# Patient Record
Sex: FEMALE | Race: OTHER | HISPANIC OR LATINO | Employment: OTHER | URBAN - METROPOLITAN AREA
[De-identification: names, ages, dates, MRNs, and addresses within clinical notes are randomized per-mention and may not be internally consistent; named-entity substitution may affect disease eponyms.]

---

## 2017-04-04 ENCOUNTER — HOSPITAL ENCOUNTER (EMERGENCY)
Facility: HOSPITAL | Age: 72
Discharge: HOME | End: 2017-04-04
Admitting: EMERGENCY MEDICINE
Payer: COMMERCIAL

## 2017-04-04 ENCOUNTER — APPOINTMENT (EMERGENCY)
Dept: RADIOLOGY | Facility: HOSPITAL | Age: 72
End: 2017-04-04
Payer: COMMERCIAL

## 2017-04-04 VITALS
RESPIRATION RATE: 20 BRPM | DIASTOLIC BLOOD PRESSURE: 86 MMHG | TEMPERATURE: 98.2 F | HEART RATE: 91 BPM | SYSTOLIC BLOOD PRESSURE: 160 MMHG | OXYGEN SATURATION: 94 % | WEIGHT: 197.4 LBS

## 2017-04-04 DIAGNOSIS — T14.8XXA CONTUSION: ICD-10-CM

## 2017-04-04 DIAGNOSIS — S62.91XA HAND FRACTURE, RIGHT: Primary | ICD-10-CM

## 2017-04-04 PROCEDURE — 73130 X-RAY EXAM OF HAND: CPT

## 2017-04-04 PROCEDURE — 99283 EMERGENCY DEPT VISIT LOW MDM: CPT

## 2017-04-04 RX ORDER — IBUPROFEN 600 MG/1
600 TABLET ORAL EVERY 6 HOURS PRN
Qty: 15 TABLET | Refills: 0 | Status: SHIPPED | OUTPATIENT
Start: 2017-04-04 | End: 2019-02-14

## 2017-04-04 RX ORDER — HYDROCODONE BITARTRATE AND ACETAMINOPHEN 5; 325 MG/1; MG/1
1 TABLET ORAL EVERY 6 HOURS PRN
Qty: 6 TABLET | Refills: 0 | Status: SHIPPED | OUTPATIENT
Start: 2017-04-04 | End: 2017-09-04

## 2017-04-12 ENCOUNTER — ALLSCRIPTS OFFICE VISIT (OUTPATIENT)
Dept: OTHER | Facility: OTHER | Age: 72
End: 2017-04-12

## 2017-05-05 ENCOUNTER — ALLSCRIPTS OFFICE VISIT (OUTPATIENT)
Dept: OTHER | Facility: OTHER | Age: 72
End: 2017-05-05

## 2017-05-05 ENCOUNTER — HOSPITAL ENCOUNTER (OUTPATIENT)
Dept: RADIOLOGY | Facility: OTHER | Age: 72
Discharge: HOME | End: 2017-05-05
Payer: COMMERCIAL

## 2017-05-05 DIAGNOSIS — M19.031 PRIMARY OSTEOARTHRITIS OF RIGHT WRIST: ICD-10-CM

## 2017-05-05 DIAGNOSIS — S62.316A CLOSED DISPLACED FRACTURE OF BASE OF FIFTH METACARPAL BONE OF RIGHT HAND: ICD-10-CM

## 2017-05-05 DIAGNOSIS — M25.631 STIFFNESS OF RIGHT WRIST, NOT ELSEWHERE CLASSIFIED: ICD-10-CM

## 2017-05-05 PROCEDURE — 73130 X-RAY EXAM OF HAND: CPT

## 2017-05-10 ENCOUNTER — APPOINTMENT (OUTPATIENT)
Dept: OCCUPATIONAL THERAPY | Facility: MEDICAL CENTER | Age: 72
End: 2017-05-10
Payer: COMMERCIAL

## 2017-05-10 PROCEDURE — 97165 OT EVAL LOW COMPLEX 30 MIN: CPT

## 2017-05-10 PROCEDURE — 97140 MANUAL THERAPY 1/> REGIONS: CPT

## 2017-05-10 PROCEDURE — 97010 HOT OR COLD PACKS THERAPY: CPT

## 2017-05-17 ENCOUNTER — APPOINTMENT (OUTPATIENT)
Dept: OCCUPATIONAL THERAPY | Facility: MEDICAL CENTER | Age: 72
End: 2017-05-17
Payer: COMMERCIAL

## 2017-05-17 PROCEDURE — 97140 MANUAL THERAPY 1/> REGIONS: CPT

## 2017-05-17 PROCEDURE — 97110 THERAPEUTIC EXERCISES: CPT

## 2017-05-17 PROCEDURE — 97010 HOT OR COLD PACKS THERAPY: CPT

## 2017-08-18 ENCOUNTER — TRANSCRIBE ORDERS (OUTPATIENT)
Dept: ADMINISTRATIVE | Facility: HOSPITAL | Age: 72
End: 2017-08-18

## 2017-08-18 DIAGNOSIS — Z12.31 ENCOUNTER FOR MAMMOGRAM TO ESTABLISH BASELINE MAMMOGRAM: Primary | ICD-10-CM

## 2017-08-23 ENCOUNTER — HOSPITAL ENCOUNTER (OUTPATIENT)
Dept: MAMMOGRAPHY | Facility: HOSPITAL | Age: 72
Discharge: HOME | End: 2017-08-23
Payer: COMMERCIAL

## 2017-08-23 ENCOUNTER — TRANSCRIBE ORDERS (OUTPATIENT)
Dept: ADMINISTRATIVE | Facility: HOSPITAL | Age: 72
End: 2017-08-23

## 2017-08-23 ENCOUNTER — HOSPITAL ENCOUNTER (OUTPATIENT)
Dept: NON INVASIVE DIAGNOSTICS | Facility: HOSPITAL | Age: 72
Discharge: HOME | End: 2017-08-23
Payer: COMMERCIAL

## 2017-08-23 ENCOUNTER — GENERIC CONVERSION - ENCOUNTER (OUTPATIENT)
Dept: OTHER | Facility: OTHER | Age: 72
End: 2017-08-23

## 2017-08-23 DIAGNOSIS — Z12.31 ENCOUNTER FOR MAMMOGRAM TO ESTABLISH BASELINE MAMMOGRAM: ICD-10-CM

## 2017-08-23 DIAGNOSIS — I49.9 VENTRICULAR ARRHYTHMIA: ICD-10-CM

## 2017-08-23 DIAGNOSIS — I49.9 VENTRICULAR ARRHYTHMIA: Primary | ICD-10-CM

## 2017-08-23 PROCEDURE — G0202 SCR MAMMO BI INCL CAD: HCPCS

## 2017-08-23 PROCEDURE — 93005 ELECTROCARDIOGRAM TRACING: CPT

## 2017-08-24 LAB
ATRIAL RATE: 83 BPM
P AXIS: 45 DEGREES
PR INTERVAL: 170 MS
QRS AXIS: 19 DEGREES
QRSD INTERVAL: 90 MS
QT INTERVAL: 390 MS
QTC INTERVAL: 458 MS
T WAVE AXIS: 38 DEGREES
VENTRICULAR RATE: 83 BPM

## 2017-08-30 ENCOUNTER — HOSPITAL ENCOUNTER (OUTPATIENT)
Dept: MAMMOGRAPHY | Facility: CLINIC | Age: 72
Discharge: HOME | End: 2017-08-30
Payer: COMMERCIAL

## 2017-08-30 ENCOUNTER — TRANSCRIBE ORDERS (OUTPATIENT)
Dept: ADMINISTRATIVE | Facility: HOSPITAL | Age: 72
End: 2017-08-30

## 2017-08-30 ENCOUNTER — HOSPITAL ENCOUNTER (OUTPATIENT)
Dept: ULTRASOUND IMAGING | Facility: CLINIC | Age: 72
Discharge: HOME | End: 2017-08-30
Payer: COMMERCIAL

## 2017-08-30 DIAGNOSIS — Z12.31 SCREENING MAMMOGRAM, ENCOUNTER FOR: Primary | ICD-10-CM

## 2017-08-30 DIAGNOSIS — R92.8 ABNORMAL SCREENING MAMMOGRAM: ICD-10-CM

## 2017-08-30 PROCEDURE — G0206 DX MAMMO INCL CAD UNI: HCPCS

## 2017-08-30 PROCEDURE — G0279 TOMOSYNTHESIS, MAMMO: HCPCS

## 2017-08-30 PROCEDURE — 76642 ULTRASOUND BREAST LIMITED: CPT

## 2017-09-04 ENCOUNTER — APPOINTMENT (EMERGENCY)
Dept: RADIOLOGY | Facility: HOSPITAL | Age: 72
End: 2017-09-04
Payer: COMMERCIAL

## 2017-09-04 ENCOUNTER — HOSPITAL ENCOUNTER (EMERGENCY)
Facility: HOSPITAL | Age: 72
Discharge: HOME | End: 2017-09-04
Attending: EMERGENCY MEDICINE | Admitting: EMERGENCY MEDICINE
Payer: COMMERCIAL

## 2017-09-04 VITALS
DIASTOLIC BLOOD PRESSURE: 78 MMHG | SYSTOLIC BLOOD PRESSURE: 147 MMHG | RESPIRATION RATE: 15 BRPM | OXYGEN SATURATION: 97 % | TEMPERATURE: 98.1 F | HEART RATE: 61 BPM | WEIGHT: 185 LBS

## 2017-09-04 DIAGNOSIS — J45.901 ACUTE BRONCHITIS WITH ASTHMA WITH ACUTE EXACERBATION: Primary | ICD-10-CM

## 2017-09-04 DIAGNOSIS — J20.9 ACUTE BRONCHITIS WITH ASTHMA WITH ACUTE EXACERBATION: Primary | ICD-10-CM

## 2017-09-04 PROCEDURE — 71020 HB CHEST X-RAY 2VW FRONTAL&LATL: CPT

## 2017-09-04 PROCEDURE — 99283 EMERGENCY DEPT VISIT LOW MDM: CPT

## 2017-09-04 PROCEDURE — 94640 AIRWAY INHALATION TREATMENT: CPT

## 2017-09-04 RX ORDER — LOSARTAN POTASSIUM AND HYDROCHLOROTHIAZIDE 25; 100 MG/1; MG/1
1 TABLET ORAL DAILY
COMMUNITY
Start: 2015-02-17 | End: 2019-02-25 | Stop reason: SDUPTHER

## 2017-09-04 RX ORDER — ALBUTEROL SULFATE 90 UG/1
2 AEROSOL, METERED RESPIRATORY (INHALATION) EVERY 4 HOURS PRN
COMMUNITY
Start: 2015-04-20 | End: 2019-03-28 | Stop reason: SDUPTHER

## 2017-09-04 RX ORDER — GUAIFENESIN 600 MG
600 TABLET, EXTENDED RELEASE 12 HR ORAL 2 TIMES DAILY
Qty: 10 TABLET | Refills: 0 | Status: SHIPPED | OUTPATIENT
Start: 2017-09-04 | End: 2017-09-09

## 2017-09-04 RX ORDER — ALBUTEROL SULFATE 2.5 MG/3ML
2.5 SOLUTION RESPIRATORY (INHALATION) EVERY 6 HOURS PRN
COMMUNITY
End: 2020-08-25 | Stop reason: SDUPTHER

## 2017-09-04 RX ORDER — PREDNISONE 20 MG/1
40 TABLET ORAL DAILY
Qty: 8 TABLET | Refills: 0 | Status: SHIPPED | OUTPATIENT
Start: 2017-09-04 | End: 2017-09-08

## 2017-09-04 RX ORDER — FLUTICASONE PROPIONATE 50 MCG
1 SPRAY, SUSPENSION (ML) NASAL
COMMUNITY
Start: 2015-02-27 | End: 2019-04-19

## 2017-09-04 RX ORDER — IPRATROPIUM BROMIDE AND ALBUTEROL SULFATE 2.5; .5 MG/3ML; MG/3ML
3 SOLUTION RESPIRATORY (INHALATION) ONCE
Status: COMPLETED | OUTPATIENT
Start: 2017-09-04 | End: 2017-09-04

## 2017-09-04 RX ORDER — LEVOFLOXACIN 750 MG/1
750 TABLET ORAL DAILY
Qty: 5 TABLET | Refills: 0 | Status: SHIPPED | OUTPATIENT
Start: 2017-09-04 | End: 2017-09-09

## 2017-09-04 RX ADMIN — IPRATROPIUM BROMIDE AND ALBUTEROL SULFATE 3 ML: .5; 3 SOLUTION RESPIRATORY (INHALATION) at 09:41

## 2017-09-04 RX ADMIN — PREDNISONE 50 MG: 10 TABLET ORAL at 09:39

## 2017-10-31 ENCOUNTER — ALLSCRIPTS OFFICE VISIT (OUTPATIENT)
Dept: OTHER | Facility: OTHER | Age: 72
End: 2017-10-31

## 2017-11-01 NOTE — CONSULTS
Assessment  1  Palpitations (785 1) (R00 2)   2  Premature atrial contractions (427 61) (I49 1)    Plan  Palpitations    · EKG/ECG- POC; Status:Complete;   Done: [id number] 03:04PM   Perform: In Office; Last Updated By:Nico Brown; 10/31/2017 3:12:11 PM;Ordered; For:Palpitations; Ordered By:Andre Santos;  Premature atrial contractions    · Follow-up visit in 4 Months Evaluation and Treatment  Follow-up  Status: Hold For -  Scheduling  Requested for: [id number]   Ordered; For: Premature atrial contractions; Ordered By: Gianfranco Yates Performed:  Due: [id number]    Discussion/Summary    This patient has premature atrial contractions she is relatively asymptomatic with the exception of palpitations  I did explain to her that these are benign atrial arrhythmias however they can be a harbinger of atrial fibrillation  My recommendation is that I follow up with her in four months and if she continues to have frequent atrial ectopy we consider 48 hour Holter monitor and repeat echocardiogram  Her echocardiogram in 2014 showed normal left ventricular ejection fraction and no significant atrial enlargement or valvular heart disease  hypertension is adequately controlled when I checked her systolic blood pressure today was 120   does have risk factors for atrial fibrillation including obesity  She majano make is an effort to exercise on a regular basis  will follow up this kind patient four months I thank you for asking me to see her in consultation  History of Present Illness  Cardiology HPI Free Text Note Form [facility]: consult for extra beats PACs and PVCs  history of htn and asthma  htn for years  history no cva or MI  brothers and sisters - cancer  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 [address]  no tobacco  etoh - rare etoh    [place]  moved here age 3    has occasional palpitations no chest pain or shortness of breath with exertion she was getting lightheaded and dizzy until her metoprolol was back down and then she felt better  Review of Systems      Cardiac: rhythm problems, but-- no chest pain,-- no signs of swelling-- and-- no syncope/fainting  Skin: No complaints of nonhealing sores or skin rash  Genitourinary: No complaints of recurrent urinary tract infections, frequent urination at night, difficult urination, blood in urine, kidney stones, loss of bladder control, kidney problems, denies any birth control or hormone replacement, is not post menopausal, not currently pregnant  Psychological: anxiety-- and-- panic attacks, but-- no depression  General: no trouble sleeping  Respiratory: shortness of breath,-- cough/sputum-- and-- wheezing--   asthma  Gastrointestinal: heartburn, but-- no nausea,-- no vomiting,-- no diarrhea-- and-- no constipation   Neurological: no numbness,-- no tingling,-- no weakness,-- no seizures,-- no headaches-- and-- no dizziness   Musculoskeletal: arthritis,-- back pain-- and-- swelling/pain-- knees, neck, back  ROS reviewed  Active Problems  1  Closed nondisplaced fracture of base of fifth metacarpal bone of right hand with routine   healing (V54 19) (S62 346D)   2  Distal radius fracture (813 42) (S52 509A)   3  Encounter for routine gynecological examination with Papanicolaou smear of cervix   (V72 31,V76 2) (Z01 419)   4  Orthopedic aftercare (V54 9) (Z47 89)   5  Palpitations (785 1) (R00 2)   6  Primary osteoarthritis of right wrist (715 13) (M19 031)   7  Wrist stiffness, right (719 53) (M25 631)    Past Medical History   · History of No significant past medical history    The active problems and past medical history were reviewed and updated today  Surgical History   · History of Appendectomy   · Denied: History Of Prior Surgery   · History of Knee Surgery   · History of Shoulder Surgery    The surgical history was reviewed and updated today         Family History  Mother    · Denied: Family history of arthritis   · Denied: Family history of malignant neoplasm   · Denied: Family history of Osteopetrosis  Father    · Denied: Family history of arthritis   · Denied: Family history of malignant neoplasm   · Denied: Family history of Osteopetrosis  Family History Reviewed: The family history was reviewed and updated today  Social History   · Drinks wine (V46 89) (Z78 9)   · Never a smoker   · Non-smoker  The social history was reviewed and updated today  The social history was reviewed and is unchanged  Current Meds   1  Advair Diskus 250-50 MCG/DOSE Inhalation Aerosol Powder Breath Activated; Therapy: 31Nuq7698 to Recorded   2  Fluticasone Propionate 50 MCG/ACT Nasal Suspension; Therapy: 87YFV9430 to Recorded   3  Losartan Potassium-HCTZ 100-25 MG Oral Tablet; Therapy: 76PRQ4941 to Recorded   4  Metoprolol Tartrate 25 MG Oral Tablet; Therapy: 78Vjd7897 to Recorded   5  Ventolin  (90 Base) MCG/ACT Inhalation Aerosol Solution; Therapy: 82Ubo9841 to Recorded    The medication list was reviewed and updated today  Allergies  1  No Known Drug Allergies    Vitals  Signs   Heart Rate: 65  Respiration: 16  Systolic: 367  Diastolic: 86  Height: 4 ft 11 in  Weight: 194 lb   BMI Calculated: 39 18  BSA Calculated: 1 82    Physical Exam    Constitutional - General appearance: No acute distress, well appearing and well nourished  Eyes - Conjunctiva and Sclera examination: Conjunctiva pink, sclera anicteric  Neck - Normal, no JVD   Pulmonary - Respiratory effort: No signs of respiratory distress  -- Auscultation of lungs: Clear to auscultation  Cardiovascular - Auscultation of heart: Normal rate and rhythm, normal S1 and S2, no murmurs  -- Pedal pulses: Normal, 2+ bilaterally  -- Examination of extremities for edema and/or varicosities: Normal     Abdomen - Soft  Musculoskeletal - Gait and station: Normal gait  Skin - Skin: Normal without rashes  Skin is warm and well perfused  Neurologic - Speech normal  No focal deficits  Psychiatric - Orientation to person, place, and time: Normal       Results/Data  Normal sinus rhythm with frequent premature atrial contractions some of these are a barely conducted  End of Encounter Meds  1  Advair Diskus 250-50 MCG/DOSE Inhalation Aerosol Powder Breath Activated; Therapy: 42Oan0893 to Recorded   2  Fluticasone Propionate 50 MCG/ACT Nasal Suspension; Therapy: 77QCL7201 to Recorded   3  Losartan Potassium-HCTZ 100-25 MG Oral Tablet; Therapy: 86TPR8294 to Recorded   4  Metoprolol Tartrate 25 MG Oral Tablet; Therapy: 15Elb8687 to Recorded   5  Ventolin  (90 Base) MCG/ACT Inhalation Aerosol Solution;    Therapy: 39Izw5392 to Recorded    Signatures   Electronically signed by : Talya Dillon DO; Oct 31 2017  3:46PM EST                       (Author)

## 2017-12-15 ENCOUNTER — GENERIC CONVERSION - ENCOUNTER (OUTPATIENT)
Dept: OTHER | Facility: OTHER | Age: 72
End: 2017-12-15

## 2017-12-15 ENCOUNTER — HOSPITAL ENCOUNTER (OUTPATIENT)
Dept: MAMMOGRAPHY | Facility: CLINIC | Age: 72
Discharge: HOME | End: 2017-12-15
Payer: COMMERCIAL

## 2017-12-15 ENCOUNTER — HOSPITAL ENCOUNTER (OUTPATIENT)
Dept: ULTRASOUND IMAGING | Facility: CLINIC | Age: 72
Discharge: HOME | End: 2017-12-15
Payer: COMMERCIAL

## 2017-12-15 DIAGNOSIS — N63.0 BREAST LUMP: ICD-10-CM

## 2017-12-15 DIAGNOSIS — Z12.31 SCREENING MAMMOGRAM, ENCOUNTER FOR: ICD-10-CM

## 2017-12-15 PROCEDURE — G0279 TOMOSYNTHESIS, MAMMO: HCPCS

## 2017-12-15 PROCEDURE — G0206 DX MAMMO INCL CAD UNI: HCPCS

## 2018-01-13 VITALS
SYSTOLIC BLOOD PRESSURE: 146 MMHG | WEIGHT: 194 LBS | DIASTOLIC BLOOD PRESSURE: 86 MMHG | HEIGHT: 59 IN | RESPIRATION RATE: 16 BRPM | BODY MASS INDEX: 39.11 KG/M2 | HEART RATE: 65 BPM

## 2018-01-14 VITALS
HEIGHT: 59 IN | SYSTOLIC BLOOD PRESSURE: 145 MMHG | HEART RATE: 73 BPM | DIASTOLIC BLOOD PRESSURE: 80 MMHG | WEIGHT: 197 LBS | BODY MASS INDEX: 39.72 KG/M2

## 2018-01-15 VITALS
WEIGHT: 197.09 LBS | DIASTOLIC BLOOD PRESSURE: 88 MMHG | SYSTOLIC BLOOD PRESSURE: 161 MMHG | HEART RATE: 89 BPM | BODY MASS INDEX: 39.73 KG/M2 | HEIGHT: 59 IN

## 2018-01-23 ENCOUNTER — APPOINTMENT (EMERGENCY)
Dept: RADIOLOGY | Facility: HOSPITAL | Age: 73
End: 2018-01-23
Payer: COMMERCIAL

## 2018-01-23 ENCOUNTER — HOSPITAL ENCOUNTER (EMERGENCY)
Facility: HOSPITAL | Age: 73
Discharge: HOME | End: 2018-01-23
Attending: EMERGENCY MEDICINE
Payer: COMMERCIAL

## 2018-01-23 VITALS
DIASTOLIC BLOOD PRESSURE: 99 MMHG | WEIGHT: 203.8 LBS | BODY MASS INDEX: 41.16 KG/M2 | OXYGEN SATURATION: 98 % | TEMPERATURE: 98.6 F | RESPIRATION RATE: 22 BRPM | SYSTOLIC BLOOD PRESSURE: 188 MMHG | HEART RATE: 90 BPM

## 2018-01-23 DIAGNOSIS — J45.901 ASTHMA EXACERBATION: Primary | ICD-10-CM

## 2018-01-23 LAB
ALBUMIN SERPL BCP-MCNC: 4 G/DL (ref 3.5–5)
ALP SERPL-CCNC: 77 U/L (ref 46–116)
ALT SERPL W P-5'-P-CCNC: 24 U/L (ref 12–78)
ANION GAP SERPL CALCULATED.3IONS-SCNC: 8 MMOL/L (ref 4–13)
AST SERPL W P-5'-P-CCNC: 22 U/L (ref 5–45)
BASOPHILS # BLD AUTO: 0.02 THOUSANDS/ΜL (ref 0–0.1)
BASOPHILS NFR BLD AUTO: 0 % (ref 0–1)
BILIRUB SERPL-MCNC: 0.29 MG/DL (ref 0.2–1)
BUN SERPL-MCNC: 27 MG/DL (ref 5–25)
CALCIUM SERPL-MCNC: 9.2 MG/DL (ref 8.3–10.1)
CHLORIDE SERPL-SCNC: 103 MMOL/L (ref 100–108)
CO2 SERPL-SCNC: 31 MMOL/L (ref 21–32)
CREAT SERPL-MCNC: 0.91 MG/DL (ref 0.6–1.3)
EOSINOPHIL # BLD AUTO: 0.27 THOUSAND/ΜL (ref 0–0.61)
EOSINOPHIL NFR BLD AUTO: 4 % (ref 0–6)
ERYTHROCYTE [DISTWIDTH] IN BLOOD BY AUTOMATED COUNT: 13.4 % (ref 11.6–15.1)
GFR SERPL CREATININE-BSD FRML MDRD: 64 ML/MIN/1.73SQ M
GLUCOSE SERPL-MCNC: 103 MG/DL (ref 65–140)
HCT VFR BLD AUTO: 38 % (ref 34.8–46.1)
HGB BLD-MCNC: 12.6 G/DL (ref 11.5–15.4)
LYMPHOCYTES # BLD AUTO: 2.75 THOUSANDS/ΜL (ref 0.6–4.47)
LYMPHOCYTES NFR BLD AUTO: 40 % (ref 14–44)
MCH RBC QN AUTO: 29.5 PG (ref 26.8–34.3)
MCHC RBC AUTO-ENTMCNC: 33.2 G/DL (ref 31.4–37.4)
MCV RBC AUTO: 89 FL (ref 82–98)
MONOCYTES # BLD AUTO: 0.69 THOUSAND/ΜL (ref 0.17–1.22)
MONOCYTES NFR BLD AUTO: 10 % (ref 4–12)
NEUTROPHILS # BLD AUTO: 3.14 THOUSANDS/ΜL (ref 1.85–7.62)
NEUTS SEG NFR BLD AUTO: 46 % (ref 43–75)
NRBC BLD AUTO-RTO: 0 /100 WBCS
PLATELET # BLD AUTO: 106 THOUSANDS/UL (ref 149–390)
PMV BLD AUTO: 13.2 FL (ref 8.9–12.7)
POTASSIUM SERPL-SCNC: 3.6 MMOL/L (ref 3.5–5.3)
PROT SERPL-MCNC: 7.6 G/DL (ref 6.4–8.2)
RBC # BLD AUTO: 4.27 MILLION/UL (ref 3.81–5.12)
SODIUM SERPL-SCNC: 142 MMOL/L (ref 136–145)
WBC # BLD AUTO: 6.87 THOUSAND/UL (ref 4.31–10.16)

## 2018-01-23 PROCEDURE — 80053 COMPREHEN METABOLIC PANEL: CPT | Performed by: EMERGENCY MEDICINE

## 2018-01-23 PROCEDURE — 99285 EMERGENCY DEPT VISIT HI MDM: CPT

## 2018-01-23 PROCEDURE — 85025 COMPLETE CBC W/AUTO DIFF WBC: CPT | Performed by: EMERGENCY MEDICINE

## 2018-01-23 PROCEDURE — 93005 ELECTROCARDIOGRAM TRACING: CPT

## 2018-01-23 PROCEDURE — 36415 COLL VENOUS BLD VENIPUNCTURE: CPT

## 2018-01-23 PROCEDURE — 94640 AIRWAY INHALATION TREATMENT: CPT

## 2018-01-23 PROCEDURE — 71046 X-RAY EXAM CHEST 2 VIEWS: CPT

## 2018-01-23 RX ORDER — PREDNISONE 20 MG/1
60 TABLET ORAL DAILY
Qty: 15 TABLET | Refills: 0 | Status: SHIPPED | OUTPATIENT
Start: 2018-01-23 | End: 2018-01-28

## 2018-01-23 RX ORDER — PREDNISONE 20 MG/1
60 TABLET ORAL ONCE
Status: COMPLETED | OUTPATIENT
Start: 2018-01-23 | End: 2018-01-23

## 2018-01-23 RX ORDER — ALBUTEROL SULFATE 2.5 MG/3ML
5 SOLUTION RESPIRATORY (INHALATION) ONCE
Status: COMPLETED | OUTPATIENT
Start: 2018-01-23 | End: 2018-01-23

## 2018-01-23 RX ADMIN — PREDNISONE 60 MG: 20 TABLET ORAL at 22:08

## 2018-01-23 RX ADMIN — ALBUTEROL SULFATE 5 MG: 2.5 SOLUTION RESPIRATORY (INHALATION) at 18:25

## 2018-01-23 RX ADMIN — IPRATROPIUM BROMIDE 0.5 MG: 0.5 SOLUTION RESPIRATORY (INHALATION) at 18:25

## 2018-01-24 NOTE — DISCHARGE INSTRUCTIONS
Asthma   WHAT YOU NEED TO KNOW:   What is asthma? Asthma is a lung disease that makes breathing difficult  Chronic inflammation and reactions to triggers narrow the airways in the lungs  Asthma can become life-threatening if it is not managed  What is cough-variant asthma? Cough-variant asthma is a type of asthma that causes a dry cough that keeps coming back  A dry cough may be your only symptom, or you may also have chest tightness  These symptoms may be caused by exercise or exposure to odors, allergens, or respiratory tract infections  Cough-variant asthma is treated the same way as typical asthma  What are the signs and symptoms of asthma? · Coughing     · Wheezing     · Shortness of breath     · Chest tightness  What may trigger an asthma attack? · A cold, the flu, or a sinus infection     · Exercise     · Weather changes, especially cold, dry air    · Smoking or secondhand smoke    · Fumes from chemicals, dust, air pollution, or other small particles in the air    · Pets, pollen, dust mites, or cockroaches       How is asthma diagnosed? Your healthcare provider will examine you and listen to your lungs  He or she will ask how often you have symptoms and what makes them worse  Tell him or her if you have trouble sleeping, exercising, or doing other activities because of shortness of breath  Your provider will ask about your allergies and past colds, and if anyone in your family has allergies or asthma  Tell your healthcare provider about medicines you take, including over-the-counter drugs and herbal supplements  You may need a chest x-ray to check for lung problems, or a lung function test  Lung function tests show how well you can breathe  How is asthma treated? · Medicines  decrease inflammation, open airways, and make it easier to breathe  Medicines may be inhaled, taken as a pill, or injected  Short-term medicines relieve your symptoms quickly   Long-term medicines are used to prevent future attacks  You may also need medicine to help control your allergies  · Allergy testing  may find allergies that trigger an asthma attack  You may need allergy shots or medicine to control allergies that make your asthma worse  How can I manage my symptoms and prevent future attacks? · Follow your Asthma Action Plan (AAP)  This is a written plan that you and your healthcare provider create  It explains which medicine you need and when to change doses if necessary  It also explains how you can monitor symptoms and use a peak flow meter  The meter measures how well your lungs are working  · Manage other health conditions , such as allergies, acid reflux, and sleep apnea  · Identify and avoid triggers  These may include pets, dust mites, mold, and cockroaches  · Do not smoke or be around others who smoke  Nicotine and other chemicals in cigarettes and cigars can cause lung damage  Ask your healthcare provider for information if you currently smoke and need help to quit  E-cigarettes or smokeless tobacco still contain nicotine  Talk to your healthcare provider before you use these products  · Ask about the flu vaccine  The flu can make your asthma worse  You may need a yearly flu shot  When should I seek immediate care? · You have severe shortness of breath  · Your lips or nails turn blue or gray  · The skin around your neck and ribs pulls in with each breath  · You have shortness of breath, even after you take your short-term medicine as directed  · Your peak flow numbers are in the red zone of your AAP  When should I contact my healthcare provider? · You run out of medicine before your next refill is due  · Your symptoms get worse  · You need to take more medicine than usual to control your symptoms  · You have questions or concerns about your condition or care  CARE AGREEMENT:   You have the right to help plan your care   Learn about your health condition and how it may be treated  Discuss treatment options with your caregivers to decide what care you want to receive  You always have the right to refuse treatment  The above information is an  only  It is not intended as medical advice for individual conditions or treatments  Talk to your doctor, nurse or pharmacist before following any medical regimen to see if it is safe and effective for you  © 2017 [employer] [employer] Information is for End User's use only and may not be sold, redistributed or otherwise used for commercial purposes  All illustrations and images included in CareNotes® are the copyrighted property of A D A M , Inc  or [employer]

## 2018-01-24 NOTE — ED PROVIDER NOTES
History  Chief Complaint   Patient presents with    Shortness of Breath     sob w/wheezing x 1 5 weeks  cough  denies fever/chills       History provided by:  Patient   used: No    Shortness of Breath   Severity:  Mild  Onset quality:  Gradual  Duration:  2 weeks  Timing:  Intermittent  Progression:  Waxing and waning  Chronicity:  Recurrent  Context: URI    Context comment:  Hx of Asthma  Relieved by:  Nothing  Worsened by:  Nothing  Ineffective treatments:  Inhaler  Associated symptoms: cough and wheezing    Associated symptoms: no abdominal pain, no chest pain, no fever, no headaches, no neck pain, no rash, no sore throat, no sputum production and no vomiting    Cough:     Cough characteristics:  Non-productive    Sputum characteristics:  Nondescript    Severity:  Moderate    Onset quality:  Gradual    Duration:  2 weeks    Timing:  Intermittent    Progression:  Waxing and waning    Chronicity:  Recurrent  Wheezing:     Severity:  Moderate    Onset quality:  Gradual    Duration:  2 weeks    Timing:  Intermittent    Progression:  Waxing and waning    Chronicity:  Recurrent  Risk factors comment:  Asthma  Hypertension      Prior to Admission Medications   Prescriptions Last Dose Informant Patient Reported? Taking?    albuterol (2 5 mg/3 mL) 0 083 % nebulizer solution   Yes No   Sig: Take 2 5 mg by nebulization every 6 (six) hours as needed for wheezing   albuterol (VENTOLIN HFA) 90 mcg/act inhaler   Yes No   Sig: Inhale 2 puffs every 4 (four) hours as needed     fluticasone (FLONASE) 50 mcg/act nasal spray   Yes No   Si spray into each nostril     fluticasone-salmeterol (ADVAIR DISKUS) 250-50 mcg/dose inhaler   Yes No   Sig: Inhale 1 puff 2 (two) times a day     ibuprofen (MOTRIN) 600 mg tablet   No No   Sig: Take 1 tablet by mouth every 6 (six) hours as needed for mild pain for up to 15 doses   losartan-hydrochlorothiazide (HYZAAR) 100-25 MG per tablet   Yes No   Sig: Take 1 tablet by mouth daily     metoprolol tartrate (LOPRESSOR) 25 mg tablet   Yes No   Sig: Take 25 mg by mouth daily        Facility-Administered Medications: None       Past Medical History:   Diagnosis Date    Asthma     Hypertension     Ventricular arrhythmia        Past Surgical History:   Procedure Laterality Date    KNEE SURGERY      ROTATOR CUFF REPAIR         History reviewed  No pertinent family history  I have reviewed and agree with the history as documented  Social History   Substance Use Topics    Smoking status: Never Smoker    Smokeless tobacco: Never Used    Alcohol use No        Review of Systems   Constitutional: Negative for activity change, chills and fever  HENT: Negative for facial swelling, sore throat and trouble swallowing  Eyes: Negative for pain and visual disturbance  Respiratory: Positive for cough, shortness of breath and wheezing  Negative for sputum production and chest tightness  Cardiovascular: Negative for chest pain and leg swelling  Gastrointestinal: Negative for abdominal pain, blood in stool, diarrhea, nausea and vomiting  Genitourinary: Negative for dysuria and flank pain  Musculoskeletal: Negative for back pain, neck pain and neck stiffness  Skin: Negative for pallor and rash  Allergic/Immunologic: Negative for environmental allergies and immunocompromised state  Neurological: Negative for dizziness and headaches  Hematological: Negative for adenopathy  Does not bruise/bleed easily  Psychiatric/Behavioral: Negative for agitation and behavioral problems  All other systems reviewed and are negative        Physical Exam  ED Triage Vitals   Temperature Pulse Respirations Blood Pressure SpO2   01/23/18 1821 01/23/18 1821 01/23/18 1821 01/23/18 1821 01/23/18 1821   98 2 °F (36 8 °C) 84 (!) 27 (!) 195/98 99 %      Temp src Heart Rate Source Patient Position - Orthostatic VS BP Location FiO2 (%)   -- 01/23/18 2119 01/23/18 2119 01/23/18 2119 --    Monitor Sitting Right arm       Pain Score       01/23/18 1821       4           Orthostatic Vital Signs  Vitals:    01/23/18 1821 01/23/18 2119 01/23/18 2145   BP: (!) 195/98 (!) 204/103 (!) 188/99   Pulse: 84 92 90   Patient Position - Orthostatic VS:  Sitting Sitting       Physical Exam   Constitutional: She is oriented to person, place, and time  She appears well-developed and well-nourished  No distress  HENT:   Head: Normocephalic and atraumatic  Airway intact, speaking full sentences     Eyes: EOM are normal    Neck: Normal range of motion  Neck supple  Cardiovascular: Normal rate, regular rhythm, normal heart sounds and intact distal pulses  Pulmonary/Chest: Effort normal  She has wheezes (bilateral)  No increased work of breathing   Abdominal: Soft  Bowel sounds are normal  There is no tenderness  There is no rebound and no guarding  Musculoskeletal: Normal range of motion  Neurological: She is alert and oriented to person, place, and time  Skin: Skin is warm and dry  Psychiatric: She has a normal mood and affect  Nursing note and vitals reviewed        ED Medications  Medications   ipratropium (ATROVENT) 0 02 % inhalation solution 0 5 mg (0 5 mg Nebulization Given 1/23/18 1825)   albuterol inhalation solution 5 mg (5 mg Nebulization Given 1/23/18 1825)   predniSONE tablet 60 mg (60 mg Oral Given 1/23/18 2208)       Diagnostic Studies  Results Reviewed     Procedure Component Value Units Date/Time    CBC and differential [[id number]]  (Abnormal) Collected:  01/23/18 1928    Lab Status:  Final result Specimen:  Blood from Arm, Right Updated:  01/23/18 2011     WBC 6 87 Thousand/uL      RBC 4 27 Million/uL      Hemoglobin 12 6 g/dL      Hematocrit 38 0 %      MCV 89 fL      MCH 29 5 pg      MCHC 33 2 g/dL      RDW 13 4 %      MPV 13 2 (H) fL      Platelets 733 (L) Thousands/uL      nRBC 0 /100 WBCs      Neutrophils Relative 46 %      Lymphocytes Relative 40 %      Monocytes Relative 10 % Eosinophils Relative 4 %      Basophils Relative 0 %      Neutrophils Absolute 3 14 Thousands/µL      Lymphocytes Absolute 2 75 Thousands/µL      Monocytes Absolute 0 69 Thousand/µL      Eosinophils Absolute 0 27 Thousand/µL      Basophils Absolute 0 02 Thousands/µL     Comprehensive metabolic panel [[id number]]  (Abnormal) Collected:  01/23/18 1928    Lab Status:  Final result Specimen:  Blood from Arm, Right Updated:  01/23/18 2001     Sodium 142 mmol/L      Potassium 3 6 mmol/L      Chloride 103 mmol/L      CO2 31 mmol/L      Anion Gap 8 mmol/L      BUN 27 (H) mg/dL      Creatinine 0 91 mg/dL      Glucose 103 mg/dL      Calcium 9 2 mg/dL      AST 22 U/L      ALT 24 U/L      Alkaline Phosphatase 77 U/L      Total Protein 7 6 g/dL      Albumin 4 0 g/dL      Total Bilirubin 0 29 mg/dL      eGFR 64 ml/min/1 73sq m     Narrative:         National Kidney Disease Education Program recommendations are as follows:  GFR calculation is accurate only with a steady state creatinine  Chronic Kidney disease less than 60 ml/min/1 73 sq  meters  Kidney failure less than 15 ml/min/1 73 sq  meters  XR chest 2 views    (Results Pending)              Procedures  ECG 12 Lead Documentation  Date/Time: 1/23/2018 9:57 PM  Performed by: Vikas Has  Authorized by: Vikas Has     Indications / Diagnosis:  Cough, Dyspnea  ECG reviewed by me, the ED Provider: yes    Patient location:  ED  Interpretation:     Interpretation: normal    Rate:     ECG rate:  91    ECG rate assessment: normal    Rhythm:     Rhythm: sinus rhythm    Ectopy:     Ectopy: none    QRS:     QRS axis:  Normal  Conduction:     Conduction: normal    ST segments:     ST segments:  Normal  T waves:     T waves: normal             Phone Contacts  ED Phone Contact    ED Course  ED Course      NOTE: Labs, CXR, Duoneb first nursed, workup wnl, patient felt better   Presentation c/w mild Asthma exacerbation; given Prednisone, d/c on Prednisone script; stable on discharge  MDM  Number of Diagnoses or Management Options  Asthma exacerbation: new and requires workup  Diagnosis management comments: Patient is a 70-year-old lady, history of asthma, comes in with recurrent symptoms of cough, wheezing, going on for 2 weeks, has tried inhalers and cough medications, was also put on amoxicillin for sinusitis about 2 weeks back, no recent use of steroids  Patient is alert, oriented, well-appearing, hemodynamically stable vital signs noted; airway intact speaking full sentences without difficulty, Lung exam shows bilateral wheezing, no increased work of breathing; Cardiovascular normal heart sounds:  Normal, equal pulses bilaterally; Abdomen: soft, nontender, nondistended, bowel sounds present; Neuro: normal cranial nerve, motor and sensory exam, no focal deficits; no neck stiffness; Extremities: no calf swelling or tenderness, neurovascular intact distally  Impression:  Mild-to-moderate asthma exacerbation  Labs and x-rays were done according to 1st nurse protocol, which were reviewed and within normal limits  Patient received a DuoNeb, and feels better  Will give prednisone, discharged on same for total 5 days, follow up with family doctor  Amount and/or Complexity of Data Reviewed  Clinical lab tests: ordered and reviewed  Tests in the radiology section of CPT®: ordered and reviewed  Tests in the medicine section of CPT®: ordered and reviewed  Independent visualization of images, tracings, or specimens: yes      CritCare Time    Disposition  Final diagnoses:   Asthma exacerbation     Time reflects when diagnosis was documented in both MDM as applicable and the Disposition within this note     Time User Action Codes Description Comment    1/23/2018  9:53 PM Chris Goldman Asthma exacerbation       ED Disposition     ED Disposition Condition Comment    Discharge  Starla Abebe discharge to home/self care      Condition at discharge: Stable        Follow-up Information     Follow up With Specialties Details Why Contact Info    Julia Manuel MD Family Medicine   [phone] [address]  [phone]          Discharge Medication List as of 1/23/2018  9:54 PM      START taking these medications    Details   predniSONE 20 mg tablet Take 3 tablets by mouth daily for 5 days, Starting Tue 1/23/2018, Until Sun 1/28/2018, Print         CONTINUE these medications which have NOT CHANGED    Details   albuterol (2 5 mg/3 mL) 0 083 % nebulizer solution Take 2 5 mg by nebulization every 6 (six) hours as needed for wheezing, Historical Med      albuterol (VENTOLIN HFA) 90 mcg/act inhaler Inhale 2 puffs every 4 (four) hours as needed  , Starting Mon 4/20/2015, Historical Med      fluticasone (FLONASE) 50 mcg/act nasal spray 1 spray into each nostril  , Starting Fri 2/27/2015, Historical Med      fluticasone-salmeterol (ADVAIR DISKUS) 250-50 mcg/dose inhaler Inhale 1 puff 2 (two) times a day  , Starting Mon 4/20/2015, Historical Med      ibuprofen (MOTRIN) 600 mg tablet Take 1 tablet by mouth every 6 (six) hours as needed for mild pain for up to 15 doses, Starting 4/4/2017, Until Discontinued, Print      losartan-hydrochlorothiazide (HYZAAR) 100-25 MG per tablet Take 1 tablet by mouth daily  , Starting Tue 2/17/2015, Historical Med      metoprolol tartrate (LOPRESSOR) 25 mg tablet Take 25 mg by mouth daily  , Starting Wed 4/22/2015, Historical Med           No discharge procedures on file      ED Provider  Electronically Signed by           Tri Dobson MD  01/23/18 [id number]

## 2018-01-25 LAB
ATRIAL RATE: 91 BPM
P AXIS: 59 DEGREES
PR INTERVAL: 174 MS
QRS AXIS: 42 DEGREES
QRSD INTERVAL: 96 MS
QT INTERVAL: 384 MS
QTC INTERVAL: 472 MS
T WAVE AXIS: 42 DEGREES
VENTRICULAR RATE: 91 BPM

## 2018-06-01 ENCOUNTER — HOSPITAL ENCOUNTER (EMERGENCY)
Facility: HOSPITAL | Age: 73
Discharge: HOME | End: 2018-06-01
Attending: EMERGENCY MEDICINE
Payer: COMMERCIAL

## 2018-06-01 ENCOUNTER — APPOINTMENT (EMERGENCY)
Dept: CT IMAGING | Facility: HOSPITAL | Age: 73
End: 2018-06-01
Payer: COMMERCIAL

## 2018-06-01 VITALS
SYSTOLIC BLOOD PRESSURE: 160 MMHG | TEMPERATURE: 97.5 F | DIASTOLIC BLOOD PRESSURE: 77 MMHG | RESPIRATION RATE: 16 BRPM | OXYGEN SATURATION: 97 % | HEART RATE: 74 BPM

## 2018-06-01 DIAGNOSIS — I10 HYPERTENSION: ICD-10-CM

## 2018-06-01 DIAGNOSIS — R51.9 HEADACHE: Primary | ICD-10-CM

## 2018-06-01 LAB
ANION GAP SERPL CALCULATED.3IONS-SCNC: 8 MMOL/L (ref 4–13)
BASOPHILS # BLD AUTO: 0.02 THOUSANDS/ΜL (ref 0–0.1)
BASOPHILS NFR BLD AUTO: 0 % (ref 0–1)
BUN SERPL-MCNC: 24 MG/DL (ref 5–25)
CALCIUM SERPL-MCNC: 9.7 MG/DL (ref 8.3–10.1)
CHLORIDE SERPL-SCNC: 101 MMOL/L (ref 100–108)
CO2 SERPL-SCNC: 33 MMOL/L (ref 21–32)
CREAT SERPL-MCNC: 0.76 MG/DL (ref 0.6–1.3)
EOSINOPHIL # BLD AUTO: 0.18 THOUSAND/ΜL (ref 0–0.61)
EOSINOPHIL NFR BLD AUTO: 3 % (ref 0–6)
ERYTHROCYTE [DISTWIDTH] IN BLOOD BY AUTOMATED COUNT: 13.3 % (ref 11.6–15.1)
GFR SERPL CREATININE-BSD FRML MDRD: 79 ML/MIN/1.73SQ M
GLUCOSE SERPL-MCNC: 94 MG/DL (ref 65–140)
HCT VFR BLD AUTO: 39.2 % (ref 34.8–46.1)
HGB BLD-MCNC: 13.1 G/DL (ref 11.5–15.4)
LYMPHOCYTES # BLD AUTO: 1.54 THOUSANDS/ΜL (ref 0.6–4.47)
LYMPHOCYTES NFR BLD AUTO: 27 % (ref 14–44)
MCH RBC QN AUTO: 29.5 PG (ref 26.8–34.3)
MCHC RBC AUTO-ENTMCNC: 33.4 G/DL (ref 31.4–37.4)
MCV RBC AUTO: 88 FL (ref 82–98)
MONOCYTES # BLD AUTO: 0.63 THOUSAND/ΜL (ref 0.17–1.22)
MONOCYTES NFR BLD AUTO: 11 % (ref 4–12)
NEUTROPHILS # BLD AUTO: 3.32 THOUSANDS/ΜL (ref 1.85–7.62)
NEUTS SEG NFR BLD AUTO: 58 % (ref 43–75)
NRBC BLD AUTO-RTO: 0 /100 WBCS
PLATELET # BLD AUTO: 135 THOUSANDS/UL (ref 149–390)
PMV BLD AUTO: 13.2 FL (ref 8.9–12.7)
POTASSIUM SERPL-SCNC: 4.1 MMOL/L (ref 3.5–5.3)
RBC # BLD AUTO: 4.44 MILLION/UL (ref 3.81–5.12)
SODIUM SERPL-SCNC: 142 MMOL/L (ref 136–145)
WBC # BLD AUTO: 5.69 THOUSAND/UL (ref 4.31–10.16)

## 2018-06-01 PROCEDURE — 36415 COLL VENOUS BLD VENIPUNCTURE: CPT | Performed by: PHYSICIAN ASSISTANT

## 2018-06-01 PROCEDURE — 80048 BASIC METABOLIC PNL TOTAL CA: CPT | Performed by: PHYSICIAN ASSISTANT

## 2018-06-01 PROCEDURE — 99284 EMERGENCY DEPT VISIT MOD MDM: CPT

## 2018-06-01 PROCEDURE — 70450 CT HEAD/BRAIN W/O DYE: CPT

## 2018-06-01 PROCEDURE — 85025 COMPLETE CBC W/AUTO DIFF WBC: CPT | Performed by: PHYSICIAN ASSISTANT

## 2018-06-01 RX ORDER — MONTELUKAST SODIUM 10 MG/1
10 TABLET ORAL DAILY
COMMUNITY
End: 2019-03-09

## 2018-06-01 RX ORDER — CETIRIZINE HYDROCHLORIDE 10 MG/1
10 TABLET ORAL DAILY
COMMUNITY
End: 2021-11-05

## 2018-06-01 NOTE — DISCHARGE INSTRUCTIONS
Acute Headache, Ambulatory Care   GENERAL INFORMATION:   An acute headache  is pain or discomfort that starts suddenly and gets worse quickly  The cause of an acute headache may not be known  It may be triggered by stress, fatigue, hormones, food, or trauma  Common related symptoms include the following:   · Fever    · Sinus pressure    · Loss of memory    · Nausea or vomiting    · Problems with your vision, such as watery or red eyes, loss of vision, or pain in bright light    · Stiff neck    · Tenderness of the head and neck area    · Trouble staying awake, or being less alert than usual     · Weakness or less energy  Seek immediate care for the following symptoms:   · Severe pain    · A headache that occurs after a blow to the head, a fall, or other trauma     · Confusion or forgetfulness    · Numbness on one side of your face or body  Treatment for an acute headache  may include medicine to decrease pain  You may also need biofeedback or cognitive behavioral therapy  Ask your healthcare provider about these and other treatments for an acute headache  Manage my symptoms:   · Apply heat  on your head for 20 to 30 minutes every 2 hours for as many days as directed  Heat helps decrease pain and muscle spasms  You may alternate heat and ice  · Apply ice  on your head for 15 to 20 minutes every hour or as directed  Use an ice pack, or put crushed ice in a plastic bag  Cover it with a towel  Ice helps decrease pain  · Relax your muscles  Lie down in a comfortable position and close your eyes  Relax your muscles slowly  Start at your toes and work your way up your body  · Keep a record of your headaches  Write down when your headaches start and stop  Include your symptoms and what you were doing when the headache began  Record what you ate or drank for 24 hours before the headache started  Describe the pain and where it hurts  Keep track of what you did to treat your headache and whether it worked    Follow up with your healthcare provider as directed:  Bring your headache record with you when you see your healthcare provider  Write down your questions so you remember to ask them during your visits  CARE AGREEMENT:   You have the right to help plan your care  Learn about your health condition and how it may be treated  Discuss treatment options with your caregivers to decide what care you want to receive  You always have the right to refuse treatment  The above information is an  only  It is not intended as medical advice for individual conditions or treatments  Talk to your doctor, nurse or pharmacist before following any medical regimen to see if it is safe and effective for you  © 2014 [address] is for End User's use only and may not be sold, redistributed or otherwise used for commercial purposes  All illustrations and images included in CareNotes® are the copyrighted property of [employer] D [employer]  or [employer]

## 2018-06-01 NOTE — ED PROVIDER NOTES
History  Chief Complaint   Patient presents with    Headache     Reports that for the past few days she has been a waxing and waning headache  States took her BP and was 358 mmHg/systolic  States took BP medication today  Pt denies blurred or double vision  Denies nausea/vomiting  Denies chest pain  States has been compliant with BP medication  Reports headache subsided but was an 8/10 at the time  Recently started no allergy medication  73 y/o F with PMHx of asthma, hypertension, who presents to the ED for bilateral frontal headaches intermittently x months, then more frequently x1 week  Patient reports that the headaches are a 10/10 and wake her up from sleep  Described as frontal, bilateral, throbbing, waxing and waning  Improves as the day progresses, but returns again at night  Did not take anything for pain prior to arrival, although patient states that the headache does subside without any pharmacological intervention  Patient denies fevers, chills, dizziness, numbness, tingling, weakness, slurred speech, confusion, hearing changes, vision changes  Denies weight loss or weight gain  Denies recent head injury  Has not started or stopped any new medications today  Patient's blood pressure also noted to be elevated on intake  Denies chest pain, sob, palpitations  She states that she just took her blood pressure medications prior to arrival         History provided by:  Patient   used: No        Prior to Admission Medications   Prescriptions Last Dose Informant Patient Reported? Taking?    albuterol (2 5 mg/3 mL) 0 083 % nebulizer solution   Yes Yes   Sig: Take 2 5 mg by nebulization every 6 (six) hours as needed for wheezing   albuterol (VENTOLIN HFA) 90 mcg/act inhaler   Yes Yes   Sig: Inhale 2 puffs every 4 (four) hours as needed     cetirizine (ZyrTEC) 10 mg tablet   Yes Yes   Sig: Take 10 mg by mouth daily   fluticasone (FLONASE) 50 mcg/act nasal spray   Yes Yes   Si spray into each nostril     fluticasone-salmeterol (ADVAIR DISKUS) 250-50 mcg/dose inhaler   Yes Yes   Sig: Inhale 1 puff 2 (two) times a day     ibuprofen (MOTRIN) 600 mg tablet   No Yes   Sig: Take 1 tablet by mouth every 6 (six) hours as needed for mild pain for up to 15 doses   losartan-hydrochlorothiazide (HYZAAR) 100-25 MG per tablet   Yes Yes   Sig: Take 1 tablet by mouth daily     metoprolol tartrate (LOPRESSOR) 25 mg tablet   Yes Yes   Sig: Take 25 mg by mouth daily     montelukast (SINGULAIR) 10 mg tablet   Yes Yes   Sig: Take 10 mg by mouth daily      Facility-Administered Medications: None       Past Medical History:   Diagnosis Date    Asthma     Hypertension     Ventricular arrhythmia        Past Surgical History:   Procedure Laterality Date    KNEE SURGERY      ROTATOR CUFF REPAIR         History reviewed  No pertinent family history  I have reviewed and agree with the history as documented  Social History   Substance Use Topics    Smoking status: Never Smoker    Smokeless tobacco: Never Used    Alcohol use No        Review of Systems   Constitutional: Negative for chills and fever  HENT: Negative  Eyes: Negative  Respiratory: Negative for cough, chest tightness, shortness of breath and wheezing  Cardiovascular: Negative for chest pain, palpitations and leg swelling  Gastrointestinal: Negative for abdominal pain, constipation, diarrhea, nausea and vomiting  Genitourinary: Negative for dysuria, flank pain, frequency, hematuria and urgency  Musculoskeletal: Negative for arthralgias, back pain, gait problem, joint swelling, myalgias, neck pain and neck stiffness  Skin: Negative for color change, pallor, rash and wound  Neurological: Positive for headaches  Negative for dizziness, tremors, seizures, syncope, facial asymmetry, speech difficulty, weakness, light-headedness and numbness         Physical Exam  Physical Exam   Constitutional: She is oriented to person, place, and time  She appears well-developed and well-nourished  No distress  HENT:   Head: Normocephalic and atraumatic  Mouth/Throat: Oropharynx is clear and moist    Eyes: Conjunctivae and EOM are normal  Pupils are equal, round, and reactive to light  Neck: Normal range of motion  Neck supple  Cardiovascular: Normal rate, regular rhythm and intact distal pulses  Pulmonary/Chest: Effort normal and breath sounds normal  She has no wheezes  She has no rales  Abdominal: Soft  There is no tenderness  Musculoskeletal: Normal range of motion  She exhibits no edema or tenderness  Neurological: She is alert and oriented to person, place, and time  No cranial nerve deficit or sensory deficit  She exhibits normal muscle tone  Coordination normal    Normal finger to nose  No pronator drift  Skin: Skin is warm and dry  Capillary refill takes less than 2 seconds  She is not diaphoretic  Psychiatric: She has a normal mood and affect  Her behavior is normal    Nursing note and vitals reviewed        Vital Signs  ED Triage Vitals [06/01/18 1013]   Temperature Pulse Respirations Blood Pressure SpO2   97 5 °F (36 4 °C) 70 16 (!) 203/100 96 %      Temp Source Heart Rate Source Patient Position - Orthostatic VS BP Location FiO2 (%)   Temporal Monitor Sitting Right arm --      Pain Score       No Pain           Vitals:    06/01/18 1013 06/01/18 1117 06/01/18 1221 06/01/18 1339   BP: (!) 203/100 (!) 189/79 (!) 174/84 160/77   Pulse: 70 72 68 74   Patient Position - Orthostatic VS: Sitting Lying Lying Lying       Visual Acuity      ED Medications  Medications - No data to display    Diagnostic Studies  Results Reviewed     Procedure Component Value Units Date/Time    Basic metabolic panel [[id number]]  (Abnormal) Collected:  06/01/18 1149    Lab Status:  Final result Specimen:  Blood from Arm, Right Updated:  06/01/18 1205     Sodium 142 mmol/L      Potassium 4 1 mmol/L      Chloride 101 mmol/L      CO2 33 (H) mmol/L      Anion Gap 8 mmol/L      BUN 24 mg/dL      Creatinine 0 76 mg/dL      Glucose 94 mg/dL      Calcium 9 7 mg/dL      eGFR 79 ml/min/1 73sq m     Narrative:         National Kidney Disease Education Program recommendations are as follows:  GFR calculation is accurate only with a steady state creatinine  Chronic Kidney disease less than 60 ml/min/1 73 sq  meters  Kidney failure less than 15 ml/min/1 73 sq  meters  CBC and differential [[id number]]  (Abnormal) Collected:  06/01/18 1149    Lab Status:  Final result Specimen:  Blood from Arm, Right Updated:  06/01/18 1205     WBC 5 69 Thousand/uL      RBC 4 44 Million/uL      Hemoglobin 13 1 g/dL      Hematocrit 39 2 %      MCV 88 fL      MCH 29 5 pg      MCHC 33 4 g/dL      RDW 13 3 %      MPV 13 2 (H) fL      Platelets 258 (L) Thousands/uL      nRBC 0 /100 WBCs      Neutrophils Relative 58 %      Lymphocytes Relative 27 %      Monocytes Relative 11 %      Eosinophils Relative 3 %      Basophils Relative 0 %      Neutrophils Absolute 3 32 Thousands/µL      Lymphocytes Absolute 1 54 Thousands/µL      Monocytes Absolute 0 63 Thousand/µL      Eosinophils Absolute 0 18 Thousand/µL      Basophils Absolute 0 02 Thousands/µL                  CT head without contrast   Final Result by Fran Rowell MD (06/01 1326)      No acute intracranial abnormality  Small left sphenoid fluid level  Workstation performed: [id number]                    Procedures  Procedures       Phone Contacts  ED Phone Contact    ED Course  ED Course as of Jun 01 1350 Fri Jun 01, 2018   1328 Patient's headache is currently improved without intervention  1350 Blood pressure improving without intervention  MDM  Number of Diagnoses or Management Options  Headache:   Diagnosis management comments: 71 y/o F with PMHx of asthma, hypertension, who presents to the ED for bilateral frontal headaches intermittently x months, then more frequently x1 week  Differential Diagnosis includes but is not limited to: tension headache, migraine headache, brain mass, low suspicion for intracranial hemorrhage  Patient declining analgesia in the ED as her headache has improved without intervention  CT head negative for brain mass  Labs are generally unremarkable  Will have patient follow up with neurology and PMD for further evaluation  Currently in the ED she has a nonfocal neurologic exam  Dc home  Pt re-examined and evaluated after testing and treatment  Spoke with the patient and feeling improved and sxs have resolved  Will discharge home with close f/u with pcp and instructed to return to the ED if sxs worsen or continue  Pt agrees with the plan for discharge and feels comfortable to go home with proper f/u  Advised to return for worsening or additional problems  Diagnostic tests were reviewed and questions answered  Diagnosis, care plan and treatment options were discussed  The patient understand instructions and will follow up as directed  Amount and/or Complexity of Data Reviewed  Clinical lab tests: ordered and reviewed  Tests in the radiology section of CPT®: ordered and reviewed      CritCare Time    Disposition  Final diagnoses:   Headache     Time reflects when diagnosis was documented in both MDM as applicable and the Disposition within this note     Time User Action Codes Description Comment    6/1/2018  1:43 PM Chanda, [address] Headache       ED Disposition     ED Disposition Condition Comment    Discharge  Chaplin Alexandru discharge to home/self care      Condition at discharge: Good        Follow-up Information     Follow up With Specialties Details Why Contact Info    Krissy Patterson MD Family Medicine In 3 days  [address] Neurology [facility] Neurology In 3 days  [address] [facility] [address] [phone]          Patient's Medications Discharge Prescriptions    No medications on file     No discharge procedures on file      ED Provider  Electronically Signed by           Tennille Lares PA-C  06/01/18 207 Josep Aquino PA-C  06/01/18 6960

## 2018-10-31 ENCOUNTER — OFFICE VISIT (OUTPATIENT)
Dept: FAMILY MEDICINE CLINIC | Facility: CLINIC | Age: 73
End: 2018-10-31
Payer: COMMERCIAL

## 2018-10-31 ENCOUNTER — TRANSCRIBE ORDERS (OUTPATIENT)
Dept: ADMINISTRATIVE | Facility: HOSPITAL | Age: 73
End: 2018-10-31

## 2018-10-31 VITALS
HEIGHT: 60 IN | WEIGHT: 218 LBS | BODY MASS INDEX: 42.8 KG/M2 | OXYGEN SATURATION: 96 % | SYSTOLIC BLOOD PRESSURE: 140 MMHG | RESPIRATION RATE: 20 BRPM | TEMPERATURE: 98.3 F | DIASTOLIC BLOOD PRESSURE: 90 MMHG | HEART RATE: 100 BPM

## 2018-10-31 DIAGNOSIS — I10 ESSENTIAL HYPERTENSION: ICD-10-CM

## 2018-10-31 DIAGNOSIS — D69.6 THROMBOCYTOPENIA (HCC): ICD-10-CM

## 2018-10-31 DIAGNOSIS — J45.40 MODERATE PERSISTENT ASTHMA, UNSPECIFIED WHETHER COMPLICATED: ICD-10-CM

## 2018-10-31 DIAGNOSIS — K44.9 HIATAL HERNIA: ICD-10-CM

## 2018-10-31 DIAGNOSIS — Z13.820 SCREENING FOR OSTEOPOROSIS: Primary | ICD-10-CM

## 2018-10-31 DIAGNOSIS — E55.9 VITAMIN D DEFICIENCY: ICD-10-CM

## 2018-10-31 DIAGNOSIS — Z12.11 COLON CANCER SCREENING: ICD-10-CM

## 2018-10-31 DIAGNOSIS — Z23 NEED FOR VACCINATION AGAINST STREPTOCOCCUS PNEUMONIAE USING PNEUMOCOCCAL CONJUGATE VACCINE 13: ICD-10-CM

## 2018-10-31 PROBLEM — I49.1 PREMATURE ATRIAL CONTRACTIONS: Status: ACTIVE | Noted: 2017-10-31

## 2018-10-31 PROBLEM — M25.631 WRIST STIFFNESS, RIGHT: Status: ACTIVE | Noted: 2017-05-05

## 2018-10-31 PROBLEM — M19.031 PRIMARY OSTEOARTHRITIS OF RIGHT WRIST: Status: ACTIVE | Noted: 2017-05-05

## 2018-10-31 PROBLEM — R00.2 PALPITATIONS: Status: ACTIVE | Noted: 2017-10-31

## 2018-10-31 PROCEDURE — G0009 ADMIN PNEUMOCOCCAL VACCINE: HCPCS

## 2018-10-31 PROCEDURE — 1160F RVW MEDS BY RX/DR IN RCRD: CPT

## 2018-10-31 PROCEDURE — 99204 OFFICE O/P NEW MOD 45 MIN: CPT | Performed by: PHYSICIAN ASSISTANT

## 2018-10-31 PROCEDURE — 3725F SCREEN DEPRESSION PERFORMED: CPT | Performed by: PHYSICIAN ASSISTANT

## 2018-10-31 PROCEDURE — 90670 PCV13 VACCINE IM: CPT

## 2018-10-31 PROCEDURE — 1101F PT FALLS ASSESS-DOCD LE1/YR: CPT | Performed by: PHYSICIAN ASSISTANT

## 2018-10-31 PROCEDURE — 1160F RVW MEDS BY RX/DR IN RCRD: CPT | Performed by: PHYSICIAN ASSISTANT

## 2018-10-31 RX ORDER — DOXYCYCLINE HYCLATE 100 MG/1
100 CAPSULE ORAL EVERY 12 HOURS
Refills: 0 | COMMUNITY
Start: 2018-07-25 | End: 2018-10-31

## 2018-10-31 RX ORDER — ERGOCALCIFEROL 1.25 MG/1
50000 CAPSULE ORAL WEEKLY
Qty: 4 CAPSULE | Refills: 1 | Status: SHIPPED | OUTPATIENT
Start: 2018-10-31 | End: 2019-03-09

## 2018-10-31 RX ORDER — ERGOCALCIFEROL 1.25 MG/1
50000 CAPSULE ORAL WEEKLY
Refills: 5 | COMMUNITY
Start: 2018-07-30 | End: 2018-10-31 | Stop reason: SDUPTHER

## 2018-10-31 RX ORDER — ATENOLOL 25 MG/1
25 TABLET ORAL DAILY
Qty: 30 TABLET | Refills: 2 | Status: SHIPPED | OUTPATIENT
Start: 2018-10-31 | End: 2019-01-18 | Stop reason: SDUPTHER

## 2018-10-31 RX ORDER — OMEPRAZOLE 20 MG/1
20 CAPSULE, DELAYED RELEASE ORAL DAILY
Qty: 30 CAPSULE | Refills: 2 | Status: SHIPPED | OUTPATIENT
Start: 2018-10-31 | End: 2019-01-08 | Stop reason: SDUPTHER

## 2018-10-31 NOTE — PROGRESS NOTES
Assessment/Plan:    Hypertension  Will switch metoprolol to atenolol and recheck in another 3 weeks  BMI 40 0-44 9, adult [facility])  Will refer to dietician and weight loss center  Patient was interested in surgery however she is unsure if it could be done due to age  Thrombocytopenia (HCC)  Last CBC done in June and stable  This has been present her whole life    Vitamin D deficiency  Will recheck labs and continue on medication until can be completed         Problem List Items Addressed This Visit        Digestive    Hiatal hernia    Relevant Medications    omeprazole (PriLOSEC) 20 mg delayed release capsule    Other Relevant Orders    Ambulatory referral to Gastroenterology       Cardiovascular and Mediastinum    Hypertension     Will switch metoprolol to atenolol and recheck in another 3 weeks  Relevant Medications    atenolol (TENORMIN) 25 mg tablet       Other    Thrombocytopenia (HCC)     Last CBC done in June and stable  This has been present her whole life         BMI 40 0-44 9, adult [facility])     Will refer to dietician and weight loss center  Patient was interested in surgery however she is unsure if it could be done due to age            Relevant Orders    Lipid panel    TSH baseline    Ambulatory referral to Weight Management    Vitamin D deficiency     Will recheck labs and continue on medication until can be completed         Relevant Medications    ergocalciferol (VITAMIN D2) 50,000 units    Other Relevant Orders    Vitamin D 25 hydroxy      Other Visit Diagnoses     Screening for osteoporosis    -  Primary    Relevant Orders    DXA bone density spine hip and pelvis    Colon cancer screening        Relevant Orders    Ambulatory referral to Gastroenterology    Moderate persistent asthma, unspecified whether complicated        Relevant Medications    fluticasone-salmeterol (ADVAIR) 500-50 mcg/dose inhaler    Need for vaccination against Streptococcus pneumoniae using pneumococcal conjugate vaccine 13        Relevant Orders    PNEUMOCOCCAL CONJUGATE VACCINE 13-VALENT GREATER THAN 6 MONTHS (Completed)            Subjective:      Patient ID: Jovan Mcmahon is a 72 y o  female  HPI  73 y/o F here for NP visit  She has a history of asthma  She does get SOB while walking  She is talking advair 250/50  She did try another inhaler that helped more but it was not on insurance formulary  She has a history of arthritis in her wrist but also her back and knees  She is getting [facility]  Weather changes does make it worse  She does not get medication for this  Last back injections 10 months ago  Her wrists  Are the worse pain  Pain makes her left wrist wewak  She is left/right handed  No tingling with left wrist   She takes 800mg ibuprfeon in the AM only  Sometimes she gets tramadol from ortho and it helps but makes her feel    She has been having headaches  Thye are often early in the AM and wake her up  She had CT scan for this which showed no mass this summer     No nausea with headache  BP is usally high when she wakes up with headache  She was on metoprolol 50 but it was making her dizzy  She still does get some dizziness on 25 mg but not as bad  She was on it to help with PACs and palpitations  She has a hx of hiatal hernia  She takes prilosec 20mg a day  She does get acid reflux if she does not take it  Fried foods and saucy foods make it happen  The following portions of the patient's history were reviewed and updated as appropriate:   She  has a past medical history of Asthma; Hypertension; and Ventricular arrhythmia    She   Patient Active Problem List    Diagnosis Date Noted    Thrombocytopenia ([facility] ) 10/31/2018    Hypertension 10/31/2018    BMI 40 0-44 9, adult ([facility] ) 10/31/2018    Vitamin D deficiency 10/31/2018    Hiatal hernia 10/31/2018    Palpitations 10/31/2017    Premature atrial contractions 10/31/2017    Primary osteoarthritis of right wrist 05/05/2017    Wrist stiffness, right 05/05/2017     She  has a past surgical history that includes Rotator cuff repair; Knee surgery; Appendectomy; and Shoulder surgery  Her family history includes Diabetes in her father and mother; Hyperlipidemia in her father and mother; Hypertension in her father and mother; Stroke in her mother  She  reports that she has never smoked  She has never used smokeless tobacco  She reports that she does not drink alcohol or use drugs  Current Outpatient Prescriptions   Medication Sig Dispense Refill    albuterol (2 5 mg/3 mL) 0 083 % nebulizer solution Take 2 5 mg by nebulization every 6 (six) hours as needed for wheezing      albuterol (VENTOLIN HFA) 90 mcg/act inhaler Inhale 2 puffs every 4 (four) hours as needed        cetirizine (ZyrTEC) 10 mg tablet Take 10 mg by mouth daily      ergocalciferol (VITAMIN D2) 50,000 units Take 1 capsule (50,000 Units total) by mouth once a week 4 capsule 1    fluticasone (FLONASE) 50 mcg/act nasal spray 1 spray into each nostril        ibuprofen (MOTRIN) 600 mg tablet Take 1 tablet by mouth every 6 (six) hours as needed for mild pain for up to 15 doses 15 tablet 0    losartan-hydrochlorothiazide (HYZAAR) 100-25 MG per tablet Take 1 tablet by mouth daily        montelukast (SINGULAIR) 10 mg tablet Take 10 mg by mouth daily      atenolol (TENORMIN) 25 mg tablet Take 1 tablet (25 mg total) by mouth daily 30 tablet 2    fluticasone-salmeterol (ADVAIR) 500-50 mcg/dose inhaler Inhale 1 puff 2 (two) times a day Rinse mouth after use  1 Inhaler 3    omeprazole (PriLOSEC) 20 mg delayed release capsule Take 1 capsule (20 mg total) by mouth daily 30 capsule 2     No current facility-administered medications for this visit        Current Outpatient Prescriptions on File Prior to Visit   Medication Sig    albuterol (2 5 mg/3 mL) 0 083 % nebulizer solution Take 2 5 mg by nebulization every 6 (six) hours as needed for wheezing    albuterol (VENTOLIN HFA) 90 mcg/act inhaler Inhale 2 puffs every 4 (four) hours as needed      cetirizine (ZyrTEC) 10 mg tablet Take 10 mg by mouth daily    fluticasone (FLONASE) 50 mcg/act nasal spray 1 spray into each nostril      ibuprofen (MOTRIN) 600 mg tablet Take 1 tablet by mouth every 6 (six) hours as needed for mild pain for up to 15 doses    losartan-hydrochlorothiazide (HYZAAR) 100-25 MG per tablet Take 1 tablet by mouth daily      montelukast (SINGULAIR) 10 mg tablet Take 10 mg by mouth daily    [DISCONTINUED] fluticasone-salmeterol (ADVAIR DISKUS) 250-50 mcg/dose inhaler Inhale 1 puff 2 (two) times a day      [DISCONTINUED] metoprolol tartrate (LOPRESSOR) 25 mg tablet Take 25 mg by mouth daily       No current facility-administered medications on file prior to visit  She has No Known Allergies       Review of Systems   Constitutional: Positive for unexpected weight change  Negative for activity change, appetite change, chills and fatigue  HENT: Negative for dental problem, ear pain, hearing loss and sore throat  Eyes: Negative for visual disturbance  Respiratory: Negative for cough and wheezing  Cardiovascular: Positive for leg swelling (mild)  Negative for chest pain  Gastrointestinal: Positive for abdominal pain  Negative for constipation, diarrhea and vomiting  Genitourinary: Negative for difficulty urinating and dysuria  Musculoskeletal: Positive for arthralgias and back pain  Negative for myalgias  Skin: Negative for rash  Neurological: Positive for dizziness  Negative for headaches  Psychiatric/Behavioral: Negative for behavioral problems  Objective:      /90 (BP Location: Left arm, Patient Position: Sitting, Cuff Size: Large)   Pulse 100   Temp 98 3 °F (36 8 °C) (Tympanic)   Resp 20   Ht 5' (1 524 m)   Wt 98 9 kg (218 lb)   SpO2 96%   Breastfeeding?  No   BMI 42 58 kg/m²          Physical Exam   Constitutional: She is oriented to person, place, and time  She appears well-developed and well-nourished  HENT:   Head: Normocephalic and atraumatic  Right Ear: External ear normal    Left Ear: External ear normal    Nose: Nose normal    Mouth/Throat: Oropharynx is clear and moist    Eyes: Conjunctivae are normal    Neck: Normal range of motion  Neck supple  No thyromegaly present  Cardiovascular: Normal rate, regular rhythm and normal heart sounds  No murmur heard  Pulmonary/Chest: Effort normal and breath sounds normal  No respiratory distress  Abdominal: Soft  Bowel sounds are normal  She exhibits no mass  There is no tenderness  There is no guarding  Musculoskeletal: Normal range of motion  She exhibits tenderness (left mid wrist with swelling  full rom)  Lymphadenopathy:     She has no cervical adenopathy  Neurological: She is alert and oriented to person, place, and time  Skin: Skin is warm  Psychiatric: She has a normal mood and affect  Her behavior is normal    Nursing note and vitals reviewed

## 2018-10-31 NOTE — ASSESSMENT & PLAN NOTE
Will refer to dietician and weight loss center  Patient was interested in surgery however she is unsure if it could be done due to age

## 2018-10-31 NOTE — PATIENT INSTRUCTIONS
Weight Management   WHAT YOU NEED TO KNOW:   Why is important to manage my weight? Being overweight increases your risk of health conditions such as heart disease, high blood pressure, type 2 diabetes, and certain types of cancer  It can also increase your risk for osteoarthritis, sleep apnea, and other respiratory problems  Aim for a slow, steady weight loss  Even a small amount of weight loss can lower your risk of health problems  How do I lose weight safely? A safe and healthy way to lose weight is to eat fewer calories and get regular exercise  You can lose up about 1 pound a week by decreasing the number of calories you eat by 500 calories each day  You can decrease calories by eating smaller portion sizes or by cutting out high-calorie foods  Read labels to find out how many calories are in the foods you eat  You can also burn calories with exercise such as walking, swimming, or biking  You will be more likely to keep weight off if you make these changes part of your lifestyle  What is a healthy meal plan that can help me manage my weight? A healthy meal plan includes a variety of foods, contains fewer calories, and helps you stay healthy  A healthy meal plan includes the following:  · Eat whole-grain foods more often  A healthy meal plan should contain fiber  Fiber is the part of grains, fruits, and vegetables that is not broken down by your body  Whole-grain foods are healthy and provide extra fiber in your diet  Some examples of whole-grain foods are whole-wheat breads and pastas, oatmeal, brown rice, and bulgur  · Eat a variety of vegetables every day  Include dark, leafy greens such as spinach, kale, clay greens, and mustard greens  Eat yellow and orange vegetables such as carrots, sweet potatoes, and winter squash  · Eat a variety of fruits every day  Choose fresh or canned fruit (canned in its own juice or light syrup) instead of juice   Fruit juice has very little or no fiber     · Eat low-fat dairy foods  Drink fat-free (skim) milk or 1% milk  Eat fat-free yogurt and low-fat cottage cheese  Try low-fat cheeses such as mozzarella and other reduced-fat cheeses  · Choose meat and other protein foods that are low in fat  Choose beans or other legumes such as split peas or lentils  Choose fish, skinless poultry (chicken or turkey), or lean cuts of red meat (beef or pork)  Before you cook meat or poultry, cut off any visible fat  · Use less fat and oil  Try baking foods instead of frying them  Add less fat, such as margarine, sour cream, regular salad dressing and mayonnaise to foods  Eat fewer high-fat foods  Some examples of high-fat foods include french fries, doughnuts, ice cream, and cakes  · Eat fewer sweets  Limit foods and drinks that are high in sugar  This includes candy, cookies, regular soda, and sweetened drinks  What are some ways I can decrease calories? · Eat smaller portions  ¨ Use a small plate with smaller servings  ¨ Do not eat second helpings  ¨ When you eat at a restaurant, ask for a box and place half of your meal in the box before you eat  ¨ Share an entrée with someone else  · Replace high-calorie snacks with healthy, low-calorie snacks  ¨ Choose fresh fruit, vegetables, fat-free rice cakes, or air-popped popcorn instead of potato chips, nuts, or chocolate  ¨ Choose water or calorie-free drinks instead of soda or sweetened drinks  · Eat regular meals  Skipping meals can lead to overeating later in the day  Eat a healthy snack in place of a meal if you do not have time to eat a regular meal      · Do not shop for groceries when you are hungry  You may be more likely to make unhealthy food choices  Take a grocery list of healthy foods and shop after you have eaten  How much exercise do I need? Exercise at least 30 minutes per day on most days of the week   Some examples of exercise include walking, biking, dancing, and swimming  You can also fit in more physical activity by taking the stairs instead of the elevator or parking farther away from stores  Ask your healthcare provider about the best exercise plan for you  What other things should I consider as I try to lose weight? · Be aware of situations that may give you the urge to overeat, such as eating while watching television  Find ways to avoid these situations  For example, read a book, go for a walk, or do crafts  · Meet with a weight loss support group or friends who are also trying to lose weight  This may help you stay motivated to continue working on your weight loss goals  CARE AGREEMENT:   You have the right to help plan your care  Learn about your health condition and how it may be treated  Discuss treatment options with your caregivers to decide what care you want to receive  You always have the right to refuse treatment  The above information is an  only  It is not intended as medical advice for individual conditions or treatments  Talk to your doctor, nurse or pharmacist before following any medical regimen to see if it is safe and effective for you  © 2017 [employer] Information is for End User's use only and may not be sold, redistributed or otherwise used for commercial purposes  All illustrations and images included in CareNotes® are the copyrighted property of [employer]  or [employer]

## 2018-11-06 ENCOUNTER — TRANSCRIBE ORDERS (OUTPATIENT)
Dept: LAB | Facility: CLINIC | Age: 73
End: 2018-11-06

## 2018-11-06 ENCOUNTER — LAB (OUTPATIENT)
Dept: LAB | Facility: CLINIC | Age: 73
End: 2018-11-06
Payer: COMMERCIAL

## 2018-11-06 DIAGNOSIS — E55.9 VITAMIN D DEFICIENCY: ICD-10-CM

## 2018-11-06 LAB
25(OH)D3 SERPL-MCNC: 50.7 NG/ML (ref 30–100)
CHOLEST SERPL-MCNC: 194 MG/DL (ref 50–200)
HDLC SERPL-MCNC: 47 MG/DL (ref 40–60)
LDLC SERPL CALC-MCNC: 112 MG/DL (ref 0–100)
NONHDLC SERPL-MCNC: 147 MG/DL
TRIGL SERPL-MCNC: 173 MG/DL
TSH SERPL DL<=0.05 MIU/L-ACNC: 2.4 UIU/ML (ref 0.36–3.74)

## 2018-11-06 PROCEDURE — 84443 ASSAY THYROID STIM HORMONE: CPT

## 2018-11-06 PROCEDURE — 82306 VITAMIN D 25 HYDROXY: CPT

## 2018-11-06 PROCEDURE — 36415 COLL VENOUS BLD VENIPUNCTURE: CPT

## 2018-11-06 PROCEDURE — 80061 LIPID PANEL: CPT

## 2018-11-07 NOTE — PROGRESS NOTES
Thyroid and vitamin d were good    Her cholesterol was a litle high and I would just recommend regular exercise and trying to eat a heart healthy diet and avoid a lot of fried foods, canola oil and processed foods such as cookies, chips

## 2018-11-21 ENCOUNTER — OFFICE VISIT (OUTPATIENT)
Dept: FAMILY MEDICINE CLINIC | Facility: CLINIC | Age: 73
End: 2018-11-21
Payer: COMMERCIAL

## 2018-11-21 VITALS
BODY MASS INDEX: 42.8 KG/M2 | TEMPERATURE: 97.6 F | WEIGHT: 218 LBS | SYSTOLIC BLOOD PRESSURE: 138 MMHG | HEART RATE: 90 BPM | DIASTOLIC BLOOD PRESSURE: 70 MMHG | RESPIRATION RATE: 20 BRPM | HEIGHT: 60 IN | OXYGEN SATURATION: 95 %

## 2018-11-21 DIAGNOSIS — I10 ESSENTIAL HYPERTENSION: ICD-10-CM

## 2018-11-21 DIAGNOSIS — Z12.39 BREAST CANCER SCREENING: Primary | ICD-10-CM

## 2018-11-21 DIAGNOSIS — J45.20 MILD INTERMITTENT ASTHMA WITHOUT COMPLICATION: ICD-10-CM

## 2018-11-21 PROBLEM — J45.909 ASTHMA: Status: ACTIVE | Noted: 2018-11-21

## 2018-11-21 PROCEDURE — 1036F TOBACCO NON-USER: CPT | Performed by: PHYSICIAN ASSISTANT

## 2018-11-21 PROCEDURE — 3075F SYST BP GE 130 - 139MM HG: CPT | Performed by: PHYSICIAN ASSISTANT

## 2018-11-21 PROCEDURE — 4040F PNEUMOC VAC/ADMIN/RCVD: CPT | Performed by: PHYSICIAN ASSISTANT

## 2018-11-21 PROCEDURE — 3008F BODY MASS INDEX DOCD: CPT | Performed by: PHYSICIAN ASSISTANT

## 2018-11-21 PROCEDURE — 3078F DIAST BP <80 MM HG: CPT | Performed by: PHYSICIAN ASSISTANT

## 2018-11-21 PROCEDURE — 99213 OFFICE O/P EST LOW 20 MIN: CPT | Performed by: PHYSICIAN ASSISTANT

## 2018-11-21 NOTE — PROGRESS NOTES
Assessment/Plan:    Asthma  Currently well controlled  Continue current medication      Hypertension  Continue on atenolol  Dizziness has resolved with this and BP is still controlled  Depressed mood has also improved         Problem List Items Addressed This Visit        Respiratory    Asthma     Currently well controlled  Continue current medication              Cardiovascular and Mediastinum    Hypertension     Continue on atenolol  Dizziness has resolved with this and BP is still controlled  Depressed mood has also improved           Other Visit Diagnoses     Breast cancer screening    -  Primary    Relevant Orders    Mammo screening bilateral w cad            Subjective:      Patient ID: Yolis Tafoya is a 72 y o  female  HPI   71 y/o F here for follow up for HTN  When last here 3 weeks ago metoprolol was switched to atenolol due to dizziness  Dizziness has resolved  Her mood is improved also  She is also here to review labs  Lipids levels were slighlty elevated and she was advised to change her diet and exercise more regularly  She would also like to get a handicap spot by her house  She has handicap placard already  Sometimes she has to walk from 1-2 blocks away and she gets pain in her knees and SOB with asthma  The following portions of the patient's history were reviewed and updated as appropriate:   She  has a past medical history of Asthma; Hypertension; and Ventricular arrhythmia  She   Patient Active Problem List    Diagnosis Date Noted    Asthma 11/21/2018    Thrombocytopenia ([facility] [address] ) 10/31/2018    Hypertension 10/31/2018    BMI 40 0-44 9, adult ([facility] [address] ) 10/31/2018    Vitamin D deficiency 10/31/2018    Hiatal hernia 10/31/2018    Palpitations 10/31/2017    Premature atrial contractions 10/31/2017    Primary osteoarthritis of right wrist 05/05/2017    Wrist stiffness, right 05/05/2017     She  has a past surgical history that includes Rotator cuff repair; Knee surgery;  Appendectomy; and Shoulder surgery  Her family history includes Diabetes in her father and mother; Hyperlipidemia in her father and mother; Hypertension in her father and mother; Stroke in her mother  She  reports that she has never smoked  She has never used smokeless tobacco  She reports that she drinks alcohol  She reports that she does not use drugs  Current Outpatient Prescriptions   Medication Sig Dispense Refill    albuterol (2 5 mg/3 mL) 0 083 % nebulizer solution Take 2 5 mg by nebulization every 6 (six) hours as needed for wheezing      albuterol (VENTOLIN HFA) 90 mcg/act inhaler Inhale 2 puffs every 4 (four) hours as needed        atenolol (TENORMIN) 25 mg tablet Take 1 tablet (25 mg total) by mouth daily 30 tablet 2    cetirizine (ZyrTEC) 10 mg tablet Take 10 mg by mouth daily      ergocalciferol (VITAMIN D2) 50,000 units Take 1 capsule (50,000 Units total) by mouth once a week 4 capsule 1    fluticasone (FLONASE) 50 mcg/act nasal spray 1 spray into each nostril        fluticasone-salmeterol (ADVAIR) 500-50 mcg/dose inhaler Inhale 1 puff 2 (two) times a day Rinse mouth after use  1 Inhaler 3    ibuprofen (MOTRIN) 600 mg tablet Take 1 tablet by mouth every 6 (six) hours as needed for mild pain for up to 15 doses 15 tablet 0    losartan-hydrochlorothiazide (HYZAAR) 100-25 MG per tablet Take 1 tablet by mouth daily        montelukast (SINGULAIR) 10 mg tablet Take 10 mg by mouth daily      omeprazole (PriLOSEC) 20 mg delayed release capsule Take 1 capsule (20 mg total) by mouth daily 30 capsule 2     No current facility-administered medications for this visit        Current Outpatient Prescriptions on File Prior to Visit   Medication Sig    albuterol (2 5 mg/3 mL) 0 083 % nebulizer solution Take 2 5 mg by nebulization every 6 (six) hours as needed for wheezing    albuterol (VENTOLIN HFA) 90 mcg/act inhaler Inhale 2 puffs every 4 (four) hours as needed      atenolol (TENORMIN) 25 mg tablet Take 1 tablet (25 mg total) by mouth daily    cetirizine (ZyrTEC) 10 mg tablet Take 10 mg by mouth daily    ergocalciferol (VITAMIN D2) 50,000 units Take 1 capsule (50,000 Units total) by mouth once a week    fluticasone (FLONASE) 50 mcg/act nasal spray 1 spray into each nostril      fluticasone-salmeterol (ADVAIR) 500-50 mcg/dose inhaler Inhale 1 puff 2 (two) times a day Rinse mouth after use   ibuprofen (MOTRIN) 600 mg tablet Take 1 tablet by mouth every 6 (six) hours as needed for mild pain for up to 15 doses    losartan-hydrochlorothiazide (HYZAAR) 100-25 MG per tablet Take 1 tablet by mouth daily      montelukast (SINGULAIR) 10 mg tablet Take 10 mg by mouth daily    omeprazole (PriLOSEC) 20 mg delayed release capsule Take 1 capsule (20 mg total) by mouth daily     No current facility-administered medications on file prior to visit  She has No Known Allergies       Review of Systems   Constitutional: Negative for chills, fatigue and fever  Respiratory: Negative for shortness of breath  Cardiovascular: Negative for chest pain  Gastrointestinal: Negative for abdominal pain  Musculoskeletal: Positive for arthralgias  Skin: Negative for rash  Neurological: Negative for dizziness  Psychiatric/Behavioral: Negative for dysphoric mood  Objective:      /70 (BP Location: Left arm, Patient Position: Sitting, Cuff Size: Large)   Pulse 90   Temp 97 6 °F (36 4 °C) (Tympanic)   Resp 20   Ht 5' (1 524 m)   Wt 98 9 kg (218 lb)   SpO2 95%   Breastfeeding? No   BMI 42 58 kg/m²          Physical Exam   Constitutional: She is oriented to person, place, and time  She appears well-developed and well-nourished  No distress  HENT:   Head: Normocephalic and atraumatic  Right Ear: External ear normal    Left Ear: External ear normal    Eyes: Conjunctivae are normal    Neck: Normal range of motion  Neck supple  No thyromegaly present     Cardiovascular: Normal rate, regular rhythm and normal heart sounds  No murmur heard  Pulmonary/Chest: Effort normal and breath sounds normal  No respiratory distress  She has no wheezes  Musculoskeletal:   Antalgic gait     Lymphadenopathy:     She has no cervical adenopathy  Neurological: She is alert and oriented to person, place, and time  Psychiatric: She has a normal mood and affect  Her behavior is normal    Nursing note and vitals reviewed

## 2018-12-05 ENCOUNTER — HOSPITAL ENCOUNTER (OUTPATIENT)
Dept: BONE DENSITY | Facility: CLINIC | Age: 73
Discharge: HOME | End: 2018-12-05
Payer: COMMERCIAL

## 2018-12-05 DIAGNOSIS — Z13.820 SCREENING FOR OSTEOPOROSIS: ICD-10-CM

## 2018-12-05 PROCEDURE — 77080 DXA BONE DENSITY AXIAL: CPT

## 2018-12-05 NOTE — PROGRESS NOTES
dexa scan shows osteopenia which is low bone density  IT is not as severe as osteoporosis    I recommend making sure she is getting 3 servings of calcium daily or if she cannot consume this regularly then taking calcium OTC such as oscal

## 2018-12-10 ENCOUNTER — TELEPHONE (OUTPATIENT)
Dept: NEUROLOGY | Facility: CLINIC | Age: 73
End: 2018-12-10

## 2018-12-18 ENCOUNTER — HOSPITAL ENCOUNTER (OUTPATIENT)
Dept: MAMMOGRAPHY | Facility: HOSPITAL | Age: 73
Discharge: HOME | End: 2018-12-18
Payer: COMMERCIAL

## 2018-12-18 VITALS — BODY MASS INDEX: 37.76 KG/M2 | WEIGHT: 200 LBS | HEIGHT: 61 IN

## 2018-12-18 DIAGNOSIS — Z12.39 BREAST CANCER SCREENING: ICD-10-CM

## 2018-12-18 PROCEDURE — 77067 SCR MAMMO BI INCL CAD: CPT

## 2018-12-27 PROBLEM — Z78.0 MENOPAUSE: Status: ACTIVE | Noted: 2018-12-27

## 2019-01-08 DIAGNOSIS — K44.9 HIATAL HERNIA: ICD-10-CM

## 2019-01-08 RX ORDER — OMEPRAZOLE 20 MG/1
CAPSULE, DELAYED RELEASE ORAL
Qty: 30 CAPSULE | Refills: 2 | Status: SHIPPED | OUTPATIENT
Start: 2019-01-08 | End: 2019-04-19 | Stop reason: SDUPTHER

## 2019-01-17 ENCOUNTER — OFFICE VISIT (OUTPATIENT)
Dept: FAMILY MEDICINE CLINIC | Facility: CLINIC | Age: 74
End: 2019-01-17

## 2019-01-17 VITALS
HEART RATE: 94 BPM | SYSTOLIC BLOOD PRESSURE: 124 MMHG | HEIGHT: 60 IN | OXYGEN SATURATION: 96 % | RESPIRATION RATE: 18 BRPM | BODY MASS INDEX: 44.2 KG/M2 | WEIGHT: 225.13 LBS | DIASTOLIC BLOOD PRESSURE: 78 MMHG | TEMPERATURE: 97.7 F

## 2019-01-17 DIAGNOSIS — M48.061 SPINAL STENOSIS, LUMBAR REGION WITHOUT NEUROGENIC CLAUDICATION: Primary | ICD-10-CM

## 2019-01-17 DIAGNOSIS — S83.282A DISLOCATION OF LEFT KNEE WITH LATERAL MENISCUS TEAR, INITIAL ENCOUNTER: ICD-10-CM

## 2019-01-17 DIAGNOSIS — S83.105A DISLOCATION OF LEFT KNEE WITH LATERAL MENISCUS TEAR, INITIAL ENCOUNTER: ICD-10-CM

## 2019-01-17 DIAGNOSIS — Z12.39 BREAST CANCER SCREENING: ICD-10-CM

## 2019-01-17 DIAGNOSIS — I10 ESSENTIAL HYPERTENSION: ICD-10-CM

## 2019-01-17 DIAGNOSIS — S83.282A ACUTE LATERAL MENISCUS TEAR OF LEFT KNEE, INITIAL ENCOUNTER: ICD-10-CM

## 2019-01-17 PROBLEM — Z78.0 OSTEOPENIA AFTER MENOPAUSE: Status: ACTIVE | Noted: 2019-01-17

## 2019-01-17 PROBLEM — M47.816 LUMBAR FACET ARTHROPATHY: Status: ACTIVE | Noted: 2019-01-17

## 2019-01-17 PROBLEM — M85.80 OSTEOPENIA AFTER MENOPAUSE: Status: ACTIVE | Noted: 2019-01-17

## 2019-01-17 PROCEDURE — 99214 OFFICE O/P EST MOD 30 MIN: CPT | Performed by: PHYSICIAN ASSISTANT

## 2019-01-17 RX ORDER — ACETAMINOPHEN AND CODEINE PHOSPHATE 300; 30 MG/1; MG/1
1 TABLET ORAL EVERY 6 HOURS PRN
Refills: 0 | COMMUNITY
Start: 2019-01-08 | End: 2019-02-14

## 2019-01-17 RX ORDER — MELOXICAM 15 MG/1
15 TABLET ORAL DAILY
Qty: 30 TABLET | Refills: 1 | Status: SHIPPED | OUTPATIENT
Start: 2019-01-17 | End: 2019-03-16 | Stop reason: SDUPTHER

## 2019-01-17 NOTE — PROGRESS NOTES
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  Recommend regular weight bearing exercise, calcium and vitamin d   2 year follow up with DEXA scan    Hypertension  Controlled with medications    BMI 40 0-44 9, adult (HCC)  BMI Counseling: Body mass index is 43 97 kg/m²  Discussed the patient's BMI with her  The BMI is above average  BMI counseling and education was provided to the patient  Referral to a nutritionist was provided to the patient  Referral to weight management was provided to the patient  Problem List Items Addressed This Visit        Cardiovascular and Mediastinum    Hypertension     Controlled with medications            Other    BMI 40 0-44 9, adult (HCC)     BMI Counseling: Body mass index is 43 97 kg/m²  Discussed the patient's BMI with her  The BMI is above average  BMI counseling and education was provided to the patient  Referral to a nutritionist was provided to the patient  Referral to weight management was provided to the patient  Other Visit Diagnoses     Spinal stenosis, 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    -  Primary    Relevant Medications    meloxicam (MOBIC) 15 mg tablet    Breast cancer screening        Dislocation of left knee with lateral meniscus tear, initial encounter        Acute lateral meniscus tear of left knee, initial encounter        Relevant Medications    meloxicam (MOBIC) 15 mg tablet            Subjective:      Patient ID: Vladimir Cortez is a 72 y o  female  HPI 73 y/o F here for medical update and to review DEXA scan  She saw orthpedics 3 weeks ago for torn meniscus in left knee  She had cortisone injection and drainage of effusion  She was debating surgery but she needed to go to therapy  She is doing PT 2 times a week and she does the exercise at home  She thinks it is helping some but she is still in pain  She has been doing ibuprofen but it is not helping as much as it used to       She will be seeing weight loss center this month  Due to her knee she has not been able to walk as much as prior  She also needs a form filled out for a parking spot  With her knee and lumbar issues she has dificulty walking more than 2 blocks  She also cannot stand for long periods of time  When she goes grocery shopping she only gets a few things to go through the fast line and then checks out  The following portions of the patient's history were reviewed and updated as appropriate:   She  has a past medical history of Asthma; Hypertension; and Ventricular arrhythmia  She   Patient Active Problem List    Diagnosis Date Noted    Spinal stenosis of 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 01/17/2019    Lumbar facet arthropathy 01/17/2019    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 01/17/2019    Menopause 12/27/2018    Asthma 11/21/2018    Thrombocytopenia ([facility] [address] ) 10/31/2018    Hypertension 10/31/2018    BMI 40 0-44 9, adult ([facility] [address] ) 10/31/2018    Vitamin D deficiency 10/31/2018    Hiatal hernia 10/31/2018    Palpitations 10/31/2017    Premature atrial contractions 10/31/2017    Primary osteoarthritis of right wrist 05/05/2017    Wrist stiffness, right 05/05/2017     She  has a past surgical history that includes Rotator cuff repair; Knee surgery; Appendectomy; and Shoulder surgery  Her family history includes Diabetes in her father and mother; Hyperlipidemia in her father and mother; Hypertension in her father and mother; Lung cancer (age of onset: 71) in her sister; Lung cancer (age of onset: 79) in her father; Stroke in her mother  She  reports that she has never smoked  She has never used smokeless tobacco  She reports that she drinks alcohol  She reports that she does not use drugs    Current Outpatient Prescriptions   Medication Sig Dispense Refill    acetaminophen-codeine (TYLENOL #3) 300-30 mg per tablet Take 1 tablet by mouth every 6 (six) hours as needed  0    albuterol (2 5 mg/3 mL) 0 083 % nebulizer solution Take 2 5 mg by nebulization every 6 (six) hours as needed for wheezing      albuterol (VENTOLIN HFA) 90 mcg/act inhaler Inhale 2 puffs every 4 (four) hours as needed        atenolol (TENORMIN) 25 mg tablet Take 1 tablet (25 mg total) by mouth daily 30 tablet 2    cetirizine (ZyrTEC) 10 mg tablet Take 10 mg by mouth daily      ergocalciferol (VITAMIN D2) 50,000 units Take 1 capsule (50,000 Units total) by mouth once a week 4 capsule 1    fluticasone (FLONASE) 50 mcg/act nasal spray 1 spray into each nostril        fluticasone-salmeterol (ADVAIR) 500-50 mcg/dose inhaler Inhale 1 puff 2 (two) times a day Rinse mouth after use  1 Inhaler 3    ibuprofen (MOTRIN) 600 mg tablet Take 1 tablet by mouth every 6 (six) hours as needed for mild pain for up to 15 doses 15 tablet 0    losartan-hydrochlorothiazide (HYZAAR) 100-25 MG per tablet Take 1 tablet by mouth daily        meloxicam (MOBIC) 15 mg tablet Take 1 tablet (15 mg total) by mouth daily 30 tablet 1    montelukast (SINGULAIR) 10 mg tablet Take 10 mg by mouth daily      omeprazole (PriLOSEC) 20 mg delayed release capsule TAKE 1 CAPSULE BY MOUTH EVERY DAY 30 capsule 2     No current facility-administered medications for this visit  Current Outpatient Prescriptions on File Prior to Visit   Medication Sig    albuterol (2 5 mg/3 mL) 0 083 % nebulizer solution Take 2 5 mg by nebulization every 6 (six) hours as needed for wheezing    albuterol (VENTOLIN HFA) 90 mcg/act inhaler Inhale 2 puffs every 4 (four) hours as needed      atenolol (TENORMIN) 25 mg tablet Take 1 tablet (25 mg total) by mouth daily    cetirizine (ZyrTEC) 10 mg tablet Take 10 mg by mouth daily    ergocalciferol (VITAMIN D2) 50,000 units Take 1 capsule (50,000 Units total) by mouth once a week    fluticasone (FLONASE) 50 mcg/act nasal spray 1 spray into each nostril      fluticasone-salmeterol (ADVAIR) 500-50 mcg/dose inhaler Inhale 1 puff 2 (two) times a day Rinse mouth after use      ibuprofen (MOTRIN) 600 mg tablet Take 1 tablet by mouth every 6 (six) hours as needed for mild pain for up to 15 doses    losartan-hydrochlorothiazide (HYZAAR) 100-25 MG per tablet Take 1 tablet by mouth daily      montelukast (SINGULAIR) 10 mg tablet Take 10 mg by mouth daily    omeprazole (PriLOSEC) 20 mg delayed release capsule TAKE 1 CAPSULE BY MOUTH EVERY DAY     No current facility-administered medications on file prior to visit  She has No Known Allergies       Review of Systems      Objective:      /78 (BP Location: Left arm, Patient Position: Sitting, Cuff Size: Standard)   Pulse 94   Temp 97 7 °F (36 5 °C)   Resp 18   Ht 5' (1 524 m)   Wt 102 kg (225 lb 2 oz)   SpO2 96%   BMI 43 97 kg/m²          Physical Exam   Constitutional: She is oriented to person, place, and time  She appears well-developed and well-nourished  No distress  HENT:   Head: Normocephalic and atraumatic  Right Ear: External ear normal    Left Ear: External ear normal    Eyes: Conjunctivae are normal    Neck: Normal range of motion  Neck supple  No thyromegaly present  Cardiovascular: Normal rate, regular rhythm and normal heart sounds  No murmur heard  Pulmonary/Chest: Effort normal and breath sounds normal  No respiratory distress  She has no wheezes  Lymphadenopathy:     She has no cervical adenopathy  Neurological: She is alert and oriented to person, place, and time  Psychiatric: She has a normal mood and affect  Her behavior is normal    Nursing note and vitals reviewed

## 2019-01-17 NOTE — PATIENT INSTRUCTIONS
Osteopenia   WHAT YOU NEED TO KNOW:   What is osteopenia? Osteopenia is a condition that causes bone loss and low bone mineral density (BMD)  Low BMD can weaken your bones and increase your risk for fractures  Osteopenia does not cause signs or symptoms  You may not know you have it until you break a bone  Osteopenia must be managed or treated so it does not worsen and become osteoporosis  Osteoporosis is a serious condition that causes bones to become weak, brittle, and easily fractured  What increases my risk for osteopenia? Your risk increases as you age and lose bone  The following may also increase your risk:  · Lack of weight-bearing exercise, or being bedridden    · Loss of testosterone (in men), or loss of estrogen, such as from menopause (in women)    · Family history of osteopenia or osteoporosis    · Alcohol abuse or smoking cigarettes    · Long-term use of a steroid medicine or an anticonvulsant medicine    · An eating disorder, such as anorexia    · Not enough calcium and vitamin D  How is osteopenia diagnosed and treated? · A bone scan  may be used to measure your bone density (thickness) and bone mass (amount)  You may need a bone scan if you are older than 65 years or you are in menopause  A bone scan may be used if you are younger than 72 years and at increased risk for fractures  A dual-energy x-ray absorptiometry (DXA) is a type of bone scan that measures the mineral content in your spine, hip, or forearm  DXA will give a number, called a T-score, based on how much bone mineral you have  The T-score shows your risk for fracture  · Treatment  depends on your risk for fracture  If your risk is low, you may only need to make exercise, nutrition, and other lifestyle changes to manage osteopenia  The changes can help prevent more bone mineral loss and reduce your risk for fractures   If your risk is high, you may be given medicines to help strengthen your bones, prevent bone breakdown, or increase bone formation  What can I do to manage or prevent osteopenia? · Exercise as directed  Do weight-bearing exercises, such as brisk walking, dancing, or yoga  Weight-bearing exercises help build or maintain bone  Exercises such as swimming and bike riding are non-weight-bearing and will not build or maintain bone  Weightlifting also helps strengthen bones and build muscle  Extra muscle can help protect your bones  Your healthcare provider may recommend weightlifting 3 times per week as part of your exercise routine  · Increase calcium and vitamin D as directed  Calcium and vitamin D work together to help build bone  The body deposits calcium into the bones until about age 27  You will then need to get enough calcium and vitamin D to maintain what your bones are storing  Your healthcare provider may tell you to eat more dairy products, such as milk and cheese, for calcium  Spinach, salmon, and dried beans are also good sources of calcium  Cereal, bread, and orange juice may be fortified with vitamin D  You also get vitamin D from exposure to sunlight  Your healthcare provider may also suggest a calcium or vitamin D supplement  Do not take supplements unless directed  · Limit or do not drink alcohol as directed  Alcohol can take calcium from your bones and increase your risk for fractures  Ask your healthcare provider if it is safe for you to drink alcohol  Women should limit alcohol to 1 drink per day  Men should limit alcohol to 2 drinks per day  A drink of alcohol is 12 ounces of beer, 5 ounces of wine, or 1½ ounces of liquor  · Do not smoke  Nicotine can damage blood vessels and make it more difficult to manage your osteopenia  Smoking also affects bone density and increases your risk for bone fractures  Do not use e-cigarettes or smokeless tobacco in place of cigarettes or to help you quit  They still contain nicotine   Ask your healthcare provider for information if you currently smoke and need help quitting  Ask your healthcare provider for information if you need help quitting  · Prevent falls  Decrease your risk for falling by removing items from the floor and removing loose carpets  Turn the lights on where you will be walking  CARE AGREEMENT:   You have the right to help plan your care  Learn about your health condition and how it may be treated  Discuss treatment options with your caregivers to decide what care you want to receive  You always have the right to refuse treatment  The above information is an  only  It is not intended as medical advice for individual conditions or treatments  Talk to your doctor, nurse or pharmacist before following any medical regimen to see if it is safe and effective for you  © 2017 [facility] Information is for End User's use only and may not be sold, redistributed or otherwise used for commercial purposes  All illustrations and images included in CareNotes® are the copyrighted property of A D A M , Inc  or Kolby Eub[employer]

## 2019-01-17 NOTE — ASSESSMENT & PLAN NOTE
BMI Counseling: Body mass index is 43 97 kg/m²  Discussed the patient's BMI with her  The BMI is above average  BMI counseling and education was provided to the patient  Referral to a nutritionist was provided to the patient  Referral to weight management was provided to the patient

## 2019-01-18 DIAGNOSIS — I10 ESSENTIAL HYPERTENSION: ICD-10-CM

## 2019-01-18 RX ORDER — ATENOLOL 25 MG/1
TABLET ORAL
Qty: 30 TABLET | Refills: 2 | Status: SHIPPED | OUTPATIENT
Start: 2019-01-18 | End: 2019-04-19 | Stop reason: SDUPTHER

## 2019-01-22 ENCOUNTER — OFFICE VISIT (OUTPATIENT)
Dept: GASTROENTEROLOGY | Facility: MEDICAL CENTER | Age: 74
End: 2019-01-22
Payer: COMMERCIAL

## 2019-01-22 VITALS
BODY MASS INDEX: 42.1 KG/M2 | WEIGHT: 223 LBS | TEMPERATURE: 98.8 F | HEART RATE: 74 BPM | DIASTOLIC BLOOD PRESSURE: 80 MMHG | SYSTOLIC BLOOD PRESSURE: 122 MMHG | HEIGHT: 61 IN

## 2019-01-22 DIAGNOSIS — K44.9 HIATAL HERNIA: ICD-10-CM

## 2019-01-22 DIAGNOSIS — K21.9 GASTROESOPHAGEAL REFLUX DISEASE WITHOUT ESOPHAGITIS: ICD-10-CM

## 2019-01-22 DIAGNOSIS — Z11.59 NEED FOR HEPATITIS C SCREENING TEST: ICD-10-CM

## 2019-01-22 DIAGNOSIS — Z12.11 COLON CANCER SCREENING: Primary | ICD-10-CM

## 2019-01-22 PROCEDURE — 99204 OFFICE O/P NEW MOD 45 MIN: CPT | Performed by: INTERNAL MEDICINE

## 2019-01-22 NOTE — LETTER
January 22, 2019     [address] [place] [address]  [address] [place] [address]    Patient: Rosalie Schneider   YOB: 1945   Date of Visit: 1/22/2019       Dear Dr Chantelle Lambert: Thank you for referring Rosalie Schneider to me for evaluation  Below are my notes for this consultation  If you have questions, please do not hesitate to call me  I look forward to following your patient along with you  Sincerely,        Tu Mayorga DO        CC: No Recipients  Tu Mayorga DO  1/22/2019  1:10 PM  Sign at close encounter  [facility] - Outpatient Consultation  Rosalie Schneider 72 y o  female MRN: [id number]  Encounter: [id number]          ASSESSMENT AND PLAN:    Nida Cheng was seen today for screening colonoscopy and Gerd  1  Colon cancer screening  Last colonoscopy was over 10 years ago, Benign polyps were removed  I will schedule her for a colonoscopy  Risks and benefits of the procedure were discussed  Risks include but aren't limited to bleeding, perforation, missed lesion, and infection  Patient is agreeable to the procedure  Bowel prep instructions given  2  Hiatal hernia  Hiatal hernia revealed via esophagram several years ago  3  BMI 40 0-44 9, adult ([address] )    4  Gastroesophageal reflux disease without esophagitis  Reflux symptoms well controlled with Omeprazole 20mg daily  I advised her to take the Omeprazole 30min before her first meal of the day  I also instructed her to continue avoiding her food triggers  5  Need for hepatitis C screening test  Because of her age range, hepatitis C screening was recommended and she will have her lab work drawn in near future  Follow-up in 4 months    ______________________________________________________________________    HPI:  Rosalie Schneider is a 72 y o  female with a history of hiatal hernia referred to us by [id number] for a colon cancer screening  Her last colonoscopy was over 10 years ago   Benign polyps were removed  Pt has a history of GERD  She used to take Prilosec OTC, but she has been on Omeprazole 20mg once daily in the morning for the past month  She does get acid reflux if she does not take it  Fried foods and saucy foods trigger her reflux symptoms  She also reports nocturnal symptomatology  She denies dysphagia, weight loss  She has no family history of colon cancer  Patient does not smoke and she consumes alcohol moderately  REVIEW OF SYSTEMS:    CONSTITUTIONAL: Denies any fever, chills, rigors, and weight loss  HEENT: No earache or tinnitus  Denies hearing loss or visual disturbances  CARDIOVASCULAR: No chest pain or palpitations  RESPIRATORY: Denies any cough, hemoptysis, shortness of breath or dyspnea on exertion  GASTROINTESTINAL: As noted in the History of Present Illness  GENITOURINARY: No problems with urination  Denies any hematuria or dysuria  NEUROLOGIC: No dizziness or vertigo, denies headaches  MUSCULOSKELETAL: Denies any muscle or joint pain  SKIN: Denies skin rashes or itching  ENDOCRINE: Denies excessive thirst  Denies intolerance to heat or cold  PSYCHOSOCIAL: Denies depression or anxiety  Denies any recent memory loss         Historical Information   Past Medical History:   Diagnosis Date    Asthma     Hypertension     Ventricular arrhythmia      Past Surgical History:   Procedure Laterality Date    APPENDECTOMY      KNEE SURGERY      ROTATOR CUFF REPAIR      SHOULDER SURGERY       Social History   History   Alcohol Use    Yes     Comment: rarely drinks wine     History   Drug Use No     History   Smoking Status    Never Smoker   Smokeless Tobacco    Never Used     Family History   Problem Relation Age of Onset    Stroke Mother     Diabetes Mother     Hyperlipidemia Mother    [place] Hypertension Mother     Diabetes Father     Hyperlipidemia Father     Hypertension Father     Lung cancer Father 79    Lung cancer Sister 71       Meds/Allergies Current Outpatient Prescriptions:     acetaminophen-codeine (TYLENOL #3) 300-30 mg per tablet    albuterol (2 5 mg/3 mL) 0 083 % nebulizer solution    albuterol (VENTOLIN HFA) 90 mcg/act inhaler    atenolol (TENORMIN) 25 mg tablet    cetirizine (ZyrTEC) 10 mg tablet    fluticasone (FLONASE) 50 mcg/act nasal spray    fluticasone-salmeterol (ADVAIR) 500-50 mcg/dose inhaler    losartan-hydrochlorothiazide (HYZAAR) 100-25 MG per tablet    meloxicam (MOBIC) 15 mg tablet    omeprazole (PriLOSEC) 20 mg delayed release capsule    ergocalciferol (VITAMIN D2) 50,000 units    ibuprofen (MOTRIN) 600 mg tablet    montelukast (SINGULAIR) 10 mg tablet    No Known Allergies        Objective     not currently breastfeeding  There is no height or weight on file to calculate BMI  PHYSICAL EXAM:      General Appearance:   Alert, cooperative, no distress   HEENT:   Normocephalic, atraumatic, anicteric      Neck:  Supple, symmetrical, trachea midline   Lungs:   Clear to auscultation bilaterally; no rales, rhonchi or wheezing; respirations unlabored    Heart[de-identified]   Regular rate and rhythm; no murmur, rub, or gallop  Abdomen:   Soft, non-tender, non-distended; normal bowel sounds; no masses, no organomegaly    Genitalia:   Deferred    Rectal:   Deferred    Extremities:  No cyanosis, clubbing or edema    Pulses:  2+ and symmetric    Skin:  No jaundice, rashes, or lesions    Lymph nodes:  No palpable cervical lymphadenopathy        Lab Results:   No visits with results within 1 Day(s) from this visit  Latest known visit with results is:   Lab on 11/06/2018   Component Date Value    Cholesterol 11/06/2018 194     Triglycerides 11/06/2018 173*    HDL, Direct 11/06/2018 47     LDL Calculated 11/06/2018 112*    Non-HDL-Chol (CHOL-HDL) 11/06/2018 147     Vit D, 25-Hydroxy 11/06/2018 50 7     TSH 3RD GENERATON 11/06/2018 2 400          Radiology Results:   No results found      Attestation:   By signing my name below, I, Nicho White, attest that this documentation has been prepared under the direction and in the presence of Maye Palma DO  Electronically Signed: Itzel Lambert  1/22/19     I, Maye Palma, personally performed the services described in this documentation  All medical record entries made by the scribe were at my direction and in my presence  I have reviewed the chart and discharge instructions and agree that the record reflects my personal performance and is accurate and complete   Maye Palma DO  1/22/19

## 2019-01-22 NOTE — PROGRESS NOTES
[facility] [facility] - Outpatient Consultation  Rosalie Schneider 72 y o  female MRN: [id number]  Encounter: [id number]          ASSESSMENT AND PLAN:    Niad Cheng was seen today for screening colonoscopy and Gerd  1  Colon cancer screening  Last colonoscopy was over 10 years ago, Benign polyps were removed  I will schedule her for a colonoscopy  Risks and benefits of the procedure were discussed  Risks include but aren't limited to bleeding, perforation, missed lesion, and infection  Patient is agreeable to the procedure  Bowel prep instructions given  2  Hiatal hernia  Hiatal hernia revealed via esophagram several years ago  3  BMI 40 0-44 9, adult ([facility] )    4  Gastroesophageal reflux disease without esophagitis  Reflux symptoms well controlled with Omeprazole 20mg daily  I advised her to take the Omeprazole 30min before her first meal of the day  I also instructed her to continue avoiding her food triggers  5  Need for hepatitis C screening test  Because of her age range, hepatitis C screening was recommended and she will have her lab work drawn in near future  Follow-up in 4 months    ______________________________________________________________________    HPI:  Rosalie Schneider is a 72 y o  female with a history of hiatal hernia referred to us by Ángel Gaytan for a colon cancer screening  Her last colonoscopy was over 10 years ago  Benign polyps were removed  Pt has a history of GERD  She used to take Prilosec OTC, but she has been on Omeprazole 20mg once daily in the morning for the past month  She does get acid reflux if she does not take it  Fried foods and saucy foods trigger her reflux symptoms  She also reports nocturnal symptomatology  She denies dysphagia, weight loss  She has no family history of colon cancer  Patient does not smoke and she consumes alcohol moderately  REVIEW OF SYSTEMS:    CONSTITUTIONAL: Denies any fever, chills, rigors, and weight loss    HEENT: No earache or tinnitus  Denies hearing loss or visual disturbances  CARDIOVASCULAR: No chest pain or palpitations  RESPIRATORY: Denies any cough, hemoptysis, shortness of breath or dyspnea on exertion  GASTROINTESTINAL: As noted in the History of Present Illness  GENITOURINARY: No problems with urination  Denies any hematuria or dysuria  NEUROLOGIC: No dizziness or vertigo, denies headaches  MUSCULOSKELETAL: Denies any muscle or joint pain  SKIN: Denies skin rashes or itching  ENDOCRINE: Denies excessive thirst  Denies intolerance to heat or cold  PSYCHOSOCIAL: Denies depression or anxiety  Denies any recent memory loss         Historical Information   Past Medical History:   Diagnosis Date    Asthma     Hypertension     Ventricular arrhythmia      Past Surgical History:   Procedure Laterality Date    APPENDECTOMY      KNEE SURGERY      ROTATOR CUFF REPAIR      SHOULDER SURGERY       Social History   History   Alcohol Use    Yes     Comment: rarely drinks wine     History   Drug Use No     History   Smoking Status    Never Smoker   Smokeless Tobacco    Never Used     Family History   Problem Relation Age of Onset   [place] Stroke Mother     Diabetes Mother     Hyperlipidemia Mother    [place] Hypertension Mother     Diabetes Father     Hyperlipidemia Father     Hypertension Father     Lung cancer Father 79    Lung cancer Sister 71       Meds/Allergies       Current Outpatient Prescriptions:     acetaminophen-codeine (TYLENOL #3) 300-30 mg per tablet    albuterol (2 5 mg/3 mL) 0 083 % nebulizer solution    albuterol (VENTOLIN HFA) 90 mcg/act inhaler    atenolol (TENORMIN) 25 mg tablet    cetirizine (ZyrTEC) 10 mg tablet    fluticasone (FLONASE) 50 mcg/act nasal spray    fluticasone-salmeterol (ADVAIR) 500-50 mcg/dose inhaler    losartan-hydrochlorothiazide (HYZAAR) 100-25 MG per tablet    meloxicam (MOBIC) 15 mg tablet    omeprazole (PriLOSEC) 20 mg delayed release capsule    ergocalciferol (VITAMIN D2) 50,000 units    ibuprofen (MOTRIN) 600 mg tablet    montelukast (SINGULAIR) 10 mg tablet    No Known Allergies        Objective     not currently breastfeeding  There is no height or weight on file to calculate BMI  PHYSICAL EXAM:      General Appearance:   Alert, cooperative, no distress   HEENT:   Normocephalic, atraumatic, anicteric      Neck:  Supple, symmetrical, trachea midline   Lungs:   Clear to auscultation bilaterally; no rales, rhonchi or wheezing; respirations unlabored    Heart[de-identified]   Regular rate and rhythm; no murmur, rub, or gallop  Abdomen:   Soft, non-tender, non-distended; normal bowel sounds; no masses, no organomegaly    Genitalia:   Deferred    Rectal:   Deferred    Extremities:  No cyanosis, clubbing or edema    Pulses:  2+ and symmetric    Skin:  No jaundice, rashes, or lesions    Lymph nodes:  No palpable cervical lymphadenopathy        Lab Results:   No visits with results within 1 Day(s) from this visit  Latest known visit with results is:   Lab on 11/06/2018   Component Date Value    Cholesterol 11/06/2018 194     Triglycerides 11/06/2018 173*    HDL, Direct 11/06/2018 47     LDL Calculated 11/06/2018 112*    Non-HDL-Chol (CHOL-HDL) 11/06/2018 147     Vit D, 25-Hydroxy 11/06/2018 50 7     TSH 3RD GENERATON 11/06/2018 2 400          Radiology Results:   No results found  Attestation:   By signing my name below, Teddy Altamirano, attest that this documentation has been prepared under the direction and in the presence of Rocky Ortiz DO  Electronically Signed: Itzel Sidhu  1/22/19     I, Rocky Ortiz, personally performed the services described in this documentation  All medical record entries made by the keonibgeneva were at my direction and in my presence  I have reviewed the chart and discharge instructions and agree that the record reflects my personal performance and is accurate and complete   Rocky Ortiz DO  1/22/19

## 2019-01-31 ENCOUNTER — TELEPHONE (OUTPATIENT)
Dept: GASTROENTEROLOGY | Facility: MEDICAL CENTER | Age: 74
End: 2019-01-31

## 2019-01-31 DIAGNOSIS — Z12.11 COLON CANCER SCREENING: Primary | ICD-10-CM

## 2019-01-31 NOTE — TELEPHONE ENCOUNTER
Dr Mitesh Mason pt called stating her prep cost too much  Pt is sched for colon procedure 03/18/19   Pt can be reached at [phone]

## 2019-02-12 NOTE — PROGRESS NOTES
Assessment/Plan:    No problem-specific Assessment & Plan notes found for this encounter  Diagnoses and all orders for this visit:    BMI 40 0-44 9, adult ([facility] )  -     Ambulatory referral to Weight Management    Gastroesophageal reflux disease without esophagitis    Suspected sleep apnea    Essential hypertension    Mild intermittent asthma without complication    Premature atrial contractions    Other orders  -     Cancel: Ambulatory referral to Sleep Medicine; Future      -Discussed options of Healthy[facility]-Intensive Lifestyle Intervention Program, Very Low Calorie Diet-VLCD, Conservative Program, [place]-En-Y Gastric Bypass and Vertical Sleeve Gastrectomy and the role of weight loss medications   -Initial weight loss goal of 5-10% weight loss for improved health  -Screening labs- reviewed  -Patient is interested in pursuing Conservative Program and learning more about surgical options  Goals:  Food log (ie ) www myfitnesspal com,sparkpeople  com,loseit com,calorieking  com,etc  baritastic  No sugary beverages  At least 64oz of water daily  Increase physical activity by 10 minutes daily  Gradually increase physical activity to a goal of 5 days per week for 30 minutes of MODERATE intensity PLUS 2 days per week of FULL BODY resistance training  Goal protein intake of 60-80 grams per day,   5-10 servings of fruits and vegetables per day,   25-35 grams of dietary fiber per day,   1875-1408 calories per day and Limit caffeine to 16oz per day  She also plans to contact her insurance to inquire about free gym plan via medicare  60 minute visit, >50% face-to-face time spent counseling patient on surgical and nonsurgical interventions for the treatment of excess weight  Discussed the advantages and long-term outcomes with regards to bariatric surgery    Discussed in detail nonsurgical options including intensive lifestyle intervention program, very low-calorie diet program and conservative program   Discussed the role of weight loss medications  Counseled patient on diet behavior and exercise modification for weight loss  Follow up in approximately 6-8weeks with Non-Surgical Physician/Advanced Practitioner  Subjective:   Chief Complaint   Patient presents with    Consult     [facility] consult        Patient ID: Debbie Rocha  is a 72 y o  female with excess weight/obesity here to pursue weight management  Past Medical History:   Diagnosis Date    Asthma     Hypertension     Ventricular arrhythmia        HPI:  Obesity:  Severity: class 3  Onset: 30 years ago    Modifiers: Diet and Exercise  Contributing factors: Pregnancy and Medications  Associated symptoms: comorbid conditions, fatigue, increased joint pain and decreased exercise capacity    Goals:healthy wt  Hydration: water- 3 glasses of water a day, no soda no juices  Alcohol: rarely  Exercise: no  She was going to the gym but she fell on the gym and broke her wrist a year ago so stopped since  Sleep: 8-10hr  STOP ban/8, has already an appointment    Bf: toast or pancake, coffee w milk w splenda  S:no  L: sandwich ham and cheese or leftovers (rice/beans w protein)  S: no  D: vegetables soup  S: tea, cookies x 4    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  The following portions of the patient's history were reviewed and updated as appropriate: allergies, current medications, past family history, past medical history, past social history, past surgical history and problem list     Review of Systems   Constitutional: Negative for activity change and appetite change  HENT: Negative  Eyes: Negative  Respiratory: Negative  Cardiovascular: Positive for palpitations  Negative for chest pain  Gastrointestinal: Negative  Genitourinary: Negative  Musculoskeletal: Negative for arthralgias  Skin: Negative for rash  Neurological: Negative  Psychiatric/Behavioral: Negative          Objective:    /90 (BP Location: Left arm, Patient Position: Sitting, Cuff Size: Adult)   Pulse 83   Temp 97 8 °F (36 6 °C) (Tympanic)   Resp 22   Ht 5' (1 524 m)   Wt 102 kg (224 lb 4 8 oz)   BMI 43 81 kg/m²     Physical Exam     Constitutional   General appearance: Abnormal   well developed and obese  Eyes No conjunctival pallor  Ears, Nose, Mouth, and Throat Oral mucosa moist    Pulmonary   Respiratory effort: No increased work of breathing or signs of respiratory distress  Auscultation of lungs: Clear to auscultation, equal breath sounds bilaterally, no wheezes, no rales, no rhonci  Cardiovascular   Auscultation of heart: Normal rate and rhythm, normal S1 and S2, without murmurs  Examination of extremities for edema and/or varicosities: Normal   no edema  Abdomen   Abdomen: Abnormal   The abdomen was obese  Bowel sounds were normal  The abdomen was soft and nontender     Musculoskeletal   Gait and station: Normal     Psychiatric   Orientation to person, place and time: Normal     Affect: appropriate

## 2019-02-13 ENCOUNTER — TELEPHONE (OUTPATIENT)
Dept: FAMILY MEDICINE CLINIC | Facility: CLINIC | Age: 74
End: 2019-02-13

## 2019-02-13 DIAGNOSIS — G44.89 OTHER HEADACHE SYNDROME: Primary | ICD-10-CM

## 2019-02-13 NOTE — TELEPHONE ENCOUNTER
Pt asking for an order to Neuro for headaches  She has an appointment tomorrow  You last saw her on 1/17/19  Last time headaches were addressed was on 10/31/18 visit

## 2019-02-13 NOTE — PROGRESS NOTES
[facility] Consult  PATIENT:  Israel Wheatley  MRN:  [id number]  :  1945  DATE OF SERVICE:  2019  REFERRED BY: Nel Jimenez PA-C  PMD: Nel Jimenez PA-C    Assessment/Plan:     Israel Wheatley is a very pleasant 72 y o  female with a past medical history of hypertension, lifelong thrombocytopenia, vitamin-D deficiency, hiatal hernia, GERD, asthma, arthritis in wrist/back/knees, palpitations, PACs, depression, osteopenia, BMI over 40 referred here for evaluation of headache  Neurologic assessment reveals normal neurological exam, except for chronic left lens abnormality, diffuse hypoactive reflexes, mildly unstable tandem gait  Noncontrast head CT 2018: Unremarkable    Chronic tension-type headache, not intractable  Positional lightheadedness  Meleico Davis reports that she never really had headaches until summer 2018 where they worker in almost every day and headache was bad enough that she went to the emergency department 2018 where she reported that home systolic blood pressure was nearly 200  She describes a bifrontal and bitemporal throbbing that occurs in the a m  Hours waking her up and last about 30 min, typically resolving spontaneously  She denies aura and denies other typical associated migrainous features  Some positional lightheadedness with waking in the morning, but knows to sit up slowly before standing  *She reports headache started improving in  and now have improved in frequency and severity and has only had headaches only twice in the past month       Abortive:  - headache typically resolve spontaneously within 30 min  - she takes daily meloxicam 15 mg for her arthritis, otherwise I recommended no more than 3 days per week over-the-counter or prescription pain medications to avoid medication overuse/rebound headache    Preventative:  - metoprolol was switched to atenolol in 2018, discussed that this medication is sometimes used for headache/migraine prevention, although it does not sound like she has migraines, it does sound like her headaches may be related to hypertension and therefore recommend continue close blood pressure monitoring and management per primary care provider  - if headaches increase in frequency or severity could consider taking over-the-counter supplements including magnesium, riboflavin daily as a preventative  - discussed safety precautions regarding positional lightheadedness in a m , continue to sit up and stand slowly to avoid falls      Insomnia, unspecified type  Snoring  - She reports history of insomnia, snoring, needing to sleep 9-10 hours yet still not feeling refreshed, waking up with headaches and high blood pressure and a sleep study in the past that she thinks was diagnostic of sleep apnea, recommended referral to sleep medicine for further evaluation  Discussed in detail what uncontrolled sleep apnea can due to the body and the brain, and recommended if she is diagnosed with sleep apnea that she follow through with the recommendations especially if they recommend CPAP  Patient instructions     Headaches have improved which is great, continue controlling blood pressure, headaches could also be related to possible sleep apnea and will refer to Sleep Medicine  Positional lightheadedness in the morning likely related to lower blood pressure when you sit up or stand 1st thing in the morning, continue to get up slowly, sitting 1st and adjusting before standing to prevent falls     Additional Testing:   - referral to sleep medicine     Headache/migraine treatment:   Abortive medications (for immediate treatment of a headache): It is ok to take ibuprofen, acetaminophen or naproxen (Advil, Tylenol,  Aleve, Excedrin) if they help your headaches you should limit these to No more than 3 times a week to avoid medication overuse/rebound headaches       Over the counter preventive supplements for headaches/migraines   - if headaches increase you could consider these supplements:  (to take every day to help prevent headaches - not to take at the time of headache):  [x] Magnesium 400mg daily (If any diarrhea or upset stomach, decrease dose  as tolerated)  [x] Riboflavin (Vitamin B2) 400mg daily   (FYI B2 may make your urine bright/neon yellow)    Prescription preventive medications for headaches/migraines   (to take every day to help prevent headaches - not to take at the time of headache):  [x] continue Atenolol prescribed by PMD  Continue trying to keep Blood pressure in line     Sleep:   [x] referral to sleep medicine for possible sleep apnea which could affect headaches    Lifestyle Recommendations:  [x] SLEEP - Maintain a regular sleep schedule: Adults need at least 7-8 hours of uninterrupted a night  Maintain good sleep hygiene:  Going to bed and waking up at consistent times, avoiding excessive daytime naps, avoiding caffeinated beverages in the evening, avoid excessive stimulation in the evening and generally using bed primarily for sleeping  One hour before bedtime would recommend turning lights down lower, decreasing your activity (may read quietly, listen to music at a low volume)  When you get into bed, should eliminate all technology (no texting, emailing, playing with your phone, iPad or tablet in bed)  [x] HYDRATION - Maintain good hydration  Drink  2L of fluid a day (4 typical small water bottles)  [x] DIET - Maintain good nutrition  In particular don't skip meals and try and eat healthy balanced meals regularly  [x] TRIGGERS - Look for other triggers and avoid them: Limit caffeine to 1-2 cups a day or less  Avoid dietary triggers that you have noticed bring on your headaches (this could include aged cheese, peanuts, MSG, aspartame and nitrates)    [x] EXERCISE - physical exercise as we all know is good for you in many ways, and not only is good for your heart, but also is beneficial for your mental health, cognitive health and  chronic pain/headaches  I would encourage at the least 5 days of physical exercise weekly for at least 30 minutes  Education and Follow-up  [x] Please call with any questions or concerns  [x] follow up 3 months       CC: We had the pleasure of evaluating Gwyn Morris in neurological consultation today  Gwyn Morris is a 72 y o    right handed female who presents today for evaluation of headaches  History of Present Illness:   Past medical history of hypertension, lifelong thrombocytopenia, vitamin-D deficiency, hiatal hernia, GERD, asthma, arthritis in wrist back and knees, palpitations, PACs, ventricular arrhythmia, depression, osteopenia, BMI over 40    * BP has often been high with headaches  ED 06/01/2018:  Reporting bifrontal headache intermittently for months  - noncontrast head CT performed  - no analgesics given as patient denied since headache improve without intervention      What is your current pain level - 0    Headaches started at what age?never used to get headaches until age 72   How often do the headaches occur? Worse over summer 2018 and then bad enough that she went to ED on 6/1/18  Started improving 10 or 11/2018 - before then they were almost every day  Now only twice in the past month - has improved in frequency and severity   What time of the day do the headaches start? often wake her up in am hours   How long do the headaches last? Lasts about 30 minutes   Are you ever headache free? Yes    Aura? without aura     Describe your usual headache pain quality? Throbbing   Where is your headache located? Bifrontal and bitemporal  What is the intensity of pain?  When bad about 6-7, now 4   Associated symptoms:   [] Nausea       [] Vomiting        [] Diarrhea  [x] Insomnia    [] Problems with concentration  [] Photophobia     []Phonophobia      [] Osmophobia  [] Blurred vision   [] Prefer quiet, dark room  [] Tinnitus   [] Hands or feet tingle or feel numb/paresthesias      [] Red ear      [] Ptosis      [] Facial droop  [] Lacrimation  [] Nasal congestion/rhinorrhea   [] Flushing of face  [] Change in pupil size    Unassociated with headache  [x] Stiff or sore neck - chronically, has had PT for this  [x] Light-headed - with standing first thing in am and gets up and everything goes away            Headache triggers:  None known     Have you seen someone else for headaches or pain? No  Have you had trigger point injection performed and how often? No  Have you had Botox injection performed and how often? No   Have you had epidural injections or transforaminal injections performed? Yes she thinks at [facility]   - some kind of cortisone injections into her back, does not sounds like in her spine   Have you used CBD or THC for your headaches and how often? No  Are you current pregnant or planning on getting pregnant? No  Have you ever had any Brain imaging? Yes, see below      What medications do you take or have you taken for your headaches? ABORTIVE:    Gets up, sits on edge of bed, walks around and it goes away on its own within 30 minues     Meloxicam 15 mg daily in am     Ibuprofen 800 mg in a m  - was taking daily for arthritis, stopped taking a few months ago once switched to meloxicam  Acetaminophen-codeine 01/08/2018, 20 tabs - that was for knee surgery, so never took   Tramadol 04/03/2018, 90 tabs - not taking anymore    PREVENTIVE:   Not specifically for headache    For hypertension:  10/31/2018 metoprolol 50 mg  - switched to atenolol 25 mg  - metoprolol was making her sleep and dizzy   - also on losartan/HCTZ  Alternative therapies used in the past for headaches? no    LIFESTYLE  Sleep - 9-10 hours  - sleep study about 5 years ago told her she sleep apnea, can not recall if they recommended a CPAP        Is your sleep restful? Maybe   What time do you go to bed at night?  11:30  What time do you wake up in am? 7  How often do you get up at night? Yes sometimes  Do you snore while asleep? yes  Have you been told that you stop breathing during sleeping? no  Do you wake up tired in the morning? yes  Do you take frequent naps during the day? Yes, sometimes     Physical activity: not lately  Gym last year     Water:  3 glasses   Diet:  Do you ever skip meals?   no    Mood: no history of anxiety  Some depressed mood around days before her menses used to be       The following portions of the patient's history were reviewed and updated as appropriate: allergies, current medications, past family history, past medical history, past social history, past surgical history and problem list     Past Medical History:     Past Medical History:   Diagnosis Date    Asthma     Hypertension     Ventricular arrhythmia        Patient Active Problem List   Diagnosis    Palpitations    Premature atrial contractions    Primary osteoarthritis of right wrist    Wrist stiffness, right    Thrombocytopenia ([facility] [address] [facility]    Hypertension    BMI 40 0-44 9, adult ([facility] )    Vitamin D deficiency    Hiatal hernia    Asthma    Menopause    Spinal stenosis of 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    Lumbar facet arthropathy    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    Colon cancer screening    Gastroesophageal reflux disease without esophagitis    Other headache syndrome       Medications:      Current Outpatient Medications   Medication Sig Dispense Refill    albuterol (2 5 mg/3 mL) 0 083 % nebulizer solution Take 2 5 mg by nebulization every 6 (six) hours as needed for wheezing      albuterol (VENTOLIN HFA) 90 mcg/act inhaler Inhale 2 puffs every 4 (four) hours as needed        atenolol (TENORMIN) 25 mg tablet TAKE 1 TABLET BY MOUTH EVERY DAY 30 tablet 2    cetirizine (ZyrTEC) 10 mg tablet Take 10 mg by mouth daily      fluticasone (FLONASE) 50 mcg/act nasal spray 1 spray into each nostril        fluticasone-salmeterol (ADVAIR) 500-50 mcg/dose inhaler Inhale 1 puff 2 (two) times a day Rinse mouth after use  1 Inhaler 3    losartan-hydrochlorothiazide (HYZAAR) 100-25 MG per tablet Take 1 tablet by mouth daily        meloxicam (MOBIC) 15 mg tablet Take 1 tablet (15 mg total) by mouth daily 30 tablet 1    omeprazole (PriLOSEC) 20 mg delayed release capsule TAKE 1 CAPSULE BY MOUTH EVERY DAY 30 capsule 2    polyethylene glycol (GOLYTELY) 4000 mL solution Use as directed by office 4000 mL 0    ergocalciferol (VITAMIN D2) 50,000 units Take 1 capsule (50,000 Units total) by mouth once a week (Patient not taking: Reported on 1/22/2019 ) 4 capsule 1    montelukast (SINGULAIR) 10 mg tablet Take 10 mg by mouth daily      Na Sulfate-K Sulfate-Mg Sulf (SUPREP BOWEL PREP KIT) 17 5-3 13-1 6 GM/177ML SOLN Take 1 Bottle by mouth once for 1 dose 1 Bottle 0     No current facility-administered medications for this visit           Allergies:    No Known Allergies    Family History:     Family History   Problem Relation Age of Onset   Tori Alcala Stroke Mother     Diabetes Mother     Hyperlipidemia Mother     Hypertension Mother     Diabetes Father     Hyperlipidemia Father     Hypertension Father     Lung cancer Father 79    Lung cancer Sister 71       Social History:   Work: works per [employer] as a [employer]   Education: high school, 1 semester college  Lives with 2 cats     Illicit Drugs: denies  Alcohol/tobacco: Denies tobacco use, alcohol intake: seldomly    Social History     Socioeconomic History    Marital status: Single     Spouse name: Not on file    Number of children: Not on file    Years of education: Not on file    Highest education level: Not on file   Occupational History    Not on file   Social Needs    Financial resource strain: Not on file    Food insecurity:     Worry: Not on file     Inability: Not on file    Transportation needs:     Medical: Not on file     Non-medical: Not on file   Tobacco Use    Smoking status: Never Smoker    Smokeless tobacco: Never Used   Substance and Sexual Activity    Alcohol use: Yes     Comment: rarely drinks wine    Drug use: No    Sexual activity: Not on file   Lifestyle    Physical activity:     Days per week: Not on file     Minutes per session: Not on file    Stress: Not on file   Relationships    Social connections:     Talks on phone: Not on file     Gets together: Not on file     Attends [employer] service: Not on file     Active member of club or organization: Not on file     Attends meetings of clubs or organizations: Not on file     Relationship status: Not on file    Intimate partner violence:     Fear of current or ex partner: Not on file     Emotionally abused: Not on file     Physically abused: Not on file     Forced sexual activity: Not on file   Other Topics Concern    Not on file   Social History Narrative    Not on file         Objective:     Physical Exam:                                                                 Vitals:            Constitutional:    /66 (BP Location: Left arm, Patient Position: Sitting, Cuff Size: Standard)   Pulse 82   Resp 16   Ht 5' 1" (1 549 m)   Wt 102 kg (224 lb 1 6 oz)   BMI 42 34 kg/m²   BP Readings from Last 3 Encounters:   02/14/19 120/66   01/22/19 122/80   01/17/19 124/78     Pulse Readings from Last 3 Encounters:   02/14/19 82   01/22/19 74   01/17/19 94         Well developed, well nourished, well groomed  No dysmorphic features  HEENT:  Normocephalic atraumatic  No meningismus  Oropharynx is clear and moist  No oral mucosal lesions  Chest:  Respirations regular and unlabored  Cardiovascular:  Regular rate, intact distal pulses  Distal extremities warm without palpable edema or tenderness, no observed significant swelling  Musculoskeletal:  Full range of motion  (see below under neurologic exam for evaluation of motor function and gait)   Skin:  warm and dry, not diaphoretic  No apparent birthmarks or stigmata of neurocutaneous disease  Psychiatric:  Normal behavior and appropriate affect        Neurological Examination:     Mental status/cognitive function:   Orientated to time, place and person  Recent and remote memory intact  Attention span and concentration as well as fund of knowledge are appropriate for age  Normal language and spontaneous speech  Cranial Nerves:  II-visual fields full  Fundi poorly visualized due to pupillary constriction (last saw eye doctor within 2 years and dilated fundus exam normal) - L eye lens (pupil per patient) was cut by broken glass at age 17 and abnormality still evident   III, IV, VI-Pupils were equal, round, and reactive to light and accomodation  Extraocular movements were full and conjugate without nystagmus  conjugate gaze, normal smooth pursuits, normal saccades   V-facial sensation symmetric  VII-facial expression symmetric, intact forehead wrinkle, strong eye closure, symmetric smile    VIII-hearing grossly intact bilaterally   IX, X-palate elevation symmetric, no dysarthria  XI-shoulder shrug strength intact    XII-tongue protrusion midline  Motor Exam: symmetric bulk and tone throughout, no pronator drift  Power/strength 5/5 bilateral upper and lower extremities, no atrophy, fasciculations or abnormal movements noted  Sensory: grossly intact light touch in all extremities  Reflexes: brachioradialis 1+, biceps 1+, knee 1+, ankle 1+ bilaterally  No ankle clonus  Reflexes diffusely hypoactive  Coordination: Finger nose finger intact bilaterally, no apparent dysmetria, ataxia or tremor noted  Gait: steady casual and mildly unstable tandem gait  Romberg Negative  Pertinent lab results:   11/6/18: Vit D 50 7, TSH 2 4  6/1/18: BMP remarkable for mildly elevated bicarb 33  CBC remarkable for plts 135  1/23/18: LFTs normal     EKG 1/23/18: NSR   Imaging:   Personally reviewed   Noncontrast head CT 06/01/2018:   Unremarkable except for small left sphenoid fluid level     Review of Systems:   ROS obtained by medical assistant Personally reviewed and updated if indicated          Review of Systems   Constitutional: Positive for fatigue  HENT: Negative  Eyes: Negative  Respiratory: Negative  Cardiovascular: Negative  Gastrointestinal: Negative  Endocrine: Negative  Genitourinary: Negative  Musculoskeletal: Positive for back pain, neck pain and neck stiffness  Skin: Negative  Allergic/Immunologic: Negative  Neurological: Positive for light-headedness and headaches  Hematological: Negative  Psychiatric/Behavioral: Negative  All other systems reviewed and are negative  I have spent over 60 minutes with Patient  today in which greater than 50% of this time was spent in counseling/coordination of care regarding Prognosis, Risks and benefits of tx options, Intructions for management, Importance of tx compliance, Risk factor reductions and Impressions        Author:  Hao Montanez MD 2/14/2019 9:20 AM

## 2019-02-14 ENCOUNTER — OFFICE VISIT (OUTPATIENT)
Dept: NEUROLOGY | Facility: CLINIC | Age: 74
End: 2019-02-14
Payer: COMMERCIAL

## 2019-02-14 ENCOUNTER — OFFICE VISIT (OUTPATIENT)
Dept: BARIATRICS | Facility: CLINIC | Age: 74
End: 2019-02-14
Payer: COMMERCIAL

## 2019-02-14 VITALS
WEIGHT: 224.1 LBS | DIASTOLIC BLOOD PRESSURE: 66 MMHG | RESPIRATION RATE: 16 BRPM | HEART RATE: 82 BPM | SYSTOLIC BLOOD PRESSURE: 120 MMHG | HEIGHT: 61 IN | BODY MASS INDEX: 42.31 KG/M2

## 2019-02-14 VITALS
RESPIRATION RATE: 22 BRPM | TEMPERATURE: 97.8 F | WEIGHT: 224.3 LBS | SYSTOLIC BLOOD PRESSURE: 130 MMHG | HEART RATE: 83 BPM | BODY MASS INDEX: 44.04 KG/M2 | HEIGHT: 60 IN | DIASTOLIC BLOOD PRESSURE: 90 MMHG

## 2019-02-14 DIAGNOSIS — I49.1 PREMATURE ATRIAL CONTRACTIONS: ICD-10-CM

## 2019-02-14 DIAGNOSIS — J45.20 MILD INTERMITTENT ASTHMA WITHOUT COMPLICATION: ICD-10-CM

## 2019-02-14 DIAGNOSIS — G44.229 CHRONIC TENSION-TYPE HEADACHE, NOT INTRACTABLE: Primary | ICD-10-CM

## 2019-02-14 DIAGNOSIS — G47.00 INSOMNIA, UNSPECIFIED TYPE: ICD-10-CM

## 2019-02-14 DIAGNOSIS — R42 POSITIONAL LIGHTHEADEDNESS: ICD-10-CM

## 2019-02-14 DIAGNOSIS — R29.818 SUSPECTED SLEEP APNEA: ICD-10-CM

## 2019-02-14 DIAGNOSIS — I10 ESSENTIAL HYPERTENSION: ICD-10-CM

## 2019-02-14 DIAGNOSIS — R06.83 SNORING: ICD-10-CM

## 2019-02-14 DIAGNOSIS — K21.9 GASTROESOPHAGEAL REFLUX DISEASE WITHOUT ESOPHAGITIS: ICD-10-CM

## 2019-02-14 PROCEDURE — 99204 OFFICE O/P NEW MOD 45 MIN: CPT | Performed by: PSYCHIATRY & NEUROLOGY

## 2019-02-14 PROCEDURE — 99214 OFFICE O/P EST MOD 30 MIN: CPT | Performed by: FAMILY MEDICINE

## 2019-02-14 NOTE — PROGRESS NOTES
Patient ID: Rajiv Man is a 72 y o  female  Assessment/Plan:    No problem-specific Assessment & Plan notes found for this encounter  {Assess/PlanSmartLinks:89245}       Subjective:    HPI    {[facility] HPI texts:92693}    {Common ambulatory SmartLinks:64297}         Objective:    not currently breastfeeding  Physical Exam    Neurological Exam      ROS:    Review of Systems   Constitutional: Positive for fatigue  HENT: Negative  Eyes: Negative  Respiratory: Negative  Cardiovascular: Negative  Gastrointestinal: Negative  Endocrine: Negative  Genitourinary: Negative  Musculoskeletal: Positive for back pain, neck pain and neck stiffness  Skin: Negative  Allergic/Immunologic: Negative  Neurological: Positive for light-headedness and headaches  Hematological: Negative  Psychiatric/Behavioral: Negative  All other systems reviewed and are negative

## 2019-02-14 NOTE — PATIENT INSTRUCTIONS
Headaches have improved which is great, continue controlling blood pressure, headaches could also be related to possible sleep apnea and will refer to Sleep Medicine  Positional lightheadedness in the morning likely related to lower blood pressure when you sit up or stand 1st thing in the morning, continue to get up slowly, sitting 1st and adjusting before standing to prevent falls    Additional Testing:   - referral to sleep medicine     Headache/migraine treatment:   Abortive medications (for immediate treatment of a headache): It is ok to take ibuprofen, acetaminophen or naproxen (Advil, Tylenol,  Aleve, Excedrin) if they help your headaches you should limit these to No more than 3 times a week to avoid medication overuse/rebound headaches  Over the counter preventive supplements for headaches/migraines   - if headaches increase you could consider these supplements:  (to take every day to help prevent headaches - not to take at the time of headache):  [x] Magnesium 400mg daily (If any diarrhea or upset stomach, decrease dose  as tolerated)  [x] Riboflavin (Vitamin B2) 400mg daily   (FYI B2 may make your urine bright/neon yellow)    Prescription preventive medications for headaches/migraines   (to take every day to help prevent headaches - not to take at the time of headache):  [x] continue Atenolol prescribed by PMD  Continue trying to keep Blood pressure in line     Sleep:   [x] referral to sleep medicine for possible sleep apnea which could affect headaches    Lifestyle Recommendations:  [x] SLEEP - Maintain a regular sleep schedule: Adults need at least 7-8 hours of uninterrupted a night  Maintain good sleep hygiene:  Going to bed and waking up at consistent times, avoiding excessive daytime naps, avoiding caffeinated beverages in the evening, avoid excessive stimulation in the evening and generally using bed primarily for sleeping    One hour before bedtime would recommend turning lights down lower, decreasing your activity (may read quietly, listen to music at a low volume)  When you get into bed, should eliminate all technology (no texting, emailing, playing with your phone, iPad or tablet in bed)  [x] HYDRATION - Maintain good hydration  Drink  2L of fluid a day (4 typical small water bottles)  [x] DIET - Maintain good nutrition  In particular don't skip meals and try and eat healthy balanced meals regularly  [x] TRIGGERS - Look for other triggers and avoid them: Limit caffeine to 1-2 cups a day or less  Avoid dietary triggers that you have noticed bring on your headaches (this could include aged cheese, peanuts, MSG, aspartame and nitrates)  [x] EXERCISE - physical exercise as we all know is good for you in many ways, and not only is good for your heart, but also is beneficial for your mental health, cognitive health and  chronic pain/headaches  I would encourage at the least 5 days of physical exercise weekly for at least 30 minutes  Education and Follow-up  [x] Please call with any questions or concerns     [x] follow up 3 months

## 2019-02-25 DIAGNOSIS — I10 ESSENTIAL HYPERTENSION: Primary | ICD-10-CM

## 2019-02-25 RX ORDER — LOSARTAN POTASSIUM AND HYDROCHLOROTHIAZIDE 25; 100 MG/1; MG/1
1 TABLET ORAL DAILY
Qty: 90 TABLET | Refills: 1 | Status: SHIPPED | OUTPATIENT
Start: 2019-02-25 | End: 2019-04-19 | Stop reason: SDUPTHER

## 2019-02-25 NOTE — TELEPHONE ENCOUNTER
Pt needs her Losartan-Hydro 100-25mg to be sent to [facility] on [address]  Pt is totally out now because the pharm was sending the refill request to wrong office

## 2019-03-05 ENCOUNTER — ANESTHESIA EVENT (OUTPATIENT)
Dept: PERIOP | Facility: AMBULARY SURGERY CENTER | Age: 74
End: 2019-03-05
Payer: COMMERCIAL

## 2019-03-06 ENCOUNTER — APPOINTMENT (OUTPATIENT)
Dept: LAB | Facility: HOSPITAL | Age: 74
End: 2019-03-06
Attending: INTERNAL MEDICINE
Payer: COMMERCIAL

## 2019-03-06 DIAGNOSIS — Z11.59 NEED FOR HEPATITIS C SCREENING TEST: ICD-10-CM

## 2019-03-06 PROCEDURE — 86803 HEPATITIS C AB TEST: CPT

## 2019-03-06 PROCEDURE — 36415 COLL VENOUS BLD VENIPUNCTURE: CPT

## 2019-03-07 LAB — HCV AB SER QL: ABNORMAL

## 2019-03-09 ENCOUNTER — HOSPITAL ENCOUNTER (EMERGENCY)
Facility: HOSPITAL | Age: 74
Discharge: HOME | End: 2019-03-09
Attending: EMERGENCY MEDICINE | Admitting: EMERGENCY MEDICINE
Payer: COMMERCIAL

## 2019-03-09 ENCOUNTER — APPOINTMENT (EMERGENCY)
Dept: RADIOLOGY | Facility: HOSPITAL | Age: 74
End: 2019-03-09
Payer: COMMERCIAL

## 2019-03-09 VITALS
SYSTOLIC BLOOD PRESSURE: 191 MMHG | DIASTOLIC BLOOD PRESSURE: 94 MMHG | TEMPERATURE: 98.3 F | HEART RATE: 94 BPM | RESPIRATION RATE: 18 BRPM | OXYGEN SATURATION: 96 %

## 2019-03-09 DIAGNOSIS — R76.8 POSITIVE HEPATITIS C ANTIBODY TEST: Primary | ICD-10-CM

## 2019-03-09 DIAGNOSIS — J20.9 ACUTE BRONCHITIS: ICD-10-CM

## 2019-03-09 DIAGNOSIS — R05.9 COUGH: Primary | ICD-10-CM

## 2019-03-09 PROCEDURE — 71046 X-RAY EXAM CHEST 2 VIEWS: CPT

## 2019-03-09 PROCEDURE — 94640 AIRWAY INHALATION TREATMENT: CPT

## 2019-03-09 PROCEDURE — 99283 EMERGENCY DEPT VISIT LOW MDM: CPT

## 2019-03-09 RX ORDER — AZITHROMYCIN 250 MG/1
500 TABLET, FILM COATED ORAL ONCE
Status: COMPLETED | OUTPATIENT
Start: 2019-03-09 | End: 2019-03-09

## 2019-03-09 RX ORDER — BENZONATATE 100 MG/1
100 CAPSULE ORAL EVERY 8 HOURS
Qty: 21 CAPSULE | Refills: 0 | Status: SHIPPED | OUTPATIENT
Start: 2019-03-09 | End: 2019-04-19

## 2019-03-09 RX ORDER — AZITHROMYCIN 250 MG/1
250 TABLET, FILM COATED ORAL EVERY 24 HOURS
Qty: 4 TABLET | Refills: 0 | Status: SHIPPED | OUTPATIENT
Start: 2019-03-09 | End: 2019-03-13

## 2019-03-09 RX ADMIN — IPRATROPIUM BROMIDE 0.5 MG: 0.5 SOLUTION RESPIRATORY (INHALATION) at 08:36

## 2019-03-09 RX ADMIN — AZITHROMYCIN MONOHYDRATE 500 MG: 250 TABLET ORAL at 08:51

## 2019-03-09 RX ADMIN — ALBUTEROL SULFATE 5 MG: 2.5 SOLUTION RESPIRATORY (INHALATION) at 08:36

## 2019-03-09 NOTE — DISCHARGE INSTRUCTIONS
Take the Azithromycin daily for the next 4 days  Start this medication tomorrow  You can try the Tessalon for coughing, a humidifier in your bedroom may also help  Call your family doctor, you should be seen in the office for further evaluation and management to make sure your symptoms are improving

## 2019-03-09 NOTE — ED PROVIDER NOTES
History  Chief Complaint   Patient presents with    Cough     Patient reports sore throat since yesterday, reporting cough and body aches today  Denies fevers  Reports pain in her chest when she coughs  71 YO female presents for evaluation of URI symptoms  States started yesterday with sore throat  She woke this morning with a non-productive cough which has been mostly constant  Having a burning chest discomfort with cough but denies pain without cough  She denies fevers, states occasional chills over the last day, no ear pain  Pt works in a nursing home, states she did receive her flu vaccination this year  Pt states symptoms are similar to previous upper respiratory infections, no new or different symptoms; she states she does get frequent infections 2/2 her asthma  Pt denies CP/SOB/F/C/N/V/D/C, no dysuria, burning on urination or blood in urine   used: No    Cough   Cough characteristics:  Non-productive  Onset quality:  Gradual  Duration:  1 day  Timing:  Constant  Progression:  Worsening  Chronicity:  Recurrent  Smoker: no    Context: upper respiratory infection    Relieved by:  Nothing  Worsened by:  Nothing  Ineffective treatments:  None tried  Associated symptoms: chest pain, chills and sore throat    Associated symptoms: no fever, no rash, no rhinorrhea, no shortness of breath, no sinus congestion and no wheezing    Chest pain:     Quality: burning      Severity:  Mild    Onset quality:  Gradual    Duration:  1 day    Timing:  Intermittent    Progression:  Waxing and waning    Chronicity:  Recurrent  Sore throat:     Severity:  Moderate    Onset quality:  Gradual    Duration:  1 day    Timing:  Constant    Progression:  Improving      Prior to Admission Medications   Prescriptions Last Dose Informant Patient Reported? Taking?    Na Sulfate-K Sulfate-Mg Sulf (SUPREP BOWEL PREP KIT) 17 5-3 13-1 6 GM/177ML SOLN   No Yes   Sig: Take 1 Bottle by mouth once for 1 dose   albuterol (2 5 mg/3 mL) 0 083 % nebulizer solution  Self Yes Yes   Sig: Take 2 5 mg by nebulization every 6 (six) hours as needed for wheezing   albuterol (VENTOLIN HFA) 90 mcg/act inhaler  Self Yes Yes   Sig: Inhale 2 puffs every 4 (four) hours as needed     atenolol (TENORMIN) 25 mg tablet  Self No Yes   Sig: TAKE 1 TABLET BY MOUTH EVERY DAY   cetirizine (ZyrTEC) 10 mg tablet  Self Yes Yes   Sig: Take 10 mg by mouth daily   fluticasone (FLONASE) 50 mcg/act nasal spray  Self Yes Yes   Si spray into each nostril     fluticasone-salmeterol (ADVAIR) 500-50 mcg/dose inhaler  Self No Yes   Sig: Inhale 1 puff 2 (two) times a day Rinse mouth after use  losartan-hydrochlorothiazide (HYZAAR) 100-25 MG per tablet   No Yes   Sig: Take 1 tablet by mouth daily   meloxicam (MOBIC) 15 mg tablet  Self No Yes   Sig: Take 1 tablet (15 mg total) by mouth daily   omeprazole (PriLOSEC) 20 mg delayed release capsule  Self No Yes   Sig: TAKE 1 CAPSULE BY MOUTH EVERY DAY      Facility-Administered Medications: None       Past Medical History:   Diagnosis Date    Asthma     GERD (gastroesophageal reflux disease)     Hypertension     Lumbar disc disease     Ventricular arrhythmia        Past Surgical History:   Procedure Laterality Date    APPENDECTOMY      KNEE SURGERY      ROTATOR CUFF REPAIR      SHOULDER SURGERY         Family History   Problem Relation Age of Onset    Stroke Mother     Diabetes Mother     Hyperlipidemia Mother     Hypertension Mother     Diabetes Father     Hyperlipidemia Father    Vani Armas Hypertension Father     Lung cancer Father 79    Lung cancer Sister 71     I have reviewed and agree with the history as documented  Social History     Tobacco Use    Smoking status: Never Smoker    Smokeless tobacco: Never Used   Substance Use Topics    Alcohol use: Yes     Comment: rarely drinks wine    Drug use: No        Review of Systems   Constitutional: Positive for chills  Negative for fatigue and fever     HENT: Positive for sore throat  Negative for dental problem and rhinorrhea  Eyes: Negative for visual disturbance  Respiratory: Positive for cough  Negative for shortness of breath and wheezing  Cardiovascular: Positive for chest pain  Gastrointestinal: Negative for abdominal pain, diarrhea and vomiting  Genitourinary: Negative for dysuria and frequency  Musculoskeletal: Negative for arthralgias  Skin: Negative for rash  Neurological: Negative for dizziness, weakness and light-headedness  Psychiatric/Behavioral: Negative for agitation, behavioral problems and confusion  All other systems reviewed and are negative  Physical Exam  Physical Exam   Constitutional: She is oriented to person, place, and time  She appears well-developed and well-nourished  HENT:   Head: Normocephalic and atraumatic  Mouth/Throat: Oropharynx is clear and moist    Eyes: Pupils are equal, round, and reactive to light  EOM are normal    Neck: Normal range of motion  Cardiovascular: Normal rate, regular rhythm and normal heart sounds  Pulmonary/Chest: Effort normal and breath sounds normal  She has no wheezes  She exhibits no tenderness  Abdominal: Soft  There is no tenderness  Musculoskeletal: Normal range of motion  Neurological: She is alert and oriented to person, place, and time  Skin: Skin is warm and dry  Psychiatric: She has a normal mood and affect  Her behavior is normal  Thought content normal    Nursing note and vitals reviewed        Vital Signs  ED Triage Vitals   Temperature Pulse Respirations Blood Pressure SpO2   03/09/19 0751 03/09/19 0749 03/09/19 0749 03/09/19 0749 03/09/19 0749   98 3 °F (36 8 °C) 94 18 (!) 191/94 96 %      Temp Source Heart Rate Source Patient Position - Orthostatic VS BP Location FiO2 (%)   03/09/19 0751 03/09/19 0749 03/09/19 0749 03/09/19 0749 --   Oral Monitor Sitting Right arm       Pain Score       03/09/19 0749       2           Vitals:    03/09/19 0749   BP: (!) 191/94   Pulse: 94   Patient Position - Orthostatic VS: Sitting       Visual Acuity      ED Medications  Medications   ipratropium (ATROVENT) 0 02 % inhalation solution 0 5 mg (0 5 mg Nebulization Given 3/9/19 0836)   albuterol inhalation solution 5 mg (5 mg Nebulization Given 3/9/19 0836)   azithromycin (ZITHROMAX) tablet 500 mg (500 mg Oral Given 3/9/19 0851)       Diagnostic Studies  Results Reviewed     None                 XR chest 2 views   ED Interpretation by Pilar Samaniego MD (03/09 6869)   No PNA      Final Result by Candice Hamm MD (03/09 1239)      No acute cardiopulmonary disease  Workstation performed: [id number]                    Procedures  Procedures       Phone Contacts  ED Phone Contact    ED Course                               MDM  Number of Diagnoses or Management Options  Acute bronchitis: new and does not require workup  Cough: new and does not require workup  Diagnosis management comments: 1  Cough - Pt with URI, states similar to previous  Currently denies chest pain, only has this discomfort with coughing  Will check CXR to rule out PNA, try breathing Tx  Amount and/or Complexity of Data Reviewed  Tests in the radiology section of CPT®: ordered and reviewed  Independent visualization of images, tracings, or specimens: yes    Patient Progress  Patient progress: stable      Disposition  Final diagnoses:   Cough   Acute bronchitis     Time reflects when diagnosis was documented in both MDM as applicable and the Disposition within this note     Time User Action Codes Description Comment    3/9/2019  8:46 AM Seymour Nissen E Add [R05] Cough     3/9/2019  8:46 AM Seymour Nissen E Add [J20 9] Acute bronchitis       ED Disposition     ED Disposition Condition Date/Time Comment    Discharge Stable Sat Mar 9, 2019  8:46 AM Anthony Anderson discharge to home/self care              Follow-up Information    None         Discharge Medication List as of 3/9/2019  8:54 AM      START taking these medications    Details   albuterol (5 mg/mL) 0 5 % nebulizer solution Take 0 5 mL (2 5 mg total) by nebulization every 6 (six) hours as needed for wheezing, Starting Sat 3/9/2019, Print      azithromycin (ZITHROMAX) 250 mg tablet Take 1 tablet (250 mg total) by mouth every 24 hours for 4 days Take 2 tablets today then 1 tablet daily x 4 days, Starting Sat 3/9/2019, Until Wed 3/13/2019, Print      benzonatate (TESSALON PERLES) 100 mg capsule Take 1 capsule (100 mg total) by mouth every 8 (eight) hours, Starting Sat 3/9/2019, Print         CONTINUE these medications which have NOT CHANGED    Details   albuterol (2 5 mg/3 mL) 0 083 % nebulizer solution Take 2 5 mg by nebulization every 6 (six) hours as needed for wheezing, Historical Med      albuterol (VENTOLIN HFA) 90 mcg/act inhaler Inhale 2 puffs every 4 (four) hours as needed  , Starting Mon 4/20/2015, Historical Med      atenolol (TENORMIN) 25 mg tablet TAKE 1 TABLET BY MOUTH EVERY DAY, Normal      cetirizine (ZyrTEC) 10 mg tablet Take 10 mg by mouth daily, Historical Med      fluticasone (FLONASE) 50 mcg/act nasal spray 1 spray into each nostril  , Starting Fri 2/27/2015, Historical Med      fluticasone-salmeterol (ADVAIR) 500-50 mcg/dose inhaler Inhale 1 puff 2 (two) times a day Rinse mouth after use , Starting Wed 10/31/2018, Normal      losartan-hydrochlorothiazide (HYZAAR) 100-25 MG per tablet Take 1 tablet by mouth daily, Starting Mon 2/25/2019, Normal      meloxicam (MOBIC) 15 mg tablet Take 1 tablet (15 mg total) by mouth daily, Starting Thu 1/17/2019, Normal      Na Sulfate-K Sulfate-Mg Sulf (SUPREP BOWEL PREP KIT) 17 5-3 13-1 6 GM/177ML SOLN Take 1 Bottle by mouth once for 1 dose, Starting Tue 1/22/2019, Normal      omeprazole (PriLOSEC) 20 mg delayed release capsule TAKE 1 CAPSULE BY MOUTH EVERY DAY, Normal           No discharge procedures on file      ED Provider  Electronically Signed by           Nayeli Gilbert MD  03/09/19 0966

## 2019-03-16 DIAGNOSIS — M48.061 SPINAL STENOSIS, LUMBAR REGION WITHOUT NEUROGENIC CLAUDICATION: ICD-10-CM

## 2019-03-16 DIAGNOSIS — S83.282A ACUTE LATERAL MENISCUS TEAR OF LEFT KNEE, INITIAL ENCOUNTER: ICD-10-CM

## 2019-03-17 RX ORDER — MELOXICAM 15 MG/1
TABLET ORAL
Qty: 30 TABLET | Refills: 1 | Status: SHIPPED | OUTPATIENT
Start: 2019-03-17 | End: 2019-04-19 | Stop reason: SDUPTHER

## 2019-03-17 NOTE — ANESTHESIA PREPROCEDURE EVALUATION
Review of Systems/Medical History  Patient summary reviewed  Chart reviewed  No history of anesthetic complications     Cardiovascular  Hypertension , Dysrhythmias (palpitations (PACs)) ,    Pulmonary  Asthma ,        GI/Hepatic    GERD ,  Hiatal hernia,             Endo/Other    Obesity    GYN       Hematology    Thrombocytopenia,    Musculoskeletal  Back pain , lumbar pain,   Arthritis     Neurology   Psychology           Physical Exam    Airway    Mallampati score: III  TM Distance: >3 FB  Neck ROM: full     Dental   upper dentures,     Cardiovascular  Rhythm: regular, Rate: normal,     Pulmonary  Breath sounds clear to auscultation,     Other Findings  Full upper denture      Anesthesia Plan  ASA Score- 2     Anesthesia Type- IV sedation with anesthesia with ASA Monitors  Additional Monitors:   Airway Plan:         Plan Factors-    Induction- intravenous  Postoperative Plan-     Informed Consent- Anesthetic plan and risks discussed with patient  I personally reviewed this patient with the CRNA  Discussed and agreed on the Anesthesia Plan with the LI Alvarenga

## 2019-03-18 ENCOUNTER — ANESTHESIA (OUTPATIENT)
Dept: PERIOP | Facility: AMBULARY SURGERY CENTER | Age: 74
End: 2019-03-18
Payer: COMMERCIAL

## 2019-03-18 ENCOUNTER — HOSPITAL ENCOUNTER (OUTPATIENT)
Facility: AMBULARY SURGERY CENTER | Age: 74
Setting detail: OUTPATIENT SURGERY
Discharge: HOME | End: 2019-03-18
Attending: INTERNAL MEDICINE | Admitting: INTERNAL MEDICINE
Payer: COMMERCIAL

## 2019-03-18 VITALS
WEIGHT: 206 LBS | DIASTOLIC BLOOD PRESSURE: 74 MMHG | SYSTOLIC BLOOD PRESSURE: 139 MMHG | OXYGEN SATURATION: 99 % | BODY MASS INDEX: 38.89 KG/M2 | RESPIRATION RATE: 18 BRPM | TEMPERATURE: 97.3 F | HEIGHT: 61 IN | HEART RATE: 64 BPM

## 2019-03-18 DIAGNOSIS — K21.9 GASTROESOPHAGEAL REFLUX DISEASE WITHOUT ESOPHAGITIS: ICD-10-CM

## 2019-03-18 DIAGNOSIS — Z12.11 COLON CANCER SCREENING: ICD-10-CM

## 2019-03-18 PROBLEM — K29.50 CHRONIC GASTRITIS WITHOUT BLEEDING: Status: ACTIVE | Noted: 2019-03-18

## 2019-03-18 PROCEDURE — 43239 EGD BIOPSY SINGLE/MULTIPLE: CPT | Performed by: INTERNAL MEDICINE

## 2019-03-18 PROCEDURE — 88342 IMHCHEM/IMCYTCHM 1ST ANTB: CPT | Performed by: PATHOLOGY

## 2019-03-18 PROCEDURE — 88313 SPECIAL STAINS GROUP 2: CPT | Performed by: PATHOLOGY

## 2019-03-18 PROCEDURE — 88305 TISSUE EXAM BY PATHOLOGIST: CPT | Performed by: PATHOLOGY

## 2019-03-18 PROCEDURE — G0121 COLON CA SCRN NOT HI RSK IND: HCPCS | Performed by: INTERNAL MEDICINE

## 2019-03-18 RX ORDER — LIDOCAINE HYDROCHLORIDE 10 MG/ML
INJECTION, SOLUTION INFILTRATION; PERINEURAL AS NEEDED
Status: DISCONTINUED | OUTPATIENT
Start: 2019-03-18 | End: 2019-03-18 | Stop reason: SURG

## 2019-03-18 RX ORDER — SODIUM CHLORIDE 9 MG/ML
75 INJECTION, SOLUTION INTRAVENOUS CONTINUOUS
Status: DISCONTINUED | OUTPATIENT
Start: 2019-03-18 | End: 2019-03-18 | Stop reason: HOSPADM

## 2019-03-18 RX ORDER — PROPOFOL 10 MG/ML
INJECTION, EMULSION INTRAVENOUS AS NEEDED
Status: DISCONTINUED | OUTPATIENT
Start: 2019-03-18 | End: 2019-03-18 | Stop reason: SURG

## 2019-03-18 RX ADMIN — LIDOCAINE HYDROCHLORIDE ANHYDROUS 50 MG: 10 INJECTION, SOLUTION INFILTRATION at 09:50

## 2019-03-18 RX ADMIN — PROPOFOL 20 MG: 10 INJECTION, EMULSION INTRAVENOUS at 10:06

## 2019-03-18 RX ADMIN — PROPOFOL 50 MG: 10 INJECTION, EMULSION INTRAVENOUS at 09:52

## 2019-03-18 RX ADMIN — PROPOFOL 50 MG: 10 INJECTION, EMULSION INTRAVENOUS at 10:01

## 2019-03-18 RX ADMIN — PROPOFOL 100 MG: 10 INJECTION, EMULSION INTRAVENOUS at 09:50

## 2019-03-18 RX ADMIN — PROPOFOL 50 MG: 10 INJECTION, EMULSION INTRAVENOUS at 09:57

## 2019-03-18 RX ADMIN — PROPOFOL 30 MG: 10 INJECTION, EMULSION INTRAVENOUS at 10:10

## 2019-03-18 RX ADMIN — SODIUM CHLORIDE: 0.9 INJECTION, SOLUTION INTRAVENOUS at 09:42

## 2019-03-18 NOTE — DISCHARGE INSTR - AVS FIRST PAGE
OPERATIVE REPORT  PATIENT NAME: Dre Matthews    :  1945  MRN: [id number]  Pt Location: [facility]  GI ROOM 01    SURGERY DATE: 3/18/2019    Surgeon(s) and Role:     Latonya Bear DO - Primary    Preop Diagnosis:  Colon cancer screening [Z12 11]  Gastroesophageal reflux disease without esophagitis [K21 9]    Post-Op Diagnosis Codes:     * Colon cancer screening [Z12 11]     * Gastroesophageal reflux disease without esophagitis [K21 9]    Procedure(s) (LRB):  COLONOSCOPY (N/A)  ESOPHAGOGASTRODUODENOSCOPY (EGD) (N/A)    Specimen(s):  ID Type Source Tests Collected by Time Destination   1 : gastritis Tissue Stomach TISSUE EXAM Severiano Castillo DO 3/18/2019 9444    2 : erosion Tissue Stomach TISSUE EXAM Severiano Castillo DO 3/18/2019 0955        Estimated Blood Loss:   Minimal    Drains:  * No LDAs found *    Anesthesia Type:   Choice    Operative Indications:  Colon cancer screening [Z12 11]  Gastroesophageal reflux disease without esophagitis [K21 9]      Operative Findings:    ESOPHAGOGASTRODUODENOSCOPY    PROCEDURE: EGD    SEDATION: Monitored anesthesia care, check anesthesia records    ASA Class: 2    INDICATIONS: GERD    CONSENT:  Informed consent was obtained for the procedure, including sedation after explaining the risks and benefits of the procedure  Risks including but not limited to bleeding, perforation, infection, and missed lesion  PREPARATION:   Telemetry, pulse oximetry, blood pressure were monitored throughout the procedure  Patient was identified by myself both verbally and by visual inspection of ID band  DESCRIPTION:   Patient was placed in the left lateral decubitus position and was sedated with the above medication  The gastroscope was introduced in to the oropharynx and the esophagus was intubated under direct visualization  Scope was passed down the esophagus up to 2nd part of the duodenum   A careful inspection was made as the gastroscope was withdrawn, including a retroflexed view of the stomach; findings and interventions are described below  FINDINGS:    #1  Esophagus- normal esophagus    #2  Stomach- mild gastritis seen in the body of the stomach, biopsied with cold biopsy forceps, a small erosion in the cardia of the stomach was found, biopsied with cold biopsy forceps    #3  Duodenum- normal 1st and 2nd part of the duodenum, biopsied with cold biopsy forceps         IMPRESSIONS:      Gastritis  Biopsied  Small erosion in the stomach cardia  Biopsied  Normal duodenum  Biopsied  RECOMMENDATIONS:     Await biopsy results  Proceed with colonoscopy now  COMPLICATIONS:  None; patient tolerated the procedure well  SPECIMENS:    ID Type Source Tests Collected by Time Destination   1 : gastritis Tissue Stomach TISSUE EXAM [employer], DO 3/18/2019 3249    2 : erosion Tissue Stomach TISSUE EXAM [employer], DO 3/18/2019 0955        ESTIMATED BLOOD LOSS:  Minimal      Colonoscopy Procedure Note    Procedure: Colonoscopy    Sedation: Monitored anesthesia care, check anesthesia records      ASA Class: 2    INDICATIONS: Colon cancer screening    POST-OP DIAGNOSIS: See the impression below    Procedure Details     Prior colonoscopy: No prior colonoscopy  Informed consent was obtained for the procedure, including sedation  Risks of perforation, hemorrhage, adverse drug reaction and aspiration were discussed  The patient was placed in the left lateral decubitus position  Based on the pre-procedure assessment, including review of the patient's medical history, medications, allergies, and review of systems, she had been deemed to be an appropriate candidate for conscious sedation; she was therefore sedated with the medications listed below  The patient was monitored continuously with telemetry, pulse oximetry, blood pressure monitoring, and direct observations  A rectal examination was performed    The colonoscope was inserted into the rectum and advanced under direct vision to the cecum, which was identified by the ileocecal valve and appendiceal orifice  The quality of the colonic preparation was good  A careful inspection was made as the colonoscope was withdrawn, including a retroflexed view of the rectum; findings and interventions are described below  Findings:  Pan diverticulosis as well as a tortuous colon  Otherwise normal colonoscopy  Complications: None; patient tolerated the procedure well  Impression:    Pan diverticulosis as well as a tortuous colon  Otherwise normal colonoscopy  Recommendations:  Repeat colonoscopy in 10 years or sooner if clinically indicated  COMPLICATIONS:  None; patient tolerated the procedure well      SPECIMENS:    ID Type Source Tests Collected by Time Destination   1 : gastritis Tissue Stomach TISSUE EXAM Paddy Pinzon DO 3/18/2019 9599    2 : erosion Tissue Stomach TISSUE EXAM Paddy Pinzon DO 3/18/2019 0955        ESTIMATED BLOOD LOSS:  Minimal        SIGNATURE: Paddy Pinzon DO  DATE: March 18, 2019  TIME: 10:43 AM

## 2019-03-18 NOTE — OP NOTE
OPERATIVE REPORT  PATIENT NAME: Israel Wheatley    :  1945  MRN: [id number]  Pt Location: [facility] 01    SURGERY DATE: 3/18/2019    Surgeon(s) and Role:     Siri Jones,  - Primary    Preop Diagnosis:  Colon cancer screening [Z12 11]  Gastroesophageal reflux disease without esophagitis [K21 9]    Post-Op Diagnosis Codes:     * Colon cancer screening [Z12 11]     * Gastroesophageal reflux disease without esophagitis [K21 9]    Procedure(s) (LRB):  COLONOSCOPY (N/A)  ESOPHAGOGASTRODUODENOSCOPY (EGD) (N/A)    Specimen(s):  ID Type Source Tests Collected by Time Destination   1 : gastritis Tissue Stomach TISSUE EXAM Lora Pacheco DO 3/18/2019 3099    2 : erosion Tissue Stomach TISSUE EXAM Lora Pacheco,  3/18/2019 0955        Estimated Blood Loss:   Minimal    Drains:  * No LDAs found *    Anesthesia Type:   Choice    Operative Indications:  Colon cancer screening [Z12 11]  Gastroesophageal reflux disease without esophagitis [K21 9]      Operative Findings:    ESOPHAGOGASTRODUODENOSCOPY    PROCEDURE: EGD    SEDATION: Monitored anesthesia care, check anesthesia records    ASA Class: 2    INDICATIONS: GERD    CONSENT:  Informed consent was obtained for the procedure, including sedation after explaining the risks and benefits of the procedure  Risks including but not limited to bleeding, perforation, infection, and missed lesion  PREPARATION:   Telemetry, pulse oximetry, blood pressure were monitored throughout the procedure  Patient was identified by myself both verbally and by visual inspection of ID band  DESCRIPTION:   Patient was placed in the left lateral decubitus position and was sedated with the above medication  The gastroscope was introduced in to the oropharynx and the esophagus was intubated under direct visualization  Scope was passed down the esophagus up to 2nd part of the duodenum   A careful inspection was made as the gastroscope was withdrawn, including a retroflexed view of the stomach; findings and interventions are described below  FINDINGS:    #1  Esophagus- normal esophagus    #2  Stomach- mild gastritis seen in the body of the stomach, biopsied with cold biopsy forceps, a small erosion in the cardia of the stomach was found, biopsied with cold biopsy forceps    #3  Duodenum- normal 1st and 2nd part of the duodenum, biopsied with cold biopsy forceps         IMPRESSIONS:      Gastritis  Biopsied  Small erosion in the stomach cardia  Biopsied  Normal duodenum  Biopsied  RECOMMENDATIONS:     Await biopsy results  Proceed with colonoscopy now  COMPLICATIONS:  None; patient tolerated the procedure well  SPECIMENS:    ID Type Source Tests Collected by Time Destination   1 : gastritis Tissue Stomach TISSUE EXAM Tiny Gant, DO 3/18/2019 8456    2 : erosion Tissue Stomach TISSUE EXAM Tiny Gant, DO 3/18/2019 0955        ESTIMATED BLOOD LOSS:  Minimal      Colonoscopy Procedure Note    Procedure: Colonoscopy    Sedation: Monitored anesthesia care, check anesthesia records      ASA Class: 2    INDICATIONS: Colon cancer screening    POST-OP DIAGNOSIS: See the impression below    Procedure Details     Prior colonoscopy: No prior colonoscopy  Informed consent was obtained for the procedure, including sedation  Risks of perforation, hemorrhage, adverse drug reaction and aspiration were discussed  The patient was placed in the left lateral decubitus position  Based on the pre-procedure assessment, including review of the patient's medical history, medications, allergies, and review of systems, she had been deemed to be an appropriate candidate for conscious sedation; she was therefore sedated with the medications listed below  The patient was monitored continuously with telemetry, pulse oximetry, blood pressure monitoring, and direct observations  A rectal examination was performed    The colonoscope was inserted into the rectum and advanced under direct vision to the cecum, which was identified by the ileocecal valve and appendiceal orifice  The quality of the colonic preparation was good  A careful inspection was made as the colonoscope was withdrawn, including a retroflexed view of the rectum; findings and interventions are described below  Findings:  Pan diverticulosis as well as a tortuous colon  Otherwise normal colonoscopy  Complications: None; patient tolerated the procedure well  Impression:    Pan diverticulosis as well as a tortuous colon  Otherwise normal colonoscopy  Recommendations:  Repeat colonoscopy in 10 years or sooner if clinically indicated  COMPLICATIONS:  None; patient tolerated the procedure well      SPECIMENS:    ID Type Source Tests Collected by Time Destination   1 : gastritis Tissue Stomach TISSUE EXAM Lieutenant Yuniel DO 3/18/2019 8762    2 : erosion Tissue Stomach TISSUE EXAM Lieutenant Yuniel DO 3/18/2019 0955        ESTIMATED BLOOD LOSS:  Minimal        SIGNATURE: Lieutenant Yuniel DO  DATE: March 18, 2019  TIME: 10:43 AM

## 2019-03-18 NOTE — H&P
History and Physical - [facility]  Rosalie Schneider 72 y o  female MRN: [id number]                  HPI: Rosalie Schneider is a 72y o  year old female who presents for EGD due to GERD and colonoscopy for screening  Her last colonoscopy was over 10 years ago  REVIEW OF SYSTEMS: Per the HPI, and otherwise unremarkable      Historical Information   Past Medical History:   Diagnosis Date    Asthma     GERD (gastroesophageal reflux disease)     Hypertension     Lumbar disc disease     Ventricular arrhythmia      Past Surgical History:   Procedure Laterality Date    APPENDECTOMY      KNEE SURGERY      ROTATOR CUFF REPAIR      SHOULDER SURGERY       Social History   Social History     Substance and Sexual Activity   Alcohol Use Yes    Frequency: Monthly or less    Comment: rarely drinks wine     Social History     Substance and Sexual Activity   Drug Use No     Social History     Tobacco Use   Smoking Status Former Smoker   Smokeless Tobacco Never Used     Family History   Problem Relation Age of Onset    Stroke Mother     Diabetes Mother     Hyperlipidemia Mother    Eamon Wolfe Hypertension Mother     Diabetes Father     Hyperlipidemia Father     Hypertension Father     Lung cancer Father 79    Lung cancer Sister 71       Meds/Allergies     Medications Prior to Admission   Medication    albuterol (VENTOLIN HFA) 90 mcg/act inhaler    atenolol (TENORMIN) 25 mg tablet    fluticasone (FLONASE) 50 mcg/act nasal spray    fluticasone-salmeterol (ADVAIR) 500-50 mcg/dose inhaler    losartan-hydrochlorothiazide (HYZAAR) 100-25 MG per tablet    albuterol (2 5 mg/3 mL) 0 083 % nebulizer solution    benzonatate (TESSALON PERLES) 100 mg capsule    cetirizine (ZyrTEC) 10 mg tablet    meloxicam (MOBIC) 15 mg tablet    omeprazole (PriLOSEC) 20 mg delayed release capsule       No Known Allergies    Objective     Blood pressure 147/77, pulse 70, temperature (!) 97 3 °F (36 3 °C), temperature source Temporal, resp  rate 18, height 5' 1" (1 549 m), weight 93 4 kg (206 lb), SpO2 96 %, not currently breastfeeding  PHYSICAL EXAM    Gen: NAD  CV: RRR  CHEST: Clear  ABD: soft, NT/ND  EXT: no edema      ASSESSMENT/PLAN:  This is a 72y o  year old female here for EGD and colonoscopy, and she is stable and optimized for her procedure

## 2019-03-18 NOTE — DISCHARGE INSTRUCTIONS
Colonoscopy   WHAT YOU NEED TO KNOW:   A colonoscopy is a procedure to examine the inside of your colon (intestine) with a scope  Polyps or tissue growths may have been removed during your colonoscopy  It is normal to feel bloated and to have some abdominal discomfort  You should be passing gas  If you have hemorrhoids or you had polyps removed, you may have a small amount of bleeding  DISCHARGE INSTRUCTIONS:   Seek care immediately if:   · You have a large amount of bright red blood in your bowel movements  · Your abdomen is hard and firm and you have severe pain  · You have sudden trouble breathing  Contact your healthcare provider if:   · You develop a rash or hives  · You have a fever within 24 hours of your procedure  · You have not had a bowel movement for 3 days after your procedure  · You have questions or concerns about your condition or care  Activity:   · Do not lift, strain, or run  for 3 days after your procedure  · Rest after your procedure  You have been given medicine to relax you  Do not  drive or make important decisions until the day after your procedure  Return to your normal activity as directed  · Relieve gas and discomfort from bloating  by lying on your right side with a heating pad on your abdomen  You may need to take short walks to help the gas move out  Eat small meals until bloating is relieved  If you had polyps removed: For 7 days after your procedure:  · Do not  take aspirin  · Do not  go on long car rides  Help prevent constipation:   · Eat a variety of healthy foods  Healthy foods include fruit, vegetables, whole-grain breads, low-fat dairy products, beans, lean meat, and fish  Ask if you need to be on a special diet  Your healthcare provider may recommend that you eat high-fiber foods such as cooked beans  Fiber helps you have regular bowel movements  · Drink liquids as directed    Adults should drink between 9 and 13 eight-ounce cups of liquid every day  Ask what amount is best for you  For most people, good liquids to drink are water, juice, and milk  · Exercise as directed  Talk to your healthcare provider about the best exercise plan for you  Exercise can help prevent constipation, decrease your blood pressure and improve your health  Follow up with your healthcare provider as directed:  Write down your questions so you remember to ask them during your visits  © 2017 [employer] Information is for End User's use only and may not be sold, redistributed or otherwise used for commercial purposes  All illustrations and images included in CareNotes® are the copyrighted property of A [employer] M [employer]  or [employer]  The above information is an  only  It is not intended as medical advice for individual conditions or treatments  Talk to your doctor, nurse or pharmacist before following any medical regimen to see if it is safe and effective for you  Upper Endoscopy   WHAT YOU NEED TO KNOW:   An upper endoscopy is also called an upper gastrointestinal (GI) endoscopy, or an esophagogastroduodenoscopy (EGD)  You may feel bloated, gassy, or have some abdominal discomfort after your procedure  Your throat may be sore for 24 to 36 hours  You may burp or pass gas from air that is still inside your body  DISCHARGE INSTRUCTIONS:   Call 911 for any of the following:   · You have sudden chest pain or trouble breathing  Seek care immediately if:   · You feel dizzy or faint  · You have trouble swallowing  · Your bowel movements are very dark or black  · Your abdomen is hard and firm and you have severe pain  · You vomit blood  Contact your healthcare provider if:   · You feel full or bloated and cannot burp or pass gas  · You have not had a bowel movement for 3 days after your procedure  · You have neck pain  · You have a fever or chills  · You have nausea or are vomiting      · You have a rash or hives  · You have questions or concerns about your endoscopy  Relieve a sore throat:  Suck on throat lozenges or crushed ice  Gargle with a small amount of warm salt water  Mix 1 teaspoon of salt and 1 cup of warm water to make salt water  Relieve gas and discomfort from bloating:  Lie on your right side with a heating pad on your abdomen  Take short walks to help pass gas  Eat small meals until bloating is relieved  Rest after your procedure: You have been given medicine to relax you  Do not  drive or make important decisions until the day after your procedure  Return to your normal activity as directed  You can usually return to work the day after your procedure  Follow up with your healthcare provider as directed:  Write down your questions so you remember to ask them during your visits  © 2017 [address] [employer] is for End User's use only and may not be sold, redistributed or otherwise used for commercial purposes  All illustrations and images included in CareNotes® are the copyrighted property of [employer] M [employer]  or [employer]  The above information is an  only  It is not intended as medical advice for individual conditions or treatments  Talk to your doctor, nurse or pharmacist before following any medical regimen to see if it is safe and effective for you

## 2019-03-18 NOTE — ANESTHESIA POSTPROCEDURE EVALUATION
Post-Op Assessment Note    CV Status:  Stable    Pain management: adequate     Mental Status:  Lethargic   Hydration Status:  Euvolemic   PONV Controlled:  Controlled   Airway Patency:  Patent   Post Op Vitals Reviewed: Yes      Staff: CRNA           /73 (03/18/19 1015)    Temp (!) 97 3 °F (36 3 °C) (03/18/19 1015)    Pulse 71 (03/18/19 1015)   Resp 18 (03/18/19 1015)    SpO2 97 % (03/18/19 1015)

## 2019-03-28 DIAGNOSIS — J45.20 MILD INTERMITTENT ASTHMA WITHOUT COMPLICATION: Primary | ICD-10-CM

## 2019-03-28 RX ORDER — ALBUTEROL SULFATE 90 UG/1
2 AEROSOL, METERED RESPIRATORY (INHALATION) EVERY 4 HOURS PRN
Qty: 1 EACH | Refills: 1 | Status: SHIPPED | OUTPATIENT
Start: 2019-03-28 | End: 2019-04-19 | Stop reason: SDUPTHER

## 2019-04-11 ENCOUNTER — OFFICE VISIT (OUTPATIENT)
Dept: BARIATRICS | Facility: CLINIC | Age: 74
End: 2019-04-11
Payer: COMMERCIAL

## 2019-04-11 VITALS
HEART RATE: 76 BPM | DIASTOLIC BLOOD PRESSURE: 60 MMHG | TEMPERATURE: 98.3 F | BODY MASS INDEX: 40.48 KG/M2 | SYSTOLIC BLOOD PRESSURE: 120 MMHG | RESPIRATION RATE: 17 BRPM | WEIGHT: 206.2 LBS | HEIGHT: 60 IN

## 2019-04-11 DIAGNOSIS — E66.01 OBESITY, CLASS III, BMI 40-49.9 (MORBID OBESITY) (HCC): Primary | ICD-10-CM

## 2019-04-11 DIAGNOSIS — R29.818 SUSPECTED SLEEP APNEA: ICD-10-CM

## 2019-04-11 DIAGNOSIS — J45.20 MILD INTERMITTENT ASTHMA WITHOUT COMPLICATION: ICD-10-CM

## 2019-04-11 DIAGNOSIS — I10 ESSENTIAL HYPERTENSION: ICD-10-CM

## 2019-04-11 PROBLEM — E66.813 OBESITY, CLASS III, BMI 40-49.9 (MORBID OBESITY): Status: ACTIVE | Noted: 2019-04-11

## 2019-04-11 PROCEDURE — 99214 OFFICE O/P EST MOD 30 MIN: CPT | Performed by: FAMILY MEDICINE

## 2019-04-12 ENCOUNTER — OFFICE VISIT (OUTPATIENT)
Dept: SLEEP CENTER | Facility: CLINIC | Age: 74
End: 2019-04-12
Payer: COMMERCIAL

## 2019-04-12 VITALS — HEIGHT: 61 IN | WEIGHT: 205 LBS | BODY MASS INDEX: 38.71 KG/M2 | HEART RATE: 74 BPM

## 2019-04-12 DIAGNOSIS — E66.01 OBESITY, CLASS III, BMI 40-49.9 (MORBID OBESITY) (HCC): ICD-10-CM

## 2019-04-12 DIAGNOSIS — R06.83 SNORING: Primary | ICD-10-CM

## 2019-04-12 DIAGNOSIS — J45.20 MILD INTERMITTENT ASTHMA WITHOUT COMPLICATION: ICD-10-CM

## 2019-04-12 DIAGNOSIS — G47.09 OTHER INSOMNIA: ICD-10-CM

## 2019-04-12 DIAGNOSIS — J31.0 CHRONIC RHINITIS: ICD-10-CM

## 2019-04-12 DIAGNOSIS — I10 ESSENTIAL HYPERTENSION: ICD-10-CM

## 2019-04-12 DIAGNOSIS — G44.229 CHRONIC TENSION-TYPE HEADACHE, NOT INTRACTABLE: ICD-10-CM

## 2019-04-12 DIAGNOSIS — G47.10 EXCESSIVE SLEEPINESS: ICD-10-CM

## 2019-04-12 PROCEDURE — 99204 OFFICE O/P NEW MOD 45 MIN: CPT | Performed by: INTERNAL MEDICINE

## 2019-04-12 RX ORDER — ZOLPIDEM TARTRATE 5 MG/1
TABLET ORAL
Qty: 1 TABLET | Refills: 0 | Status: SHIPPED | OUTPATIENT
Start: 2019-04-12 | End: 2019-10-18

## 2019-04-19 ENCOUNTER — TELEPHONE (OUTPATIENT)
Dept: GASTROENTEROLOGY | Facility: CLINIC | Age: 74
End: 2019-04-19

## 2019-04-19 ENCOUNTER — OFFICE VISIT (OUTPATIENT)
Dept: FAMILY MEDICINE CLINIC | Facility: CLINIC | Age: 74
End: 2019-04-19

## 2019-04-19 ENCOUNTER — APPOINTMENT (OUTPATIENT)
Dept: LAB | Facility: HOSPITAL | Age: 74
End: 2019-04-19
Attending: INTERNAL MEDICINE
Payer: COMMERCIAL

## 2019-04-19 VITALS
HEIGHT: 61 IN | HEART RATE: 95 BPM | OXYGEN SATURATION: 96 % | WEIGHT: 203 LBS | SYSTOLIC BLOOD PRESSURE: 136 MMHG | RESPIRATION RATE: 18 BRPM | DIASTOLIC BLOOD PRESSURE: 60 MMHG | BODY MASS INDEX: 38.33 KG/M2 | TEMPERATURE: 97.6 F

## 2019-04-19 DIAGNOSIS — S83.282A ACUTE LATERAL MENISCUS TEAR OF LEFT KNEE, INITIAL ENCOUNTER: ICD-10-CM

## 2019-04-19 DIAGNOSIS — J45.20 MILD INTERMITTENT ASTHMA WITHOUT COMPLICATION: ICD-10-CM

## 2019-04-19 DIAGNOSIS — J31.0 CHRONIC RHINITIS: ICD-10-CM

## 2019-04-19 DIAGNOSIS — R76.8 POSITIVE HEPATITIS C ANTIBODY TEST: ICD-10-CM

## 2019-04-19 DIAGNOSIS — M48.061 SPINAL STENOSIS, LUMBAR REGION WITHOUT NEUROGENIC CLAUDICATION: ICD-10-CM

## 2019-04-19 DIAGNOSIS — D69.6 THROMBOCYTOPENIA (HCC): Primary | ICD-10-CM

## 2019-04-19 DIAGNOSIS — J45.40 MODERATE PERSISTENT ASTHMA, UNSPECIFIED WHETHER COMPLICATED: ICD-10-CM

## 2019-04-19 DIAGNOSIS — I10 ESSENTIAL HYPERTENSION: ICD-10-CM

## 2019-04-19 DIAGNOSIS — K44.9 HIATAL HERNIA: ICD-10-CM

## 2019-04-19 PROCEDURE — 36415 COLL VENOUS BLD VENIPUNCTURE: CPT

## 2019-04-19 PROCEDURE — 87522 HEPATITIS C REVRS TRNSCRPJ: CPT

## 2019-04-19 PROCEDURE — 99214 OFFICE O/P EST MOD 30 MIN: CPT | Performed by: PHYSICIAN ASSISTANT

## 2019-04-19 RX ORDER — MELOXICAM 15 MG/1
15 TABLET ORAL DAILY
Qty: 90 TABLET | Refills: 1 | Status: SHIPPED | OUTPATIENT
Start: 2019-04-19 | End: 2019-10-15 | Stop reason: SDUPTHER

## 2019-04-19 RX ORDER — ATENOLOL 25 MG/1
25 TABLET ORAL DAILY
Qty: 90 TABLET | Refills: 1 | Status: SHIPPED | OUTPATIENT
Start: 2019-04-19 | End: 2019-09-24 | Stop reason: SDUPTHER

## 2019-04-19 RX ORDER — ALBUTEROL SULFATE 90 UG/1
2 AEROSOL, METERED RESPIRATORY (INHALATION) EVERY 4 HOURS PRN
Qty: 1 EACH | Refills: 1 | Status: SHIPPED | OUTPATIENT
Start: 2019-04-19 | End: 2019-07-23 | Stop reason: SDUPTHER

## 2019-04-19 RX ORDER — LOSARTAN POTASSIUM AND HYDROCHLOROTHIAZIDE 25; 100 MG/1; MG/1
1 TABLET ORAL DAILY
Qty: 90 TABLET | Refills: 1 | Status: SHIPPED | OUTPATIENT
Start: 2019-04-19 | End: 2019-10-18

## 2019-04-19 RX ORDER — OMEPRAZOLE 20 MG/1
20 CAPSULE, DELAYED RELEASE ORAL DAILY
Qty: 90 CAPSULE | Refills: 1 | Status: SHIPPED | OUTPATIENT
Start: 2019-04-19 | End: 2020-07-20

## 2019-04-22 ENCOUNTER — TELEPHONE (OUTPATIENT)
Dept: FAMILY MEDICINE CLINIC | Facility: CLINIC | Age: 74
End: 2019-04-22

## 2019-04-22 LAB
HCV RNA SERPL NAA+PROBE-ACNC: NORMAL IU/ML
TEST INFORMATION: NORMAL

## 2019-04-24 DIAGNOSIS — J30.9 ALLERGIC RHINITIS, UNSPECIFIED SEASONALITY, UNSPECIFIED TRIGGER: Primary | ICD-10-CM

## 2019-04-24 RX ORDER — FLUTICASONE PROPIONATE 50 MCG
2 SPRAY, SUSPENSION (ML) NASAL DAILY
Qty: 1 BOTTLE | Refills: 2 | Status: SHIPPED | OUTPATIENT
Start: 2019-04-24 | End: 2019-08-14 | Stop reason: SDUPTHER

## 2019-04-29 ENCOUNTER — TELEPHONE (OUTPATIENT)
Dept: GASTROENTEROLOGY | Facility: CLINIC | Age: 74
End: 2019-04-29

## 2019-05-13 ENCOUNTER — APPOINTMENT (OUTPATIENT)
Dept: URGENT CARE | Facility: MEDICAL CENTER | Age: 74
End: 2019-05-13
Payer: OTHER MISCELLANEOUS

## 2019-05-13 PROCEDURE — G0382 LEV 3 HOSP TYPE B ED VISIT: HCPCS | Performed by: NURSE PRACTITIONER

## 2019-05-13 PROCEDURE — 99283 EMERGENCY DEPT VISIT LOW MDM: CPT | Performed by: NURSE PRACTITIONER

## 2019-06-04 ENCOUNTER — HOSPITAL ENCOUNTER (OUTPATIENT)
Dept: SLEEP CENTER | Facility: CLINIC | Age: 74
Discharge: HOME | End: 2019-06-04
Payer: COMMERCIAL

## 2019-06-04 DIAGNOSIS — G44.229 CHRONIC TENSION-TYPE HEADACHE, NOT INTRACTABLE: ICD-10-CM

## 2019-06-04 DIAGNOSIS — R06.83 SNORING: ICD-10-CM

## 2019-06-04 PROCEDURE — 95810 POLYSOM 6/> YRS 4/> PARAM: CPT

## 2019-06-06 ENCOUNTER — TELEPHONE (OUTPATIENT)
Dept: SLEEP CENTER | Facility: CLINIC | Age: 74
End: 2019-06-06

## 2019-07-23 DIAGNOSIS — J45.20 MILD INTERMITTENT ASTHMA WITHOUT COMPLICATION: ICD-10-CM

## 2019-07-23 RX ORDER — ALBUTEROL SULFATE 90 UG/1
AEROSOL, METERED RESPIRATORY (INHALATION)
Qty: 8.5 INHALER | Refills: 1 | Status: SHIPPED | OUTPATIENT
Start: 2019-07-23 | End: 2019-10-18 | Stop reason: SDUPTHER

## 2019-08-02 ENCOUNTER — OFFICE VISIT (OUTPATIENT)
Dept: SLEEP CENTER | Facility: CLINIC | Age: 74
End: 2019-08-02
Payer: COMMERCIAL

## 2019-08-02 VITALS
DIASTOLIC BLOOD PRESSURE: 80 MMHG | SYSTOLIC BLOOD PRESSURE: 144 MMHG | HEIGHT: 61 IN | WEIGHT: 191 LBS | BODY MASS INDEX: 36.06 KG/M2 | HEART RATE: 84 BPM

## 2019-08-02 DIAGNOSIS — I10 ESSENTIAL HYPERTENSION: ICD-10-CM

## 2019-08-02 DIAGNOSIS — E66.01 OBESITY, CLASS III, BMI 40-49.9 (MORBID OBESITY) (HCC): ICD-10-CM

## 2019-08-02 DIAGNOSIS — J45.20 MILD INTERMITTENT ASTHMA WITHOUT COMPLICATION: ICD-10-CM

## 2019-08-02 DIAGNOSIS — J31.0 CHRONIC RHINITIS: ICD-10-CM

## 2019-08-02 DIAGNOSIS — G47.10 EXCESSIVE SLEEPINESS: ICD-10-CM

## 2019-08-02 DIAGNOSIS — G47.09 OTHER INSOMNIA: ICD-10-CM

## 2019-08-02 DIAGNOSIS — G47.33 OSA (OBSTRUCTIVE SLEEP APNEA): Primary | ICD-10-CM

## 2019-08-02 PROCEDURE — 99214 OFFICE O/P EST MOD 30 MIN: CPT | Performed by: INTERNAL MEDICINE

## 2019-08-02 NOTE — PATIENT INSTRUCTIONS
CPAP Desensitization - Principle & Procedure    Principle    1  You can adjust to practically anything you want given the time, motivation and willingness to persevere  2  In fact your nervous system is designed to adapt to repetitive stimuli  Physiologically, sensory receptors show a decline in action potential frequency with time in response to constant/repetitive  stimuli  3  To illustrate how your body adapts, here are 2 examples that you will be able to relate to:   a  When you step into a hot shower, initially the water feels pretty hot  After a few seconds, it no longer feels so hot  Now you can turn up the heat some more   b  Say you live next to a busy road (or railway line) initially you will hear all the traffic  After a few days you do not hear it as much any more  This happens because your nervous system has adapted to the repetitive stimuli and tuned out the noise  The procedure that we use utilizes this physiological process and we use this principle to your advantage in getting you to acclimate to use of CPAP  Procedure     First phase is for you to put the mask on initially during the day, while awake and engaged in other activities such as reading, watching TV etc    1  Wear the mask for a few minutes at a time e g  while watching TV: put it on initially when the commercials are on and remove it during the program    2  Gradually build up your wearing time to the point where you are able to wear it during the program and remove it only while the commercials are on    3  Your goal is to reach the point that you are able to wear it continuously with no discomfort for around 20 minutes without a break  4  Once you are able to do this while awake, now wear the mask and try to sleep with it on  Second phase is to wear the mask and introduce the air pressure  You will need to repeat the same process above, this time with the mask and the pressure   Once again, your goal is to reach the point that you are able to wear it continuously with no discomfort for around 20 minutes without a break (at the lowest pressure setting)  5   Once you are able to tolerate the mask with the pressure like this for 20 minutes while awake, you are ready for the next step: to go to sleep with the mask and the pressure on  Third phase is to wear the mask with the pressure every night while gradually increasing the pressure in small increments according to the prescription  6  These increments are very small and designed especially for your situation  It will be increased over days or weeks (if necessary) so that you will not even be able to tell the difference! In this way you will get you adapted to CPAP by tricking your body using what we know about your physiology to your advantage  7  If you have any difficulty tolerating the pressure, use the Ramp feature  This will be especially useful if you wake up and have trouble falling back to sleep  8  At this point we still do not know what your optimal pressure requirement is  We are guessing based on various known factors  If you notice improvement in your sleep and / or daytime function, this suggests that we are close to your optimal pressure requirement  If you notice no change, then the pressure is probably still too low  In either case, once you have adapted to CPAP, you will need a repeat study to figure out what is the pressure that is need to fix your breathing during sleep  9  This adaptation period is also the time for you to be working out which mask and humidity settings work best for you  If you have any difficulties with making these adjustments, please contact your D[employer] Medical [employer] provider  They are required to work with you in this regard  I strongly recommend that you clarify this aspect with them up front  Ideally they should be able to exchange the mask for you at no additional cost during the acclimation period

## 2019-08-02 NOTE — PROGRESS NOTES
Review of Systems      Genitourinary none   Cardiology none   Gastrointestinal none   Neurology none   Constitutional none   Integumentary none   Psychiatry none   Musculoskeletal joint pain and back pain   Pulmonary snoring   ENT none   Endocrine none   Hematological none

## 2019-08-02 NOTE — PROGRESS NOTES
Follow-up Note - Sleep Center   Tanya Dow  69 y o  female  [id number]  [id number]    I saw Sharon Ramirez in sleep clinic today for her sleep complaints & medical conditions  Patient recently had a diagnostic sleep study and is here to review results / treatment options  The study demonstrated severe obstructive sleep apnea: AHI 36 4 /hour higher while supine and considerably higher during stage REM  Zeinab Pereirather Intermittent snoring of very loud intensity was noted    Minimum oxygen saturation 68 %  and 201 minutes of total sleep time was spent with saturations less than 90%  There were moderate periodic limb movements of sleep for an index of 24 per hour  ECG demonstrated occasional PVCs    PFSH, Problem List, Medications & Allergies were reviewed in EMR  Interval changes: none reported  She  has a past medical history of Asthma, GERD (gastroesophageal reflux disease), Hypertension, Lumbar disc disease, and Ventricular arrhythmia  She has a current medication list which includes the following prescription(s): albuterol, albuterol, atenolol, cetirizine, fluticasone, fluticasone-salmeterol, losartan-hydrochlorothiazide, meloxicam, omeprazole, and zolpidem  ROS: reviewed see attached  Significant for allergies and asthma are controlled on current medications  Recently she has not experienced palpitations  She has had around 30 lb intentional weight loss over the past 4-5 months  She is very claustrophobic  HPI:  She sleeps alone  She continues to snore but is not disturbing her  She is not aware of breathing difficulties during sleep  She states she is no longer awakening with headaches  Sleep Routine:  She is getting 7-1/2 hours sleep  She has no difficulty initiating or maintaining sleep  She awakens spontaneously feeling refreshed  [She denied excessive drowsiness and rated herself at Total score: 3 /24 on the [place] sleepiness scale ]        Habits:  reports that she has quit smoking   She has quit using smokeless tobacco  She reports that she drinks alcohol  She reports that she does not use drugs  EXAM: /80   Pulse 84   Ht 5' 1" (1 549 m)   Wt 86 6 kg (191 lb)   BMI 36 09 kg/m²     Patient is well groomed; well appearing  Skin/Extrem: warm & dry; col & hydration normal; no edema  Psych: cooperativeand in no distress  Mental state appears normal   CNS: Alert, orientated, clear & coherent speech  H&N: EOMI; NC/AT:no facial pressure marks, no rashes  Neck Circumference: 17 7 cm  ENMT Mucus membranes appear normal Nasal airway:patent  Oral airway:  crowded; base of tongue is at Mallampati IV  She has dentures in the upper jaw  Teeth in the lower jaw all worn down  Resp:effort is normal CVS: RRR ABD:truncal obesity MSK:Gait normal     IMPRESSION: Primary Sleep/Secondary(to Medical or Psych conditions) & comorbidities   1  MONO (obstructive sleep apnea)  Cpap DME    CPAP Study   2  Other insomnia      Improved   3  Excessive sleepiness      Improved   4  Chronic rhinitis     5  Mild intermittent asthma without complication     6  Essential hypertension     7  Obesity, Class III, 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) ([facility] [address] )         PLAN:    1  I reviewed results of the Sleep studies with the patient  2  With respect to above conditions, I counseled on pathophysiology, diagnosis, treatment options, risks and benefits; inter-relationship and effects on symptoms and comorbidities; risks of no treatment; costs and insurance aspects  3  Because of severity of her sleep disordered breathing and no teeth in the upper jaw, her options are limited to surgery and she is not interested in surgery  CPAP is the treatment of choice, but she is extremely resistant, agreed to try, but very reluctantly  4  I recommended formal desensitization and explained the principal and procedure to her  Pressure settin to 15 cm H2O in 1 cm increments    5  Need for compliance with therapy and weight reduction were emphasized  6  Follow-up to be scheduled in 1 months to monitor compliance, progress and to adjust therapy  7  Once she has adjusted, she will need a titration study to determine the optimal pressure setting  Thank you for allowing me to participate in the care of this patient  I will keep you apprised of developments      Sincerely,    Authenticated electronically by Og Trevizo MD on 74/32/03   Board Certified Specialist

## 2019-08-14 DIAGNOSIS — J30.9 ALLERGIC RHINITIS, UNSPECIFIED SEASONALITY, UNSPECIFIED TRIGGER: ICD-10-CM

## 2019-08-15 RX ORDER — FLUTICASONE PROPIONATE 50 MCG
SPRAY, SUSPENSION (ML) NASAL
Qty: 16 ML | Refills: 2 | Status: SHIPPED | OUTPATIENT
Start: 2019-08-15 | End: 2019-11-22 | Stop reason: SDUPTHER

## 2019-09-24 DIAGNOSIS — I10 ESSENTIAL HYPERTENSION: ICD-10-CM

## 2019-09-25 RX ORDER — ATENOLOL 25 MG/1
TABLET ORAL
Qty: 90 TABLET | Refills: 1 | Status: SHIPPED | OUTPATIENT
Start: 2019-09-25 | End: 2019-10-18 | Stop reason: SDUPTHER

## 2019-09-30 DIAGNOSIS — R76.8 HEPATITIS C ANTIBODY TEST POSITIVE: Primary | ICD-10-CM

## 2019-10-14 ENCOUNTER — APPOINTMENT (OUTPATIENT)
Dept: LAB | Facility: HOSPITAL | Age: 74
End: 2019-10-14
Attending: INTERNAL MEDICINE
Payer: COMMERCIAL

## 2019-10-14 DIAGNOSIS — R76.8 HEPATITIS C ANTIBODY TEST POSITIVE: ICD-10-CM

## 2019-10-14 PROCEDURE — 36415 COLL VENOUS BLD VENIPUNCTURE: CPT

## 2019-10-14 PROCEDURE — 87522 HEPATITIS C REVRS TRNSCRPJ: CPT

## 2019-10-15 DIAGNOSIS — S83.282A ACUTE LATERAL MENISCUS TEAR OF LEFT KNEE, INITIAL ENCOUNTER: ICD-10-CM

## 2019-10-15 DIAGNOSIS — M48.061 SPINAL STENOSIS, LUMBAR REGION WITHOUT NEUROGENIC CLAUDICATION: ICD-10-CM

## 2019-10-15 RX ORDER — MELOXICAM 15 MG/1
TABLET ORAL
Qty: 90 TABLET | Refills: 0 | Status: SHIPPED | OUTPATIENT
Start: 2019-10-15 | End: 2020-02-11 | Stop reason: SDUPTHER

## 2019-10-16 LAB
HCV RNA SERPL NAA+PROBE-ACNC: NORMAL IU/ML
TEST INFORMATION: NORMAL

## 2019-10-17 ENCOUNTER — TELEPHONE (OUTPATIENT)
Dept: GASTROENTEROLOGY | Facility: AMBULARY SURGERY CENTER | Age: 74
End: 2019-10-17

## 2019-10-17 NOTE — TELEPHONE ENCOUNTER
Called patient, Alex Sepulveda RN not in office),and she is aware that her viral load was not detected and that she does not have Hep C  She understands she will always test positive for Hep C  As long as viral load is not detected or zero, she does not have the Hep C virus in her body

## 2019-10-18 ENCOUNTER — OFFICE VISIT (OUTPATIENT)
Dept: FAMILY MEDICINE CLINIC | Facility: CLINIC | Age: 74
End: 2019-10-18

## 2019-10-18 VITALS
OXYGEN SATURATION: 96 % | SYSTOLIC BLOOD PRESSURE: 138 MMHG | RESPIRATION RATE: 18 BRPM | HEART RATE: 82 BPM | TEMPERATURE: 97.3 F | DIASTOLIC BLOOD PRESSURE: 70 MMHG | WEIGHT: 192 LBS | HEIGHT: 61 IN | BODY MASS INDEX: 36.25 KG/M2

## 2019-10-18 DIAGNOSIS — J45.40 MODERATE PERSISTENT ASTHMA, UNSPECIFIED WHETHER COMPLICATED: ICD-10-CM

## 2019-10-18 DIAGNOSIS — J45.41 MODERATE PERSISTENT ASTHMA WITH EXACERBATION: ICD-10-CM

## 2019-10-18 DIAGNOSIS — I10 ESSENTIAL HYPERTENSION: ICD-10-CM

## 2019-10-18 DIAGNOSIS — Z23 ENCOUNTER FOR IMMUNIZATION: Primary | ICD-10-CM

## 2019-10-18 DIAGNOSIS — J45.20 MILD INTERMITTENT ASTHMA WITHOUT COMPLICATION: ICD-10-CM

## 2019-10-18 PROCEDURE — 3075F SYST BP GE 130 - 139MM HG: CPT | Performed by: PHYSICIAN ASSISTANT

## 2019-10-18 PROCEDURE — 3078F DIAST BP <80 MM HG: CPT | Performed by: PHYSICIAN ASSISTANT

## 2019-10-18 PROCEDURE — 1036F TOBACCO NON-USER: CPT | Performed by: PHYSICIAN ASSISTANT

## 2019-10-18 PROCEDURE — 90662 IIV NO PRSV INCREASED AG IM: CPT | Performed by: PHYSICIAN ASSISTANT

## 2019-10-18 PROCEDURE — G0008 ADMIN INFLUENZA VIRUS VAC: HCPCS | Performed by: PHYSICIAN ASSISTANT

## 2019-10-18 PROCEDURE — 99214 OFFICE O/P EST MOD 30 MIN: CPT | Performed by: PHYSICIAN ASSISTANT

## 2019-10-18 RX ORDER — ALBUTEROL SULFATE 90 UG/1
2 AEROSOL, METERED RESPIRATORY (INHALATION) EVERY 6 HOURS PRN
Qty: 8.5 INHALER | Refills: 1 | Status: SHIPPED | OUTPATIENT
Start: 2019-10-18 | End: 2020-03-20 | Stop reason: SDUPTHER

## 2019-10-18 RX ORDER — PREDNISONE 20 MG/1
20 TABLET ORAL 2 TIMES DAILY WITH MEALS
Qty: 10 TABLET | Refills: 0 | Status: SHIPPED | OUTPATIENT
Start: 2019-10-18 | End: 2019-10-23

## 2019-10-18 RX ORDER — IBUPROFEN 800 MG/1
TABLET ORAL EVERY 6 HOURS PRN
COMMUNITY
End: 2020-05-11

## 2019-10-18 RX ORDER — ATENOLOL 25 MG/1
25 TABLET ORAL DAILY
Qty: 90 TABLET | Refills: 1 | Status: SHIPPED | OUTPATIENT
Start: 2019-10-18 | End: 2020-07-28 | Stop reason: SDUPTHER

## 2019-10-18 RX ORDER — HYDROCHLOROTHIAZIDE 25 MG/1
25 TABLET ORAL DAILY
Qty: 90 TABLET | Refills: 1 | Status: SHIPPED | OUTPATIENT
Start: 2019-10-18 | End: 2020-05-13 | Stop reason: SDUPTHER

## 2019-10-18 RX ORDER — LOSARTAN POTASSIUM 100 MG/1
100 TABLET ORAL DAILY
Qty: 90 TABLET | Refills: 1 | Status: SHIPPED | OUTPATIENT
Start: 2019-10-18 | End: 2020-05-18 | Stop reason: SDUPTHER

## 2019-10-18 NOTE — PROGRESS NOTES
Assessment/Plan:    Lumbar facet arthropathy  She had injections from [facility]    Hypertension    Continue with losartan, hydrochlorothiazide and atenolol  Blood pressure is well controlled  Asthma    Continue Advair daily and as needed albuterol         Problem List Items Addressed This Visit        Respiratory    Asthma       Continue Advair daily and as needed albuterol         Relevant Medications    albuterol (PROVENTIL HFA,VENTOLIN HFA) 90 mcg/act inhaler    fluticasone-salmeterol (ADVAIR, WIXELA) 500-50 mcg/dose inhaler       Cardiovascular and Mediastinum    Hypertension       Continue with losartan, hydrochlorothiazide and atenolol  Blood pressure is well controlled  Relevant Medications    atenolol (TENORMIN) 25 mg tablet    losartan (COZAAR) 100 MG tablet    hydrochlorothiazide (HYDRODIURIL) 25 mg tablet    Other Relevant Orders    Comprehensive metabolic panel    Lipid panel      Other Visit Diagnoses     Encounter for immunization    -  Primary    Relevant Orders    influenza vaccine, [id number], high-dose, PF 0 5 mL (FLUZONE HIGH-DOSE) (Completed)    Moderate persistent asthma with exacerbation        Relevant Medications    predniSONE 20 mg tablet    albuterol (PROVENTIL HFA,VENTOLIN HFA) 90 mcg/act inhaler    fluticasone-salmeterol (ADVAIR, WIXELA) 500-50 mcg/dose inhaler            Subjective:      Patient ID: Truong Irwin is a 69 y o  female  HPI [de-identified] year female here for follow-up of hypertension  No complaints    She has been feeling sick for about the last 4 days  She has had congtestion and sorethroat  NO fevers  She has   Been using her inhaler more frequently every 4-6 hours  She is getting cough and wheeze and feeling shortness of breath      The following portions of the patient's history were reviewed and updated as appropriate:   She  has a past medical history of Asthma, GERD (gastroesophageal reflux disease), Hypertension, Lumbar disc disease, and Ventricular arrhythmia  She   Patient Active Problem List    Diagnosis Date Noted    Obstructive sleep apnea (adult) (pediatric)     Chronic rhinitis 04/12/2019    Obesity, Class III, 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) ([facility] ) 04/11/2019    Chronic gastritis without bleeding 03/18/2019    Suspected sleep apnea 02/14/2019    Other headache syndrome 02/13/2019    Colon cancer screening 01/22/2019    Gastroesophageal reflux disease without esophagitis 01/22/2019    Spinal stenosis of 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 01/17/2019    Lumbar facet arthropathy 01/17/2019    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 01/17/2019    Menopause 12/27/2018    Asthma 11/21/2018    Thrombocytopenia ([facility] ) 10/31/2018    Hypertension 10/31/2018    BMI 40 0-44 9, adult ([place] [address] ) 10/31/2018    Vitamin D deficiency 10/31/2018    Hiatal hernia 10/31/2018    Palpitations 10/31/2017    Premature atrial contractions 10/31/2017    Primary osteoarthritis of right wrist 05/05/2017    Wrist stiffness, right 05/05/2017     She  has a past surgical history that includes Rotator cuff repair; Knee surgery; Appendectomy; Shoulder surgery; Colonoscopy (N/A, 3/18/2019); and Esophagogastroduodenoscopy (N/A, 3/18/2019)  Her family history includes Diabetes in her father and mother; Hyperlipidemia in her father and mother; Hypertension in her father and mother; Lung cancer (age of onset: 71) in her sister; Lung cancer (age of onset: 79) in her father; Stroke in her mother  She  reports that she has quit smoking  She has quit using smokeless tobacco  She reports that she drinks alcohol  She reports that she does not use drugs    Current Outpatient Medications   Medication Sig Dispense Refill    albuterol (2 5 mg/3 mL) 0 083 % nebulizer solution Take 2 5 mg by nebulization every 6 (six) hours as needed for wheezing      albuterol (PROVENTIL HFA,VENTOLIN HFA) 90 mcg/act inhaler Inhale 2 puffs every 6 (six) hours as needed for wheezing 8 5 Inhaler 1  atenolol (TENORMIN) 25 mg tablet Take 1 tablet (25 mg total) by mouth daily 90 tablet 1    cetirizine (ZyrTEC) 10 mg tablet Take 10 mg by mouth daily      fluticasone (FLONASE) 50 mcg/act nasal spray SPRAY 2 SPRAYS INTO EACH NOSTRIL EVERY DAY 16 mL 2    fluticasone-salmeterol (ADVAIR, WIXELA) 500-50 mcg/dose inhaler Inhale 1 puff 2 (two) times a day Rinse mouth after use  3 Inhaler 1    ibuprofen (MOTRIN) 800 mg tablet Take by mouth every 6 (six) hours as needed      omeprazole (PriLOSEC) 20 mg delayed release capsule Take 1 capsule (20 mg total) by mouth daily 90 capsule 1    hydrochlorothiazide (HYDRODIURIL) 25 mg tablet Take 1 tablet (25 mg total) by mouth daily 90 tablet 1    losartan (COZAAR) 100 MG tablet Take 1 tablet (100 mg total) by mouth daily 90 tablet 1    predniSONE 20 mg tablet Take 1 tablet (20 mg total) by mouth 2 (two) times a day with meals for 5 days 10 tablet 0     No current facility-administered medications for this visit  Current Outpatient Medications on File Prior to Visit   Medication Sig    albuterol (2 5 mg/3 mL) 0 083 % nebulizer solution Take 2 5 mg by nebulization every 6 (six) hours as needed for wheezing    cetirizine (ZyrTEC) 10 mg tablet Take 10 mg by mouth daily    fluticasone (FLONASE) 50 mcg/act nasal spray SPRAY 2 SPRAYS INTO EACH NOSTRIL EVERY DAY    ibuprofen (MOTRIN) 800 mg tablet Take by mouth every 6 (six) hours as needed    omeprazole (PriLOSEC) 20 mg delayed release capsule Take 1 capsule (20 mg total) by mouth daily    [DISCONTINUED] albuterol (PROVENTIL HFA,VENTOLIN HFA) 90 mcg/act inhaler TAKE 2 PUFFS BY MOUTH EVERY 4 HOURS AS NEEDED FOR WHEEZE    [DISCONTINUED] atenolol (TENORMIN) 25 mg tablet TAKE 1 TABLET BY MOUTH EVERY DAY    [DISCONTINUED] fluticasone-salmeterol (ADVAIR DISKUS, WIXELA INHUB) 500-50 mcg/dose inhaler Inhale 1 puff 2 (two) times a day Rinse mouth after use      [DISCONTINUED] losartan-hydrochlorothiazide (HYZAAR) 100-25 MG per tablet Take 1 tablet by mouth daily    [DISCONTINUED] meloxicam (MOBIC) 15 mg tablet TAKE 1 TABLET BY MOUTH EVERY DAY (Patient not taking: Reported on 10/18/2019)    [DISCONTINUED] zolpidem (AMBIEN) 5 mg tablet Take one 1/2 hour before lights out on the night of your sleep study  (Patient not taking: Reported on 10/18/2019)     No current facility-administered medications on file prior to visit  She has No Known Allergies       Review of Systems   Constitutional: Negative for activity change, appetite change, chills, fatigue and unexpected weight change  HENT: Positive for congestion, rhinorrhea and sore throat  Negative for dental problem, ear pain, hearing loss, sinus pressure and sinus pain  Eyes: Negative for visual disturbance  Respiratory: Positive for cough and shortness of breath  Negative for wheezing  Cardiovascular: Negative for chest pain  Gastrointestinal: Negative for abdominal pain, constipation, diarrhea and vomiting  Genitourinary: Negative for difficulty urinating and dysuria  Musculoskeletal: Positive for arthralgias and back pain  Negative for myalgias  Skin: Negative for rash  Neurological: Negative for dizziness and headaches  Psychiatric/Behavioral: Negative for behavioral problems  Objective:      /70 (BP Location: Left arm, Patient Position: Sitting, Cuff Size: Large)   Pulse 82   Temp (!) 97 3 °F (36 3 °C) (Temporal)   Resp 18   Ht 5' 1" (1 549 m)   Wt 87 1 kg (192 lb)   SpO2 96%   Breastfeeding? No   BMI 36 28 kg/m²          Physical Exam   Constitutional: She is oriented to person, place, and time  She appears well-developed and well-nourished  No distress  HENT:   Head: Normocephalic and atraumatic  Right Ear: External ear normal    Left Ear: External ear normal    Mouth/Throat: Oropharynx is clear and moist  No oropharyngeal exudate  Eyes: Conjunctivae are normal    Neck: Normal range of motion  Neck supple   No thyromegaly present  Cardiovascular: Normal rate, regular rhythm and normal heart sounds  No murmur heard  Pulmonary/Chest: Effort normal and breath sounds normal  No respiratory distress  She has no wheezes  Lymphadenopathy:     She has no cervical adenopathy  Neurological: She is alert and oriented to person, place, and time  Psychiatric: She has a normal mood and affect  Her behavior is normal    Nursing note and vitals reviewed

## 2019-11-03 DIAGNOSIS — J45.20 MILD INTERMITTENT ASTHMA WITHOUT COMPLICATION: ICD-10-CM

## 2019-11-04 RX ORDER — ALBUTEROL SULFATE 90 UG/1
AEROSOL, METERED RESPIRATORY (INHALATION)
Qty: 8.5 INHALER | Refills: 1 | Status: SHIPPED | OUTPATIENT
Start: 2019-11-04 | End: 2020-08-13

## 2019-11-05 ENCOUNTER — HOSPITAL ENCOUNTER (OUTPATIENT)
Dept: SLEEP CENTER | Facility: CLINIC | Age: 74
Discharge: HOME | End: 2019-11-05
Payer: COMMERCIAL

## 2019-11-05 DIAGNOSIS — G47.33 OSA (OBSTRUCTIVE SLEEP APNEA): ICD-10-CM

## 2019-11-05 PROCEDURE — 95811 POLYSOM 6/>YRS CPAP 4/> PARM: CPT

## 2019-11-06 NOTE — PROGRESS NOTES
Sleep Study Documentation    Pre-Sleep Study       Sleep testing procedure explained to patient:YES    Patient napped prior to study:NO    Caffeine:Dayshift worker after 12PM   Caffeine use:NO    Alcohol:Dayshift workers after 5PM: Alcohol use:NO    Typical day for patient:YES       Study Documentation    Sleep Study Indications: MONO     Sleep Study: Treatment   Optimal PAP pressure: 9  Leak:Small  Snore:Eliminated  REM Obtained:yes  Supplemental O2: no    Minimum SaO2 89%  Baseline SaO2 94%  PAP mask tried (list all) Respironics Dreamwear Small  PAP mask choice (final) Respironics Dreamwear Small  PAP mask type:full face  PAP pressure at which snoring was eliminated 9  Minimum SaO2 at final PAP pressure 91%  Mode of Therapy:CPAP  ETCO2:No  CPAP changed to BiPAP:No    Mode of Therapy:CPAP    EKG abnormalities: no     EEG abnormalities: no    Sleep Study Recorded < 2 hours: N/A    Sleep Study Recorded > 2 hours but incomplete study: N/A    Sleep Study Recorded 6 hours but no sleep obtained: NO    Patient classification: employed       Post-Sleep Study    Medication used at bedtime or during sleep study:NO    Patient reports time it took to fall asleep:less than 20 minutes    Patient reports waking up during study:1 to 2 times  Patient reports returning to sleep in 10 to 30 minutes  Patient reports sleeping 4 to 6 hours without dreaming  Patient reports sleep during study:typical    Patient rated sleepiness: Very sleepy or tired    PAP treatment:yes: Post PAP treatment patient reports feeling better and  would wear PAP mask at home

## 2019-11-07 DIAGNOSIS — G47.33 OSA (OBSTRUCTIVE SLEEP APNEA): Primary | ICD-10-CM

## 2019-11-08 ENCOUNTER — TELEPHONE (OUTPATIENT)
Dept: SLEEP CENTER | Facility: CLINIC | Age: 74
End: 2019-11-08

## 2019-11-08 NOTE — TELEPHONE ENCOUNTER
Advised patient there is a DME order- she states she received a new CPAP machine in August but the pressure is too high   Pressure on new DME order is 9 cm

## 2019-11-11 ENCOUNTER — TELEPHONE (OUTPATIENT)
Dept: SLEEP CENTER | Facility: CLINIC | Age: 74
End: 2019-11-11

## 2019-11-11 NOTE — TELEPHONE ENCOUNTER
Patient called & left a message that her pressure is too high  Emailed new DME order earlier to [employer] with lower pressure, in-boxed liasons to check for order & change pressure

## 2019-11-13 ENCOUNTER — TELEPHONE (OUTPATIENT)
Dept: SLEEP CENTER | Facility: CLINIC | Age: 74
End: 2019-11-13

## 2019-11-14 NOTE — TELEPHONE ENCOUNTER
Recalled the patient left message for the patient to discuss sleep study results and Dr Sendy Torres recommendations      Patient does have compliance follow up scheduled with Dr Nadya Wilson on 1/10/2019

## 2019-11-22 DIAGNOSIS — J30.9 ALLERGIC RHINITIS, UNSPECIFIED SEASONALITY, UNSPECIFIED TRIGGER: ICD-10-CM

## 2019-11-22 RX ORDER — FLUTICASONE PROPIONATE 50 MCG
SPRAY, SUSPENSION (ML) NASAL
Qty: 16 ML | Refills: 2 | Status: SHIPPED | OUTPATIENT
Start: 2019-11-22 | End: 2020-05-13 | Stop reason: SDUPTHER

## 2019-12-16 ENCOUNTER — OFFICE VISIT (OUTPATIENT)
Dept: SLEEP CENTER | Facility: CLINIC | Age: 74
End: 2019-12-16
Payer: COMMERCIAL

## 2019-12-16 VITALS
DIASTOLIC BLOOD PRESSURE: 76 MMHG | WEIGHT: 192 LBS | HEIGHT: 61 IN | SYSTOLIC BLOOD PRESSURE: 118 MMHG | BODY MASS INDEX: 36.25 KG/M2

## 2019-12-16 DIAGNOSIS — G44.229 CHRONIC TENSION-TYPE HEADACHE, NOT INTRACTABLE: ICD-10-CM

## 2019-12-16 DIAGNOSIS — G47.33 OSA (OBSTRUCTIVE SLEEP APNEA): Primary | ICD-10-CM

## 2019-12-16 DIAGNOSIS — G47.09 OTHER INSOMNIA: ICD-10-CM

## 2019-12-16 DIAGNOSIS — G47.10 EXCESSIVE SLEEPINESS: ICD-10-CM

## 2019-12-16 DIAGNOSIS — J31.0 CHRONIC RHINITIS: ICD-10-CM

## 2019-12-16 DIAGNOSIS — I10 ESSENTIAL HYPERTENSION: ICD-10-CM

## 2019-12-16 DIAGNOSIS — E66.9 OBESITY (BMI 30-39.9): ICD-10-CM

## 2019-12-16 DIAGNOSIS — J45.20 MILD INTERMITTENT ASTHMA WITHOUT COMPLICATION: ICD-10-CM

## 2019-12-16 PROCEDURE — 3074F SYST BP LT 130 MM HG: CPT | Performed by: INTERNAL MEDICINE

## 2019-12-16 PROCEDURE — 3078F DIAST BP <80 MM HG: CPT | Performed by: INTERNAL MEDICINE

## 2019-12-16 PROCEDURE — 99214 OFFICE O/P EST MOD 30 MIN: CPT | Performed by: INTERNAL MEDICINE

## 2019-12-16 NOTE — PATIENT INSTRUCTIONS

## 2019-12-16 NOTE — PROGRESS NOTES
Follow-Up Note - [address] [facility] 79  69 y o  female  QEE:[birth year]  MBR:[id number]    CC: I saw this patient for follow-up in clinic today for her Sleep Disordered Breathing, Coexisting Sleep and Medical Problems  A sleep study to titrate Positive airway pressure (PAP) therapy was undertaken  Patient is here to review results and to adjust therapy  The study demonstrated sleep disordered breathing was this adequately remediated with PAP at 9 cm H2O  The diagnostic study in April of 2019 demonstrated severe obstructive sleep apnea: AHI 36 4 /hour higher while supine and considerably higher during stage REM  Hank Chavez Intermittent snoring of very loud intensity was noted    Minimum oxygen saturation 68 %  and 201 minutes of total sleep time was spent with saturations less than 90%  There were moderate periodic limb movements of sleep for an index of 24 per hour  ECG demonstrated occasional PVCs     PFSH, Problem List, Medications & Allergies were reviewed in EMR  Interval changes: none reported  She  has a past medical history of Asthma, GERD (gastroesophageal reflux disease), Hypertension, Lumbar disc disease, and Ventricular arrhythmia  She has a current medication list which includes the following prescription(s): albuterol, albuterol, albuterol, atenolol, cetirizine, fluticasone, fluticasone-salmeterol, hydrochlorothiazide, ibuprofen, losartan, and omeprazole  ROS: Reviewed (see attached)  Significant for allergies and asthma control  She is no longer having headaches  Unk Kathy DATA REVIEWED:  using PAP > 4 hours/night 67% of the time  Estimated SERJIO 6 8/hour at pressure of 9cm H2O @90th percentile  SUBJECTIVE: Regarding use of PAP, Jayla reports:   · She is experiencing significant adverse effects: pressure too high and was unable to keep the mask on  However, since pressure was reduced, she is able to use CPAP more consistently    She was unable to use CPAP for 8 out of the past 30 days because of respiratory infection  · She is   benefiting from use: sleeping better and no longer snoring / having breathing difficulties   Sleep Routine: She reports getting 6 hrs sleep on week nights and more on weekends; she has no difficulty initiating or maintaining sleep   She awakens spontaneously and feels refreshed  She denied excessive drowsiness   She rated herself at Total score: 5 /24 on the Aubrey sleepiness scale  Habits: reports that she has quit smoking  She has quit using smokeless tobacco ,  reports that she drinks alcohol ,  reports that she does not use drugs  , Caffeine use: limited , Exercise routine: sometimes   OBJECTIVE: /76   Ht 5' 1" (1 549 m)   Wt 87 1 kg (192 lb)   BMI 36 28 kg/m²    Constitutional: Patient is well groomed; well appearing  Skin/Extrem: warm & dry; col & hydration normal; no edema  Psych: cooperativeand in no distress  Mental State appears normal   CNS: Alert, orientated, clear & coherent speech  H&N: EOMI; NC/AT:no facial pressure marks, no rashes  ENMT Mucus membranes normal Nasal airway:patent  Oral airway: crowded  Resp:effort is normal CVS: RRR ABD:truncal obesity MSK:Gait normal     ASSESSMENT: Primary Sleep/Secondary(to Medical or Psych conditions) & comorbidities   1  MONO (obstructive sleep apnea)     2  Other insomnia     3  Excessive sleepiness     4  Chronic tension-type headache, not intractable     5  Chronic rhinitis     6  Mild intermittent asthma without complication     7  Essential hypertension     8  Obesity (BMI 30-39  9)       PLAN:  1  Treatment with  PAP is medically necessary, is benefitting from use and [place] is agreable to continue use  Anticipate compliance will improve since pressure was reduced  2  Care of equipment, methods to improve comfort using PAP and importance of compliance with therapy were discussed  3  Pressure setting: continue 9 cmH2O  (She is unable to tolerate higher pressure)    4  Rx provided to replace supplies and Care coordinated with DME provider  5  Strategies for weight reduction were discussed  6  She should also ensure adequate treatment of her allergies and asthma  7  Follow-up is advised in 1 year or sooner if needed to monitor progress, compliance and to adjust therapy  Thank you for allowing me to participate in the care of this patient      Sincerely,    Authenticated electronically by Mckenzie Odonnell MD on 75/59/01   Board Certified Specialist

## 2019-12-16 NOTE — PROGRESS NOTES
Tajik  Review of Systems      Genitourinary none   Cardiology ankle/leg swelling   Gastrointestinal none   Neurology none   Constitutional none   Integumentary none   Psychiatry none   Musculoskeletal joint pain and back pain   Pulmonary snoring   ENT none   Endocrine none   Hematological none

## 2020-01-20 ENCOUNTER — TELEPHONE (OUTPATIENT)
Dept: FAMILY MEDICINE CLINIC | Facility: CLINIC | Age: 75
End: 2020-01-20

## 2020-01-20 NOTE — TELEPHONE ENCOUNTER
[employer] is calling in with questions regarding the following medication for the patient   When calling please use reference number [id number]      albuterol (2 5 mg/3 mL) 0 083 % nebulizer solution

## 2020-02-11 DIAGNOSIS — M48.061 SPINAL STENOSIS, LUMBAR REGION WITHOUT NEUROGENIC CLAUDICATION: ICD-10-CM

## 2020-02-11 DIAGNOSIS — S83.282A ACUTE LATERAL MENISCUS TEAR OF LEFT KNEE, INITIAL ENCOUNTER: ICD-10-CM

## 2020-02-11 RX ORDER — MELOXICAM 15 MG/1
TABLET ORAL
Qty: 90 TABLET | Refills: 0 | Status: SHIPPED | OUTPATIENT
Start: 2020-02-11 | End: 2020-05-11

## 2020-02-14 ENCOUNTER — LAB (OUTPATIENT)
Dept: LAB | Facility: CLINIC | Age: 75
End: 2020-02-14
Payer: COMMERCIAL

## 2020-02-14 DIAGNOSIS — I10 ESSENTIAL HYPERTENSION: ICD-10-CM

## 2020-02-14 LAB
ALBUMIN SERPL BCP-MCNC: 4 G/DL (ref 3.5–5)
ALP SERPL-CCNC: 66 U/L (ref 46–116)
ALT SERPL W P-5'-P-CCNC: 22 U/L (ref 12–78)
ANION GAP SERPL CALCULATED.3IONS-SCNC: 3 MMOL/L (ref 4–13)
AST SERPL W P-5'-P-CCNC: 14 U/L (ref 5–45)
BILIRUB SERPL-MCNC: 0.5 MG/DL (ref 0.2–1)
BUN SERPL-MCNC: 29 MG/DL (ref 5–25)
CALCIUM SERPL-MCNC: 9.9 MG/DL (ref 8.3–10.1)
CHLORIDE SERPL-SCNC: 104 MMOL/L (ref 100–108)
CHOLEST SERPL-MCNC: 217 MG/DL (ref 50–200)
CO2 SERPL-SCNC: 31 MMOL/L (ref 21–32)
CREAT SERPL-MCNC: 0.84 MG/DL (ref 0.6–1.3)
GFR SERPL CREATININE-BSD FRML MDRD: 70 ML/MIN/1.73SQ M
GLUCOSE P FAST SERPL-MCNC: 95 MG/DL (ref 65–99)
HDLC SERPL-MCNC: 54 MG/DL
LDLC SERPL CALC-MCNC: 134 MG/DL (ref 0–100)
NONHDLC SERPL-MCNC: 163 MG/DL
POTASSIUM SERPL-SCNC: 3.9 MMOL/L (ref 3.5–5.3)
PROT SERPL-MCNC: 7.6 G/DL (ref 6.4–8.2)
SODIUM SERPL-SCNC: 138 MMOL/L (ref 136–145)
TRIGL SERPL-MCNC: 144 MG/DL

## 2020-02-14 PROCEDURE — 80061 LIPID PANEL: CPT

## 2020-02-14 PROCEDURE — 36415 COLL VENOUS BLD VENIPUNCTURE: CPT

## 2020-02-14 PROCEDURE — 80053 COMPREHEN METABOLIC PANEL: CPT

## 2020-02-19 NOTE — RESULT ENCOUNTER NOTE
sw  Her cholesterol did go up a little  I would not recommend any medication at this time but I would recommend more diet modification  It should be under 100  Recommend getting regular exercise of at least moderate intensity 3 5 hours a week  I also recommend eating a low carbohydrate diet and making sure that carbs are whole grain when eaten  Also recommend trying to avoid unhealthy fats such as vegetable oil, canola oil, peanut oil  These are often cooked with but can be found in a lot of snacks such as cookies, cakes, chips

## 2020-03-16 ENCOUNTER — TELEPHONE (OUTPATIENT)
Dept: FAMILY MEDICINE CLINIC | Facility: CLINIC | Age: 75
End: 2020-03-16

## 2020-03-20 DIAGNOSIS — J45.20 MILD INTERMITTENT ASTHMA WITHOUT COMPLICATION: ICD-10-CM

## 2020-03-20 RX ORDER — ALBUTEROL SULFATE 90 UG/1
2 AEROSOL, METERED RESPIRATORY (INHALATION) EVERY 6 HOURS PRN
Qty: 8.5 INHALER | Refills: 1 | Status: SHIPPED | OUTPATIENT
Start: 2020-03-20 | End: 2020-05-13 | Stop reason: SDUPTHER

## 2020-03-30 NOTE — PATIENT INSTRUCTIONS
Goals: Food log (ie ) www myfitnesspal com,sparkpeople  com,loseit com,calorieking  com,etc  baritastic  No sugary beverages  At least 64oz of water daily  Increase physical activity by 10 minutes daily   Gradually increase physical activity to a goal of 5 days per week for 30 minutes of MODERATE intensity PLUS 2 days per week of FULL BODY resistance training  Goal protein intake of 60-80 grams per day, 5-10 servings of fruits and vegetables per day, 25-35 grams of dietary fiber per day, 3776-9628 calories per day and Limit caffeine to 16oz per day
unknown

## 2020-05-10 DIAGNOSIS — M48.061 SPINAL STENOSIS, LUMBAR REGION WITHOUT NEUROGENIC CLAUDICATION: ICD-10-CM

## 2020-05-10 DIAGNOSIS — S83.282A ACUTE LATERAL MENISCUS TEAR OF LEFT KNEE, INITIAL ENCOUNTER: ICD-10-CM

## 2020-05-11 RX ORDER — MELOXICAM 15 MG/1
TABLET ORAL
Qty: 90 TABLET | Refills: 0 | Status: SHIPPED | OUTPATIENT
Start: 2020-05-11 | End: 2020-08-13

## 2020-05-13 DIAGNOSIS — J45.20 MILD INTERMITTENT ASTHMA WITHOUT COMPLICATION: ICD-10-CM

## 2020-05-13 DIAGNOSIS — I10 ESSENTIAL HYPERTENSION: ICD-10-CM

## 2020-05-13 DIAGNOSIS — J30.9 ALLERGIC RHINITIS, UNSPECIFIED SEASONALITY, UNSPECIFIED TRIGGER: ICD-10-CM

## 2020-05-14 RX ORDER — HYDROCHLOROTHIAZIDE 25 MG/1
25 TABLET ORAL DAILY
Qty: 90 TABLET | Refills: 0 | Status: SHIPPED | OUTPATIENT
Start: 2020-05-14 | End: 2020-07-01 | Stop reason: SDUPTHER

## 2020-05-14 RX ORDER — ALBUTEROL SULFATE 90 UG/1
2 AEROSOL, METERED RESPIRATORY (INHALATION) EVERY 6 HOURS PRN
Qty: 8.5 INHALER | Refills: 1 | Status: SHIPPED | OUTPATIENT
Start: 2020-05-14 | End: 2020-07-28 | Stop reason: SDUPTHER

## 2020-05-14 RX ORDER — FLUTICASONE PROPIONATE 50 MCG
2 SPRAY, SUSPENSION (ML) NASAL DAILY
Qty: 16 ML | Refills: 2 | Status: SHIPPED | OUTPATIENT
Start: 2020-05-14 | End: 2020-08-12

## 2020-05-18 DIAGNOSIS — I10 ESSENTIAL HYPERTENSION: ICD-10-CM

## 2020-05-19 RX ORDER — LOSARTAN POTASSIUM 100 MG/1
100 TABLET ORAL DAILY
Qty: 90 TABLET | Refills: 0 | Status: SHIPPED | OUTPATIENT
Start: 2020-05-19 | End: 2020-05-20

## 2020-05-20 ENCOUNTER — TELEPHONE (OUTPATIENT)
Dept: FAMILY MEDICINE CLINIC | Facility: CLINIC | Age: 75
End: 2020-05-20

## 2020-05-20 DIAGNOSIS — I10 ESSENTIAL HYPERTENSION: Primary | ICD-10-CM

## 2020-05-20 RX ORDER — VALSARTAN 160 MG/1
160 TABLET ORAL DAILY
Qty: 90 TABLET | Refills: 1 | Status: SHIPPED | OUTPATIENT
Start: 2020-05-20 | End: 2020-05-21

## 2020-05-21 ENCOUNTER — TELEPHONE (OUTPATIENT)
Dept: OTHER | Facility: OTHER | Age: 75
End: 2020-05-21

## 2020-05-21 DIAGNOSIS — I10 ESSENTIAL HYPERTENSION: Primary | ICD-10-CM

## 2020-05-21 RX ORDER — IRBESARTAN 300 MG/1
300 TABLET ORAL
Qty: 90 TABLET | Refills: 0 | Status: SHIPPED | OUTPATIENT
Start: 2020-05-21 | End: 2020-07-01 | Stop reason: SDUPTHER

## 2020-06-16 ENCOUNTER — LAB REQUISITION (OUTPATIENT)
Dept: LAB | Facility: HOSPITAL | Age: 75
End: 2020-06-16
Payer: COMMERCIAL

## 2020-06-16 DIAGNOSIS — U07.1 COVID-19: ICD-10-CM

## 2020-06-16 PROCEDURE — U0003 INFECTIOUS AGENT DETECTION BY NUCLEIC ACID (DNA OR RNA); SEVERE ACUTE RESPIRATORY SYNDROME CORONAVIRUS 2 (SARS-COV-2) (CORONAVIRUS DISEASE [COVID-19]), AMPLIFIED PROBE TECHNIQUE, MAKING USE OF HIGH THROUGHPUT TECHNOLOGIES AS DESCRIBED BY CMS-2020-01-R: HCPCS | Performed by: INTERNAL MEDICINE

## 2020-06-17 LAB — SARS-COV-2 N GENE RESP QL NAA+PROBE: NEGATIVE

## 2020-07-01 DIAGNOSIS — I10 ESSENTIAL HYPERTENSION: ICD-10-CM

## 2020-07-01 RX ORDER — IRBESARTAN 300 MG/1
300 TABLET ORAL
Qty: 90 TABLET | Refills: 0 | Status: SHIPPED | OUTPATIENT
Start: 2020-07-01 | End: 2020-11-09 | Stop reason: SDUPTHER

## 2020-07-01 RX ORDER — HYDROCHLOROTHIAZIDE 25 MG/1
25 TABLET ORAL DAILY
Qty: 90 TABLET | Refills: 0 | Status: SHIPPED | OUTPATIENT
Start: 2020-07-01 | End: 2020-07-28 | Stop reason: SDUPTHER

## 2020-07-01 NOTE — TELEPHONE ENCOUNTER
Pt's appt has been r/s 4 times due to COVID   Pt needs refills for following medications    irbesartan (AVAPRO) 300 mg tablet     hydrochlorothiazide (HYDRODIURIL) 25 mg tablet

## 2020-07-19 DIAGNOSIS — K44.9 HIATAL HERNIA: ICD-10-CM

## 2020-07-20 RX ORDER — OMEPRAZOLE 20 MG/1
CAPSULE, DELAYED RELEASE ORAL
Qty: 90 CAPSULE | Refills: 1 | Status: SHIPPED | OUTPATIENT
Start: 2020-07-20 | End: 2021-01-11 | Stop reason: SDUPTHER

## 2020-07-22 ENCOUNTER — APPOINTMENT (EMERGENCY)
Dept: CT IMAGING | Facility: HOSPITAL | Age: 75
End: 2020-07-22
Payer: COMMERCIAL

## 2020-07-22 ENCOUNTER — HOSPITAL ENCOUNTER (EMERGENCY)
Facility: HOSPITAL | Age: 75
Discharge: HOME | End: 2020-07-22
Attending: EMERGENCY MEDICINE | Admitting: EMERGENCY MEDICINE
Payer: COMMERCIAL

## 2020-07-22 VITALS
SYSTOLIC BLOOD PRESSURE: 167 MMHG | RESPIRATION RATE: 18 BRPM | BODY MASS INDEX: 37.07 KG/M2 | HEART RATE: 99 BPM | WEIGHT: 196.21 LBS | TEMPERATURE: 98.2 F | OXYGEN SATURATION: 98 % | DIASTOLIC BLOOD PRESSURE: 87 MMHG

## 2020-07-22 DIAGNOSIS — C34.90 LUNG CANCER (HCC): ICD-10-CM

## 2020-07-22 DIAGNOSIS — R10.9 ABDOMINAL PAIN: Primary | ICD-10-CM

## 2020-07-22 LAB
ALBUMIN SERPL BCP-MCNC: 3.9 G/DL (ref 3.5–5)
ALP SERPL-CCNC: 70 U/L (ref 46–116)
ALT SERPL W P-5'-P-CCNC: 56 U/L (ref 12–78)
ANION GAP SERPL CALCULATED.3IONS-SCNC: 10 MMOL/L (ref 4–13)
AST SERPL W P-5'-P-CCNC: 59 U/L (ref 5–45)
ATRIAL RATE: 60 BPM
BASOPHILS # BLD AUTO: 0.04 THOUSANDS/ΜL (ref 0–0.1)
BASOPHILS NFR BLD AUTO: 0 % (ref 0–1)
BILIRUB SERPL-MCNC: 1.02 MG/DL (ref 0.2–1)
BILIRUB UR QL STRIP: NEGATIVE
BUN SERPL-MCNC: 29 MG/DL (ref 5–25)
CALCIUM SERPL-MCNC: 9.7 MG/DL (ref 8.3–10.1)
CHLORIDE SERPL-SCNC: 102 MMOL/L (ref 100–108)
CLARITY UR: CLEAR
CO2 SERPL-SCNC: 30 MMOL/L (ref 21–32)
COLOR UR: YELLOW
CREAT SERPL-MCNC: 0.82 MG/DL (ref 0.6–1.3)
EOSINOPHIL # BLD AUTO: 0.07 THOUSAND/ΜL (ref 0–0.61)
EOSINOPHIL NFR BLD AUTO: 1 % (ref 0–6)
ERYTHROCYTE [DISTWIDTH] IN BLOOD BY AUTOMATED COUNT: 12.7 % (ref 11.6–15.1)
GFR SERPL CREATININE-BSD FRML MDRD: 71 ML/MIN/1.73SQ M
GLUCOSE SERPL-MCNC: 130 MG/DL (ref 65–140)
GLUCOSE UR STRIP-MCNC: NEGATIVE MG/DL
HCT VFR BLD AUTO: 38.9 % (ref 34.8–46.1)
HGB BLD-MCNC: 12.7 G/DL (ref 11.5–15.4)
HGB UR QL STRIP.AUTO: NEGATIVE
IMM GRANULOCYTES # BLD AUTO: 0.03 THOUSAND/UL (ref 0–0.2)
IMM GRANULOCYTES NFR BLD AUTO: 0 % (ref 0–2)
KETONES UR STRIP-MCNC: NEGATIVE MG/DL
LEUKOCYTE ESTERASE UR QL STRIP: NEGATIVE
LIPASE SERPL-CCNC: 161 U/L (ref 73–393)
LYMPHOCYTES # BLD AUTO: 0.98 THOUSANDS/ΜL (ref 0.6–4.47)
LYMPHOCYTES NFR BLD AUTO: 10 % (ref 14–44)
MCH RBC QN AUTO: 30.2 PG (ref 26.8–34.3)
MCHC RBC AUTO-ENTMCNC: 32.6 G/DL (ref 31.4–37.4)
MCV RBC AUTO: 92 FL (ref 82–98)
MONOCYTES # BLD AUTO: 0.8 THOUSAND/ΜL (ref 0.17–1.22)
MONOCYTES NFR BLD AUTO: 8 % (ref 4–12)
NEUTROPHILS # BLD AUTO: 7.69 THOUSANDS/ΜL (ref 1.85–7.62)
NEUTS SEG NFR BLD AUTO: 81 % (ref 43–75)
NITRITE UR QL STRIP: NEGATIVE
NRBC BLD AUTO-RTO: 0 /100 WBCS
P AXIS: 60 DEGREES
PH UR STRIP.AUTO: 8 [PH]
PLATELET # BLD AUTO: 125 THOUSANDS/UL (ref 149–390)
PMV BLD AUTO: 13.1 FL (ref 8.9–12.7)
POTASSIUM SERPL-SCNC: 4.1 MMOL/L (ref 3.5–5.3)
PR INTERVAL: 176 MS
PROT SERPL-MCNC: 7.7 G/DL (ref 6.4–8.2)
PROT UR STRIP-MCNC: NEGATIVE MG/DL
QRS AXIS: 54 DEGREES
QRSD INTERVAL: 96 MS
QT INTERVAL: 426 MS
QTC INTERVAL: 426 MS
RBC # BLD AUTO: 4.21 MILLION/UL (ref 3.81–5.12)
SODIUM SERPL-SCNC: 142 MMOL/L (ref 136–145)
SP GR UR STRIP.AUTO: 1.01 (ref 1–1.03)
T WAVE AXIS: 56 DEGREES
TROPONIN I SERPL-MCNC: <0.02 NG/ML
UROBILINOGEN UR QL STRIP.AUTO: 0.2 E.U./DL
VENTRICULAR RATE: 60 BPM
WBC # BLD AUTO: 9.61 THOUSAND/UL (ref 4.31–10.16)

## 2020-07-22 PROCEDURE — 93005 ELECTROCARDIOGRAM TRACING: CPT

## 2020-07-22 PROCEDURE — 83690 ASSAY OF LIPASE: CPT | Performed by: EMERGENCY MEDICINE

## 2020-07-22 PROCEDURE — 99285 EMERGENCY DEPT VISIT HI MDM: CPT | Performed by: EMERGENCY MEDICINE

## 2020-07-22 PROCEDURE — 96374 THER/PROPH/DIAG INJ IV PUSH: CPT

## 2020-07-22 PROCEDURE — 81003 URINALYSIS AUTO W/O SCOPE: CPT | Performed by: EMERGENCY MEDICINE

## 2020-07-22 PROCEDURE — 74174 CTA ABD&PLVS W/CONTRAST: CPT

## 2020-07-22 PROCEDURE — 96375 TX/PRO/DX INJ NEW DRUG ADDON: CPT

## 2020-07-22 PROCEDURE — 71275 CT ANGIOGRAPHY CHEST: CPT

## 2020-07-22 PROCEDURE — 36415 COLL VENOUS BLD VENIPUNCTURE: CPT | Performed by: EMERGENCY MEDICINE

## 2020-07-22 PROCEDURE — 84484 ASSAY OF TROPONIN QUANT: CPT | Performed by: EMERGENCY MEDICINE

## 2020-07-22 PROCEDURE — 80053 COMPREHEN METABOLIC PANEL: CPT | Performed by: EMERGENCY MEDICINE

## 2020-07-22 PROCEDURE — 85025 COMPLETE CBC W/AUTO DIFF WBC: CPT | Performed by: EMERGENCY MEDICINE

## 2020-07-22 PROCEDURE — 93010 ELECTROCARDIOGRAM REPORT: CPT | Performed by: INTERNAL MEDICINE

## 2020-07-22 PROCEDURE — 99284 EMERGENCY DEPT VISIT MOD MDM: CPT

## 2020-07-22 PROCEDURE — C9113 INJ PANTOPRAZOLE SODIUM, VIA: HCPCS | Performed by: EMERGENCY MEDICINE

## 2020-07-22 RX ORDER — ONDANSETRON 4 MG/1
4 TABLET, ORALLY DISINTEGRATING ORAL EVERY 6 HOURS PRN
Qty: 20 TABLET | Refills: 0 | Status: SHIPPED | OUTPATIENT
Start: 2020-07-22 | End: 2021-01-29 | Stop reason: ALTCHOICE

## 2020-07-22 RX ORDER — PANTOPRAZOLE SODIUM 40 MG/1
40 INJECTION, POWDER, FOR SOLUTION INTRAVENOUS ONCE
Status: COMPLETED | OUTPATIENT
Start: 2020-07-22 | End: 2020-07-22

## 2020-07-22 RX ORDER — TRAMADOL HYDROCHLORIDE 50 MG/1
50 TABLET ORAL EVERY 6 HOURS PRN
Qty: 20 TABLET | Refills: 0 | Status: SHIPPED | OUTPATIENT
Start: 2020-07-22 | End: 2020-07-29

## 2020-07-22 RX ORDER — ONDANSETRON 2 MG/ML
4 INJECTION INTRAMUSCULAR; INTRAVENOUS ONCE
Status: COMPLETED | OUTPATIENT
Start: 2020-07-22 | End: 2020-07-22

## 2020-07-22 RX ADMIN — PANTOPRAZOLE SODIUM 40 MG: 40 INJECTION, POWDER, FOR SOLUTION INTRAVENOUS at 13:32

## 2020-07-22 RX ADMIN — ONDANSETRON 4 MG: 2 INJECTION INTRAMUSCULAR; INTRAVENOUS at 13:28

## 2020-07-22 RX ADMIN — IOHEXOL 100 ML: 350 INJECTION, SOLUTION INTRAVENOUS at 15:03

## 2020-07-22 NOTE — DISCHARGE INSTRUCTIONS
Your CT today shows the followin 5 cm right hilar nodule is highly suspicious for primary pulmonary malignancy  Soft tissue extends into the anterior segmental bronchus of the right upper lobe  Ipsilateral hilar and mediastinal lymphadenopathy is present  Recommend PET/CT and pulmonology referral for transbronchial biopsy

## 2020-07-22 NOTE — ED PROVIDER NOTES
Pt Name: Mainor Rao  MRN: [id number]  [place] 1945  Age/Sex: 70 y o  female  Date of evaluation: 7/22/2020  PCP: Sang Romo PA-C    CHIEF COMPLAINT    Chief Complaint   Patient presents with    Abdominal Pain     ruq abd pain wraps around right upper back with 3 episodes of vomiting  started to day  HPI    Tommy Og presents to the Emergency Department complaining of upper abdominal pain that wraps around to her back  She had the pain first and then started to have nausea and vomiting  No injury  No diarhea  No chest pain or SOB  No infectious exposures  HPI      Past Medical and Surgical History    Past Medical History:   Diagnosis Date    Asthma     GERD (gastroesophageal reflux disease)     Hypertension     Lumbar disc disease     Ventricular arrhythmia        Past Surgical History:   Procedure Laterality Date    APPENDECTOMY      COLONOSCOPY N/A 3/18/2019    Procedure: COLONOSCOPY;  Surgeon: Jada Mas DO;  Location[facility]; Service: Gastroenterology    ESOPHAGOGASTRODUODENOSCOPY N/A 3/18/2019    Procedure: ESOPHAGOGASTRODUODENOSCOPY (EGD); Surgeon: Jada Mas DO;  [facility]; Service: Gastroenterology    KNEE SURGERY      ROTATOR CUFF REPAIR      SHOULDER SURGERY         Family History   Problem Relation Age of Onset    Stroke Mother     Diabetes Mother     Hyperlipidemia Mother     Hypertension Mother     Diabetes Father     Hyperlipidemia Father     Hypertension Father     Lung cancer Father 79    Lung cancer Sister 71       Social History     Tobacco Use    Smoking status: Former Smoker    Smokeless tobacco: Former User   Substance Use Topics    Alcohol use: Yes     Frequency: Monthly or less     Comment: rarely drinks wine    Drug use: No              Allergies    No Known Allergies    Home Medications    Prior to Admission medications    Medication Sig Start Date End Date Taking?  Authorizing Provider   albuterol (2 5 mg/3 mL) 0 083 % nebulizer solution Take 2 5 mg by nebulization every 6 (six) hours as needed for wheezing    Historical Provider, MD   albuterol (PROVENTIL HFA,VENTOLIN HFA) 90 mcg/act inhaler TAKE 2 PUFFS BY MOUTH EVERY 4 HOURS AS NEEDED FOR WHEEZE 11/4/19   Avni Griffin PA-C   albuterol (PROVENTIL HFA,VENTOLIN HFA) 90 mcg/act inhaler Inhale 2 puffs every 6 (six) hours as needed for wheezing 5/14/20   Nel Jimenez PA-C   atenolol (TENORMIN) 25 mg tablet Take 1 tablet (25 mg total) by mouth daily 10/18/19   Nel Jimenez PA-C   cetirizine (ZyrTEC) 10 mg tablet Take 10 mg by mouth daily    Historical Provider, MD   fluticasone (FLONASE) 50 mcg/act nasal spray 2 sprays into each nostril daily 5/14/20   Nel Jimenez PA-C   fluticasone-salmeterol (ADVAIR, WIXELA) 500-50 mcg/dose inhaler Inhale 1 puff 2 (two) times a day Rinse mouth after use  10/18/19   Nel Jimenez PA-C   hydrochlorothiazide (HYDRODIURIL) 25 mg tablet Take 1 tablet (25 mg total) by mouth daily 7/1/20   Nel Jimenez PA-C   irbesartan (AVAPRO) 300 mg tablet Take 1 tablet (300 mg total) by mouth daily at bedtime 7/1/20   Nel Jimenez PA-C   meloxicam (MOBIC) 15 mg tablet TAKE 1 TABLET BY MOUTH EVERY DAY 5/11/20   Nel Jimenez PA-C   omeprazole (PriLOSEC) 20 mg delayed release capsule TAKE 1 CAPSULE BY MOUTH EVERY DAY 7/20/20   Nel Jimenez PA-C           Review of Systems    Review of Systems   Constitutional: Negative for activity change, appetite change, chills, diaphoresis, fatigue and fever  HENT: Negative for congestion, postnasal drip, rhinorrhea, sinus pressure, sneezing and sore throat  Eyes: Negative for pain and visual disturbance  Respiratory: Negative for cough, chest tightness and shortness of breath  Cardiovascular: Negative for chest pain, palpitations and leg swelling  Gastrointestinal: Positive for abdominal pain, nausea and vomiting  Negative for abdominal distention, constipation and diarrhea     Endocrine: Negative for polydipsia, polyphagia and polyuria  Genitourinary: Negative for decreased urine volume, difficulty urinating, dysuria, flank pain, frequency and hematuria  Musculoskeletal: Negative for arthralgias, gait problem, joint swelling and neck pain  Skin: Negative for pallor and rash  Allergic/Immunologic: Negative for immunocompromised state  Neurological: Negative for syncope, speech difficulty, weakness, light-headedness, numbness and headaches  All other systems reviewed and are negative  Physical Exam      ED Triage Vitals   Temperature Pulse Respirations Blood Pressure SpO2   07/22/20 1127 07/22/20 1127 07/22/20 1127 07/22/20 1129 07/22/20 1127   97 6 °F (36 4 °C) 68 18 (!) 203/91 98 %      Temp Source Heart Rate Source Patient Position - Orthostatic VS BP Location FiO2 (%)   07/22/20 1127 07/22/20 1127 07/22/20 1639 07/22/20 1127 --   Temporal Monitor Lying Right arm       Pain Score       07/22/20 1127       8               Physical Exam   Constitutional: She is oriented to person, place, and time  She appears well-developed and well-nourished  No distress  HENT:   Head: Normocephalic and atraumatic  Nose: Nose normal    Mouth/Throat: Oropharynx is clear and moist    Eyes: Pupils are equal, round, and reactive to light  Conjunctivae, EOM and lids are normal    Neck: Normal range of motion  Neck supple  Cardiovascular: Normal rate, regular rhythm and normal heart sounds  Exam reveals no gallop and no friction rub  No murmur heard  Pulmonary/Chest: Effort normal and breath sounds normal  No accessory muscle usage  No respiratory distress  She has no wheezes  She has no rales  Abdominal: Soft  She exhibits no distension  There is tenderness  There is no rebound and no guarding  Neurological: She is alert and oriented to person, place, and time  No cranial nerve deficit or sensory deficit  Skin: Skin is warm and dry  No rash noted  She is not diaphoretic  No erythema  Psychiatric: She has a normal mood and affect  Her speech is normal and behavior is normal  Judgment and thought content normal    Nursing note and vitals reviewed  Assessment and Plan    Martin Dunbar is a 70 y o  female who presents with upper abdominal pain  Physical examination remarkable for tenderness  Differential diagnosis (not completely inclusive) includes intra-abdominal pathology  Plan will be to perform diagnostic testing and treat symptomatically  MDM    Diagnostic Results        EKG INTERPRETATION  EKG Interpretation    EKG interpreted by me  Interpretation by Flako Fernandes DO  EKG reviewed and interpreted independently      Labs:    Results for orders placed or performed during the hospital encounter of 07/22/20   CBC and differential   Result Value Ref Range    WBC 9 61 4 31 - 10 16 Thousand/uL    RBC 4 21 3 81 - 5 12 Million/uL    Hemoglobin 12 7 11 5 - 15 4 g/dL    Hematocrit 38 9 34 8 - 46 1 %    MCV 92 82 - 98 fL    MCH 30 2 26 8 - 34 3 pg    MCHC 32 6 31 4 - 37 4 g/dL    RDW 12 7 11 6 - 15 1 %    MPV 13 1 (H) 8 9 - 12 7 fL    Platelets 641 (L) 896 - 390 Thousands/uL    nRBC 0 /100 WBCs    Neutrophils Relative 81 (H) 43 - 75 %    Immat GRANS % 0 0 - 2 %    Lymphocytes Relative 10 (L) 14 - 44 %    Monocytes Relative 8 4 - 12 %    Eosinophils Relative 1 0 - 6 %    Basophils Relative 0 0 - 1 %    Neutrophils Absolute 7 69 (H) 1 85 - 7 62 Thousands/µL    Immature Grans Absolute 0 03 0 00 - 0 20 Thousand/uL    Lymphocytes Absolute 0 98 0 60 - 4 47 Thousands/µL    Monocytes Absolute 0 80 0 17 - 1 22 Thousand/µL    Eosinophils Absolute 0 07 0 00 - 0 61 Thousand/µL    Basophils Absolute 0 04 0 00 - 0 10 Thousands/µL   Comprehensive metabolic panel   Result Value Ref Range    Sodium 142 136 - 145 mmol/L    Potassium 4 1 3 5 - 5 3 mmol/L    Chloride 102 100 - 108 mmol/L    CO2 30 21 - 32 mmol/L    ANION GAP 10 4 - 13 mmol/L    BUN 29 (H) 5 - 25 mg/dL    Creatinine 0 82 0 60 - 1 30 mg/dL    Glucose 130 65 - 140 mg/dL    Calcium 9 7 8 3 - 10 1 mg/dL    AST 59 (H) 5 - 45 U/L    ALT 56 12 - 78 U/L    Alkaline Phosphatase 70 46 - 116 U/L    Total Protein 7 7 6 4 - 8 2 g/dL    Albumin 3 9 3 5 - 5 0 g/dL    Total Bilirubin 1 02 (H) 0 20 - 1 00 mg/dL    eGFR 71 ml/min/1 73sq m   Lipase   Result Value Ref Range    Lipase 161 73 - 393 u/L   UA w Reflex to Microscopic w Reflex to Culture   Result Value Ref Range    Color, UA Yellow     Clarity, UA Clear     Specific [place], UA 1 015 1 003 - 1 030    pH, UA 8 0 4 5, 5 0, 5 5, 6 0, 6 5, 7 0, 7 5, 8 0    Leukocytes, UA Negative Negative    Nitrite, UA Negative Negative    Protein, UA Negative Negative mg/dl    Glucose, UA Negative Negative mg/dl    Ketones, UA Negative Negative mg/dl    Urobilinogen, UA 0 2 0 2, 1 0 E U /dl E U /dl    Bilirubin, UA Negative Negative    Blood, UA Negative Negative   Troponin I   Result Value Ref Range    Troponin I <0 02 <=0 04 ng/mL   ECG 12 lead   Result Value Ref Range    Ventricular Rate 60 BPM    Atrial Rate 60 BPM    WA Interval 176 ms    QRSD Interval 96 ms    QT Interval 426 ms    QTC Interval 426 ms    P Axis 60 degrees    QRS Axis 54 degrees    T Wave Axis 56 degrees       All labs reviewed and utilized in the medical decision making process    Radiology:    CTA dissection protocol chest abdomen pelvis w wo contrast   Final Result      1   2 5 cm right hilar nodule is highly suspicious for primary pulmonary malignancy  Soft tissue extends into the anterior segmental bronchus of the right upper lobe  Ipsilateral hilar and mediastinal lymphadenopathy is present  Recommend PET/CT and    pulmonology referral for transbronchial biopsy  2   Thoracic aorta, abdominal aorta, and branch vessels show mild age-appropriate atherosclerosis without significant occlusive disease, dissection, aneurysm, or other acute vascular findings  3   No pneumonic consolidation or other acute thoracic findings  4   No acute abdominopelvic findings  Chronic abdominal findings including small hiatal hernia, hepatomegaly, and colonic diverticulosis without diverticulitis  Note: I personally discussed this study with DARRICK CUBA on 7/22/2020 at 4:00 PM                      Workstation performed: [id number]             All radiology studies independently viewed by me and interpreted by the radiologist     Procedure    Procedures      ED Course of Care and Re-Assessments      Medications   ondansetron (ZOFRAN) injection 4 mg (4 mg Intravenous Given 7/22/20 1328)   pantoprazole (PROTONIX) injection 40 mg (40 mg Intravenous Given 7/22/20 1332)   iohexol (OMNIPAQUE) 350 MG/ML injection (MULTI-DOSE) 100 mL (100 mL Intravenous Given 7/22/20 1503)     I had a long discussion with the patient regarding her CT findings  She tells me that her sister and father both had lung cancer as well  She is a former smoker  She has an appointment with her PCP in a week  FINAL IMPRESSION    Final diagnoses:   Abdominal pain   Lung cancer [facility])         DISPOSITION/PLAN      Time reflects when diagnosis was documented in both MDM as applicable and the Disposition within this note     Time User Action Codes Description Comment    7/22/2020  4:25 PM Ivan ALBRECHT Add [R10 9] Abdominal pain     7/22/2020  4:27 PM Ivan Naidu Add [C34 90] Lung cancer [facility])       ED Disposition     ED Disposition Condition Date/Time Comment    Discharge Stable Wed Jul 22, 2020  4:25 PM Starla Abebe discharge to home/self care              Follow-up Information     Follow up With Specialties Details Why Contact Info Additional Information    Nel Jimenez PA-C Family Medicine, Physician Assistant Schedule an appointment as soon as possible for a visit   [address]  [address] [place] [address] [place] [facility] Schedule an appointment as soon as possible for a visit  You will need biopsy via bronchoscopy  [address] [facility] [address]  [address] [facility], [facility] [address], [facility] [place], [place], [address] [phone]            PATIENT REFERRED TO:    uCllen Saini PA-C  [address]  [address]    Schedule an appointment as soon as possible for a visit       [facility]  [address]  [facility] [address] [id number] [phone]  Schedule an appointment as soon as possible for a visit   You will need biopsy via bronchoscopy        DISCHARGE MEDICATIONS:    Discharge Medication List as of 7/22/2020  4:28 PM      START taking these medications    Details   ondansetron (ZOFRAN-ODT) 4 mg disintegrating tablet Take 1 tablet (4 mg total) by mouth every 6 (six) hours as needed for nausea or vomiting, Starting Wed 7/22/2020, Print      traMADol (ULTRAM) 50 mg tablet Take 1 tablet (50 mg total) by mouth every 6 (six) hours as needed for moderate pain for up to 10 days, Starting Wed 7/22/2020, Until Sat 8/1/2020, Print         CONTINUE these medications which have NOT CHANGED    Details   albuterol (2 5 mg/3 mL) 0 083 % nebulizer solution Take 2 5 mg by nebulization every 6 (six) hours as needed for wheezing, Historical Med      !! albuterol (PROVENTIL HFA,VENTOLIN HFA) 90 mcg/act inhaler TAKE 2 PUFFS BY MOUTH EVERY 4 HOURS AS NEEDED FOR WHEEZE, Normal      !! albuterol (PROVENTIL HFA,VENTOLIN HFA) 90 mcg/act inhaler Inhale 2 puffs every 6 (six) hours as needed for wheezing, Starting Thu 5/14/2020, Normal      atenolol (TENORMIN) 25 mg tablet Take 1 tablet (25 mg total) by mouth daily, Starting Fri 10/18/2019, Normal      cetirizine (ZyrTEC) 10 mg tablet Take 10 mg by mouth daily, Historical Med      fluticasone (FLONASE) 50 mcg/act nasal spray 2 sprays into each nostril daily, Starting Thu 5/14/2020, Normal      fluticasone-salmeterol (ADVAIR, WIXELA) 500-50 mcg/dose inhaler Inhale 1 puff 2 (two) times a day Rinse mouth after use , Starting Fri 10/18/2019, Normal      hydrochlorothiazide (HYDRODIURIL) 25 mg tablet Take 1 tablet (25 mg total) by mouth daily, Starting Wed 7/1/2020, Normal      irbesartan (AVAPRO) 300 mg tablet Take 1 tablet (300 mg total) by mouth daily at bedtime, Starting Wed 7/1/2020, Normal      meloxicam (MOBIC) 15 mg tablet TAKE 1 TABLET BY MOUTH EVERY DAY, Normal      omeprazole (PriLOSEC) 20 mg delayed release capsule TAKE 1 CAPSULE BY MOUTH EVERY DAY, Normal       !! - Potential duplicate medications found  Please discuss with provider  No discharge procedures on file           Abigail Last, [address] [place] [address], DO  07/22/20 8300

## 2020-07-22 NOTE — ED NOTES
RN attempted peripheral IV access and blood work but was unsuccessful       Alycia Adams RN  07/22/20 9647

## 2020-07-28 ENCOUNTER — OFFICE VISIT (OUTPATIENT)
Dept: FAMILY MEDICINE CLINIC | Facility: CLINIC | Age: 75
End: 2020-07-28

## 2020-07-28 VITALS
TEMPERATURE: 96.5 F | OXYGEN SATURATION: 97 % | SYSTOLIC BLOOD PRESSURE: 146 MMHG | DIASTOLIC BLOOD PRESSURE: 82 MMHG | HEART RATE: 83 BPM | WEIGHT: 199.9 LBS | RESPIRATION RATE: 20 BRPM | BODY MASS INDEX: 37.74 KG/M2 | HEIGHT: 61 IN

## 2020-07-28 DIAGNOSIS — R91.8 MASS OF RIGHT LUNG: ICD-10-CM

## 2020-07-28 DIAGNOSIS — Z00.00 MEDICARE ANNUAL WELLNESS VISIT, SUBSEQUENT: ICD-10-CM

## 2020-07-28 DIAGNOSIS — I10 ESSENTIAL HYPERTENSION: ICD-10-CM

## 2020-07-28 DIAGNOSIS — J45.20 MILD INTERMITTENT ASTHMA WITHOUT COMPLICATION: ICD-10-CM

## 2020-07-28 DIAGNOSIS — Z12.31 BREAST CANCER SCREENING BY MAMMOGRAM: Primary | ICD-10-CM

## 2020-07-28 DIAGNOSIS — J45.40 MODERATE PERSISTENT ASTHMA, UNSPECIFIED WHETHER COMPLICATED: ICD-10-CM

## 2020-07-28 DIAGNOSIS — F41.9 ANXIETY: ICD-10-CM

## 2020-07-28 DIAGNOSIS — E66.01 MORBID OBESITY (HCC): ICD-10-CM

## 2020-07-28 PROCEDURE — 3079F DIAST BP 80-89 MM HG: CPT | Performed by: PHYSICIAN ASSISTANT

## 2020-07-28 PROCEDURE — 1170F FXNL STATUS ASSESSED: CPT | Performed by: PHYSICIAN ASSISTANT

## 2020-07-28 PROCEDURE — 3077F SYST BP >= 140 MM HG: CPT | Performed by: PHYSICIAN ASSISTANT

## 2020-07-28 PROCEDURE — G0439 PPPS, SUBSEQ VISIT: HCPCS | Performed by: PHYSICIAN ASSISTANT

## 2020-07-28 PROCEDURE — 1125F AMNT PAIN NOTED PAIN PRSNT: CPT | Performed by: PHYSICIAN ASSISTANT

## 2020-07-28 PROCEDURE — 99213 OFFICE O/P EST LOW 20 MIN: CPT | Performed by: PHYSICIAN ASSISTANT

## 2020-07-28 PROCEDURE — 4040F PNEUMOC VAC/ADMIN/RCVD: CPT | Performed by: PHYSICIAN ASSISTANT

## 2020-07-28 PROCEDURE — 1160F RVW MEDS BY RX/DR IN RCRD: CPT | Performed by: PHYSICIAN ASSISTANT

## 2020-07-28 PROCEDURE — 3008F BODY MASS INDEX DOCD: CPT | Performed by: PHYSICIAN ASSISTANT

## 2020-07-28 PROCEDURE — 1036F TOBACCO NON-USER: CPT | Performed by: PHYSICIAN ASSISTANT

## 2020-07-28 RX ORDER — LORAZEPAM 0.5 MG/1
0.5 TABLET ORAL 2 TIMES DAILY PRN
Qty: 10 TABLET | Refills: 0 | Status: SHIPPED | OUTPATIENT
Start: 2020-07-28 | End: 2020-09-10

## 2020-07-28 RX ORDER — HYDROCHLOROTHIAZIDE 25 MG/1
25 TABLET ORAL DAILY
Qty: 90 TABLET | Refills: 1 | Status: SHIPPED | OUTPATIENT
Start: 2020-07-28 | End: 2021-02-23 | Stop reason: SDUPTHER

## 2020-07-28 RX ORDER — ATENOLOL 25 MG/1
25 TABLET ORAL DAILY
Qty: 90 TABLET | Refills: 1 | Status: SHIPPED | OUTPATIENT
Start: 2020-07-28 | End: 2020-11-09

## 2020-07-28 RX ORDER — ALBUTEROL SULFATE 90 UG/1
2 AEROSOL, METERED RESPIRATORY (INHALATION) EVERY 6 HOURS PRN
Qty: 8.5 INHALER | Refills: 1 | Status: SHIPPED | OUTPATIENT
Start: 2020-07-28 | End: 2020-08-25 | Stop reason: SDUPTHER

## 2020-07-28 NOTE — PATIENT INSTRUCTIONS
Medicare Preventive Visit Patient Instructions  Thank you for completing your Welcome to Medicare Visit or Medicare Annual Wellness Visit today  Your next wellness visit will be due in one year (7/28/2021)  The screening/preventive services that you may require over the next 5-10 years are detailed below  Some tests may not apply to you based off risk factors and/or age  Screening tests ordered at today's visit but not completed yet may show as past due  Also, please note that scanned in results may not display below  Preventive Screenings:  Service Recommendations Previous Testing/Comments   Colorectal Cancer Screening  * Colonoscopy    * Fecal Occult Blood Test (FOBT)/Fecal Immunochemical Test (FIT)  * Fecal DNA/Cologuard Test  * Flexible Sigmoidoscopy Age: 54-65 years old   Colonoscopy: every 10 years (may be performed more frequently if at higher risk)  OR  FOBT/FIT: every 1 year  OR  Cologuard: every 3 years  OR  Sigmoidoscopy: every 5 years  Screening may be recommended earlier than age 48 if at higher risk for colorectal cancer  Also, an individualized decision between you and your healthcare provider will decide whether screening between the ages of 74-80 would be appropriate  Colonoscopy: 03/18/2019  FOBT/FIT: Not on file  Cologuard: Not on file  Sigmoidoscopy: Not on file    Screening Current     Breast Cancer Screening Age: 36 years old  Frequency: every 1-2 years  Not required if history of left and right mastectomy Mammogram: 12/18/2018    Screening Current   Cervical Cancer Screening Between the ages of 21-29, pap smear recommended once every 3 years  Between the ages of 33-67, can perform pap smear with HPV co-testing every 5 years     Recommendations may differ for women with a history of total hysterectomy, cervical cancer, or abnormal pap smears in past  Pap Smear: 10/14/2016    Screening Not Indicated   Hepatitis C Screening Once for adults born between 1945 and 1965  More frequently in patients at high risk for Hepatitis C Hep C Antibody: 10/14/2019    Screening Current   Diabetes Screening 1-2 times per year if you're at risk for diabetes or have pre-diabetes Fasting glucose: 95 mg/dL   A1C: No results in last 5 years    Screening Current   Cholesterol Screening Once every 5 years if you don't have a lipid disorder  May order more often based on risk factors  Lipid panel: 02/14/2020    Screening Current     Other Preventive Screenings Covered by Medicare:  1  Abdominal Aortic Aneurysm (AAA) Screening: covered once if your at risk  You're considered to be at risk if you have a family history of AAA  2  Lung Cancer Screening: covers low dose CT scan once per year if you meet all of the following conditions: (1) Age 50-69; (2) No signs or symptoms of lung cancer; (3) Current smoker or have quit smoking within the last 15 years; (4) You have a tobacco smoking history of at least 30 pack years (packs per day multiplied by number of years you smoked); (5) You get a written order from a healthcare provider  3  Glaucoma Screening: covered annually if you're considered high risk: (1) You have diabetes OR (2) Family history of glaucoma OR (3)  aged 48 and older OR (3)  American aged 72 and older  3  Osteoporosis Screening: covered every 2 years if you meet one of the following conditions: (1) You're estrogen deficient and at risk for osteoporosis based off medical history and other findings; (2) Have a vertebral abnormality; (3) On glucocorticoid therapy for more than 3 months; (4) Have primary hyperparathyroidism; (5) On osteoporosis medications and need to assess response to drug therapy  · Last bone density test (DXA Scan): 12/05/2018  5  HIV Screening: covered annually if you're between the age of 12-76  Also covered annually if you are younger than 13 and older than 72 with risk factors for HIV infection   For pregnant patients, it is covered up to 3 times per pregnancy  Immunizations:  Immunization Recommendations   Influenza Vaccine Annual influenza vaccination during flu season is recommended for all persons aged >= 6 months who do not have contraindications   Pneumococcal Vaccine (Prevnar and Pneumovax)  * Prevnar = PCV13  * Pneumovax = PPSV23   Adults 25-60 years old: 1-3 doses may be recommended based on certain risk factors  Adults 72 years old: Prevnar (PCV13) vaccine recommended followed by Pneumovax (PPSV23) vaccine  If already received PPSV23 since turning 65, then PCV13 recommended at least one year after PPSV23 dose  Hepatitis B Vaccine 3 dose series if at intermediate or high risk (ex: diabetes, end stage renal disease, liver disease)   Tetanus (Td) Vaccine - COST NOT COVERED BY MEDICARE PART B Following completion of primary series, a booster dose should be given every 10 years to maintain immunity against tetanus  Td may also be given as tetanus wound prophylaxis  Tdap Vaccine - COST NOT COVERED BY MEDICARE PART B Recommended at least once for all adults  For pregnant patients, recommended with each pregnancy  Shingles Vaccine (Shingrix) - COST NOT COVERED BY MEDICARE PART B  2 shot series recommended in those aged 48 and above     Health Maintenance Due:      Topic Date Due    MAMMOGRAM  12/18/2020    CRC Screening: Colonoscopy  03/18/2029    Hepatitis C Screening  Completed     Immunizations Due:      Topic Date Due    DTaP,Tdap,and Td Vaccines (1 - Tdap) 04/18/1956    Pneumococcal Vaccine: 65+ Years (2 of 2 - PPSV23) 10/31/2019    Influenza Vaccine  07/01/2020     Advance Directives   What are advance directives? Advance directives are legal documents that state your wishes and plans for medical care  These plans are made ahead of time in case you lose your ability to make decisions for yourself  Advance directives can apply to any medical decision, such as the treatments you want, and if you want to donate organs     What are the types of advance directives? There are many types of advance directives, and each state has rules about how to use them  You may choose a combination of any of the following:  · Living will: This is a written record of the treatment you want  You can also choose which treatments you do not want, which to limit, and which to stop at a certain time  This includes surgery, medicine, IV fluid, and tube feedings  · Durable power of  for healthcare [facility]): This is a written record that states who you want to make healthcare choices for you when you are unable to make them for yourself  This person, called a proxy, is usually a family member or a friend  You may choose more than 1 proxy  · Do not resuscitate (DNR) order:  A DNR order is used in case your heart stops beating or you stop breathing  It is a request not to have certain forms of treatment, such as CPR  A DNR order may be included in other types of advance directives  · Medical directive: This covers the care that you want if you are in a coma, near death, or unable to make decisions for yourself  You can list the treatments you want for each condition  Treatment may include pain medicine, surgery, blood transfusions, dialysis, IV or tube feedings, and a ventilator (breathing machine)  · Values history: This document has questions about your views, beliefs, and how you feel and think about life  This information can help others choose the care that you would choose  Why are advance directives important? An advance directive helps you control your care  Although spoken wishes may be used, it is better to have your wishes written down  Spoken wishes can be misunderstood, or not followed  Treatments may be given even if you do not want them  An advance directive may make it easier for your family to make difficult choices about your care     Weight Management   Why it is important to manage your weight:  Being overweight increases your risk of health conditions such as heart disease, high blood pressure, type 2 diabetes, and certain types of cancer  It can also increase your risk for osteoarthritis, sleep apnea, and other respiratory problems  Aim for a slow, steady weight loss  Even a small amount of weight loss can lower your risk of health problems  How to lose weight safely:  A safe and healthy way to lose weight is to eat fewer calories and get regular exercise  You can lose up about 1 pound a week by decreasing the number of calories you eat by 500 calories each day  Healthy meal plan for weight management:  A healthy meal plan includes a variety of foods, contains fewer calories, and helps you stay healthy  A healthy meal plan includes the following:  · Eat whole-grain foods more often  A healthy meal plan should contain fiber  Fiber is the part of grains, fruits, and vegetables that is not broken down by your body  Whole-grain foods are healthy and provide extra fiber in your diet  Some examples of whole-grain foods are whole-wheat breads and pastas, oatmeal, brown rice, and bulgur  · Eat a variety of vegetables every day  Include dark, leafy greens such as spinach, kale, clay greens, and mustard greens  Eat yellow and orange vegetables such as carrots, sweet potatoes, and winter squash  · Eat a variety of fruits every day  Choose fresh or canned fruit (canned in its own juice or light syrup) instead of juice  Fruit juice has very little or no fiber  · Eat low-fat dairy foods  Drink fat-free (skim) milk or 1% milk  Eat fat-free yogurt and low-fat cottage cheese  Try low-fat cheeses such as mozzarella and other reduced-fat cheeses  · Choose meat and other protein foods that are low in fat  Choose beans or other legumes such as split peas or lentils  Choose fish, skinless poultry (chicken or turkey), or lean cuts of red meat (beef or pork)  Before you cook meat or poultry, cut off any visible fat  · Use less fat and oil    Try baking foods instead of frying them  Add less fat, such as margarine, sour cream, regular salad dressing and mayonnaise to foods  Eat fewer high-fat foods  Some examples of high-fat foods include french fries, doughnuts, ice cream, and cakes  · Eat fewer sweets  Limit foods and drinks that are high in sugar  This includes candy, cookies, regular soda, and sweetened drinks  Exercise:  Exercise at least 30 minutes per day on most days of the week  Some examples of exercise include walking, biking, dancing, and swimming  You can also fit in more physical activity by taking the stairs instead of the elevator or parking farther away from stores  Ask your healthcare provider about the best exercise plan for you  © Copyright [employer] 2018 Information is for End User's use only and may not be sold, redistributed or otherwise used for commercial purposes  All illustrations and images included in CareNotes® are the copyrighted property of [employer]  or [facility] Preventive Visit Patient Instructions  Thank you for completing your Welcome to Medicare Visit or Medicare Annual Wellness Visit today  Your next wellness visit will be due in one year (7/28/2021)  The screening/preventive services that you may require over the next 5-10 years are detailed below  Some tests may not apply to you based off risk factors and/or age  Screening tests ordered at today's visit but not completed yet may show as past due  Also, please note that scanned in results may not display below    Preventive Screenings:  Service Recommendations Previous Testing/Comments   Colorectal Cancer Screening  * Colonoscopy    * Fecal Occult Blood Test (FOBT)/Fecal Immunochemical Test (FIT)  * Fecal DNA/Cologuard Test  * Flexible Sigmoidoscopy Age: 54-65 years old   Colonoscopy: every 10 years (may be performed more frequently if at higher risk)  OR  FOBT/FIT: every 1 year  OR  Cologuard: every 3 years  OR  Sigmoidoscopy: every 5 years  Screening may be recommended earlier than age 48 if at higher risk for colorectal cancer  Also, an individualized decision between you and your healthcare provider will decide whether screening between the ages of 74-80 would be appropriate  Colonoscopy: 03/18/2019  FOBT/FIT: Not on file  Cologuard: Not on file  Sigmoidoscopy: Not on file    Screening Current     Breast Cancer Screening Age: 36 years old  Frequency: every 1-2 years  Not required if history of left and right mastectomy Mammogram: 12/18/2018    Screening Current   Cervical Cancer Screening Between the ages of 21-29, pap smear recommended once every 3 years  Between the ages of 33-67, can perform pap smear with HPV co-testing every 5 years  Recommendations may differ for women with a history of total hysterectomy, cervical cancer, or abnormal pap smears in past  Pap Smear: 10/14/2016    Screening Not Indicated   Hepatitis C Screening Once for adults born between 1945 and 1965  More frequently in patients at high risk for Hepatitis C Hep C Antibody: 10/14/2019    Screening Current   Diabetes Screening 1-2 times per year if you're at risk for diabetes or have pre-diabetes Fasting glucose: 95 mg/dL   A1C: No results in last 5 years    Screening Current   Cholesterol Screening Once every 5 years if you don't have a lipid disorder  May order more often based on risk factors  Lipid panel: 02/14/2020    Screening Current     Other Preventive Screenings Covered by Medicare:  6  Abdominal Aortic Aneurysm (AAA) Screening: covered once if your at risk  You're considered to be at risk if you have a family history of AAA    7  Lung Cancer Screening: covers low dose CT scan once per year if you meet all of the following conditions: (1) Age 50-69; (2) No signs or symptoms of lung cancer; (3) Current smoker or have quit smoking within the last 15 years; (4) You have a tobacco smoking history of at least 30 pack years (packs per day multiplied by number of years you smoked); (5) You get a written order from a healthcare provider  8  Glaucoma Screening: covered annually if you're considered high risk: (1) You have diabetes OR (2) Family history of glaucoma OR (3)  aged 48 and older OR (3)  American aged 72 and older  5  Osteoporosis Screening: covered every 2 years if you meet one of the following conditions: (1) You're estrogen deficient and at risk for osteoporosis based off medical history and other findings; (2) Have a vertebral abnormality; (3) On glucocorticoid therapy for more than 3 months; (4) Have primary hyperparathyroidism; (5) On osteoporosis medications and need to assess response to drug therapy  · Last bone density test (DXA Scan): 12/05/2018  10  HIV Screening: covered annually if you're between the age of 12-76  Also covered annually if you are younger than 13 and older than 72 with risk factors for HIV infection  For pregnant patients, it is covered up to 3 times per pregnancy  Immunizations:  Immunization Recommendations   Influenza Vaccine Annual influenza vaccination during flu season is recommended for all persons aged >= 6 months who do not have contraindications   Pneumococcal Vaccine (Prevnar and Pneumovax)  * Prevnar = PCV13  * Pneumovax = PPSV23   Adults 25-60 years old: 1-3 doses may be recommended based on certain risk factors  Adults 72 years old: Prevnar (PCV13) vaccine recommended followed by Pneumovax (PPSV23) vaccine  If already received PPSV23 since turning 65, then PCV13 recommended at least one year after PPSV23 dose  Hepatitis B Vaccine 3 dose series if at intermediate or high risk (ex: diabetes, end stage renal disease, liver disease)   Tetanus (Td) Vaccine - COST NOT COVERED BY MEDICARE PART B Following completion of primary series, a booster dose should be given every 10 years to maintain immunity against tetanus  Td may also be given as tetanus wound prophylaxis     Tdap Vaccine - COST NOT COVERED BY MEDICARE PART B Recommended at least once for all adults  For pregnant patients, recommended with each pregnancy  Shingles Vaccine (Shingrix) - COST NOT COVERED BY MEDICARE PART B  2 shot series recommended in those aged 48 and above     Health Maintenance Due:      Topic Date Due    MAMMOGRAM  12/18/2020    CRC Screening: Colonoscopy  03/18/2029    Hepatitis C Screening  Completed     Immunizations Due:      Topic Date Due    DTaP,Tdap,and Td Vaccines (1 - Tdap) 04[birth year]/1958    Pneumococcal Vaccine: 65+ Years (2 of 2 - PPSV23) 10/31/2019    Influenza Vaccine  07/01/2020     Advance Directives   What are advance directives? Advance directives are legal documents that state your wishes and plans for medical care  These plans are made ahead of time in case you lose your ability to make decisions for yourself  Advance directives can apply to any medical decision, such as the treatments you want, and if you want to donate organs  What are the types of advance directives? There are many types of advance directives, and each state has rules about how to use them  You may choose a combination of any of the following:  · Living will: This is a written record of the treatment you want  You can also choose which treatments you do not want, which to limit, and which to stop at a certain time  This includes surgery, medicine, IV fluid, and tube feedings  · Durable power of  for healthcare [facility]): This is a written record that states who you want to make healthcare choices for you when you are unable to make them for yourself  This person, called a proxy, is usually a family member or a friend  You may choose more than 1 proxy  · Do not resuscitate (DNR) order:  A DNR order is used in case your heart stops beating or you stop breathing  It is a request not to have certain forms of treatment, such as CPR  A DNR order may be included in other types of advance directives    · Medical directive: This covers the care that you want if you are in a coma, near death, or unable to make decisions for yourself  You can list the treatments you want for each condition  Treatment may include pain medicine, surgery, blood transfusions, dialysis, IV or tube feedings, and a ventilator (breathing machine)  · Values history: This document has questions about your views, beliefs, and how you feel and think about life  This information can help others choose the care that you would choose  Why are advance directives important? An advance directive helps you control your care  Although spoken wishes may be used, it is better to have your wishes written down  Spoken wishes can be misunderstood, or not followed  Treatments may be given even if you do not want them  An advance directive may make it easier for your family to make difficult choices about your care  Weight Management   Why it is important to manage your weight:  Being overweight increases your risk of health conditions such as heart disease, high blood pressure, type 2 diabetes, and certain types of cancer  It can also increase your risk for osteoarthritis, sleep apnea, and other respiratory problems  Aim for a slow, steady weight loss  Even a small amount of weight loss can lower your risk of health problems  How to lose weight safely:  A safe and healthy way to lose weight is to eat fewer calories and get regular exercise  You can lose up about 1 pound a week by decreasing the number of calories you eat by 500 calories each day  Healthy meal plan for weight management:  A healthy meal plan includes a variety of foods, contains fewer calories, and helps you stay healthy  A healthy meal plan includes the following:  · Eat whole-grain foods more often  A healthy meal plan should contain fiber  Fiber is the part of grains, fruits, and vegetables that is not broken down by your body  Whole-grain foods are healthy and provide extra fiber in your diet   Some examples of whole-grain foods are whole-wheat breads and pastas, oatmeal, brown rice, and bulgur  · Eat a variety of vegetables every day  Include dark, leafy greens such as spinach, kale, clay greens, and mustard greens  Eat yellow and orange vegetables such as carrots, sweet potatoes, and winter squash  · Eat a variety of fruits every day  Choose fresh or canned fruit (canned in its own juice or light syrup) instead of juice  Fruit juice has very little or no fiber  · Eat low-fat dairy foods  Drink fat-free (skim) milk or 1% milk  Eat fat-free yogurt and low-fat cottage cheese  Try low-fat cheeses such as mozzarella and other reduced-fat cheeses  · Choose meat and other protein foods that are low in fat  Choose beans or other legumes such as split peas or lentils  Choose fish, skinless poultry (chicken or turkey), or lean cuts of red meat (beef or pork)  Before you cook meat or poultry, cut off any visible fat  · Use less fat and oil  Try baking foods instead of frying them  Add less fat, such as margarine, sour cream, regular salad dressing and mayonnaise to foods  Eat fewer high-fat foods  Some examples of high-fat foods include french fries, doughnuts, ice cream, and cakes  · Eat fewer sweets  Limit foods and drinks that are high in sugar  This includes candy, cookies, regular soda, and sweetened drinks  Exercise:  Exercise at least 30 minutes per day on most days of the week  Some examples of exercise include walking, biking, dancing, and swimming  You can also fit in more physical activity by taking the stairs instead of the elevator or parking farther away from stores  Ask your healthcare provider about the best exercise plan for you  © Copyright [employer] 2018 Information is for End User's use only and may not be sold, redistributed or otherwise used for commercial purposes   All illustrations and images included in CareNotes® are the copyrighted property of [employer]  or [employer] [address]

## 2020-07-28 NOTE — PROGRESS NOTES
Assessment/Plan:    Mass of right lung  She is scheduled pulmonology for tomorrow  We discussed that she will need additional testing done which may include a PET scan or a biopsy or both  Asthma  Control continue with Advair daily and as needed albuterol  Morbid obesity ([address] )  BMI Counseling: Body mass index is 37 77 kg/m²  The BMI is above normal  Nutrition recommendations include 3-5 servings of fruits/vegetables daily and increasing intake of lean protein  Problem List Items Addressed This Visit        Respiratory    Asthma     Control continue with Advair daily and as needed albuterol  Relevant Medications    fluticasone-salmeterol (ADVAIR, WIXELA) 500-50 mcg/dose inhaler    albuterol (PROVENTIL HFA,VENTOLIN HFA) 90 mcg/act inhaler    Mass of right lung     She is scheduled pulmonology for tomorrow  We discussed that she will need additional testing done which may include a PET scan or a biopsy or both  Relevant Medications    fluticasone-salmeterol (ADVAIR, WIXELA) 500-50 mcg/dose inhaler    albuterol (PROVENTIL HFA,VENTOLIN HFA) 90 mcg/act inhaler       Cardiovascular and Mediastinum    Hypertension    Relevant Medications    atenolol (TENORMIN) 25 mg tablet    hydrochlorothiazide (HYDRODIURIL) 25 mg tablet       Other    Morbid obesity (HCC)     BMI Counseling: Body mass index is 37 77 kg/m²  The BMI is above normal  Nutrition recommendations include 3-5 servings of fruits/vegetables daily and increasing intake of lean protein  Other Visit Diagnoses     Breast cancer screening by mammogram    -  Primary    Relevant Orders    Mammo screening bilateral w 3d & cad    Anxiety        Relevant Medications    LORazepam (ATIVAN) 0 5 mg tablet    Medicare annual wellness visit, subsequent                Subjective:      Patient ID: Jovan Mcmahon is a 70 y o  female      HPI 44-year-old female here for annual wellness visit and also for follow-up from the emergency room She was seen in ED on 7/22 for RUQ abdomen pain that wrapped from the flank  Pain is started on the day that she went to the emergency room  She did have a CTA done chest abdomen and pelvis and did find a mass of 12mm right hlar area with adenopathy  She does have a follow-up with pulmonology tomorrow to discuss this  If she was a smoker in the past and she does have a family history of lung cancer as well  She does have asthma and obstructive sleep apnea also  She states that her breathing is unchanged for how has been for years  She denies any cough or wheeze  She denies any night sweats or recent weight loss  The abdominal pain that she did has has resolved  The following portions of the patient's history were reviewed and updated as appropriate:   She  has a past medical history of Asthma, GERD (gastroesophageal reflux disease), Hypertension, Lumbar disc disease, and Ventricular arrhythmia    She   Patient Active Problem List    Diagnosis Date Noted    Mass of right lung 07/29/2020    MONO (obstructive sleep apnea)     Obstructive sleep apnea (adult) (pediatric)     Chronic rhinitis 04/12/2019    Morbid obesity ([address] ) 04/11/2019    Chronic gastritis without bleeding 03/18/2019    Suspected sleep apnea 02/14/2019    Other headache syndrome 02/13/2019    Colon cancer screening 01/22/2019    Gastroesophageal reflux disease without esophagitis 01/22/2019    Spinal stenosis of 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 01/17/2019    Lumbar facet arthropathy 01/17/2019    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 01/17/2019    Menopause 12/27/2018    Asthma 11/21/2018    Thrombocytopenia ([address] ) 10/31/2018    Hypertension 10/31/2018    BMI 40 0-44 9, adult ([address] ) 10/31/2018    Vitamin D deficiency 10/31/2018    Hiatal hernia 10/31/2018    Palpitations 10/31/2017    Premature atrial contractions 10/31/2017    Primary osteoarthritis of right wrist 05/05/2017    Wrist stiffness, right 05/05/2017 She  has a past surgical history that includes Rotator cuff repair; Knee surgery; Appendectomy; Shoulder surgery; Colonoscopy (N/A, 3/18/2019); and Esophagogastroduodenoscopy (N/A, 3/18/2019)  Her family history includes Diabetes in her father and mother; Hyperlipidemia in her father and mother; Hypertension in her father and mother; Lung cancer (age of onset: 71) in her sister; Lung cancer (age of onset: 79) in her father; Stroke in her mother  She  reports that she has quit smoking  She has quit using smokeless tobacco  She reports that she drinks alcohol  She reports that she does not use drugs  Current Outpatient Medications   Medication Sig Dispense Refill    albuterol (2 5 mg/3 mL) 0 083 % nebulizer solution Take 2 5 mg by nebulization every 6 (six) hours as needed for wheezing      albuterol (PROVENTIL HFA,VENTOLIN HFA) 90 mcg/act inhaler TAKE 2 PUFFS BY MOUTH EVERY 4 HOURS AS NEEDED FOR WHEEZE 8 5 Inhaler 1    albuterol (PROVENTIL HFA,VENTOLIN HFA) 90 mcg/act inhaler Inhale 2 puffs every 6 (six) hours as needed for wheezing 8 5 Inhaler 1    atenolol (TENORMIN) 25 mg tablet Take 1 tablet (25 mg total) by mouth daily 90 tablet 1    cetirizine (ZyrTEC) 10 mg tablet Take 10 mg by mouth daily      fluticasone (FLONASE) 50 mcg/act nasal spray 2 sprays into each nostril daily 16 mL 2    fluticasone-salmeterol (ADVAIR, WIXELA) 500-50 mcg/dose inhaler Inhale 1 puff 2 (two) times a day Rinse mouth after use   3 Inhaler 1    hydrochlorothiazide (HYDRODIURIL) 25 mg tablet Take 1 tablet (25 mg total) by mouth daily 90 tablet 1    irbesartan (AVAPRO) 300 mg tablet Take 1 tablet (300 mg total) by mouth daily at bedtime 90 tablet 0    meloxicam (MOBIC) 15 mg tablet TAKE 1 TABLET BY MOUTH EVERY DAY 90 tablet 0    omeprazole (PriLOSEC) 20 mg delayed release capsule TAKE 1 CAPSULE BY MOUTH EVERY DAY 90 capsule 1    ondansetron (ZOFRAN-ODT) 4 mg disintegrating tablet Take 1 tablet (4 mg total) by mouth every 6 (six) hours as needed for nausea or vomiting 20 tablet 0    LORazepam (ATIVAN) 0 5 mg tablet Take 1 tablet (0 5 mg total) by mouth 2 (two) times a day as needed for anxiety 10 tablet 0     No current facility-administered medications for this visit  Current Outpatient Medications on File Prior to Visit   Medication Sig    albuterol (2 5 mg/3 mL) 0 083 % nebulizer solution Take 2 5 mg by nebulization every 6 (six) hours as needed for wheezing    albuterol (PROVENTIL HFA,VENTOLIN HFA) 90 mcg/act inhaler TAKE 2 PUFFS BY MOUTH EVERY 4 HOURS AS NEEDED FOR WHEEZE    cetirizine (ZyrTEC) 10 mg tablet Take 10 mg by mouth daily    fluticasone (FLONASE) 50 mcg/act nasal spray 2 sprays into each nostril daily    irbesartan (AVAPRO) 300 mg tablet Take 1 tablet (300 mg total) by mouth daily at bedtime    meloxicam (MOBIC) 15 mg tablet TAKE 1 TABLET BY MOUTH EVERY DAY    omeprazole (PriLOSEC) 20 mg delayed release capsule TAKE 1 CAPSULE BY MOUTH EVERY DAY    ondansetron (ZOFRAN-ODT) 4 mg disintegrating tablet Take 1 tablet (4 mg total) by mouth every 6 (six) hours as needed for nausea or vomiting    [DISCONTINUED] traMADol (ULTRAM) 50 mg tablet Take 1 tablet (50 mg total) by mouth every 6 (six) hours as needed for moderate pain for up to 10 days     No current facility-administered medications on file prior to visit  She has No Known Allergies       Review of Systems   Constitutional: Negative for activity change, appetite change, chills, fatigue and unexpected weight change  HENT: Negative for dental problem, ear pain, hearing loss and sore throat  Eyes: Negative for visual disturbance  Respiratory: Negative for cough and wheezing  Cardiovascular: Negative for chest pain and leg swelling  Gastrointestinal: Negative for abdominal pain, constipation, diarrhea and vomiting  Genitourinary: Negative for difficulty urinating and dysuria     Musculoskeletal: Negative for arthralgias, back pain and myalgias  Skin: Negative for rash  Neurological: Negative for dizziness and headaches  Psychiatric/Behavioral: Negative for behavioral problems  Objective:      /82 (BP Location: Left arm, Patient Position: Sitting, Cuff Size: Large)   Pulse 83   Temp (!) 96 5 °F (35 8 °C) (Temporal)   Resp 20   Ht 5' 1" (1 549 m)   Wt 90 7 kg (199 lb 14 4 oz)   SpO2 97%   BMI 37 77 kg/m²          Physical Exam   Constitutional: She is oriented to person, place, and time  She appears well-developed and well-nourished  HENT:   Head: Normocephalic and atraumatic  Right Ear: External ear normal    Left Ear: External ear normal    Nose: Nose normal    Mouth/Throat: Oropharynx is clear and moist    Eyes: Conjunctivae are normal    Neck: Normal range of motion  Neck supple  No thyromegaly present  Cardiovascular: Normal rate, regular rhythm and normal heart sounds  No murmur heard  Pulmonary/Chest: Effort normal and breath sounds normal  No respiratory distress  Abdominal: Soft  Bowel sounds are normal  She exhibits no mass  There is no tenderness  There is no guarding  Musculoskeletal: Normal range of motion  Lymphadenopathy:     She has no cervical adenopathy  Neurological: She is alert and oriented to person, place, and time  Skin: Skin is warm  Psychiatric: She has a normal mood and affect  Her behavior is normal    Nursing note and vitals reviewed  Assessment and Plan:     Problem List Items Addressed This Visit        Respiratory    Asthma     Control continue with Advair daily and as needed albuterol  Relevant Medications    fluticasone-salmeterol (ADVAIR, WIXELA) 500-50 mcg/dose inhaler    albuterol (PROVENTIL HFA,VENTOLIN HFA) 90 mcg/act inhaler    Mass of right lung     She is scheduled pulmonology for tomorrow  We discussed that she will need additional testing done which may include a PET scan or a biopsy or both           Relevant Medications fluticasone-salmeterol (ADVAIR, WIXELA) 500-50 mcg/dose inhaler    albuterol (PROVENTIL HFA,VENTOLIN HFA) 90 mcg/act inhaler       Cardiovascular and Mediastinum    Hypertension    Relevant Medications    atenolol (TENORMIN) 25 mg tablet    hydrochlorothiazide (HYDRODIURIL) 25 mg tablet       Other    Morbid obesity (HCC)     BMI Counseling: Body mass index is 37 77 kg/m²  The BMI is above normal  Nutrition recommendations include 3-5 servings of fruits/vegetables daily and increasing intake of lean protein  Other Visit Diagnoses     Breast cancer screening by mammogram    -  Primary    Relevant Orders    Mammo screening bilateral w 3d & cad    Anxiety        Relevant Medications    LORazepam (ATIVAN) 0 5 mg tablet    Medicare annual wellness visit, subsequent               Preventive health issues were discussed with patient, and age appropriate screening tests were ordered as noted in patient's After Visit Summary  Personalized health advice and appropriate referrals for health education or preventive services given if needed, as noted in patient's After Visit Summary       History of Present Illness:     Patient presents for Medicare Annual Wellness visit    Patient Care Team:  [address] [place], [place] [address]SHIV as PCP - General (Family Medicine)  DO Zhen Valderrama MD Rena Cummins, DO as Endoscopist     Problem List:     Patient Active Problem List   Diagnosis    Palpitations    Premature atrial contractions    Primary osteoarthritis of right wrist    Wrist stiffness, right    Th[facility]ocytopenia ([facility] )    Hypertension    BMI 40 0-44 9, adult ([facility] [address] )    Vitamin D deficiency    Hiatal hernia    Asthma    Menopause    Spinal stenosis of 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    Lumbar facet arthropathy    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    Colon cancer screening    Gastroesophageal reflux disease without esophagitis    Other headache syndrome    Suspected sleep apnea    Chronic gastritis without bleeding    Morbid obesity (HCC)    Chronic rhinitis    Obstructive sleep apnea (adult) (pediatric)    MONO (obstructive sleep apnea)    Mass of right lung      Past Medical and Surgical History:     Past Medical History:   Diagnosis Date    Asthma     GERD (gastroesophageal reflux disease)     Hypertension     Lumbar disc disease     Ventricular arrhythmia      Past Surgical History:   Procedure Laterality Date    APPENDECTOMY      COLONOSCOPY N/A 3/18/2019    Procedure: COLONOSCOPY;  Surgeon: Norberto He DO;  Location: [facility]; Service: Gastroenterology    ESOPHAGOGASTRODUODENOSCOPY N/A 3/18/2019    Procedure: ESOPHAGOGASTRODUODENOSCOPY (EGD); Surgeon: Norberto He DO;  Location: [facility];   Service: Gastroenterology    KNEE SURGERY      ROTATOR CUFF REPAIR      SHOULDER SURGERY        Family History:     Family History   Problem Relation Age of Onset   Earna Katie Stroke Mother     Diabetes Mother     Hyperlipidemia Mother     Hypertension Mother     Diabetes Father     Hyperlipidemia Father     Hypertension Father     Lung cancer Father 79    Lung cancer Sister 71      Social History:        Social History     Socioeconomic History    Marital status: Single     Spouse name: None    Number of children: None    Years of education: None    Highest education level: None   Occupational History    None   Social Needs    Financial resource strain: None    Food insecurity:     Worry: None     Inability: None    Transportation needs:     Medical: None     Non-medical: None   Tobacco Use    Smoking status: Former Smoker    Smokeless tobacco: Former User   Substance and Sexual Activity    Alcohol use: Yes     Frequency: Monthly or less     Comment: rarely drinks wine    Drug use: No    Sexual activity: None   Lifestyle    Physical activity:     Days per week: None     Minutes per session: None    Stress: None   Relationships    Social connections:     Talks on phone: None     Gets together: None     Attends Mandaeism service: None     Active member of club or organization: None     Attends meetings of clubs or organizations: None     Relationship status: None    Intimate partner violence:     Fear of current or ex partner: None     Emotionally abused: None     Physically abused: None     Forced sexual activity: None   Other Topics Concern    None   Social History Narrative    None      Medications and Allergies:     Current Outpatient Medications   Medication Sig Dispense Refill    albuterol (2 5 mg/3 mL) 0 083 % nebulizer solution Take 2 5 mg by nebulization every 6 (six) hours as needed for wheezing      albuterol (PROVENTIL HFA,VENTOLIN HFA) 90 mcg/act inhaler TAKE 2 PUFFS BY MOUTH EVERY 4 HOURS AS NEEDED FOR WHEEZE 8 5 Inhaler 1    albuterol (PROVENTIL HFA,VENTOLIN HFA) 90 mcg/act inhaler Inhale 2 puffs every 6 (six) hours as needed for wheezing 8 5 Inhaler 1    atenolol (TENORMIN) 25 mg tablet Take 1 tablet (25 mg total) by mouth daily 90 tablet 1    cetirizine (ZyrTEC) 10 mg tablet Take 10 mg by mouth daily      fluticasone (FLONASE) 50 mcg/act nasal spray 2 sprays into each nostril daily 16 mL 2    fluticasone-salmeterol (ADVAIR, WIXELA) 500-50 mcg/dose inhaler Inhale 1 puff 2 (two) times a day Rinse mouth after use   3 Inhaler 1    hydrochlorothiazide (HYDRODIURIL) 25 mg tablet Take 1 tablet (25 mg total) by mouth daily 90 tablet 1    irbesartan (AVAPRO) 300 mg tablet Take 1 tablet (300 mg total) by mouth daily at bedtime 90 tablet 0    meloxicam (MOBIC) 15 mg tablet TAKE 1 TABLET BY MOUTH EVERY DAY 90 tablet 0    omeprazole (PriLOSEC) 20 mg delayed release capsule TAKE 1 CAPSULE BY MOUTH EVERY DAY 90 capsule 1    ondansetron (ZOFRAN-ODT) 4 mg disintegrating tablet Take 1 tablet (4 mg total) by mouth every 6 (six) hours as needed for nausea or vomiting 20 tablet 0    LORazepam (ATIVAN) 0 5 mg tablet Take 1 tablet (0 5 mg total) by mouth 2 (two) times a day as needed for anxiety 10 tablet 0     No current facility-administered medications for this visit  No Known Allergies   Immunizations:     Immunization History   Administered Date(s) Administered    INFLUENZA 10/16/2018    Influenza, high dose seasonal 0 5 mL 10/18/2019    Pneumococcal Conjugate 13-Valent 10/31/2018      Health Maintenance:         Topic Date Due    MAMMOGRAM  12/18/2020    CRC Screening: Colonoscopy  03/18/2029    Hepatitis C Screening  Completed         Topic Date Due    DTaP,Tdap,and Td Vaccines (1 - Tdap) 04/18/1956    Pneumococcal Vaccine: 65+ Years (2 of 2 - PPSV23) 10/31/2019    Influenza Vaccine  07/01/2020      Medicare Health Risk Assessment:     /82 (BP Location: Left arm, Patient Position: Sitting, Cuff Size: Large)   Pulse 83   Temp (!) 96 5 °F (35 8 °C) (Temporal)   Resp 20   Ht 5' 1" (1 549 m)   Wt 90 7 kg (199 lb 14 4 oz)   SpO2 97%   BMI 37 77 kg/m²          Health Risk Assessment:   Patient rates overall health as good  Patient feels that their physical health rating is slightly better  Eyesight was rated as same  Hearing was rated as same  Patient feels that their emotional and mental health rating is slightly better  Pain experienced in the last 7 days has been none  Patient states that she has experienced no weight loss or gain in last 6 months  Depression Screening:   PHQ-2 Score: 2      Fall Risk Screening: In the past year, patient has experienced: no history of falling in past year      Urinary Incontinence Screening:   Patient has not leaked urine accidently in the last six months  Home Safety:  Patient does not have trouble with stairs inside or outside of their home  Patient has working smoke alarms and has no working carbon monoxide detector  Home safety hazards include: none  Medications:   Patient is not currently taking any over-the-counter supplements  Patient is able to manage medications       Activities of Daily Living (ADLs)/Instrumental Activities of Daily Living (IADLs):   Walk and transfer into and out of bed and chair?: Yes  Dress and groom yourself?: Yes    Bathe or shower yourself?: Yes    Feed yourself? Yes  Do your laundry/housekeeping?: Yes  Manage your money, pay your bills and track your expenses?: Yes  Make your own meals?: Yes    Do your own shopping?: Yes    Advance Care Planning:   Living will: Yes    Durable POA for healthcare: Yes    Advanced directive: Yes      Comments: Recommend bringing in documentation   Her son is her POAGurjit Moody    Cognitive Screening:   Provider or family/friend/caregiver concerned regarding cognition?: No    PREVENTIVE SCREENINGS      Cardiovascular Screening:    General: Screening Current    Due for: Lipid Panel      Diabetes Screening:     General: Screening Current    Due for: Blood Glucose      Colorectal Cancer Screening:     General: Screening Current      Breast Cancer Screening:     General: Screening Current    Due for: Mammogram        Cervical Cancer Screening:    General: Screening Not Indicated      Osteoporosis Screening:    General: Risks and Benefits Discussed and History Osteoporosis    Due for: DXA Axial      Abdominal Aortic Aneurysm (AAA) Screening:        General: Screening Not Indicated      Lung Cancer Screening:     General: Screening Not Indicated      Hepatitis C Screening:    General: Screening Current      Nel Jimenez PA-C

## 2020-07-29 ENCOUNTER — OFFICE VISIT (OUTPATIENT)
Dept: PULMONOLOGY | Facility: CLINIC | Age: 75
End: 2020-07-29
Payer: COMMERCIAL

## 2020-07-29 ENCOUNTER — DOCUMENTATION (OUTPATIENT)
Dept: PULMONOLOGY | Facility: CLINIC | Age: 75
End: 2020-07-29

## 2020-07-29 VITALS
SYSTOLIC BLOOD PRESSURE: 110 MMHG | WEIGHT: 201 LBS | HEIGHT: 61 IN | TEMPERATURE: 98.2 F | OXYGEN SATURATION: 99 % | DIASTOLIC BLOOD PRESSURE: 80 MMHG | BODY MASS INDEX: 37.95 KG/M2 | HEART RATE: 84 BPM

## 2020-07-29 DIAGNOSIS — R91.1 NODULE OF UPPER LOBE OF RIGHT LUNG: ICD-10-CM

## 2020-07-29 DIAGNOSIS — J45.20 MILD INTERMITTENT ASTHMA WITHOUT COMPLICATION: Primary | ICD-10-CM

## 2020-07-29 PROBLEM — R91.8 MASS OF RIGHT LUNG: Status: ACTIVE | Noted: 2020-07-29

## 2020-07-29 PROCEDURE — 1125F AMNT PAIN NOTED PAIN PRSNT: CPT | Performed by: INTERNAL MEDICINE

## 2020-07-29 PROCEDURE — 99204 OFFICE O/P NEW MOD 45 MIN: CPT | Performed by: INTERNAL MEDICINE

## 2020-07-29 PROCEDURE — 4040F PNEUMOC VAC/ADMIN/RCVD: CPT | Performed by: INTERNAL MEDICINE

## 2020-07-29 PROCEDURE — 3008F BODY MASS INDEX DOCD: CPT | Performed by: INTERNAL MEDICINE

## 2020-07-29 PROCEDURE — 1036F TOBACCO NON-USER: CPT | Performed by: INTERNAL MEDICINE

## 2020-07-29 PROCEDURE — 3074F SYST BP LT 130 MM HG: CPT | Performed by: INTERNAL MEDICINE

## 2020-07-29 PROCEDURE — 1160F RVW MEDS BY RX/DR IN RCRD: CPT | Performed by: INTERNAL MEDICINE

## 2020-07-29 PROCEDURE — 3079F DIAST BP 80-89 MM HG: CPT | Performed by: INTERNAL MEDICINE

## 2020-07-29 PROCEDURE — 1170F FXNL STATUS ASSESSED: CPT | Performed by: INTERNAL MEDICINE

## 2020-07-29 NOTE — PROGRESS NOTES
Pulmonary Consultation   Helen Walters 70 y o  female MRN: [id number]  7/28/2020      Reason for Consultation:  Incidental pulmonary nodule     Requested by:  Dr Marjan Quintero (ER)    Assessment and Plan:    1  Incidental R upper lobe nodule 2 5cm with +mediastinal LAD    - PET scan and EBUS with biopsy  - no night sweats, no weight loss, however, strong FHx for malignancy, +smoking hx and second hand smoke   - CBC, PT/INR prior to procedure given evidence of Thrombocytopenia on labs     2  Asthma- stable   - c/w Albuterol and Advair inh bid  - uses Albuterol every morning and occasionally uses it a second time, but no known trigger  - PFTs    3  Obstructive Sleep Apnea   - did use CPAP but it gave too much air so she didn't use it and Medicare took it away  - Has a revisit with Sleep Medicine in Dec and amenable to rediscussing     History of Present Illness   HPI:  Helen Walters is a 70 y o  female who has a PMHx of MONO, obesity, GERD presented to the ER on 7/22 for abdominal pain along with vomiting bile  She took ibuprofen and it didn't go away and got tramadol and the pain went away  She was sent for a CTA chest/abdomen/pelvis that revealed a 2 5cm RULr nodule that extends into the anterior segmental bronchus of the RUL  She has no complaints of hemoptysis, shortness of breath, night sweats, fever, chills, chest pain  Review of Systems   Constitutional: Negative  HENT: Negative  Eyes: Negative  Respiratory: Negative  Cardiovascular: Positive for leg swelling  Endocrine: Negative  Genitourinary: Negative  Musculoskeletal: Negative  Allergic/Immunologic: Negative  Neurological: Negative  Hematological: Negative  Psychiatric/Behavioral: Negative          Historical Information   Past Medical History:   Diagnosis Date    Asthma     GERD (gastroesophageal reflux disease)     Hypertension     Lumbar disc disease     Ventricular arrhythmia      Past Surgical History: Procedure Laterality Date    APPENDECTOMY      COLONOSCOPY N/A 3/18/2019    Procedure: COLONOSCOPY;  Surgeon: Wallace Diez DO;  Location[facility]; Service: Gastroenterology    ESOPHAGOGASTRODUODENOSCOPY N/A 3/18/2019    Procedure: ESOPHAGOGASTRODUODENOSCOPY (EGD); Surgeon: Wallace Diez DO;  Location[facility]; Service: Gastroenterology    KNEE SURGERY      ROTATOR CUFF REPAIR      SHOULDER SURGERY       Family History   Problem Relation Age of Onset    Stroke Mother     Diabetes Mother     Hyperlipidemia Mother     Hypertension Mother     Diabetes Father     Hyperlipidemia Father     Hypertension Father     Lung cancer Father 79    Lung cancer Sister 71   Sister - had uterine cancer and spread to lungs   Sister- Lymphoma     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  Social History: +former smoker >30 yrs ago, smoke 5-6 cig/day  Ex- used to smoke all the time so exposed to second-hand smoke       Meds/Allergies     Current Outpatient Medications:     albuterol (2 5 mg/3 mL) 0 083 % nebulizer solution, Take 2 5 mg by nebulization every 6 (six) hours as needed for wheezing, Disp: , Rfl:     albuterol (PROVENTIL HFA,VENTOLIN HFA) 90 mcg/act inhaler, TAKE 2 PUFFS BY MOUTH EVERY 4 HOURS AS NEEDED FOR WHEEZE, Disp: 8 5 Inhaler, Rfl: 1    albuterol (PROVENTIL HFA,VENTOLIN HFA) 90 mcg/act inhaler, Inhale 2 puffs every 6 (six) hours as needed for wheezing, Disp: 8 5 Inhaler, Rfl: 1    atenolol (TENORMIN) 25 mg tablet, Take 1 tablet (25 mg total) by mouth daily, Disp: 90 tablet, Rfl: 1    cetirizine (ZyrTEC) 10 mg tablet, Take 10 mg by mouth daily, Disp: , Rfl:     fluticasone (FLONASE) 50 mcg/act nasal spray, 2 sprays into each nostril daily, Disp: 16 mL, Rfl: 2    fluticasone-salmeterol (ADVAIR, WIXELA) 500-50 mcg/dose inhaler, Inhale 1 puff 2 (two) times a day Rinse mouth after use , Disp: 3 Inhaler, Rfl: 1    hydrochlorothiazide (HYDRODIURIL) 25 mg tablet, Take 1 tablet (25 mg total) by mouth daily, Disp: 90 tablet, Rfl: 1    irbesartan (AVAPRO) 300 mg tablet, Take 1 tablet (300 mg total) by mouth daily at bedtime, Disp: 90 tablet, Rfl: 0    LORazepam (ATIVAN) 0 5 mg tablet, Take 1 tablet (0 5 mg total) by mouth 2 (two) times a day as needed for anxiety, Disp: 10 tablet, Rfl: 0    meloxicam (MOBIC) 15 mg tablet, TAKE 1 TABLET BY MOUTH EVERY DAY, Disp: 90 tablet, Rfl: 0    omeprazole (PriLOSEC) 20 mg delayed release capsule, TAKE 1 CAPSULE BY MOUTH EVERY DAY, Disp: 90 capsule, Rfl: 1    ondansetron (ZOFRAN-ODT) 4 mg disintegrating tablet, Take 1 tablet (4 mg total) by mouth every 6 (six) hours as needed for nausea or vomiting, Disp: 20 tablet, Rfl: 0    traMADol (ULTRAM) 50 mg tablet, Take 1 tablet (50 mg total) by mouth every 6 (six) hours as needed for moderate pain for up to 10 days, Disp: 20 tablet, Rfl: 0  No Known Allergies    Vitals: not currently breastfeeding  , There is no height or weight on file to calculate BMI  Physical Exam  Physical Exam   Constitutional: She is oriented to person, place, and time  She appears well-developed and well-nourished  HENT:   Head: Normocephalic and atraumatic  Eyes: Pupils are equal, round, and reactive to light  Neck: Normal range of motion  Cardiovascular: Normal rate and regular rhythm  Pulmonary/Chest: Effort normal and breath sounds normal    Abdominal: Soft  Bowel sounds are normal    Musculoskeletal: She exhibits edema  Neurological: She is alert and oriented to person, place, and time  Skin: Skin is warm and dry  Ecchymosis noted              Labs:   Lab Results   Component Value Date    WBC 9 61 07/22/2020    HGB 12 7 07/22/2020    HCT 38 9 07/22/2020    MCV 92 07/22/2020     (L) 07/22/2020     Lab Results   Component Value Date    CALCIUM 9 7 07/22/2020    K 4 1 07/22/2020    CO2 30 07/22/2020     07/22/2020 BUN 29 (H) 07/22/2020    CREATININE 0 82 07/22/2020     No results found for: IGE  Lab Results   Component Value Date    ALT 56 07/22/2020    AST 59 (H) 07/22/2020    ALKPHOS 70 07/22/2020       Imaging and other studies:   CT scan w/wo contrast 7/22- 2 5cm R hilar nodule that extends into anterior segmental bronchus with mediastinal LAD    Pulmonary function testing:  N/A    EKG, Pathology, and Other Studies:     Polina Andrews MD  Pulmonary and Critical Care Fellow   [facility]

## 2020-07-29 NOTE — ASSESSMENT & PLAN NOTE
BMI Counseling: Body mass index is 37 77 kg/m²  The BMI is above normal  Nutrition recommendations include 3-5 servings of fruits/vegetables daily and increasing intake of lean protein

## 2020-07-29 NOTE — ASSESSMENT & PLAN NOTE
She is scheduled pulmonology for tomorrow  We discussed that she will need additional testing done which may include a PET scan or a biopsy or both

## 2020-07-29 NOTE — H&P (VIEW-ONLY)
Pulmonary Consultation   Helen Walters 70 y o  female MRN: [id number]  7/28/2020      Reason for Consultation:  Incidental pulmonary nodule     Requested by:  Dr Marjan Quintero (ER)    Assessment and Plan:    1  Incidental R upper lobe nodule 2 5cm with +mediastinal LAD    - PET scan and EBUS with biopsy  - no night sweats, no weight loss, however, strong FHx for malignancy, +smoking hx and second hand smoke   - CBC, PT/INR prior to procedure given evidence of Thrombocytopenia on labs     2  Asthma- stable   - c/w Albuterol and Advair inh bid  - uses Albuterol every morning and occasionally uses it a second time, but no known trigger  - PFTs    3  Obstructive Sleep Apnea   - did use CPAP but it gave too much air so she didn't use it and Medicare took it away  - Has a revisit with Sleep Medicine in Dec and amenable to rediscussing     History of Present Illness   HPI:  Helen Walters is a 70 y o  female who has a PMHx of MONO, obesity, GERD presented to the ER on 7/22 for abdominal pain along with vomiting bile  She took ibuprofen and it didn't go away and got tramadol and the pain went away  She was sent for a CTA chest/abdomen/pelvis that revealed a 2 5cm RULr nodule that extends into the anterior segmental bronchus of the RUL  She has no complaints of hemoptysis, shortness of breath, night sweats, fever, chills, chest pain  Review of Systems   Constitutional: Negative  HENT: Negative  Eyes: Negative  Respiratory: Negative  Cardiovascular: Positive for leg swelling  Endocrine: Negative  Genitourinary: Negative  Musculoskeletal: Negative  Allergic/Immunologic: Negative  Neurological: Negative  Hematological: Negative  Psychiatric/Behavioral: Negative          Historical Information   Past Medical History:   Diagnosis Date    Asthma     GERD (gastroesophageal reflux disease)     Hypertension     Lumbar disc disease     Ventricular arrhythmia      Past Surgical History: Procedure Laterality Date    APPENDECTOMY      COLONOSCOPY N/A 3/18/2019    Procedure: COLONOSCOPY;  Surgeon: Maye Palma DO;  Location[facility]; Service: Gastroenterology    ESOPHAGOGASTRODUODENOSCOPY N/A 3/18/2019    Procedure: ESOPHAGOGASTRODUODENOSCOPY (EGD); Surgeon: Maye Palma DO;  [facility]; Service: Gastroenterology    KNEE SURGERY      ROTATOR CUFF REPAIR      SHOULDER SURGERY       Family History   Problem Relation Age of Onset    Stroke Mother     Diabetes Mother     Hyperlipidemia Mother     Hypertension Mother     Diabetes Father     Hyperlipidemia Father     Hypertension Father     Lung cancer Father 79    Lung cancer Sister 71   Sister - had uterine cancer and spread to lungs   Sister- Lymphoma     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  Social History: +former smoker >30 yrs ago, smoke 5-6 cig/day  Ex- used to smoke all the time so exposed to second-hand smoke       Meds/Allergies     Current Outpatient Medications:     albuterol (2 5 mg/3 mL) 0 083 % nebulizer solution, Take 2 5 mg by nebulization every 6 (six) hours as needed for wheezing, Disp: , Rfl:     albuterol (PROVENTIL HFA,VENTOLIN HFA) 90 mcg/act inhaler, TAKE 2 PUFFS BY MOUTH EVERY 4 HOURS AS NEEDED FOR WHEEZE, Disp: 8 5 Inhaler, Rfl: 1    albuterol (PROVENTIL HFA,VENTOLIN HFA) 90 mcg/act inhaler, Inhale 2 puffs every 6 (six) hours as needed for wheezing, Disp: 8 5 Inhaler, Rfl: 1    atenolol (TENORMIN) 25 mg tablet, Take 1 tablet (25 mg total) by mouth daily, Disp: 90 tablet, Rfl: 1    cetirizine (ZyrTEC) 10 mg tablet, Take 10 mg by mouth daily, Disp: , Rfl:     fluticasone (FLONASE) 50 mcg/act nasal spray, 2 sprays into each nostril daily, Disp: 16 mL, Rfl: 2    fluticasone-salmeterol (ADVAIR, WIXELA) 500-50 mcg/dose inhaler, Inhale 1 puff 2 (two) times a day Rinse mouth after use , Disp: 3 Inhaler, Rfl: 1    hydrochlorothiazide (HYDRODIURIL) 25 mg tablet, Take 1 tablet (25 mg total) by mouth daily, Disp: 90 tablet, Rfl: 1    irbesartan (AVAPRO) 300 mg tablet, Take 1 tablet (300 mg total) by mouth daily at bedtime, Disp: 90 tablet, Rfl: 0    LORazepam (ATIVAN) 0 5 mg tablet, Take 1 tablet (0 5 mg total) by mouth 2 (two) times a day as needed for anxiety, Disp: 10 tablet, Rfl: 0    meloxicam (MOBIC) 15 mg tablet, TAKE 1 TABLET BY MOUTH EVERY DAY, Disp: 90 tablet, Rfl: 0    omeprazole (PriLOSEC) 20 mg delayed release capsule, TAKE 1 CAPSULE BY MOUTH EVERY DAY, Disp: 90 capsule, Rfl: 1    ondansetron (ZOFRAN-ODT) 4 mg disintegrating tablet, Take 1 tablet (4 mg total) by mouth every 6 (six) hours as needed for nausea or vomiting, Disp: 20 tablet, Rfl: 0    traMADol (ULTRAM) 50 mg tablet, Take 1 tablet (50 mg total) by mouth every 6 (six) hours as needed for moderate pain for up to 10 days, Disp: 20 tablet, Rfl: 0  No Known Allergies    Vitals: not currently breastfeeding  , There is no height or weight on file to calculate BMI  Physical Exam  Physical Exam   Constitutional: She is oriented to person, place, and time  She appears well-developed and well-nourished  HENT:   Head: Normocephalic and atraumatic  Eyes: Pupils are equal, round, and reactive to light  Neck: Normal range of motion  Cardiovascular: Normal rate and regular rhythm  Pulmonary/Chest: Effort normal and breath sounds normal    Abdominal: Soft  Bowel sounds are normal    Musculoskeletal: She exhibits edema  Neurological: She is alert and oriented to person, place, and time  Skin: Skin is warm and dry  Ecchymosis noted              Labs:   Lab Results   Component Value Date    WBC 9 61 07/22/2020    HGB 12 7 07/22/2020    HCT 38 9 07/22/2020    MCV 92 07/22/2020     (L) 07/22/2020     Lab Results   Component Value Date    CALCIUM 9 7 07/22/2020    K 4 1 07/22/2020    CO2 30 07/22/2020     07/22/2020 BUN 29 (H) 07/22/2020    CREATININE 0 82 07/22/2020     No results found for: IGE  Lab Results   Component Value Date    ALT 56 07/22/2020    AST 59 (H) 07/22/2020    ALKPHOS 70 07/22/2020       Imaging and other studies:   CT scan w/wo contrast 7/22- 2 5cm R hilar nodule that extends into anterior segmental bronchus with mediastinal LAD    Pulmonary function testing:  N/A    EKG, Pathology, and Other Studies:     Chikis Medina MD  Pulmonary and Critical Care Fellow   [facility]

## 2020-08-10 ENCOUNTER — HOSPITAL ENCOUNTER (OUTPATIENT)
Dept: NUCLEAR MEDICINE | Facility: HOSPITAL | Age: 75
Discharge: HOME | End: 2020-08-10
Payer: COMMERCIAL

## 2020-08-10 ENCOUNTER — TELEPHONE (OUTPATIENT)
Dept: PULMONOLOGY | Facility: CLINIC | Age: 75
End: 2020-08-10

## 2020-08-10 DIAGNOSIS — R91.1 NODULE OF UPPER LOBE OF RIGHT LUNG: ICD-10-CM

## 2020-08-10 LAB — GLUCOSE SERPL-MCNC: 98 MG/DL (ref 65–140)

## 2020-08-10 PROCEDURE — A9552 F18 FDG: HCPCS

## 2020-08-10 PROCEDURE — 78815 PET IMAGE W/CT SKULL-THIGH: CPT

## 2020-08-10 PROCEDURE — 82948 REAGENT STRIP/BLOOD GLUCOSE: CPT

## 2020-08-11 ENCOUNTER — TELEPHONE (OUTPATIENT)
Dept: PULMONOLOGY | Facility: CLINIC | Age: 75
End: 2020-08-11

## 2020-08-11 DIAGNOSIS — R91.1 NODULE OF UPPER LOBE OF RIGHT LUNG: ICD-10-CM

## 2020-08-11 PROCEDURE — U0003 INFECTIOUS AGENT DETECTION BY NUCLEIC ACID (DNA OR RNA); SEVERE ACUTE RESPIRATORY SYNDROME CORONAVIRUS 2 (SARS-COV-2) (CORONAVIRUS DISEASE [COVID-19]), AMPLIFIED PROBE TECHNIQUE, MAKING USE OF HIGH THROUGHPUT TECHNOLOGIES AS DESCRIBED BY CMS-2020-01-R: HCPCS | Performed by: INTERNAL MEDICINE

## 2020-08-11 NOTE — TELEPHONE ENCOUNTER
Dr Neil Pierre will be performing an EBUS on [place] on 8/20/2020    LOCATION: [place]     ANTICOAGULATION INSTRUCTIONS: N/A    OTHER MEDICATION INSTRUCTIONS: may take all medications with small sips of water    LABS: (CBC/PT/PTT in last 90 days): CBC completed 7/29    LAST OFFICE NOTE/H&P within 30 days of procedure: (YES/NO) saw Dr Marry Gustafson on 7/29    INSTRUCTIONS GIVEN: patient advised to have COVID swab completed today 8/11  Patient to be NPO from midnight the night prior and will need a ride to and from the procedure   Patient will also receive a call the night prior with time of arrival     [facility]

## 2020-08-12 DIAGNOSIS — J30.9 ALLERGIC RHINITIS, UNSPECIFIED SEASONALITY, UNSPECIFIED TRIGGER: ICD-10-CM

## 2020-08-12 LAB — SARS-COV-2 RNA SPEC QL NAA+PROBE: NOT DETECTED

## 2020-08-12 RX ORDER — FLUTICASONE PROPIONATE 50 MCG
SPRAY, SUSPENSION (ML) NASAL
Qty: 16 ML | Refills: 2 | Status: SHIPPED | OUTPATIENT
Start: 2020-08-12 | End: 2021-01-22

## 2020-08-13 RX ORDER — TRAMADOL HYDROCHLORIDE 50 MG/1
50 TABLET ORAL EVERY 6 HOURS PRN
COMMUNITY
End: 2020-10-19 | Stop reason: SDUPTHER

## 2020-08-20 ENCOUNTER — ANESTHESIA (OUTPATIENT)
Dept: PERIOP | Facility: HOSPITAL | Age: 75
End: 2020-08-20

## 2020-08-20 ENCOUNTER — HOSPITAL ENCOUNTER (OUTPATIENT)
Dept: PERIOP | Facility: HOSPITAL | Age: 75
Setting detail: OUTPATIENT SURGERY
Discharge: HOME | End: 2020-08-20
Admitting: INTERNAL MEDICINE
Payer: COMMERCIAL

## 2020-08-20 ENCOUNTER — ANESTHESIA EVENT (OUTPATIENT)
Dept: PERIOP | Facility: HOSPITAL | Age: 75
End: 2020-08-20

## 2020-08-20 VITALS
BODY MASS INDEX: 37 KG/M2 | SYSTOLIC BLOOD PRESSURE: 186 MMHG | DIASTOLIC BLOOD PRESSURE: 87 MMHG | HEART RATE: 70 BPM | HEIGHT: 61 IN | WEIGHT: 196 LBS | RESPIRATION RATE: 18 BRPM | OXYGEN SATURATION: 96 % | TEMPERATURE: 95.5 F

## 2020-08-20 DIAGNOSIS — R91.1 NODULE OF UPPER LOBE OF RIGHT LUNG: ICD-10-CM

## 2020-08-20 PROCEDURE — 88173 CYTOPATH EVAL FNA REPORT: CPT | Performed by: PATHOLOGY

## 2020-08-20 PROCEDURE — 88172 CYTP DX EVAL FNA 1ST EA SITE: CPT | Performed by: PATHOLOGY

## 2020-08-20 PROCEDURE — 88341 IMHCHEM/IMCYTCHM EA ADD ANTB: CPT | Performed by: PATHOLOGY

## 2020-08-20 PROCEDURE — 88112 CYTOPATH CELL ENHANCE TECH: CPT | Performed by: PATHOLOGY

## 2020-08-20 PROCEDURE — 88361 TUMOR IMMUNOHISTOCHEM/COMPUT: CPT | Performed by: PATHOLOGY

## 2020-08-20 PROCEDURE — 31623 DX BRONCHOSCOPE/BRUSH: CPT | Performed by: INTERNAL MEDICINE

## 2020-08-20 PROCEDURE — 88305 TISSUE EXAM BY PATHOLOGIST: CPT | Performed by: PATHOLOGY

## 2020-08-20 PROCEDURE — 88342 IMHCHEM/IMCYTCHM 1ST ANTB: CPT | Performed by: PATHOLOGY

## 2020-08-20 PROCEDURE — 31652 BRONCH EBUS SAMPLNG 1/2 NODE: CPT | Performed by: INTERNAL MEDICINE

## 2020-08-20 PROCEDURE — 31629 BRONCHOSCOPY/NEEDLE BX EACH: CPT | Performed by: INTERNAL MEDICINE

## 2020-08-20 RX ORDER — PROPOFOL 10 MG/ML
INJECTION, EMULSION INTRAVENOUS AS NEEDED
Status: DISCONTINUED | OUTPATIENT
Start: 2020-08-20 | End: 2020-08-20

## 2020-08-20 RX ORDER — ONDANSETRON 2 MG/ML
4 INJECTION INTRAMUSCULAR; INTRAVENOUS EVERY 4 HOURS PRN
Status: DISCONTINUED | OUTPATIENT
Start: 2020-08-20 | End: 2020-08-24 | Stop reason: HOSPADM

## 2020-08-20 RX ORDER — METOCLOPRAMIDE HYDROCHLORIDE 5 MG/ML
10 INJECTION INTRAMUSCULAR; INTRAVENOUS EVERY 4 HOURS PRN
Status: DISCONTINUED | OUTPATIENT
Start: 2020-08-20 | End: 2020-08-24 | Stop reason: HOSPADM

## 2020-08-20 RX ORDER — LIDOCAINE HYDROCHLORIDE 10 MG/ML
INJECTION, SOLUTION EPIDURAL; INFILTRATION; INTRACAUDAL; PERINEURAL
Status: DISPENSED
Start: 2020-08-20 | End: 2020-08-20

## 2020-08-20 RX ORDER — FENTANYL CITRATE 50 UG/ML
INJECTION, SOLUTION INTRAMUSCULAR; INTRAVENOUS AS NEEDED
Status: DISCONTINUED | OUTPATIENT
Start: 2020-08-20 | End: 2020-08-20

## 2020-08-20 RX ORDER — SODIUM CHLORIDE, SODIUM LACTATE, POTASSIUM CHLORIDE, CALCIUM CHLORIDE 600; 310; 30; 20 MG/100ML; MG/100ML; MG/100ML; MG/100ML
125 INJECTION, SOLUTION INTRAVENOUS CONTINUOUS
Status: DISCONTINUED | OUTPATIENT
Start: 2020-08-20 | End: 2020-08-24 | Stop reason: HOSPADM

## 2020-08-20 RX ORDER — LIDOCAINE HYDROCHLORIDE 10 MG/ML
INJECTION, SOLUTION EPIDURAL; INFILTRATION; INTRACAUDAL; PERINEURAL AS NEEDED
Status: DISCONTINUED | OUTPATIENT
Start: 2020-08-20 | End: 2020-08-20

## 2020-08-20 RX ORDER — FENTANYL CITRATE/PF 50 MCG/ML
25 SYRINGE (ML) INJECTION
Status: COMPLETED | OUTPATIENT
Start: 2020-08-20 | End: 2020-08-20

## 2020-08-20 RX ORDER — PROPOFOL 10 MG/ML
INJECTION, EMULSION INTRAVENOUS CONTINUOUS PRN
Status: DISCONTINUED | OUTPATIENT
Start: 2020-08-20 | End: 2020-08-20

## 2020-08-20 RX ORDER — ALBUTEROL SULFATE 2.5 MG/3ML
2.5 SOLUTION RESPIRATORY (INHALATION) ONCE
Status: COMPLETED | OUTPATIENT
Start: 2020-08-20 | End: 2020-08-20

## 2020-08-20 RX ORDER — ONDANSETRON 2 MG/ML
INJECTION INTRAMUSCULAR; INTRAVENOUS AS NEEDED
Status: DISCONTINUED | OUTPATIENT
Start: 2020-08-20 | End: 2020-08-20

## 2020-08-20 RX ORDER — DEXAMETHASONE SODIUM PHOSPHATE 10 MG/ML
INJECTION, SOLUTION INTRAMUSCULAR; INTRAVENOUS AS NEEDED
Status: DISCONTINUED | OUTPATIENT
Start: 2020-08-20 | End: 2020-08-20

## 2020-08-20 RX ORDER — LIDOCAINE HYDROCHLORIDE 20 MG/ML
INJECTION, SOLUTION EPIDURAL; INFILTRATION; INTRACAUDAL; PERINEURAL CODE/TRAUMA/SEDATION MEDICATION
Status: COMPLETED | OUTPATIENT
Start: 2020-08-20 | End: 2020-08-20

## 2020-08-20 RX ADMIN — Medication 25 MCG: at 10:03

## 2020-08-20 RX ADMIN — Medication 25 MCG: at 09:51

## 2020-08-20 RX ADMIN — PROPOFOL 50 MCG/KG/MIN: 10 INJECTION, EMULSION INTRAVENOUS at 08:15

## 2020-08-20 RX ADMIN — LIDOCAINE HYDROCHLORIDE 4 ML: 20 INJECTION, SOLUTION EPIDURAL; INFILTRATION; INTRACAUDAL; PERINEURAL at 08:22

## 2020-08-20 RX ADMIN — PROPOFOL 150 MG: 10 INJECTION, EMULSION INTRAVENOUS at 08:15

## 2020-08-20 RX ADMIN — FENTANYL CITRATE 50 MCG: 50 INJECTION, SOLUTION INTRAMUSCULAR; INTRAVENOUS at 08:14

## 2020-08-20 RX ADMIN — Medication 25 MCG: at 10:13

## 2020-08-20 RX ADMIN — SODIUM CHLORIDE, SODIUM LACTATE, POTASSIUM CHLORIDE, AND CALCIUM CHLORIDE: .6; .31; .03; .02 INJECTION, SOLUTION INTRAVENOUS at 08:02

## 2020-08-20 RX ADMIN — PROPOFOL 50 MG: 10 INJECTION, EMULSION INTRAVENOUS at 08:26

## 2020-08-20 RX ADMIN — ALBUTEROL SULFATE 2.5 MG: 2.5 SOLUTION RESPIRATORY (INHALATION) at 10:05

## 2020-08-20 RX ADMIN — ONDANSETRON 4 MG: 2 INJECTION INTRAMUSCULAR; INTRAVENOUS at 09:25

## 2020-08-20 RX ADMIN — LIDOCAINE HYDROCHLORIDE 50 MG: 10 INJECTION, SOLUTION EPIDURAL; INFILTRATION; INTRACAUDAL; PERINEURAL at 08:15

## 2020-08-20 RX ADMIN — LIDOCAINE HYDROCHLORIDE 8 ML: 20 INJECTION, SOLUTION EPIDURAL; INFILTRATION; INTRACAUDAL; PERINEURAL at 08:28

## 2020-08-20 RX ADMIN — Medication 25 MCG: at 09:56

## 2020-08-20 RX ADMIN — DEXAMETHASONE SODIUM PHOSPHATE 10 MG: 10 INJECTION, SOLUTION INTRAMUSCULAR; INTRAVENOUS at 09:25

## 2020-08-20 RX ADMIN — FENTANYL CITRATE 50 MCG: 50 INJECTION, SOLUTION INTRAMUSCULAR; INTRAVENOUS at 08:11

## 2020-08-20 NOTE — INTERVAL H&P NOTE
H&P reviewed  After examining the patient I find no changes in the patients condition since the H&P had been written  Vitals:    08/20/20 0644   BP: 149/100   Pulse: 72   Resp: 16   Temp: 98 9 °F (37 2 °C)   SpO2: 98%   Exam - NAD, AAOx2, conversant CV reg, single s1/2, no m/r, pulm clear, no wheeze no rales    CT chest reviewed and CT/PET reviewed with hypermetabolic right hilar mass and 4R LN  Assessment  1   Hypermetabolic right hilar mass and mediastinal adenopathy - concerning for malignancy    PLAN  - bronchoscopy with EBUS guided tissue sampling  - procedure, risks and alternatives reviewed, consent obtained    Jax Bolaños DO

## 2020-08-20 NOTE — ANESTHESIA PREPROCEDURE EVALUATION
Procedure:  ENDOBRONCHIAL ULTRASOUND (EBUS)    Relevant Problems   ANESTHESIA (within normal limits)      CARDIO   (+) Hypertension   (+) Premature atrial contractions      GI/HEPATIC   (+) Gastroesophageal reflux disease without esophagitis   (+) Hiatal hernia      HEMATOLOGY   (+) Thrombocytopenia (HCC)      MUSCULOSKELETAL   (+) Primary osteoarthritis of right wrist      PULMONARY   (+) Asthma   (+) MONO (obstructive sleep apnea)   (+) Obstructive sleep apnea (adult) (pediatric)      Other   (+) Chronic gastritis without bleeding   (+) Morbid obesity (HCC)   (+) Spinal stenosis of 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        Physical Exam    Airway    Mallampati score: II  TM Distance: >3 FB  Neck ROM: full     Dental   upper dentures,     Cardiovascular      Pulmonary      Other Findings        Anesthesia Plan  ASA Score- 3     Anesthesia Type- general with ASA Monitors  Additional Monitors:   Airway Plan: LMA  Plan Factors-Exercise tolerance (METS): >4 METS  Chart reviewed  Patient summary reviewed  Patient is not a current smoker  Induction- intravenous  Postoperative Plan-     Informed Consent- Anesthetic plan and risks discussed with patient  I personally reviewed this patient with the CRNA  Discussed and agreed on the Anesthesia Plan with the CRNA  Edvin Jean

## 2020-08-20 NOTE — ANESTHESIA POSTPROCEDURE EVALUATION
Post-Op Assessment Note    CV Status:  Stable    Pain management: adequate     Mental Status:  Alert and awake   Hydration Status:  Euvolemic   PONV Controlled:  Controlled   Airway Patency:  Patent      Post Op Vitals Reviewed: Yes      Staff: Anesthesiologist         No complications documented

## 2020-08-20 NOTE — DISCHARGE INSTRUCTIONS
Asthma   WHAT YOU NEED TO KNOW:   Asthma is a lung disease that makes breathing difficult  Chronic inflammation and reactions to triggers narrow the airways in the lungs  Asthma can become life-threatening if it is not managed  DISCHARGE INSTRUCTIONS:   Seek care immediately if:   · You have severe shortness of breath  · Your lips or nails turn blue or gray  · The skin around your neck and ribs pulls in with each breath  · You have shortness of breath, even after you take your short-term medicine as directed  · Your peak flow numbers are in the red zone of your AAP  Contact your healthcare provider if:   · You run out of medicine before your next refill is due  · Your symptoms get worse  · You need to take more medicine than usual to control your symptoms  · You have questions or concerns about your condition or care  Medicines:   · Medicines  decrease inflammation, open airways, and make it easier to breathe  Medicines may be inhaled, taken as a pill, or injected  Short-term medicines relieve your symptoms quickly  Long-term medicines are used to prevent future attacks  You may also need medicine to help control your allergies  Ask your healthcare provider for more information about the medicine you are given and how to take it safely  · Take your medicine as directed  Contact your healthcare provider if you think your medicine is not helping or if you have side effects  Tell him or her if you are allergic to any medicine  Keep a list of the medicines, vitamins, and herbs you take  Include the amounts, and when and why you take them  Bring the list or the pill bottles to follow-up visits  Carry your medicine list with you in case of an emergency  Follow up with your healthcare provider as directed: You will need to return to make sure your medicine is working and your symptoms are controlled   You may be referred to an asthma specialist  Suzanne Moreno may be asked to keep a record of your peak flow values and bring it with you to your appointments  Write down your questions so you remember to ask them  Manage your symptoms and prevent future attacks:   · Follow your Asthma Action Plan (AAP)  This is a written plan that you and your healthcare provider create  It explains which medicine you need and when to change doses if necessary  It also explains how you can monitor symptoms and use a peak flow meter  The meter measures how well your lungs are working  · Manage other health conditions , such as allergies, acid reflux, and sleep apnea  · Identify and avoid triggers  These may include pets, dust mites, mold, and cockroaches  · Do not smoke or be around others who smoke  Nicotine and other chemicals in cigarettes and cigars can cause lung damage  Ask your healthcare provider for information if you currently smoke and need help to quit  E-cigarettes or smokeless tobacco still contain nicotine  Talk to your healthcare provider before you use these products  · Ask about the flu vaccine  The flu can make your asthma worse  You may need a yearly flu shot  © 2017 [employer] [employer]  Information is for End User's use only and may not be sold, redistributed or otherwise used for commercial purposes  All illustrations and images included in CareNotes® are the copyrighted property of A D [employer] M [employer] Inc  or [employer]  The above information is an  only  It is not intended as medical advice for individual conditions or treatments  Talk to your doctor, nurse or pharmacist before following any medical regimen to see if it is safe and effective for you

## 2020-08-21 ENCOUNTER — TELEPHONE (OUTPATIENT)
Dept: PULMONOLOGY | Facility: CLINIC | Age: 75
End: 2020-08-21

## 2020-08-21 NOTE — TELEPHONE ENCOUNTER
Spoke with patient  She reports she is feeling better  She will continue daily Allegra and nebulizers twice daily and monitor symptoms  If symptoms do not improve, she will return call to our office  She is aware to go to the ER in case of emergency  D/W WILDA Jefferson

## 2020-08-21 NOTE — TELEPHONE ENCOUNTER
Patient is having sob and moraima  Did meter flow and was in the caution zone yesterday  After nebulizer treatment today she was higher on meter flow   Wants to know if this is normal after an EBUS

## 2020-08-21 NOTE — TELEPHONE ENCOUNTER
Spoke with Fawad  She came home yesterday from her EBUS around 1230-100  She was feeling fine then  She said come evening time she felt like she could not breathe  She said in her RLL she heard an "aspiration noise"  She said she does have allergies and did not take her pill yesterday  She checked her peak flow and it was 250 which she says is her caution number  350 is her green number  She did a neb treatment  Her peak flow did not go up, but she was able to go to sleep  This morning she did a neb treatment and her peak flow is at 350  Today she is coughing and doing a lot of clear her throat  She was bringing up yellow phlegm overnight but it is now clear  Her breathing is better today  She said if her peak flow gets to 250 again should she go to the ER  Please advise

## 2020-08-24 LAB — SCAN RESULT: NORMAL

## 2020-08-25 DIAGNOSIS — C34.91 NON-SMALL CELL CANCER OF RIGHT LUNG (HCC): Primary | ICD-10-CM

## 2020-08-25 DIAGNOSIS — J45.20 MILD INTERMITTENT ASTHMA WITHOUT COMPLICATION: ICD-10-CM

## 2020-08-25 DIAGNOSIS — J45.21 MILD INTERMITTENT ASTHMA WITH EXACERBATION: Primary | ICD-10-CM

## 2020-08-25 RX ORDER — ALBUTEROL SULFATE 90 UG/1
2 AEROSOL, METERED RESPIRATORY (INHALATION) EVERY 6 HOURS PRN
Qty: 8.5 INHALER | Refills: 1 | Status: SHIPPED | OUTPATIENT
Start: 2020-08-25 | End: 2020-11-11 | Stop reason: SDUPTHER

## 2020-08-25 RX ORDER — PREDNISONE 20 MG/1
40 TABLET ORAL DAILY
Qty: 10 TABLET | Refills: 0 | Status: SHIPPED | OUTPATIENT
Start: 2020-08-25 | End: 2020-08-30

## 2020-08-25 RX ORDER — ALBUTEROL SULFATE 2.5 MG/3ML
2.5 SOLUTION RESPIRATORY (INHALATION) EVERY 6 HOURS PRN
Qty: 30 VIAL | Refills: 1 | Status: SHIPPED | OUTPATIENT
Start: 2020-08-25 | End: 2020-11-11 | Stop reason: SDUPTHER

## 2020-08-26 ENCOUNTER — TELEPHONE (OUTPATIENT)
Dept: PULMONOLOGY | Facility: CLINIC | Age: 75
End: 2020-08-26

## 2020-08-26 NOTE — TELEPHONE ENCOUNTER
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  I explained the next steps were to refer her to our Oncologists for which I placed an order for  I asked how she was feeling in general and she reported that she has been feeling more wheezy recently and she had a hard time getting medications through her PCP which I had seen was an inhaler and nebulizer  She took leftover prednisone of 10mg the day prior and 8/25 and felt much better  She reports it's very hard to get in contact with her PCP so I offered to give a steroid burst of 40mg po daily x 5 days and she can pick it up at the pharmacy  She was grateful and said she would call me on Tuesday if she felt worse since I would be in the clinic  Satisfactory

## 2020-08-31 ENCOUNTER — CONSULT (OUTPATIENT)
Dept: HEMATOLOGY ONCOLOGY | Facility: CLINIC | Age: 75
End: 2020-08-31
Payer: COMMERCIAL

## 2020-08-31 VITALS
OXYGEN SATURATION: 96 % | SYSTOLIC BLOOD PRESSURE: 142 MMHG | HEIGHT: 61 IN | TEMPERATURE: 98.6 F | WEIGHT: 203 LBS | BODY MASS INDEX: 38.33 KG/M2 | DIASTOLIC BLOOD PRESSURE: 84 MMHG | RESPIRATION RATE: 18 BRPM | HEART RATE: 98 BPM

## 2020-08-31 DIAGNOSIS — C79.51 BONE METASTASIS (HCC): ICD-10-CM

## 2020-08-31 DIAGNOSIS — C34.91 NON-SMALL CELL CANCER OF RIGHT LUNG (HCC): Primary | ICD-10-CM

## 2020-08-31 PROCEDURE — 3008F BODY MASS INDEX DOCD: CPT | Performed by: INTERNAL MEDICINE

## 2020-08-31 PROCEDURE — 3008F BODY MASS INDEX DOCD: CPT | Performed by: PHYSICIAN ASSISTANT

## 2020-08-31 PROCEDURE — 99205 OFFICE O/P NEW HI 60 MIN: CPT | Performed by: INTERNAL MEDICINE

## 2020-08-31 NOTE — LETTER
August 31, 2020     [address] [place] [address]    Patient: Radha Reyes   YOB: 1945   Date of Visit: 8/31/2020       Dear Dr Deven Montiel: Thank you for referring Radha Reyes to me for evaluation  Below are my notes for this consultation  If you have questions, please do not hesitate to call me  I look forward to following your patient along with you  Sincerely,        Janine Wade MD        CC: MD Janine Barker MD  8/31/2020  2:44 PM  Sign when Signing Visit  Hematology / Oncology Outpatient Consult Note    Radha Reyes 70 y o  female [id number]         Date:  8/31/2020    Assessment / Plan:  A 22-year-old female with newly diagnosed metastatic adenocarcinoma of the lung  Her lung cancer was discovered incidentally  She has no symptomatology from oncology standpoint  She has somewhat limited smoking history with 3-4 cigarettes for 20 years until 1986  We had extensive discussion regarding the diagnosis, advanced stage of disease, prognosis and treatment options  I told her that since she has metastatic disease, surgery is not indicated  She may have tolerable gene mutation, since she has limited smoking history  I requested pathology department to do genomic testing of EGFR, ALK and ROS  I also requested PD L1 testing  If she has targetable gene mutation, corresponding TKI may be indicated  If she has no targetable gene mutation, carboplatin, Pemetrexed with pembrolizumab may be the treatment option depending on PD L1 expression  She has no symptomatology from cancer standpoint  Therefore, I will see her again in 2 weeks and have to discuss those test results and make further recommendation on systemic therapy  In the meantime, I recommended her to be evaluated by Radiation Oncology for possible palliative radiation to T8 lesion to prevent future spinal cord compromise    The med infusion will be strongly considered once she start systemic therapy  I told her that metastatic adenocarcinoma of the lung is highly treatable condition but not curable condition  Prognosis varies depending on the presence of targetable mutation or not  All the patient questions were answered to her satisfaction at this moment  I will see her again in mid September 2020 for follow-up  Subjective:     HPI:  A 60-year-old female who has limited smoking history  She smoked 20 years with 3-4 cigarettes per day until 1986  She recently developed right flank pain for which she went to the emergency department  She was found instantly found to have right lung mass  Her right flank pain disappeared without knowing the clear etiology  She underwent PET-CT scan which showed hypermetabolic right upper lobe mass as well as hypermetabolic T8 lesion  In addition, she had multiple iliac bone lesion with SUV 12 4  She underwent bronchoscopy and biopsy which showed non-small cell carcinoma  Fine-needle aspiration from 4R lymph nodes showed adenocarcinoma consistent with lung primary  She presents today to discuss the diagnosis and treatment options  She has absolutely no complaint of pain, weight loss  She has been sign asthma, therefore, she has mild exertional shortness of breath  She denied fever, chills or night sweats  Her performance status is normal           Interval History:          Objective:     Primary Diagnosis:    Metastatic adenocarcinoma of the lung, diagnosed and August 2020  Cancer Staging:  Cancer Staging  No matching staging information was found for the patient  Previous Hematologic/ Oncologic Treatment:         Current Hematologic/ Oncologic Treatment: To be determined  Disease Status:     Not evaluated at this time  Test Results:    Pathology:    Cytology from right upper lobe mass showed non-small cell carcinoma, consistent with lung primary    Fine-needle aspiration from 4R lymph nodes showed adenocarcinoma  Radiology:    PET-CT scan showed hypermetabolic right upper lobe mass, hypermetabolic T8 lesion with SUV 20 9  Multiple hypermetabolic iliac bone lesions with SUV 12 4  Laboratory:    Platelet count 258  Creatinine 0 8  See below for CBC and CMP  Physical Exam:      General Appearance:    Alert, oriented        Eyes:    PERRL   Ears:    Normal external ear canals, both ears   Nose:   Nares normal, septum midline   Throat:   Mucosa moist  Pharynx without injection  Neck:   Supple       Lungs:     Clear to auscultation bilaterally   Chest Wall:    No tenderness or deformity    Heart:    Regular rate and rhythm       Abdomen:     Soft, non-tender, bowel sounds +, no organomegaly           Extremities:   Extremities no cyanosis or edema       Skin:   no rash or icterus  Lymph nodes:   Cervical, supraclavicular, and axillary nodes normal   Neurologic:   CNII-XII intact, normal strength, sensation and reflexes     Throughout          Breast exam:   NA         ROS: Review of Systems   All other systems reviewed and are negative  Imaging: Nm Pet Ct Skull Base To Mid Thigh    Result Date: 8/10/2020  Narrative: PET/CT SCAN INDICATION:  R91 1: Solitary pulmonary nodule MODIFIER: PI COMPARISON: CT 7/22/2020 CELL TYPE:  None TECHNIQUE:   10 4 mCi F-18-FDG administered IV  Multiplanar attenuation corrected and non attenuation corrected PET images were acquired 60 minutes post injection  Contiguous, low dose, axial CT sections were obtained from the skull base through the femurs   Intravenous contrast material was not utilized  This examination, like all CT scans performed in the [facility], was performed utilizing techniques to minimize radiation dose exposure, including the use of iterative reconstruction and automated exposure control  Fasting serum glucose: 98 mg/dl FINDINGS: VISUALIZED BRAIN:   No acute abnormalities are seen   HEAD/NECK:   There is a physiologic distribution of FDG  No FDG avid cervical adenopathy is seen  CT images: Unremarkable  CHEST:   Dominant right upper perihilar lung nodule with soft tissue extending to the right hilum along the right mainstem bronchus, and mediastinum demonstrates intense FDG activity, most compatible with malignancy  The right upper lung nodule measures approximately 2 7 x 2 cm, SUV 19 5  Large hypermetabolic precarinal node measures 3 x 1 9 cm, SUV 17 6  CT images: 3 mm right apical nodule series 4 image 50   6 mm right upper nodule image 55  These are too small for accurate PET evaluation  Coronary atherosclerosis  ABDOMEN:   No FDG avid soft tissue lesions are seen  CT images: Right renal cysts  Colon diverticula  PELVIS: Perianal activity is typically physiologic or possibly inflammatory such as from hemorrhoids  Otherwise no worrisome FDG avid soft tissue lesions are seen  CT images: Unremarkable  OSSEOUS STRUCTURES: Several hypermetabolic osseous lesions are compatible with metastases  These include the following: T8 lesion SUV 20 9  Right posterior iliac bone lesion SUV 12 4  Left posterior superior iliac bone lesion SUV 11 2  Right posterior 9th rib lesion near the costovertebral junction has an SUV of 3 7  Questional small L2 lesion has an SUV of 5  CT images: Otherwise stable  Impression: 1  Dominant right upper perihilar lung nodule with soft tissue extending to the right hilum along the right mainstem bronchus, and mediastinum, demonstrates intense FDG activity, most compatible with malignancy  Tissue sampling recommended  2   Several hypermetabolic osseous lesions most compatible with metastases  3   Subcentimeter right upper lung nodular densities that are too small for accurate PET evaluation  Continued CT follow-up recommended  The study was marked in EPIC for significant notification   Workstation performed: [id number]         Labs:   Lab Results   Component Value Date    WBC 9 61 07/22/2020    HGB 12 7 07/22/2020    HCT 38 9 07/22/2020    MCV 92 07/22/2020     (L) 07/22/2020     Lab Results   Component Value Date    K 4 1 07/22/2020     07/22/2020    CO2 30 07/22/2020    BUN 29 (H) 07/22/2020    CREATININE 0 82 07/22/2020    GLUF 95 02/14/2020    CALCIUM 9 7 07/22/2020    AST 59 (H) 07/22/2020    ALT 56 07/22/2020    ALKPHOS 70 07/22/2020    EGFR 71 07/22/2020         Vital Sign:    Body surface area is 1 9 meters squared      Wt Readings from Last 3 Encounters:   08/31/20 92 1 kg (203 lb)   08/20/20 88 9 kg (196 lb)   07/29/20 91 2 kg (201 lb)        Temp Readings from Last 3 Encounters:   08/31/20 98 6 °F (37 °C) (Tympanic Core)   08/20/20 (!) 95 5 °F (35 3 °C) (Tympanic)   07/29/20 98 2 °F (36 8 °C)        BP Readings from Last 3 Encounters:   08/31/20 142/84   08/20/20 (!) 186/87   07/29/20 110/80         Pulse Readings from Last 3 Encounters:   08/31/20 98   08/20/20 70   07/29/20 84     @LASTSAO2(3)@    Active Problems:   Patient Active Problem List   Diagnosis    Palpitations    Premature atrial contractions    Primary osteoarthritis of right wrist    Wrist stiffness, right    Thrombocytopenia (HCC)    Hypertension    BMI 40 0-44 9, adult ([facility] [address] )    Vitamin D deficiency    Hiatal hernia    Asthma    Menopause    Spinal stenosis of 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    Lumbar facet arthropathy    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    Colon cancer screening    Gastroesophageal reflux disease without esophagitis    Other headache syndrome    Suspected sleep apnea    Chronic gastritis without bleeding    Morbid obesity (HCC)    Chronic rhinitis    Obstructive sleep apnea (adult) (pediatric)    MONO (obstructive sleep apnea)    Mass of right lung    Bone metastasis (HCC)    Non-small cell cancer of right lung (HCC)       Past Medical History:   Past Medical History:   Diagnosis Date    Asthma     GERD (gastroesophageal reflux disease)     Hypertension     Lumbar disc disease     Sleep apnea     suspected     Ventricular arrhythmia        Surgical History:   Past Surgical History:   Procedure Laterality Date    APPENDECTOMY      COLONOSCOPY N/A 3/18/2019    Procedure: COLONOSCOPY;  Surgeon: Jada Mas DO;  Location: [facility]; Service: Gastroenterology    ESOPHAGOGASTRODUODENOSCOPY N/A 3/18/2019    Procedure: ESOPHAGOGASTRODUODENOSCOPY (EGD); Surgeon: Jada Mas DO;  Location: [facility]; Service: Gastroenterology    KNEE SURGERY      ROTATOR CUFF REPAIR      SHOULDER SURGERY         Family History:    Family History   Problem Relation Age of Onset    Stroke Mother     Diabetes Mother     Hyperlipidemia Mother     Hypertension Mother     Diabetes Father     Hyperlipidemia Father     Hypertension Father     Lung cancer Father 79    Lung cancer Sister 71       Cancer-related family history includes Lung cancer (age of onset: 71) in her sister; Lung cancer (age of onset: 79) in her father      Social History:   Social History     Socioeconomic History    Marital status: Single     Spouse name: Not on file    Number of children: Not on file    Years of education: Not on file    Highest education level: Not on file   Occupational History    Not on file   Social Needs    Financial resource strain: Not on file    Food insecurity     Worry: Not on file     Inability: Not on file    Transportation needs     Medical: Not on file     Non-medical: Not on file   Tobacco Use    Smoking status: Former Smoker     Packs/day: 6 00     Years: 0 40     Pack years: 2 40     Types: Cigarettes    Smokeless tobacco: Former User    Tobacco comment: quit smoking > 30 years ago    Substance and Sexual Activity    Alcohol use: Yes     Frequency: Monthly or less    Drug use: No    Sexual activity: Not on file   Lifestyle    Physical activity     Days per week: Not on file     Minutes per session: Not on file    Stress: Not on file   Relationships    Social connections     Talks on phone: Not on file     Gets together: Not on file     Attends Anabaptist service: Not on file     Active member of club or organization: Not on file     Attends meetings of clubs or organizations: Not on file     Relationship status: Not on file    Intimate partner violence     Fear of current or ex partner: Not on file     Emotionally abused: Not on file     Physically abused: Not on file     Forced sexual activity: Not on file   Other Topics Concern    Not on file   Social History Narrative    Not on file       Current Medications:   Current Outpatient Medications   Medication Sig Dispense Refill    albuterol (2 5 mg/3 mL) 0 083 % nebulizer solution Take 1 vial (2 5 mg total) by nebulization every 6 (six) hours as needed for wheezing 30 vial 1    albuterol (PROVENTIL HFA,VENTOLIN HFA) 90 mcg/act inhaler Inhale 2 puffs every 6 (six) hours as needed for wheezing 8 5 Inhaler 1    atenolol (TENORMIN) 25 mg tablet Take 1 tablet (25 mg total) by mouth daily 90 tablet 1    cetirizine (ZyrTEC) 10 mg tablet Take 10 mg by mouth daily      fluticasone (FLONASE) 50 mcg/act nasal spray SPRAY 2 SPRAYS INTO EACH NOSTRIL EVERY DAY 16 mL 2    fluticasone-salmeterol (ADVAIR, WIXELA) 500-50 mcg/dose inhaler Inhale 1 puff 2 (two) times a day Rinse mouth after use   3 Inhaler 1    hydrochlorothiazide (HYDRODIURIL) 25 mg tablet Take 1 tablet (25 mg total) by mouth daily 90 tablet 1    irbesartan (AVAPRO) 300 mg tablet Take 1 tablet (300 mg total) by mouth daily at bedtime 90 tablet 0    LORazepam (ATIVAN) 0 5 mg tablet Take 1 tablet (0 5 mg total) by mouth 2 (two) times a day as needed for anxiety 10 tablet 0    omeprazole (PriLOSEC) 20 mg delayed release capsule TAKE 1 CAPSULE BY MOUTH EVERY DAY 90 capsule 1    ondansetron (ZOFRAN-ODT) 4 mg disintegrating tablet Take 1 tablet (4 mg total) by mouth every 6 (six) hours as needed for nausea or vomiting 20 tablet 0    traMADol (ULTRAM) 50 mg tablet Take 50 mg by mouth every 6 (six) hours as needed for moderate pain       No current facility-administered medications for this visit          Allergies: No Known Allergies

## 2020-08-31 NOTE — PROGRESS NOTES
Hematology / Oncology Outpatient Consult Note    Hao Hannah 70 y o  female [id number]         Date:  8/31/2020    Assessment / Plan:  A 74-year-old female with newly diagnosed metastatic adenocarcinoma of the lung  Her lung cancer was discovered incidentally  She has no symptomatology from oncology standpoint  She has somewhat limited smoking history with 3-4 cigarettes for 20 years until 1986  We had extensive discussion regarding the diagnosis, advanced stage of disease, prognosis and treatment options  I told her that since she has metastatic disease, surgery is not indicated  She may have tolerable gene mutation, since she has limited smoking history  I requested pathology department to do genomic testing of EGFR, ALK and ROS  I also requested PD L1 testing  If she has targetable gene mutation, corresponding TKI may be indicated  If she has no targetable gene mutation, carboplatin, Pemetrexed with pembrolizumab may be the treatment option depending on PD L1 expression  She has no symptomatology from cancer standpoint  Therefore, I will see her again in 2 weeks and have to discuss those test results and make further recommendation on systemic therapy  In the meantime, I recommended her to be evaluated by Radiation Oncology for possible palliative radiation to T8 lesion to prevent future spinal cord compromise  The med infusion will be strongly considered once she start systemic therapy  I told her that metastatic adenocarcinoma of the lung is highly treatable condition but not curable condition  Prognosis varies depending on the presence of targetable mutation or not  All the patient questions were answered to her satisfaction at this moment  I will see her again in mid September 2020 for follow-up  Subjective:     HPI:  A 74-year-old female who has limited smoking history  She smoked 20 years with 3-4 cigarettes per day until 1986   She recently developed right flank pain for which she went to the emergency department  She was found instantly found to have right lung mass  Her right flank pain disappeared without knowing the clear etiology  She underwent PET-CT scan which showed hypermetabolic right upper lobe mass as well as hypermetabolic T8 lesion  In addition, she had multiple iliac bone lesion with SUV 12 4  She underwent bronchoscopy and biopsy which showed non-small cell carcinoma  Fine-needle aspiration from 4R lymph nodes showed adenocarcinoma consistent with lung primary  She presents today to discuss the diagnosis and treatment options  She has absolutely no complaint of pain, weight loss  She has been sign asthma, therefore, she has mild exertional shortness of breath  She denied fever, chills or night sweats  Her performance status is normal           Interval History:          Objective:     Primary Diagnosis:    Metastatic adenocarcinoma of the lung, diagnosed and August 2020  Cancer Staging:  Cancer Staging  No matching staging information was found for the patient  Previous Hematologic/ Oncologic Treatment:         Current Hematologic/ Oncologic Treatment: To be determined  Disease Status:     Not evaluated at this time  Test Results:    Pathology:    Cytology from right upper lobe mass showed non-small cell carcinoma, consistent with lung primary  Fine-needle aspiration from 4R lymph nodes showed adenocarcinoma  Radiology:    PET-CT scan showed hypermetabolic right upper lobe mass, hypermetabolic T8 lesion with SUV 20 9  Multiple hypermetabolic iliac bone lesions with SUV 12 4  Laboratory:    Platelet count 446  Creatinine 0 8  See below for CBC and CMP  Physical Exam:      General Appearance:    Alert, oriented        Eyes:    PERRL   Ears:    Normal external ear canals, both ears   Nose:   Nares normal, septum midline   Throat:   Mucosa moist  Pharynx without injection      Neck:   Supple       Lungs:     Clear to auscultation bilaterally   Chest Wall:    No tenderness or deformity    Heart:    Regular rate and rhythm       Abdomen:     Soft, non-tender, bowel sounds +, no organomegaly           Extremities:   Extremities no cyanosis or edema       Skin:   no rash or icterus  Lymph nodes:   Cervical, supraclavicular, and axillary nodes normal   Neurologic:   CNII-XII intact, normal strength, sensation and reflexes     Throughout          Breast exam:   NA         ROS: Review of Systems   All other systems reviewed and are negative  Imaging: Nm Pet Ct Skull Base To Mid Thigh    Result Date: 8/10/2020  Narrative: PET/CT SCAN INDICATION:  R91 1: Solitary pulmonary nodule MODIFIER: PI COMPARISON: CT 7/22/2020 CELL TYPE:  None TECHNIQUE:   10 4 mCi F-18-FDG administered IV  Multiplanar attenuation corrected and non attenuation corrected PET images were acquired 60 minutes post injection  Contiguous, low dose, axial CT sections were obtained from the skull base through the femurs   Intravenous contrast material was not utilized  This examination, like all CT scans performed in the [facility], was performed utilizing techniques to minimize radiation dose exposure, including the use of iterative reconstruction and automated exposure control  Fasting serum glucose: 98 mg/dl FINDINGS: VISUALIZED BRAIN:   No acute abnormalities are seen  HEAD/NECK:   There is a physiologic distribution of FDG  No FDG avid cervical adenopathy is seen  CT images: Unremarkable  CHEST:   Dominant right upper perihilar lung nodule with soft tissue extending to the right hilum along the right mainstem bronchus, and mediastinum demonstrates intense FDG activity, most compatible with malignancy  The right upper lung nodule measures approximately 2 7 x 2 cm, SUV 19 5  Large hypermetabolic precarinal node measures 3 x 1 9 cm, SUV 17 6  CT images: 3 mm right apical nodule series 4 image 50   6 mm right upper nodule image 55  These are too small for accurate PET evaluation  Coronary atherosclerosis  ABDOMEN:   No FDG avid soft tissue lesions are seen  CT images: Right renal cysts  Colon diverticula  PELVIS: Perianal activity is typically physiologic or possibly inflammatory such as from hemorrhoids  Otherwise no worrisome FDG avid soft tissue lesions are seen  CT images: Unremarkable  OSSEOUS STRUCTURES: Several hypermetabolic osseous lesions are compatible with metastases  These include the following: T8 lesion SUV 20 9  Right posterior iliac bone lesion SUV 12 4  Left posterior superior iliac bone lesion SUV 11 2  Right posterior 9th rib lesion near the costovertebral junction has an SUV of 3 7  Questional small L2 lesion has an SUV of 5  CT images: Otherwise stable  Impression: 1  Dominant right upper perihilar lung nodule with soft tissue extending to the right hilum along the right mainstem bronchus, and mediastinum, demonstrates intense FDG activity, most compatible with malignancy  Tissue sampling recommended  2   Several hypermetabolic osseous lesions most compatible with metastases  3   Subcentimeter right upper lung nodular densities that are too small for accurate PET evaluation  Continued CT follow-up recommended  The study was marked in EPIC for significant notification  Workstation performed: [id number]         Labs:   Lab Results   Component Value Date    WBC 9 61 07/22/2020    HGB 12 7 07/22/2020    HCT 38 9 07/22/2020    MCV 92 07/22/2020     (L) 07/22/2020     Lab Results   Component Value Date    K 4 1 07/22/2020     07/22/2020    CO2 30 07/22/2020    BUN 29 (H) 07/22/2020    CREATININE 0 82 07/22/2020    GLUF 95 02/14/2020    CALCIUM 9 7 07/22/2020    AST 59 (H) 07/22/2020    ALT 56 07/22/2020    ALKPHOS 70 07/22/2020    EGFR 71 07/22/2020         Vital Sign:    Body surface area is 1 9 meters squared      Wt Readings from Last 3 Encounters:   08/31/20 92 1 kg (203 lb)   08/20/20 88 9 kg (196 lb) 07/29/20 91 2 kg (201 lb)        Temp Readings from Last 3 Encounters:   08/31/20 98 6 °F (37 °C) (Tympanic Core)   08/20/20 (!) 95 5 °F (35 3 °C) (Tympanic)   07/29/20 98 2 °F (36 8 °C)        BP Readings from Last 3 Encounters:   08/31/20 142/84   08/20/20 (!) 186/87   07/29/20 110/80         Pulse Readings from Last 3 Encounters:   08/31/20 98   08/20/20 70   07/29/20 84     @LASTSAO2(3)@    Active Problems:   Patient Active Problem List   Diagnosis    Palpitations    Premature atrial contractions    Primary osteoarthritis of right wrist    Wrist stiffness, right    Thrombocytopenia (HCC)    Hypertension    BMI 40 0-44 9, adult ([facility] [address] )    Vitamin D deficiency    Hiatal hernia    Asthma    Menopause    Spinal stenosis of 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    Lumbar facet arthropathy    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    Colon cancer screening    Gastroesophageal reflux disease without esophagitis    Other headache syndrome    Suspected sleep apnea    Chronic gastritis without bleeding    Morbid obesity (HCC)    Chronic rhinitis    Obstructive sleep apnea (adult) (pediatric)    MONO (obstructive sleep apnea)    Mass of right lung    Bone metastasis (HCC)    Non-small cell cancer of right lung (HCC)       Past Medical History:   Past Medical History:   Diagnosis Date    Asthma     GERD (gastroesophageal reflux disease)     Hypertension     Lumbar disc disease     Sleep apnea     suspected     Ventricular arrhythmia        Surgical History:   Past Surgical History:   Procedure Laterality Date    APPENDECTOMY      COLONOSCOPY N/A 3/18/2019    Procedure: COLONOSCOPY;  Surgeon: Maye Palma DO;  [facility]; Service: Gastroenterology    ESOPHAGOGASTRODUODENOSCOPY N/A 3/18/2019    Procedure: ESOPHAGOGASTRODUODENOSCOPY (EGD); Surgeon: Maye Palma DO;  [facility];   Service: Gastroenterology    KNEE SURGERY      ROTATOR CUFF REPAIR      SHOULDER SURGERY         Family History:    Family History   Problem Relation Age of Onset   [facility] Stroke Mother     Diabetes Mother     Hyperlipidemia Mother     Hypertension Mother     Diabetes Father     Hyperlipidemia Father     Hypertension Father     Lung cancer Father 79    Lung cancer Sister 71       Cancer-related family history includes Lung cancer (age of onset: 71) in her sister; Lung cancer (age of onset: 79) in her father      Social History:   Social History     Socioeconomic History    Marital status: Single     Spouse name: Not on file    Number of children: Not on file    Years of education: Not on file    Highest education level: Not on file   Occupational History    Not on file   Social Needs    Financial resource strain: Not on file    Food insecurity     Worry: Not on file     Inability: Not on file    Transportation needs     Medical: Not on file     Non-medical: Not on file   Tobacco Use    Smoking status: Former Smoker     Packs/day: 6 00     Years: 0 40     Pack years: 2 40     Types: Cigarettes    Smokeless tobacco: Former User    Tobacco comment: quit smoking > 30 years ago    Substance and Sexual Activity    Alcohol use: Yes     Frequency: Monthly or less    Drug use: No    Sexual activity: Not on file   Lifestyle    Physical activity     Days per week: Not on file     Minutes per session: Not on file    Stress: Not on file   Relationships    Social connections     Talks on phone: Not on file     Gets together: Not on file     Attends [employer] service: Not on file     Active member of club or organization: Not on file     Attends meetings of clubs or organizations: Not on file     Relationship status: Not on file    Intimate partner violence     Fear of current or ex partner: Not on file     Emotionally abused: Not on file     Physically abused: Not on file     Forced sexual activity: Not on file   Other Topics Concern    Not on file   Social History Narrative    Not on file       Current Medications:   Current Outpatient Medications   Medication Sig Dispense Refill    albuterol (2 5 mg/3 mL) 0 083 % nebulizer solution Take 1 vial (2 5 mg total) by nebulization every 6 (six) hours as needed for wheezing 30 vial 1    albuterol (PROVENTIL HFA,VENTOLIN HFA) 90 mcg/act inhaler Inhale 2 puffs every 6 (six) hours as needed for wheezing 8 5 Inhaler 1    atenolol (TENORMIN) 25 mg tablet Take 1 tablet (25 mg total) by mouth daily 90 tablet 1    cetirizine (ZyrTEC) 10 mg tablet Take 10 mg by mouth daily      fluticasone (FLONASE) 50 mcg/act nasal spray SPRAY 2 SPRAYS INTO EACH NOSTRIL EVERY DAY 16 mL 2    fluticasone-salmeterol (ADVAIR, WIXELA) 500-50 mcg/dose inhaler Inhale 1 puff 2 (two) times a day Rinse mouth after use  3 Inhaler 1    hydrochlorothiazide (HYDRODIURIL) 25 mg tablet Take 1 tablet (25 mg total) by mouth daily 90 tablet 1    irbesartan (AVAPRO) 300 mg tablet Take 1 tablet (300 mg total) by mouth daily at bedtime 90 tablet 0    LORazepam (ATIVAN) 0 5 mg tablet Take 1 tablet (0 5 mg total) by mouth 2 (two) times a day as needed for anxiety 10 tablet 0    omeprazole (PriLOSEC) 20 mg delayed release capsule TAKE 1 CAPSULE BY MOUTH EVERY DAY 90 capsule 1    ondansetron (ZOFRAN-ODT) 4 mg disintegrating tablet Take 1 tablet (4 mg total) by mouth every 6 (six) hours as needed for nausea or vomiting 20 tablet 0    traMADol (ULTRAM) 50 mg tablet Take 50 mg by mouth every 6 (six) hours as needed for moderate pain       No current facility-administered medications for this visit          Allergies: No Known Allergies

## 2020-09-01 ENCOUNTER — DOCUMENTATION (OUTPATIENT)
Dept: INFUSION CENTER | Facility: CLINIC | Age: 75
End: 2020-09-01

## 2020-09-01 NOTE — SOCIAL WORK
LSW received DT and problem list via email  Pt self scored 3/10 and noted concerns with fear, worry, breathing and indigestion  Due to low self score and few concerns LSW intervention is not warranted at this time

## 2020-09-04 ENCOUNTER — APPOINTMENT (OUTPATIENT)
Dept: RADIATION ONCOLOGY | Facility: CLINIC | Age: 75
End: 2020-09-04
Attending: RADIOLOGY
Payer: COMMERCIAL

## 2020-09-04 VITALS
HEIGHT: 61 IN | WEIGHT: 202.6 LBS | SYSTOLIC BLOOD PRESSURE: 128 MMHG | HEART RATE: 87 BPM | RESPIRATION RATE: 18 BRPM | TEMPERATURE: 99 F | BODY MASS INDEX: 38.25 KG/M2 | DIASTOLIC BLOOD PRESSURE: 76 MMHG

## 2020-09-04 DIAGNOSIS — C79.51 BONE METASTASIS (HCC): Primary | ICD-10-CM

## 2020-09-04 DIAGNOSIS — C34.11 MALIGNANT NEOPLASM OF UPPER LOBE OF RIGHT LUNG (HCC): Primary | ICD-10-CM

## 2020-09-04 PROCEDURE — 77290 THER RAD SIMULAJ FIELD CPLX: CPT | Performed by: RADIOLOGY

## 2020-09-04 PROCEDURE — 99211 OFF/OP EST MAY X REQ PHY/QHP: CPT | Performed by: RADIOLOGY

## 2020-09-04 PROCEDURE — G0463 HOSPITAL OUTPT CLINIC VISIT: HCPCS | Performed by: RADIOLOGY

## 2020-09-04 NOTE — PROGRESS NOTES
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  History of Present Illness   Vladimir Cortez is a 70y o  year old female who presents with history of going to the ER for right abdominal pain and CT angiogram of the chest, abdomen and pelvis revealed incidental finding of 2 4 cm nodule in the right upper lobe, abnormal right hilar lymph nodes as well as precarinal and peribronchial   The vascular structures were without abnormalities  PET-CT scan showed intense FDG activity of the right upper lung nodule, right mediastinum with SUV 19 5 and 17 6 respectively  In addition, there were hypermetabolic bone lesions at T8 SUV 20 9, right iliac bone SUV12 4, left posterior superior iliac bone SUV 11 2 and 9th rib lesion as well as questionable L2 lesion  On 2020 patient underwent EBUS guided aspiration biopsy of mediastinal node  4R  Final pathology report was non-small cell carcinoma  Historical Information   Oncology History   Bone metastasis ([address] )   2020 Initial Diagnosis    Bone metastasis (HCC)     Non-small cell cancer of right lung ([facility] )   2020 Biopsy    FNA Level 4R  NSCC    RUL brushing: Conclusive evidence of malignancy  Non small cell carcinoma       Level 4R tissue sampling     2020 Initial Diagnosis    Non-small cell cancer of right lung [facility])           Past Medical History:   Diagnosis Date    Asthma     GERD (gastroesophageal reflux disease)     Hypertension     Lumbar disc disease     Lung cancer ([address] )     Sleep apnea     suspected     Ventricular arrhythmia      Past Surgical History:   Procedure Laterality Date    APPENDECTOMY      COLONOSCOPY N/A 3/18/2019    Procedure: COLONOSCOPY;  Surgeon: Tejal Dyer DO;  [facility]; Service: Gastroenterology    ESOPHAGOGASTRODUODENOSCOPY N/A 3/18/2019    Procedure: ESOPHAGOGASTRODUODENOSCOPY (EGD); Surgeon: Tejal Dyer DO;  Location[facility];   Service: Gastroenterology    KNEE SURGERY      ROTATOR CUFF REPAIR      SHOULDER SURGERY         Family History   Problem Relation Age of Onset    Stroke Mother     Diabetes Mother     Hyperlipidemia Mother     Hypertension Mother     Diabetes Father     Hyperlipidemia Father     Hypertension Father     Lung cancer Father 79    Lung cancer Sister 71       Social History   Social History     Substance and Sexual Activity   Alcohol Use Yes    Frequency: Monthly or less     Social History     Substance and Sexual Activity   Drug Use No     Social History     Tobacco Use   Smoking Status Former Smoker    Packs/day: 6 00    Years: 0 40    Pack years: 2 40    Types: Cigarettes   Smokeless Tobacco Former User   Tobacco Comment    quit smoking > 30 years ago          Meds/Allergies     Current Outpatient Medications:     albuterol (2 5 mg/3 mL) 0 083 % nebulizer solution, Take 1 vial (2 5 mg total) by nebulization every 6 (six) hours as needed for wheezing, Disp: 30 vial, Rfl: 1    albuterol (PROVENTIL HFA,VENTOLIN HFA) 90 mcg/act inhaler, Inhale 2 puffs every 6 (six) hours as needed for wheezing, Disp: 8 5 Inhaler, Rfl: 1    atenolol (TENORMIN) 25 mg tablet, Take 1 tablet (25 mg total) by mouth daily, Disp: 90 tablet, Rfl: 1    cetirizine (ZyrTEC) 10 mg tablet, Take 10 mg by mouth daily, Disp: , Rfl:     fluticasone (FLONASE) 50 mcg/act nasal spray, SPRAY 2 SPRAYS INTO EACH NOSTRIL EVERY DAY, Disp: 16 mL, Rfl: 2    fluticasone-salmeterol (ADVAIR, WIXELA) 500-50 mcg/dose inhaler, Inhale 1 puff 2 (two) times a day Rinse mouth after use , Disp: 3 Inhaler, Rfl: 1    hydrochlorothiazide (HYDRODIURIL) 25 mg tablet, Take 1 tablet (25 mg total) by mouth daily, Disp: 90 tablet, Rfl: 1    irbesartan (AVAPRO) 300 mg tablet, Take 1 tablet (300 mg total) by mouth daily at bedtime, Disp: 90 tablet, Rfl: 0    LORazepam (ATIVAN) 0 5 mg tablet, Take 1 tablet (0 5 mg total) by mouth 2 (two) times a day as needed for anxiety, Disp: 10 tablet, Rfl: 0    omeprazole (PriLOSEC) 20 mg delayed release capsule, TAKE 1 CAPSULE BY MOUTH EVERY DAY, Disp: 90 capsule, Rfl: 1    ondansetron (ZOFRAN-ODT) 4 mg disintegrating tablet, Take 1 tablet (4 mg total) by mouth every 6 (six) hours as needed for nausea or vomiting, Disp: 20 tablet, Rfl: 0    traMADol (ULTRAM) 50 mg tablet, Take 50 mg by mouth every 6 (six) hours as needed for moderate pain, Disp: , Rfl:   No Known Allergies      Review of Systems   Constitutional: Negative for activity change, appetite change, fatigue, fever and unexpected weight change  HENT: Negative for congestion, hearing loss, mouth sores, nosebleeds, sore throat, trouble swallowing and voice change  Eyes: Negative for photophobia, pain and visual disturbance  Respiratory: Negative for cough, chest tightness and shortness of breath  Cardiovascular: Negative for chest pain, palpitations and leg swelling  Gastrointestinal: Positive for abdominal pain  Negative for abdominal distention, blood in stool, nausea and vomiting  Endocrine: Negative  Genitourinary: Negative for difficulty urinating, dysuria, flank pain, frequency and pelvic pain  Musculoskeletal: Positive for back pain  Negative for joint swelling, neck pain and neck stiffness  Skin: Negative  Allergic/Immunologic: Negative  Neurological: Negative for dizziness, seizures, speech difficulty, weakness, numbness and headaches  Hematological: Negative for adenopathy  Does not bruise/bleed easily  Psychiatric/Behavioral: Negative             OBJECTIVE:   /76 (BP Location: Right arm, Patient Position: Sitting, Cuff Size: Large)   Pulse 87   Temp 99 °F (37 2 °C) (Tympanic)   Resp 18 Ht 5' 1" (1 549 m)   Wt 91 9 kg (202 lb 9 6 oz)   BMI 38 28 kg/m²   Pain Assessment:  4  Performance Status: Karnofsky: 80 - Normal activity with effort; some signs or symptoms of disease    Physical Exam  Constitutional:       General: She is not in acute distress  Appearance: Normal appearance  She is normal weight  HENT:      Head: Normocephalic  Nose: Nose normal       Mouth/Throat:      Pharynx: Oropharynx is clear  No oropharyngeal exudate  Eyes:      Extraocular Movements: Extraocular movements intact  Pupils: Pupils are equal, round, and reactive to light  Neck:      Musculoskeletal: Normal range of motion and neck supple  Cardiovascular:      Rate and Rhythm: Normal rate and regular rhythm  Heart sounds: Normal heart sounds  Pulmonary:      Effort: Pulmonary effort is normal       Breath sounds: Normal breath sounds  Abdominal:      General: Abdomen is flat  Palpations: Abdomen is soft  There is no mass  Tenderness: There is no abdominal tenderness  Musculoskeletal: Normal range of motion  General: Tenderness present  No swelling  Lymphadenopathy:      Cervical: No cervical adenopathy  Skin:     General: Skin is warm  Findings: No erythema, lesion or rash  Neurological:      General: No focal deficit present  Mental Status: She is alert and oriented to person, place, and time  Mental status is at baseline  Sensory: No sensory deficit  Motor: No weakness  Psychiatric:         Mood and Affect: Mood normal          Behavior: Behavior normal          Thought Content:  Thought content normal          Judgment: Judgment normal             RESULTS  Lab Results    Chemistry        Component Value Date/Time    K 4 1 07/22/2020 1427     07/22/2020 1427    CO2 30 07/22/2020 1427    BUN 29 (H) 07/22/2020 1427    CREATININE 0 82 07/22/2020 1427        Component Value Date/Time    CALCIUM 9 7 07/22/2020 1427    ALKPHOS 70 07/22/2020 1427    AST 59 (H) 07/22/2020 1427    ALT 56 07/22/2020 1427            Lab Results   Component Value Date    WBC 9 61 07/22/2020    HGB 12 7 07/22/2020    HCT 38 9 07/22/2020    MCV 92 07/22/2020     (L) 07/22/2020         Imaging Studies  Nm Pet Ct Skull Base To Mid Thigh    Result Date: 8/10/2020  Narrative: PET/CT SCAN INDICATION:  R91 1: Solitary pulmonary nodule MODIFIER: PI COMPARISON: CT 7/22/2020 CELL TYPE:  None TECHNIQUE:   10 4 mCi F-18-FDG administered IV  Multiplanar attenuation corrected and non attenuation corrected PET images were acquired 60 minutes post injection  Contiguous, low dose, axial CT sections were obtained from the skull base through the femurs   Intravenous contrast material was not utilized  This examination, like all CT scans performed in the [facility], was performed utilizing techniques to minimize radiation dose exposure, including the use of iterative reconstruction and automated exposure control  Fasting serum glucose: 98 mg/dl FINDINGS: VISUALIZED BRAIN:   No acute abnormalities are seen  HEAD/NECK:   There is a physiologic distribution of FDG  No FDG avid cervical adenopathy is seen  CT images: Unremarkable  CHEST:   Dominant right upper perihilar lung nodule with soft tissue extending to the right hilum along the right mainstem bronchus, and mediastinum demonstrates intense FDG activity, most compatible with malignancy  The right upper lung nodule measures approximately 2 7 x 2 cm, SUV 19 5  Large hypermetabolic precarinal node measures 3 x 1 9 cm, SUV 17 6  CT images: 3 mm right apical nodule series 4 image 50   6 mm right upper nodule image 55  These are too small for accurate PET evaluation  Coronary atherosclerosis  ABDOMEN:   No FDG avid soft tissue lesions are seen  CT images: Right renal cysts  Colon diverticula  PELVIS: Perianal activity is typically physiologic or possibly inflammatory such as from hemorrhoids   Otherwise no worrisome FDG avid soft tissue lesions are seen  CT images: Unremarkable  OSSEOUS STRUCTURES: Several hypermetabolic osseous lesions are compatible with metastases  These include the following: T8 lesion SUV 20 9  Right posterior iliac bone lesion SUV 12 4  Left posterior superior iliac bone lesion SUV 11 2  Right posterior 9th rib lesion near the costovertebral junction has an SUV of 3 7  Questional small L2 lesion has an SUV of 5  CT images: Otherwise stable  Impression: 1  Dominant right upper perihilar lung nodule with soft tissue extending to the right hilum along the right mainstem bronchus, and mediastinum, demonstrates intense FDG activity, most compatible with malignancy  Tissue sampling recommended  2   Several hypermetabolic osseous lesions most compatible with metastases  3   Subcentimeter right upper lung nodular densities that are too small for accurate PET evaluation  Continued CT follow-up recommended  The study was marked in EPIC for significant notification  Workstation performed: [id number]         Pathology:  Non-small cell carcinoma right upper lung  ASSESSMENT  No diagnosis found  Cancer Staging  No matching staging information was found for the patient  PLAN/DISCUSSION  No orders of the defined types were placed in this encounter  Blu Dang is a 70y o  year old female with stage IV non-small cell right upper lung  She has evidence of bone metastases particular T8 although not symptomatic may be at risk for neurologic problems and discussion with the patient today is to give a course of palliative radiation therapy 30 Gy in 10 treatments to prevent neurologic complication  Patient is in agreement with that and we are actually going to do the CT treatment planning simulation today and hopefully start her treatments Tuesday next week September 8  Side effects minimal may be some discomfort swallowing at the end of treatment      During discussion we are able to show patient's the images of the PET-CT scan  Tashia Reynoso MD  9/4/2020,2:54 PM      Portions of the record may have been created with voice recognition software  Occasional wrong word or "sound a like" substitutions may have occurred due to the inherent limitations of voice recognition software  Read the chart carefully and recognize, using context, where substitutions have occurred

## 2020-09-04 NOTE — PROGRESS NOTES
Rajiv Man 1945 is a 70 y o  female  Pt is a 69 yo being referred by Dr Beata Barrientos, from newly diagnosed bone mets  from metastatic lung cancer  Pt is newly diagnosed lung cancer, which was found incidentally  She had gone to the ER 7/22/2020 with upper abd pain, that wrapped around to her back  A CTA revealed a 2 5 cm hilar nodule, highly suspicious for primary pulmonary malignancy  Soft tissue extends into the anterior segmental bronchus of the right upper lobe  Ipsilateral hilar and mediastinal lymphadenopathy is present  She then had a consult with Pulmonology  Pet scan, EBUS and PFT all ordered  8/10/2020 Pet scan:   Dominant right upper perihilar lung nodule with soft tissue extending to the right hilum along the right mainstem bronchus, and mediastinum, demonstrates intense FDG activity, most compatible with malignancy  Tissue sampling recommended  2   Several hypermetabolic osseous lesions most compatible with metastases  3   Subcentimeter right upper lung nodular densities that are too small for accurate PET evaluation  Continued CT follow-up recommended  8/20/2020 EBUS was performed  Findings positive for  Non-small cell carcinoma  Pt had been prescribed a steroid pack, by Dr Phyllis Alonso, after she had felt more wheezing after EBUS      8/31/2020 Pt had consult with Dr Beata Barrientso  Pt was made aware of her extensive disease, so surgery would not be an option  Genomic testing was ordered of her tissue, and final decision on drug combination will be made then   She was then referred to rad onc, for palliative radiation to the T8 lesion, to prevent future spinal cord compromise  She will return to see Dr Beata Barrientos 9/17  She'll have a PAC placed 9/25/2020      Oncology History   Bone metastasis ([address] )   8/31/2020 Initial Diagnosis    Bone metastasis ([address] )     Non-small cell cancer of right lung ([address] )   8/20/2020 Biopsy    FNA Level 4R  NSCC    RUL brushing: Conclusive evidence of malignancy  Non small cell carcinoma  Level 4R tissue sampling     8/31/2020 Initial Diagnosis    Non-small cell cancer of right lung [facility])       Clinical Trial: no    Health Maintenance   Topic Date Due    DTaP,Tdap,and Td Vaccines (1 - Tdap) 04/18/1968    Pneumococcal Vaccine: 65+ Years (2 of 2 - PPSV23) 10/31/2019    Influenza Vaccine  07/01/2020    MAMMOGRAM  12/18/2020    Fall Risk  07/28/2021    Depression Screening PHQ  07/28/2021    Medicare Annual Wellness Visit (AWV)  07/28/2021    BMI: Followup Plan  07/29/2021    BMI: Adult  08/31/2021    Colorectal Cancer Screening  03/18/2029    Hepatitis C Screening  Completed    HIB Vaccine  Aged Out    Hepatitis B Vaccine  Aged Out    IPV Vaccine  Aged Out    Hepatitis A Vaccine  Aged Out    Meningococcal ACWY Vaccine  Aged Out    HPV Vaccine  Aged Out     Past Medical History:   Diagnosis Date    Asthma     GERD (gastroesophageal reflux disease)     Hypertension     Lumbar disc disease     Lung cancer ([address] )     Sleep apnea     suspected     Ventricular arrhythmia      Past Surgical History:   Procedure Laterality Date    APPENDECTOMY      COLONOSCOPY N/A 3/18/2019    Procedure: COLONOSCOPY;  Surgeon: Abhilash Ron DO;  [facility]; Service: Gastroenterology    ESOPHAGOGASTRODUODENOSCOPY N/A 3/18/2019    Procedure: ESOPHAGOGASTRODUODENOSCOPY (EGD); Surgeon: Abhilash Ron DO;  [facility];   Service: Gastroenterology    KNEE SURGERY      ROTATOR CUFF REPAIR      SHOULDER SURGERY       Family History   Problem Relation Age of Onset    Stroke Mother     Diabetes Mother     Hyperlipidemia Mother     Hypertension Mother     Diabetes Father     Hyperlipidemia Father     Hypertension Father     Lung cancer Father 79    Lung cancer Sister 71     Social History     Tobacco Use    Smoking status: Former Smoker     Packs/day: 6 00     Years: 0 40     Pack years: 2 40     Types: Cigarettes    Smokeless tobacco: Former User    Tobacco comment: quit smoking > 30 years ago    Substance Use Topics    Alcohol use: Yes     Frequency: Monthly or less    Drug use: No      Current Outpatient Medications:     albuterol (2 5 mg/3 mL) 0 083 % nebulizer solution, Take 1 vial (2 5 mg total) by nebulization every 6 (six) hours as needed for wheezing, Disp: 30 vial, Rfl: 1    albuterol (PROVENTIL HFA,VENTOLIN HFA) 90 mcg/act inhaler, Inhale 2 puffs every 6 (six) hours as needed for wheezing, Disp: 8 5 Inhaler, Rfl: 1    atenolol (TENORMIN) 25 mg tablet, Take 1 tablet (25 mg total) by mouth daily, Disp: 90 tablet, Rfl: 1    cetirizine (ZyrTEC) 10 mg tablet, Take 10 mg by mouth daily, Disp: , Rfl:     fluticasone (FLONASE) 50 mcg/act nasal spray, SPRAY 2 SPRAYS INTO EACH NOSTRIL EVERY DAY, Disp: 16 mL, Rfl: 2    fluticasone-salmeterol (ADVAIR, WIXELA) 500-50 mcg/dose inhaler, Inhale 1 puff 2 (two) times a day Rinse mouth after use , Disp: 3 Inhaler, Rfl: 1    hydrochlorothiazide (HYDRODIURIL) 25 mg tablet, Take 1 tablet (25 mg total) by mouth daily, Disp: 90 tablet, Rfl: 1    irbesartan (AVAPRO) 300 mg tablet, Take 1 tablet (300 mg total) by mouth daily at bedtime, Disp: 90 tablet, Rfl: 0    LORazepam (ATIVAN) 0 5 mg tablet, Take 1 tablet (0 5 mg total) by mouth 2 (two) times a day as needed for anxiety, Disp: 10 tablet, Rfl: 0    omeprazole (PriLOSEC) 20 mg delayed release capsule, TAKE 1 CAPSULE BY MOUTH EVERY DAY, Disp: 90 capsule, Rfl: 1    ondansetron (ZOFRAN-ODT) 4 mg disintegrating tablet, Take 1 tablet (4 mg total) by mouth every 6 (six) hours as needed for nausea or vomiting, Disp: 20 tablet, Rfl: 0    traMADol (ULTRAM) 50 mg tablet, Take 50 mg by mouth every 6 (six) hours as needed for moderate pain, Disp: , Rfl:     No Known Allergies     Review of Systems:  Review of Systems   Constitutional: Positive for chills (Intermittent HS)  HENT: Positive for hearing loss (Mild in left ear)      Respiratory: Positive for shortness of breath (BERTRAND) and wheezing (History of asthma)  Cardiovascular: Positive for leg swelling  Gastrointestinal: Negative  History of coltitis   Endocrine: Negative  Genitourinary:        Nocturia 2x/night   Musculoskeletal: Positive for arthralgias  Negative for back pain  Skin: Negative  Allergic/Immunologic: Positive for environmental allergies (Seasonal)  Neurological: Positive for headaches  Hematological: Bruises/bleeds easily  Psychiatric/Behavioral: Negative  Vitals:    09/04/20 1240   BP: 128/76   BP Location: Right arm   Patient Position: Sitting   Cuff Size: Large   Pulse: 87   Resp: 18   Temp: 99 °F (37 2 °C)   TempSrc: Tympanic   Weight: 91 9 kg (202 lb 9 6 oz)   Height: 5' 1" (1 549 m)       Pain Score: 0-No pain    Imaging:No images are attached to the encounter       Teaching Palliative RT for Spine mets    MST

## 2020-09-08 PROCEDURE — 77334 RADIATION TREATMENT AID(S): CPT | Performed by: RADIOLOGY

## 2020-09-08 PROCEDURE — 77295 3-D RADIOTHERAPY PLAN: CPT | Performed by: RADIOLOGY

## 2020-09-08 PROCEDURE — 77300 RADIATION THERAPY DOSE PLAN: CPT | Performed by: RADIOLOGY

## 2020-09-08 PROCEDURE — 77412 RADIATION TX DELIVERY LVL 3: CPT | Performed by: RADIOLOGY

## 2020-09-09 PROCEDURE — 77331 SPECIAL RADIATION DOSIMETRY: CPT | Performed by: RADIOLOGY

## 2020-09-09 PROCEDURE — 77412 RADIATION TX DELIVERY LVL 3: CPT | Performed by: RADIOLOGY

## 2020-09-09 PROCEDURE — 77387 GUIDANCE FOR RADJ TX DLVR: CPT | Performed by: RADIOLOGY

## 2020-09-10 ENCOUNTER — APPOINTMENT (OUTPATIENT)
Dept: RADIATION ONCOLOGY | Facility: CLINIC | Age: 75
End: 2020-09-10
Payer: COMMERCIAL

## 2020-09-10 ENCOUNTER — TELEPHONE (OUTPATIENT)
Dept: FAMILY MEDICINE CLINIC | Facility: CLINIC | Age: 75
End: 2020-09-10

## 2020-09-10 DIAGNOSIS — F06.4 ANXIETY DISORDER DUE TO MEDICAL CONDITION: Primary | ICD-10-CM

## 2020-09-10 PROCEDURE — 77412 RADIATION TX DELIVERY LVL 3: CPT | Performed by: RADIOLOGY

## 2020-09-10 PROCEDURE — 77387 GUIDANCE FOR RADJ TX DLVR: CPT | Performed by: RADIOLOGY

## 2020-09-10 RX ORDER — LORAZEPAM 1 MG/1
1 TABLET ORAL EVERY 8 HOURS PRN
Qty: 30 TABLET | Refills: 0 | Status: SHIPPED | OUTPATIENT
Start: 2020-09-10 | End: 2020-11-03 | Stop reason: SDUPTHER

## 2020-09-10 NOTE — TELEPHONE ENCOUNTER
Pt states she's been doing radiation daily and would like to know if her lorazepam could be refilled and increased

## 2020-09-10 NOTE — TELEPHONE ENCOUNTER
I wondered the same  She actually said if her dose could be stronger   Said she's really having a tough time and the 0 5mg isn't helping much

## 2020-09-10 NOTE — TELEPHONE ENCOUNTER
I am ok with that but I just wanted to know if she means increased like more pills or make it stronger

## 2020-09-11 DIAGNOSIS — C79.51 BONE METASTASIS (HCC): Primary | ICD-10-CM

## 2020-09-11 PROCEDURE — 77412 RADIATION TX DELIVERY LVL 3: CPT | Performed by: RADIOLOGY

## 2020-09-11 PROCEDURE — 77387 GUIDANCE FOR RADJ TX DLVR: CPT | Performed by: RADIOLOGY

## 2020-09-14 ENCOUNTER — APPOINTMENT (OUTPATIENT)
Dept: LAB | Facility: CLINIC | Age: 75
End: 2020-09-14
Attending: RADIOLOGY
Payer: COMMERCIAL

## 2020-09-14 ENCOUNTER — APPOINTMENT (OUTPATIENT)
Dept: RADIATION ONCOLOGY | Facility: CLINIC | Age: 75
End: 2020-09-14
Attending: RADIOLOGY
Payer: COMMERCIAL

## 2020-09-14 DIAGNOSIS — C79.51 BONE METASTASIS (HCC): ICD-10-CM

## 2020-09-14 LAB
BASOPHILS # BLD AUTO: 0.03 THOUSANDS/ΜL (ref 0–0.1)
BASOPHILS NFR BLD AUTO: 1 % (ref 0–1)
EOSINOPHIL # BLD AUTO: 0.27 THOUSAND/ΜL (ref 0–0.61)
EOSINOPHIL NFR BLD AUTO: 4 % (ref 0–6)
ERYTHROCYTE [DISTWIDTH] IN BLOOD BY AUTOMATED COUNT: 12.7 % (ref 11.6–15.1)
HCT VFR BLD AUTO: 36.9 % (ref 34.8–46.1)
HGB BLD-MCNC: 12.3 G/DL (ref 11.5–15.4)
LYMPHOCYTES # BLD AUTO: 1.26 THOUSANDS/ΜL (ref 0.6–4.47)
LYMPHOCYTES NFR BLD AUTO: 20 % (ref 14–44)
MCH RBC QN AUTO: 30.1 PG (ref 26.8–34.3)
MCHC RBC AUTO-ENTMCNC: 33.3 G/DL (ref 31.4–37.4)
MCV RBC AUTO: 90 FL (ref 82–98)
MONOCYTES # BLD AUTO: 0.8 THOUSAND/ΜL (ref 0.17–1.22)
MONOCYTES NFR BLD AUTO: 13 % (ref 4–12)
NEUTROPHILS # BLD AUTO: 3.93 THOUSANDS/ΜL (ref 1.85–7.62)
NEUTS SEG NFR BLD AUTO: 62 % (ref 43–75)
PLATELET # BLD AUTO: 149 THOUSANDS/UL (ref 149–390)
PMV BLD AUTO: 12.4 FL (ref 8.9–12.7)
RBC # BLD AUTO: 4.09 MILLION/UL (ref 3.81–5.12)
WBC # BLD AUTO: 6.29 THOUSAND/UL (ref 4.31–10.16)

## 2020-09-14 PROCEDURE — 36415 COLL VENOUS BLD VENIPUNCTURE: CPT

## 2020-09-14 PROCEDURE — 85025 COMPLETE CBC W/AUTO DIFF WBC: CPT

## 2020-09-14 PROCEDURE — 77387 GUIDANCE FOR RADJ TX DLVR: CPT | Performed by: RADIOLOGY

## 2020-09-14 PROCEDURE — 77336 RADIATION PHYSICS CONSULT: CPT | Performed by: RADIOLOGY

## 2020-09-14 PROCEDURE — 77412 RADIATION TX DELIVERY LVL 3: CPT | Performed by: RADIOLOGY

## 2020-09-15 PROCEDURE — 77412 RADIATION TX DELIVERY LVL 3: CPT | Performed by: RADIOLOGY

## 2020-09-15 PROCEDURE — 77417 THER RADIOLOGY PORT IMAGE(S): CPT | Performed by: RADIOLOGY

## 2020-09-15 PROCEDURE — 77387 GUIDANCE FOR RADJ TX DLVR: CPT | Performed by: RADIOLOGY

## 2020-09-16 ENCOUNTER — APPOINTMENT (OUTPATIENT)
Dept: RADIATION ONCOLOGY | Facility: CLINIC | Age: 75
End: 2020-09-16
Attending: RADIOLOGY
Payer: COMMERCIAL

## 2020-09-16 ENCOUNTER — HOSPITAL ENCOUNTER (OUTPATIENT)
Dept: MAMMOGRAPHY | Facility: CLINIC | Age: 75
Discharge: HOME | End: 2020-09-16
Payer: COMMERCIAL

## 2020-09-16 VITALS — BODY MASS INDEX: 38.14 KG/M2 | WEIGHT: 202 LBS | HEIGHT: 61 IN

## 2020-09-16 DIAGNOSIS — Z12.31 BREAST CANCER SCREENING BY MAMMOGRAM: ICD-10-CM

## 2020-09-16 DIAGNOSIS — Z12.31 ENCOUNTER FOR SCREENING MAMMOGRAM FOR MALIGNANT NEOPLASM OF BREAST: ICD-10-CM

## 2020-09-16 PROCEDURE — 77387 GUIDANCE FOR RADJ TX DLVR: CPT | Performed by: RADIOLOGY

## 2020-09-16 PROCEDURE — 77067 SCR MAMMO BI INCL CAD: CPT

## 2020-09-16 PROCEDURE — 77063 BREAST TOMOSYNTHESIS BI: CPT

## 2020-09-16 PROCEDURE — 77412 RADIATION TX DELIVERY LVL 3: CPT | Performed by: RADIOLOGY

## 2020-09-17 ENCOUNTER — OFFICE VISIT (OUTPATIENT)
Dept: HEMATOLOGY ONCOLOGY | Facility: CLINIC | Age: 75
End: 2020-09-17
Payer: COMMERCIAL

## 2020-09-17 VITALS
SYSTOLIC BLOOD PRESSURE: 142 MMHG | RESPIRATION RATE: 18 BRPM | TEMPERATURE: 98.3 F | HEART RATE: 96 BPM | HEIGHT: 61 IN | DIASTOLIC BLOOD PRESSURE: 80 MMHG | BODY MASS INDEX: 39.27 KG/M2 | OXYGEN SATURATION: 97 % | WEIGHT: 208 LBS

## 2020-09-17 DIAGNOSIS — C34.91 NON-SMALL CELL CANCER OF RIGHT LUNG (HCC): Primary | ICD-10-CM

## 2020-09-17 PROBLEM — C79.51 BONE METASTASES (HCC): Status: ACTIVE | Noted: 2020-09-17

## 2020-09-17 PROCEDURE — 1160F RVW MEDS BY RX/DR IN RCRD: CPT | Performed by: INTERNAL MEDICINE

## 2020-09-17 PROCEDURE — 99215 OFFICE O/P EST HI 40 MIN: CPT | Performed by: INTERNAL MEDICINE

## 2020-09-17 PROCEDURE — 77412 RADIATION TX DELIVERY LVL 3: CPT | Performed by: RADIOLOGY

## 2020-09-17 PROCEDURE — 77387 GUIDANCE FOR RADJ TX DLVR: CPT | Performed by: RADIOLOGY

## 2020-09-17 PROCEDURE — 1036F TOBACCO NON-USER: CPT | Performed by: INTERNAL MEDICINE

## 2020-09-17 RX ORDER — SODIUM CHLORIDE 9 MG/ML
20 INJECTION, SOLUTION INTRAVENOUS ONCE
Status: CANCELLED | OUTPATIENT
Start: 2020-09-29

## 2020-09-17 NOTE — PROGRESS NOTES
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  Her lung cancer was discovered incidentally  She has no symptomatology from oncology standpoint  She has somewhat limited smoking history with 3-4 cigarettes for 20 years until 1986  She has right hilar mass and osseous metastasis  She is going to complete palliative radiation therapy to T8  We discussed the diagnosis, specific gene mutation, prognosis and treatment options  Since ALK rearrangements is found, systemic chemotherapy is not indicated  I recommended her to start Alectinib 600 mg b i d   Side effects of a nocturnal was thoroughly discussed, including but not limited to LFT abnormality, edema, bradycardia, fatigue and anemia  She understood and wished to proceed  Since she has bone metastasis, I recommended her to start the med infusion every 3 months next week  We discussed general information about prognosis of stage IV ALK mutated non-small cell lung cancer  This is not a curable disease  However, prognosis with ALK mutated lung cancer is much longer than average lung cancer caused by cigarettes  She understood  I will see her again in 6 weeks with CBC and CMP  All the patient and her brother's questions were answered to their satisfaction       Addendum; Since she is not going to have intravenous chemotherapy, I am going to cancel port placement by Interventional Radiology            Subjective:      HPI:  A 72-year-old female who has limited smoking history  She smoked 20 years with 3-4 cigarettes per day until 1986  She recently developed right flank pain for which she went to the emergency department  She was found instantly found to have right lung mass  Her right flank pain disappeared without knowing the clear etiology    She underwent PET-CT scan which showed hypermetabolic right upper lobe mass as well as hypermetabolic T8 lesion  In addition, she had multiple iliac bone lesion with SUV 12 4  She underwent bronchoscopy and biopsy which showed non-small cell carcinoma  Fine-needle aspiration from 4R lymph nodes showed adenocarcinoma consistent with lung primary  She presents today to discuss the diagnosis and treatment options  She has absolutely no complaint of pain, weight loss  She has been sign asthma, therefore, she has mild exertional shortness of breath  She denied fever, chills or night sweats  Her performance status is normal              Interval History:  A 60-year-old female with metastatic adenocarcinoma of the lung  Her lung cancer was discovered incidentally  She has no symptomatology from oncology standpoint  She has somewhat limited smoking history with 3-4 cigarettes for 20 years until 1986  She has right hilar mass as well as multiple osseous metastasis including T8  She is currently on palliative radiation to thoracic spine with no toxicity  Her tumor was found to have ALK rearrangements  She presents today with her brother to discuss the diagnosis and treatment options  She has no complaint of pain  She denied any respiratory symptoms  Her weight is stable  Her performance status is normal            Objective:      Primary Diagnosis:     Metastatic adenocarcinoma of the lung, with ALK rearrangements  diagnosed and August 2020       Cancer Staging:  Cancer Staging  No matching staging information was found for the patient         Previous Hematologic/ Oncologic Treatment:            Current Hematologic/ Oncologic Treatment:       Alectinib 600 mg b i d  To be started soon  Zometa every 3 months to be started next week      Disease Status:      Not evaluated at this time      Test Results:     Pathology:     Cytology from right upper lobe mass showed non-small cell carcinoma, consistent with lung primary  Fine-needle aspiration from 4R lymph nodes showed adenocarcinoma  ALK rearrangements positive  No evidence of ROS 1 translocation  PD L1 expression and EGFR mutation are pending      Radiology:     PET-CT scan showed hypermetabolic right upper lobe mass, hypermetabolic T8 lesion with SUV 20 9  Multiple hypermetabolic iliac bone lesions with SUV 12 4      Laboratory:     Platelet count 397  Creatinine 0 8  See below for CBC and CMP      Physical Exam:        General Appearance:    Alert, oriented          Eyes:    PERRL   Ears:    Normal external ear canals, both ears   Nose:   Nares normal, septum midline   Throat:   Mucosa moist  Pharynx without injection  Neck:   Supple         Lungs:     Clear to auscultation bilaterally   Chest Wall:    No tenderness or deformity    Heart:    Regular rate and rhythm         Abdomen:     Soft, non-tender, bowel sounds +, no organomegaly               Extremities:   Extremities no cyanosis or edema         Skin:   no rash or icterus  Lymph nodes:   Cervical, supraclavicular, and axillary nodes normal   Neurologic:   CNII-XII intact, normal strength, sensation and reflexes     Throughout             Breast exam:   NA           ROS: Review of Systems   All other systems reviewed and are negative  Imaging: Mammo Screening Bilateral W 3d & Cad    Result Date: 9/17/2020  Narrative: DIAGNOSIS: Breast cancer screening by mammogram; Encounter for screening mammogram for malignant neoplasm of breast TECHNIQUE: Digital screening mammography was performed  Computer Aided Detection (CAD) analyzed all applicable images  COMPARISONS:  Multiple prior exams dating between 11/10/2005 and 12/18/2018  RELEVANT HISTORY: Family Breast Cancer History: No known family history of breast cancer  Family Medical History: No known relevant family medical history  Personal History: No known relevant hormone history  No known relevant surgical history  No known relevant medical history  The patient is scheduled in a reminder system for screening mammography  8-10% of cancers will be missed on mammography  Management of a palpable abnormality must be based on clinical grounds  Patients will be notified of their results via letter from our facility  Accredited by [employer] and FDA  RISK ASSESSMENT: 5 Year Tyrer-Cuzick: 1 37 % 10 Year Tyrer-Cuzick: 2 91 % Lifetime Tyrer-Cuzick: 3 56 % TISSUE DENSITY: The breasts are almost entirely fatty  INDICATION: Jovan Mcmahon is a 70 y o  female presenting for screening mammography  FINDINGS: Bilateral There are no suspicious masses, grouped microcalcifications or areas of architectural distortion  The skin and nipple areolar complex are unremarkable  Benign-appearing calcifications are noted in each breast      Impression: No mammographic evidence of malignancy  ASSESSMENT/BI-RADS CATEGORY: Left: 2 - Benign Right: 2 - Benign Overall: 2 - Benign RECOMMENDATION:      - Routine screening mammogram in 1 year for both breasts  Workstation ID: [id number]         Labs:   Lab Results   Component Value Date    WBC 6 29 09/14/2020    HGB 12 3 09/14/2020    HCT 36 9 09/14/2020    MCV 90 09/14/2020     09/14/2020     Lab Results   Component Value Date    K 4 1 07/22/2020     07/22/2020    CO2 30 07/22/2020    BUN 29 (H) 07/22/2020    CREATININE 0 82 07/22/2020    GLUF 95 02/14/2020    CALCIUM 9 7 07/22/2020    AST 59 (H) 07/22/2020    ALT 56 07/22/2020    ALKPHOS 70 07/22/2020    EGFR 71 07/22/2020           Current Medications: Reviewed  Allergies: Reviewed  PMH/FH/SH:  Reviewed      Vital Sign:    Body surface area is 1 92 meters squared      Wt Readings from Last 3 Encounters:   09/17/20 94 3 kg (208 lb)   09/16/20 91 6 kg (202 lb)   09/04/20 91 9 kg (202 lb 9 6 oz)        Temp Readings from Last 3 Encounters:   09/17/20 98 3 °F (36 8 °C) (Tympanic Core)   09/04/20 99 °F (37 2 °C) (Tympanic)   08/31/20 98 6 °F (37 °C) (Tympanic Core) BP Readings from Last 3 Encounters:   09/17/20 142/80   09/04/20 128/76   08/31/20 142/84         Pulse Readings from Last 3 Encounters:   09/17/20 96   09/04/20 87   08/31/20 98     @LASTSAO2(3)@

## 2020-09-18 ENCOUNTER — DOCUMENTATION (OUTPATIENT)
Dept: HEMATOLOGY ONCOLOGY | Facility: CLINIC | Age: 75
End: 2020-09-18

## 2020-09-18 PROCEDURE — 77412 RADIATION TX DELIVERY LVL 3: CPT | Performed by: RADIOLOGY

## 2020-09-18 PROCEDURE — 77387 GUIDANCE FOR RADJ TX DLVR: CPT | Performed by: RADIOLOGY

## 2020-09-18 NOTE — PROGRESS NOTES
9/18/2020  Received notifcation from clinical pt will be starting Alecensa 600mg b i d  Pt has [employer] [employer]  ID # [id number]  BIN [id number]  PCN #[id number]  GRP # [id number]    Submitted for auth through cover my meds  9/23/2020  Called [employer] at 9:28 ([phone]) spoke with Angelique Mackay  Per Jemima Sierra # [id number] is valid from 6/20/2020  Through 9/18/2021    Notified clinical, [facility], and finance  10/20/2020  Received notification from finance the pt has been approved for free drug    [facility] will be supplying the medication

## 2020-09-19 ENCOUNTER — TELEPHONE (OUTPATIENT)
Dept: OTHER | Facility: OTHER | Age: 75
End: 2020-09-19

## 2020-09-19 DIAGNOSIS — C34.91 NON-SMALL CELL CANCER OF RIGHT LUNG (HCC): Primary | ICD-10-CM

## 2020-09-19 RX ORDER — OXYCODONE HYDROCHLORIDE AND ACETAMINOPHEN 5; 325 MG/1; MG/1
1 TABLET ORAL EVERY 6 HOURS PRN
Qty: 60 TABLET | Refills: 0 | Status: SHIPPED | OUTPATIENT
Start: 2020-09-19 | End: 2021-01-29 | Stop reason: ALTCHOICE

## 2020-09-19 NOTE — TELEPHONE ENCOUNTER
[phone]/YN is James gayle 49/Pt is in a lot of pain after treatment yesterday  Pt would like to know if something could be sent to the [facility] in [facility]    Dr Lara Flores was paged at [phone]    Please give us a call back if you do not hear from the provider within 20-30 mins

## 2020-09-21 ENCOUNTER — TELEPHONE (OUTPATIENT)
Dept: RADIOLOGY | Facility: HOSPITAL | Age: 75
End: 2020-09-21

## 2020-09-21 ENCOUNTER — APPOINTMENT (OUTPATIENT)
Dept: RADIATION ONCOLOGY | Facility: CLINIC | Age: 75
End: 2020-09-21
Attending: RADIOLOGY
Payer: COMMERCIAL

## 2020-09-21 ENCOUNTER — TELEPHONE (OUTPATIENT)
Dept: HEMATOLOGY ONCOLOGY | Facility: CLINIC | Age: 75
End: 2020-09-21

## 2020-09-21 ENCOUNTER — DOCUMENTATION (OUTPATIENT)
Dept: HEMATOLOGY ONCOLOGY | Facility: CLINIC | Age: 75
End: 2020-09-21

## 2020-09-21 PROCEDURE — 77387 GUIDANCE FOR RADJ TX DLVR: CPT | Performed by: RADIOLOGY

## 2020-09-21 PROCEDURE — 77336 RADIATION PHYSICS CONSULT: CPT | Performed by: RADIOLOGY

## 2020-09-21 PROCEDURE — 77412 RADIATION TX DELIVERY LVL 3: CPT | Performed by: RADIOLOGY

## 2020-09-21 NOTE — TELEPHONE ENCOUNTER
Ren from IR called to check if patient needs to have port placed on Friday 9/25/20  I reviewed Dr Gabriel Bright not which indicate that patient will be starting on oral chemo  Explained to [facility] that port placement can be canceled  [facility] stated that he needed documentation in the chart from Dr Cassandria Lombard stating that patient no longer needs port placed    Will forward to RN with Dr Cassandria Lombard to review with him    [facility] will cancel the appointment once note is placed

## 2020-09-22 ENCOUNTER — TELEPHONE (OUTPATIENT)
Dept: HEMATOLOGY ONCOLOGY | Facility: CLINIC | Age: 75
End: 2020-09-22

## 2020-09-22 NOTE — TELEPHONE ENCOUNTER
Patient called to report that she has not heard from anyone about setting up her Zometa  She prefers to used the [address] [place] [address] [facility] for her Zometa infusions  Patient is inquiring about the status of Alecensa  Reviewed with patient that they are awaiting copay information from Radha Perry and then they will contact patient with an update per Geneva Gonzales 9/21/20 Epic note

## 2020-09-23 ENCOUNTER — TELEPHONE (OUTPATIENT)
Dept: HEMATOLOGY ONCOLOGY | Facility: MEDICAL CENTER | Age: 75
End: 2020-09-23

## 2020-09-23 NOTE — TELEPHONE ENCOUNTER
Patient Naomy Car [id number] called stating that she emailed Donnald Gilford requested attachments  and was advised she will be out the office and would like a email back confirm the attachments was received      * noted above emailed to the oncology finance group

## 2020-09-23 NOTE — TELEPHONE ENCOUNTER
Patient daughter called in stated that she email you some financial records and has some questions in regards to medication a good call back [phone]

## 2020-09-28 ENCOUNTER — APPOINTMENT (OUTPATIENT)
Dept: LAB | Facility: HOSPITAL | Age: 75
End: 2020-09-28
Attending: INTERNAL MEDICINE
Payer: COMMERCIAL

## 2020-09-28 DIAGNOSIS — C79.51 BONE METASTASES (HCC): ICD-10-CM

## 2020-09-28 DIAGNOSIS — C34.91 NON-SMALL CELL CANCER OF RIGHT LUNG (HCC): ICD-10-CM

## 2020-09-28 LAB
ALBUMIN SERPL BCP-MCNC: 4.2 G/DL (ref 3–5.2)
ALP SERPL-CCNC: 88 U/L (ref 43–122)
ALT SERPL W P-5'-P-CCNC: 19 U/L (ref 9–52)
ANION GAP SERPL CALCULATED.3IONS-SCNC: 8 MMOL/L (ref 5–14)
AST SERPL W P-5'-P-CCNC: 36 U/L (ref 14–36)
BILIRUB SERPL-MCNC: 0.6 MG/DL
BUN SERPL-MCNC: 31 MG/DL (ref 5–25)
CALCIUM SERPL-MCNC: 11 MG/DL (ref 8.4–10.2)
CHLORIDE SERPL-SCNC: 99 MMOL/L (ref 97–108)
CO2 SERPL-SCNC: 31 MMOL/L (ref 22–30)
CREAT SERPL-MCNC: 0.98 MG/DL (ref 0.6–1.2)
GFR SERPL CREATININE-BSD FRML MDRD: 57 ML/MIN/1.73SQ M
GLUCOSE SERPL-MCNC: 133 MG/DL (ref 70–99)
POTASSIUM SERPL-SCNC: 4.2 MMOL/L (ref 3.6–5)
PROT SERPL-MCNC: 7.7 G/DL (ref 5.9–8.4)
SODIUM SERPL-SCNC: 138 MMOL/L (ref 137–147)

## 2020-09-28 PROCEDURE — 80053 COMPREHEN METABOLIC PANEL: CPT

## 2020-09-28 PROCEDURE — 36415 COLL VENOUS BLD VENIPUNCTURE: CPT

## 2020-09-29 ENCOUNTER — HOSPITAL ENCOUNTER (OUTPATIENT)
Dept: INFUSION CENTER | Facility: HOSPITAL | Age: 75
Discharge: HOME | End: 2020-09-29
Attending: INTERNAL MEDICINE
Payer: COMMERCIAL

## 2020-09-29 VITALS
TEMPERATURE: 98.3 F | HEART RATE: 80 BPM | SYSTOLIC BLOOD PRESSURE: 129 MMHG | DIASTOLIC BLOOD PRESSURE: 77 MMHG | OXYGEN SATURATION: 97 % | RESPIRATION RATE: 18 BRPM

## 2020-09-29 DIAGNOSIS — C34.91 NON-SMALL CELL CANCER OF RIGHT LUNG (HCC): ICD-10-CM

## 2020-09-29 DIAGNOSIS — C79.51 BONE METASTASES (HCC): Primary | ICD-10-CM

## 2020-09-29 PROCEDURE — 96365 THER/PROPH/DIAG IV INF INIT: CPT

## 2020-09-29 RX ORDER — SODIUM CHLORIDE 9 MG/ML
20 INJECTION, SOLUTION INTRAVENOUS ONCE
Status: CANCELLED | OUTPATIENT
Start: 2020-12-22

## 2020-09-29 RX ORDER — SODIUM CHLORIDE 9 MG/ML
20 INJECTION, SOLUTION INTRAVENOUS ONCE
Status: COMPLETED | OUTPATIENT
Start: 2020-09-29 | End: 2020-09-29

## 2020-09-29 RX ADMIN — ZOLEDRONIC ACID 3.3 MG: 4 INJECTION, SOLUTION, CONCENTRATE INTRAVENOUS at 13:01

## 2020-09-29 RX ADMIN — SODIUM CHLORIDE 20 ML/HR: 0.9 INJECTION, SOLUTION INTRAVENOUS at 12:48

## 2020-10-01 ENCOUNTER — TELEPHONE (OUTPATIENT)
Dept: HEMATOLOGY ONCOLOGY | Facility: CLINIC | Age: 75
End: 2020-10-01

## 2020-10-05 LAB — SCAN RESULT: NORMAL

## 2020-10-07 ENCOUNTER — TELEPHONE (OUTPATIENT)
Dept: HEMATOLOGY ONCOLOGY | Facility: MEDICAL CENTER | Age: 75
End: 2020-10-07

## 2020-10-19 ENCOUNTER — TELEPHONE (OUTPATIENT)
Dept: HEMATOLOGY ONCOLOGY | Facility: CLINIC | Age: 75
End: 2020-10-19

## 2020-10-19 DIAGNOSIS — C79.51 BONE METASTASIS (HCC): ICD-10-CM

## 2020-10-19 DIAGNOSIS — C34.91 NON-SMALL CELL CANCER OF RIGHT LUNG (HCC): Primary | ICD-10-CM

## 2020-10-19 DIAGNOSIS — R52 PAIN: ICD-10-CM

## 2020-10-19 RX ORDER — TRAMADOL HYDROCHLORIDE 50 MG/1
50 TABLET ORAL EVERY 6 HOURS PRN
Qty: 30 TABLET | Refills: 0 | Status: SHIPPED | OUTPATIENT
Start: 2020-10-19 | End: 2021-03-10

## 2020-10-20 DIAGNOSIS — C34.91 NON-SMALL CELL CANCER OF RIGHT LUNG (HCC): Primary | ICD-10-CM

## 2020-10-20 DIAGNOSIS — C79.51 BONE METASTASIS (HCC): ICD-10-CM

## 2020-10-23 ENCOUNTER — TELEMEDICINE (OUTPATIENT)
Dept: RADIATION ONCOLOGY | Facility: CLINIC | Age: 75
End: 2020-10-23
Attending: RADIOLOGY

## 2020-10-23 DIAGNOSIS — C79.51 BONE METASTASIS (HCC): Primary | ICD-10-CM

## 2020-10-26 ENCOUNTER — LAB (OUTPATIENT)
Dept: LAB | Facility: HOSPITAL | Age: 75
End: 2020-10-26
Attending: INTERNAL MEDICINE
Payer: COMMERCIAL

## 2020-10-26 DIAGNOSIS — C34.91 NON-SMALL CELL CANCER OF RIGHT LUNG (HCC): ICD-10-CM

## 2020-10-26 LAB
ALBUMIN SERPL BCP-MCNC: 4.2 G/DL (ref 3–5.2)
ALP SERPL-CCNC: 120 U/L (ref 43–122)
ALT SERPL W P-5'-P-CCNC: 17 U/L (ref 9–52)
ANION GAP SERPL CALCULATED.3IONS-SCNC: 7 MMOL/L (ref 5–14)
AST SERPL W P-5'-P-CCNC: 30 U/L (ref 14–36)
BASOPHILS # BLD AUTO: 0 THOUSANDS/ΜL (ref 0–0.1)
BASOPHILS NFR BLD AUTO: 0 % (ref 0–1)
BILIRUB SERPL-MCNC: 0.7 MG/DL
BUN SERPL-MCNC: 21 MG/DL (ref 5–25)
CALCIUM SERPL-MCNC: 9.3 MG/DL (ref 8.4–10.2)
CHLORIDE SERPL-SCNC: 100 MMOL/L (ref 97–108)
CO2 SERPL-SCNC: 31 MMOL/L (ref 22–30)
CREAT SERPL-MCNC: 0.8 MG/DL (ref 0.6–1.2)
EOSINOPHIL # BLD AUTO: 0.2 THOUSAND/ΜL (ref 0–0.4)
EOSINOPHIL NFR BLD AUTO: 3 % (ref 0–6)
ERYTHROCYTE [DISTWIDTH] IN BLOOD BY AUTOMATED COUNT: 13.2 %
GFR SERPL CREATININE-BSD FRML MDRD: 73 ML/MIN/1.73SQ M
GLUCOSE P FAST SERPL-MCNC: 107 MG/DL (ref 70–99)
HCT VFR BLD AUTO: 38.2 % (ref 36–46)
HGB BLD-MCNC: 12.6 G/DL (ref 12–16)
LYMPHOCYTES # BLD AUTO: 1 THOUSANDS/ΜL (ref 0.5–4)
LYMPHOCYTES NFR BLD AUTO: 15 % (ref 25–45)
MCH RBC QN AUTO: 28.3 PG (ref 26–34)
MCHC RBC AUTO-ENTMCNC: 33 G/DL (ref 31–36)
MCV RBC AUTO: 86 FL (ref 80–100)
MONOCYTES # BLD AUTO: 0.8 THOUSAND/ΜL (ref 0.2–0.9)
MONOCYTES NFR BLD AUTO: 12 % (ref 1–10)
NEUTROPHILS # BLD AUTO: 4.6 THOUSANDS/ΜL (ref 1.8–7.8)
NEUTS SEG NFR BLD AUTO: 70 % (ref 45–65)
PLATELET # BLD AUTO: 192 THOUSANDS/UL (ref 150–450)
PMV BLD AUTO: 10.9 FL (ref 8.9–12.7)
POTASSIUM SERPL-SCNC: 4.1 MMOL/L (ref 3.6–5)
PROT SERPL-MCNC: 8 G/DL (ref 5.9–8.4)
RBC # BLD AUTO: 4.45 MILLION/UL (ref 4–5.2)
SODIUM SERPL-SCNC: 138 MMOL/L (ref 137–147)
WBC # BLD AUTO: 6.6 THOUSAND/UL (ref 4.5–11)

## 2020-10-26 PROCEDURE — 85025 COMPLETE CBC W/AUTO DIFF WBC: CPT

## 2020-10-26 PROCEDURE — 80053 COMPREHEN METABOLIC PANEL: CPT

## 2020-10-26 PROCEDURE — 36415 COLL VENOUS BLD VENIPUNCTURE: CPT

## 2020-10-29 ENCOUNTER — OFFICE VISIT (OUTPATIENT)
Dept: HEMATOLOGY ONCOLOGY | Facility: CLINIC | Age: 75
End: 2020-10-29
Payer: COMMERCIAL

## 2020-10-29 VITALS
BODY MASS INDEX: 38.51 KG/M2 | RESPIRATION RATE: 18 BRPM | DIASTOLIC BLOOD PRESSURE: 72 MMHG | SYSTOLIC BLOOD PRESSURE: 122 MMHG | HEART RATE: 82 BPM | TEMPERATURE: 98.2 F | OXYGEN SATURATION: 96 % | HEIGHT: 61 IN | WEIGHT: 204 LBS

## 2020-10-29 DIAGNOSIS — C79.51 BONE METASTASES (HCC): ICD-10-CM

## 2020-10-29 DIAGNOSIS — C34.91 NON-SMALL CELL CANCER OF RIGHT LUNG (HCC): Primary | ICD-10-CM

## 2020-10-29 PROCEDURE — 99214 OFFICE O/P EST MOD 30 MIN: CPT | Performed by: INTERNAL MEDICINE

## 2020-10-29 PROCEDURE — 3008F BODY MASS INDEX DOCD: CPT | Performed by: INTERNAL MEDICINE

## 2020-10-29 PROCEDURE — 3078F DIAST BP <80 MM HG: CPT | Performed by: INTERNAL MEDICINE

## 2020-10-29 PROCEDURE — 3074F SYST BP LT 130 MM HG: CPT | Performed by: INTERNAL MEDICINE

## 2020-11-03 DIAGNOSIS — F06.4 ANXIETY DISORDER DUE TO MEDICAL CONDITION: ICD-10-CM

## 2020-11-04 RX ORDER — LORAZEPAM 1 MG/1
1 TABLET ORAL EVERY 8 HOURS PRN
Qty: 30 TABLET | Refills: 1 | Status: SHIPPED | OUTPATIENT
Start: 2020-11-04 | End: 2021-03-10

## 2020-11-08 DIAGNOSIS — I10 ESSENTIAL HYPERTENSION: ICD-10-CM

## 2020-11-09 DIAGNOSIS — I10 ESSENTIAL HYPERTENSION: ICD-10-CM

## 2020-11-09 RX ORDER — IRBESARTAN 300 MG/1
300 TABLET ORAL
Qty: 90 TABLET | Refills: 0 | Status: SHIPPED | OUTPATIENT
Start: 2020-11-09 | End: 2021-02-08

## 2020-11-09 RX ORDER — ATENOLOL 25 MG/1
TABLET ORAL
Qty: 90 TABLET | Refills: 1 | Status: SHIPPED | OUTPATIENT
Start: 2020-11-09 | End: 2021-05-24 | Stop reason: SDUPTHER

## 2020-11-11 ENCOUNTER — OFFICE VISIT (OUTPATIENT)
Dept: PULMONOLOGY | Facility: CLINIC | Age: 75
End: 2020-11-11
Payer: COMMERCIAL

## 2020-11-11 VITALS
DIASTOLIC BLOOD PRESSURE: 70 MMHG | RESPIRATION RATE: 16 BRPM | HEART RATE: 76 BPM | WEIGHT: 204 LBS | SYSTOLIC BLOOD PRESSURE: 118 MMHG | BODY MASS INDEX: 38.55 KG/M2 | TEMPERATURE: 98.2 F | OXYGEN SATURATION: 95 %

## 2020-11-11 DIAGNOSIS — I10 ESSENTIAL HYPERTENSION: ICD-10-CM

## 2020-11-11 DIAGNOSIS — C34.91 NON-SMALL CELL CARCINOMA OF LUNG, STAGE 4, RIGHT (HCC): ICD-10-CM

## 2020-11-11 DIAGNOSIS — E66.01 MORBID OBESITY (HCC): ICD-10-CM

## 2020-11-11 DIAGNOSIS — J45.31 MILD PERSISTENT ASTHMA WITH ACUTE EXACERBATION: Primary | ICD-10-CM

## 2020-11-11 PROBLEM — D69.6 THROMBOCYTOPENIA (HCC): Status: RESOLVED | Noted: 2018-10-31 | Resolved: 2020-11-11

## 2020-11-11 PROBLEM — R91.8 MASS OF RIGHT LUNG: Status: RESOLVED | Noted: 2020-07-29 | Resolved: 2020-11-11

## 2020-11-11 PROBLEM — R29.818 SUSPECTED SLEEP APNEA: Status: RESOLVED | Noted: 2019-02-14 | Resolved: 2020-11-11

## 2020-11-11 PROBLEM — Z78.0 MENOPAUSE: Status: RESOLVED | Noted: 2018-12-27 | Resolved: 2020-11-11

## 2020-11-11 PROBLEM — M25.631 WRIST STIFFNESS, RIGHT: Status: RESOLVED | Noted: 2017-05-05 | Resolved: 2020-11-11

## 2020-11-11 PROBLEM — J45.30 MILD PERSISTENT ASTHMA: Status: ACTIVE | Noted: 2020-11-11

## 2020-11-11 PROBLEM — J45.909 ASTHMA: Status: RESOLVED | Noted: 2018-11-21 | Resolved: 2020-11-11

## 2020-11-11 PROCEDURE — 99213 OFFICE O/P EST LOW 20 MIN: CPT | Performed by: INTERNAL MEDICINE

## 2020-11-11 PROCEDURE — 1160F RVW MEDS BY RX/DR IN RCRD: CPT | Performed by: INTERNAL MEDICINE

## 2020-11-11 PROCEDURE — 1036F TOBACCO NON-USER: CPT | Performed by: INTERNAL MEDICINE

## 2020-11-11 RX ORDER — ALBUTEROL SULFATE 90 UG/1
2 AEROSOL, METERED RESPIRATORY (INHALATION) EVERY 6 HOURS PRN
Qty: 8.5 INHALER | Refills: 1 | Status: SHIPPED | OUTPATIENT
Start: 2020-11-11 | End: 2020-12-09 | Stop reason: SDUPTHER

## 2020-11-11 RX ORDER — PREDNISONE 20 MG/1
40 TABLET ORAL DAILY
Qty: 10 TABLET | Refills: 0 | Status: SHIPPED | OUTPATIENT
Start: 2020-11-11 | End: 2020-11-16

## 2020-11-11 RX ORDER — ALBUTEROL SULFATE 2.5 MG/3ML
2.5 SOLUTION RESPIRATORY (INHALATION) EVERY 6 HOURS PRN
Qty: 30 VIAL | Refills: 1 | Status: SHIPPED | OUTPATIENT
Start: 2020-11-11 | End: 2021-03-10 | Stop reason: SDUPTHER

## 2020-11-17 ENCOUNTER — TELEPHONE (OUTPATIENT)
Dept: HEMATOLOGY ONCOLOGY | Facility: CLINIC | Age: 75
End: 2020-11-17

## 2020-11-17 ENCOUNTER — LAB (OUTPATIENT)
Dept: LAB | Facility: HOSPITAL | Age: 75
End: 2020-11-17
Attending: INTERNAL MEDICINE
Payer: COMMERCIAL

## 2020-11-17 DIAGNOSIS — D69.6 PLATELETS DECREASED (HCC): ICD-10-CM

## 2020-11-17 DIAGNOSIS — C79.51 BONE METASTASES (HCC): ICD-10-CM

## 2020-11-17 DIAGNOSIS — C34.91 NON-SMALL CELL CANCER OF RIGHT LUNG (HCC): Primary | ICD-10-CM

## 2020-11-17 DIAGNOSIS — J45.31 MILD PERSISTENT ASTHMA WITH ACUTE EXACERBATION: ICD-10-CM

## 2020-11-17 LAB
ALBUMIN SERPL BCP-MCNC: 4.1 G/DL (ref 3–5.2)
ALP SERPL-CCNC: 120 U/L (ref 43–122)
ALT SERPL W P-5'-P-CCNC: 21 U/L (ref 9–52)
ANION GAP SERPL CALCULATED.3IONS-SCNC: 5 MMOL/L (ref 5–14)
AST SERPL W P-5'-P-CCNC: 37 U/L (ref 14–36)
BILIRUB SERPL-MCNC: 0.9 MG/DL
BUN SERPL-MCNC: 29 MG/DL (ref 5–25)
CALCIUM SERPL-MCNC: 8.7 MG/DL (ref 8.4–10.2)
CHLORIDE SERPL-SCNC: 100 MMOL/L (ref 97–108)
CO2 SERPL-SCNC: 31 MMOL/L (ref 22–30)
CREAT SERPL-MCNC: 0.95 MG/DL (ref 0.6–1.2)
EOSINOPHIL # BLD AUTO: 0.48 THOUSAND/UL (ref 0–0.4)
EOSINOPHIL NFR BLD MANUAL: 7 % (ref 0–6)
ERYTHROCYTE [DISTWIDTH] IN BLOOD BY AUTOMATED COUNT: 15.3 %
GFR SERPL CREATININE-BSD FRML MDRD: 60 ML/MIN/1.73SQ M
GLUCOSE P FAST SERPL-MCNC: 94 MG/DL (ref 70–99)
HCT VFR BLD AUTO: 34.5 % (ref 36–46)
HGB BLD-MCNC: 11.6 G/DL (ref 12–16)
LYMPHOCYTES # BLD AUTO: 1.16 THOUSAND/UL (ref 0.5–4)
LYMPHOCYTES # BLD AUTO: 17 % (ref 25–45)
MCH RBC QN AUTO: 29.5 PG (ref 26–34)
MCHC RBC AUTO-ENTMCNC: 33.7 G/DL (ref 31–36)
MCV RBC AUTO: 87 FL (ref 80–100)
MONOCYTES # BLD AUTO: 0.48 THOUSAND/UL (ref 0.2–0.9)
MONOCYTES NFR BLD AUTO: 7 % (ref 1–10)
MYELOCYTES NFR BLD MANUAL: 4 % (ref 0–1)
NEUTS SEG # BLD: 4.15 THOUSAND/UL (ref 1.8–7.8)
NEUTS SEG NFR BLD AUTO: 61 %
PLATELET # BLD AUTO: 99 THOUSANDS/UL (ref 150–450)
PLATELET BLD QL SMEAR: ABNORMAL
PMV BLD AUTO: 11.3 FL (ref 8.9–12.7)
POTASSIUM SERPL-SCNC: 4.1 MMOL/L (ref 3.6–5)
PROT SERPL-MCNC: 7.1 G/DL (ref 5.9–8.4)
RBC # BLD AUTO: 3.95 MILLION/UL (ref 4–5.2)
RBC MORPH BLD: NORMAL
SODIUM SERPL-SCNC: 136 MMOL/L (ref 137–147)
TOTAL CELLS COUNTED SPEC: 100
VARIANT LYMPHS # BLD AUTO: 4 % (ref 0–0)
WBC # BLD AUTO: 6.8 THOUSAND/UL (ref 4.5–11)

## 2020-11-17 PROCEDURE — 80053 COMPREHEN METABOLIC PANEL: CPT | Performed by: INTERNAL MEDICINE

## 2020-11-17 PROCEDURE — 85007 BL SMEAR W/DIFF WBC COUNT: CPT | Performed by: INTERNAL MEDICINE

## 2020-11-17 PROCEDURE — 86003 ALLG SPEC IGE CRUDE XTRC EA: CPT

## 2020-11-17 PROCEDURE — 85027 COMPLETE CBC AUTOMATED: CPT | Performed by: INTERNAL MEDICINE

## 2020-11-17 PROCEDURE — 82785 ASSAY OF IGE: CPT

## 2020-11-17 PROCEDURE — 36415 COLL VENOUS BLD VENIPUNCTURE: CPT | Performed by: INTERNAL MEDICINE

## 2020-11-18 LAB
A ALTERNATA IGE QN: <0.1 KUA/I
A FUMIGATUS IGE QN: 0.13 KUA/I
ALLERGEN COMMENT: ABNORMAL
BERMUDA GRASS IGE QN: <0.1 KUA/I
BOXELDER IGE QN: <0.1 KUA/I
C HERBARUM IGE QN: <0.1 KUA/I
CAT DANDER IGE QN: 3.41 KUA/I
CMN PIGWEED IGE QN: <0.1 KUA/I
COMMON RAGWEED IGE QN: <0.1 KUA/I
COTTONWOOD IGE QN: <0.1 KUA/I
D FARINAE IGE QN: 0.23 KUA/I
D PTERONYSS IGE QN: 0.2 KUA/I
DOG DANDER IGE QN: 1.23 KUA/I
LONDON PLANE IGE QN: <0.1 KUA/I
MOUSE URINE PROT IGE QN: <0.1 KUA/I
MT JUNIPER IGE QN: <0.1 KUA/I
MUGWORT IGE QN: <0.1 KUA/I
P NOTATUM IGE QN: <0.1 KUA/I
ROACH IGE QN: 0.19 KUA/I
SHEEP SORREL IGE QN: <0.1 KUA/I
SILVER BIRCH IGE QN: <0.1 KUA/I
TIMOTHY IGE QN: <0.1 KUA/I
TOTAL IGE SMQN RAST: 109 KU/L (ref 0–113)
WALNUT IGE QN: <0.1 KUA/I
WHITE ASH IGE QN: <0.1 KUA/I
WHITE ELM IGE QN: <0.1 KUA/I
WHITE MULBERRY IGE QN: <0.1 KUA/I
WHITE OAK IGE QN: <0.1 KUA/I

## 2020-12-01 ENCOUNTER — LAB (OUTPATIENT)
Dept: LAB | Facility: HOSPITAL | Age: 75
End: 2020-12-01
Attending: INTERNAL MEDICINE
Payer: COMMERCIAL

## 2020-12-01 DIAGNOSIS — C34.91 NON-SMALL CELL CANCER OF RIGHT LUNG (HCC): ICD-10-CM

## 2020-12-01 DIAGNOSIS — D69.6 PLATELETS DECREASED (HCC): ICD-10-CM

## 2020-12-01 DIAGNOSIS — C79.51 BONE METASTASES (HCC): ICD-10-CM

## 2020-12-01 LAB
ACANTHOCYTES BLD QL SMEAR: PRESENT
BASOPHILS # BLD AUTO: 0.05 THOUSAND/UL (ref 0–0.1)
BASOPHILS NFR MAR MANUAL: 1 % (ref 0–1)
BURR CELLS BLD QL SMEAR: PRESENT
EOSINOPHIL # BLD AUTO: 0.15 THOUSAND/UL (ref 0–0.4)
EOSINOPHIL NFR BLD MANUAL: 3 % (ref 0–6)
ERYTHROCYTE [DISTWIDTH] IN BLOOD BY AUTOMATED COUNT: 16 %
HCT VFR BLD AUTO: 33.7 % (ref 36–46)
HGB BLD-MCNC: 11.3 G/DL (ref 12–16)
LG PLATELETS BLD QL SMEAR: PRESENT
LYMPHOCYTES # BLD AUTO: 0.95 THOUSAND/UL (ref 0.5–4)
LYMPHOCYTES # BLD AUTO: 19 % (ref 25–45)
MCH RBC QN AUTO: 29 PG (ref 26–34)
MCHC RBC AUTO-ENTMCNC: 33.4 G/DL (ref 31–36)
MCV RBC AUTO: 87 FL (ref 80–100)
MONOCYTES # BLD AUTO: 0.8 THOUSAND/UL (ref 0.2–0.9)
MONOCYTES NFR BLD AUTO: 16 % (ref 1–10)
NEUTS SEG # BLD: 3.05 THOUSAND/UL (ref 1.8–7.8)
NEUTS SEG NFR BLD AUTO: 61 %
PLATELET # BLD AUTO: 101 THOUSANDS/UL (ref 150–450)
PLATELET BLD QL SMEAR: ABNORMAL
PMV BLD AUTO: 11.9 FL (ref 8.9–12.7)
RBC # BLD AUTO: 3.88 MILLION/UL (ref 4–5.2)
RBC MORPH BLD: ABNORMAL
SCHISTOCYTES BLD QL SMEAR: PRESENT
TOTAL CELLS COUNTED SPEC: 100
WBC # BLD AUTO: 5 THOUSAND/UL (ref 4.5–11)

## 2020-12-01 PROCEDURE — 85027 COMPLETE CBC AUTOMATED: CPT

## 2020-12-01 PROCEDURE — 85007 BL SMEAR W/DIFF WBC COUNT: CPT

## 2020-12-01 PROCEDURE — 36415 COLL VENOUS BLD VENIPUNCTURE: CPT

## 2020-12-09 ENCOUNTER — OFFICE VISIT (OUTPATIENT)
Dept: PULMONOLOGY | Facility: CLINIC | Age: 75
End: 2020-12-09
Payer: COMMERCIAL

## 2020-12-09 VITALS
BODY MASS INDEX: 40.29 KG/M2 | HEART RATE: 63 BPM | SYSTOLIC BLOOD PRESSURE: 112 MMHG | TEMPERATURE: 97.7 F | DIASTOLIC BLOOD PRESSURE: 70 MMHG | HEIGHT: 61 IN | OXYGEN SATURATION: 98 % | WEIGHT: 213.4 LBS

## 2020-12-09 DIAGNOSIS — J45.40 MODERATE PERSISTENT ASTHMA, UNSPECIFIED WHETHER COMPLICATED: ICD-10-CM

## 2020-12-09 DIAGNOSIS — G47.33 OSA (OBSTRUCTIVE SLEEP APNEA): ICD-10-CM

## 2020-12-09 DIAGNOSIS — J45.31 MILD PERSISTENT ASTHMA WITH ACUTE EXACERBATION: Primary | ICD-10-CM

## 2020-12-09 DIAGNOSIS — E66.01 MORBID OBESITY (HCC): ICD-10-CM

## 2020-12-09 PROCEDURE — 3008F BODY MASS INDEX DOCD: CPT | Performed by: INTERNAL MEDICINE

## 2020-12-09 PROCEDURE — 99213 OFFICE O/P EST LOW 20 MIN: CPT | Performed by: INTERNAL MEDICINE

## 2020-12-09 PROCEDURE — 1160F RVW MEDS BY RX/DR IN RCRD: CPT | Performed by: INTERNAL MEDICINE

## 2020-12-09 PROCEDURE — 3074F SYST BP LT 130 MM HG: CPT | Performed by: INTERNAL MEDICINE

## 2020-12-09 PROCEDURE — 3078F DIAST BP <80 MM HG: CPT | Performed by: INTERNAL MEDICINE

## 2020-12-09 RX ORDER — ALBUTEROL SULFATE 90 UG/1
2 AEROSOL, METERED RESPIRATORY (INHALATION) EVERY 6 HOURS PRN
Qty: 8.5 INHALER | Refills: 1 | Status: SHIPPED | OUTPATIENT
Start: 2020-12-09 | End: 2021-03-10 | Stop reason: SDUPTHER

## 2020-12-16 ENCOUNTER — TELEMEDICINE (OUTPATIENT)
Dept: SLEEP CENTER | Facility: CLINIC | Age: 75
End: 2020-12-16
Payer: COMMERCIAL

## 2020-12-16 DIAGNOSIS — G47.33 OSA (OBSTRUCTIVE SLEEP APNEA): Primary | ICD-10-CM

## 2020-12-16 DIAGNOSIS — C34.91 NON-SMALL CELL CARCINOMA OF LUNG, STAGE 4, RIGHT (HCC): ICD-10-CM

## 2020-12-16 DIAGNOSIS — G47.09 OTHER INSOMNIA: ICD-10-CM

## 2020-12-16 DIAGNOSIS — J31.0 CHRONIC RHINITIS: ICD-10-CM

## 2020-12-16 DIAGNOSIS — J45.31 MILD PERSISTENT ASTHMA WITH ACUTE EXACERBATION: ICD-10-CM

## 2020-12-16 DIAGNOSIS — E66.9 OBESITY (BMI 30-39.9): ICD-10-CM

## 2020-12-16 DIAGNOSIS — I10 ESSENTIAL HYPERTENSION: ICD-10-CM

## 2020-12-16 PROCEDURE — 1036F TOBACCO NON-USER: CPT | Performed by: INTERNAL MEDICINE

## 2020-12-16 PROCEDURE — 99214 OFFICE O/P EST MOD 30 MIN: CPT | Performed by: INTERNAL MEDICINE

## 2020-12-16 PROCEDURE — 1160F RVW MEDS BY RX/DR IN RCRD: CPT | Performed by: INTERNAL MEDICINE

## 2020-12-21 ENCOUNTER — LAB (OUTPATIENT)
Dept: LAB | Facility: HOSPITAL | Age: 75
End: 2020-12-21
Attending: INTERNAL MEDICINE
Payer: COMMERCIAL

## 2020-12-21 DIAGNOSIS — C79.51 BONE METASTASES (HCC): ICD-10-CM

## 2020-12-21 DIAGNOSIS — C34.91 NON-SMALL CELL CARCINOMA OF LUNG, STAGE 4, RIGHT (HCC): ICD-10-CM

## 2020-12-22 ENCOUNTER — HOSPITAL ENCOUNTER (OUTPATIENT)
Dept: INFUSION CENTER | Facility: HOSPITAL | Age: 75
Discharge: HOME | End: 2020-12-22
Attending: INTERNAL MEDICINE
Payer: COMMERCIAL

## 2020-12-22 VITALS
HEART RATE: 77 BPM | SYSTOLIC BLOOD PRESSURE: 129 MMHG | TEMPERATURE: 97.6 F | DIASTOLIC BLOOD PRESSURE: 61 MMHG | RESPIRATION RATE: 18 BRPM

## 2020-12-22 DIAGNOSIS — C79.51 BONE METASTASES (HCC): Primary | ICD-10-CM

## 2020-12-22 DIAGNOSIS — C34.91 NON-SMALL CELL CARCINOMA OF LUNG, STAGE 4, RIGHT (HCC): ICD-10-CM

## 2020-12-22 PROCEDURE — 96365 THER/PROPH/DIAG IV INF INIT: CPT

## 2020-12-22 RX ORDER — SODIUM CHLORIDE 9 MG/ML
20 INJECTION, SOLUTION INTRAVENOUS ONCE
Status: CANCELLED | OUTPATIENT
Start: 2021-03-16

## 2020-12-22 RX ORDER — SODIUM CHLORIDE 9 MG/ML
20 INJECTION, SOLUTION INTRAVENOUS ONCE
Status: COMPLETED | OUTPATIENT
Start: 2020-12-22 | End: 2020-12-22

## 2020-12-22 RX ADMIN — ZOLEDRONIC ACID 4 MG: 4 INJECTION, SOLUTION, CONCENTRATE INTRAVENOUS at 12:34

## 2020-12-22 RX ADMIN — SODIUM CHLORIDE 20 ML/HR: 0.9 INJECTION, SOLUTION INTRAVENOUS at 12:33

## 2021-01-11 DIAGNOSIS — K44.9 HIATAL HERNIA: ICD-10-CM

## 2021-01-11 RX ORDER — OMEPRAZOLE 20 MG/1
CAPSULE, DELAYED RELEASE ORAL
Qty: 90 CAPSULE | Refills: 1 | Status: SHIPPED | OUTPATIENT
Start: 2021-01-11 | End: 2021-08-05 | Stop reason: SDUPTHER

## 2021-01-21 ENCOUNTER — LAB (OUTPATIENT)
Dept: LAB | Facility: HOSPITAL | Age: 76
End: 2021-01-21
Attending: INTERNAL MEDICINE
Payer: COMMERCIAL

## 2021-01-21 DIAGNOSIS — C79.51 BONE METASTASES (HCC): ICD-10-CM

## 2021-01-21 DIAGNOSIS — C34.91 NON-SMALL CELL CARCINOMA OF LUNG, STAGE 4, RIGHT (HCC): ICD-10-CM

## 2021-01-21 DIAGNOSIS — J30.9 ALLERGIC RHINITIS, UNSPECIFIED SEASONALITY, UNSPECIFIED TRIGGER: ICD-10-CM

## 2021-01-21 LAB
ALBUMIN SERPL BCP-MCNC: 4.3 G/DL (ref 3–5.2)
ALP SERPL-CCNC: 111 U/L (ref 43–122)
ALT SERPL W P-5'-P-CCNC: 16 U/L (ref 9–52)
ANION GAP SERPL CALCULATED.3IONS-SCNC: 8 MMOL/L (ref 5–14)
AST SERPL W P-5'-P-CCNC: 28 U/L (ref 14–36)
BILIRUB SERPL-MCNC: 1.1 MG/DL
BUN SERPL-MCNC: 32 MG/DL (ref 5–25)
CALCIUM SERPL-MCNC: 9.3 MG/DL (ref 8.4–10.2)
CHLORIDE SERPL-SCNC: 99 MMOL/L (ref 97–108)
CO2 SERPL-SCNC: 32 MMOL/L (ref 22–30)
CREAT SERPL-MCNC: 1.42 MG/DL (ref 0.6–1.2)
GFR SERPL CREATININE-BSD FRML MDRD: 37 ML/MIN/1.73SQ M
GLUCOSE P FAST SERPL-MCNC: 97 MG/DL (ref 70–99)
POTASSIUM SERPL-SCNC: 4 MMOL/L (ref 3.6–5)
PROT SERPL-MCNC: 7.4 G/DL (ref 5.9–8.4)
SODIUM SERPL-SCNC: 139 MMOL/L (ref 137–147)

## 2021-01-21 PROCEDURE — 80053 COMPREHEN METABOLIC PANEL: CPT

## 2021-01-21 PROCEDURE — 36415 COLL VENOUS BLD VENIPUNCTURE: CPT

## 2021-01-22 ENCOUNTER — HOSPITAL ENCOUNTER (OUTPATIENT)
Dept: NUCLEAR MEDICINE | Facility: HOSPITAL | Age: 76
Discharge: HOME | End: 2021-01-22
Attending: INTERNAL MEDICINE
Payer: COMMERCIAL

## 2021-01-22 DIAGNOSIS — C34.91 NON-SMALL CELL CANCER OF RIGHT LUNG (HCC): ICD-10-CM

## 2021-01-22 DIAGNOSIS — C79.51 BONE METASTASES (HCC): ICD-10-CM

## 2021-01-22 LAB — GLUCOSE SERPL-MCNC: 91 MG/DL (ref 65–140)

## 2021-01-22 PROCEDURE — A9552 F18 FDG: HCPCS

## 2021-01-22 PROCEDURE — 78815 PET IMAGE W/CT SKULL-THIGH: CPT

## 2021-01-22 PROCEDURE — G1004 CDSM NDSC: HCPCS

## 2021-01-22 PROCEDURE — 82948 REAGENT STRIP/BLOOD GLUCOSE: CPT

## 2021-01-22 RX ORDER — FLUTICASONE PROPIONATE 50 MCG
SPRAY, SUSPENSION (ML) NASAL
Qty: 16 ML | Refills: 2 | Status: SHIPPED | OUTPATIENT
Start: 2021-01-22 | End: 2021-05-24 | Stop reason: SDUPTHER

## 2021-01-29 ENCOUNTER — OFFICE VISIT (OUTPATIENT)
Dept: HEMATOLOGY ONCOLOGY | Facility: CLINIC | Age: 76
End: 2021-01-29
Payer: COMMERCIAL

## 2021-01-29 VITALS
BODY MASS INDEX: 38.51 KG/M2 | WEIGHT: 204 LBS | HEART RATE: 67 BPM | OXYGEN SATURATION: 94 % | TEMPERATURE: 97.9 F | DIASTOLIC BLOOD PRESSURE: 68 MMHG | SYSTOLIC BLOOD PRESSURE: 128 MMHG | HEIGHT: 61 IN | RESPIRATION RATE: 18 BRPM

## 2021-01-29 DIAGNOSIS — C34.91 NON-SMALL CELL CARCINOMA OF LUNG, STAGE 4, RIGHT (HCC): Primary | ICD-10-CM

## 2021-01-29 PROCEDURE — 99214 OFFICE O/P EST MOD 30 MIN: CPT | Performed by: INTERNAL MEDICINE

## 2021-01-29 PROCEDURE — 3008F BODY MASS INDEX DOCD: CPT | Performed by: INTERNAL MEDICINE

## 2021-01-29 NOTE — PROGRESS NOTES
Hematology / Oncology Outpatient Follow Up Note    Luis Armando Leal 70 y o  female [facility] [place]:[id number]         Date:  1/29/2021    Assessment / Plan:  A 68-year-old female with metastatic adenocarcinoma of the lung with ALK rearrangement   Her lung cancer was discovered incidentally   She has no symptomatology from oncology standpoint  Megan Fritz has limited smoking history with 3-4 cigarettes for 20 years until 1986  She has right hilar mass and osseous metastasis  She is currently on Alectinib with no toxicity  Based on recent PET-CT scan, she has very good partial response  I recommended her to continue with Alectinib 600 milligram twice a day  She is in agreement with my recommendation  I will see her again in 4 months with CBC, CMP and CT scan of chest abdomen pelvis without IV contrast   She is in agreement with my recommendations       Subjective:      HPI:  A 68-year-old female who has limited smoking history   She smoked 20 years with 3-4 cigarettes per day until 1986   She recently developed right flank pain for which she went to the emergency department  Megan Fritz was found instantly found to have right lung mass   Her right flank pain disappeared without knowing the clear etiology   She underwent PET-CT scan which showed hypermetabolic right upper lobe mass as well as hypermetabolic T8 lesion   In addition, she had multiple iliac bone lesion with SUV 12 4   She underwent bronchoscopy and biopsy which showed non-small cell carcinoma   Fine-needle aspiration from 4R lymph nodes showed adenocarcinoma consistent with lung primary   She presents today to discuss the diagnosis and treatment options   She has absolutely no complaint of pain, weight loss  Megan Fritz has been sign asthma, therefore, she has mild exertional shortness of breath   She denied fever, chills or night sweats   Her performance status is normal              Interval History:  A 68-year-old female with metastatic adenocarcinoma of the lung  Albaro Quiñones lung cancer was discovered incidentally   She has no symptomatology from oncology standpoint   She has somewhat limited smoking history with 3-4 cigarettes for 20 years until 1986  She has right hilar mass as well as multiple osseous metastasis including T8  Her tumor was found to have ALK-EML4 translocation  She has been on Alectinib 600 milligram twice a day with excellent tolerance  She recently underwent PET-CT scan for disease evaluation which showed significant decrease hypermetabolism as well as size reduction of  right hilar mass and mediastinal adenopathy, consistent with good partial response  She presents today for follow-up  She feels well  She has no nausea, vomiting or diarrhea  Her weight is stable  She has no exertional shortness of breath  She has no complaint of pain  Her performance status is normal      Objective:      Primary Diagnosis:     Metastatic adenocarcinoma of the lung, with ALK rearrangements   diagnosed and August 2020       Cancer Staging:  Cancer Staging  No matching staging information was found for the patient         Previous Hematologic/ Oncologic Treatment:            Current Hematologic/ Oncologic Treatment:       Alectinib 600 mg b i d   Since late October 2020       Zometa every 3 months since September 2020       Disease Status:        Good partial response      Test Results:     Pathology:     Cytology from right upper lobe mass showed non-small cell carcinoma, consistent with lung primary   Fine-needle aspiration from 4R lymph nodes showed adenocarcinoma   ALK rearrangements positive   No evidence of ROS 1 translocation   PD L1 expression and EGFR mutation are pending      Radiology:     PET-CT scan   In January 2021 showed decreased right hilar mass as well as significant decrease of hypermetabolism in the right hilar mass, mediastinal Opti as well as bone  Metastasis      Laboratory:       See below      Physical Exam:        General Appearance:    Alert, oriented          Eyes:    PERRL   Ears:    Normal external ear canals, both ears   Nose:   Nares normal, septum midline   Throat:   Mucosa moist  Pharynx without injection  Neck:   Supple         Lungs:     Clear to auscultation bilaterally   Chest Wall:    No tenderness or deformity    Heart:    Regular rate and rhythm         Abdomen:     Soft, non-tender, bowel sounds +, no organomegaly               Extremities:   Extremities no cyanosis or edema         Skin:   no rash or icterus  Lymph nodes:   Cervical, supraclavicular, and axillary nodes normal   Neurologic:   CNII-XII intact, normal strength, sensation and reflexes     Throughout             Breast exam:   NA           ROS: Review of Systems   All other systems reviewed and are negative  Imaging: Nm Pet Ct Skull Base To Mid Thigh    Result Date: 1/22/2021  Narrative: PET/CT SCAN INDICATION: History of lung cancer  Restaging post therapy  C34 91: Malignant neoplasm of unspecified part of right bronchus or lung C79 51: Secondary malignant neoplasm of bone MODIFIER: PS COMPARISON: PET/CT 8/10/2020 and additional priors CELL TYPE:  Adenocarcinoma of the lung TECHNIQUE:   12 4 mCi F-18-FDG administered IV  Multiplanar attenuation corrected and non attenuation corrected PET images were acquired 60 minutes post injection  Contiguous, low dose, axial CT sections were obtained from the skull base through the femurs   Intravenous contrast material was not utilized  This examination, like all CT scans performed in the [facility], was performed utilizing techniques to minimize radiation dose exposure, including the use of iterative reconstruction and automated exposure control  Fasting serum glucose: 91 mg/dl FINDINGS: VISUALIZED BRAIN:   No acute abnormalities are seen  HEAD/NECK:   There is a physiologic distribution of FDG  No FDG avid cervical adenopathy is seen  CT images: Unremarkable   CHEST:   Improved FDG uptake in the right hilar region  Right hilar focus now demonstrates SUV max of 4 6, previously 19 5  No significant FDG uptake at the precarinal lymph node, SUV max here is 3 0, below mediastinal background activity, SUV max of 3 3  This now measures 1 7 x 0 9 cm image 58 series 4, smaller, previously 3 0 x 1 9 cm  Smaller 3 mm right upper lobe nodule, previously 6 mm  This is seen on image 52 series 4  Previously there was a 3 mm right apical nodule, not seen on the current exam   Asymmetric linear FDG uptake in the right shoulder musculature may be physiologic or inflammatory  CT images: Small hiatal hernia  Moderate coronary artery calcifications  ABDOMEN:   No FDG avid soft tissue lesions are seen  CT images: Stable right renal cyst   Colonic diverticulosis  PELVIS: No FDG avid soft tissue lesions are seen  Stable perianal activity  CT images: Small fibroids suggested in the uterus  OSSEOUS STRUCTURES: Improved FDG avid osseous lesions  T8 lesion demonstrates SUV max of 3 6, previously 20 9  Increased sclerosis here on CT  Right posterior 9th rib lesion demonstrates SUV max of 7 3, increased, previously 3 7  There is now what is likely a pathologic fracture here on CT with sclerosis at the margins  The fracture may explain the increase in FDG uptake here rather than malignancy  Right posterior iliac lesion no longer demonstrate significant FDG uptake, SUV max of 2 7, previously 12 4  Increased sclerosis here on CT  Left posterior iliac lesion is no longer seen, SUV max here is 3 0, previously 11 2  Increased sclerosis here on CT  No suspicious residual FDG uptake at L2  CT images: Multiple new sclerotic lesions in the osseous structures most evident in the spine without significant FDG uptake  These may represent areas of treated disease  Impression: 1  Overall findings compatible with response to therapy  2   Significant improvement in the FDG avid right hilar disease and mediastinal lymphadenopathy    Moderate FDG activity remains in the right hilar region which may represent residual disease  3  Overall improved FDG uptake in the osseous metastasis  The activity at the right posterior 9th rib has increased from the prior exam but this may be related to underlying fracture rather than malignancy  Workstation performed: [id number]         Labs:   Lab Results   Component Value Date    WBC 5 10 12/21/2020    HGB 11 0 (L) 12/21/2020    HCT 33 3 (L) 12/21/2020    MCV 88 12/21/2020     (L) 12/21/2020     Lab Results   Component Value Date    K 4 0 01/21/2021    CL 99 01/21/2021    CO2 32 (H) 01/21/2021    BUN 32 (H) 01/21/2021    CREATININE 1 42 (H) 01/21/2021    GLUF 97 01/21/2021    CALCIUM 9 3 01/21/2021    AST 28 01/21/2021    ALT 16 01/21/2021    ALKPHOS 111 01/21/2021    EGFR 37 (L) 01/21/2021         Current Medications: Reviewed  Allergies: Reviewed  PMH/FH/SH:  Reviewed      Vital Sign:    Body surface area is 1 9 meters squared      Wt Readings from Last 3 Encounters:   01/29/21 92 5 kg (204 lb)   12/09/20 96 8 kg (213 lb 6 4 oz)   11/11/20 92 5 kg (204 lb)        Temp Readings from Last 3 Encounters:   01/29/21 97 9 °F (36 6 °C) (Tympanic Core)   12/22/20 97 6 °F (36 4 °C) (Temporal)   12/09/20 97 7 °F (36 5 °C) (Temporal)        BP Readings from Last 3 Encounters:   01/29/21 128/68   12/22/20 129/61   12/09/20 112/70         Pulse Readings from Last 3 Encounters:   01/29/21 67   12/22/20 77   12/09/20 63     @LASTSAO2(3)@

## 2021-02-08 DIAGNOSIS — I10 ESSENTIAL HYPERTENSION: ICD-10-CM

## 2021-02-08 RX ORDER — IRBESARTAN 300 MG/1
TABLET ORAL
Qty: 90 TABLET | Refills: 1 | Status: SHIPPED | OUTPATIENT
Start: 2021-02-08 | End: 2021-05-24 | Stop reason: SDUPTHER

## 2021-02-23 ENCOUNTER — OFFICE VISIT (OUTPATIENT)
Dept: FAMILY MEDICINE CLINIC | Facility: CLINIC | Age: 76
End: 2021-02-23

## 2021-02-23 VITALS
HEART RATE: 68 BPM | DIASTOLIC BLOOD PRESSURE: 70 MMHG | RESPIRATION RATE: 18 BRPM | WEIGHT: 208.6 LBS | SYSTOLIC BLOOD PRESSURE: 108 MMHG | TEMPERATURE: 96.9 F | BODY MASS INDEX: 39.38 KG/M2 | OXYGEN SATURATION: 95 % | HEIGHT: 61 IN

## 2021-02-23 DIAGNOSIS — J45.30 MILD PERSISTENT ASTHMA WITHOUT COMPLICATION: Primary | ICD-10-CM

## 2021-02-23 DIAGNOSIS — I10 ESSENTIAL HYPERTENSION: ICD-10-CM

## 2021-02-23 DIAGNOSIS — E66.01 MORBID OBESITY (HCC): ICD-10-CM

## 2021-02-23 PROBLEM — N28.9 ABNORMAL RENAL FUNCTION: Status: ACTIVE | Noted: 2021-02-23

## 2021-02-23 PROCEDURE — 3078F DIAST BP <80 MM HG: CPT | Performed by: PHYSICIAN ASSISTANT

## 2021-02-23 PROCEDURE — 3074F SYST BP LT 130 MM HG: CPT | Performed by: PHYSICIAN ASSISTANT

## 2021-02-23 PROCEDURE — 3725F SCREEN DEPRESSION PERFORMED: CPT | Performed by: PHYSICIAN ASSISTANT

## 2021-02-23 PROCEDURE — 1036F TOBACCO NON-USER: CPT | Performed by: PHYSICIAN ASSISTANT

## 2021-02-23 PROCEDURE — 99214 OFFICE O/P EST MOD 30 MIN: CPT | Performed by: PHYSICIAN ASSISTANT

## 2021-02-23 RX ORDER — HYDROCHLOROTHIAZIDE 25 MG/1
25 TABLET ORAL DAILY
Qty: 90 TABLET | Refills: 1 | Status: SHIPPED | OUTPATIENT
Start: 2021-02-23 | End: 2021-05-24

## 2021-02-23 NOTE — ASSESSMENT & PLAN NOTE
She does have labs ordered for repeat this month  If continues to be elevated would stop the hydrochlorothiazide    Discussed the need to drink more water regularly and try to reduce ibuprofen use

## 2021-02-23 NOTE — ASSESSMENT & PLAN NOTE
BMI Counseling: Body mass index is 39 41 kg/m²  The BMI is above normal  Nutrition recommendations include 3-5 servings of fruits/vegetables daily and increasing intake of lean protein  recommend trying to keep a healthy diet  Increase water intake

## 2021-02-23 NOTE — PROGRESS NOTES
Assessment/Plan:    Mild persistent asthma  Continue with Advair 500/50 and  as needed albuterol  Hypertension  Controlled on current medications  Continue current medications    Morbid obesity (HCC)  BMI Counseling: Body mass index is 39 41 kg/m²  The BMI is above normal  Nutrition recommendations include 3-5 servings of fruits/vegetables daily and increasing intake of lean protein  recommend trying to keep a healthy diet  Increase water intake  Abnormal renal function  She does have labs ordered for repeat this month  If continues to be elevated would stop the hydrochlorothiazide  Discussed the need to drink more water regularly and try to reduce ibuprofen use          Problem List Items Addressed This Visit        Respiratory    Mild persistent asthma - Primary     Continue with Advair 500/50 and  as needed albuterol  Cardiovascular and Mediastinum    Hypertension     Controlled on current medications  Continue current medications         Relevant Medications    hydrochlorothiazide (HYDRODIURIL) 25 mg tablet       Other    Morbid obesity (HCC)     BMI Counseling: Body mass index is 39 41 kg/m²  The BMI is above normal  Nutrition recommendations include 3-5 servings of fruits/vegetables daily and increasing intake of lean protein  recommend trying to keep a healthy diet  Increase water intake  Subjective:      Patient ID: Claire Adhikari is a 70 y o  female  HPI 70year old F here for follow up of HTN  Since last seen she was diagnosed with lung cancer  She had recent PET scan done and it does seem to be responding to treatment  She is continuing to see pulmonology  She has been using her Advair regularly  She still does get short of breath easily  She also notices has gained weight which she believes is related to her medication for the lung cancer      When she last saw Hematology they had recommended that she cut back on her She takes 3 ibuprofen in the AM  She was taking 4 int he past  Her renal function was slightly reduced  She is not intersted in getting cortisone injections as she just got injection in her knees  She was givne tramadol  It worked for the first 2 days but then made her truong  Her anxiety and her mood have improved since she has started treatment for the cancer  She is no longer using the anxiolytic  She does sometimes have issues with sleep however it is inconsistent  The following portions of the patient's history were reviewed and updated as appropriate:   She  has a past medical history of Asthma, GERD (gastroesophageal reflux disease), Hypertension, Lumbar disc disease, Lung cancer ([facility] ), Sleep apnea, and Ventricular arrhythmia  She   Patient Active Problem List    Diagnosis Date Noted    Abnormal renal function 02/23/2021    Mild persistent asthma 11/11/2020    Bone metastases ([facility] ) 09/17/2020    Bone metastasis ([facility] ) 08/31/2020    Non-small cell carcinoma of lung, stage 4, right ([place] [address] ) 08/31/2020    MONO (obstructive sleep apnea)     Chronic rhinitis 04/12/2019    Morbid obesity ([facility] ) 04/11/2019    Chronic gastritis without bleeding 03/18/2019    Other headache syndrome 02/13/2019    Colon cancer screening 01/22/2019    Gastroesophageal reflux disease without esophagitis 01/22/2019    Spinal stenosis of 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 01/17/2019    Lumbar facet arthropathy 01/17/2019    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 01/17/2019    Hypertension 10/31/2018    Vitamin D deficiency 10/31/2018    Hiatal hernia 10/31/2018    Palpitations 10/31/2017    Premature atrial contractions 10/31/2017    Primary osteoarthritis of right wrist 05/05/2017     She  has a past surgical history that includes Rotator cuff repair; Knee surgery; Appendectomy; Shoulder surgery; Colonoscopy (N/A, 3/18/2019); and Esophagogastroduodenoscopy (N/A, 3/18/2019)    Her family history includes Diabetes in her father and mother; Hyperlipidemia in her father and mother; Hypertension in her father and mother; Lung cancer (age of onset: 71) in her sister; Lung cancer (age of onset: 79) in her father; Stroke in her mother  She  reports that she quit smoking about 34 years ago  Her smoking use included cigarettes  She started smoking about 59 years ago  She has a 0 10 pack-year smoking history  She has quit using smokeless tobacco  She reports current alcohol use  She reports that she does not use drugs  Current Outpatient Medications   Medication Sig Dispense Refill    albuterol (2 5 mg/3 mL) 0 083 % nebulizer solution Take 1 vial (2 5 mg total) by nebulization every 6 (six) hours as needed for wheezing 30 vial 1    albuterol (PROVENTIL HFA,VENTOLIN HFA) 90 mcg/act inhaler Inhale 2 puffs every 6 (six) hours as needed for wheezing 8 5 Inhaler 1    Alectinib HCl 150 MG CAPS Take 4 capsules (600 mg total) by mouth 2 (two) times a day 240 capsule 5    atenolol (TENORMIN) 25 mg tablet TAKE 1 TABLET BY MOUTH EVERY DAY 90 tablet 1    cetirizine (ZyrTEC) 10 mg tablet Take 10 mg by mouth daily      fluticasone (FLONASE) 50 mcg/act nasal spray SPRAY 2 SPRAYS INTO EACH NOSTRIL EVERY DAY 16 mL 2    fluticasone-salmeterol (ADVAIR, WIXELA) 500-50 mcg/dose inhaler Inhale 1 puff 2 (two) times a day Rinse mouth after use  3 Inhaler 1    hydrochlorothiazide (HYDRODIURIL) 25 mg tablet Take 1 tablet (25 mg total) by mouth daily 90 tablet 1    irbesartan (AVAPRO) 300 mg tablet TAKE 1 TABLET BY MOUTH DAILY AT BEDTIME 90 tablet 1    LORazepam (ATIVAN) 1 mg tablet Take 1 tablet (1 mg total) by mouth every 8 (eight) hours as needed for anxiety 30 tablet 1    omeprazole (PriLOSEC) 20 mg delayed release capsule TAKE 1 CAPSULE BY MOUTH EVERY DAY 90 capsule 1    traMADol (ULTRAM) 50 mg tablet Take 1 tablet (50 mg total) by mouth every 6 (six) hours as needed for moderate pain 30 tablet 0     No current facility-administered medications for this visit  Current Outpatient Medications on File Prior to Visit   Medication Sig    albuterol (2 5 mg/3 mL) 0 083 % nebulizer solution Take 1 vial (2 5 mg total) by nebulization every 6 (six) hours as needed for wheezing    albuterol (PROVENTIL HFA,VENTOLIN HFA) 90 mcg/act inhaler Inhale 2 puffs every 6 (six) hours as needed for wheezing    Alectinib HCl 150 MG CAPS Take 4 capsules (600 mg total) by mouth 2 (two) times a day    atenolol (TENORMIN) 25 mg tablet TAKE 1 TABLET BY MOUTH EVERY DAY    cetirizine (ZyrTEC) 10 mg tablet Take 10 mg by mouth daily    fluticasone (FLONASE) 50 mcg/act nasal spray SPRAY 2 SPRAYS INTO EACH NOSTRIL EVERY DAY    fluticasone-salmeterol (ADVAIR, WIXELA) 500-50 mcg/dose inhaler Inhale 1 puff 2 (two) times a day Rinse mouth after use   irbesartan (AVAPRO) 300 mg tablet TAKE 1 TABLET BY MOUTH DAILY AT BEDTIME    LORazepam (ATIVAN) 1 mg tablet Take 1 tablet (1 mg total) by mouth every 8 (eight) hours as needed for anxiety    omeprazole (PriLOSEC) 20 mg delayed release capsule TAKE 1 CAPSULE BY MOUTH EVERY DAY    traMADol (ULTRAM) 50 mg tablet Take 1 tablet (50 mg total) by mouth every 6 (six) hours as needed for moderate pain    [DISCONTINUED] hydrochlorothiazide (HYDRODIURIL) 25 mg tablet Take 1 tablet (25 mg total) by mouth daily    [DISCONTINUED] omeprazole (PriLOSEC) 20 mg delayed release capsule TAKE 1 CAPSULE BY MOUTH EVERY DAY     No current facility-administered medications on file prior to visit  She has No Known Allergies       Review of Systems   Constitutional: Positive for appetite change (increased) and fatigue  Negative for chills and fever  Respiratory: Negative for cough, shortness of breath and wheezing  Cardiovascular: Negative for chest pain  Gastrointestinal: Negative for abdominal distention, abdominal pain, constipation, diarrhea, nausea and vomiting  Musculoskeletal: Positive for arthralgias and back pain     Psychiatric/Behavioral: Negative for dysphoric mood  The patient is not nervous/anxious  Objective:      /70 (BP Location: Left arm, Patient Position: Sitting, Cuff Size: Standard)   Pulse 68   Temp (!) 96 9 °F (36 1 °C) (Temporal)   Resp 18   Ht 5' 1" (1 549 m)   Wt 94 6 kg (208 lb 9 6 oz)   SpO2 95%   BMI 39 41 kg/m²          Physical Exam  Vitals signs and nursing note reviewed  Constitutional:       General: She is not in acute distress  Appearance: She is well-developed  HENT:      Head: Normocephalic and atraumatic  Right Ear: External ear normal       Left Ear: External ear normal    Eyes:      Conjunctiva/sclera: Conjunctivae normal    Neck:      Musculoskeletal: Normal range of motion and neck supple  Thyroid: No thyromegaly  Cardiovascular:      Rate and Rhythm: Normal rate and regular rhythm  Heart sounds: Normal heart sounds  No murmur  Pulmonary:      Effort: Pulmonary effort is normal  No respiratory distress  Breath sounds: Normal breath sounds  No wheezing  Lymphadenopathy:      Cervical: No cervical adenopathy  Neurological:      Mental Status: She is alert and oriented to person, place, and time     Psychiatric:         Behavior: Behavior normal

## 2021-03-08 NOTE — PROGRESS NOTES
Pulmonary Follow Up Note   Luis Armando Leal 70 y o  female MRN: [id number]  3/10/2021      Assessment and Plan:    Mild intermittent asthma   - Allergy testing   +Cat dander, cockroaches, dust mites, dog dander, Total IgE 109, marginally elevated Aspergillus Fumigatus   - Advair 500-50mcg inhaler bid - given good control, will send new prescription for 250-50mcg bid   -obtain PFTs that were ordered (scheduled for April)       Non-small cell carcinoma of lung, stage 4, right (HCC)  - ALK rearrangement   - s/p palliative radiation therapy to T8 x 10 treatments  - on Alectinib as per hem/onc   - had a PET scan 1/2021 shows response to therapy   - upcoming CT chest/ abd/pelvis ordered by hem/onc      Essential hypertension  - well controlled       Morbid obesity  - counseled   - she is getting PT for her knees 3/xweek     MONO   -missed her appointment with Dr Mazin Contreras on 2/22 because of the weather, she will reschedule  - sleep study in 2019    RTC in 6 months     1  Mild intermittent asthma without complication  -     albuterol (PROVENTIL HFA,VENTOLIN HFA) 90 mcg/act inhaler; Inhale 2 puffs every 6 (six) hours as needed for wheezing  -     albuterol (2 5 mg/3 mL) 0 083 % nebulizer solution; Take 1 vial (2 5 mg total) by nebulization every 6 (six) hours as needed for wheezing    2  Mild persistent asthma without complication  -     fluticasone-salmeterol (Wixela Inhub) 250-50 mcg/dose inhaler; Inhale 1 puff 2 (two) times a day Rinse mouth after use  3  MONO (obstructive sleep apnea)    4  Essential hypertension      No follow-ups on file  History of Present Illness   HPI:  Luis Armando Leal is a 70 y o  female 70 y o  female who has a PMHx of MONO, obesity, GERD, 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 and has received palliative radiation to the spine and now on oral chemo  She has had seasonal asthma and has had a flare up with the leaves in the fall   Her asthma is very well controlled overall and she has only used her nebulizer once a week  She uses her peak flow every once in awhile and it's been 250-300 which is her baseline  She did gain 8 lbs since February and she has noticed this after the start of her chemotherapy medication  Review of Systems   Cardiovascular: Positive for leg swelling  All other systems reviewed and are negative  Historical Information   Past Medical History:   Diagnosis Date    Asthma     GERD (gastroesophageal reflux disease)     Hypertension     Lumbar disc disease     [facility] )     Sleep apnea     suspected     Ventricular arrhythmia      Past Surgical History:   Procedure Laterality Date    APPENDECTOMY      COLONOSCOPY N/A 3/18/2019    Procedure: COLONOSCOPY;  Surgeon: Alfredo Sewell DO;  Location: [facility]; Service: Gastroenterology    ESOPHAGOGASTRODUODENOSCOPY N/A 3/18/2019    Procedure: ESOPHAGOGASTRODUODENOSCOPY (EGD); Surgeon: Alfredo Sewell DO;  Location: [facility];   Service: Gastroenterology    KNEE SURGERY      ROTATOR CUFF REPAIR      SHOULDER SURGERY       Family History   Problem Relation Age of Onset    Stroke Mother     Diabetes Mother     Hyperlipidemia Mother     Hypertension Mother     Diabetes Father     Hyperlipidemia Father     Hypertension Father     Lung cancer Father 79    Lung cancer Sister 71         Meds/Allergies     Current Outpatient Medications:     albuterol (2 5 mg/3 mL) 0 083 % nebulizer solution, Take 1 vial (2 5 mg total) by nebulization every 6 (six) hours as needed for wheezing, Disp: 30 vial, Rfl: 1    albuterol (PROVENTIL HFA,VENTOLIN HFA) 90 mcg/act inhaler, Inhale 2 puffs every 6 (six) hours as needed for wheezing, Disp: 8 5 Inhaler, Rfl: 1    Alectinib HCl 150 MG CAPS, Take 4 capsules (600 mg total) by mouth 2 (two) times a day, Disp: 240 capsule, Rfl: 5    atenolol (TENORMIN) 25 mg tablet, TAKE 1 TABLET BY MOUTH EVERY DAY, Disp: 90 tablet, Rfl: 1    cetirizine (ZyrTEC) 10 mg tablet, Take 10 mg by mouth daily, Disp: , Rfl:     fluticasone (FLONASE) 50 mcg/act nasal spray, SPRAY 2 SPRAYS INTO EACH NOSTRIL EVERY DAY, Disp: 16 mL, Rfl: 2    hydrochlorothiazide (HYDRODIURIL) 25 mg tablet, Take 1 tablet (25 mg total) by mouth daily, Disp: 90 tablet, Rfl: 1    irbesartan (AVAPRO) 300 mg tablet, TAKE 1 TABLET BY MOUTH DAILY AT BEDTIME, Disp: 90 tablet, Rfl: 1    omeprazole (PriLOSEC) 20 mg delayed release capsule, TAKE 1 CAPSULE BY MOUTH EVERY DAY, Disp: 90 capsule, Rfl: 1    fluticasone-salmeterol (ADVAIR, WIXELA) 500-50 mcg/dose inhaler, Inhale 1 puff 2 (two) times a day Rinse mouth after use  (Patient not taking: Reported on 3/10/2021), Disp: 3 Inhaler, Rfl: 1  No Known Allergies    Vitals: Blood pressure 110/70, pulse 69, temperature 97 8 °F (36 6 °C), temperature source Temporal, height 5' 1" (1 549 m), weight 98 kg (216 lb), SpO2 96 %, not currently breastfeeding  Body mass index is 40 81 kg/m²  Oxygen Therapy  SpO2: 96 %  Oxygen Therapy: None (Room air)      Physical Exam  Physical Exam  Vitals signs reviewed  Constitutional:       Appearance: Normal appearance  She is obese  HENT:      Head: Normocephalic  Nose: Nose normal       Mouth/Throat:      Mouth: Mucous membranes are moist    Eyes:      Pupils: Pupils are equal, round, and reactive to light  Neck:      Musculoskeletal: Normal range of motion  Cardiovascular:      Rate and Rhythm: Normal rate and regular rhythm  Pulses: Normal pulses  Heart sounds: Normal heart sounds  Pulmonary:      Effort: Pulmonary effort is normal       Breath sounds: Normal breath sounds  Abdominal:      General: Abdomen is flat  Palpations: Abdomen is soft  Musculoskeletal:         General: Swelling present  Skin:     General: Skin is warm and dry  Neurological:      General: No focal deficit present  Mental Status: She is alert and oriented to person, place, and time  Labs:  I have personally reviewed pertinent lab results  Lab Results   Component Value Date    WBC 5 10 12/21/2020    HGB 11 0 (L) 12/21/2020    HCT 33 3 (L) 12/21/2020    MCV 88 12/21/2020     (L) 12/21/2020     Lab Results   Component Value Date    CALCIUM 9 3 01/21/2021    K 4 0 01/21/2021    CO2 32 (H) 01/21/2021    CL 99 01/21/2021    BUN 32 (H) 01/21/2021    CREATININE 1 42 (H) 01/21/2021     Lab Results   Component Value Date     11/17/2020     Lab Results   Component Value Date    ALT 16 01/21/2021    AST 28 01/21/2021    ALKPHOS 111 01/21/2021       Imaging and other studies: I have personally reviewed pertinent reports  and I have personally reviewed pertinent films in PACS    Pulmonary function testing: none     EKG, Pathology, and Other Studies: I have personally reviewed pertinent reports     and I have personally reviewed pertinent films in PACS      Minus MD Stefanie   Pulmonary and Critical Care Fellow  [facility]

## 2021-03-10 ENCOUNTER — OFFICE VISIT (OUTPATIENT)
Dept: PULMONOLOGY | Facility: CLINIC | Age: 76
End: 2021-03-10
Payer: COMMERCIAL

## 2021-03-10 VITALS
DIASTOLIC BLOOD PRESSURE: 70 MMHG | HEIGHT: 61 IN | SYSTOLIC BLOOD PRESSURE: 110 MMHG | HEART RATE: 69 BPM | WEIGHT: 216 LBS | OXYGEN SATURATION: 96 % | TEMPERATURE: 97.8 F | BODY MASS INDEX: 40.78 KG/M2

## 2021-03-10 DIAGNOSIS — J45.20 MILD INTERMITTENT ASTHMA WITHOUT COMPLICATION: Primary | ICD-10-CM

## 2021-03-10 DIAGNOSIS — G47.33 OSA (OBSTRUCTIVE SLEEP APNEA): ICD-10-CM

## 2021-03-10 DIAGNOSIS — Z23 ENCOUNTER FOR IMMUNIZATION: ICD-10-CM

## 2021-03-10 DIAGNOSIS — J45.30 MILD PERSISTENT ASTHMA WITHOUT COMPLICATION: ICD-10-CM

## 2021-03-10 DIAGNOSIS — I10 ESSENTIAL HYPERTENSION: ICD-10-CM

## 2021-03-10 PROCEDURE — 3008F BODY MASS INDEX DOCD: CPT | Performed by: INTERNAL MEDICINE

## 2021-03-10 PROCEDURE — 99213 OFFICE O/P EST LOW 20 MIN: CPT | Performed by: INTERNAL MEDICINE

## 2021-03-10 PROCEDURE — 3008F BODY MASS INDEX DOCD: CPT | Performed by: PHYSICIAN ASSISTANT

## 2021-03-10 PROCEDURE — 1036F TOBACCO NON-USER: CPT | Performed by: INTERNAL MEDICINE

## 2021-03-10 RX ORDER — ALBUTEROL SULFATE 90 UG/1
2 AEROSOL, METERED RESPIRATORY (INHALATION) EVERY 6 HOURS PRN
Qty: 8.5 INHALER | Refills: 1 | Status: SHIPPED | OUTPATIENT
Start: 2021-03-10 | End: 2021-06-10

## 2021-03-10 RX ORDER — ALBUTEROL SULFATE 2.5 MG/3ML
2.5 SOLUTION RESPIRATORY (INHALATION) EVERY 6 HOURS PRN
Qty: 30 VIAL | Refills: 1 | Status: SHIPPED | OUTPATIENT
Start: 2021-03-10 | End: 2021-08-10

## 2021-03-11 ENCOUNTER — TELEPHONE (OUTPATIENT)
Dept: HEMATOLOGY ONCOLOGY | Facility: CLINIC | Age: 76
End: 2021-03-11

## 2021-03-11 NOTE — TELEPHONE ENCOUNTER
Appointment Confirmation     Appointment with Infusion    Appointment date & time 03/16 at 12   Location [facility]   Patient verbilized Understanding  yes

## 2021-03-15 ENCOUNTER — APPOINTMENT (OUTPATIENT)
Dept: LAB | Facility: HOSPITAL | Age: 76
End: 2021-03-15
Attending: INTERNAL MEDICINE
Payer: COMMERCIAL

## 2021-03-15 ENCOUNTER — TELEPHONE (OUTPATIENT)
Dept: HEMATOLOGY ONCOLOGY | Facility: CLINIC | Age: 76
End: 2021-03-15

## 2021-03-15 DIAGNOSIS — C79.51 BONE METASTASES (HCC): Primary | ICD-10-CM

## 2021-03-15 DIAGNOSIS — C79.51 BONE METASTASES (HCC): ICD-10-CM

## 2021-03-15 LAB
ALBUMIN SERPL BCP-MCNC: 4.3 G/DL (ref 3–5.2)
ALP SERPL-CCNC: 112 U/L (ref 43–122)
ALT SERPL W P-5'-P-CCNC: <6 U/L (ref 9–52)
ANION GAP SERPL CALCULATED.3IONS-SCNC: 6 MMOL/L (ref 5–14)
AST SERPL W P-5'-P-CCNC: 31 U/L (ref 14–36)
BILIRUB SERPL-MCNC: 0.8 MG/DL
BUN SERPL-MCNC: 27 MG/DL (ref 5–25)
CALCIUM SERPL-MCNC: 9.5 MG/DL (ref 8.4–10.2)
CHLORIDE SERPL-SCNC: 98 MMOL/L (ref 97–108)
CO2 SERPL-SCNC: 36 MMOL/L (ref 22–30)
CREAT SERPL-MCNC: 1.09 MG/DL (ref 0.6–1.2)
GFR SERPL CREATININE-BSD FRML MDRD: 50 ML/MIN/1.73SQ M
GLUCOSE SERPL-MCNC: 151 MG/DL (ref 70–99)
POTASSIUM SERPL-SCNC: 3.5 MMOL/L (ref 3.6–5)
PROT SERPL-MCNC: 7.4 G/DL (ref 5.9–8.4)
SODIUM SERPL-SCNC: 140 MMOL/L (ref 137–147)

## 2021-03-15 PROCEDURE — 36415 COLL VENOUS BLD VENIPUNCTURE: CPT

## 2021-03-15 PROCEDURE — 80053 COMPREHEN METABOLIC PANEL: CPT

## 2021-03-15 NOTE — TELEPHONE ENCOUNTER
Patient's phone was breaking up  However, from what I understand, she was asking about lab work for her Zometa infusion tomorrow  I placed a new CMP as there was an issue where the  was unable to find the one for today  Patient verbalized understanding and agreed to plan

## 2021-03-16 ENCOUNTER — HOSPITAL ENCOUNTER (OUTPATIENT)
Dept: INFUSION CENTER | Facility: HOSPITAL | Age: 76
Discharge: HOME | End: 2021-03-16
Attending: INTERNAL MEDICINE
Payer: COMMERCIAL

## 2021-03-16 VITALS
RESPIRATION RATE: 18 BRPM | TEMPERATURE: 97.9 F | SYSTOLIC BLOOD PRESSURE: 136 MMHG | HEART RATE: 62 BPM | DIASTOLIC BLOOD PRESSURE: 66 MMHG

## 2021-03-16 DIAGNOSIS — C79.51 BONE METASTASES (HCC): Primary | ICD-10-CM

## 2021-03-16 DIAGNOSIS — C34.91 NON-SMALL CELL CARCINOMA OF LUNG, STAGE 4, RIGHT (HCC): ICD-10-CM

## 2021-03-16 PROCEDURE — 96365 THER/PROPH/DIAG IV INF INIT: CPT

## 2021-03-16 RX ORDER — SODIUM CHLORIDE 9 MG/ML
20 INJECTION, SOLUTION INTRAVENOUS ONCE
Status: COMPLETED | OUTPATIENT
Start: 2021-03-16 | End: 2021-03-16

## 2021-03-16 RX ORDER — SODIUM CHLORIDE 9 MG/ML
20 INJECTION, SOLUTION INTRAVENOUS ONCE
Status: CANCELLED | OUTPATIENT
Start: 2021-06-08

## 2021-03-16 RX ADMIN — SODIUM CHLORIDE 20 ML/HR: 0.9 INJECTION, SOLUTION INTRAVENOUS at 12:10

## 2021-03-16 RX ADMIN — ZOLEDRONIC ACID 3.3 MG: 4 INJECTION INTRAVENOUS at 12:25

## 2021-03-16 NOTE — PROGRESS NOTES
Pt tolerated Zometa infusion without difficulty  Next appt scheduled  Confirmed office follow up and aware of when to have labs drawn    Left ambulatory with AVS

## 2021-03-16 NOTE — PROGRESS NOTES
The lab results were reviewed for Nia han 70 y o  female  Wt Readings from Last 1 Encounters:   03/10/21 98 kg (216 lb)       Lab Results   Component Value Date    CREATININE 1 09 03/15/2021       Adjusted Scr = (Reported SCr x 1 065) + 0 067 is 1 23  If patient is greater than 65 years and Scr is less than 0 8 mg/dl, use a minimum of 0 8 mg/dL      The cockcroft-gault creatinine clearance based on patient's adjusted weight is 43 7 ml/min  If being used for non-oncology use: If CrCl >35 ml/min:  No Dosage adjustment required   If CrCl <35 ml/min: Use is contraindicated    If being used for oncology use, adjust as follows:   Baseline CrCl ml/min  Recommended Zometa Dose    Greater than 60 4 mg    50-60   3 5 mg     40-49   3 3 mg    30-39   3 mg    <30   Contact Presciber    Based on the calculations the paitent will receive a dose of 3 3 mg today        Chelsie Bach, Pharmacist

## 2021-03-16 NOTE — PLAN OF CARE
Problem: Potential for Falls  Goal: Patient will remain free of falls  Description: INTERVENTIONS:  - Assess patient frequently for physical needs  -  Identify cognitive and physical deficits and behaviors that affect risk of falls  -  [place] fall precautions as indicated by assessment   - Educate patient/family on patient safety including physical limitations  - Instruct patient to call for assistance with activity based on assessment  - Modify environment to reduce risk of injury  - Consider OT/PT consult to assist with strengthening/mobility  Outcome: Progressing     Problem: Knowledge Deficit  Goal: Patient/family/caregiver demonstrates understanding of disease process, treatment plan, medications, and discharge instructions  Description: Complete learning assessment and assess knowledge base    Interventions:  - Provide teaching at level of understanding  - Provide teaching via preferred learning methods  Outcome: Progressing

## 2021-04-05 ENCOUNTER — HOSPITAL ENCOUNTER (OUTPATIENT)
Dept: PULMONOLOGY | Facility: HOSPITAL | Age: 76
Discharge: HOME | End: 2021-04-05
Payer: COMMERCIAL

## 2021-04-05 DIAGNOSIS — J45.31 MILD PERSISTENT ASTHMA WITH ACUTE EXACERBATION: ICD-10-CM

## 2021-04-05 PROCEDURE — 94726 PLETHYSMOGRAPHY LUNG VOLUMES: CPT

## 2021-04-05 PROCEDURE — 94060 EVALUATION OF WHEEZING: CPT | Performed by: INTERNAL MEDICINE

## 2021-04-05 PROCEDURE — 94760 N-INVAS EAR/PLS OXIMETRY 1: CPT

## 2021-04-05 PROCEDURE — 94060 EVALUATION OF WHEEZING: CPT

## 2021-04-05 PROCEDURE — 94729 DIFFUSING CAPACITY: CPT | Performed by: INTERNAL MEDICINE

## 2021-04-05 PROCEDURE — 94729 DIFFUSING CAPACITY: CPT

## 2021-04-05 RX ORDER — ALBUTEROL SULFATE 2.5 MG/3ML
2.5 SOLUTION RESPIRATORY (INHALATION) ONCE AS NEEDED
Status: COMPLETED | OUTPATIENT
Start: 2021-04-05 | End: 2021-04-05

## 2021-04-05 RX ADMIN — ALBUTEROL SULFATE 2.5 MG: 2.5 SOLUTION RESPIRATORY (INHALATION) at 10:01

## 2021-05-04 ENCOUNTER — TELEPHONE (OUTPATIENT)
Dept: HEMATOLOGY ONCOLOGY | Facility: CLINIC | Age: 76
End: 2021-05-04

## 2021-05-14 ENCOUNTER — LAB (OUTPATIENT)
Dept: LAB | Facility: HOSPITAL | Age: 76
End: 2021-05-14
Attending: INTERNAL MEDICINE
Payer: COMMERCIAL

## 2021-05-14 DIAGNOSIS — C79.51 BONE METASTASES (HCC): ICD-10-CM

## 2021-05-14 DIAGNOSIS — C34.91 NON-SMALL CELL CARCINOMA OF LUNG, STAGE 4, RIGHT (HCC): ICD-10-CM

## 2021-05-14 LAB
ACANTHOCYTES BLD QL SMEAR: PRESENT
ALBUMIN SERPL BCP-MCNC: 4.1 G/DL (ref 3–5.2)
ALP SERPL-CCNC: 90 U/L (ref 43–122)
ALT SERPL W P-5'-P-CCNC: 20 U/L
ANION GAP SERPL CALCULATED.3IONS-SCNC: 6 MMOL/L (ref 5–14)
ANISOCYTOSIS BLD QL SMEAR: PRESENT
AST SERPL W P-5'-P-CCNC: 34 U/L (ref 14–36)
BASOPHILS # BLD AUTO: 0.06 THOUSAND/UL (ref 0–0.1)
BASOPHILS NFR MAR MANUAL: 1 % (ref 0–1)
BILIRUB SERPL-MCNC: 0.88 MG/DL
BUN SERPL-MCNC: 34 MG/DL (ref 5–25)
BURR CELLS BLD QL SMEAR: PRESENT
CALCIUM SERPL-MCNC: 9.6 MG/DL (ref 8.4–10.2)
CHLORIDE SERPL-SCNC: 103 MMOL/L (ref 97–108)
CO2 SERPL-SCNC: 32 MMOL/L (ref 22–30)
CREAT SERPL-MCNC: 1.22 MG/DL (ref 0.6–1.2)
EOSINOPHIL # BLD AUTO: 0.19 THOUSAND/UL (ref 0–0.4)
EOSINOPHIL NFR BLD MANUAL: 3 % (ref 0–6)
ERYTHROCYTE [DISTWIDTH] IN BLOOD BY AUTOMATED COUNT: 15 %
GFR SERPL CREATININE-BSD FRML MDRD: 44 ML/MIN/1.73SQ M
GIANT PLATELETS BLD QL SMEAR: PRESENT
GLUCOSE P FAST SERPL-MCNC: 87 MG/DL (ref 70–99)
HCT VFR BLD AUTO: 33.4 % (ref 36–46)
HGB BLD-MCNC: 11.2 G/DL (ref 12–16)
LG PLATELETS BLD QL SMEAR: PRESENT
LYMPHOCYTES # BLD AUTO: 1.05 THOUSAND/UL (ref 0.5–4)
LYMPHOCYTES # BLD AUTO: 17 % (ref 25–45)
MCH RBC QN AUTO: 30.8 PG (ref 26–34)
MCHC RBC AUTO-ENTMCNC: 33.7 G/DL (ref 31–36)
MCV RBC AUTO: 91 FL (ref 80–100)
MONOCYTES # BLD AUTO: 0.87 THOUSAND/UL (ref 0.2–0.9)
MONOCYTES NFR BLD AUTO: 14 % (ref 1–10)
NEUTS SEG # BLD: 4.03 THOUSAND/UL (ref 1.8–7.8)
NEUTS SEG NFR BLD AUTO: 65 %
PLATELET # BLD AUTO: 97 THOUSANDS/UL (ref 150–450)
PLATELET BLD QL SMEAR: ABNORMAL
PMV BLD AUTO: 12.5 FL (ref 8.9–12.7)
POTASSIUM SERPL-SCNC: 4 MMOL/L (ref 3.6–5)
PROT SERPL-MCNC: 7.2 G/DL (ref 5.9–8.4)
RBC # BLD AUTO: 3.66 MILLION/UL (ref 4–5.2)
RBC MORPH BLD: ABNORMAL
SODIUM SERPL-SCNC: 141 MMOL/L (ref 137–147)
TOTAL CELLS COUNTED SPEC: 100
WBC # BLD AUTO: 6.2 THOUSAND/UL (ref 4.5–11)

## 2021-05-14 PROCEDURE — 36415 COLL VENOUS BLD VENIPUNCTURE: CPT

## 2021-05-14 PROCEDURE — 80053 COMPREHEN METABOLIC PANEL: CPT

## 2021-05-14 PROCEDURE — 85027 COMPLETE CBC AUTOMATED: CPT

## 2021-05-14 PROCEDURE — 85007 BL SMEAR W/DIFF WBC COUNT: CPT

## 2021-05-17 ENCOUNTER — HOSPITAL ENCOUNTER (OUTPATIENT)
Dept: CT IMAGING | Facility: HOSPITAL | Age: 76
Discharge: HOME | End: 2021-05-17
Attending: INTERNAL MEDICINE
Payer: COMMERCIAL

## 2021-05-17 ENCOUNTER — RA CDI HCC (OUTPATIENT)
Dept: OTHER | Facility: HOSPITAL | Age: 76
End: 2021-05-17

## 2021-05-17 DIAGNOSIS — C34.91 NON-SMALL CELL CARCINOMA OF LUNG, STAGE 4, RIGHT (HCC): ICD-10-CM

## 2021-05-17 PROCEDURE — G1004 CDSM NDSC: HCPCS

## 2021-05-17 PROCEDURE — 71250 CT THORAX DX C-: CPT

## 2021-05-17 PROCEDURE — 74176 CT ABD & PELVIS W/O CONTRAST: CPT

## 2021-05-17 NOTE — PROGRESS NOTES
[facility]  coding opportunities             Chart reviewed, (number of) suggestions sent to provider: 2           Patients insurance company: [employer] (Med[employer]are [employer] and [employer])     Visit status: Patient arrived for their scheduled appointment   dx sent not on bill  Provider never responded to [place] [id number]  coding request     [place] [address]  coding opportunities             Chart reviewed, (number of) suggestions sent to provider: 2      DX:  C79 51-Secondary malignant neoplasm of bone  D69  6-Thrombocytopenia, [id number]       Patients insurance company: [employer] (Medicare Advantage and [employer])

## 2021-05-19 ENCOUNTER — TELEPHONE (OUTPATIENT)
Dept: HEMATOLOGY ONCOLOGY | Facility: CLINIC | Age: 76
End: 2021-05-19

## 2021-05-19 NOTE — TELEPHONE ENCOUNTER
Called and left patient a message in regards to her 5/28 appointment with Marcie Cat stated that he will not be in the office that day and that this appointment needed to be rescheduled  I was able to reschedule patient's appointment for 6/4 at 11:20am  Stated that if this date and or time does not work for patient to please give the office a call back

## 2021-05-21 ENCOUNTER — HOSPITAL ENCOUNTER (EMERGENCY)
Facility: HOSPITAL | Age: 76
Discharge: HOME | End: 2021-05-21
Attending: EMERGENCY MEDICINE | Admitting: EMERGENCY MEDICINE
Payer: COMMERCIAL

## 2021-05-21 ENCOUNTER — TELEPHONE (OUTPATIENT)
Dept: OTHER | Facility: HOSPITAL | Age: 76
End: 2021-05-21

## 2021-05-21 VITALS
RESPIRATION RATE: 20 BRPM | WEIGHT: 217.37 LBS | BODY MASS INDEX: 41.07 KG/M2 | OXYGEN SATURATION: 97 % | HEART RATE: 73 BPM | DIASTOLIC BLOOD PRESSURE: 88 MMHG | TEMPERATURE: 98.4 F | SYSTOLIC BLOOD PRESSURE: 199 MMHG

## 2021-05-21 DIAGNOSIS — D64.9 CHRONIC ANEMIA: ICD-10-CM

## 2021-05-21 DIAGNOSIS — R03.0 ELEVATED BLOOD PRESSURE READING: ICD-10-CM

## 2021-05-21 DIAGNOSIS — R06.00 DYSPNEA ON EXERTION: Primary | ICD-10-CM

## 2021-05-21 LAB
ANION GAP SERPL CALCULATED.3IONS-SCNC: 4 MMOL/L (ref 4–13)
BASOPHILS # BLD AUTO: 0.03 THOUSANDS/ΜL (ref 0–0.1)
BASOPHILS NFR BLD AUTO: 1 % (ref 0–1)
BUN SERPL-MCNC: 30 MG/DL (ref 5–25)
CALCIUM SERPL-MCNC: 9.2 MG/DL (ref 8.3–10.1)
CHLORIDE SERPL-SCNC: 106 MMOL/L (ref 100–108)
CO2 SERPL-SCNC: 34 MMOL/L (ref 21–32)
CREAT SERPL-MCNC: 1.3 MG/DL (ref 0.6–1.3)
EOSINOPHIL # BLD AUTO: 0.24 THOUSAND/ΜL (ref 0–0.61)
EOSINOPHIL NFR BLD AUTO: 4 % (ref 0–6)
ERYTHROCYTE [DISTWIDTH] IN BLOOD BY AUTOMATED COUNT: 14.1 % (ref 11.6–15.1)
GFR SERPL CREATININE-BSD FRML MDRD: 41 ML/MIN/1.73SQ M
GLUCOSE SERPL-MCNC: 97 MG/DL (ref 65–140)
HCT VFR BLD AUTO: 30.1 % (ref 34.8–46.1)
HGB BLD-MCNC: 10.2 G/DL (ref 11.5–15.4)
IMM GRANULOCYTES # BLD AUTO: 0.05 THOUSAND/UL (ref 0–0.2)
IMM GRANULOCYTES NFR BLD AUTO: 1 % (ref 0–2)
LYMPHOCYTES # BLD AUTO: 1.06 THOUSANDS/ΜL (ref 0.6–4.47)
LYMPHOCYTES NFR BLD AUTO: 17 % (ref 14–44)
MCH RBC QN AUTO: 31 PG (ref 26.8–34.3)
MCHC RBC AUTO-ENTMCNC: 33.9 G/DL (ref 31.4–37.4)
MCV RBC AUTO: 92 FL (ref 82–98)
MONOCYTES # BLD AUTO: 0.92 THOUSAND/ΜL (ref 0.17–1.22)
MONOCYTES NFR BLD AUTO: 15 % (ref 4–12)
NEUTROPHILS # BLD AUTO: 3.93 THOUSANDS/ΜL (ref 1.85–7.62)
NEUTS SEG NFR BLD AUTO: 62 % (ref 43–75)
NRBC BLD AUTO-RTO: 0 /100 WBCS
PLATELET # BLD AUTO: 97 THOUSANDS/UL (ref 149–390)
PMV BLD AUTO: 14.2 FL (ref 8.9–12.7)
POTASSIUM SERPL-SCNC: 3.9 MMOL/L (ref 3.5–5.3)
RBC # BLD AUTO: 3.29 MILLION/UL (ref 3.81–5.12)
SARS-COV-2 RNA RESP QL NAA+PROBE: NEGATIVE
SODIUM SERPL-SCNC: 144 MMOL/L (ref 136–145)
WBC # BLD AUTO: 6.23 THOUSAND/UL (ref 4.31–10.16)

## 2021-05-21 PROCEDURE — 85025 COMPLETE CBC W/AUTO DIFF WBC: CPT | Performed by: EMERGENCY MEDICINE

## 2021-05-21 PROCEDURE — 36415 COLL VENOUS BLD VENIPUNCTURE: CPT | Performed by: EMERGENCY MEDICINE

## 2021-05-21 PROCEDURE — 99285 EMERGENCY DEPT VISIT HI MDM: CPT | Performed by: EMERGENCY MEDICINE

## 2021-05-21 PROCEDURE — U0003 INFECTIOUS AGENT DETECTION BY NUCLEIC ACID (DNA OR RNA); SEVERE ACUTE RESPIRATORY SYNDROME CORONAVIRUS 2 (SARS-COV-2) (CORONAVIRUS DISEASE [COVID-19]), AMPLIFIED PROBE TECHNIQUE, MAKING USE OF HIGH THROUGHPUT TECHNOLOGIES AS DESCRIBED BY CMS-2020-01-R: HCPCS | Performed by: EMERGENCY MEDICINE

## 2021-05-21 PROCEDURE — 99285 EMERGENCY DEPT VISIT HI MDM: CPT

## 2021-05-21 PROCEDURE — 80048 BASIC METABOLIC PNL TOTAL CA: CPT | Performed by: EMERGENCY MEDICINE

## 2021-05-21 PROCEDURE — 93005 ELECTROCARDIOGRAM TRACING: CPT

## 2021-05-21 PROCEDURE — U0005 INFEC AGEN DETEC AMPLI PROBE: HCPCS | Performed by: EMERGENCY MEDICINE

## 2021-05-21 NOTE — ED PROVIDER NOTES
Emergency Department Note- Jerry Ohara 78 y o  female MRN: [id number]    Unit/Bed#: ED 12 Encounter: [id number]        History of Present Illness     Patient is a 33-year-old female history of non-small cell lung cancer, stage IV, previously treated with radiation last year, currently on daily oral chemotherapy for about a year  She follows with Hematology Oncology, had a CT the chest abdomen and pelvis on May 17th which showed improvement of her lung malignancy however also showed bilateral ground-glass opacities which may be treatment related, inflammatory, or an infectious etiology like COVID-19 Karrie Nissen She says that she was called by her oncologist today to discuss the results, asked if she had any shortness of breath and commented that for the last 3 weeks or so she has had a little bit of slight dyspnea on exertion  She was advised to go to the ED for further workup  She says the dyspnea on exertion is mild, and she would not be here in the ED if she had not been called by her oncologist   She says she has no orthopnea, no cough, no fever, no chills  No loss of taste or smell, no myalgias, no arthralgias  No chest pain, no palpitations Does have some slight chronic pedal edema secondary to the medication side effect from her chemotherapy which been present for about a year  Patient denies any prolonged travel history, no recent long travel, no immobilizations, or hospitalizations  She did complete both doses of her Pfizer COVID vaccine, 2nd dose was on March 22nd      REVIEW OF SYSTEMS     Constitutional:  No recent weight  gains or losses   Eyes:  No visual changes   ENT:  No tinnitus or hearing changes   Cardiac: As per HPI   Respiratory:  As per HPI   Abdominal:  No nausea or vomiting   Urinary: No dysuria or hematuria   Hematologic: No easy bruising or bleeding   Skin: No rash   Musculoskeletal: No aches or pains   Neurologic: No weakness or sensory changes   Psychiatric: No mood changes      Historical Information   Past Medical History:   Diagnosis Date    Asthma     GERD (gastroesophageal reflux disease)     Hypertension     Lumbar disc disease     [facility] [address] )     Sleep apnea     suspected     Ventricular arrhythmia      Past Surgical History:   Procedure Laterality Date    APPENDECTOMY      COLONOSCOPY N/A 3/18/2019    Procedure: COLONOSCOPY;  Surgeon: Amaya Tobin DO;  Location: [facility]; Service: Gastroenterology    ESOPHAGOGASTRODUODENOSCOPY N/A 3/18/2019    Procedure: ESOPHAGOGASTRODUODENOSCOPY (EGD); Surgeon: Amaya Tobin DO;  Location: [facility]; Service: Gastroenterology    KNEE SURGERY      ROTATOR CUFF REPAIR      SHOULDER SURGERY       Social History   Social History     Substance and Sexual Activity   Alcohol Use Yes    Frequency: Monthly or less    Comment: occ     Social History     Substance and Sexual Activity   Drug Use No     Social History     Tobacco Use   Smoking Status Former Smoker    Packs/day: 0 25    Years: 0 40    Pack years: 0 10    Types: Cigarettes    Start date:     Quit date: 26    Years since quittin 4   Smokeless Tobacco Former User   Tobacco Comment    quit smoking > 30 years ago      Family History:   Family History   Problem Relation Age of Onset    Stroke Mother     Diabetes Mother     Hyperlipidemia Mother     Hypertension Mother     Diabetes Father     Hyperlipidemia Father     Hypertension Father     Lung cancer Father 79    Lung cancer Sister 71       MEDICATIONS:  No current facility-administered medications on file prior to encounter        Current Outpatient Medications on File Prior to Encounter   Medication Sig Dispense Refill    albuterol (2 5 mg/3 mL) 0 083 % nebulizer solution Take 1 vial (2 5 mg total) by nebulization every 6 (six) hours as needed for wheezing 30 vial 1    albuterol (PROVENTIL HFA,VENTOLIN HFA) 90 mcg/act inhaler Inhale 2 puffs every 6 (six) hours as needed for wheezing 8 5 Inhaler 1    Alectinib HCl 150 MG CAPS Take 4 capsules (600 mg total) by mouth 2 (two) times a day 240 capsule 5    atenolol (TENORMIN) 25 mg tablet TAKE 1 TABLET BY MOUTH EVERY DAY 90 tablet 1    cetirizine (ZyrTEC) 10 mg tablet Take 10 mg by mouth daily      fluticasone (FLONASE) 50 mcg/act nasal spray SPRAY 2 SPRAYS INTO EACH NOSTRIL EVERY DAY 16 mL 2    fluticasone-salmeterol (Wixela Inhub) 250-50 mcg/dose inhaler Inhale 1 puff 2 (two) times a day Rinse mouth after use  1 Inhaler 3    hydrochlorothiazide (HYDRODIURIL) 25 mg tablet Take 1 tablet (25 mg total) by mouth daily 90 tablet 1    irbesartan (AVAPRO) 300 mg tablet TAKE 1 TABLET BY MOUTH DAILY AT BEDTIME 90 tablet 1    omeprazole (PriLOSEC) 20 mg delayed release capsule TAKE 1 CAPSULE BY MOUTH EVERY DAY (Patient not taking: Reported on 5/21/2021) 90 capsule 1    [DISCONTINUED] omeprazole (PriLOSEC) 20 mg delayed release capsule TAKE 1 CAPSULE BY MOUTH EVERY DAY 90 capsule 1     ALLERGIES:  No Known Allergies    Vitals:    05/21/21 1807   BP: (!) 199/88   TempSrc: Oral   Pulse: 73   Resp: 20   Temp: 98 4 °F (36 9 °C)       PHYSICAL EXAM    General:  Patient is well-appearing  Head:  Atraumatic  Eyes:  Conjunctiva pink  ENT:  Mucous membranes are moist  Neck:  Supple  Cardiac:  S1-S2, without murmurs  Lungs:  Clear to auscultation bilaterally  Abdomen:  Soft, nontender, normal bowel sounds, no CVA tenderness, no tympany, no rigidity, no guarding  Extremities:  Normal range of motion, trace nonpitting pedal edema, no calf asymmetry or calf swelling  Neurologic:  Awake, fluent speech, normal comprehension  AAOx3     Skin:  Pink warm and dry  Psychiatric:  Alert, pleasant, cooperative            Labs Reviewed   BASIC METABOLIC PANEL - Abnormal       Result Value Ref Range Status    Sodium 144  136 - 145 mmol/L Final    Potassium 3 9  3 5 - 5 3 mmol/L Final    Chloride 106  100 - 108 mmol/L Final    CO2 34 (*) 21 - 32 mmol/L Final    ANION GAP 4  4 - 13 mmol/L Final    BUN 30 (*) 5 - 25 mg/dL Final    Creatinine 1 30  0 60 - 1 30 mg/dL Final    Comment: Standardized to IDMS reference method    Glucose 97  65 - 140 mg/dL Final    Comment: If the patient is fasting, the ADA then defines impaired fasting glucose as > 100 mg/dL and diabetes as > or equal to 123 mg/dL  Specimen collection should occur prior to Sulfasalazine administration due to the potential for falsely depressed results  Specimen collection should occur prior to Sulfapyridine administration due to the potential for falsely elevated results      Calcium 9 2  8 3 - 10 1 mg/dL Final    eGFR 41  ml/min/1 73sq m Final    Narrative:     [place] guidelines for Chronic Kidney Disease (CKD):     Stage 1 with normal or high GFR (GFR > 90 mL/min/1 73 square meters)    Stage 2 Mild CKD (GFR = 60-89 mL/min/1 73 square meters)    Stage 3A Moderate CKD (GFR = 45-59 mL/min/1 73 square meters)    Stage 3B Moderate CKD (GFR = 30-44 mL/min/1 73 square meters)    Stage 4 Severe CKD (GFR = 15-29 mL/min/1 73 square meters)    Stage 5 End Stage CKD (GFR <15 mL/min/1 73 square meters)  Note: GFR calculation is accurate only with a steady state creatinine   CBC AND DIFFERENTIAL - Abnormal    WBC 6 23  4 31 - 10 16 Thousand/uL Final    RBC 3 29 (*) 3 81 - 5 12 Million/uL Final    Hemoglobin 10 2 (*) 11 5 - 15 4 g/dL Final    Hematocrit 30 1 (*) 34 8 - 46 1 % Final    MCV 92  82 - 98 fL Final    MCH 31 0  26 8 - 34 3 pg Final    MCHC 33 9  31 4 - 37 4 g/dL Final    RDW 14 1  11 6 - 15 1 % Final    MPV 14 2 (*) 8 9 - 12 7 fL Final    Platelets 97 (*) 203 - 390 Thousands/uL Final    nRBC 0  /100 WBCs Final    Neutrophils Relative 62  43 - 75 % Final    Immat GRANS % 1  0 - 2 % Final    Lymphocytes Relative 17  14 - 44 % Final    Monocytes Relative 15 (*) 4 - 12 % Final    Eosinophils Relative 4  0 - 6 % Final    Basophils Relative 1  0 - 1 % Final    Neutrophils Absolute 3 93  1 85 - 7 62 Thousands/µL Final    Immature Grans Absolute 0 05  0 00 - 0 20 Thousand/uL Final    Lymphocytes Absolute 1 06  0 60 - 4 47 Thousands/µL Final    Monocytes Absolute 0 92  0 17 - 1 22 Thousand/µL Final    Eosinophils Absolute 0 24  0 00 - 0 61 Thousand/µL Final    Basophils Absolute 0 03  0 00 - 0 10 Thousands/µL Final   NOVEL CORONAVIRUS (COVID-19), PCR [facility] - Normal    SARS-CoV-2 Negative  Negative Final    Comment:      Narrative: The specimen collection materials, transport medium, and/or testing methodology utilized in the production of these test results have been proven to be reliable in a limited validation with an abbreviated program under the Emergency Utilization Authorization provided by the FDA  Testing reported as "Presumptive positive" will be confirmed with secondary testing to ensure result accuracy  Clinical caution and judgement should be used with the interpretation of these results with consideration of the clinical impression and other laboratory testing  Testing reported as "Positive" or "Negative" has been proven to be accurate according to standard laboratory validation requirements  All testing is performed with control materials showing appropriate reactivity at standard intervals  Medications - No data to display    No orders to display       ED Course as of May 21 1942   Fri May 21, 2021   1835 ECG interpreted by me, sinus rhythm, rate of 65, no axis deviation, no ischemic or infarct changes, no ST segment elevation or depression, no acute change from July 22, 2020      1925 Patient with slight chronic normocytic anemia      1936 On reassessment there is no change the above findings, patient said she was feeling okay, felt comfortable going home          Assessment/Plan     ED Medical Decision Making: The cause the patient's symptoms is unclear but unlikely to represent an acute emergency  I suspect her CT findings are secondary to post treatment changes    She has no clinical symptoms of pneumonia, has clear lungs, no signs of heart failure or orthopnea  She has an appointment in 3 days with her primary care team which I encouraged her to keep  While the cause of the patient's complaints is most likely benign, it is possible that this is the early presentation of a more serious condition  This diagnostic uncertainty was discussed with the patient, the importance of follow up care, as well as the need to return to immediately return to the closest emergency department for concerning signs and symptoms  The patient stated they were aware of this diagnostic uncertainty, understood the importance of follow up and were comfortable being discharged  I believe that discharge home with outpatient follow up for further evaluation is medically appropriate  Supportive care, importance of follow-up and return precautions were discussed with the patient, who expressed understanding  Time reflects when diagnosis was documented in both MDM as applicable and the Disposition within this note     Time User Action Codes Description Comment    5/21/2021  7:40 PM Lilton [place] Add [R06 00] Dyspnea on exertion     5/21/2021  7:40 PM Lilton [place] Add [R03 0] Elevated blood pressure reading     5/21/2021  7:41 PM Lilton [place] Add [D64 9] Chronic anemia       ED Disposition     ED Disposition Condition Date/Time Comment    Discharge Stable Fri May 21, 2021  7:40 PM Maria Eugenia De León discharge to home/self care              Follow-up Information     Follow up With Specialties Details Why Contact Info    Nel Jimenez PA-C Family Medicine, Physician Assistant Go in 3 days Keep your scheduled appointment [address]  [address]  [place] [address]  [phone]            New Prescriptions    No medications on file            Rubio Ramirez DO  05/21/21 9544

## 2021-05-21 NOTE — TELEPHONE ENCOUNTER
I was called by the radiology department regarding the CT scan results of the chest abdomen pelvis done on 05/17 which showed some improvement of her lung malignancy  However, she seems to have bilateral ground-glass opacities most likely compatible with inflammatory process versus infectious etiology like COVID-19  The patient was called the she stated that she gets dyspneic on exertion mainly  She stated that she had the COVID-19 vaccination  I did advise her to get to the emergency room with any hint of worsening of her breathing

## 2021-05-21 NOTE — DISCHARGE INSTRUCTIONS
Your blood pressure was elevated in the emergency department today  You should discuss this with your doctor when seen for follow-up  Shortness of Breath  DISCHARGE INSTRUCTIONS:   Return to the emergency department if:   You have shaking chills or a fever over 102°F      You have new pain, pressure, or tightness in your chest      You have a new or worse cough or wheezing, or you cough up blood  You feel like you cannot get enough air  The skin over your ribs or on your neck sinks in when you breathe  You have a severe headache with vomiting and abdominal pain  You feel confused or dizzy  Contact your healthcare provider or specialist if:   You have questions or concerns about your condition or care

## 2021-05-21 NOTE — ED NOTES
Pt reports that she does not want RN to place IV at this time  Requesting that she have labs done with a butterfly needle but no IV placement  Pt reports that she is "a very hard stick and need US for IV doesn't want IV if she doesn't have to"   Labs drawn from [facility] with butterfly needle per pt request       Ar Gar RN  05/21/21 3678

## 2021-05-22 LAB
ATRIAL RATE: 65 BPM
ATRIAL RATE: 66 BPM
P AXIS: 55 DEGREES
P AXIS: 57 DEGREES
PR INTERVAL: 168 MS
PR INTERVAL: 180 MS
QRS AXIS: 55 DEGREES
QRS AXIS: 55 DEGREES
QRSD INTERVAL: 92 MS
QRSD INTERVAL: 94 MS
QT INTERVAL: 392 MS
QT INTERVAL: 394 MS
QTC INTERVAL: 409 MS
QTC INTERVAL: 410 MS
T WAVE AXIS: 28 DEGREES
T WAVE AXIS: 32 DEGREES
VENTRICULAR RATE: 65 BPM
VENTRICULAR RATE: 66 BPM

## 2021-05-22 PROCEDURE — 93010 ELECTROCARDIOGRAM REPORT: CPT | Performed by: INTERNAL MEDICINE

## 2021-05-24 ENCOUNTER — OFFICE VISIT (OUTPATIENT)
Dept: FAMILY MEDICINE CLINIC | Facility: CLINIC | Age: 76
End: 2021-05-24

## 2021-05-24 VITALS
SYSTOLIC BLOOD PRESSURE: 122 MMHG | TEMPERATURE: 97.6 F | DIASTOLIC BLOOD PRESSURE: 60 MMHG | WEIGHT: 217 LBS | HEART RATE: 74 BPM | RESPIRATION RATE: 16 BRPM | BODY MASS INDEX: 40.97 KG/M2 | OXYGEN SATURATION: 96 % | HEIGHT: 61 IN

## 2021-05-24 DIAGNOSIS — I10 ESSENTIAL HYPERTENSION: ICD-10-CM

## 2021-05-24 DIAGNOSIS — J45.31 MILD PERSISTENT ASTHMA WITH ACUTE EXACERBATION: ICD-10-CM

## 2021-05-24 DIAGNOSIS — F33.9 DEPRESSION, RECURRENT (HCC): ICD-10-CM

## 2021-05-24 DIAGNOSIS — J30.9 ALLERGIC RHINITIS, UNSPECIFIED SEASONALITY, UNSPECIFIED TRIGGER: ICD-10-CM

## 2021-05-24 DIAGNOSIS — J45.31 MILD PERSISTENT ASTHMA WITH EXACERBATION: Primary | ICD-10-CM

## 2021-05-24 DIAGNOSIS — J31.0 CHRONIC RHINITIS: ICD-10-CM

## 2021-05-24 DIAGNOSIS — E66.01 MORBID OBESITY (HCC): ICD-10-CM

## 2021-05-24 DIAGNOSIS — R60.0 LOWER EXTREMITY EDEMA: ICD-10-CM

## 2021-05-24 PROCEDURE — 3725F SCREEN DEPRESSION PERFORMED: CPT | Performed by: PHYSICIAN ASSISTANT

## 2021-05-24 PROCEDURE — 99214 OFFICE O/P EST MOD 30 MIN: CPT | Performed by: PHYSICIAN ASSISTANT

## 2021-05-24 PROCEDURE — 1036F TOBACCO NON-USER: CPT | Performed by: PHYSICIAN ASSISTANT

## 2021-05-24 PROCEDURE — 1160F RVW MEDS BY RX/DR IN RCRD: CPT | Performed by: PHYSICIAN ASSISTANT

## 2021-05-24 RX ORDER — PREDNISONE 20 MG/1
40 TABLET ORAL DAILY
Qty: 10 TABLET | Refills: 0 | Status: SHIPPED | OUTPATIENT
Start: 2021-05-24 | End: 2021-05-29

## 2021-05-24 RX ORDER — IRBESARTAN 300 MG/1
300 TABLET ORAL
Qty: 90 TABLET | Refills: 1 | Status: SHIPPED | OUTPATIENT
Start: 2021-05-24 | End: 2021-12-27

## 2021-05-24 RX ORDER — FLUTICASONE PROPIONATE 50 MCG
2 SPRAY, SUSPENSION (ML) NASAL DAILY
Qty: 16 ML | Refills: 5 | Status: SHIPPED | OUTPATIENT
Start: 2021-05-24 | End: 2021-10-22

## 2021-05-24 RX ORDER — ATENOLOL 25 MG/1
25 TABLET ORAL DAILY
Qty: 90 TABLET | Refills: 1 | Status: SHIPPED | OUTPATIENT
Start: 2021-05-24 | End: 2021-12-27

## 2021-05-24 RX ORDER — FUROSEMIDE 20 MG/1
20 TABLET ORAL DAILY
Qty: 90 TABLET | Refills: 1 | Status: SHIPPED | OUTPATIENT
Start: 2021-05-24 | End: 2021-10-22

## 2021-05-24 NOTE — ASSESSMENT & PLAN NOTE
BMI Counseling: Body mass index is 41 kg/m²  The BMI is above normal  Nutrition recommendations include reducing intake of cholesterol  limit salt intake

## 2021-05-24 NOTE — ASSESSMENT & PLAN NOTE
Continue with Advair daily  Continue taking Flonase and cetirizine for allergies  We discussed going to the emergency room if symptoms get worse or she is not getting any relief from albuterol

## 2021-05-24 NOTE — ASSESSMENT & PLAN NOTE
She stop hydrochlorothiazide which she was on for both edema and hypertension and start furosemide daily  Recommend recheck of CMP and another 2-3 weeks     Discussed wearing compression socks regularly

## 2021-05-24 NOTE — PROGRESS NOTES
Assessment/Plan:    Depression, recurrent ([facility] )   Doing well  Not currently depressed    Mild persistent asthma  Continue with Advair daily  Continue taking Flonase and cetirizine for allergies  We discussed going to the emergency room if symptoms get worse or she is not getting any relief from albuterol  Chronic rhinitis    Continue Flonase and cetirizine    Lower extremity edema   She stop hydrochlorothiazide which she was on for both edema and hypertension and start furosemide daily  Recommend recheck of CMP and another 2-3 weeks  Discussed wearing compression socks regularly    Morbid obesity ([facility] )  BMI Counseling: Body mass index is 41 kg/m²  The BMI is above normal  Nutrition recommendations include reducing intake of cholesterol  limit salt intake           Problem List Items Addressed This Visit        Respiratory    Chronic rhinitis       Continue Flonase and cetirizine         Mild persistent asthma     Continue with Advair daily  Continue taking Flonase and cetirizine for allergies  We discussed going to the emergency room if symptoms get worse or she is not getting any relief from albuterol  Cardiovascular and Mediastinum    Hypertension    Relevant Medications    furosemide (LASIX) 20 mg tablet    irbesartan (AVAPRO) 300 mg tablet    atenolol (TENORMIN) 25 mg tablet       Other    Morbid obesity (HCC)     BMI Counseling: Body mass index is 41 kg/m²  The BMI is above normal  Nutrition recommendations include reducing intake of cholesterol  limit salt intake           Depression, recurrent ([facility] )      Doing well  Not currently depressed         Lower extremity edema      She stop hydrochlorothiazide which she was on for both edema and hypertension and start furosemide daily  Recommend recheck of CMP and another 2-3 weeks     Discussed wearing compression socks regularly         Relevant Medications    furosemide (LASIX) 20 mg tablet    Other Relevant Orders    Comprehensive metabolic panel      Other Visit Diagnoses     Mild persistent asthma with exacerbation    -  Primary    Relevant Medications    predniSONE 20 mg tablet    Allergic rhinitis, unspecified seasonality, unspecified trigger        Relevant Medications    predniSONE 20 mg tablet    fluticasone (FLONASE) 50 mcg/act nasal spray            Subjective:      Patient ID: Dvaid Sierra is a 78 y o  female  HPI  78year old F here for follow up of HTN and asthma  Patient is following with oncology for lung cancer, non small cell stage 4 on the right lung  She is currently on chemotherapy and did have radiation last year  CT on 5/17 showed ground glass appearance and she had been having mild BERTRAND  X 3 weeks  She was recommended to go to ED to R/O COVID  She did have 2 doses of pfiezer vaccine with last on 3/22  She has been using nebulizer  It helps a little and then comes back  It does feel like her asthma  She has had  A lot of allergy symptoms  She does take have pollen allergies  She takes zyrtec for it  Peak flow was at 250 today and has been at 350  She is using the advair daily  She is  Using the flonase and it helps with sinus congestion  She typically needs prednisone when it gets like this  She has been using her albuterol frequently every 4 hours sometimes more  She has also noticed that she has been having more leg swelling  This has been ongoing to prior to the shortness of breath  She has been taking hydrochlorothiazide for a long time  She was told that her chemotherapy medication can also cause leg swelling  The following portions of the patient's history were reviewed and updated as appropriate:   She  has a past medical history of Asthma, GERD (gastroesophageal reflux disease), Hypertension, Lumbar disc disease, Lung cancer ([facility] ), Sleep apnea, and Ventricular arrhythmia    She   Patient Active Problem List    Diagnosis Date Noted    Depression, recurrent ([facility] ) 05/24/2021    Lower extremity edema 05/24/2021    Abnormal renal function 02/23/2021    Mild persistent asthma 11/11/2020    Bone metastases ([facility] ) 09/17/2020    Bone metastasis ([facility] ) 08/31/2020    Non-small cell carcinoma of lung, stage 4, right ([facility] ) 08/31/2020    MONO (obstructive sleep apnea)     Chronic rhinitis 04/12/2019    Morbid obesity ([place] [address] ) 04/11/2019    Chronic gastritis without bleeding 03/18/2019    Other headache syndrome 02/13/2019    Colon cancer screening 01/22/2019    Gastroesophageal reflux disease without esophagitis 01/22/2019    Spinal stenosis of 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 01/17/2019    Lumbar facet arthropathy 01/17/2019    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 01/17/2019    Hypertension 10/31/2018    Vitamin D deficiency 10/31/2018    Hiatal hernia 10/31/2018    Palpitations 10/31/2017    Premature atrial contractions 10/31/2017    Primary osteoarthritis of right wrist 05/05/2017     She  has a past surgical history that includes Rotator cuff repair; Knee surgery; Appendectomy; Shoulder surgery; Colonoscopy (N/A, 3/18/2019); and Esophagogastroduodenoscopy (N/A, 3/18/2019)  Her family history includes Diabetes in her father and mother; Hyperlipidemia in her father and mother; Hypertension in her father and mother; Lung cancer (age of onset: 71) in her sister; Lung cancer (age of onset: 79) in her father; Stroke in her mother  She  reports that she quit smoking about 34 years ago  Her smoking use included cigarettes  She started smoking about 59 years ago  She has a 0 10 pack-year smoking history  She has quit using smokeless tobacco  She reports current alcohol use  She reports that she does not use drugs    Current Outpatient Medications   Medication Sig Dispense Refill    albuterol (2 5 mg/3 mL) 0 083 % nebulizer solution Take 1 vial (2 5 mg total) by nebulization every 6 (six) hours as needed for wheezing 30 vial 1    albuterol (PROVENTIL HFA,VENTOLIN HFA) 90 mcg/act inhaler Inhale 2 puffs every 6 (six) hours as needed for wheezing 8 5 Inhaler 1    Alectinib HCl 150 MG CAPS Take 4 capsules (600 mg total) by mouth 2 (two) times a day 240 capsule 5    atenolol (TENORMIN) 25 mg tablet Take 1 tablet (25 mg total) by mouth daily 90 tablet 1    fluticasone (FLONASE) 50 mcg/act nasal spray 2 sprays into each nostril daily 16 mL 5    irbesartan (AVAPRO) 300 mg tablet Take 1 tablet (300 mg total) by mouth daily at bedtime 90 tablet 1    cetirizine (ZyrTEC) 10 mg tablet Take 10 mg by mouth daily      fluticasone-salmeterol (Wixela Inhub) 250-50 mcg/dose inhaler Inhale 1 puff 2 (two) times a day Rinse mouth after use  (Patient not taking: Reported on 5/24/2021) 1 Inhaler 3    furosemide (LASIX) 20 mg tablet Take 1 tablet (20 mg total) by mouth daily 90 tablet 1    omeprazole (PriLOSEC) 20 mg delayed release capsule TAKE 1 CAPSULE BY MOUTH EVERY DAY (Patient not taking: Reported on 5/21/2021) 90 capsule 1    predniSONE 20 mg tablet Take 2 tablets (40 mg total) by mouth daily for 5 days 10 tablet 0     No current facility-administered medications for this visit        Current Outpatient Medications on File Prior to Visit   Medication Sig    albuterol (2 5 mg/3 mL) 0 083 % nebulizer solution Take 1 vial (2 5 mg total) by nebulization every 6 (six) hours as needed for wheezing    albuterol (PROVENTIL HFA,VENTOLIN HFA) 90 mcg/act inhaler Inhale 2 puffs every 6 (six) hours as needed for wheezing    Alectinib HCl 150 MG CAPS Take 4 capsules (600 mg total) by mouth 2 (two) times a day    [DISCONTINUED] atenolol (TENORMIN) 25 mg tablet TAKE 1 TABLET BY MOUTH EVERY DAY    [DISCONTINUED] fluticasone (FLONASE) 50 mcg/act nasal spray SPRAY 2 SPRAYS INTO EACH NOSTRIL EVERY DAY    [DISCONTINUED] hydrochlorothiazide (HYDRODIURIL) 25 mg tablet Take 1 tablet (25 mg total) by mouth daily    [DISCONTINUED] irbesartan (AVAPRO) 300 mg tablet TAKE 1 TABLET BY MOUTH DAILY AT BEDTIME    cetirizine (ZyrTEC) 10 mg tablet Take 10 mg by mouth daily    fluticasone-salmeterol (Wixela Inhub) 250-50 mcg/dose inhaler Inhale 1 puff 2 (two) times a day Rinse mouth after use  (Patient not taking: Reported on 5/24/2021)    omeprazole (PriLOSEC) 20 mg delayed release capsule TAKE 1 CAPSULE BY MOUTH EVERY DAY (Patient not taking: Reported on 5/21/2021)    [DISCONTINUED] omeprazole (PriLOSEC) 20 mg delayed release capsule TAKE 1 CAPSULE BY MOUTH EVERY DAY     No current facility-administered medications on file prior to visit  She has No Known Allergies       Review of Systems   Constitutional: Negative for activity change, appetite change, chills, fatigue and unexpected weight change  HENT: Negative for dental problem, ear pain, hearing loss and sore throat  Eyes: Negative for visual disturbance  Respiratory: Positive for cough, shortness of breath and wheezing  Cardiovascular: Positive for leg swelling  Negative for chest pain  Gastrointestinal: Negative for abdominal pain, constipation, diarrhea and vomiting  Genitourinary: Negative for difficulty urinating and dysuria  Musculoskeletal: Negative for arthralgias, back pain and myalgias  Skin: Negative for rash  Neurological: Negative for dizziness and headaches  Psychiatric/Behavioral: Negative for behavioral problems  Objective:      /60 (BP Location: Left arm, Patient Position: Sitting, Cuff Size: Large)   Pulse 74   Temp 97 6 °F (36 4 °C) (Temporal)   Resp 16   Ht 5' 1" (1 549 m)   Wt 98 4 kg (217 lb)   SpO2 96%   Breastfeeding No   BMI 41 00 kg/m²          Physical Exam  Vitals signs and nursing note reviewed  Constitutional:       Appearance: She is well-developed  She is obese  HENT:      Head: Normocephalic and atraumatic        Right Ear: External ear normal       Left Ear: External ear normal       Nose: Nose normal    Eyes:      Conjunctiva/sclera: Conjunctivae normal    Neck:      Musculoskeletal: Normal range of motion and neck supple  Thyroid: No thyromegaly  Cardiovascular:      Rate and Rhythm: Normal rate and regular rhythm  Heart sounds: Normal heart sounds  No murmur  Comments: +1 pitting edema both feet  Pulmonary:      Effort: Pulmonary effort is normal  No respiratory distress  Breath sounds: Wheezing present  Abdominal:      General: Bowel sounds are normal       Palpations: Abdomen is soft  There is no mass  Tenderness: There is no abdominal tenderness  There is no guarding  Musculoskeletal: Normal range of motion  Lymphadenopathy:      Cervical: No cervical adenopathy  Skin:     General: Skin is warm  Neurological:      Mental Status: She is alert and oriented to person, place, and time     Psychiatric:         Mood and Affect: Mood normal          Behavior: Behavior normal

## 2021-05-27 ENCOUNTER — TELEPHONE (OUTPATIENT)
Dept: HEMATOLOGY ONCOLOGY | Facility: CLINIC | Age: 76
End: 2021-05-27

## 2021-05-27 DIAGNOSIS — C34.91 NON-SMALL CELL CANCER OF RIGHT LUNG (HCC): ICD-10-CM

## 2021-05-27 DIAGNOSIS — C79.51 BONE METASTASIS (HCC): ICD-10-CM

## 2021-05-27 NOTE — TELEPHONE ENCOUNTER
Patient is calling for a refill on her Alectinib HCl   Sig: Take 4 capsules (600 mg total) by mouth 2 (two) times a day  Patient uses   Pharmacy:  [facility], [address] [facility]   Phone:  [phone]     5/17/21 CT scan C/A/P  Patient had a 5/21/21 ED visit(note in Epic)    Follow up appointment 6/4/21 with Dr Cali Raines      Forwarded to Dr Steven Davis RN for refill review with MD

## 2021-06-02 DIAGNOSIS — C34.91 NON-SMALL CELL CANCER OF RIGHT LUNG (HCC): ICD-10-CM

## 2021-06-02 DIAGNOSIS — C79.51 BONE METASTASIS (HCC): ICD-10-CM

## 2021-06-04 ENCOUNTER — OFFICE VISIT (OUTPATIENT)
Dept: HEMATOLOGY ONCOLOGY | Facility: CLINIC | Age: 76
End: 2021-06-04
Payer: COMMERCIAL

## 2021-06-04 VITALS
RESPIRATION RATE: 18 BRPM | SYSTOLIC BLOOD PRESSURE: 132 MMHG | HEIGHT: 61 IN | DIASTOLIC BLOOD PRESSURE: 76 MMHG | WEIGHT: 219 LBS | TEMPERATURE: 96.9 F | BODY MASS INDEX: 41.35 KG/M2 | HEART RATE: 73 BPM | OXYGEN SATURATION: 95 %

## 2021-06-04 DIAGNOSIS — C34.91 NON-SMALL CELL CARCINOMA OF LUNG, STAGE 4, RIGHT (HCC): Primary | ICD-10-CM

## 2021-06-04 DIAGNOSIS — C79.51 BONE METASTASIS (HCC): ICD-10-CM

## 2021-06-04 PROCEDURE — 99214 OFFICE O/P EST MOD 30 MIN: CPT | Performed by: INTERNAL MEDICINE

## 2021-06-04 PROCEDURE — 1036F TOBACCO NON-USER: CPT | Performed by: INTERNAL MEDICINE

## 2021-06-04 PROCEDURE — 3008F BODY MASS INDEX DOCD: CPT | Performed by: PHYSICIAN ASSISTANT

## 2021-06-04 PROCEDURE — 3075F SYST BP GE 130 - 139MM HG: CPT | Performed by: INTERNAL MEDICINE

## 2021-06-04 PROCEDURE — 3008F BODY MASS INDEX DOCD: CPT | Performed by: INTERNAL MEDICINE

## 2021-06-04 PROCEDURE — 3078F DIAST BP <80 MM HG: CPT | Performed by: INTERNAL MEDICINE

## 2021-06-04 PROCEDURE — 1160F RVW MEDS BY RX/DR IN RCRD: CPT | Performed by: INTERNAL MEDICINE

## 2021-06-04 RX ORDER — LORATADINE 10 MG/1
CAPSULE, LIQUID FILLED ORAL
COMMUNITY
End: 2021-11-05

## 2021-06-04 NOTE — PROGRESS NOTES
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   Her lung cancer was discovered incidentally   She has no symptomatology from oncology standpoint  Gayatri Win has limited smoking history with 3-4 cigarettes for 20 years until 1986  She has right hilar mass and osseous metastasis  She is currently on Alectinib with no toxicity , resulting in near complete resolution of mass  Recent CT scan showed no evidence of progression  I recommended her to continue with Alectinib 600 mg b i d  She will continue with Zometa every 3 months  I will see her again in 4 months with another CBC, CMP and CT scan of chest abdomen pelvis  She is in agreement with my recommendations        Subjective:      HPI:  A 24-year-old female who has limited smoking history   She smoked 20 years with 3-4 cigarettes per day until 1986   She recently developed right flank pain for which she went to the emergency department  Gayatri Win was found instantly found to have right lung mass   Her right flank pain disappeared without knowing the clear etiology   She underwent PET-CT scan which showed hypermetabolic right upper lobe mass as well as hypermetabolic T8 lesion   In addition, she had multiple iliac bone lesion with SUV 12 4   She underwent bronchoscopy and biopsy which showed non-small cell carcinoma   Fine-needle aspiration from 4R lymph nodes showed adenocarcinoma consistent with lung primary   She presents today to discuss the diagnosis and treatment options   She has absolutely no complaint of pain, weight loss  Gayatri Win has been sign asthma, therefore, she has mild exertional shortness of breath   She denied fever, chills or night sweats   Her performance status is normal              Interval History:  A 24-year-old female with metastatic adenocarcinoma of the lung  Dar Shoemaker lung cancer was discovered incidentally   She has no symptomatology from oncology standpoint   She has somewhat limited smoking history with 3-4 cigarettes for 20 years until 1986  She has right hilar mass as well as multiple osseous metastasis including T8   Her tumor was found to have ALK-EML4 translocation  She has been on Alectinib 600 milligram twice a day with excellent tolerance  She already had major response on Alectinib  She presents today for routine follow-up  She has mild exertional shortness of breath  She denied any pain  Her weight is stable  She has no cough or sputum production  Her recent CT scan showed non visible right hilar mass  Scattered ground-glass opacity was noted  Her oxygen saturation is between 95-97%  Her performance status is normal        Objective:      Primary Diagnosis:     Metastatic adenocarcinoma of the lung, with ALK rearrangements   diagnosed and August 2020       Cancer Staging:  Cancer Staging  No matching staging information was found for the patient         Previous Hematologic/ Oncologic Treatment:            Current Hematologic/ Oncologic Treatment:       Alectinib 600 mg b i d  since late October 2020       Zometa every 3 months since September 2020       Disease Status:        Good partial response      Test Results:     Pathology:     Cytology from right upper lobe mass showed non-small cell carcinoma, consistent with lung primary   Fine-needle aspiration from 4R lymph nodes showed adenocarcinoma   ALK rearrangements positive   No evidence of ROS 1 translocation   PD L1 expression and EGFR mutation are pending      Radiology:       CT scan of chest abdomen pelvis in May 2021 showed primary right hilar malignancy is not visualized  Resolution of right upper lobe pulmonary nodule  Stable multifocal sclerotic osseous lesion  Scattered ill-defined ground-glass opacities    I personally reviewed this films      Laboratory:       See below      Physical Exam:        General Appearance:    Alert, oriented          Eyes:    PERRL   Ears:    Normal external ear canals, both ears   Nose:   Nares normal, septum midline   Throat:   Mucosa moist  Pharynx without injection  Neck:   Supple         Lungs:     Clear to auscultation bilaterally   Chest Wall:    No tenderness or deformity    Heart:    Regular rate and rhythm         Abdomen:     Soft, non-tender, bowel sounds +, no organomegaly               Extremities:   Extremities no cyanosis or edema         Skin:   no rash or icterus  Lymph nodes:   Cervical, supraclavicular, and axillary nodes normal   Neurologic:   CNII-XII intact, normal strength, sensation and reflexes     Throughout             Breast exam:   NA           ROS: Review of Systems   All other systems reviewed and are negative  Imaging: Ct Chest Abdomen Pelvis Wo Contrast    Result Date: 5/21/2021  Narrative: CT CHEST, ABDOMEN AND PELVIS WITHOUT IV CONTRAST INDICATION:   C34 91: Malignant neoplasm of unspecified part of right bronchus or lung  COMPARISON:  PET/CT from 1/22/2021 and 8/10/2020, and CT from 7/22/2020  TECHNIQUE: CT examination of the chest, abdomen and pelvis was performed without intravenous contrast   Axial, sagittal, and coronal 2D reformatted images were created from the source data and submitted for interpretation  Radiation dose length product (DLP) for this visit:  881 mGy-cm   This examination, like all CT scans performed in the [facility], was performed utilizing techniques to minimize radiation dose exposure, including the use of iterative reconstruction and automated exposure control  Enteric contrast was administered  FINDINGS: CHEST LUNGS:  Right upper lobe lung nodule seen on prior PET/CT has resolved  No new nodules are identified  There is a 5 mm right lower lobe nodule on series 3, image 96, stable from 2016    There are ill-defined groundglass opacity scattered throughout the lung fields, nonspecific  There is no tracheal or endobronchial lesion  PLEURA:  Unremarkable  HEART/GREAT VESSELS:  Unremarkable for patient's age  MEDIASTINUM AND ARACELY: Known right hilar malignancy cannot be visualized without contrast   The appearance is similar to the previous PET/CT  Small hiatal hernia noted  No mediastinal or hilar lymphadenopathy  CHEST WALL AND LOWER NECK:   Unremarkable  ABDOMEN LIVER/BILIARY TREE:  Unremarkable  GALLBLADDER:  No calcified gallstones  No pericholecystic inflammatory change  SPLEEN:  Unremarkable  PANCREAS:  Unremarkable  ADRENAL GLANDS:  Unremarkable  KIDNEYS/URETERS:  Right renal cysts  No hydronephrosis  STOMACH AND BOWEL:  There is colonic diverticulosis without evidence of acute diverticulitis  APPENDIX:  No findings to suggest appendicitis  ABDOMINOPELVIC CAVITY:  No ascites  No pneumoperitoneum  No lymphadenopathy  VESSELS:  Unremarkable for patient's age  PELVIS REPRODUCTIVE ORGANS:  There is a subserosal fibroid  URINARY BLADDER:  Unremarkable  ABDOMINAL WALL/INGUINAL REGIONS:  Unremarkable  OSSEOUS STRUCTURES:  Multiple sclerotic osseous lesions again seen throughout the ribs, spine, and pelvis consistent with metastasis, unchanged from the prior PET/CT  Impression: 1  Primary right hilar malignancy not visualized without contrast though the appearance is similar to previous PET/CT  2   Resolution of previous right upper lobe lung nodule  No new nodules identified  3   Stable multifocal sclerotic osseous metastasis  4   Scattered ill-defined groundglass opacities, nonspecific  This may be inflammatory or treatment-related  Infectious etiology including Covid-19 pneumonia cannot be excluded given the multifocal groundglass appearance    I personally discussed this study with RIA PARSONS on 5/21/2021 at 5:01 PM  Workstation performed: [id number]         Labs:   Lab Results   Component Value Date    WBC 6 23 05/21/2021    HGB 10 2 (L) 05/21/2021    HCT 30 1 (L) 05/21/2021    MCV 92 05/21/2021    PLT 97 (L) 05/21/2021     Lab Results   Component Value Date    K 3 9 05/21/2021     05/21/2021    CO2 34 (H) 05/21/2021    BUN 30 (H) 05/21/2021    CREATININE 1 30 05/21/2021    GLUF 87 05/14/2021    CALCIUM 9 2 05/21/2021    AST 34 05/14/2021    ALT 20 05/14/2021    ALKPHOS 90 05/14/2021    EGFR 41 05/21/2021         Current Medications: Reviewed  Allergies: Reviewed  PMH/FH/SH:  Reviewed      Vital Sign:    Body surface area is 1 96 meters squared      Wt Readings from Last 3 Encounters:   06/04/21 99 3 kg (219 lb)   05/24/21 98 4 kg (217 lb)   05/21/21 98 6 kg (217 lb 6 oz)        Temp Readings from Last 3 Encounters:   06/04/21 (!) 96 9 °F (36 1 °C) (Tympanic Core)   05/24/21 97 6 °F (36 4 °C) (Temporal)   05/21/21 98 4 °F (36 9 °C) (Oral)        BP Readings from Last 3 Encounters:   06/04/21 132/76   05/24/21 122/60   05/21/21 (!) 199/88         Pulse Readings from Last 3 Encounters:   06/04/21 73   05/24/21 74   05/21/21 73     @LASTSAO2(3)@

## 2021-06-07 ENCOUNTER — APPOINTMENT (OUTPATIENT)
Dept: LAB | Facility: HOSPITAL | Age: 76
End: 2021-06-07
Attending: INTERNAL MEDICINE
Payer: COMMERCIAL

## 2021-06-07 DIAGNOSIS — C34.91 NON-SMALL CELL CARCINOMA OF LUNG, STAGE 4, RIGHT (HCC): ICD-10-CM

## 2021-06-07 DIAGNOSIS — C79.51 BONE METASTASIS (HCC): ICD-10-CM

## 2021-06-07 LAB
ACANTHOCYTES BLD QL SMEAR: PRESENT
ALBUMIN SERPL BCP-MCNC: 3.9 G/DL (ref 3–5.2)
ALP SERPL-CCNC: 109 U/L (ref 43–122)
ALT SERPL W P-5'-P-CCNC: 21 U/L
ANION GAP SERPL CALCULATED.3IONS-SCNC: 5 MMOL/L (ref 5–14)
AST SERPL W P-5'-P-CCNC: 33 U/L (ref 14–36)
BASOPHILS # BLD AUTO: 0.07 THOUSAND/UL (ref 0–0.1)
BASOPHILS NFR MAR MANUAL: 1 % (ref 0–1)
BILIRUB SERPL-MCNC: 0.82 MG/DL
BUN SERPL-MCNC: 36 MG/DL (ref 5–25)
BURR CELLS BLD QL SMEAR: PRESENT
CALCIUM SERPL-MCNC: 9.4 MG/DL (ref 8.4–10.2)
CHLORIDE SERPL-SCNC: 103 MMOL/L (ref 97–108)
CO2 SERPL-SCNC: 34 MMOL/L (ref 22–30)
CREAT SERPL-MCNC: 1.23 MG/DL (ref 0.6–1.2)
EOSINOPHIL # BLD AUTO: 0.26 THOUSAND/UL (ref 0–0.4)
EOSINOPHIL NFR BLD MANUAL: 4 % (ref 0–6)
ERYTHROCYTE [DISTWIDTH] IN BLOOD BY AUTOMATED COUNT: 15 %
GFR SERPL CREATININE-BSD FRML MDRD: 43 ML/MIN/1.73SQ M
GLUCOSE P FAST SERPL-MCNC: 102 MG/DL (ref 70–99)
HCT VFR BLD AUTO: 32.4 % (ref 36–46)
HGB BLD-MCNC: 10.9 G/DL (ref 12–16)
LG PLATELETS BLD QL SMEAR: PRESENT
LYMPHOCYTES # BLD AUTO: 1.25 THOUSAND/UL (ref 0.5–4)
LYMPHOCYTES # BLD AUTO: 19 % (ref 25–45)
MCH RBC QN AUTO: 31.2 PG (ref 26–34)
MCHC RBC AUTO-ENTMCNC: 33.7 G/DL (ref 31–36)
MCV RBC AUTO: 93 FL (ref 80–100)
MONOCYTES # BLD AUTO: 0.46 THOUSAND/UL (ref 0.2–0.9)
MONOCYTES NFR BLD AUTO: 7 % (ref 1–10)
MYELOCYTES NFR BLD MANUAL: 1 % (ref 0–1)
NEUTS SEG # BLD: 4.42 THOUSAND/UL (ref 1.8–7.8)
NEUTS SEG NFR BLD AUTO: 67 %
PLATELET # BLD AUTO: 96 THOUSANDS/UL (ref 150–450)
PLATELET BLD QL SMEAR: ABNORMAL
PMV BLD AUTO: 11.6 FL (ref 8.9–12.7)
POTASSIUM SERPL-SCNC: 4 MMOL/L (ref 3.6–5)
PROT SERPL-MCNC: 7 G/DL (ref 5.9–8.4)
RBC # BLD AUTO: 3.49 MILLION/UL (ref 4–5.2)
RBC MORPH BLD: ABNORMAL
SCHISTOCYTES BLD QL SMEAR: PRESENT
SODIUM SERPL-SCNC: 142 MMOL/L (ref 137–147)
TOTAL CELLS COUNTED SPEC: 100
VARIANT LYMPHS # BLD AUTO: 1 % (ref 0–0)
WBC # BLD AUTO: 6.6 THOUSAND/UL (ref 4.5–11)

## 2021-06-07 PROCEDURE — 80053 COMPREHEN METABOLIC PANEL: CPT

## 2021-06-07 PROCEDURE — 85027 COMPLETE CBC AUTOMATED: CPT

## 2021-06-07 PROCEDURE — 85007 BL SMEAR W/DIFF WBC COUNT: CPT

## 2021-06-07 PROCEDURE — 36415 COLL VENOUS BLD VENIPUNCTURE: CPT

## 2021-06-08 ENCOUNTER — HOSPITAL ENCOUNTER (OUTPATIENT)
Dept: INFUSION CENTER | Facility: HOSPITAL | Age: 76
Discharge: HOME | End: 2021-06-08
Attending: INTERNAL MEDICINE
Payer: COMMERCIAL

## 2021-06-08 VITALS
HEART RATE: 70 BPM | DIASTOLIC BLOOD PRESSURE: 70 MMHG | RESPIRATION RATE: 16 BRPM | SYSTOLIC BLOOD PRESSURE: 128 MMHG | TEMPERATURE: 97 F | OXYGEN SATURATION: 97 %

## 2021-06-08 DIAGNOSIS — C79.51 BONE METASTASES (HCC): Primary | ICD-10-CM

## 2021-06-08 DIAGNOSIS — C34.91 NON-SMALL CELL CARCINOMA OF LUNG, STAGE 4, RIGHT (HCC): ICD-10-CM

## 2021-06-08 PROCEDURE — 96365 THER/PROPH/DIAG IV INF INIT: CPT

## 2021-06-08 RX ORDER — SODIUM CHLORIDE 9 MG/ML
20 INJECTION, SOLUTION INTRAVENOUS ONCE
Status: COMPLETED | OUTPATIENT
Start: 2021-06-08 | End: 2021-06-08

## 2021-06-08 RX ORDER — SODIUM CHLORIDE 9 MG/ML
20 INJECTION, SOLUTION INTRAVENOUS ONCE
Status: CANCELLED | OUTPATIENT
Start: 2021-08-31

## 2021-06-08 RX ADMIN — ZOLEDRONIC ACID 3 MG: 4 INJECTION INTRAVENOUS at 12:28

## 2021-06-08 RX ADMIN — SODIUM CHLORIDE 20 ML/HR: 0.9 INJECTION, SOLUTION INTRAVENOUS at 12:03

## 2021-06-08 NOTE — PLAN OF CARE
Problem: Potential for Falls  Goal: Patient will remain free of falls  Description: INTERVENTIONS:  - Assess patient frequently for physical needs  -  Identify cognitive and physical deficits and behaviors that affect risk of falls    -  [place] fall precautions as indicated by assessment   - Educate patient/family on patient safety including physical limitations  - Instruct patient to call for assistance with activity based on assessment  - Modify environment to reduce risk of injury  - Consider OT/PT consult to assist with strengthening/mobility  Outcome: Progressing

## 2021-06-10 DIAGNOSIS — J45.20 MILD INTERMITTENT ASTHMA WITHOUT COMPLICATION: ICD-10-CM

## 2021-06-10 RX ORDER — ALBUTEROL SULFATE 90 UG/1
AEROSOL, METERED RESPIRATORY (INHALATION)
Qty: 8 G | Refills: 1 | Status: SHIPPED | OUTPATIENT
Start: 2021-06-10 | End: 2021-09-15 | Stop reason: SDUPTHER

## 2021-07-10 ENCOUNTER — APPOINTMENT (EMERGENCY)
Dept: RADIOLOGY | Facility: HOSPITAL | Age: 76
End: 2021-07-10
Payer: COMMERCIAL

## 2021-07-10 ENCOUNTER — HOSPITAL ENCOUNTER (EMERGENCY)
Facility: HOSPITAL | Age: 76
Discharge: HOME | End: 2021-07-10
Attending: EMERGENCY MEDICINE | Admitting: EMERGENCY MEDICINE
Payer: COMMERCIAL

## 2021-07-10 ENCOUNTER — APPOINTMENT (EMERGENCY)
Dept: CT IMAGING | Facility: HOSPITAL | Age: 76
End: 2021-07-10
Payer: COMMERCIAL

## 2021-07-10 VITALS
RESPIRATION RATE: 16 BRPM | BODY MASS INDEX: 42.82 KG/M2 | SYSTOLIC BLOOD PRESSURE: 196 MMHG | WEIGHT: 226.63 LBS | DIASTOLIC BLOOD PRESSURE: 76 MMHG | OXYGEN SATURATION: 98 % | TEMPERATURE: 98.2 F | HEART RATE: 67 BPM

## 2021-07-10 DIAGNOSIS — J18.9 PNEUMONIA OF RIGHT LOWER LOBE DUE TO INFECTIOUS ORGANISM: Primary | ICD-10-CM

## 2021-07-10 DIAGNOSIS — R06.00 DYSPNEA: ICD-10-CM

## 2021-07-10 DIAGNOSIS — J45.41 MODERATE PERSISTENT ASTHMA WITH EXACERBATION: ICD-10-CM

## 2021-07-10 LAB
ALBUMIN SERPL BCP-MCNC: 3.8 G/DL (ref 3.5–5)
ALP SERPL-CCNC: 110 U/L (ref 46–116)
ALT SERPL W P-5'-P-CCNC: 23 U/L (ref 12–78)
ANION GAP SERPL CALCULATED.3IONS-SCNC: 7 MMOL/L (ref 4–13)
AST SERPL W P-5'-P-CCNC: 33 U/L (ref 5–45)
ATRIAL RATE: 61 BPM
BASOPHILS # BLD AUTO: 0.02 THOUSANDS/ΜL (ref 0–0.1)
BASOPHILS NFR BLD AUTO: 0 % (ref 0–1)
BILIRUB SERPL-MCNC: 0.74 MG/DL (ref 0.2–1)
BUN SERPL-MCNC: 24 MG/DL (ref 5–25)
CALCIUM SERPL-MCNC: 8.7 MG/DL (ref 8.3–10.1)
CHLORIDE SERPL-SCNC: 105 MMOL/L (ref 100–108)
CO2 SERPL-SCNC: 34 MMOL/L (ref 21–32)
CREAT SERPL-MCNC: 1 MG/DL (ref 0.6–1.3)
EOSINOPHIL # BLD AUTO: 0.23 THOUSAND/ΜL (ref 0–0.61)
EOSINOPHIL NFR BLD AUTO: 3 % (ref 0–6)
ERYTHROCYTE [DISTWIDTH] IN BLOOD BY AUTOMATED COUNT: 14.7 % (ref 11.6–15.1)
GFR SERPL CREATININE-BSD FRML MDRD: 56 ML/MIN/1.73SQ M
GLUCOSE SERPL-MCNC: 89 MG/DL (ref 65–140)
HCT VFR BLD AUTO: 31.6 % (ref 34.8–46.1)
HGB BLD-MCNC: 10.6 G/DL (ref 11.5–15.4)
IMM GRANULOCYTES # BLD AUTO: 0.06 THOUSAND/UL (ref 0–0.2)
IMM GRANULOCYTES NFR BLD AUTO: 1 % (ref 0–2)
LYMPHOCYTES # BLD AUTO: 1.14 THOUSANDS/ΜL (ref 0.6–4.47)
LYMPHOCYTES NFR BLD AUTO: 17 % (ref 14–44)
MCH RBC QN AUTO: 30.7 PG (ref 26.8–34.3)
MCHC RBC AUTO-ENTMCNC: 33.5 G/DL (ref 31.4–37.4)
MCV RBC AUTO: 92 FL (ref 82–98)
MONOCYTES # BLD AUTO: 0.84 THOUSAND/ΜL (ref 0.17–1.22)
MONOCYTES NFR BLD AUTO: 12 % (ref 4–12)
NEUTROPHILS # BLD AUTO: 4.47 THOUSANDS/ΜL (ref 1.85–7.62)
NEUTS SEG NFR BLD AUTO: 67 % (ref 43–75)
NRBC BLD AUTO-RTO: 0 /100 WBCS
NT-PROBNP SERPL-MCNC: 277 PG/ML
P AXIS: 29 DEGREES
PLATELET # BLD AUTO: 99 THOUSANDS/UL (ref 149–390)
PMV BLD AUTO: 14 FL (ref 8.9–12.7)
POTASSIUM SERPL-SCNC: 3.8 MMOL/L (ref 3.5–5.3)
PR INTERVAL: 160 MS
PROT SERPL-MCNC: 7.4 G/DL (ref 6.4–8.2)
QRS AXIS: 40 DEGREES
QRSD INTERVAL: 82 MS
QT INTERVAL: 410 MS
QTC INTERVAL: 412 MS
RBC # BLD AUTO: 3.45 MILLION/UL (ref 3.81–5.12)
SODIUM SERPL-SCNC: 146 MMOL/L (ref 136–145)
T WAVE AXIS: 56 DEGREES
TROPONIN I SERPL-MCNC: 0.04 NG/ML
VENTRICULAR RATE: 61 BPM
WBC # BLD AUTO: 6.76 THOUSAND/UL (ref 4.31–10.16)

## 2021-07-10 PROCEDURE — 71045 X-RAY EXAM CHEST 1 VIEW: CPT

## 2021-07-10 PROCEDURE — 94640 AIRWAY INHALATION TREATMENT: CPT

## 2021-07-10 PROCEDURE — 99285 EMERGENCY DEPT VISIT HI MDM: CPT

## 2021-07-10 PROCEDURE — 85025 COMPLETE CBC W/AUTO DIFF WBC: CPT | Performed by: PHYSICIAN ASSISTANT

## 2021-07-10 PROCEDURE — 83880 ASSAY OF NATRIURETIC PEPTIDE: CPT | Performed by: PHYSICIAN ASSISTANT

## 2021-07-10 PROCEDURE — 36556 INSERT NON-TUNNEL CV CATH: CPT | Performed by: PHYSICIAN ASSISTANT

## 2021-07-10 PROCEDURE — 93005 ELECTROCARDIOGRAM TRACING: CPT

## 2021-07-10 PROCEDURE — 84484 ASSAY OF TROPONIN QUANT: CPT | Performed by: PHYSICIAN ASSISTANT

## 2021-07-10 PROCEDURE — 99285 EMERGENCY DEPT VISIT HI MDM: CPT | Performed by: PHYSICIAN ASSISTANT

## 2021-07-10 PROCEDURE — 80053 COMPREHEN METABOLIC PANEL: CPT | Performed by: PHYSICIAN ASSISTANT

## 2021-07-10 PROCEDURE — 36415 COLL VENOUS BLD VENIPUNCTURE: CPT | Performed by: PHYSICIAN ASSISTANT

## 2021-07-10 PROCEDURE — 93010 ELECTROCARDIOGRAM REPORT: CPT | Performed by: INTERNAL MEDICINE

## 2021-07-10 PROCEDURE — 76937 US GUIDE VASCULAR ACCESS: CPT | Performed by: PHYSICIAN ASSISTANT

## 2021-07-10 PROCEDURE — 71275 CT ANGIOGRAPHY CHEST: CPT

## 2021-07-10 RX ORDER — PREDNISONE 20 MG/1
TABLET ORAL
Qty: 20 TABLET | Refills: 0 | Status: SHIPPED | OUTPATIENT
Start: 2021-07-10 | End: 2021-08-23

## 2021-07-10 RX ORDER — DOXYCYCLINE HYCLATE 100 MG/1
100 CAPSULE ORAL ONCE
Status: COMPLETED | OUTPATIENT
Start: 2021-07-10 | End: 2021-07-10

## 2021-07-10 RX ORDER — IPRATROPIUM BROMIDE AND ALBUTEROL SULFATE 2.5; .5 MG/3ML; MG/3ML
3 SOLUTION RESPIRATORY (INHALATION) 4 TIMES DAILY
Qty: 360 ML | Refills: 0 | Status: SHIPPED | OUTPATIENT
Start: 2021-07-10 | End: 2021-08-09

## 2021-07-10 RX ORDER — ALBUTEROL SULFATE 2.5 MG/3ML
5 SOLUTION RESPIRATORY (INHALATION) ONCE
Status: COMPLETED | OUTPATIENT
Start: 2021-07-10 | End: 2021-07-10

## 2021-07-10 RX ORDER — DOXYCYCLINE HYCLATE 100 MG/1
100 CAPSULE ORAL EVERY 12 HOURS SCHEDULED
Qty: 20 CAPSULE | Refills: 0 | Status: SHIPPED | OUTPATIENT
Start: 2021-07-10 | End: 2021-07-20

## 2021-07-10 RX ADMIN — DOXYCYCLINE 100 MG: 100 CAPSULE ORAL at 19:24

## 2021-07-10 RX ADMIN — IPRATROPIUM BROMIDE 0.5 MG: 0.5 SOLUTION RESPIRATORY (INHALATION) at 10:57

## 2021-07-10 RX ADMIN — PREDNISONE 50 MG: 20 TABLET ORAL at 19:24

## 2021-07-10 RX ADMIN — IOHEXOL 85 ML: 350 INJECTION, SOLUTION INTRAVENOUS at 17:16

## 2021-07-10 RX ADMIN — ALBUTEROL SULFATE 5 MG: 2.5 SOLUTION RESPIRATORY (INHALATION) at 10:57

## 2021-07-10 NOTE — ED PROCEDURE NOTE
Procedure  Central Line    Date/Time: 7/10/2021 4:48 PM  Performed by: Hitesh Ariza MD  Authorized by: Hitesh Ariza MD     Patient location:  ED  Other Assisting Provider: Yes (comment) Madelaine Hector    Consent:     Consent obtained:  Written    Consent given by:  Patient    Risks discussed:  Arterial puncture, bleeding, incorrect placement, infection, nerve damage and pneumothorax    Alternatives discussed:  No treatment, delayed treatment, alternative treatment and observation  Universal protocol:     Procedure explained and questions answered to patient or proxy's satisfaction: yes      Relevant documents present and verified: yes      Patient identity confirmed:  Verbally with patient and arm band  Pre-procedure details:     Hand hygiene: Hand hygiene performed prior to insertion      Sterile barrier technique: All elements of maximal sterile technique followed      Skin preparation:  2% chlorhexidine    Skin preparation agent: Skin preparation agent completely dried prior to procedure    Indications:     Central line indications: no peripheral vascular access    Anesthesia (see MAR for exact dosages):      Anesthesia method:  Local infiltration    Local anesthetic:  Lidocaine 1% w/o epi  Procedure details:     Location:  Right internal jugular    Vessel type: vein      Laterality:  Right    Approach: percutaneous technique used      Patient position:  Flat    Catheter type:  Triple lumen    Catheter size:  7 Fr    Landmarks identified: yes      Ultrasound guidance: yes      Ultrasound image availability:  Still images obtained    Sterile ultrasound techniques: Sterile gel and sterile probe covers were used      Manometry confirmation: yes      Number of attempts:  1    Successful placement: yes      Vessel of catheter tip end:  Svc  Post-procedure details:     Post-procedure:  Dressing applied and line sutured    Assessment:  Blood return through all ports, no pneumothorax on x-ray, placement verified by x-ray and free fluid flow    Post-procedure complications: none      Patient tolerance of procedure:   Tolerated well, no immediate complications  Comments:      Long view of guidewire in vessel                         Beba Talavera MD  07/10/21 3552

## 2021-07-10 NOTE — ED PROVIDER NOTES
History  Chief Complaint   Patient presents with    Shortness of Breath     Reports shortness of breath since yesterday  No relief from at home neb treatments  Audible wheeze in triage  Short of breath while talking  Patient is a 78year old female with hx of HTN, asthma and lung cancer (non-small cell stage 4 lung CA - currently on chemotherapy - radiation 1 year ago with osseous metastasis), presents to the ED for evaluation of SOB  Pt states she has had gradually worsening SOB for past few weeks, becoming worse yesterday  Pt with productive cough, wheezing, SOB and chest tightness  Pt states yesterday she had episodes of chest pain as well, described as burning sensation, substernal, intermittent  Pt states she has been using her albuterol nebulizer at home without significant relief  Pt was seen by PCP on  for asthma exacerbation, was placed on prednisone which she finished and improved her symptoms initially, but symptoms returned  Pt also been suffering from lower leg edema and BERTRAND  Pt denies fever, chills, palpitations, headache, vision changes, focal weakness, abdominal pain, N/V/D  Prior to Admission Medications   Prescriptions Last Dose Informant Patient Reported? Taking?    Alectinib HCl 150 MG CAPS   No No   Sig: Take 4 capsules (600 mg total) by mouth 2 (two) times a day   Loratadine (Claritin) 10 MG CAPS  Self Yes No   Sig: Take by mouth   albuterol (2 5 mg/3 mL) 0 083 % nebulizer solution   No No   Sig: Take 1 vial (2 5 mg total) by nebulization every 6 (six) hours as needed for wheezing   albuterol (PROVENTIL HFA,VENTOLIN HFA) 90 mcg/act inhaler   No No   Sig: TAKE 2 PUFFS BY MOUTH EVERY 6 HOURS AS NEEDED FOR WHEEZE   atenolol (TENORMIN) 25 mg tablet   No No   Sig: Take 1 tablet (25 mg total) by mouth daily   cetirizine (ZyrTEC) 10 mg tablet  Self Yes No   Sig: Take 10 mg by mouth daily   fluticasone (FLONASE) 50 mcg/act nasal spray   No No   Si sprays into each nostril daily fluticasone-salmeterol (Wixela Inhub) 250-50 mcg/dose inhaler   No No   Sig: Inhale 1 puff 2 (two) times a day Rinse mouth after use  furosemide (LASIX) 20 mg tablet   No No   Sig: Take 1 tablet (20 mg total) by mouth daily   irbesartan (AVAPRO) 300 mg tablet   No No   Sig: Take 1 tablet (300 mg total) by mouth daily at bedtime   omeprazole (PriLOSEC) 20 mg delayed release capsule  Self No No   Sig: TAKE 1 CAPSULE BY MOUTH EVERY DAY      Facility-Administered Medications: None       Past Medical History:   Diagnosis Date    Asthma     GERD (gastroesophageal reflux disease)     Hypertension     Lumbar disc disease     Lung cancer ([facility] [address] )     Sleep apnea     suspected     Ventricular arrhythmia        Past Surgical History:   Procedure Laterality Date    APPENDECTOMY      COLONOSCOPY N/A 3/18/2019    Procedure: COLONOSCOPY;  Surgeon: Micaela Rojo DO;  Location: [facility]; Service: Gastroenterology    ESOPHAGOGASTRODUODENOSCOPY N/A 3/18/2019    Procedure: ESOPHAGOGASTRODUODENOSCOPY (EGD); Surgeon: Micaela Rojo DO;  Location: [facility]; Service: Gastroenterology    KNEE SURGERY      ROTATOR CUFF REPAIR      SHOULDER SURGERY         Family History   Problem Relation Age of Onset    Stroke Mother     Diabetes Mother     Hyperlipidemia Mother     Hypertension Mother     Diabetes Father     Hyperlipidemia Father     Hypertension Father     Lung cancer Father 79    Lung cancer Sister 71     I have reviewed and agree with the history as documented      E-Cigarette/Vaping    E-Cigarette Use Never User      E-Cigarette/Vaping Substances    Nicotine No     THC No     CBD No     Flavoring No     Other No     Unknown No      Social History     Tobacco Use    Smoking status: Former Smoker     Packs/day: 0 25     Years: 0 40     Pack years: 0 10     Types: Cigarettes     Start date:      Quit date:      Years since quittin 5    Smokeless tobacco: Former User    Tobacco comment: quit smoking > 30 years ago    Vaping Use    Vaping Use: Never used   Substance Use Topics    Alcohol use: Yes     Comment: occ    Drug use: No       Review of Systems   Constitutional: Negative for chills and fever  HENT: Negative for ear pain and sore throat  Eyes: Negative for visual disturbance  Respiratory: Positive for cough, chest tightness, shortness of breath and wheezing  Cardiovascular: Positive for chest pain and leg swelling  Negative for palpitations  Gastrointestinal: Negative for abdominal pain, diarrhea, nausea and vomiting  Genitourinary: Negative for dysuria and hematuria  Musculoskeletal: Negative for back pain and neck pain  Skin: Negative for rash  Neurological: Negative for speech difficulty and headaches  Psychiatric/Behavioral: Negative for confusion  Physical Exam  Physical Exam  Constitutional:       General: She is not in acute distress  Appearance: She is well-developed  She is not ill-appearing, toxic-appearing or diaphoretic  HENT:      Head: Normocephalic and atraumatic  Right Ear: External ear normal       Left Ear: External ear normal       Nose: Nose normal       Mouth/Throat:      Lips: Pink  Mouth: Mucous membranes are moist    Eyes:      Extraocular Movements: Extraocular movements intact  Conjunctiva/sclera: Conjunctivae normal    Cardiovascular:      Rate and Rhythm: Normal rate and regular rhythm  Pulmonary:      Effort: Pulmonary effort is normal  No tachypnea or respiratory distress  Breath sounds: No decreased breath sounds, wheezing (prolonged expiratory phase), rhonchi or rales  Musculoskeletal:      Cervical back: Normal range of motion and neck supple  Right lower leg: No swelling, tenderness or bony tenderness  Edema present  Left lower leg: No swelling, tenderness or bony tenderness  Edema present  Skin:     General: Skin is warm and dry        Capillary Refill: Capillary refill takes less than 2 seconds  Neurological:      Mental Status: She is alert and oriented to person, place, and time  GCS: GCS eye subscore is 4  GCS verbal subscore is 5  GCS motor subscore is 6     Psychiatric:         Mood and Affect: Mood and affect normal          Speech: Speech normal          Vital Signs  ED Triage Vitals [07/10/21 1016]   Temperature Pulse Respirations Blood Pressure SpO2   98 2 °F (36 8 °C) 69 16 168/77 98 %      Temp Source Heart Rate Source Patient Position - Orthostatic VS BP Location FiO2 (%)   Oral Monitor Sitting Right arm --      Pain Score       5           Vitals:    07/10/21 1016 07/10/21 1513   BP: 168/77 (!) 185/86   Pulse: 69 67   Patient Position - Orthostatic VS: Sitting          Visual Acuity      ED Medications  Medications   albuterol inhalation solution 5 mg (5 mg Nebulization Given 7/10/21 1057)   ipratropium (ATROVENT) 0 02 % inhalation solution 0 5 mg (0 5 mg Nebulization Given 7/10/21 1057)       Diagnostic Studies  Results Reviewed     Procedure Component Value Units Date/Time    Comprehensive metabolic panel [[id number]]  (Abnormal) Collected: 07/10/21 1209    Lab Status: Final result Specimen: Blood from Arm, Right Updated: 07/10/21 1240     Sodium 146 mmol/L      Potassium 3 8 mmol/L      Chloride 105 mmol/L      CO2 34 mmol/L      ANION GAP 7 mmol/L      BUN 24 mg/dL      Creatinine 1 00 mg/dL      Glucose 89 mg/dL      Calcium 8 7 mg/dL      AST 33 U/L      ALT 23 U/L      Alkaline Phosphatase 110 U/L      Total Protein 7 4 g/dL      Albumin 3 8 g/dL      Total Bilirubin 0 74 mg/dL      eGFR 56 ml/min/1 73sq m     Narrative:      [place] guidelines for Chronic Kidney Disease (CKD):     Stage 1 with normal or high GFR (GFR > 90 mL/min/1 73 square meters)    Stage 2 Mild CKD (GFR = 60-89 mL/min/1 73 square meters)    Stage 3A Moderate CKD (GFR = 45-59 mL/min/1 73 square meters)    Stage 3B Moderate CKD (GFR = 30-44 mL/min/1 73 square meters)    Stage 4 Severe CKD (GFR = 15-29 mL/min/1 73 square meters)    Stage 5 End Stage CKD (GFR <15 mL/min/1 73 square meters)  Note: GFR calculation is accurate only with a steady state creatinine    NT-BNP PRO [[id number]]  (Abnormal) Collected: 07/10/21 1209    Lab Status: Final result Specimen: Blood from Arm, Right Updated: 07/10/21 1240     NT-proBNP 277 pg/mL     Troponin I [[id number]]  (Normal) Collected: 07/10/21 1209    Lab Status: Final result Specimen: Blood from Arm, Right Updated: 07/10/21 1236     Troponin I 0 04 ng/mL     CBC and differential [[id number]]  (Abnormal) Collected: 07/10/21 1209    Lab Status: Final result Specimen: Blood from Arm, Right Updated: 07/10/21 1222     WBC 6 76 Thousand/uL      RBC 3 45 Million/uL      Hemoglobin 10 6 g/dL      Hematocrit 31 6 %      MCV 92 fL      MCH 30 7 pg      MCHC 33 5 g/dL      RDW 14 7 %      MPV 14 0 fL      Platelets 99 Thousands/uL      nRBC 0 /100 WBCs      Neutrophils Relative 67 %      Immat GRANS % 1 %      Lymphocytes Relative 17 %      Monocytes Relative 12 %      Eosinophils Relative 3 %      Basophils Relative 0 %      Neutrophils Absolute 4 47 Thousands/µL      Immature Grans Absolute 0 06 Thousand/uL      Lymphocytes Absolute 1 14 Thousands/µL      Monocytes Absolute 0 84 Thousand/µL      Eosinophils Absolute 0 23 Thousand/µL      Basophils Absolute 0 02 Thousands/µL                  XR chest 1 view portable    (Results Pending)   CTA ED chest PE study    (Results Pending)   XR chest 1 view portable    (Results Pending)              Procedures  ECG 12 Lead Documentation Only    Date/Time: 7/10/2021 11:36 AM  Performed by: Leanna Wise PA-C  Authorized by: Leanna Wise PA-C     Indications / Diagnosis:  Sob  ECG reviewed by me, the ED Provider: yes    Patient location:  ED  Previous ECG:     Previous ECG:  Compared to current    Comparison ECG info:  21-may-2021    Similarity:  No change    Comparison to cardiac monitor: Yes Interpretation:     Interpretation: normal    Quality:     Tracing quality:  Limited by artifact  Rate:     ECG rate:  61    ECG rate assessment: normal    Rhythm:     Rhythm: sinus rhythm    Ectopy:     Ectopy: none    QRS:     QRS axis:  Normal    QRS intervals:  Normal  Conduction:     Conduction: normal    ST segments:     ST segments:  Normal  T waves:     T waves: normal    Comments:      QT/QTc: 410/412  No STEMI             ED Course  ED Course as of Jul 10 1713   Sat Jul 10, 2021   1222 Baseline anemia   Hemoglobin(!): 10 6   1236 Troponin I: 0 04   1557 Multiple attempts by nurses to obtain IV access for contrast study, unsuccessful  Patient agreeable to place central line       1711 Sign out to [facility], pending CTA chest and dispo                                    Andre' Criteria for PE      Most Recent Value   Andre' Criteria for PE   Clinical signs and symptoms of DVT  0 Filed at: 07/10/2021 1703   PE is primary diagnosis or equally likely  3 Filed at: 07/10/2021 1703   HR >100  0 Filed at: 07/10/2021 1703   Immobilization at least 3 days or Surgery in the previous 4 weeks  0 Filed at: 07/10/2021 1703   Previous, objectively diagnosed PE or DVT  0 Filed at: 07/10/2021 1703   Hemoptysis  0 Filed at: 07/10/2021 1703   Malignancy with treatment within 6 months or palliative  1 Filed at: 07/10/2021 1703   Andre' Criteria Total  4 Filed at: 07/10/2021 1703                MDM  Number of Diagnoses or Management Options  Diagnosis management comments:   Patient is a 78year old female with hx of HTN, asthma and lung cancer (non-small cell stage 4 lung CA - currently on chemotherapy - radiation 1 year ago with osseous metastasis), presents to the ED for evaluation of SOB  Pt states she has had gradually worsening SOB for past few weeks, becoming worse yesterday  Pt with productive cough, wheezing, SOB and chest tightness   Pt states yesterday she had episodes of chest pain as well, described as burning sensation, substernal, intermittent  Pt states she has been using her albuterol nebulizer at home without significant relief  Pt was seen by PCP on 5/24 for asthma exacerbation, was placed on prednisone which she finished and improved her symptoms initially, but symptoms returned  Pt also been suffering from lower leg edema and BERTRAND  EKG shows no ischemic changes  Troponin WNL  Albuterol and atrovent neb given with slight improvement in symptoms  Lab work otherwise without significant abnormality, mildly elevated BNP  Given patient with cancer with onset of SOB/CP without improvement with asthma treatments, need to rule out PE  RN multiple attempts to obtain IV line via ultrasound  IJ central line placed by Dr Keyon Nettles and Dr Miguel Steele (Resident)    Sign out to [facility] pending CTA chest        Disposition  Final diagnoses:   Dyspnea     Time reflects when diagnosis was documented in both MDM as applicable and the Disposition within this note     Time User Action Codes Description Comment    7/10/2021  5:13 PM Miryam Wilkerson Add [R06 00] Dyspnea       ED Disposition     None      Follow-up Information    None         Patient's Medications   Discharge Prescriptions    No medications on file     No discharge procedures on file      PDMP Review       Value Time User    PDMP Reviewed  Yes 11/4/2020  2:54 PM Nel Jimenez PA-C          ED Provider  Electronically Signed by           Bonnye Dancer, PA-C  07/10/21 0862

## 2021-07-10 NOTE — ED NOTES
4 nurses have attempted to get IV and bloodwork on patient - provider aware that at this time nursing staff does not have an IV in patient     Roger Vyas RN  07/10/21 0924

## 2021-07-10 NOTE — ED ATTENDING ATTESTATION
7/10/2021  IShawnee DO, saw and evaluated the patient  I have discussed the patient with the resident/non-physician practitioner and agree with the resident's/non-physician practitioner's findings, Plan of Care, and MDM as documented in the resident's/non-physician practitioner's note, except where noted  All available labs and Radiology studies were reviewed  I was present for key portions of any procedure(s) performed by the resident/non-physician practitioner and I was immediately available to provide assistance  At this point I agree with the current assessment done in the Emergency Department  I have conducted an independent evaluation of this patient a history and physical is as follows:  71y F w/ metastatic cancer receiving active chemo here w/ abrupt onset sob/majano not improved w/ her home treatment  Had been having some worsening symptoms for a couple of weeks but abruptly worse today   +cough, +chest tightness and wheezing  Exam wnwd f nad, morbidly obese w/ bmi 42 6, ncat, mmm, neck supple, l - decreased throughout w/ prolonged expiratory phase, hrrr, abd soft nt, ext +b/l edema, no ttp, skin warm, dry, neuro non-focal, normal mood  A/P Dyspnea/Chest tightness  PE on differential dx in addition to copd/asthma exacerbation, pna, ptx   Will ck labs, ekg, will need cta to r/o PE    ED Course     Labs Reviewed   CBC AND DIFFERENTIAL - Abnormal       Result Value Ref Range Status    WBC 6 76  4 31 - 10 16 Thousand/uL Final    RBC 3 45 (*) 3 81 - 5 12 Million/uL Final    Hemoglobin 10 6 (*) 11 5 - 15 4 g/dL Final    Hematocrit 31 6 (*) 34 8 - 46 1 % Final    MCV 92  82 - 98 fL Final    MCH 30 7  26 8 - 34 3 pg Final    MCHC 33 5  31 4 - 37 4 g/dL Final    RDW 14 7  11 6 - 15 1 % Final    MPV 14 0 (*) 8 9 - 12 7 fL Final    Platelets 99 (*) 486 - 390 Thousands/uL Final    nRBC 0  /100 WBCs Final    Neutrophils Relative 67  43 - 75 % Final    Immat GRANS % 1  0 - 2 % Final    Lymphocytes Relative 17  14 - 44 % Final    Monocytes Relative 12  4 - 12 % Final    Eosinophils Relative 3  0 - 6 % Final    Basophils Relative 0  0 - 1 % Final    Neutrophils Absolute 4 47  1 85 - 7 62 Thousands/µL Final    Immature Grans Absolute 0 06  0 00 - 0 20 Thousand/uL Final    Lymphocytes Absolute 1 14  0 60 - 4 47 Thousands/µL Final    Monocytes Absolute 0 84  0 17 - 1 22 Thousand/µL Final    Eosinophils Absolute 0 23  0 00 - 0 61 Thousand/µL Final    Basophils Absolute 0 02  0 00 - 0 10 Thousands/µL Final   COMPREHENSIVE METABOLIC PANEL - Abnormal    Sodium 146 (*) 136 - 145 mmol/L Final    Potassium 3 8  3 5 - 5 3 mmol/L Final    Comment: Slightly Hemolyzed; Results May be Affected    Chloride 105  100 - 108 mmol/L Final    CO2 34 (*) 21 - 32 mmol/L Final    ANION GAP 7  4 - 13 mmol/L Final    BUN 24  5 - 25 mg/dL Final    Creatinine 1 00  0 60 - 1 30 mg/dL Final    Comment: Standardized to IDMS reference method    Glucose 89  65 - 140 mg/dL Final    Comment: If the patient is fasting, the ADA then defines impaired fasting glucose as > 100 mg/dL and diabetes as > or equal to 123 mg/dL  Specimen collection should occur prior to Sulfasalazine administration due to the potential for falsely depressed results  Specimen collection should occur prior to Sulfapyridine administration due to the potential for falsely elevated results  Calcium 8 7  8 3 - 10 1 mg/dL Final    AST 33  5 - 45 U/L Final    Comment: Slightly Hemolyzed; Results May be Affected  Specimen collection should occur prior to Sulfasalazine administration due to the potential for falsely depressed results  ALT 23  12 - 78 U/L Final    Comment: Specimen collection should occur prior to Sulfasalazine administration due to the potential for falsely depressed results       Alkaline Phosphatase 110  46 - 116 U/L Final    Total Protein 7 4  6 4 - 8 2 g/dL Final    Albumin 3 8  3 5 - 5 0 g/dL Final    Total Bilirubin 0 74  0 20 - 1 00 mg/dL Final Comment: Use of this assay is not recommended for patients undergoing treatment with eltrombopag due to the potential for falsely elevated results  eGFR 56  ml/min/1 73sq m Final    Narrative:     [place] guidelines for Chronic Kidney Disease (CKD):     Stage 1 with normal or high GFR (GFR > 90 mL/min/1 73 square meters)    Stage 2 Mild CKD (GFR = 60-89 mL/min/1 73 square meters)    Stage 3A Moderate CKD (GFR = 45-59 mL/min/1 73 square meters)    Stage 3B Moderate CKD (GFR = 30-44 mL/min/1 73 square meters)    Stage 4 Severe CKD (GFR = 15-29 mL/min/1 73 square meters)    Stage 5 End Stage CKD (GFR <15 mL/min/1 73 square meters)  Note: GFR calculation is accurate only with a steady state creatinine   NT-BNP PRO (BRAIN NATRIURETIC PEPTIDE) - Abnormal    NT-proBNP 277 (*) <125 pg/mL Final   TROPONIN I - Normal    Troponin I 0 04  <=0 04 ng/mL Final    Comment: Siemens Chemistry analyzer 99% cutoff is > 0 04 ng/mL in network labs     o cTnI 99% cutoff is useful only when applied to patients in the clinical setting of myocardial ischemia   o cTnI 99% cutoff should be interpreted in the context of clinical history, ECG findings and possibly cardiac imaging to establish correct diagnosis  o cTnI 99% cutoff may be suggestive but clearly not indicative of a coronary event without the clinical setting of myocardial ischemia  CTA ED chest PE study   Final Result         1  No pulmonary embolism  2  Multifocal patchy groundglass airspace opacities bilaterally, slightly progressed from May 17, 2021  This may be related to infectious/inflammatory process or drug reaction  COVID 19 pneumonia remains a differential consideration  3   Mild soft tissue thickening in the right hilum, which may be related to posttreatment changes or small volume residual tumor from patient's known lung cancer  Continued attention on follow-up is recommended        4   Stable multifocal sclerotic osseous metastasis  Workstation performed: [id number]         XR chest 1 view portable    (Results Pending)   XR chest 1 view portable    (Results Pending)         Critical Care Time  Procedures    1  Pneumonia of right lower lobe due to infectious organism  predniSONE 20 mg tablet    doxycycline hyclate (VIBRAMYCIN) 100 mg capsule    ipratropium-albuterol (DUO-NEB) 0 5-2 5 mg/3 mL nebulizer solution   2  Dyspnea     3  Moderate persistent asthma with exacerbation  predniSONE 20 mg tablet    ipratropium-albuterol (DUO-NEB) 0 5-2 5 mg/3 mL nebulizer solution     Time reflects when diagnosis was documented in both MDM as applicable and the Disposition within this note     Time User Action Codes Description Comment    7/10/2021  5:13 PM Ayesha Winter Add [R06 00] Dyspnea     7/10/2021  7:56 PM [address] [J18 9] Pneumonia of right lower lobe due to infectious organism     7/10/2021  7:56 PM [address] [J45 41] Moderate persistent asthma with exacerbation     7/10/2021  7:57 PM Earline Tejeda Modify [R06 00] Dyspnea     7/10/2021  7:57 PM Earline Tejeda Modify [J18 9] Pneumonia of right lower lobe due to infectious organism     7/10/2021  7:57 PM Earline Tejeda Modify [J45 41] Moderate persistent asthma with exacerbation       ED Disposition     ED Disposition Condition Date/Time Comment    Discharge Stable Sat Jul 10, 2021  7:56 PM Lin Bui discharge to home/self care              Follow-up Information     Follow up With Specialties Details Why [id number]  Dilma Aquino PA-C Family Medicine, Physician Assistant Call   [address]  [address] [facility]  [place] [address]

## 2021-07-11 NOTE — DISCHARGE INSTRUCTIONS
Asthma   WHAT YOU NEED TO KNOW:   Asthma is a lung disease that makes breathing difficult  Chronic inflammation and reactions to triggers narrow the airways in the lungs  Asthma can become life-threatening if it is not managed  DISCHARGE INSTRUCTIONS:   Call your local emergency number (911 in the [address]) if:   · You have severe shortness of breath  · Your lips or nails turn blue or gray  · The skin around your neck and ribs pulls in with each breath  · You have shortness of breath, even after you take your short-term medicine as directed  · Your peak flow numbers are in the red zone of your AAP  Call your doctor if:   · You run out of medicine before your next refill is due  · Your symptoms get worse  · You need to take more medicine than usual to control your symptoms  · You have questions or concerns about your condition or care  Medicines:   · Medicines  decrease inflammation, open airways, and make it easier to breathe  Medicines may be inhaled, taken as a pill, or injected  Short-term medicines relieve your symptoms quickly  Long-term medicines are used to prevent future attacks  You may also need medicine to help control your allergies  Ask your healthcare provider for more information about the medicine you are given and how to take it safely  · Take your medicine as directed  Contact your healthcare provider if you think your medicine is not helping or if you have side effects  Tell him of her if you are allergic to any medicine  Keep a list of the medicines, vitamins, and herbs you take  Include the amounts, and when and why you take them  Bring the list or the pill bottles to follow-up visits  Carry your medicine list with you in case of an emergency  Follow up with your healthcare provider as directed: You will need to return to make sure your medicine is working and your symptoms are controlled   You may be referred to an asthma specialist  Dana Lay may be asked to keep a record of your peak flow values and bring it with you to your appointments  Write down your questions so you remember to ask them during your visits  Manage your symptoms and prevent future attacks:   · Follow your Asthma Action Plan (AAP)  This is a written plan that you and your healthcare provider create  It explains which medicine you need and when to change doses if necessary  It also explains how you can monitor symptoms and use a peak flow meter  The meter measures how well your lungs are working  · Manage other health conditions , such as allergies, acid reflux, and sleep apnea  · Identify and avoid triggers  These may include pets, dust mites, mold, and cockroaches  · Do not smoke or be around others who smoke  Nicotine and other chemicals in cigarettes and cigars can cause lung damage  Ask your healthcare provider for information if you currently smoke and need help to quit  E-cigarettes or smokeless tobacco still contain nicotine  Talk to your healthcare provider before you use these products  · Ask about the flu vaccine  The flu can make your asthma worse  You may need a yearly flu shot  © Copyright [address] Information is for End User's use only and may not be sold, redistributed or otherwise used for commercial purposes  All illustrations and images included in CareNotes® are the copyrighted property of A D A M , [employer]  or [address] [place] [address] Raul   The above information is an  only  It is not intended as medical advice for individual conditions or treatments  Talk to your doctor, nurse or pharmacist before following any medical regimen to see if it is safe and effective for you

## 2021-07-27 ENCOUNTER — TELEPHONE (OUTPATIENT)
Dept: FAMILY MEDICINE CLINIC | Facility: CLINIC | Age: 76
End: 2021-07-27

## 2021-07-27 NOTE — TELEPHONE ENCOUNTER
Pt left a voicemail on the nurse line regarding if you can prescribe medication for the cough, pt went on the ED  07/10/21, and they didn't prescribe mediation for that

## 2021-07-27 NOTE — TELEPHONE ENCOUNTER
She has medicare and they dont typically cover cough meds    Her could should have improved with antibiotics and prednisone but if it didn't I recommedn recheck

## 2021-07-29 ENCOUNTER — OFFICE VISIT (OUTPATIENT)
Dept: FAMILY MEDICINE CLINIC | Facility: CLINIC | Age: 76
End: 2021-07-29

## 2021-07-29 VITALS
BODY MASS INDEX: 42.86 KG/M2 | WEIGHT: 227 LBS | DIASTOLIC BLOOD PRESSURE: 78 MMHG | RESPIRATION RATE: 18 BRPM | TEMPERATURE: 97.8 F | HEIGHT: 61 IN | SYSTOLIC BLOOD PRESSURE: 126 MMHG | HEART RATE: 91 BPM | OXYGEN SATURATION: 95 %

## 2021-07-29 DIAGNOSIS — J22 ACUTE LOWER RESPIRATORY INFECTION: Primary | ICD-10-CM

## 2021-07-29 PROCEDURE — 4040F PNEUMOC VAC/ADMIN/RCVD: CPT | Performed by: NURSE PRACTITIONER

## 2021-07-29 PROCEDURE — 1036F TOBACCO NON-USER: CPT | Performed by: NURSE PRACTITIONER

## 2021-07-29 PROCEDURE — 99214 OFFICE O/P EST MOD 30 MIN: CPT | Performed by: NURSE PRACTITIONER

## 2021-07-29 PROCEDURE — 3288F FALL RISK ASSESSMENT DOCD: CPT | Performed by: NURSE PRACTITIONER

## 2021-07-29 PROCEDURE — 1101F PT FALLS ASSESS-DOCD LE1/YR: CPT | Performed by: NURSE PRACTITIONER

## 2021-07-29 PROCEDURE — 1160F RVW MEDS BY RX/DR IN RCRD: CPT | Performed by: NURSE PRACTITIONER

## 2021-07-29 RX ORDER — PROMETHAZINE HYDROCHLORIDE AND CODEINE PHOSPHATE 6.25; 1 MG/5ML; MG/5ML
5 SYRUP ORAL EVERY 4 HOURS PRN
Qty: 240 ML | Refills: 1 | Status: SHIPPED | OUTPATIENT
Start: 2021-07-29 | End: 2022-08-10 | Stop reason: HOSPADM

## 2021-07-29 RX ORDER — METHYLPREDNISOLONE 4 MG/1
TABLET ORAL
Qty: 21 EACH | Refills: 0 | Status: SHIPPED | OUTPATIENT
Start: 2021-07-29 | End: 2021-11-05

## 2021-07-29 RX ORDER — SULFAMETHOXAZOLE AND TRIMETHOPRIM 800; 160 MG/1; MG/1
1 TABLET ORAL EVERY 12 HOURS SCHEDULED
Qty: 14 TABLET | Refills: 0 | Status: SHIPPED | OUTPATIENT
Start: 2021-07-29 | End: 2021-08-05

## 2021-07-29 NOTE — ASSESSMENT & PLAN NOTE
Treated for pneumonia in ED 2 weeks ago with doxycycline and prednisone taper  Unclear whether current symptoms are re occurrence of pneumonia or increasing ground-glass opacities evidence of airspace which did increase from the last 2 chest x-rays  -will modulate to alternative antibiotic indicated for pneumonia  -additional prednisone taper ordered at this time  -advised patient to continue with inhaler regimen  -for chronic cough causing right-sided abdominal pain will prescribe Phenergan with codeine  -obtain repeat chest x-ray if infiltrate increased or opacities worsen will obtain chest CT

## 2021-07-29 NOTE — PROGRESS NOTES
Assessment/Plan:    Problem List Items Addressed This Visit        Respiratory    Acute lower respiratory infection - Primary     Treated for pneumonia in ED 2 weeks ago with doxycycline and prednisone taper  Unclear whether current symptoms are re occurrence of pneumonia or increasing ground-glass opacities evidence of airspace which did increase from the last 2 chest x-rays  -will modulate to alternative antibiotic indicated for pneumonia  -additional prednisone taper ordered at this time  -advised patient to continue with inhaler regimen  -for chronic cough causing right-sided abdominal pain will prescribe Phenergan with codeine  -obtain repeat chest x-ray if infiltrate increased or opacities worsen will obtain chest CT  Relevant Medications    promethazine-codeine (PHENERGAN WITH CODEINE) 6 25-10 mg/5 mL syrup    methylPREDNISolone 4 MG tablet therapy pack    sulfamethoxazole-trimethoprim (BACTRIM DS) 800-160 mg per tablet    Other Relevant Orders    XR chest pa & lateral            Return in about 1 week (around 8/5/2021) for Recheck cough, congestion, wheezing  A chart review was performed and previous primary care visit notes were reviewed  All applicable imaging studies were reviewed and images were reviewed personally  All applicable laboratory studies were reviewed personally  Care everywhere review was performed if  available and all pertinent notes were reviewed  Subjective:     HPI: Sam Cooper is a 78 y o  female who  has a past medical history of Asthma, GERD (gastroesophageal reflux disease), Hypertension, Lumbar disc disease, Lung cancer ([address] ), Sleep apnea, and Ventricular arrhythmia  who presented to the office today for continued cough, shortness of breath  Patient was seen 07/10 in the ED for cough, wheezing, SOB, chest tightness  She has stage IV non-small cell lung cancer currently receiving chemotherapy    Chest CT at the time showed multifocal patchy ground-glass airspace opacities bilaterally as well as tissue thickening of the right lung  Pneumonia remains a differential consideration and she was discharged on antibiotics and prednisone taper  She states she initially felt better but she still has shortness of breath, wheezing, cough  She states the cough is now causing right flank pain  She denies fever or any contact with known COVID positive persons  She states she was tested for COVID 2 weeks before her ED encounter which was negative  She denies productive cough  She is using her inhalers as prescribed  The following portions of the patient's history were reviewed and updated as appropriate: allergies, current medications, past family history, past medical history, past social history, past surgical history, and problem list     Current Outpatient Medications on File Prior to Visit   Medication Sig Dispense Refill    albuterol (2 5 mg/3 mL) 0 083 % nebulizer solution Take 1 vial (2 5 mg total) by nebulization every 6 (six) hours as needed for wheezing 30 vial 1    albuterol (PROVENTIL HFA,VENTOLIN HFA) 90 mcg/act inhaler TAKE 2 PUFFS BY MOUTH EVERY 6 HOURS AS NEEDED FOR WHEEZE 8 g 1    Alectinib HCl 150 MG CAPS Take 4 capsules (600 mg total) by mouth 2 (two) times a day 240 capsule 5    atenolol (TENORMIN) 25 mg tablet Take 1 tablet (25 mg total) by mouth daily 90 tablet 1    cetirizine (ZyrTEC) 10 mg tablet Take 10 mg by mouth daily      fluticasone (FLONASE) 50 mcg/act nasal spray 2 sprays into each nostril daily 16 mL 5    fluticasone-salmeterol (Wixela Inhub) 250-50 mcg/dose inhaler Inhale 1 puff 2 (two) times a day Rinse mouth after use   1 Inhaler 3    furosemide (LASIX) 20 mg tablet Take 1 tablet (20 mg total) by mouth daily 90 tablet 1    ipratropium-albuterol (DUO-NEB) 0 5-2 5 mg/3 mL nebulizer solution Take 3 mL by nebulization 4 (four) times a day 360 mL 0    irbesartan (AVAPRO) 300 mg tablet Take 1 tablet (300 mg total) by mouth daily at bedtime 90 tablet 1    Loratadine (Claritin) 10 MG CAPS Take by mouth      omeprazole (PriLOSEC) 20 mg delayed release capsule TAKE 1 CAPSULE BY MOUTH EVERY DAY 90 capsule 1    predniSONE 20 mg tablet 3 tabs daily x 3 days, then 2 tabs daily x 3 days, then 1 tab daily x 3 days, then 1/2 tab daily x 3 days and stop  20 tablet 0    [DISCONTINUED] omeprazole (PriLOSEC) 20 mg delayed release capsule TAKE 1 CAPSULE BY MOUTH EVERY DAY 90 capsule 1     No current facility-administered medications on file prior to visit  Review of Systems   Constitutional: Positive for fatigue  Negative for fever and unexpected weight change  HENT: Negative for congestion, ear pain, rhinorrhea and sore throat  Eyes: Negative for visual disturbance  Respiratory: Positive for cough, chest tightness, shortness of breath and wheezing  Cardiovascular: Negative for chest pain  Gastrointestinal: Positive for abdominal pain  Negative for blood in stool, constipation, diarrhea, nausea and vomiting  Pain at right anterior trunk/base of lung   Endocrine: Negative  Genitourinary: Negative for dysuria and hematuria  Musculoskeletal: Negative for arthralgias and back pain  Skin: Negative for rash  Allergic/Immunologic: Negative  Neurological: Negative for dizziness, syncope, light-headedness and headaches  Hematological: Negative  Psychiatric/Behavioral: Negative  All other systems reviewed and are negative  Objective:    /78 (BP Location: Left arm, Patient Position: Sitting, Cuff Size: Large)   Pulse 91   Temp 97 8 °F (36 6 °C) (Temporal)   Resp 18   Ht 5' 1" (1 549 m)   Wt 103 kg (227 lb)   SpO2 95%   BMI 42 89 kg/m²     Physical Exam  Vitals reviewed  Constitutional:       General: She is not in acute distress  Appearance: Normal appearance  HENT:      Head: Normocephalic        Right Ear: External ear normal       Left Ear: External ear normal       Nose: Nose normal  No congestion  Mouth/Throat:      Mouth: Mucous membranes are moist       Pharynx: Oropharynx is clear  Eyes:      Extraocular Movements: Extraocular movements intact  Conjunctiva/sclera: Conjunctivae normal       Pupils: Pupils are equal, round, and reactive to light  Cardiovascular:      Rate and Rhythm: Normal rate and regular rhythm  Pulses: Normal pulses  Heart sounds: Normal heart sounds  No murmur heard  No friction rub  No gallop  Pulmonary:      Effort: Pulmonary effort is normal       Breath sounds: Examination of the right-upper field reveals wheezing  Examination of the left-upper field reveals wheezing  Examination of the right-middle field reveals rhonchi  Examination of the right-lower field reveals wheezing and rhonchi  Examination of the left-lower field reveals wheezing  Wheezing and rhonchi present  Abdominal:      General: Bowel sounds are normal       Palpations: Abdomen is soft  Tenderness: There is no abdominal tenderness  Musculoskeletal:         General: Normal range of motion  Cervical back: Normal range of motion and neck supple  No muscular tenderness  Right lower leg: No edema  Left lower leg: No edema  Skin:     General: Skin is warm and dry  Capillary Refill: Capillary refill takes less than 2 seconds  Neurological:      General: No focal deficit present  Mental Status: She is alert and oriented to person, place, and time  Psychiatric:         Mood and Affect: Mood normal          Behavior: Behavior normal          WILDA Perry  07/29/21  11:33 AM    There are no Patient Instructions on file for this visit

## 2021-07-30 ENCOUNTER — HOSPITAL ENCOUNTER (OUTPATIENT)
Dept: RADIOLOGY | Facility: HOSPITAL | Age: 76
Discharge: HOME | End: 2021-07-30
Payer: COMMERCIAL

## 2021-07-30 DIAGNOSIS — J22 ACUTE LOWER RESPIRATORY INFECTION: ICD-10-CM

## 2021-07-30 PROCEDURE — 71046 X-RAY EXAM CHEST 2 VIEWS: CPT

## 2021-08-02 ENCOUNTER — APPOINTMENT (OUTPATIENT)
Dept: LAB | Facility: HOSPITAL | Age: 76
End: 2021-08-02
Attending: INTERNAL MEDICINE
Payer: COMMERCIAL

## 2021-08-02 DIAGNOSIS — C79.51 BONE METASTASES (HCC): ICD-10-CM

## 2021-08-02 DIAGNOSIS — C34.91 NON-SMALL CELL CARCINOMA OF LUNG, STAGE 4, RIGHT (HCC): ICD-10-CM

## 2021-08-02 LAB
ALBUMIN SERPL BCP-MCNC: 4.2 G/DL (ref 3–5.2)
ALP SERPL-CCNC: 92 U/L (ref 43–122)
ALT SERPL W P-5'-P-CCNC: 25 U/L
ANION GAP SERPL CALCULATED.3IONS-SCNC: 8 MMOL/L (ref 5–14)
AST SERPL W P-5'-P-CCNC: 34 U/L (ref 14–36)
BILIRUB SERPL-MCNC: 0.88 MG/DL
BUN SERPL-MCNC: 45 MG/DL (ref 5–25)
CALCIUM SERPL-MCNC: 9.3 MG/DL (ref 8.4–10.2)
CHLORIDE SERPL-SCNC: 99 MMOL/L (ref 97–108)
CO2 SERPL-SCNC: 31 MMOL/L (ref 22–30)
CREAT SERPL-MCNC: 1.59 MG/DL (ref 0.6–1.2)
GFR SERPL CREATININE-BSD FRML MDRD: 32 ML/MIN/1.73SQ M
GLUCOSE P FAST SERPL-MCNC: 93 MG/DL (ref 70–99)
POTASSIUM SERPL-SCNC: 5 MMOL/L (ref 3.6–5)
PROT SERPL-MCNC: 7.1 G/DL (ref 5.9–8.4)
SODIUM SERPL-SCNC: 138 MMOL/L (ref 137–147)

## 2021-08-02 PROCEDURE — 80053 COMPREHEN METABOLIC PANEL: CPT

## 2021-08-02 PROCEDURE — 36415 COLL VENOUS BLD VENIPUNCTURE: CPT

## 2021-08-04 ENCOUNTER — OFFICE VISIT (OUTPATIENT)
Dept: PULMONOLOGY | Facility: CLINIC | Age: 76
End: 2021-08-04
Payer: COMMERCIAL

## 2021-08-04 VITALS
TEMPERATURE: 98.1 F | DIASTOLIC BLOOD PRESSURE: 80 MMHG | OXYGEN SATURATION: 97 % | WEIGHT: 224 LBS | HEART RATE: 65 BPM | SYSTOLIC BLOOD PRESSURE: 142 MMHG | HEIGHT: 61 IN | BODY MASS INDEX: 42.29 KG/M2

## 2021-08-04 DIAGNOSIS — J45.30 MILD PERSISTENT ASTHMA WITHOUT COMPLICATION: Primary | ICD-10-CM

## 2021-08-04 PROBLEM — J18.9 PNEUMONIA: Status: ACTIVE | Noted: 2021-08-04

## 2021-08-04 PROCEDURE — 4040F PNEUMOC VAC/ADMIN/RCVD: CPT | Performed by: INTERNAL MEDICINE

## 2021-08-04 PROCEDURE — 1036F TOBACCO NON-USER: CPT | Performed by: INTERNAL MEDICINE

## 2021-08-04 PROCEDURE — 1160F RVW MEDS BY RX/DR IN RCRD: CPT | Performed by: INTERNAL MEDICINE

## 2021-08-04 PROCEDURE — 99214 OFFICE O/P EST MOD 30 MIN: CPT | Performed by: INTERNAL MEDICINE

## 2021-08-04 NOTE — PROGRESS NOTES
Pulmonary Follow Up Note   Gary Gomez 78 y o  female MRN: [id number]  8/4/2021      Assessment and Plan:    Mild persistent Asthma with probable recent pneumonia   - Peak flow in office: 250ml x 3 (which is baseline)   - will order Pulmicort BID if she continues to wheeze and/or peak flow numbers decrease    - mucinex po bid   - repeat CXR in 4 weeks   - received a course of Doxy and Bactrim with prednisone - hold off on additional steroids and abx     - Allergy testing 11/2020:   +Cat dander, cockroaches, dust mites, dog dander, Total IgE 109, marginally elevated Aspergillus Fumigatus  - Advair 250-50mcg bid   - PFTs 4/2021: FEV1/FVC 71%, FVC 61%- may suggest restriction however, lung volumes not ordered       Metastatic Non-small cell carcinoma of Right lung (8/2020)   - ALK rearrangement   - s/p palliative radiation therapy to T8 x 10 treatments  - on Alectinib as per hem/onc   - had a PET scan 1/2021 shows response to therapy   - CT chest/ abd/pelvis ordered by hem/onc 5/2021 showed resolution of RUL nodule, R hilum not well visualized, however, was similar to PET scan, osseous metastasis ( ribs, spine, and pelvis), GGO   - CTA 7/2021 showed persistent multifocal patchy GGO slightly increased         Essential hypertension  - well controlled       Morbid obesity  - counseled   - finished getting PT for her knees     MONO   -missed her appointment with Dr Marie Dow   - sleep study in 2019  - does not want to undergo testing at this time     1  Mild persistent asthma without complication  -     XR chest pa & lateral; Future; Expected date: 09/04/2021      No follow-ups on file  History of Present Illness   HPI:  Gary Gomez is a 78 y o  female 70 y o  female who has a PMHx of MONO, obesity, GERD, 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 and has received palliative radiation to the spine and now on oral chemo  She has had seasonal asthma and has had a flare up with the leaves in the fall  Her asthma is very well controlled overall, however, did have an ED visit 7/2021 for which she received a CTA that showed multifocal GGO and was treated with doxy and prednisone  BNP was 277  She had a repeat CXR 7/30 that showed resolution of opacities and received Bactrim and prednisone medrol pack the day prior and has 1 more day left  She is here for regular follow up  Since her bout of pneumonia, she still feels like she has congestion and cough with phlegm  She feels some wheeze  She still feels very fatigue and has a dry cough during the day  Review of Systems   Constitutional: Negative for appetite change and fever  HENT: Positive for postnasal drip  Negative for ear pain, rhinorrhea, sneezing, sore throat and trouble swallowing  Respiratory: Positive for cough and wheezing  Cardiovascular: Positive for chest pain and leg swelling  Musculoskeletal: Negative for myalgias  Neurological: Negative for headaches  All other systems reviewed and are negative  Historical Information   Past Medical History:   Diagnosis Date    Asthma     GERD (gastroesophageal reflux disease)     Hypertension     Lumbar disc disease     Lung cancer ([facility] [address] )     Sleep apnea     suspected     Ventricular arrhythmia      Past Surgical History:   Procedure Laterality Date    APPENDECTOMY      COLONOSCOPY N/A 3/18/2019    Procedure: COLONOSCOPY;  Surgeon: Isabelle Ramirez DO;  Location: [facility]; Service: Gastroenterology    ESOPHAGOGASTRODUODENOSCOPY N/A 3/18/2019    Procedure: ESOPHAGOGASTRODUODENOSCOPY (EGD); Surgeon: Isabelle Ramirez DO;  Location: [facility];   Service: Gastroenterology    KNEE SURGERY      ROTATOR CUFF REPAIR      SHOULDER SURGERY       Family History   Problem Relation Age of Onset    Stroke Mother     Diabetes Mother     Hyperlipidemia Mother     Hypertension Mother     Diabetes Father     Hyperlipidemia Father    Ariela Davison Hypertension Father     Lung cancer Father 79  Lung cancer Sister 71         Meds/Allergies     Current Outpatient Medications:     albuterol (2 5 mg/3 mL) 0 083 % nebulizer solution, Take 1 vial (2 5 mg total) by nebulization every 6 (six) hours as needed for wheezing, Disp: 30 vial, Rfl: 1    albuterol (PROVENTIL HFA,VENTOLIN HFA) 90 mcg/act inhaler, TAKE 2 PUFFS BY MOUTH EVERY 6 HOURS AS NEEDED FOR WHEEZE, Disp: 8 g, Rfl: 1    Alectinib HCl 150 MG CAPS, Take 4 capsules (600 mg total) by mouth 2 (two) times a day, Disp: 240 capsule, Rfl: 5    atenolol (TENORMIN) 25 mg tablet, Take 1 tablet (25 mg total) by mouth daily, Disp: 90 tablet, Rfl: 1    cetirizine (ZyrTEC) 10 mg tablet, Take 10 mg by mouth daily, Disp: , Rfl:     fluticasone (FLONASE) 50 mcg/act nasal spray, 2 sprays into each nostril daily, Disp: 16 mL, Rfl: 5    fluticasone-salmeterol (Wixela Inhub) 250-50 mcg/dose inhaler, Inhale 1 puff 2 (two) times a day Rinse mouth after use , Disp: 1 Inhaler, Rfl: 3    furosemide (LASIX) 20 mg tablet, Take 1 tablet (20 mg total) by mouth daily, Disp: 90 tablet, Rfl: 1    ipratropium-albuterol (DUO-NEB) 0 5-2 5 mg/3 mL nebulizer solution, Take 3 mL by nebulization 4 (four) times a day, Disp: 360 mL, Rfl: 0    irbesartan (AVAPRO) 300 mg tablet, Take 1 tablet (300 mg total) by mouth daily at bedtime, Disp: 90 tablet, Rfl: 1    Loratadine (Claritin) 10 MG CAPS, Take by mouth, Disp: , Rfl:     methylPREDNISolone 4 MG tablet therapy pack, Use as directed on package, Disp: 21 each, Rfl: 0    omeprazole (PriLOSEC) 20 mg delayed release capsule, TAKE 1 CAPSULE BY MOUTH EVERY DAY, Disp: 90 capsule, Rfl: 1    predniSONE 20 mg tablet, 3 tabs daily x 3 days, then 2 tabs daily x 3 days, then 1 tab daily x 3 days, then 1/2 tab daily x 3 days and stop , Disp: 20 tablet, Rfl: 0    promethazine-codeine (PHENERGAN WITH CODEINE) 6 25-10 mg/5 mL syrup, Take 5 mL by mouth every 4 (four) hours as needed for cough, Disp: 240 mL, Rfl: 1   sulfamethoxazole-trimethoprim (BACTRIM DS) 800-160 mg per tablet, Take 1 tablet by mouth every 12 (twelve) hours for 7 days, Disp: 14 tablet, Rfl: 0  No Known Allergies    Vitals: Blood pressure 142/80, pulse 65, temperature 98 1 °F (36 7 °C), height 5' 1" (1 549 m), weight 102 kg (224 lb), SpO2 97 %, not currently breastfeeding  Body mass index is 42 32 kg/m²  Oxygen Therapy  SpO2: 97 %  Oxygen Therapy: None (Room air)      Physical Exam  Physical Exam  Vitals reviewed  Constitutional:       Appearance: Normal appearance  She is obese  HENT:      Head: Normocephalic  Nose: Nose normal       Mouth/Throat:      Mouth: Mucous membranes are moist    Eyes:      Pupils: Pupils are equal, round, and reactive to light  Cardiovascular:      Rate and Rhythm: Normal rate and regular rhythm  Pulses: Normal pulses  Heart sounds: Normal heart sounds  Pulmonary:      Effort: Pulmonary effort is normal       Breath sounds: Normal breath sounds  Comments: Rhonchi clears with cough   Abdominal:      General: Abdomen is flat  Palpations: Abdomen is soft  Musculoskeletal:         General: Swelling present  Cervical back: Normal range of motion  Skin:     General: Skin is warm and dry  Neurological:      General: No focal deficit present  Mental Status: She is alert and oriented to person, place, and time  Labs: I have personally reviewed pertinent lab results    Lab Results   Component Value Date    WBC 6 76 07/10/2021    HGB 10 6 (L) 07/10/2021    HCT 31 6 (L) 07/10/2021    MCV 92 07/10/2021    PLT 99 (L) 07/10/2021     Lab Results   Component Value Date    CALCIUM 9 3 08/02/2021    K 5 0 08/02/2021    CO2 31 (H) 08/02/2021    CL 99 08/02/2021    BUN 45 (H) 08/02/2021    CREATININE 1 59 (H) 08/02/2021     Lab Results   Component Value Date     11/17/2020     Lab Results   Component Value Date    ALT 25 08/02/2021    AST 34 08/02/2021    ALKPHOS 92 08/02/2021 Imaging and other studies: I have personally reviewed pertinent reports  and I have personally reviewed pertinent films in PACS    Pulmonary function testin2021  FEV1/FVC Ratio: 71 %  Forced Vital Capacity: 1 82 L    61 % predicted  FEV1: 1 29 L     56 % predicted     After bronchodilator:  FEV1/FVC Ratio: 72 %  Forced Vital Capacity: 1 88 L    63 % predicted  FEV1: 1 36 L     59 % predicted     DLCO corrected for patients hemoglobin level: 88 %    EKG, Pathology, and Other Studies: I have personally reviewed pertinent reports     and I have personally reviewed pertinent films in PACS      Jocyelyn Montana MD   Pulmonary and Critical Care Fellow  [facility]    Answers for HPI/ROS submitted by the patient on 2021  Do you have difficulty breathing?: Yes  When did you first notice your symptoms?: 1 to 4 weeks ago  How often do your symptoms occur?: daily  Since you first noticed this problem, how has it changed?: gradually improving  Do you have shortness of breath that occurs with effort or exertion?: Yes  Do you have ear congestion?: No  Do you have heartburn?: No  Do you have fatigue?: Yes  Do you have nasal congestion?: No  Do you have shortness of breath when lying flat?: No  Do you have shortness of breath when you wake up?: Yes  Do you have sweats?: No  Have you experienced weight loss?: No  Which of the following makes your symptoms worse?: change in weather, climbing stairs, exercise, exposure to fumes, exposure to smoke, pollen, strenuous activity, URI  Which of the following makes your symptoms better?: oral steroids, prescription cough suppressant, rest  Risk factors for lung disease: animal exposure

## 2021-08-05 ENCOUNTER — OFFICE VISIT (OUTPATIENT)
Dept: FAMILY MEDICINE CLINIC | Facility: CLINIC | Age: 76
End: 2021-08-05

## 2021-08-05 VITALS
TEMPERATURE: 97 F | DIASTOLIC BLOOD PRESSURE: 70 MMHG | SYSTOLIC BLOOD PRESSURE: 114 MMHG | OXYGEN SATURATION: 97 % | HEART RATE: 82 BPM | HEIGHT: 61 IN | BODY MASS INDEX: 41.91 KG/M2 | WEIGHT: 222 LBS | RESPIRATION RATE: 18 BRPM

## 2021-08-05 DIAGNOSIS — J45.31 MILD PERSISTENT ASTHMA WITH ACUTE EXACERBATION: Primary | ICD-10-CM

## 2021-08-05 DIAGNOSIS — K21.9 GASTROESOPHAGEAL REFLUX DISEASE WITHOUT ESOPHAGITIS: ICD-10-CM

## 2021-08-05 DIAGNOSIS — J18.9 PNEUMONIA OF BOTH LUNGS DUE TO INFECTIOUS ORGANISM, UNSPECIFIED PART OF LUNG: ICD-10-CM

## 2021-08-05 DIAGNOSIS — K44.9 HIATAL HERNIA: ICD-10-CM

## 2021-08-05 PROCEDURE — 3008F BODY MASS INDEX DOCD: CPT | Performed by: NURSE PRACTITIONER

## 2021-08-05 PROCEDURE — 3008F BODY MASS INDEX DOCD: CPT | Performed by: INTERNAL MEDICINE

## 2021-08-05 PROCEDURE — 90732 PPSV23 VACC 2 YRS+ SUBQ/IM: CPT | Performed by: FAMILY MEDICINE

## 2021-08-05 PROCEDURE — 99213 OFFICE O/P EST LOW 20 MIN: CPT | Performed by: FAMILY MEDICINE

## 2021-08-05 PROCEDURE — G0009 ADMIN PNEUMOCOCCAL VACCINE: HCPCS | Performed by: FAMILY MEDICINE

## 2021-08-05 RX ORDER — OMEPRAZOLE 20 MG/1
20 CAPSULE, DELAYED RELEASE ORAL DAILY
Qty: 90 CAPSULE | Refills: 1 | Status: SHIPPED | OUTPATIENT
Start: 2021-08-05 | End: 2021-11-24

## 2021-08-05 NOTE — ASSESSMENT & PLAN NOTE
-Recently diagnosed with Pneumonia as evidenced by Bilateral GGO on CT Pulmonary angiogram  -Has history of lung cancer s/p palliative radiation and currently on chemotherapy  -Suspect etiology to be COVID PNA versus drug reaction from chemotherapy   -Patient was treated with Doxycycline intially then bactrim a few weeks later for persistent symptoms  -Repeat CXR showed improvement in the imaging findings  -Will need repeat CXR in 4 weeks per Pulmonology  -Will complete Pneumococcal vaccination series today  -Recommend another vaccination in 5 years given her immunocompromised state

## 2021-08-05 NOTE — PROGRESS NOTES
Assessment/Plan:    Mild persistent asthma  -Stable  -Most recent Peak flow at baseline  -Recently seen by Pulmonology on 8/3/21  -Continue AYLA/LABA/ICS combo  -Mucinex to relief congestion  -Will obtain repeat CXR in 4 weeks due to recent pneumonia      Pneumonia  -Recently diagnosed with Pneumonia as evidenced by Bilateral GGO on CT Pulmonary angiogram  -Has history of lung cancer s/p palliative radiation and currently on chemotherapy  -Suspect etiology to be COVID PNA versus drug reaction from chemotherapy   -Patient was treated with Doxycycline intially then bactrim a few weeks later for persistent symptoms  -Repeat CXR showed improvement in the imaging findings  -Will need repeat CXR in 4 weeks per Pulmonology  -Will complete Pneumococcal vaccination series today  -Recommend another vaccination in 5 years given her immunocompromised state    Gastroesophageal reflux disease without esophagitis  Well controlled  Continue PPI          Return in about 3 months (around 11/5/2021) for Next scheduled follow up for asthma  There are no Patient Instructions on file for this visit  Diagnoses and all orders for this visit:    Mild persistent asthma with acute exacerbation  -     PNEUMOCOCCAL POLYSACCHARIDE VACCINE 23-VALENT =>3YO SQ IM  -     dextromethorphan-guaifenesin (MUCINEX DM)  MG per 12 hr tablet; Take 1 tablet by mouth every 12 (twelve) hours    Hiatal hernia  -     omeprazole (PriLOSEC) 20 mg delayed release capsule; Take 1 capsule (20 mg total) by mouth daily    Gastroesophageal reflux disease without esophagitis    Pneumonia of both lungs due to infectious organism, unspecified part of lung          Subjective:   Frances Llamas is a 78 y o  female 70 y  o  female who has a PMHx of MONO, Asthma obesity, GERD, 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 and has received palliative radiation to the spine and now on oral chemo who is here for follow up   She was recently in the ED for a SOB and she was diagnosed with pneumonia  In the ED her CT chest showed ground glass opacity and she did not get COVID testing  She states that she has gotten her Covid vaccine  She received Doxycycline and prednisone taper  initially but her symptoms did not improve  She presented to PCP office for worsening symptoms  She was then treated with Prednisone taper and then Bactrim  She was seen at the Pulmonlogy office on 8/4/2021  She states that she is doing much better today  She still feels a little bit congested  She smoked for 5 years a long time ago and quit back in 1986  HPI      The following portions of the patient's history were reviewed and updated as appropriate: past medical history, past surgical history and problem list     Current Outpatient Medications on File Prior to Visit   Medication Sig Dispense Refill    albuterol (2 5 mg/3 mL) 0 083 % nebulizer solution Take 1 vial (2 5 mg total) by nebulization every 6 (six) hours as needed for wheezing 30 vial 1    albuterol (PROVENTIL HFA,VENTOLIN HFA) 90 mcg/act inhaler TAKE 2 PUFFS BY MOUTH EVERY 6 HOURS AS NEEDED FOR WHEEZE 8 g 1    Alectinib HCl 150 MG CAPS Take 4 capsules (600 mg total) by mouth 2 (two) times a day 240 capsule 5    atenolol (TENORMIN) 25 mg tablet Take 1 tablet (25 mg total) by mouth daily 90 tablet 1    cetirizine (ZyrTEC) 10 mg tablet Take 10 mg by mouth daily      fluticasone (FLONASE) 50 mcg/act nasal spray 2 sprays into each nostril daily 16 mL 5    fluticasone-salmeterol (Wixela Inhub) 250-50 mcg/dose inhaler Inhale 1 puff 2 (two) times a day Rinse mouth after use   1 Inhaler 3    furosemide (LASIX) 20 mg tablet Take 1 tablet (20 mg total) by mouth daily 90 tablet 1    ipratropium-albuterol (DUO-NEB) 0 5-2 5 mg/3 mL nebulizer solution Take 3 mL by nebulization 4 (four) times a day 360 mL 0    irbesartan (AVAPRO) 300 mg tablet Take 1 tablet (300 mg total) by mouth daily at bedtime 90 tablet 1    Loratadine (Claritin) 10 MG CAPS Take by mouth      methylPREDNISolone 4 MG tablet therapy pack Use as directed on package 21 each 0    predniSONE 20 mg tablet 3 tabs daily x 3 days, then 2 tabs daily x 3 days, then 1 tab daily x 3 days, then 1/2 tab daily x 3 days and stop  20 tablet 0    promethazine-codeine (PHENERGAN WITH CODEINE) 6 25-10 mg/5 mL syrup Take 5 mL by mouth every 4 (four) hours as needed for cough 240 mL 1    sulfamethoxazole-trimethoprim (BACTRIM DS) 800-160 mg per tablet Take 1 tablet by mouth every 12 (twelve) hours for 7 days 14 tablet 0    [DISCONTINUED] omeprazole (PriLOSEC) 20 mg delayed release capsule TAKE 1 CAPSULE BY MOUTH EVERY DAY 90 capsule 1    [DISCONTINUED] omeprazole (PriLOSEC) 20 mg delayed release capsule TAKE 1 CAPSULE BY MOUTH EVERY DAY 90 capsule 1     No current facility-administered medications on file prior to visit  Review of Systems   Constitutional: Negative for chills, fatigue and fever  HENT: Positive for congestion  Respiratory: Positive for cough  Negative for shortness of breath and wheezing  Cardiovascular: Negative for chest pain, palpitations and leg swelling  Gastrointestinal: Negative for nausea and vomiting  Skin: Negative for rash  Neurological: Negative for dizziness, syncope and headaches  Psychiatric/Behavioral: Negative for behavioral problems, confusion and dysphoric mood  Objective:    /70 (BP Location: Left arm, Patient Position: Sitting, Cuff Size: Large)   Pulse 82   Temp (!) 97 °F (36 1 °C) (Temporal)   Resp 18   Ht 5' 1" (1 549 m)   Wt 101 kg (222 lb)   SpO2 97%   Breastfeeding No   BMI 41 95 kg/m²     Physical Exam  Constitutional:       Appearance: She is well-developed  HENT:      Head: Normocephalic  Right Ear: External ear normal       Left Ear: External ear normal       Nose: Nose normal    Eyes:      Pupils: Pupils are equal, round, and reactive to light  Neck:      Thyroid: No thyromegaly  Vascular: No JVD  Trachea: No tracheal deviation  Cardiovascular:      Rate and Rhythm: Normal rate and regular rhythm  Heart sounds: Normal heart sounds  No murmur heard  No friction rub  No gallop  Pulmonary:      Effort: Pulmonary effort is normal  No respiratory distress  Breath sounds: No stridor  Rhonchi present  No wheezing  Abdominal:      General: Bowel sounds are normal  There is no distension  Palpations: Abdomen is soft  There is no mass  Tenderness: There is no abdominal tenderness  There is no guarding  Musculoskeletal:         General: Normal range of motion  Cervical back: Normal range of motion  Skin:     General: Skin is warm  Capillary Refill: Capillary refill takes less than 2 seconds  Findings: No rash  Neurological:      Mental Status: She is alert and oriented to person, place, and time  Cranial Nerves: No cranial nerve deficit  Motor: No abnormal muscle tone        Coordination: Coordination normal       Deep Tendon Reflexes: Reflexes normal    Psychiatric:         Behavior: Behavior normal          Barbara Hung MD  08/05/21  2:49 PM

## 2021-08-05 NOTE — ASSESSMENT & PLAN NOTE
-Stable  -Most recent Peak flow at baseline  -Recently seen by Pulmonology on 8/3/21  -Continue AYLA/LABA/ICS combo  -Mucinex to relief congestion  -Will obtain repeat CXR in 4 weeks due to recent pneumonia

## 2021-08-10 DIAGNOSIS — J45.20 MILD INTERMITTENT ASTHMA WITHOUT COMPLICATION: ICD-10-CM

## 2021-08-10 RX ORDER — ALBUTEROL SULFATE 2.5 MG/3ML
2.5 SOLUTION RESPIRATORY (INHALATION) EVERY 6 HOURS PRN
Qty: 75 ML | Refills: 1 | Status: SHIPPED | OUTPATIENT
Start: 2021-08-10 | End: 2021-11-25 | Stop reason: SDUPTHER

## 2021-08-16 ENCOUNTER — RA CDI HCC (OUTPATIENT)
Dept: OTHER | Facility: HOSPITAL | Age: 76
End: 2021-08-16

## 2021-08-16 NOTE — PROGRESS NOTES
[place] [id number]  coding opportunities             Chart Reviewed * (Number of) Inbasket suggestions sent to Provider: 1     Problem listed updated  Provider Accepted, (number of) suggestions accepted: 1         Number of suggestions used: 1      Number of suggestions NOT actually used: 0     Patients insurance company: [employer] (Medicare Advantage and [employer])   dx sent is on the bill  Visit status: Patient arrived for their scheduled appointment        [facility] [employer]  coding opportunities             Chart Reviewed * (Number of) Inbasket suggestions sent to Provider: 1     Problem listed updated  Provider Accepted, (number of) suggestions accepted: 1               Patients insurance company: [employer] (Medicare Advantage and [employer])           [facility] [employer]  coding opportunities             Chart Reviewed * (Number of) Inbasket suggestions sent to Provider: 1   DX:  Ramy Dotson  6-Thrombocytopenia, unspecified-Plt-99-July 2021-unsure on current status-has bone mets                 Patients insurance company: [employer] [address] [place] [address] (Medicare Advantage and [employer])

## 2021-08-17 ENCOUNTER — HOSPITAL ENCOUNTER (OUTPATIENT)
Dept: RADIOLOGY | Facility: HOSPITAL | Age: 76
Discharge: HOME | End: 2021-08-17
Payer: COMMERCIAL

## 2021-08-17 DIAGNOSIS — J45.30 MILD PERSISTENT ASTHMA WITHOUT COMPLICATION: ICD-10-CM

## 2021-08-17 PROCEDURE — 71046 X-RAY EXAM CHEST 2 VIEWS: CPT

## 2021-08-20 PROBLEM — D69.6 THROMBOCYTOPENIA, UNSPECIFIED (HCC): Status: ACTIVE | Noted: 2021-08-20

## 2021-08-23 ENCOUNTER — OFFICE VISIT (OUTPATIENT)
Dept: FAMILY MEDICINE CLINIC | Facility: CLINIC | Age: 76
End: 2021-08-23

## 2021-08-23 VITALS
OXYGEN SATURATION: 96 % | HEIGHT: 61 IN | SYSTOLIC BLOOD PRESSURE: 140 MMHG | DIASTOLIC BLOOD PRESSURE: 70 MMHG | WEIGHT: 226 LBS | BODY MASS INDEX: 42.67 KG/M2 | RESPIRATION RATE: 18 BRPM | HEART RATE: 78 BPM | TEMPERATURE: 97.2 F

## 2021-08-23 DIAGNOSIS — E66.01 MORBID OBESITY (HCC): ICD-10-CM

## 2021-08-23 DIAGNOSIS — Z78.0 POST-MENOPAUSE: ICD-10-CM

## 2021-08-23 DIAGNOSIS — D69.6 THROMBOCYTOPENIA, UNSPECIFIED (HCC): ICD-10-CM

## 2021-08-23 DIAGNOSIS — D69.6 THROMBOCYTOPENIA (HCC): ICD-10-CM

## 2021-08-23 DIAGNOSIS — K21.9 GASTROESOPHAGEAL REFLUX DISEASE WITHOUT ESOPHAGITIS: ICD-10-CM

## 2021-08-23 DIAGNOSIS — I10 ESSENTIAL HYPERTENSION: ICD-10-CM

## 2021-08-23 DIAGNOSIS — C79.51 BONE METASTASIS (HCC): Primary | ICD-10-CM

## 2021-08-23 DIAGNOSIS — Z13.820 SCREENING FOR OSTEOPOROSIS: ICD-10-CM

## 2021-08-23 DIAGNOSIS — Z12.31 BREAST CANCER SCREENING BY MAMMOGRAM: ICD-10-CM

## 2021-08-23 DIAGNOSIS — Z00.00 ENCOUNTER FOR ANNUAL WELLNESS VISIT (AWV) IN MEDICARE PATIENT: ICD-10-CM

## 2021-08-23 PROCEDURE — 1170F FXNL STATUS ASSESSED: CPT | Performed by: PHYSICIAN ASSISTANT

## 2021-08-23 PROCEDURE — 3077F SYST BP >= 140 MM HG: CPT | Performed by: PHYSICIAN ASSISTANT

## 2021-08-23 PROCEDURE — 3008F BODY MASS INDEX DOCD: CPT | Performed by: PHYSICIAN ASSISTANT

## 2021-08-23 PROCEDURE — G0439 PPPS, SUBSEQ VISIT: HCPCS | Performed by: PHYSICIAN ASSISTANT

## 2021-08-23 PROCEDURE — 1036F TOBACCO NON-USER: CPT | Performed by: PHYSICIAN ASSISTANT

## 2021-08-23 PROCEDURE — 3288F FALL RISK ASSESSMENT DOCD: CPT | Performed by: PHYSICIAN ASSISTANT

## 2021-08-23 PROCEDURE — 1160F RVW MEDS BY RX/DR IN RCRD: CPT | Performed by: PHYSICIAN ASSISTANT

## 2021-08-23 PROCEDURE — 3725F SCREEN DEPRESSION PERFORMED: CPT | Performed by: PHYSICIAN ASSISTANT

## 2021-08-23 PROCEDURE — 3078F DIAST BP <80 MM HG: CPT | Performed by: PHYSICIAN ASSISTANT

## 2021-08-23 PROCEDURE — 99214 OFFICE O/P EST MOD 30 MIN: CPT | Performed by: PHYSICIAN ASSISTANT

## 2021-08-23 PROCEDURE — 1125F AMNT PAIN NOTED PAIN PRSNT: CPT | Performed by: PHYSICIAN ASSISTANT

## 2021-08-23 PROCEDURE — 3008F BODY MASS INDEX DOCD: CPT | Performed by: INTERNAL MEDICINE

## 2021-08-23 RX ORDER — LIDOCAINE 50 MG/G
1 PATCH TOPICAL DAILY
Qty: 30 PATCH | Refills: 1 | Status: SHIPPED | OUTPATIENT
Start: 2021-08-23 | End: 2021-08-27

## 2021-08-23 RX ORDER — OXYCODONE HYDROCHLORIDE AND ACETAMINOPHEN 5; 325 MG/1; MG/1
1 TABLET ORAL EVERY 4 HOURS PRN
Qty: 60 TABLET | Refills: 0 | Status: SHIPPED | OUTPATIENT
Start: 2021-08-23 | End: 2022-03-22 | Stop reason: SDUPTHER

## 2021-08-23 NOTE — PATIENT INSTRUCTIONS
Medicare Preventive Visit Patient Instructions  Thank you for completing your Welcome to Medicare Visit or Medicare Annual Wellness Visit today  Your next wellness visit will be due in one year (8/24/2022)  The screening/preventive services that you may require over the next 5-10 years are detailed below  Some tests may not apply to you based off risk factors and/or age  Screening tests ordered at today's visit but not completed yet may show as past due  Also, please note that scanned in results may not display below  Preventive Screenings:  Service Recommendations Previous Testing/Comments   Colorectal Cancer Screening  * Colonoscopy    * Fecal Occult Blood Test (FOBT)/Fecal Immunochemical Test (FIT)  * Fecal DNA/Cologuard Test  * Flexible Sigmoidoscopy Age: 54-65 years old   Colonoscopy: every 10 years (may be performed more frequently if at higher risk)  OR  FOBT/FIT: every 1 year  OR  Cologuard: every 3 years  OR  Sigmoidoscopy: every 5 years  Screening may be recommended earlier than age 48 if at higher risk for colorectal cancer  Also, an individualized decision between you and your healthcare provider will decide whether screening between the ages of 74-80 would be appropriate  Colonoscopy: 03/18/2019  FOBT/FIT: Not on file  Cologuard: Not on file  Sigmoidoscopy: Not on file    Screening Current     Breast Cancer Screening Age: 36 years old  Frequency: every 1-2 years  Not required if history of left and right mastectomy Mammogram: 09/16/2020    Screening Current   Cervical Cancer Screening Between the ages of 21-29, pap smear recommended once every 3 years  Between the ages of 33-67, can perform pap smear with HPV co-testing every 5 years     Recommendations may differ for women with a history of total hysterectomy, cervical cancer, or abnormal pap smears in past  Pap Smear: 10/14/2016    Screening Not Indicated   Hepatitis C Screening Once for adults born between 1945 and 1965  More frequently in patients at high risk for Hepatitis C Hep C Antibody: 10/14/2019    Screening Current   Diabetes Screening 1-2 times per year if you're at risk for diabetes or have pre-diabetes Fasting glucose: 93 mg/dL   A1C: No results in last 5 years    Screening Current   Cholesterol Screening Once every 5 years if you don't have a lipid disorder  May order more often based on risk factors  Lipid panel: 02/14/2020    Screening Current     Other Preventive Screenings Covered by Medicare:  1  Abdominal Aortic Aneurysm (AAA) Screening: covered once if your at risk  You're considered to be at risk if you have a family history of AAA  2  Lung Cancer Screening: covers low dose CT scan once per year if you meet all of the following conditions: (1) Age 50-69; (2) No signs or symptoms of lung cancer; (3) Current smoker or have quit smoking within the last 15 years; (4) You have a tobacco smoking history of at least 30 pack years (packs per day multiplied by number of years you smoked); (5) You get a written order from a healthcare provider  3  Glaucoma Screening: covered annually if you're considered high risk: (1) You have diabetes OR (2) Family history of glaucoma OR (3)  aged 48 and older OR (3)  American aged 72 and older  3  Osteoporosis Screening: covered every 2 years if you meet one of the following conditions: (1) You're estrogen deficient and at risk for osteoporosis based off medical history and other findings; (2) Have a vertebral abnormality; (3) On glucocorticoid therapy for more than 3 months; (4) Have primary hyperparathyroidism; (5) On osteoporosis medications and need to assess response to drug therapy  · Last bone density test (DXA Scan): 12/05/2018  5  HIV Screening: covered annually if you're between the age of 12-76  Also covered annually if you are younger than 13 and older than 72 with risk factors for HIV infection   For pregnant patients, it is covered up to 3 times per pregnancy  Immunizations:  Immunization Recommendations   Influenza Vaccine Annual influenza vaccination during flu season is recommended for all persons aged >= 6 months who do not have contraindications   Pneumococcal Vaccine (Prevnar and Pneumovax)  * Prevnar = PCV13  * Pneumovax = PPSV23   Adults 25-60 years old: 1-3 doses may be recommended based on certain risk factors  Adults 72 years old: Prevnar (PCV13) vaccine recommended followed by Pneumovax (PPSV23) vaccine  If already received PPSV23 since turning 65, then PCV13 recommended at least one year after PPSV23 dose  Hepatitis B Vaccine 3 dose series if at intermediate or high risk (ex: diabetes, end stage renal disease, liver disease)   Tetanus (Td) Vaccine - COST NOT COVERED BY MEDICARE PART B Following completion of primary series, a booster dose should be given every 10 years to maintain immunity against tetanus  Td may also be given as tetanus wound prophylaxis  Tdap Vaccine - COST NOT COVERED BY MEDICARE PART B Recommended at least once for all adults  For pregnant patients, recommended with each pregnancy  Shingles Vaccine (Shingrix) - COST NOT COVERED BY MEDICARE PART B  2 shot series recommended in those aged 48 and above     Health Maintenance Due:      Topic Date Due    Breast Cancer Screening: Mammogram  09/16/2022    Colorectal Cancer Screening  03/18/2029    Hepatitis C Screening  Completed     Immunizations Due:      Topic Date Due    COVID-19 Vaccine (1) Never done    DTaP,Tdap,and Td Vaccines (1 - Tdap) Never done    Influenza Vaccine (1) 09/01/2021     Advance Directives   What are advance directives? Advance directives are legal documents that state your wishes and plans for medical care  These plans are made ahead of time in case you lose your ability to make decisions for yourself  Advance directives can apply to any medical decision, such as the treatments you want, and if you want to donate organs     What are the types of advance directives? There are many types of advance directives, and each state has rules about how to use them  You may choose a combination of any of the following:  · Living will: This is a written record of the treatment you want  You can also choose which treatments you do not want, which to limit, and which to stop at a certain time  This includes surgery, medicine, IV fluid, and tube feedings  · Durable power of  for healthcare [facility]): This is a written record that states who you want to make healthcare choices for you when you are unable to make them for yourself  This person, called a proxy, is usually a family member or a friend  You may choose more than 1 proxy  · Do not resuscitate (DNR) order:  A DNR order is used in case your heart stops beating or you stop breathing  It is a request not to have certain forms of treatment, such as CPR  A DNR order may be included in other types of advance directives  · Medical directive: This covers the care that you want if you are in a coma, near death, or unable to make decisions for yourself  You can list the treatments you want for each condition  Treatment may include pain medicine, surgery, blood transfusions, dialysis, IV or tube feedings, and a ventilator (breathing machine)  · Values history: This document has questions about your views, beliefs, and how you feel and think about life  This information can help others choose the care that you would choose  Why are advance directives important? An advance directive helps you control your care  Although spoken wishes may be used, it is better to have your wishes written down  Spoken wishes can be misunderstood, or not followed  Treatments may be given even if you do not want them  An advance directive may make it easier for your family to make difficult choices about your care  Fall Prevention    Fall prevention  includes ways to make your home and other areas safer   It also includes ways you can move more carefully to prevent a fall  Health conditions that cause changes in your blood pressure, vision, or muscle strength and coordination may increase your risk for falls  Medicines may also increase your risk for falls if they make you dizzy, weak, or sleepy  Fall prevention tips:   · Stand or sit up slowly  · Use assistive devices as directed  · Wear shoes that fit well and have soles that   · Wear a personal alarm  · Stay active  · Manage your medical conditions  Home Safety Tips:  · Add items to prevent falls in the bathroom  · Keep paths clear  · Install bright lights in your home  · Keep items you use often on shelves within reach  · Paint or place reflective tape on the edges of your stairs  Urinary Incontinence   Urinary incontinence (UI)  is when you lose control of your bladder  UI develops because your bladder cannot store or empty urine properly  The 3 most common types of UI are stress incontinence, urge incontinence, or both  Medicines:   · May be given to help strengthen your bladder control  Report any side effects of medication to your healthcare provider  Do pelvic muscle exercises often:  Your pelvic muscles help you stop urinating  Squeeze these muscles tight for 5 seconds, then relax for 5 seconds  Gradually work up to squeezing for 10 seconds  Do 3 sets of 15 repetitions a day, or as directed  This will help strengthen your pelvic muscles and improve bladder control  Train your bladder:  Go to the bathroom at set times, such as every 2 hours, even if you do not feel the urge to go  You can also try to hold your urine when you feel the urge to go  For example, hold your urine for 5 minutes when you feel the urge to go  As that becomes easier, hold your urine for 10 minutes  Self-care:   · Keep a UI record  Write down how often you leak urine and how much you leak  Make a note of what you were doing when you leaked urine    · Drink liquids as directed  You may need to limit the amount of liquid you drink to help control your urine leakage  Do not drink any liquid right before you go to bed  Limit or do not have drinks that contain caffeine or alcohol  · Prevent constipation  Eat a variety of high-fiber foods  Good examples are high-fiber cereals, beans, vegetables, and whole-grain breads  Walking is the best way to trigger your intestines to have a bowel movement  · Exercise regularly and maintain a healthy weight  Weight loss and exercise will decrease pressure on your bladder and help you control your leakage  · Use a catheter as directed  to help empty your bladder  A catheter is a tiny, plastic tube that is put into your bladder to drain your urine  · Go to behavior therapy as directed  Behavior therapy may be used to help you learn to control your urge to urinate  Weight Management   Why it is important to manage your weight:  Being overweight increases your risk of health conditions such as heart disease, high blood pressure, type 2 diabetes, and certain types of cancer  It can also increase your risk for osteoarthritis, sleep apnea, and other respiratory problems  Aim for a slow, steady weight loss  Even a small amount of weight loss can lower your risk of health problems  How to lose weight safely:  A safe and healthy way to lose weight is to eat fewer calories and get regular exercise  You can lose up about 1 pound a week by decreasing the number of calories you eat by 500 calories each day  Healthy meal plan for weight management:  A healthy meal plan includes a variety of foods, contains fewer calories, and helps you stay healthy  A healthy meal plan includes the following:  · Eat whole-grain foods more often  A healthy meal plan should contain fiber  Fiber is the part of grains, fruits, and vegetables that is not broken down by your body  Whole-grain foods are healthy and provide extra fiber in your diet   Some examples of whole-grain foods are whole-wheat breads and pastas, oatmeal, brown rice, and bulgur  · Eat a variety of vegetables every day  Include dark, leafy greens such as spinach, kale, clay greens, and mustard greens  Eat yellow and orange vegetables such as carrots, sweet potatoes, and winter squash  · Eat a variety of fruits every day  Choose fresh or canned fruit (canned in its own juice or light syrup) instead of juice  Fruit juice has very little or no fiber  · Eat low-fat dairy foods  Drink fat-free (skim) milk or 1% milk  Eat fat-free yogurt and low-fat cottage cheese  Try low-fat cheeses such as mozzarella and other reduced-fat cheeses  · Choose meat and other protein foods that are low in fat  Choose beans or other legumes such as split peas or lentils  Choose fish, skinless poultry (chicken or turkey), or lean cuts of red meat (beef or pork)  Before you cook meat or poultry, cut off any visible fat  · Use less fat and oil  Try baking foods instead of frying them  Add less fat, such as margarine, sour cream, regular salad dressing and mayonnaise to foods  Eat fewer high-fat foods  Some examples of high-fat foods include french fries, doughnuts, ice cream, and cakes  · Eat fewer sweets  Limit foods and drinks that are high in sugar  This includes candy, cookies, regular soda, and sweetened drinks  Exercise:  Exercise at least 30 minutes per day on most days of the week  Some examples of exercise include walking, biking, dancing, and swimming  You can also fit in more physical activity by taking the stairs instead of the elevator or parking farther away from stores  Ask your healthcare provider about the best exercise plan for you  © Copyright [employer] 2018 Information is for End User's use only and may not be sold, redistributed or otherwise used for commercial purposes   All illustrations and images included in CareNotes® are the copyrighted property of [employer]  or [employer] [address]

## 2021-08-23 NOTE — PROGRESS NOTES
Assessment and Plan:     Problem List Items Addressed This Visit        Digestive    Gastroesophageal reflux disease without esophagitis     Controlled with omeprazole            Cardiovascular and Mediastinum    Hypertension     Controlled with avapro and atenolol            Musculoskeletal and Integument    Bone metastasis (HCC) - Primary     Refilled oxycodone  Last filled in september         Relevant Medications    lidocaine (LIDODERM) 5 %    oxyCODONE-acetaminophen (PERCOCET) 5-325 mg per tablet       Other    Morbid obesity ([facility] )     BMI Counseling: Body mass index is 42 7 kg/m²  The BMI is above normal  Exercise recommendations include moderate aerobic physical activity for 150 minutes/week  Thrombocytopenia, unspecified ([facility] )     Following with hematology and has been between            Other Visit Diagnoses     Thrombocytopenia ([facility] )        Breast cancer screening by mammogram        Relevant Orders    Mammo screening bilateral w 3d & cad    Post-menopause        Relevant Orders    DXA bone density spine hip and pelvis    Screening for osteoporosis        Relevant Orders    DXA bone density spine hip and pelvis    Encounter for annual wellness visit (AWV) in Medicare patient               Preventive health issues were discussed with patient, and age appropriate screening tests were ordered as noted in patient's After Visit Summary  Personalized health advice and appropriate referrals for health education or preventive services given if needed, as noted in patient's After Visit Summary       History of Present Illness:     Patient presents for Medicare Annual Wellness visit    Patient Care Team:  Zeinab Santoro PA-C as PCP - General (Family Medicine)  DO Abhinav Giraldo MD Linnette Deters, DO as Endoscopist  Braulio Perez MD (Radiation Oncology)     Problem List:     Patient Active Problem List   Diagnosis    Palpitations    Premature atrial contractions    Primary osteoarthritis of right wrist    Hypertension    Vitamin D deficiency    Hiatal hernia    Spinal stenosis of 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    Lumbar facet arthropathy    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    Colon cancer screening    Gastroesophageal reflux disease without esophagitis    Other headache syndrome    Chronic gastritis without bleeding    Morbid obesity (HCC)    Chronic rhinitis    MONO (obstructive sleep apnea)    Bone metastasis (HCC)    Non-small cell carcinoma of lung, stage 4, right (HCC)    Bone metastases (HCC)    Mild persistent asthma    Abnormal renal function    Depression, recurrent (HCC)    Lower extremity edema    Acute lower respiratory infection    Pneumonia    Thrombocytopenia, unspecified (HCC)      Past Medical and Surgical History:     Past Medical History:   Diagnosis Date    Asthma     GERD (gastroesophageal reflux disease)     Hypertension     Lumbar disc disease     Lung cancer ([facility] )     Sleep apnea     suspected     Ventricular arrhythmia      Past Surgical History:   Procedure Laterality Date    APPENDECTOMY      COLONOSCOPY N/A 3/18/2019    Procedure: COLONOSCOPY;  Surgeon: Jorge Elam DO;  [facility]; Service: Gastroenterology    ESOPHAGOGASTRODUODENOSCOPY N/A 3/18/2019    Procedure: ESOPHAGOGASTRODUODENOSCOPY (EGD); Surgeon: Jorge Elam DO;  Location[facility];   Service: Gastroenterology    KNEE SURGERY      ROTATOR CUFF REPAIR      SHOULDER SURGERY        Family History:     Family History   Problem Relation Age of Onset   [employer] Stroke Mother     Diabetes Mother     Hyperlipidemia Mother     Hypertension Mother     Diabetes Father     Hyperlipidemia Father     Hypertension Father     Lung cancer Father 79    Lung cancer Sister 71      Social History:     Social History     Socioeconomic History    Marital status: Single     Spouse name: None    Number of children: 5    Years of education: None    Highest education level: None   Occupational History    None   Tobacco Use    Smoking status: Former Smoker     Packs/day: 0 25     Years: 0 40     Pack years: 0 10     Types: Cigarettes     Start date:      Quit date:      Years since quittin 10    Smokeless tobacco: Former User    Tobacco comment: quit smoking > 30 years ago    Vaping Use    Vaping Use: Never used   Substance and Sexual Activity    Alcohol use: Yes     Comment: occ    Drug use: No    Sexual activity: None   Other Topics Concern    None   Social History Narrative    None     Social Determinants of Health     Financial Resource Strain:     Difficulty of Paying Living Expenses:    Food Insecurity:     Worried About Running Out of Food in the Last Year:     Ran Out of Food in the Last Year:    Transportation Needs:     Lack of Transportation (Medical):      Lack of Transportation (Non-Medical):    Physical Activity:     Days of Exercise per Week:     Minutes of Exercise per Session:    Stress:     Feeling of Stress :    Social Connections:     Frequency of Communication with Friends and Family:     Frequency of Social Gatherings with Friends and Family:     Attends Rastafari Services:     Active Member of Clubs or Organizations:     Attends Club or Organization Meetings:     Marital Status:    Intimate Partner Violence:     Fear of Current or Ex-Partner:     Emotionally Abused:     Physically Abused:     Sexually Abused:       Medications and Allergies:     Current Outpatient Medications   Medication Sig Dispense Refill    albuterol (2 5 mg/3 mL) 0 083 % nebulizer solution TAKE 1 VIAL (2 5 MG TOTAL) BY NEBULIZATION EVERY 6 (SIX) HOURS AS NEEDED FOR WHEEZING 75 mL 1    albuterol (PROVENTIL HFA,VENTOLIN HFA) 90 mcg/act inhaler TAKE 2 PUFFS BY MOUTH EVERY 6 HOURS AS NEEDED FOR WHEEZE 8 g 1    Alectinib HCl 150 MG CAPS Take 4 capsules (600 mg total) by mouth 2 (two) times a day 240 capsule 5    atenolol (TENORMIN) 25 mg tablet Take 1 tablet (25 mg total) by mouth daily 90 tablet 1    cetirizine (ZyrTEC) 10 mg tablet Take 10 mg by mouth daily      dextromethorphan-guaifenesin (MUCINEX DM)  MG per 12 hr tablet Take 1 tablet by mouth every 12 (twelve) hours 30 tablet 1    fluticasone (FLONASE) 50 mcg/act nasal spray 2 sprays into each nostril daily 16 mL 5    fluticasone-salmeterol (Wixela Inhub) 250-50 mcg/dose inhaler Inhale 1 puff 2 (two) times a day Rinse mouth after use  1 Inhaler 3    furosemide (LASIX) 20 mg tablet Take 1 tablet (20 mg total) by mouth daily 90 tablet 1    irbesartan (AVAPRO) 300 mg tablet Take 1 tablet (300 mg total) by mouth daily at bedtime 90 tablet 1    Loratadine (Claritin) 10 MG CAPS Take by mouth      omeprazole (PriLOSEC) 20 mg delayed release capsule Take 1 capsule (20 mg total) by mouth daily 90 capsule 1    promethazine-codeine (PHENERGAN WITH CODEINE) 6 25-10 mg/5 mL syrup Take 5 mL by mouth every 4 (four) hours as needed for cough 240 mL 1    lidocaine (LIDODERM) 5 % Apply 1 patch topically daily Remove & Discard patch within 12 hours or as directed by MD 30 patch 1    methylPREDNISolone 4 MG tablet therapy pack Use as directed on package 21 each 0    oxyCODONE-acetaminophen (PERCOCET) 5-325 mg per tablet Take 1 tablet by mouth every 4 (four) hours as needed for moderate pain For ongoing painMax Daily Amount: 6 tablets 60 tablet 0     No current facility-administered medications for this visit       No Known Allergies   Immunizations:     Immunization History   Administered Date(s) Administered    INFLUENZA 10/16/2018    Influenza, high dose seasonal 0 7 mL 10/18/2019    Pneumococcal Conjugate 13-Valent 10/31/2018    Pneumococcal Polysaccharide PPV23 08/05/2021      Health Maintenance:         Topic Date Due    Breast Cancer Screening: Mammogram  09/16/2022    Colorectal Cancer Screening  03/18/2029    Hepatitis C Screening  Completed         Topic Date Due  COVID-19 Vaccine (1) Never done    DTaP,Tdap,and Td Vaccines (1 - Tdap) Never done    Influenza Vaccine (1) 09/01/2021      Medicare Health Risk Assessment:     /70 (BP Location: Left arm, Patient Position: Sitting, Cuff Size: Large)   Pulse 78   Temp (!) 97 2 °F (36 2 °C) (Temporal)   Resp 18   Ht 5' 1" (1 549 m)   Wt 103 kg (226 lb)   SpO2 96%   Breastfeeding No   BMI 42 70 kg/m²      Lacho Mayfield is here for her Subsequent Wellness visit  Health Risk Assessment:   Patient rates overall health as fair  Patient feels that their physical health rating is slightly better  Patient is satisfied with their life  Eyesight was rated as same  Hearing was rated as same  Patient feels that their emotional and mental health rating is same  Patients states they are sometimes angry  Patient states they are often unusually tired/fatigued  Pain experienced in the last 7 days has been some  Patient's pain rating has been 1/10  Patient states that she has experienced weight loss or gain in last 6 months  Depression Screening:   PHQ-2 Score: 0  PHQ-9 Score: 5      Fall Risk Screening: In the past year, patient has experienced: history of falling in past year    Number of falls: 1  Injured during fall?: No    Feels unsteady when standing or walking?: No    Worried about falling?: No      Urinary Incontinence Screening:   Patient has leaked urine accidently in the last six months  Home Safety:  Patient has trouble with stairs inside or outside of their home  Patient has working smoke alarms and has no working carbon monoxide detector  Home safety hazards include: none  Nutrition:   Current diet is Regular  Medications:   Patient is currently taking over-the-counter supplements  OTC medications include: Vitamins  Patient is able to manage medications       Activities of Daily Living (ADLs)/Instrumental Activities of Daily Living (IADLs):   Walk and transfer into and out of bed and chair?: Yes  Dress and groom yourself?: Yes    Bathe or shower yourself?: Yes    Feed yourself? Yes  Do your laundry/housekeeping?: Yes  Manage your money, pay your bills and track your expenses?: Yes  Make your own meals?: Yes    Do your own shopping?: Yes    Previous Hospitalizations:   Any hospitalizations or ED visits within the last 12 months?: Yes    How many hospitalizations have you had in the last year?: 1-2    Advance Care Planning:   Living will: Yes    Durable POA for healthcare: Yes    Advanced directive: Yes    Advanced directive counseling given: No    Five wishes given: No    Patient declined ACP directive: No      Cognitive Screening:   Provider or family/friend/caregiver concerned regarding cognition?: No    PREVENTIVE SCREENINGS      Cardiovascular Screening:    General: Screening Current      Diabetes Screening:     General: Screening Current      Colorectal Cancer Screening:     General: Screening Current      Breast Cancer Screening:     General: Screening Current      Cervical Cancer Screening:    General: Screening Not Indicated      Osteoporosis Screening:    General: Screening Current      Abdominal Aortic Aneurysm (AAA) Screening:        General: Screening Not Indicated      Lung Cancer Screening:     General: Screening Not Indicated and History Lung Cancer      Hepatitis C Screening:    General: Screening Current    Screening, Brief Intervention, and Referral to Treatment (SBIRT)    Screening  Typical number of drinks in a day: 0  Typical number of drinks in a week: 0  Interpretation: Low risk drinking behavior  AUDIT-C Screenin) How often did you have a drink containing alcohol in the past year? monthly or less  2) How many drinks did you have on a typical day when you were drinking in the past year?  1 to 2  3) How often did you have 6 or more drinks on one occasion in the past year? never    AUDIT-C Score: 1  Interpretation: Score 0-2 (female): Negative screen for alcohol misuse    Single Item Drug Screening:  How often have you used an illegal drug (including marijuana) or a prescription medication for non-medical reasons in the past year? never    Single Item Drug Screen Score: 0  Interpretation: Negative screen for possible drug use disorder    Other Counseling Topics:   Patient had both covid vaccines but does not have the card      Nel Jimenez PA-C

## 2021-08-23 NOTE — PROGRESS NOTES
Assessment/Plan:    Gastroesophageal reflux disease without esophagitis  Controlled with omeprazole    Mild persistent asthma  Continue with advair  Did discuss about flovent which she did not have at this time  Continue prn albuterol    Hypertension  Controlled with avapro and atenolol    Bone metastasis (HCC)  Refilled oxycodone  Last filled in september    Morbid obesity ([facility] )  BMI Counseling: Body mass index is 42 7 kg/m²  The BMI is above normal  Exercise recommendations include moderate aerobic physical activity for 150 minutes/week  Problem List Items Addressed This Visit        Digestive    Gastroesophageal reflux disease without esophagitis     Controlled with omeprazole            Cardiovascular and Mediastinum    Hypertension     Controlled with avapro and atenolol            Musculoskeletal and Integument    Bone metastasis (HCC)     Refilled oxycodone  Last filled in september         Relevant Medications    lidocaine (LIDODERM) 5 %    oxyCODONE-acetaminophen (PERCOCET) 5-325 mg per tablet       Other    Morbid obesity ([facility] )     BMI Counseling: Body mass index is 42 7 kg/m²  The BMI is above normal  Exercise recommendations include moderate aerobic physical activity for 150 minutes/week  Other Visit Diagnoses     Breast cancer screening by mammogram    -  Primary    Relevant Orders    Mammo screening bilateral w 3d & cad    Post-menopause        Relevant Orders    DXA bone density spine hip and pelvis    Screening for osteoporosis        Relevant Orders    DXA bone density spine hip and pelvis    Thrombocytopenia (HCC)                Subjective:      Patient ID: Sarbjit Holliday is a 78 y o  female  HPI 78year old F with a history of stage 4 nonsmall cell lung cancer with bone mets to rib here for AWV and f/u  She is still having right posterior rib pain  She took ibuprofen today and it helped today   She does have oxycodone and it helps but she does not want to take it regularly  She is still needing to do the minineb  She typically does 1 treatment a day  SHe is using advair daily  She was seen in ED on 7/10 for pneumonia and typically after she is treated she returns to normal but has continued to wheeze since  The cough has resolved and no fevers  Her BP has been controlled  The following portions of the patient's history were reviewed and updated as appropriate:   She  has a past medical history of Asthma, GERD (gastroesophageal reflux disease), Hypertension, Lumbar disc disease, Lung cancer ([place] [address] ), Sleep apnea, and Ventricular arrhythmia  She   Patient Active Problem List    Diagnosis Date Noted    Thrombocytopenia, unspecified ([place] [address] ) 08/20/2021    Pneumonia 08/04/2021    Acute lower respiratory infection 07/29/2021    Depression, recurrent ([place] [address] ) 05/24/2021    Lower extremity edema 05/24/2021    Abnormal renal function 02/23/2021    Mild persistent asthma 11/11/2020    Bone metastases ([place] [address] ) 09/17/2020    Bone metastasis ([place] [address] ) 08/31/2020    Non-small cell carcinoma of lung, stage 4, right ([place] [address] ) 08/31/2020    MONO (obstructive sleep apnea)     Chronic rhinitis 04/12/2019    Morbid obesity ([place] [address] ) 04/11/2019    Chronic gastritis without bleeding 03/18/2019    Other headache syndrome 02/13/2019    Colon cancer screening 01/22/2019    Gastroesophageal reflux disease without esophagitis 01/22/2019    Spinal stenosis of 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 01/17/2019    Lumbar facet arthropathy 01/17/2019    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 01/17/2019    Hypertension 10/31/2018    Vitamin D deficiency 10/31/2018    Hiatal hernia 10/31/2018    Palpitations 10/31/2017    Premature atrial contractions 10/31/2017    Primary osteoarthritis of right wrist 05/05/2017     She  has a past surgical history that includes Rotator cuff repair; Knee surgery; Appendectomy; Shoulder surgery;  Colonoscopy (N/A, 3/18/2019); and Esophagogastroduodenoscopy (N/A, 3/18/2019)  Her family history includes Diabetes in her father and mother; Hyperlipidemia in her father and mother; Hypertension in her father and mother; Lung cancer (age of onset: 71) in her sister; Lung cancer (age of onset: 79) in her father; Stroke in her mother  She  reports that she quit smoking about 34 years ago  Her smoking use included cigarettes  She started smoking about 59 years ago  She has a 0 10 pack-year smoking history  She has quit using smokeless tobacco  She reports current alcohol use  She reports that she does not use drugs  Current Outpatient Medications   Medication Sig Dispense Refill    albuterol (2 5 mg/3 mL) 0 083 % nebulizer solution TAKE 1 VIAL (2 5 MG TOTAL) BY NEBULIZATION EVERY 6 (SIX) HOURS AS NEEDED FOR WHEEZING 75 mL 1    albuterol (PROVENTIL HFA,VENTOLIN HFA) 90 mcg/act inhaler TAKE 2 PUFFS BY MOUTH EVERY 6 HOURS AS NEEDED FOR WHEEZE 8 g 1    Alectinib HCl 150 MG CAPS Take 4 capsules (600 mg total) by mouth 2 (two) times a day 240 capsule 5    atenolol (TENORMIN) 25 mg tablet Take 1 tablet (25 mg total) by mouth daily 90 tablet 1    cetirizine (ZyrTEC) 10 mg tablet Take 10 mg by mouth daily      dextromethorphan-guaifenesin (MUCINEX DM)  MG per 12 hr tablet Take 1 tablet by mouth every 12 (twelve) hours 30 tablet 1    fluticasone (FLONASE) 50 mcg/act nasal spray 2 sprays into each nostril daily 16 mL 5    fluticasone-salmeterol (Wixela Inhub) 250-50 mcg/dose inhaler Inhale 1 puff 2 (two) times a day Rinse mouth after use   1 Inhaler 3    furosemide (LASIX) 20 mg tablet Take 1 tablet (20 mg total) by mouth daily 90 tablet 1    irbesartan (AVAPRO) 300 mg tablet Take 1 tablet (300 mg total) by mouth daily at bedtime 90 tablet 1    Loratadine (Claritin) 10 MG CAPS Take by mouth      omeprazole (PriLOSEC) 20 mg delayed release capsule Take 1 capsule (20 mg total) by mouth daily 90 capsule 1    promethazine-codeine (PHENERGAN WITH CODEINE) 6 25-10 mg/5 mL syrup Take 5 mL by mouth every 4 (four) hours as needed for cough 240 mL 1    lidocaine (LIDODERM) 5 % Apply 1 patch topically daily Remove & Discard patch within 12 hours or as directed by MD 30 patch 1    methylPREDNISolone 4 MG tablet therapy pack Use as directed on package 21 each 0    oxyCODONE-acetaminophen (PERCOCET) 5-325 mg per tablet Take 1 tablet by mouth every 4 (four) hours as needed for moderate pain For ongoing painMax Daily Amount: 6 tablets 60 tablet 0     No current facility-administered medications for this visit  Current Outpatient Medications on File Prior to Visit   Medication Sig    albuterol (2 5 mg/3 mL) 0 083 % nebulizer solution TAKE 1 VIAL (2 5 MG TOTAL) BY NEBULIZATION EVERY 6 (SIX) HOURS AS NEEDED FOR WHEEZING    albuterol (PROVENTIL HFA,VENTOLIN HFA) 90 mcg/act inhaler TAKE 2 PUFFS BY MOUTH EVERY 6 HOURS AS NEEDED FOR WHEEZE    Alectinib HCl 150 MG CAPS Take 4 capsules (600 mg total) by mouth 2 (two) times a day    atenolol (TENORMIN) 25 mg tablet Take 1 tablet (25 mg total) by mouth daily    cetirizine (ZyrTEC) 10 mg tablet Take 10 mg by mouth daily    dextromethorphan-guaifenesin (MUCINEX DM)  MG per 12 hr tablet Take 1 tablet by mouth every 12 (twelve) hours    fluticasone (FLONASE) 50 mcg/act nasal spray 2 sprays into each nostril daily    fluticasone-salmeterol (Wixela Inhub) 250-50 mcg/dose inhaler Inhale 1 puff 2 (two) times a day Rinse mouth after use      furosemide (LASIX) 20 mg tablet Take 1 tablet (20 mg total) by mouth daily    irbesartan (AVAPRO) 300 mg tablet Take 1 tablet (300 mg total) by mouth daily at bedtime    Loratadine (Claritin) 10 MG CAPS Take by mouth    omeprazole (PriLOSEC) 20 mg delayed release capsule Take 1 capsule (20 mg total) by mouth daily    promethazine-codeine (PHENERGAN WITH CODEINE) 6 25-10 mg/5 mL syrup Take 5 mL by mouth every 4 (four) hours as needed for cough    methylPREDNISolone 4 MG tablet therapy pack Use as directed on package    [DISCONTINUED] omeprazole (PriLOSEC) 20 mg delayed release capsule TAKE 1 CAPSULE BY MOUTH EVERY DAY     No current facility-administered medications on file prior to visit  She has No Known Allergies       Review of Systems   Constitutional: Negative for activity change, appetite change, chills, fatigue and unexpected weight change  HENT: Negative for ear pain, hearing loss and sore throat  Eyes: Negative for visual disturbance  Respiratory: Positive for wheezing  Negative for cough  Cardiovascular: Negative for chest pain  Gastrointestinal: Negative for abdominal pain, constipation, diarrhea and vomiting  Genitourinary: Negative for difficulty urinating and dysuria  Musculoskeletal: Positive for back pain  Negative for arthralgias and myalgias  Skin: Negative for rash  Neurological: Negative for dizziness and headaches  Psychiatric/Behavioral: Negative for behavioral problems  Objective:      /70 (BP Location: Left arm, Patient Position: Sitting, Cuff Size: Large)   Pulse 78   Temp (!) 97 2 °F (36 2 °C) (Temporal)   Resp 18   Ht 5' 1" (1 549 m)   Wt 103 kg (226 lb)   SpO2 96%   Breastfeeding No   BMI 42 70 kg/m²          Physical Exam  Vitals and nursing note reviewed  Constitutional:       General: She is not in acute distress  Appearance: She is well-developed  She is obese  HENT:      Head: Normocephalic and atraumatic  Right Ear: External ear normal       Left Ear: External ear normal       Nose: Nose normal    Eyes:      Conjunctiva/sclera: Conjunctivae normal    Neck:      Thyroid: No thyromegaly  Cardiovascular:      Rate and Rhythm: Normal rate and regular rhythm  Heart sounds: Normal heart sounds  No murmur heard  Pulmonary:      Effort: Pulmonary effort is normal  No respiratory distress  Breath sounds: Wheezing present  Musculoskeletal:         General: Tenderness (right posterior ribs 9-10) present  Cervical back: Normal range of motion and neck supple  Lymphadenopathy:      Cervical: No cervical adenopathy  Neurological:      Mental Status: She is alert and oriented to person, place, and time     Psychiatric:         Mood and Affect: Mood normal          Behavior: Behavior normal

## 2021-08-24 NOTE — ASSESSMENT & PLAN NOTE
BMI Counseling: Body mass index is 42 7 kg/m²  The BMI is above normal  Exercise recommendations include moderate aerobic physical activity for 150 minutes/week

## 2021-08-24 NOTE — ASSESSMENT & PLAN NOTE
After Visit Summary   8/3/2018    Daniel Villanueva    MRN: [id number]           Patient Information     Date Of Birth          1952        Visit Information        Provider Department      8/3/2018 2:00 PM Nikko Gomez MD [facility]        Today's Diagnoses     Suspected sleep apnea    -  1    Snoring        Morbid obesity (H)        Obesity, morbid, BMI 40.0-49.9 (H)          Care Instructions      Your BMI is Body mass index is 41.75 kg/(m^2).  Weight management is a personal decision.  If you are interested in exploring weight loss strategies, the following discussion covers the approaches that may be successful. Body mass index (BMI) is one way to tell whether you are at a healthy weight, overweight, or obese. It measures your weight in relation to your height.  A BMI of 18.5 to 24.9 is in the healthy range. A person with a BMI of 25 to 29.9 is considered overweight, and someone with a BMI of 30 or greater is considered obese. More than two-thirds of American adults are considered overweight or obese.  Being overweight or obese increases the risk for further weight gain. Excess weight may lead to heart disease and diabetes.  Creating and following plans for healthy eating and physical activity may help you improve your health.  Weight control is part of healthy lifestyle and includes exercise, emotional health, and healthy eating habits. Careful eating habits lifelong are the mainstay of weight control. Though there are significant health benefits from weight loss, long-term weight loss with diet alone may be very difficult to achieve- studies show long-term success with dietary management in less than 10% of people. Attaining a healthy weight may be especially difficult to achieve in those with severe obesity. In some cases, medications, devices and surgical management might be considered.  What can you do?  If you are overweight or obese and are interested in methods for  Controlled with avapro and atenolol weight loss, you should discuss this with your provider.     Consider reducing daily calorie intake by 500 calories.     Keep a food journal.     Avoiding skipping meals, consider cutting portions instead.    Diet combined with exercise helps maintain muscle while optimizing fat loss. Strength training is particularly important for building and maintaining muscle mass. Exercise helps reduce stress, increase energy, and improves fitness. Increasing exercise without diet control, however, may not burn enough calories to loose weight.       Start walking three days a week 10-20 minutes at a time    Work towards walking thirty minutes five days a week     Eventually, increase the speed of your walking for 1-2 minutes at time    In addition, we recommend that you review healthy lifestyles and methods for weight loss available through the National Institutes of Health patient information sites:  http://win.niddk.nih.gov/publications/index.htm    And look into health and wellness programs that may be available through your health insurance provider, employer, local community center, or aquiles club.    Weight management plan: Patient was referred to their PCP to discuss a diet and exercise plan.              Follow-ups after your visit        Your next 10 appointments already scheduled     Oct 01, 2018  8:30 PM CDT   PSG Split with BED 4 SH SLEEP   [facility]    [address] [address]   [phone]            Oct 12, 2018 11:30 AM CDT   Return Sleep Patient with Nikko Gomez MD   [facility]    [address] [address]   [phone]              Future tests that were ordered for you today     Open Future Orders        Priority Expected Expires Ordered    Comprehensive Sleep Study Routine  1/30/2019 8/3/2018            Who to contact     If you have questions  "or need follow up information about today's clinic visit or your schedule please contact [facility] [place] directly at [phone].  Normal or non-critical lab and imaging results will be communicated to you by MyChart, letter or phone within 4 business days after the clinic has received the results. If you do not hear from us within 7 days, please contact the clinic through AMXhart or phone. If you have a critical or abnormal lab result, we will notify you by phone as soon as possible.  Submit refill requests through [facility] or call your pharmacy and they will forward the refill request to us. Please allow 3 business days for your refill to be completed.          Additional Information About Your Visit        AriadNEXT Information     [facility] gives you secure access to your electronic health record. If you see a primary care provider, you can also send messages to your care team and make appointments. If you have questions, please call your primary care clinic.  If you do not have a primary care provider, please call [phone] and they will assist you.        Care EveryWhere ID     This is your Care EveryWhere ID. This could be used by other organizations to access your [facility] medical records  [id number]        Your Vitals Were     Pulse Temperature Respirations Height Pulse Oximetry BMI (Body Mass Index)    90 98.3  F (36.8  C) (Oral) 16 1.778 m (5' 10\") 95% 41.75 kg/m2       Blood Pressure from Last 3 Encounters:   08/03/18 122/84   12/13/17 95/69   12/11/17 128/78    Weight from Last 3 Encounters:   08/03/18 132 kg (291 lb)   12/11/17 132.9 kg (293 lb)   12/11/17 134.3 kg (296 lb)              We Performed the Following     SLEEP EVALUATION & MANAGEMENT REFERRAL - ADULT -[facility] [phone]  (Age 18 and up)        Primary Care Provider Office Phone # Fax #    Oneal Whitehead [phone]       [address]  [place] [address]      "   Equal Access to Services     [facility]EL [facility] Center[facility]YSON : Hadii aad ku hadspeedyandreas Martini, waveroda minatarshaha, qarobertaderrick martinezjuanahenry solares. So [facility] [phone].    ATENCIÓN: Si habla español, tiene a barrera disposición servicios gratuitos de asistencia lingüística. Llame al [phone].    We comply with applicable federal civil rights laws and [place] laws. We do not discriminate on the basis of race, color, national origin, age, disability, sex, sexual orientation, or gender identity.            Thank you!     Thank you for choosing [facility]  for your care. Our goal is always to provide you with excellent care. Hearing back from our patients is one way we can continue to improve our services. Please take a few minutes to complete the written survey that you may receive in the mail after your visit with us. Thank you!             Your Updated Medication List - Protect others around you: Learn how to safely use, store and throw away your medicines at www.disposemymeds.org.          This list is accurate as of 8/3/18  2:42 PM.  Always use your most recent med list.                   Brand Name Dispense Instructions for use Diagnosis    ASPIRIN PO      Take 81 mg by mouth daily        levothyroxine 150 MCG tablet    SYNTHROID/LEVOTHROID    90 tablet    Take 1 tablet (150 mcg) by mouth daily    Other specified hypothyroidism       OMEGA-3 FISH OIL PO           simvastatin 40 MG tablet    ZOCOR    90 tablet    Take 1 tablet (40 mg) by mouth At Bedtime    Hyperlipidemia LDL goal <130

## 2021-08-24 NOTE — ASSESSMENT & PLAN NOTE
Continue with advair  Did discuss about flovent which she did not have at this time   Continue prn albuterol

## 2021-08-30 ENCOUNTER — APPOINTMENT (OUTPATIENT)
Dept: LAB | Facility: HOSPITAL | Age: 76
End: 2021-08-30
Payer: COMMERCIAL

## 2021-08-30 DIAGNOSIS — R60.0 LOWER EXTREMITY EDEMA: ICD-10-CM

## 2021-08-30 DIAGNOSIS — C79.51 BONE METASTASIS (HCC): ICD-10-CM

## 2021-08-30 DIAGNOSIS — C34.91 NON-SMALL CELL CARCINOMA OF LUNG, STAGE 4, RIGHT (HCC): ICD-10-CM

## 2021-08-30 LAB
ALBUMIN SERPL BCP-MCNC: 4 G/DL (ref 3–5.2)
ALP SERPL-CCNC: 108 U/L (ref 43–122)
ALT SERPL W P-5'-P-CCNC: 25 U/L
ANION GAP SERPL CALCULATED.3IONS-SCNC: 7 MMOL/L (ref 5–14)
AST SERPL W P-5'-P-CCNC: 39 U/L (ref 14–36)
BILIRUB SERPL-MCNC: 0.91 MG/DL
BUN SERPL-MCNC: 30 MG/DL (ref 5–25)
CALCIUM SERPL-MCNC: 9.5 MG/DL (ref 8.4–10.2)
CHLORIDE SERPL-SCNC: 100 MMOL/L (ref 97–108)
CO2 SERPL-SCNC: 33 MMOL/L (ref 22–30)
CREAT SERPL-MCNC: 1.16 MG/DL (ref 0.6–1.2)
GFR SERPL CREATININE-BSD FRML MDRD: 46 ML/MIN/1.73SQ M
GLUCOSE SERPL-MCNC: 104 MG/DL (ref 70–99)
POTASSIUM SERPL-SCNC: 4 MMOL/L (ref 3.6–5)
PROT SERPL-MCNC: 7 G/DL (ref 5.9–8.4)
SODIUM SERPL-SCNC: 140 MMOL/L (ref 137–147)

## 2021-08-30 PROCEDURE — 36415 COLL VENOUS BLD VENIPUNCTURE: CPT

## 2021-08-30 PROCEDURE — 80053 COMPREHEN METABOLIC PANEL: CPT

## 2021-08-31 ENCOUNTER — HOSPITAL ENCOUNTER (OUTPATIENT)
Dept: INFUSION CENTER | Facility: HOSPITAL | Age: 76
Discharge: HOME | End: 2021-08-31
Attending: INTERNAL MEDICINE
Payer: COMMERCIAL

## 2021-08-31 VITALS
SYSTOLIC BLOOD PRESSURE: 142 MMHG | HEART RATE: 67 BPM | DIASTOLIC BLOOD PRESSURE: 77 MMHG | TEMPERATURE: 97.2 F | RESPIRATION RATE: 16 BRPM

## 2021-08-31 DIAGNOSIS — C34.91 NON-SMALL CELL CARCINOMA OF LUNG, STAGE 4, RIGHT (HCC): ICD-10-CM

## 2021-08-31 DIAGNOSIS — C79.51 BONE METASTASES (HCC): Primary | ICD-10-CM

## 2021-08-31 PROCEDURE — 96365 THER/PROPH/DIAG IV INF INIT: CPT

## 2021-08-31 RX ORDER — SODIUM CHLORIDE 9 MG/ML
20 INJECTION, SOLUTION INTRAVENOUS ONCE
Status: COMPLETED | OUTPATIENT
Start: 2021-08-31 | End: 2021-08-31

## 2021-08-31 RX ORDER — SODIUM CHLORIDE 9 MG/ML
20 INJECTION, SOLUTION INTRAVENOUS ONCE
Status: CANCELLED | OUTPATIENT
Start: 2021-11-23

## 2021-08-31 RX ADMIN — ZOLEDRONIC ACID 3.3 MG: 4 INJECTION, SOLUTION, CONCENTRATE INTRAVENOUS at 13:54

## 2021-08-31 RX ADMIN — SODIUM CHLORIDE 20 ML/HR: 0.9 INJECTION, SOLUTION INTRAVENOUS at 13:23

## 2021-08-31 NOTE — PROGRESS NOTES
Patient denies any jaw pains or upcoming dental procedures, current creatinine clearance 41 7, zometa 3 3mg given to pt without complications  Made and confirmed next appts and AVS declined

## 2021-09-03 ENCOUNTER — HOSPITAL ENCOUNTER (OUTPATIENT)
Dept: BONE DENSITY | Facility: CLINIC | Age: 76
Discharge: HOME | End: 2021-09-03
Payer: COMMERCIAL

## 2021-09-03 DIAGNOSIS — Z78.0 POST-MENOPAUSE: ICD-10-CM

## 2021-09-03 DIAGNOSIS — Z13.820 SCREENING FOR OSTEOPOROSIS: ICD-10-CM

## 2021-09-03 PROCEDURE — 77080 DXA BONE DENSITY AXIAL: CPT

## 2021-09-07 ENCOUNTER — TELEPHONE (OUTPATIENT)
Dept: FAMILY MEDICINE CLINIC | Facility: CLINIC | Age: 76
End: 2021-09-07

## 2021-09-07 NOTE — TELEPHONE ENCOUNTER
SIGNATURES NEEDED FOR  CoverMyMeds Prior Authorization  FORM RECEIVED VIA FAX  WILL BE PLACED IN FORM BIN FOR MA PICKUP

## 2021-09-15 DIAGNOSIS — J45.20 MILD INTERMITTENT ASTHMA WITHOUT COMPLICATION: ICD-10-CM

## 2021-09-15 RX ORDER — ALBUTEROL SULFATE 90 UG/1
2 AEROSOL, METERED RESPIRATORY (INHALATION) EVERY 6 HOURS PRN
Qty: 8 G | Refills: 1 | Status: SHIPPED | OUTPATIENT
Start: 2021-09-15 | End: 2021-11-19

## 2021-09-27 NOTE — PRE-PROCEDURE INSTRUCTIONS
Outpatient Speech Language Pathology   Peds Speech Language Treatment Note       Patient Name: Twan Escalante  : 2007  MRN: [id number]  Today's Date: 2021      Visit Date: 2021            ..This patient/or legal guardian has consented to receive care through a telehealth visit today. yes  This patient/or legal guardian has verbally consented to use a video/telephone visit for their medical care today?yes          Visit Dx:    ICD-10-CM ICD-9-CM   1. Symbolic dysfunction  R48.9 784.60   2. Mixed receptive-expressive language disorder  F80.2 315.32   3. Executive function deficit  R41.844 799.55   4. Reading disorder  F81.0 315.00   5. Down's syndrome  Q90.9 758.0   6. Social communication disorder  F80.89 315.39        21 1247   Subjective Comments   Subjective Comments Patient seen for ST via telehealth.  SLP and Mom discussing current strength and weakness.  Recently, [employer] meeting completed with transition from [facility] ed class to high structured class with high levels of accommodations.  Mom reports mixed feelings on this change in middle school, however, recognizes with decreased classroom demands , he is falling behind.  Will continue ST according to current POC.Main focus will remain on deductive reasoning skills for safety and auditory and reading comprehension.    Short-Term Goals   STG- 1 Patient will generate simple sentence  with emphasis on agent-action-object sentences 80% fo the time with max assist during NEW: Twan will use other means of communication with teacher, peers, therapist and unfamiliar people using high tech applications ([facility], google, text communication, email, letter writing) to enhance communication skills in wiriting and verbal expression.    Status: STG- 1 Progressing as expected;New   Comments: STG- 1 using complete sentences from recorded details w/ max assist for word order and descriptive word use.     STG- 2 Patient will identify and  document main idea  Pre-Surgery Instructions:   Medication Instructions    albuterol (PROVENTIL HFA,VENTOLIN HFA) 90 mcg/act inhaler Instructed patient per Anesthesia Guidelines   atenolol (TENORMIN) 25 mg tablet Instructed patient per Anesthesia Guidelines   cetirizine (ZyrTEC) 10 mg tablet Instructed patient per Anesthesia Guidelines   fluticasone (FLONASE) 50 mcg/act nasal spray Instructed patient per Anesthesia Guidelines   fluticasone-salmeterol (ADVAIR, Prasanna Commander) 500-50 mcg/dose inhaler Instructed patient per Anesthesia Guidelines   hydrochlorothiazide (HYDRODIURIL) 25 mg tablet Instructed patient per Anesthesia Guidelines   irbesartan (AVAPRO) 300 mg tablet Instructed patient per Anesthesia Guidelines   LORazepam (ATIVAN) 0 5 mg tablet Instructed patient per Anesthesia Guidelines   omeprazole (PriLOSEC) 20 mg delayed release capsule Instructed patient per Anesthesia Guidelines   ondansetron (ZOFRAN-ODT) 4 mg disintegrating tablet Instructed patient per Anesthesia Guidelines   traMADol (ULTRAM) 50 mg tablet Instructed patient per Anesthesia Guidelines  Spoke to pt  Medication list reviewed & instructed   As of 8/13 pt instructed on tramadol or tylenol only   Am DOS pt ok to take atenolol, omeprazole with small sip of water  Ativan ok if need  Pt to take advair inhaler and PRN if need  Showering instructions reviewed with pt   Advised on 1 visitor policy  All instructions verbally understood by patient  No further questions   Callback number provided "from 2-5 sentence paragraph with 80% accuracy during structured reading assignments.    Comments: STG- 2 2/4 with max assist during infer/ prediction tasks using a letter   STG- 3 Patient will identify problem, conflict within a story chapter when given 2-3 choices with 80% accuracy   Status: STG- 3 New   STG- 4 Patient will use strategies to collect and record new and old information in an organized manner to enhace recall and comprehension from a reading assignment (@ high reading level )  with only min-mod cues and EET outline strategy with 5-8 complete sentences.    Status: STG- 4 Progressing as expected   Comments: STG- 4 steady progress as indicated above    STG- 5 Patient will use socially acceptable ways to greet, change topics and end conversation 6/10 during structured tasks   Status: STG- 5 New   STG- 6 Patient will use new social communication outline strategy as reference tool  to maintain and extend a short conversation with therapist using socially  acceptable ways to extend time from 1 minute  minute to 5 minute conversation. As length of conversation increases, Twan will need to ask for clarification and also repeat message misunderstood by partner 3 times during conversation exchange with therapist during structured activities.    Status: STG- 6 New   Comments: STG- 6 see above    STG- 7 Patient will ID how long it will take to complete a familiar task with choices with 8/10.    STG- 8 Patient recall 3 numbers, 3-4 words and repeat with correct pronunciation and order with 80% accuracy.     Comments: STG- 8 Patient using new startegy introduced last week that increased probe form 1/5 to 6/10 . blaine please and enjoyed \"note taking\".  using \"bunching of numbers to aid in 4 digits.  recall 4 with 10% . It should be noted, Blaine often pushes start before ready.  When asked ready he will shake head for yes and is NOT ready with impacts score.  Today, time used to prepare, make eye contact and say " ready before he starts recording or tasks that did enhance susccessful responses.  Will use time constraints next week to focus on prparation, ready and can move from 1 question to the other without long pause and or be redirected back to task. N/A for today(data left for comparision next week)    Long-Term Goals   LTG- 1 Age appropriate vocabulary   Status: LTG- 1 Progressing as expected   LTG- 2 Age approrpiate receptive and expressive language skills   Status: LTG- 2 Progressing as expected   LTG- 3 Age appropriate phoneme acquisition in phrases to improve speech intelligibility with unfamiliar people   Status: LTG- 3 Progressing as expected   LTG- 4 Able to express wants, needs , ideas with unfamiliar people with use of true words augmented by picture symbols and manual signs (ASL)   Status: LTG- 4 Progressing as expected   LTG- 5 Able to infer, problem solve and main on task while completing age appropraite job/task (independent based on age and gender)    Status: LTG- 5 New   LTG- 6  GFTA: Raw Score 71, Standard Score 40, Percentile Rank <0.1, Test Age equivalent <2 years of age.  Despite poor scores, patient does use strategy skills to improve overlall intelligibility during verbal exchanges.  Expressive and receptive language skills contue to improve, ST will now focus on oral motor placement therapy techniques while expanding expression in written and verbal forms (transcriptions)   Comments: LTG- 6 testing will be completed once face to face treatment is resumed , at this time social pragmatic testing will be used to assess skills and weakness   LTG- 7 Communication Matrix updated and scanned into system for [employer] and medical records   SLP Time Calculation   SLP Goal Re-Cert Due Date 09/26/21 09/23/21 1251   SLP Assessment   Functional Problems Speech Language- Peds   SLP Diagnosis mixed language disorder   Prognosis Good (comment)   Patient/caregiver participated in establishment of treatment  plan and goals Yes   Patient would benefit from skilled therapy intervention Yes   SLP Plan   Frequency weekly   Duration 1 visit   Planned CPT's? SLP INDIVIDUAL SPEECH THERAPY: 74649        09/23/21 1252   Education   Learning Motivation Strong   Learning Method Explanation;Demonstration;Teach back;Written materials   Teaching Response Verbalized understanding;Demonstrated understanding;Reinforcement needed                                  Time Calculation:                       Claudia Enriquez MA,CCC-SLP  9/27/2021

## 2021-10-06 ENCOUNTER — HOSPITAL ENCOUNTER (OUTPATIENT)
Dept: CT IMAGING | Facility: HOSPITAL | Age: 76
Discharge: HOME | End: 2021-10-06
Attending: INTERNAL MEDICINE
Payer: COMMERCIAL

## 2021-10-06 DIAGNOSIS — C34.91 NON-SMALL CELL CARCINOMA OF LUNG, STAGE 4, RIGHT (HCC): ICD-10-CM

## 2021-10-06 DIAGNOSIS — C79.51 BONE METASTASIS (HCC): ICD-10-CM

## 2021-10-06 PROCEDURE — 74176 CT ABD & PELVIS W/O CONTRAST: CPT

## 2021-10-06 PROCEDURE — 71250 CT THORAX DX C-: CPT

## 2021-10-14 ENCOUNTER — OFFICE VISIT (OUTPATIENT)
Dept: HEMATOLOGY ONCOLOGY | Facility: CLINIC | Age: 76
End: 2021-10-14
Payer: COMMERCIAL

## 2021-10-14 ENCOUNTER — TELEPHONE (OUTPATIENT)
Dept: HEMATOLOGY ONCOLOGY | Facility: HOSPITAL | Age: 76
End: 2021-10-14

## 2021-10-14 VITALS
HEART RATE: 79 BPM | BODY MASS INDEX: 41.35 KG/M2 | WEIGHT: 219 LBS | HEIGHT: 61 IN | OXYGEN SATURATION: 90 % | DIASTOLIC BLOOD PRESSURE: 78 MMHG | SYSTOLIC BLOOD PRESSURE: 124 MMHG | TEMPERATURE: 98.8 F | RESPIRATION RATE: 18 BRPM

## 2021-10-14 DIAGNOSIS — C79.51 BONE METASTASES (HCC): ICD-10-CM

## 2021-10-14 DIAGNOSIS — C34.91 NON-SMALL CELL CARCINOMA OF LUNG, STAGE 4, RIGHT (HCC): Primary | ICD-10-CM

## 2021-10-14 PROCEDURE — 1160F RVW MEDS BY RX/DR IN RCRD: CPT | Performed by: INTERNAL MEDICINE

## 2021-10-14 PROCEDURE — 3008F BODY MASS INDEX DOCD: CPT | Performed by: INTERNAL MEDICINE

## 2021-10-14 PROCEDURE — 99214 OFFICE O/P EST MOD 30 MIN: CPT | Performed by: INTERNAL MEDICINE

## 2021-10-14 PROCEDURE — 3078F DIAST BP <80 MM HG: CPT | Performed by: INTERNAL MEDICINE

## 2021-10-14 PROCEDURE — 3074F SYST BP LT 130 MM HG: CPT | Performed by: INTERNAL MEDICINE

## 2021-10-21 DIAGNOSIS — J30.9 ALLERGIC RHINITIS, UNSPECIFIED SEASONALITY, UNSPECIFIED TRIGGER: ICD-10-CM

## 2021-10-21 DIAGNOSIS — R60.0 LOWER EXTREMITY EDEMA: ICD-10-CM

## 2021-10-22 RX ORDER — FUROSEMIDE 20 MG/1
TABLET ORAL
Qty: 90 TABLET | Refills: 1 | Status: SHIPPED | OUTPATIENT
Start: 2021-10-22 | End: 2022-04-18 | Stop reason: SDUPTHER

## 2021-10-22 RX ORDER — FLUTICASONE PROPIONATE 50 MCG
SPRAY, SUSPENSION (ML) NASAL
Qty: 48 ML | Refills: 1 | Status: SHIPPED | OUTPATIENT
Start: 2021-10-22 | End: 2021-12-22 | Stop reason: SDUPTHER

## 2021-10-28 ENCOUNTER — RA CDI HCC (OUTPATIENT)
Dept: OTHER | Facility: HOSPITAL | Age: 76
End: 2021-10-28

## 2021-11-05 ENCOUNTER — OFFICE VISIT (OUTPATIENT)
Dept: FAMILY MEDICINE CLINIC | Facility: CLINIC | Age: 76
End: 2021-11-05

## 2021-11-05 VITALS
HEART RATE: 75 BPM | BODY MASS INDEX: 43.05 KG/M2 | OXYGEN SATURATION: 93 % | SYSTOLIC BLOOD PRESSURE: 132 MMHG | TEMPERATURE: 97.3 F | DIASTOLIC BLOOD PRESSURE: 70 MMHG | HEIGHT: 61 IN | RESPIRATION RATE: 18 BRPM | WEIGHT: 228 LBS

## 2021-11-05 DIAGNOSIS — J45.30 MILD PERSISTENT ASTHMA WITHOUT COMPLICATION: ICD-10-CM

## 2021-11-05 DIAGNOSIS — N28.9 ABNORMAL RENAL FUNCTION: ICD-10-CM

## 2021-11-05 DIAGNOSIS — R60.0 LOWER EXTREMITY EDEMA: Primary | ICD-10-CM

## 2021-11-05 PROCEDURE — 1160F RVW MEDS BY RX/DR IN RCRD: CPT | Performed by: PHYSICIAN ASSISTANT

## 2021-11-05 PROCEDURE — 99214 OFFICE O/P EST MOD 30 MIN: CPT | Performed by: PHYSICIAN ASSISTANT

## 2021-11-05 PROCEDURE — 3078F DIAST BP <80 MM HG: CPT | Performed by: PHYSICIAN ASSISTANT

## 2021-11-05 PROCEDURE — 1036F TOBACCO NON-USER: CPT | Performed by: PHYSICIAN ASSISTANT

## 2021-11-05 PROCEDURE — 3075F SYST BP GE 130 - 139MM HG: CPT | Performed by: PHYSICIAN ASSISTANT

## 2021-11-05 RX ORDER — BUDESONIDE 0.5 MG/2ML
0.5 INHALANT ORAL 2 TIMES DAILY
Qty: 120 ML | Refills: 1 | Status: SHIPPED | OUTPATIENT
Start: 2021-11-05 | End: 2021-12-09

## 2021-11-05 RX ORDER — FEXOFENADINE HCL 180 MG/1
180 TABLET ORAL DAILY
COMMUNITY

## 2021-11-06 ENCOUNTER — HOSPITAL ENCOUNTER (OUTPATIENT)
Dept: MAMMOGRAPHY | Facility: CLINIC | Age: 76
Discharge: HOME | End: 2021-11-06
Payer: COMMERCIAL

## 2021-11-06 DIAGNOSIS — Z12.31 BREAST CANCER SCREENING BY MAMMOGRAM: ICD-10-CM

## 2021-11-06 DIAGNOSIS — Z12.31 ENCOUNTER FOR SCREENING MAMMOGRAM FOR MALIGNANT NEOPLASM OF BREAST: ICD-10-CM

## 2021-11-06 PROCEDURE — 77063 BREAST TOMOSYNTHESIS BI: CPT

## 2021-11-06 PROCEDURE — 77067 SCR MAMMO BI INCL CAD: CPT

## 2021-11-10 ENCOUNTER — OFFICE VISIT (OUTPATIENT)
Dept: PULMONOLOGY | Facility: CLINIC | Age: 76
End: 2021-11-10
Payer: COMMERCIAL

## 2021-11-10 VITALS
HEART RATE: 73 BPM | BODY MASS INDEX: 43.05 KG/M2 | WEIGHT: 228 LBS | SYSTOLIC BLOOD PRESSURE: 118 MMHG | TEMPERATURE: 98.3 F | HEIGHT: 61 IN | DIASTOLIC BLOOD PRESSURE: 70 MMHG | OXYGEN SATURATION: 97 %

## 2021-11-10 DIAGNOSIS — R60.0 EDEMA, LOWER EXTREMITY: ICD-10-CM

## 2021-11-10 DIAGNOSIS — J45.20 MILD INTERMITTENT ASTHMA WITHOUT COMPLICATION: Primary | ICD-10-CM

## 2021-11-10 PROCEDURE — 3008F BODY MASS INDEX DOCD: CPT | Performed by: PHYSICIAN ASSISTANT

## 2021-11-10 PROCEDURE — 1036F TOBACCO NON-USER: CPT | Performed by: INTERNAL MEDICINE

## 2021-11-10 PROCEDURE — 1160F RVW MEDS BY RX/DR IN RCRD: CPT | Performed by: INTERNAL MEDICINE

## 2021-11-10 PROCEDURE — 99214 OFFICE O/P EST MOD 30 MIN: CPT | Performed by: INTERNAL MEDICINE

## 2021-11-10 PROCEDURE — 3008F BODY MASS INDEX DOCD: CPT | Performed by: INTERNAL MEDICINE

## 2021-11-11 ENCOUNTER — HOSPITAL ENCOUNTER (OUTPATIENT)
Dept: MAMMOGRAPHY | Facility: CLINIC | Age: 76
Discharge: HOME | End: 2021-11-11
Payer: COMMERCIAL

## 2021-11-11 DIAGNOSIS — R92.8 ABNORMAL SCREENING MAMMOGRAM: ICD-10-CM

## 2021-11-11 PROCEDURE — G0279 TOMOSYNTHESIS, MAMMO: HCPCS

## 2021-11-11 PROCEDURE — 76642 ULTRASOUND BREAST LIMITED: CPT

## 2021-11-11 PROCEDURE — 77065 DX MAMMO INCL CAD UNI: CPT

## 2021-11-19 DIAGNOSIS — J45.20 MILD INTERMITTENT ASTHMA WITHOUT COMPLICATION: ICD-10-CM

## 2021-11-19 RX ORDER — ALBUTEROL SULFATE 90 UG/1
AEROSOL, METERED RESPIRATORY (INHALATION)
Qty: 6.7 G | Refills: 1 | Status: SHIPPED | OUTPATIENT
Start: 2021-11-19 | End: 2021-12-27

## 2021-11-22 ENCOUNTER — APPOINTMENT (OUTPATIENT)
Dept: LAB | Facility: HOSPITAL | Age: 76
End: 2021-11-22
Attending: INTERNAL MEDICINE
Payer: COMMERCIAL

## 2021-11-22 DIAGNOSIS — C79.51 BONE METASTASES (HCC): ICD-10-CM

## 2021-11-22 DIAGNOSIS — C34.91 NON-SMALL CELL CARCINOMA OF LUNG, STAGE 4, RIGHT (HCC): ICD-10-CM

## 2021-11-22 LAB
ALBUMIN SERPL BCP-MCNC: 3.9 G/DL (ref 3–5.2)
ALP SERPL-CCNC: 114 U/L (ref 43–122)
ALT SERPL W P-5'-P-CCNC: 27 U/L
ANION GAP SERPL CALCULATED.3IONS-SCNC: 4 MMOL/L (ref 5–14)
AST SERPL W P-5'-P-CCNC: 36 U/L (ref 14–36)
BILIRUB SERPL-MCNC: 0.75 MG/DL
BUN SERPL-MCNC: 30 MG/DL (ref 5–25)
CALCIUM SERPL-MCNC: 9.2 MG/DL (ref 8.4–10.2)
CHLORIDE SERPL-SCNC: 100 MMOL/L (ref 97–108)
CO2 SERPL-SCNC: 35 MMOL/L (ref 22–30)
CREAT SERPL-MCNC: 1.21 MG/DL (ref 0.6–1.2)
GFR SERPL CREATININE-BSD FRML MDRD: 44 ML/MIN/1.73SQ M
GLUCOSE SERPL-MCNC: 104 MG/DL (ref 70–99)
POTASSIUM SERPL-SCNC: 4.6 MMOL/L (ref 3.6–5)
PROT SERPL-MCNC: 6.9 G/DL (ref 5.9–8.4)
SODIUM SERPL-SCNC: 139 MMOL/L (ref 137–147)

## 2021-11-22 PROCEDURE — 36415 COLL VENOUS BLD VENIPUNCTURE: CPT

## 2021-11-22 PROCEDURE — 80053 COMPREHEN METABOLIC PANEL: CPT

## 2021-11-23 ENCOUNTER — HOSPITAL ENCOUNTER (OUTPATIENT)
Dept: INFUSION CENTER | Facility: HOSPITAL | Age: 76
Discharge: HOME | End: 2021-11-23
Attending: INTERNAL MEDICINE
Payer: COMMERCIAL

## 2021-11-23 VITALS
WEIGHT: 227.07 LBS | SYSTOLIC BLOOD PRESSURE: 154 MMHG | BODY MASS INDEX: 42.91 KG/M2 | DIASTOLIC BLOOD PRESSURE: 78 MMHG | RESPIRATION RATE: 20 BRPM | HEART RATE: 66 BPM | TEMPERATURE: 97.5 F

## 2021-11-23 DIAGNOSIS — C79.51 BONE METASTASES (HCC): Primary | ICD-10-CM

## 2021-11-23 DIAGNOSIS — C34.91 NON-SMALL CELL CARCINOMA OF LUNG, STAGE 4, RIGHT (HCC): ICD-10-CM

## 2021-11-23 PROCEDURE — 96365 THER/PROPH/DIAG IV INF INIT: CPT

## 2021-11-23 RX ORDER — SODIUM CHLORIDE 9 MG/ML
20 INJECTION, SOLUTION INTRAVENOUS ONCE
Status: COMPLETED | OUTPATIENT
Start: 2021-11-23 | End: 2021-11-23

## 2021-11-23 RX ORDER — SODIUM CHLORIDE 9 MG/ML
20 INJECTION, SOLUTION INTRAVENOUS ONCE
Status: CANCELLED | OUTPATIENT
Start: 2022-02-15

## 2021-11-23 RX ADMIN — SODIUM CHLORIDE 20 ML/HR: 0.9 INJECTION, SOLUTION INTRAVENOUS at 13:33

## 2021-11-23 RX ADMIN — ZOLEDRONIC ACID 3.3 MG: 4 INJECTION INTRAVENOUS at 13:32

## 2021-11-24 DIAGNOSIS — K44.9 HIATAL HERNIA: ICD-10-CM

## 2021-11-24 RX ORDER — OMEPRAZOLE 20 MG/1
CAPSULE, DELAYED RELEASE ORAL
Qty: 90 CAPSULE | Refills: 1 | Status: SHIPPED | OUTPATIENT
Start: 2021-11-24 | End: 2022-06-02 | Stop reason: SDUPTHER

## 2021-11-25 DIAGNOSIS — J45.20 MILD INTERMITTENT ASTHMA WITHOUT COMPLICATION: ICD-10-CM

## 2021-11-29 RX ORDER — ALBUTEROL SULFATE 2.5 MG/3ML
2.5 SOLUTION RESPIRATORY (INHALATION) EVERY 6 HOURS PRN
Qty: 360 ML | Refills: 5 | Status: SHIPPED | OUTPATIENT
Start: 2021-11-29

## 2021-12-06 ENCOUNTER — HOSPITAL ENCOUNTER (OUTPATIENT)
Dept: NON INVASIVE DIAGNOSTICS | Facility: HOSPITAL | Age: 76
Discharge: HOME | End: 2021-12-06
Attending: INTERNAL MEDICINE
Payer: COMMERCIAL

## 2021-12-06 VITALS
HEIGHT: 61 IN | WEIGHT: 227 LBS | DIASTOLIC BLOOD PRESSURE: 67 MMHG | BODY MASS INDEX: 42.86 KG/M2 | SYSTOLIC BLOOD PRESSURE: 137 MMHG

## 2021-12-06 DIAGNOSIS — R60.0 EDEMA, LOWER EXTREMITY: ICD-10-CM

## 2021-12-06 LAB
AORTIC ROOT: 4.2 CM
AORTIC VALVE MEAN VELOCITY: 11.8 M/S
APICAL FOUR CHAMBER EJECTION FRACTION: 63 %
AV AREA BY CONTINUOUS VTI: 2.6 CM2
AV AREA PEAK VELOCITY: 2.5 CM2
AV LVOT MEAN GRADIENT: 4 MMHG
AV LVOT PEAK GRADIENT: 6 MMHG
AV MEAN GRADIENT: 6 MMHG
AV PEAK GRADIENT: 9 MMHG
AV VALVE AREA: 2.63 CM2
DOP CALC AO VTI: 34.89 CM
DOP CALC LVOT AREA: 3.14 CM2
DOP CALC LVOT DIAMETER: 2 CM
DOP CALC LVOT PEAK VEL VTI: 29.27 CM
DOP CALC LVOT PEAK VEL: 1.23 M/S
DOP CALC LVOT STROKE INDEX: 46.7 ML/M2
DOP CALC LVOT STROKE VOLUME: 91.91 CM3
E WAVE DECELERATION TIME: 260 MS
FRACTIONAL SHORTENING: 35 % (ref 28–44)
INTERVENTRICULAR SEPTUM IN DIASTOLE (PARASTERNAL SHORT AXIS VIEW): 1.6 CM
LAAS-AP2: 28.9 CM2
LAAS-AP4: 31.2 CM2
LEFT INTERNAL DIMENSION IN SYSTOLE: 2.4 CM (ref 2.1–4)
LEFT VENTRICULAR INTERNAL DIMENSION IN DIASTOLE: 3.7 CM (ref 5.88–8.77)
LEFT VENTRICULAR POSTERIOR WALL IN END DIASTOLE: 1.2 CM
LEFT VENTRICULAR STROKE VOLUME: 38 ML
MV E'TISSUE VEL-LAT: 5 CM/S
MV E'TISSUE VEL-SEP: 5 CM/S
MV PEAK A VEL: 1.19 M/S
MV PEAK E VEL: 79 CM/S
PA SYSTOLIC PRESSURE: 37 MMHG
RA PRESSURE ESTIMATED: 5 MMHG
RIGHT ATRIAL 2D VOLUME: 59 ML
RIGHT ATRIUM AREA SYSTOLE A4C: 20 CM2
RIGHT VENTRICLE ID DIMENSION: 4.2 CM
RV PSP: 37 MMHG
SL CV LV EF: 63
SL CV PED ECHO LEFT VENTRICLE DIASTOLIC VOLUME (MOD BIPLANE) 2D: 59 ML
SL CV PED ECHO LEFT VENTRICLE SYSTOLIC VOLUME (MOD BIPLANE) 2D: 21 ML
TR MAX PG: 32 MMHG
TR PEAK VELOCITY: 2.8 M/S
TRICUSPID ANNULAR PLANE SYSTOLIC EXCURSION: 3 CM
TRICUSPID VALVE PEAK REGURGITATION VELOCITY: 2.81 M/S
TRICUSPID VALVE S': 66 CM/S
TV PEAK GRADIENT: 31 MMHG
Z-SCORE OF LEFT VENTRICULAR DIMENSION IN END SYSTOLE: -6.63

## 2021-12-06 PROCEDURE — 93306 TTE W/DOPPLER COMPLETE: CPT | Performed by: INTERNAL MEDICINE

## 2021-12-06 PROCEDURE — 93306 TTE W/DOPPLER COMPLETE: CPT

## 2021-12-09 DIAGNOSIS — J45.30 MILD PERSISTENT ASTHMA WITHOUT COMPLICATION: ICD-10-CM

## 2021-12-09 RX ORDER — BUDESONIDE 0.5 MG/2ML
0.5 INHALANT ORAL 2 TIMES DAILY
Qty: 120 ML | Refills: 1 | Status: SHIPPED | OUTPATIENT
Start: 2021-12-09 | End: 2022-04-18 | Stop reason: SDUPTHER

## 2021-12-14 ENCOUNTER — TELEPHONE (OUTPATIENT)
Dept: FAMILY MEDICINE CLINIC | Facility: CLINIC | Age: 76
End: 2021-12-14

## 2021-12-14 ENCOUNTER — TELEPHONE (OUTPATIENT)
Dept: GYNECOLOGIC ONCOLOGY | Facility: CLINIC | Age: 76
End: 2021-12-14

## 2021-12-22 DIAGNOSIS — C34.91 NON-SMALL CELL CANCER OF RIGHT LUNG (HCC): ICD-10-CM

## 2021-12-22 DIAGNOSIS — J30.9 ALLERGIC RHINITIS, UNSPECIFIED SEASONALITY, UNSPECIFIED TRIGGER: ICD-10-CM

## 2021-12-22 DIAGNOSIS — J45.30 MILD PERSISTENT ASTHMA WITHOUT COMPLICATION: ICD-10-CM

## 2021-12-22 DIAGNOSIS — C79.51 BONE METASTASIS (HCC): ICD-10-CM

## 2021-12-22 RX ORDER — FLUTICASONE PROPIONATE 50 MCG
2 SPRAY, SUSPENSION (ML) NASAL DAILY
Qty: 48 ML | Refills: 1 | Status: SHIPPED | OUTPATIENT
Start: 2021-12-22

## 2021-12-27 DIAGNOSIS — I10 ESSENTIAL HYPERTENSION: ICD-10-CM

## 2021-12-27 DIAGNOSIS — J45.20 MILD INTERMITTENT ASTHMA WITHOUT COMPLICATION: ICD-10-CM

## 2021-12-27 RX ORDER — ATENOLOL 25 MG/1
TABLET ORAL
Qty: 90 TABLET | Refills: 1 | Status: SHIPPED | OUTPATIENT
Start: 2021-12-27 | End: 2022-04-18 | Stop reason: SDUPTHER

## 2021-12-27 RX ORDER — ALBUTEROL SULFATE 90 UG/1
AEROSOL, METERED RESPIRATORY (INHALATION)
Qty: 18 G | Refills: 1 | Status: SHIPPED | OUTPATIENT
Start: 2021-12-27 | End: 2022-02-23

## 2021-12-27 RX ORDER — IRBESARTAN 300 MG/1
TABLET ORAL
Qty: 90 TABLET | Refills: 1 | Status: SHIPPED | OUTPATIENT
Start: 2021-12-27 | End: 2022-03-25 | Stop reason: SDUPTHER

## 2022-01-14 ENCOUNTER — CLINICAL SUPPORT (OUTPATIENT)
Dept: FAMILY MEDICINE CLINIC | Facility: CLINIC | Age: 77
End: 2022-01-14

## 2022-01-14 DIAGNOSIS — Z11.52 ENCOUNTER FOR SCREENING FOR COVID-19: Primary | ICD-10-CM

## 2022-01-14 PROCEDURE — U0003 INFECTIOUS AGENT DETECTION BY NUCLEIC ACID (DNA OR RNA); SEVERE ACUTE RESPIRATORY SYNDROME CORONAVIRUS 2 (SARS-COV-2) (CORONAVIRUS DISEASE [COVID-19]), AMPLIFIED PROBE TECHNIQUE, MAKING USE OF HIGH THROUGHPUT TECHNOLOGIES AS DESCRIBED BY CMS-2020-01-R: HCPCS | Performed by: PHYSICIAN ASSISTANT

## 2022-01-14 PROCEDURE — U0005 INFEC AGEN DETEC AMPLI PROBE: HCPCS | Performed by: PHYSICIAN ASSISTANT

## 2022-01-15 LAB — SARS-COV-2 RNA RESP QL NAA+PROBE: NEGATIVE

## 2022-02-08 NOTE — PROGRESS NOTES
Pulmonary Follow Up Note   Starla Abebe 78 y o  female MRN: [id number]  2/9/2022      Assessment and Plan:    Mild persistent Allergic Asthma - controlled   - Peak flow  250ml (which is baseline)   - ordered Pulmicort BID for seasonal changes   - mucinex PRN   - takes allegra   - Allergy testing 11/2020: +Cat dander, cockroaches, dust mites, dog dander, Total IgE 109, marginally elevated Aspergillus Fumigatus  - Advair 250-50mcg bid   - PFTs 4/2021: FEV1/FVC 71%, FVC 61%- may suggest restriction however, lung volumes not ordered      Metastatic Non-small cell carcinoma of Right lung (8/2020)   - ALK rearrangement   - s/p palliative radiation therapy to T8 x 10 treatments  - on Alectinib as per hem/onc   - had a PET scan 1/2021 shows response to therapy   - CT chest/ abd/pelvis ordered by hem/onc 5/2021 showed resolution of RUL nodule, R hilum not well visualized, however, was similar to PET scan, osseous metastasis ( ribs, spine, and pelvis), GGO   - CTA 7/2021 showed persistent multifocal patchy GGO slightly increased       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   - 2D echo 12/2021 with EF 63%, RV mildly dilated, grade 1 DD, mild MR, PASP 37mmHg   - on lasix     Postnasal drip   - currently using Flonase   - won't use Ipratropium due to early signs of glaucoma   - trial a saline rinse at night       Essential hypertension  - well controlled       Morbid obesity  - counseled   - finished getting PT for her knees     Severe MONO   - sleep study in 2019 showed severe MONO   - will send for home study- patient does not want to undergo lab testing whatsoever     1  MONO (obstructive sleep apnea)  -     Home Study; Future    2  Mild persistent asthma without complication    3  Non-small cell carcinoma of lung, stage 4, right ([address] )    4  Chronic rhinitis      Return in about 3 months (around 5/9/2022)      History of Present Illness   HPI:  Starla Abebe is a 78 y o  female 70 y o  female who has a PMHx of MONO, obesity, GERD, 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 and has received palliative radiation to the spine and now on oral chemo  She has had seasonal asthma and has had a flare up with the leaves in the fall  Her asthma is very well controlled overall, however, did have an ED visit 7/2021 for which she received a CTA that showed multifocal GGO and was treated with doxy and prednisone  BNP was 277  She had a repeat CXR 7/30 that showed resolution of opacities and received Bactrim and prednisone medrol pack the day prior and has 1 more day left  She is here for regular follow up  She still has her cat but doesn't feel worse around it  She is still coughing but is more embarrassing than bothersome  She does bring up some thick sputum moreso when she wears a mask  Review of Systems   Constitutional: Negative for appetite change and fever  HENT: Positive for postnasal drip  Negative for ear pain, rhinorrhea, sneezing, sore throat and trouble swallowing  Respiratory: Positive for cough  Cardiovascular: Negative for chest pain  Musculoskeletal: Positive for myalgias  Neurological: Negative for headaches  All other systems reviewed and are negative  Historical Information   Past Medical History:   Diagnosis Date    Asthma     GERD (gastroesophageal reflux disease)     Hypertension     Lumbar disc disease     Lung cancer ([facility] [address] )     Sleep apnea     suspected     Ventricular arrhythmia      Past Surgical History:   Procedure Laterality Date    APPENDECTOMY      COLONOSCOPY N/A 3/18/2019    Procedure: COLONOSCOPY;  Surgeon: Justin Baird DO;  Location[facility]; Service: Gastroenterology    ESOPHAGOGASTRODUODENOSCOPY N/A 3/18/2019    Procedure: ESOPHAGOGASTRODUODENOSCOPY (EGD); Surgeon: Justin Baird DO;  Location[facility];   Service: Gastroenterology    KNEE SURGERY      ROTATOR CUFF REPAIR      SHOULDER SURGERY       Family History   Problem Relation Age of Onset    Stroke Mother     Diabetes Mother     Hyperlipidemia Mother     Hypertension Mother     Diabetes Father     Hyperlipidemia Father     Hypertension Father     Lung cancer Father 79    Lung cancer Sister 71         Meds/Allergies     Current Outpatient Medications:     albuterol (2 5 mg/3 mL) 0 083 % nebulizer solution, Take 3 mL (2 5 mg total) by nebulization every 6 (six) hours as needed for wheezing, Disp: 360 mL, Rfl: 5    albuterol (PROVENTIL HFA,VENTOLIN HFA) 90 mcg/act inhaler, INHALE 2 PUFFS BY MOUTH EVERY 6 HOURS AS NEEDED FOR WHEEZE, Disp: 18 g, Rfl: 1    Alectinib HCl 150 MG CAPS, Take 4 capsules (600 mg total) by mouth 2 (two) times a day, Disp: 240 capsule, Rfl: 5    atenolol (TENORMIN) 25 mg tablet, TAKE 1 TABLET BY MOUTH EVERY DAY, Disp: 90 tablet, Rfl: 1    budesonide (PULMICORT) 0 5 mg/2 mL nebulizer solution, TAKE 2 ML (0 5 MG TOTAL) BY NEBULIZATION 2 (TWO) TIMES A DAY RINSE MOUTH AFTER USE , Disp: 120 mL, Rfl: 1    dextromethorphan-guaifenesin (MUCINEX DM)  MG per 12 hr tablet, Take 1 tablet by mouth every 12 (twelve) hours, Disp: 30 tablet, Rfl: 1    fexofenadine (ALLEGRA) 180 MG tablet, Take 180 mg by mouth daily, Disp: , Rfl:     fluticasone (FLONASE) 50 mcg/act nasal spray, 2 sprays into each nostril daily, Disp: 48 mL, Rfl: 1    fluticasone-salmeterol (Wixela Inhub) 250-50 mcg/dose inhaler, Inhale 1 puff 2 (two) times a day Rinse mouth after use , Disp: 1 blister, Rfl: 3    furosemide (LASIX) 20 mg tablet, TAKE 1 TABLET BY MOUTH EVERY DAY, Disp: 90 tablet, Rfl: 1    irbesartan (AVAPRO) 300 mg tablet, TAKE 1 TABLET BY MOUTH DAILY AT BEDTIME, Disp: 90 tablet, Rfl: 1    lidocaine (XYLOCAINE) 5 % ointment, Apply topically as needed for mild pain, Disp: 35 44 g, Rfl: 2    omeprazole (PriLOSEC) 20 mg delayed release capsule, TAKE 1 CAPSULE BY MOUTH EVERY DAY, Disp: 90 capsule, Rfl: 1    oxyCODONE-acetaminophen (PERCOCET) 5-325 mg per tablet, Take 1 tablet by mouth every 4 (four) hours as needed for moderate pain For ongoing painMax Daily Amount: 6 tablets, Disp: 60 tablet, Rfl: 0    promethazine-codeine (PHENERGAN WITH CODEINE) 6 25-10 mg/5 mL syrup, Take 5 mL by mouth every 4 (four) hours as needed for cough, Disp: 240 mL, Rfl: 1  No Known Allergies    Vitals: Blood pressure 118/70, pulse 67, temperature 98 °F (36 7 °C), height 5' 1" (1 549 m), weight 103 kg (228 lb), SpO2 94 %, not currently breastfeeding  Body mass index is 43 08 kg/m²  Oxygen Therapy  SpO2: 94 %  Oxygen Therapy: None (Room air)      Physical Exam  Physical Exam  Vitals reviewed  Constitutional:       Appearance: Normal appearance  She is obese  HENT:      Head: Normocephalic  Nose: Nose normal       Mouth/Throat:      Mouth: Mucous membranes are moist    Eyes:      Pupils: Pupils are equal, round, and reactive to light  Cardiovascular:      Rate and Rhythm: Normal rate and regular rhythm  Pulses: Normal pulses  Heart sounds: Normal heart sounds  Pulmonary:      Effort: Pulmonary effort is normal       Breath sounds: Normal breath sounds  Abdominal:      General: Abdomen is flat  Palpations: Abdomen is soft  Musculoskeletal:         General: Swelling present  Cervical back: Normal range of motion  Right lower leg: Edema present  Left lower leg: Edema present  Skin:     General: Skin is warm and dry  Neurological:      General: No focal deficit present  Mental Status: She is alert and oriented to person, place, and time  Labs: I have personally reviewed pertinent lab results    Lab Results   Component Value Date    WBC 6 76 07/10/2021    HGB 10 6 (L) 07/10/2021    HCT 31 6 (L) 07/10/2021    MCV 92 07/10/2021    PLT 99 (L) 07/10/2021     Lab Results   Component Value Date    CALCIUM 9 2 11/22/2021    K 4 6 11/22/2021    CO2 35 (H) 11/22/2021     11/22/2021    BUN 30 (H) 11/22/2021    CREATININE 1 21 (H) 11/22/2021     Lab Results Component Value Date     2020     Lab Results   Component Value Date    ALT 27 2021    AST 36 2021    ALKPHOS 114 2021       Imaging and other studies: I have personally reviewed pertinent reports  and I have personally reviewed pertinent films in PACS    Pulmonary function testin2021  FEV1/FVC Ratio: 71 %  Forced Vital Capacity: 1 82 L    61 % predicted  FEV1: 1 29 L     56 % predicted     After bronchodilator:  FEV1/FVC Ratio: 72 %  Forced Vital Capacity: 1 88 L    63 % predicted  FEV1: 1 36 L     59 % predicted     DLCO corrected for patients hemoglobin level: 88 %    EKG, Pathology, and Other Studies: I have personally reviewed pertinent reports     and I have personally reviewed pertinent films in PACS      Yolanda Acharya MD   Pulmonary and Critical Care Fellow  [facility]    Answers for HPI/ROS submitted by the patient on 2022  Do you experience frequent throat clearing?: Yes  Chronicity: recurrent  When did you first notice your symptoms?: more than 1 month ago  How often do your symptoms occur?: 2 to 4 times per day  Since you first noticed this problem, how has it changed?: gradually improving  Do you have shortness of breath that occurs with effort or exertion?: Yes  Do you have ear congestion?: No  Do you have heartburn?: No  Do you have fatigue?: Yes  Do you have nasal congestion?: Yes  Do you have shortness of breath when lying flat?: No  Do you have shortness of breath when you wake up?: Yes  Do you have sweats?: No  Have you experienced weight loss?: No  Which of the following makes your symptoms worse?: animal exposure, any activity, climbing stairs, emotional stress, exercise, exposure to fumes, exposure to smoke, pollen, strenuous activity, URI  Which of the following makes your symptoms better?: nothing, beta-agonist, oral steroids, OTC cough suppressant, prescription cough suppressant, steroid inhaler  Risk factors for lung disease: animal exposure

## 2022-02-09 ENCOUNTER — OFFICE VISIT (OUTPATIENT)
Dept: PULMONOLOGY | Facility: CLINIC | Age: 77
End: 2022-02-09
Payer: COMMERCIAL

## 2022-02-09 VITALS
HEART RATE: 67 BPM | DIASTOLIC BLOOD PRESSURE: 70 MMHG | BODY MASS INDEX: 43.05 KG/M2 | HEIGHT: 61 IN | WEIGHT: 228 LBS | OXYGEN SATURATION: 94 % | SYSTOLIC BLOOD PRESSURE: 118 MMHG | TEMPERATURE: 98 F

## 2022-02-09 DIAGNOSIS — G47.33 OSA (OBSTRUCTIVE SLEEP APNEA): Primary | ICD-10-CM

## 2022-02-09 DIAGNOSIS — J31.0 CHRONIC RHINITIS: ICD-10-CM

## 2022-02-09 DIAGNOSIS — C34.91 NON-SMALL CELL CARCINOMA OF LUNG, STAGE 4, RIGHT (HCC): ICD-10-CM

## 2022-02-09 DIAGNOSIS — J45.30 MILD PERSISTENT ASTHMA WITHOUT COMPLICATION: ICD-10-CM

## 2022-02-09 PROBLEM — J22 ACUTE LOWER RESPIRATORY INFECTION: Status: RESOLVED | Noted: 2021-07-29 | Resolved: 2022-02-09

## 2022-02-09 PROCEDURE — 99214 OFFICE O/P EST MOD 30 MIN: CPT | Performed by: INTERNAL MEDICINE

## 2022-02-09 PROCEDURE — 3008F BODY MASS INDEX DOCD: CPT | Performed by: INTERNAL MEDICINE

## 2022-02-09 PROCEDURE — 1160F RVW MEDS BY RX/DR IN RCRD: CPT | Performed by: INTERNAL MEDICINE

## 2022-02-09 PROCEDURE — 1036F TOBACCO NON-USER: CPT | Performed by: INTERNAL MEDICINE

## 2022-02-09 PROCEDURE — 3078F DIAST BP <80 MM HG: CPT | Performed by: INTERNAL MEDICINE

## 2022-02-09 PROCEDURE — 3074F SYST BP LT 130 MM HG: CPT | Performed by: INTERNAL MEDICINE

## 2022-02-14 ENCOUNTER — APPOINTMENT (OUTPATIENT)
Dept: LAB | Facility: HOSPITAL | Age: 77
End: 2022-02-14
Attending: INTERNAL MEDICINE
Payer: COMMERCIAL

## 2022-02-14 DIAGNOSIS — C34.91 NON-SMALL CELL CARCINOMA OF LUNG, STAGE 4, RIGHT (HCC): ICD-10-CM

## 2022-02-14 DIAGNOSIS — C79.51 BONE METASTASES (HCC): ICD-10-CM

## 2022-02-14 LAB
ALBUMIN SERPL BCP-MCNC: 4.1 G/DL (ref 3–5.2)
ALP SERPL-CCNC: 104 U/L (ref 43–122)
ALT SERPL W P-5'-P-CCNC: 31 U/L
ANION GAP SERPL CALCULATED.3IONS-SCNC: 5 MMOL/L (ref 5–14)
AST SERPL W P-5'-P-CCNC: 39 U/L (ref 14–36)
BILIRUB SERPL-MCNC: 0.8 MG/DL
BUN SERPL-MCNC: 28 MG/DL (ref 5–25)
CALCIUM SERPL-MCNC: 9.2 MG/DL (ref 8.4–10.2)
CHLORIDE SERPL-SCNC: 101 MMOL/L (ref 97–108)
CO2 SERPL-SCNC: 33 MMOL/L (ref 22–30)
CREAT SERPL-MCNC: 1.26 MG/DL (ref 0.6–1.2)
GFR SERPL CREATININE-BSD FRML MDRD: 42 ML/MIN/1.73SQ M
GLUCOSE SERPL-MCNC: 124 MG/DL (ref 70–99)
POTASSIUM SERPL-SCNC: 4.3 MMOL/L (ref 3.6–5)
PROT SERPL-MCNC: 7.4 G/DL (ref 5.9–8.4)
SODIUM SERPL-SCNC: 139 MMOL/L (ref 137–147)

## 2022-02-14 PROCEDURE — 80053 COMPREHEN METABOLIC PANEL: CPT

## 2022-02-14 PROCEDURE — 36415 COLL VENOUS BLD VENIPUNCTURE: CPT

## 2022-02-15 ENCOUNTER — HOSPITAL ENCOUNTER (OUTPATIENT)
Dept: INFUSION CENTER | Facility: HOSPITAL | Age: 77
Discharge: HOME | End: 2022-02-15
Attending: INTERNAL MEDICINE
Payer: COMMERCIAL

## 2022-02-15 VITALS
OXYGEN SATURATION: 94 % | RESPIRATION RATE: 20 BRPM | HEART RATE: 62 BPM | BODY MASS INDEX: 43.05 KG/M2 | HEIGHT: 61 IN | DIASTOLIC BLOOD PRESSURE: 60 MMHG | TEMPERATURE: 98.6 F | SYSTOLIC BLOOD PRESSURE: 134 MMHG | WEIGHT: 228 LBS

## 2022-02-15 DIAGNOSIS — C34.91 NON-SMALL CELL CARCINOMA OF LUNG, STAGE 4, RIGHT (HCC): ICD-10-CM

## 2022-02-15 DIAGNOSIS — C79.51 BONE METASTASES (HCC): Primary | ICD-10-CM

## 2022-02-15 PROCEDURE — 96365 THER/PROPH/DIAG IV INF INIT: CPT

## 2022-02-15 RX ORDER — SODIUM CHLORIDE 9 MG/ML
20 INJECTION, SOLUTION INTRAVENOUS ONCE
Status: COMPLETED | OUTPATIENT
Start: 2022-02-15 | End: 2022-02-15

## 2022-02-15 RX ORDER — SODIUM CHLORIDE 9 MG/ML
20 INJECTION, SOLUTION INTRAVENOUS ONCE
Status: CANCELLED | OUTPATIENT
Start: 2022-05-10

## 2022-02-15 RX ADMIN — SODIUM CHLORIDE 20 ML/HR: 0.9 INJECTION, SOLUTION INTRAVENOUS at 13:05

## 2022-02-15 RX ADMIN — ZOLEDRONIC ACID 3 MG: 4 INJECTION INTRAVENOUS at 13:05

## 2022-02-22 DIAGNOSIS — J45.20 MILD INTERMITTENT ASTHMA WITHOUT COMPLICATION: ICD-10-CM

## 2022-02-28 ENCOUNTER — TELEPHONE (OUTPATIENT)
Dept: SLEEP CENTER | Facility: CLINIC | Age: 77
End: 2022-02-28

## 2022-02-28 NOTE — TELEPHONE ENCOUNTER
----- Message from Luana Jolly MD sent at 2/27/2022  5:56 PM EST -----  approved  ----- Message -----  From: Victoria Ko  Sent: 1/57/3530   8:36 AM EST  To: Sleep Medicine [facility] Provider    This sleep study needs approval      If approved please sign and return to clerical pool  If denied please include reasons why  Also provide alternative testing if warranted  Please sign and return to clerical pool

## 2022-03-17 DIAGNOSIS — J45.20 MILD INTERMITTENT ASTHMA WITHOUT COMPLICATION: ICD-10-CM

## 2022-03-18 RX ORDER — ALBUTEROL SULFATE 90 UG/1
2 AEROSOL, METERED RESPIRATORY (INHALATION) EVERY 6 HOURS PRN
Qty: 18 G | Refills: 5 | Status: SHIPPED | OUTPATIENT
Start: 2022-03-18

## 2022-03-22 DIAGNOSIS — C79.51 BONE METASTASIS (HCC): ICD-10-CM

## 2022-03-23 NOTE — TELEPHONE ENCOUNTER
Pt left a message on the nurse line wanting to know the status of the medication refill request oxyCODONE-acetaminophen (PERCOCET) 5-325 mg per tablet

## 2022-03-24 RX ORDER — OXYCODONE HYDROCHLORIDE AND ACETAMINOPHEN 5; 325 MG/1; MG/1
1 TABLET ORAL EVERY 4 HOURS PRN
Qty: 60 TABLET | Refills: 0 | Status: SHIPPED | OUTPATIENT
Start: 2022-03-24

## 2022-03-25 DIAGNOSIS — I10 ESSENTIAL HYPERTENSION: ICD-10-CM

## 2022-03-25 RX ORDER — IRBESARTAN 300 MG/1
300 TABLET ORAL
Qty: 90 TABLET | Refills: 0 | Status: SHIPPED | OUTPATIENT
Start: 2022-03-25 | End: 2022-04-18 | Stop reason: SDUPTHER

## 2022-04-07 ENCOUNTER — HOSPITAL ENCOUNTER (OUTPATIENT)
Dept: CT IMAGING | Facility: HOSPITAL | Age: 77
Discharge: HOME | End: 2022-04-07
Attending: INTERNAL MEDICINE
Payer: COMMERCIAL

## 2022-04-07 DIAGNOSIS — C79.51 BONE METASTASES (HCC): ICD-10-CM

## 2022-04-07 DIAGNOSIS — C34.91 NON-SMALL CELL CARCINOMA OF LUNG, STAGE 4, RIGHT (HCC): ICD-10-CM

## 2022-04-07 PROCEDURE — 74177 CT ABD & PELVIS W/CONTRAST: CPT

## 2022-04-07 PROCEDURE — 71260 CT THORAX DX C+: CPT

## 2022-04-07 RX ADMIN — IOHEXOL 100 ML: 350 INJECTION, SOLUTION INTRAVENOUS at 09:58

## 2022-04-11 ENCOUNTER — RA CDI HCC (OUTPATIENT)
Dept: OTHER | Facility: HOSPITAL | Age: 77
End: 2022-04-11

## 2022-04-11 NOTE — PROGRESS NOTES
[facility] [address]  coding opportunities        Chart Reviewed number of suggestions sent to Provider: 1     Patients Insurance     Medicare Insurance: [employer]vantage          Appt on 4/18/2022    Found in active problem list - please review and assess if applicable for 3525    K22 54: Morbid (severe) obesity due to excess calories ([facility] [address] ) - Per CMS/ICD 10 coding guidelines, to be used when BMI >=40

## 2022-04-14 ENCOUNTER — OFFICE VISIT (OUTPATIENT)
Dept: HEMATOLOGY ONCOLOGY | Facility: CLINIC | Age: 77
End: 2022-04-14
Payer: COMMERCIAL

## 2022-04-14 VITALS
HEIGHT: 61 IN | BODY MASS INDEX: 43.8 KG/M2 | RESPIRATION RATE: 18 BRPM | TEMPERATURE: 97.7 F | SYSTOLIC BLOOD PRESSURE: 122 MMHG | HEART RATE: 74 BPM | DIASTOLIC BLOOD PRESSURE: 68 MMHG | WEIGHT: 232 LBS | OXYGEN SATURATION: 93 %

## 2022-04-14 DIAGNOSIS — C34.91 NON-SMALL CELL CARCINOMA OF LUNG, STAGE 4, RIGHT (HCC): Primary | ICD-10-CM

## 2022-04-14 DIAGNOSIS — C79.51 BONE METASTASES (HCC): ICD-10-CM

## 2022-04-14 PROCEDURE — 1036F TOBACCO NON-USER: CPT | Performed by: INTERNAL MEDICINE

## 2022-04-14 PROCEDURE — 99214 OFFICE O/P EST MOD 30 MIN: CPT | Performed by: INTERNAL MEDICINE

## 2022-04-14 PROCEDURE — 1160F RVW MEDS BY RX/DR IN RCRD: CPT | Performed by: INTERNAL MEDICINE

## 2022-04-14 NOTE — PROGRESS NOTES
Hematology / Oncology Outpatient Follow Up Note    Rosales Rogers 78 y o  female Sherman Pritchett VKV:[id number]         Date:  4/14/2022    Assessment / Plan:  A 79-year-old female with metastatic adenocarcinoma of the lung with ALK rearrangement   Her lung cancer was discovered incidentally   She has no symptomatology from oncology standpoint  Vladimir White has limited smoking history with 3-4 cigarettes for 20 years until 1986  She has right hilar mass and osseous metastasis   She is currently on Alectinib with no toxicity, resulting in near complete resolution of mass  Based on her symptoms, physical examination as well as imaging study, she has no evidence of progressive disease  I recommended her to continue alectinib 600 mg b i d  She also will continue Zometa infusion every 3 months  I will see her again in 6 months with CBC, CMP and CT scan of chest abdomen pelvis  She is in agreement with my recommendations  Subjective:      HPI:  A 79-year-old female who has limited smoking history   She smoked 20 years with 3-4 cigarettes per day until 1986   She recently developed right flank pain for which she went to the emergency department  Vladimir White was found instantly found to have right lung mass   Her right flank pain disappeared without knowing the clear etiology   She underwent PET-CT scan which showed hypermetabolic right upper lobe mass as well as hypermetabolic T8 lesion   In addition, she had multiple iliac bone lesion with SUV 12 4   She underwent bronchoscopy and biopsy which showed non-small cell carcinoma   Fine-needle aspiration from 4R lymph nodes showed adenocarcinoma consistent with lung primary   She presents today to discuss the diagnosis and treatment options   She has absolutely no complaint of pain, weight loss  Vladimir White has been sign asthma, therefore, she has mild exertional shortness of breath   She denied fever, chills or night sweats   Her performance status is normal              Interval History:  A 30-year-old female with metastatic adenocarcinoma of the lung   Her lung cancer was discovered incidentally   She has no symptomatology from oncology standpoint   She has somewhat limited smoking history with 3-4 cigarettes for 20 years until 1986  She has right hilar mass as well as multiple osseous metastasis including T8   Her tumor was found to have ALK-EML4 translocation   She has been on Alectinib 600 milligram twice a day with excellent tolerance   She already had major response on Alectinib   She presents today for routine follow-up  She feels well  She has stable chronic cough as well as mild exertional shortness of breath  She has interval weight gain  She denied any pain  Her recent CT scan showed no evidence of progressive disease  Her performance status is 0 to 1/4 on ECOG scale         Objective:      Primary Diagnosis:     Metastatic adenocarcinoma of the lung, with ALK rearrangements   diagnosed and August 2020       Cancer Staging:  Cancer Staging  No matching staging information was found for the patient         Previous Hematologic/ Oncologic Treatment:            Current Hematologic/ Oncologic Treatment:       Alectinib 600 mg b i d  since late October 2020       Zometa every 3 months since September 2020       Disease Status:        Good partial response      Test Results:     Pathology:     Cytology from right upper lobe mass showed non-small cell carcinoma, consistent with lung primary   Fine-needle aspiration from 4R lymph nodes showed adenocarcinoma   ALK rearrangements positive   No evidence of ROS 1 translocation   PDL1 expression negative  EGFR mutation negative       Radiology:       CT scan of chest abdomen pelvis in April 2022 showed no new metastatic disease    Stable osseous metastasis      Laboratory:       See below      Physical Exam:        General Appearance:    Alert, oriented          Eyes:    PERRL   Ears:    Normal external ear canals, both ears   Nose:   Nares normal, septum midline   Throat:   Mucosa moist  Pharynx without injection  Neck:   Supple         Lungs:     Clear to auscultation bilaterally   Chest Wall:    No tenderness or deformity    Heart:    Regular rate and rhythm         Abdomen:     Soft, non-tender, bowel sounds +, no organomegaly               Extremities:   Extremities no cyanosis or edema         Skin:   no rash or icterus  Lymph nodes:   Cervical, supraclavicular, and axillary nodes normal   Neurologic:   CNII-XII intact, normal strength, sensation and reflexes     Throughout             Breast exam:   NA           ROS: Review of Systems   All other systems reviewed and are negative  Imaging: CT chest abdomen pelvis w contrast    Result Date: 4/8/2022  Narrative: CT CHEST, ABDOMEN AND PELVIS WITH IV CONTRAST INDICATION:   C34 91: Malignant neoplasm of unspecified part of right bronchus or lung C79 51: Secondary malignant neoplasm of bone  COMPARISON:  10/6/2021; 7/10/2021; 7/22/2020 TECHNIQUE: CT examination of the chest, abdomen and pelvis was performed  Axial, sagittal, and coronal 2D reformatted images were created from the source data and submitted for interpretation  Radiation dose length product (DLP) for this visit:  1004 mGy-cm   This examination, like all CT scans performed in the [facility], was performed utilizing techniques to minimize radiation dose exposure, including the use of iterative reconstruction and automated exposure control  IV Contrast:  100 mL of iohexol (OMNIPAQUE) Enteric Contrast: Enteric contrast was administered  FINDINGS: CHEST LUNGS:  There are fine tree-in-bud opacities in the right lower lobe of the lung new since the study of October 6  Small tree-in-bud opacities in the lingula may also be present  This distribution is different than groundglass opacity seen originally in July 2021  There is no tracheal or endobronchial lesion  PLEURA:  Unremarkable   HEART/GREAT VESSELS: Heart is unremarkable for patient's age  No thoracic aortic aneurysm  Coronary calcification is present  MEDIASTINUM AND ARACELY:  Small inferior right hilar node is unchanged  Equivocal stranding at the inferior right hilum is unchanged  Mass was noted in this location on prior study 2020  CHEST WALL AND LOWER NECK:   Unremarkable  ABDOMEN LIVER/BILIARY TREE:  Unremarkable  GALLBLADDER:  No calcified gallstones  No pericholecystic inflammatory change  SPLEEN:  Unremarkable  PANCREAS:  Unremarkable  ADRENAL GLANDS:  Unremarkable  KIDNEYS/URETERS:  No hydronephrosis  Right renal cyst STOMACH AND BOWEL:  This stomach is not well distended focal thickening could be present  No small bowel dilatation  Diffuse colon diverticulosis  Multiple areas of underdistention and apparent thickening are seen both in the right and left colon may be related to muscular hypertrophy  Some subtle inflammation is not excluded but the surrounding fat is unremarkable  The appearance is similar to the prior study  APPENDIX:  There are expected postoperative changes of appendectomy  ABDOMINOPELVIC CAVITY:  No ascites  No pneumoperitoneum  No lymphadenopathy  Some thickening of the nora of the diaphragm seen bilaterally not representing adenopathy  VESSELS:  Unremarkable for patient's age  PELVIS REPRODUCTIVE ORGANS:  Unremarkable for patient's age  URINARY BLADDER:  Unremarkable  ABDOMINAL WALL/INGUINAL REGIONS:  Unremarkable  OSSEOUS STRUCTURES:  Multiple sclerotic lesions are noted in the thoracolumbar spine similar to the prior study No lytic or blastic abnormality can be appreciated  Lesions in the pelvis near the SI joints are also unchanged  Probable pathologic fracture of the right 9th rib is also demonstrated without change  Impression: No evidence of new metastatic disease  Scattered sclerotic bone metastases appear similar to the prior study   There is new subtle tree-in-bud and groundglass opacities in the lungs which could be inflammatory and should be correlated with any new pulmonary symptoms  Follow-up could be performed in 3 months time  The study was marked in EPIC for significant notification  Workstation performed: [id number]         Labs:   Lab Results   Component Value Date    WBC 6 76 07/10/2021    HGB 10 6 (L) 07/10/2021    HCT 31 6 (L) 07/10/2021    MCV 92 07/10/2021    PLT 99 (L) 07/10/2021     Lab Results   Component Value Date    K 4 3 02/14/2022     02/14/2022    CO2 33 (H) 02/14/2022    BUN 28 (H) 02/14/2022    CREATININE 1 26 (H) 02/14/2022    GLUF 93 08/02/2021    CALCIUM 9 2 02/14/2022    AST 39 (H) 02/14/2022    ALT 31 02/14/2022    ALKPHOS 104 02/14/2022    EGFR 42 02/14/2022         Current Medications: Reviewed  Allergies: Reviewed  PMH/FH/SH:  Reviewed      Vital Sign:    Body surface area is 2 01 meters squared      Wt Readings from Last 3 Encounters:   04/14/22 105 kg (232 lb)   02/15/22 103 kg (228 lb)   02/09/22 103 kg (228 lb)        Temp Readings from Last 3 Encounters:   04/14/22 97 7 °F (36 5 °C) (Tympanic Core)   02/15/22 98 6 °F (37 °C) (Temporal)   02/09/22 98 °F (36 7 °C)        BP Readings from Last 3 Encounters:   04/14/22 122/68   02/15/22 134/60   02/09/22 118/70         Pulse Readings from Last 3 Encounters:   04/14/22 74   02/15/22 62   02/09/22 67     @LASTSAO2(3)@

## 2022-04-18 ENCOUNTER — OFFICE VISIT (OUTPATIENT)
Dept: FAMILY MEDICINE CLINIC | Facility: CLINIC | Age: 77
End: 2022-04-18

## 2022-04-18 VITALS
OXYGEN SATURATION: 94 % | TEMPERATURE: 97.8 F | SYSTOLIC BLOOD PRESSURE: 132 MMHG | DIASTOLIC BLOOD PRESSURE: 70 MMHG | RESPIRATION RATE: 18 BRPM | WEIGHT: 230.2 LBS | HEIGHT: 61 IN | BODY MASS INDEX: 43.46 KG/M2 | HEART RATE: 72 BPM

## 2022-04-18 DIAGNOSIS — D50.9 IRON DEFICIENCY ANEMIA, UNSPECIFIED IRON DEFICIENCY ANEMIA TYPE: ICD-10-CM

## 2022-04-18 DIAGNOSIS — J45.31 MILD PERSISTENT ASTHMA WITH ACUTE EXACERBATION: ICD-10-CM

## 2022-04-18 DIAGNOSIS — J18.9 PNEUMONIA OF BOTH LUNGS DUE TO INFECTIOUS ORGANISM, UNSPECIFIED PART OF LUNG: Primary | ICD-10-CM

## 2022-04-18 DIAGNOSIS — J45.30 MILD PERSISTENT ASTHMA WITHOUT COMPLICATION: ICD-10-CM

## 2022-04-18 DIAGNOSIS — R60.0 LOWER EXTREMITY EDEMA: ICD-10-CM

## 2022-04-18 DIAGNOSIS — I10 ESSENTIAL HYPERTENSION: ICD-10-CM

## 2022-04-18 DIAGNOSIS — I10 PRIMARY HYPERTENSION: ICD-10-CM

## 2022-04-18 DIAGNOSIS — M47.816 LUMBAR FACET ARTHROPATHY: ICD-10-CM

## 2022-04-18 DIAGNOSIS — N18.31 STAGE 3A CHRONIC KIDNEY DISEASE (HCC): ICD-10-CM

## 2022-04-18 PROCEDURE — 1160F RVW MEDS BY RX/DR IN RCRD: CPT | Performed by: NURSE PRACTITIONER

## 2022-04-18 PROCEDURE — 1036F TOBACCO NON-USER: CPT | Performed by: NURSE PRACTITIONER

## 2022-04-18 PROCEDURE — 99214 OFFICE O/P EST MOD 30 MIN: CPT | Performed by: NURSE PRACTITIONER

## 2022-04-18 PROCEDURE — 3008F BODY MASS INDEX DOCD: CPT | Performed by: INTERNAL MEDICINE

## 2022-04-18 PROCEDURE — 3078F DIAST BP <80 MM HG: CPT | Performed by: NURSE PRACTITIONER

## 2022-04-18 PROCEDURE — 3075F SYST BP GE 130 - 139MM HG: CPT | Performed by: NURSE PRACTITIONER

## 2022-04-18 PROCEDURE — 3008F BODY MASS INDEX DOCD: CPT | Performed by: NURSE PRACTITIONER

## 2022-04-18 RX ORDER — FUROSEMIDE 20 MG/1
20 TABLET ORAL DAILY
Qty: 90 TABLET | Refills: 1 | Status: SHIPPED | OUTPATIENT
Start: 2022-04-18 | End: 2022-07-05 | Stop reason: SDUPTHER

## 2022-04-18 RX ORDER — BUDESONIDE 0.5 MG/2ML
0.5 INHALANT ORAL 2 TIMES DAILY
Qty: 120 ML | Refills: 1 | Status: SHIPPED | OUTPATIENT
Start: 2022-04-18 | End: 2022-05-04

## 2022-04-18 RX ORDER — FERROUS SULFATE TAB EC 324 MG (65 MG FE EQUIVALENT) 324 (65 FE) MG
324 TABLET DELAYED RESPONSE ORAL EVERY OTHER DAY
Qty: 90 TABLET | Refills: 1 | Status: SHIPPED | OUTPATIENT
Start: 2022-04-18

## 2022-04-18 RX ORDER — SENNOSIDES 8.6 MG
650 CAPSULE ORAL EVERY 8 HOURS PRN
Qty: 30 TABLET | Refills: 0 | Status: SHIPPED | OUTPATIENT
Start: 2022-04-18 | End: 2022-04-18

## 2022-04-18 RX ORDER — ATENOLOL 50 MG/1
25 TABLET ORAL DAILY
Qty: 90 TABLET | Refills: 2 | Status: SHIPPED | OUTPATIENT
Start: 2022-04-18 | End: 2022-07-05 | Stop reason: SDUPTHER

## 2022-04-18 RX ORDER — SENNOSIDES 8.6 MG
650 CAPSULE ORAL EVERY 8 HOURS PRN
Qty: 90 TABLET | Refills: 2 | Status: SHIPPED | OUTPATIENT
Start: 2022-04-18

## 2022-04-18 RX ORDER — AZITHROMYCIN 250 MG/1
TABLET, FILM COATED ORAL
Qty: 6 TABLET | Refills: 0 | Status: SHIPPED | OUTPATIENT
Start: 2022-04-18 | End: 2022-04-22

## 2022-04-18 RX ORDER — IRBESARTAN 300 MG/1
300 TABLET ORAL
Qty: 90 TABLET | Refills: 0 | Status: SHIPPED | OUTPATIENT
Start: 2022-04-18 | End: 2022-07-05 | Stop reason: SDUPTHER

## 2022-04-18 NOTE — ASSESSMENT & PLAN NOTE
She does follow with pulmonology and uses her Pulmicort nebulizer twice daily, albuterol as needed  States the [employer] combined inhaler is too expensive, I did offer to switch it to Advair which is the same thing but different brand and she states her recent SOB and dyspnea is more likely secondary to her chest congestion  I did advise her that may not be the case rather she likely need step-up treatment for her asthma  She will follow up pulmonology regarding this

## 2022-04-18 NOTE — ASSESSMENT & PLAN NOTE
Recent CT showing tree-in-bud opacities, does endorse cough but unproductive however she did have pneumonia 6 months ago    - cover prophylactically with a Z-Ramez  -Mucinex DM for chest congestion, advised to drink plenty of fluids

## 2022-04-18 NOTE — ASSESSMENT & PLAN NOTE
Uncontrolled, record of above goal blood pressure readings  Continue Lasix 20 mg, ear bus are 10 300 mg, I will increase her atenolol from 25 mg to 50 mg  Discussed DASH diet, reviewed and provided handout

## 2022-04-18 NOTE — PROGRESS NOTES
Assessment/Plan:    Problem List Items Addressed This Visit        Respiratory    Mild persistent asthma      She does follow with pulmonology and uses her Pulmicort nebulizer twice daily, albuterol as needed  States the Americana combined inhaler is too expensive, I did offer to switch it to Advair which is the same thing but different brand and she states her recent SOB and dyspnea is more likely secondary to her chest congestion  I did advise her that may not be the case rather she likely need step-up treatment for her asthma  She will follow up pulmonology regarding this  Relevant Medications    dextromethorphan-guaifenesin (MUCINEX DM)  MG per 12 hr tablet    budesonide (PULMICORT) 0 5 mg/2 mL nebulizer solution    Other Relevant Orders    Nebulizer Supplies    Pneumonia - Primary       Recent CT showing tree-in-bud opacities, does endorse cough but unproductive however she did have pneumonia 6 months ago  - cover prophylactically with a Z-Ramez  -Mucinex DM for chest congestion, advised to drink plenty of fluids         Relevant Medications    azithromycin (ZITHROMAX) 250 mg tablet    dextromethorphan-guaifenesin (MUCINEX DM)  MG per 12 hr tablet    budesonide (PULMICORT) 0 5 mg/2 mL nebulizer solution       Cardiovascular and Mediastinum    Hypertension       Uncontrolled, record of above goal blood pressure readings  Continue Lasix 20 mg, ear bus are 10 300 mg, I will increase her atenolol from 25 mg to 50 mg  Discussed DASH diet, reviewed and provided handout  Relevant Medications    atenolol (TENORMIN) 50 mg tablet    irbesartan (AVAPRO) 300 mg tablet    furosemide (LASIX) 20 mg tablet       Musculoskeletal and Integument    Lumbar facet arthropathy       She has been using ibuprofen but given her CKD I advised she use Tylenol arthritis and explained to her reasoning behind this  She ambulates with a cane           Relevant Medications    acetaminophen (TYLENOL) 650 mg CR tablet Other    Lower extremity edema      Continue Lasix and leg elevation         Relevant Medications    furosemide (LASIX) 20 mg tablet      Other Visit Diagnoses     Essential hypertension        Relevant Medications    atenolol (TENORMIN) 50 mg tablet    irbesartan (AVAPRO) 300 mg tablet    furosemide (LASIX) 20 mg tablet    Stage 3a chronic kidney disease (HCC)        Relevant Medications    furosemide (LASIX) 20 mg tablet    Iron deficiency anemia, unspecified iron deficiency anemia type        Relevant Medications    ferrous sulfate 324 (65 Fe) mg            Return in about 3 months (around 7/18/2022) for Recheck htn, anemia, asthma  A chart review was performed and previous primary care visit notes were reviewed  All applicable imaging studies were reviewed and images were reviewed personally  All applicable laboratory studies were reviewed personally  Care everywhere review was performed if  available and all pertinent notes were reviewed  Subjective:     HPI: Tammy Judd is a 78 y o  female who  has a past medical history of Asthma, GERD (gastroesophageal reflux disease), Hypertension, Lumbar disc disease, Lung cancer ([address] ), Sleep apnea, and Ventricular arrhythmia  who presented to the office today for chest CT follow-up  She obtained chest CT from her oncologist for ongoing monitoring of lung cancer with bone Mets  There were tree-in-bud opacities and she feels this evidence of possible pneumonia setting in especially given her recent dyspnea on exertion and chest congestion  She does however have asthma and has not been using her combined inhaler Wixela code she states it was too expensive, previously on Advair  She does a Pulmicort nebulizer twice daily  She has no other new concerns but does state her back continues to cause her pain, she is using ibuprofen regularly but has CKD stage 3      The following portions of the patient's history were reviewed and updated as appropriate: allergies, current medications, past family history, past medical history, past social history, past surgical history, and problem list     Current Outpatient Medications on File Prior to Visit   Medication Sig Dispense Refill    albuterol (2 5 mg/3 mL) 0 083 % nebulizer solution Take 3 mL (2 5 mg total) by nebulization every 6 (six) hours as needed for wheezing 360 mL 5    albuterol (Ventolin HFA) 90 mcg/act inhaler Inhale 2 puffs every 6 (six) hours as needed for wheezing 18 g 5    Alectinib HCl 150 MG CAPS Take 4 capsules (600 mg total) by mouth 2 (two) times a day 240 capsule 5    fexofenadine (ALLEGRA) 180 MG tablet Take 180 mg by mouth daily      fluticasone (FLONASE) 50 mcg/act nasal spray 2 sprays into each nostril daily 48 mL 1    fluticasone-salmeterol (Wixela Inhub) 250-50 mcg/dose inhaler Inhale 1 puff 2 (two) times a day Rinse mouth after use   1 blister 3    lidocaine (XYLOCAINE) 5 % ointment Apply topically as needed for mild pain 35 44 g 2    omeprazole (PriLOSEC) 20 mg delayed release capsule TAKE 1 CAPSULE BY MOUTH EVERY DAY 90 capsule 1    oxyCODONE-acetaminophen (PERCOCET) 5-325 mg per tablet Take 1 tablet by mouth every 4 (four) hours as needed for moderate pain For ongoing pain Max Daily Amount: 6 tablets 60 tablet 0    promethazine-codeine (PHENERGAN WITH CODEINE) 6 25-10 mg/5 mL syrup Take 5 mL by mouth every 4 (four) hours as needed for cough 240 mL 1    [DISCONTINUED] atenolol (TENORMIN) 25 mg tablet TAKE 1 TABLET BY MOUTH EVERY DAY 90 tablet 1    [DISCONTINUED] budesonide (PULMICORT) 0 5 mg/2 mL nebulizer solution TAKE 2 ML (0 5 MG TOTAL) BY NEBULIZATION 2 (TWO) TIMES A DAY RINSE MOUTH AFTER USE  120 mL 1    [DISCONTINUED] dextromethorphan-guaifenesin (MUCINEX DM)  MG per 12 hr tablet Take 1 tablet by mouth every 12 (twelve) hours 30 tablet 1    [DISCONTINUED] furosemide (LASIX) 20 mg tablet TAKE 1 TABLET BY MOUTH EVERY DAY 90 tablet 1    [DISCONTINUED] irbesartan (AVAPRO) 300 mg tablet Take 1 tablet (300 mg total) by mouth daily at bedtime 90 tablet 0     No current facility-administered medications on file prior to visit  Review of Systems   Constitutional: Negative  HENT: Negative  Eyes: Negative  Respiratory: Positive for cough, shortness of breath and wheezing  Negative for chest tightness  Cardiovascular: Negative  Negative for chest pain  Gastrointestinal: Negative  Endocrine: Negative  Genitourinary: Negative  Musculoskeletal: Positive for back pain  Skin: Negative  Allergic/Immunologic: Negative  Neurological: Negative  Psychiatric/Behavioral: Negative  Objective:    /70 (BP Location: Left arm, Patient Position: Sitting, Cuff Size: Standard) Comment (BP Location): forearm - level of heart  Pulse 72   Temp 97 8 °F (36 6 °C) (Temporal)   Resp 18   Ht 5' 1 18" (1 554 m)   Wt 104 kg (230 lb 3 2 oz)   SpO2 94%   Breastfeeding No   BMI 43 25 kg/m²     Physical Exam  Vitals reviewed  Constitutional:       General: She is not in acute distress  Appearance: Normal appearance  HENT:      Head: Normocephalic  Right Ear: External ear normal       Left Ear: External ear normal       Nose: Nose normal  No congestion  Mouth/Throat:      Mouth: Mucous membranes are moist    Eyes:      Extraocular Movements: Extraocular movements intact  Conjunctiva/sclera: Conjunctivae normal    Cardiovascular:      Rate and Rhythm: Normal rate and regular rhythm  Heart sounds: Normal heart sounds  No murmur heard  No friction rub  No gallop  Pulmonary:      Effort: Pulmonary effort is normal       Breath sounds: Normal breath sounds  No wheezing  Musculoskeletal:         General: Normal range of motion  Cervical back: Normal range of motion and neck supple  No muscular tenderness  Right lower le+ Edema present  Left lower le+ Edema present     Skin:     General: Skin is warm and dry  Capillary Refill: Capillary refill takes less than 2 seconds  Neurological:      General: No focal deficit present  Mental Status: She is alert  Psychiatric:         Mood and Affect: Mood normal          Behavior: Behavior normal          Thought Content: Thought content normal          WILDA Dunne  04/18/22  5:18 PM    There are no Patient Instructions on file for this visit

## 2022-04-18 NOTE — ASSESSMENT & PLAN NOTE
She has been using ibuprofen but given her CKD I advised she use Tylenol arthritis and explained to her reasoning behind this  She ambulates with a cane

## 2022-05-03 ENCOUNTER — HOSPITAL ENCOUNTER (OUTPATIENT)
Dept: SLEEP CENTER | Facility: CLINIC | Age: 77
Discharge: HOME | End: 2022-05-03
Payer: COMMERCIAL

## 2022-05-03 DIAGNOSIS — G47.33 OSA (OBSTRUCTIVE SLEEP APNEA): ICD-10-CM

## 2022-05-03 PROCEDURE — G0399 HOME SLEEP TEST/TYPE 3 PORTA: HCPCS

## 2022-05-03 NOTE — PROGRESS NOTES
Pulmonary Follow Up Note   Hitesh Kamara 78 y o  female MRN: [id number]  5/4/2022      Assessment and Plan: Moderate persistent Allergic Asthma - uncontrolled   - Peak flow  250ml (which is baseline)    - mucinex PRN   - takes allegra   - Allergy testing 11/2020: +Cat dander, cockroaches, dust mites, dog dander, Total IgE 109, marginally elevated Aspergillus Fumigatus  - Advair 250-50mcg bid- increase to Advair 500-50mcg/ BID     - add spiriva 1 25 once daily to maximize therapy   - PFTs 4/2021: FEV1/FVC 71%, FVC 61%- may suggest restriction however, lung volumes not ordered    - prednisone therapy 40mg po daily x 5 days     Metastatic Non-small cell carcinoma of Right lung (8/2020)   - ALK rearrangement   - s/p palliative radiation therapy to T8 x 10 treatments  - on Alectinib as per hem/onc - doing well so far   - had a PET scan 1/2021 shows response to therapy   - CT chest/ abd/pelvis ordered by hem/onc 5/2021 showed resolution of RUL nodule, R hilum not well visualized, however, was similar to PET scan, osseous metastasis ( ribs, spine, and pelvis), GGO   - CTA 7/2021 showed persistent multifocal patchy GGO slightly increased     - CT chest/abdomen/pelvis: tree-in-bed opacities in RLL and some in lingula   - will await repeat CT scan in 6 months and if still present will pursue bronchoscopy     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   - 2D echo 12/2021 with EF 63%, RV mildly dilated, grade 1 DD, mild MR, PASP 37mmHg   - on lasix     Postnasal drip - improved   - currently using Flonase   - won't use Ipratropium due to early signs of glaucoma   - can try saline rinse at night       Essential hypertension  - well controlled       Morbid obesity  - counseled   - finished getting PT for her knees     Severe MONO   - sleep study in 2019 showed severe MONO   - will send for home study- patient does not want to undergo lab testing whatsoever     1   Mild persistent extrinsic asthma with acute exacerbation  - predniSONE 20 mg tablet; Take 2 tablets (40 mg total) by mouth daily  -     tiotropium (Spiriva Respimat) 1 25 MCG/ACT AERS inhaler; Inhale 2 puffs daily  -     Fluticasone-Salmeterol (Advair) 500-50 mcg/dose inhaler; Inhale 1 puff 2 (two) times a day Rinse mouth after use  Return in about 3 months (around 8/4/2022)  History of Present Illness   HPI:  Barrett Singh is a 78 y o  female 70 y o  female who has a PMHx of MONO, obesity, GERD, 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 and has received palliative radiation to the spine and now on oral chemo  She has had seasonal asthma and has had a flare up with the leaves in the fall  Her asthma is very well controlled overall, however, did have an ED visit 7/2021 for which she received a CTA that showed multifocal GGO and was treated with doxy and prednisone  BNP was 277  She had a repeat CXR 7/30 that showed resolution of opacities and received Bactrim and prednisone medrol pack the day prior and has 1 more day left  Follow Up: requiring albuterol 3x/day  She has tried mucinex  She was given azithromycin and did help some but still having a cough and feeling SOB  She continues to have a cough sometimes with mucous  Review of Systems   Constitutional: Negative for appetite change and fever  HENT: Positive for postnasal drip  Negative for ear pain, rhinorrhea, sneezing, sore throat and trouble swallowing  Respiratory: Positive for cough  Cardiovascular: Negative for chest pain  Musculoskeletal: Positive for myalgias  Neurological: Negative for headaches  All other systems reviewed and are negative        Historical Information   Past Medical History:   Diagnosis Date    Asthma     GERD (gastroesophageal reflux disease)     Hypertension     Lumbar disc disease     Lung cancer ([facility] [address] )     Sleep apnea     suspected     Ventricular arrhythmia      Past Surgical History:   Procedure Laterality Date    APPENDECTOMY      COLONOSCOPY N/A 3/18/2019    Procedure: COLONOSCOPY;  Surgeon: Romana Barth, DO;  Location: [facility]; Service: Gastroenterology    ESOPHAGOGASTRODUODENOSCOPY N/A 3/18/2019    Procedure: ESOPHAGOGASTRODUODENOSCOPY (EGD); Surgeon: Romana Barth, DO;  Location: [facility];   Service: Gastroenterology    KNEE SURGERY      ROTATOR CUFF REPAIR      SHOULDER SURGERY       Family History   Problem Relation Age of Onset    Stroke Mother     Diabetes Mother     Hyperlipidemia Mother     Hypertension Mother     Diabetes Father     Hyperlipidemia Father     Hypertension Father     Lung cancer Father 79    Lung cancer Sister 71         Meds/Allergies     Current Outpatient Medications:     acetaminophen (TYLENOL) 650 mg CR tablet, Take 1 tablet (650 mg total) by mouth every 8 (eight) hours as needed for mild pain, Disp: 90 tablet, Rfl: 2    albuterol (2 5 mg/3 mL) 0 083 % nebulizer solution, Take 3 mL (2 5 mg total) by nebulization every 6 (six) hours as needed for wheezing, Disp: 360 mL, Rfl: 5    albuterol (Ventolin HFA) 90 mcg/act inhaler, Inhale 2 puffs every 6 (six) hours as needed for wheezing, Disp: 18 g, Rfl: 5    Alectinib HCl 150 MG CAPS, Take 4 capsules (600 mg total) by mouth 2 (two) times a day, Disp: 240 capsule, Rfl: 5    atenolol (TENORMIN) 50 mg tablet, Take 0 5 tablets (25 mg total) by mouth daily, Disp: 90 tablet, Rfl: 2    dextromethorphan-guaifenesin (MUCINEX DM)  MG per 12 hr tablet, Take 1 tablet by mouth every 12 (twelve) hours, Disp: 30 tablet, Rfl: 1    ferrous sulfate 324 (65 Fe) mg, Take 1 tablet (324 mg total) by mouth every other day, Disp: 90 tablet, Rfl: 1    fexofenadine (ALLEGRA) 180 MG tablet, Take 180 mg by mouth daily, Disp: , Rfl:     fluticasone (FLONASE) 50 mcg/act nasal spray, 2 sprays into each nostril daily, Disp: 48 mL, Rfl: 1    furosemide (LASIX) 20 mg tablet, Take 1 tablet (20 mg total) by mouth daily, Disp: 90 tablet, Rfl: 1    irbesartan (AVAPRO) 300 mg tablet, Take 1 tablet (300 mg total) by mouth daily at bedtime, Disp: 90 tablet, Rfl: 0    lidocaine (XYLOCAINE) 5 % ointment, Apply topically as needed for mild pain, Disp: 35 44 g, Rfl: 2    omeprazole (PriLOSEC) 20 mg delayed release capsule, TAKE 1 CAPSULE BY MOUTH EVERY DAY, Disp: 90 capsule, Rfl: 1    oxyCODONE-acetaminophen (PERCOCET) 5-325 mg per tablet, Take 1 tablet by mouth every 4 (four) hours as needed for moderate pain For ongoing pain Max Daily Amount: 6 tablets, Disp: 60 tablet, Rfl: 0    promethazine-codeine (PHENERGAN WITH CODEINE) 6 25-10 mg/5 mL syrup, Take 5 mL by mouth every 4 (four) hours as needed for cough, Disp: 240 mL, Rfl: 1    Fluticasone-Salmeterol (Advair) 500-50 mcg/dose inhaler, Inhale 1 puff 2 (two) times a day Rinse mouth after use , Disp: 60 blister, Rfl: 3    predniSONE 20 mg tablet, Take 2 tablets (40 mg total) by mouth daily, Disp: 10 tablet, Rfl: 0    tiotropium (Spiriva Respimat) 1 25 MCG/ACT AERS inhaler, Inhale 2 puffs daily, Disp: 4 g, Rfl: 3  No Known Allergies    Vitals: Blood pressure 130/62, pulse 70, temperature 99 °F (37 2 °C), resp  rate 18, height 5' 1" (1 549 m), weight 105 kg (232 lb), SpO2 94 %, not currently breastfeeding  Body mass index is 43 84 kg/m²  Oxygen Therapy  SpO2: 94 %  Oxygen Therapy: None (Room air)      Physical Exam  Physical Exam  Vitals reviewed  Constitutional:       Appearance: Normal appearance  She is obese  HENT:      Head: Normocephalic  Nose: Nose normal       Mouth/Throat:      Mouth: Mucous membranes are moist    Eyes:      Pupils: Pupils are equal, round, and reactive to light  Cardiovascular:      Rate and Rhythm: Normal rate and regular rhythm  Pulses: Normal pulses  Heart sounds: Normal heart sounds  Pulmonary:      Effort: Pulmonary effort is normal       Breath sounds: Normal breath sounds  Abdominal:      General: Abdomen is flat  Palpations: Abdomen is soft  Musculoskeletal:         General: Swelling present  Cervical back: Normal range of motion  Right lower leg: Edema present  Left lower leg: Edema present  Skin:     General: Skin is warm and dry  Neurological:      General: No focal deficit present  Mental Status: She is alert and oriented to person, place, and time  Labs: I have personally reviewed pertinent lab results  Lab Results   Component Value Date    WBC 6 76 07/10/2021    HGB 10 6 (L) 07/10/2021    HCT 31 6 (L) 07/10/2021    MCV 92 07/10/2021    PLT 99 (L) 07/10/2021     Lab Results   Component Value Date    CALCIUM 9 2 2022    K 4 3 2022    CO2 33 (H) 2022     2022    BUN 28 (H) 2022    CREATININE 1 26 (H) 2022     Lab Results   Component Value Date     2020     Lab Results   Component Value Date    ALT 31 2022    AST 39 (H) 2022    ALKPHOS 104 2022       Imaging and other studies: I have personally reviewed pertinent reports  and I have personally reviewed pertinent films in PACS    Pulmonary function testin2021  FEV1/FVC Ratio: 71 %  Forced Vital Capacity: 1 82 L    61 % predicted  FEV1: 1 29 L     56 % predicted     After bronchodilator:  FEV1/FVC Ratio: 72 %  Forced Vital Capacity: 1 88 L    63 % predicted  FEV1: 1 36 L     59 % predicted     DLCO corrected for patients hemoglobin level: 88 %    EKG, Pathology, and Other Studies: I have personally reviewed pertinent reports     and I have personally reviewed pertinent films in PACS      Kristina Martinez MD   Pulmonary and Critical Care Fellow  [facility]  Answers for HPI/ROS submitted by the patient on 2022  Do you have chest tightness?: Yes  Do you have difficulty breathing?: Yes  Do you experience frequent throat clearing?: Yes  Chronicity: chronic  When did you first notice your symptoms?: more than 1 month ago  How often do your symptoms occur?: 2 to 4 times per day  Since you first noticed this problem, how has it changed?: gradually improving  Do you have shortness of breath that occurs with effort or exertion?: Yes  Do you have ear congestion?: No  Do you have heartburn?: No  Do you have fatigue?: Yes  Do you have nasal congestion?: Yes  Do you have shortness of breath when lying flat?: No  Do you have shortness of breath when you wake up?: Yes  Have you experienced weight loss?: No  Which of the following makes your symptoms worse?: any activity, climbing stairs, exercise, exposure to fumes, exposure to smoke, strenuous activity  Which of the following makes your symptoms better?: nothing, cold air, oral steroids  Risk factors for lung disease: animal exposure

## 2022-05-03 NOTE — PROGRESS NOTES
Home Sleep Study Documentation    Pre-Sleep Home Study:    Set-up and instructions performed by: MM    Technician performed demonstration for Patient: yes    Return demonstration performed by Patient: yes    Written instructions provided to Patient: yes    Patient signed consent form: yes        Post-Sleep Home Study:    Additional comments by Patient: none    Home Sleep Study Failed:no:    Failure reason: N/A    Reported or Detected: N/A    Scored by: ANTONIETA Raymond, RPSGT

## 2022-05-04 ENCOUNTER — OFFICE VISIT (OUTPATIENT)
Dept: PULMONOLOGY | Facility: CLINIC | Age: 77
End: 2022-05-04
Payer: COMMERCIAL

## 2022-05-04 VITALS
RESPIRATION RATE: 18 BRPM | HEART RATE: 70 BPM | HEIGHT: 61 IN | BODY MASS INDEX: 43.8 KG/M2 | WEIGHT: 232 LBS | SYSTOLIC BLOOD PRESSURE: 130 MMHG | DIASTOLIC BLOOD PRESSURE: 62 MMHG | OXYGEN SATURATION: 94 % | TEMPERATURE: 99 F

## 2022-05-04 DIAGNOSIS — J45.31 MILD PERSISTENT EXTRINSIC ASTHMA WITH ACUTE EXACERBATION: Primary | ICD-10-CM

## 2022-05-04 PROCEDURE — 99214 OFFICE O/P EST MOD 30 MIN: CPT | Performed by: INTERNAL MEDICINE

## 2022-05-04 PROCEDURE — 3078F DIAST BP <80 MM HG: CPT | Performed by: INTERNAL MEDICINE

## 2022-05-04 PROCEDURE — 3075F SYST BP GE 130 - 139MM HG: CPT | Performed by: INTERNAL MEDICINE

## 2022-05-04 PROCEDURE — 95806 SLEEP STUDY UNATT&RESP EFFT: CPT | Performed by: INTERNAL MEDICINE

## 2022-05-04 RX ORDER — FLUTICASONE PROPIONATE AND SALMETEROL 500; 50 UG/1; UG/1
1 POWDER RESPIRATORY (INHALATION) 2 TIMES DAILY
Qty: 60 BLISTER | Refills: 3 | Status: CANCELLED | OUTPATIENT
Start: 2022-05-04

## 2022-05-04 RX ORDER — FLUTICASONE PROPIONATE AND SALMETEROL 500; 50 UG/1; UG/1
1 POWDER RESPIRATORY (INHALATION) 2 TIMES DAILY
Qty: 60 BLISTER | Refills: 3 | Status: SHIPPED | OUTPATIENT
Start: 2022-05-04 | End: 2022-08-10 | Stop reason: SDUPTHER

## 2022-05-04 RX ORDER — PREDNISONE 20 MG/1
40 TABLET ORAL DAILY
Qty: 10 TABLET | Refills: 0 | Status: SHIPPED | OUTPATIENT
Start: 2022-05-04 | End: 2022-08-10 | Stop reason: HOSPADM

## 2022-05-04 RX ORDER — TIOTROPIUM BROMIDE INHALATION SPRAY 1.56 UG/1
2 SPRAY, METERED RESPIRATORY (INHALATION) DAILY
Qty: 4 G | Refills: 3 | Status: SHIPPED | OUTPATIENT
Start: 2022-05-04 | End: 2022-08-10 | Stop reason: SDUPTHER

## 2022-05-07 ENCOUNTER — APPOINTMENT (OUTPATIENT)
Dept: LAB | Facility: HOSPITAL | Age: 77
End: 2022-05-07
Attending: INTERNAL MEDICINE
Payer: COMMERCIAL

## 2022-05-07 DIAGNOSIS — C34.91 NON-SMALL CELL CARCINOMA OF LUNG, STAGE 4, RIGHT (HCC): ICD-10-CM

## 2022-05-07 DIAGNOSIS — C79.51 BONE METASTASES (HCC): ICD-10-CM

## 2022-05-07 LAB
ALBUMIN SERPL BCP-MCNC: 4.2 G/DL (ref 3–5.2)
ALP SERPL-CCNC: 94 U/L (ref 43–122)
ALT SERPL W P-5'-P-CCNC: 30 U/L
ANION GAP SERPL CALCULATED.3IONS-SCNC: 7 MMOL/L (ref 5–14)
AST SERPL W P-5'-P-CCNC: 34 U/L (ref 14–36)
BILIRUB SERPL-MCNC: 0.92 MG/DL
BUN SERPL-MCNC: 33 MG/DL (ref 5–25)
CALCIUM SERPL-MCNC: 9.4 MG/DL (ref 8.4–10.2)
CHLORIDE SERPL-SCNC: 102 MMOL/L (ref 97–108)
CO2 SERPL-SCNC: 33 MMOL/L (ref 22–30)
CREAT SERPL-MCNC: 1.04 MG/DL (ref 0.6–1.2)
GFR SERPL CREATININE-BSD FRML MDRD: 52 ML/MIN/1.73SQ M
GLUCOSE P FAST SERPL-MCNC: 91 MG/DL (ref 70–99)
POTASSIUM SERPL-SCNC: 3.9 MMOL/L (ref 3.6–5)
PROT SERPL-MCNC: 7.5 G/DL (ref 5.9–8.4)
SODIUM SERPL-SCNC: 142 MMOL/L (ref 137–147)

## 2022-05-07 PROCEDURE — 36415 COLL VENOUS BLD VENIPUNCTURE: CPT

## 2022-05-07 PROCEDURE — 80053 COMPREHEN METABOLIC PANEL: CPT

## 2022-05-09 ENCOUNTER — HOSPITAL ENCOUNTER (OUTPATIENT)
Dept: RADIOLOGY | Facility: HOSPITAL | Age: 77
Discharge: HOME | End: 2022-05-09
Payer: COMMERCIAL

## 2022-05-09 ENCOUNTER — OFFICE VISIT (OUTPATIENT)
Dept: OBGYN CLINIC | Facility: HOSPITAL | Age: 77
End: 2022-05-09
Payer: COMMERCIAL

## 2022-05-09 VITALS — WEIGHT: 232 LBS | HEIGHT: 61 IN | BODY MASS INDEX: 43.8 KG/M2

## 2022-05-09 DIAGNOSIS — S46.812A TRAPEZIUS MUSCLE STRAIN, LEFT, INITIAL ENCOUNTER: ICD-10-CM

## 2022-05-09 DIAGNOSIS — M54.2 PAIN IN THE NECK: Primary | ICD-10-CM

## 2022-05-09 DIAGNOSIS — M54.2 PAIN IN THE NECK: ICD-10-CM

## 2022-05-09 PROCEDURE — 1160F RVW MEDS BY RX/DR IN RCRD: CPT | Performed by: PHYSICIAN ASSISTANT

## 2022-05-09 PROCEDURE — 99203 OFFICE O/P NEW LOW 30 MIN: CPT | Performed by: PHYSICIAN ASSISTANT

## 2022-05-09 PROCEDURE — 3008F BODY MASS INDEX DOCD: CPT | Performed by: NURSE PRACTITIONER

## 2022-05-09 PROCEDURE — 1036F TOBACCO NON-USER: CPT | Performed by: PHYSICIAN ASSISTANT

## 2022-05-09 PROCEDURE — 72050 X-RAY EXAM NECK SPINE 4/5VWS: CPT

## 2022-05-09 PROCEDURE — 3008F BODY MASS INDEX DOCD: CPT | Performed by: PHYSICIAN ASSISTANT

## 2022-05-09 NOTE — PROGRESS NOTES
Assessment:    Left trapezius / rhomboid muscle strain       Plan:    Referral to PT for stretching, massage, treatments as indicated  Heat to the area  OTC pain medications as needed  Follow-up with pain management, referral placed for consideration of trigger point injections if PT does not provide relief  Visit Diagnoses     Pain in the neck    -  Primary    Relevant Orders    XR spine cervical 2 or 3 vw injury                   Subjective:     Patient ID:  Luis Armando Leal is a 78 y o  female    HPI    65-year-old female presenting for evaluation of her left neck  According to the patient, she has roughly a two week history of muscular pain located between her neck and shoulder that she describes as burning  Occasionally it will go down her arm but usually it stays in this area it is worse with activity  She has recently been on steroids and has taken ibuprofen with no improvement in symptoms  She states it feels stiff  She tries to stretch out her neck however does not help relieve her symptoms  She denies any patrice numbness and tingling or weakness of the upper extremities  No gait instability, no bowel or bladder incontinence, saddle anesthesia  This has never happened to her before  No history of cervical spine surgery  The following portions of the patient's history were reviewed and updated as appropriate: allergies, current medications, past family history, past medical history, past social history, past surgical history and problem list     Review of Systems     Objective:    Imaging:  Cervical spine x-rays demonstrate multilevel degenerative changes, worse at C5-6, C6-7      Physical Exam     Orthopedic Examination:  Cervical spine     Inspection:  No open wounds or erythema  Palpation:  The left trapezius and rhomboid muscles are tender to palpation    The shoulder is nontender to palpation    Range-of-motion:  Cervical range of motion slightly limited to left with rotation and tilt    Strength:  5/5 C5-T1 bilaterally    Sensation:  Equal intact, C5-T1 bilaterally    Special Tests:  Negative Spurling's   Palpable radial pulse   The upper extremities warm and well perfused

## 2022-05-10 ENCOUNTER — HOSPITAL ENCOUNTER (OUTPATIENT)
Dept: INFUSION CENTER | Facility: HOSPITAL | Age: 77
Discharge: HOME | End: 2022-05-10
Attending: INTERNAL MEDICINE
Payer: COMMERCIAL

## 2022-05-10 VITALS
OXYGEN SATURATION: 91 % | RESPIRATION RATE: 18 BRPM | TEMPERATURE: 97.2 F | HEART RATE: 72 BPM | BODY MASS INDEX: 43.7 KG/M2 | DIASTOLIC BLOOD PRESSURE: 67 MMHG | WEIGHT: 231.48 LBS | SYSTOLIC BLOOD PRESSURE: 128 MMHG | HEIGHT: 61 IN

## 2022-05-10 DIAGNOSIS — C34.91 NON-SMALL CELL CARCINOMA OF LUNG, STAGE 4, RIGHT (HCC): ICD-10-CM

## 2022-05-10 DIAGNOSIS — C79.51 BONE METASTASES (HCC): Primary | ICD-10-CM

## 2022-05-10 PROCEDURE — 96365 THER/PROPH/DIAG IV INF INIT: CPT

## 2022-05-10 RX ORDER — SODIUM CHLORIDE 9 MG/ML
20 INJECTION, SOLUTION INTRAVENOUS ONCE
Status: CANCELLED | OUTPATIENT
Start: 2022-08-02

## 2022-05-10 RX ORDER — SODIUM CHLORIDE 9 MG/ML
20 INJECTION, SOLUTION INTRAVENOUS ONCE
Status: COMPLETED | OUTPATIENT
Start: 2022-05-10 | End: 2022-05-10

## 2022-05-10 RX ADMIN — ZOLEDRONIC ACID 3.3 MG: 4 INJECTION INTRAVENOUS at 14:15

## 2022-05-10 RX ADMIN — SODIUM CHLORIDE 20 ML/HR: 0.9 INJECTION, SOLUTION INTRAVENOUS at 14:02

## 2022-05-10 NOTE — PLAN OF CARE
Problem: Potential for Falls  Goal: Patient will remain free of falls  Description: INTERVENTIONS:  - Educate patient/family on patient safety including physical limitations  - Instruct patient to call for assistance with activity   - Consult OT/PT to assist with strengthening/mobility   - Keep Call bell within reach  - Keep bed low and locked with side rails adjusted as appropriate  - Keep care items and personal belongings within reach  - Initiate and maintain comfort round    - Apply yellow socks and bracelet for high fall risk patients  - Consider moving patient to room near nurses station  Outcome: Progressing

## 2022-05-13 ENCOUNTER — TELEPHONE (OUTPATIENT)
Dept: SLEEP CENTER | Facility: CLINIC | Age: 77
End: 2022-05-13

## 2022-05-13 NOTE — TELEPHONE ENCOUNTER
Patient of Dr Rosa Maria Dougherty in the [facility] pulmonary office  Called patient and advised sleep study resulted and shows severe MONO  SERJIO 85 2  APAP ordered  Patient states she cannot afford to get the APAP machine at this time  The copay is $33/month and she is on a fixed income  States as soon as she catches up with some bills she will look into getting a machine  Advised patient I can mesasge the pulmonary office to send the order for APAP to [employer] so they have it on file for when she is ready to get set up  Patient agreeable

## 2022-05-18 ENCOUNTER — EVALUATION (OUTPATIENT)
Dept: PHYSICAL THERAPY | Facility: CLINIC | Age: 77
End: 2022-05-18
Payer: COMMERCIAL

## 2022-05-18 DIAGNOSIS — S46.812A TRAPEZIUS MUSCLE STRAIN, LEFT, INITIAL ENCOUNTER: ICD-10-CM

## 2022-05-18 DIAGNOSIS — M54.2 NECK PAIN: Primary | ICD-10-CM

## 2022-05-18 PROCEDURE — 97161 PT EVAL LOW COMPLEX 20 MIN: CPT | Performed by: PHYSICAL THERAPIST

## 2022-05-18 PROCEDURE — 97140 MANUAL THERAPY 1/> REGIONS: CPT | Performed by: PHYSICAL THERAPIST

## 2022-05-18 PROCEDURE — 97110 THERAPEUTIC EXERCISES: CPT | Performed by: PHYSICAL THERAPIST

## 2022-05-18 NOTE — PROGRESS NOTES
Order processed via parachute to [employer], and they were asked to put order on hold and contact patient to work out the plan

## 2022-05-18 NOTE — PROGRESS NOTES
PT Evaluation     Today's date: 2022  Patient name: Rohan Delgado  :   MRN: [id number]  Referring provider: Jenny Pickett  Dx:   Encounter Diagnosis     ICD-10-CM    1  Neck pain  M54 2    2  Trapezius muscle strain, left, initial encounter  S46 812A Ambulatory Referral to Physical Therapy       Start Time: 1400  Stop Time: 1450  Total time in clinic (min): 50 minutes    Assessment/Plan  Rohan Delgado is a 78 y o  female who was referred to physical therapy for management of L sided neck pain  Primary impairments include pain with active cervical motion, poor seated posture  Consequently, patient has difficulty completing ADLs including putting dishes into cabinets and sleeping  Sara Anchors would benefit from skilled intervention to address all deficits and improve functional capability  Patient is a good candidate for therapy, pending compliance with HEP and 2x weekly participation  Thank you for the referral and please do not hesitate to contact me with any questions or concerns regarding Leduvinas care! Plan  Frequency: 1-2x per week   Duration in weeks: 6  POC start date: 22  POC end date: 22   Therapeutic exercise/activity, neuromuscular reeducation, manual therapy, and modalities  Patient understands and agrees to plan of care  Goals  Short Term--4 weeks  1  2 point decrease in pain levels  2  1/2 point increase in deficient MMT scores  3  Painfree active cervical motion  Long Term--By Discharge  1  Patient will achieve expected FOTO score  2  Patient will put away dishes without aggravation of shoulder pain  3  Patient will sleep without disturbance secondary to neck pain  Patient's Goal: Decrease pain  Subjective     History   Date of Onset: Approx one month ago  Description: Patient reports insidious onset of L-sided UT/scapular pain   However, she was placed on Prednisone for an unrelated issue, which did resolve a lot of her neck pain  Currently it primarily bothers her while lying on it at night, head turns while driving, and putting away dishes into her cabinet  She denies any onset of numbness or paresthesia into her upper extremity  Pain at best: 0  Pain at worst: [address] lives with her adult songeneva      Objective       Cervical % of normal   Flex  100    Extn  100   SB Left 75 P! SB Right 100   ROT Left 75 P!   ROT Right 100       Patient reported increased pain in her RUE with MMT testing, so only L side was performed**             MMT        AROM    Shoulder       L        R          L   Flex  4 WNL WNL   Extn  4+ WNL WNL   Abd  4 WNL WNL   IR  4+     ER  4 WNL WNL                MMT   Elbow       L   Flex  4+   Extn  4+              MMT   Wrist       L   Flex  5   Extn  5    bent WNL       Head positioning: forward head, rounded shoulders  Palpation:  (+) TTP L UT/LS/SOR    Cervical joint mobility: hypomobile throughout    Thoracic mobility: hypomobile throughout         Precautions: Stage 4 Liver CA         Manuals 5/18            Thoracic PA seated JAB            L lat cervical glides  JAB            UT/LS STM/stretching JAB            SOR/Distraction JAB            Neuro Re-Ed             scap retract 20x5" HEP            cerv retract 20x5" HEP            Retro UBE             TB rows             TB ext                                       Ther Ex             L UT stretch 10x10" HEP                                                                                                       Ther Activity                                       Gait Training                                       Modalities

## 2022-05-25 ENCOUNTER — OFFICE VISIT (OUTPATIENT)
Dept: PHYSICAL THERAPY | Facility: CLINIC | Age: 77
End: 2022-05-25
Payer: COMMERCIAL

## 2022-05-25 DIAGNOSIS — S46.812A TRAPEZIUS MUSCLE STRAIN, LEFT, INITIAL ENCOUNTER: Primary | ICD-10-CM

## 2022-05-25 DIAGNOSIS — M54.2 NECK PAIN: ICD-10-CM

## 2022-05-25 PROCEDURE — 97112 NEUROMUSCULAR REEDUCATION: CPT

## 2022-05-25 PROCEDURE — 97110 THERAPEUTIC EXERCISES: CPT

## 2022-05-25 NOTE — PROGRESS NOTES
Daily Note     Today's date: 2022  Patient name: Cameron Red  :   MRN: [id number]  Referring provider: Edgar York  Dx:   Encounter Diagnosis     ICD-10-CM    1  Trapezius muscle strain, left, initial encounter  S46 812A    2  Neck pain  M54 2                   Subjective: Pt states that her neck is feeling "okay" today, reporting a little tightness thru the (L) side of the neck  Pt states that she felt a little better following manuals LV  Objective: See treatment diary below      Assessment: Tolerated treatment well  Mod restrictions/tenderness present thru the (L) UT  Pt requires intermittent seated rest breaks while performing tband rows/ext to accommodate for SOB  Discussed POC and educated pt today about appropriate sensations during TE's  Also reminded pt of pursed lip breathing to manage SOB  Patient demonstrated fatigue post treatment, exhibited good technique with therapeutic exercises and would benefit from continued PT to progress postural stability and upper quarter endurance to reduce stresses on the neck and improve pt's tolerance to sitting, standing, and using the UE daily  Plan: Continue per plan of care  Progress treatment as tolerated  Pt to resume PT in x2 weeks secondary to high copay  Precautions: Stage 4 Liver CA         Date            Visit 1 2           FOTO IE xx           Re-Eval IE                Manuals            Thoracic PA seated JAB            L lat cervical glides  JAB AA           UT/LS STM/stretching JAB AA           SOR/Distraction JAB AA                        Neuro Re-Ed            scap retract 20x5" HEP 20x5"           cerv retract 20x5" HEP 20x5"           Retro UBE  nv           TB rows  GTB 20x           TB ext  GTB 20x                                     Ther Ex            L UT stretch 10x10" HEP 5x10"           L LS Stretch             Pulleys  5'           Corner pec (S)  nv Seated T/S Ext  20x3"            Open Books              No Moneys  nv                                     Ther Activity 5/18 5/25                                                               Modalities 5/18 5/25

## 2022-06-02 DIAGNOSIS — K44.9 HIATAL HERNIA: ICD-10-CM

## 2022-06-03 RX ORDER — OMEPRAZOLE 20 MG/1
20 CAPSULE, DELAYED RELEASE ORAL DAILY
Qty: 90 CAPSULE | Refills: 1 | Status: SHIPPED | OUTPATIENT
Start: 2022-06-03

## 2022-06-08 ENCOUNTER — TELEPHONE (OUTPATIENT)
Dept: OBGYN CLINIC | Facility: HOSPITAL | Age: 77
End: 2022-06-08

## 2022-06-08 NOTE — TELEPHONE ENCOUNTER
Patient called in to follow-up on her left shoulder pain she has seen Spencer Kim for  I advised her of Carlton's follow-up recommendations with pain management  Patient was transferred to pain management and provided with phone number

## 2022-06-30 DIAGNOSIS — C79.51 BONE METASTASIS (HCC): ICD-10-CM

## 2022-06-30 DIAGNOSIS — C34.91 NON-SMALL CELL CANCER OF RIGHT LUNG (HCC): ICD-10-CM

## 2022-07-05 ENCOUNTER — OFFICE VISIT (OUTPATIENT)
Dept: FAMILY MEDICINE CLINIC | Facility: CLINIC | Age: 77
End: 2022-07-05

## 2022-07-05 ENCOUNTER — TELEPHONE (OUTPATIENT)
Dept: FAMILY MEDICINE CLINIC | Facility: CLINIC | Age: 77
End: 2022-07-05

## 2022-07-05 VITALS
OXYGEN SATURATION: 97 % | HEIGHT: 61 IN | TEMPERATURE: 97.9 F | WEIGHT: 228.4 LBS | SYSTOLIC BLOOD PRESSURE: 132 MMHG | RESPIRATION RATE: 22 BRPM | BODY MASS INDEX: 43.12 KG/M2 | HEART RATE: 70 BPM | DIASTOLIC BLOOD PRESSURE: 88 MMHG

## 2022-07-05 DIAGNOSIS — I10 ESSENTIAL HYPERTENSION: Primary | ICD-10-CM

## 2022-07-05 DIAGNOSIS — R60.0 LOWER EXTREMITY EDEMA: ICD-10-CM

## 2022-07-05 DIAGNOSIS — Z23 NEED FOR VACCINATION: ICD-10-CM

## 2022-07-05 PROCEDURE — 3075F SYST BP GE 130 - 139MM HG: CPT | Performed by: FAMILY MEDICINE

## 2022-07-05 PROCEDURE — 1036F TOBACCO NON-USER: CPT | Performed by: FAMILY MEDICINE

## 2022-07-05 PROCEDURE — 90471 IMMUNIZATION ADMIN: CPT | Performed by: FAMILY MEDICINE

## 2022-07-05 PROCEDURE — 99213 OFFICE O/P EST LOW 20 MIN: CPT | Performed by: FAMILY MEDICINE

## 2022-07-05 PROCEDURE — 90715 TDAP VACCINE 7 YRS/> IM: CPT | Performed by: FAMILY MEDICINE

## 2022-07-05 PROCEDURE — 3008F BODY MASS INDEX DOCD: CPT | Performed by: FAMILY MEDICINE

## 2022-07-05 PROCEDURE — 1160F RVW MEDS BY RX/DR IN RCRD: CPT | Performed by: FAMILY MEDICINE

## 2022-07-05 PROCEDURE — 3079F DIAST BP 80-89 MM HG: CPT | Performed by: FAMILY MEDICINE

## 2022-07-05 RX ORDER — ATENOLOL 50 MG/1
25 TABLET ORAL DAILY
Qty: 90 TABLET | Refills: 2 | Status: SHIPPED | OUTPATIENT
Start: 2022-07-05 | End: 2022-07-05 | Stop reason: SDUPTHER

## 2022-07-05 RX ORDER — ATENOLOL 25 MG/1
25 TABLET ORAL DAILY
Qty: 90 TABLET | Refills: 1 | Status: SHIPPED | OUTPATIENT
Start: 2022-07-05

## 2022-07-05 RX ORDER — IRBESARTAN 300 MG/1
300 TABLET ORAL
Qty: 90 TABLET | Refills: 0 | Status: SHIPPED | OUTPATIENT
Start: 2022-07-05 | End: 2022-10-07

## 2022-07-05 RX ORDER — FUROSEMIDE 20 MG/1
20 TABLET ORAL DAILY
Qty: 90 TABLET | Refills: 1 | Status: SHIPPED | OUTPATIENT
Start: 2022-07-05

## 2022-07-05 NOTE — ASSESSMENT & PLAN NOTE
BP today is 132/88  Well-controlled    - Continue Atenolol 25 mg po daily, furosemide 20 mg po daily, Avapro (irbesartan) 300 mg po daily   - Medications refilled today  - Educated patient on the importance on a diet low in sodium and carbohydrates

## 2022-07-18 ENCOUNTER — APPOINTMENT (OUTPATIENT)
Dept: LAB | Facility: HOSPITAL | Age: 77
End: 2022-07-18
Attending: INTERNAL MEDICINE
Payer: COMMERCIAL

## 2022-07-18 DIAGNOSIS — C79.51 BONE METASTASES (HCC): ICD-10-CM

## 2022-07-18 DIAGNOSIS — M25.519 ARTHRALGIA OF SHOULDER, UNSPECIFIED LATERALITY: ICD-10-CM

## 2022-07-18 DIAGNOSIS — C34.91 NON-SMALL CELL CARCINOMA OF LUNG, STAGE 4, RIGHT (HCC): ICD-10-CM

## 2022-07-18 PROBLEM — Z23 NEED FOR VACCINATION: Status: ACTIVE | Noted: 2022-07-18

## 2022-07-18 PROBLEM — R60.0 LOWER EXTREMITY EDEMA: Status: RESOLVED | Noted: 2021-05-24 | Resolved: 2022-07-18

## 2022-07-18 LAB
ALBUMIN SERPL BCP-MCNC: 4 G/DL (ref 3.5–5)
ALBUMIN SERPL BCP-MCNC: 4.1 G/DL (ref 3.5–5)
ALP SERPL-CCNC: 104 U/L (ref 43–122)
ALT SERPL W P-5'-P-CCNC: 39 U/L
ANION GAP SERPL CALCULATED.3IONS-SCNC: 7 MMOL/L (ref 5–14)
ANION GAP SERPL CALCULATED.3IONS-SCNC: 9 MMOL/L (ref 5–14)
AST SERPL W P-5'-P-CCNC: 37 U/L (ref 14–36)
BILIRUB SERPL-MCNC: 0.89 MG/DL (ref 0.2–1)
BUN SERPL-MCNC: 30 MG/DL (ref 5–25)
BUN SERPL-MCNC: 31 MG/DL (ref 5–25)
CALCIUM SERPL-MCNC: 9 MG/DL (ref 8.4–10.2)
CALCIUM SERPL-MCNC: 9.2 MG/DL (ref 8.4–10.2)
CHLORIDE SERPL-SCNC: 100 MMOL/L (ref 96–108)
CHLORIDE SERPL-SCNC: 101 MMOL/L (ref 96–108)
CO2 SERPL-SCNC: 33 MMOL/L (ref 21–32)
CO2 SERPL-SCNC: 34 MMOL/L (ref 21–32)
CREAT SERPL-MCNC: 1.09 MG/DL (ref 0.6–1.2)
CREAT SERPL-MCNC: 1.15 MG/DL (ref 0.6–1.2)
GFR SERPL CREATININE-BSD FRML MDRD: 46 ML/MIN/1.73SQ M
GFR SERPL CREATININE-BSD FRML MDRD: 49 ML/MIN/1.73SQ M
GLUCOSE SERPL-MCNC: 154 MG/DL (ref 70–99)
GLUCOSE SERPL-MCNC: 155 MG/DL (ref 70–99)
PHOSPHATE SERPL-MCNC: 3.4 MG/DL (ref 2.5–4.8)
POTASSIUM SERPL-SCNC: 4 MMOL/L (ref 3.5–5.3)
POTASSIUM SERPL-SCNC: 4.2 MMOL/L (ref 3.5–5.3)
PROT SERPL-MCNC: 7.2 G/DL (ref 6.4–8.4)
SODIUM SERPL-SCNC: 142 MMOL/L (ref 135–147)
SODIUM SERPL-SCNC: 142 MMOL/L (ref 135–147)

## 2022-07-18 PROCEDURE — 80069 RENAL FUNCTION PANEL: CPT

## 2022-07-18 PROCEDURE — 36415 COLL VENOUS BLD VENIPUNCTURE: CPT

## 2022-07-18 PROCEDURE — 80053 COMPREHEN METABOLIC PANEL: CPT

## 2022-07-18 NOTE — PROGRESS NOTES
Assessment/Plan:    Essential hypertension  BP today is 132/88  Well-controlled  - Continue Atenolol 25 mg po daily, furosemide 20 mg po daily, Avapro (irbesartan) 300 mg po daily   - Medications refilled today  - Educated patient on the importance on a diet low in sodium and carbohydrates      Need for vaccination  Tdap Overdue    TDAP vaccine administered today       Diagnoses and all orders for this visit:    Essential hypertension  -     Discontinue: atenolol (TENORMIN) 50 mg tablet; Take 0 5 tablets (25 mg total) by mouth daily  -     irbesartan (AVAPRO) 300 mg tablet; Take 1 tablet (300 mg total) by mouth daily at bedtime  -     atenolol (TENORMIN) 25 mg tablet; Take 1 tablet (25 mg total) by mouth daily    Need for vaccination  -     TDAP VACCINE GREATER THAN OR EQUAL TO 8YO IM    Lower extremity edema  -     furosemide (LASIX) 20 mg tablet; Take 1 tablet (20 mg total) by mouth daily          Subjective:      Patient ID: Martin Dunbar is a 78 y o  female  77 yo f patient w/ a PMI of Small cell lung carcinoma, essential hypertension and obesity presents to clinic for blood pressure monitoring and medication refill  She states that she is currently on an atenolol pill which she has to brake in half to count as a 25 mg tablet dose  Decision is made to prescribe Atenolol 25 mg tablet po once daily  Patient reports that she feels her legs swollen  Patient is overdue for her TDAP vaccine and patient agreed to have this vaccine done today  The following portions of the patient's history were reviewed and updated as appropriate: allergies, current medications, past family history, past medical history, past social history, past surgical history and problem list     Review of Systems   Constitutional: Negative for chills and fever  HENT: Negative for ear pain and sore throat  Eyes: Negative for pain and visual disturbance  Respiratory: Negative for cough and shortness of breath      Cardiovascular: Negative for chest pain and palpitations  Gastrointestinal: Negative for abdominal pain and vomiting  Genitourinary: Negative for dysuria and hematuria  Musculoskeletal: Positive for arthralgias and myalgias  Negative for back pain  Skin: Negative for color change and rash  Neurological: Negative for seizures and syncope  All other systems reviewed and are negative  Objective:      /88 (BP Location: Left arm, Patient Position: Sitting, Cuff Size: Adult)   Pulse 70   Temp 97 9 °F (36 6 °C) (Temporal)   Resp 22   Ht 5' 1" (1 549 m)   Wt 104 kg (228 lb 6 4 oz)   SpO2 97%   BMI 43 16 kg/m²          Physical Exam  Vitals reviewed  Constitutional:       Appearance: She is obese  HENT:      Head: Normocephalic  Eyes:      Conjunctiva/sclera: Conjunctivae normal    Cardiovascular:      Rate and Rhythm: Normal rate and regular rhythm  Pulses: Normal pulses  Heart sounds: Normal heart sounds  No murmur heard  Pulmonary:      Effort: Pulmonary effort is normal       Breath sounds: Normal breath sounds  Musculoskeletal:      Right lower le+ Pitting Edema present  Left lower le+ Pitting Edema present  Skin:     General: Skin is warm and dry  Neurological:      Mental Status: She is alert

## 2022-07-28 ENCOUNTER — APPOINTMENT (EMERGENCY)
Dept: CT IMAGING | Facility: HOSPITAL | Age: 77
End: 2022-07-28
Payer: COMMERCIAL

## 2022-07-28 ENCOUNTER — HOSPITAL ENCOUNTER (EMERGENCY)
Facility: HOSPITAL | Age: 77
Discharge: HOME | End: 2022-07-28
Attending: EMERGENCY MEDICINE
Payer: COMMERCIAL

## 2022-07-28 ENCOUNTER — APPOINTMENT (EMERGENCY)
Dept: RADIOLOGY | Facility: HOSPITAL | Age: 77
End: 2022-07-28
Payer: COMMERCIAL

## 2022-07-28 VITALS
BODY MASS INDEX: 43.32 KG/M2 | DIASTOLIC BLOOD PRESSURE: 77 MMHG | RESPIRATION RATE: 18 BRPM | TEMPERATURE: 97.7 F | WEIGHT: 229.28 LBS | SYSTOLIC BLOOD PRESSURE: 171 MMHG | HEART RATE: 63 BPM | OXYGEN SATURATION: 94 %

## 2022-07-28 DIAGNOSIS — S83.91XA RIGHT KNEE SPRAIN: Primary | ICD-10-CM

## 2022-07-28 DIAGNOSIS — M25.561 RIGHT KNEE PAIN: ICD-10-CM

## 2022-07-28 PROCEDURE — 99284 EMERGENCY DEPT VISIT MOD MDM: CPT

## 2022-07-28 PROCEDURE — G1004 CDSM NDSC: HCPCS

## 2022-07-28 PROCEDURE — 99284 EMERGENCY DEPT VISIT MOD MDM: CPT | Performed by: EMERGENCY MEDICINE

## 2022-07-28 PROCEDURE — 73564 X-RAY EXAM KNEE 4 OR MORE: CPT

## 2022-07-28 PROCEDURE — 73700 CT LOWER EXTREMITY W/O DYE: CPT

## 2022-07-28 RX ORDER — ACETAMINOPHEN 325 MG/1
650 TABLET ORAL ONCE
Status: COMPLETED | OUTPATIENT
Start: 2022-07-28 | End: 2022-07-28

## 2022-07-28 RX ADMIN — ACETAMINOPHEN 650 MG: 325 TABLET, FILM COATED ORAL at 17:54

## 2022-07-28 NOTE — ED PROVIDER NOTES
History  Chief Complaint   Patient presents with    Knee Injury     Pt tripped over flip flop this morning and fell onto right knee  Pt reports right knee pain/pressure with ambulation  Abrasion present to right knee  78 y o  F p/w right knee pain x today  Tripped over flip flop this morning and fell onto right knee  Has been ambulatory but with pain  Had tetanus booster this month  History provided by:  Patient   used: No    Knee Pain  Location:  Knee  Injury: yes    Mechanism of injury: fall    Fall:     Fall occurred:  Standing  Knee location:  R knee  Chronicity:  New  Relieved by:  Nothing  Worsened by: Activity  Associated symptoms: swelling        Prior to Admission Medications   Prescriptions Last Dose Informant Patient Reported? Taking? Alectinib HCl 150 MG CAPS   No No   Sig: Take 4 capsules (600 mg total) by mouth 2 (two) times a day   Fluticasone-Salmeterol (Advair) 500-50 mcg/dose inhaler   No No   Sig: Inhale 1 puff 2 (two) times a day Rinse mouth after use     acetaminophen (TYLENOL) 650 mg CR tablet  Self No No   Sig: Take 1 tablet (650 mg total) by mouth every 8 (eight) hours as needed for mild pain   albuterol (2 5 mg/3 mL) 0 083 % nebulizer solution  Self No No   Sig: Take 3 mL (2 5 mg total) by nebulization every 6 (six) hours as needed for wheezing   albuterol (Ventolin HFA) 90 mcg/act inhaler  Self No No   Sig: Inhale 2 puffs every 6 (six) hours as needed for wheezing   atenolol (TENORMIN) 25 mg tablet   No No   Sig: Take 1 tablet (25 mg total) by mouth daily   dextromethorphan-guaifenesin (MUCINEX DM)  MG per 12 hr tablet  Self No No   Sig: Take 1 tablet by mouth every 12 (twelve) hours   ferrous sulfate 324 (65 Fe) mg  Self No No   Sig: Take 1 tablet (324 mg total) by mouth every other day   fexofenadine (ALLEGRA) 180 MG tablet  Self Yes No   Sig: Take 180 mg by mouth daily   fluticasone (FLONASE) 50 mcg/act nasal spray  Self No No   Si sprays into each nostril daily   furosemide (LASIX) 20 mg tablet   No No   Sig: Take 1 tablet (20 mg total) by mouth daily   irbesartan (AVAPRO) 300 mg tablet   No No   Sig: Take 1 tablet (300 mg total) by mouth daily at bedtime   lidocaine (XYLOCAINE) 5 % ointment  Self No No   Sig: Apply topically as needed for mild pain   omeprazole (PriLOSEC) 20 mg delayed release capsule   No No   Sig: Take 1 capsule (20 mg total) by mouth daily   oxyCODONE-acetaminophen (PERCOCET) 5-325 mg per tablet  Self No No   Sig: Take 1 tablet by mouth every 4 (four) hours as needed for moderate pain For ongoing pain Max Daily Amount: 6 tablets   predniSONE 20 mg tablet   No No   Sig: Take 2 tablets (40 mg total) by mouth daily   promethazine-codeine (PHENERGAN WITH CODEINE) 6 25-10 mg/5 mL syrup  Self No No   Sig: Take 5 mL by mouth every 4 (four) hours as needed for cough   tiotropium (Spiriva Respimat) 1 25 MCG/ACT AERS inhaler   No No   Sig: Inhale 2 puffs daily      Facility-Administered Medications: None       Past Medical History:   Diagnosis Date    Asthma     GERD (gastroesophageal reflux disease)     Hypertension     Lumbar disc disease     Lung cancer ([facility] [address] )     Sleep apnea     suspected     Ventricular arrhythmia        Past Surgical History:   Procedure Laterality Date    APPENDECTOMY      COLONOSCOPY N/A 3/18/2019    Procedure: COLONOSCOPY;  Surgeon: Amaya Tobin DO;  Location: [facility]; Service: Gastroenterology    ESOPHAGOGASTRODUODENOSCOPY N/A 3/18/2019    Procedure: ESOPHAGOGASTRODUODENOSCOPY (EGD); Surgeon: Amaya Tobin DO;  Location: [facility];   Service: Gastroenterology    KNEE SURGERY      ROTATOR CUFF REPAIR      SHOULDER SURGERY         Family History   Problem Relation Age of Onset    Stroke Mother     Diabetes Mother     Hyperlipidemia Mother     Hypertension Mother     Diabetes Father     Hyperlipidemia Father     Hypertension Father     Lung cancer Father 79    Lung cancer Sister 71     I have reviewed and agree with the history as documented  E-Cigarette/Vaping    E-Cigarette Use Never User      E-Cigarette/Vaping Substances    Nicotine No     THC No     CBD No     Flavoring No     Other No     Unknown No      Social History     Tobacco Use    Smoking status: Former Smoker     Packs/day: 0 25     Years: 25 00     Pack years: 6 25     Types: Cigarettes     Start date:      Quit date:      Years since quittin 5    Smokeless tobacco: Former User   Vaping Use    Vaping Use: Never used   Substance Use Topics    Alcohol use: Yes     Comment: occ    Drug use: No       Review of Systems   Musculoskeletal:        Right knee pain     Skin: Positive for wound (Abrasion to right knee)  All other systems reviewed and are negative  Physical Exam  Physical Exam  Vitals and nursing note reviewed  Constitutional:       General: She is not in acute distress  Appearance: She is well-developed  She is not ill-appearing, toxic-appearing or diaphoretic  HENT:      Head: Normocephalic and atraumatic  Right Ear: External ear normal       Left Ear: External ear normal       Nose: Nose normal    Eyes:      General: No scleral icterus  Conjunctiva/sclera:      Right eye: Right conjunctiva is not injected  Left eye: Left conjunctiva is not injected  Neck:      Vascular: No JVD  Trachea: Phonation normal    Cardiovascular:      Pulses: Normal pulses  Pulmonary:      Effort: Pulmonary effort is normal       Breath sounds: No stridor  Musculoskeletal:         General: No deformity  Normal range of motion  Right knee: No lacerations  Normal range of motion  Tenderness present  Normal pulse  Comments: No gross deformity  No signs of septic joint  Skin:     General: Skin is warm and dry  Findings: Abrasion (Anterior knee) and bruising (Anterior knee) present  No ecchymosis, erythema, laceration or rash        Comments: No signs of septic joint  No crepitus  No discoloration  No excessive warmth  Neurological:      Mental Status: She is alert  GCS: GCS eye subscore is 4  GCS verbal subscore is 5  GCS motor subscore is 6  Sensory: No sensory deficit  Motor: Motor function is intact  Psychiatric:         Behavior: Behavior normal          Vital Signs  ED Triage Vitals [07/28/22 1644]   Temperature Pulse Respirations Blood Pressure SpO2   97 5 °F (36 4 °C) 70 18 139/65 94 %      Temp Source Heart Rate Source Patient Position - Orthostatic VS BP Location FiO2 (%)   Oral Monitor Sitting Right arm --      Pain Score       4           Vitals:    07/28/22 1644 07/28/22 1700   BP: 139/65 (!) 171/77   Pulse: 70 63   Patient Position - Orthostatic VS: Sitting Sitting         Visual Acuity      ED Medications  Medications   acetaminophen (TYLENOL) tablet 650 mg (650 mg Oral Given 7/28/22 1754)       Diagnostic Studies  Results Reviewed     None                 CT lower extremity wo contrast right   Final Result by Slade Leblanc MD (07/28 1929)      Anterior subcutaneous hematoma  No fracture identified  Workstation performed: [id number]         XR knee 4+ vw right injury   ED Interpretation by Eric Alcantar DO (07/28 1942)   No fracture      Final Result by Slade Leblanc MD (07/28 1930)   No acute osseous abnormality  Workstation performed: [id number]                    Procedures  Procedures         ED Course  ED Course as of 07/28/22 2024   Thu Jul 28, 2022 1802 Unremarkable xray knee  Will obtain CT knee giving swelling and bruising                                               MDM    Disposition  Final diagnoses:   Right knee sprain   Right knee pain     Time reflects when diagnosis was documented in both MDM as applicable and the Disposition within this note     Time User Action Codes Description Comment    7/28/2022  7:40 PM Guanako Kaba Right knee sprain     7/28/2022  7:41 PM Avery Venus ALBRECHT Add [M25 561] Right knee pain       ED Disposition     ED Disposition   Discharge    Condition   Stable    Date/Time   Thu Jul 28, 2022  7:41 PM    Comment   Liane Campa discharge to home/self care                 Follow-up Information     Follow up With Specialties Details Why Contact Info    Your orthopedist  Schedule an appointment as soon as possible for a visit             Discharge Medication List as of 7/28/2022  7:41 PM      CONTINUE these medications which have NOT CHANGED    Details   acetaminophen (TYLENOL) 650 mg CR tablet Take 1 tablet (650 mg total) by mouth every 8 (eight) hours as needed for mild pain, Starting Mon 4/18/2022, Normal      albuterol (2 5 mg/3 mL) 0 083 % nebulizer solution Take 3 mL (2 5 mg total) by nebulization every 6 (six) hours as needed for wheezing, Starting Mon 11/29/2021, Normal      albuterol (Ventolin HFA) 90 mcg/act inhaler Inhale 2 puffs every 6 (six) hours as needed for wheezing, Starting Fri 3/18/2022, Normal      Alectinib HCl 150 MG CAPS Take 4 capsules (600 mg total) by mouth 2 (two) times a day, Starting Thu 6/30/2022, Normal      atenolol (TENORMIN) 25 mg tablet Take 1 tablet (25 mg total) by mouth daily, Starting Tue 7/5/2022, Normal      dextromethorphan-guaifenesin (MUCINEX DM)  MG per 12 hr tablet Take 1 tablet by mouth every 12 (twelve) hours, Starting Mon 4/18/2022, Normal      ferrous sulfate 324 (65 Fe) mg Take 1 tablet (324 mg total) by mouth every other day, Starting Mon 4/18/2022, Normal      fexofenadine (ALLEGRA) 180 MG tablet Take 180 mg by mouth daily, Historical Med      fluticasone (FLONASE) 50 mcg/act nasal spray 2 sprays into each nostril daily, Starting Wed 12/22/2021, Normal      Fluticasone-Salmeterol (Advair) 500-50 mcg/dose inhaler Inhale 1 puff 2 (two) times a day Rinse mouth after use , Starting Wed 5/4/2022, Normal      furosemide (LASIX) 20 mg tablet Take 1 tablet (20 mg total) by mouth daily, Starting Tue 7/5/2022, Normal      irbesartan (AVAPRO) 300 mg tablet Take 1 tablet (300 mg total) by mouth daily at bedtime, Starting Tue 7/5/2022, Normal      lidocaine (XYLOCAINE) 5 % ointment Apply topically as needed for mild pain, Starting Fri 8/27/2021, Normal      omeprazole (PriLOSEC) 20 mg delayed release capsule Take 1 capsule (20 mg total) by mouth daily, Starting Fri 6/3/2022, Normal      oxyCODONE-acetaminophen (PERCOCET) 5-325 mg per tablet Take 1 tablet by mouth every 4 (four) hours as needed for moderate pain For ongoing pain Max Daily Amount: 6 tablets, Starting Thu 3/24/2022, Normal      predniSONE 20 mg tablet Take 2 tablets (40 mg total) by mouth daily, Starting Wed 5/4/2022, Normal      promethazine-codeine (PHENERGAN WITH CODEINE) 6 25-10 mg/5 mL syrup Take 5 mL by mouth every 4 (four) hours as needed for cough, Starting Thu 7/29/2021, Normal      tiotropium (Spiriva Respimat) 1 25 MCG/ACT AERS inhaler Inhale 2 puffs daily, Starting Wed 5/4/2022, Normal             No discharge procedures on file      PDMP Review       Value Time User    PDMP Reviewed  Yes 3/24/2022  4:46 PM [facility]  Electronically Signed by           Norman Stoll, DO  07/28/22 2024

## 2022-07-29 ENCOUNTER — TELEPHONE (OUTPATIENT)
Dept: OBGYN CLINIC | Facility: CLINIC | Age: 77
End: 2022-07-29

## 2022-08-02 ENCOUNTER — HOSPITAL ENCOUNTER (OUTPATIENT)
Dept: INFUSION CENTER | Facility: HOSPITAL | Age: 77
Discharge: HOME | End: 2022-08-02
Attending: INTERNAL MEDICINE
Payer: COMMERCIAL

## 2022-08-02 VITALS
RESPIRATION RATE: 18 BRPM | TEMPERATURE: 97 F | SYSTOLIC BLOOD PRESSURE: 140 MMHG | WEIGHT: 229.28 LBS | HEART RATE: 71 BPM | BODY MASS INDEX: 43.32 KG/M2 | DIASTOLIC BLOOD PRESSURE: 63 MMHG

## 2022-08-02 DIAGNOSIS — C79.51 BONE METASTASES (HCC): Primary | ICD-10-CM

## 2022-08-02 DIAGNOSIS — C34.91 NON-SMALL CELL CARCINOMA OF LUNG, STAGE 4, RIGHT (HCC): ICD-10-CM

## 2022-08-02 PROCEDURE — 96365 THER/PROPH/DIAG IV INF INIT: CPT

## 2022-08-02 RX ORDER — SODIUM CHLORIDE 9 MG/ML
20 INJECTION, SOLUTION INTRAVENOUS ONCE
Status: CANCELLED | OUTPATIENT
Start: 2022-10-25

## 2022-08-02 RX ORDER — SODIUM CHLORIDE 9 MG/ML
20 INJECTION, SOLUTION INTRAVENOUS ONCE
Status: COMPLETED | OUTPATIENT
Start: 2022-08-02 | End: 2022-08-02

## 2022-08-02 RX ADMIN — ZOLEDRONIC ACID 3.3 MG: 4 INJECTION, SOLUTION, CONCENTRATE INTRAVENOUS at 13:36

## 2022-08-02 RX ADMIN — SODIUM CHLORIDE 20 ML/HR: 9 INJECTION, SOLUTION INTRAVENOUS at 13:35

## 2022-08-02 NOTE — PROGRESS NOTES
Pt tolerated treatment today with no adverse reactions  Next apt scheduled and avs provided  Left unit ambulatory with a steady gait using spc

## 2022-08-09 NOTE — PROGRESS NOTES
Pulmonary Follow Up Note   David Sierra 78 y o  female MRN: [id number]  8/10/2022      Assessment and Plan:     Moderate persistent Allergic Asthma   - Peak flow  250ml (which is baseline)    - mucinex PRN   - takes allegra   - Allergy testing 11/2020: +Cat dander, cockroaches, dust mites, dog dander, Total IgE 109, marginally elevated Aspergillus Fumigatus  - mazimized on Advair 500-50mcg/ BID, spiriva 1 25 once daily       - PFTs 4/2021: FEV1/FVC 71%, FVC 61%- may suggest restriction however, lung volumes not ordered    -needed prednisone therapy 40mg po daily x 5 days 5/2022   - has multiple allergies mainly cat and dog dander, will refer to allergy for customized allergy shots   - she still has her cat that she won't be able to get rid of     Severe MONO   - sleep study in 2019 showed severe MONO   - home sleep study 5/2022 with SERJIO 85 with significant hypoxia to 70%- since she's refusing in-lab studies and titration, she was recommended trial aCPAP with 2lpm, however, she is adamantly refusing CPAP due to cost and other issues       Metastatic Non-small cell carcinoma of Right lung (8/2020)   - ALK rearrangement   - s/p palliative radiation therapy to T8 x 10 treatments  - on Alectinib as per hem/onc - doing well so far   - had a PET scan 1/2021 shows response to therapy   - CT chest/ abd/pelvis ordered by hem/onc 5/2021 showed resolution of RUL nodule, R hilum not well visualized, however, was similar to PET scan, osseous metastasis ( ribs, spine, and pelvis), GGO   - CTA 7/2021 showed persistent multifocal patchy GGO slightly increased     - CT chest/abdomen/pelvis 4/2022: tree-in-bed opacities in RLL and some in lingula likely secondary to allergic asthma- will monitor for now and consider repeat scan in the winter 2022     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   - 2D echo 12/2021 with EF 63%, RV mildly dilated, grade 1 DD, mild MR, PASP 37mmHg   - on lasix     Postnasal drip - improved   - currently using Flonase   - won't use Ipratropium due to early signs of glaucoma   - can try saline rinse at night       Essential hypertension  - well controlled       Morbid obesity  - counseled   - finished getting PT for her knees     1  Mild persistent extrinsic asthma with acute exacerbation  -     Fluticasone-Salmeterol (Advair) 500-50 mcg/dose inhaler; Inhale 1 puff 2 (two) times a day Rinse mouth after use  -     tiotropium (Spiriva Respimat) 1 25 MCG/ACT AERS inhaler; Inhale 2 puffs daily  -     Ambulatory Referral to Allergy; Future      Return in about 4 months (around 12/10/2022)  History of Present Illness   HPI:  Jerry Ohara is a 78 y o  female 70 y o  female who has a PMHx of MONO, obesity, GERD, 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 and has received palliative radiation to the spine and now on oral chemo  She has had seasonal asthma and has had a flare up with the leaves in the fall  Her asthma is very well controlled overall, however, did have an ED visit 7/2021 for which she received a CTA that showed multifocal GGO and was treated with doxy and prednisone  BNP was 277  She had a repeat CXR 7/30 that showed resolution of opacities  Follow Up: She had fallen and sustained a R knee injury for which she believes is infected now  She continues to refuse CPAP and doesn't want it due to the cost  She has SOB when she goes outside and thus avoids the heat  She is using her inhalers  Review of Systems   Constitutional: Negative for appetite change and fever  HENT: Negative for ear pain, rhinorrhea, sneezing, sore throat and trouble swallowing  Cardiovascular: Negative for chest pain  Neurological: Negative for headaches  All other systems reviewed and are negative        Historical Information   Past Medical History:   Diagnosis Date    Asthma     GERD (gastroesophageal reflux disease)     Hypertension     Lumbar disc disease     Lung cancer ([address] )     Sleep apnea suspected     Ventricular arrhythmia      Past Surgical History:   Procedure Laterality Date    APPENDECTOMY      COLONOSCOPY N/A 3/18/2019    Procedure: COLONOSCOPY;  Surgeon: Lora Pacheco DO;  [facility]; Service: Gastroenterology    ESOPHAGOGASTRODUODENOSCOPY N/A 3/18/2019    Procedure: ESOPHAGOGASTRODUODENOSCOPY (EGD); Surgeon: Loar Pacheco DO;  Location: [facility];   Service: Gastroenterology    KNEE SURGERY      ROTATOR CUFF REPAIR      SHOULDER SURGERY       Family History   Problem Relation Age of Onset    Stroke Mother     Diabetes Mother     Hyperlipidemia Mother     Hypertension Mother     Diabetes Father     Hyperlipidemia Father     Hypertension Father     Lung cancer Father 79    Lung cancer Sister 71     Pets: still has a cat     Meds/Allergies     Current Outpatient Medications:     albuterol (2 5 mg/3 mL) 0 083 % nebulizer solution, Take 3 mL (2 5 mg total) by nebulization every 6 (six) hours as needed for wheezing, Disp: 360 mL, Rfl: 5    albuterol (Ventolin HFA) 90 mcg/act inhaler, Inhale 2 puffs every 6 (six) hours as needed for wheezing, Disp: 18 g, Rfl: 5    Alectinib HCl 150 MG CAPS, Take 4 capsules (600 mg total) by mouth 2 (two) times a day, Disp: 240 capsule, Rfl: 5    atenolol (TENORMIN) 25 mg tablet, Take 1 tablet (25 mg total) by mouth daily, Disp: 90 tablet, Rfl: 1    ferrous sulfate 324 (65 Fe) mg, Take 1 tablet (324 mg total) by mouth every other day, Disp: 90 tablet, Rfl: 1    fexofenadine (ALLEGRA) 180 MG tablet, Take 180 mg by mouth daily, Disp: , Rfl:     fluticasone (FLONASE) 50 mcg/act nasal spray, 2 sprays into each nostril daily, Disp: 48 mL, Rfl: 1    Fluticasone-Salmeterol (Advair) 500-50 mcg/dose inhaler, Inhale 1 puff 2 (two) times a day Rinse mouth after use , Disp: 60 blister, Rfl: 3    furosemide (LASIX) 20 mg tablet, Take 1 tablet (20 mg total) by mouth daily, Disp: 90 tablet, Rfl: 1    irbesartan (AVAPRO) 300 mg tablet, Take 1 tablet (300 mg total) by mouth daily at bedtime, Disp: 90 tablet, Rfl: 0    omeprazole (PriLOSEC) 20 mg delayed release capsule, Take 1 capsule (20 mg total) by mouth daily, Disp: 90 capsule, Rfl: 1    oxyCODONE-acetaminophen (PERCOCET) 5-325 mg per tablet, Take 1 tablet by mouth every 4 (four) hours as needed for moderate pain For ongoing pain Max Daily Amount: 6 tablets, Disp: 60 tablet, Rfl: 0    tiotropium (Spiriva Respimat) 1 25 MCG/ACT AERS inhaler, Inhale 2 puffs daily, Disp: 4 g, Rfl: 3    acetaminophen (TYLENOL) 650 mg CR tablet, Take 1 tablet (650 mg total) by mouth every 8 (eight) hours as needed for mild pain, Disp: 90 tablet, Rfl: 2    lidocaine (XYLOCAINE) 5 % ointment, Apply topically as needed for mild pain, Disp: 35 44 g, Rfl: 2  No Known Allergies    Vitals: Blood pressure 128/62, pulse 80, temperature 97 7 °F (36 5 °C), temperature source Tympanic, resp  rate 18, height 5' 1" (1 549 m), weight 104 kg (229 lb), SpO2 94 %, not currently breastfeeding  Body mass index is 43 27 kg/m²  Oxygen Therapy  SpO2: 94 %  Oxygen Therapy: None (Room air)      Physical Exam  Physical Exam  Vitals reviewed  Constitutional:       Appearance: Normal appearance  She is obese  HENT:      Head: Normocephalic  Nose: Nose normal       Mouth/Throat:      Mouth: Mucous membranes are moist    Eyes:      Pupils: Pupils are equal, round, and reactive to light  Cardiovascular:      Rate and Rhythm: Normal rate and regular rhythm  Pulses: Normal pulses  Heart sounds: Normal heart sounds  Pulmonary:      Effort: Pulmonary effort is normal       Breath sounds: Normal breath sounds  Abdominal:      General: Abdomen is flat  Palpations: Abdomen is soft  Musculoskeletal:         General: Swelling present  Cervical back: Normal range of motion  Right lower leg: Edema present  Left lower leg: Edema present        Comments: R knee warm, ecchymosis with red streak to lower leg    Skin:     General: Skin is warm and dry  Neurological:      General: No focal deficit present  Mental Status: She is alert and oriented to person, place, and time  Labs: I have personally reviewed pertinent lab results  Lab Results   Component Value Date    WBC 6 76 07/10/2021    HGB 10 6 (L) 07/10/2021    HCT 31 6 (L) 07/10/2021    MCV 92 07/10/2021    PLT 99 (L) 07/10/2021     Lab Results   Component Value Date    CALCIUM 9 2 2022    CALCIUM 9 0 2022    K 4 0 2022    K 4 2 2022    CO2 33 (H) 2022    CO2 34 (H) 2022     2022     2022    BUN 30 (H) 2022    BUN 31 (H) 2022    CREATININE 1 15 2022    CREATININE 1 09 2022     Lab Results   Component Value Date     2020     Lab Results   Component Value Date    ALT 39 (H) 2022    AST 37 (H) 2022    ALKPHOS 104 2022       Imaging and other studies: I have personally reviewed pertinent reports  and I have personally reviewed pertinent films in PACS    Pulmonary function testin2021  FEV1/FVC Ratio: 71 %  Forced Vital Capacity: 1 82 L    61 % predicted  FEV1: 1 29 L     56 % predicted     After bronchodilator:  FEV1/FVC Ratio: 72 %  Forced Vital Capacity: 1 88 L    63 % predicted  FEV1: 1 36 L     59 % predicted     DLCO corrected for patients hemoglobin level: 88 %    EKG, Pathology, and Other Studies: I have personally reviewed pertinent reports     and I have personally reviewed pertinent films in PACS      Ole Red MD   Pulmonary and Critical Care Fellow  [facility]

## 2022-08-10 ENCOUNTER — OFFICE VISIT (OUTPATIENT)
Dept: URGENT CARE | Age: 77
End: 2022-08-10
Payer: COMMERCIAL

## 2022-08-10 ENCOUNTER — OFFICE VISIT (OUTPATIENT)
Dept: PULMONOLOGY | Facility: CLINIC | Age: 77
End: 2022-08-10
Payer: COMMERCIAL

## 2022-08-10 ENCOUNTER — APPOINTMENT (OUTPATIENT)
Dept: RADIOLOGY | Age: 77
End: 2022-08-10
Payer: COMMERCIAL

## 2022-08-10 VITALS
TEMPERATURE: 97.7 F | SYSTOLIC BLOOD PRESSURE: 128 MMHG | HEIGHT: 61 IN | OXYGEN SATURATION: 94 % | HEART RATE: 80 BPM | DIASTOLIC BLOOD PRESSURE: 62 MMHG | WEIGHT: 229 LBS | BODY MASS INDEX: 43.23 KG/M2 | RESPIRATION RATE: 18 BRPM

## 2022-08-10 VITALS
WEIGHT: 236.2 LBS | RESPIRATION RATE: 18 BRPM | BODY MASS INDEX: 44.6 KG/M2 | SYSTOLIC BLOOD PRESSURE: 122 MMHG | TEMPERATURE: 97.7 F | OXYGEN SATURATION: 99 % | HEIGHT: 61 IN | HEART RATE: 62 BPM | DIASTOLIC BLOOD PRESSURE: 78 MMHG

## 2022-08-10 DIAGNOSIS — J45.31 MILD PERSISTENT EXTRINSIC ASTHMA WITH ACUTE EXACERBATION: ICD-10-CM

## 2022-08-10 DIAGNOSIS — M25.561 ACUTE PAIN OF RIGHT KNEE: Primary | ICD-10-CM

## 2022-08-10 DIAGNOSIS — M25.561 ACUTE PAIN OF RIGHT KNEE: ICD-10-CM

## 2022-08-10 DIAGNOSIS — L08.9 SKIN INFECTION OF RIGHT KNEE: ICD-10-CM

## 2022-08-10 PROCEDURE — 1160F RVW MEDS BY RX/DR IN RCRD: CPT | Performed by: INTERNAL MEDICINE

## 2022-08-10 PROCEDURE — 99213 OFFICE O/P EST LOW 20 MIN: CPT | Performed by: NURSE PRACTITIONER

## 2022-08-10 PROCEDURE — 73564 X-RAY EXAM KNEE 4 OR MORE: CPT

## 2022-08-10 PROCEDURE — 99214 OFFICE O/P EST MOD 30 MIN: CPT | Performed by: INTERNAL MEDICINE

## 2022-08-10 RX ORDER — TIOTROPIUM BROMIDE INHALATION SPRAY 1.56 UG/1
2 SPRAY, METERED RESPIRATORY (INHALATION) DAILY
Qty: 4 G | Refills: 3 | Status: SHIPPED | OUTPATIENT
Start: 2022-08-10

## 2022-08-10 RX ORDER — FLUTICASONE PROPIONATE AND SALMETEROL 500; 50 UG/1; UG/1
1 POWDER RESPIRATORY (INHALATION) 2 TIMES DAILY
Qty: 60 BLISTER | Refills: 3 | Status: SHIPPED | OUTPATIENT
Start: 2022-08-10 | End: 2022-08-29 | Stop reason: SDUPTHER

## 2022-08-10 RX ORDER — CEPHALEXIN 500 MG/1
500 CAPSULE ORAL EVERY 8 HOURS SCHEDULED
Qty: 21 CAPSULE | Refills: 0 | Status: SHIPPED | OUTPATIENT
Start: 2022-08-10 | End: 2022-08-17

## 2022-08-10 NOTE — PATIENT INSTRUCTIONS
Take meds as directed  Follow up with your ortho dr  Repeat xray done today, no worsening issues       Tylenol and motrin for pain   Continue to use cane with ambulating/walking assistance    If worse go to the ER

## 2022-08-10 NOTE — PROGRESS NOTES
NAME: Lidia Crandall is a 78 y o  female  : 1945    MRN: [id number]    /78   Pulse 62   Temp 97 7 °F (36 5 °C)   Resp 18   Ht 5' 1" (1 549 m)   Wt 107 kg (236 lb 3 2 oz)   SpO2 99%   BMI 44 63 kg/m²     Assessment and Plan   Acute pain of right knee [M25 561]  1  Acute pain of right knee  XR knee 4+ vw right injury    cephalexin (KEFLEX) 500 mg capsule   2  Skin infection of right knee  cephalexin (KEFLEX) 500 mg capsule       Aletha Polk was seen today for knee pain  Diagnoses and all orders for this visit:    Acute pain of right knee  -     XR knee 4+ vw right injury; Future  -     cephalexin (KEFLEX) 500 mg capsule; Take 1 capsule (500 mg total) by mouth every 8 (eight) hours for 7 days    Skin infection of right knee  -     cephalexin (KEFLEX) 500 mg capsule; Take 1 capsule (500 mg total) by mouth every 8 (eight) hours for 7 days      No fx noted    Patient Instructions   Patient Instructions   Take meds as directed  Follow up with your ortho dr  Repeat xray done today, no worsening issues  Tylenol and motrin for pain   Continue to use cane with ambulating/walking assistance    If worse go to the ER      Proceed to the nearest ER if symptoms worsen, Follow up with your PCP  Continue to social distance, wash your hands, and wear your masks  Please continue to follow the CDC  gov guidelines daily for they are subject to change on COVID-19    Chief Complaint     Chief Complaint   Patient presents with    Knee Pain     Pt had fall on , went to ED, CXR showed sprain  Pt went to Ortho, refused cortisone injection  Pt states within past three days pt had had redness, warmth and throbbing  Taking Ibuprofen and applying ice  History of Present Illness     This is a 70-year-old female who presents today with redness and swelling to her right knee and lower leg  She states she was in her kitchen and fell on  she fell onto her right side    She had right-sided chest pain and right knee pain  She was seen in the emergency room she had an x-ray and a CT scan  She was given a knee immobilizer and crutches and told to follow-up with ortho  She states she did not wear the knee immobilizer he gets with pinching her skin  She did follow-up last week with Ortho  They repeated an additional x-ray of the right knee which was negative  She was offered a cortisone shot to the right knee which she declined peer she is here today because she has swelling and redness in the right knee and down the front of her shin  Her right lower leg is more swollen than her left  Which is not normal for her  Patient states she is concerned that she is going to gangrene of the leg patient states her right-sided chest pain has resolved  Review of Systems   Review of Systems   Constitutional: Negative for chills, fatigue and fever  HENT: Negative  Eyes: Negative  Respiratory: Negative  Negative for cough and shortness of breath  Cardiovascular: Positive for leg swelling (R lower leg)  Negative for chest pain  Gastrointestinal: Negative  Genitourinary: Negative  Musculoskeletal: Positive for joint swelling ( R knee is swollen, warm to touch, and red )  Neurological: Negative            Current Medications       Current Outpatient Medications:     albuterol (2 5 mg/3 mL) 0 083 % nebulizer solution, Take 3 mL (2 5 mg total) by nebulization every 6 (six) hours as needed for wheezing, Disp: 360 mL, Rfl: 5    albuterol (Ventolin HFA) 90 mcg/act inhaler, Inhale 2 puffs every 6 (six) hours as needed for wheezing, Disp: 18 g, Rfl: 5    Alectinib HCl 150 MG CAPS, Take 4 capsules (600 mg total) by mouth 2 (two) times a day, Disp: 240 capsule, Rfl: 5    atenolol (TENORMIN) 25 mg tablet, Take 1 tablet (25 mg total) by mouth daily, Disp: 90 tablet, Rfl: 1    cephalexin (KEFLEX) 500 mg capsule, Take 1 capsule (500 mg total) by mouth every 8 (eight) hours for 7 days, Disp: 21 capsule, Rfl: 0   ferrous sulfate 324 (65 Fe) mg, Take 1 tablet (324 mg total) by mouth every other day, Disp: 90 tablet, Rfl: 1    fexofenadine (ALLEGRA) 180 MG tablet, Take 180 mg by mouth daily, Disp: , Rfl:     fluticasone (FLONASE) 50 mcg/act nasal spray, 2 sprays into each nostril daily, Disp: 48 mL, Rfl: 1    Fluticasone-Salmeterol (Advair) 500-50 mcg/dose inhaler, Inhale 1 puff 2 (two) times a day Rinse mouth after use , Disp: 60 blister, Rfl: 3    furosemide (LASIX) 20 mg tablet, Take 1 tablet (20 mg total) by mouth daily, Disp: 90 tablet, Rfl: 1    irbesartan (AVAPRO) 300 mg tablet, Take 1 tablet (300 mg total) by mouth daily at bedtime, Disp: 90 tablet, Rfl: 0    omeprazole (PriLOSEC) 20 mg delayed release capsule, Take 1 capsule (20 mg total) by mouth daily, Disp: 90 capsule, Rfl: 1    oxyCODONE-acetaminophen (PERCOCET) 5-325 mg per tablet, Take 1 tablet by mouth every 4 (four) hours as needed for moderate pain For ongoing pain Max Daily Amount: 6 tablets, Disp: 60 tablet, Rfl: 0    tiotropium (Spiriva Respimat) 1 25 MCG/ACT AERS inhaler, Inhale 2 puffs daily, Disp: 4 g, Rfl: 3    acetaminophen (TYLENOL) 650 mg CR tablet, Take 1 tablet (650 mg total) by mouth every 8 (eight) hours as needed for mild pain, Disp: 90 tablet, Rfl: 2    lidocaine (XYLOCAINE) 5 % ointment, Apply topically as needed for mild pain, Disp: 35 44 g, Rfl: 2    Current Allergies     Allergies as of 08/10/2022    (No Known Allergies)              Past Medical History:   Diagnosis Date    Asthma     GERD (gastroesophageal reflux disease)     Hypertension     Lumbar disc disease     [facility] [address] [address]     Sleep apnea     suspected     Ventricular arrhythmia        Past Surgical History:   Procedure Laterality Date    APPENDECTOMY      COLONOSCOPY N/A 3/18/2019    Procedure: COLONOSCOPY;  Surgeon: Tu Mayorga DO;  [facility];   Service: Gastroenterology    ESOPHAGOGASTRODUODENOSCOPY N/A 3/18/2019    Procedure: ESOPHAGOGASTRODUODENOSCOPY (EGD); Surgeon: Paz Cifuentes DO;  [facility]; Service: Gastroenterology    KNEE SURGERY      ROTATOR CUFF REPAIR      SHOULDER SURGERY         Family History   Problem Relation Age of Onset    Stroke Mother     Diabetes Mother     Hyperlipidemia Mother     Hypertension Mother     Diabetes Father     Hyperlipidemia Father     Hypertension Father     Lung cancer Father 79    Lung cancer Sister 71         Medications have been verified  The following portions of the patient's history were reviewed and updated as appropriate: allergies, current medications, past family history, past medical history, past social history, past surgical history and problem list     Objective   /78   Pulse 62   Temp 97 7 °F (36 5 °C)   Resp 18   Ht 5' 1" (1 549 m)   Wt 107 kg (236 lb 3 2 oz)   SpO2 99%   BMI 44 63 kg/m²      Physical Exam     Physical Exam  Constitutional:       Appearance: Normal appearance  HENT:      Head: Normocephalic and atraumatic  Cardiovascular:      Rate and Rhythm: Normal rate and regular rhythm  Pulses: Normal pulses  Heart sounds: Normal heart sounds  Pulmonary:      Effort: Pulmonary effort is normal       Breath sounds: Normal breath sounds  Musculoskeletal:         General: Swelling (R lowere extremity) and tenderness (R knee) present  Right lower leg: Edema present  Skin:     General: Skin is warm and dry  Comments: Area of R knee and upper shin reddened and warm to touch   Neurological:      General: No focal deficit present  Mental Status: She is alert and oriented to person, place, and time  Psychiatric:         Mood and Affect: Mood normal          Behavior: Behavior normal                Note: Portions of this record may have been created with voice recognition software   Occasional wrong word or "sound a like" substitutions may have occurred due to the inherent limitations of voice recognition software  Please read the chart carefully and recognize, using context, where substitutions have occurred  WILDA Goldman

## 2022-08-29 DIAGNOSIS — J45.31 MILD PERSISTENT EXTRINSIC ASTHMA WITH ACUTE EXACERBATION: ICD-10-CM

## 2022-08-30 RX ORDER — FLUTICASONE PROPIONATE AND SALMETEROL 500; 50 UG/1; UG/1
1 POWDER RESPIRATORY (INHALATION) 2 TIMES DAILY
Qty: 60 BLISTER | Refills: 0 | Status: SHIPPED | OUTPATIENT
Start: 2022-08-30

## 2022-09-19 DIAGNOSIS — J45.20 MILD INTERMITTENT ASTHMA WITHOUT COMPLICATION: ICD-10-CM

## 2022-09-19 RX ORDER — ALBUTEROL SULFATE 90 UG/1
2 AEROSOL, METERED RESPIRATORY (INHALATION) EVERY 6 HOURS PRN
Qty: 18 G | Refills: 5 | Status: SHIPPED | OUTPATIENT
Start: 2022-09-19

## 2022-10-07 DIAGNOSIS — I10 ESSENTIAL HYPERTENSION: ICD-10-CM

## 2022-10-07 RX ORDER — IRBESARTAN 300 MG/1
TABLET ORAL
Qty: 90 TABLET | Refills: 0 | Status: SHIPPED | OUTPATIENT
Start: 2022-10-07 | End: 2022-10-12 | Stop reason: SDUPTHER

## 2022-10-12 ENCOUNTER — OFFICE VISIT (OUTPATIENT)
Dept: FAMILY MEDICINE CLINIC | Facility: CLINIC | Age: 77
End: 2022-10-12

## 2022-10-12 VITALS
WEIGHT: 240 LBS | RESPIRATION RATE: 18 BRPM | TEMPERATURE: 97.6 F | SYSTOLIC BLOOD PRESSURE: 138 MMHG | OXYGEN SATURATION: 98 % | HEIGHT: 61 IN | HEART RATE: 72 BPM | BODY MASS INDEX: 45.31 KG/M2 | DIASTOLIC BLOOD PRESSURE: 72 MMHG

## 2022-10-12 DIAGNOSIS — K44.9 HIATAL HERNIA: ICD-10-CM

## 2022-10-12 DIAGNOSIS — I10 ESSENTIAL HYPERTENSION: ICD-10-CM

## 2022-10-12 DIAGNOSIS — C79.51 BONE METASTASIS (HCC): ICD-10-CM

## 2022-10-12 DIAGNOSIS — Z23 IMMUNIZATION DUE: ICD-10-CM

## 2022-10-12 DIAGNOSIS — D69.6 THROMBOCYTOPENIA, UNSPECIFIED (HCC): ICD-10-CM

## 2022-10-12 DIAGNOSIS — J11.1 INFLUENZA: Primary | ICD-10-CM

## 2022-10-12 PROBLEM — J18.9 PNEUMONIA: Status: RESOLVED | Noted: 2021-08-04 | Resolved: 2022-10-12

## 2022-10-12 RX ORDER — OXYCODONE HYDROCHLORIDE AND ACETAMINOPHEN 5; 325 MG/1; MG/1
1 TABLET ORAL EVERY 4 HOURS PRN
Qty: 60 TABLET | Refills: 0 | Status: SHIPPED | OUTPATIENT
Start: 2022-10-12

## 2022-10-12 RX ORDER — OXYCODONE HYDROCHLORIDE AND ACETAMINOPHEN 5; 325 MG/1; MG/1
1 TABLET ORAL EVERY 4 HOURS PRN
Qty: 60 TABLET | Refills: 0 | Status: CANCELLED
Start: 2022-10-12

## 2022-10-12 RX ORDER — IRBESARTAN 300 MG/1
300 TABLET ORAL
Qty: 90 TABLET | Refills: 0 | Status: SHIPPED | OUTPATIENT
Start: 2022-10-12

## 2022-10-12 RX ORDER — OMEPRAZOLE 20 MG/1
20 CAPSULE, DELAYED RELEASE ORAL DAILY
Qty: 90 CAPSULE | Refills: 1 | Status: SHIPPED | OUTPATIENT
Start: 2022-10-12

## 2022-10-12 NOTE — PROGRESS NOTES
Assessment and Plan:     Problem List Items Addressed This Visit    None          Preventive health issues were discussed with patient, and age appropriate screening tests were ordered as noted in patient's After Visit Summary  Personalized health advice and appropriate referrals for health education or preventive services given if needed, as noted in patient's After Visit Summary  History of Present Illness:     Patient presents for a Medicare Wellness Visit     77 yo female patient comes in today for her medicare annual wellness visit  Patient Care Team:  Pao Last MD as PCP - General (Family Medicine)  DO Harman Valderrama MD Marian Lange Chaput, DO as Endoscopist  Becka Dixon MD (Radiation Oncology)     Review of Systems:     Review of Systems   Constitutional: Negative for chills and fever  HENT: Negative for ear pain and sore throat  Eyes: Negative for pain and visual disturbance  Respiratory: Negative for cough and shortness of breath  Cardiovascular: Negative for chest pain and palpitations  Gastrointestinal: Negative for abdominal pain and vomiting  Genitourinary: Negative for dysuria and hematuria  Musculoskeletal: Negative for arthralgias and back pain  Skin: Negative for color change and rash  Neurological: Negative for seizures and syncope  All other systems reviewed and are negative         Problem List:     Patient Active Problem List   Diagnosis   • Palpitations   • Premature atrial contractions   • Primary osteoarthritis of right wrist   • Essential hypertension   • Vitamin D deficiency   • Hiatal hernia   • Spinal stenosis of 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   • Lumbar facet arthropathy   • 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   • Colon cancer screening   • Gastroesophageal reflux disease without esophagitis   • Other headache syndrome   • Chronic gastritis without bleeding   • Morbid obesity ([address] )   • Chronic rhinitis   • MONO (obstructive sleep apnea)   • Non-small cell carcinoma of lung, stage 4, right (HCC)   • Bone metastases (HCC)   • Mild persistent asthma   • Abnormal renal function   • Depression, recurrent (HCC)   • Thrombocytopenia, unspecified ([facility] )   • Need for vaccination      Past Medical and Surgical History:     Past Medical History:   Diagnosis Date   • Asthma    • GERD (gastroesophageal reflux disease)    • Hypertension    • Lumbar disc disease    • [facility] )    • Sleep apnea     suspected    • Ventricular arrhythmia      Past Surgical History:   Procedure Laterality Date   • APPENDECTOMY     • COLONOSCOPY N/A 3/18/2019    Procedure: COLONOSCOPY;  Surgeon: Chele Martinez DO;  Location: [facility]; Service: Gastroenterology   • ESOPHAGOGASTRODUODENOSCOPY N/A 3/18/2019    Procedure: ESOPHAGOGASTRODUODENOSCOPY (EGD); Surgeon: Chele Martinez DO;  Location: [facility];   Service: Gastroenterology   • KNEE SURGERY     • ROTATOR CUFF REPAIR     • SHOULDER SURGERY        Family History:     Family History   Problem Relation Age of Onset   • Stroke Mother    • Diabetes Mother    • Hyperlipidemia Mother    • Hypertension Mother    • Diabetes Father    • Hyperlipidemia Father    • Hypertension Father    • Lung cancer Father 79   • Lung cancer Sister 71      Social History:     Social History     Socioeconomic History   • Marital status: Single     Spouse name: None   • Number of children: 5   • Years of education: None   • Highest education level: None   Occupational History   • None   Tobacco Use   • Smoking status: Former Smoker     Packs/day: 0 25     Years: 25 00     Pack years: 6 25     Types: Cigarettes     Start date:      Quit date:      Years since quittin 8   • Smokeless tobacco: Former User   Vaping Use   • Vaping Use: Never used   Substance and Sexual Activity   • Alcohol use: Yes     Comment: occ   • Drug use: No   • Sexual activity: None   Other Topics Concern   • None Social History Narrative   • None     Social Determinants of Health     Financial Resource Strain: Low Risk    • Difficulty of Paying Living Expenses: Not hard at all   Food Insecurity: No Food Insecurity   • Worried About Running Out of Food in the Last Year: Never true   • Ran Out of Food in the Last Year: Never true   Transportation Needs: No Transportation Needs   • Lack of Transportation (Medical): No   • Lack of Transportation (Non-Medical): No   Physical Activity: Not on file   Stress: Not on file   Social Connections: Not on file   Intimate Partner Violence: Not on file   Housing Stability: Not on file      Medications and Allergies:     Current Outpatient Medications   Medication Sig Dispense Refill   • acetaminophen (TYLENOL) 650 mg CR tablet Take 1 tablet (650 mg total) by mouth every 8 (eight) hours as needed for mild pain 90 tablet 2   • albuterol (2 5 mg/3 mL) 0 083 % nebulizer solution Take 3 mL (2 5 mg total) by nebulization every 6 (six) hours as needed for wheezing 360 mL 5   • albuterol (Ventolin HFA) 90 mcg/act inhaler Inhale 2 puffs every 6 (six) hours as needed for wheezing 18 g 5   • Alectinib HCl 150 MG CAPS Take 4 capsules (600 mg total) by mouth 2 (two) times a day 240 capsule 5   • atenolol (TENORMIN) 25 mg tablet Take 1 tablet (25 mg total) by mouth daily 90 tablet 1   • ferrous sulfate 324 (65 Fe) mg Take 1 tablet (324 mg total) by mouth every other day 90 tablet 1   • fexofenadine (ALLEGRA) 180 MG tablet Take 180 mg by mouth daily     • fluticasone (FLONASE) 50 mcg/act nasal spray 2 sprays into each nostril daily 48 mL 1   • Fluticasone-Salmeterol (Advair) 500-50 mcg/dose inhaler Inhale 1 puff 2 (two) times a day Rinse mouth after use   60 blister 0   • furosemide (LASIX) 20 mg tablet Take 1 tablet (20 mg total) by mouth daily 90 tablet 1   • irbesartan (AVAPRO) 300 mg tablet TAKE 1 TABLET BY MOUTH DAILY AT BEDTIME 90 tablet 0   • lidocaine (XYLOCAINE) 5 % ointment Apply topically as needed for mild pain 35 44 g 2   • omeprazole (PriLOSEC) 20 mg delayed release capsule Take 1 capsule (20 mg total) by mouth daily 90 capsule 1   • oxyCODONE-acetaminophen (PERCOCET) 5-325 mg per tablet Take 1 tablet by mouth every 4 (four) hours as needed for moderate pain For ongoing pain Max Daily Amount: 6 tablets 60 tablet 0   • tiotropium (Spiriva Respimat) 1 25 MCG/ACT AERS inhaler Inhale 2 puffs daily 4 g 3     No current facility-administered medications for this visit  No Known Allergies   Immunizations:     Immunization History   Administered Date(s) Administered   • COVID-19 PFIZER VACCINE 0 3 ML IM 03/01/2021, 03/22/2021   • INFLUENZA 10/16/2018, 09/18/2021   • Influenza, Seasonal Vaccine, Quadrivalent, Adjuvanted,  5e 09/15/2020   • Influenza, high dose seasonal 0 7 mL 10/18/2019   • Pneumococcal Conjugate 13-Valent 10/31/2018   • Pneumococcal Polysaccharide PPV23 08/05/2021   • Tdap 07/05/2022   • influenza, trivalent, adjuvanted 10/18/2019      Health Maintenance:         Topic Date Due   • Breast Cancer Screening: Mammogram  11/11/2023   • Colorectal Cancer Screening  03/18/2029   • Hepatitis C Screening  Completed         Topic Date Due   • COVID-19 Vaccine (3 - Pfizer risk series) 04/19/2021   • Influenza Vaccine (1) 09/01/2022      Medicare Screening Tests and Risk Assessments:     Bishop Jovel is here for her Subsequent Wellness visit  Last Medicare Wellness visit information reviewed, patient interviewed, no change since last AWV  Health Risk Assessment:   Patient rates overall health as poor  Patient feels that their physical health rating is same  Patient is satisfied with their life  Eyesight was rated as slightly worse  Hearing was rated as slightly worse  Patient feels that their emotional and mental health rating is same  Patients states they are sometimes angry  Patient states they are sometimes unusually tired/fatigued  Pain experienced in the last 7 days has been a lot   Patient's pain rating has been 7/10  Patient states that she has experienced weight loss or gain in last 6 months  Depression Screening:   PHQ-9 Score: 0      Fall Risk Screening: In the past year, patient has experienced: history of falling in past year    Number of falls: 1  Injured during fall?: Yes    Feels unsteady when standing or walking?: Yes    Worried about falling?: Yes      Urinary Incontinence Screening:   Patient has not leaked urine accidently in the last six months  Home Safety:  Patient has trouble with stairs inside or outside of their home  Patient has working smoke alarms and has working carbon monoxide detector  Home safety hazards include: none  Nutrition:   Current diet is Regular, Limited junk food and No Added Salt  Patient reports having a diet low in salt intake due to her history of HTN  She reports no addition of salt in the meals she cooks and avoids eating at restaurants  Medications:   Patient is currently taking over-the-counter supplements  OTC medications include: see medication list  Patient is able to manage medications  Activities of Daily Living (ADLs)/Instrumental Activities of Daily Living (IADLs):   Walk and transfer into and out of bed and chair?: Yes  Dress and groom yourself?: Yes    Bathe or shower yourself?: Yes    Feed yourself?  Yes  Do your laundry/housekeeping?: Yes  Manage your money, pay your bills and track your expenses?: Yes  Make your own meals?: Yes    Do your own shopping?: Yes    Previous Hospitalizations:   Any hospitalizations or ED visits within the last 12 months?: No      Advance Care Planning:   Living will: Yes    Advanced directive: Yes      Cognitive Screening:   Provider or family/friend/caregiver concerned regarding cognition?: No    PREVENTIVE SCREENINGS      Cardiovascular Screening:    General: Screening Current      Diabetes Screening:     General: Screening Current      Colorectal Cancer Screening:     General: Screening Current      Breast Cancer Screening:     General: Screening Current      Cervical Cancer Screening:    General: Screening Not Indicated      Osteoporosis Screening:    General: Screening Current      Abdominal Aortic Aneurysm (AAA) Screening:        General: Screening Not Indicated      Lung Cancer Screening:     General: Screening Not Indicated and History Lung Cancer      Hepatitis C Screening:    General: Screening Current    Screening, Brief Intervention, and Referral to Treatment (SBIRT)    Screening  Typical number of drinks in a day: 0  Typical number of drinks in a week: 0  Interpretation: Low risk drinking behavior  Single Item Drug Screening:  How often have you used an illegal drug (including marijuana) or a prescription medication for non-medical reasons in the past year? never    Single Item Drug Screen Score: 0  Interpretation: Negative screen for possible drug use disorder    Brief Intervention  Alcohol & drug use screenings were reviewed  No concerns regarding substance use disorder identified  Other Counseling Topics:   Car/seat belt/driving safety  No exam data present     Physical Exam:     /72 (BP Location: Right arm, Patient Position: Sitting, Cuff Size: Large)   Pulse 72   Temp 97 6 °F (36 4 °C) (Temporal)   Resp 18   Ht 5' 1" (1 549 m)   Wt 109 kg (240 lb)   SpO2 98%   BMI 45 35 kg/m²     Physical Exam  Vitals reviewed  Constitutional:       Appearance: She is obese  HENT:      Head: Normocephalic and atraumatic  Nose: Nose normal       Mouth/Throat:      Mouth: Mucous membranes are moist    Eyes:      Conjunctiva/sclera: Conjunctivae normal    Cardiovascular:      Rate and Rhythm: Normal rate and regular rhythm  Pulses: Normal pulses  Heart sounds: Normal heart sounds  Pulmonary:      Effort: Pulmonary effort is normal    Abdominal:      General: Abdomen is flat  Bowel sounds are normal       Palpations: Abdomen is soft     Skin:     Capillary Refill: Capillary refill takes less than 2 seconds  Neurological:      Mental Status: She is alert            Claudejair Cool MD

## 2022-10-12 NOTE — PATIENT INSTRUCTIONS
Medicare Preventive Visit Patient Instructions  Thank you for completing your Welcome to Medicare Visit or Medicare Annual Wellness Visit today  Your next wellness visit will be due in one year (10/13/2023)  The screening/preventive services that you may require over the next 5-10 years are detailed below  Some tests may not apply to you based off risk factors and/or age  Screening tests ordered at today's visit but not completed yet may show as past due  Also, please note that scanned in results may not display below  Preventive Screenings:  Service Recommendations Previous Testing/Comments   Colorectal Cancer Screening  * Colonoscopy    * Fecal Occult Blood Test (FOBT)/Fecal Immunochemical Test (FIT)  * Fecal DNA/Cologuard Test  * Flexible Sigmoidoscopy Age: 39-70 years old   Colonoscopy: every 10 years (may be performed more frequently if at higher risk)  OR  FOBT/FIT: every 1 year  OR  Cologuard: every 3 years  OR  Sigmoidoscopy: every 5 years  Screening may be recommended earlier than age 39 if at higher risk for colorectal cancer  Also, an individualized decision between you and your healthcare provider will decide whether screening between the ages of 74-80 would be appropriate  Colonoscopy: 03/18/2019  FOBT/FIT: Not on file  Cologuard: Not on file  Sigmoidoscopy: Not on file    Screening Current     Breast Cancer Screening Age: 36 years old  Frequency: every 1-2 years  Not required if history of left and right mastectomy Mammogram: 11/11/2021    Screening Current   Cervical Cancer Screening Between the ages of 21-29, pap smear recommended once every 3 years  Between the ages of 33-67, can perform pap smear with HPV co-testing every 5 years     Recommendations may differ for women with a history of total hysterectomy, cervical cancer, or abnormal pap smears in past  Pap Smear: 10/14/2016    Screening Not Indicated   Hepatitis C Screening Once for adults born between 1945 and 1965  More frequently in patients at high risk for Hepatitis C Hep C Antibody: 10/14/2019    Screening Current   Diabetes Screening 1-2 times per year if you're at risk for diabetes or have pre-diabetes Fasting glucose: 91 mg/dL (5/7/2022)  A1C: No results in last 5 years (No results in last 5 years)  Screening Current   Cholesterol Screening Once every 5 years if you don't have a lipid disorder  May order more often based on risk factors  Lipid panel: 02/14/2020    Screening Current     Other Preventive Screenings Covered by Medicare:  1  Abdominal Aortic Aneurysm (AAA) Screening: covered once if your at risk  You're considered to be at risk if you have a family history of AAA  2  Lung Cancer Screening: covers low dose CT scan once per year if you meet all of the following conditions: (1) Age 50-69; (2) No signs or symptoms of lung cancer; (3) Current smoker or have quit smoking within the last 15 years; (4) You have a tobacco smoking history of at least 20 pack years (packs per day multiplied by number of years you smoked); (5) You get a written order from a healthcare provider  3  Glaucoma Screening: covered annually if you're considered high risk: (1) You have diabetes OR (2) Family history of glaucoma OR (3)  aged 48 and older OR (3)  American aged 72 and older  3  Osteoporosis Screening: covered every 2 years if you meet one of the following conditions: (1) You're estrogen deficient and at risk for osteoporosis based off medical history and other findings; (2) Have a vertebral abnormality; (3) On glucocorticoid therapy for more than 3 months; (4) Have primary hyperparathyroidism; (5) On osteoporosis medications and need to assess response to drug therapy  · Last bone density test (DXA Scan): 09/03/2021   5  HIV Screening: covered annually if you're between the age of 15-65  Also covered annually if you are younger than 13 and older than 72 with risk factors for HIV infection   For pregnant patients, it is covered up to 3 times per pregnancy  Immunizations:  Immunization Recommendations   Influenza Vaccine Annual influenza vaccination during flu season is recommended for all persons aged >= 6 months who do not have contraindications   Pneumococcal Vaccine   * Pneumococcal conjugate vaccine = PCV13 (Prevnar 13), PCV15 (Vaxneuvance), PCV20 (Prevnar 20)  * Pneumococcal polysaccharide vaccine = PPSV23 (Pneumovax) Adults 25-60 years old: 1-3 doses may be recommended based on certain risk factors  Adults 72 years old: 1-2 doses may be recommended based off what pneumonia vaccine you previously received   Hepatitis B Vaccine 3 dose series if at intermediate or high risk (ex: diabetes, end stage renal disease, liver disease)   Tetanus (Td) Vaccine - COST NOT COVERED BY MEDICARE PART B Following completion of primary series, a booster dose should be given every 10 years to maintain immunity against tetanus  Td may also be given as tetanus wound prophylaxis  Tdap Vaccine - COST NOT COVERED BY MEDICARE PART B Recommended at least once for all adults  For pregnant patients, recommended with each pregnancy  Shingles Vaccine (Shingrix) - COST NOT COVERED BY MEDICARE PART B  2 shot series recommended in those aged 48 and above     Health Maintenance Due:      Topic Date Due   • Breast Cancer Screening: Mammogram  11/11/2023   • Colorectal Cancer Screening  03/18/2029   • Hepatitis C Screening  Completed     Immunizations Due:      Topic Date Due   • COVID-19 Vaccine (3 - Pfizer risk series) 04/19/2021   • Influenza Vaccine (1) 09/01/2022     Advance Directives   What are advance directives? Advance directives are legal documents that state your wishes and plans for medical care  These plans are made ahead of time in case you lose your ability to make decisions for yourself  Advance directives can apply to any medical decision, such as the treatments you want, and if you want to donate organs     What are the types of advance directives? There are many types of advance directives, and each state has rules about how to use them  You may choose a combination of any of the following:  · Living will: This is a written record of the treatment you want  You can also choose which treatments you do not want, which to limit, and which to stop at a certain time  This includes surgery, medicine, IV fluid, and tube feedings  · Durable power of  for healthcare [facility]): This is a written record that states who you want to make healthcare choices for you when you are unable to make them for yourself  This person, called a proxy, is usually a family member or a friend  You may choose more than 1 proxy  · Do not resuscitate (DNR) order:  A DNR order is used in case your heart stops beating or you stop breathing  It is a request not to have certain forms of treatment, such as CPR  A DNR order may be included in other types of advance directives  · Medical directive: This covers the care that you want if you are in a coma, near death, or unable to make decisions for yourself  You can list the treatments you want for each condition  Treatment may include pain medicine, surgery, blood transfusions, dialysis, IV or tube feedings, and a ventilator (breathing machine)  · Values history: This document has questions about your views, beliefs, and how you feel and think about life  This information can help others choose the care that you would choose  Why are advance directives important? An advance directive helps you control your care  Although spoken wishes may be used, it is better to have your wishes written down  Spoken wishes can be misunderstood, or not followed  Treatments may be given even if you do not want them  An advance directive may make it easier for your family to make difficult choices about your care  Fall Prevention    Fall prevention  includes ways to make your home and other areas safer   It also includes ways you can move more carefully to prevent a fall  Health conditions that cause changes in your blood pressure, vision, or muscle strength and coordination may increase your risk for falls  Medicines may also increase your risk for falls if they make you dizzy, weak, or sleepy  Fall prevention tips:   · Stand or sit up slowly  · Use assistive devices as directed  · Wear shoes that fit well and have soles that   · Wear a personal alarm  · Stay active  · Manage your medical conditions  Home Safety Tips:  · Add items to prevent falls in the bathroom  · Keep paths clear  · Install bright lights in your home  · Keep items you use often on shelves within reach  · Paint or place reflective tape on the edges of your stairs  Weight Management   Why it is important to manage your weight:  Being overweight increases your risk of health conditions such as heart disease, high blood pressure, type 2 diabetes, and certain types of cancer  It can also increase your risk for osteoarthritis, sleep apnea, and other respiratory problems  Aim for a slow, steady weight loss  Even a small amount of weight loss can lower your risk of health problems  How to lose weight safely:  A safe and healthy way to lose weight is to eat fewer calories and get regular exercise  You can lose up about 1 pound a week by decreasing the number of calories you eat by 500 calories each day  Healthy meal plan for weight management:  A healthy meal plan includes a variety of foods, contains fewer calories, and helps you stay healthy  A healthy meal plan includes the following:  · Eat whole-grain foods more often  A healthy meal plan should contain fiber  Fiber is the part of grains, fruits, and vegetables that is not broken down by your body  Whole-grain foods are healthy and provide extra fiber in your diet  Some examples of whole-grain foods are whole-wheat breads and pastas, oatmeal, brown rice, and bulgur    · Eat a variety of vegetables every day  Include dark, leafy greens such as spinach, kale, clay greens, and mustard greens  Eat yellow and orange vegetables such as carrots, sweet potatoes, and winter squash  · Eat a variety of fruits every day  Choose fresh or canned fruit (canned in its own juice or light syrup) instead of juice  Fruit juice has very little or no fiber  · Eat low-fat dairy foods  Drink fat-free (skim) milk or 1% milk  Eat fat-free yogurt and low-fat cottage cheese  Try low-fat cheeses such as mozzarella and other reduced-fat cheeses  · Choose meat and other protein foods that are low in fat  Choose beans or other legumes such as split peas or lentils  Choose fish, skinless poultry (chicken or turkey), or lean cuts of red meat (beef or pork)  Before you cook meat or poultry, cut off any visible fat  · Use less fat and oil  Try baking foods instead of frying them  Add less fat, such as margarine, sour cream, regular salad dressing and mayonnaise to foods  Eat fewer high-fat foods  Some examples of high-fat foods include french fries, doughnuts, ice cream, and cakes  · Eat fewer sweets  Limit foods and drinks that are high in sugar  This includes candy, cookies, regular soda, and sweetened drinks  Exercise:  Exercise at least 30 minutes per day on most days of the week  Some examples of exercise include walking, biking, dancing, and swimming  You can also fit in more physical activity by taking the stairs instead of the elevator or parking farther away from stores  Ask your healthcare provider about the best exercise plan for you  © Copyright [employer] 2018 Information is for End User's use only and may not be sold, redistributed or otherwise used for commercial purposes   All illustrations and images included in CareNotes® are the copyrighted property of A D [employer] M , [employer]  or [address] [place] [address] [facility]

## 2022-10-14 ENCOUNTER — TELEPHONE (OUTPATIENT)
Dept: FAMILY MEDICINE CLINIC | Facility: CLINIC | Age: 77
End: 2022-10-14

## 2022-10-14 ENCOUNTER — HOSPITAL ENCOUNTER (OUTPATIENT)
Dept: CT IMAGING | Facility: HOSPITAL | Age: 77
End: 2022-10-14
Attending: INTERNAL MEDICINE
Payer: COMMERCIAL

## 2022-10-14 DIAGNOSIS — C79.51 BONE METASTASES (HCC): ICD-10-CM

## 2022-10-14 DIAGNOSIS — C34.91 NON-SMALL CELL CARCINOMA OF LUNG, STAGE 4, RIGHT (HCC): ICD-10-CM

## 2022-10-14 PROCEDURE — 71250 CT THORAX DX C-: CPT

## 2022-10-14 PROCEDURE — 74176 CT ABD & PELVIS W/O CONTRAST: CPT

## 2022-10-14 NOTE — TELEPHONE ENCOUNTER
Received completed physician certification of disability for accessible parking  first attempt to contact patient  left message to return my call on answering machine  Patient notified form is ready for     Form scanned into patient chart  Form placed in  bin

## 2022-10-17 ENCOUNTER — APPOINTMENT (OUTPATIENT)
Dept: LAB | Facility: HOSPITAL | Age: 77
End: 2022-10-17
Attending: INTERNAL MEDICINE
Payer: COMMERCIAL

## 2022-10-17 LAB
ALBUMIN SERPL BCP-MCNC: 4 G/DL (ref 3.5–5)
ALP SERPL-CCNC: 112 U/L (ref 43–122)
ALT SERPL W P-5'-P-CCNC: 36 U/L
ANION GAP SERPL CALCULATED.3IONS-SCNC: 5 MMOL/L (ref 5–14)
AST SERPL W P-5'-P-CCNC: 36 U/L (ref 14–36)
BASOPHILS # BLD AUTO: 0.03 THOUSANDS/ΜL (ref 0–0.1)
BASOPHILS NFR BLD AUTO: 0 % (ref 0–1)
BILIRUB SERPL-MCNC: 0.86 MG/DL (ref 0.2–1)
BUN SERPL-MCNC: 26 MG/DL (ref 5–25)
CALCIUM SERPL-MCNC: 9.1 MG/DL (ref 8.4–10.2)
CHLORIDE SERPL-SCNC: 100 MMOL/L (ref 96–108)
CO2 SERPL-SCNC: 36 MMOL/L (ref 21–32)
CREAT SERPL-MCNC: 1.1 MG/DL (ref 0.6–1.2)
EOSINOPHIL # BLD AUTO: 0.22 THOUSAND/ΜL (ref 0–0.61)
EOSINOPHIL NFR BLD AUTO: 3 % (ref 0–6)
ERYTHROCYTE [DISTWIDTH] IN BLOOD BY AUTOMATED COUNT: 15.1 % (ref 11.6–15.1)
GFR SERPL CREATININE-BSD FRML MDRD: 49 ML/MIN/1.73SQ M
GLUCOSE SERPL-MCNC: 144 MG/DL (ref 70–99)
HCT VFR BLD AUTO: 34.5 % (ref 34.8–46.1)
HGB BLD-MCNC: 11.5 G/DL (ref 11.5–15.4)
IMM GRANULOCYTES # BLD AUTO: 0.05 THOUSAND/UL (ref 0–0.2)
IMM GRANULOCYTES NFR BLD AUTO: 1 % (ref 0–2)
LYMPHOCYTES # BLD AUTO: 1.08 THOUSANDS/ΜL (ref 0.6–4.47)
LYMPHOCYTES NFR BLD AUTO: 16 % (ref 14–44)
MCH RBC QN AUTO: 29.3 PG (ref 26.8–34.3)
MCHC RBC AUTO-ENTMCNC: 33.3 G/DL (ref 31.4–37.4)
MCV RBC AUTO: 88 FL (ref 82–98)
MONOCYTES # BLD AUTO: 0.61 THOUSAND/ΜL (ref 0.17–1.22)
MONOCYTES NFR BLD AUTO: 9 % (ref 4–12)
NEUTROPHILS # BLD AUTO: 4.79 THOUSANDS/ΜL (ref 1.85–7.62)
NEUTS SEG NFR BLD AUTO: 71 % (ref 43–75)
NRBC BLD AUTO-RTO: 0 /100 WBCS
PLATELET # BLD AUTO: 103 THOUSANDS/UL (ref 149–390)
POTASSIUM SERPL-SCNC: 4 MMOL/L (ref 3.5–5.3)
PROT SERPL-MCNC: 7.2 G/DL (ref 6.4–8.4)
RBC # BLD AUTO: 3.92 MILLION/UL (ref 3.81–5.12)
SODIUM SERPL-SCNC: 141 MMOL/L (ref 135–147)
WBC # BLD AUTO: 6.78 THOUSAND/UL (ref 4.31–10.16)

## 2022-10-18 ENCOUNTER — TELEPHONE (OUTPATIENT)
Dept: FAMILY MEDICINE CLINIC | Facility: CLINIC | Age: 77
End: 2022-10-18

## 2022-10-18 DIAGNOSIS — Z12.31 SCREENING MAMMOGRAM FOR BREAST CANCER: Primary | ICD-10-CM

## 2022-10-25 ENCOUNTER — HOSPITAL ENCOUNTER (OUTPATIENT)
Dept: INFUSION CENTER | Facility: HOSPITAL | Age: 77
Discharge: HOME | End: 2022-10-25
Attending: INTERNAL MEDICINE
Payer: COMMERCIAL

## 2022-10-25 VITALS
SYSTOLIC BLOOD PRESSURE: 135 MMHG | WEIGHT: 240.74 LBS | TEMPERATURE: 97.2 F | HEART RATE: 69 BPM | RESPIRATION RATE: 16 BRPM | OXYGEN SATURATION: 93 % | BODY MASS INDEX: 45.49 KG/M2 | DIASTOLIC BLOOD PRESSURE: 61 MMHG

## 2022-10-25 DIAGNOSIS — C79.51 BONE METASTASES (HCC): Primary | ICD-10-CM

## 2022-10-25 DIAGNOSIS — C34.91 NON-SMALL CELL CARCINOMA OF LUNG, STAGE 4, RIGHT (HCC): ICD-10-CM

## 2022-10-25 RX ORDER — SODIUM CHLORIDE 9 MG/ML
20 INJECTION, SOLUTION INTRAVENOUS ONCE
Status: COMPLETED | OUTPATIENT
Start: 2022-10-25 | End: 2022-10-25

## 2022-10-25 RX ORDER — SODIUM CHLORIDE 9 MG/ML
20 INJECTION, SOLUTION INTRAVENOUS ONCE
OUTPATIENT
Start: 2023-01-02

## 2022-10-25 RX ADMIN — SODIUM CHLORIDE 20 ML/HR: 0.9 INJECTION, SOLUTION INTRAVENOUS at 13:30

## 2022-10-25 RX ADMIN — ZOLEDRONIC ACID 3.3 MG: 4 INJECTION INTRAVENOUS at 14:02

## 2022-10-27 ENCOUNTER — OFFICE VISIT (OUTPATIENT)
Dept: HEMATOLOGY ONCOLOGY | Facility: CLINIC | Age: 77
End: 2022-10-27
Payer: COMMERCIAL

## 2022-10-27 VITALS
HEART RATE: 64 BPM | OXYGEN SATURATION: 96 % | DIASTOLIC BLOOD PRESSURE: 80 MMHG | RESPIRATION RATE: 20 BRPM | TEMPERATURE: 98.1 F | SYSTOLIC BLOOD PRESSURE: 134 MMHG | BODY MASS INDEX: 45.31 KG/M2 | HEIGHT: 61 IN | WEIGHT: 240 LBS

## 2022-10-27 DIAGNOSIS — C34.91 NON-SMALL CELL CARCINOMA OF LUNG, STAGE 4, RIGHT (HCC): ICD-10-CM

## 2022-10-27 DIAGNOSIS — C79.51 BONE METASTASES (HCC): Primary | ICD-10-CM

## 2022-10-27 PROCEDURE — 99214 OFFICE O/P EST MOD 30 MIN: CPT | Performed by: INTERNAL MEDICINE

## 2022-10-27 NOTE — PROGRESS NOTES
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, diagnosed in August 2020  Luna Moreno lung cancer was discovered incidentally  Aime Rivera has no symptomatology from oncology standpoint  Aime Rivera has limited smoking history with 3-4 cigarettes for 20 years until 1986  She had right hilar mass and osseous metastasis   She is currently on Alectinib with no toxicity, resulting in near complete resolution of mass  Clinically as well as radiographically, she has no evidence of progression  I recommended her to continue Alectinib 600 mg b i d  Alexa Suárez I will see her again in 6 months with CT scan of chest abdomen pelvis, CBC and CMP  She is in agreement with my recommendations        Subjective:      HPI:  A 72-year-old female who has limited smoking history   She smoked 20 years with 3-4 cigarettes per day until 1986   She recently developed right flank pain for which she went to the emergency department  Aime Rivera was found instantly found to have right lung mass   Her right flank pain disappeared without knowing the clear etiology   She underwent PET-CT scan which showed hypermetabolic right upper lobe mass as well as hypermetabolic T8 lesion   In addition, she had multiple iliac bone lesion with SUV 12 4   She underwent bronchoscopy and biopsy which showed non-small cell carcinoma   Fine-needle aspiration from 4R lymph nodes showed adenocarcinoma consistent with lung primary   She presents today to discuss the diagnosis and treatment options   She has absolutely no complaint of pain, weight loss  Aime Rivera has been sign asthma, therefore, she has mild exertional shortness of breath   She denied fever, chills or night sweats   Her performance status is normal              Interval History:  A 72-year-old female with metastatic adenocarcinoma of the lung, diagnosed in August 2020  Lenore Elder lung cancer was discovered incidentally  Razia Baker has no symptomatology from oncology standpoint  Razia Baker has somewhat limited smoking history with 3-4 cigarettes for 20 years until 1986  She has right hilar mass as well as multiple osseous metastasis including T8   Her tumor was found to have ALK-EML4 translocation   She has been on Alectinib 600 mg twice a day with excellent tolerance   She already had major response on Alectinib   She presents today for routine follow-up  She feels well with no new complaint  She has stable mild exertional shortness of breath  Her weight is stable  She has no complaint of pain  Her performance status is 0 to 1/4 on ECOG scale         Objective:      Primary Diagnosis:     Metastatic adenocarcinoma of the lung, with ALK rearrangements   diagnosed and August 2020       Cancer Staging:  Cancer Staging  No matching staging information was found for the patient         Previous Hematologic/ Oncologic Treatment:            Current Hematologic/ Oncologic Treatment:       Alectinib 600 mg b i d  since late October 2020       Zometa every 3 months since September 2020       Disease Status:        Good partial response      Test Results:     Pathology:     Cytology from right upper lobe mass showed non-small cell carcinoma, consistent with lung primary   Fine-needle aspiration from 4R lymph nodes showed adenocarcinoma   ALK rearrangements positive   No evidence of ROS 1 translocation   PDL1 expression negative  EGFR mutation negative       Radiology:       CT scan of chest abdomen pelvis in  October 2022 showed no evidence of metastatic disease  Stable sclerotic bony change  Laboratory:       See below      Physical Exam:        General Appearance:    Alert, oriented          Eyes:    PERRL   Ears:    Normal external ear canals, both ears   Nose:   Nares normal, septum midline   Throat:   Mucosa moist  Pharynx without injection      Neck:   Supple       Lungs:     Clear to auscultation bilaterally   Chest Wall:    No tenderness or deformity    Heart:    Regular rate and rhythm         Abdomen:     Soft, non-tender, bowel sounds +, no organomegaly               Extremities:   Extremities no cyanosis or edema         Skin:   no rash or icterus  Lymph nodes:   Cervical, supraclavicular, and axillary nodes normal   Neurologic:   CNII-XII intact, normal strength, sensation and reflexes     Throughout             Breast exam:   NA           ROS: Review of Systems   All other systems reviewed and are negative  Imaging: CT chest abdomen pelvis wo contrast    Result Date: 10/14/2022  Narrative: CT CHEST, ABDOMEN AND PELVIS WITHOUT IV CONTRAST INDICATION:   C34 91: Malignant neoplasm of unspecified part of right bronchus or lung C79 51: Secondary malignant neoplasm of bone  COMPARISON:  CT chest abdomen and pelvis 4/7/2022 and CT chest 7/22/2020 TECHNIQUE: CT examination of the chest, abdomen and pelvis was performed without intravenous contrast  Axial, sagittal, and coronal 2D reformatted images were created from the source data and submitted for interpretation  Radiation dose length product (DLP) for this visit:  985 mGy-cm   This examination, like all CT scans performed in the [facility], was performed utilizing techniques to minimize radiation dose exposure, including the use of iterative reconstruction and automated exposure control  Enteric contrast was administered  FINDINGS: CHEST LUNGS:  No suspicious pulmonary nodule  1 mm nodule right upper lobe marked on series 3 unchanged as far back as July 2020 compatible with a benign nodule  Prior tree-in-bud opacities in the right lower lobe and lingula have resolved  No infiltrate  Stable scattered areas of minimal pulmonary scarring  Central airways are clear  PLEURA:  Unremarkable  HEART/GREAT VESSELS: Heart is not enlarged  Minimal fluid pooling in the anterior pericardial sac    Aortic, mitral annular and coronary artery calcification  No thoracic aortic aneurysm  MEDIASTINUM AND ARACELY:  Unremarkable  CHEST WALL AND LOWER NECK:  Unremarkable  ABDOMEN LIVER/BILIARY TREE:  Unremarkable  GALLBLADDER:  No calcified gallstones  No pericholecystic inflammatory change  SPLEEN:  Unremarkable  PANCREAS:  Unremarkable  ADRENAL GLANDS:  Unremarkable  KIDNEYS/URETERS:  Right renal simple cysts, the larger of which measures 4 cm  Otherwise unremarkable  No perinephric collection  STOMACH AND BOWEL:  Nondistended stomach limits its evaluation  Colonic diverticulosis without diverticulitis  Few mid small bowel diverticula without diverticulitis may be sequela of small bowel bacterial overgrowth syndrome  Stable small lipoma in the duodenojejunal junction  No bowel obstruction  APPENDIX:  Post appendectomy per history in Epic  ABDOMINOPELVIC CAVITY:  No ascites  No pneumoperitoneum  No lymphadenopathy  VESSELS:  Aortoiliac calcification  No aneurysm  PELVIS REPRODUCTIVE ORGANS:  Unremarkable for patient's age  URINARY BLADDER:  Unremarkable  ABDOMINAL WALL/INGUINAL REGIONS:  Unremarkable  OSSEOUS STRUCTURES:  No acute fracture  Scattered sclerotic lesions in the thoracolumbar spine and pelvis without significant interval change  Ununited right posterior medial ninth rib fracture  Degenerative changes of the spine, pubic symphysis, and multiple joints  Stable grade 1 degenerative anterolisthesis of L3 on L4  Impression: CT chest: No evidence of intrathoracic metastatic disease  Stable diffuse sclerotic osseous lesions  Prior tree-in-bud groundglass opacities have resolved  No new infiltrate  Additional chronic findings and negatives as above  CT abdomen and pelvis: No evidence of intraabdominopelvic metastatic disease insofar as can be detected on a noncontrast CT  Stable diffuse sclerotic osseous lesions  Additional chronic findings and negatives as above   Workstation performed: [id number]         Labs: Lab Results   Component Value Date    WBC 6 78 10/17/2022    HGB 11 5 10/17/2022    HCT 34 5 (L) 10/17/2022    MCV 88 10/17/2022     (L) 10/17/2022     Lab Results   Component Value Date    K 4 0 10/17/2022     10/17/2022    CO2 36 (H) 10/17/2022    BUN 26 (H) 10/17/2022    CREATININE 1 10 10/17/2022    GLUF 91 05/07/2022    CALCIUM 9 1 10/17/2022    AST 36 10/17/2022    ALT 36 (H) 10/17/2022    ALKPHOS 112 10/17/2022    EGFR 49 10/17/2022         Current Medications: Reviewed  Allergies: Reviewed  PMH/FH/SH:  Reviewed      Vital Sign:    Body surface area is 2 04 meters squared      Wt Readings from Last 3 Encounters:   10/27/22 109 kg (240 lb)   10/25/22 109 kg (240 lb 11 9 oz)   10/12/22 109 kg (240 lb)        Temp Readings from Last 3 Encounters:   10/27/22 98 1 °F (36 7 °C) (Temporal)   10/25/22 (!) 97 2 °F (36 2 °C) (Temporal)   10/12/22 97 6 °F (36 4 °C) (Temporal)        BP Readings from Last 3 Encounters:   10/27/22 134/80   10/25/22 135/61   10/12/22 138/72         Pulse Readings from Last 3 Encounters:   10/27/22 64   10/25/22 69   10/12/22 72     @LASTSAO2(3)@

## 2022-11-09 ENCOUNTER — TELEPHONE (OUTPATIENT)
Dept: PULMONOLOGY | Facility: CLINIC | Age: 77
End: 2022-11-09

## 2022-11-09 NOTE — TELEPHONE ENCOUNTER
LM for Pt to call back to schedule 4m follow up with Dr Dao Delarosa, first available, at the [facility]

## 2022-11-15 PROBLEM — J30.1 CHRONIC SEASONAL ALLERGIC RHINITIS DUE TO POLLEN: Status: ACTIVE | Noted: 2022-11-15

## 2022-11-15 PROBLEM — J30.81 ALLERGIC RHINITIS DUE TO ANIMAL (CAT) (DOG) HAIR AND DANDER: Status: ACTIVE | Noted: 2022-11-15

## 2022-11-15 PROBLEM — J30.89 ALLERGIC RHINITIS DUE TO HOUSE DUST MITE: Status: ACTIVE | Noted: 2022-11-15

## 2022-11-15 PROBLEM — J45.50 SEVERE PERSISTENT ASTHMA WITHOUT COMPLICATION: Status: ACTIVE | Noted: 2022-11-15

## 2022-12-28 DIAGNOSIS — I10 ESSENTIAL HYPERTENSION: ICD-10-CM

## 2022-12-28 RX ORDER — ATENOLOL 25 MG/1
25 TABLET ORAL DAILY
Qty: 90 TABLET | Refills: 1 | Status: SHIPPED | OUTPATIENT
Start: 2022-12-28

## 2023-01-03 ENCOUNTER — TELEPHONE (OUTPATIENT)
Dept: HEMATOLOGY ONCOLOGY | Facility: CLINIC | Age: 78
End: 2023-01-03

## 2023-01-03 DIAGNOSIS — C79.51 BONE METASTASIS (HCC): ICD-10-CM

## 2023-01-03 DIAGNOSIS — C34.91 NON-SMALL CELL CANCER OF RIGHT LUNG (HCC): ICD-10-CM

## 2023-01-04 ENCOUNTER — HOSPITAL ENCOUNTER (OUTPATIENT)
Dept: MAMMOGRAPHY | Facility: CLINIC | Age: 78
Discharge: HOME | End: 2023-01-04

## 2023-01-04 VITALS — HEIGHT: 61 IN | WEIGHT: 234 LBS | BODY MASS INDEX: 44.18 KG/M2

## 2023-01-04 DIAGNOSIS — R60.0 LOWER EXTREMITY EDEMA: ICD-10-CM

## 2023-01-04 DIAGNOSIS — Z12.31 SCREENING MAMMOGRAM FOR BREAST CANCER: ICD-10-CM

## 2023-01-05 RX ORDER — FUROSEMIDE 20 MG/1
TABLET ORAL
Qty: 90 TABLET | Refills: 1 | Status: SHIPPED | OUTPATIENT
Start: 2023-01-05

## 2023-01-13 PROBLEM — M17.11 PRIMARY LOCALIZED OSTEOARTHRITIS OF RIGHT KNEE: Status: ACTIVE | Noted: 2022-12-14

## 2023-01-13 PROBLEM — R00.2 PALPITATIONS: Status: RESOLVED | Noted: 2017-10-31 | Resolved: 2023-01-13

## 2023-01-16 ENCOUNTER — APPOINTMENT (OUTPATIENT)
Dept: LAB | Facility: HOSPITAL | Age: 78
End: 2023-01-16
Attending: INTERNAL MEDICINE

## 2023-01-16 DIAGNOSIS — C34.91 NON-SMALL CELL CARCINOMA OF LUNG, STAGE 4, RIGHT (HCC): ICD-10-CM

## 2023-01-16 DIAGNOSIS — C79.51 BONE METASTASES (HCC): ICD-10-CM

## 2023-01-16 LAB
ALBUMIN SERPL BCP-MCNC: 4 G/DL (ref 3.5–5)
ALP SERPL-CCNC: 112 U/L (ref 43–122)
ALT SERPL W P-5'-P-CCNC: 33 U/L
ANION GAP SERPL CALCULATED.3IONS-SCNC: 4 MMOL/L (ref 5–14)
AST SERPL W P-5'-P-CCNC: 33 U/L (ref 14–36)
BILIRUB SERPL-MCNC: 0.88 MG/DL (ref 0.2–1)
BUN SERPL-MCNC: 32 MG/DL (ref 5–25)
CALCIUM SERPL-MCNC: 9.2 MG/DL (ref 8.4–10.2)
CHLORIDE SERPL-SCNC: 102 MMOL/L (ref 96–108)
CO2 SERPL-SCNC: 35 MMOL/L (ref 21–32)
CREAT SERPL-MCNC: 1.32 MG/DL (ref 0.6–1.2)
GFR SERPL CREATININE-BSD FRML MDRD: 39 ML/MIN/1.73SQ M
GLUCOSE P FAST SERPL-MCNC: 94 MG/DL (ref 70–99)
POTASSIUM SERPL-SCNC: 4.5 MMOL/L (ref 3.5–5.3)
PROT SERPL-MCNC: 7.2 G/DL (ref 6.4–8.4)
SODIUM SERPL-SCNC: 141 MMOL/L (ref 135–147)

## 2023-01-17 ENCOUNTER — HOSPITAL ENCOUNTER (OUTPATIENT)
Dept: INFUSION CENTER | Facility: HOSPITAL | Age: 78
Discharge: HOME | End: 2023-01-17
Attending: INTERNAL MEDICINE

## 2023-01-17 VITALS
DIASTOLIC BLOOD PRESSURE: 76 MMHG | SYSTOLIC BLOOD PRESSURE: 158 MMHG | TEMPERATURE: 97.3 F | RESPIRATION RATE: 18 BRPM | HEART RATE: 86 BPM

## 2023-01-17 DIAGNOSIS — C34.91 NON-SMALL CELL CARCINOMA OF LUNG, STAGE 4, RIGHT (HCC): ICD-10-CM

## 2023-01-17 DIAGNOSIS — C79.51 BONE METASTASES (HCC): Primary | ICD-10-CM

## 2023-01-17 RX ORDER — SODIUM CHLORIDE 9 MG/ML
20 INJECTION, SOLUTION INTRAVENOUS ONCE
Status: COMPLETED | OUTPATIENT
Start: 2023-01-17 | End: 2023-01-17

## 2023-01-17 RX ORDER — SODIUM CHLORIDE 9 MG/ML
20 INJECTION, SOLUTION INTRAVENOUS ONCE
OUTPATIENT
Start: 2023-04-10

## 2023-01-17 RX ADMIN — ZOLEDRONIC ACID 3 MG: 4 INJECTION INTRAVENOUS at 14:14

## 2023-01-17 RX ADMIN — SODIUM CHLORIDE 20 ML/HR: 9 INJECTION, SOLUTION INTRAVENOUS at 14:11

## 2023-01-17 NOTE — PROGRESS NOTES
Pt tolerated Zometa today with no adverse reactions  Next apts confirmed  Left unit ambulatory with a steady gait

## 2023-02-24 DIAGNOSIS — J30.9 ALLERGIC RHINITIS, UNSPECIFIED SEASONALITY, UNSPECIFIED TRIGGER: ICD-10-CM

## 2023-02-24 RX ORDER — FLUTICASONE PROPIONATE 50 MCG
SPRAY, SUSPENSION (ML) NASAL
Qty: 48 ML | Refills: 1 | Status: SHIPPED | OUTPATIENT
Start: 2023-02-24

## 2023-03-20 ENCOUNTER — HOSPITAL ENCOUNTER (OUTPATIENT)
Dept: RADIOLOGY | Facility: HOSPITAL | Age: 78
Discharge: HOME | End: 2023-03-20
Attending: INTERNAL MEDICINE

## 2023-03-20 ENCOUNTER — OFFICE VISIT (OUTPATIENT)
Dept: RHEUMATOLOGY | Facility: CLINIC | Age: 78
End: 2023-03-20

## 2023-03-20 VITALS
DIASTOLIC BLOOD PRESSURE: 80 MMHG | WEIGHT: 242.4 LBS | BODY MASS INDEX: 45.76 KG/M2 | SYSTOLIC BLOOD PRESSURE: 122 MMHG | HEIGHT: 61 IN

## 2023-03-20 DIAGNOSIS — M19.049 HAND ARTHRITIS: Primary | ICD-10-CM

## 2023-03-20 DIAGNOSIS — G89.29 CHRONIC BILATERAL LOW BACK PAIN, UNSPECIFIED WHETHER SCIATICA PRESENT: ICD-10-CM

## 2023-03-20 DIAGNOSIS — M79.10 MYALGIA: ICD-10-CM

## 2023-03-20 DIAGNOSIS — M54.50 CHRONIC BILATERAL LOW BACK PAIN, UNSPECIFIED WHETHER SCIATICA PRESENT: ICD-10-CM

## 2023-03-20 DIAGNOSIS — M19.049 HAND ARTHRITIS: ICD-10-CM

## 2023-03-20 NOTE — PATIENT INSTRUCTIONS
Use over the counter Voltaren gel and apply it to your painful joints up to 4 times per day  You can also try OTC Aspercreme with lidocaine and apply it to your painful joints 2-3 times per day  For your low back pain you can try Salonpas lidocaine patches and apply them to your low back and leave on for 12 hours  Please have your blood work (non-fasting) and xrays done at your earliest convenience    I placed a referral to our comprehensive spine program

## 2023-03-20 NOTE — PROGRESS NOTES
Rheumatology Consult   Israel Wheatley 78 y o  female 1945    DATE: 3/20/2023    Reason for Consult: polyarticular arthritis, low back pain  Assessment and Plan:  Bilateral hand arthritis/low back pain, knee/hip OA  PT  Check serologies, CK, esr/crp  Xrays hands/low back pain  Anti-inflammatory diet/exercise/weight control  Increase CV fitness/aerobic activity  Patient to continue to monitor symptoms  Topical NSAIDs/analgesics PRN joint pain  Referral to comprehensive spine program '  F/u in 6 mos  or sooner if necessary    History of Present Illness:  Israel Wheatley is a 78 y o  female Here for initial eval of polyarticular joint pain  She has hand pain and swelling and AM stiffness  She has bilateral knee pain and swelling and stiffness as well  She has chronic LBP and had received epidural CS injections in the past   She has also undergone PT  Review of Systems  Review of Systems   Constitutional: Negative for fatigue  HENT: Negative for mouth sores  Respiratory: Negative for cough and shortness of breath  Cardiovascular: Negative for chest pain and leg swelling  Gastrointestinal: Negative for abdominal pain, constipation and diarrhea  Musculoskeletal: Positive for arthralgias, back pain and joint swelling  Negative for myalgias  Skin: Negative for color change and rash  Neurological: Negative for weakness  Hematological: Negative for adenopathy  Psychiatric/Behavioral: Negative for sleep disturbance         Allergies  No Known Allergies    Current Medications      Past Medical History  Past Medical History:   Diagnosis Date   • Asthma    • Degenerative joint disease    • GERD (gastroesophageal reflux disease)    • Hypertension    • Lumbar disc disease    • Lung cancer ([facility] [address] )    • Sleep apnea     suspected    • Ventricular arrhythmia    • Vitamin D deficiency        Past Surgical History  Past Surgical History:   Procedure Laterality Date   • APPENDECTOMY     • COLONOSCOPY N/A 2019    Procedure: COLONOSCOPY;  Surgeon: Honorio Mark DO;  Location: [facility]; Service: Gastroenterology   • ESOPHAGOGASTRODUODENOSCOPY N/A 2019    Procedure: ESOPHAGOGASTRODUODENOSCOPY (EGD); Surgeon: Honorio Mark DO;  Location: [facility]; Service: Gastroenterology   • KNEE SURGERY     • ROTATOR CUFF REPAIR     • SHOULDER SURGERY         Family History  No known autoimmune or inflammatory diseases in the family  Family History   Problem Relation Age of Onset   • Stroke Mother    • Diabetes Mother    • Hyperlipidemia Mother    • Hypertension Mother    • Allergies Mother         Environmental   • Asthma Mother    • Diabetes Father    • Hyperlipidemia Father    • Hypertension Father    • Lung cancer Father 79   • Allergies Father         Environmental   • Asthma Father    • Lymphoma Sister 71   • Heart disease Sister         Pacemaker   • Asthma Brother    • Aneurysm Brother         Brain - had surgery   • Other Brother         Lymes disease   • Coronary artery disease Daughter         2 bypass done   • No Known Problems Daughter    • No Known Problems Daughter    • No Known Problems Son    • Kidney disease Son    • Liver disease Son    • Obesity Son        Social History  Occupation: retired nurses aid  Social History     Substance and Sexual Activity   Alcohol Use Yes    Comment: occasionally     Social History     Substance and Sexual Activity   Drug Use Never     Social History     Tobacco Use   Smoking Status Former   • Packs/day: 0 25   • Years: 25 00   • Pack years: 6 25   • Types: Cigarettes   • Start date: 48   • Quit date: 26   • Years since quittin 2   Smokeless Tobacco Former        Objective:  /80   Ht 5' 1" (1 549 m)   Wt 110 kg (242 lb 6 4 oz)   BMI 45 80 kg/m²     Physical Exam  Constitutional:       General: She is not in acute distress  HENT:      Head: Normocephalic and atraumatic     Eyes:      Conjunctiva/sclera: Conjunctivae normal  Cardiovascular:      Rate and Rhythm: Normal rate and regular rhythm  Heart sounds: S1 normal and S2 normal      No friction rub  Pulmonary:      Effort: Pulmonary effort is normal  No respiratory distress  Breath sounds: Normal breath sounds  No wheezing, rhonchi or rales  Musculoskeletal:      Right hand: Swelling and tenderness present  Left hand: Swelling and tenderness present  Cervical back: Neck supple  Right knee: Crepitus present  Left knee: Crepitus present  Skin:     General: Skin is warm and dry  Coloration: Skin is not pale  Findings: No rash  Neurological:      Mental Status: She is alert  Mental status is at baseline  Psychiatric:         Mood and Affect: Mood normal          Behavior: Behavior normal             Lab Results: I have personally reviewed pertinent reports        CBC:   , Chemistry Profile:       Invalid input(s): ALBUMIN, Coagulation Studies:   , Cardiac Studies:   , Additional Labs:   , iSTAT CHEM 8:       Invalid input(s): POTASSIUMIS, ABG:   , Toxicology:   , Last A1C/Lipid Panel/Thyroid Panel:   Lab Results   Component Value Date    TRIG 144 02/14/2020    TRIG 173 (H) 11/06/2018    HDL 54 02/14/2020    HDL 47 11/06/2018    LDLCALC 134 (H) 02/14/2020    LDLCALC 112 (H) 11/06/2018    [id number] 2 400 11/06/2018     Lab Results   Component Value Date    HEPCAB Low Reactive (A) 03/06/2019           Invalid input(s): URIBILINOGEN         Imaging: I have personally reviewed pertinent films in PACS

## 2023-03-28 ENCOUNTER — RA CDI HCC (OUTPATIENT)
Dept: OTHER | Facility: HOSPITAL | Age: 78
End: 2023-03-28

## 2023-03-28 DIAGNOSIS — C79.51 BONE METASTASIS: ICD-10-CM

## 2023-03-28 DIAGNOSIS — C34.91 NON-SMALL CELL CANCER OF RIGHT LUNG (HCC): ICD-10-CM

## 2023-03-28 NOTE — PROGRESS NOTES
[facility]  coding opportunities       Chart reviewed, no opportunity found: CHART REVIEWED, NO OPPORTUNITY FOUND      Mismatch report  Patients Insurance        Commercial Insurance: [employer]

## 2023-03-30 DIAGNOSIS — I10 ESSENTIAL HYPERTENSION: ICD-10-CM

## 2023-03-31 ENCOUNTER — TELEPHONE (OUTPATIENT)
Dept: RHEUMATOLOGY | Facility: CLINIC | Age: 78
End: 2023-03-31

## 2023-04-04 RX ORDER — IRBESARTAN 300 MG/1
TABLET ORAL
Qty: 90 TABLET | Refills: 0 | Status: SHIPPED | OUTPATIENT
Start: 2023-04-04

## 2023-04-20 ENCOUNTER — APPOINTMENT (EMERGENCY)
Dept: RADIOLOGY | Facility: HOSPITAL | Age: 78
End: 2023-04-20

## 2023-04-20 ENCOUNTER — HOSPITAL ENCOUNTER (EMERGENCY)
Facility: HOSPITAL | Age: 78
Discharge: HOME | End: 2023-04-20
Attending: EMERGENCY MEDICINE | Admitting: EMERGENCY MEDICINE

## 2023-04-20 VITALS
RESPIRATION RATE: 18 BRPM | OXYGEN SATURATION: 95 % | HEART RATE: 66 BPM | HEIGHT: 61 IN | WEIGHT: 244.71 LBS | BODY MASS INDEX: 46.2 KG/M2 | SYSTOLIC BLOOD PRESSURE: 159 MMHG | TEMPERATURE: 97.8 F | DIASTOLIC BLOOD PRESSURE: 71 MMHG

## 2023-04-20 DIAGNOSIS — Z85.118 HISTORY OF LUNG CANCER: ICD-10-CM

## 2023-04-20 DIAGNOSIS — Z86.79 HISTORY OF HYPERTENSION: ICD-10-CM

## 2023-04-20 DIAGNOSIS — R07.9 CHEST PAIN: Primary | ICD-10-CM

## 2023-04-20 LAB
2HR DELTA HS TROPONIN: 0 NG/L
ANION GAP SERPL CALCULATED.3IONS-SCNC: 8 MMOL/L (ref 4–13)
ANISOCYTOSIS BLD QL SMEAR: PRESENT
ATRIAL RATE: 65 BPM
ATRIAL RATE: 71 BPM
BASOPHILS # BLD MANUAL: 0 THOUSAND/UL (ref 0–0.1)
BASOPHILS NFR MAR MANUAL: 0 % (ref 0–1)
BUN SERPL-MCNC: 27 MG/DL (ref 5–25)
BURR CELLS BLD QL SMEAR: PRESENT
CALCIUM SERPL-MCNC: 9.4 MG/DL (ref 8.4–10.2)
CARDIAC TROPONIN I PNL SERPL HS: 11 NG/L
CARDIAC TROPONIN I PNL SERPL HS: 11 NG/L
CHLORIDE SERPL-SCNC: 103 MMOL/L (ref 96–108)
CO2 SERPL-SCNC: 31 MMOL/L (ref 21–32)
CREAT SERPL-MCNC: 1.3 MG/DL (ref 0.6–1.3)
EOSINOPHIL # BLD MANUAL: 0 THOUSAND/UL (ref 0–0.4)
EOSINOPHIL NFR BLD MANUAL: 0 % (ref 0–6)
ERYTHROCYTE [DISTWIDTH] IN BLOOD BY AUTOMATED COUNT: 15.4 % (ref 11.6–15.1)
GFR SERPL CREATININE-BSD FRML MDRD: 39 ML/MIN/1.73SQ M
GLUCOSE SERPL-MCNC: 160 MG/DL (ref 65–140)
HCT VFR BLD AUTO: 34.6 % (ref 34.8–46.1)
HGB BLD-MCNC: 11.5 G/DL (ref 11.5–15.4)
LYMPHOCYTES # BLD AUTO: 1.31 THOUSAND/UL (ref 0.6–4.47)
LYMPHOCYTES # BLD AUTO: 19 % (ref 14–44)
MCH RBC QN AUTO: 30 PG (ref 26.8–34.3)
MCHC RBC AUTO-ENTMCNC: 33.2 G/DL (ref 31.4–37.4)
MCV RBC AUTO: 90 FL (ref 82–98)
MONOCYTES # BLD AUTO: 0.41 THOUSAND/UL (ref 0–1.22)
MONOCYTES NFR BLD: 6 % (ref 4–12)
NEUTROPHILS # BLD MANUAL: 5.15 THOUSAND/UL (ref 1.85–7.62)
NEUTS SEG NFR BLD AUTO: 75 % (ref 43–75)
P AXIS: 56 DEGREES
P AXIS: 58 DEGREES
PLATELET # BLD AUTO: 83 THOUSANDS/UL (ref 149–390)
PLATELET BLD QL SMEAR: ABNORMAL
POTASSIUM SERPL-SCNC: 4.2 MMOL/L (ref 3.5–5.3)
PR INTERVAL: 148 MS
PR INTERVAL: 160 MS
QRS AXIS: 37 DEGREES
QRS AXIS: 44 DEGREES
QRSD INTERVAL: 80 MS
QRSD INTERVAL: 84 MS
QT INTERVAL: 376 MS
QT INTERVAL: 410 MS
QTC INTERVAL: 408 MS
QTC INTERVAL: 426 MS
RBC # BLD AUTO: 3.83 MILLION/UL (ref 3.81–5.12)
RBC MORPH BLD: PRESENT
SODIUM SERPL-SCNC: 142 MMOL/L (ref 135–147)
T WAVE AXIS: 30 DEGREES
T WAVE AXIS: 47 DEGREES
VENTRICULAR RATE: 65 BPM
VENTRICULAR RATE: 71 BPM
WBC # BLD AUTO: 6.87 THOUSAND/UL (ref 4.31–10.16)

## 2023-04-20 NOTE — ED NOTES
Multiple RNs still unable to obtain IV access and collect all remaining blood work  Provider aware   Will have a third RN attempt      Almeta Dakins, RN  04/20/23 0239

## 2023-04-20 NOTE — DISCHARGE INSTRUCTIONS
Return to the ER if your chest pain changes in quality or location, or becomes associated with shortness of breath, lightheadedness, vomiting or sweating  An order for cardiology follow up has been placed  You should follow up for further management  Regrese a la jose de emergencias si ruiz dolor de pecho cambia en calidad o ubicación, o se asocia con dificultad para respirar, mareos, vómitos o sudoración  Se ha realizado un pedido de seguimiento cardiológico  Debe hacer un seguimiento para ruiz posterior manejo 
today

## 2023-04-20 NOTE — ED PROVIDER NOTES
"History  Chief Complaint   Patient presents with   • Chest Pain     Pt reports chest pain episodes in which she gets sharp chest pain, SOB, weakness, pain in back, pain in posterior head  Several episodes this week  Described \"burning pain\" in her chest       70 y o  F w/h/o HTN, lung CA on chemo, GERD p/w chest pain x few days  Substernal, radiates to back/back of head, sharp and burning, intermittent  Lasts up to 15 minutes  Has some SOB and nausea with it  Reports she \"sometimes\" has diaphoresis with episodes  Denies F/C, abd pain, h/o PE/DVT, h/o MI  History provided by:  Patient   used: No    Chest Pain  Pain location:  Substernal area  Pain quality: sharp    Pain radiates to:  Mid back  Pain radiates to the back: yes    Timing:  Intermittent  Progression:  Unchanged  Chronicity:  New  Associated symptoms: diaphoresis (\"sometimes\") and shortness of breath    Associated symptoms: no abdominal pain, no cough, no fever, no nausea and not vomiting    Risk factors: hypertension    Risk factors: no coronary artery disease, no diabetes mellitus and no high cholesterol        Prior to Admission Medications   Prescriptions Last Dose Informant Patient Reported? Taking? Alectinib HCl 150 MG CAPS   No No   Sig: Take 4 capsules (600 mg total) by mouth 2 (two) times a day   Fluticasone-Salmeterol (Advair) 500-50 mcg/dose inhaler   No No   Sig: Inhale 1 puff 2 (two) times a day Rinse mouth after use  albuterol (2 5 mg/3 mL) 0 083 % nebulizer solution   No No   Sig: Take 3 mL (2 5 mg total) by nebulization every 6 (six) hours as needed for wheezing   albuterol (Ventolin HFA) 90 mcg/act inhaler   No No   Sig: Inhale 2 puffs every 6 (six) hours as needed for wheezing   atenolol (TENORMIN) 25 mg tablet   No No   Sig: TAKE 1 TABLET (25 MG TOTAL) BY MOUTH DAILY     benralizumab (FASENRA) subcutaneous injection   No No   Sig: Inject 1 mL (30 mg total) under the skin every 56 days   ferrous sulfate " 324 (65 Fe) mg   No No   Sig: Take 1 tablet (324 mg total) by mouth every other day   fexofenadine (ALLEGRA) 180 MG tablet   Yes No   Sig: Take 180 mg by mouth daily   fluticasone (FLONASE) 50 mcg/act nasal spray   No No   Sig: SPRAY 2 SPRAYS INTO EACH NOSTRIL EVERY DAY   furosemide (LASIX) 20 mg tablet   No No   Sig: TAKE 1 TABLET BY MOUTH EVERY DAY   ibuprofen (MOTRIN) 200 mg tablet   Yes No   Sig: Take by mouth every 6 (six) hours as needed   irbesartan (AVAPRO) 300 mg tablet   No No   Sig: TAKE 1 TABLET BY MOUTH DAILY AT BEDTIME   omeprazole (PriLOSEC) 20 mg delayed release capsule   No No   Sig: Take 1 capsule (20 mg total) by mouth daily   oxyCODONE-acetaminophen (PERCOCET) 5-325 mg per tablet   No No   Sig: Take 1 tablet by mouth every 4 (four) hours as needed for moderate pain For ongoing pain Max Daily Amount: 6 tablets      Facility-Administered Medications: None       Past Medical History:   Diagnosis Date   • Asthma    • Degenerative joint disease    • GERD (gastroesophageal reflux disease)    • Hypertension    • Lumbar disc disease    • Lung cancer (HCC)    • Sleep apnea     suspected    • Ventricular arrhythmia    • Vitamin D deficiency        Past Surgical History:   Procedure Laterality Date   • APPENDECTOMY     • COLONOSCOPY N/A 03/18/2019    Procedure: COLONOSCOPY;  Surgeon: Brigido Garcia DO;  Location: [facility]; Service: Gastroenterology   • ESOPHAGOGASTRODUODENOSCOPY N/A 03/18/2019    Procedure: ESOPHAGOGASTRODUODENOSCOPY (EGD); Surgeon: Brigido Garcia DO;  Location: [facility];   Service: Gastroenterology   • KNEE SURGERY     • ROTATOR CUFF REPAIR     • SHOULDER SURGERY         Family History   Problem Relation Age of Onset   • Stroke Mother    • Diabetes Mother    • Hyperlipidemia Mother    • Hypertension Mother    • Allergies Mother         Environmental   • Asthma Mother    • Diabetes Father    • Hyperlipidemia Father    • Hypertension Father    • Lung cancer Father 79   • "Allergies Father         Environmental   • Asthma Father    • Lymphoma Sister 71   • Heart disease Sister         Pacemaker   • Asthma Brother    • Aneurysm Brother         Brain - had surgery   • Other Brother         Lymes disease   • Coronary artery disease Daughter         2 bypass done   • No Known Problems Daughter    • No Known Problems Daughter    • No Known Problems Son    • Kidney disease Son    • Liver disease Son    • Obesity Son      I have reviewed and agree with the history as documented  E-Cigarette/Vaping   • E-Cigarette Use Never User      E-Cigarette/Vaping Substances   • Nicotine No    • THC No    • CBD No    • Flavoring No    • Other No    • Unknown No      Social History     Tobacco Use   • Smoking status: Former     Packs/day: 0 25     Years: 25 00     Pack years: 6 25     Types: Cigarettes     Start date:      Quit date:      Years since quittin 3   • Smokeless tobacco: Former   Vaping Use   • Vaping Use: Never used   Substance Use Topics   • Alcohol use: Yes     Comment: occasionally   • Drug use: Never       Review of Systems   Constitutional: Positive for diaphoresis (\"sometimes\")  Negative for fever  Respiratory: Positive for shortness of breath  Negative for cough  Cardiovascular: Positive for chest pain and leg swelling (baseline, no worse than usual)  Gastrointestinal: Negative for abdominal pain, nausea and vomiting  Physical Exam  Physical Exam  Vitals and nursing note reviewed  Constitutional:       General: She is not in acute distress  Appearance: She is well-developed  She is not ill-appearing, toxic-appearing or diaphoretic  HENT:      Head: Normocephalic and atraumatic  Eyes:      General: No scleral icterus  Conjunctiva/sclera:      Right eye: Right conjunctiva is not injected  Left eye: Left conjunctiva is not injected  Neck:      Vascular: No JVD        Trachea: Trachea normal    Cardiovascular:      Rate and Rhythm: Normal " rate and regular rhythm  Pulses: Normal pulses  Heart sounds: Normal heart sounds  No murmur heard  No friction rub  Pulmonary:      Effort: Pulmonary effort is normal  No accessory muscle usage or respiratory distress  Breath sounds: Normal breath sounds  No stridor  No wheezing, rhonchi or rales  Chest:      Chest wall: No tenderness  Abdominal:      General: There is no distension  Palpations: Abdomen is soft  Tenderness: There is no abdominal tenderness  There is no guarding or rebound  Musculoskeletal:      Cervical back: Normal range of motion  Right lower leg: No tenderness  No edema  Left lower leg: No tenderness  No edema  Skin:     General: Skin is warm and dry  Coloration: Skin is not pale  Findings: No rash  Neurological:      Mental Status: She is alert  GCS: GCS eye subscore is 4  GCS verbal subscore is 5  GCS motor subscore is 6     Psychiatric:         Behavior: Behavior normal          Vital Signs  ED Triage Vitals [04/20/23 1612]   Temperature Pulse Respirations Blood Pressure SpO2   97 8 °F (36 6 °C) 74 22 (!) 171/83 95 %      Temp Source Heart Rate Source Patient Position - Orthostatic VS BP Location FiO2 (%)   Oral Monitor Sitting Right arm --      Pain Score       7           Vitals:    04/20/23 1612 04/20/23 1837   BP: (!) 171/83 133/61   Pulse: 74 65   Patient Position - Orthostatic VS: Sitting Lying         Visual Acuity      ED Medications  Medications - No data to display    Diagnostic Studies  Results Reviewed     Procedure Component Value Units Date/Time    HS Troponin I 2hr [[id number]] Collected: 04/20/23 1858    Lab Status: No result Specimen: Blood from Arm, Right     HS Troponin I 4hr [[id number]]     Lab Status: No result Specimen: Blood     Manual Differential(PHLEBS Do Not Order) [[id number]]  (Abnormal) Collected: 04/20/23 1634    Lab Status: Final result Specimen: Blood from Arm, Right Updated: 04/20/23 1831 Segmented % 75 %      Lymphocytes % 19 %      Monocytes % 6 %      Eosinophils, % 0 %      Basophils % 0 %      Absolute Neutrophils 5 15 Thousand/uL      Lymphocytes Absolute 1 31 Thousand/uL      Monocytes Absolute 0 41 Thousand/uL      Eosinophils Absolute 0 00 Thousand/uL      Basophils Absolute 0 00 Thousand/uL      Total Counted --     RBC Morphology Present     Anisocytosis Present     Frank Cells Present     Platelet Estimate Decreased    CBC and differential [[id number]]  (Abnormal) Collected: 04/20/23 1634    Lab Status: Final result Specimen: Blood from Arm, Right Updated: 04/20/23 1831     WBC 6 87 Thousand/uL      RBC 3 83 Million/uL      Hemoglobin 11 5 g/dL      Hematocrit 34 6 %      MCV 90 fL      MCH 30 0 pg      MCHC 33 2 g/dL      RDW 15 4 %      Platelets 83 Thousands/uL     HS Troponin 0hr (reflex protocol) [[id number]]  (Normal) Collected: 04/20/23 1634    Lab Status: Final result Specimen: Blood from Arm, Right Updated: 04/20/23 1711     hs TnI 0hr 11 ng/L     Basic metabolic panel [[id number]]  (Abnormal) Collected: 04/20/23 1634    Lab Status: Final result Specimen: Blood from Arm, Right Updated: 04/20/23 1705     Sodium 142 mmol/L      Potassium 4 2 mmol/L      Chloride 103 mmol/L      CO2 31 mmol/L      ANION GAP 8 mmol/L      BUN 27 mg/dL      Creatinine 1 30 mg/dL      Glucose 160 mg/dL      Calcium 9 4 mg/dL      eGFR 39 ml/min/1 73sq m     Narrative:      [place] guidelines for Chronic Kidney Disease (CKD):   •  Stage 1 with normal or high GFR (GFR > 90 mL/min/1 73 square meters)  •  Stage 2 Mild CKD (GFR = 60-89 mL/min/1 73 square meters)  •  Stage 3A Moderate CKD (GFR = 45-59 mL/min/1 73 square meters)  •  Stage 3B Moderate CKD (GFR = 30-44 mL/min/1 73 square meters)  •  Stage 4 Severe CKD (GFR = 15-29 mL/min/1 73 square meters)  •  Stage 5 End Stage CKD (GFR <15 mL/min/1 73 square meters)  Note: GFR calculation is accurate only with a steady state creatinine XR chest 2 views    (Results Pending)              Procedures  ECG 12 Lead Documentation Only    Date/Time: 4/20/2023 4:24 PM  Performed by: Fay Jones DO  Authorized by: Fay Jones DO     Indications / Diagnosis:  Chest pain  ECG reviewed by me, the ED Provider: yes    Patient location:  Bedside  Rate:     ECG rate:  77    ECG rate assessment: normal    Rhythm:     Rhythm: sinus rhythm    Ectopy:     Ectopy: none    QRS:     QRS axis:  Normal    QRS intervals:  Normal  ST segments:     ST segments:  Normal  T waves:     T waves: normal      ECG 12 Lead Documentation Only    Date/Time: 4/20/2023 6:40 PM  Performed by: Fay Jones DO  Authorized by: Fay Jones DO     Indications / Diagnosis:  Chest pain  ECG reviewed by me, the ED Provider: yes    Patient location:  Bedside  Previous ECG:     Previous ECG:  Compared to current    Comparison ECG info:  4/20/23    Similarity:  No change  Rate:     ECG rate:  65    ECG rate assessment: normal    Rhythm:     Rhythm: sinus rhythm    Ectopy:     Ectopy: none    QRS:     QRS axis:  Normal    QRS intervals:  Normal  ST segments:     ST segments:  Normal  T waves:     T waves: normal               ED Course  ED Course as of 04/20/23 1906   Thu Apr 20, 2023   1712 hs TnI 0hr: 11  Will delta   1719 Creatinine: 1 30  Baseline   1830 Pt sleeping  Woke pt up  Updated pt on labs and plan for delta  If delta negative, pt to f/u with cardiology  Ambulatory referral placed     1831 CBC and differential(!)  Unremarkable   1906 Signed out to Dr Anay Adair to f/u on delta trop             HEART Risk Score    Flowsheet Row Most Recent Value   Heart Score Risk Calculator    History 1 Filed at: 04/20/2023 1715   ECG 0 Filed at: 04/20/2023 1715   Age 2 Filed at: 04/20/2023 1715   Risk Factors 1 Filed at: 04/20/2023 1715   Troponin 0 Filed at: 04/20/2023 1715   HEART Score 4 Filed at: 04/20/2023 [facility] [address] Most Recent Value   PERC Rule for PE    Age >=50 1 Filed at: 04/20/2023 1638   HR >=100 0 Filed at: 04/20/2023 1638   O2 Sat on room air < 95% 0 Filed at: 04/20/2023 1638   History of PE or DVT 0 Filed at: 04/20/2023 1638   Recent trauma or surgery 0 Filed at: 04/20/2023 1638   Hemoptysis 0 Filed at: 04/20/2023 1638   Exogenous estrogen 0 Filed at: 04/20/2023 1638   Unilateral leg swelling 0 Filed at: 04/20/2023 1638   PERC Rule for PE Results 1 Filed at: 04/20/2023 1638              SBIRT 20yo+    Flowsheet Row Most Recent Value   Initial Alcohol Screen: US AUDIT-C     1  How often do you have a drink containing alcohol? 0 Filed at: 04/20/2023 1615   2  How many drinks containing alcohol do you have on a typical day you are drinking? 0 Filed at: 04/20/2023 1615   3a  Male UNDER 65: How often do you have five or more drinks on one occasion? 0 Filed at: 04/20/2023 1615   3b  FEMALE Any Age, or MALE 65+: How often do you have 4 or more drinks on one occassion? 0 Filed at: 04/20/2023 1615   Audit-C Score 0 Filed at: 04/20/2023 1615   SERVANDO: How many times in the past year have you    Used an illegal drug or used a prescription medication for non-medical reasons?  Never Filed at: 04/20/2023 1615          Wells' Criteria for PE    Flowsheet Row Most Recent Value   Wells' Criteria for PE    Clinical signs and symptoms of DVT 0 Filed at: 04/20/2023 1621   PE is primary diagnosis or equally likely 0 Filed at: 04/20/2023 1621   HR >100 0 Filed at: 04/20/2023 1621   Immobilization at least 3 days or Surgery in the previous 4 weeks 0 Filed at: 04/20/2023 1621   Previous, objectively diagnosed PE or DVT 0 Filed at: 04/20/2023 1621   Hemoptysis 0 Filed at: 04/20/2023 1621   Malignancy with treatment within 6 months or palliative 1 Filed at: 04/20/2023 1621   Wells' Criteria Total 1 Filed at: 04/20/2023 1621                Medical Decision Making  Will check CBC to r/o anemia, BMP to r/o lyte abnormality, troponin to assess for NSTEMI, EKG to r/o STEMI/ischemic changes, CXR to r/o PTX/PNA/pulmonary edema/effusion  Amount and/or Complexity of Data Reviewed  Labs: ordered  Decision-making details documented in ED Course  Radiology: ordered            Disposition  Final diagnoses:   Chest pain   History of hypertension   History of lung cancer     Time reflects when diagnosis was documented in both MDM as applicable and the Disposition within this note     Time User Action Codes Description Comment    4/20/2023  4:36 PM Ru Varela 48 [R07 9] Chest pain     4/20/2023  4:36 PM Ru Varela 48 [Z86 [id number]] History of hypertension     4/20/2023  4:36 PM Ru Varela 48 [C67 [id number]] History of lung cancer       ED Disposition     None      Follow-up Information     Follow up With Specialties Details Why Contact Info [facility] Schedule an appointment as soon as possible for a visit  For follow up [address] [facility] [place] [phone]  [address] [place] [address] Cardiology [address], [place], [place], [id number] [phone]          Patient's Medications   Discharge Prescriptions    No medications on file           PDMP Review       Value Time User    PDMP Reviewed  Yes 3/24/2022  4:46 PM Otis Kendall, [facility]          ED Provider  Electronically Signed by           Jes [address], DO  04/20/23 8269

## 2023-04-21 DIAGNOSIS — J45.20 MILD INTERMITTENT ASTHMA WITHOUT COMPLICATION: ICD-10-CM

## 2023-04-21 DIAGNOSIS — I10 ESSENTIAL HYPERTENSION: ICD-10-CM

## 2023-04-23 RX ORDER — ATENOLOL 25 MG/1
25 TABLET ORAL DAILY
Qty: 90 TABLET | Refills: 1 | Status: SHIPPED | OUTPATIENT
Start: 2023-04-23

## 2023-04-23 RX ORDER — IRBESARTAN 300 MG/1
TABLET ORAL
Qty: 90 TABLET | Refills: 0 | Status: SHIPPED | OUTPATIENT
Start: 2023-04-23

## 2023-04-24 RX ORDER — ALBUTEROL SULFATE 2.5 MG/3ML
2.5 SOLUTION RESPIRATORY (INHALATION) EVERY 6 HOURS PRN
Qty: 375 ML | Refills: 5 | Status: SHIPPED | OUTPATIENT
Start: 2023-04-24

## 2023-04-27 ENCOUNTER — HOSPITAL ENCOUNTER (OUTPATIENT)
Dept: CT IMAGING | Facility: HOSPITAL | Age: 78
Discharge: HOME | End: 2023-04-27
Attending: INTERNAL MEDICINE

## 2023-04-27 DIAGNOSIS — C34.91 NON-SMALL CELL CARCINOMA OF LUNG, STAGE 4, RIGHT (HCC): ICD-10-CM

## 2023-04-27 DIAGNOSIS — J45.20 MILD INTERMITTENT ASTHMA WITHOUT COMPLICATION: ICD-10-CM

## 2023-04-27 DIAGNOSIS — C79.51 MALIGNANT NEOPLASM METASTATIC TO BONE (HCC): ICD-10-CM

## 2023-04-28 RX ORDER — ALBUTEROL SULFATE 90 UG/1
2 AEROSOL, METERED RESPIRATORY (INHALATION) EVERY 6 HOURS PRN
Qty: 18 G | Refills: 0 | OUTPATIENT
Start: 2023-04-28

## 2023-04-29 ENCOUNTER — APPOINTMENT (OUTPATIENT)
Dept: LAB | Facility: HOSPITAL | Age: 78
End: 2023-04-29

## 2023-04-29 DIAGNOSIS — C79.51 MALIGNANT NEOPLASM METASTATIC TO BONE (HCC): ICD-10-CM

## 2023-04-29 DIAGNOSIS — C34.91 NON-SMALL CELL CARCINOMA OF LUNG, STAGE 4, RIGHT (HCC): ICD-10-CM

## 2023-04-29 LAB
ALBUMIN SERPL BCP-MCNC: 4 G/DL (ref 3.5–5)
ALP SERPL-CCNC: 93 U/L (ref 34–104)
ALT SERPL W P-5'-P-CCNC: 27 U/L (ref 7–52)
ANION GAP SERPL CALCULATED.3IONS-SCNC: 10 MMOL/L (ref 4–13)
AST SERPL W P-5'-P-CCNC: 29 U/L (ref 13–39)
BILIRUB SERPL-MCNC: 0.82 MG/DL (ref 0.2–1)
BUN SERPL-MCNC: 28 MG/DL (ref 5–25)
CALCIUM SERPL-MCNC: 9.1 MG/DL (ref 8.4–10.2)
CHLORIDE SERPL-SCNC: 101 MMOL/L (ref 96–108)
CO2 SERPL-SCNC: 32 MMOL/L (ref 21–32)
CREAT SERPL-MCNC: 1 MG/DL (ref 0.6–1.3)
GFR SERPL CREATININE-BSD FRML MDRD: 54 ML/MIN/1.73SQ M
GLUCOSE P FAST SERPL-MCNC: 117 MG/DL (ref 65–99)
POTASSIUM SERPL-SCNC: 3.7 MMOL/L (ref 3.5–5.3)
PROT SERPL-MCNC: 6.9 G/DL (ref 6.4–8.4)
SODIUM SERPL-SCNC: 143 MMOL/L (ref 135–147)

## 2023-05-02 ENCOUNTER — OFFICE VISIT (OUTPATIENT)
Dept: FAMILY MEDICINE CLINIC | Facility: CLINIC | Age: 78
End: 2023-05-02

## 2023-05-02 VITALS
OXYGEN SATURATION: 92 % | HEART RATE: 66 BPM | DIASTOLIC BLOOD PRESSURE: 74 MMHG | BODY MASS INDEX: 46.82 KG/M2 | RESPIRATION RATE: 21 BRPM | HEIGHT: 61 IN | SYSTOLIC BLOOD PRESSURE: 142 MMHG | WEIGHT: 248 LBS | TEMPERATURE: 97.4 F

## 2023-05-02 DIAGNOSIS — J45.20 MILD INTERMITTENT ASTHMA WITHOUT COMPLICATION: ICD-10-CM

## 2023-05-02 DIAGNOSIS — J45.30 MILD PERSISTENT ASTHMA WITHOUT COMPLICATION: Primary | ICD-10-CM

## 2023-05-02 DIAGNOSIS — C34.90 MALIGNANT NEOPLASM DETERMINED BY BIOPSY OF LUNG (HCC): ICD-10-CM

## 2023-05-02 RX ORDER — OXYCODONE HYDROCHLORIDE AND ACETAMINOPHEN 5; 325 MG/1; MG/1
1 TABLET ORAL EVERY 4 HOURS PRN
Qty: 60 TABLET | Refills: 0 | Status: SHIPPED | OUTPATIENT
Start: 2023-05-02

## 2023-05-02 RX ORDER — BUDESONIDE 0.5 MG/2ML
0.5 INHALANT ORAL
Status: CANCELLED | OUTPATIENT
Start: 2023-05-02

## 2023-05-02 RX ORDER — ALBUTEROL SULFATE 90 UG/1
2 AEROSOL, METERED RESPIRATORY (INHALATION) EVERY 6 HOURS PRN
Qty: 18 G | Refills: 2 | Status: SHIPPED | OUTPATIENT
Start: 2023-05-02

## 2023-05-02 RX ORDER — BUDESONIDE 0.25 MG/2ML
0.25 INHALANT ORAL 2 TIMES DAILY
Qty: 2 ML | Refills: 2 | Status: SHIPPED | OUTPATIENT
Start: 2023-05-02

## 2023-05-02 NOTE — ASSESSMENT & PLAN NOTE
Patient with a hx of lung cancer currently in chemo treatment  Follows with oncology      -Refilled percocet 5-325 mg q4h prn   -Advised palliative care ( Patient will like to do her own research discuss this at our next office visit)

## 2023-05-02 NOTE — ASSESSMENT & PLAN NOTE
Patient reports she has had to use her albuterol inhaler several times due to her allergies which she follows with immunology    -Refilled Ventolin   -Refilled Budesonide

## 2023-05-02 NOTE — PROGRESS NOTES
Name: Georgette Henry      : [id number]      MRN: [id number]  Encounter Provider: Bridget Henry MD  Encounter Date: 2023   Encounter department: [address] [place] [address]     1  Mild persistent asthma without complication  Assessment & Plan:  Patient reports she has had to use her albuterol inhaler several times due to her allergies which she follows with immunology    -Refilled Ventolin   -Refilled Budesonide     Orders:  -     budesonide (Pulmicort) 0 25 mg/2 mL nebulizer solution; Take 2 mL (0 25 mg total) by nebulization 2 (two) times a day Rinse mouth after use  2  Malignant neoplasm determined by biopsy of lung [facility])  Assessment & Plan:  Patient with a hx of lung cancer currently in chemo treatment  Follows with oncology  -Refilled percocet 5-325 mg q4h prn   -Advised palliative care ( Patient will like to do her own research discuss this at our next office visit)     Orders:  -     oxyCODONE-acetaminophen (PERCOCET) 5-325 mg per tablet; Take 1 tablet by mouth every 4 (four) hours as needed for moderate pain For ongoing pain Max Daily Amount: 6 tablets    3  Mild intermittent asthma without complication  -     albuterol (Ventolin HFA) 90 mcg/act inhaler; Inhale 2 puffs every 6 (six) hours as needed for wheezing         Subjective      Georgette Henry is a very pleasant 69 yo female with a PMI of Lung cancer with metastasis on chemotherapy, GERD, Asthma, and allergic rhinitis presents today for follow up of her chronic conditions and medication refills  Patient states she was recently started on biologic injections Maryln Linnea ) given once monthly due to allergies to multiple conditions that have exacerbated her Asthma  She follows with [facility] immunology  Patient reports concern for many episodes of generalized episodes of pain due to her cancer  During this office encounter her percocet medication, albuterol and ventolin were refilled    Due to Joanne Webb having multiple health conditions palliative care was offerred however, she denied at this time  Patient will like to do her own research of palliative care in order to make a decision  Will touch base with her on this topic at next office visit  Review of Systems   Constitutional: Positive for activity change  Respiratory: Positive for cough and shortness of breath (Patient gets SOB after walking 1 blockk and sometimes a few steps which is her baseline )  Cardiovascular: Negative  Gastrointestinal: Negative  Endocrine: Negative  Genitourinary: Negative  Musculoskeletal: Positive for arthralgias and myalgias  Allergic/Immunologic: Positive for environmental allergies and immunocompromised state  Psychiatric/Behavioral: Negative  Current Outpatient Medications on File Prior to Visit   Medication Sig    albuterol (2 5 mg/3 mL) 0 083 % nebulizer solution TAKE 3 ML (2 5 MG TOTAL) BY NEBULIZATION EVERY 6 (SIX) HOURS AS NEEDED FOR WHEEZING    Alectinib HCl 150 MG CAPS Take 4 capsules (600 mg total) by mouth 2 (two) times a day    atenolol (TENORMIN) 25 mg tablet TAKE 1 TABLET (25 MG TOTAL) BY MOUTH DAILY   benralizumab (FASENRA) subcutaneous injection Inject 1 mL (30 mg total) under the skin every 56 days    ferrous sulfate 324 (65 Fe) mg Take 1 tablet (324 mg total) by mouth every other day    fexofenadine (ALLEGRA) 180 MG tablet Take 180 mg by mouth daily    fluticasone (FLONASE) 50 mcg/act nasal spray SPRAY 2 SPRAYS INTO EACH NOSTRIL EVERY DAY    Fluticasone-Salmeterol (Advair) 500-50 mcg/dose inhaler Inhale 1 puff 2 (two) times a day Rinse mouth after use      furosemide (LASIX) 20 mg tablet TAKE 1 TABLET BY MOUTH EVERY DAY    ibuprofen (MOTRIN) 200 mg tablet Take by mouth every 6 (six) hours as needed    irbesartan (AVAPRO) 300 mg tablet TAKE 1 TABLET BY MOUTH EVERYDAY AT BEDTIME    omeprazole (PriLOSEC) 20 mg delayed release capsule Take 1 capsule (20 mg total) "by mouth daily    [DISCONTINUED] albuterol (Ventolin HFA) 90 mcg/act inhaler Inhale 2 puffs every 6 (six) hours as needed for wheezing    [DISCONTINUED] oxyCODONE-acetaminophen (PERCOCET) 5-325 mg per tablet Take 1 tablet by mouth every 4 (four) hours as needed for moderate pain For ongoing pain Max Daily Amount: 6 tablets       Objective     /74 (BP Location: Left arm, Patient Position: Sitting, Cuff Size: Large)   Pulse 66   Temp (!) 97 4 °F (36 3 °C) (Temporal)   Resp 21   Ht 5' 1\" (1 549 m)   Wt 112 kg (248 lb)   SpO2 92%   BMI 46 86 kg/m²     Physical Exam  Vitals reviewed  Constitutional:       Appearance: She is obese  HENT:      Head: Normocephalic  Nose: Nose normal       Mouth/Throat:      Mouth: Mucous membranes are moist    Eyes:      Conjunctiva/sclera: Conjunctivae normal    Cardiovascular:      Rate and Rhythm: Normal rate and regular rhythm  Pulses: Normal pulses  Heart sounds: Normal heart sounds  Heart sounds not distant  No murmur heard  No friction rub  No gallop  Pulmonary:      Effort: Pulmonary effort is normal       Breath sounds: Normal breath sounds  Abdominal:      General: Bowel sounds are normal    Musculoskeletal:      Cervical back: Normal range of motion  Right lower le+ Pitting Edema present  Left lower le+ Pitting Edema present  Skin:     General: Skin is warm and dry  Neurological:      General: No focal deficit present  Mental Status: She is alert         Wally Potter MD  "

## 2023-05-04 ENCOUNTER — OFFICE VISIT (OUTPATIENT)
Dept: HEMATOLOGY ONCOLOGY | Facility: CLINIC | Age: 78
End: 2023-05-04

## 2023-05-04 VITALS
BODY MASS INDEX: 44.37 KG/M2 | TEMPERATURE: 96.9 F | SYSTOLIC BLOOD PRESSURE: 134 MMHG | WEIGHT: 235 LBS | DIASTOLIC BLOOD PRESSURE: 72 MMHG | HEART RATE: 67 BPM | HEIGHT: 61 IN | OXYGEN SATURATION: 93 % | RESPIRATION RATE: 18 BRPM

## 2023-05-04 DIAGNOSIS — C34.91 NON-SMALL CELL CARCINOMA OF LUNG, STAGE 4, RIGHT (HCC): Primary | ICD-10-CM

## 2023-05-04 RX ORDER — SENNOSIDES 8.6 MG
CAPSULE ORAL
COMMUNITY
Start: 2023-04-07

## 2023-05-04 NOTE — PROGRESS NOTES
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, positive for ALK rearranagement    Ms Emeli Dobbs is a 21-year-old female with metastatic adenocarcinoma of the lung with ALK rearrangement, diagnosed in August 2020  [facility] lung cancer was discovered incidentally  Tiana Salmeron has limited smoking history with 3-4 cigarettes for 20 years until 1986  She had right hilar mass and diffuse osseous metastasis  Patient was started on ALK targeted therapy with Alectinib with no toxicity, resulting in near complete resolution of lung mass  Patient reports development of bilateral lower extremity swelling after being started on treatment; edema can be seen as a side effect with alectinib, however, patient denies any acute worsening in recent months  Clinically and radiographically, there is no evidence of disease progression  Patient has been recommended to continue Alectinib 600 mg twice daily  Additionally, patient will continue Zometa infusions every 3 months  Patient was recommended to follow-up in office in about 6 months with repeat CBC, CMP, and CT CAP       Subjective:      HPI:  A 21-year-old female who has limited smoking history   She smoked 20 years with 3-4 cigarettes per day until 1986   She recently developed right flank pain for which she went to the emergency department  Tiana Salmeron was found instantly found to have right lung mass   Her right flank pain disappeared without knowing the clear etiology   She underwent PET-CT scan which showed hypermetabolic right upper lobe mass as well as hypermetabolic T8 lesion   In addition, she had multiple iliac bone lesion with SUV 12 4   She underwent bronchoscopy and biopsy which showed non-small cell carcinoma   Fine-needle aspiration from 4R lymph nodes showed adenocarcinoma consistent with lung primary   She presents today to discuss the diagnosis and treatment options   She has absolutely no complaint of pain, weight loss  Enoch Bear has been sign asthma, therefore, she has mild exertional shortness of breath   She denied fever, chills or night sweats   Her performance status is normal              Interval History:  A 53-year-old female with metastatic adenocarcinoma of the lung, diagnosed in August 2020  [facility] lung cancer was discovered incidentally  Enoch Bear had no symptoms from oncology standpoint  Enoch Bear has somewhat limited smoking history with 3-4 cigarettes for 20 years until 1986  She has right hilar mass as well as multiple osseous metastasis including T8   Her tumor was found to have ALK-EML4 translocation   She has been on Alectinib 600 mg twice a day with excellent tolerance       She already had major response on Alectinib   She presents today for routine follow-up  Patient reported development of bilateral lower extremity edema shortly after starting Alectinib  Unfortunately, edema can be seen in about 22-30% of cases  Additionally, patient reports chronic dyspnea, worsened with exertion, which mainly started after she received her COVID booster shot coupel years back  She is on a monoclonal injection that is prescribed by her allergy physician, named Tristen Ramires  Patient continues to be independent of all ADLs         Objective:      Primary Diagnosis:     Metastatic adenocarcinoma of the lung, with ALK rearrangements, diagnosed in August 2020       Cancer Staging:  Cancer Staging  Stage IV Lung Adenocarcinoma        Previous Hematologic/ Oncologic Treatment:            Current Hematologic/ Oncologic Treatment:       Alectinib 600 mg b i d  since late October 2020       Zometa every 3 months since September 2020       Disease Status:        Good partial response      Test Results:     Pathology:     Cytology from right upper lobe mass showed non-small cell carcinoma, consistent with lung primary   Fine-needle aspiration from 4R lymph nodes showed adenocarcinoma   ALK rearrangements positive   No evidence of ROS 1 translocation   PDL1 expression negative  EGFR mutation negative       Radiology:     CT scan of chest abdomen pelvis from April 2022 shows stable diffuse osseous metastasis and no new lesions to suggest adenocarcinoma recurrence or mets in the abdomen/pelvis  Laboratory:       See below      Physical Exam:        General Appearance:    Alert, oriented, obese         Eyes:    PERRL   Ears:    Normal external ear canals, both ears   Nose:   Nares normal, septum midline   Throat:   Mucosa moist  Pharynx without injection  Neck:   Supple         Lungs:     Clear to auscultation bilaterally   Chest Wall:    No tenderness or deformity    Heart:    Regular rate and rhythm         Abdomen:     Soft, non-tender, bowel sounds +, no organomegaly               Extremities:   Chronic bilateral lower extremity swelling          Skin:   no rash or icterus  Lymph nodes:   Cervical, supraclavicular, and axillary nodes normal   Neurologic:   CNII-XII intact, normal strength, sensation and reflexes     Throughout             Breast exam:   NA       ROS: Review of Systems   Constitutional: Negative for chills, fever and unexpected weight change  HENT: Negative for ear pain and sore throat  Eyes: Negative for pain and visual disturbance  Respiratory: Positive for shortness of breath (worsened with exertion, chronic)  Negative for cough  Cardiovascular: Positive for leg swelling (bilateral LE swelling, chronic for past ~3 years)  Negative for chest pain and palpitations  Gastrointestinal: Negative for abdominal pain, blood in stool, diarrhea and vomiting  Genitourinary: Negative for dysuria and hematuria  Musculoskeletal: Positive for back pain (chronic, mainly in lumbar area, denies any acute worsening in recent months)  Negative for arthralgias  Skin: Negative for color change and rash     Neurological: Negative for seizures and syncope  Hematological: Negative for adenopathy  Does not bruise/bleed easily  Psychiatric/Behavioral: Negative for agitation and confusion  All other systems reviewed and are negative  Imaging: CT chest abdomen pelvis wo contrast    Result Date: 10/14/2022  Narrative: CT CHEST, ABDOMEN AND PELVIS WITHOUT IV CONTRAST INDICATION:   C34 91: Malignant neoplasm of unspecified part of right bronchus or lung C79 51: Secondary malignant neoplasm of bone  COMPARISON:  CT chest abdomen and pelvis 4/7/2022 and CT chest 7/22/2020 TECHNIQUE: CT examination of the chest, abdomen and pelvis was performed without intravenous contrast  Axial, sagittal, and coronal 2D reformatted images were created from the source data and submitted for interpretation  Radiation dose length product (DLP) for this visit:  985 mGy-cm   This examination, like all CT scans performed in the [facility], was performed utilizing techniques to minimize radiation dose exposure, including the use of iterative reconstruction and automated exposure control  Enteric contrast was administered  FINDINGS: CHEST LUNGS:  No suspicious pulmonary nodule  1 mm nodule right upper lobe marked on series 3 unchanged as far back as July 2020 compatible with a benign nodule  Prior tree-in-bud opacities in the right lower lobe and lingula have resolved  No infiltrate  Stable scattered areas of minimal pulmonary scarring  Central airways are clear  PLEURA:  Unremarkable  HEART/GREAT VESSELS: Heart is not enlarged  Minimal fluid pooling in the anterior pericardial sac  Aortic, mitral annular and coronary artery calcification  No thoracic aortic aneurysm  MEDIASTINUM AND ARACELY:  Unremarkable  CHEST WALL AND LOWER NECK:  Unremarkable  ABDOMEN LIVER/BILIARY TREE:  Unremarkable  GALLBLADDER:  No calcified gallstones  No pericholecystic inflammatory change  SPLEEN:  Unremarkable  PANCREAS:  Unremarkable  ADRENAL GLANDS:  Unremarkable  KIDNEYS/URETERS:  Right renal simple cysts, the larger of which measures 4 cm  Otherwise unremarkable  No perinephric collection  STOMACH AND BOWEL:  Nondistended stomach limits its evaluation  Colonic diverticulosis without diverticulitis  Few mid small bowel diverticula without diverticulitis may be sequela of small bowel bacterial overgrowth syndrome  Stable small lipoma in the duodenojejunal junction  No bowel obstruction  APPENDIX:  Post appendectomy per history in Epic  ABDOMINOPELVIC CAVITY:  No ascites  No pneumoperitoneum  No lymphadenopathy  VESSELS:  Aortoiliac calcification  No aneurysm  PELVIS REPRODUCTIVE ORGANS:  Unremarkable for patient's age  URINARY BLADDER:  Unremarkable  ABDOMINAL WALL/INGUINAL REGIONS:  Unremarkable  OSSEOUS STRUCTURES:  No acute fracture  Scattered sclerotic lesions in the thoracolumbar spine and pelvis without significant interval change  Ununited right posterior medial ninth rib fracture  Degenerative changes of the spine, pubic symphysis, and multiple joints  Stable grade 1 degenerative anterolisthesis of L3 on L4  Impression: CT chest: No evidence of intrathoracic metastatic disease  Stable diffuse sclerotic osseous lesions  Prior tree-in-bud groundglass opacities have resolved  No new infiltrate  Additional chronic findings and negatives as above  CT abdomen and pelvis: No evidence of intraabdominopelvic metastatic disease insofar as can be detected on a noncontrast CT  Stable diffuse sclerotic osseous lesions  Additional chronic findings and negatives as above   Workstation performed: [id number]         Labs:   Lab Results   Component Value Date    WBC 6 87 04/20/2023    HGB 11 5 04/20/2023    HCT 34 6 (L) 04/20/2023    MCV 90 04/20/2023    PLT 83 (L) 04/20/2023     Lab Results   Component Value Date    K 3 7 04/29/2023     04/29/2023    CO2 32 04/29/2023    BUN 28 (H) 04/29/2023    CREATININE 1 00 04/29/2023    GLUF 117 (H) 04/29/2023    CALCIUM 9 1 04/29/2023    AST 29 04/29/2023    ALT 27 04/29/2023    ALKPHOS 93 04/29/2023    EGFR 54 04/29/2023         Current Medications: Reviewed  Allergies: Reviewed  PMH/FH/SH:  Reviewed      Vital Sign:    Body surface area is 2 03 meters squared      Wt Readings from Last 3 Encounters:   05/04/23 107 kg (235 lb)   05/02/23 112 kg (248 lb)   04/20/23 111 kg (244 lb 11 4 oz)        Temp Readings from Last 3 Encounters:   05/04/23 (!) 96 9 °F (36 1 °C) (Tympanic)   05/02/23 (!) 97 4 °F (36 3 °C) (Temporal)   04/20/23 97 8 °F (36 6 °C) (Oral)        BP Readings from Last 3 Encounters:   05/04/23 134/72   05/02/23 142/74   04/20/23 159/71         Pulse Readings from Last 3 Encounters:   05/04/23 67   05/02/23 66   04/20/23 66     @LASTSAO2(3)@

## 2023-05-10 DIAGNOSIS — J45.30 MILD PERSISTENT ASTHMA WITHOUT COMPLICATION: ICD-10-CM

## 2023-05-11 ENCOUNTER — APPOINTMENT (EMERGENCY)
Dept: RADIOLOGY | Facility: HOSPITAL | Age: 78
End: 2023-05-11

## 2023-05-11 ENCOUNTER — HOSPITAL ENCOUNTER (EMERGENCY)
Facility: HOSPITAL | Age: 78
Discharge: HOME | End: 2023-05-11
Attending: EMERGENCY MEDICINE

## 2023-05-11 VITALS
OXYGEN SATURATION: 97 % | HEART RATE: 66 BPM | SYSTOLIC BLOOD PRESSURE: 178 MMHG | TEMPERATURE: 98.2 F | RESPIRATION RATE: 20 BRPM | DIASTOLIC BLOOD PRESSURE: 84 MMHG

## 2023-05-11 DIAGNOSIS — M67.40 GANGLION CYST: ICD-10-CM

## 2023-05-11 DIAGNOSIS — S60.221A CONTUSION OF RIGHT HAND, INITIAL ENCOUNTER: Primary | ICD-10-CM

## 2023-05-11 NOTE — ED PROVIDER NOTES
History  Chief Complaint   Patient presents with   • Hand Pain     Right hand pain and swelling x2 days  No new trauma/injury  Taking motrin without relief  History provided by:  Patient   used: No    Patient states she hit her lower aspect of her right hand on her stove the other day and now her hand has been bothering her but she also feels this lump the dorsal aspect of the right hand over the fifth metacarpal   The area seems swollen to her  There is been no redness  No fever  No weakness or numbness  Prior to Admission Medications   Prescriptions Last Dose Informant Patient Reported? Taking? Alectinib HCl 150 MG CAPS   No Yes   Sig: Take 4 capsules (600 mg total) by mouth 2 (two) times a day   Fluticasone-Salmeterol (Advair) 500-50 mcg/dose inhaler   No No   Sig: Inhale 1 puff 2 (two) times a day Rinse mouth after use  acetaminophen (TYLENOL) 650 mg CR tablet   Yes Yes   Sig: TAKE 1 TABLET (650 MG TOTAL) BY MOUTH EVERY 8 (EIGHT) HOURS AS NEEDED FOR MILD PAIN   albuterol (2 5 mg/3 mL) 0 083 % nebulizer solution   No Yes   Sig: TAKE 3 ML (2 5 MG TOTAL) BY NEBULIZATION EVERY 6 (SIX) HOURS AS NEEDED FOR WHEEZING   albuterol (Ventolin HFA) 90 mcg/act inhaler   No Yes   Sig: Inhale 2 puffs every 6 (six) hours as needed for wheezing   atenolol (TENORMIN) 25 mg tablet   No Yes   Sig: TAKE 1 TABLET (25 MG TOTAL) BY MOUTH DAILY  benralizumab (FASENRA) subcutaneous injection   No Yes   Sig: Inject 1 mL (30 mg total) under the skin every 56 days   budesonide (Pulmicort) 0 25 mg/2 mL nebulizer solution   No Yes   Sig: Take 2 mL (0 25 mg total) by nebulization 2 (two) times a day Rinse mouth after use     ferrous sulfate 324 (65 Fe) mg   No Yes   Sig: Take 1 tablet (324 mg total) by mouth every other day   fexofenadine (ALLEGRA) 180 MG tablet   Yes Yes   Sig: Take 180 mg by mouth daily   fluticasone (FLONASE) 50 mcg/act nasal spray   No Yes   Sig: SPRAY 2 SPRAYS INTO EACH NOSTRIL EVERY DAY   furosemide (LASIX) 20 mg tablet   No Yes   Sig: TAKE 1 TABLET BY MOUTH EVERY DAY   ibuprofen (MOTRIN) 200 mg tablet   Yes Yes   Sig: Take by mouth every 6 (six) hours as needed   irbesartan (AVAPRO) 300 mg tablet   No Yes   Sig: TAKE 1 TABLET BY MOUTH EVERYDAY AT BEDTIME   omeprazole (PriLOSEC) 20 mg delayed release capsule   No Yes   Sig: Take 1 capsule (20 mg total) by mouth daily   oxyCODONE-acetaminophen (PERCOCET) 5-325 mg per tablet   No Yes   Sig: Take 1 tablet by mouth every 4 (four) hours as needed for moderate pain For ongoing pain Max Daily Amount: 6 tablets      Facility-Administered Medications: None       Past Medical History:   Diagnosis Date   • Asthma    • Degenerative joint disease    • GERD (gastroesophageal reflux disease)    • Hypertension    • Lumbar disc disease    • [facility]    • Sleep apnea     suspected    • Ventricular arrhythmia    • Vitamin D deficiency        Past Surgical History:   Procedure Laterality Date   • APPENDECTOMY     • COLONOSCOPY N/A 03/18/2019    Procedure: COLONOSCOPY;  Surgeon: Fernando Sanon DO;  Location: [facility]; Service: Gastroenterology   • ESOPHAGOGASTRODUODENOSCOPY N/A 03/18/2019    Procedure: ESOPHAGOGASTRODUODENOSCOPY (EGD); Surgeon: Fernando Sanon DO;  Location: [facility];   Service: Gastroenterology   • KNEE SURGERY     • ROTATOR CUFF REPAIR     • SHOULDER SURGERY         Family History   Problem Relation Age of Onset   • Stroke Mother    • Diabetes Mother    • Hyperlipidemia Mother    • Hypertension Mother    • Allergies Mother         Environmental   • Asthma Mother    • Diabetes Father    • Hyperlipidemia Father    • Hypertension Father    • Lung cancer Father 79   • Allergies Father         Environmental   • Asthma Father    • Lymphoma Sister 71   • Heart disease Sister         Pacemaker   • Asthma Brother    • Aneurysm Brother         Brain - had surgery   • Other Brother         Lymes disease   • Coronary artery disease Daughter         2 bypass done   • No Known Problems Daughter    • No Known Problems Daughter    • No Known Problems Son    • Kidney disease Son    • Liver disease Son    • Obesity Son      I have reviewed and agree with the history as documented  E-Cigarette/Vaping   • E-Cigarette Use Never User      E-Cigarette/Vaping Substances   • Nicotine No    • THC No    • CBD No    • Flavoring No    • Other No    • Unknown No      Social History     Tobacco Use   • Smoking status: Former     Packs/day: 0 25     Years: 25 00     Pack years: 6 25     Types: Cigarettes     Start date:      Quit date:      Years since quittin 3   • Smokeless tobacco: Former   Vaping Use   • Vaping Use: Never used   Substance Use Topics   • Alcohol use: Yes     Comment: occasionally   • Drug use: Never       Review of Systems    Physical Exam  Physical Exam  Constitutional:       General: She is not in acute distress  Appearance: Normal appearance  She is not ill-appearing, toxic-appearing or diaphoretic  Cardiovascular:      Rate and Rhythm: Normal rate and regular rhythm  Pulses: Normal pulses  Pulmonary:      Effort: Pulmonary effort is normal  No respiratory distress  Musculoskeletal:         General: Swelling and tenderness present  No deformity or signs of injury  Normal range of motion  Cervical back: Normal range of motion  Right lower leg: No edema  Comments: I do not see any traumatic injury externally  There is no bruising  May be mildly swollen although is difficult to tell given the patient's body habitus  I do feel a soft cystic structure over the extensor tendon which is perhaps slightly mobile which could be a ganglionic cyst   This area was tender  She also is tender on the ulnar aspect of the fifth metacarpal    Skin:     General: Skin is warm  Findings: No bruising or erythema  Neurological:      General: No focal deficit present  Mental Status: She is alert  Sensory: No sensory deficit  Motor: No weakness  Psychiatric:         Mood and Affect: Mood normal          Vital Signs  ED Triage Vitals [05/11/23 1007]   Temperature Pulse Respirations Blood Pressure SpO2   98 2 °F (36 8 °C) 66 20 (!) 178/84 97 %      Temp src Heart Rate Source Patient Position - Orthostatic VS BP Location FiO2 (%)   -- Monitor Sitting Right arm --      Pain Score       --           Vitals:    05/11/23 1007   BP: (!) 178/84   Pulse: 66   Patient Position - Orthostatic VS: Sitting         Visual Acuity      ED Medications  Medications - No data to display    Diagnostic Studies  Results Reviewed     None                 XR hand 3+ views RIGHT   ED Interpretation by Keren Hua MD (05/11 1204)   I have reviewed the film, per my independent interpretation : no acute fracture or dislocation  Procedures  Procedures         ED Course                               SBIRT 20yo+    Flowsheet Row Most Recent Value   Initial Alcohol Screen: US AUDIT-C     1  How often do you have a drink containing alcohol? 0 Filed at: 05/11/2023 1105   2  How many drinks containing alcohol do you have on a typical day you are drinking? 0 Filed at: 05/11/2023 1105   3b  FEMALE Any Age, or MALE 65+: How often do you have 4 or more drinks on one occassion? 0 Filed at: 05/11/2023 1105   Audit-C Score 0 Filed at: 05/11/2023 1105   SERVANDO: How many times in the past year have you    Used an illegal drug or used a prescription medication for non-medical reasons? Never Filed at: 05/11/2023 1105                    Medical Decision Making  Do not see any acute fracture  Suspect ganglionic cyst   Possible it could be a lipoma but that would be an unusual location  No signs of infection  She is neurovascularly intact  Arrange follow-up with primary care  She can use ice over the area  No infectious symptoms do not feel any blood work is indicated at this point      Amount and/or Complexity of Data Reviewed  Radiology: ordered and independent interpretation performed  Disposition  Final diagnoses:   Contusion of right hand, initial encounter   Ganglion cyst     Time reflects when diagnosis was documented in both MDM as applicable and the Disposition within this note     Time User Action Codes Description Comment    5/11/2023 12:05 PM Jhonatan Garcia Contusion of right hand, initial encounter     5/11/2023 12:05 PM Rabia Pritchard Add [M67 40] Ganglion cyst       ED Disposition     ED Disposition   Discharge    Condition   Stable    Date/Time   Thu May 11, 2023 12:05 PM    Comment   Rica Sanchezkamari discharge to home/self care  Follow-up Information     Follow up With Specialties Details Why Contact Info Additional [facility] Schedule an appointment as soon as possible for a visit in 1 week reevaluation [address], [address] [facility] [address]  [address] [place] [address], [address], [address] [facility], [address]          Discharge Medication List as of 5/11/2023 12:06 PM      CONTINUE these medications which have NOT CHANGED    Details   acetaminophen (TYLENOL) 650 mg CR tablet TAKE 1 TABLET (650 MG TOTAL) BY MOUTH EVERY 8 (EIGHT) HOURS AS NEEDED FOR MILD PAIN, Historical Med      albuterol (2 5 mg/3 mL) 0 083 % nebulizer solution TAKE 3 ML (2 5 MG TOTAL) BY NEBULIZATION EVERY 6 (SIX) HOURS AS NEEDED FOR WHEEZING, Starting Mon 4/24/2023, Normal      albuterol (Ventolin HFA) 90 mcg/act inhaler Inhale 2 puffs every 6 (six) hours as needed for wheezing, Starting Tue 5/2/2023, Normal      Alectinib HCl 150 MG CAPS Take 4 capsules (600 mg total) by mouth 2 (two) times a day, Starting Tue 3/28/2023, Normal      atenolol (TENORMIN) 25 mg tablet TAKE 1 TABLET (25 MG TOTAL) BY MOUTH DAILY  , Starting Sun 4/23/2023, Normal      benralizumab (FASENRA) subcutaneous injection Inject 1 mL (30 mg total) under the skin every 56 days, Starting Tue 11/15/2022, Until Wed 11/15/2023, No Print      budesonide (Pulmicort) 0 25 mg/2 mL nebulizer solution Take 2 mL (0 25 mg total) by nebulization 2 (two) times a day Rinse mouth after use , Starting Tue 5/2/2023, Normal      ferrous sulfate 324 (65 Fe) mg Take 1 tablet (324 mg total) by mouth every other day, Starting Mon 4/18/2022, Normal      fexofenadine (ALLEGRA) 180 MG tablet Take 180 mg by mouth daily, Historical Med      fluticasone (FLONASE) 50 mcg/act nasal spray SPRAY 2 SPRAYS INTO EACH NOSTRIL EVERY DAY, Normal      furosemide (LASIX) 20 mg tablet TAKE 1 TABLET BY MOUTH EVERY DAY, Normal      ibuprofen (MOTRIN) 200 mg tablet Take by mouth every 6 (six) hours as needed, Historical Med      irbesartan (AVAPRO) 300 mg tablet TAKE 1 TABLET BY MOUTH EVERYDAY AT BEDTIME, Normal      omeprazole (PriLOSEC) 20 mg delayed release capsule Take 1 capsule (20 mg total) by mouth daily, Starting Tue 4/11/2023, Normal      oxyCODONE-acetaminophen (PERCOCET) 5-325 mg per tablet Take 1 tablet by mouth every 4 (four) hours as needed for moderate pain For ongoing pain Max Daily Amount: 6 tablets, Starting Tue 5/2/2023, Normal      Fluticasone-Salmeterol (Advair) 500-50 mcg/dose inhaler Inhale 1 puff 2 (two) times a day Rinse mouth after use , Starting Tue 8/30/2022, Normal             No discharge procedures on file      PDMP Review       Value Time User    PDMP Reviewed  Yes 5/2/2023  4:16 PM Keren Win MD          ED Provider  Electronically Signed by           Og Duque MD  05/11/23 2404

## 2023-05-12 RX ORDER — BUDESONIDE 0.25 MG/2ML
INHALANT ORAL
Qty: 360 ML | Refills: 1 | Status: SHIPPED | OUTPATIENT
Start: 2023-05-12

## 2023-06-28 ENCOUNTER — APPOINTMENT (OUTPATIENT)
Dept: LAB | Facility: HOSPITAL | Age: 78
End: 2023-06-28
Payer: COMMERCIAL

## 2023-06-28 DIAGNOSIS — C34.91 NON-SMALL CELL CARCINOMA OF LUNG, STAGE 4, RIGHT (HCC): ICD-10-CM

## 2023-06-28 LAB
ALBUMIN SERPL BCP-MCNC: 4.5 G/DL (ref 3.5–5)
ALP SERPL-CCNC: 91 U/L (ref 34–104)
ALT SERPL W P-5'-P-CCNC: 26 U/L (ref 7–52)
ANION GAP SERPL CALCULATED.3IONS-SCNC: 13 MMOL/L
AST SERPL W P-5'-P-CCNC: 26 U/L (ref 13–39)
BASOPHILS # BLD AUTO: 0.01 THOUSANDS/ÂΜL (ref 0–0.1)
BASOPHILS NFR BLD AUTO: 0 % (ref 0–1)
BILIRUB SERPL-MCNC: 0.97 MG/DL (ref 0.2–1)
BUN SERPL-MCNC: 34 MG/DL (ref 5–25)
CALCIUM SERPL-MCNC: 10.3 MG/DL (ref 8.4–10.2)
CHLORIDE SERPL-SCNC: 100 MMOL/L (ref 96–108)
CO2 SERPL-SCNC: 30 MMOL/L (ref 21–32)
CREAT SERPL-MCNC: 1.12 MG/DL (ref 0.6–1.3)
EOSINOPHIL # BLD AUTO: 0 THOUSAND/ÂΜL (ref 0–0.61)
EOSINOPHIL NFR BLD AUTO: 0 % (ref 0–6)
ERYTHROCYTE [DISTWIDTH] IN BLOOD BY AUTOMATED COUNT: 14.6 % (ref 11.6–15.1)
GFR SERPL CREATININE-BSD FRML MDRD: 47 ML/MIN/1.73SQ M
GLUCOSE P FAST SERPL-MCNC: 129 MG/DL (ref 65–99)
HCT VFR BLD AUTO: 38.1 % (ref 34.8–46.1)
HGB BLD-MCNC: 12.7 G/DL (ref 11.5–15.4)
IMM GRANULOCYTES # BLD AUTO: 0.08 THOUSAND/UL (ref 0–0.2)
IMM GRANULOCYTES NFR BLD AUTO: 1 % (ref 0–2)
LYMPHOCYTES # BLD AUTO: 0.96 THOUSANDS/ÂΜL (ref 0.6–4.47)
LYMPHOCYTES NFR BLD AUTO: 13 % (ref 14–44)
MCH RBC QN AUTO: 28.9 PG (ref 26.8–34.3)
MCHC RBC AUTO-ENTMCNC: 33.3 G/DL (ref 31.4–37.4)
MCV RBC AUTO: 87 FL (ref 82–98)
MONOCYTES # BLD AUTO: 0.56 THOUSAND/ÂΜL (ref 0.17–1.22)
MONOCYTES NFR BLD AUTO: 7 % (ref 4–12)
NEUTROPHILS # BLD AUTO: 6.01 THOUSANDS/ÂΜL (ref 1.85–7.62)
NEUTS SEG NFR BLD AUTO: 79 % (ref 43–75)
NRBC BLD AUTO-RTO: 0 /100 WBCS
PLATELET # BLD AUTO: 109 THOUSANDS/UL (ref 149–390)
POTASSIUM SERPL-SCNC: 4.1 MMOL/L (ref 3.5–5.3)
PROT SERPL-MCNC: 7.2 G/DL (ref 6.4–8.4)
RBC # BLD AUTO: 4.39 MILLION/UL (ref 3.81–5.12)
SODIUM SERPL-SCNC: 143 MMOL/L (ref 135–147)
WBC # BLD AUTO: 7.62 THOUSAND/UL (ref 4.31–10.16)

## 2023-06-28 PROCEDURE — 36415 COLL VENOUS BLD VENIPUNCTURE: CPT

## 2023-06-28 PROCEDURE — 85025 COMPLETE CBC W/AUTO DIFF WBC: CPT

## 2023-06-28 PROCEDURE — 80053 COMPREHEN METABOLIC PANEL: CPT

## 2023-06-29 ENCOUNTER — CONSULT (OUTPATIENT)
Dept: CARDIOLOGY CLINIC | Facility: CLINIC | Age: 78
End: 2023-06-29
Payer: COMMERCIAL

## 2023-06-29 ENCOUNTER — TELEPHONE (OUTPATIENT)
Dept: CARDIOLOGY CLINIC | Facility: CLINIC | Age: 78
End: 2023-06-29

## 2023-06-29 VITALS
HEIGHT: 61 IN | HEART RATE: 60 BPM | SYSTOLIC BLOOD PRESSURE: 179 MMHG | BODY MASS INDEX: 43.73 KG/M2 | DIASTOLIC BLOOD PRESSURE: 87 MMHG | WEIGHT: 231.6 LBS

## 2023-06-29 DIAGNOSIS — E66.01 MORBID OBESITY (HCC): ICD-10-CM

## 2023-06-29 DIAGNOSIS — R60.0 BILATERAL LOWER EXTREMITY EDEMA: ICD-10-CM

## 2023-06-29 DIAGNOSIS — R07.2 PRECORDIAL PAIN: Primary | ICD-10-CM

## 2023-06-29 DIAGNOSIS — I50.32 CHRONIC DIASTOLIC CONGESTIVE HEART FAILURE (HCC): ICD-10-CM

## 2023-06-29 DIAGNOSIS — I10 ESSENTIAL HYPERTENSION: ICD-10-CM

## 2023-06-29 DIAGNOSIS — I25.10 CORONARY ARTERY DISEASE INVOLVING NATIVE CORONARY ARTERY OF NATIVE HEART WITHOUT ANGINA PECTORIS: ICD-10-CM

## 2023-06-29 PROCEDURE — 93000 ELECTROCARDIOGRAM COMPLETE: CPT | Performed by: INTERNAL MEDICINE

## 2023-06-29 PROCEDURE — 99204 OFFICE O/P NEW MOD 45 MIN: CPT | Performed by: INTERNAL MEDICINE

## 2023-06-29 RX ORDER — ROSUVASTATIN CALCIUM 10 MG/1
10 TABLET, COATED ORAL DAILY
Qty: 90 TABLET | Refills: 3 | Status: SHIPPED | OUTPATIENT
Start: 2023-06-29 | End: 2023-09-27

## 2023-06-29 RX ORDER — FUROSEMIDE 40 MG/1
40 TABLET ORAL DAILY
Qty: 30 TABLET | Refills: 5 | Status: SHIPPED | OUTPATIENT
Start: 2023-06-29

## 2023-06-29 NOTE — PROGRESS NOTES
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 on chemo (Alectanib), HFpEF, HTN, mild persistent asthma, MONO unable to tolerate CPAP, GERD, recently seen in the ED for chest pain is here to establish cardiology care  On 4/20 the patient went to the ED with chest pain  The chest pain was substernal but self resolved shortly after going to the ED  Troponin 11--> 11  EKG showed NSR with no ischemic changes  cxr with no acute process  Weight was 244 pounds at that time and soon after went up to 248 pounds  The patient started fluid restricting and since then has lost 16 pounds is now 232  She has not had any more chest pains in the past 2 months since being discharged from the emergency room  A few months ago with the chest pain she also had significant shortness of breath especially on exertion  Her shortness of breath is improved now  She has had leg swelling for the past few months and was told this could be a side effect from the alectinib  She has no paroxysmal nocturnal dyspnea no orthopnea  She has been on Lasix 20 mg for years now and thinks it is less effective now  She denies any syncope, palpitations  Cancer history: Incidentally discovered in August 2020  Was found to have left hilar mass with multiple osseous metastasis including T8  Tumor had an ALK-EML4 for translocation and was started on alectinib with major response  Blood pressure is high today however she forgot to take her irbesartan last night      Review of Systems  Review of system was conducted and was negative except for as stated in the interval history    Physical Exam:  Vitals: Blood pressure (!) 179/87, pulse 60, height 5' 1\" (1 549 m), weight 105 kg (231 lb 9 6 oz), not " currently breastfeeding , Body mass index is 43 76 kg/m²  GEN: Isa Moses appears well, alert and oriented x 3, pleasant and cooperative   HEENT:  Normocephalic, atraumatic, anicteric, moist mucous membranes  NECK:  no JVD or carotid bruits   HEART: reg rhythm, reg rate, normal S1 and S2, no murmurs, clicks, gallops or rubs   LUNGS: Clear to auscultation bilaterally; no wheezes, rales, or rhonchi; respiration nonlabored   ABDOMEN:  Normoactive bowel sounds, soft, no tenderness, no distention  NEURO: no gross focal findings; cranial nerves grossly intact   SKIN:  Dry, intact, warm to touch  EXT: 1+ pitting edema in lower legs bilaterally  Home Medications: (Not in a hospital admission)    FAMILY HISTORY: SISTER HAS A PACEMAKER  SOCIAL: QUIT SMOKING 25 YEARS AGO  Current Outpatient Medications:   •  acetaminophen (TYLENOL) 650 mg CR tablet, TAKE 1 TABLET (650 MG TOTAL) BY MOUTH EVERY 8 (EIGHT) HOURS AS NEEDED FOR MILD PAIN, Disp: , Rfl:   •  albuterol (2 5 mg/3 mL) 0 083 % nebulizer solution, TAKE 3 ML (2 5 MG TOTAL) BY NEBULIZATION EVERY 6 (SIX) HOURS AS NEEDED FOR WHEEZING, Disp: 375 mL, Rfl: 5  •  albuterol (Ventolin HFA) 90 mcg/act inhaler, Inhale 2 puffs every 6 (six) hours as needed for wheezing, Disp: 18 g, Rfl: 2  •  Alectinib HCl 150 MG CAPS, Take 4 capsules (600 mg total) by mouth 2 (two) times a day, Disp: 240 capsule, Rfl: 5  •  atenolol (TENORMIN) 25 mg tablet, TAKE 1 TABLET (25 MG TOTAL) BY MOUTH DAILY  , Disp: 90 tablet, Rfl: 1  •  benralizumab (FASENRA) subcutaneous injection, Inject 1 mL (30 mg total) under the skin every 56 days, Disp: 1 mL, Rfl: 6  •  budesonide (PULMICORT) 0 25 mg/2 mL nebulizer solution, TAKE 2 ML BY NEBULIZATION 2 (TWO) TIMES A DAY RINSE MOUTH AFTER USE **MED B**, Disp: 360 mL, Rfl: 1  •  ferrous sulfate 324 (65 Fe) mg, Take 1 tablet (324 mg total) by mouth every other day, Disp: 90 tablet, Rfl: 1  •  fexofenadine (ALLEGRA) 180 MG tablet, Take 180 mg by mouth "daily, Disp: , Rfl:   •  fluticasone (FLONASE) 50 mcg/act nasal spray, SPRAY 2 SPRAYS INTO EACH NOSTRIL EVERY DAY, Disp: 48 mL, Rfl: 1  •  Fluticasone-Salmeterol (Advair) 500-50 mcg/dose inhaler, Inhale 1 puff 2 (two) times a day Rinse mouth after use , Disp: 60 blister, Rfl: 0  •  furosemide (LASIX) 20 mg tablet, TAKE 1 TABLET BY MOUTH EVERY DAY, Disp: 90 tablet, Rfl: 1  •  ibuprofen (MOTRIN) 200 mg tablet, Take by mouth every 6 (six) hours as needed, Disp: , Rfl:   •  irbesartan (AVAPRO) 300 mg tablet, TAKE 1 TABLET BY MOUTH EVERYDAY AT BEDTIME, Disp: 90 tablet, Rfl: 0  •  omeprazole (PriLOSEC) 20 mg delayed release capsule, Take 1 capsule (20 mg total) by mouth daily, Disp: 90 capsule, Rfl: 1  •  oxyCODONE-acetaminophen (PERCOCET) 5-325 mg per tablet, Take 1 tablet by mouth every 4 (four) hours as needed for moderate pain For ongoing pain Max Daily Amount: 6 tablets, Disp: 60 tablet, Rfl: 0    Labs & Results:  Lab Results   Component Value Date    HSTNI0 11 2023    HSTNI2 11 2023     Lab Results   Component Value Date    NTBNP 277 (H) 07/10/2021     Lab Results   Component Value Date    TRIG 144 2020    HDL 54 2020    LDLCALC 134 (H) 2020     Lab Results   Component Value Date    K 4 1 2023    CO2 30 2023     2023    BUN 34 (H) 2023    CREATININE 1 12 2023    ALT 26 2023    AST 26 2023     Lab Results   Component Value Date    WBC 7 62 2023    HGB 12 7 2023    HCT 38 1 2023    MCV 87 2023     (L) 2023     No results found for: \"INR\"    EK/29 heart rate 60  Normal sinus rhythm  Normal EKG  ECHO:  No results found for this or any previous visit  Results for orders placed during the hospital encounter of 21    Echo complete w/ contrast if indicated    Interpretation Summary  •  Left Ventricle: Left ventricular cavity size is normal  Wall thickness is mildly increased   The left " ventricular ejection fraction is 63% by single dimension measurement  Systolic function is normal  Wall motion is normal  There is mild concentric hypertrophy  Diastolic function is mildly abnormal, consistent with grade I (abnormal) relaxation  Left atrial filling pressure is elevated  •  IVS: There is sigmoid appearance of the septum  •  Right Ventricle: Right ventricular cavity size is mildly dilated  •  Left Atrium: The atrium is moderately dilated  •  Right Atrium: The atrium is mildly dilated  •  Aortic Valve: The aortic valve is trileaflet  The leaflets are moderately thickened  The leaflets are mildly calcified  The leaflets exhibit normal mobility  •  Mitral Valve: There is moderate annular calcification  There is mild regurgitation  •  Tricuspid Valve: There is mild regurgitation  •  Aorta: The aortic root is mildly dilated  •  Pulmonary Artery: The estimated pulmonary artery systolic pressure is 28 6 mmHg  The pulmonary artery systolic pressure is mildly increased  Assessment/Plan     Donny Philippe is a 70y o  year old female with PMH of 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 on chemo (Alectanib), HTN, mild persistent asthma, MONO unable to tolerate CPAP, GERD, recently seen in the ED for chest pain is here to establish cardiology care  #HFpEF  #mildly dilated RV with mildly elevated RV pressure  Patient says she has been diagnosed with CHF in the past  Prior BNP elevated  Echo from 2021 shows grade 1 diastolic dysfunction, mild MR, mild TR, mildly dilated RV, mildly elevated PA systolic pressure (37 mmHg)  She has been maintained on Lasix 20 mg daily for years now  Around the time she went to the ED and April she was up about 16 pounds which she has since lost with fluid restriction      -echo ordered, BNP ordered    -Increase Lasix to 40 mg daily  -Continue volume restriction as patient is doing  -Patient and informed for daily weights   -Patient advised to avoid ibuprofen if possible (currently taking 600 800 mg a day for the pain    #Chest Pain  Patient had episode of chest pain and April with normal work-up in ED including negative troponins and normal EKG  Since then she has had no further episodes of chest pain and has no exertional chest pain  Around that time in April she had more lower extremity edema and has since lost 16 pounds due to fluid restriction  I suspect symptoms may have been due to volume overload  Getting echo and increasing Lasix as above  We will hold off on stress test at this time but will restratify her with lipids and likely start statin  She does have mild coronary artery calcification seen on CT  -Check lipids, expect ASCVD risk will be elevated and we will discuss that at that time  #HTN  Blood pressure elevated in office today however she forgot to take her irbesartan last night  Typically blood pressure at home is 130/60 and she checks it regularly  Blood pressure may slightly improved with increased dose of Lasix    -Continue atenolol 25mg  Irbesartan 300mg   -Increase Lasix to 40 mg daily  -Patient and her blood pressure chart and will record home    Follow up in 6-8 weeks  Case discussed and reviewed with Dr Kenneth Gonzales who agrees with my assessment and plan  Pearl Gifford MD  Cardiology Fellow       =============================================================    Please Note: voice to text software may have been used in the creation of this document

## 2023-06-29 NOTE — TELEPHONE ENCOUNTER
----- Message from Lizzie Acosta DO sent at 6/29/2023  1:04 PM EDT -----  Please instruct the patient that we have sent her blood work in for a metabolic panel to be performed in 2 weeks  This is separate than her cholesterol panel and other blood work which should be done soon as possible  This is to check her potassium and kidney function after being on the increased dose of Lasix 2 weeks for now  Her cholesterol medication rosuvastatin was filled to her pharmacy due to her evidence of coronary calcification tell prevent risk of future heart attacks  We will follow-up with her after testing  Thank you

## 2023-06-29 NOTE — TELEPHONE ENCOUNTER
Placed call to patient  No answer  Left detailed message instructing patient to get BMP in two weeks  Ok per communication consent

## 2023-07-03 ENCOUNTER — APPOINTMENT (OUTPATIENT)
Dept: LAB | Facility: HOSPITAL | Age: 78
End: 2023-07-03
Payer: COMMERCIAL

## 2023-07-03 ENCOUNTER — RA CDI HCC (OUTPATIENT)
Dept: OTHER | Facility: HOSPITAL | Age: 78
End: 2023-07-03

## 2023-07-03 DIAGNOSIS — R07.2 PRECORDIAL PAIN: ICD-10-CM

## 2023-07-03 DIAGNOSIS — E66.01 MORBID OBESITY (HCC): ICD-10-CM

## 2023-07-03 DIAGNOSIS — I10 ESSENTIAL HYPERTENSION: ICD-10-CM

## 2023-07-03 DIAGNOSIS — R60.0 BILATERAL LOWER EXTREMITY EDEMA: ICD-10-CM

## 2023-07-03 DIAGNOSIS — I50.32 CHRONIC DIASTOLIC CONGESTIVE HEART FAILURE (HCC): Primary | ICD-10-CM

## 2023-07-03 DIAGNOSIS — I25.10 CORONARY ARTERY DISEASE INVOLVING NATIVE CORONARY ARTERY OF NATIVE HEART WITHOUT ANGINA PECTORIS: ICD-10-CM

## 2023-07-03 DIAGNOSIS — I50.32 CHRONIC DIASTOLIC CONGESTIVE HEART FAILURE (HCC): ICD-10-CM

## 2023-07-03 LAB
ANION GAP SERPL CALCULATED.3IONS-SCNC: 9 MMOL/L
BNP SERPL-MCNC: 48 PG/ML (ref 0–100)
BUN SERPL-MCNC: 37 MG/DL (ref 5–25)
CALCIUM SERPL-MCNC: 9.3 MG/DL (ref 8.4–10.2)
CHLORIDE SERPL-SCNC: 102 MMOL/L (ref 96–108)
CHOLEST SERPL-MCNC: 220 MG/DL
CO2 SERPL-SCNC: 32 MMOL/L (ref 21–32)
CREAT SERPL-MCNC: 1.26 MG/DL (ref 0.6–1.3)
GFR SERPL CREATININE-BSD FRML MDRD: 41 ML/MIN/1.73SQ M
GLUCOSE P FAST SERPL-MCNC: 108 MG/DL (ref 65–99)
HDLC SERPL-MCNC: 54 MG/DL
LDLC SERPL CALC-MCNC: 124 MG/DL (ref 0–100)
POTASSIUM SERPL-SCNC: 4.3 MMOL/L (ref 3.5–5.3)
SODIUM SERPL-SCNC: 143 MMOL/L (ref 135–147)
TRIGL SERPL-MCNC: 208 MG/DL

## 2023-07-03 PROCEDURE — 36415 COLL VENOUS BLD VENIPUNCTURE: CPT

## 2023-07-03 PROCEDURE — 80048 BASIC METABOLIC PNL TOTAL CA: CPT

## 2023-07-03 PROCEDURE — 83880 ASSAY OF NATRIURETIC PEPTIDE: CPT

## 2023-07-03 PROCEDURE — 80061 LIPID PANEL: CPT

## 2023-07-03 NOTE — PROGRESS NOTES
[address] coding opportunities          Chart Reviewed number of suggestions sent to Provider: 1     Patients Insurance     Medicare Insurance: [employer] Medicare Advantage          I11.0: Hypertensive heart disease with heart failure ([address]) [[id number]]    Per ICD 10 CM coding guidelines the classification presumes a causal relationship between hypertension and heart involvement and between hypertension unless the documentation clearly states the conditions are unrelated

## 2023-07-05 ENCOUNTER — HOSPITAL ENCOUNTER (OUTPATIENT)
Dept: INFUSION CENTER | Facility: HOSPITAL | Age: 78
Discharge: HOME | End: 2023-07-05
Attending: INTERNAL MEDICINE
Payer: COMMERCIAL

## 2023-07-05 VITALS
DIASTOLIC BLOOD PRESSURE: 74 MMHG | RESPIRATION RATE: 20 BRPM | SYSTOLIC BLOOD PRESSURE: 167 MMHG | HEART RATE: 58 BPM | TEMPERATURE: 97.4 F

## 2023-07-05 DIAGNOSIS — C79.51 MALIGNANT NEOPLASM METASTATIC TO BONE (HCC): Primary | ICD-10-CM

## 2023-07-05 DIAGNOSIS — C34.91 NON-SMALL CELL CARCINOMA OF LUNG, STAGE 4, RIGHT (HCC): ICD-10-CM

## 2023-07-05 PROCEDURE — 96365 THER/PROPH/DIAG IV INF INIT: CPT

## 2023-07-05 RX ORDER — SODIUM CHLORIDE 9 MG/ML
20 INJECTION, SOLUTION INTRAVENOUS ONCE
OUTPATIENT
Start: 2023-07-22

## 2023-07-05 RX ORDER — SODIUM CHLORIDE 9 MG/ML
20 INJECTION, SOLUTION INTRAVENOUS ONCE
Status: COMPLETED | OUTPATIENT
Start: 2023-07-05 | End: 2023-07-05

## 2023-07-05 RX ADMIN — ZOLEDRONIC ACID 3.3 MG: 4 INJECTION INTRAVENOUS at 14:18

## 2023-07-05 RX ADMIN — SODIUM CHLORIDE 20 ML/HR: 0.9 INJECTION, SOLUTION INTRAVENOUS at 13:45

## 2023-07-05 NOTE — PLAN OF CARE
Problem: Potential for Falls  Goal: Patient will remain free of falls  Description: INTERVENTIONS:  - Educate patient/family on patient safety including physical limitations  - Instruct patient to call for assistance with activity   - Consult OT/PT to assist with strengthening/mobility   - Keep Call bell within reach  - Keep bed low and locked with side rails adjusted as appropriate  - Keep care items and personal belongings within reach  - Initiate and maintain comfort rounds  - Make Fall Risk Sign visible to staff  - Offer Toileting every  Hours, in advance of need  - Initiate/Maintain alarm  - Obtain necessary fall risk management equipment:   - Apply yellow socks and bracelet for high fall risk patients  - Consider moving patient to room near nurses station  Outcome: Progressing     Problem: Knowledge Deficit  Goal: Patient/family/caregiver demonstrates understanding of disease process, treatment plan, medications, and discharge instructions  Description: Complete learning assessment and assess knowledge base.   Interventions:  - Provide teaching at level of understanding  - Provide teaching via preferred learning methods  Outcome: Progressing

## 2023-07-05 NOTE — PROGRESS NOTES
Pt tolerated Zometa infusion without difficulty. Next appt scheduled and AVS provided. Left ambulatory in stable condition.

## 2023-07-06 ENCOUNTER — TELEPHONE (OUTPATIENT)
Dept: CARDIOLOGY CLINIC | Facility: CLINIC | Age: 78
End: 2023-07-06

## 2023-07-06 NOTE — TELEPHONE ENCOUNTER
Called pt and left your msg on her answering machine. Told her to go to any [facility] place and the lab will see your order and do the blood work .

## 2023-07-06 NOTE — TELEPHONE ENCOUNTER
----- Message from Colletta Hatch, DO sent at 7/3/2023  6:13 PM EDT -----  Creatinine was 1.26 which is not too far off from her baseline. I have ordered a repeat creatinine to be performed in 1 week. Patient is aware. Please reach out to her and help her get this scheduled or get the prescription. Thank you.

## 2023-07-10 ENCOUNTER — OFFICE VISIT (OUTPATIENT)
Dept: FAMILY MEDICINE CLINIC | Facility: CLINIC | Age: 78
End: 2023-07-10

## 2023-07-10 VITALS
OXYGEN SATURATION: 95 % | BODY MASS INDEX: 44.71 KG/M2 | HEIGHT: 61 IN | TEMPERATURE: 98 F | DIASTOLIC BLOOD PRESSURE: 80 MMHG | SYSTOLIC BLOOD PRESSURE: 132 MMHG | HEART RATE: 95 BPM | RESPIRATION RATE: 22 BRPM | WEIGHT: 236.8 LBS

## 2023-07-10 DIAGNOSIS — I25.10 CORONARY ARTERY DISEASE INVOLVING NATIVE CORONARY ARTERY OF NATIVE HEART WITHOUT ANGINA PECTORIS: ICD-10-CM

## 2023-07-10 DIAGNOSIS — I10 ESSENTIAL HYPERTENSION: Primary | ICD-10-CM

## 2023-07-10 DIAGNOSIS — C34.90 MALIGNANT NEOPLASM DETERMINED BY BIOPSY OF LUNG (HCC): ICD-10-CM

## 2023-07-10 PROBLEM — E78.5 DYSLIPIDEMIA: Status: ACTIVE | Noted: 2023-07-10

## 2023-07-10 PROCEDURE — 99213 OFFICE O/P EST LOW 20 MIN: CPT | Performed by: FAMILY MEDICINE

## 2023-07-10 NOTE — ASSESSMENT & PLAN NOTE
Patient with a hx of lung cancer currently in chemo treatment.   Follows with oncology.      -Discuss palliative care at next visit ( Patient will like to do her own research discuss this at our next office visit)

## 2023-07-10 NOTE — ASSESSMENT & PLAN NOTE
CAD w/ coranary calcifications as evidenced by chest CT of April/2023  LVED 63%, MILD lvh, grade 1 duastolic dysfunction w/ elevated LAP, mild RV dilation, moderate LA and mild RA dilation, AV sclerosis, mild aortic root dilation 4.2 cm  Follows with cardiology  Home meds: atenolol 25 mg qd, Iberartan (AVAPRO) 300 mg qd    -Continue home meds  -Contine following w/cardiology (next appointment on 9/14/23)  -Cardiac echo scheduled for 7/20

## 2023-07-10 NOTE — ASSESSMENT & PLAN NOTE
ASCVD risk 24.6% High risk  7/03/23 lipid panel: Chol 220, Trig 208, HDL 54,   Home medications: Rovastatin 10 mg qd    Continue home meds  Continue healthy diet low in carbs and fats

## 2023-07-10 NOTE — ASSESSMENT & PLAN NOTE
/80 mmhg   Follows with cardiology  Home medications: lasix 40 mg qd, atenolol 25 mg qd, ibersartan 300 mg qd    -Continue home meds  -f/u in 3 months

## 2023-07-10 NOTE — PROGRESS NOTES
Name: Hina Gipson      :       MRN: [id number]  Encounter Provider: Emanuel Schroeder MD  Encounter Date: 7/10/2023   Encounter department: [address] [facility]     1. Essential hypertension  Assessment & Plan:     /80 mmhg   Follows with cardiology  Home medications: lasix 40 mg qd, atenolol 25 mg qd, ibersartan 300 mg qd    -Continue home meds  -f/u in 3 months       2. Coronary artery disease involving native coronary artery of native heart without angina pectoris  Assessment & Plan:  CAD w/ coranary calcifications as evidenced by chest CT of 2023  LVED 63%, MILD lvh, grade 1 duastolic dysfunction w/ elevated LAP, mild RV dilation, moderate LA and mild RA dilation, AV sclerosis, mild aortic root dilation 4.2 cm  Follows with cardiology  Home meds: atenolol 25 mg qd, Iberartan (AVAPRO) 300 mg qd    -Continue home meds  -Contine following w/cardiology (next appointment on 23)  -Cardiac echo scheduled for       3. Malignant neoplasm determined by biopsy of lung [facility])  Assessment & Plan:  Patient with a hx of lung cancer currently in chemo treatment. Follows with oncology.      -Discuss palliative care at next visit ( Patient will like to do her own research discuss this at our next office visit)            Subjective      HPI  Hina Gipson is a very pleasant 71 yo female with pmh of Lung cancer w/ mets on chemp, GERD, Asthma, HTN presents to clinic today for f/u of her chtronic medical conditions. Most recently she ws evaluated by  cardioplogy due to leg swelling and sob and addition of lasix 40 mg qd was implemented. Patient reports significant improvement of her leg edema and feels a lot better. She has an echo appointment on 23. Patient denies further concerns at this time. Will f/u in 3 months     Review of Systems   Constitutional: Negative for chills and fever. HENT: Negative for ear pain and sore throat. Eyes: Negative for pain and visual disturbance. Respiratory: Positive for cough and shortness of breath (has been chronic due to patient's multiple conditions). Cardiovascular: Negative for chest pain and palpitations. Gastrointestinal: Negative for abdominal pain and vomiting. Genitourinary: Negative for dysuria and hematuria. Musculoskeletal: Negative for arthralgias and back pain. Skin: Negative for color change and rash. Neurological: Negative for seizures and syncope. All other systems reviewed and are negative. Current Outpatient Medications on File Prior to Visit   Medication Sig   • acetaminophen (TYLENOL) 650 mg CR tablet TAKE 1 TABLET (650 MG TOTAL) BY MOUTH EVERY 8 (EIGHT) HOURS AS NEEDED FOR MILD PAIN   • albuterol (2.5 mg/3 mL) 0.083 % nebulizer solution TAKE 3 ML (2.5 MG TOTAL) BY NEBULIZATION EVERY 6 (SIX) HOURS AS NEEDED FOR WHEEZING   • albuterol (Ventolin HFA) 90 mcg/act inhaler Inhale 2 puffs every 6 (six) hours as needed for wheezing   • Alectinib HCl 150 MG CAPS Take 4 capsules (600 mg total) by mouth 2 (two) times a day   • atenolol (TENORMIN) 25 mg tablet TAKE 1 TABLET (25 MG TOTAL) BY MOUTH DAILY. • benralizumab (FASENRA) subcutaneous injection Inject 1 mL (30 mg total) under the skin every 56 days   • budesonide (PULMICORT) 0.25 mg/2 mL nebulizer solution TAKE 2 ML BY NEBULIZATION 2 (TWO) TIMES A DAY RINSE MOUTH AFTER USE **MED B**   • ferrous sulfate 324 (65 Fe) mg Take 1 tablet (324 mg total) by mouth every other day   • fexofenadine (ALLEGRA) 180 MG tablet Take 180 mg by mouth daily   • fluticasone (FLONASE) 50 mcg/act nasal spray SPRAY 2 SPRAYS INTO EACH NOSTRIL EVERY DAY   • Fluticasone-Salmeterol (Advair) 500-50 mcg/dose inhaler Inhale 1 puff 2 (two) times a day Rinse mouth after use.    • furosemide (LASIX) 40 mg tablet Take 1 tablet (40 mg total) by mouth daily   • ibuprofen (MOTRIN) 200 mg tablet Take by mouth every 6 (six) hours as needed   • irbesartan (AVAPRO) 300 mg tablet TAKE 1 TABLET BY MOUTH EVERYDAY AT BEDTIME   • omeprazole (PriLOSEC) 20 mg delayed release capsule Take 1 capsule (20 mg total) by mouth daily   • oxyCODONE-acetaminophen (PERCOCET) 5-325 mg per tablet Take 1 tablet by mouth every 4 (four) hours as needed for moderate pain For ongoing pain Max Daily Amount: 6 tablets   • rosuvastatin (CRESTOR) 10 MG tablet Take 1 tablet (10 mg total) by mouth daily       Objective     /80 (BP Location: Left arm, Patient Position: Sitting, Cuff Size: Standard)   Pulse 95   Temp 98 °F (36.7 °C) (Temporal)   Resp 22   Ht 5' 1" (1.549 m)   Wt 107 kg (236 lb 12.8 oz)   SpO2 95%   BMI 44.74 kg/m²     Physical Exam  Vitals reviewed. HENT:      Head: Normocephalic. Nose: Nose normal.   Eyes:      Conjunctiva/sclera: Conjunctivae normal.   Cardiovascular:      Rate and Rhythm: Normal rate and regular rhythm. Pulses: Normal pulses. Pulmonary:      Effort: No respiratory distress. Breath sounds: Normal breath sounds. No wheezing or rales. Skin:     General: Skin is warm. Capillary Refill: Capillary refill takes less than 2 seconds. Neurological:      General: No focal deficit present. Mental Status: She is alert.    Psychiatric:         Behavior: Behavior normal.       Emanuel Schroeder MD

## 2023-07-11 DIAGNOSIS — J45.20 MILD INTERMITTENT ASTHMA WITHOUT COMPLICATION: ICD-10-CM

## 2023-07-12 RX ORDER — ALBUTEROL SULFATE 90 UG/1
AEROSOL, METERED RESPIRATORY (INHALATION)
Qty: 8.5 G | Refills: 2 | Status: SHIPPED | OUTPATIENT
Start: 2023-07-12

## 2023-07-17 ENCOUNTER — APPOINTMENT (OUTPATIENT)
Dept: LAB | Facility: HOSPITAL | Age: 78
End: 2023-07-17
Payer: COMMERCIAL

## 2023-07-17 DIAGNOSIS — I50.32 CHRONIC DIASTOLIC CONGESTIVE HEART FAILURE (HCC): ICD-10-CM

## 2023-07-17 DIAGNOSIS — M79.10 MYALGIA: ICD-10-CM

## 2023-07-17 DIAGNOSIS — C79.51 MALIGNANT NEOPLASM METASTATIC TO BONE (HCC): ICD-10-CM

## 2023-07-17 DIAGNOSIS — M54.50 CHRONIC BILATERAL LOW BACK PAIN, UNSPECIFIED WHETHER SCIATICA PRESENT: ICD-10-CM

## 2023-07-17 DIAGNOSIS — G89.29 CHRONIC BILATERAL LOW BACK PAIN, UNSPECIFIED WHETHER SCIATICA PRESENT: ICD-10-CM

## 2023-07-17 DIAGNOSIS — C34.91 NON-SMALL CELL CARCINOMA OF LUNG, STAGE 4, RIGHT (HCC): ICD-10-CM

## 2023-07-17 DIAGNOSIS — M19.049 HAND ARTHRITIS: ICD-10-CM

## 2023-07-17 LAB
ALBUMIN SERPL BCP-MCNC: 4 G/DL (ref 3.5–5)
ALP SERPL-CCNC: 94 U/L (ref 34–104)
ALT SERPL W P-5'-P-CCNC: 14 U/L (ref 7–52)
ANION GAP SERPL CALCULATED.3IONS-SCNC: 10 MMOL/L
AST SERPL W P-5'-P-CCNC: 21 U/L (ref 13–39)
BILIRUB SERPL-MCNC: 0.98 MG/DL (ref 0.2–1)
BUN SERPL-MCNC: 21 MG/DL (ref 5–25)
CALCIUM SERPL-MCNC: 8.8 MG/DL (ref 8.4–10.2)
CHLORIDE SERPL-SCNC: 100 MMOL/L (ref 96–108)
CK SERPL-CCNC: 57 U/L (ref 26–192)
CO2 SERPL-SCNC: 36 MMOL/L (ref 21–32)
CREAT SERPL-MCNC: 1.44 MG/DL (ref 0.6–1.3)
CRP SERPL QL: 6.7 MG/L
ERYTHROCYTE [SEDIMENTATION RATE] IN BLOOD: 22 MM/HOUR (ref 0–29)
GFR SERPL CREATININE-BSD FRML MDRD: 35 ML/MIN/1.73SQ M
GLUCOSE P FAST SERPL-MCNC: 110 MG/DL (ref 65–99)
POTASSIUM SERPL-SCNC: 3.3 MMOL/L (ref 3.5–5.3)
PROT SERPL-MCNC: 6.5 G/DL (ref 6.4–8.4)
SODIUM SERPL-SCNC: 146 MMOL/L (ref 135–147)
TSH SERPL DL<=0.05 MIU/L-ACNC: 3.93 UIU/ML (ref 0.45–4.5)

## 2023-07-17 PROCEDURE — 86140 C-REACTIVE PROTEIN: CPT

## 2023-07-17 PROCEDURE — 86430 RHEUMATOID FACTOR TEST QUAL: CPT

## 2023-07-17 PROCEDURE — 86235 NUCLEAR ANTIGEN ANTIBODY: CPT

## 2023-07-17 PROCEDURE — 36415 COLL VENOUS BLD VENIPUNCTURE: CPT

## 2023-07-17 PROCEDURE — 82550 ASSAY OF CK (CPK): CPT

## 2023-07-17 PROCEDURE — 80053 COMPREHEN METABOLIC PANEL: CPT

## 2023-07-17 PROCEDURE — 84443 ASSAY THYROID STIM HORMONE: CPT

## 2023-07-17 PROCEDURE — 86200 CCP ANTIBODY: CPT

## 2023-07-17 PROCEDURE — 85652 RBC SED RATE AUTOMATED: CPT

## 2023-07-18 DIAGNOSIS — J45.30 MILD PERSISTENT ASTHMA WITHOUT COMPLICATION: ICD-10-CM

## 2023-07-18 LAB
ENA SS-A AB SER-ACNC: <0.2 AI (ref 0–0.9)
ENA SS-B AB SER-ACNC: <0.2 AI (ref 0–0.9)
RHEUMATOID FACT SER QL LA: NEGATIVE

## 2023-07-19 LAB — CCP AB SER IA-ACNC: 0.8

## 2023-07-19 RX ORDER — BUDESONIDE 0.25 MG/2ML
INHALANT ORAL
Qty: 360 ML | Refills: 2 | Status: SHIPPED | OUTPATIENT
Start: 2023-07-19

## 2023-07-20 ENCOUNTER — HOSPITAL ENCOUNTER (OUTPATIENT)
Dept: NON INVASIVE DIAGNOSTICS | Facility: HOSPITAL | Age: 78
Discharge: HOME | End: 2023-07-20
Payer: COMMERCIAL

## 2023-07-20 VITALS
HEIGHT: 61 IN | HEART RATE: 95 BPM | WEIGHT: 236 LBS | DIASTOLIC BLOOD PRESSURE: 80 MMHG | BODY MASS INDEX: 44.56 KG/M2 | SYSTOLIC BLOOD PRESSURE: 132 MMHG

## 2023-07-20 DIAGNOSIS — I50.32 CHRONIC DIASTOLIC CONGESTIVE HEART FAILURE (HCC): ICD-10-CM

## 2023-07-20 LAB
AORTIC ROOT: 3.4 CM
AORTIC VALVE MEAN VELOCITY: 10 M/S
APICAL FOUR CHAMBER EJECTION FRACTION: 61 %
ASCENDING AORTA: 3.3 CM
AV LVOT MEAN GRADIENT: 5 MMHG
AV LVOT PEAK GRADIENT: 7 MMHG
AV MEAN GRADIENT: 5 MMHG
AV PEAK GRADIENT: 9 MMHG
AV VELOCITY RATIO: 0.93
DOP CALC AO PEAK VEL: 1.47 M/S
DOP CALC AO VTI: 29.93 CM
DOP CALC LVOT PEAK VEL VTI: 34.12 CM
DOP CALC LVOT PEAK VEL: 1.37 M/S
E WAVE DECELERATION TIME: 444 MS
FRACTIONAL SHORTENING: 36 (ref 28–44)
INTERVENTRICULAR SEPTUM IN DIASTOLE (PARASTERNAL SHORT AXIS VIEW): 1.5 CM
INTERVENTRICULAR SEPTUM: 1.5 CM (ref 0.6–1.1)
LAAS-AP2: 35.8 CM2
LAAS-AP4: 37 CM2
LEFT ATRIUM SIZE: 4.8 CM
LEFT ATRIUM VOLUME (MOD BIPLANE): 162 ML
LEFT INTERNAL DIMENSION IN SYSTOLE: 2.5 CM (ref 2.1–4)
LEFT VENTRICULAR INTERNAL DIMENSION IN DIASTOLE: 3.9 CM (ref 3.5–6)
LEFT VENTRICULAR POSTERIOR WALL IN END DIASTOLE: 1.4 CM
LEFT VENTRICULAR STROKE VOLUME: 43 ML
LVSV (TEICH): 43 ML
MV E'TISSUE VEL-SEP: 5 CM/S
MV PEAK A VEL: 1.22 M/S
MV PEAK E VEL: 91 CM/S
MV STENOSIS PRESSURE HALF TIME: 129 MS
MV VALVE AREA P 1/2 METHOD: 1.71
RIGHT ATRIUM AREA SYSTOLE A4C: 13.2 CM2
RIGHT VENTRICLE ID DIMENSION: 4.1 CM
SL CV LEFT ATRIUM LENGTH A2C: 6.6 CM
SL CV LV EF: 61
SL CV PED ECHO LEFT VENTRICLE DIASTOLIC VOLUME (MOD BIPLANE) 2D: 65 ML
SL CV PED ECHO LEFT VENTRICLE SYSTOLIC VOLUME (MOD BIPLANE) 2D: 22 ML
TRICUSPID ANNULAR PLANE SYSTOLIC EXCURSION: 2.2 CM

## 2023-07-20 PROCEDURE — 93306 TTE W/DOPPLER COMPLETE: CPT

## 2023-07-20 PROCEDURE — 93306 TTE W/DOPPLER COMPLETE: CPT | Performed by: INTERNAL MEDICINE

## 2023-07-21 ENCOUNTER — TELEPHONE (OUTPATIENT)
Dept: CARDIOLOGY CLINIC | Facility: CLINIC | Age: 78
End: 2023-07-21

## 2023-07-21 DIAGNOSIS — I50.32 CHRONIC DIASTOLIC CONGESTIVE HEART FAILURE (HCC): ICD-10-CM

## 2023-07-21 DIAGNOSIS — E87.6 HYPOKALEMIA: Primary | ICD-10-CM

## 2023-07-21 RX ORDER — FUROSEMIDE 20 MG/1
20 TABLET ORAL DAILY
Qty: 90 TABLET | Refills: 3 | Status: SHIPPED | OUTPATIENT
Start: 2023-07-21 | End: 2023-08-31

## 2023-07-21 RX ORDER — POTASSIUM CHLORIDE 1500 MG/1
20 TABLET, EXTENDED RELEASE ORAL DAILY
Qty: 10 TABLET | Refills: 0 | Status: SHIPPED | OUTPATIENT
Start: 2023-07-21 | End: 2023-08-23

## 2023-07-21 NOTE — TELEPHONE ENCOUNTER
----- Message from Francisca Goddard DO sent at 7/21/2023  1:35 PM EDT -----  Please have the patient decrease her Lasix back down to 20 mg daily and I will call in potassium supplementation with 20 mEq for the next 3 days. Repeat metabolic panel in 1 week.

## 2023-08-09 ENCOUNTER — APPOINTMENT (OUTPATIENT)
Dept: LAB | Facility: HOSPITAL | Age: 78
DRG: 189 | End: 2023-08-09
Payer: COMMERCIAL

## 2023-08-09 DIAGNOSIS — I50.32 CHRONIC DIASTOLIC CONGESTIVE HEART FAILURE (HCC): ICD-10-CM

## 2023-08-09 LAB
ANION GAP SERPL CALCULATED.3IONS-SCNC: 7 MMOL/L
BUN SERPL-MCNC: 31 MG/DL (ref 5–25)
CALCIUM SERPL-MCNC: 8.6 MG/DL (ref 8.4–10.2)
CHLORIDE SERPL-SCNC: 102 MMOL/L (ref 96–108)
CO2 SERPL-SCNC: 35 MMOL/L (ref 21–32)
CREAT SERPL-MCNC: 1.7 MG/DL (ref 0.6–1.3)
GFR SERPL CREATININE-BSD FRML MDRD: 28 ML/MIN/1.73SQ M
GLUCOSE P FAST SERPL-MCNC: 106 MG/DL (ref 65–99)
POTASSIUM SERPL-SCNC: 3.8 MMOL/L (ref 3.5–5.3)
SODIUM SERPL-SCNC: 144 MMOL/L (ref 135–147)

## 2023-08-09 PROCEDURE — 36415 COLL VENOUS BLD VENIPUNCTURE: CPT

## 2023-08-09 PROCEDURE — 80048 BASIC METABOLIC PNL TOTAL CA: CPT

## 2023-08-12 ENCOUNTER — APPOINTMENT (EMERGENCY)
Dept: RADIOLOGY | Facility: HOSPITAL | Age: 78
DRG: 189 | End: 2023-08-12
Payer: COMMERCIAL

## 2023-08-12 ENCOUNTER — HOSPITAL ENCOUNTER (INPATIENT)
Facility: HOSPITAL | Age: 78
LOS: 11 days | Discharge: HOME | DRG: 189 | End: 2023-08-23
Attending: EMERGENCY MEDICINE | Admitting: FAMILY MEDICINE
Payer: COMMERCIAL

## 2023-08-12 DIAGNOSIS — J96.01 ACUTE RESPIRATORY FAILURE WITH HYPOXIA (HCC): ICD-10-CM

## 2023-08-12 DIAGNOSIS — I48.92 ATRIAL FLUTTER (HCC): ICD-10-CM

## 2023-08-12 DIAGNOSIS — N28.9 ABNORMAL RENAL FUNCTION: ICD-10-CM

## 2023-08-12 DIAGNOSIS — J45.50 SEVERE PERSISTENT ASTHMA WITHOUT COMPLICATION: ICD-10-CM

## 2023-08-12 DIAGNOSIS — I48.92 NEW ONSET ATRIAL FLUTTER (HCC): ICD-10-CM

## 2023-08-12 DIAGNOSIS — J45.21 MILD INTERMITTENT ASTHMA WITH EXACERBATION: Primary | ICD-10-CM

## 2023-08-12 PROBLEM — C34.90 NON-SMALL CELL LUNG CANCER (HCC): Status: ACTIVE | Noted: 2020-08-31

## 2023-08-12 PROBLEM — J45.901 ASTHMA EXACERBATION: Status: ACTIVE | Noted: 2018-11-21

## 2023-08-12 PROBLEM — I50.30 DIASTOLIC CHF (HCC): Status: ACTIVE | Noted: 2023-08-12

## 2023-08-12 LAB
ALBUMIN SERPL BCP-MCNC: 3.9 G/DL (ref 3.5–5)
ALP SERPL-CCNC: 87 U/L (ref 34–104)
ALT SERPL W P-5'-P-CCNC: 23 U/L (ref 7–52)
ANION GAP SERPL CALCULATED.3IONS-SCNC: 4 MMOL/L
ANISOCYTOSIS BLD QL SMEAR: PRESENT
AST SERPL W P-5'-P-CCNC: 26 U/L (ref 13–39)
BASOPHILS # BLD MANUAL: 0 THOUSAND/UL (ref 0–0.1)
BASOPHILS NFR MAR MANUAL: 0 % (ref 0–1)
BILIRUB SERPL-MCNC: 0.98 MG/DL (ref 0.2–1)
BNP SERPL-MCNC: 273 PG/ML (ref 0–100)
BUN SERPL-MCNC: 27 MG/DL (ref 5–25)
CALCIUM SERPL-MCNC: 8.9 MG/DL (ref 8.4–10.2)
CHLORIDE SERPL-SCNC: 106 MMOL/L (ref 96–108)
CO2 SERPL-SCNC: 33 MMOL/L (ref 21–32)
CREAT SERPL-MCNC: 1.57 MG/DL (ref 0.6–1.3)
EOSINOPHIL # BLD MANUAL: 0 THOUSAND/UL (ref 0–0.4)
EOSINOPHIL NFR BLD MANUAL: 0 % (ref 0–6)
ERYTHROCYTE [DISTWIDTH] IN BLOOD BY AUTOMATED COUNT: 16 % (ref 11.6–15.1)
GFR SERPL CREATININE-BSD FRML MDRD: 31 ML/MIN/1.73SQ M
GLUCOSE SERPL-MCNC: 160 MG/DL (ref 65–140)
HCT VFR BLD AUTO: 31.9 % (ref 34.8–46.1)
HGB BLD-MCNC: 10.1 G/DL (ref 11.5–15.4)
LYMPHOCYTES # BLD AUTO: 1.47 THOUSAND/UL (ref 0.6–4.47)
LYMPHOCYTES # BLD AUTO: 22 % (ref 14–44)
MCH RBC QN AUTO: 29.2 PG (ref 26.8–34.3)
MCHC RBC AUTO-ENTMCNC: 31.7 G/DL (ref 31.4–37.4)
MCV RBC AUTO: 92 FL (ref 82–98)
MONOCYTES # BLD AUTO: 0.33 THOUSAND/UL (ref 0–1.22)
MONOCYTES NFR BLD: 5 % (ref 4–12)
NEUTROPHILS # BLD MANUAL: 4.87 THOUSAND/UL (ref 1.85–7.62)
NEUTS SEG NFR BLD AUTO: 73 % (ref 43–75)
PLATELET # BLD AUTO: 90 THOUSANDS/UL (ref 149–390)
PLATELET BLD QL SMEAR: ABNORMAL
POTASSIUM SERPL-SCNC: 3.7 MMOL/L (ref 3.5–5.3)
PROT SERPL-MCNC: 6.6 G/DL (ref 6.4–8.4)
RBC # BLD AUTO: 3.46 MILLION/UL (ref 3.81–5.12)
SODIUM SERPL-SCNC: 143 MMOL/L (ref 135–147)
WBC # BLD AUTO: 6.67 THOUSAND/UL (ref 4.31–10.16)

## 2023-08-12 PROCEDURE — 94640 AIRWAY INHALATION TREATMENT: CPT

## 2023-08-12 PROCEDURE — 99285 EMERGENCY DEPT VISIT HI MDM: CPT

## 2023-08-12 PROCEDURE — 99285 EMERGENCY DEPT VISIT HI MDM: CPT | Performed by: EMERGENCY MEDICINE

## 2023-08-12 PROCEDURE — 96374 THER/PROPH/DIAG INJ IV PUSH: CPT

## 2023-08-12 PROCEDURE — 99223 1ST HOSP IP/OBS HIGH 75: CPT | Performed by: HOSPITALIST

## 2023-08-12 PROCEDURE — 71046 X-RAY EXAM CHEST 2 VIEWS: CPT

## 2023-08-12 PROCEDURE — 85007 BL SMEAR W/DIFF WBC COUNT: CPT | Performed by: EMERGENCY MEDICINE

## 2023-08-12 PROCEDURE — 85027 COMPLETE CBC AUTOMATED: CPT | Performed by: EMERGENCY MEDICINE

## 2023-08-12 PROCEDURE — 36415 COLL VENOUS BLD VENIPUNCTURE: CPT | Performed by: EMERGENCY MEDICINE

## 2023-08-12 PROCEDURE — 83880 ASSAY OF NATRIURETIC PEPTIDE: CPT | Performed by: EMERGENCY MEDICINE

## 2023-08-12 PROCEDURE — NC001 PR NO CHARGE: Performed by: EMERGENCY MEDICINE

## 2023-08-12 PROCEDURE — 94760 N-INVAS EAR/PLS OXIMETRY 1: CPT

## 2023-08-12 PROCEDURE — 80053 COMPREHEN METABOLIC PANEL: CPT | Performed by: EMERGENCY MEDICINE

## 2023-08-12 RX ORDER — LEVALBUTEROL INHALATION SOLUTION 1.25 MG/3ML
1.25 SOLUTION RESPIRATORY (INHALATION) EVERY 6 HOURS PRN
Status: DISCONTINUED | OUTPATIENT
Start: 2023-08-12 | End: 2023-08-23 | Stop reason: HOSPADM

## 2023-08-12 RX ORDER — ALBUTEROL SULFATE 2.5 MG/3ML
5 SOLUTION RESPIRATORY (INHALATION) ONCE
Status: COMPLETED | OUTPATIENT
Start: 2023-08-12 | End: 2023-08-12

## 2023-08-12 RX ORDER — ACETAMINOPHEN 325 MG/1
650 TABLET ORAL EVERY 6 HOURS PRN
Status: DISCONTINUED | OUTPATIENT
Start: 2023-08-12 | End: 2023-08-23 | Stop reason: HOSPADM

## 2023-08-12 RX ORDER — PANTOPRAZOLE SODIUM 20 MG/1
20 TABLET, DELAYED RELEASE ORAL
Status: DISCONTINUED | OUTPATIENT
Start: 2023-08-13 | End: 2023-08-23 | Stop reason: HOSPADM

## 2023-08-12 RX ORDER — ACETAMINOPHEN 325 MG/1
650 TABLET ORAL EVERY 4 HOURS PRN
Status: DISCONTINUED | OUTPATIENT
Start: 2023-08-12 | End: 2023-08-12

## 2023-08-12 RX ORDER — ALBUTEROL SULFATE 2.5 MG/3ML
2.5 SOLUTION RESPIRATORY (INHALATION) EVERY 6 HOURS PRN
Status: DISCONTINUED | OUTPATIENT
Start: 2023-08-12 | End: 2023-08-23 | Stop reason: HOSPADM

## 2023-08-12 RX ORDER — ONDANSETRON 2 MG/ML
4 INJECTION INTRAMUSCULAR; INTRAVENOUS EVERY 6 HOURS PRN
Status: DISCONTINUED | OUTPATIENT
Start: 2023-08-12 | End: 2023-08-23 | Stop reason: HOSPADM

## 2023-08-12 RX ORDER — SODIUM CHLORIDE FOR INHALATION 0.9 %
3 VIAL, NEBULIZER (ML) INHALATION
Status: DISCONTINUED | OUTPATIENT
Start: 2023-08-12 | End: 2023-08-13

## 2023-08-12 RX ORDER — FUROSEMIDE 40 MG/1
40 TABLET ORAL 2 TIMES DAILY
Status: DISCONTINUED | OUTPATIENT
Start: 2023-08-12 | End: 2023-08-13

## 2023-08-12 RX ORDER — LEVALBUTEROL INHALATION SOLUTION 1.25 MG/3ML
1.25 SOLUTION RESPIRATORY (INHALATION)
Status: DISCONTINUED | OUTPATIENT
Start: 2023-08-12 | End: 2023-08-23 | Stop reason: HOSPADM

## 2023-08-12 RX ORDER — SODIUM CHLORIDE FOR INHALATION 0.9 %
3 VIAL, NEBULIZER (ML) INHALATION EVERY 6 HOURS PRN
Status: DISCONTINUED | OUTPATIENT
Start: 2023-08-12 | End: 2023-08-23 | Stop reason: HOSPADM

## 2023-08-12 RX ORDER — ATENOLOL 25 MG/1
25 TABLET ORAL DAILY
Status: DISCONTINUED | OUTPATIENT
Start: 2023-08-13 | End: 2023-08-14

## 2023-08-12 RX ORDER — METHYLPREDNISOLONE SODIUM SUCCINATE 125 MG/2ML
125 INJECTION, POWDER, LYOPHILIZED, FOR SOLUTION INTRAMUSCULAR; INTRAVENOUS ONCE
Status: COMPLETED | OUTPATIENT
Start: 2023-08-12 | End: 2023-08-12

## 2023-08-12 RX ORDER — POTASSIUM CHLORIDE 750 MG/1
20 TABLET, EXTENDED RELEASE ORAL DAILY
Status: DISCONTINUED | OUTPATIENT
Start: 2023-08-12 | End: 2023-08-13

## 2023-08-12 RX ORDER — METHYLPREDNISOLONE SODIUM SUCCINATE 40 MG/ML
40 INJECTION, POWDER, LYOPHILIZED, FOR SOLUTION INTRAMUSCULAR; INTRAVENOUS EVERY 6 HOURS SCHEDULED
Status: DISCONTINUED | OUTPATIENT
Start: 2023-08-12 | End: 2023-08-13

## 2023-08-12 RX ORDER — LOSARTAN POTASSIUM 50 MG/1
100 TABLET ORAL DAILY
Status: DISCONTINUED | OUTPATIENT
Start: 2023-08-13 | End: 2023-08-13

## 2023-08-12 RX ORDER — FUROSEMIDE 20 MG/1
20 TABLET ORAL DAILY
Status: DISCONTINUED | OUTPATIENT
Start: 2023-08-13 | End: 2023-08-12

## 2023-08-12 RX ORDER — ENOXAPARIN SODIUM 100 MG/ML
40 INJECTION SUBCUTANEOUS DAILY
Status: DISCONTINUED | OUTPATIENT
Start: 2023-08-13 | End: 2023-08-14

## 2023-08-12 RX ORDER — OXYCODONE HYDROCHLORIDE AND ACETAMINOPHEN 5; 325 MG/1; MG/1
1 TABLET ORAL EVERY 4 HOURS PRN
Status: DISCONTINUED | OUTPATIENT
Start: 2023-08-12 | End: 2023-08-23 | Stop reason: HOSPADM

## 2023-08-12 RX ORDER — ATORVASTATIN CALCIUM 40 MG/1
40 TABLET, FILM COATED ORAL
Status: DISCONTINUED | OUTPATIENT
Start: 2023-08-12 | End: 2023-08-23 | Stop reason: HOSPADM

## 2023-08-12 RX ORDER — BUDESONIDE 0.25 MG/2ML
0.25 INHALANT ORAL
Status: DISCONTINUED | OUTPATIENT
Start: 2023-08-12 | End: 2023-08-23 | Stop reason: HOSPADM

## 2023-08-12 RX ORDER — FLUTICASONE PROPIONATE 50 MCG
2 SPRAY, SUSPENSION (ML) NASAL DAILY
Status: DISCONTINUED | OUTPATIENT
Start: 2023-08-13 | End: 2023-08-23 | Stop reason: HOSPADM

## 2023-08-12 RX ADMIN — BUDESONIDE 0.25 MG: 0.25 INHALANT RESPIRATORY (INHALATION) at 19:14

## 2023-08-12 RX ADMIN — METHYLPREDNISOLONE SODIUM SUCCINATE 40 MG: 40 INJECTION, POWDER, FOR SOLUTION INTRAMUSCULAR; INTRAVENOUS at 17:24

## 2023-08-12 RX ADMIN — POTASSIUM CHLORIDE 20 MEQ: 750 TABLET, EXTENDED RELEASE ORAL at 17:24

## 2023-08-12 RX ADMIN — IPRATROPIUM BROMIDE 0.5 MG: 0.5 SOLUTION RESPIRATORY (INHALATION) at 12:05

## 2023-08-12 RX ADMIN — ALBUTEROL SULFATE 5 MG: 2.5 SOLUTION RESPIRATORY (INHALATION) at 12:05

## 2023-08-12 RX ADMIN — ALBUTEROL SULFATE 5 MG: 2.5 SOLUTION RESPIRATORY (INHALATION) at 14:32

## 2023-08-12 RX ADMIN — METHYLPREDNISOLONE SODIUM SUCCINATE 125 MG: 125 INJECTION, POWDER, FOR SOLUTION INTRAMUSCULAR; INTRAVENOUS at 12:30

## 2023-08-12 RX ADMIN — LEVALBUTEROL HYDROCHLORIDE 1.25 MG: 1.25 SOLUTION RESPIRATORY (INHALATION) at 19:14

## 2023-08-12 RX ADMIN — FUROSEMIDE 40 MG: 40 TABLET ORAL at 17:24

## 2023-08-12 RX ADMIN — ATORVASTATIN CALCIUM 40 MG: 40 TABLET, FILM COATED ORAL at 17:24

## 2023-08-12 RX ADMIN — IPRATROPIUM BROMIDE 0.5 MG: 0.5 SOLUTION RESPIRATORY (INHALATION) at 19:14

## 2023-08-12 RX ADMIN — Medication 3 ML: at 19:14

## 2023-08-12 NOTE — PLAN OF CARE
Problem: MOBILITY - ADULT  Goal: Maintain or return to baseline ADL function  Description: INTERVENTIONS:  -  Assess patient's ability to carry out ADLs; assess patient's baseline for ADL function and identify physical deficits which impact ability to perform ADLs (bathing, care of mouth/teeth, toileting, grooming, dressing, etc.)  - Assess/evaluate cause of self-care deficits   - Assess range of motion  - Assess patient's mobility; develop plan if impaired  - Assess patient's need for assistive devices and provide as appropriate  - Encourage maximum independence but intervene and supervise when necessary  - Involve family in performance of ADLs  - Assess for home care needs following discharge   - Consider OT consult to assist with ADL evaluation and planning for discharge  - Provide patient education as appropriate  Outcome: Progressing  Goal: Maintains/Returns to pre admission functional level  Description: INTERVENTIONS:  - Perform BMAT or MOVE assessment daily.   - Set and communicate daily mobility goal to care team and patient/family/caregiver. - Collaborate with rehabilitation services on mobility goals if consulted  - Perform Range of Motion 4 times a day. - Reposition patient every 2 hours.   - Dangle patient 3 times a day  - Stand patient 3 times a day  - Ambulate patient 3 times a day  - Out of bed to chair 3 times a day   - Out of bed for meals 3 times a day  - Out of bed for toileting  - Record patient progress and toleration of activity level   Outcome: Progressing     Problem: PAIN - ADULT  Goal: Verbalizes/displays adequate comfort level or baseline comfort level  Description: Interventions:  - Encourage patient to monitor pain and request assistance  - Assess pain using appropriate pain scale  - Administer analgesics based on type and severity of pain and evaluate response  - Implement non-pharmacological measures as appropriate and evaluate response  - Consider cultural and social influences on pain and pain management  - Notify physician/advanced practitioner if interventions unsuccessful or patient reports new pain  Outcome: Progressing     Problem: INFECTION - ADULT  Goal: Absence or prevention of progression during hospitalization  Description: INTERVENTIONS:  - Assess and monitor for signs and symptoms of infection  - Monitor lab/diagnostic results  - Monitor all insertion sites, i.e. indwelling lines, tubes, and drains  - Monitor endotracheal if appropriate and nasal secretions for changes in amount and color  - [place] appropriate cooling/warming therapies per order  - Administer medications as ordered  - Instruct and encourage patient and family to use good hand hygiene technique  - Identify and instruct in appropriate isolation precautions for identified infection/condition  Outcome: Progressing  Goal: Absence of fever/infection during neutropenic period  Description: INTERVENTIONS:  - Monitor WBC    Outcome: Progressing     Problem: SAFETY ADULT  Goal: Maintain or return to baseline ADL function  Description: INTERVENTIONS:  -  Assess patient's ability to carry out ADLs; assess patient's baseline for ADL function and identify physical deficits which impact ability to perform ADLs (bathing, care of mouth/teeth, toileting, grooming, dressing, etc.)  - Assess/evaluate cause of self-care deficits   - Assess range of motion  - Assess patient's mobility; develop plan if impaired  - Assess patient's need for assistive devices and provide as appropriate  - Encourage maximum independence but intervene and supervise when necessary  - Involve family in performance of ADLs  - Assess for home care needs following discharge   - Consider OT consult to assist with ADL evaluation and planning for discharge  - Provide patient education as appropriate  Outcome: Progressing  Goal: Maintains/Returns to pre admission functional level  Description: INTERVENTIONS:  - Perform BMAT or MOVE assessment daily.   - Set and communicate daily mobility goal to care team and patient/family/caregiver. - Collaborate with rehabilitation services on mobility goals if consulted  - Perform Range of Motion 4 times a day. - Reposition patient every 2 hours.   - Dangle patient 3 times a day  - Stand patient 3 times a day  - Ambulate patient 3 times a day  - Out of bed to chair 3 times a day   - Out of bed for meals 3 times a day  - Out of bed for toileting  - Record patient progress and toleration of activity level   Outcome: Progressing  Goal: Patient will remain free of falls  Description: INTERVENTIONS:  - Educate patient/family on patient safety including physical limitations  - Instruct patient to call for assistance with activity   - Consult OT/PT to assist with strengthening/mobility   - Keep Call bell within reach  - Keep bed low and locked with side rails adjusted as appropriate  - Keep care items and personal belongings within reach  - Initiate and maintain comfort rounds  - Make Fall Risk Sign visible to staff  - Offer Toileting every 2 Hours, in advance of need  - Apply yellow socks and bracelet for high fall risk patients  - Consider moving patient to room near nurses station  Outcome: Progressing     Problem: DISCHARGE PLANNING  Goal: Discharge to home or other facility with appropriate resources  Description: INTERVENTIONS:  - Identify barriers to discharge w/patient and caregiver  - Arrange for needed discharge resources and transportation as appropriate  - Identify discharge learning needs (meds, wound care, etc.)  - Arrange for interpretive services to assist at discharge as needed  - Refer to Case Management Department for coordinating discharge planning if the patient needs post-hospital services based on physician/advanced practitioner order or complex needs related to functional status, cognitive ability, or social support system  Outcome: Progressing     Problem: Knowledge Deficit  Goal: Patient/family/caregiver demonstrates understanding of disease process, treatment plan, medications, and discharge instructions  Description: Complete learning assessment and assess knowledge base.   Interventions:  - Provide teaching at level of understanding  - Provide teaching via preferred learning methods  Outcome: Progressing     Problem: RESPIRATORY - ADULT  Goal: Achieves optimal ventilation and oxygenation  Description: INTERVENTIONS:  - Assess for changes in respiratory status  - Assess for changes in mentation and behavior  - Position to facilitate oxygenation and minimize respiratory effort  - Oxygen administered by appropriate delivery if ordered  - Initiate smoking cessation education as indicated  - Encourage broncho-pulmonary hygiene including cough, deep breathe, Incentive Spirometry  - Assess the need for suctioning and aspirate as needed  - Assess and instruct to report SOB or any respiratory difficulty  - Respiratory Therapy support as indicated  Outcome: Progressing

## 2023-08-12 NOTE — ASSESSMENT & PLAN NOTE
Wt Readings from Last 3 Encounters:   07/20/23 107 kg (236 lb)   07/10/23 107 kg (236 lb 12.8 oz)   06/29/23 105 kg (231 lb 9.6 oz)         Possible mild exacerbation  Give gentle lasix

## 2023-08-12 NOTE — H&P
[address] [place] [address]  H&P  Name: Alvaro Warren 70 y.o. female I MRN: [id number]  Unit/Bed#: [facility] -01 I Date of Admission: 8/12/2023   Date of Service: 8/12/2023 I Hospital Day: 0            Assessment/Plan   Severe persistent asthma without complication  Assessment & Plan  She is on advair, pulmicort, fasenra, and albuterol     Follows with pulmonary   will place on IV solumedrol  Add nebulizers. Ask pulmonary to see      Non-small cell lung cancer [facility])  Assessment & Plan  Follow with heme onc        Diastolic CHF ([address])  Assessment & Plan      Wt Readings from Last 3 Encounters:   07/20/23 107 kg (236 lb)   07/10/23 107 kg (236 lb 12.8 oz)   06/29/23 105 kg (231 lb 9.6 oz)            Possible mild exacerbation  Give gentle lasix                       Chief Complaint:   Shortness of breath and wheezing        History of Present Illness:     Alvaro Warren is a 70 y.o. female who presents with shortness of breath and wheezing. Patient has severe persistent asthma. She feels like this is an asthma attack. She has a nonproductive cough. No fever nor chills. Does have some increased leg swelling that has been going on for several weeks now. She is on Lasix for diastolic CHF. She does feel better after several nebulizer treatments of albuterol in the emergency room. No sick contacts. Compliant with all her medications. .        Review of Systems:     Review of Systems   Constitutional: Negative for chills and fever. HENT: Negative for ear pain and sore throat. Eyes: Negative for pain and visual disturbance. Respiratory: Positive for wheezing. Negative for cough and shortness of breath. Cardiovascular: Positive for leg swelling. Negative for chest pain and palpitations. Gastrointestinal: Negative for abdominal pain and vomiting. Genitourinary: Negative for dysuria and hematuria. Musculoskeletal: Negative for arthralgias and back pain.    Skin: Negative for color change and rash. Neurological: Negative for seizures and syncope. All other systems reviewed and are negative.           Past Medical and Surgical History:      Medical History        Past Medical History:   Diagnosis Date   • Asthma     • Degenerative joint disease     • GERD (gastroesophageal reflux disease)     • Hypertension     • Lumbar disc disease     • Lung cancer ([address])     • Sleep apnea       suspected    • Ventricular arrhythmia     • Vitamin D deficiency              Surgical History         Past Surgical History:   Procedure Laterality Date   • APPENDECTOMY       • COLONOSCOPY N/A 03/18/2019     Procedure: COLONOSCOPY;  Surgeon: Dee Dee Davenport DO;  [facility]; Service: Gastroenterology   • ESOPHAGOGASTRODUODENOSCOPY N/A 03/18/2019     Procedure: ESOPHAGOGASTRODUODENOSCOPY (EGD); Surgeon: Dee Dee Davenport DO;  Location[facility]; Service: Gastroenterology   • KNEE SURGERY       • ROTATOR CUFF REPAIR       • SHOULDER SURGERY                   Home Medications:             Prior to Admission medications    Medication Sig Start Date End Date Taking? Authorizing Provider   acetaminophen (TYLENOL) 650 mg CR tablet TAKE 1 TABLET (650 MG TOTAL) BY MOUTH EVERY 8 (EIGHT) HOURS AS NEEDED FOR MILD PAIN 4/7/23     Historical Provider, MD   albuterol (2.5 mg/3 mL) 0.083 % nebulizer solution TAKE 3 ML (2.5 MG TOTAL) BY NEBULIZATION EVERY 6 (SIX) HOURS AS NEEDED FOR WHEEZING 4/24/23     Gregorio Leary MD   Alectinib HCl 150 MG CAPS Take 4 capsules (600 mg total) by mouth 2 (two) times a day 3/28/23     Ishan Higginbotham MD   atenolol (TENORMIN) 25 mg tablet TAKE 1 TABLET (25 MG TOTAL) BY MOUTH DAILY.  4/23/23     Darryn Mayberry MD   benralizumab [facility]) subcutaneous injection Inject 1 mL (30 mg total) under the skin every 56 days 6/26/23 6/25/24   Clyde Park DO   budesonide (PULMICORT) 0.25 mg/2 mL nebulizer solution TAKE 2 ML BY NEBULIZATION 2 (TWO) TIMES A DAY RINSE MOUTH AFTER USE 7/19/23     Verónica Wilkinson MD   ferrous sulfate 324 (65 Fe) mg Take 1 tablet (324 mg total) by mouth every other day 4/18/22     WILDA Hollingsworth   fexofenadine (ALLEGRA) 180 MG tablet Take 180 mg by mouth daily       Historical Provider, MD   fluticasone (FLONASE) 50 mcg/act nasal spray SPRAY 2 SPRAYS INTO EACH NOSTRIL EVERY DAY 2/24/23     Verónica Wilkinson MD   Fluticasone-Salmeterol (Advair) 500-50 mcg/dose inhaler Inhale 1 puff 2 (two) times a day Rinse mouth after use.  8/30/22     Carroll Coy MD   furosemide (LASIX) 20 mg tablet Take 1 tablet (20 mg total) by mouth daily 7/21/23 10/19/23   Aakash Thomas DO   ibuprofen (MOTRIN) 200 mg tablet Take by mouth every 6 (six) hours as needed       Historical Provider, MD   irbesartan (AVAPRO) 300 mg tablet TAKE 1 TABLET BY MOUTH EVERYDAY AT BEDTIME 4/23/23     Verónica Wilkinson MD   omeprazole (PriLOSEC) 20 mg delayed release capsule Take 1 capsule (20 mg total) by mouth daily 4/11/23     Verónica Wilkinson MD   oxyCODONE-acetaminophen (PERCOCET) 5-325 mg per tablet Take 1 tablet by mouth every 4 (four) hours as needed for moderate pain For ongoing pain Max Daily Amount: 6 tablets 5/2/23     Pita Moreno MD   potassium chloride 20 MEQ TBCR Take 1 tablet (20 mEq total) by mouth in the morning for 10 days 7/21/23 7/31/23   Aakash Thomas DO   rosuvastatin (CRESTOR) 10 MG tablet Take 1 tablet (10 mg total) by mouth daily 6/29/23 9/27/23   Aakash Thomas DO   Ventolin  (90 Base) MCG/ACT inhaler INHALE 2 PUFFS EVERY 6 HOURS AS NEEDED FOR WHEEZING 7/12/23     Verónica Wilkinson MD      I have reviewed home medications with patient personally.        Allergies: No Known Allergies        Social History:     Substance Use History:   Social History           Substance and Sexual Activity   Alcohol Use Yes     Comment: occasionally      Social History           Tobacco Use   Smoking Status Former   • Packs/day: 0.25   • Years: 25.00   • Total pack years: 6.25   • Types: Cigarettes   • Start date: 48   • Quit date: 26   • Years since quittin.6   Smokeless Tobacco Former      Social History      Substance and Sexual Activity   Drug Use Never            Family History:     non-contributory        Physical Exam:      Vitals:   Blood Pressure: 148/69 (23 1502)  Pulse: 61 (23 1502)  Temperature: 97.6 °F (36.4 °C) (23 1148)  Respirations: 22 (23 1502)  SpO2: 95 % (23 1502)     Physical Exam  Vitals and nursing note reviewed. HENT:      Head: Normocephalic and atraumatic. Eyes:      Pupils: Pupils are equal, round, and reactive to light. Cardiovascular:      Rate and Rhythm: Normal rate and regular rhythm. Heart sounds: No murmur heard. No friction rub. No gallop. Pulmonary:      Effort: Pulmonary effort is normal.      Breath sounds: Wheezing (bilateral inspiratory and expiratory wheeze moderate) present. No rales. Abdominal:      General: Bowel sounds are normal.      Palpations: Abdomen is soft. Tenderness: There is no abdominal tenderness. Musculoskeletal:      Right lower leg: Edema present.       Left lower leg: Edema present.            .     Additional Data:      Lab Results: I have personally reviewed pertinent reports.       Results from last 7 days   Lab Units 23  1228   WBC Thousand/uL 6.67   HEMOGLOBIN g/dL 10.1*   HEMATOCRIT % 31.9*   PLATELETS Thousands/uL 90*   LYMPHO PCT % 22   MONO PCT % 5   EOS PCT % 0           Results from last 7 days   Lab Units 23  1228   POTASSIUM mmol/L 3.7   CHLORIDE mmol/L 106   CO2 mmol/L 33*   BUN mg/dL 27*   CREATININE mg/dL 1.57*   CALCIUM mg/dL 8.9   ALK PHOS U/L 87   ALT U/L 23   AST U/L 26                        Imaging: I have personally reviewed pertinent reports.       XR chest 2 views   Final Result by Iliana Pearl MD ( 141)       No acute cardiopulmonary disease.                     Workstation performed: [id number]                       VTE Prophylaxis: Enoxaparin (Lovenox)          Anticipated Length of Stay:  Patient will be admitted on an Inpatient basis with an anticipated length of stay of greater than 2 midnights. Justification for Hospital Stay: Patient needs IV steroids. Needs pulmonary consultation. Length of stay began 2 midnights                 ** Please Note: This note has been constructed using a voice recognition system.  **

## 2023-08-12 NOTE — ED NOTES
Patient's O2 sat between 88-90% on RA.  Patient placed on 2L NC, O2 sat 95%     Iliana Hernandez RN  08/12/23 [id number]

## 2023-08-12 NOTE — PROGRESS NOTES
[address] [facility]  Progress Note  Name: Kalyani Vyas  MRN: [id number]  Unit/Bed#: [address] [address] -01 I Date of Admission: 8/12/2023   Date of Service: 8/12/2023 I Hospital Day: 0    Assessment/Plan   Severe persistent asthma without complication  Assessment & Plan  She is on advair, pulmicort, fasenra, and albuterol    Follows with pulmonary    Non-small cell lung cancer [facility])  Assessment & Plan  Follow with heme onc      Diastolic CHF ([address])  Assessment & Plan  Wt Readings from Last 3 Encounters:   07/20/23 107 kg (236 lb)   07/10/23 107 kg (236 lb 12.8 oz)   06/29/23 105 kg (231 lb 9.6 oz)         Possible mild exacerbation  Give gentle lasix               Chief Complaint:   Shortness of breath and wheezing      History of Present Illness:    Alyssa Chavarria is a 70 y.o. female who presents with shortness of breath and wheezing. Patient has severe persistent asthma. She feels like this is an asthma attack. She has a nonproductive cough. No fever nor chills. Does have some increased leg swelling that has been going on for several weeks now. She is on Lasix for diastolic CHF. She does feel better after several nebulizer treatments of albuterol in the emergency room. No sick contacts. Compliant with all her medications. .      Review of Systems:    Review of Systems   Constitutional: Negative for chills and fever. HENT: Negative for ear pain and sore throat. Eyes: Negative for pain and visual disturbance. Respiratory: Positive for wheezing. Negative for cough and shortness of breath. Cardiovascular: Positive for leg swelling. Negative for chest pain and palpitations. Gastrointestinal: Negative for abdominal pain and vomiting. Genitourinary: Negative for dysuria and hematuria. Musculoskeletal: Negative for arthralgias and back pain. Skin: Negative for color change and rash. Neurological: Negative for seizures and syncope.    All other systems reviewed and are negative. Past Medical and Surgical History:     Past Medical History:   Diagnosis Date   • Asthma    • Degenerative joint disease    • GERD (gastroesophageal reflux disease)    • Hypertension    • Lumbar disc disease    • Lung cancer ([address])    • Sleep apnea     suspected    • Ventricular arrhythmia    • Vitamin D deficiency        Past Surgical History:   Procedure Laterality Date   • APPENDECTOMY     • COLONOSCOPY N/A 03/18/2019    Procedure: COLONOSCOPY;  Surgeon: Rubén Maya DO;  [facility]; Service: Gastroenterology   • ESOPHAGOGASTRODUODENOSCOPY N/A 03/18/2019    Procedure: ESOPHAGOGASTRODUODENOSCOPY (EGD); Surgeon: Rubén Maya DO;  [facility]; Service: Gastroenterology   • KNEE SURGERY     • ROTATOR CUFF REPAIR     • SHOULDER SURGERY           Home Medications:    Prior to Admission medications    Medication Sig Start Date End Date Taking? Authorizing Provider   acetaminophen (TYLENOL) 650 mg CR tablet TAKE 1 TABLET (650 MG TOTAL) BY MOUTH EVERY 8 (EIGHT) HOURS AS NEEDED FOR MILD PAIN 4/7/23   Historical Provider, MD   albuterol (2.5 mg/3 mL) 0.083 % nebulizer solution TAKE 3 ML (2.5 MG TOTAL) BY NEBULIZATION EVERY 6 (SIX) HOURS AS NEEDED FOR WHEEZING 4/24/23   Marcellina Cowden, MD   Alectinib HCl 150 MG CAPS Take 4 capsules (600 mg total) by mouth 2 (two) times a day 3/28/23   Doroteo Bethea MD   atenolol (TENORMIN) 25 mg tablet TAKE 1 TABLET (25 MG TOTAL) BY MOUTH DAILY.  4/23/23   Ana Kulkarni MD   benralizumab [facility]) subcutaneous injection Inject 1 mL (30 mg total) under the skin every 56 days 6/26/23 6/25/24  Maia Cobos DO   budesonide (PULMICORT) 0.25 mg/2 mL nebulizer solution TAKE 2 ML BY NEBULIZATION 2 (TWO) TIMES A DAY RINSE MOUTH AFTER USE 7/19/23   Ana Kulkarni MD   ferrous sulfate 324 (65 Fe) mg Take 1 tablet (324 mg total) by mouth every other day 4/18/22   WILDA Briggs   fexofenadine (ALLEGRA) 180 MG tablet Take 180 mg by mouth daily    Historical Provider, MD   fluticasone [facility]) 50 mcg/act nasal spray SPRAY 2 SPRAYS INTO EACH NOSTRIL EVERY DAY 23   Jim Mayfield MD   Fluticasone-Salmeterol (Advair) 500-50 mcg/dose inhaler Inhale 1 puff 2 (two) times a day Rinse mouth after use. 22   Richie Self MD   furosemide (LASIX) 20 mg tablet Take 1 tablet (20 mg total) by mouth daily 7/21/23 10/19/23  Yoan Benton DO   ibuprofen (MOTRIN) 200 mg tablet Take by mouth every 6 (six) hours as needed    Historical Provider, MD   irbesartan (AVAPRO) 300 mg tablet TAKE 1 TABLET BY MOUTH EVERYDAY AT BEDTIME 23   Jim Mayfield MD   omeprazole (PriLOSEC) 20 mg delayed release capsule Take 1 capsule (20 mg total) by mouth daily 23   Jim Mayfield MD   oxyCODONE-acetaminophen (PERCOCET) 5-325 mg per tablet Take 1 tablet by mouth every 4 (four) hours as needed for moderate pain For ongoing pain Max Daily Amount: 6 tablets 23   Houston Gambino MD   potassium chloride 20 MEQ TBCR Take 1 tablet (20 mEq total) by mouth in the morning for 10 days 23  Yoan Benton DO   rosuvastatin (CRESTOR) 10 MG tablet Take 1 tablet (10 mg total) by mouth daily 23  Yoan Benton DO   Ventolin  (61 Base) MCG/ACT inhaler INHALE 2 PUFFS EVERY 6 HOURS AS NEEDED FOR WHEEZING 23   Jim Mayfield MD     I have reviewed home medications with patient personally.       Allergies: No Known Allergies      Social History:    Substance Use History:   Social History     Substance and Sexual Activity   Alcohol Use Yes    Comment: occasionally     Social History     Tobacco Use   Smoking Status Former   • Packs/day: 0.25   • Years: 25.00   • Total pack years: 6.25   • Types: Cigarettes   • Start date: 48   • Quit date: 28   • Years since quittin.6   Smokeless Tobacco Former     Social History     Substance and Sexual Activity   Drug Use Never Family History:    non-contributory      Physical Exam:     Vitals:   Blood Pressure: 148/69 (08/12/23 1502)  Pulse: 61 (08/12/23 1502)  Temperature: 97.6 °F (36.4 °C) (08/12/23 1148)  Respirations: 22 (08/12/23 1502)  SpO2: 95 % (08/12/23 1502)    Physical Exam  Vitals and nursing note reviewed. HENT:      Head: Normocephalic and atraumatic. Eyes:      Pupils: Pupils are equal, round, and reactive to light. Cardiovascular:      Rate and Rhythm: Normal rate and regular rhythm. Heart sounds: No murmur heard. No friction rub. No gallop. Pulmonary:      Effort: Pulmonary effort is normal.      Breath sounds: Wheezing (bilateral inspiratory and expiratory wheeze moderate) present. No rales. Abdominal:      General: Bowel sounds are normal.      Palpations: Abdomen is soft. Tenderness: There is no abdominal tenderness. Musculoskeletal:      Right lower leg: Edema present. Left lower leg: Edema present. .    Additional Data:     Lab Results: I have personally reviewed pertinent reports. Results from last 7 days   Lab Units 08/12/23  1228   WBC Thousand/uL 6.67   HEMOGLOBIN g/dL 10.1*   HEMATOCRIT % 31.9*   PLATELETS Thousands/uL 90*   LYMPHO PCT % 22   MONO PCT % 5   EOS PCT % 0     Results from last 7 days   Lab Units 08/12/23  1228   POTASSIUM mmol/L 3.7   CHLORIDE mmol/L 106   CO2 mmol/L 33*   BUN mg/dL 27*   CREATININE mg/dL 1.57*   CALCIUM mg/dL 8.9   ALK PHOS U/L 87   ALT U/L 23   AST U/L 26                     Imaging: I have personally reviewed pertinent reports. XR chest 2 views   Final Result by Caroline Graves MD (08/12 1418)      No acute cardiopulmonary disease. Workstation performed: [id number]                 VTE Prophylaxis: Enoxaparin (Lovenox)        Anticipated Length of Stay:  Patient will be admitted on an Inpatient basis with an anticipated length of stay of greater than 2 midnights.    Justification for Hospital Stay: Patient needs IV steroids. Needs pulmonary consultation. Length of stay began 2 midnights            ** Please Note: This note has been constructed using a voice recognition system.  **

## 2023-08-12 NOTE — ED NOTES
Patient's oxygen level while ambulating was 92 but she could not walk anymore and she started coughing and her oxygen level started to drop, patient wanted to go back to her room and get in bed.      Gabriella Cooper  08/12/23 1674

## 2023-08-12 NOTE — ED PROVIDER NOTES
History  Chief Complaint   Patient presents with   • Shortness of Breath     Began "a couple days ago." Hx of asthma     71 yo F with asthma, lung cancer previously treated with chemotherapy XRT, remission since 2020 and remains on immunotherapy, c/o onset of shortness of breath as BERTRAND with short distance, conversational dyspnea, cough, for 2-3 days, without fever, chills, or chest pain, not responding to her usual regimen with nebulizer and inhalers. She has previously required admission for asthma, but more than 1 year ago. She recalls periodically requiring steroids, but cannot recall the last time. History provided by:  Patient      Prior to Admission Medications   Prescriptions Last Dose Informant Patient Reported? Taking? Alectinib HCl 150 MG CAPS   No No   Sig: Take 4 capsules (600 mg total) by mouth 2 (two) times a day   Fluticasone-Salmeterol (Advair) 500-50 mcg/dose inhaler  Self No No   Sig: Inhale 1 puff 2 (two) times a day Rinse mouth after use. Ventolin  (90 Base) MCG/ACT inhaler   No No   Sig: INHALE 2 PUFFS EVERY 6 HOURS AS NEEDED FOR WHEEZING   acetaminophen (TYLENOL) 650 mg CR tablet   Yes No   Sig: TAKE 1 TABLET (650 MG TOTAL) BY MOUTH EVERY 8 (EIGHT) HOURS AS NEEDED FOR MILD PAIN   albuterol (2.5 mg/3 mL) 0.083 % nebulizer solution   No No   Sig: TAKE 3 ML (2.5 MG TOTAL) BY NEBULIZATION EVERY 6 (SIX) HOURS AS NEEDED FOR WHEEZING   atenolol (TENORMIN) 25 mg tablet   No No   Sig: TAKE 1 TABLET (25 MG TOTAL) BY MOUTH DAILY.    benralizumab (FASENRA) subcutaneous injection   No No   Sig: Inject 1 mL (30 mg total) under the skin every 56 days   budesonide (PULMICORT) 0.25 mg/2 mL nebulizer solution   No No   Sig: TAKE 2 ML BY NEBULIZATION 2 (TWO) TIMES A DAY RINSE MOUTH AFTER USE   ferrous sulfate 324 (65 Fe) mg  Self No No   Sig: Take 1 tablet (324 mg total) by mouth every other day   fexofenadine (ALLEGRA) 180 MG tablet  Self Yes No   Sig: Take 180 mg by mouth daily   fluticasone (FLONASE) 50 mcg/act nasal spray   No No   Sig: SPRAY 2 SPRAYS INTO EACH NOSTRIL EVERY DAY   furosemide (LASIX) 20 mg tablet   No No   Sig: Take 1 tablet (20 mg total) by mouth daily   ibuprofen (MOTRIN) 200 mg tablet   Yes No   Sig: Take by mouth every 6 (six) hours as needed   irbesartan (AVAPRO) 300 mg tablet   No No   Sig: TAKE 1 TABLET BY MOUTH EVERYDAY AT BEDTIME   omeprazole (PriLOSEC) 20 mg delayed release capsule   No No   Sig: Take 1 capsule (20 mg total) by mouth daily   oxyCODONE-acetaminophen (PERCOCET) 5-325 mg per tablet   No No   Sig: Take 1 tablet by mouth every 4 (four) hours as needed for moderate pain For ongoing pain Max Daily Amount: 6 tablets   potassium chloride 20 MEQ TBCR   No No   Sig: Take 1 tablet (20 mEq total) by mouth in the morning for 10 days   rosuvastatin (CRESTOR) 10 MG tablet   No No   Sig: Take 1 tablet (10 mg total) by mouth daily      Facility-Administered Medications: None       Past Medical History:   Diagnosis Date   • Asthma    • Degenerative joint disease    • GERD (gastroesophageal reflux disease)    • Hypertension    • Lumbar disc disease    • Lung cancer ([address] [facility])    • Sleep apnea     suspected    • Ventricular arrhythmia    • Vitamin D deficiency        Past Surgical History:   Procedure Laterality Date   • APPENDECTOMY     • COLONOSCOPY N/A 03/18/2019    Procedure: COLONOSCOPY;  Surgeon: Valorie Marquez DO;  Location: [facility]; Service: Gastroenterology   • ESOPHAGOGASTRODUODENOSCOPY N/A 03/18/2019    Procedure: ESOPHAGOGASTRODUODENOSCOPY (EGD); Surgeon: Valorie Marquez DO;  Location: [facility];   Service: Gastroenterology   • KNEE SURGERY     • ROTATOR CUFF REPAIR     • SHOULDER SURGERY         Family History   Problem Relation Age of Onset   • Stroke Mother    • Diabetes Mother    • Hyperlipidemia Mother    • Hypertension Mother    • Allergies Mother         Environmental   • Asthma Mother    • Diabetes Father    • Hyperlipidemia Father    • Hypertension Father    • Lung cancer Father 79   • Allergies Father         Environmental   • Asthma Father    • Lymphoma Sister 71   • Heart disease Sister         Pacemaker   • Asthma Brother    • Aneurysm Brother         Brain - had surgery   • Other Brother         Lymes disease   • Coronary artery disease Daughter         2 bypass done   • No Known Problems Daughter    • No Known Problems Daughter    • No Known Problems Son    • Kidney disease Son    • Liver disease Son    • Obesity Son      I have reviewed and agree with the history as documented. E-Cigarette/Vaping   • E-Cigarette Use Never User      E-Cigarette/Vaping Substances   • Nicotine No    • THC No    • CBD No    • Flavoring No    • Other No    • Unknown No      Social History     Tobacco Use   • Smoking status: Former     Packs/day: 0.25     Years: 25.00     Total pack years: 6.25     Types: Cigarettes     Start date:      Quit date:      Years since quittin.6   • Smokeless tobacco: Former   Vaping Use   • Vaping Use: Never used   Substance Use Topics   • Alcohol use: Yes     Comment: occasionally   • Drug use: Never       Review of Systems    Physical Exam  Physical Exam  Vitals and nursing note reviewed. Constitutional:       General: She is not in acute distress. Appearance: She is well-developed. She is obese. She is not diaphoretic. HENT:      Head: Normocephalic and atraumatic. Right Ear: External ear normal.      Left Ear: External ear normal.      Nose: Nose normal.   Eyes:      Conjunctiva/sclera: Conjunctivae normal.      Pupils: Pupils are equal, round, and reactive to light. Cardiovascular:      Rate and Rhythm: Normal rate and regular rhythm. Pulmonary:      Effort: Tachypnea and prolonged expiration present. No accessory muscle usage. Breath sounds: Decreased air movement present. No wheezing. Abdominal:      Palpations: Abdomen is soft. Musculoskeletal:         General: Normal range of motion. Cervical back: Normal range of motion and neck supple. Skin:     General: Skin is warm and dry. Findings: No rash. Neurological:      Mental Status: She is alert and oriented to person, place, and time. Gait: Gait normal.   Psychiatric:         Behavior: Behavior normal.         Thought Content:  Thought content normal.         Judgment: Judgment normal.         Vital Signs  ED Triage Vitals   Temperature Pulse Respirations Blood Pressure SpO2   08/12/23 1148 08/12/23 1148 08/12/23 1148 08/12/23 1148 08/12/23 1148   97.6 °F (36.4 °C) 57 (!) 28 161/96 97 %      Temp Source Heart Rate Source Patient Position - Orthostatic VS BP Location FiO2 (%)   08/12/23 1609 08/12/23 1502 08/12/23 1148 08/12/23 1148 --   Oral Monitor Sitting Right arm       Pain Score       08/12/23 1148       No Pain           Vitals:    08/12/23 1148 08/12/23 1502 08/12/23 1609   BP: 161/96 148/69 155/74   Pulse: 57 61 69   Patient Position - Orthostatic VS: Sitting Lying Lying         Visual Acuity      ED Medications  Medications   fluticasone (FLONASE) 50 mcg/act nasal spray 2 spray (has no administration in time range)   Alectinib HCl CAPS 600 mg (has no administration in time range)   atenolol (TENORMIN) tablet 25 mg (has no administration in time range)   albuterol inhalation solution 2.5 mg (has no administration in time range)   oxyCODONE-acetaminophen (PERCOCET) 5-325 mg per tablet 1 tablet (has no administration in time range)   budesonide (PULMICORT) inhalation solution 0.25 mg (has no administration in time range)   pantoprazole (PROTONIX) EC tablet 20 mg (has no administration in time range)   potassium chloride (K-DUR,KLOR-CON) CR tablet 20 mEq (20 mEq Oral Given 8/12/23 1724)   atorvastatin (LIPITOR) tablet 40 mg (40 mg Oral Given 8/12/23 1724)   losartan (COZAAR) tablet 100 mg (has no administration in time range)   ondansetron (ZOFRAN) injection 4 mg (has no administration in time range)   acetaminophen (TYLENOL) tablet 650 mg (has no administration in time range)   ipratropium (ATROVENT) 0.02 % inhalation solution 0.5 mg ( Nebulization Canceled Entry 8/12/23 1645)   levalbuterol (XOPENEX) inhalation solution 1.25 mg (has no administration in time range)     And   sodium chloride 0.9 % inhalation solution 3 mL (has no administration in time range)   levalbuterol (XOPENEX) inhalation solution 1.25 mg ( Nebulization Canceled Entry 8/12/23 1645)     And   sodium chloride 0.9 % inhalation solution 3 mL ( Nebulization Canceled Entry 8/12/23 1645)   methylPREDNISolone sodium succinate (Solu-MEDROL) injection 40 mg (40 mg Intravenous Given 8/12/23 1724)   enoxaparin (LOVENOX) subcutaneous injection 40 mg (has no administration in time range)   furosemide (LASIX) tablet 40 mg (40 mg Oral Given 8/12/23 1724)   albuterol inhalation solution 5 mg (5 mg Nebulization Given 8/12/23 1205)   ipratropium (ATROVENT) 0.02 % inhalation solution 0.5 mg (0.5 mg Nebulization Given 8/12/23 1205)   methylPREDNISolone sodium succinate (Solu-MEDROL) injection 125 mg (125 mg Intravenous Given 8/12/23 1230)   albuterol inhalation solution 5 mg (5 mg Nebulization Given 8/12/23 1432)       Diagnostic Studies  Results Reviewed     Procedure Component Value Units Date/Time    Manual Differential(PHLEBS Do Not Order) [[id number]]  (Abnormal) Collected: 08/12/23 1228    Lab Status: Final result Specimen: Blood from Arm, Right Updated: 08/12/23 1343     Segmented % 73 %      Lymphocytes % 22 %      Monocytes % 5 %      Eosinophils, % 0 %      Basophils % 0 %      Absolute Neutrophils 4.87 Thousand/uL      Lymphocytes Absolute 1.47 Thousand/uL      Monocytes Absolute 0.33 Thousand/uL      Eosinophils Absolute 0.00 Thousand/uL      Basophils Absolute 0.00 Thousand/uL      Total Counted --     Platelet Estimate Decreased     Anisocytosis Present    CBC and differential [[id number]]  (Abnormal) Collected: 08/12/23 1228    Lab Status: Final result Specimen: Blood from Arm, Right Updated: 08/12/23 1320     WBC 6.67 Thousand/uL      RBC 3.46 Million/uL      Hemoglobin 10.1 g/dL      Hematocrit 31.9 %      MCV 92 fL      MCH 29.2 pg      MCHC 31.7 g/dL      RDW 16.0 %      Platelets 90 Thousands/uL     Narrative: This is an appended report. These results have been appended to a previously verified report. B-Type Natriuretic Peptide(BNP) [[id number]]  (Abnormal) Collected: 08/12/23 1228    Lab Status: Final result Specimen: Blood from Arm, Right Updated: 08/12/23 1317      pg/mL     Comprehensive metabolic panel [[id number]]  (Abnormal) Collected: 08/12/23 1228    Lab Status: Final result Specimen: Blood from Arm, Right Updated: 08/12/23 1254     Sodium 143 mmol/L      Potassium 3.7 mmol/L      Chloride 106 mmol/L      CO2 33 mmol/L      ANION GAP 4 mmol/L      BUN 27 mg/dL      Creatinine 1.57 mg/dL      Glucose 160 mg/dL      Calcium 8.9 mg/dL      AST 26 U/L      ALT 23 U/L      Alkaline Phosphatase 87 U/L      Total Protein 6.6 g/dL      Albumin 3.9 g/dL      Total Bilirubin 0.98 mg/dL      eGFR 31 ml/min/1.73sq m     Narrative:      [place] guidelines for Chronic Kidney Disease (CKD):   •  Stage 1 with normal or high GFR (GFR > 90 mL/min/1.73 square meters)  •  Stage 2 Mild CKD (GFR = 60-89 mL/min/1.73 square meters)  •  Stage 3A Moderate CKD (GFR = 45-59 mL/min/1.73 square meters)  •  Stage 3B Moderate CKD (GFR = 30-44 mL/min/1.73 square meters)  •  Stage 4 Severe CKD (GFR = 15-29 mL/min/1.73 square meters)  •  Stage 5 End Stage CKD (GFR <15 mL/min/1.73 square meters)  Note: GFR calculation is accurate only with a steady state creatinine                 XR chest 2 views   Final Result by Mark Anthony Carrillo MD (08/12 1418)      No acute cardiopulmonary disease.                   Workstation performed: [id number]                    Procedures  Procedures         ED Course  ED Course as of 08/12/23 1732   Sat Aug 12, 2023   1256 XR chest 2 views  My independent interpretation of imaging:   No acute findings, mild vascular congestion      1348 Reviewed results with patient at bedside and updated on the plan. She thinks she feels better, cough increased or more harsh since breathing treatment. Since her main complaint was exertional dyspnea, I am doing an ambulatory SpO2 and exertional tolerance for comparison to her perceived baseline. 1437 Ambulatory SpO2 92%, and she was dyspneic, again, exacerbated cough and wheezing . I offered admission for serial nebs and steroids, to which she is agreeable. SBIRT 20yo+    Flowsheet Row Most Recent Value   Initial Alcohol Screen: US AUDIT-C     1. How often do you have a drink containing alcohol? 0 Filed at: 08/12/2023 1231   2. How many drinks containing alcohol do you have on a typical day you are drinking? 0 Filed at: 08/12/2023 1231   3b. FEMALE Any Age, or MALE 65+: How often do you have 4 or more drinks on one occassion? 0 Filed at: 08/12/2023 1231   Audit-C Score 0 Filed at: 08/12/2023 1231   SERVANDO: How many times in the past year have you. .. Used an illegal drug or used a prescription medication for non-medical reasons?  Never Filed at: 08/12/2023 1231                    MDM    Disposition  Final diagnoses:   Mild intermittent asthma with exacerbation     Time reflects when diagnosis was documented in both MDM as applicable and the Disposition within this note     Time User Action Codes Description Comment    8/12/2023  2:37 PM Jamaal Subramanian Add [J45.21] Mild intermittent asthma with exacerbation     8/12/2023  4:30 PM Suzanna Martinez Add [J45.50] Severe persistent asthma without complication       ED Disposition     ED Disposition   Admit    Condition   Stable    Date/Time   Sat Aug 12, 2023  2:37 PM    Comment   Case was discussed with Dr. Faisal James and the patient's admission status was agreed to be Admission Status: inpatient status to the service of  [facility] . Follow-up Information    None         Current Discharge Medication List      CONTINUE these medications which have NOT CHANGED    Details   acetaminophen (TYLENOL) 650 mg CR tablet TAKE 1 TABLET (650 MG TOTAL) BY MOUTH EVERY 8 (EIGHT) HOURS AS NEEDED FOR MILD PAIN      albuterol (2.5 mg/3 mL) 0.083 % nebulizer solution TAKE 3 ML (2.5 MG TOTAL) BY NEBULIZATION EVERY 6 (SIX) HOURS AS NEEDED FOR WHEEZING  Qty: 375 mL, Refills: 5    Associated Diagnoses: Mild intermittent asthma without complication      Alectinib HCl 150 MG CAPS Take 4 capsules (600 mg total) by mouth 2 (two) times a day  Qty: 240 capsule, Refills: 5    Associated Diagnoses: Non-small cell cancer of right lung ([address]); Bone metastasis      atenolol (TENORMIN) 25 mg tablet TAKE 1 TABLET (25 MG TOTAL) BY MOUTH DAILY. Qty: 90 tablet, Refills: 1    Associated Diagnoses: Essential hypertension      benralizumab (FASENRA) subcutaneous injection Inject 1 mL (30 mg total) under the skin every 56 days  Qty: 1 mL, Refills: 6    Associated Diagnoses: Severe persistent asthma without complication      budesonide (PULMICORT) 0.25 mg/2 mL nebulizer solution TAKE 2 ML BY NEBULIZATION 2 (TWO) TIMES A DAY RINSE MOUTH AFTER USE  Qty: 360 mL, Refills: 2    Associated Diagnoses: Mild persistent asthma without complication      ferrous sulfate 324 (65 Fe) mg Take 1 tablet (324 mg total) by mouth every other day  Qty: 90 tablet, Refills: 1    Associated Diagnoses: Iron deficiency anemia, unspecified iron deficiency anemia type      fexofenadine (ALLEGRA) 180 MG tablet Take 180 mg by mouth daily      fluticasone (FLONASE) 50 mcg/act nasal spray SPRAY 2 SPRAYS INTO EACH NOSTRIL EVERY DAY  Qty: 48 mL, Refills: 1    Associated Diagnoses: Allergic rhinitis, unspecified seasonality, unspecified trigger      Fluticasone-Salmeterol (Advair) 500-50 mcg/dose inhaler Inhale 1 puff 2 (two) times a day Rinse mouth after use.   Qty: 60 blister, Refills: 0    Comments: Substitution to a formulary equivalent within the same pharmaceutical class is authorized. Associated Diagnoses: Mild persistent extrinsic asthma with acute exacerbation      furosemide (LASIX) 20 mg tablet Take 1 tablet (20 mg total) by mouth daily  Qty: 90 tablet, Refills: 3    Associated Diagnoses: Chronic diastolic congestive heart failure (HCC)      ibuprofen (MOTRIN) 200 mg tablet Take by mouth every 6 (six) hours as needed      irbesartan (AVAPRO) 300 mg tablet TAKE 1 TABLET BY MOUTH EVERYDAY AT BEDTIME  Qty: 90 tablet, Refills: 0    Associated Diagnoses: Essential hypertension      omeprazole (PriLOSEC) 20 mg delayed release capsule Take 1 capsule (20 mg total) by mouth daily  Qty: 90 capsule, Refills: 1    Comments: DX Code Needed  PT NEEDS MORE RERFILLS. Associated Diagnoses: Hiatal hernia      oxyCODONE-acetaminophen (PERCOCET) 5-325 mg per tablet Take 1 tablet by mouth every 4 (four) hours as needed for moderate pain For ongoing pain Max Daily Amount: 6 tablets  Qty: 60 tablet, Refills: 0    Associated Diagnoses: Malignant neoplasm determined by biopsy of lung ([facility])      potassium chloride 20 MEQ TBCR Take 1 tablet (20 mEq total) by mouth in the morning for 10 days  Qty: 10 tablet, Refills: 0    Associated Diagnoses: Hypokalemia      rosuvastatin (CRESTOR) 10 MG tablet Take 1 tablet (10 mg total) by mouth daily  Qty: 90 tablet, Refills: 3    Associated Diagnoses: Coronary artery disease involving native coronary artery of native heart without angina pectoris      Ventolin  (90 Base) MCG/ACT inhaler INHALE 2 PUFFS EVERY 6 HOURS AS NEEDED FOR WHEEZING  Qty: 8.5 g, Refills: 2    Comments: Substitution to a formulary equivalent within the same pharmaceutical class is authorized. Associated Diagnoses: Mild intermittent asthma without complication             No discharge procedures on file.     PDMP Review       Value Time User    PDMP Reviewed  Yes 5/2/2023  4:16 PM Phil Acevedo MD ED Provider  Electronically Signed by           Marquis Dailey MD  08/12/23 [id number]

## 2023-08-13 ENCOUNTER — APPOINTMENT (INPATIENT)
Dept: ULTRASOUND IMAGING | Facility: HOSPITAL | Age: 78
DRG: 189 | End: 2023-08-13
Payer: COMMERCIAL

## 2023-08-13 PROBLEM — J45.51 SEVERE PERSISTENT ASTHMA WITH ACUTE EXACERBATION: Status: ACTIVE | Noted: 2022-11-15

## 2023-08-13 LAB
ANION GAP SERPL CALCULATED.3IONS-SCNC: 8 MMOL/L
ATRIAL RATE: 288 BPM
ATRIAL RATE: 288 BPM
ATRIAL RATE: 72 BPM
BUN SERPL-MCNC: 32 MG/DL (ref 5–25)
CALCIUM SERPL-MCNC: 8.9 MG/DL (ref 8.4–10.2)
CHLORIDE SERPL-SCNC: 103 MMOL/L (ref 96–108)
CO2 SERPL-SCNC: 31 MMOL/L (ref 21–32)
CREAT SERPL-MCNC: 1.71 MG/DL (ref 0.6–1.3)
ERYTHROCYTE [DISTWIDTH] IN BLOOD BY AUTOMATED COUNT: 15.9 % (ref 11.6–15.1)
GFR SERPL CREATININE-BSD FRML MDRD: 28 ML/MIN/1.73SQ M
GLUCOSE SERPL-MCNC: 166 MG/DL (ref 65–140)
HCT VFR BLD AUTO: 32.2 % (ref 34.8–46.1)
HGB BLD-MCNC: 10.5 G/DL (ref 11.5–15.4)
MAGNESIUM SERPL-MCNC: 2.3 MG/DL (ref 1.9–2.7)
MCH RBC QN AUTO: 29.7 PG (ref 26.8–34.3)
MCHC RBC AUTO-ENTMCNC: 32.6 G/DL (ref 31.4–37.4)
MCV RBC AUTO: 91 FL (ref 82–98)
P AXIS: 249 DEGREES
P AXIS: 254 DEGREES
P AXIS: 49 DEGREES
PLATELET # BLD AUTO: 89 THOUSANDS/UL (ref 149–390)
PLATELET # BLD AUTO: 90 THOUSANDS/UL (ref 149–390)
POTASSIUM SERPL-SCNC: 4.1 MMOL/L (ref 3.5–5.3)
PR INTERVAL: 168 MS
QRS AXIS: 19 DEGREES
QRS AXIS: 28 DEGREES
QRS AXIS: 30 DEGREES
QRSD INTERVAL: 90 MS
QRSD INTERVAL: 90 MS
QRSD INTERVAL: 94 MS
QT INTERVAL: 260 MS
QT INTERVAL: 260 MS
QT INTERVAL: 386 MS
QTC INTERVAL: 402 MS
QTC INTERVAL: 402 MS
QTC INTERVAL: 422 MS
RBC # BLD AUTO: 3.53 MILLION/UL (ref 3.81–5.12)
SODIUM SERPL-SCNC: 142 MMOL/L (ref 135–147)
T WAVE AXIS: 11 DEGREES
T WAVE AXIS: 32 DEGREES
T WAVE AXIS: 6 DEGREES
VENTRICULAR RATE: 144 BPM
VENTRICULAR RATE: 144 BPM
VENTRICULAR RATE: 72 BPM
WBC # BLD AUTO: 9.26 THOUSAND/UL (ref 4.31–10.16)

## 2023-08-13 PROCEDURE — 85027 COMPLETE CBC AUTOMATED: CPT | Performed by: HOSPITALIST

## 2023-08-13 PROCEDURE — 83735 ASSAY OF MAGNESIUM: CPT | Performed by: HOSPITALIST

## 2023-08-13 PROCEDURE — 93010 ELECTROCARDIOGRAM REPORT: CPT | Performed by: STUDENT IN AN ORGANIZED HEALTH CARE EDUCATION/TRAINING PROGRAM

## 2023-08-13 PROCEDURE — 84484 ASSAY OF TROPONIN QUANT: CPT

## 2023-08-13 PROCEDURE — 94760 N-INVAS EAR/PLS OXIMETRY 1: CPT

## 2023-08-13 PROCEDURE — 84443 ASSAY THYROID STIM HORMONE: CPT

## 2023-08-13 PROCEDURE — 99232 SBSQ HOSP IP/OBS MODERATE 35: CPT | Performed by: PHYSICIAN ASSISTANT

## 2023-08-13 PROCEDURE — 94640 AIRWAY INHALATION TREATMENT: CPT

## 2023-08-13 PROCEDURE — 76775 US EXAM ABDO BACK WALL LIM: CPT

## 2023-08-13 PROCEDURE — 85049 AUTOMATED PLATELET COUNT: CPT | Performed by: HOSPITALIST

## 2023-08-13 PROCEDURE — 99223 1ST HOSP IP/OBS HIGH 75: CPT | Performed by: INTERNAL MEDICINE

## 2023-08-13 PROCEDURE — 85027 COMPLETE CBC AUTOMATED: CPT

## 2023-08-13 PROCEDURE — 80048 BASIC METABOLIC PNL TOTAL CA: CPT | Performed by: HOSPITALIST

## 2023-08-13 PROCEDURE — 93005 ELECTROCARDIOGRAM TRACING: CPT

## 2023-08-13 RX ORDER — METOPROLOL TARTRATE 5 MG/5ML
5 INJECTION INTRAVENOUS ONCE
Status: COMPLETED | OUTPATIENT
Start: 2023-08-13 | End: 2023-08-13

## 2023-08-13 RX ORDER — DILTIAZEM HYDROCHLORIDE 5 MG/ML
15 INJECTION INTRAVENOUS ONCE
Status: COMPLETED | OUTPATIENT
Start: 2023-08-13 | End: 2023-08-13

## 2023-08-13 RX ORDER — PREDNISONE 20 MG/1
40 TABLET ORAL DAILY
Status: DISCONTINUED | OUTPATIENT
Start: 2023-08-14 | End: 2023-08-14

## 2023-08-13 RX ORDER — BENZONATATE 100 MG/1
100 CAPSULE ORAL 3 TIMES DAILY
Status: DISCONTINUED | OUTPATIENT
Start: 2023-08-13 | End: 2023-08-23 | Stop reason: HOSPADM

## 2023-08-13 RX ORDER — METHYLPREDNISOLONE SODIUM SUCCINATE 40 MG/ML
40 INJECTION, POWDER, LYOPHILIZED, FOR SOLUTION INTRAMUSCULAR; INTRAVENOUS EVERY 12 HOURS SCHEDULED
Status: DISCONTINUED | OUTPATIENT
Start: 2023-08-13 | End: 2023-08-13

## 2023-08-13 RX ORDER — AZITHROMYCIN 250 MG/1
500 TABLET, FILM COATED ORAL EVERY 24 HOURS
Status: DISCONTINUED | OUTPATIENT
Start: 2023-08-13 | End: 2023-08-16

## 2023-08-13 RX ORDER — GUAIFENESIN 600 MG/1
600 TABLET, EXTENDED RELEASE ORAL 2 TIMES DAILY
Status: DISCONTINUED | OUTPATIENT
Start: 2023-08-13 | End: 2023-08-23 | Stop reason: HOSPADM

## 2023-08-13 RX ADMIN — DILTIAZEM HYDROCHLORIDE 15 MG: 5 INJECTION INTRAVENOUS at 22:30

## 2023-08-13 RX ADMIN — Medication 3 ML: at 08:08

## 2023-08-13 RX ADMIN — BENZONATATE 100 MG: 100 CAPSULE ORAL at 20:45

## 2023-08-13 RX ADMIN — METOPROLOL TARTRATE 5 MG: 1 INJECTION, SOLUTION INTRAVENOUS at 22:12

## 2023-08-13 RX ADMIN — ALECTINIB HYDROCHLORIDE 600 MG: 150 CAPSULE ORAL at 17:25

## 2023-08-13 RX ADMIN — ALECTINIB HYDROCHLORIDE 600 MG: 150 CAPSULE ORAL at 11:29

## 2023-08-13 RX ADMIN — ATORVASTATIN CALCIUM 40 MG: 40 TABLET, FILM COATED ORAL at 17:25

## 2023-08-13 RX ADMIN — BUDESONIDE 0.25 MG: 0.25 INHALANT RESPIRATORY (INHALATION) at 19:25

## 2023-08-13 RX ADMIN — BENZONATATE 100 MG: 100 CAPSULE ORAL at 17:25

## 2023-08-13 RX ADMIN — LEVALBUTEROL HYDROCHLORIDE 1.25 MG: 1.25 SOLUTION RESPIRATORY (INHALATION) at 13:09

## 2023-08-13 RX ADMIN — METHYLPREDNISOLONE SODIUM SUCCINATE 40 MG: 40 INJECTION, POWDER, FOR SOLUTION INTRAMUSCULAR; INTRAVENOUS at 00:12

## 2023-08-13 RX ADMIN — BUDESONIDE 0.25 MG: 0.25 INHALANT RESPIRATORY (INHALATION) at 08:06

## 2023-08-13 RX ADMIN — GUAIFENESIN 600 MG: 600 TABLET, EXTENDED RELEASE ORAL at 11:29

## 2023-08-13 RX ADMIN — ENOXAPARIN SODIUM 40 MG: 100 INJECTION SUBCUTANEOUS at 08:38

## 2023-08-13 RX ADMIN — IPRATROPIUM BROMIDE 0.5 MG: 0.5 SOLUTION RESPIRATORY (INHALATION) at 13:09

## 2023-08-13 RX ADMIN — PANTOPRAZOLE SODIUM 20 MG: 20 TABLET, DELAYED RELEASE ORAL at 06:02

## 2023-08-13 RX ADMIN — FLUTICASONE PROPIONATE 2 SPRAY: 50 SPRAY, METERED NASAL at 08:34

## 2023-08-13 RX ADMIN — BENZONATATE 100 MG: 100 CAPSULE ORAL at 04:33

## 2023-08-13 RX ADMIN — METHYLPREDNISOLONE SODIUM SUCCINATE 40 MG: 40 INJECTION, POWDER, FOR SOLUTION INTRAMUSCULAR; INTRAVENOUS at 06:02

## 2023-08-13 RX ADMIN — IPRATROPIUM BROMIDE 0.5 MG: 0.5 SOLUTION RESPIRATORY (INHALATION) at 19:25

## 2023-08-13 RX ADMIN — LEVALBUTEROL HYDROCHLORIDE 1.25 MG: 1.25 SOLUTION RESPIRATORY (INHALATION) at 19:25

## 2023-08-13 RX ADMIN — AZITHROMYCIN MONOHYDRATE 500 MG: 250 TABLET ORAL at 11:29

## 2023-08-13 RX ADMIN — IPRATROPIUM BROMIDE 0.5 MG: 0.5 SOLUTION RESPIRATORY (INHALATION) at 07:57

## 2023-08-13 RX ADMIN — GUAIFENESIN 600 MG: 600 TABLET, EXTENDED RELEASE ORAL at 17:25

## 2023-08-13 RX ADMIN — ATENOLOL 25 MG: 25 TABLET ORAL at 08:39

## 2023-08-13 RX ADMIN — LEVALBUTEROL HYDROCHLORIDE 1.25 MG: 1.25 SOLUTION RESPIRATORY (INHALATION) at 07:57

## 2023-08-13 NOTE — ASSESSMENT & PLAN NOTE
BMI 45 noted contributing to comorbidities  Would benefit from weight loss and lifestyle modifications

## 2023-08-13 NOTE — NURSING NOTE
Assumed care of patient at 1600. Agree with previous RN assessment. Patient resting in bed at time of assessment. A&O x4, respirations regular/nonlabored. Patient offers no complaint at this time. Call bell within reach.

## 2023-08-13 NOTE — PLAN OF CARE
Problem: MOBILITY - ADULT  Goal: Maintain or return to baseline ADL function  Description: INTERVENTIONS:  -  Assess patient's ability to carry out ADLs; assess patient's baseline for ADL function and identify physical deficits which impact ability to perform ADLs (bathing, care of mouth/teeth, toileting, grooming, dressing, etc.)  - Assess/evaluate cause of self-care deficits   - Assess range of motion  - Assess patient's mobility; develop plan if impaired  - Assess patient's need for assistive devices and provide as appropriate  - Encourage maximum independence but intervene and supervise when necessary  - Involve family in performance of ADLs  - Assess for home care needs following discharge   - Consider OT consult to assist with ADL evaluation and planning for discharge  - Provide patient education as appropriate  Outcome: Progressing  Goal: Maintains/Returns to pre admission functional level  Description: INTERVENTIONS:  - Perform BMAT or MOVE assessment daily.   - Set and communicate daily mobility goal to care team and patient/family/caregiver. - Collaborate with rehabilitation services on mobility goals if consulted  - Perform Range of Motion 4 times a day. - Reposition patient every 2 hours.   - Dangle patient 3 times a day  - Stand patient 3 times a day  - Ambulate patient 3 times a day  - Out of bed to chair 3 times a day   - Out of bed for meals 3 times a day  - Out of bed for toileting  - Record patient progress and toleration of activity level   Outcome: Progressing     Problem: PAIN - ADULT  Goal: Verbalizes/displays adequate comfort level or baseline comfort level  Description: Interventions:  - Encourage patient to monitor pain and request assistance  - Assess pain using appropriate pain scale  - Administer analgesics based on type and severity of pain and evaluate response  - Implement non-pharmacological measures as appropriate and evaluate response  - Consider cultural and social influences on pain and pain management  - Notify physician/advanced practitioner if interventions unsuccessful or patient reports new pain  Outcome: Progressing     Problem: INFECTION - ADULT  Goal: Absence or prevention of progression during hospitalization  Description: INTERVENTIONS:  - Assess and monitor for signs and symptoms of infection  - Monitor lab/diagnostic results  - Monitor all insertion sites, i.e. indwelling lines, tubes, and drains  - Monitor endotracheal if appropriate and nasal secretions for changes in amount and color  - [place] appropriate cooling/warming therapies per order  - Administer medications as ordered  - Instruct and encourage patient and family to use good hand hygiene technique  - Identify and instruct in appropriate isolation precautions for identified infection/condition  Outcome: Progressing  Goal: Absence of fever/infection during neutropenic period  Description: INTERVENTIONS:  - Monitor WBC    Outcome: Progressing     Problem: SAFETY ADULT  Goal: Maintain or return to baseline ADL function  Description: INTERVENTIONS:  -  Assess patient's ability to carry out ADLs; assess patient's baseline for ADL function and identify physical deficits which impact ability to perform ADLs (bathing, care of mouth/teeth, toileting, grooming, dressing, etc.)  - Assess/evaluate cause of self-care deficits   - Assess range of motion  - Assess patient's mobility; develop plan if impaired  - Assess patient's need for assistive devices and provide as appropriate  - Encourage maximum independence but intervene and supervise when necessary  - Involve family in performance of ADLs  - Assess for home care needs following discharge   - Consider OT consult to assist with ADL evaluation and planning for discharge  - Provide patient education as appropriate  Outcome: Progressing  Goal: Maintains/Returns to pre admission functional level  Description: INTERVENTIONS:  - Perform BMAT or MOVE assessment daily.   - Set and communicate daily mobility goal to care team and patient/family/caregiver. - Collaborate with rehabilitation services on mobility goals if consulted  - Perform Range of Motion 4 times a day. - Reposition patient every 2 hours.   - Dangle patient 3 times a day  - Stand patient 3 times a day  - Ambulate patient 3 times a day  - Out of bed to chair 3 times a day   - Out of bed for meals 3 times a day  - Out of bed for toileting  - Record patient progress and toleration of activity level   Outcome: Progressing  Goal: Patient will remain free of falls  Description: INTERVENTIONS:  - Educate patient/family on patient safety including physical limitations  - Instruct patient to call for assistance with activity   - Consult OT/PT to assist with strengthening/mobility   - Keep Call bell within reach  - Keep bed low and locked with side rails adjusted as appropriate  - Keep care items and personal belongings within reach  - Initiate and maintain comfort rounds  - Make Fall Risk Sign visible to staff  - Offer Toileting every 2 Hours, in advance of need  - Apply yellow socks and bracelet for high fall risk patients  - Consider moving patient to room near nurses station  Outcome: Progressing     Problem: DISCHARGE PLANNING  Goal: Discharge to home or other facility with appropriate resources  Description: INTERVENTIONS:  - Identify barriers to discharge w/patient and caregiver  - Arrange for needed discharge resources and transportation as appropriate  - Identify discharge learning needs (meds, wound care, etc.)  - Arrange for interpretive services to assist at discharge as needed  - Refer to Case Management Department for coordinating discharge planning if the patient needs post-hospital services based on physician/advanced practitioner order or complex needs related to functional status, cognitive ability, or social support system  Outcome: Progressing     Problem: Knowledge Deficit  Goal: Patient/family/caregiver demonstrates understanding of disease process, treatment plan, medications, and discharge instructions  Description: Complete learning assessment and assess knowledge base.   Interventions:  - Provide teaching at level of understanding  - Provide teaching via preferred learning methods  Outcome: Progressing     Problem: RESPIRATORY - ADULT  Goal: Achieves optimal ventilation and oxygenation  Description: INTERVENTIONS:  - Assess for changes in respiratory status  - Assess for changes in mentation and behavior  - Position to facilitate oxygenation and minimize respiratory effort  - Oxygen administered by appropriate delivery if ordered  - Initiate smoking cessation education as indicated  - Encourage broncho-pulmonary hygiene including cough, deep breathe, Incentive Spirometry  - Assess the need for suctioning and aspirate as needed  - Assess and instruct to report SOB or any respiratory difficulty  - Respiratory Therapy support as indicated  Outcome: Progressing

## 2023-08-13 NOTE — PROGRESS NOTES
[facility]  Progress Note  Name: Lucrecia Ambrocio  MRN: [id number]  Unit/Bed#: [facility] -01 I Date of Admission: 8/12/2023   Date of Service: 8/13/2023 I Hospital Day: 1    Assessment/Plan   * Severe persistent asthma with acute exacerbation  Assessment & Plan  · Patient is a 53-year-old female with past medical history of severe persistent asthma, non-small cell lung cancer, diastolic CHF, hypertension, apnea, CAD presenting with shortness of breath concerning for asthma exacerbation  · Chest x-ray no acute cardiopulmonary disease  · Currently on 2 lpm nasal cannula   · Wean oxygen as able   · Started on IV Solu-Medrol 40 mg every 6 hours, will continue q 12 hours   · nebulizer treatments scheduled  · Start azithromycin x 3 days    · Pulmonology has been consulted  · She follows with Dr. Shelbi Busby outpt     Diastolic CHF [facility])  Assessment & Plan  Wt Readings from Last 3 Encounters:   08/13/23 108 kg (238 lb 12.1 oz)   07/20/23 107 kg (236 lb)   07/10/23 107 kg (236 lb 12.8 oz)       · Chest x-ray without acute cardiopulmonary disease  ·   · Creatinine is elevated this morning, will hold diuretics and ACE inhibitor  · Her lasix was increased to 40 mg once daily --> decreased to 20 mg by Dr. Caio Kothari for rise in Cr outpt   · Monitor daily weights, I's/O  · Cardiac diet  · Recently had updated echo Alaio with EF 03%, grade 1 diastolic dysfunction  · Follows with Dr. Caio Kothari outpatient      Dyslipidemia  Assessment & Plan  · Maintained on Crestor per outpatient regimen, substituted for Lipitor while hospitalized    Coronary artery disease involving native coronary artery of native heart without angina pectoris  Assessment & Plan  · Continue atenolol and statin  · Holding irbesartan given rising kidney function    Thrombocytopenia, unspecified (HCC)  Assessment & Plan  · Chronic thrombocytopenia noted back in 2020  · Likely due to non-small cell lung cancer  · Monitor on lovenox dvt proph while here     Abnormal renal function  Assessment & Plan  · Patient creatinine previously running 1.0-1.3, since July through August has been running 1.4-1.7; kidney function 8/9 was 1.7  · Creatinine on admission 1.57 increased to 1.71 today  · We will hold ARB and Lasix  · Avoid hypotension and nephrotoxins  · Check ultrasound kidney and bladder  · Placed on retention protocol, I/O  · If kidney function were slow to improve or to would ask for nephrology consult here   · BMP in AM     Non-small cell lung cancer [facility])  Assessment & Plan  · 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 Jovanni Marroquin outpatient  · She is following every 6 months outpatient last seen in May  · Continues on Alectinib 600 mg twice daily - nonformulary placed, son is bringing in today   · Also receives Zometa infusions every 3 months    MONO (obstructive sleep apnea)  Assessment & Plan  · Severe obstructive sleep apnea  · Has adamantly refused CPAP per outpatient notes  · Contributing to admission    Morbid obesity ([address])  Assessment & Plan  BMI 45 noted contributing to comorbidities  Would benefit from weight loss and lifestyle modifications    Gastroesophageal reflux disease without esophagitis  Assessment & Plan  Continue PPI    Essential hypertension  Assessment & Plan  · Continue atenolol 25 mg daily  · Holding Lasix (per outpatient cardiology notes increase to 40 mg daily) as well as ARB given rise in Cr   · Check US kidney/bladder   · Monitor VS            VTE Pharmacologic Prophylaxis: VTE Score: 5 High Risk (Score >/= 5) - Pharmacological DVT Prophylaxis Ordered: enoxaparin (Lovenox). Sequential Compression Devices Ordered. Patient Centered Rounds: I performed bedside rounds with nursing staff today. Discussions with Specialists or Other Care Team Provider: will follow up pulm recs     Education and Discussions with Family / Patient: Patient declined call to . Total Time Spent on Date of Encounter in care of patient: 35 minutes This time was spent on one or more of the following: performing physical exam; counseling and coordination of care; obtaining or reviewing history; documenting in the medical record; reviewing/ordering tests, medications or procedures; communicating with other healthcare professionals and discussing with patient's family/caregivers. Current Length of Stay: 1 day(s)  Current Patient Status: Inpatient   Certification Statement: The patient will continue to require additional inpatient hospital stay due to Abnormal renal function, asthma  Discharge Plan: Anticipate discharge in 24-48 hrs to home. Code Status: Level 1 - Full Code    Subjective:   Patient still with some chest tightness and couhg with wheezing. On 2 lpm nasal. No lightheadedness or dizziness. Admits to chronic leg swelling not worse than normal.     Objective:     Vitals:   Temp (24hrs), Av.6 °F (36.4 °C), Min:97.1 °F (36.2 °C), Max:97.8 °F (36.6 °C)    Temp:  [97.1 °F (36.2 °C)-97.8 °F (36.6 °C)] 97.1 °F (36.2 °C)  HR:  [57-83] 72  Resp:  [16-28] 16  BP: (148-161)/(69-96) 152/75  SpO2:  [87 %-97 %] 95 %  Body mass index is 45.11 kg/m². Input and Output Summary (last 24 hours): Intake/Output Summary (Last 24 hours) at 2023 0845  Last data filed at 2023 0001  Gross per 24 hour   Intake 240 ml   Output 1000 ml   Net -760 ml       Physical Exam:   Physical Exam  Vitals and nursing note reviewed. Constitutional:       Appearance: She is obese. Cardiovascular:      Rate and Rhythm: Normal rate and regular rhythm. Pulmonary:      Effort: Pulmonary effort is normal. No respiratory distress. Breath sounds: Wheezing present. Comments: 2 lpm nasal cannula   Abdominal:      General: Bowel sounds are normal.      Palpations: Abdomen is soft. Musculoskeletal:      Right lower leg: No edema. Left lower leg: No edema.    Skin:     General: Skin is warm.   Neurological:      Mental Status: She is alert. Mental status is at baseline.    Psychiatric:         Mood and Affect: Mood normal.          Additional Data:     Labs:  Results from last 7 days   Lab Units 08/13/23  0452 08/12/23  1228   WBC Thousand/uL 9.26 6.67   HEMOGLOBIN g/dL 10.5* 10.1*   HEMATOCRIT % 32.2* 31.9*   PLATELETS Thousands/uL 90* 90*   LYMPHO PCT %  --  22   MONO PCT %  --  5   EOS PCT %  --  0     Results from last 7 days   Lab Units 08/13/23  0452 08/12/23  1228   SODIUM mmol/L 142 143   POTASSIUM mmol/L 4.1 3.7   CHLORIDE mmol/L 103 106   CO2 mmol/L 31 33*   BUN mg/dL 32* 27*   CREATININE mg/dL 1.71* 1.57*   ANION GAP mmol/L 8 4   CALCIUM mg/dL 8.9 8.9   ALBUMIN g/dL  --  3.9   TOTAL BILIRUBIN mg/dL  --  0.98   ALK PHOS U/L  --  87   ALT U/L  --  23   AST U/L  --  26   GLUCOSE RANDOM mg/dL 166* 160*                       Lines/Drains:  Invasive Devices     Peripheral Intravenous Line  Duration           Peripheral IV 08/12/23 Right Antecubital <1 day                      Imaging: Reviewed radiology reports from this admission including: chest xray and ECHO    Recent Cultures (last 7 days):         Last 24 Hours Medication List:   Current Facility-Administered Medications   Medication Dose Route Frequency Provider Last Rate   • acetaminophen  650 mg Oral Q6H PRN Shayy Cheo Prechtel, DO     • albuterol  2.5 mg Nebulization Q6H PRN Shayy Grider Prechtel, DO     • Alectinib HCl  600 mg Oral BID David S Prechtel, DO     • atenolol  25 mg Oral Daily David S Prechtel, DO     • atorvastatin  40 mg Oral Daily [employer] S Prechtel, DO     • benzonatate  100 mg Oral TID Lynda Rush PA-C     • budesonide  0.25 mg Nebulization Q12H David S Prechtel, DO     • enoxaparin  40 mg Subcutaneous Daily David S Prechtel, DO     • fluticasone  2 spray Each Nare Daily David S Prechtel, DO     • ipratropium  0.5 mg Nebulization TID Shayy Cheo Prechtel, DO     • levalbuterol  1.25 mg Nebulization Q6H PRN Marilyn Crass Prechtel, DO      And   • sodium chloride  3 mL Nebulization Q6H PRN Marilyn Crass Prechtel, DO     • levalbuterol  1.25 mg Nebulization TID Marilyn Crass Prechtel, DO      And   • sodium chloride  3 mL Nebulization TID Marilyn Crass Prechtel, DO     • methylPREDNISolone sodium succinate  40 mg Intravenous Q6H [facility] S Prechtel, DO     • ondansetron  4 mg Intravenous Q6H PRN Marilyn Crass Prechtel, DO     • oxyCODONE-acetaminophen  1 tablet Oral Q4H PRN Marilyn Crass Prechtel, DO     • pantoprazole  20 mg Oral Early Morning Krissy Diego,           Today, Patient Was Seen By: Sukumar Echeverria PA-C    **Please Note: This note may have been constructed using a voice recognition system. **

## 2023-08-13 NOTE — ASSESSMENT & PLAN NOTE
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 Amanda Harrison outpatient  · She is following every 6 months outpatient last seen in May  · Continues on Alectinib 600 mg twice daily  · Also receives Zometa infusions every 3 months

## 2023-08-13 NOTE — PLAN OF CARE
Problem: MOBILITY - ADULT  Goal: Maintain or return to baseline ADL function  Description: INTERVENTIONS:  -  Assess patient's ability to carry out ADLs; assess patient's baseline for ADL function and identify physical deficits which impact ability to perform ADLs (bathing, care of mouth/teeth, toileting, grooming, dressing, etc.)  - Assess/evaluate cause of self-care deficits   - Assess range of motion  - Assess patient's mobility; develop plan if impaired  - Assess patient's need for assistive devices and provide as appropriate  - Encourage maximum independence but intervene and supervise when necessary  - Involve family in performance of ADLs  - Assess for home care needs following discharge   - Consider OT consult to assist with ADL evaluation and planning for discharge  - Provide patient education as appropriate  Outcome: Progressing  Goal: Maintains/Returns to pre admission functional level  Description: INTERVENTIONS:  - Perform BMAT or MOVE assessment daily.   - Set and communicate daily mobility goal to care team and patient/family/caregiver. - Collaborate with rehabilitation services on mobility goals if consulted  - Perform Range of Motion 4 times a day. - Reposition patient every 2 hours.   - Dangle patient 3 times a day  - Stand patient 3 times a day  - Ambulate patient 3 times a day  - Out of bed to chair 3 times a day   - Out of bed for meals 3 times a day  - Out of bed for toileting  - Record patient progress and toleration of activity level   Outcome: Progressing     Problem: PAIN - ADULT  Goal: Verbalizes/displays adequate comfort level or baseline comfort level  Description: Interventions:  - Encourage patient to monitor pain and request assistance  - Assess pain using appropriate pain scale  - Administer analgesics based on type and severity of pain and evaluate response  - Implement non-pharmacological measures as appropriate and evaluate response  - Consider cultural and social influences on pain and pain management  - Notify physician/advanced practitioner if interventions unsuccessful or patient reports new pain  Outcome: Progressing     Problem: INFECTION - ADULT  Goal: Absence or prevention of progression during hospitalization  Description: INTERVENTIONS:  - Assess and monitor for signs and symptoms of infection  - Monitor lab/diagnostic results  - Monitor all insertion sites, i.e. indwelling lines, tubes, and drains  - Monitor endotracheal if appropriate and nasal secretions for changes in amount and color  - [place] appropriate cooling/warming therapies per order  - Administer medications as ordered  - Instruct and encourage patient and family to use good hand hygiene technique  - Identify and instruct in appropriate isolation precautions for identified infection/condition  Outcome: Progressing  Goal: Absence of fever/infection during neutropenic period  Description: INTERVENTIONS:  - Monitor WBC    Outcome: Progressing     Problem: SAFETY ADULT  Goal: Maintain or return to baseline ADL function  Description: INTERVENTIONS:  -  Assess patient's ability to carry out ADLs; assess patient's baseline for ADL function and identify physical deficits which impact ability to perform ADLs (bathing, care of mouth/teeth, toileting, grooming, dressing, etc.)  - Assess/evaluate cause of self-care deficits   - Assess range of motion  - Assess patient's mobility; develop plan if impaired  - Assess patient's need for assistive devices and provide as appropriate  - Encourage maximum independence but intervene and supervise when necessary  - Involve family in performance of ADLs  - Assess for home care needs following discharge   - Consider OT consult to assist with ADL evaluation and planning for discharge  - Provide patient education as appropriate  Outcome: Progressing  Goal: Maintains/Returns to pre admission functional level  Description: INTERVENTIONS:  - Perform BMAT or MOVE assessment daily.   - Set and communicate daily mobility goal to care team and patient/family/caregiver. - Collaborate with rehabilitation services on mobility goals if consulted  - Perform Range of Motion 4 times a day. - Reposition patient every 2 hours.   - Dangle patient 3 times a day  - Stand patient 3 times a day  - Ambulate patient 3 times a day  - Out of bed to chair 3 times a day   - Out of bed for meals 3 times a day  - Out of bed for toileting  - Record patient progress and toleration of activity level   Outcome: Progressing  Goal: Patient will remain free of falls  Description: INTERVENTIONS:  - Educate patient/family on patient safety including physical limitations  - Instruct patient to call for assistance with activity   - Consult OT/PT to assist with strengthening/mobility   - Keep Call bell within reach  - Keep bed low and locked with side rails adjusted as appropriate  - Keep care items and personal belongings within reach  - Initiate and maintain comfort rounds  - Make Fall Risk Sign visible to staff  - Offer Toileting every 2 Hours, in advance of need  - Apply yellow socks and bracelet for high fall risk patients  - Consider moving patient to room near nurses station  Outcome: Progressing     Problem: DISCHARGE PLANNING  Goal: Discharge to home or other facility with appropriate resources  Description: INTERVENTIONS:  - Identify barriers to discharge w/patient and caregiver  - Arrange for needed discharge resources and transportation as appropriate  - Identify discharge learning needs (meds, wound care, etc.)  - Arrange for interpretive services to assist at discharge as needed  - Refer to Case Management Department for coordinating discharge planning if the patient needs post-hospital services based on physician/advanced practitioner order or complex needs related to functional status, cognitive ability, or social support system  Outcome: Progressing     Problem: Knowledge Deficit  Goal: Patient/family/caregiver demonstrates understanding of disease process, treatment plan, medications, and discharge instructions  Description: Complete learning assessment and assess knowledge base.   Interventions:  - Provide teaching at level of understanding  - Provide teaching via preferred learning methods  Outcome: Progressing     Problem: RESPIRATORY - ADULT  Goal: Achieves optimal ventilation and oxygenation  Description: INTERVENTIONS:  - Assess for changes in respiratory status  - Assess for changes in mentation and behavior  - Position to facilitate oxygenation and minimize respiratory effort  - Oxygen administered by appropriate delivery if ordered  - Initiate smoking cessation education as indicated  - Encourage broncho-pulmonary hygiene including cough, deep breathe, Incentive Spirometry  - Assess the need for suctioning and aspirate as needed  - Assess and instruct to report SOB or any respiratory difficulty  - Respiratory Therapy support as indicated  Outcome: Progressing

## 2023-08-13 NOTE — PLAN OF CARE
Problem: MOBILITY - ADULT  Goal: Maintain or return to baseline ADL function  Description: INTERVENTIONS:  -  Assess patient's ability to carry out ADLs; assess patient's baseline for ADL function and identify physical deficits which impact ability to perform ADLs (bathing, care of mouth/teeth, toileting, grooming, dressing, etc.)  - Assess/evaluate cause of self-care deficits   - Assess range of motion  - Assess patient's mobility; develop plan if impaired  - Assess patient's need for assistive devices and provide as appropriate  - Encourage maximum independence but intervene and supervise when necessary  - Involve family in performance of ADLs  - Assess for home care needs following discharge   - Consider OT consult to assist with ADL evaluation and planning for discharge  - Provide patient education as appropriate  Outcome: Progressing  Goal: Maintains/Returns to pre admission functional level  Description: INTERVENTIONS:  - Perform BMAT or MOVE assessment daily.   - Set and communicate daily mobility goal to care team and patient/family/caregiver. - Collaborate with rehabilitation services on mobility goals if consulted  - Perform Range of Motion 4 times a day. - Reposition patient every 2 hours.   - Dangle patient 3 times a day  - Stand patient 3 times a day  - Ambulate patient 3 times a day  - Out of bed to chair 3 times a day   - Out of bed for meals 3 times a day  - Out of bed for toileting  - Record patient progress and toleration of activity level   Outcome: Progressing     Problem: PAIN - ADULT  Goal: Verbalizes/displays adequate comfort level or baseline comfort level  Description: Interventions:  - Encourage patient to monitor pain and request assistance  - Assess pain using appropriate pain scale  - Administer analgesics based on type and severity of pain and evaluate response  - Implement non-pharmacological measures as appropriate and evaluate response  - Consider cultural and social influences on pain and pain management  - Notify physician/advanced practitioner if interventions unsuccessful or patient reports new pain  Outcome: Progressing     Problem: INFECTION - ADULT  Goal: Absence or prevention of progression during hospitalization  Description: INTERVENTIONS:  - Assess and monitor for signs and symptoms of infection  - Monitor lab/diagnostic results  - Monitor all insertion sites, i.e. indwelling lines, tubes, and drains  - Monitor endotracheal if appropriate and nasal secretions for changes in amount and color  - Brady appropriate cooling/warming therapies per order  - Administer medications as ordered  - Instruct and encourage patient and family to use good hand hygiene technique  - Identify and instruct in appropriate isolation precautions for identified infection/condition  Outcome: Progressing  Goal: Absence of fever/infection during neutropenic period  Description: INTERVENTIONS:  - Monitor WBC    Outcome: Progressing     Problem: SAFETY ADULT  Goal: Maintain or return to baseline ADL function  Description: INTERVENTIONS:  -  Assess patient's ability to carry out ADLs; assess patient's baseline for ADL function and identify physical deficits which impact ability to perform ADLs (bathing, care of mouth/teeth, toileting, grooming, dressing, etc.)  - Assess/evaluate cause of self-care deficits   - Assess range of motion  - Assess patient's mobility; develop plan if impaired  - Assess patient's need for assistive devices and provide as appropriate  - Encourage maximum independence but intervene and supervise when necessary  - Involve family in performance of ADLs  - Assess for home care needs following discharge   - Consider OT consult to assist with ADL evaluation and planning for discharge  - Provide patient education as appropriate  Outcome: Progressing  Goal: Maintains/Returns to pre admission functional level  Description: INTERVENTIONS:  - Perform BMAT or MOVE assessment daily.   - Set and communicate daily mobility goal to care team and patient/family/caregiver. - Collaborate with rehabilitation services on mobility goals if consulted  - Perform Range of Motion 4 times a day. - Reposition patient every 2 hours.   - Dangle patient 3 times a day  - Stand patient 3 times a day  - Ambulate patient 3 times a day  - Out of bed to chair 3 times a day   - Out of bed for meals 3 times a day  - Out of bed for toileting  - Record patient progress and toleration of activity level   Outcome: Progressing  Goal: Patient will remain free of falls  Description: INTERVENTIONS:  - Educate patient/family on patient safety including physical limitations  - Instruct patient to call for assistance with activity   - Consult OT/PT to assist with strengthening/mobility   - Keep Call bell within reach  - Keep bed low and locked with side rails adjusted as appropriate  - Keep care items and personal belongings within reach  - Initiate and maintain comfort rounds  - Make Fall Risk Sign visible to staff  - Offer Toileting every 2 Hours, in advance of need  - Apply yellow socks and bracelet for high fall risk patients  - Consider moving patient to room near nurses station  Outcome: Progressing     Problem: DISCHARGE PLANNING  Goal: Discharge to home or other facility with appropriate resources  Description: INTERVENTIONS:  - Identify barriers to discharge w/patient and caregiver  - Arrange for needed discharge resources and transportation as appropriate  - Identify discharge learning needs (meds, wound care, etc.)  - Arrange for interpretive services to assist at discharge as needed  - Refer to Case Management Department for coordinating discharge planning if the patient needs post-hospital services based on physician/advanced practitioner order or complex needs related to functional status, cognitive ability, or social support system  Outcome: Progressing     Problem: Knowledge Deficit  Goal: Patient/family/caregiver demonstrates understanding of disease process, treatment plan, medications, and discharge instructions  Description: Complete learning assessment and assess knowledge base.   Interventions:  - Provide teaching at level of understanding  - Provide teaching via preferred learning methods  Outcome: Progressing     Problem: RESPIRATORY - ADULT  Goal: Achieves optimal ventilation and oxygenation  Description: INTERVENTIONS:  - Assess for changes in respiratory status  - Assess for changes in mentation and behavior  - Position to facilitate oxygenation and minimize respiratory effort  - Oxygen administered by appropriate delivery if ordered  - Initiate smoking cessation education as indicated  - Encourage broncho-pulmonary hygiene including cough, deep breathe, Incentive Spirometry  - Assess the need for suctioning and aspirate as needed  - Assess and instruct to report SOB or any respiratory difficulty  - Respiratory Therapy support as indicated  Outcome: Progressing

## 2023-08-13 NOTE — ASSESSMENT & PLAN NOTE
· Continue atenolol 25 mg daily  · Holding Lasix (per outpatient cardiology notes increase to 40 mg daily) as well as ARB given rise in Cr   · Check US kidney/bladder   · Monitor VS

## 2023-08-13 NOTE — ASSESSMENT & PLAN NOTE
· Patient creatinine previously running 1.0-1.3, since July through August has been running 1.4-1.7; kidney function 8/9 was 1.7  · Creatinine on admission 1.57 increased to 1.71 today  · We will hold ARB and Lasix  · Avoid hypotension and nephrotoxins  · Check ultrasound kidney and bladder  · Placed on retention protocol, I/O  · If kidney function were slow to improve or to would ask for nephrology consult here   · BMP in AM

## 2023-08-13 NOTE — CONSULTS
Pulmonary Consultation   Uzma Parekh 70 y.o. female MRN: [id number]   Bo 2 -01 Encounter: [id number]      Reason for consultation:   Dyspnea on exertion and asthma acute exacerbation. Requesting physician:   Libia Gonzalez DO      Impressions:   • Bronchial asthma with acute exacerbation. • Dyspnea on exertion. • Severe obstructive sleep apnea. Untreated. • Stage IV lung cancer on immunotherapy. • Morbid obesity. Recommendations:  • Discontinue Solu-Medrol. • Prednisone 40 mg p.o. daily for 3 days starting tomorrow. • Continue bronchodilators. • CPAP/BiPAP titration study as outpatient. • Follow-up after the CPAP/BiPAP titration study is done. History of Present Illness   HPI:  Uzma Parekh is a 70 y.o. female who was admitted because of increasing shortness of breath on exertion and tiredness. The patient stated that she has chronic dyspnea on exertion however it has worsened. Also she started having a cough and she tried her nebulizer treatments at home but did not help significantly. The patient was brought to the emergency room and since then she has been treated with steroids and bronchodilators. She feels her breathing has improved. The patient has stage IV lung cancer and treated with immunotherapy. She has good response. Also she has severe obstructive sleep apnea. The patient did not tolerate CPAP and returned the machine. Review of systems:  12 point review of systems was completed and was otherwise negative except as listed in HPI.       Historical Information   Past Medical History:   Diagnosis Date   • Asthma    • Degenerative joint disease    • GERD (gastroesophageal reflux disease)    • Hypertension    • Lumbar disc disease    • Lung cancer ([address])    • Sleep apnea     suspected    • Ventricular arrhythmia    • Vitamin D deficiency      Past Surgical History:   Procedure Laterality Date   • APPENDECTOMY     • COLONOSCOPY N/A 03/18/2019    Procedure: COLONOSCOPY;  Surgeon: Susan Mcfarland DO;  Location: [facility]; Service: Gastroenterology   • ESOPHAGOGASTRODUODENOSCOPY N/A 03/18/2019    Procedure: ESOPHAGOGASTRODUODENOSCOPY (EGD); Surgeon: Susan Mcfarland DO;  Location: [facility]; Service: Gastroenterology   • KNEE SURGERY     • ROTATOR CUFF REPAIR     • SHOULDER SURGERY       Family History   Problem Relation Age of Onset   • Stroke Mother    • Diabetes Mother    • Hyperlipidemia Mother    • Hypertension Mother    • Allergies Mother         Environmental   • Asthma Mother    • Diabetes Father    • Hyperlipidemia Father    • Hypertension Father    • Lung cancer Father 79   • Allergies Father         Environmental   • Asthma Father    • Lymphoma Sister 71   • Heart disease Sister         Pacemaker   • Asthma Brother    • Aneurysm Brother         Brain - had surgery   • Other Brother         Lymes disease   • Coronary artery disease Daughter         2 bypass done   • No Known Problems Daughter    • No Known Problems Daughter    • No Known Problems Son    • Kidney disease Son    • Liver disease Son    • Obesity Son        Family History:  Father had lung cancer. Social History:  The patient lives with his son. She has about 5-pack-year smoking history and quit in 1986. She has a cat.       Meds/Allergies   Current Facility-Administered Medications   Medication Dose Route Frequency   • acetaminophen (TYLENOL) tablet 650 mg  650 mg Oral Q6H PRN   • albuterol inhalation solution 2.5 mg  2.5 mg Nebulization Q6H PRN   • Alectinib HCl CAPS 600 mg  600 mg Oral BID   • atenolol (TENORMIN) tablet 25 mg  25 mg Oral Daily   • atorvastatin (LIPITOR) tablet 40 mg  40 mg Oral Daily With Dinner   • azithromycin (ZITHROMAX) tablet 500 mg  500 mg Oral Q24H   • benzonatate (TESSALON PERLES) capsule 100 mg  100 mg Oral TID   • budesonide (PULMICORT) inhalation solution 0.25 mg  0.25 mg Nebulization Q12H   • enoxaparin (LOVENOX) subcutaneous injection 40 mg  40 mg Subcutaneous Daily   • fluticasone (FLONASE) 50 mcg/act nasal spray 2 spray  2 spray Each Nare Daily   • guaiFENesin (MUCINEX) 12 hr tablet 600 mg  600 mg Oral BID   • ipratropium (ATROVENT) 0.02 % inhalation solution 0.5 mg  0.5 mg Nebulization TID   • levalbuterol (XOPENEX) inhalation solution 1.25 mg  1.25 mg Nebulization Q6H PRN    And   • sodium chloride 0.9 % inhalation solution 3 mL  3 mL Nebulization Q6H PRN   • levalbuterol (XOPENEX) inhalation solution 1.25 mg  1.25 mg Nebulization TID   • methylPREDNISolone sodium succinate (Solu-MEDROL) injection 40 mg  40 mg Intravenous Q12H [address]   • ondansetron (ZOFRAN) injection 4 mg  4 mg Intravenous Q6H PRN   • oxyCODONE-acetaminophen (PERCOCET) 5-325 mg per tablet 1 tablet  1 tablet Oral Q4H PRN   • pantoprazole (PROTONIX) EC tablet 20 mg  20 mg Oral Early Morning     Medications Prior to Admission   Medication   • acetaminophen (TYLENOL) 650 mg CR tablet   • albuterol (2.5 mg/3 mL) 0.083 % nebulizer solution   • Alectinib HCl 150 MG CAPS   • atenolol (TENORMIN) 25 mg tablet   • benralizumab (FASENRA) subcutaneous injection   • budesonide (PULMICORT) 0.25 mg/2 mL nebulizer solution   • ferrous sulfate 324 (65 Fe) mg   • fexofenadine (ALLEGRA) 180 MG tablet   • fluticasone (FLONASE) 50 mcg/act nasal spray   • Fluticasone-Salmeterol (Advair) 500-50 mcg/dose inhaler   • furosemide (LASIX) 20 mg tablet   • ibuprofen (MOTRIN) 200 mg tablet   • irbesartan (AVAPRO) 300 mg tablet   • omeprazole (PriLOSEC) 20 mg delayed release capsule   • oxyCODONE-acetaminophen (PERCOCET) 5-325 mg per tablet   • potassium chloride 20 MEQ TBCR   • rosuvastatin (CRESTOR) 10 MG tablet   • Ventolin  (90 Base) MCG/ACT inhaler     No Known Allergies    Vitals: Blood pressure 152/75, pulse 72, temperature (!) 97.1 °F (36.2 °C), temperature source Oral, resp.  rate 16, height 5' 1" (1.549 m), weight 108 kg (238 lb 12.1 oz), SpO2 95 %, not currently breastfeeding.,      Intake/Output Summary (Last 24 hours) at 8/13/2023 1352  Last data filed at 8/13/2023 6236  Gross per 24 hour   Intake 480 ml   Output 1000 ml   Net -520 ml       Physical exam:        Head/eyes:    Normocephalic, without obvious abnormality, atraumatic,         PERRL, extraocular muscles intact, no scleral icterus    Nose:   Nares normal, septum midline, mucosa normal, no drainage    or sinus tenderness   Throat:   Moist mucous membranes, no thrush   Neck:   Supple, trachea midline, no adenopathy; no carotid    bruit or JVD   Lungs:    Decreased breath sounds. No wheezing. Heart:    Regular rate and rhythm, S1 and S2 normal, no murmur, rub   or gallop   Abdomen:     Soft, non-tender, bowel sounds active all four quadrants,     no masses, no organomegaly   Extremities:   Extremities normal, atraumatic, no cyanosis or edema   Skin:   Warm, dry, turgor normal, no rashes or lesions   Neurologic:   CNII-XII intact, normal strength, non-focal             Labs:   CBC:   Lab Results   Component Value Date    WBC 9.26 08/13/2023    HGB 10.5 (L) 08/13/2023    HCT 32.2 (L) 08/13/2023    MCV 91 08/13/2023    PLT 90 (L) 08/13/2023    RBC 3.53 (L) 08/13/2023    MCH 29.7 08/13/2023    MCHC 32.6 08/13/2023    RDW 15.9 (H) 08/13/2023   , CMP:   Lab Results   Component Value Date    SODIUM 142 08/13/2023    K 4.1 08/13/2023     08/13/2023    CO2 31 08/13/2023    BUN 32 (H) 08/13/2023    CREATININE 1.71 (H) 08/13/2023    CALCIUM 8.9 08/13/2023    EGFR 28 08/13/2023       Imaging and other studies: I have personally reviewed pertinent films in PACS chest x-rays reviewed on the PACS system. No acute pulmonary findings.         Wallace Maza MD

## 2023-08-13 NOTE — ASSESSMENT & PLAN NOTE
· Severe obstructive sleep apnea  · Has adamantly refused CPAP per outpatient notes  · Contributing to admission

## 2023-08-13 NOTE — ASSESSMENT & PLAN NOTE
Wt Readings from Last 3 Encounters:   08/13/23 108 kg (238 lb 12.1 oz)   07/20/23 107 kg (236 lb)   07/10/23 107 kg (236 lb 12.8 oz)       · Chest x-ray without acute cardiopulmonary disease  ·   · Creatinine is elevated this morning, will hold diuretics and ACE inhibitor  · Monitor daily weights, I's/O  · Cardiac diet  · Recently had updated echo Alaio with EF 97%, grade 1 diastolic dysfunction  · Follows with Dr. Ronnie Avendano outpatient

## 2023-08-14 PROBLEM — J96.01 ACUTE RESPIRATORY FAILURE WITH HYPOXIA (HCC): Status: ACTIVE | Noted: 2023-08-14

## 2023-08-14 PROBLEM — R13.10 DYSPHAGIA: Status: ACTIVE | Noted: 2023-08-14

## 2023-08-14 LAB
2HR DELTA HS TROPONIN: 4 NG/L
4HR DELTA HS TROPONIN: 6 NG/L
ANION GAP SERPL CALCULATED.3IONS-SCNC: 5 MMOL/L
BUN SERPL-MCNC: 37 MG/DL (ref 5–25)
CALCIUM SERPL-MCNC: 8.8 MG/DL (ref 8.4–10.2)
CARDIAC TROPONIN I PNL SERPL HS: 20 NG/L
CARDIAC TROPONIN I PNL SERPL HS: 24 NG/L
CARDIAC TROPONIN I PNL SERPL HS: 26 NG/L
CHLORIDE SERPL-SCNC: 104 MMOL/L (ref 96–108)
CO2 SERPL-SCNC: 33 MMOL/L (ref 21–32)
CREAT SERPL-MCNC: 1.48 MG/DL (ref 0.6–1.3)
ERYTHROCYTE [DISTWIDTH] IN BLOOD BY AUTOMATED COUNT: 16.1 % (ref 11.6–15.1)
ERYTHROCYTE [DISTWIDTH] IN BLOOD BY AUTOMATED COUNT: 16.2 % (ref 11.6–15.1)
GFR SERPL CREATININE-BSD FRML MDRD: 34 ML/MIN/1.73SQ M
GLUCOSE SERPL-MCNC: 117 MG/DL (ref 65–140)
HCT VFR BLD AUTO: 31.2 % (ref 34.8–46.1)
HCT VFR BLD AUTO: 31.6 % (ref 34.8–46.1)
HGB BLD-MCNC: 10.1 G/DL (ref 11.5–15.4)
HGB BLD-MCNC: 10.1 G/DL (ref 11.5–15.4)
MCH RBC QN AUTO: 29.2 PG (ref 26.8–34.3)
MCH RBC QN AUTO: 29.7 PG (ref 26.8–34.3)
MCHC RBC AUTO-ENTMCNC: 32 G/DL (ref 31.4–37.4)
MCHC RBC AUTO-ENTMCNC: 32.6 G/DL (ref 31.4–37.4)
MCV RBC AUTO: 91 FL (ref 82–98)
MCV RBC AUTO: 92 FL (ref 82–98)
NRBC BLD AUTO-RTO: 0 /100 WBCS
NRBC BLD AUTO-RTO: 0 /100 WBCS
PLATELET # BLD AUTO: 88 THOUSANDS/UL (ref 149–390)
PLATELET # BLD AUTO: 89 THOUSANDS/UL (ref 149–390)
POTASSIUM SERPL-SCNC: 3.6 MMOL/L (ref 3.5–5.3)
RBC # BLD AUTO: 3.4 MILLION/UL (ref 3.81–5.12)
RBC # BLD AUTO: 3.46 MILLION/UL (ref 3.81–5.12)
SODIUM SERPL-SCNC: 142 MMOL/L (ref 135–147)
TSH SERPL DL<=0.05 MIU/L-ACNC: 1.49 UIU/ML (ref 0.45–4.5)
WBC # BLD AUTO: 11.24 THOUSAND/UL (ref 4.31–10.16)
WBC # BLD AUTO: 11.62 THOUSAND/UL (ref 4.31–10.16)

## 2023-08-14 PROCEDURE — 99223 1ST HOSP IP/OBS HIGH 75: CPT | Performed by: STUDENT IN AN ORGANIZED HEALTH CARE EDUCATION/TRAINING PROGRAM

## 2023-08-14 PROCEDURE — 99232 SBSQ HOSP IP/OBS MODERATE 35: CPT | Performed by: INTERNAL MEDICINE

## 2023-08-14 PROCEDURE — 85027 COMPLETE CBC AUTOMATED: CPT | Performed by: PHYSICIAN ASSISTANT

## 2023-08-14 PROCEDURE — 84484 ASSAY OF TROPONIN QUANT: CPT

## 2023-08-14 PROCEDURE — 80048 BASIC METABOLIC PNL TOTAL CA: CPT | Performed by: PHYSICIAN ASSISTANT

## 2023-08-14 PROCEDURE — 94640 AIRWAY INHALATION TREATMENT: CPT

## 2023-08-14 PROCEDURE — 94760 N-INVAS EAR/PLS OXIMETRY 1: CPT

## 2023-08-14 RX ORDER — PREDNISONE 20 MG/1
40 TABLET ORAL DAILY
Status: COMPLETED | OUTPATIENT
Start: 2023-08-15 | End: 2023-08-18

## 2023-08-14 RX ADMIN — ATENOLOL 25 MG: 25 TABLET ORAL at 09:28

## 2023-08-14 RX ADMIN — AZITHROMYCIN MONOHYDRATE 500 MG: 250 TABLET ORAL at 11:21

## 2023-08-14 RX ADMIN — METOPROLOL TARTRATE 25 MG: 25 TABLET, FILM COATED ORAL at 12:21

## 2023-08-14 RX ADMIN — PANTOPRAZOLE SODIUM 20 MG: 20 TABLET, DELAYED RELEASE ORAL at 05:31

## 2023-08-14 RX ADMIN — BUDESONIDE 0.25 MG: 0.25 INHALANT RESPIRATORY (INHALATION) at 07:16

## 2023-08-14 RX ADMIN — APIXABAN 5 MG: 5 TABLET, FILM COATED ORAL at 12:21

## 2023-08-14 RX ADMIN — BENZONATATE 100 MG: 100 CAPSULE ORAL at 20:20

## 2023-08-14 RX ADMIN — BENZONATATE 100 MG: 100 CAPSULE ORAL at 17:01

## 2023-08-14 RX ADMIN — GUAIFENESIN 600 MG: 600 TABLET, EXTENDED RELEASE ORAL at 17:01

## 2023-08-14 RX ADMIN — IPRATROPIUM BROMIDE 0.5 MG: 0.5 SOLUTION RESPIRATORY (INHALATION) at 20:34

## 2023-08-14 RX ADMIN — APIXABAN 5 MG: 5 TABLET, FILM COATED ORAL at 17:01

## 2023-08-14 RX ADMIN — ALECTINIB HYDROCHLORIDE 600 MG: 150 CAPSULE ORAL at 09:29

## 2023-08-14 RX ADMIN — METOPROLOL TARTRATE 25 MG: 25 TABLET, FILM COATED ORAL at 20:20

## 2023-08-14 RX ADMIN — BENZONATATE 100 MG: 100 CAPSULE ORAL at 09:28

## 2023-08-14 RX ADMIN — ATORVASTATIN CALCIUM 40 MG: 40 TABLET, FILM COATED ORAL at 17:01

## 2023-08-14 RX ADMIN — IPRATROPIUM BROMIDE 0.5 MG: 0.5 SOLUTION RESPIRATORY (INHALATION) at 13:01

## 2023-08-14 RX ADMIN — GUAIFENESIN 600 MG: 600 TABLET, EXTENDED RELEASE ORAL at 09:28

## 2023-08-14 RX ADMIN — BUDESONIDE 0.25 MG: 0.25 INHALANT RESPIRATORY (INHALATION) at 20:35

## 2023-08-14 RX ADMIN — LEVALBUTEROL HYDROCHLORIDE 1.25 MG: 1.25 SOLUTION RESPIRATORY (INHALATION) at 13:01

## 2023-08-14 RX ADMIN — ENOXAPARIN SODIUM 40 MG: 100 INJECTION SUBCUTANEOUS at 09:28

## 2023-08-14 RX ADMIN — FLUTICASONE PROPIONATE 2 SPRAY: 50 SPRAY, METERED NASAL at 09:29

## 2023-08-14 RX ADMIN — LEVALBUTEROL HYDROCHLORIDE 1.25 MG: 1.25 SOLUTION RESPIRATORY (INHALATION) at 20:34

## 2023-08-14 RX ADMIN — PREDNISONE 40 MG: 20 TABLET ORAL at 09:28

## 2023-08-14 RX ADMIN — IPRATROPIUM BROMIDE 0.5 MG: 0.5 SOLUTION RESPIRATORY (INHALATION) at 07:14

## 2023-08-14 RX ADMIN — LEVALBUTEROL HYDROCHLORIDE 1.25 MG: 1.25 SOLUTION RESPIRATORY (INHALATION) at 07:14

## 2023-08-14 RX ADMIN — ALECTINIB HYDROCHLORIDE 600 MG: 150 CAPSULE ORAL at 17:01

## 2023-08-14 NOTE — PROGRESS NOTES
Progress Note - Pulmonary   Lisa Villalta 70 y.o. female MRN: [id number]  Unit/Bed#: [place] [address] -01 Encounter: [id number]    Assessment/Plan:    1. Severe allergic asthma with acute exacerbation  - with multiple allergy hx and elevated eosinophilia (480) and Total IgE 109. FEV1 59% in 2020. With last exacerbation in 5/2022. Unknown trigger factor this time  - home regimen including Allegra, Fluticasone, Advair 500-50 BID, Spiriva BID, report to be compliant with meds  - in hospital regimen: budesonide BID, ipratropium/levalbuterol TID, flonase, mucinex. Will complete prednisone 40mg for 5 days. - upon discharge should complete prednisone course, resume allegra, fluticasone, advair, and spiriva. Patient should follow in our clinic within next 1 or 2 weeks. I will send a message to the clinic to schedule a follow up    2. Acute hypoxic resp failure  -In the setting of asthma exacerbation vs possible chornic OHS due to body habitus. At home does not require home O2, noiw on 2L with Sat 96%  -Will continue to wean SpO2 to > 88%. If refractory to treatment, in her case should consider immunotherapy related pneumonitis, then should get CT chest for further evaluation.  -Will check ambulatory O2 upon discharge to determine whether the need for home O2. PT venus appreciated    3. Hx of MONO  Recent sleep study showed AHI 85.2 in 5/2022 with significant hypoxia. However with hx of refusal of Cpap   Will need to re evaluate as outpatient and probably repeat sleep study     4. Rt adenocarcinoma with bone mets   - currently on Alectinib due to ALK +, s/p RT for T spine mets  - stable. Follow with oncologist    Chief Complaint:    I still have cough    Subjective:    Seen and examined at bedsied, Sat 93-95% on RA. Patient states she still has non productive cough but is much improving.  No fever, no chest tightness, or other overnight event    Objective:    Vitals: Blood pressure 143/55, pulse 62, temperature 98.1 °F (36.7 °C), resp. rate 16, height 5' 1" (1.549 m), weight 108 kg (238 lb 15.7 oz), SpO2 91 %, not currently breastfeeding. RA,Body mass index is 45.15 kg/m². Intake/Output Summary (Last 24 hours) at 8/14/2023 1245  Last data filed at 8/13/2023 1630  Gross per 24 hour   Intake --   Output 200 ml   Net -200 ml       Invasive Devices     Peripheral Intravenous Line  Duration           Peripheral IV 08/12/23 Right Antecubital 2 days                Physical Exam:     Physical Exam  Constitutional:       General: She is not in acute distress. Appearance: She is obese. She is not ill-appearing. HENT:      Mouth/Throat:      Mouth: Mucous membranes are moist.   Cardiovascular:      Rate and Rhythm: Normal rate and regular rhythm. Pulmonary:      Effort: Pulmonary effort is normal.      Breath sounds: Examination of the right-lower field reveals wheezing. Wheezing present. No decreased breath sounds, rhonchi or rales. Abdominal:      General: Bowel sounds are normal.      Palpations: Abdomen is soft. Musculoskeletal:         General: Normal range of motion. Cervical back: Normal range of motion. Right lower leg: No edema. Left lower leg: No edema. Skin:     General: Skin is warm. Neurological:      Mental Status: She is alert. Labs: I have personally reviewed pertinent lab results.         Imaging and other studies: I have personally reviewed pertinent films in PACS

## 2023-08-14 NOTE — PROGRESS NOTES
[address] [facility]  Progress Note  Name: Zeyad Gan  MRN: [id number]  Unit/Bed#: [place] 2 -01 I Date of Admission: 8/12/2023   Date of Service: 8/14/2023 I Hospital Day: 2    Assessment/Plan   * Severe persistent asthma with acute exacerbation  Assessment & Plan  · Patient is a 79-year-old female with past medical history of severe persistent asthma, non-small cell lung cancer, diastolic CHF, hypertension, apnea, CAD presenting with shortness of breath concerning for asthma exacerbation  · Chest x-ray no acute cardiopulmonary disease  · Currently on room air  · Appreciate pulmonary recommendations. Changed from solumedrol to prednisone 40mg daily for 5 days   · Continue bronchodilators   · She follows with Dr. Kimberlee Miller outpt     Acute respiratory failure with hypoxia [facility])  Assessment & Plan  As a result of exacerbation  Required 2 liters of oxygen  Has since been weaned to 1 liter  Will require home o2 eval at discharge. Dysphagia  Assessment & Plan  Reports food getting stuck  Will consult speech eval     Diastolic CHF ([address])  Assessment & Plan  Wt Readings from Last 3 Encounters:   08/14/23 108 kg (238 lb 15.7 oz)   07/20/23 107 kg (236 lb)   07/10/23 107 kg (236 lb 12.8 oz)       · Chest x-ray without acute cardiopulmonary disease  ·   · Creatinine is elevated this morning, will hold diuretics and ACE inhibitor- given iv lasix. Likely restart lasix 20mg daily at discharge.    · Monitor daily weights, I's/O  · Cardiac diet  · Recently had updated echo Alaio with EF 46%, grade 1 diastolic dysfunction  · Follows with Dr. Katey García outpatient      Dyslipidemia  Assessment & Plan  · Maintained on Crestor per outpatient regimen, substituted for Lipitor while hospitalized    Coronary artery disease involving native coronary artery of native heart without angina pectoris  Assessment & Plan  · Continue atenolol and statin  · Holding irbesartan given rising kidney function- however creatinine improved. May be able to start this in near future     Thrombocytopenia, unspecified ([address])  Assessment & Plan  · Chronic thrombocytopenia noted back in 2020  · Likely due to non-small cell lung cancer    Abnormal renal function  Assessment & Plan  · Patient creatinine previously running 1.0-1.3, since July through August has been running 1.4-1.7; kidney function 8/9 was 1.7  · Creatinine on admission 1.57 increased to 1.71 during hospital stay but has since improved to 1.4  · Arb and lasix are on hold      Non-small cell lung cancer [facility])  Assessment & Plan  · 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 Ankit Xiao outpatient  · She is following every 6 months outpatient last seen in May  · Continues on Alectinib 600 mg twice daily  · Also receives Zometa infusions every 3 months    ANKIT (obstructive sleep apnea)  Assessment & Plan  · Severe obstructive sleep apnea  · Has adamantly refused CPAP per outpatient notes  · Contributing to admission  · outpt sleep study     Morbid obesity ([address])  Assessment & Plan  BMI 45 noted contributing to comorbidities  Would benefit from weight loss and lifestyle modifications    Gastroesophageal reflux disease without esophagitis  Assessment & Plan  Continue PPI    Essential hypertension  Assessment & Plan  · bp stable. · atenolol changed for metoprolol due to new onset aflutter. new onset aflutter- appreciate cardiology recommendations. Changed atenolol to metoprolol. Agreeable to eliquis     VTE Pharmacologic Prophylaxis: eliquis     Mechanical VTE Prophylaxis in Place: Yes    Education and Discussions with Family / Patient:patient    Current Length of Stay: 2 day(s)  Current Patient Status: Inpatient     Discharge Plan / Estimated Discharge Date: 24 to 48 hours     Code Status: Level 1 - Full Code      Subjective:   Pt seen and examined. Breathing is improved.  She does admit to food getting stuck at times in her throat when she tries to swallow. No other problems were noted. Objective:     Vitals:   Temp (24hrs), Av.2 °F (36.8 °C), Min:98.1 °F (36.7 °C), Max:98.2 °F (36.8 °C)    Temp:  [98.1 °F (36.7 °C)-98.2 °F (36.8 °C)] 98.2 °F (36.8 °C)  HR:  [] 64  Resp:  [16-20] 16  BP: (101-154)/(54-90) 146/61  SpO2:  [91 %-98 %] 95 %  Body mass index is 45.15 kg/m². Input and Output Summary (last 24 hours):     No intake or output data in the 24 hours ending 23 1650    Physical Exam:   Physical Exam  Constitutional:       Appearance: Normal appearance. HENT:      Head: Normocephalic and atraumatic. Eyes:      Extraocular Movements: Extraocular movements intact. Pupils: Pupils are equal, round, and reactive to light. Cardiovascular:      Rate and Rhythm: Normal rate and regular rhythm. Heart sounds: No murmur heard. No friction rub. No gallop. Pulmonary:      Effort: Pulmonary effort is normal. No respiratory distress. Breath sounds: No rhonchi or rales. Comments: Minimal wheezing   Abdominal:      General: Bowel sounds are normal. There is no distension. Palpations: Abdomen is soft. Tenderness: There is no abdominal tenderness. There is no guarding or rebound. Musculoskeletal:      Right lower leg: Edema present. Left lower leg: Edema present. Neurological:      Mental Status: She is alert and oriented to person, place, and time. Additional Data:     Labs:  Results from last 7 days   Lab Units 23  1228   WBC Thousand/uL 11.62*   < > 6.67   HEMOGLOBIN g/dL 10.1*   < > 10.1*   HEMATOCRIT % 31.6*   < > 31.9*   PLATELETS Thousands/uL 88*   < > 90*   LYMPHO PCT %  --   --  22   MONO PCT %  --   --  5   EOS PCT %  --   --  0    < > = values in this interval not displayed.      Results from last 7 days   Lab Units 2312/23  1228   SODIUM mmol/L 142   < > 143   POTASSIUM mmol/L 3.6   < > 3. 7   CHLORIDE mmol/L 104   < > 106   CO2 mmol/L 33*   < > 33*   BUN mg/dL 37*   < > 27*   CREATININE mg/dL 1.48*   < > 1.57*   ANION GAP mmol/L 5   < > 4   CALCIUM mg/dL 8.8   < > 8.9   ALBUMIN g/dL  --   --  3.9   TOTAL BILIRUBIN mg/dL  --   --  0.98   ALK PHOS U/L  --   --  87   ALT U/L  --   --  23   AST U/L  --   --  26   GLUCOSE RANDOM mg/dL 117   < > 160*    < > = values in this interval not displayed.                        Recent Cultures (last 7 days):           Lines/Drains:  Invasive Devices     Peripheral Intravenous Line  Duration           Peripheral IV 08/12/23 Right Antecubital 2 days                Telemetry:   Telemetry Orders (From admission, onward)             24 Hour Telemetry Monitoring  Continuous x 24 Hours (Telem)        Question:  Reason for 24 Hour Telemetry  Answer:  Arrhythmias requiring acute medical intervention / PPM or ICD malfunction                    Last 24 Hours Medication List:   Current Facility-Administered Medications   Medication Dose Route Frequency Provider Last Rate   • acetaminophen  650 mg Oral Q6H PRN Evelyn Jed Prechtel, DO     • albuterol  2.5 mg Nebulization Q6H PRN David Chowhtel, DO     • Alectinib HCl  600 mg Oral BID Ruthie Flroes PA-C     • apixaban  5 mg Oral BID Yelena Garcia MD     • atorvastatin  40 mg Oral Daily With [employer] S Prechtel, DO     • azithromycin  500 mg Oral Q24H Ruthie Flores PA-C     • benzonatate  100 mg Oral TID Lynda Rush PA-C     • budesonide  0.25 mg Nebulization Q12H David Chowhtel, DO     • fluticasone  2 spray Each Nare Daily David Chowhtel, DO     • guaiFENesin  600 mg Oral BID Ruthie Flores PA-C     • ipratropium  0.5 mg Nebulization TID Evelyn Jed Huddlestonel, DO     • levalbuterol  1.25 mg Nebulization Q6H PRN David Chowhtel, DO      And   • sodium chloride  3 mL Nebulization Q6H PRN Evelyn Jed Huddlestonel, DO     • levalbuterol  1.25 mg Nebulization TID Evelyn Magic Prechtel, DO     • metoprolol tartrate  25 mg Oral Q12H [address] Aquino MD     • ondansetron  4 mg Intravenous Q6H PRN Kianna Pratt DO     • oxyCODONE-acetaminophen  1 tablet Oral Q4H PRN Sukhwinder Martinez DO     • pantoprazole  20 mg Oral Early Morning David Martinez,      • [START ON 8/15/2023] predniSONE  40 mg Oral Daily Heather Duane, MD          Today, Patient Was Seen By: Amy Pickering DO

## 2023-08-14 NOTE — ASSESSMENT & PLAN NOTE
· Severe obstructive sleep apnea  · Has adamantly refused CPAP per outpatient notes  · Contributing to admission  · outpt sleep study

## 2023-08-14 NOTE — ASSESSMENT & PLAN NOTE
Wt Readings from Last 3 Encounters:   08/14/23 108 kg (238 lb 15.7 oz)   07/20/23 107 kg (236 lb)   07/10/23 107 kg (236 lb 12.8 oz)       · Chest x-ray without acute cardiopulmonary disease  ·   · Creatinine is elevated this morning, will hold diuretics and ACE inhibitor- given iv lasix. Likely restart lasix 20mg daily at discharge.    · Monitor daily weights, I's/O  · Cardiac diet  · Recently had updated echo Alaio with EF 60%, grade 1 diastolic dysfunction  · Follows with Dr. Amado Barry outpatient

## 2023-08-14 NOTE — ASSESSMENT & PLAN NOTE
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 Catana Soulier outpatient  · She is following every 6 months outpatient last seen in May  · Continues on Alectinib 600 mg twice daily  · Also receives Zometa infusions every 3 months

## 2023-08-14 NOTE — ASSESSMENT & PLAN NOTE
· Patient is a 81-year-old female with past medical history of severe persistent asthma, non-small cell lung cancer, diastolic CHF, hypertension, apnea, CAD presenting with shortness of breath concerning for asthma exacerbation  · Chest x-ray no acute cardiopulmonary disease  · Currently on room air  · Appreciate pulmonary recommendations.  Changed from solumedrol to prednisone 40mg daily for 5 days   · Continue bronchodilators   · She follows with Dr. Kent Cea

## 2023-08-14 NOTE — UTILIZATION REVIEW
Initial Clinical Review    Admission: Date/Time/Statement:   Admission Orders (From admission, onward)     Ordered        08/12/23 1437  INPATIENT ADMISSION  Once                      Orders Placed This Encounter   Procedures   • INPATIENT ADMISSION     Standing Status:   Standing     Number of Occurrences:   1     Order Specific Question:   Level of Care     Answer:   Med Surg [16]     Order Specific Question:   Estimated length of stay     Answer:   More than 2 Midnights     Order Specific Question:   Certification     Answer:   I certify that inpatient services are medically necessary for this patient for a duration of greater than two midnights. See H&P and MD Progress Notes for additional information about the patient's course of treatment. ED Arrival Information     Expected   -    Arrival   8/12/2023 11:44    Acuity   Urgent            Means of arrival   Wheelchair    Escorted by   Self    Service   Hospitalist    Admission type   Emergency            Arrival complaint   sob            Chief Complaint   Patient presents with   • Shortness of Breath     Began "a couple days ago." Hx of asthma       Initial Presentation: 70 y.o. female to the ED from home with complaints of shortness of breath. H/O   asthma, lung cancer previously treated with chemotherapy XRT, remission since 2020 and remains on immunotherapy. Admitted to inpatient for severe persistent asthma, chf. Arrives tachypneic, obese with prolonged expiration, decreased air movement. Pulse ox shows no acute findings. Ambulatory pulse ox 92% on room air. Placed on [facility]. IN the ED, given IV steroids, started on nebulizers. Date: 8/14   Day 2:   Continue with nebulizers, steroids iv. PUlmonary consult. Creat increased to 1.71, Hold arb, lasix. Check US kidney and bladder. REcheck BMP. PUlmonary consult:  Has severe obstructive sleep apnea untreated. Morbid obesity. DC Solu medrol. Start prednisone.  BIPap titration study as OP.     8/13 UPdate: Tachycardia with heart rate in 140s/atrial flutter. Given metoprolol iv. Given Cardizem with rate improved to 70s then to NSR. Check troponin, TSH, continue tele. Cardiology consult. Date: 8/14    Day 3: Has surpassed a 2nd midnight with active treatments and services, which include    Continued tele monitoring. Continue Eliquis.    ED Triage Vitals   Temperature Pulse Respirations Blood Pressure SpO2   08/12/23 1148 08/12/23 1148 08/12/23 1148 08/12/23 1148 08/12/23 1148   97.6 °F (36.4 °C) 57 (!) 28 161/96 97 %      Temp Source Heart Rate Source Patient Position - Orthostatic VS BP Location FiO2 (%)   08/12/23 1609 08/12/23 1502 08/12/23 1148 08/12/23 1148 --   Oral Monitor Sitting Right arm       Pain Score       08/12/23 1148       No Pain          Wt Readings from Last 1 Encounters:   08/14/23 108 kg (238 lb 15.7 oz)     Additional Vital Signs:   Date/Time Temp Pulse Resp BP MAP (mmHg) SpO2 Calculated FIO2 (%) - Nasal Cannula Nasal Cannula O2 Flow Rate (L/min) O2 Device Patient Position - Orthostatic VS   08/14/23 0719 -- -- -- -- -- 97 % 44 6 L/min Nasal cannula --   08/13/23 2236 -- 78 -- 101/54 70 -- -- -- -- --   08/13/23 21:26:50 98.2 °F (36.8 °C) 143 Abnormal  20 154/90 111 92 % -- -- None (Room air) Lying   08/13/23 1925 -- -- -- -- -- 93 % 28 2 L/min Nasal cannula --   08/13/23 14:45:27 98.2 °F (36.8 °C) 69 18 121/48 Abnormal  72 96 % -- -- Nasal cannula Lying   08/13/23 1311 -- -- -- -- -- -- 28 2 L/min Nasal cannula --   08/13/23 07:51:25 97.1 °F (36.2 °C) Abnormal  72 16 152/75 101 95 % -- -- -- Lying   08/12/23 21:12:37 97.8 °F (36.6 °C) 83 20 153/75 101 87 % Abnormal  -- -- None (Room air) Lying   08/12/23 1914 -- -- -- -- -- 91 % -- -- None (Room air) --   08/12/23 16:09:46 97.8 °F (36.6 °C) 69 22 155/74 101 91 % -- -- -- Lying   08/12/23 1502 -- 61 22 148/69 99 95 % -- -- None (Room air) Lying   08/12/23 1222 -- -- -- -- -- -- -- -- None (Room air) --   08/12/23 1148 97.6 °F (36.4 °C) 57 28 Abnormal  161/96 -- 97 % -- -- None (Room air) Sitting       Pertinent Labs/Diagnostic Test Results:   XR chest 2 views   Final Result by Roxana De Dios MD (08/12 1418)      No acute cardiopulmonary disease.                   Workstation performed: [id number]         US kidney and bladder    (Results Pending)         Results from last 7 days   Lab Units 08/13/23  2339 08/13/23  0452 08/12/23  1228   WBC Thousand/uL 11.24* 9.26 6.67   HEMOGLOBIN g/dL 10.1* 10.5* 10.1*   HEMATOCRIT % 31.2* 32.2* 31.9*   PLATELETS Thousands/uL 89*  89* 90* 90*         Results from last 7 days   Lab Units 08/13/23  0452 08/12/23  1228 08/09/23  0814   SODIUM mmol/L 142 143 144   POTASSIUM mmol/L 4.1 3.7 3.8   CHLORIDE mmol/L 103 106 102   CO2 mmol/L 31 33* 35*   ANION GAP mmol/L 8 4 7   BUN mg/dL 32* 27* 31*   CREATININE mg/dL 1.71* 1.57* 1.70*   EGFR ml/min/1.73sq m 28 31 28   CALCIUM mg/dL 8.9 8.9 8.6   MAGNESIUM mg/dL 2.3  --   --      Results from last 7 days   Lab Units 08/12/23  1228   AST U/L 26   ALT U/L 23   ALK PHOS U/L 87   TOTAL PROTEIN g/dL 6.6   ALBUMIN g/dL 3.9   TOTAL BILIRUBIN mg/dL 0.98         Results from last 7 days   Lab Units 08/13/23  0452 08/12/23  1228   GLUCOSE RANDOM mg/dL 166* 160*       Results from last 7 days   Lab Units 08/13/23  2339   HS TNI 0HR ng/L 20   HS TNI 2HR ng/L  --    HSTNI D2 ng/L  --    HS TNI 4HR ng/L  --    HSTNI D4 ng/L  --            Results from last 7 days   Lab Units 08/13/23  2339   TSH 3RD GENERATON uIU/mL 1.492       Results from last 7 days   Lab Units 08/12/23  1228   BNP pg/mL 273*       ED Treatment:   Medication Administration from 08/12/2023 1144 to 08/12/2023 1552       Date/Time Order Dose Route Action     08/12/2023 1205 EDT albuterol inhalation solution 5 mg 5 mg Nebulization Given     08/12/2023 1205 EDT ipratropium (ATROVENT) 0.02 % inhalation solution 0.5 mg 0.5 mg Nebulization Given     08/12/2023 1230 EDT methylPREDNISolone sodium succinate (Solu-MEDROL) injection 125 mg 125 mg Intravenous Given     08/12/2023 1432 EDT albuterol inhalation solution 5 mg 5 mg Nebulization Given        Past Medical History:   Diagnosis Date   • Asthma    • Degenerative joint disease    • GERD (gastroesophageal reflux disease)    • Hypertension    • Lumbar disc disease    • Lung cancer ([address])    • Sleep apnea     suspected    • Ventricular arrhythmia    • Vitamin D deficiency        Admitting Diagnosis: SOB (shortness of breath) [R06.02]  Mild intermittent asthma with exacerbation [J45.21]  Age/Sex: 70 y.o. female  Admission Orders:  Tele  UP as tolerated  I/O  Scheduled Medications:  Alectinib HCl, 600 mg, Oral, BID  apixaban, 5 mg, Oral, BID  atorvastatin, 40 mg, Oral, Daily With Dinner  azithromycin, 500 mg, Oral, Q24H  benzonatate, 100 mg, Oral, TID  budesonide, 0.25 mg, Nebulization, Q12H  fluticasone, 2 spray, Each Nare, Daily  guaiFENesin, 600 mg, Oral, BID  ipratropium, 0.5 mg, Nebulization, TID  levalbuterol, 1.25 mg, Nebulization, TID  metoprolol tartrate, 25 mg, Oral, Q12H JAIRON  pantoprazole, 20 mg, Oral, Early Morning  predniSONE, 40 mg, Oral, Daily      Continuous IV Infusions:     PRN Meds:  acetaminophen, 650 mg, Oral, Q6H PRN  albuterol, 2.5 mg, Nebulization, Q6H PRN  levalbuterol, 1.25 mg, Nebulization, Q6H PRN   And  sodium chloride, 3 mL, Nebulization, Q6H PRN  ondansetron, 4 mg, Intravenous, Q6H PRN  oxyCODONE-acetaminophen, 1 tablet, Oral, Q4H PRN        IP CONSULT TO NUTRITION SERVICES  IP CONSULT TO PULMONOLOGY  IP CONSULT TO CARDIOLOGY    Network Utilization Review Department  ATTENTION: Please call with any questions or concerns to [phone] and carefully listen to the prompts so that you are directed to the right person. All voicemails are confidential.  Marine Aldrich all requests for admission clinical reviews, approved or denied determinations and any other requests to dedicated fax number below belonging to the campus where the patient is receiving treatment.  List of dedicated fax numbers for the Facilities:  [place] DENIALS (Administrative/Medical Necessity) [phone] [address] (Maternity/NICU/Pediatrics) [phone]   [address] [place] [address] [phone]   [facility] [address] [facility] [phone] 1505 [facility] 207 [facility] [address] [address] [facility] [phone]   [address] [facility] [address] [phone]

## 2023-08-14 NOTE — PLAN OF CARE
Problem: MOBILITY - ADULT  Goal: Maintain or return to baseline ADL function  Description: INTERVENTIONS:  -  Assess patient's ability to carry out ADLs; assess patient's baseline for ADL function and identify physical deficits which impact ability to perform ADLs (bathing, care of mouth/teeth, toileting, grooming, dressing, etc.)  - Assess/evaluate cause of self-care deficits   - Assess range of motion  - Assess patient's mobility; develop plan if impaired  - Assess patient's need for assistive devices and provide as appropriate  - Encourage maximum independence but intervene and supervise when necessary  - Involve family in performance of ADLs  - Assess for home care needs following discharge   - Consider OT consult to assist with ADL evaluation and planning for discharge  - Provide patient education as appropriate  Outcome: Progressing  Goal: Maintains/Returns to pre admission functional level  Description: INTERVENTIONS:  - Perform BMAT or MOVE assessment daily.   - Set and communicate daily mobility goal to care team and patient/family/caregiver. - Collaborate with rehabilitation services on mobility goals if consulted  - Perform Range of Motion 4 times a day. - Reposition patient every 2 hours.   - Dangle patient 3 times a day  - Stand patient 3 times a day  - Ambulate patient 3 times a day  - Out of bed to chair 3 times a day   - Out of bed for meals 3 times a day  - Out of bed for toileting  - Record patient progress and toleration of activity level   Outcome: Progressing     Problem: PAIN - ADULT  Goal: Verbalizes/displays adequate comfort level or baseline comfort level  Description: Interventions:  - Encourage patient to monitor pain and request assistance  - Assess pain using appropriate pain scale  - Administer analgesics based on type and severity of pain and evaluate response  - Implement non-pharmacological measures as appropriate and evaluate response  - Consider cultural and social influences on pain and pain management  - Notify physician/advanced practitioner if interventions unsuccessful or patient reports new pain  Outcome: Progressing

## 2023-08-14 NOTE — QUICK NOTE
Nursing reached out as she noted patient tachycardic into the 140's with feeling of palpitations and mild right-sided chest pain that began earlier this evening. Denies any other associated symptoms such as SOB, diaphoresis, dizziness/lightheadedness, arm radiation, or headache.   · EKG obtained demonstrating a flutter with 4:1 conduction  · No PMHx of a fib/a flutter per chart review, patient not on oral anticoagulation  · 5 mg metoprolol given without conversion  · Patient appeared to be in afib with rvr on telemetry rate still 140's  · 15 mg cardizem given, rate controlled down to 70's following, no longer in a fib on telemetry at approx 2245, converted to NSR  · Check troponin, TSH  · Continue telemetry monitoring   · Cardiology consult for AM

## 2023-08-14 NOTE — ASSESSMENT & PLAN NOTE
· Patient creatinine previously running 1.0-1.3, since July through August has been running 1.4-1.7; kidney function 8/9 was 1.7  · Creatinine on admission 1.57 increased to 1.71 during hospital stay but has since improved to 1.4  · Arb and lasix are on hold

## 2023-08-14 NOTE — ASSESSMENT & PLAN NOTE
· Continue atenolol and statin  · Holding irbesartan given rising kidney function- however creatinine improved.  May be able to start this in near future

## 2023-08-14 NOTE — PLAN OF CARE
Problem: MOBILITY - ADULT  Goal: Maintain or return to baseline ADL function  Description: INTERVENTIONS:  -  Assess patient's ability to carry out ADLs; assess patient's baseline for ADL function and identify physical deficits which impact ability to perform ADLs (bathing, care of mouth/teeth, toileting, grooming, dressing, etc.)  - Assess/evaluate cause of self-care deficits   - Assess range of motion  - Assess patient's mobility; develop plan if impaired  - Assess patient's need for assistive devices and provide as appropriate  - Encourage maximum independence but intervene and supervise when necessary  - Involve family in performance of ADLs  - Assess for home care needs following discharge   - Consider OT consult to assist with ADL evaluation and planning for discharge  - Provide patient education as appropriate  Outcome: Progressing  Goal: Maintains/Returns to pre admission functional level  Description: INTERVENTIONS:  - Perform BMAT or MOVE assessment daily.   - Set and communicate daily mobility goal to care team and patient/family/caregiver. - Collaborate with rehabilitation services on mobility goals if consulted  - Perform Range of Motion 4 times a day. - Reposition patient every 2 hours.   - Dangle patient 3 times a day  - Stand patient 3 times a day  - Ambulate patient 3 times a day  - Out of bed to chair 3 times a day   - Out of bed for meals 3 times a day  - Out of bed for toileting  - Record patient progress and toleration of activity level   Outcome: Progressing     Problem: PAIN - ADULT  Goal: Verbalizes/displays adequate comfort level or baseline comfort level  Description: Interventions:  - Encourage patient to monitor pain and request assistance  - Assess pain using appropriate pain scale  - Administer analgesics based on type and severity of pain and evaluate response  - Implement non-pharmacological measures as appropriate and evaluate response  - Consider cultural and social influences on pain and pain management  - Notify physician/advanced practitioner if interventions unsuccessful or patient reports new pain  Outcome: Progressing     Problem: INFECTION - ADULT  Goal: Absence or prevention of progression during hospitalization  Description: INTERVENTIONS:  - Assess and monitor for signs and symptoms of infection  - Monitor lab/diagnostic results  - Monitor all insertion sites, i.e. indwelling lines, tubes, and drains  - Monitor endotracheal if appropriate and nasal secretions for changes in amount and color  - [place] appropriate cooling/warming therapies per order  - Administer medications as ordered  - Instruct and encourage patient and family to use good hand hygiene technique  - Identify and instruct in appropriate isolation precautions for identified infection/condition  Outcome: Progressing  Goal: Absence of fever/infection during neutropenic period  Description: INTERVENTIONS:  - Monitor WBC    Outcome: Progressing     Problem: SAFETY ADULT  Goal: Maintain or return to baseline ADL function  Description: INTERVENTIONS:  -  Assess patient's ability to carry out ADLs; assess patient's baseline for ADL function and identify physical deficits which impact ability to perform ADLs (bathing, care of mouth/teeth, toileting, grooming, dressing, etc.)  - Assess/evaluate cause of self-care deficits   - Assess range of motion  - Assess patient's mobility; develop plan if impaired  - Assess patient's need for assistive devices and provide as appropriate  - Encourage maximum independence but intervene and supervise when necessary  - Involve family in performance of ADLs  - Assess for home care needs following discharge   - Consider OT consult to assist with ADL evaluation and planning for discharge  - Provide patient education as appropriate  Outcome: Progressing  Goal: Maintains/Returns to pre admission functional level  Description: INTERVENTIONS:  - Perform BMAT or MOVE assessment daily.   - Set and communicate daily mobility goal to care team and patient/family/caregiver. - Collaborate with rehabilitation services on mobility goals if consulted  - Perform Range of Motion 4 times a day. - Reposition patient every 2 hours.   - Dangle patient 3 times a day  - Stand patient 3 times a day  - Ambulate patient 3 times a day  - Out of bed to chair 3 times a day   - Out of bed for meals 3 times a day  - Out of bed for toileting  - Record patient progress and toleration of activity level   Outcome: Progressing  Goal: Patient will remain free of falls  Description: INTERVENTIONS:  - Educate patient/family on patient safety including physical limitations  - Instruct patient to call for assistance with activity   - Consult OT/PT to assist with strengthening/mobility   - Keep Call bell within reach  - Keep bed low and locked with side rails adjusted as appropriate  - Keep care items and personal belongings within reach  - Initiate and maintain comfort rounds  - Make Fall Risk Sign visible to staff  - Offer Toileting every 2 Hours, in advance of need  - Apply yellow socks and bracelet for high fall risk patients  - Consider moving patient to room near nurses station  Outcome: Progressing     Problem: DISCHARGE PLANNING  Goal: Discharge to home or other facility with appropriate resources  Description: INTERVENTIONS:  - Identify barriers to discharge w/patient and caregiver  - Arrange for needed discharge resources and transportation as appropriate  - Identify discharge learning needs (meds, wound care, etc.)  - Arrange for interpretive services to assist at discharge as needed  - Refer to Case Management Department for coordinating discharge planning if the patient needs post-hospital services based on physician/advanced practitioner order or complex needs related to functional status, cognitive ability, or social support system  Outcome: Progressing     Problem: Knowledge Deficit  Goal: Patient/family/caregiver demonstrates understanding of disease process, treatment plan, medications, and discharge instructions  Description: Complete learning assessment and assess knowledge base.   Interventions:  - Provide teaching at level of understanding  - Provide teaching via preferred learning methods  Outcome: Progressing     Problem: RESPIRATORY - ADULT  Goal: Achieves optimal ventilation and oxygenation  Description: INTERVENTIONS:  - Assess for changes in respiratory status  - Assess for changes in mentation and behavior  - Position to facilitate oxygenation and minimize respiratory effort  - Oxygen administered by appropriate delivery if ordered  - Initiate smoking cessation education as indicated  - Encourage broncho-pulmonary hygiene including cough, deep breathe, Incentive Spirometry  - Assess the need for suctioning and aspirate as needed  - Assess and instruct to report SOB or any respiratory difficulty  - Respiratory Therapy support as indicated  Outcome: Progressing

## 2023-08-14 NOTE — ASSESSMENT & PLAN NOTE
As a result of exacerbation  Required 2 liters of oxygen  Has since been weaned to 1 liter  Will require home o2 eval at discharge.

## 2023-08-14 NOTE — CONSULTS
Consultation - General Cardiology Team 2  Anthony Oakley 70 y.o. female MRN: [id number]  Unit/Bed#: [place] [id number] Merged with [facility] [id number] Encounter: [id number]            Inpatient consult to Cardiology     Performed by  Royal Beckie MD   Authorized by Lavonne Gallagher PA-C           PCP: Steven Martinez MD   Outpatient Cardiologist: No Mitchell DO     History of Present Illness   Physician Requesting Consult: Eliza Llamas DO  Reason for Consult / Principal Problem: Acute respiratory distress    Assessment/Plan   61-year-old lady with history of severe persistent asthma on diastolic heart failure presented to the hospital with respiratory distress and worsening lower extremity edema. Assessment:    1. Atrial flutter-new diagnosis  -ECG on 8/13/2023 showed atrial flutter with 2 1 AV conduction, with STS changes, possible inferior subendocardial injury   -In setting of asthma exacerbation with known moderately dilated left atrium  -[id number]'s of 5 points  ORBIT bleeding score: 4 points, high risk   HAS-BLED: 2 points   -Rate control: Atenolol 25 mg daily; PRN lopressor 5mg IV   - Spontaneously converted to NSR at 2200 on 8/13/23          2. Chronic diastolic heart failure  - Event history: Presented with respiratory distress and worsening lower extremity edema  - Etiology: Likely nonischemic  - Status: NYHA Class/ ACC: 2-3, stage B-C; warm and euvolemic on exam,   - Studies:    TTE (7/20/2023): Moderate LVH, LVEF 61%, grade  1 diastolic dysfunction, probable mild mid  ventricular obstruction with systolic gradient up to  27 mmHg with Valsalva.   Normal RV, moderate left  atrial dilation, moderate MAC.      - Neurohormonal Blockade/GDMT:  --Beta-Blocker: Atenolol  --ACEi, ARB or ARNi: Irbesartan (currently held)  --Aldosterone Receptor Blocker: No  --SGLT2 Inhibitor: No  --Diuretic: Lasix 40 mg daily (decreased to 20 mg due to rise in creatinine  - Diet:  --2g sodium diet  --2000 ml fluid restriction    2. Acute respiratory distress-secondary to severe persistent asthma exacerbation  - Presented in acute exacerbation     3. Acute on chronic kidney diease-stage IIIb (eGFR 28- 35)  - Noted to have a rise in Cr back in July, 2023  -Creatinine levels have remained persistently elevated, peaked at 1.71 on 8/13/2023; now downtrending     4. CAD with coronary calcification by CT  -Rosuvastatin 10 mg nightly    5. Obstructive sleep apnea unable to tolerate CPAP  6. Stage IV non-small cell lung cancer  -Discovered in 2020, metastasis to T8. Noted to have ALK-AML translocation  -On Alectinib     7. Hypertension  -On atenolol and irbesartan  - Irbesartan held due to renal function   8. Anemia, possibly due to Alectinib? 9.  Obesity    Plan:    1. Alectinib is known to cause edema( 22-30% of pts), will defer to Oncology. 2. Continue to hold Lasix, resume once renal function improves, doesn't appear to be in acute HF decompensation   3.  Reasonable to initiate anticoagulation for afib, she is at increased risk for thrombogenesis considering underlying malignancy. Eliquis 5mg BID   4. DCCV can be considered if she goes back to Flutter  5. Switched atenolol to metoprolol tartrate 25mg BID, will attempt to discharge her on metoprolol succinate once dose appropreiate is determined. Case discussed and reviewed with Dr. John Grimaldo who agrees with my assessment and plan. Thank you for involving us in the care of your patient. Jonatan Lindsey MD  Cardiology Fellow   PGY-5    HPI: Radha Lanza is a 70y.o. 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 She was also noted to have increased leg swelling for several weeks.       Review of Systems  Review of system was conducted and was negative except for as stated in the HPI. Historical Information   Past Medical History:   Diagnosis Date   • Asthma    • Degenerative joint disease    • GERD (gastroesophageal reflux disease)    • Hypertension    • Lumbar disc disease    • Lung cancer ([address])    • Sleep apnea     suspected    • Ventricular arrhythmia    • Vitamin D deficiency      Past Surgical History:   Procedure Laterality Date   • APPENDECTOMY     • COLONOSCOPY N/A 2019    Procedure: COLONOSCOPY;  Surgeon: Susan Mcfarland DO;  Location: [facility]; Service: Gastroenterology   • ESOPHAGOGASTRODUODENOSCOPY N/A 2019    Procedure: ESOPHAGOGASTRODUODENOSCOPY (EGD); Surgeon: Susan Mcfarland DO;  Location: [facility];   Service: Gastroenterology   • KNEE SURGERY     • ROTATOR CUFF REPAIR     • SHOULDER SURGERY       Social History     Substance and Sexual Activity   Alcohol Use Yes    Comment: occasionally     Social History     Substance and Sexual Activity   Drug Use Never     Social History     Tobacco Use   Smoking Status Former   • Packs/day: 0.25   • Years: 25.00   • Total pack years: 6.25   • Types: Cigarettes   • Start date: 48   • Quit date: 28   • Years since quittin.6   Smokeless Tobacco Former     Family History: Sister has a pacemaker     Meds/Allergies   Hospital Medications:   Current Facility-Administered Medications   Medication Dose Route Frequency   • acetaminophen (TYLENOL) tablet 650 mg  650 mg Oral Q6H PRN   • albuterol inhalation solution 2.5 mg  2.5 mg Nebulization Q6H PRN   • Alectinib HCl CAPS 600 mg  600 mg Oral BID   • atenolol (TENORMIN) tablet 25 mg  25 mg Oral Daily   • atorvastatin (LIPITOR) tablet 40 mg  40 mg Oral Daily With Dinner   • azithromycin (ZITHROMAX) tablet 500 mg  500 mg Oral Q24H   • benzonatate (TESSALON PERLES) capsule 100 mg  100 mg Oral TID   • budesonide (PULMICORT) inhalation solution 0.25 mg  0.25 mg Nebulization Q12H   • enoxaparin (LOVENOX) subcutaneous injection 40 mg  40 mg Subcutaneous Daily   • fluticasone (FLONASE) 50 mcg/act nasal spray 2 spray  2 spray Each Nare Daily   • guaiFENesin (MUCINEX) 12 hr tablet 600 mg  600 mg Oral BID   • ipratropium (ATROVENT) 0.02 % inhalation solution 0.5 mg  0.5 mg Nebulization TID   • levalbuterol (XOPENEX) inhalation solution 1.25 mg  1.25 mg Nebulization Q6H PRN    And   • sodium chloride 0.9 % inhalation solution 3 mL  3 mL Nebulization Q6H PRN   • levalbuterol (XOPENEX) inhalation solution 1.25 mg  1.25 mg Nebulization TID   • ondansetron (ZOFRAN) injection 4 mg  4 mg Intravenous Q6H PRN   • oxyCODONE-acetaminophen (PERCOCET) 5-325 mg per tablet 1 tablet  1 tablet Oral Q4H PRN   • pantoprazole (PROTONIX) EC tablet 20 mg  20 mg Oral Early Morning   • predniSONE tablet 40 mg  40 mg Oral Daily     Home Medications:   Medications Prior to Admission   Medication   • acetaminophen (TYLENOL) 650 mg CR tablet   • albuterol (2.5 mg/3 mL) 0.083 % nebulizer solution   • Alectinib HCl 150 MG CAPS   • atenolol (TENORMIN) 25 mg tablet   • benralizumab (FASENRA) subcutaneous injection   • budesonide (PULMICORT) 0.25 mg/2 mL nebulizer solution   • ferrous sulfate 324 (65 Fe) mg   • fexofenadine (ALLEGRA) 180 MG tablet   • fluticasone (FLONASE) 50 mcg/act nasal spray   • Fluticasone-Salmeterol (Advair) 500-50 mcg/dose inhaler   • furosemide (LASIX) 20 mg tablet   • ibuprofen (MOTRIN) 200 mg tablet   • irbesartan (AVAPRO) 300 mg tablet   • omeprazole (PriLOSEC) 20 mg delayed release capsule   • oxyCODONE-acetaminophen (PERCOCET) 5-325 mg per tablet   • potassium chloride 20 MEQ TBCR   • rosuvastatin (CRESTOR) 10 MG tablet   • Ventolin  (90 Base) MCG/ACT inhaler       No Known Allergies    Objective   Vitals: Blood pressure 101/54, pulse 78, temperature 98.2 °F (36.8 °C), temperature source Oral, resp. rate 20, height 5' 1" (1.549 m), weight 108 kg (238 lb 15.7 oz), SpO2 92 %, not currently breastfeeding.   Orthostatic Blood Pressures Flowsheet Row Most Recent Value   Blood Pressure 101/54 filed at 08/13/2023 2236   Patient Position - Orthostatic VS Lying filed at 08/13/2023 2126            Invasive Devices     Peripheral Intravenous Line  Duration           Peripheral IV 08/12/23 Right Antecubital 1 day                Physical Exam  GEN: Asael Holden appears well, alert and oriented x 3, pleasant and cooperative, morbidly obese   NECK: No JVD or carotid bruits   HEART: Regular rhythm, normal rate, normal S1 and S2, no murmurs, clicks, gallops or rubs   LUNGS: Coarse breath sounds, wheezing and crackles heard   ABDOMEN:  Normoactive bowel sounds, soft, no tenderness, no distention  EXTREMITIES: peripheral pulses palpable; no edema    Lab Results: I have personally reviewed pertinent lab results. Results from last 7 days   Lab Units 08/14/23  0414 08/14/23  0136 08/13/23  2339   HS TNI 0HR ng/L  --   --  20   HS TNI 2HR ng/L  --  24  --    HS TNI 4HR ng/L 26  --   --          Results from last 7 days   Lab Units 08/14/23  0420 08/13/23  0452 08/12/23  1228   POTASSIUM mmol/L 3.6 4.1 3.7   CO2 mmol/L 33* 31 33*   CHLORIDE mmol/L 104 103 106   BUN mg/dL 37* 32* 27*   CREATININE mg/dL 1.48* 1.71* 1.57*     Results from last 7 days   Lab Units 08/14/23  0420 08/13/23  2339 08/13/23  0452   HEMOGLOBIN g/dL 10.1* 10.1* 10.5*   HEMATOCRIT % 31.6* 31.2* 32.2*   PLATELETS Thousands/uL 88* 89*  89* 90*             Imaging: I have personally reviewed pertinent reports. Telemetry: NSR, HR in 60-70s     EKG:   Date: 8/13/23  Interpretation: Atrial flutter with 2 1 AV conduction, nonspecific ST abnormality    Previous STRESS TEST:  No results found for this or any previous visit. No results found for this or any previous visit. No results found for this or any previous visit. Previous Cath/PCI:  No results found for this or any previous visit. No results found for this or any previous visit.     No results found for this or any previous visit.      ECHO:  No results found for this or any previous visit. Results for orders placed during the hospital encounter of 07/20/23    Echo complete w/ contrast if indicated    Interpretation Summary  •  Left Ventricle: Left ventricular cavity size is normal. Wall thickness is moderately increased. The left ventricular ejection fraction is 61%. Systolic function is normal. Wall motion is normal. Diastolic function is mildly abnormal, consistent with grade I (abnormal) relaxation. Probable mild mid ventricular obstruction with systolic gradient up to 27 mmHg with Valsalva. •  Left Atrium: The atrium is moderately dilated. •  Mitral Valve: There is moderate annular calcification. JOCELYNE:  No results found for this or any previous visit. No results found for this or any previous visit. CMR:  No results found for this or any previous visit. No results found for this or any previous visit. No results found for this or any previous visit. HOLTER  No results found for this or any previous visit. No results found for this or any previous visit.

## 2023-08-15 PROBLEM — I48.92 ATRIAL FLUTTER (HCC): Status: ACTIVE | Noted: 2023-08-15

## 2023-08-15 LAB
ANION GAP SERPL CALCULATED.3IONS-SCNC: 5 MMOL/L
BUN SERPL-MCNC: 31 MG/DL (ref 5–25)
CALCIUM SERPL-MCNC: 8.7 MG/DL (ref 8.4–10.2)
CHLORIDE SERPL-SCNC: 104 MMOL/L (ref 96–108)
CO2 SERPL-SCNC: 33 MMOL/L (ref 21–32)
CREAT SERPL-MCNC: 1.24 MG/DL (ref 0.6–1.3)
GFR SERPL CREATININE-BSD FRML MDRD: 42 ML/MIN/1.73SQ M
GLUCOSE SERPL-MCNC: 94 MG/DL (ref 65–140)
POTASSIUM SERPL-SCNC: 3.7 MMOL/L (ref 3.5–5.3)
SODIUM SERPL-SCNC: 142 MMOL/L (ref 135–147)

## 2023-08-15 PROCEDURE — 97163 PT EVAL HIGH COMPLEX 45 MIN: CPT

## 2023-08-15 PROCEDURE — 94640 AIRWAY INHALATION TREATMENT: CPT

## 2023-08-15 PROCEDURE — 94761 N-INVAS EAR/PLS OXIMETRY MLT: CPT

## 2023-08-15 PROCEDURE — 99232 SBSQ HOSP IP/OBS MODERATE 35: CPT | Performed by: INTERNAL MEDICINE

## 2023-08-15 PROCEDURE — 92610 EVALUATE SWALLOWING FUNCTION: CPT

## 2023-08-15 PROCEDURE — 80048 BASIC METABOLIC PNL TOTAL CA: CPT | Performed by: INTERNAL MEDICINE

## 2023-08-15 PROCEDURE — 94760 N-INVAS EAR/PLS OXIMETRY 1: CPT

## 2023-08-15 RX ORDER — AMIODARONE HYDROCHLORIDE 200 MG/1
200 TABLET ORAL
Status: DISCONTINUED | OUTPATIENT
Start: 2023-08-23 | End: 2023-08-21

## 2023-08-15 RX ORDER — AMIODARONE HYDROCHLORIDE 200 MG/1
200 TABLET ORAL 2 TIMES DAILY WITH MEALS
Status: DISCONTINUED | OUTPATIENT
Start: 2023-08-15 | End: 2023-08-16

## 2023-08-15 RX ADMIN — AMIODARONE HYDROCHLORIDE 200 MG: 200 TABLET ORAL at 17:25

## 2023-08-15 RX ADMIN — METOPROLOL TARTRATE 25 MG: 25 TABLET, FILM COATED ORAL at 21:07

## 2023-08-15 RX ADMIN — BENZONATATE 100 MG: 100 CAPSULE ORAL at 08:41

## 2023-08-15 RX ADMIN — APIXABAN 5 MG: 5 TABLET, FILM COATED ORAL at 08:41

## 2023-08-15 RX ADMIN — IPRATROPIUM BROMIDE 0.5 MG: 0.5 SOLUTION RESPIRATORY (INHALATION) at 19:31

## 2023-08-15 RX ADMIN — ALECTINIB HYDROCHLORIDE 600 MG: 150 CAPSULE ORAL at 17:25

## 2023-08-15 RX ADMIN — FLUTICASONE PROPIONATE 2 SPRAY: 50 SPRAY, METERED NASAL at 08:40

## 2023-08-15 RX ADMIN — BENZONATATE 100 MG: 100 CAPSULE ORAL at 21:07

## 2023-08-15 RX ADMIN — ALECTINIB HYDROCHLORIDE 600 MG: 150 CAPSULE ORAL at 08:41

## 2023-08-15 RX ADMIN — LEVALBUTEROL HYDROCHLORIDE 1.25 MG: 1.25 SOLUTION RESPIRATORY (INHALATION) at 07:05

## 2023-08-15 RX ADMIN — IPRATROPIUM BROMIDE 0.5 MG: 0.5 SOLUTION RESPIRATORY (INHALATION) at 13:39

## 2023-08-15 RX ADMIN — BENZONATATE 100 MG: 100 CAPSULE ORAL at 16:00

## 2023-08-15 RX ADMIN — IPRATROPIUM BROMIDE 0.5 MG: 0.5 SOLUTION RESPIRATORY (INHALATION) at 07:05

## 2023-08-15 RX ADMIN — LEVALBUTEROL HYDROCHLORIDE 1.25 MG: 1.25 SOLUTION RESPIRATORY (INHALATION) at 13:39

## 2023-08-15 RX ADMIN — BUDESONIDE 0.25 MG: 0.25 INHALANT RESPIRATORY (INHALATION) at 07:05

## 2023-08-15 RX ADMIN — ATORVASTATIN CALCIUM 40 MG: 40 TABLET, FILM COATED ORAL at 17:25

## 2023-08-15 RX ADMIN — METOPROLOL TARTRATE 25 MG: 25 TABLET, FILM COATED ORAL at 08:41

## 2023-08-15 RX ADMIN — GUAIFENESIN 600 MG: 600 TABLET, EXTENDED RELEASE ORAL at 17:25

## 2023-08-15 RX ADMIN — BUDESONIDE 0.25 MG: 0.25 INHALANT RESPIRATORY (INHALATION) at 19:31

## 2023-08-15 RX ADMIN — PREDNISONE 40 MG: 20 TABLET ORAL at 08:41

## 2023-08-15 RX ADMIN — APIXABAN 5 MG: 5 TABLET, FILM COATED ORAL at 17:25

## 2023-08-15 RX ADMIN — LEVALBUTEROL HYDROCHLORIDE 1.25 MG: 1.25 SOLUTION RESPIRATORY (INHALATION) at 19:31

## 2023-08-15 RX ADMIN — AZITHROMYCIN MONOHYDRATE 500 MG: 250 TABLET ORAL at 11:51

## 2023-08-15 RX ADMIN — GUAIFENESIN 600 MG: 600 TABLET, EXTENDED RELEASE ORAL at 08:41

## 2023-08-15 RX ADMIN — PANTOPRAZOLE SODIUM 20 MG: 20 TABLET, DELAYED RELEASE ORAL at 04:37

## 2023-08-15 NOTE — PROGRESS NOTES
Progress Note - Pulmonary   Sahara Buck 70 y.o. female MRN: [id number]  Unit/Bed#: [address] [place] [address]01 Encounter: [id number]    Assessment/Plan:    78F hx severe type 2 high asthma (on ICS/LABA/LAMA and Clorinda Lang), obesity, untreated MONO, presenting with increasing BERTRAND and wheezing found to have asthma exacerbation. 1. Severe allergic/eosinophilic asthma with acute exacerbation  Hx asthma (type 1 high with high eos 480 and multiple possitive allergens in 2020), severe with 1-3x a year exacerbaitons, improved since starting fasenra Jan 2023. Per pt daily and allergy symptoms also improved. Obesity and untreated MONO likely also contributing to poor asthma control. Unclear trigger for this admission, possibly viral. No evidence of PNA with clear CXR and normal WBC on admission. Slightly elevated WBC now since starting steroids  - home regimen including Allegra, Fluticasone, Advair 500-50 BID, Spiriva BID, report to be compliant with meds  - in hospital regimen: budesonide BID, ipratropium/levalbuterol TID, flonase, mucinex. Will complete prednisone 40mg for 5 days. - reasonable to continue 3-5 days of azithromycin for bronchitis  - upon discharge should complete prednisone course, resume allegra, fluticasone, advair, and spiriva. - Patient should follow in our clinic within next 1 or 2 weeks. 2. Acute hypoxic resp failure  VQ mismatch in setting of asthma exacerbation. , likely with contribution from hypoventilation from severe obesity (bicarb 33 suggestive of chronic hypercarbic resp failure), and atelectasis. Unlikley pneumonitis from cancer therapy as CXR normal.   -wean as able  - incentive spirometry  - will need home O2 at discharge    3. Hx of MONO  Recent sleep study showed AHI 85.2 in 5/2022 with significant hypoxia. Previously had difficulty tolerating CPAP.  Given elevated bicarb, obesity and some concern for chronic hypercarbia/hypoventilaiton, would benefit from inpatient PAP titration study  - will need PAP titration as outpatient     4. Rt adenocarcinoma with bone mets   currently on Alectinib due to ALK +, s/p RT for T spine mets  stable. Follow with oncologist. No evidence of pneumonitis from kinase inhibitor      Chief Complaint:    My cough and breathing is better    Subjective:    [facility] 81-83% on RA, 97% while on 2L NC. Patient says her cough and tightness is improving. Objective:    Vitals: Blood pressure 163/77, pulse 66, temperature (!) 97 °F (36.1 °C), resp. rate 16, height 5' 1" (1.549 m), weight 108 kg (238 lb 8.6 oz), SpO2 91 %, not currently breastfeeding. RA,Body mass index is 45.07 kg/m². No intake or output data in the 24 hours ending 08/15/23 0851    Invasive Devices     Peripheral Intravenous Line  Duration           Peripheral IV 08/12/23 Right Antecubital 2 days                Physical Exam:     Physical Exam  Constitutional:       General: She is not in acute distress. Appearance: She is obese. She is not ill-appearing. HENT:      Mouth/Throat:      Mouth: Mucous membranes are moist.   Cardiovascular:      Rate and Rhythm: Normal rate and regular rhythm. Pulmonary:      Effort: Pulmonary effort is normal.      Breath sounds: Wheezing present. No decreased breath sounds, rhonchi or rales. Abdominal:      General: Bowel sounds are normal.      Palpations: Abdomen is soft. Musculoskeletal:         General: Normal range of motion. Cervical back: Normal range of motion. Right lower leg: No edema. Left lower leg: No edema. Skin:     General: Skin is warm. Neurological:      Mental Status: She is alert. Labs: I have personally reviewed pertinent lab results.         Imaging and other studies: I have personally reviewed pertinent films in PACS

## 2023-08-15 NOTE — ASSESSMENT & PLAN NOTE
Reports food getting stuck  Appreciate speech eval.   Tolerating diet.  Given tips to help with swallowing  Recommend follow up with gi outpt

## 2023-08-15 NOTE — ASSESSMENT & PLAN NOTE
New onset aflutter  Appreciate cardiology recommendations  Changed from atenolol to metoprolol  Did have an elevated heart rate last night of 138 but currently rate controlled   Pt will be started on eliquis

## 2023-08-15 NOTE — PLAN OF CARE
Problem: MOBILITY - ADULT  Goal: Maintain or return to baseline ADL function  Description: INTERVENTIONS:  -  Assess patient's ability to carry out ADLs; assess patient's baseline for ADL function and identify physical deficits which impact ability to perform ADLs (bathing, care of mouth/teeth, toileting, grooming, dressing, etc.)  - Assess/evaluate cause of self-care deficits   - Assess range of motion  - Assess patient's mobility; develop plan if impaired  - Assess patient's need for assistive devices and provide as appropriate  - Encourage maximum independence but intervene and supervise when necessary  - Involve family in performance of ADLs  - Assess for home care needs following discharge   - Consider OT consult to assist with ADL evaluation and planning for discharge  - Provide patient education as appropriate  8/15/2023 1123 by Libbie Felty, RN  Outcome: Progressing  8/15/2023 1123 by Libbie Felty, RN  Outcome: Progressing  Goal: Maintains/Returns to pre admission functional level  Description: INTERVENTIONS:  - Perform BMAT or MOVE assessment daily.   - Set and communicate daily mobility goal to care team and patient/family/caregiver. - Collaborate with rehabilitation services on mobility goals if consulted  - Perform Range of Motion 4 times a day. - Reposition patient every 2 hours.   - Dangle patient 3 times a day  - Stand patient 3 times a day  - Ambulate patient 3 times a day  - Out of bed to chair 3 times a day   - Out of bed for meals 3 times a day  - Out of bed for toileting  - Record patient progress and toleration of activity level   8/15/2023 1123 by Libbie Felty, RN  Outcome: Progressing  8/15/2023 1123 by Libbie Felty, RN  Outcome: Progressing     Problem: PAIN - ADULT  Goal: Verbalizes/displays adequate comfort level or baseline comfort level  Description: Interventions:  - Encourage patient to monitor pain and request assistance  - Assess pain using appropriate pain scale  - Administer analgesics based on type and severity of pain and evaluate response  - Implement non-pharmacological measures as appropriate and evaluate response  - Consider cultural and social influences on pain and pain management  - Notify physician/advanced practitioner if interventions unsuccessful or patient reports new pain  8/15/2023 1123 by Dustin Wang RN  Outcome: Progressing  8/15/2023 1123 by Dustin Wang RN  Outcome: Progressing     Problem: INFECTION - ADULT  Goal: Absence or prevention of progression during hospitalization  Description: INTERVENTIONS:  - Assess and monitor for signs and symptoms of infection  - Monitor lab/diagnostic results  - Monitor all insertion sites, i.e. indwelling lines, tubes, and drains  - Monitor endotracheal if appropriate and nasal secretions for changes in amount and color  - [place] appropriate cooling/warming therapies per order  - Administer medications as ordered  - Instruct and encourage patient and family to use good hand hygiene technique  - Identify and instruct in appropriate isolation precautions for identified infection/condition  8/15/2023 1123 by Dustin Wang RN  Outcome: Progressing  8/15/2023 1123 by Dustin Wang RN  Outcome: Progressing  Goal: Absence of fever/infection during neutropenic period  Description: INTERVENTIONS:  - Monitor WBC    8/15/2023 1123 by Dustin Wang RN  Outcome: Progressing  8/15/2023 1123 by Dustin Wang RN  Outcome: Progressing     Problem: SAFETY ADULT  Goal: Maintain or return to baseline ADL function  Description: INTERVENTIONS:  -  Assess patient's ability to carry out ADLs; assess patient's baseline for ADL function and identify physical deficits which impact ability to perform ADLs (bathing, care of mouth/teeth, toileting, grooming, dressing, etc.)  - Assess/evaluate cause of self-care deficits   - Assess range of motion  - Assess patient's mobility; develop plan if impaired  - Assess patient's need for assistive devices and provide as appropriate  - Encourage maximum independence but intervene and supervise when necessary  - Involve family in performance of ADLs  - Assess for home care needs following discharge   - Consider OT consult to assist with ADL evaluation and planning for discharge  - Provide patient education as appropriate  8/15/2023 1123 by Merlinda Pester, RN  Outcome: Progressing  8/15/2023 1123 by Merlinda Pester, RN  Outcome: Progressing  Goal: Maintains/Returns to pre admission functional level  Description: INTERVENTIONS:  - Perform BMAT or MOVE assessment daily.   - Set and communicate daily mobility goal to care team and patient/family/caregiver. - Collaborate with rehabilitation services on mobility goals if consulted  - Perform Range of Motion 4 times a day. - Reposition patient every 2 hours.   - Dangle patient 3 times a day  - Stand patient 3 times a day  - Ambulate patient 3 times a day  - Out of bed to chair 3 times a day   - Out of bed for meals 3 times a day  - Out of bed for toileting  - Record patient progress and toleration of activity level   8/15/2023 1123 by Merlinda Pester, RN  Outcome: Progressing  8/15/2023 1123 by Merlinda Pester, RN  Outcome: Progressing  Goal: Patient will remain free of falls  Description: INTERVENTIONS:  - Educate patient/family on patient safety including physical limitations  - Instruct patient to call for assistance with activity   - Consult OT/PT to assist with strengthening/mobility   - Keep Call bell within reach  - Keep bed low and locked with side rails adjusted as appropriate  - Keep care items and personal belongings within reach  - Initiate and maintain comfort rounds  - Make Fall Risk Sign visible to staff  - Offer Toileting every 2 Hours, in advance of need  - Apply yellow socks and bracelet for high fall risk patients  - Consider moving patient to room near nurses station  8/15/2023 1123 by Merlinda Pester, RN  Outcome: Progressing  8/15/2023 1123 by Merlinda Pester, RN  Outcome: Progressing     Problem: DISCHARGE PLANNING  Goal: Discharge to home or other facility with appropriate resources  Description: INTERVENTIONS:  - Identify barriers to discharge w/patient and caregiver  - Arrange for needed discharge resources and transportation as appropriate  - Identify discharge learning needs (meds, wound care, etc.)  - Arrange for interpretive services to assist at discharge as needed  - Refer to Case Management Department for coordinating discharge planning if the patient needs post-hospital services based on physician/advanced practitioner order or complex needs related to functional status, cognitive ability, or social support system  8/15/2023 1123 by Mk Ribera RN  Outcome: Progressing  8/15/2023 1123 by Mk Ribera RN  Outcome: Progressing     Problem: Knowledge Deficit  Goal: Patient/family/caregiver demonstrates understanding of disease process, treatment plan, medications, and discharge instructions  Description: Complete learning assessment and assess knowledge base.   Interventions:  - Provide teaching at level of understanding  - Provide teaching via preferred learning methods  8/15/2023 1123 by Mk Ribera RN  Outcome: Progressing  8/15/2023 1123 by Mk Ribera RN  Outcome: Progressing     Problem: RESPIRATORY - ADULT  Goal: Achieves optimal ventilation and oxygenation  Description: INTERVENTIONS:  - Assess for changes in respiratory status  - Assess for changes in mentation and behavior  - Position to facilitate oxygenation and minimize respiratory effort  - Oxygen administered by appropriate delivery if ordered  - Initiate smoking cessation education as indicated  - Encourage broncho-pulmonary hygiene including cough, deep breathe, Incentive Spirometry  - Assess the need for suctioning and aspirate as needed  - Assess and instruct to report SOB or any respiratory difficulty  - Respiratory Therapy support as indicated  8/15/2023 1123 by Mk Ribera RN  Outcome: Progressing  8/15/2023 1123 by Sushant Lei RN  Outcome: Progressing

## 2023-08-15 NOTE — DISCHARGE INSTR - DIET
Regular diet texture. Reflux precautions. Avoid dry/dense foods. Take sips after every few bites. Elevate head of bed at rest 6 inches/30 degrees.  Sit fully upright for meals and 45 minutes after meal.

## 2023-08-15 NOTE — PLAN OF CARE
Problem: MOBILITY - ADULT  Goal: Maintain or return to baseline ADL function  Description: INTERVENTIONS:  -  Assess patient's ability to carry out ADLs; assess patient's baseline for ADL function and identify physical deficits which impact ability to perform ADLs (bathing, care of mouth/teeth, toileting, grooming, dressing, etc.)  - Assess/evaluate cause of self-care deficits   - Assess range of motion  - Assess patient's mobility; develop plan if impaired  - Assess patient's need for assistive devices and provide as appropriate  - Encourage maximum independence but intervene and supervise when necessary  - Involve family in performance of ADLs  - Assess for home care needs following discharge   - Consider OT consult to assist with ADL evaluation and planning for discharge  - Provide patient education as appropriate  Outcome: Progressing  Goal: Maintains/Returns to pre admission functional level  Description: INTERVENTIONS:  - Perform BMAT or MOVE assessment daily.   - Set and communicate daily mobility goal to care team and patient/family/caregiver. - Collaborate with rehabilitation services on mobility goals if consulted  - Perform Range of Motion 4 times a day. - Reposition patient every 2 hours.   - Dangle patient 3 times a day  - Stand patient 3 times a day  - Ambulate patient 3 times a day  - Out of bed to chair 3 times a day   - Out of bed for meals 3 times a day  - Out of bed for toileting  - Record patient progress and toleration of activity level   Outcome: Progressing

## 2023-08-15 NOTE — PROGRESS NOTES
Progress Note - Cardiology   Dante Rae 70 y.o. female MRN: [id number]  Unit/Bed#: [address] [address] -[id number] Encounter: [id number]    Assessment:  · Paroxysmal atrial flutter   - new onset, 8/2023.   - with no prior history of atrial fibrillation or atrial flutter. - with spontaneous conversion to SR at 2200 on 8/13/23, however with recurrent arrhythmia overnight > presently in sinus rhythm. - in light of elevated [id number] score of 5, AC with Eliquis 5 mg twice daily initiated. - current AV blocker Rx, metoprolol tartrate 25 mg twice daily (previously on atenolol 25 mg daily). · Severe allergic/eosinophilic asthma with acute exacerbation   · Acute hypoxic respiratory failure  · Acute kidney injury on chronic kidney disease, stage III  · Chronic diastolic congestive heart failure with preserved ejection fraction   - TTE 7/20/23: LVEF 61%, grade 1 DD, probable mild mid ventricular obstruction with a peak gradient of 27 mmHg with Valsalva, moderate LA. · Coronary artery calcifications via CT chest, April 2023   · Hypertension   · Thrombocytopenia   · GERD  · Obesity   · Obstructive sleep apnea, unable to tolerate CPAP   · Stage IV non- small cell lung cancer     Plan/ Discussion:  · Currently on metoprolol tartrate 25 mg twice daily. · Will begin amiodarone 200 mg twice daily x 1 week (through 8/22) with transition to 200 mg daily thereafter (on 8/23). · Risks, benefits and alternatives to amiodarone use have been discussed with the patient including the risk of pulmonary abnormalities including fibrosis, thyroid abnormalities and liver abnormalities. Patient is aware of the need for annual eye exams. Patient understands these risks and wishes to proceed. · Will monitor on telemetry overnight to assess for further arrhythmias. · Continue anticoagulation with Eliquis 5 mg twice daily, statin therapy with atorvastatin 40 mg daily.   · Agree to restart furosemide 20 mg daily tomorrow; continue to hold outpatient ARB for now. · Monitor volume status with strict intake/output, daily weight. · Monitor renal function and electrolytes closely; maintain potassium > 4, magnesium > 2. Subjective:  Patient seen and evaluated at the bedside. Patient sat up in bed for exam. She endorses increased lower extremity edema. She is without current shortness of breath or chest pain.      Vitals:  Vitals:    08/14/23 0553 08/15/23 0600   Weight: 108 kg (238 lb 15.7 oz) 108 kg (238 lb 8.6 oz)   ,  Vitals:    08/15/23 0315 08/15/23 0600 08/15/23 0744 08/15/23 1305   BP: 124/73  163/77 159/79   BP Location:       Pulse: (!) 108  66 68   Resp:   16 18   Temp:   (!) 97 °F (36.1 °C) 98.4 °F (36.9 °C)   TempSrc:       SpO2: 94%  91% 90%   Weight:  108 kg (238 lb 8.6 oz)     Height:           Exam:  General: Alert awake and oriented, no acute distress  Heart:  Regular rate with controlled rhythm  Respiratory effort/ Lungs:  Breathing comfortably on room air, poor inspiratory effort, wheezing bilaterally   Abdominal: Non-tender to palpation, + bowel sounds, soft, no masses or distension  Lower Limbs: edema worsening per patient, with trace at present            Telemetry:       SR on telemetry     Medications:    Current Facility-Administered Medications:   •  acetaminophen (TYLENOL) tablet 650 mg, 650 mg, Oral, Q6H PRN, David Martinez DO  •  albuterol inhalation solution 2.5 mg, 2.5 mg, Nebulization, Q6H PRN, David Martinez DO  •  Alectinib HCl CAPS 600 mg, 600 mg, Oral, BID, Ruthie Flores PA-C, 600 mg at 08/15/23 0841  •  apixaban (ELIQUIS) tablet 5 mg, 5 mg, Oral, BID, Daria Way MD, 5 mg at 08/15/23 0841  •  atorvastatin (LIPITOR) tablet 40 mg, 40 mg, Oral, Daily With Kateryna Martinez DO, 40 mg at 08/14/23 1701  •  azithromycin (ZITHROMAX) tablet 500 mg, 500 mg, Oral, Q24H, Ruthie Flores PA-C, 500 mg at 08/15/23 1151  •  benzonatate (TESSALON PERLES) capsule 100 mg, 100 mg, Oral, TID, Lynda ROBLERO SHIV Rush, 100 mg at 08/15/23 0841  •  budesonide (PULMICORT) inhalation solution 0.25 mg, 0.25 mg, Nebulization, Q12H, David S Prechtel, DO, 0.25 mg at 08/15/23 0705  •  fluticasone (FLONASE) 50 mcg/act nasal spray 2 spray, 2 spray, Each Nare, Daily, David S Prechtel, DO, 2 spray at 08/15/23 0840  •  guaiFENesin (MUCINEX) 12 hr tablet 600 mg, 600 mg, Oral, BID, Ruthie Flores PA-C, 600 mg at 08/15/23 0841  •  ipratropium (ATROVENT) 0.02 % inhalation solution 0.5 mg, 0.5 mg, Nebulization, TID, David S Prechtel, DO, 0.5 mg at 08/15/23 1339  •  levalbuterol (XOPENEX) inhalation solution 1.25 mg, 1.25 mg, Nebulization, Q6H PRN **AND** sodium chloride 0.9 % inhalation solution 3 mL, 3 mL, Nebulization, Q6H PRN, Alida Colon S Prechtel, DO  •  levalbuterol (XOPENEX) inhalation solution 1.25 mg, 1.25 mg, Nebulization, TID, 1.25 mg at 08/15/23 1339 **AND** [DISCONTINUED] sodium chloride 0.9 % inhalation solution 3 mL, 3 mL, Nebulization, TID, David S Prechtel, DO, 3 mL at 08/13/23 0808  •  metoprolol tartrate (LOPRESSOR) tablet 25 mg, 25 mg, Oral, Q12H [address] [facility] MD, 25 mg at 08/15/23 0841  •  ondansetron (ZOFRAN) injection 4 mg, 4 mg, Intravenous, Q6H PRN, Alida VENTURA Prechtel, DO  •  oxyCODONE-acetaminophen (PERCOCET) 5-325 mg per tablet 1 tablet, 1 tablet, Oral, Q4H PRN, David S Prechtel, DO  •  pantoprazole (PROTONIX) EC tablet 20 mg, 20 mg, Oral, Early Morning, David Martinez DO, 20 mg at 08/15/23 0437  •  predniSONE tablet 40 mg, 40 mg, Oral, Daily, Maria C Laird MD, 40 mg at 08/15/23 0841      Labs/Data:        Results from last 7 days   Lab Units 08/14/23  0420 08/13/23  2339 08/13/23  0452   WBC Thousand/uL 11.62* 11.24* 9.26   HEMOGLOBIN g/dL 10.1* 10.1* 10.5*   HEMATOCRIT % 31.6* 31.2* 32.2*   PLATELETS Thousands/uL 88* 89*  89* 90*     Results from last 7 days   Lab Units 08/15/23  0437 08/14/23  0420 08/13/23  0452   POTASSIUM mmol/L 3.7 3.6 4.1   CHLORIDE mmol/L 104 104 103   CO2 mmol/L 33* 33* 31   BUN mg/dL 31* 37* 32*   CREATININE mg/dL 1.24 1.48* 1.71*

## 2023-08-15 NOTE — RESPIRATORY THERAPY NOTE
Home Oxygen Qualifying Test     Patient name: Uzma Parekh        :    Date of Test:  August 15, 2023  Diagnosis:    Home Oxygen Test:    **Medicare Guidelines require item(s) 1-5 on all ambulatory patients or 1 and 2 on non-ambulatory patients. 1. Baseline SPO2 on Room Air at rest 95 %   a. If <= 88% on Room Air add O2 via NC to obtain SpO2 >=88%. If LPM needed, document LPM N/A needed to reach =>88%    2. SPO2 during exertion on Room Air 91  %  a. During exertion monitor SPO2. If SPO2 increases >=89%, do not add supplemental oxygen    3. SPO2 on Oxygen at Rest N/A % at N/A LPM    4. SPO2 during exertion on Oxygen N/A % at N/A LPM    5. Test performed during exertion activity. []  Supplemental Home Oxygen is indicated. [x]  Client does not qualify for home oxygen.     Respiratory Additional Notes-PT WALKED WITH CANE AS TOLERATED    Eliud Albright, RT

## 2023-08-15 NOTE — ASSESSMENT & PLAN NOTE
As a result of exacerbation  Required 2 liters of oxygen  Has since been weaned to 2 liter  Will require home o2 eval at discharge.

## 2023-08-15 NOTE — ASSESSMENT & PLAN NOTE
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 Patricia Rojas outpatient  · She is following every 6 months outpatient last seen in May  · Continues on Alectinib 600 mg twice daily  · Also receives Zometa infusions every 3 months

## 2023-08-15 NOTE — PLAN OF CARE
Problem: SLP ADULT - SWALLOWING, IMPAIRED  Goal: Initial SLP swallow eval performed  Outcome: Completed  Note: Summary:  Pt seen in am. satting 92-95 on RA when seated upright. Reported she can not be near her cat at home or her asthma acts up. Reported she has been on omeprazole for years. Reports solid food dysphagia, not liquids or meds. Pt lays flat at night. Uses "2 pillows" but they are only behind her head, not her back for elevation. Reported if she does not take her omeprazole she gets heartburn. States she has difficulty w/ meats (unless rare steak) and dense foods (commom esophageal complaint). Does not always drink when she eats. Today she was assessed w/ several large meds and thin water. No overt s/s aspiration. Mastication on muffin was WNL. Pt stated it felt stuck (also common w/ muffins). Took a drink and this resolved. She stated she feels it on the r side of her neck (at home, not here). Pt's often report this sensation when they have esophageal stasis. Took additional liquids wnl. Educated on reflux precautions and need to aletrnate w/ small sips of liquids. Explain what a hiatal hernia is and how it can effect swallowiing. Oral/pharyngeal stages WNL. Question possible reflux/retropulsion/dysmotility. Recommendations:  Diet: regular. Avoid dry/dense foods. Add moisture/condiments. Liquid: thin  Meds:as tolerated  Supervision: prn  Positioning:Upright  Strategies: slow rate, chew well, alternate w/ sips of liquids  Pt to take PO/Meds only when fully alert and upright. Oral care  Reflux precautions. Upright during and after meals. Elevate HOB at rest  Eval only, No f/u tx indicated.       Consider consult w/:  GI (? OP)  Nutrition MBI 45.07

## 2023-08-15 NOTE — PHYSICAL THERAPY NOTE
PT EVALUATION    Pt. Name: Uzma Parekh  Pt. Age: 70 y.o. MRN: [id number]  LENGTH OF STAY: 3      Admitting Diagnoses:   SOB (shortness of breath) [R06.02]  Mild intermittent asthma with exacerbation [J45.21]    Past Medical History:   Diagnosis Date    Asthma     Degenerative joint disease     GERD (gastroesophageal reflux disease)     Hypertension     Lumbar disc disease     Lung cancer ([address])     Sleep apnea     suspected     Ventricular arrhythmia     Vitamin D deficiency        Past Surgical History:   Procedure Laterality Date    APPENDECTOMY      COLONOSCOPY N/A 03/18/2019    Procedure: COLONOSCOPY;  Surgeon: Ulysses Anton, DO;  Location: [facility]; Service: Gastroenterology    ESOPHAGOGASTRODUODENOSCOPY N/A 03/18/2019    Procedure: ESOPHAGOGASTRODUODENOSCOPY (EGD); Surgeon: Ulysses Anton, DO;  Location: [facility]; Service: Gastroenterology    KNEE SURGERY      ROTATOR CUFF REPAIR      SHOULDER SURGERY         Imaging Studies:  US kidney and bladder   Final Result by Gina Powell MD (08/14 1209)      No hydronephrosis. Right simple renal cyst.      Bilateral ureteral jets not detected. Bladder otherwise unremarkable. Workstation performed: [id number]         XR chest 2 views   Final Result by Salome De Paz MD (08/12 1418)      No acute cardiopulmonary disease. Workstation performed: [id number]               08/15/23 1040   PT Last Visit   PT Visit Date 08/15/23   Note Type   Note type Evaluation   Pain Assessment   Pain Score No Pain   Restrictions/Precautions   Weight Bearing Precautions Per Order No   Other Precautions Fall Risk;O2  (2L O2 NC upon my arrival)   Home Living   Type of [address] [place] [address] Dr Two level;Stairs to enter with rails;Bed/bath upstairs; Other (Comment)  (3STE; stair glide to 2nd flr bed/bath)   Bathroom Shower/Tub Tub/shower unit   Bathroom Toilet Standard   Bathroom Equipment Grab bars in shower; Shower chair   Home Equipment Walker;Cane   Additional Comments pt not on home O2 at baseline   Prior Function   Level of [place] Independent with functional mobility; Independent with ADLs; Independent with IADLS  (w/ SPC)   Lives With Son   [address]ty [address] in the last 6 months 0   Vocational Retired   Comments (+)    General   Family/Caregiver Present No   Cognition   Overall Cognitive Status WFL   Arousal/Participation Alert   Orientation Level Oriented X4   Following Commands Follows all commands and directions without difficulty   Comments pleasant & cooperative   Subjective   Subjective Pt agreeable to PT/OT evals. RUE Assessment   RUE Assessment WFL   LUE Assessment   LUE Assessment WFL   RLE Assessment   RLE Assessment WFL   LLE Assessment   LLE Assessment WFL   Coordination   Movements are Fluid and Coordinated 1   Sensation WFL   Bed Mobility   Supine to Sit Unable to assess   Sit to Supine Unable to assess   Additional Comments pt seated EOB pre & post-session; pt reports no difficulty getting in/OOB. Transfers   Sit to Stand 6  Modified independent  (w/ SPC)   Stand to Sit 6  Modified independent  (w/ SPC)   Ambulation/Elevation   Gait pattern Wide SUSIE; Decreased foot clearance; Excessively slow   Gait Assistance 5  Supervision   Additional items Verbal cues   Assistive Device Straight cane   Distance 150'x1   Ambulation/Elevation Additional Comments no gross LOB noted; pt notes baseline gait; (+) SOB but relieved w/ rest; SpO2 90-94% at RA during & after amb   Balance   Static Sitting Normal   Dynamic Sitting Good   Static Standing Fair +   Dynamic Standing Fair   Ambulatory Fair -   Activity Tolerance   Activity Tolerance Treatment limited secondary to medical complications (Comment)   Nurse Made Aware RN Isela   Assessment   Prognosis Good   Problem List Decreased endurance; Impaired balance   Assessment Pt 70 y.o. female presented w/ SOB & wheezing.  Pt admitted for Severe persistent asthma with acute exacerbation w/ acute respiratory failure w/ hypoxia, a-flutter & afib w/ RVR. Pt referred to PT for mobility assessment & D/C planning. Please see flowsheet above re: home set-up, PLOF & objective findings during PT assessment. PTA, pt reports being I w/ SPC. On eval, pt demonstrates no significant decline in function to warrant skilled PT at this time. Pt  I/modified I for bed mobility & transfers; S for amb w/ SPC. Balance & gait WFL. Pt notes baseline gait. (+) SOB during amb but relieved w/ rest w/ SpO2 90-94% at RA during amb & after amb. Resting Spo2 97% w/ 2L O2 NC. Attempted mobility off O2 as pt not on home O2 at baseline. Pt instructed on general energy conservation techniques, activity pacing, breathing techniques & easing back to normal activity w/ good understanding. Please see flowsheet above for objective findings & levels of assistance required for all functional tasks tolerated during this tx session. Pt states being at her functional baseline and states no concerns about going back home when medically cleared. The patient's AM-PAC Basic Mobility Inpatient Short Form Raw Score is 22. A Raw score of greater than 16 suggests the patient may benefit from discharge to home. Please also refer to the recommendation of the Physical Therapist for safe discharge planning. From PT standpoint, pt may return home when medically cleared. Pt may benefit from OPPT for pulmonary rehab & higher balance training as pt wishes to not use SPC. Will D/C PT. Will recommend restorative for daily amb to prevent decline in function. Nsg notified.    Barriers to Discharge None   Goals   Patient Goals to go home   PT Treatment Day 0   Plan   Treatment/Interventions Spoke to nursing;Spoke to case management  (PT eval only)   PT Frequency Other (Comment)  (D/C PT)   Recommendation   PT Discharge Recommendation Home with outpatient rehabilitation  (PRN)   Equipment Recommended Cane  (pt has)   Additional Comments restorative for daily amb   AM-PAC Basic Mobility Inpatient   Turning in Flat Bed Without Bedrails 4   Lying on Back to Sitting on Edge of Flat Bed Without Bedrails 4   Moving Bed to Chair 4   Standing Up From Chair Using Arms 4   Walk in Room 3   Climb 3-5 Stairs With Railing 3   Basic Mobility Inpatient Raw Score 22   Basic Mobility Standardized Score 47.4   Highest Level Of Mobility   JH-HLM Goal 7: Walk 25 feet or more   JH-HLM Achieved 7: Walk 25 feet or more   End of Consult   Patient Position at End of Consult Seated edge of bed; All needs within reach   End of Consult Comments Pt in stable condition. All needs in reach.    Hx/personal factors: co-morbidities, inaccessible home, dec caregiver support, advanced age, use of AD, and O2  Examination: assessed body system, balance, endurance, amb, D/C disposition & fall risk  Clinical: unpredictable (ongoing medical status and abnormal lab values)  Complexity: high    Darrius Gross

## 2023-08-15 NOTE — ASSESSMENT & PLAN NOTE
· Patient is a 37-year-old female with past medical history of severe persistent asthma, non-small cell lung cancer, diastolic CHF, hypertension, apnea, CAD presenting with shortness of breath concerning for asthma exacerbation  · Chest x-ray no acute cardiopulmonary disease  · Currently on 2 liters. Will require home oxygen eval   · Appreciate pulmonary recommendations.  Changed from solumedrol to prednisone 40mg daily for 5 days   · Continue bronchodilators- pt has nebs at home and pulmicort   · She follows with Dr. Norberto Griffith

## 2023-08-15 NOTE — ASSESSMENT & PLAN NOTE
· Patient creatinine previously running 1.0-1.3, since July through August has been running 1.4-1.7; kidney function 8/9 was 1.7  · Creatinine on admission 1.57 increased to 1.71 during hospital stay   · Arb and lasix are on hold  · Creatinine currently 1.2  · Will restart lasix tomorrow

## 2023-08-15 NOTE — ASSESSMENT & PLAN NOTE
Wt Readings from Last 3 Encounters:   08/15/23 108 kg (238 lb 8.6 oz)   07/20/23 107 kg (236 lb)   07/10/23 107 kg (236 lb 12.8 oz)       · Chest x-ray without acute cardiopulmonary disease  ·   · Creatinine is elevated this morning, will hold diuretics and ACE inhibitor- given 40mg of lasix.  Restart 20mg of lasix in am  · Monitor daily weights, I's/O  · Cardiac diet  · Recently had updated echo Alaio with EF 73%, grade 1 diastolic dysfunction  · Follows with Dr. Annalisa Arizmendi outpatient

## 2023-08-15 NOTE — ASSESSMENT & PLAN NOTE
· Severe obstructive sleep apnea  · Has adamantly refused CPAP per outpatient notes  · Contributing to admission  · outpt sleep study vs pulm ordering new style of cpap as pt could not tolerate previous mask

## 2023-08-15 NOTE — PROGRESS NOTES
[facility]  Progress Note  Name: Angela Seals  MRN: [id number]  Unit/Bed#: [facility] 2 -01 I Date of Admission: 8/12/2023   Date of Service: 8/15/2023 I Hospital Day: 3    Assessment/Plan   * Severe persistent asthma with acute exacerbation  Assessment & Plan  · Patient is a 61-year-old female with past medical history of severe persistent asthma, non-small cell lung cancer, diastolic CHF, hypertension, apnea, CAD presenting with shortness of breath concerning for asthma exacerbation  · Chest x-ray no acute cardiopulmonary disease  · Currently on 2 liters. Will require home oxygen eval   · Appreciate pulmonary recommendations. Changed from solumedrol to prednisone 40mg daily for 5 days   · Continue bronchodilators- pt has nebs at home and pulmicort   · She follows with Dr. Kailyn Tamez outpt     Atrial flutter [facility])  Assessment & Plan  New onset aflutter  Appreciate cardiology recommendations  Changed from atenolol to metoprolol  Did have an elevated heart rate last night of 138 but currently rate controlled   Pt will be started on eliquis     Acute respiratory failure with hypoxia ([address])  Assessment & Plan  As a result of exacerbation  Required 2 liters of oxygen  Has since been weaned to 2 liter  Will require home o2 eval at discharge. Dysphagia  Assessment & Plan  Reports food getting stuck  Appreciate speech eval.   Tolerating diet. Given tips to help with swallowing  Recommend follow up with gi outpt     Diastolic CHF [facility])  Assessment & Plan  Wt Readings from Last 3 Encounters:   08/15/23 108 kg (238 lb 8.6 oz)   07/20/23 107 kg (236 lb)   07/10/23 107 kg (236 lb 12.8 oz)       · Chest x-ray without acute cardiopulmonary disease  ·   · Creatinine is elevated this morning, will hold diuretics and ACE inhibitor- given 40mg of lasix.  Restart 20mg of lasix in am  · Monitor daily weights, I's/O  · Cardiac diet  · Recently had updated echo Alaio with EF 32%, grade 1 diastolic dysfunction  · Follows with Dr. Trina Borrego outpatient      Dyslipidemia  Assessment & Plan  · Maintained on Crestor per outpatient regimen, substituted for Lipitor while hospitalized    Coronary artery disease involving native coronary artery of native heart without angina pectoris  Assessment & Plan  · Continue atenolol and statin  · Holding irbesartan given rising kidney function- however creatinine improved. May be able to start this in near future     Thrombocytopenia, unspecified ([address])  Assessment & Plan  · Chronic thrombocytopenia noted back in 2020  · Likely due to non-small cell lung cancer    Abnormal renal function  Assessment & Plan  · Patient creatinine previously running 1.0-1.3, since July through August has been running 1.4-1.7; kidney function 8/9 was 1.7  · Creatinine on admission 1.57 increased to 1.71 during hospital stay   · Arb and lasix are on hold  · Creatinine currently 1.2  · Will restart lasix tomorrow       Non-small cell lung cancer ([address])  Assessment & Plan  · 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 Aime Espinoza outpatient  · She is following every 6 months outpatient last seen in May  · Continues on Alectinib 600 mg twice daily  · Also receives Zometa infusions every 3 months    MONO (obstructive sleep apnea)  Assessment & Plan  · Severe obstructive sleep apnea  · Has adamantly refused CPAP per outpatient notes  · Contributing to admission  · outpt sleep study vs pulm ordering new style of cpap as pt could not tolerate previous mask     Morbid obesity ([address])  Assessment & Plan  BMI 45 noted contributing to comorbidities  Would benefit from weight loss and lifestyle modifications    Gastroesophageal reflux disease without esophagitis  Assessment & Plan  Continue PPI    Essential hypertension  Assessment & Plan  · bp stable. · atenolol changed for metoprolol due to new onset aflutter.             VTE Pharmacologic Prophylaxis: eliquis Mechanical VTE Prophylaxis in Place: Yes    Education and Discussions with Family / Patient: patient   Current Length of Stay: 3 day(s)  Current Patient Status: Inpatient     Discharge Plan / Estimated Discharge Date: possible d/c today if cleared by cardiology     Code Status: Level 1 - Full Code      Subjective:   Pt seen and examined. Pt had a fast heart rate last night. She is on more oxygen this am of 2 liters. No other problems were noted. She does admit that she has limited funds at home and it is already difficult for her to afford her medicine. Objective:     Vitals:   Temp (24hrs), Av.7 °F (36.5 °C), Min:97 °F (36.1 °C), Max:98.2 °F (36.8 °C)    Temp:  [97 °F (36.1 °C)-98.2 °F (36.8 °C)] 97 °F (36.1 °C)  HR:  [] 66  Resp:  [16-20] 16  BP: (116-163)/(55-78) 163/77  SpO2:  [86 %-96 %] 91 %  Body mass index is 45.07 kg/m². Input and Output Summary (last 24 hours):     No intake or output data in the 24 hours ending 08/15/23 1020    Physical Exam:   Physical Exam  Constitutional:       Appearance: Normal appearance. HENT:      Head: Normocephalic and atraumatic. Eyes:      Extraocular Movements: Extraocular movements intact. Pupils: Pupils are equal, round, and reactive to light. Cardiovascular:      Rate and Rhythm: Normal rate and regular rhythm. Heart sounds: No murmur heard. No friction rub. No gallop. Pulmonary:      Effort: Pulmonary effort is normal. No respiratory distress. Breath sounds: Wheezing present. No rhonchi or rales. Comments: Minimal wheeze  Abdominal:      General: Bowel sounds are normal. There is no distension. Palpations: Abdomen is soft. Tenderness: There is no abdominal tenderness. There is no guarding or rebound. Musculoskeletal:      Right lower leg: Edema present. Left lower leg: Edema present. Comments: Trace edema    Neurological:      Mental Status: She is alert and oriented to person, place, and time. Additional Data:     Labs:  Results from last 7 days   Lab Units 08/14/23  0420 08/13/23  0452 08/12/23  1228   WBC Thousand/uL 11.62*   < > 6.67   HEMOGLOBIN g/dL 10.1*   < > 10.1*   HEMATOCRIT % 31.6*   < > 31.9*   PLATELETS Thousands/uL 88*   < > 90*   LYMPHO PCT %  --   --  22   MONO PCT %  --   --  5   EOS PCT %  --   --  0    < > = values in this interval not displayed. Results from last 7 days   Lab Units 08/15/23  0437 08/13/23  0452 08/12/23  1228   SODIUM mmol/L 142   < > 143   POTASSIUM mmol/L 3.7   < > 3.7   CHLORIDE mmol/L 104   < > 106   CO2 mmol/L 33*   < > 33*   BUN mg/dL 31*   < > 27*   CREATININE mg/dL 1.24   < > 1.57*   ANION GAP mmol/L 5   < > 4   CALCIUM mg/dL 8.7   < > 8.9   ALBUMIN g/dL  --   --  3.9   TOTAL BILIRUBIN mg/dL  --   --  0.98   ALK PHOS U/L  --   --  87   ALT U/L  --   --  23   AST U/L  --   --  26   GLUCOSE RANDOM mg/dL 94   < > 160*    < > = values in this interval not displayed.                        Recent Cultures (last 7 days):           Lines/Drains:  Invasive Devices     Peripheral Intravenous Line  Duration           Peripheral IV 08/12/23 Right Antecubital 2 days                Telemetry:   Telemetry Orders (From admission, onward)             24 Hour Telemetry Monitoring  Continuous x 24 Hours (Telem)        Question:  Reason for 24 Hour Telemetry  Answer:  Arrhythmias requiring acute medical intervention / PPM or ICD malfunction                    Last 24 Hours Medication List:   Current Facility-Administered Medications   Medication Dose Route Frequency Provider Last Rate   • acetaminophen  650 mg Oral Q6H PRN Fifi Nirmala Juan DO     • albuterol  2.5 mg Nebulization Q6H PRN David Martinez DO     • Alectinib HCl  600 mg Oral BID Ruthie Flores PA-C     • apixaban  5 mg Oral BID Dennis Early MD     • atorvastatin  40 mg Oral Daily With [employer] S Juan DO     • azithromycin  500 mg Oral Q24H Ruthie Flores PA-C     • benzonatate 100 mg Oral TID Lynda Rush PA-C     • budesonide  0.25 mg Nebulization Q12H David S Prechtel, DO     • fluticasone  2 spray Each Nare Daily David S Prechtel, DO     • guaiFENesin  600 mg Oral BID Ruthie Flores PA-C     • ipratropium  0.5 mg Nebulization TID Jonn Danas Prechtel, DO     • levalbuterol  1.25 mg Nebulization Q6H PRN Jonn Danas Prechtel, DO      And   • sodium chloride  3 mL Nebulization Q6H PRN Jonn Danas Prechtel, DO     • levalbuterol  1.25 mg Nebulization TID Jonn Danas Prechtel, DO     • metoprolol tartrate  25 mg Oral Q12H [address] Zeeshan Reid MD     • ondansetron  4 mg Intravenous Q6H PRN Jonnguillermina Cottrell Prechtel, DO     • oxyCODONE-acetaminophen  1 tablet Oral Q4H PRN David S Prechtel, DO     • pantoprazole  20 mg Oral Early Morning David S Prechtel, DO     • predniSONE  40 mg Oral Daily Maia Steele MD          Today, Patient Was Seen By: Shade Petersen DO

## 2023-08-15 NOTE — CASE MANAGEMENT
Case Management Discharge Planning Note    Patient name Jaymie Ortiz  Location [address] [address] [facility] [address] Jovan Lion* MRN [id number]  : 1945 Date 8/15/2023       Current Admission Date: 2023  Current Admission Diagnosis:Severe persistent asthma with acute exacerbation   Patient Active Problem List    Diagnosis Date Noted   • Atrial flutter ([address]) 08/15/2023   • Dysphagia 2023   • Acute respiratory failure with hypoxia ([address])    • Diastolic CHF ([address]) 5717   • Coronary artery disease involving native coronary artery of native heart without angina pectoris 07/10/2023   • Dyslipidemia 07/10/2023   • Malignant neoplasm determined by biopsy of lung ([address]) 2023   • Primary localized osteoarthritis of right knee 2022   • Severe persistent asthma with acute exacerbation 11/15/2022   • Chronic seasonal allergic rhinitis due to pollen 11/15/2022   • Allergic rhinitis due to animal (cat) (dog) hair and dander 11/15/2022   • Allergic rhinitis due to house dust mite 11/15/2022   • Need for vaccination 2022   • Thrombocytopenia, unspecified ([address]) 2021   • Depression, recurrent ([address]) 2021   • Abnormal renal function 2021   • Mild persistent asthma 2020   • Malignant neoplasm metastatic to bone ([address]) 2020   • Non-small cell lung cancer ([address]) 2020   • MONO (obstructive sleep apnea)    • Chronic rhinitis 2019   • Morbid obesity ([address]) 2019   • Chronic gastritis without bleeding 2019   • Other headache syndrome 2019   • Colon cancer screening 2019   • Gastroesophageal reflux disease without esophagitis 2019   • Spinal stenosis of 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 2019   • Lumbar facet arthropathy 2019   • 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 2019   • Essential hypertension 10/31/2018   • Vitamin D deficiency 10/31/2018   • Hiatal hernia 10/31/2018   • Premature atrial contractions 10/31/2017   • Primary osteoarthritis of right wrist 05/05/2017      LOS (days): 3  Geometric Mean LOS (GMLOS) (days): 3.50  Days to GMLOS:0.5     OBJECTIVE:  Risk of Unplanned Readmission Score: 22.94         Current admission status: Inpatient   Preferred Pharmacy:   [address] [place] [address] Drive, [address] [address] [address]  Phone: [phone] Fax: [phone]    Primary Care Provider: Luis Schmitt MD    Primary Insurance: [employer] [address] [place] [address]  Secondary Insurance:     DISCHARGE DETAILS:    Discharge planning discussed with[de-identified] pt            Treatment Team Recommendation: Home  Discharge Destination Plan[de-identified] Home              Additional Comments: MD  is planning on discharging pt home today. No need for home 02. PT  is recommending OP PT and list given. Pt will need  Eliquis - co-pay is 40.00 a month and gave her the 30 days free card. Pt is also interested in applying for MA. Gave pt the MA information and application to apply. Pt stated her car in is the parking garage and she plans to drive home. MD aware of this.

## 2023-08-15 NOTE — SPEECH THERAPY NOTE
Speech Language/Pathology  Speech/Language Pathology  Assessment    Patient Name: Hesham Bolaños  MNSDD'J Date: 8/15/2023     Problem List  Principal Problem:    Severe persistent asthma with acute exacerbation  Active Problems:    Essential hypertension    Gastroesophageal reflux disease without esophagitis    Morbid obesity (HCC)    MONO (obstructive sleep apnea)    Non-small cell lung cancer (HCC)    Abnormal renal function    Thrombocytopenia, unspecified (HCC)    Coronary artery disease involving native coronary artery of native heart without angina pectoris    Dyslipidemia    Diastolic CHF ([address])    Dysphagia    Acute respiratory failure with hypoxia (HCC)    Past Medical History  Past Medical History:   Diagnosis Date   • Asthma    • Degenerative joint disease    • GERD (gastroesophageal reflux disease)    • Hypertension    • Lumbar disc disease    • Lung cancer ([address])    • Sleep apnea     suspected    • Ventricular arrhythmia    • Vitamin D deficiency      Past Surgical History  Past Surgical History:   Procedure Laterality Date   • APPENDECTOMY     • COLONOSCOPY N/A 03/18/2019    Procedure: COLONOSCOPY;  Surgeon: Indra Lobo DO;  Location: [facility]; Service: Gastroenterology   • ESOPHAGOGASTRODUODENOSCOPY N/A 03/18/2019    Procedure: ESOPHAGOGASTRODUODENOSCOPY (EGD); Surgeon: Indra Lobo DO;  Location: [facility]; Service: Gastroenterology   • KNEE SURGERY     • ROTATOR CUFF REPAIR     • SHOULDER SURGERY          Bedside Swallow Evaluation:    Summary:  Pt seen in am. satting 92-95 on RA when seated upright. Reported she can not be near her cat at home or her asthma acts up. Reported she has been on omeprazole for years. Reports solid food dysphagia, not liquids or meds. Pt lays flat at night. Uses "2 pillows" but they are only behind her head, not her back for elevation. Reported if she does not take her omeprazole she gets heartburn.  States she has difficulty w/ meats (unless rare steak) and dense foods (commom esophageal complaint). Does not always drink when she eats. Today she was assessed w/ several large meds and thin water. No overt s/s aspiration. Mastication on muffin was WNL. Pt stated it felt stuck (also common w/ muffins). Took a drink and this resolved. She stated she feels it on the r side of her neck (at home, not here). Pt's often report this sensation when they have esophageal stasis. Took additional liquids wnl. Educated on reflux precautions and need to aletrnate w/ small sips of liquids. Explain what a hiatal hernia is and how it can effect swallowiing. Oral/pharyngeal stages WNL. Question possible reflux/retropulsion/dysmotility. Recommendations:  Diet: regular. Avoid dry/dense foods. Add moisture/condiments. Liquid: thin  Meds:as tolerated  Supervision: prn  Positioning:Upright  Strategies: slow rate, chew well, alternate w/ sips of liquids  Pt to take PO/Meds only when fully alert and upright. Oral care  Reflux precautions. Upright during and after meals. Elevate HOB at rest  Eval only, No f/u tx indicated. Consider consult w/:  GI (? OP)  Nutrition MBI 45.07    H&P/Admit info/ pertinent provider notes: (PMH noted above)  Chief Complaint:   Shortness of breath and wheezing  History of Present Illness:  Tiffany Vega a 70 y. o. female who presents with shortness of breath and wheezing.  Patient has severe persistent asthma.  She feels like this is an asthma attack.  She has a nonproductive cough.  No fever nor chills.  Does have some increased leg swelling that has been going on for several weeks now. Ronel Bright is on Lasix for diastolic CHF.  She does feel better after several nebulizer treatments of albuterol in the emergency room.  No sick contacts. Luiza Barkley with all her medications. .  Per pulmonary 8/13/23:  Impressions:   • Bronchial asthma with acute exacerbation. • Dyspnea on exertion. • Severe obstructive sleep apnea. Untreated.   • Stage IV lung cancer on immunotherapy. • Morbid obesity. Recommendations:  • Discontinue Solu-Medrol. • Prednisone 40 mg p.o. daily for 3 days starting tomorrow. • Continue bronchodilators. • CPAP/BiPAP titration study as outpatient. • Follow-up after the CPAP/BiPAP titration study is done. Special Studies:  Small hiatal hernia reported on a previous CT. CXR nad. EGD 3/18/19:  #1. Esophagus- normal esophagus  #2. Stomach- mild gastritis seen in the body of the stomach, biopsied with cold biopsy forceps, a small erosion in the cardia of the stomach was found, biopsied with cold biopsy forceps  #3. Duodenum- normal 1st and 2nd part of the duodenum, biopsied with cold biopsy forceps  IMPRESSIONS:    Gastritis. Biopsied. Small erosion in the stomach cardia. Biopsied. Normal duodenum. Biopsied. Previous MBS:  None. No barium studies. Patient's goal: none stated    Did the pt report pain?no  If yes, was nursing notified/was it addressed? n/a    Reason for consult:  R/o aspiration  Determine safest and least restrictive diet  h/o dysphagia, esophageal/GI    Precautions:  Fall  reflux    Food Allergies:  none   Current Diet:  regular   Premorbid diet:  regular   O2 requirement:  none.  She reported sats drop when she walks   Social/Prior living  lives w/ children   Voice/Speech:  wnl   Follows commands:  yes   Cognitive status:  alert     Oral [facility] exam:  Dentition:upper plate, lower frontal dentition only  Lips (VII):+  Tongue (XII):+  Secretion management: +  Volitional cough:+    Esophageal stage:  H/o GERD    Results d/w:  Pt, nursing,physician

## 2023-08-15 NOTE — NURSING NOTE
Cardiac Rhythm changed to Aflutter from sinus rhythm with a rate of 138. Pt is asymptomatic at this time, resting in bed, VSS. Hospitalist Notified of change and will evaluate pt.

## 2023-08-16 LAB
ALBUMIN SERPL BCP-MCNC: 3.9 G/DL (ref 3.5–5)
ALP SERPL-CCNC: 70 U/L (ref 34–104)
ALT SERPL W P-5'-P-CCNC: 32 U/L (ref 7–52)
ANION GAP SERPL CALCULATED.3IONS-SCNC: 6 MMOL/L
AST SERPL W P-5'-P-CCNC: 29 U/L (ref 13–39)
ATRIAL RATE: 71 BPM
BILIRUB SERPL-MCNC: 0.77 MG/DL (ref 0.2–1)
BUN SERPL-MCNC: 30 MG/DL (ref 5–25)
CALCIUM SERPL-MCNC: 9 MG/DL (ref 8.4–10.2)
CHLORIDE SERPL-SCNC: 102 MMOL/L (ref 96–108)
CO2 SERPL-SCNC: 32 MMOL/L (ref 21–32)
CREAT SERPL-MCNC: 1.34 MG/DL (ref 0.6–1.3)
GFR SERPL CREATININE-BSD FRML MDRD: 38 ML/MIN/1.73SQ M
GLUCOSE SERPL-MCNC: 181 MG/DL (ref 65–140)
MAGNESIUM SERPL-MCNC: 2.4 MG/DL (ref 1.9–2.7)
P AXIS: 57 DEGREES
POTASSIUM SERPL-SCNC: 3.6 MMOL/L (ref 3.5–5.3)
PR INTERVAL: 160 MS
PROT SERPL-MCNC: 6.5 G/DL (ref 6.4–8.4)
QRS AXIS: 29 DEGREES
QRSD INTERVAL: 96 MS
QT INTERVAL: 402 MS
QTC INTERVAL: 436 MS
SODIUM SERPL-SCNC: 140 MMOL/L (ref 135–147)
T WAVE AXIS: 29 DEGREES
VENTRICULAR RATE: 71 BPM

## 2023-08-16 PROCEDURE — 94760 N-INVAS EAR/PLS OXIMETRY 1: CPT

## 2023-08-16 PROCEDURE — 80053 COMPREHEN METABOLIC PANEL: CPT | Performed by: NURSE PRACTITIONER

## 2023-08-16 PROCEDURE — 94640 AIRWAY INHALATION TREATMENT: CPT

## 2023-08-16 PROCEDURE — 93010 ELECTROCARDIOGRAM REPORT: CPT | Performed by: INTERNAL MEDICINE

## 2023-08-16 PROCEDURE — 93005 ELECTROCARDIOGRAM TRACING: CPT

## 2023-08-16 PROCEDURE — 99232 SBSQ HOSP IP/OBS MODERATE 35: CPT | Performed by: INTERNAL MEDICINE

## 2023-08-16 PROCEDURE — 83735 ASSAY OF MAGNESIUM: CPT | Performed by: NURSE PRACTITIONER

## 2023-08-16 RX ORDER — METOPROLOL TARTRATE 5 MG/5ML
5 INJECTION INTRAVENOUS EVERY 6 HOURS PRN
Status: DISCONTINUED | OUTPATIENT
Start: 2023-08-16 | End: 2023-08-23 | Stop reason: HOSPADM

## 2023-08-16 RX ORDER — AMIODARONE HYDROCHLORIDE 200 MG/1
200 TABLET ORAL
Status: DISCONTINUED | OUTPATIENT
Start: 2023-08-16 | End: 2023-08-21

## 2023-08-16 RX ORDER — POTASSIUM CHLORIDE 20 MEQ/1
40 TABLET, EXTENDED RELEASE ORAL ONCE
Status: COMPLETED | OUTPATIENT
Start: 2023-08-16 | End: 2023-08-16

## 2023-08-16 RX ORDER — FUROSEMIDE 20 MG/1
20 TABLET ORAL DAILY
Status: DISCONTINUED | OUTPATIENT
Start: 2023-08-16 | End: 2023-08-17

## 2023-08-16 RX ORDER — AMIODARONE HYDROCHLORIDE 200 MG/1
200 TABLET ORAL DAILY
Qty: 30 TABLET | Refills: 0 | Status: SHIPPED | OUTPATIENT
Start: 2023-08-16 | End: 2023-08-23

## 2023-08-16 RX ADMIN — BENZONATATE 100 MG: 100 CAPSULE ORAL at 21:17

## 2023-08-16 RX ADMIN — AMIODARONE HYDROCHLORIDE 200 MG: 200 TABLET ORAL at 11:07

## 2023-08-16 RX ADMIN — IPRATROPIUM BROMIDE 0.5 MG: 0.5 SOLUTION RESPIRATORY (INHALATION) at 14:20

## 2023-08-16 RX ADMIN — PANTOPRAZOLE SODIUM 20 MG: 20 TABLET, DELAYED RELEASE ORAL at 05:12

## 2023-08-16 RX ADMIN — ALECTINIB HYDROCHLORIDE 600 MG: 150 CAPSULE ORAL at 08:26

## 2023-08-16 RX ADMIN — AMIODARONE HYDROCHLORIDE 200 MG: 200 TABLET ORAL at 08:25

## 2023-08-16 RX ADMIN — PREDNISONE 40 MG: 20 TABLET ORAL at 08:25

## 2023-08-16 RX ADMIN — ATORVASTATIN CALCIUM 40 MG: 40 TABLET, FILM COATED ORAL at 19:47

## 2023-08-16 RX ADMIN — IPRATROPIUM BROMIDE 0.5 MG: 0.5 SOLUTION RESPIRATORY (INHALATION) at 20:21

## 2023-08-16 RX ADMIN — AMIODARONE HYDROCHLORIDE 200 MG: 200 TABLET ORAL at 19:47

## 2023-08-16 RX ADMIN — BENZONATATE 100 MG: 100 CAPSULE ORAL at 08:24

## 2023-08-16 RX ADMIN — APIXABAN 5 MG: 5 TABLET, FILM COATED ORAL at 08:25

## 2023-08-16 RX ADMIN — LEVALBUTEROL HYDROCHLORIDE 1.25 MG: 1.25 SOLUTION RESPIRATORY (INHALATION) at 07:06

## 2023-08-16 RX ADMIN — FLUTICASONE PROPIONATE 2 SPRAY: 50 SPRAY, METERED NASAL at 08:26

## 2023-08-16 RX ADMIN — LEVALBUTEROL HYDROCHLORIDE 1.25 MG: 1.25 SOLUTION RESPIRATORY (INHALATION) at 14:20

## 2023-08-16 RX ADMIN — FUROSEMIDE 20 MG: 20 TABLET ORAL at 11:07

## 2023-08-16 RX ADMIN — BUDESONIDE 0.25 MG: 0.25 INHALANT RESPIRATORY (INHALATION) at 20:21

## 2023-08-16 RX ADMIN — APIXABAN 5 MG: 5 TABLET, FILM COATED ORAL at 19:46

## 2023-08-16 RX ADMIN — POTASSIUM CHLORIDE 40 MEQ: 1500 TABLET, EXTENDED RELEASE ORAL at 11:07

## 2023-08-16 RX ADMIN — GUAIFENESIN 600 MG: 600 TABLET, EXTENDED RELEASE ORAL at 08:25

## 2023-08-16 RX ADMIN — LEVALBUTEROL HYDROCHLORIDE 1.25 MG: 1.25 SOLUTION RESPIRATORY (INHALATION) at 20:21

## 2023-08-16 RX ADMIN — METOPROLOL TARTRATE 5 MG: 1 INJECTION, SOLUTION INTRAVENOUS at 09:44

## 2023-08-16 RX ADMIN — BUDESONIDE 0.25 MG: 0.25 INHALANT RESPIRATORY (INHALATION) at 07:06

## 2023-08-16 RX ADMIN — GUAIFENESIN 600 MG: 600 TABLET, EXTENDED RELEASE ORAL at 19:45

## 2023-08-16 RX ADMIN — IPRATROPIUM BROMIDE 0.5 MG: 0.5 SOLUTION RESPIRATORY (INHALATION) at 07:06

## 2023-08-16 RX ADMIN — METOPROLOL TARTRATE 25 MG: 25 TABLET, FILM COATED ORAL at 21:17

## 2023-08-16 RX ADMIN — ALECTINIB HYDROCHLORIDE 600 MG: 150 CAPSULE ORAL at 19:46

## 2023-08-16 RX ADMIN — METOPROLOL TARTRATE 25 MG: 25 TABLET, FILM COATED ORAL at 08:25

## 2023-08-16 NOTE — PROGRESS NOTES
[facility]  Progress Note  Name: Tabby Villafuerte  MRN: [id number]  Unit/Bed#: [place] 2 -01 I Date of Admission: 8/12/2023   Date of Service: 8/16/2023 I Hospital Day: 4    Assessment/Plan   * Severe persistent asthma with acute exacerbation  Assessment & Plan  · Patient is a 29-year-old female with past medical history of severe persistent asthma, non-small cell lung cancer, diastolic CHF, hypertension, apnea, CAD presenting with shortness of breath concerning for asthma exacerbation  · Chest x-ray no acute cardiopulmonary disease  · Currently on 2 liters. Will require home oxygen eval   · Appreciate pulmonary recommendations. Changed from solumedrol to prednisone 40mg daily for 5 days. Currently day 2  · Continue bronchodilators- pt has nebs at home and pulmicort  · Completed 3 days of azithromycin   · She had been on budesonide outpt but fairly expensive for her of 100 a month. advair is 40mg a month. Pulmonary cleared pt to restart advair and d/c budesonide which will help her afford other medications. She will also continue fluticasone and spiriva   · She follows with Dr. Ash Peterson outpt     Atrial flutter [facility])  Assessment & Plan  New onset aflutter  Appreciate cardiology recommendations  Heart rate controlled until ambulation. Increased to 160  Changed from atenolol to metoprolol. Continue metoprolol 25mg bid. Continue amiodarone which was started yesterday. Loading dose was increased  Currently concern of affordability of meds  Case management looking into the price of amiodarone 200mg daily  Continue eliquis (which is 40 dollars for pt)     Acute respiratory failure with hypoxia ([address])  Assessment & Plan  As a result of exacerbation  Required 2 liters of oxygen  Has since been weaned to 2 liter  Was not recommended for oxygen at discharge. Dysphagia  Assessment & Plan  Reports food getting stuck  Appreciate speech eval.   Tolerating diet.  Given tips to help with swallowing  Recommend follow up with gi outpt     Diastolic CHF [facility])  Assessment & Plan  Wt Readings from Last 3 Encounters:   08/16/23 107 kg (235 lb 10.8 oz)   07/20/23 107 kg (236 lb)   07/10/23 107 kg (236 lb 12.8 oz)       · Chest x-ray without acute cardiopulmonary disease  ·   · Creatinine is elevated this morning, will hold diuretics and ACE inhibitor- given 40mg of lasix. Continue to hold lasix   · Monitor daily weights, I's/O- weight decreased   · Cardiac diet  · Recently had updated echo Alaio with EF 64%, grade 1 diastolic dysfunction  · Follows with Dr. Cony Drake outpatient      Dyslipidemia  Assessment & Plan  · Maintained on Crestor per outpatient regimen, substituted for Lipitor while hospitalized    Coronary artery disease involving native coronary artery of native heart without angina pectoris  Assessment & Plan  · Continue atenolol and statin  · Holding irbesartan given rising kidney function- however creatinine improved.  May be able to start this in near future     Thrombocytopenia, unspecified ([address])  Assessment & Plan  · Chronic thrombocytopenia noted back in 2020  · Likely due to non-small cell lung cancer    Abnormal renal function  Assessment & Plan  · Patient creatinine previously running 1.0-1.3, since July through August has been running 1.4-1.7; kidney function 8/9 was 1.7  · Creatinine on admission 1.57 increased to 1.71 during hospital stay   · Arb and lasix are on hold  · Creatinine currently 1.3  · Check bmp in am.       Non-small cell lung cancer ([address])  Assessment & Plan  · 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 Catana Soulier outpatient  · She is following every 6 months outpatient last seen in May  · Continues on Alectinib 600 mg twice daily  · Also receives Zometa infusions every 3 months    MONO (obstructive sleep apnea)  Assessment & Plan  · Severe obstructive sleep apnea  · Has adamantly refused CPAP per outpatient notes  · Contributing to admission  · outpt sleep study vs pulm ordering new style of cpap as pt could not tolerate previous mask   · Will order nocturnal pulse ox    Morbid obesity (HCC)  Assessment & Plan  BMI 45 noted contributing to comorbidities  Would benefit from weight loss and lifestyle modifications    Gastroesophageal reflux disease without esophagitis  Assessment & Plan  Continue PPI    Essential hypertension  Assessment & Plan  bp fairly stable   · atenolol changed for metoprolol due to new onset aflutter. VTE Pharmacologic Prophylaxis: eliquis     Mechanical VTE Prophylaxis in Place: Yes    Education and Discussions with Family / Patient: patient     Current Length of Stay: 4 day(s)  Current Patient Status: Inpatient     Discharge Plan / Estimated Discharge Date: 24 to 48 hours     Code Status: Level 1 - Full Code      Subjective:   Pt seen and examined. She had tachycardia in the 160s with ambulating. No worsening shortness of breath but had required oxygen over night. No f/c no cp no n/v/d no abd pain     Objective:     Vitals:   Temp (24hrs), Av.9 °F (36.6 °C), Min:97.6 °F (36.4 °C), Max:98.4 °F (36.9 °C)    Temp:  [97.6 °F (36.4 °C)-98.4 °F (36.9 °C)] 97.6 °F (36.4 °C)  HR:  [57-91] 91  Resp:  [16-18] 17  BP: ()/(67-79) 96/70  SpO2:  [66 %-99 %] 90 %  Body mass index is 44.53 kg/m². Input and Output Summary (last 24 hours):     No intake or output data in the 24 hours ending 23 1051    Physical Exam:   Physical Exam  Constitutional:       Appearance: Normal appearance. HENT:      Head: Normocephalic and atraumatic. Eyes:      Extraocular Movements: Extraocular movements intact. Pupils: Pupils are equal, round, and reactive to light. Cardiovascular:      Rate and Rhythm: Normal rate and regular rhythm. Heart sounds: No murmur heard. No friction rub. No gallop. Pulmonary:      Effort: Pulmonary effort is normal. No respiratory distress.       Breath sounds: Normal breath sounds. No wheezing, rhonchi or rales. Abdominal:      General: Bowel sounds are normal. There is no distension. Palpations: Abdomen is soft. Tenderness: There is no abdominal tenderness. There is no guarding or rebound. Neurological:      Mental Status: She is alert and oriented to person, place, and time. Additional Data:     Labs:  Results from last 7 days   Lab Units 08/14/23  0420 08/13/23  0452 08/12/23  1228   WBC Thousand/uL 11.62*   < > 6.67   HEMOGLOBIN g/dL 10.1*   < > 10.1*   HEMATOCRIT % 31.6*   < > 31.9*   PLATELETS Thousands/uL 88*   < > 90*   LYMPHO PCT %  --   --  22   MONO PCT %  --   --  5   EOS PCT %  --   --  0    < > = values in this interval not displayed.      Results from last 7 days   Lab Units 08/16/23  0940   SODIUM mmol/L 140   POTASSIUM mmol/L 3.6   CHLORIDE mmol/L 102   CO2 mmol/L 32   BUN mg/dL 30*   CREATININE mg/dL 1.34*   ANION GAP mmol/L 6   CALCIUM mg/dL 9.0   ALBUMIN g/dL 3.9   TOTAL BILIRUBIN mg/dL 0.77   ALK PHOS U/L 70   ALT U/L 32   AST U/L 29   GLUCOSE RANDOM mg/dL 181*                       Recent Cultures (last 7 days):           Lines/Drains:  Invasive Devices     Peripheral Intravenous Line  Duration           Peripheral IV 08/12/23 Right Antecubital 3 days                Telemetry:   Telemetry Orders (From admission, onward)             24 Hour Telemetry Monitoring  Continuous x 24 Hours (Telem)        Question:  Reason for 24 Hour Telemetry  Answer:  Arrhythmias requiring acute medical intervention / PPM or ICD malfunction                    Last 24 Hours Medication List:   Current Facility-Administered Medications   Medication Dose Route Frequency Provider Last Rate   • acetaminophen  650 mg Oral Q6H PRN Carmenza Pencil Prechtel, DO     • albuterol  2.5 mg Nebulization Q6H PRN Carmenza Pencil Prechtel, DO     • Alectinib HCl  600 mg Oral BID Ruthie Flores PA-C     • [START ON 8/23/2023] amiodarone  200 mg Oral Daily With Breakfast WILDA Thomas     • amiodarone  200 mg Oral TID With Meals WILDA Thomas     • apixaban  5 mg Oral BID Linnea Chang MD     • atorvastatin  40 mg Oral Daily With [place] S Prechtel, DO     • benzonatate  100 mg Oral TID Lynda Rush PA-C     • budesonide  0.25 mg Nebulization Q12H David S Prechtel, DO     • fluticasone  2 spray Each Nare Daily David S Prechtel, DO     • furosemide  20 mg Oral Daily WILDA Thomas     • guaiFENesin  600 mg Oral BID Endy Wilkes PA-C     • ipratropium  0.5 mg Nebulization TID Marjorie [facility] Prec[facility], DO     • levalbuterol  1.25 mg Nebulization Q6H PRN Marjorie [facility], DO      And   • sodium chloride  3 mL Nebulization Q6H [facility]     • levalbuterol  1.25 mg Nebulization TID [facility], [facility]     • metoprolol  5 mg Intravenous Q6H PRN WILDA Thomas     • metoprolol tartrate  25 mg Oral Q12H [address] Linnea Chang MD     • ondansetron  4 mg Intravenous Q6H PRN [facility], DO     • oxyCODONE-acetaminophen  1 tablet Oral Q4H PRN [facility], DO     • pantoprazole  20 mg Oral Early Morning [facility], DO     • potassium chloride  40 mEq Oral Once newMentorWILDA     • predniSONE  40 mg Oral Daily Apolonia May MD          Today, Patient Was Seen By: Dolores Fu DO

## 2023-08-16 NOTE — PROGRESS NOTES
Progress Note - Cardiology   Alvaro Warren 70 y.o. female MRN: [id number]  Unit/Bed#: [place] -01 Encounter: [id number]     Assessment:  • Paroxysmal atrial flutter              - new onset, 8/2023.              - with no prior history of atrial fibrillation or atrial flutter. - with spontaneous conversion to SR at 2200 on 8/13/23, however with recurrent intermittent rapid atrial fibrillation and flutter > presently in sinus rhythm. - in light of elevated [id number] score of 5, AC with Eliquis 5 mg twice daily initiated. - current AV blocker Rx, metoprolol tartrate 25 mg twice daily (previously on atenolol 25 mg daily). • Severe allergic/eosinophilic asthma with acute exacerbation, history of tobacco use   • Acute hypoxic respiratory failure  • Acute kidney injury on chronic kidney disease, stage III  • Chronic diastolic congestive heart failure with preserved ejection fraction              - TTE 7/20/23: LVEF 61%, grade 1 DD, probable mild mid ventricular obstruction with a peak gradient of 27 mmHg with Valsalva, moderate LA. • Coronary artery calcifications via CT chest, April 2023   • Hypertension   • Thrombocytopenia   • GERD  • Obesity   • Obstructive sleep apnea, unable to tolerate CPAP   • Stage IV non- small cell lung cancer     Plan/ Discussion:  · Recurrent rapid atrial fibrillation today with ambulation. · Order placed for IV metoprolol 5 mg Q6H PRN added. · Will increase amiodarone load to 200 mg three times daily will 200 mg daily to follow thereafter. · Continue oral metoprolol tartrate 25 mg twice daily. · Follow up repeat ECG. · Continue anticoagulation with Eliquis 5 mg twice daily, statin therapy with atorvastatin 40 mg daily. · Creatinine slightly bumped to 1.34 today, however presently within baseline. Would recommend to restart low-dose diuretic with furosemide 20 mg daily.  If renal function does not tolerate reintroduction of diuretic, will plan to hold tomorrow. · Monitor volume status closely with strict intake/outputs, daily weights. · Monitor renal function and electrolytes closely; maintain potassium > 4, magnesium > 2.  · Slight bump in creatinine today to 1.34.   · Potassium 3.6, will provide K- Dur 40 mEq x 1 today. · Check magnesium level. Subjective:  Patient states she is feeling better overall. She says she felt her heart pounding this morning, during episode of rapid AFib, while walking to the bathroom. She endorses that she has felt this feeling intermittently over the last couple months to year, while lying in bed, but attributed it to an anxiety attack. Vitals:  Vitals:    08/15/23 0600 23 0600   Weight: 108 kg (238 lb 8.6 oz) 107 kg (235 lb 10.8 oz)   ,  Vitals:    23 0600 23 0710 23 0735 23 0746   BP:    142/70   BP Location:       Pulse: 58   67   Resp:    17   Temp:    97.6 °F (36.4 °C)   TempSrc:       SpO2: 97% 94% 99% (!) 87%   Weight: 107 kg (235 lb 10.8 oz)      Height:           Exam:  Physical Exam  Vitals and nursing note reviewed. Constitutional:       Appearance: She is obese. HENT:      Mouth/Throat:      Mouth: Mucous membranes are moist.   Cardiovascular:      Rate and Rhythm: Normal rate and regular rhythm. Pulses: Normal pulses. Heart sounds: Normal heart sounds. Pulmonary:      Effort: No respiratory distress. Breath sounds: Normal breath sounds. No wheezing. Abdominal:      Palpations: Abdomen is soft. Musculoskeletal:      Cervical back: Neck supple. No rigidity. Right lower le+ Pitting Edema present. Left lower le+ Pitting Edema present. Skin:     General: Skin is warm and dry. Capillary Refill: Capillary refill takes 2 to 3 seconds. Neurological:      General: No focal deficit present. Mental Status: She is alert and oriented to person, place, and time.                 Telemetry:       Normal sinus rhythm, Heart Rate 75 bpm      Medications:    Current Facility-Administered Medications:   •  acetaminophen (TYLENOL) tablet 650 mg, 650 mg, Oral, Q6H PRN, David S Prechtel, DO  •  albuterol inhalation solution 2.5 mg, 2.5 mg, Nebulization, Q6H PRN, David S Prechtel, DO  •  Alectinib HCl CAPS 600 mg, 600 mg, Oral, BID, Ruthie Flores PA-C, 600 mg at 08/16/23 5730  •  amiodarone tablet 200 mg, 200 mg, Oral, BID With Meals, WILDA Alex, 200 mg at 08/16/23 0825  •  [START ON 8/23/2023] amiodarone tablet 200 mg, 200 mg, Oral, Daily With Breakfast, WILDA Thomas  •  apixaban (ELIQUIS) tablet 5 mg, 5 mg, Oral, BID, Yelena Garcia MD, 5 mg at 08/16/23 0825  •  atorvastatin (LIPITOR) tablet 40 mg, 40 mg, Oral, Daily With Dinner, David Chowhtel, DO, 40 mg at 08/15/23 1725  •  benzonatate (TESSALON PERLES) capsule 100 mg, 100 mg, Oral, TID, Lynda Rush PA-C, 100 mg at 08/16/23 0824  •  budesonide (PULMICORT) inhalation solution 0.25 mg, 0.25 mg, Nebulization, Q12H, David S Prechtel, DO, 0.25 mg at 08/16/23 0706  •  fluticasone (FLONASE) 50 mcg/act nasal spray 2 spray, 2 spray, Each Nare, Daily, David Chowhtel, DO, 2 spray at 08/16/23 0826  •  guaiFENesin (MUCINEX) 12 hr tablet 600 mg, 600 mg, Oral, BID, Ruthie Flores PA-C, 600 mg at 08/16/23 0825  •  ipratropium (ATROVENT) 0.02 % inhalation solution 0.5 mg, 0.5 mg, Nebulization, TID, David Chowhtel, DO, 0.5 mg at 08/16/23 0706  •  levalbuterol (XOPENEX) inhalation solution 1.25 mg, 1.25 mg, Nebulization, Q6H PRN **AND** sodium chloride 0.9 % inhalation solution 3 mL, 3 mL, Nebulization, Q6H PRN, Evelyn Magic Prechtel, DO  •  levalbuterol (XOPENEX) inhalation solution 1.25 mg, 1.25 mg, Nebulization, TID, 1.25 mg at 08/16/23 0706 **AND** [DISCONTINUED] sodium chloride 0.9 % inhalation solution 3 mL, 3 mL, Nebulization, TID, David Huddlestonel, DO, 3 mL at 08/13/23 0808  •  metoprolol tartrate (LOPRESSOR) tablet 25 mg, 25 mg, Oral, Q12H [address], Lena Gonzales MD, 25 mg at 08/16/23 0825  •  ondansetron [facility]) injection 4 mg, 4 mg, Intravenous, Q6H PRN, Yina Martinez, DO  •  oxyCODONE-acetaminophen (PERCOCET) 5-325 mg per tablet 1 tablet, 1 tablet, Oral, Q4H PRN, David Martinez, DO  •  pantoprazole (PROTONIX) EC tablet 20 mg, 20 mg, Oral, Early Morning, David Martinez, , 20 mg at 08/16/23 1106  •  predniSONE tablet 40 mg, 40 mg, Oral, Daily, Asia Pal MD, 40 mg at 08/16/23 0825      Labs/Data:        Results from last 7 days   Lab Units 08/14/23  0420 08/13/23  2339 08/13/23  0452   WBC Thousand/uL 11.62* 11.24* 9.26   HEMOGLOBIN g/dL 10.1* 10.1* 10.5*   HEMATOCRIT % 31.6* 31.2* 32.2*   PLATELETS Thousands/uL 88* 89*  89* 90*     Results from last 7 days   Lab Units 08/15/23  0437 08/14/23  0420 08/13/23  0452   POTASSIUM mmol/L 3.7 3.6 4.1   CHLORIDE mmol/L 104 104 103   CO2 mmol/L 33* 33* 31   BUN mg/dL 31* 37* 32*   CREATININE mg/dL 1.24 1.48* 1.71*

## 2023-08-16 NOTE — ASSESSMENT & PLAN NOTE
Wt Readings from Last 3 Encounters:   08/16/23 107 kg (235 lb 10.8 oz)   07/20/23 107 kg (236 lb)   07/10/23 107 kg (236 lb 12.8 oz)       · Chest x-ray without acute cardiopulmonary disease  ·   · Creatinine is elevated this morning, will hold diuretics and ACE inhibitor- given 40mg of lasix.  Continue to hold lasix   · Monitor daily weights, I's/O- weight decreased   · Cardiac diet  · Recently had updated echo Alaio with EF 23%, grade 1 diastolic dysfunction  · Follows with Dr. Trina Borrego outpatient

## 2023-08-16 NOTE — ASSESSMENT & PLAN NOTE
New onset aflutter  Appreciate cardiology recommendations  Heart rate controlled until ambulation. Increased to 160  Changed from atenolol to metoprolol. Continue metoprolol 25mg bid. Continue amiodarone which was started yesterday.  Loading dose was increased  Currently concern of affordability of meds  Case management looking into the price of amiodarone 200mg daily  Continue eliquis (which is 40 dollars for pt)

## 2023-08-16 NOTE — ASSESSMENT & PLAN NOTE
As a result of exacerbation  Required 2 liters of oxygen  Has since been weaned to 2 liter  Was not recommended for oxygen at discharge.

## 2023-08-16 NOTE — CASE MANAGEMENT
Case Management Discharge Planning Note    Patient name Lyndsey Salinas  Location [facility] 2 [address] [facility] [address] [place]* MRN [id number]  : 1945 Date 2023       Current Admission Date: 2023  Current Admission Diagnosis:Severe persistent asthma with acute exacerbation   Patient Active Problem List    Diagnosis Date Noted   • Atrial flutter ([address]) 08/15/2023   • Dysphagia 2023   • Acute respiratory failure with hypoxia ([address])    • Diastolic CHF ([address])    • Coronary artery disease involving native coronary artery of native heart without angina pectoris 07/10/2023   • Dyslipidemia 07/10/2023   • Malignant neoplasm determined by biopsy of lung ([address]) 2023   • Primary localized osteoarthritis of right knee 2022   • Severe persistent asthma with acute exacerbation 11/15/2022   • Chronic seasonal allergic rhinitis due to pollen 11/15/2022   • Allergic rhinitis due to animal (cat) (dog) hair and dander 11/15/2022   • Allergic rhinitis due to house dust mite 11/15/2022   • Need for vaccination 2022   • Thrombocytopenia, unspecified ([address]) 2021   • Depression, recurrent ([address]) 2021   • Abnormal renal function 2021   • Mild persistent asthma 2020   • Malignant neoplasm metastatic to bone ([address]) 2020   • Non-small cell lung cancer ([address]) 2020   • MONO (obstructive sleep apnea)    • Chronic rhinitis 2019   • Morbid obesity ([address]) 2019   • Chronic gastritis without bleeding 2019   • Other headache syndrome 2019   • Colon cancer screening 2019   • Gastroesophageal reflux disease without esophagitis 2019   • Spinal stenosis of 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 2019   • Lumbar facet arthropathy 2019   • 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 2019   • Essential hypertension 10/31/2018   • Vitamin D deficiency 10/31/2018   • Hiatal hernia 10/31/2018   • Premature atrial contractions 10/31/2017   • Primary osteoarthritis of right wrist 05/05/2017      LOS (days): 4  Geometric Mean LOS (GMLOS) (days): 3.50  Days to GMLOS:-0.4     OBJECTIVE:  Risk of Unplanned Readmission Score: 23.65         Current admission status: Inpatient   Preferred Pharmacy:   [address] [place] [address] [address]  Phone: [phone] Fax: [phone]    Primary Care Provider: Iqra Jolley MD    Primary Insurance: [facility] REP  Secondary Insurance:     DISCHARGE DETAILS:       IMM Given (Date):: 08/16/23  IMM Given to[de-identified] Patient (Read IMM, copy given and copy put in bin to be scan.)     Additional Comments: TC to Pharmacy to check co-pay for Amiodarone 200 mg. No co-pay and made MD aware of this.

## 2023-08-16 NOTE — RESTORATIVE TECHNICIAN NOTE
Restorative Technician Note      Patient Name: Asael Holden     Restorative Tech Visit Date: 08/16/23  Note Type: Mobility  Patient Position Upon Consult: Supine  Activity Performed: Ambulated  Assistive Device: Cane  Patient Position at End of Consult: Bedside chair;  All needs within reach

## 2023-08-16 NOTE — ASSESSMENT & PLAN NOTE
· Patient is a 70-year-old female with past medical history of severe persistent asthma, non-small cell lung cancer, diastolic CHF, hypertension, apnea, CAD presenting with shortness of breath concerning for asthma exacerbation  · Chest x-ray no acute cardiopulmonary disease  · Currently on 2 liters. Will require home oxygen eval   · Appreciate pulmonary recommendations. Changed from solumedrol to prednisone 40mg daily for 5 days. Currently day 2  · Continue bronchodilators- pt has nebs at home and pulmicort  · Completed 3 days of azithromycin   · She had been on budesonide outpt but fairly expensive for her of 100 a month. advair is 40mg a month. Pulmonary cleared pt to restart advair and d/c budesonide which will help her afford other medications.  She will also continue fluticasone and spiriva   · She follows with Dr. Dione Storey

## 2023-08-16 NOTE — ASSESSMENT & PLAN NOTE
· Patient creatinine previously running 1.0-1.3, since July through August has been running 1.4-1.7; kidney function 8/9 was 1.7  · Creatinine on admission 1.57 increased to 1.71 during hospital stay   · Arb and lasix are on hold  · Creatinine currently 1.3  · Check bmp in am.

## 2023-08-16 NOTE — CASE MANAGEMENT
Case Management Discharge Planning Note    Patient name Ronda Callejas  Location [place] [id number] [facility] [address] [facility]* MRN [id number]  : 1945 Date 2023       Current Admission Date: 2023  Current Admission Diagnosis:Severe persistent asthma with acute exacerbation   Patient Active Problem List    Diagnosis Date Noted   • Atrial flutter ([address]) 08/15/2023   • Dysphagia 2023   • Acute respiratory failure with hypoxia ([address]) 15/35/8723   • Diastolic CHF ([address])    • Coronary artery disease involving native coronary artery of native heart without angina pectoris 07/10/2023   • Dyslipidemia 07/10/2023   • Malignant neoplasm determined by biopsy of lung ([address]) 2023   • Primary localized osteoarthritis of right knee 2022   • Severe persistent asthma with acute exacerbation 11/15/2022   • Chronic seasonal allergic rhinitis due to pollen 11/15/2022   • Allergic rhinitis due to animal (cat) (dog) hair and dander 11/15/2022   • Allergic rhinitis due to house dust mite 11/15/2022   • Need for vaccination 2022   • Thrombocytopenia, unspecified ([address]) 2021   • Depression, recurrent ([address]) 2021   • Abnormal renal function 2021   • Mild persistent asthma 2020   • Malignant neoplasm metastatic to bone ([address]) 2020   • Non-small cell lung cancer ([address]) 2020   • MONO (obstructive sleep apnea)    • Chronic rhinitis 2019   • Morbid obesity ([address]) 2019   • Chronic gastritis without bleeding 2019   • Other headache syndrome 2019   • Colon cancer screening 2019   • Gastroesophageal reflux disease without esophagitis 2019   • Spinal stenosis of 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 2019   • Lumbar facet arthropathy 2019   • 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 2019   • Essential hypertension 10/31/2018   • Vitamin D deficiency 10/31/2018   • Hiatal hernia 10/31/2018   • Premature atrial contractions 10/31/2017   • Primary osteoarthritis of right wrist 05/05/2017      LOS (days): 4  Geometric Mean LOS (GMLOS) (days): 3.50  Days to GMLOS:-0.4     OBJECTIVE:  Risk of Unplanned Readmission Score: 23.65         Current admission status: Inpatient   Preferred Pharmacy:   [address] [place] [address] [address]  Phone: [phone] Fax: [phone]    Primary Care Provider: Brianna Johnson MD    Primary Insurance: [facility] REP  Secondary Insurance:     DISCHARGE DETAILS:       IMM Given (Date):: 08/16/23  IMM Given to[de-identified] Patient (Read IMM, copy given and copy put in bin to be scan.)     Additional Comments: MD reports pt will not be discharged today. Might need 02 at night and might need pace maker. MD asking to call pharmacy to check Amiodarone 200 mg. Will f/u.

## 2023-08-16 NOTE — ASSESSMENT & PLAN NOTE
· Severe obstructive sleep apnea  · Has adamantly refused CPAP per outpatient notes  · Contributing to admission  · outpt sleep study vs pulm ordering new style of cpap as pt could not tolerate previous mask   · Will order nocturnal pulse ox

## 2023-08-16 NOTE — CASE MANAGEMENT
Case Management Assessment & Discharge Planning Note    Patient name Eloy Ovalles  Location [address] [address] [id number] [place] [address] [address] Sherry Pardo* MRN [id number]  : 1945 Date 2023       Current Admission Date: 2023  Current Admission Diagnosis:Severe persistent asthma with acute exacerbation   Patient Active Problem List    Diagnosis Date Noted   • Atrial flutter ([address]) 08/15/2023   • Dysphagia 2023   • Acute respiratory failure with hypoxia ([address])    • Diastolic CHF ([address])    • Coronary artery disease involving native coronary artery of native heart without angina pectoris 07/10/2023   • Dyslipidemia 07/10/2023   • Malignant neoplasm determined by biopsy of lung ([address]) 2023   • Primary localized osteoarthritis of right knee 2022   • Severe persistent asthma with acute exacerbation 11/15/2022   • Chronic seasonal allergic rhinitis due to pollen 11/15/2022   • Allergic rhinitis due to animal (cat) (dog) hair and dander 11/15/2022   • Allergic rhinitis due to house dust mite 11/15/2022   • Need for vaccination 2022   • Thrombocytopenia, unspecified ([address]) 2021   • Depression, recurrent ([address]) 2021   • Abnormal renal function 2021   • Mild persistent asthma 2020   • Malignant neoplasm metastatic to bone ([address]) 2020   • Non-small cell lung cancer ([address]) 2020   • MONO (obstructive sleep apnea)    • Chronic rhinitis 2019   • Morbid obesity ([address]) 2019   • Chronic gastritis without bleeding 2019   • Other headache syndrome 2019   • Colon cancer screening 2019   • Gastroesophageal reflux disease without esophagitis 2019   • Spinal stenosis of 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 2019   • Lumbar facet arthropathy 2019   • 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 2019   • Essential hypertension 10/31/2018   • Vitamin D deficiency 10/31/2018   • Hiatal hernia 10/31/2018   • Premature atrial contractions 10/31/2017 • Primary osteoarthritis of right wrist 05/05/2017      LOS (days): 4  Geometric Mean LOS (GMLOS) (days): 3.50  Days to GMLOS:-0.6     OBJECTIVE:    Risk of Unplanned Readmission Score: 25.86         Current admission status: Inpatient       Preferred Pharmacy:   [facility] [address], [address] [place] [address] [address]  Phone: [phone] Fax: [phone]    Primary Care Provider: Kaylin Bernard MD    Primary Insurance: [facility]  Secondary Insurance:     ASSESSMENT:  Lisa Proxies    There are no active Health Care Proxies on file. Advance Directives  Does patient have a Health Care POA?: Yes  Does patient have Advance Directives?: Yes  Advance Directives: Living will, Power of  for health care  Primary Contact: Anselmo Herrera (Son)   [address]   [phone] (I)   [phone] (M)         Readmission Root Cause  30 Day Readmission: No    Patient Information  Admitted from[de-identified] Home  Mental Status: Alert  During Assessment patient was accompanied by: Not accompanied during assessment  Assessment information provided by[de-identified] Patient  Primary Caregiver: Self  Support Systems: Son, Daughter, Family members  [facility]: [address] [place] [address] do you live in?: [address] [facility] entry access options.  Select all that apply.: Stairs  Number of steps to enter home.: 3  Do the steps have railings?: Yes  Type of Current Residence: [address] [place] home  Upon entering residence, is there a bedroom on the main floor (no further steps)?: No  A bedroom is located on the following floor levels of residence (select all that apply):: 2nd Floor  Upon entering residence, is there a bathroom on the main floor (no further steps)?: No  Indicate which floors of current residence have a bathroom (select all the apply):: 2nd Floor  Number of steps to 2nd floor from main floor: One Flight  In the last 12 months, was there a time when you were not able to pay the mortgage or rent on time?: No  In the last 12 months, how many places have you lived?: 1  In the last 12 months, was there a time when you did not have a steady place to sleep or slept in a shelter (including now)?: No  Homeless/housing insecurity resource given?: N/A  Living Arrangements: Lives w/ Son  Is patient a ?: No    Activities of Daily Living Prior to Admission  Functional Status: Independent  Completes ADLs independently?: Yes  Ambulates independently?: Yes  Does patient use assisted devices?: Yes  Assisted Devices (DME) used: Straight Cane, Other (Comment), Shower Chair (Stair glider)  Does patient currently own DME?: Yes  What DME does the patient currently own?: Straight Cane, Other (Comment), Shower Chair, Walker (Stair glider)  Does patient have a history of Outpatient Therapy (PT/OT)?: Yes  Does the patient have a history of Short-Term Rehab?: No  Does patient have a history of HHC?: No  Does patient currently have [address]?: No         Patient Information Continued  Income Source: Pension/group home  Within the past 12 months, you worried that your food would run out before you got the money to buy more.: Never true  Within the past 12 months, the food you bought just didn't last and you didn't have money to get more.: Never true  Food insecurity resource given?: N/A  Does patient receive dialysis treatments?: No  Does patient have a history of substance abuse?: No  Does patient have a history of Mental Health Diagnosis?: No         Means of Transportation  [facility]  In the past 12 months, has lack of transportation kept you from medical appointments or from getting medications?: No  In the past 12 months, has lack of transportation kept you from meetings, work, or from getting things needed for daily living?: No  Was application for public transport provided?: N/A        DISCHARGE DETAILS:       Additional Comments: Possible need for 02 at night and pace maker. Will continue to follow.

## 2023-08-16 NOTE — PROGRESS NOTES
Progress Note - Pulmonary   Iliana Kelly 70 y.o. female MRN: [id number]  Unit/Bed#: [facility] -01 Encounter: [id number]    Assessment/Plan:    78F hx severe type 2 high asthma (on ICS/LABA/LAMA and Felicie Conde), obesity, untreated MONO, presenting with increasing BERTRAND and wheezing found to have asthma exacerbation. 1. Severe allergic/eosinophilic asthma with acute exacerbation  Hx asthma (type 1 high with high eos 480 and multiple possitive allergens in 2020), severe with 1-3x a year exacerbaitons, improved since starting fasenra Jan 2023. Per pt daily and allergy symptoms also improved. Obesity and untreated MONO likely also contributing to poor asthma control. Unclear trigger for this admission, possibly viral. No evidence of PNA with clear CXR and normal WBC on admission. Slightly elevated WBC now since starting steroids  - home regimen including Allegra, Fluticasone, Advair 500-50 BID, Spiriva BID, report to be compliant with meds  - in hospital regimen: budesonide BID, ipratropium/levalbuterol TID, flonase, mucinex. Will complete prednisone 40mg for 5 days. - reasonable to continue 3-5 days of azithromycin for bronchitis  - upon discharge should complete prednisone course, resume allegra, fluticasone, advair, and spiriva. - Patient should follow in our clinic within next 1 or 2 weeks. 2. Acute hypoxic resp failure  VQ mismatch in setting of asthma exacerbation. , likely with contribution from hypoventilation from severe obesity (bicarb 33 suggestive of chronic hypercarbic resp failure), and atelectasis. Unlikley pneumonitis from cancer therapy as CXR normal.   -wean as able  - incentive spirometry  - will need home O2 at discharge    3. Hx of MONO  Recent sleep study showed AHI 85.2 in 5/2022 with significant hypoxia. Previously had difficulty tolerating CPAP.  Given elevated bicarb, obesity and some concern for chronic hypercarbia/hypoventilaiton, would benefit from inpatient PAP titration study  - will need PAP titration as outpatient (she needs to follow up with our pulmonary office and arrange further evaluation of pap titration)     4. Rt adenocarcinoma with bone mets   currently on Alectinib due to ALK +, s/p RT for T spine mets  stable. Follow with oncologist. No evidence of pneumonitis from kinase inhibitor      Chief Complaint:    My cough and breathing is better    Subjective:    Seen and examined at [facility], Sat 85% on RA, 97% while on 2L NC. Patient says her cough and tightness is improving. Objective:    Vitals: Blood pressure 164/78, pulse 59, temperature 97.8 °F (36.6 °C), resp. rate 18, height 5' 1" (1.549 m), weight 108 kg (238 lb 8.6 oz), SpO2 97 %, not currently breastfeeding. RA,Body mass index is 45.07 kg/m². No intake or output data in the 24 hours ending 08/16/23 0610    Invasive Devices     Peripheral Intravenous Line  Duration           Peripheral IV 08/12/23 Right Antecubital 3 days                Physical Exam:     Physical Exam  Constitutional:       General: She is not in acute distress. Appearance: She is obese. She is not ill-appearing. HENT:      Mouth/Throat:      Mouth: Mucous membranes are moist.   Cardiovascular:      Rate and Rhythm: Normal rate and regular rhythm. Pulmonary:      Effort: Pulmonary effort is normal.      Breath sounds: No decreased breath sounds, wheezing, rhonchi or rales. Abdominal:      General: Bowel sounds are normal.      Palpations: Abdomen is soft. Musculoskeletal:         General: Normal range of motion. Cervical back: Normal range of motion. Right lower leg: No edema. Left lower leg: No edema. Skin:     General: Skin is warm. Neurological:      Mental Status: She is alert. Labs: I have personally reviewed pertinent lab results.         Imaging and other studies: I have personally reviewed pertinent films in PACS

## 2023-08-17 ENCOUNTER — APPOINTMENT (INPATIENT)
Dept: RADIOLOGY | Facility: HOSPITAL | Age: 78
DRG: 189 | End: 2023-08-17
Payer: COMMERCIAL

## 2023-08-17 LAB
ANION GAP SERPL CALCULATED.3IONS-SCNC: 3 MMOL/L
BUN SERPL-MCNC: 33 MG/DL (ref 5–25)
CALCIUM SERPL-MCNC: 9.1 MG/DL (ref 8.4–10.2)
CHLORIDE SERPL-SCNC: 101 MMOL/L (ref 96–108)
CO2 SERPL-SCNC: 37 MMOL/L (ref 21–32)
CREAT SERPL-MCNC: 1.3 MG/DL (ref 0.6–1.3)
GFR SERPL CREATININE-BSD FRML MDRD: 39 ML/MIN/1.73SQ M
GLUCOSE SERPL-MCNC: 98 MG/DL (ref 65–140)
POTASSIUM SERPL-SCNC: 4.2 MMOL/L (ref 3.5–5.3)
SODIUM SERPL-SCNC: 141 MMOL/L (ref 135–147)

## 2023-08-17 PROCEDURE — 94760 N-INVAS EAR/PLS OXIMETRY 1: CPT

## 2023-08-17 PROCEDURE — 99232 SBSQ HOSP IP/OBS MODERATE 35: CPT | Performed by: STUDENT IN AN ORGANIZED HEALTH CARE EDUCATION/TRAINING PROGRAM

## 2023-08-17 PROCEDURE — 99232 SBSQ HOSP IP/OBS MODERATE 35: CPT | Performed by: INTERNAL MEDICINE

## 2023-08-17 PROCEDURE — 71046 X-RAY EXAM CHEST 2 VIEWS: CPT

## 2023-08-17 PROCEDURE — 94762 N-INVAS EAR/PLS OXIMTRY CONT: CPT

## 2023-08-17 PROCEDURE — 94640 AIRWAY INHALATION TREATMENT: CPT

## 2023-08-17 PROCEDURE — 80048 BASIC METABOLIC PNL TOTAL CA: CPT | Performed by: INTERNAL MEDICINE

## 2023-08-17 RX ORDER — FUROSEMIDE 20 MG/1
20 TABLET ORAL
Status: DISCONTINUED | OUTPATIENT
Start: 2023-08-17 | End: 2023-08-18

## 2023-08-17 RX ADMIN — PANTOPRAZOLE SODIUM 20 MG: 20 TABLET, DELAYED RELEASE ORAL at 05:59

## 2023-08-17 RX ADMIN — FUROSEMIDE 20 MG: 20 TABLET ORAL at 08:12

## 2023-08-17 RX ADMIN — LEVALBUTEROL HYDROCHLORIDE 1.25 MG: 1.25 SOLUTION RESPIRATORY (INHALATION) at 07:00

## 2023-08-17 RX ADMIN — IPRATROPIUM BROMIDE 0.5 MG: 0.5 SOLUTION RESPIRATORY (INHALATION) at 14:27

## 2023-08-17 RX ADMIN — ALECTINIB HYDROCHLORIDE 600 MG: 150 CAPSULE ORAL at 16:56

## 2023-08-17 RX ADMIN — LEVALBUTEROL HYDROCHLORIDE 1.25 MG: 1.25 SOLUTION RESPIRATORY (INHALATION) at 14:27

## 2023-08-17 RX ADMIN — BUDESONIDE 0.25 MG: 0.25 INHALANT RESPIRATORY (INHALATION) at 19:48

## 2023-08-17 RX ADMIN — GUAIFENESIN 600 MG: 600 TABLET, EXTENDED RELEASE ORAL at 08:12

## 2023-08-17 RX ADMIN — IPRATROPIUM BROMIDE 0.5 MG: 0.5 SOLUTION RESPIRATORY (INHALATION) at 19:48

## 2023-08-17 RX ADMIN — ATORVASTATIN CALCIUM 40 MG: 40 TABLET, FILM COATED ORAL at 16:56

## 2023-08-17 RX ADMIN — AMIODARONE HYDROCHLORIDE 200 MG: 200 TABLET ORAL at 13:07

## 2023-08-17 RX ADMIN — BENZONATATE 100 MG: 100 CAPSULE ORAL at 08:12

## 2023-08-17 RX ADMIN — GUAIFENESIN 600 MG: 600 TABLET, EXTENDED RELEASE ORAL at 16:56

## 2023-08-17 RX ADMIN — AMIODARONE HYDROCHLORIDE 200 MG: 200 TABLET ORAL at 08:12

## 2023-08-17 RX ADMIN — BENZONATATE 100 MG: 100 CAPSULE ORAL at 21:56

## 2023-08-17 RX ADMIN — LEVALBUTEROL HYDROCHLORIDE 1.25 MG: 1.25 SOLUTION RESPIRATORY (INHALATION) at 19:48

## 2023-08-17 RX ADMIN — BENZONATATE 100 MG: 100 CAPSULE ORAL at 16:56

## 2023-08-17 RX ADMIN — AMIODARONE HYDROCHLORIDE 200 MG: 200 TABLET ORAL at 16:56

## 2023-08-17 RX ADMIN — APIXABAN 5 MG: 5 TABLET, FILM COATED ORAL at 08:12

## 2023-08-17 RX ADMIN — METOPROLOL TARTRATE 25 MG: 25 TABLET, FILM COATED ORAL at 08:12

## 2023-08-17 RX ADMIN — PREDNISONE 40 MG: 20 TABLET ORAL at 08:12

## 2023-08-17 RX ADMIN — FLUTICASONE PROPIONATE 2 SPRAY: 50 SPRAY, METERED NASAL at 08:12

## 2023-08-17 RX ADMIN — METOPROLOL TARTRATE 25 MG: 25 TABLET, FILM COATED ORAL at 21:56

## 2023-08-17 RX ADMIN — IPRATROPIUM BROMIDE 0.5 MG: 0.5 SOLUTION RESPIRATORY (INHALATION) at 07:00

## 2023-08-17 RX ADMIN — BUDESONIDE 0.25 MG: 0.25 INHALANT RESPIRATORY (INHALATION) at 07:00

## 2023-08-17 RX ADMIN — APIXABAN 5 MG: 5 TABLET, FILM COATED ORAL at 16:56

## 2023-08-17 RX ADMIN — ALECTINIB HYDROCHLORIDE 600 MG: 150 CAPSULE ORAL at 08:12

## 2023-08-17 RX ADMIN — FUROSEMIDE 20 MG: 20 TABLET ORAL at 16:56

## 2023-08-17 NOTE — PROGRESS NOTES
[address] [facility]  Progress Note  Name: Nu Patrick  MRN: [id number]  Unit/Bed#: [facility]-01 I Date of Admission: 8/12/2023   Date of Service: 8/17/2023 I Hospital Day: 5    Assessment/Plan   * Severe persistent asthma with acute exacerbation  Assessment & Plan  · Patient is a 16-year-old female with past medical history of severe persistent asthma, non-small cell lung cancer, diastolic CHF, hypertension, apnea, CAD presenting with shortness of breath concerning for asthma exacerbation  · Chest x-ray no acute cardiopulmonary disease  · Continues to require oxygen from 2 to 5 liters. Had originally not qualified for home oxygen. Would repeat study again prior to discharge to see if she qualifies   · Appreciate pulmonary recommendations. Changed from solumedrol to prednisone 40mg daily for 5 days. · Continue bronchodilators- pt has nebs at home and pulmicort. · Completed 3 days of azithromycin   · She had been on budesonide outpt but fairly expensive for her of 100 a month. advair is 40mg a month. Pulmonary cleared pt to restart advair and d/c budesonide which will help her afford other medications. She has 3 boxes of budesonide at home. She may finish budesonide prior to starting advair as change is to help her afford meds. She will also continue fluticasone and spiriva  · She follows with Dr. Lizeth Cordero outpt     Atrial flutter [facility])  Assessment & Plan  New onset aflutter  Appreciate cardiology recommendations  Heart rate controlled until ambulation. Increased to 160  Changed from atenolol to metoprolol. Continue metoprolol 25mg bid. Continue amiodarone with loading dose. Appreciate case management eval of amiodarone pricing.  Currently at no extra amount to pt   Continue eliquis (which is 40 dollars for pt)     Acute respiratory failure with hypoxia ([address])  Assessment & Plan  As a result of exacerbation  Required 2 liters of oxygen  Had home oxygen eval did not require oxygen  Nocturnal pulse ox requires 2 liters at night  Now requiring oxygen again  Possibly related to pulmonary edema  Check cxr  Pt will require repeat home oxygen eval at time of discharge. Dysphagia  Assessment & Plan  Reports food getting stuck  Appreciate speech eval.   Tolerating diet. Given tips to help with swallowing  Recommend follow up with gi outpt     Diastolic CHF [facility])  Assessment & Plan  Wt Readings from Last 3 Encounters:   08/17/23 107 kg (236 lb 15.9 oz)   07/20/23 107 kg (236 lb)   07/10/23 107 kg (236 lb 12.8 oz)       · Chest x-ray without acute cardiopulmonary disease  ·   · Creatinine is elevated this morning, will hold diuretics and ACE inhibitor- given 40mg of lasix. · Monitor daily weights, I's/O- weight increased   · Lasix restarted at 20mg. Will increased to bid dosing  · Check cxr  · Monitor creatinine   · Cardiac diet  · Recently had updated echo with EF 07%, grade 1 diastolic dysfunction  · Follows with Dr. Yeung Factor outpatient      Dyslipidemia  Assessment & Plan  · Maintained on Crestor per outpatient regimen, substituted for Lipitor while hospitalized    Coronary artery disease involving native coronary artery of native heart without angina pectoris  Assessment & Plan  · Continue atenolol and statin  · Holding irbesartan given rising kidney function- however creatinine improved.  May be able to start this in near future     Thrombocytopenia, unspecified ([address])  Assessment & Plan  · Chronic thrombocytopenia noted back in 2020  · Likely due to non-small cell lung cancer    Abnormal renal function  Assessment & Plan  · Patient creatinine previously running 1.0-1.3, since July through August has been running 1.4-1.7; kidney function 8/9 was 1.7  · Creatinine on admission 1.57 increased to 1.71 during hospital stay   · Arb on hold and lasix was restarted   · Creatinine currently 1.3  · Check bmp in am.       MONO (obstructive sleep apnea)  Assessment & Plan  · Severe obstructive sleep apnea  · Has adamantly refused CPAP per outpatient notes  · Contributing to admission  · outpt sleep study vs pulm ordering new style of cpap as pt could not tolerate previous mask   · Qualifies for nocturnal oxygen     Morbid obesity ([address])  Assessment & Plan  BMI 45 noted contributing to comorbidities  Would benefit from weight loss and lifestyle modifications    Gastroesophageal reflux disease without esophagitis  Assessment & Plan  Continue PPI    Essential hypertension  Assessment & Plan  bp fairly stable   · atenolol changed for metoprolol due to new onset aflutter. VTE Pharmacologic Prophylaxis: eliquis     Mechanical VTE Prophylaxis in Place: Yes    Education and Discussions with Family / Patient: patient    Current Length of Stay: 5 day(s)  Current Patient Status: Inpatient     Discharge Plan / Estimated Discharge Date:24 to 48 hours depending on fluid status. She will require a home oxygen eval prior to discharge    Code Status: Level 1 - Full Code      Subjective:   Pt seen and examined. Pt was on 5 liters of oxygen this am. Tried to wean oxygen to off but desated. She is currently on 2 liters. No f/c no cp no n/v/d no abd pain     Objective:     Vitals:   Temp (24hrs), Av.7 °F (36.5 °C), Min:97.3 °F (36.3 °C), Max:97.9 °F (36.6 °C)    Temp:  [97.3 °F (36.3 °C)-97.9 °F (36.6 °C)] 97.9 °F (36.6 °C)  HR:  [61-81] 64  Resp:  [16] 16  BP: (120-169)/(56-79) 152/72  SpO2:  [27 %-99 %] 95 %  Body mass index is 44.78 kg/m². Input and Output Summary (last 24 hours):     No intake or output data in the 24 hours ending 23 1432    Physical Exam:   Physical Exam  Constitutional:       Appearance: Normal appearance. HENT:      Head: Normocephalic and atraumatic. Eyes:      Extraocular Movements: Extraocular movements intact. Pupils: Pupils are equal, round, and reactive to light. Cardiovascular:      Rate and Rhythm: Normal rate and regular rhythm.       Heart sounds: No murmur heard.     No friction rub. No gallop. Pulmonary:      Effort: Pulmonary effort is normal. No respiratory distress. Breath sounds: Normal breath sounds. No wheezing, rhonchi or rales. Abdominal:      General: Bowel sounds are normal. There is no distension. Palpations: Abdomen is soft. Tenderness: There is no abdominal tenderness. There is no guarding or rebound. Musculoskeletal:      Right lower leg: Edema present. Left lower leg: Edema present. Comments: Trace edema bilateral    Neurological:      Mental Status: She is alert and oriented to person, place, and time. Additional Data:     Labs:  Results from last 7 days   Lab Units 08/14/23  0420 08/13/23  0452 08/12/23  1228   WBC Thousand/uL 11.62*   < > 6.67   HEMOGLOBIN g/dL 10.1*   < > 10.1*   HEMATOCRIT % 31.6*   < > 31.9*   PLATELETS Thousands/uL 88*   < > 90*   LYMPHO PCT %  --   --  22   MONO PCT %  --   --  5   EOS PCT %  --   --  0    < > = values in this interval not displayed.      Results from last 7 days   Lab Units 08/17/23  0555 08/16/23  0940   SODIUM mmol/L 141 140   POTASSIUM mmol/L 4.2 3.6   CHLORIDE mmol/L 101 102   CO2 mmol/L 37* 32   BUN mg/dL 33* 30*   CREATININE mg/dL 1.30 1.34*   ANION GAP mmol/L 3 6   CALCIUM mg/dL 9.1 9.0   ALBUMIN g/dL  --  3.9   TOTAL BILIRUBIN mg/dL  --  0.77   ALK PHOS U/L  --  70   ALT U/L  --  32   AST U/L  --  29   GLUCOSE RANDOM mg/dL 98 181*                       Recent Cultures (last 7 days):           Lines/Drains:  Invasive Devices     Peripheral Intravenous Line  Duration           Peripheral IV 08/12/23 Right Antecubital 5 days    Peripheral IV 08/17/23 Left;Ventral (anterior) Forearm <1 day                Telemetry:   Telemetry Orders (From admission, onward)             24 Hour Telemetry Monitoring  Continuous x 24 Hours (Telem)        Question:  Reason for 24 Hour Telemetry  Answer:  Arrhythmias requiring acute medical intervention / PPM or ICD malfunction Last 24 Hours Medication List:   Current Facility-Administered Medications   Medication Dose Route Frequency Provider Last Rate   • acetaminophen  650 mg Oral Q6H PRN Ardie Ala Prechtel, DO     • albuterol  2.5 mg Nebulization Q6H PRN Ardie Ala Prechtel, DO     • Alectinib HCl  600 mg Oral BID Darcie Chau PA-C     • [START ON 8/23/2023] amiodarone  200 mg Oral Daily With Breakfast WILDA Thomas     • amiodarone  200 mg Oral TID With Meals WILDA Thomas     • apixaban  5 mg Oral BID Afshan Benson MD     • atorvastatin  40 mg Oral Daily With [employer] S Prechtel, DO     • benzonatate  100 mg Oral TID Lynda Rush PA-C     • budesonide  0.25 mg Nebulization Q12H David S Prechtel, DO     • fluticasone  2 spray Each Nare Daily David S Prechtel, DO     • furosemide  20 mg Oral BID (diuretic) Kat Armstrong DO     • guaiFENesin  600 mg Oral BID Ruthie Flores PA-C     • ipratropium  0.5 mg Nebulization TID Ardie Ala Prechtel, DO     • levalbuterol  1.25 mg Nebulization Q6H PRN Ardie Ala Prechtel, DO      And   • sodium chloride  3 mL Nebulization Q6H PRN Ardie Ala Prechtel, DO     • levalbuterol  1.25 mg Nebulization TID Ardie Ala Prechtel, DO     • metoprolol  5 mg Intravenous Q6H PRN WILDA Thomas     • metoprolol tartrate  25 mg Oral Q12H [facility] Afshan Benson MD     • ondansetron  4 mg Intravenous Q6H PRN Ardie Ala Prechtel, DO     • oxyCODONE-acetaminophen  1 tablet Oral Q4H PRN David S Prechtel, DO     • pantoprazole  20 mg Oral Early Morning David S Prechtel, DO     • predniSONE  40 mg Oral Daily Sondra Sanchez MD          Today, Patient Was Seen By: Mable Chacon DO

## 2023-08-17 NOTE — PLAN OF CARE
Problem: MOBILITY - ADULT  Goal: Maintain or return to baseline ADL function  Description: INTERVENTIONS:  -  Assess patient's ability to carry out ADLs; assess patient's baseline for ADL function and identify physical deficits which impact ability to perform ADLs (bathing, care of mouth/teeth, toileting, grooming, dressing, etc.)  - Assess/evaluate cause of self-care deficits   - Assess range of motion  - Assess patient's mobility; develop plan if impaired  - Assess patient's need for assistive devices and provide as appropriate  - Encourage maximum independence but intervene and supervise when necessary  - Involve family in performance of ADLs  - Assess for home care needs following discharge   - Consider OT consult to assist with ADL evaluation and planning for discharge  - Provide patient education as appropriate  Outcome: Progressing  Goal: Maintains/Returns to pre admission functional level  Description: INTERVENTIONS:  - Perform BMAT or MOVE assessment daily.   - Set and communicate daily mobility goal to care team and patient/family/caregiver. - Collaborate with rehabilitation services on mobility goals if consulted  - Perform Range of Motion 4 times a day. - Reposition patient every 2 hours.   - Dangle patient 3 times a day  - Stand patient 3 times a day  - Ambulate patient 3 times a day  - Out of bed to chair 3 times a day   - Out of bed for meals 3 times a day  - Out of bed for toileting  - Record patient progress and toleration of activity level   Outcome: Progressing     Problem: PAIN - ADULT  Goal: Verbalizes/displays adequate comfort level or baseline comfort level  Description: Interventions:  - Encourage patient to monitor pain and request assistance  - Assess pain using appropriate pain scale  - Administer analgesics based on type and severity of pain and evaluate response  - Implement non-pharmacological measures as appropriate and evaluate response  - Consider cultural and social influences on pain and pain management  - Notify physician/advanced practitioner if interventions unsuccessful or patient reports new pain  Outcome: Progressing     Problem: INFECTION - ADULT  Goal: Absence or prevention of progression during hospitalization  Description: INTERVENTIONS:  - Assess and monitor for signs and symptoms of infection  - Monitor lab/diagnostic results  - Monitor all insertion sites, i.e. indwelling lines, tubes, and drains  - Monitor endotracheal if appropriate and nasal secretions for changes in amount and color  - [place] appropriate cooling/warming therapies per order  - Administer medications as ordered  - Instruct and encourage patient and family to use good hand hygiene technique  - Identify and instruct in appropriate isolation precautions for identified infection/condition  Outcome: Progressing  Goal: Absence of fever/infection during neutropenic period  Description: INTERVENTIONS:  - Monitor WBC    Outcome: Progressing     Problem: SAFETY ADULT  Goal: Maintain or return to baseline ADL function  Description: INTERVENTIONS:  -  Assess patient's ability to carry out ADLs; assess patient's baseline for ADL function and identify physical deficits which impact ability to perform ADLs (bathing, care of mouth/teeth, toileting, grooming, dressing, etc.)  - Assess/evaluate cause of self-care deficits   - Assess range of motion  - Assess patient's mobility; develop plan if impaired  - Assess patient's need for assistive devices and provide as appropriate  - Encourage maximum independence but intervene and supervise when necessary  - Involve family in performance of ADLs  - Assess for home care needs following discharge   - Consider OT consult to assist with ADL evaluation and planning for discharge  - Provide patient education as appropriate  Outcome: Progressing  Goal: Maintains/Returns to pre admission functional level  Description: INTERVENTIONS:  - Perform BMAT or MOVE assessment daily.   - Set and communicate daily mobility goal to care team and patient/family/caregiver. - Collaborate with rehabilitation services on mobility goals if consulted  - Perform Range of Motion 4 times a day. - Reposition patient every 2 hours.   - Dangle patient 3 times a day  - Stand patient 3 times a day  - Ambulate patient 3 times a day  - Out of bed to chair 3 times a day   - Out of bed for meals 3 times a day  - Out of bed for toileting  - Record patient progress and toleration of activity level   Outcome: Progressing  Goal: Patient will remain free of falls  Description: INTERVENTIONS:  - Educate patient/family on patient safety including physical limitations  - Instruct patient to call for assistance with activity   - Consult OT/PT to assist with strengthening/mobility   - Keep Call bell within reach  - Keep bed low and locked with side rails adjusted as appropriate  - Keep care items and personal belongings within reach  - Initiate and maintain comfort rounds  - Make Fall Risk Sign visible to staff  - Offer Toileting every 2 Hours, in advance of need  - Apply yellow socks and bracelet for high fall risk patients  - Consider moving patient to room near nurses station  Outcome: Progressing     Problem: DISCHARGE PLANNING  Goal: Discharge to home or other facility with appropriate resources  Description: INTERVENTIONS:  - Identify barriers to discharge w/patient and caregiver  - Arrange for needed discharge resources and transportation as appropriate  - Identify discharge learning needs (meds, wound care, etc.)  - Arrange for interpretive services to assist at discharge as needed  - Refer to Case Management Department for coordinating discharge planning if the patient needs post-hospital services based on physician/advanced practitioner order or complex needs related to functional status, cognitive ability, or social support system  Outcome: Progressing     Problem: Knowledge Deficit  Goal: Patient/family/caregiver demonstrates understanding of disease process, treatment plan, medications, and discharge instructions  Description: Complete learning assessment and assess knowledge base.   Interventions:  - Provide teaching at level of understanding  - Provide teaching via preferred learning methods  Outcome: Progressing     Problem: RESPIRATORY - ADULT  Goal: Achieves optimal ventilation and oxygenation  Description: INTERVENTIONS:  - Assess for changes in respiratory status  - Assess for changes in mentation and behavior  - Position to facilitate oxygenation and minimize respiratory effort  - Oxygen administered by appropriate delivery if ordered  - Initiate smoking cessation education as indicated  - Encourage broncho-pulmonary hygiene including cough, deep breathe, Incentive Spirometry  - Assess the need for suctioning and aspirate as needed  - Assess and instruct to report SOB or any respiratory difficulty  - Respiratory Therapy support as indicated  Outcome: Progressing

## 2023-08-17 NOTE — ASSESSMENT & PLAN NOTE
· Severe obstructive sleep apnea  · Has adamantly refused CPAP per outpatient notes  · Contributing to admission  · outpt sleep study vs pulm ordering new style of cpap as pt could not tolerate previous mask   · Qualifies for nocturnal oxygen

## 2023-08-17 NOTE — PROGRESS NOTES
Progress Note - Pulmonary   Alvaro Warren 70 y.o. female MRN: [id number]  Unit/Bed#: [facility] -01 Encounter: [id number]    Assessment/Plan:    78F hx severe type 2 high asthma (on ICS/LABA/LAMA and [place]), obesity, untreated MONO, presenting with increasing BERTRAND and wheezing found to have asthma exacerbation. 1. Severe allergic/eosinophilic asthma with acute exacerbation  Hx asthma (type 1 high with high eos 480 and multiple possitive allergens in 2020), severe with 1-3x a year exacerbaitons, improved since starting fasenra Jan 2023. Per pt daily and allergy symptoms also improved. Obesity and untreated MONO likely also contributing to poor asthma control. Unclear trigger for this admission, possibly viral. No evidence of PNA with clear CXR and normal WBC on admission. Slightly elevated WBC now since starting steroids  - home regimen including Allegra, Fluticasone, Advair 500-50 BID, Spiriva BID, report to be compliant with meds  - in hospital regimen: budesonide BID, ipratropium/levalbuterol TID, flonase, mucinex. Dc prednisone. - upon discharge resume allegra, fluticasone, advair, and spiriva. - Patient should follow in our clinic within next 1 or 2 weeks. 2. Acute hypoxic resp failure  VQ mismatch in setting of asthma exacerbation. , likely with contribution from hypoventilation from severe obesity (bicarb 33 suggestive of chronic hypercarbic resp failure), and atelectasis. Unlikley pneumonitis from cancer therapy as CXR normal.   -wean as able. May try to diurese if there is signs of fluid overload which might also lead to hypoxia  - incentive spirometry  - will need home O2 at discharge    3. Hx of MONO  Recent sleep study showed AHI 85.2 in 5/2022 with significant hypoxia. Previously had difficulty tolerating CPAP.  Given elevated bicarb, obesity and some concern for chronic hypercarbia/hypoventilaiton, would benefit from inpatient PAP titration study  - will need PAP titration as outpatient (she needs to follow up with our pulmonary office and arrange further evaluation of pap titration)     4. Rt adenocarcinoma with bone mets   currently on Alectinib due to ALK +, s/p RT for T spine mets  stable. Follow with oncologist. No evidence of pneumonitis from kinase inhibitor    We will sign off for now. Please call if have any questions    Chief Complaint:    My cough and breathing is better    Subjective:    Seen and examined at bedsied, Sat 95% while on 5L NC (which is increasing from yesterday). Patient says her cough and tightness is improving. Objective:    Vitals: Blood pressure 152/72, pulse 64, temperature 97.9 °F (36.6 °C), resp. rate 16, height 5' 1" (1.549 m), weight 107 kg (236 lb 15.9 oz), SpO2 96 %, not currently breastfeeding. RA,Body mass index is 44.78 kg/m². No intake or output data in the 24 hours ending 08/17/23 1249    Invasive Devices     Peripheral Intravenous Line  Duration           Peripheral IV 08/12/23 Right Antecubital 5 days    Peripheral IV 08/17/23 Left;Ventral (anterior) Forearm <1 day                Physical Exam:     Physical Exam  Constitutional:       General: She is not in acute distress. Appearance: She is obese. She is not ill-appearing. HENT:      Mouth/Throat:      Mouth: Mucous membranes are moist.   Cardiovascular:      Rate and Rhythm: Normal rate and regular rhythm. Pulmonary:      Effort: Pulmonary effort is normal.      Breath sounds: No decreased breath sounds, wheezing, rhonchi or rales. Abdominal:      General: Bowel sounds are normal.      Palpations: Abdomen is soft. Musculoskeletal:         General: Normal range of motion. Cervical back: Normal range of motion. Right lower leg: No edema. Left lower leg: No edema. Skin:     General: Skin is warm. Neurological:      Mental Status: She is alert. Labs: I have personally reviewed pertinent lab results.         Imaging and other studies: I have personally reviewed pertinent films in PACS

## 2023-08-17 NOTE — ASSESSMENT & PLAN NOTE
As a result of exacerbation  Required 2 liters of oxygen  Had home oxygen eval did not require oxygen  Nocturnal pulse ox requires 2 liters at night  Now requiring oxygen again  Possibly related to pulmonary edema  Check cxr  Pt will require repeat home oxygen eval at time of discharge.

## 2023-08-17 NOTE — ASSESSMENT & PLAN NOTE
· Patient creatinine previously running 1.0-1.3, since July through August has been running 1.4-1.7; kidney function 8/9 was 1.7  · Creatinine on admission 1.57 increased to 1.71 during hospital stay   · Arb on hold and lasix was restarted   · Creatinine currently 1.3  · Check bmp in am.

## 2023-08-17 NOTE — ASSESSMENT & PLAN NOTE
· Patient is a 72-year-old female with past medical history of severe persistent asthma, non-small cell lung cancer, diastolic CHF, hypertension, apnea, CAD presenting with shortness of breath concerning for asthma exacerbation  · Chest x-ray no acute cardiopulmonary disease  · Continues to require oxygen from 2 to 5 liters. Had originally not qualified for home oxygen. Would repeat study again prior to discharge to see if she qualifies   · Appreciate pulmonary recommendations. Changed from solumedrol to prednisone 40mg daily for 5 days. · Continue bronchodilators- pt has nebs at home and pulmicort. · Completed 3 days of azithromycin   · She had been on budesonide outpt but fairly expensive for her of 100 a month. advair is 40mg a month. Pulmonary cleared pt to restart advair and d/c budesonide which will help her afford other medications. She has 3 boxes of budesonide at home. She may finish budesonide prior to starting advair as change is to help her afford meds.   She will also continue fluticasone and spiriva  · She follows with Dr. Savage Lucas

## 2023-08-17 NOTE — PROGRESS NOTES
Cardiology Progress Note - Grisel Ford 70 y.o. female MRN: [id number]    Unit/Bed#: [place] 2 -01 Encounter: [id number]      Assessment & Plan:    New onset atrial flutter  -Patient developed new onset paroxysmal atrial flutter overnight and spontaneously converted back to sinus rhythm this morning  - No prior history of atrial fibrillation or flutter  - [id number] score of 5 (age x2, female, hypertension, CHF)  -Continue with Eliquis 5 mg twice daily  - Started on amiodarone, plan for 200 mg 3 times daily for 1 week and then changed to 200 mg daily on 8/23  - Continue with Lopressor 25 mg twice daily    Severe persistent asthma with acute exacerbation  -Presented with shortness of breath  - Started on steroids and standing nebulizers  - Currently improved    Chronic diastolic heart failure with preserved ejection fraction  - TTE 7/20/2023 showed EF 61%, grade 1 DD, probable mild mid ventricular obstruction with a peak gradient of 27 mmHg with Valsalva, moderate LA  - BNP mildly elevated on admission to 273  - Outpatient diuretic regimen Lasix 20 mg daily    Essential hypertension  -Outpatient antihypertensive regimen irbesartan 300 mg daily and atenolol 25 mg daily  - Currently holding ARB due to CARLOS ENRIQUE    Dyslipidemia  -Continue with rosuvastatin 10 mg daily    Non-small cell lung cancer ([address])  - Has stage IV metastatic non-small cell lung cancer diagnosed in 2020  - Currently receiving alectinib     CARLOS ENRIQUE on CKD stage III  -Baseline creatinine up to 1.44 on 7/17 prior to admission  - Creatinine elevated to 1.7 this admission, currently improved and back to baseline    Coronary artery disease involving native coronary artery of native heart without angina pectoris  - Noted to have coronary artery calcifications on prior CAT scan, no prior history of MI  - Continue with statin    Thrombocytopenia, unspecified (HCC)    Gastroesophageal reflux disease without esophagitis    Morbid obesity (HCC)    MONO (obstructive sleep apnea)     Summary:  -No recurrent atrial flutter overnight  - Continue with amiodarone load with 200 mg 3 times daily for 1 week and then 200 mg daily thereafter starting on 8/23  - Continue to monitor on telemetry while inpatient  - Appears euvolemic from a cardiac standpoint, continue with furosemide 20 mg daily  - Persistent hypoxia appears to be more likely from underlying lung disease and possible asthma exacerbation       Subjective:   No significant events overnight. Patient still having hypoxia when she removes her nasal cannula. Otherwise reports feeling okay. Denies chest pain, abdominal pain, nausea, vomiting, fever, chills, headache, dizziness or palpitations. Objective:     Vitals: Blood pressure 152/72, pulse 64, temperature 97.9 °F (36.6 °C), resp.  rate 16, height 5' 1" (1.549 m), weight 107 kg (236 lb 15.9 oz), SpO2 96 %, not currently breastfeeding., Body mass index is 44.78 kg/m².,   Orthostatic Blood Pressures    Flowsheet Row Most Recent Value   Blood Pressure 152/72 filed at 08/17/2023 1117   Patient Position - Orthostatic VS Lying filed at 08/15/2023 0253          No intake or output data in the 24 hours ending 08/17/23 1158        Physical Exam:    GEN: Cali De Paz appears well, alert and oriented x 3, pleasant and cooperative   HEENT: Mucous membranes moist, no scleral icterus, no conjunctival pallor  NECK: No elevated JVD  HEART: Regular rate and rhythm, normal S1 and S2, no murmurs or rubs   LUNGS: Decreased breath sounds bilaterally  ABDOMEN: normal bowel sounds, soft, no tenderness, no distention  EXTREMITIES: peripheral pulses normal; no lower extremity edema   NEURO: no focal findings   SKIN: No lesions or rashes on exposed skin        Current Facility-Administered Medications:   •  acetaminophen (TYLENOL) tablet 650 mg, 650 mg, Oral, Q6H PRN, David VENTURA Prechtel, DO  •  albuterol inhalation solution 2.5 mg, 2.5 mg, Nebulization, Q6H PRN, Urbano Le Prechtel, DO  • Alectinib HCl CAPS 600 mg, 600 mg, Oral, BID, Ruthie Flores PA-C, 600 mg at 08/17/23 7941  •  [START ON 8/23/2023] amiodarone tablet 200 mg, 200 mg, Oral, Daily With Breakfast, WILDA Thomas  •  amiodarone tablet 200 mg, 200 mg, Oral, TID With Meals, Deann LordWILDA Kirby, 200 mg at 08/17/23 3712  •  apixaban (ELIQUIS) tablet 5 mg, 5 mg, Oral, BID, Jonatan Lindsey MD, 5 mg at 08/17/23 3062  •  atorvastatin (LIPITOR) tablet 40 mg, 40 mg, Oral, Daily With Jason Martinez DO, 40 mg at [birth year]/1947  •  benzonatate (TESSALON PERLES) capsule 100 mg, 100 mg, Oral, TID, Lynda Rush PA-C, 100 mg at 08/17/23 7897  •  budesonide (PULMICORT) inhalation solution 0.25 mg, 0.25 mg, Nebulization, Q12H, David S Prechtel, DO, 0.25 mg at 08/17/23 0700  •  fluticasone (FLONASE) 50 mcg/act nasal spray 2 spray, 2 spray, Each Nare, Daily, David S Prechtel, DO, 2 spray at 08/17/23 5868  •  furosemide (LASIX) tablet 20 mg, 20 mg, Oral, Daily, WILDA Thomas, 20 mg at 08/17/23 2986  •  guaiFENesin (MUCINEX) 12 hr tablet 600 mg, 600 mg, Oral, BID, Ruthie Flores PA-C, 600 mg at 08/17/23 5786  •  ipratropium (ATROVENT) 0.02 % inhalation solution 0.5 mg, 0.5 mg, Nebulization, TID, David S Prechtel, DO, 0.5 mg at 08/17/23 0700  •  levalbuterol (XOPENEX) inhalation solution 1.25 mg, 1.25 mg, Nebulization, Q6H PRN **AND** sodium chloride 0.9 % inhalation solution 3 mL, 3 mL, Nebulization, Q6H PRN, Madison Huddlestonel, DO  •  levalbuterol (XOPENEX) inhalation solution 1.25 mg, 1.25 mg, Nebulization, TID, 1.25 mg at 08/17/23 0700 **AND** [DISCONTINUED] sodium chloride 0.9 % inhalation solution 3 mL, 3 mL, Nebulization, TID, David Martinez, DO, 3 mL at 08/13/23 0808  •  metoprolol (LOPRESSOR) injection 5 mg, 5 mg, Intravenous, Q6H PRN, WILDA Thomas, 5 mg at 08/16/23 0944  •  metoprolol tartrate (LOPRESSOR) tablet 25 mg, 25 mg, Oral, Q12H [address], Jonatan Lindsey MD, 25 mg at 08/17/23 5597  •  ondansetron (ZOFRAN) injection 4 mg, 4 mg, Intravenous, Q6H PRN, Steven Coppola Prechtel, DO  •  oxyCODONE-acetaminophen (PERCOCET) 5-325 mg per tablet 1 tablet, 1 tablet, Oral, Q4H PRN, David Martinez, DO  •  pantoprazole (PROTONIX) EC tablet 20 mg, 20 mg, Oral, Early Morning, David Huddlestonel, DO, 20 mg at 08/17/23 0559  •  predniSONE tablet 40 mg, 40 mg, Oral, Daily, Lazarus Hackney, MD, 40 mg at 08/17/23 0812    Labs & Results:    Lab Results   Component Value Date    CKTOTAL 57 07/17/2023    TROPONINI 0.04 07/10/2021    TROPONINI <0.02 07/22/2020       Lab Results   Component Value Date    CALCIUM 9.1 08/17/2023    K 4.2 08/17/2023    CO2 37 (H) 08/17/2023     08/17/2023    BUN 33 (H) 08/17/2023    CREATININE 1.30 08/17/2023       Lab Results   Component Value Date    WBC 11.62 (H) 08/14/2023    HGB 10.1 (L) 08/14/2023    HCT 31.6 (L) 08/14/2023    MCV 91 08/14/2023    PLT 88 (L) 08/14/2023           No results found for: "CHOL"  Lab Results   Component Value Date    HDL 54 07/03/2023    HDL 54 02/14/2020     Lab Results   Component Value Date    LDLCALC 124 (H) 07/03/2023    LDLCALC 134 (H) 02/14/2020     Lab Results   Component Value Date    TRIG 208 (H) 07/03/2023    TRIG 144 02/14/2020       Lab Results   Component Value Date    ALT 32 08/16/2023    AST 29 08/16/2023    ALKPHOS 70 08/16/2023         EKG personally reviewed by )Francois Holter, MD. No acute changes   TELE: No significant arrhythmias seen on telemetry review.

## 2023-08-17 NOTE — RESTORATIVE TECHNICIAN NOTE
Restorative Technician Note      Patient Name: Grisel Ford     Restorative Tech Visit Date: 08/17/23  Note Type: Mobility  Patient Position Upon Consult: Supine  Activity Performed: Ambulated  Assistive Device: Roller walker  Patient Position at End of Consult: Supine;  All needs within reach

## 2023-08-17 NOTE — ASSESSMENT & PLAN NOTE
Wt Readings from Last 3 Encounters:   08/17/23 107 kg (236 lb 15.9 oz)   07/20/23 107 kg (236 lb)   07/10/23 107 kg (236 lb 12.8 oz)       · Chest x-ray without acute cardiopulmonary disease  ·   · Creatinine is elevated this morning, will hold diuretics and ACE inhibitor- given 40mg of lasix. · Monitor daily weights, I's/O- weight increased   · Lasix restarted at 20mg.  Will increased to bid dosing  · Check cxr  · Monitor creatinine   · Cardiac diet  · Recently had updated echo with EF 54%, grade 1 diastolic dysfunction  · Follows with Dr. Matthias Ferrer outpatient

## 2023-08-18 DIAGNOSIS — G47.33 OSA (OBSTRUCTIVE SLEEP APNEA): Primary | ICD-10-CM

## 2023-08-18 LAB
ANION GAP SERPL CALCULATED.3IONS-SCNC: 5 MMOL/L
BUN SERPL-MCNC: 32 MG/DL (ref 5–25)
CALCIUM SERPL-MCNC: 9 MG/DL (ref 8.4–10.2)
CHLORIDE SERPL-SCNC: 98 MMOL/L (ref 96–108)
CO2 SERPL-SCNC: 36 MMOL/L (ref 21–32)
CREAT SERPL-MCNC: 1.63 MG/DL (ref 0.6–1.3)
GFR SERPL CREATININE-BSD FRML MDRD: 30 ML/MIN/1.73SQ M
GLUCOSE SERPL-MCNC: 107 MG/DL (ref 65–140)
POTASSIUM SERPL-SCNC: 4.2 MMOL/L (ref 3.5–5.3)
SODIUM SERPL-SCNC: 139 MMOL/L (ref 135–147)

## 2023-08-18 PROCEDURE — 80048 BASIC METABOLIC PNL TOTAL CA: CPT | Performed by: INTERNAL MEDICINE

## 2023-08-18 PROCEDURE — 94760 N-INVAS EAR/PLS OXIMETRY 1: CPT

## 2023-08-18 PROCEDURE — 94002 VENT MGMT INPAT INIT DAY: CPT

## 2023-08-18 PROCEDURE — 94664 DEMO&/EVAL PT USE INHALER: CPT

## 2023-08-18 PROCEDURE — 99232 SBSQ HOSP IP/OBS MODERATE 35: CPT | Performed by: INTERNAL MEDICINE

## 2023-08-18 PROCEDURE — 94640 AIRWAY INHALATION TREATMENT: CPT

## 2023-08-18 PROCEDURE — 99232 SBSQ HOSP IP/OBS MODERATE 35: CPT

## 2023-08-18 RX ORDER — FUROSEMIDE 20 MG/1
20 TABLET ORAL DAILY
Status: DISCONTINUED | OUTPATIENT
Start: 2023-08-19 | End: 2023-08-23 | Stop reason: HOSPADM

## 2023-08-18 RX ADMIN — FUROSEMIDE 20 MG: 20 TABLET ORAL at 08:42

## 2023-08-18 RX ADMIN — FLUTICASONE PROPIONATE 2 SPRAY: 50 SPRAY, METERED NASAL at 08:42

## 2023-08-18 RX ADMIN — LEVALBUTEROL HYDROCHLORIDE 1.25 MG: 1.25 SOLUTION RESPIRATORY (INHALATION) at 07:55

## 2023-08-18 RX ADMIN — PREDNISONE 40 MG: 20 TABLET ORAL at 08:42

## 2023-08-18 RX ADMIN — IPRATROPIUM BROMIDE 0.5 MG: 0.5 SOLUTION RESPIRATORY (INHALATION) at 19:49

## 2023-08-18 RX ADMIN — AMIODARONE HYDROCHLORIDE 200 MG: 200 TABLET ORAL at 17:43

## 2023-08-18 RX ADMIN — AMIODARONE HYDROCHLORIDE 200 MG: 200 TABLET ORAL at 08:42

## 2023-08-18 RX ADMIN — BUDESONIDE 0.25 MG: 0.25 INHALANT RESPIRATORY (INHALATION) at 07:55

## 2023-08-18 RX ADMIN — METOPROLOL TARTRATE 25 MG: 25 TABLET, FILM COATED ORAL at 08:42

## 2023-08-18 RX ADMIN — ALECTINIB HYDROCHLORIDE 600 MG: 150 CAPSULE ORAL at 17:43

## 2023-08-18 RX ADMIN — ALECTINIB HYDROCHLORIDE 600 MG: 150 CAPSULE ORAL at 08:42

## 2023-08-18 RX ADMIN — IPRATROPIUM BROMIDE 0.5 MG: 0.5 SOLUTION RESPIRATORY (INHALATION) at 07:55

## 2023-08-18 RX ADMIN — GUAIFENESIN 600 MG: 600 TABLET, EXTENDED RELEASE ORAL at 08:42

## 2023-08-18 RX ADMIN — LEVALBUTEROL HYDROCHLORIDE 1.25 MG: 1.25 SOLUTION RESPIRATORY (INHALATION) at 19:49

## 2023-08-18 RX ADMIN — APIXABAN 5 MG: 5 TABLET, FILM COATED ORAL at 08:42

## 2023-08-18 RX ADMIN — METOPROLOL TARTRATE 25 MG: 25 TABLET, FILM COATED ORAL at 20:16

## 2023-08-18 RX ADMIN — ATORVASTATIN CALCIUM 40 MG: 40 TABLET, FILM COATED ORAL at 17:43

## 2023-08-18 RX ADMIN — APIXABAN 5 MG: 5 TABLET, FILM COATED ORAL at 17:43

## 2023-08-18 RX ADMIN — LEVALBUTEROL HYDROCHLORIDE 1.25 MG: 1.25 SOLUTION RESPIRATORY (INHALATION) at 13:47

## 2023-08-18 RX ADMIN — BENZONATATE 100 MG: 100 CAPSULE ORAL at 17:43

## 2023-08-18 RX ADMIN — BUDESONIDE 0.25 MG: 0.25 INHALANT RESPIRATORY (INHALATION) at 19:48

## 2023-08-18 RX ADMIN — GUAIFENESIN 600 MG: 600 TABLET, EXTENDED RELEASE ORAL at 17:43

## 2023-08-18 RX ADMIN — BENZONATATE 100 MG: 100 CAPSULE ORAL at 08:42

## 2023-08-18 RX ADMIN — PANTOPRAZOLE SODIUM 20 MG: 20 TABLET, DELAYED RELEASE ORAL at 05:35

## 2023-08-18 RX ADMIN — IPRATROPIUM BROMIDE 0.5 MG: 0.5 SOLUTION RESPIRATORY (INHALATION) at 13:47

## 2023-08-18 RX ADMIN — AMIODARONE HYDROCHLORIDE 200 MG: 200 TABLET ORAL at 11:54

## 2023-08-18 NOTE — ASSESSMENT & PLAN NOTE
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 Lydia Roberson outpatient  · She is following every 6 months outpatient last seen in May  · Continues on Alectinib 600 mg twice daily  · Also receives Zometa infusions every 3 months

## 2023-08-18 NOTE — PROGRESS NOTES
[facility]  Progress Note  Name: Julio C Robles  MRN: [id number]  Unit/Bed#: [place] -01 I Date of Admission: 8/12/2023   Date of Service: 8/18/2023 I Hospital Day: 6    Assessment/Plan   * Severe persistent asthma with acute exacerbation  Assessment & Plan  · Patient is a 70-year-old female with past medical history of severe persistent asthma, non-small cell lung cancer, diastolic CHF, hypertension, apnea, CAD presenting with shortness of breath concerning for asthma exacerbation  · Chest x-ray no acute cardiopulmonary disease  · Continues to require oxygen prn at daytaime up to 2 L, but at nighttime up to 10 L. Had originally not qualified for home oxygen. Would repeat study again prior to discharge to see if she qualifies   · Appreciate pulmonary recommendations. Changed from solumedrol to prednisone 40mg daily for 5 days. · Continue bronchodilators- pt has nebs at home and pulmicort. · Completed 3 days of azithromycin   · She had been on budesonide outpt but fairly expensive for her of 100 a month. advair is 40 a month. Pulmonary cleared pt to restart advair and d/c budesonide which will help her afford other medications. She has 3 boxes of budesonide at home. She may finish budesonide prior to starting advair as change is to help her afford meds. She will also continue fluticasone and spiriva  · She follows with Dr. Fernanda Vanessa outpt     Acute respiratory failure with hypoxia [facility])  Assessment & Plan  · As a result of asthma exacerbation vs HF  · Required about 2 L of oxygen as needed at daytime  · Nocturnal pulse ox requires 2 liters at night  · Requires?   10 L at nighttime  · Noncompliant with CPAP      MONO (obstructive sleep apnea)  Assessment & Plan  · Severe obstructive sleep apnea  · Has adamantly refused CPAP per outpatient notes  · Contributing to admission  · outpt sleep study vs pulm ordering new style of cpap as pt could not tolerate previous mask   · Qualifies for nocturnal oxygen     Atrial flutter (HCC)  Assessment & Plan  · New onset aflutter  · Continue amiodarone loading dose 200 mg 3 times daily for 1 week then 200 mg daily starting 8/23  · Continue metoprolol tartrate 25 mg twice daily  · Continue eliquis (which is 40 dollars for pt)     Dysphagia  Assessment & Plan  Reports food getting stuck  Appreciate speech eval.   Tolerating diet. Given tips to help with swallowing  Recommend follow up with gi outpt     Diastolic CHF [facility])  Assessment & Plan  Wt Readings from Last 3 Encounters:   08/18/23 107 kg (236 lb 8.9 oz)   07/20/23 107 kg (236 lb)   07/10/23 107 kg (236 lb 12.8 oz)       · Chest x-ray without acute cardiopulmonary disease  ·   · Creatinine is elevated this morning, will hold diuretics and ACE inhibitor- given 40mg of lasix. · Monitor daily weights, I's/O- weight increased   · Lasix restarted at 20mg.   Increased ESR 17 to twice daily, now due to rising creatinine decrease back to once daily  · Chest x-ray without vascular congestion  · Monitor creatinine   · Cardiac diet  · Recently had updated echo with EF 63%, grade 1 diastolic dysfunction  · Follows with Dr. Gamal Thompson outpatient      Dyslipidemia  Assessment & Plan  · Maintained on Crestor per outpatient regimen, substituted for Lipitor while hospitalized    Coronary artery disease involving native coronary artery of native heart without angina pectoris  Assessment & Plan  · Continue atenolol and statin  · Holding irbesartan given rising kidney function    Thrombocytopenia, unspecified (HCC)  Assessment & Plan  · Chronic thrombocytopenia noted back in 2020  · Likely due to non-small cell lung cancer    Abnormal renal function  Assessment & Plan  · Patient creatinine previously running 1.0-1.3, since July through August has been running 1.4-1.7; kidney function 8/9 was 1.7  · Creatinine increased to 1.6 today  · Change Lasix to once daily    Non-small cell lung cancer ([address])  Assessment & Plan  · Metastatic adenocarcinoma of the lung with bony mets, diagnosed in 2020  · Follows with Dr. Jovanni Marroquin outpatient  · She is following every 6 months outpatient last seen in May  · Continues on Alectinib 600 mg twice daily  · Also receives Zometa infusions every 3 months    Morbid obesity ([address])  Assessment & Plan  BMI 45 noted contributing to comorbidities  Would benefit from weight loss and lifestyle modifications    Gastroesophageal reflux disease without esophagitis  Assessment & Plan  · Continue PPI    Essential hypertension  Assessment & Plan  bp fairly stable   · atenolol changed for metoprolol due to new onset aflutter. VTE Pharmacologic Prophylaxis: VTE Score: 5 High Risk (Score >/= 5) - Pharmacological DVT Prophylaxis Ordered: apixaban (Eliquis). Sequential Compression Devices Ordered. Patient Centered Rounds: I performed bedside rounds with nursing staff today. Discussions with Specialists or Other Care Team Provider: Nursing    Education and Discussions with Family / Patient: Patient declined call to . Total Time Spent on Date of Encounter in care of patient: 45 minutes This time was spent on one or more of the following: performing physical exam; counseling and coordination of care; obtaining or reviewing history; documenting in the medical record; reviewing/ordering tests, medications or procedures; communicating with other healthcare professionals and discussing with patient's family/caregivers. Current Length of Stay: 6 day(s)  Current Patient Status: Inpatient   Certification Statement: The patient will continue to require additional inpatient hospital stay due to Respiratory failure  Discharge Plan: Anticipate discharge in 48 hrs to home with home services.     Code Status: Level 1 - Full Code    Subjective:   Patient was seen and evaluated bedside    Objective:     Vitals:   Temp (24hrs), Av.9 °F (36.6 °C), Min:97.6 °F (36.4 °C), Max:98.3 °F (36.8 °C)    Temp:  [97.6 °F (36.4 °C)-98.3 °F (36.8 °C)] 97.9 °F (36.6 °C)  HR:  [65-80] 68  Resp:  [16-20] 16  BP: (117-164)/(46-83) 142/81  SpO2:  [56 %-98 %] 96 %  Body mass index is 44.7 kg/m². Input and Output Summary (last 24 hours): Intake/Output Summary (Last 24 hours) at 8/18/2023 1635  Last data filed at 8/18/2023 1501  Gross per 24 hour   Intake 480 ml   Output 800 ml   Net -320 ml       Physical Exam:   Physical Exam  Constitutional:       General: She is not in acute distress. Appearance: She is obese. HENT:      Head: Atraumatic. Cardiovascular:      Rate and Rhythm: Normal rate and regular rhythm. Heart sounds: No murmur heard. No friction rub. No gallop. Pulmonary:      Effort: Pulmonary effort is normal. No respiratory distress. Breath sounds: Normal breath sounds. No wheezing. Abdominal:      General: Bowel sounds are normal. There is no distension. Palpations: Abdomen is soft. Tenderness: There is no abdominal tenderness. Musculoskeletal:         General: No swelling. Cervical back: Neck supple. Skin:     General: Skin is warm and dry. Neurological:      General: No focal deficit present. Mental Status: She is alert. Psychiatric:         Mood and Affect: Mood normal.          Additional Data:     Labs:  Results from last 7 days   Lab Units 08/14/23  0420 08/13/23  0452 08/12/23  1228   WBC Thousand/uL 11.62*   < > 6.67   HEMOGLOBIN g/dL 10.1*   < > 10.1*   HEMATOCRIT % 31.6*   < > 31.9*   PLATELETS Thousands/uL 88*   < > 90*   LYMPHO PCT %  --   --  22   MONO PCT %  --   --  5   EOS PCT %  --   --  0    < > = values in this interval not displayed.      Results from last 7 days   Lab Units 08/18/23  0452 08/17/23  0555 08/16/23  0940   SODIUM mmol/L 139   < > 140   POTASSIUM mmol/L 4.2   < > 3.6   CHLORIDE mmol/L 98   < > 102   CO2 mmol/L 36*   < > 32   BUN mg/dL 32*   < > 30*   CREATININE mg/dL 1.63*   < > 1.34*   ANION GAP mmol/L 5   < > 6   CALCIUM mg/dL 9.0   < > 9.0   ALBUMIN g/dL  --   --  3.9   TOTAL BILIRUBIN mg/dL  --   --  0.77   ALK PHOS U/L  --   --  70   ALT U/L  --   --  32   AST U/L  --   --  29   GLUCOSE RANDOM mg/dL 107   < > 181*    < > = values in this interval not displayed.                        Lines/Drains:  Invasive Devices     Peripheral Intravenous Line  Duration           Peripheral IV 23 Left;Ventral (anterior) Forearm 1 day                  Telemetry:  Telemetry Orders (From admission, onward)             24 Hour Telemetry Monitoring  Continuous x 24 Hours (Telem)           Question:  Reason for 24 Hour Telemetry  Answer:  Arrhythmias requiring acute medical intervention / PPM or ICD malfunction                 Telemetry Reviewed: Normal Sinus Rhythm  Indication for Continued Telemetry Use: Arrthymias requiring medical therapy             Imaging: Reviewed radiology reports from this admission including: chest xray    Recent Cultures (last 7 days):         Last 24 Hours Medication List:   Current Facility-Administered Medications   Medication Dose Route Frequency Provider Last Rate   • acetaminophen  650 mg Oral Q6H PRN Steven Martinez DO     • albuterol  2.5 mg Nebulization Q6H PRN Steven Martinez DO     • Alectinib HCl  600 mg Oral BID Ruthie Flores PA-C     • [START ON 2023] amiodarone  200 mg Oral Daily With Breakfast WILDA Thomas     • amiodarone  200 mg Oral TID With Meals WILDA Thomas     • apixaban  5 mg Oral BID Wilberto Blackman MD     • atorvastatin  40 mg Oral Daily With [employer] S Juan, DO     • benzonatate  100 mg Oral TID Lynda Rush PA-C     • budesonide  0.25 mg Nebulization Q12H David Martinez, DO     • fluticasone  2 spray Each Nare Daily David Martinez DO     • [START ON 2023] furosemide  20 mg Oral Daily WILDA Thomas     • guaiFENesin  600 mg Oral BID Ruthie Flores PA-C     • ipratropium  0.5 mg Nebulization TID Lavena Spoon Prechtel, DO     • levalbuterol  1.25 mg Nebulization Q6H PRN Lavena Spoon Prechtel, DO      And   • sodium chloride  3 mL Nebulization Q6H PRN Lavena Spoon Prechtel, DO     • levalbuterol  1.25 mg Nebulization TID Lavena Spoon Prechtel, DO     • metoprolol  5 mg Intravenous Q6H PRN WILDA Thomas     • metoprolol tartrate  25 mg Oral Q12H [address] MD     • ondansetron  4 mg Intravenous Q6H PRN Lavena Spoon Prechtel, DO     • oxyCODONE-acetaminophen  1 tablet Oral Q4H PRN David VENTURA Prechtel, DO     • pantoprazole  20 mg Oral Early Morning Jeramie Swartz DO          Today, Patient Was Seen By: Pawan Silva MD    **Please Note: This note may have been constructed using a voice recognition system. **

## 2023-08-18 NOTE — PLAN OF CARE
Problem: MOBILITY - ADULT  Goal: Maintain or return to baseline ADL function  Description: INTERVENTIONS:  -  Assess patient's ability to carry out ADLs; assess patient's baseline for ADL function and identify physical deficits which impact ability to perform ADLs (bathing, care of mouth/teeth, toileting, grooming, dressing, etc.)  - Assess/evaluate cause of self-care deficits   - Assess range of motion  - Assess patient's mobility; develop plan if impaired  - Assess patient's need for assistive devices and provide as appropriate  - Encourage maximum independence but intervene and supervise when necessary  - Involve family in performance of ADLs  - Assess for home care needs following discharge   - Consider OT consult to assist with ADL evaluation and planning for discharge  - Provide patient education as appropriate  Outcome: Progressing  Goal: Maintains/Returns to pre admission functional level  Description: INTERVENTIONS:  - Perform BMAT or MOVE assessment daily.   - Set and communicate daily mobility goal to care team and patient/family/caregiver. - Collaborate with rehabilitation services on mobility goals if consulted  - Perform Range of Motion 4 times a day. - Reposition patient every 2 hours.   - Dangle patient 3 times a day  - Stand patient 3 times a day  - Ambulate patient 3 times a day  - Out of bed to chair 3 times a day   - Out of bed for meals 3 times a day  - Out of bed for toileting  - Record patient progress and toleration of activity level   Outcome: Progressing     Problem: PAIN - ADULT  Goal: Verbalizes/displays adequate comfort level or baseline comfort level  Description: Interventions:  - Encourage patient to monitor pain and request assistance  - Assess pain using appropriate pain scale  - Administer analgesics based on type and severity of pain and evaluate response  - Implement non-pharmacological measures as appropriate and evaluate response  - Consider cultural and social influences on pain and pain management  - Notify physician/advanced practitioner if interventions unsuccessful or patient reports new pain  Outcome: Progressing     Problem: INFECTION - ADULT  Goal: Absence or prevention of progression during hospitalization  Description: INTERVENTIONS:  - Assess and monitor for signs and symptoms of infection  - Monitor lab/diagnostic results  - Monitor all insertion sites, i.e. indwelling lines, tubes, and drains  - Monitor endotracheal if appropriate and nasal secretions for changes in amount and color  - [place] appropriate cooling/warming therapies per order  - Administer medications as ordered  - Instruct and encourage patient and family to use good hand hygiene technique  - Identify and instruct in appropriate isolation precautions for identified infection/condition  Outcome: Progressing     Problem: SAFETY ADULT  Goal: Maintain or return to baseline ADL function  Description: INTERVENTIONS:  -  Assess patient's ability to carry out ADLs; assess patient's baseline for ADL function and identify physical deficits which impact ability to perform ADLs (bathing, care of mouth/teeth, toileting, grooming, dressing, etc.)  - Assess/evaluate cause of self-care deficits   - Assess range of motion  - Assess patient's mobility; develop plan if impaired  - Assess patient's need for assistive devices and provide as appropriate  - Encourage maximum independence but intervene and supervise when necessary  - Involve family in performance of ADLs  - Assess for home care needs following discharge   - Consider OT consult to assist with ADL evaluation and planning for discharge  - Provide patient education as appropriate  Outcome: Progressing  Goal: Maintains/Returns to pre admission functional level  Description: INTERVENTIONS:  - Perform BMAT or MOVE assessment daily.   - Set and communicate daily mobility goal to care team and patient/family/caregiver.    - Collaborate with rehabilitation services on mobility goals if consulted  - Perform Range of Motion 4 times a day. - Reposition patient every 2 hours.   - Dangle patient 3 times a day  - Stand patient 3 times a day  - Ambulate patient 3 times a day  - Out of bed to chair 3 times a day   - Out of bed for meals 3 times a day  - Out of bed for toileting  - Record patient progress and toleration of activity level   Outcome: Progressing  Goal: Patient will remain free of falls  Description: INTERVENTIONS:  - Educate patient/family on patient safety including physical limitations  - Instruct patient to call for assistance with activity   - Consult OT/PT to assist with strengthening/mobility   - Keep Call bell within reach  - Keep bed low and locked with side rails adjusted as appropriate  - Keep care items and personal belongings within reach  - Initiate and maintain comfort rounds  - Make Fall Risk Sign visible to staff  - Offer Toileting every 2 Hours, in advance of need  - Apply yellow socks and bracelet for high fall risk patients  - Consider moving patient to room near nurses station  Outcome: Progressing     Problem: DISCHARGE PLANNING  Goal: Discharge to home or other facility with appropriate resources  Description: INTERVENTIONS:  - Identify barriers to discharge w/patient and caregiver  - Arrange for needed discharge resources and transportation as appropriate  - Identify discharge learning needs (meds, wound care, etc.)  - Arrange for interpretive services to assist at discharge as needed  - Refer to Case Management Department for coordinating discharge planning if the patient needs post-hospital services based on physician/advanced practitioner order or complex needs related to functional status, cognitive ability, or social support system  Outcome: Progressing     Problem: Knowledge Deficit  Goal: Patient/family/caregiver demonstrates understanding of disease process, treatment plan, medications, and discharge instructions  Description: Complete learning assessment and assess knowledge base.   Interventions:  - Provide teaching at level of understanding  - Provide teaching via preferred learning methods  Outcome: Progressing     Problem: RESPIRATORY - ADULT  Goal: Achieves optimal ventilation and oxygenation  Description: INTERVENTIONS:  - Assess for changes in respiratory status  - Assess for changes in mentation and behavior  - Position to facilitate oxygenation and minimize respiratory effort  - Oxygen administered by appropriate delivery if ordered  - Initiate smoking cessation education as indicated  - Encourage broncho-pulmonary hygiene including cough, deep breathe, Incentive Spirometry  - Assess the need for suctioning and aspirate as needed  - Assess and instruct to report SOB or any respiratory difficulty  - Respiratory Therapy support as indicated  Outcome: Progressing

## 2023-08-18 NOTE — ASSESSMENT & PLAN NOTE
· Patient is a 68-year-old female with past medical history of severe persistent asthma, non-small cell lung cancer, diastolic CHF, hypertension, apnea, CAD presenting with shortness of breath concerning for asthma exacerbation  · Chest x-ray no acute cardiopulmonary disease  · Continues to require oxygen prn at daytaime up to 2 L, but at nighttime up to 10 L. Had originally not qualified for home oxygen. Would repeat study again prior to discharge to see if she qualifies   · Appreciate pulmonary recommendations. Changed from solumedrol to prednisone 40mg daily for 5 days. · Continue bronchodilators- pt has nebs at home and pulmicort. · Completed 3 days of azithromycin   · She had been on budesonide outpt but fairly expensive for her of 100 a month. advair is 40 a month. Pulmonary cleared pt to restart advair and d/c budesonide which will help her afford other medications. She has 3 boxes of budesonide at home. She may finish budesonide prior to starting advair as change is to help her afford meds.   She will also continue fluticasone and spiriva  · She follows with Dr. Sendy Storey

## 2023-08-18 NOTE — ASSESSMENT & PLAN NOTE
· As a result of asthma exacerbation vs HF  · Required about 2 L of oxygen as needed at daytime  · Nocturnal pulse ox requires 2 liters at night  · Requires?   10 L at nighttime  · Noncompliant with CPAP

## 2023-08-18 NOTE — ASSESSMENT & PLAN NOTE
Wt Readings from Last 3 Encounters:   08/18/23 107 kg (236 lb 8.9 oz)   07/20/23 107 kg (236 lb)   07/10/23 107 kg (236 lb 12.8 oz)       · Chest x-ray without acute cardiopulmonary disease  ·   · Creatinine is elevated this morning, will hold diuretics and ACE inhibitor- given 40mg of lasix. · Monitor daily weights, I's/O- weight increased   · Lasix restarted at 20mg.   Increased ESR 17 to twice daily, now due to rising creatinine decrease back to once daily  · Chest x-ray without vascular congestion  · Monitor creatinine   · Cardiac diet  · Recently had updated echo with EF 83%, grade 1 diastolic dysfunction  · Follows with Dr. Abimbola Graff outpatient

## 2023-08-18 NOTE — PLAN OF CARE
Problem: MOBILITY - ADULT  Goal: Maintain or return to baseline ADL function  Description: INTERVENTIONS:  -  Assess patient's ability to carry out ADLs; assess patient's baseline for ADL function and identify physical deficits which impact ability to perform ADLs (bathing, care of mouth/teeth, toileting, grooming, dressing, etc.)  - Assess/evaluate cause of self-care deficits   - Assess range of motion  - Assess patient's mobility; develop plan if impaired  - Assess patient's need for assistive devices and provide as appropriate  - Encourage maximum independence but intervene and supervise when necessary  - Involve family in performance of ADLs  - Assess for home care needs following discharge   - Consider OT consult to assist with ADL evaluation and planning for discharge  - Provide patient education as appropriate  8/17/2023 2353 by Evgeny Hall RN  Outcome: Progressing  8/17/2023 2352 by Evgeny Hall RN  Outcome: Progressing  Goal: Maintains/Returns to pre admission functional level  Description: INTERVENTIONS:  - Perform BMAT or MOVE assessment daily.   - Set and communicate daily mobility goal to care team and patient/family/caregiver. - Collaborate with rehabilitation services on mobility goals if consulted  - Perform Range of Motion 4 times a day. - Reposition patient every 2 hours.   - Dangle patient 3 times a day  - Stand patient 3 times a day  - Ambulate patient 3 times a day  - Out of bed to chair 3 times a day   - Out of bed for meals 3 times a day  - Out of bed for toileting  - Record patient progress and toleration of activity level   8/17/2023 2353 by Evgeny Hall RN  Outcome: Progressing  8/17/2023 2352 by Evgeny Hall RN  Outcome: Progressing     Problem: PAIN - ADULT  Goal: Verbalizes/displays adequate comfort level or baseline comfort level  Description: Interventions:  - Encourage patient to monitor pain and request assistance  - Assess pain using appropriate pain scale  - Administer analgesics based on type and severity of pain and evaluate response  - Implement non-pharmacological measures as appropriate and evaluate response  - Consider cultural and social influences on pain and pain management  - Notify physician/advanced practitioner if interventions unsuccessful or patient reports new pain  8/17/2023 2353 by Arpan Reid RN  Outcome: Progressing  8/17/2023 2352 by Arpan Reid RN  Outcome: Progressing     Problem: INFECTION - ADULT  Goal: Absence or prevention of progression during hospitalization  Description: INTERVENTIONS:  - Assess and monitor for signs and symptoms of infection  - Monitor lab/diagnostic results  - Monitor all insertion sites, i.e. indwelling lines, tubes, and drains  - Monitor endotracheal if appropriate and nasal secretions for changes in amount and color  - [place] appropriate cooling/warming therapies per order  - Administer medications as ordered  - Instruct and encourage patient and family to use good hand hygiene technique  - Identify and instruct in appropriate isolation precautions for identified infection/condition  8/17/2023 2353 by Arpan Reid RN  Outcome: Progressing  8/17/2023 2352 by Arpan Reid RN  Outcome: Progressing     Problem: SAFETY ADULT  Goal: Maintain or return to baseline ADL function  Description: INTERVENTIONS:  -  Assess patient's ability to carry out ADLs; assess patient's baseline for ADL function and identify physical deficits which impact ability to perform ADLs (bathing, care of mouth/teeth, toileting, grooming, dressing, etc.)  - Assess/evaluate cause of self-care deficits   - Assess range of motion  - Assess patient's mobility; develop plan if impaired  - Assess patient's need for assistive devices and provide as appropriate  - Encourage maximum independence but intervene and supervise when necessary  - Involve family in performance of ADLs  - Assess for home care needs following discharge   - Consider OT consult to assist with ADL evaluation and planning for discharge  - Provide patient education as appropriate  8/17/2023 2353 by Sierra Jimenes RN  Outcome: Progressing  8/17/2023 2352 by Sierra Jimenes RN  Outcome: Progressing  Goal: Maintains/Returns to pre admission functional level  Description: INTERVENTIONS:  - Perform BMAT or MOVE assessment daily.   - Set and communicate daily mobility goal to care team and patient/family/caregiver. - Collaborate with rehabilitation services on mobility goals if consulted  - Perform Range of Motion 4 times a day. - Reposition patient every 2 hours.   - Dangle patient 3 times a day  - Stand patient 3 times a day  - Ambulate patient 3 times a day  - Out of bed to chair 3 times a day   - Out of bed for meals 3 times a day  - Out of bed for toileting  - Record patient progress and toleration of activity level   8/17/2023 2353 by Sierra Jimenes RN  Outcome: Progressing  8/17/2023 2352 by Sierra Jimenes RN  Outcome: Progressing  Goal: Patient will remain free of falls  Description: INTERVENTIONS:  - Educate patient/family on patient safety including physical limitations  - Instruct patient to call for assistance with activity   - Consult OT/PT to assist with strengthening/mobility   - Keep Call bell within reach  - Keep bed low and locked with side rails adjusted as appropriate  - Keep care items and personal belongings within reach  - Initiate and maintain comfort rounds  - Make Fall Risk Sign visible to staff  - Offer Toileting every 2 Hours, in advance of need  - Apply yellow socks and bracelet for high fall risk patients  - Consider moving patient to room near nurses station  8/17/2023 2353 by Sierra Jimenes RN  Outcome: Progressing  8/17/2023 2352 by Sierra Jimenes RN  Outcome: Progressing     Problem: DISCHARGE PLANNING  Goal: Discharge to home or other facility with appropriate resources  Description: INTERVENTIONS:  - Identify barriers to discharge w/patient and caregiver  - Arrange for needed discharge resources and transportation as appropriate  - Identify discharge learning needs (meds, wound care, etc.)  - Arrange for interpretive services to assist at discharge as needed  - Refer to Case Management Department for coordinating discharge planning if the patient needs post-hospital services based on physician/advanced practitioner order or complex needs related to functional status, cognitive ability, or social support system  8/17/2023 2353 by Justin Reilly RN  Outcome: Progressing  8/17/2023 2352 by Justin Reilly RN  Outcome: Progressing     Problem: Knowledge Deficit  Goal: Patient/family/caregiver demonstrates understanding of disease process, treatment plan, medications, and discharge instructions  Description: Complete learning assessment and assess knowledge base.   Interventions:  - Provide teaching at level of understanding  - Provide teaching via preferred learning methods  8/17/2023 2353 by Justin Reilly RN  Outcome: Progressing  8/17/2023 2352 by Justin Reilly RN  Outcome: Progressing     Problem: RESPIRATORY - ADULT  Goal: Achieves optimal ventilation and oxygenation  Description: INTERVENTIONS:  - Assess for changes in respiratory status  - Assess for changes in mentation and behavior  - Position to facilitate oxygenation and minimize respiratory effort  - Oxygen administered by appropriate delivery if ordered  - Initiate smoking cessation education as indicated  - Encourage broncho-pulmonary hygiene including cough, deep breathe, Incentive Spirometry  - Assess the need for suctioning and aspirate as needed  - Assess and instruct to report SOB or any respiratory difficulty  - Respiratory Therapy support as indicated  8/17/2023 2353 by Justin Reilly RN  Outcome: Progressing  8/17/2023 2352 by Justin Reilly RN  Outcome: Progressing

## 2023-08-18 NOTE — ASSESSMENT & PLAN NOTE
· New onset aflutter  · Continue amiodarone loading dose 200 mg 3 times daily for 1 week then 200 mg daily starting 8/23  · Continue metoprolol tartrate 25 mg twice daily  · Continue eliquis (which is 40 dollars for pt)

## 2023-08-18 NOTE — PROGRESS NOTES
Progress Note - Cardiology   Uzma Parekh 70 y.o. female MRN: [id number]  Unit/Bed#: [address] [address]01 Encounter: [id number]    Assessment:  • Paroxysmal atrial flutter and atrial fibrillation               - new onset, 8/2023.              - with no prior history of atrial fibrillation or atrial flutter.              - with spontaneous conversion to SR at 2200 on 8/13/23, however with recurrent intermittent rapid atrial fibrillation and flutter > presently in sinus rhythm.               - in light of elevated [id number] score of 5, AC with Eliquis 5 mg twice daily initiated.              - current AV blocker Rx, metoprolol tartrate 25 mg twice daily (previously on atenolol 25 mg daily). • Severe allergic/eosinophilic asthma with acute exacerbation, history of tobacco use   • Acute hypoxic respiratory failure  • Acute kidney injury on chronic kidney disease, stage III  • Chronic diastolic congestive heart failure with preserved ejection fraction              - TTE 7/20/23: LVEF 61%, grade 1 DD, probable mild mid ventricular obstruction with a peak gradient of 27 mmHg with Valsalva, moderate LA. • Coronary artery calcifications via CT chest, April 2023   • Hypertension   • Thrombocytopenia   • GERD  • Obesity   • Obstructive sleep apnea, unable to tolerate CPAP   • Stage IV non- small cell lung cancer     Plan/ Discussion:  · Currently on amiodarone 200 mg 3 times daily for 1 week with transition to 200 mg daily thereafter on 8/23/23. · Currently on metoprolol tartrate 25 mg twice daily, furosemide 20 mg twice daily. · Given bump in creatinine, would decrease furosemide to once daily. · Continue anticoagulation with Eliquis 5 mg twice daily, statin therapy with atorvastatin 40 mg daily. · Monitor volume status closely with strict intake/output, daily weight. · Monitor renal function and electrolytes; maintain potassium > 4, magnesium > 2. Subjective:  Patient seen and examined at the bedside.  Patient without acute shortness of breath, remains on supplemental oxygen. Patient denies chest pain.      Vitals:  Vitals:    08/17/23 0600 08/18/23 0600   Weight: 107 kg (236 lb 15.9 oz) 107 kg (236 lb 8.9 oz)   ,  Vitals:    08/18/23 0534 08/18/23 0534 08/18/23 0600 08/18/23 0755   BP:  (!) 117/46     Pulse:  65     Resp:       Temp:  98.3 °F (36.8 °C)     TempSrc:       SpO2: 98% 98%  97%   Weight:   107 kg (236 lb 8.9 oz)    Height:           Exam:  General: alert awake and oriented, no acute distress  Heart: regular rate with controlled rhythm   Respiratory effort/ Lungs: on supplemental oxygen via NC, diminished breath sounds bilateral bases   Abdominal: obese, rounded  Lower Limbs: trace edema bilaterally            Telemetry:       SR on telemetry     Medications:    Current Facility-Administered Medications:   •  acetaminophen (TYLENOL) tablet 650 mg, 650 mg, Oral, Q6H PRN, David Martinez, DO  •  albuterol inhalation solution 2.5 mg, 2.5 mg, Nebulization, Q6H PRN, David Martinez, DO  •  Alectinib HCl CAPS 600 mg, 600 mg, Oral, BID, Ruthie Flores PA-C, 600 mg at 08/18/23 0842  •  [START ON 8/23/2023] amiodarone tablet 200 mg, 200 mg, Oral, Daily With Breakfast, WILDA Thomas  •  amiodarone tablet 200 mg, 200 mg, Oral, TID With Meals, WILDA Thomas, 200 mg at 08/18/23 4304  •  apixaban (ELIQUIS) tablet 5 mg, 5 mg, Oral, BID, Yennifer Hyman MD, 5 mg at 08/18/23 6513  •  atorvastatin (LIPITOR) tablet 40 mg, 40 mg, Oral, Daily With Dinner, David Martinez, , 40 mg at 08/17/23 1656  •  benzonatate (TESSALON PERLES) capsule 100 mg, 100 mg, Oral, TID, Lynda Rush PA-C, 100 mg at 08/18/23 0842  •  budesonide (PULMICORT) inhalation solution 0.25 mg, 0.25 mg, Nebulization, Q12H, David S Prechtel, DO, 0.25 mg at 08/18/23 0755  •  fluticasone (FLONASE) 50 mcg/act nasal spray 2 spray, 2 spray, Each Nare, Daily, David S Prechtel, DO, 2 spray at 08/18/23 0842  •  furosemide (LASIX) tablet 20 mg, 20 mg, Oral, BID (diuretic), Kat Ambron, DO, 20 mg at 08/18/23 6304  •  guaiFENesin (MUCINEX) 12 hr tablet 600 mg, 600 mg, Oral, BID, Ruthie Flores PA-C, 600 mg at 08/18/23 4200  •  ipratropium (ATROVENT) 0.02 % inhalation solution 0.5 mg, 0.5 mg, Nebulization, TID, David S Prechtel, DO, 0.5 mg at 08/18/23 0755  •  levalbuterol (XOPENEX) inhalation solution 1.25 mg, 1.25 mg, Nebulization, Q6H PRN **AND** sodium chloride 0.9 % inhalation solution 3 mL, 3 mL, Nebulization, Q6H PRN, Chelsie VENTURA Prechtel, DO  •  levalbuterol (XOPENEX) inhalation solution 1.25 mg, 1.25 mg, Nebulization, TID, 1.25 mg at 08/18/23 0755 **AND** [DISCONTINUED] sodium chloride 0.9 % inhalation solution 3 mL, 3 mL, Nebulization, TID, David S Prechtel, DO, 3 mL at 08/13/23 0808  •  metoprolol (LOPRESSOR) injection 5 mg, 5 mg, Intravenous, Q6H PRN, WILDA Thomas, 5 mg at 08/16/23 0944  •  metoprolol tartrate (LOPRESSOR) tablet 25 mg, 25 mg, Oral, Q12H [address], Isaiah Russell MD, 25 mg at 08/18/23 0842  •  ondansetron (ZOFRAN) injection 4 mg, 4 mg, Intravenous, Q6H PRN, Chelsie VENTURA Prechtel, DO  •  oxyCODONE-acetaminophen (PERCOCET) 5-325 mg per tablet 1 tablet, 1 tablet, Oral, Q4H PRN, David S Prechtel, DO  •  pantoprazole (PROTONIX) EC tablet 20 mg, 20 mg, Oral, Early Morning, David S Prechtel, DO, 20 mg at 08/18/23 0535      Labs/Data:        Results from last 7 days   Lab Units 08/14/23  0420 08/13/23  2339 08/13/23  0452   WBC Thousand/uL 11.62* 11.24* 9.26   HEMOGLOBIN g/dL 10.1* 10.1* 10.5*   HEMATOCRIT % 31.6* 31.2* 32.2*   PLATELETS Thousands/uL 88* 89*  89* 90*     Results from last 7 days   Lab Units 08/18/23  0452 08/17/23  0555 08/16/23  0940   POTASSIUM mmol/L 4.2 4.2 3.6   CHLORIDE mmol/L 98 101 102   CO2 mmol/L 36* 37* 32   BUN mg/dL 32* 33* 30*   CREATININE mg/dL 1.63* 1.30 1.34*

## 2023-08-18 NOTE — QUICK NOTE
Notified by primary team that patient desat overnight. Likely due to severe MONO that diagnosed in 2/2023 and cannot tolerate cpap mask and now is willing to try in house. I will suggest to start Cpap tonight and I also ordered AutoCpap for her as outpatient.      She should follow with us after discharge to continue follow up her Cpap issue

## 2023-08-18 NOTE — ASSESSMENT & PLAN NOTE
· Patient creatinine previously running 1.0-1.3, since July through August has been running 1.4-1.7; kidney function 8/9 was 1.7  · Creatinine increased to 1.6 today  · Change Lasix to once daily

## 2023-08-19 LAB
ANION GAP SERPL CALCULATED.3IONS-SCNC: 6 MMOL/L
BUN SERPL-MCNC: 36 MG/DL (ref 5–25)
CALCIUM SERPL-MCNC: 9.1 MG/DL (ref 8.4–10.2)
CHLORIDE SERPL-SCNC: 96 MMOL/L (ref 96–108)
CO2 SERPL-SCNC: 37 MMOL/L (ref 21–32)
CREAT SERPL-MCNC: 1.48 MG/DL (ref 0.6–1.3)
DME PARACHUTE DELIVERY DATE REQUESTED: NORMAL
DME PARACHUTE ITEM DESCRIPTION: NORMAL
DME PARACHUTE ORDER STATUS: NORMAL
DME PARACHUTE SUPPLIER NAME: NORMAL
DME PARACHUTE SUPPLIER PHONE: NORMAL
ERYTHROCYTE [DISTWIDTH] IN BLOOD BY AUTOMATED COUNT: 16 % (ref 11.6–15.1)
GFR SERPL CREATININE-BSD FRML MDRD: 34 ML/MIN/1.73SQ M
GLUCOSE SERPL-MCNC: 109 MG/DL (ref 65–140)
HCT VFR BLD AUTO: 33.4 % (ref 34.8–46.1)
HGB BLD-MCNC: 10.9 G/DL (ref 11.5–15.4)
MCH RBC QN AUTO: 29.4 PG (ref 26.8–34.3)
MCHC RBC AUTO-ENTMCNC: 32.6 G/DL (ref 31.4–37.4)
MCV RBC AUTO: 90 FL (ref 82–98)
PLATELET # BLD AUTO: 101 THOUSANDS/UL (ref 149–390)
POTASSIUM SERPL-SCNC: 3.6 MMOL/L (ref 3.5–5.3)
RBC # BLD AUTO: 3.71 MILLION/UL (ref 3.81–5.12)
SODIUM SERPL-SCNC: 139 MMOL/L (ref 135–147)
WBC # BLD AUTO: 9.45 THOUSAND/UL (ref 4.31–10.16)

## 2023-08-19 PROCEDURE — 99232 SBSQ HOSP IP/OBS MODERATE 35: CPT | Performed by: INTERNAL MEDICINE

## 2023-08-19 PROCEDURE — 94660 CPAP INITIATION&MGMT: CPT

## 2023-08-19 PROCEDURE — 94640 AIRWAY INHALATION TREATMENT: CPT

## 2023-08-19 PROCEDURE — 80048 BASIC METABOLIC PNL TOTAL CA: CPT | Performed by: INTERNAL MEDICINE

## 2023-08-19 PROCEDURE — 94760 N-INVAS EAR/PLS OXIMETRY 1: CPT

## 2023-08-19 PROCEDURE — 85027 COMPLETE CBC AUTOMATED: CPT | Performed by: INTERNAL MEDICINE

## 2023-08-19 RX ADMIN — AMIODARONE HYDROCHLORIDE 200 MG: 200 TABLET ORAL at 07:48

## 2023-08-19 RX ADMIN — BUDESONIDE 0.25 MG: 0.25 INHALANT RESPIRATORY (INHALATION) at 20:37

## 2023-08-19 RX ADMIN — ATORVASTATIN CALCIUM 40 MG: 40 TABLET, FILM COATED ORAL at 17:06

## 2023-08-19 RX ADMIN — AMIODARONE HYDROCHLORIDE 200 MG: 200 TABLET ORAL at 12:12

## 2023-08-19 RX ADMIN — BENZONATATE 100 MG: 100 CAPSULE ORAL at 21:07

## 2023-08-19 RX ADMIN — FLUTICASONE PROPIONATE 2 SPRAY: 50 SPRAY, METERED NASAL at 08:36

## 2023-08-19 RX ADMIN — LEVALBUTEROL HYDROCHLORIDE 1.25 MG: 1.25 SOLUTION RESPIRATORY (INHALATION) at 20:37

## 2023-08-19 RX ADMIN — ALECTINIB HYDROCHLORIDE 600 MG: 150 CAPSULE ORAL at 17:06

## 2023-08-19 RX ADMIN — BUDESONIDE 0.25 MG: 0.25 INHALANT RESPIRATORY (INHALATION) at 07:07

## 2023-08-19 RX ADMIN — BENZONATATE 100 MG: 100 CAPSULE ORAL at 17:07

## 2023-08-19 RX ADMIN — METOPROLOL TARTRATE 25 MG: 25 TABLET, FILM COATED ORAL at 08:36

## 2023-08-19 RX ADMIN — AMIODARONE HYDROCHLORIDE 200 MG: 200 TABLET ORAL at 17:07

## 2023-08-19 RX ADMIN — APIXABAN 5 MG: 5 TABLET, FILM COATED ORAL at 08:36

## 2023-08-19 RX ADMIN — FUROSEMIDE 20 MG: 20 TABLET ORAL at 08:36

## 2023-08-19 RX ADMIN — METOPROLOL TARTRATE 25 MG: 25 TABLET, FILM COATED ORAL at 21:07

## 2023-08-19 RX ADMIN — GUAIFENESIN 600 MG: 600 TABLET, EXTENDED RELEASE ORAL at 17:06

## 2023-08-19 RX ADMIN — IPRATROPIUM BROMIDE 0.5 MG: 0.5 SOLUTION RESPIRATORY (INHALATION) at 20:37

## 2023-08-19 RX ADMIN — LEVALBUTEROL HYDROCHLORIDE 1.25 MG: 1.25 SOLUTION RESPIRATORY (INHALATION) at 07:07

## 2023-08-19 RX ADMIN — IPRATROPIUM BROMIDE 0.5 MG: 0.5 SOLUTION RESPIRATORY (INHALATION) at 07:07

## 2023-08-19 RX ADMIN — ALECTINIB HYDROCHLORIDE 600 MG: 150 CAPSULE ORAL at 08:36

## 2023-08-19 RX ADMIN — GUAIFENESIN 600 MG: 600 TABLET, EXTENDED RELEASE ORAL at 08:36

## 2023-08-19 RX ADMIN — BENZONATATE 100 MG: 100 CAPSULE ORAL at 08:36

## 2023-08-19 RX ADMIN — PANTOPRAZOLE SODIUM 20 MG: 20 TABLET, DELAYED RELEASE ORAL at 07:48

## 2023-08-19 RX ADMIN — APIXABAN 5 MG: 5 TABLET, FILM COATED ORAL at 17:06

## 2023-08-19 NOTE — PLAN OF CARE
Problem: MOBILITY - ADULT  Goal: Maintain or return to baseline ADL function  Description: INTERVENTIONS:  -  Assess patient's ability to carry out ADLs; assess patient's baseline for ADL function and identify physical deficits which impact ability to perform ADLs (bathing, care of mouth/teeth, toileting, grooming, dressing, etc.)  - Assess/evaluate cause of self-care deficits   - Assess range of motion  - Assess patient's mobility; develop plan if impaired  - Assess patient's need for assistive devices and provide as appropriate  - Encourage maximum independence but intervene and supervise when necessary  - Involve family in performance of ADLs  - Assess for home care needs following discharge   - Consider OT consult to assist with ADL evaluation and planning for discharge  - Provide patient education as appropriate  Outcome: Progressing  Goal: Maintains/Returns to pre admission functional level  Description: INTERVENTIONS:  - Perform BMAT or MOVE assessment daily.   - Set and communicate daily mobility goal to care team and patient/family/caregiver. - Collaborate with rehabilitation services on mobility goals if consulted  - Perform Range of Motion 4 times a day. - Reposition patient every 2 hours.   - Dangle patient 3 times a day  - Stand patient 3 times a day  - Ambulate patient 3 times a day  - Out of bed to chair 3 times a day   - Out of bed for meals 3 times a day  - Out of bed for toileting  - Record patient progress and toleration of activity level   Outcome: Progressing     Problem: PAIN - ADULT  Goal: Verbalizes/displays adequate comfort level or baseline comfort level  Description: Interventions:  - Encourage patient to monitor pain and request assistance  - Assess pain using appropriate pain scale  - Administer analgesics based on type and severity of pain and evaluate response  - Implement non-pharmacological measures as appropriate and evaluate response  - Consider cultural and social influences on pain and pain management  - Notify physician/advanced practitioner if interventions unsuccessful or patient reports new pain  Outcome: Progressing     Problem: INFECTION - ADULT  Goal: Absence or prevention of progression during hospitalization  Description: INTERVENTIONS:  - Assess and monitor for signs and symptoms of infection  - Monitor lab/diagnostic results  - Monitor all insertion sites, i.e. indwelling lines, tubes, and drains  - Monitor endotracheal if appropriate and nasal secretions for changes in amount and color  - [place] appropriate cooling/warming therapies per order  - Administer medications as ordered  - Instruct and encourage patient and family to use good hand hygiene technique  - Identify and instruct in appropriate isolation precautions for identified infection/condition  Outcome: Progressing     Problem: SAFETY ADULT  Goal: Maintain or return to baseline ADL function  Description: INTERVENTIONS:  -  Assess patient's ability to carry out ADLs; assess patient's baseline for ADL function and identify physical deficits which impact ability to perform ADLs (bathing, care of mouth/teeth, toileting, grooming, dressing, etc.)  - Assess/evaluate cause of self-care deficits   - Assess range of motion  - Assess patient's mobility; develop plan if impaired  - Assess patient's need for assistive devices and provide as appropriate  - Encourage maximum independence but intervene and supervise when necessary  - Involve family in performance of ADLs  - Assess for home care needs following discharge   - Consider OT consult to assist with ADL evaluation and planning for discharge  - Provide patient education as appropriate  Outcome: Progressing  Goal: Maintains/Returns to pre admission functional level  Description: INTERVENTIONS:  - Perform BMAT or MOVE assessment daily.   - Set and communicate daily mobility goal to care team and patient/family/caregiver.    - Collaborate with rehabilitation services on mobility goals if consulted  - Perform Range of Motion 4 times a day. - Reposition patient every 2 hours.   - Dangle patient 3 times a day  - Stand patient 3 times a day  - Ambulate patient 3 times a day  - Out of bed to chair 3 times a day   - Out of bed for meals 3 times a day  - Out of bed for toileting  - Record patient progress and toleration of activity level   Outcome: Progressing  Goal: Patient will remain free of falls  Description: INTERVENTIONS:  - Educate patient/family on patient safety including physical limitations  - Instruct patient to call for assistance with activity   - Consult OT/PT to assist with strengthening/mobility   - Keep Call bell within reach  - Keep bed low and locked with side rails adjusted as appropriate  - Keep care items and personal belongings within reach  - Initiate and maintain comfort rounds  - Make Fall Risk Sign visible to staff  - Offer Toileting every 2 Hours, in advance of need  - Apply yellow socks and bracelet for high fall risk patients  - Consider moving patient to room near nurses station  Outcome: Progressing     Problem: DISCHARGE PLANNING  Goal: Discharge to home or other facility with appropriate resources  Description: INTERVENTIONS:  - Identify barriers to discharge w/patient and caregiver  - Arrange for needed discharge resources and transportation as appropriate  - Identify discharge learning needs (meds, wound care, etc.)  - Arrange for interpretive services to assist at discharge as needed  - Refer to Case Management Department for coordinating discharge planning if the patient needs post-hospital services based on physician/advanced practitioner order or complex needs related to functional status, cognitive ability, or social support system  Outcome: Progressing     Problem: Knowledge Deficit  Goal: Patient/family/caregiver demonstrates understanding of disease process, treatment plan, medications, and discharge instructions  Description: Complete learning assessment and assess knowledge base.   Interventions:  - Provide teaching at level of understanding  - Provide teaching via preferred learning methods  Outcome: Progressing     Problem: RESPIRATORY - ADULT  Goal: Achieves optimal ventilation and oxygenation  Description: INTERVENTIONS:  - Assess for changes in respiratory status  - Assess for changes in mentation and behavior  - Position to facilitate oxygenation and minimize respiratory effort  - Oxygen administered by appropriate delivery if ordered  - Initiate smoking cessation education as indicated  - Encourage broncho-pulmonary hygiene including cough, deep breathe, Incentive Spirometry  - Assess the need for suctioning and aspirate as needed  - Assess and instruct to report SOB or any respiratory difficulty  - Respiratory Therapy support as indicated  Outcome: Progressing

## 2023-08-19 NOTE — ASSESSMENT & PLAN NOTE
· Patient is a 53-year-old female with past medical history of severe persistent asthma, non-small cell lung cancer, diastolic CHF, hypertension, apnea, CAD presenting with shortness of breath concerning for asthma exacerbation  · Chest x-ray no acute cardiopulmonary disease  · Continues to require oxygen prn at daytaime up to 2 L, but at nighttime up to 10 L. Had originally not qualified for home oxygen. Would repeat study again prior to discharge to see if she qualifies   · Appreciate pulmonary recommendations. Changed from solumedrol to prednisone 40mg daily for 5 days. · Continue bronchodilators- pt has nebs at home and pulmicort. · Completed 3 days of azithromycin   · She had been on budesonide outpt but fairly expensive for her of 100 a month. advair is 40 a month. Pulmonary cleared pt to restart advair and d/c budesonide which will help her afford other medications. She has 3 boxes of budesonide at home. She may finish budesonide prior to starting advair as change is to help her afford meds.   She will also continue fluticasone and spiriva  · She follows with Dr. Kent Cea

## 2023-08-19 NOTE — ASSESSMENT & PLAN NOTE
Wt Readings from Last 3 Encounters:   08/19/23 109 kg (240 lb 4.8 oz)   07/20/23 107 kg (236 lb)   07/10/23 107 kg (236 lb 12.8 oz)       · Chest x-ray without acute cardiopulmonary disease  ·   · Recently had updated echo with EF 66%, grade 1 diastolic dysfunction  · Follows with Dr. Christie Favorite outpatient  · By cardiology, initially on Lasix twice daily, due to rising creatinine it was changed to daily  · Creatinine stable, euvolemic

## 2023-08-19 NOTE — ASSESSMENT & PLAN NOTE
· As a result of asthma exacerbation vs HF vs jose  · Not tolerate her CPAP in the past  · Did not qualify for home O2  · Discussed with pulmonology, patient on CPAP overnight which she tolerated, they ordered outpatient CPAP until she follows up with them.   · CM on board for arranging CPAP

## 2023-08-19 NOTE — PROGRESS NOTES
[facility]  Progress Note  Name: Cy Bauer  MRN: [id number]  Unit/Bed#: [facility] -01 I Date of Admission: 8/12/2023   Date of Service: 8/19/2023 I Hospital Day: 7    Assessment/Plan   * Severe persistent asthma with acute exacerbation  Assessment & Plan  · Patient is a 68-year-old female with past medical history of severe persistent asthma, non-small cell lung cancer, diastolic CHF, hypertension, apnea, CAD presenting with shortness of breath concerning for asthma exacerbation  · Chest x-ray no acute cardiopulmonary disease  · Continues to require oxygen prn at daytaime up to 2 L, but at nighttime up to 10 L. Had originally not qualified for home oxygen. Would repeat study again prior to discharge to see if she qualifies   · Appreciate pulmonary recommendations. Changed from solumedrol to prednisone 40mg daily for 5 days. · Continue bronchodilators- pt has nebs at home and pulmicort. · Completed 3 days of azithromycin   · She had been on budesonide outpt but fairly expensive for her of 100 a month. advair is 40 a month. Pulmonary cleared pt to restart advair and d/c budesonide which will help her afford other medications. She has 3 boxes of budesonide at home. She may finish budesonide prior to starting advair as change is to help her afford meds. She will also continue fluticasone and spiriva  · She follows with Dr. Maddie Sanchez outpt     Acute respiratory failure with hypoxia [facility])  Assessment & Plan  · As a result of asthma exacerbation vs HF vs mono  · Not tolerate her CPAP in the past  · Did not qualify for home O2  · Discussed with pulmonology, patient on CPAP overnight which she tolerated, they ordered outpatient CPAP until she follows up with them.   · CM on board for arranging CPAP    MONO (obstructive sleep apnea)  Assessment & Plan  · Severe obstructive sleep apnea  · Could not tolerate CPAP as outpatient  · Tolerated CPAP yesterday  · Discussed with pulmonology, CPAP ordered as outpatient, CM on board      Atrial flutter ([address])  Assessment & Plan  · New onset aflutter  · Continue amiodarone loading dose 200 mg 3 times daily for 1 week then 200 mg daily starting 8/23  · Continue metoprolol tartrate 25 mg twice daily  · Continue eliquis (which is 40 dollars for pt)     Dysphagia  Assessment & Plan  Reports food getting stuck  Appreciate speech eval.   Tolerating diet.  Given tips to help with swallowing  Recommend follow up with gi outpt     Diastolic CHF ([address])  Assessment & Plan  Wt Readings from Last 3 Encounters:   08/19/23 109 kg (240 lb 4.8 oz)   07/20/23 107 kg (236 lb)   07/10/23 107 kg (236 lb 12.8 oz)       · Chest x-ray without acute cardiopulmonary disease  ·   · Recently had updated echo with EF 99%, grade 1 diastolic dysfunction  · Follows with Dr. Siddhartha Lomas outpatient  · By cardiology, initially on Lasix twice daily, due to rising creatinine it was changed to daily  · Creatinine stable, euvolemic      Dyslipidemia  Assessment & Plan  · Maintained on Crestor per outpatient regimen, substituted for Lipitor while hospitalized    Coronary artery disease involving native coronary artery of native heart without angina pectoris  Assessment & Plan  · Continue metoprolol and statin      Thrombocytopenia, unspecified (HCC)  Assessment & Plan  · Chronic thrombocytopenia noted back in 2020  · Likely due to non-small cell lung cancer    Abnormal renal function  Assessment & Plan  · Patient creatinine previously running 1.0-1.3, since July through August has been running 1.4-1.7  · Creatinine today 1.48  · Continue Lasix daily    Non-small cell lung cancer ([address])  Assessment & Plan  · 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 Ailin Barfield outpatient  · She is following every 6 months outpatient last seen in May  · Continues on Alectinib 600 mg twice daily  · Also receives Zometa infusions every 3 months    Morbid obesity [facility])  Assessment & Plan  BMI 45 noted contributing to comorbidities  Would benefit from weight loss and lifestyle modifications    Gastroesophageal reflux disease without esophagitis  Assessment & Plan  · Continue PPI    Essential hypertension  Assessment & Plan  bp fairly stable   · atenolol changed for metoprolol due to new onset aflutter. VTE Pharmacologic Prophylaxis: VTE Score: 5 High Risk (Score >/= 5) - Pharmacological DVT Prophylaxis Ordered: apixaban (Eliquis). Sequential Compression Devices Ordered. Patient Centered Rounds: I performed bedside rounds with nursing staff today. Discussions with Specialists or Other Care Team Provider: Nursing, case management    Education and Discussions with Family / Patient: Updated  (son) at bedside. Total Time Spent on Date of Encounter in care of patient: 35 minutes This time was spent on one or more of the following: performing physical exam; counseling and coordination of care; obtaining or reviewing history; documenting in the medical record; reviewing/ordering tests, medications or procedures; communicating with other healthcare professionals and discussing with patient's family/caregivers. Current Length of Stay: 7 day(s)  Current Patient Status: Inpatient   Certification Statement: The patient will continue to require additional inpatient hospital stay due to Obstructive sleep apnea  Discharge Plan: Anticipate discharge in 24-48 hrs to home. Code Status: Level 1 - Full Code    Subjective:   Patient was seen evaluated bedside, feeling well, on room air at the breast, when she walks to the bathroom she feels a little bit winded. Objective:     Vitals:   Temp (24hrs), Av.4 °F (36.9 °C), Min:98.1 °F (36.7 °C), Max:98.6 °F (37 °C)    Temp:  [98.1 °F (36.7 °C)-98.6 °F (37 °C)] 98.6 °F (37 °C)  HR:  [68-69] 69  Resp:  [15] 15  BP: (145-157)/(79) 157/79  SpO2:  [89 %-98 %] 93 %  Body mass index is 45.4 kg/m².      Input and Output Summary (last 24 hours): Intake/Output Summary (Last 24 hours) at 8/19/2023 1926  Last data filed at 8/19/2023 0901  Gross per 24 hour   Intake 360 ml   Output --   Net 360 ml       Physical Exam:   Physical Exam  Constitutional:       General: She is not in acute distress. Appearance: She is obese. HENT:      Head: Atraumatic. Cardiovascular:      Rate and Rhythm: Normal rate and regular rhythm. Heart sounds: No murmur heard. No friction rub. No gallop. Pulmonary:      Effort: Pulmonary effort is normal. No respiratory distress. Breath sounds: Normal breath sounds. No wheezing. Abdominal:      General: Bowel sounds are normal. There is no distension. Palpations: Abdomen is soft. Tenderness: There is no abdominal tenderness. Musculoskeletal:         General: No swelling. Cervical back: Neck supple. Skin:     General: Skin is warm and dry. Neurological:      General: No focal deficit present. Mental Status: She is alert. Psychiatric:         Mood and Affect: Mood normal.          Additional Data:     Labs:  Results from last 7 days   Lab Units 08/19/23  0614   WBC Thousand/uL 9.45   HEMOGLOBIN g/dL 10.9*   HEMATOCRIT % 33.4*   PLATELETS Thousands/uL 101*     Results from last 7 days   Lab Units 08/19/23  0830 08/17/23  0555 08/16/23  0940   SODIUM mmol/L 139   < > 140   POTASSIUM mmol/L 3.6   < > 3.6   CHLORIDE mmol/L 96   < > 102   CO2 mmol/L 37*   < > 32   BUN mg/dL 36*   < > 30*   CREATININE mg/dL 1.48*   < > 1.34*   ANION GAP mmol/L 6   < > 6   CALCIUM mg/dL 9.1   < > 9.0   ALBUMIN g/dL  --   --  3.9   TOTAL BILIRUBIN mg/dL  --   --  0.77   ALK PHOS U/L  --   --  70   ALT U/L  --   --  32   AST U/L  --   --  29   GLUCOSE RANDOM mg/dL 109   < > 181*    < > = values in this interval not displayed.                        Lines/Drains:  Invasive Devices     Peripheral Intravenous Line  Duration           Peripheral IV 08/17/23 Left;Ventral (anterior) Forearm 2 days                  Telemetry:  Telemetry Orders (From admission, onward)             24 Hour Telemetry Monitoring  Continuous x 24 Hours (Telem)           Question:  Reason for 24 Hour Telemetry  Answer:  Arrhythmias requiring acute medical intervention / PPM or ICD malfunction                 Telemetry Reviewed: Normal Sinus Rhythm  Indication for Continued Telemetry Use: No indication for continued use. Will discontinue.               Imaging: Reviewed radiology reports from this admission including: chest xray    Recent Cultures (last 7 days):         Last 24 Hours Medication List:   Current Facility-Administered Medications   Medication Dose Route Frequency Provider Last Rate   • acetaminophen  650 mg Oral Q6H PRN Dorena Crosser Prechtel, DO     • albuterol  2.5 mg Nebulization Q6H PRN Dorena Crosser Prechtel, DO     • Alectinib HCl  600 mg Oral BID Guilherme Rodriguez PA-C     • [START ON 2023] amiodarone  200 mg Oral Daily With Breakfast WILDA Thomas     • amiodarone  200 mg Oral TID With Meals WILDA Thomas     • apixaban  5 mg Oral BID Ezekiel Hernandez MD     • atorvastatin  40 mg Oral Daily With [employer] S Prechtel, DO     • benzonatate  100 mg Oral TID Lynda Rush PA-C     • budesonide  0.25 mg Nebulization Q12H David S Prechtel, DO     • fluticasone  2 spray Each Nare Daily David S Prechtel, DO     • furosemide  20 mg Oral Daily WILDA Thomas     • guaiFENesin  600 mg Oral BID Guilherme Rodriguez PA-C     • ipratropium  0.5 mg Nebulization TID Dorena Crosser Prechtel, DO     • levalbuterol  1.25 mg Nebulization Q6H PRN Dorclarence Crosser Prechtel, DO      And   • sodium chloride  3 mL Nebulization Q6H PRN Dorena Crosser Prechtel, DO     • levalbuterol  1.25 mg Nebulization TID Dorena Crosser Prechtel, DO     • metoprolol  5 mg Intravenous Q6H PRN WILDA Thomas     • metoprolol tartrate  25 mg Oral Q12H [facility] Ezekiel Hernandez MD     • ondansetron  4 mg Intravenous Q6H PRN Urbano Le Prechtel, DO     • oxyCODONE-acetaminophen  1 tablet Oral Q4H PRN David VENTURA Prechtel, DO     • pantoprazole  20 mg Oral Early Morning Chen Villeda,           Today, Patient Was Seen By: Zena Hood MD    **Please Note: This note may have been constructed using a voice recognition system. **

## 2023-08-19 NOTE — ASSESSMENT & PLAN NOTE
· Patient creatinine previously running 1.0-1.3, since July through August has been running 1.4-1.7  · Creatinine today 1.48  · Continue Lasix daily

## 2023-08-19 NOTE — ASSESSMENT & PLAN NOTE
· Severe obstructive sleep apnea  · Could not tolerate CPAP as outpatient  · Tolerated CPAP yesterday  · Discussed with pulmonology, CPAP ordered as outpatient, CM on board

## 2023-08-19 NOTE — ASSESSMENT & PLAN NOTE
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 Elijah Pandya outpatient  · She is following every 6 months outpatient last seen in May  · Continues on Alectinib 600 mg twice daily  · Also receives Zometa infusions every 3 months

## 2023-08-19 NOTE — CASE MANAGEMENT
Case Management Discharge Planning Note    Patient name Alyssa Chavarria  Location [address] [id number] [facility] [address] Toma Pathak* MRN [id number]  : 1945 Date 2023       Current Admission Date: 2023  Current Admission Diagnosis:Severe persistent asthma with acute exacerbation   Patient Active Problem List    Diagnosis Date Noted   • Atrial flutter ([address]) 08/15/2023   • Dysphagia 2023   • Acute respiratory failure with hypoxia ([address])    • Diastolic CHF ([address])    • Coronary artery disease involving native coronary artery of native heart without angina pectoris 07/10/2023   • Dyslipidemia 07/10/2023   • Malignant neoplasm determined by biopsy of lung ([address]) 2023   • Primary localized osteoarthritis of right knee 2022   • Severe persistent asthma with acute exacerbation 11/15/2022   • Chronic seasonal allergic rhinitis due to pollen 11/15/2022   • Allergic rhinitis due to animal (cat) (dog) hair and dander 11/15/2022   • Allergic rhinitis due to house dust mite 11/15/2022   • Need for vaccination 2022   • Thrombocytopenia, unspecified ([address]) 2021   • Depression, recurrent ([address]) 2021   • Abnormal renal function 2021   • Mild persistent asthma 2020   • Malignant neoplasm metastatic to bone ([address]) 2020   • Non-small cell lung cancer ([address]) 2020   • MONO (obstructive sleep apnea)    • Chronic rhinitis 2019   • Morbid obesity ([address]) 2019   • Chronic gastritis without bleeding 2019   • Other headache syndrome 2019   • Colon cancer screening 2019   • Gastroesophageal reflux disease without esophagitis 2019   • Spinal stenosis of 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 2019   • Lumbar facet arthropathy 2019   • 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 2019   • Essential hypertension 10/31/2018   • Vitamin D deficiency 10/31/2018   • Hiatal hernia 10/31/2018   • Premature atrial contractions 10/31/2017   • Primary osteoarthritis of right wrist 05/05/2017      LOS (days): 7  Geometric Mean LOS (GMLOS) (days): 3.50  Days to GMLOS:-3.5     OBJECTIVE:  Risk of Unplanned Readmission Score: 24.88         Current admission status: Inpatient   Preferred Pharmacy:   [facility] [address] [place] [address]  [address]  Phone: [phone] Fax: [phone]    Primary Care Provider: Yvonne Russo MD    Primary Insurance: [facility]  Secondary Insurance:     DISCHARGE DETAILS:     CM notified by Cally Mcdonnell that patient is in need of a CPAP at discharge. Pulm placed outpatient CPAP order. CM utilized this order to put in [place] referral to [facility]. Awaiting approval determination.

## 2023-08-20 PROCEDURE — 99232 SBSQ HOSP IP/OBS MODERATE 35: CPT | Performed by: INTERNAL MEDICINE

## 2023-08-20 PROCEDURE — 94660 CPAP INITIATION&MGMT: CPT

## 2023-08-20 PROCEDURE — 94760 N-INVAS EAR/PLS OXIMETRY 1: CPT

## 2023-08-20 PROCEDURE — 94640 AIRWAY INHALATION TREATMENT: CPT

## 2023-08-20 RX ADMIN — IPRATROPIUM BROMIDE 0.5 MG: 0.5 SOLUTION RESPIRATORY (INHALATION) at 13:14

## 2023-08-20 RX ADMIN — BUDESONIDE 0.25 MG: 0.25 INHALANT RESPIRATORY (INHALATION) at 07:25

## 2023-08-20 RX ADMIN — IPRATROPIUM BROMIDE 0.5 MG: 0.5 SOLUTION RESPIRATORY (INHALATION) at 19:47

## 2023-08-20 RX ADMIN — LEVALBUTEROL HYDROCHLORIDE 1.25 MG: 1.25 SOLUTION RESPIRATORY (INHALATION) at 07:25

## 2023-08-20 RX ADMIN — APIXABAN 5 MG: 5 TABLET, FILM COATED ORAL at 17:13

## 2023-08-20 RX ADMIN — ATORVASTATIN CALCIUM 40 MG: 40 TABLET, FILM COATED ORAL at 17:13

## 2023-08-20 RX ADMIN — LEVALBUTEROL HYDROCHLORIDE 1.25 MG: 1.25 SOLUTION RESPIRATORY (INHALATION) at 19:47

## 2023-08-20 RX ADMIN — APIXABAN 5 MG: 5 TABLET, FILM COATED ORAL at 08:31

## 2023-08-20 RX ADMIN — BENZONATATE 100 MG: 100 CAPSULE ORAL at 20:19

## 2023-08-20 RX ADMIN — METOPROLOL TARTRATE 25 MG: 25 TABLET, FILM COATED ORAL at 08:31

## 2023-08-20 RX ADMIN — AMIODARONE HYDROCHLORIDE 200 MG: 200 TABLET ORAL at 08:32

## 2023-08-20 RX ADMIN — GUAIFENESIN 600 MG: 600 TABLET, EXTENDED RELEASE ORAL at 17:13

## 2023-08-20 RX ADMIN — ALECTINIB HYDROCHLORIDE 600 MG: 150 CAPSULE ORAL at 17:13

## 2023-08-20 RX ADMIN — BUDESONIDE 0.25 MG: 0.25 INHALANT RESPIRATORY (INHALATION) at 19:50

## 2023-08-20 RX ADMIN — IPRATROPIUM BROMIDE 0.5 MG: 0.5 SOLUTION RESPIRATORY (INHALATION) at 07:25

## 2023-08-20 RX ADMIN — LEVALBUTEROL HYDROCHLORIDE 1.25 MG: 1.25 SOLUTION RESPIRATORY (INHALATION) at 13:14

## 2023-08-20 RX ADMIN — ALECTINIB HYDROCHLORIDE 600 MG: 150 CAPSULE ORAL at 08:30

## 2023-08-20 RX ADMIN — BENZONATATE 100 MG: 100 CAPSULE ORAL at 08:31

## 2023-08-20 RX ADMIN — GUAIFENESIN 600 MG: 600 TABLET, EXTENDED RELEASE ORAL at 08:31

## 2023-08-20 RX ADMIN — PANTOPRAZOLE SODIUM 20 MG: 20 TABLET, DELAYED RELEASE ORAL at 05:22

## 2023-08-20 RX ADMIN — FLUTICASONE PROPIONATE 2 SPRAY: 50 SPRAY, METERED NASAL at 08:30

## 2023-08-20 RX ADMIN — FUROSEMIDE 20 MG: 20 TABLET ORAL at 08:31

## 2023-08-20 RX ADMIN — METOPROLOL TARTRATE 25 MG: 25 TABLET, FILM COATED ORAL at 20:19

## 2023-08-20 RX ADMIN — AMIODARONE HYDROCHLORIDE 200 MG: 200 TABLET ORAL at 17:13

## 2023-08-20 RX ADMIN — BENZONATATE 100 MG: 100 CAPSULE ORAL at 17:13

## 2023-08-20 RX ADMIN — AMIODARONE HYDROCHLORIDE 200 MG: 200 TABLET ORAL at 12:29

## 2023-08-20 NOTE — RESTORATIVE TECHNICIAN NOTE
Restorative Technician Note      Patient Name: Isai King     Restorative Tech Visit Date: 08/20/23  Note Type: Mobility  Patient Position Upon Consult: Bedside chair  Activity Performed: Ambulated  Assistive Device: Cane  Patient Position at End of Consult: Supine;  All needs within reach

## 2023-08-20 NOTE — ASSESSMENT & PLAN NOTE
· As a result of asthma exacerbation vs HF vs jose  · Did not qualify for home O2 which was days ago  · Patient states she feels winded when she is ambulating and she would like to be reevaluated for home O2 prior to discharge

## 2023-08-20 NOTE — ASSESSMENT & PLAN NOTE
· Patient is a 81-year-old female with past medical history of severe persistent asthma, non-small cell lung cancer, diastolic CHF, hypertension, apnea, CAD presenting with shortness of breath concerning for asthma exacerbation  · Chest x-ray no acute cardiopulmonary disease  · Continues to require oxygen prn at daytaime up to 2 L, but at nighttime up to 10 L. Had originally not qualified for home oxygen. · Would repeat home 02 study again prior to discharge to see if she qualifies   · Followed by pulmonology, finished steroids and 3 days of azithromycin  · Continue bronchodilators- pt has nebs at home and pulmicort. · She had been on budesonide outpt but fairly expensive for her of 100 a month. advair is 40 a month. Pulmonary cleared pt to restart advair and d/c budesonide which will help her afford other medications. She has 3 boxes of budesonide at home. She may finish budesonide prior to starting advair as change is to help her afford meds.   She will also continue fluticasone nasal spray and spiriva  · She follows with Dr. Kent Cea

## 2023-08-20 NOTE — CASE MANAGEMENT
Case Management Discharge Planning Note    Patient name Radha Lanza  Location [place] 2 /[place] 2 Diana Ego* MRN [id number]  : 1945 Date 2023               OBJECTIVE:  Risk of Unplanned Readmission Score: 25.21         Current admission status: Inpatient   Preferred Pharmacy:   [facility] [address], [address] [place] [address] Street  [address]  Phone: [phone] Fax: [phone]    Primary Care Provider: Haley Garcia MD    Primary Insurance: Zechariah Wilkerson [facility]  Secondary Insurance:     DISCHARGE DETAILS:         DME Referral Provided  Referral made for DME?: Yes  DME referral completed for the following items[de-identified] CPAP  DME Supplier Name[de-identified] [employer]    Other Referral/Resources/Interventions Provided:  Interventions: DME (Pt ordered a CPAP with all documents uploaded in [address] [place] [address] and CM calling [employer] to check on status- unfortunately, CPAP's must be reviewed by a special department who work Mon-Fri only.   Attending made aware of same.)

## 2023-08-20 NOTE — PLAN OF CARE
Problem: MOBILITY - ADULT  Goal: Maintain or return to baseline ADL function  Description: INTERVENTIONS:  -  Assess patient's ability to carry out ADLs; assess patient's baseline for ADL function and identify physical deficits which impact ability to perform ADLs (bathing, care of mouth/teeth, toileting, grooming, dressing, etc.)  - Assess/evaluate cause of self-care deficits   - Assess range of motion  - Assess patient's mobility; develop plan if impaired  - Assess patient's need for assistive devices and provide as appropriate  - Encourage maximum independence but intervene and supervise when necessary  - Involve family in performance of ADLs  - Assess for home care needs following discharge   - Consider OT consult to assist with ADL evaluation and planning for discharge  - Provide patient education as appropriate  Outcome: Progressing  Goal: Maintains/Returns to pre admission functional level  Description: INTERVENTIONS:  - Perform BMAT or MOVE assessment daily.   - Set and communicate daily mobility goal to care team and patient/family/caregiver. - Collaborate with rehabilitation services on mobility goals if consulted  - Perform Range of Motion 4 times a day. - Reposition patient every 2 hours.   - Dangle patient 3 times a day  - Stand patient 3 times a day  - Ambulate patient 3 times a day  - Out of bed to chair 3 times a day   - Out of bed for meals 3 times a day  - Out of bed for toileting  - Record patient progress and toleration of activity level   Outcome: Progressing     Problem: PAIN - ADULT  Goal: Verbalizes/displays adequate comfort level or baseline comfort level  Description: Interventions:  - Encourage patient to monitor pain and request assistance  - Assess pain using appropriate pain scale  - Administer analgesics based on type and severity of pain and evaluate response  - Implement non-pharmacological measures as appropriate and evaluate response  - Consider cultural and social influences on pain and pain management  - Notify physician/advanced practitioner if interventions unsuccessful or patient reports new pain  Outcome: Progressing     Problem: INFECTION - ADULT  Goal: Absence or prevention of progression during hospitalization  Description: INTERVENTIONS:  - Assess and monitor for signs and symptoms of infection  - Monitor lab/diagnostic results  - Monitor all insertion sites, i.e. indwelling lines, tubes, and drains  - Monitor endotracheal if appropriate and nasal secretions for changes in amount and color  - [place] appropriate cooling/warming therapies per order  - Administer medications as ordered  - Instruct and encourage patient and family to use good hand hygiene technique  - Identify and instruct in appropriate isolation precautions for identified infection/condition  Outcome: Progressing     Problem: SAFETY ADULT  Goal: Maintain or return to baseline ADL function  Description: INTERVENTIONS:  -  Assess patient's ability to carry out ADLs; assess patient's baseline for ADL function and identify physical deficits which impact ability to perform ADLs (bathing, care of mouth/teeth, toileting, grooming, dressing, etc.)  - Assess/evaluate cause of self-care deficits   - Assess range of motion  - Assess patient's mobility; develop plan if impaired  - Assess patient's need for assistive devices and provide as appropriate  - Encourage maximum independence but intervene and supervise when necessary  - Involve family in performance of ADLs  - Assess for home care needs following discharge   - Consider OT consult to assist with ADL evaluation and planning for discharge  - Provide patient education as appropriate  Outcome: Progressing  Goal: Maintains/Returns to pre admission functional level  Description: INTERVENTIONS:  - Perform BMAT or MOVE assessment daily.   - Set and communicate daily mobility goal to care team and patient/family/caregiver.    - Collaborate with rehabilitation services on mobility goals if consulted  - Perform Range of Motion 4 times a day. - Reposition patient every 2 hours.   - Dangle patient 3 times a day  - Stand patient 3 times a day  - Ambulate patient 3 times a day  - Out of bed to chair 3 times a day   - Out of bed for meals 3 times a day  - Out of bed for toileting  - Record patient progress and toleration of activity level   Outcome: Progressing  Goal: Patient will remain free of falls  Description: INTERVENTIONS:  -  Assess patient's ability to carry out ADLs; assess patient's baseline for ADL function and identify physical deficits which impact ability to perform ADLs (bathing, care of mouth/teeth, toileting, grooming, dressing, etc.)  - Assess/evaluate cause of self-care deficits   - Assess range of motion  - Assess patient's mobility; develop plan if impaired  - Assess patient's need for assistive devices and provide as appropriate  - Encourage maximum independence but intervene and supervise when necessary  - Involve family in performance of ADLs  - Assess for home care needs following discharge   - Consider OT consult to assist with ADL evaluation and planning for discharge  - Provide patient education as appropriate  Outcome: Progressing

## 2023-08-20 NOTE — PROGRESS NOTES
[facility]  Progress Note  Name: Venkat Caraballo  MRN: [id number]  Unit/Bed#: [facility] 2 -01 I Date of Admission: 8/12/2023   Date of Service: 8/20/2023 I Hospital Day: 8    Assessment/Plan   * Severe persistent asthma with acute exacerbation  Assessment & Plan  · Patient is a 16-year-old female with past medical history of severe persistent asthma, non-small cell lung cancer, diastolic CHF, hypertension, apnea, CAD presenting with shortness of breath concerning for asthma exacerbation  · Chest x-ray no acute cardiopulmonary disease  · Continues to require oxygen prn at daytaime up to 2 L, but at nighttime up to 10 L. Had originally not qualified for home oxygen. · Would repeat home 02 study again prior to discharge to see if she qualifies   · Followed by pulmonology, finished steroids and 3 days of azithromycin  · Continue bronchodilators- pt has nebs at home and pulmicort. · She had been on budesonide outpt but fairly expensive for her of 100 a month. advair is 40 a month. Pulmonary cleared pt to restart advair and d/c budesonide which will help her afford other medications. She has 3 boxes of budesonide at home. She may finish budesonide prior to starting advair as change is to help her afford meds.   She will also continue fluticasone nasal spray and spiriva  · She follows with Dr. Tor Valenzuela outpt     Acute respiratory failure with hypoxia [facility])  Assessment & Plan  · As a result of asthma exacerbation vs HF vs jose  · Did not qualify for home O2 which was days ago  · Patient states she feels winded when she is ambulating and she would like to be reevaluated for home O2 prior to discharge    JOSE (obstructive sleep apnea)  Assessment & Plan  · Severe obstructive sleep apnea, required 8 to 10 L of oxygen at night while inpatient  · Could not tolerate CPAP as outpatient in the past and she had to return her machine  · Discussed with pulmonology on Friday, we placed her on CPAP here which she tolerated for the past 2 days. Pulmonology ordered CPAP for outpatient.  on board, awaiting approval on Monday. Patient should be good to go after she gets her machine        Atrial flutter [facility])  Assessment & Plan  · New onset aflutter  · Continue amiodarone loading dose 200 mg 3 times daily for 1 week then 200 mg daily starting 8/23  · Continue metoprolol tartrate 25 mg twice daily  · Continue eliquis (which is 40 dollars for pt)     Dysphagia  Assessment & Plan  Reports food getting stuck  Appreciate speech eval.   Tolerating diet.  Given tips to help with swallowing  Recommend follow up with gi outpt     Diastolic CHF ([address])  Assessment & Plan  Wt Readings from Last 3 Encounters:   08/20/23 107 kg (236 lb 12.4 oz)   07/20/23 107 kg (236 lb)   07/10/23 107 kg (236 lb 12.8 oz)       · Chest x-ray without acute cardiopulmonary disease  ·   · Recently had updated echo with EF 95%, grade 1 diastolic dysfunction  · Follows with Dr. Lucas Gutierrez outpatient  · Seen by cardiology, she was diuresed with IV Lasix, then transitioned to Lasix 20 mg daily    Dyslipidemia  Assessment & Plan  · Maintained on Crestor per outpatient regimen, substituted for Lipitor while hospitalized    Coronary artery disease involving native coronary artery of native heart without angina pectoris  Assessment & Plan  · Continue metoprolol and statin      Thrombocytopenia, unspecified (HCC)  Assessment & Plan  · Chronic thrombocytopenia noted back in 2020  · Likely due to non-small cell lung cancer    Abnormal renal function  Assessment & Plan  · Patient creatinine previously running 1.0-1.3, since July through August has been running 1.4-1.7  · Creatinine 1.48  · Continue Lasix daily    Non-small cell lung cancer ([address])  Assessment & Plan  · 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 Roberta Wang outpatient  · She is following every 6 months outpatient last seen in May  · Continues on Alectinib 600 mg twice daily  · Also receives Zometa infusions every 3 months    Morbid obesity ([address])  Assessment & Plan  BMI 45 noted contributing to comorbidities  Would benefit from weight loss and lifestyle modifications    Gastroesophageal reflux disease without esophagitis  Assessment & Plan  · Continue PPI    Essential hypertension  Assessment & Plan  bp fairly stable   · atenolol changed for metoprolol due to new onset aflutter. VTE Pharmacologic Prophylaxis: VTE Score: 5 High Risk (Score >/= 5) - Pharmacological DVT Prophylaxis Ordered: apixaban (Eliquis). Sequential Compression Devices Ordered. Patient Centered Rounds: I performed bedside rounds with nursing staff today. Discussions with Specialists or Other Care Team Provider: Nursing, case management    Education and Discussions with Family / Patient: Patient declined call to . Total Time Spent on Date of Encounter in care of patient: 35 minutes This time was spent on one or more of the following: performing physical exam; counseling and coordination of care; obtaining or reviewing history; documenting in the medical record; reviewing/ordering tests, medications or procedures; communicating with other healthcare professionals and discussing with patient's family/caregivers. Current Length of Stay: 8 day(s)  Current Patient Status: Inpatient   Certification Statement: The patient will continue to require additional inpatient hospital stay due to Awaiting CPAP machine to be approved  Discharge Plan: Anticipate discharge in 24-48 hrs to home. Code Status: Level 1 - Full Code    Subjective:   Patient was seen and evaluated bedside. Feeling well at rest.  She states she is getting a little bit winded when she is ambulating but her oxygen level is not dropping.   Tolerated CPAP overnight    Objective:     Vitals:   Temp (24hrs), Av.8 °F (36.6 °C), Min:97.5 °F (36.4 °C), Max:98 °F (36.7 °C)    Temp: [97.5 °F (36.4 °C)-98 °F (36.7 °C)] 98 °F (36.7 °C)  HR:  [55-69] 62  Resp:  [16-20] 16  BP: (123-149)/(66-77) 149/71  SpO2:  [90 %-99 %] 95 %  Body mass index is 44.74 kg/m². Input and Output Summary (last 24 hours):   No intake or output data in the 24 hours ending 08/20/23 1656    Physical Exam:   Physical Exam  Constitutional:       General: She is not in acute distress. Appearance: She is obese. HENT:      Head: Atraumatic. Cardiovascular:      Rate and Rhythm: Normal rate and regular rhythm. Heart sounds: No murmur heard. No friction rub. No gallop. Pulmonary:      Effort: Pulmonary effort is normal. No respiratory distress. Breath sounds: Normal breath sounds. No wheezing. Abdominal:      General: Bowel sounds are normal. There is no distension. Palpations: Abdomen is soft. Tenderness: There is no abdominal tenderness. Musculoskeletal:         General: No swelling. Cervical back: Neck supple. Skin:     General: Skin is warm and dry. Neurological:      General: No focal deficit present. Mental Status: She is alert. Psychiatric:         Mood and Affect: Mood normal.        Additional Data:     Labs:  Results from last 7 days   Lab Units 08/19/23  0614   WBC Thousand/uL 9.45   HEMOGLOBIN g/dL 10.9*   HEMATOCRIT % 33.4*   PLATELETS Thousands/uL 101*     Results from last 7 days   Lab Units 08/19/23  0830 08/17/23  0555 08/16/23  0940   SODIUM mmol/L 139   < > 140   POTASSIUM mmol/L 3.6   < > 3.6   CHLORIDE mmol/L 96   < > 102   CO2 mmol/L 37*   < > 32   BUN mg/dL 36*   < > 30*   CREATININE mg/dL 1.48*   < > 1.34*   ANION GAP mmol/L 6   < > 6   CALCIUM mg/dL 9.1   < > 9.0   ALBUMIN g/dL  --   --  3.9   TOTAL BILIRUBIN mg/dL  --   --  0.77   ALK PHOS U/L  --   --  70   ALT U/L  --   --  32   AST U/L  --   --  29   GLUCOSE RANDOM mg/dL 109   < > 181*    < > = values in this interval not displayed.                        Lines/Drains:  Invasive Devices Peripheral Intravenous Line  Duration           Peripheral IV 08/17/23 Left;Ventral (anterior) Forearm 3 days                      Imaging: Reviewed radiology reports from this admission including: chest xray    Recent Cultures (last 7 days):         Last 24 Hours Medication List:   Current Facility-Administered Medications   Medication Dose Route Frequency Provider Last Rate   • acetaminophen  650 mg Oral Q6H PRN Cyntha Halsted Prechtel, DO     • albuterol  2.5 mg Nebulization Q6H PRN Cyntha Halsted Prechtel, DO     • Alectinib HCl  600 mg Oral BID Sidney Palma PA-C     • [START ON 8/23/2023] amiodarone  200 mg Oral Daily With Breakfast WILDA Thomas     • amiodarone  200 mg Oral TID With Meals WILDA Thomas     • apixaban  5 mg Oral BID Carlos Cope MD     • atorvastatin  40 mg Oral Daily With [employer] S Prechtel, DO     • benzonatate  100 mg Oral TID Lynda Rush PA-C     • budesonide  0.25 mg Nebulization Q12H David S Prechtel, DO     • fluticasone  2 spray Each Nare Daily David S Prechtel, DO     • furosemide  20 mg Oral Daily WILDA Thomas     • guaiFENesin  600 mg Oral BID Sidney Palma PA-C     • ipratropium  0.5 mg Nebulization TID David S Prechtel, DO     • levalbuterol  1.25 mg Nebulization Q6H PRN Cyntha Halsted Prechtel, DO      And   • sodium chloride  3 mL Nebulization Q6H PRN Cyntha Halsted Prechtel, DO     • levalbuterol  1.25 mg Nebulization TID Cyntha Halsted Prechtel, DO     • metoprolol  5 mg Intravenous Q6H PRN WILDA Thomas     • metoprolol tartrate  25 mg Oral Q12H [facility] Carlos Cope MD     • ondansetron  4 mg Intravenous Q6H PRN Cyntha Halsted Prechtel, DO     • oxyCODONE-acetaminophen  1 tablet Oral Q4H PRN David S Prechtel, DO     • pantoprazole  20 mg Oral Early Morning April Peña DO          Today, Patient Was Seen By: Nati Henry MD    **Please Note: This note may have been constructed using a voice recognition system. **

## 2023-08-20 NOTE — ASSESSMENT & PLAN NOTE
· Severe obstructive sleep apnea, required 8 to 10 L of oxygen at night while inpatient  · Could not tolerate CPAP as outpatient in the past and she had to return her machine  · Discussed with pulmonology on Friday, we placed her on CPAP here which she tolerated for the past 2 days. Pulmonology ordered CPAP for outpatient.  on board, awaiting approval on Monday.   Patient should be good to go after she gets her machine

## 2023-08-20 NOTE — RESTORATIVE TECHNICIAN NOTE
Restorative Technician Note      Patient Name: Alyssa Chavarria     Restorative Tech Visit Date: 08/20/23  Note Type: Mobility  Patient Position Upon Consult: Supine  Activity Performed: Ambulated  Assistive Device: Cane  Patient Position at End of Consult: Bedside chair;  All needs within reach

## 2023-08-20 NOTE — ASSESSMENT & PLAN NOTE
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 Alicia Caldwell outpatient  · She is following every 6 months outpatient last seen in May  · Continues on Alectinib 600 mg twice daily  · Also receives Zometa infusions every 3 months

## 2023-08-20 NOTE — ASSESSMENT & PLAN NOTE
· Patient creatinine previously running 1.0-1.3, since July through August has been running 1.4-1.7  · Creatinine 1.48  · Continue Lasix daily

## 2023-08-20 NOTE — ASSESSMENT & PLAN NOTE
Wt Readings from Last 3 Encounters:   08/20/23 107 kg (236 lb 12.4 oz)   07/20/23 107 kg (236 lb)   07/10/23 107 kg (236 lb 12.8 oz)       · Chest x-ray without acute cardiopulmonary disease  ·   · Recently had updated echo with EF 42%, grade 1 diastolic dysfunction  · Follows with Dr. Ronnie Avendano outpatient  · Seen by cardiology, she was diuresed with IV Lasix, then transitioned to Lasix 20 mg daily

## 2023-08-20 NOTE — PLAN OF CARE
Problem: MOBILITY - ADULT  Goal: Maintain or return to baseline ADL function  Description: INTERVENTIONS:  -  Assess patient's ability to carry out ADLs; assess patient's baseline for ADL function and identify physical deficits which impact ability to perform ADLs (bathing, care of mouth/teeth, toileting, grooming, dressing, etc.)  - Assess/evaluate cause of self-care deficits   - Assess range of motion  - Assess patient's mobility; develop plan if impaired  - Assess patient's need for assistive devices and provide as appropriate  - Encourage maximum independence but intervene and supervise when necessary  - Involve family in performance of ADLs  - Assess for home care needs following discharge   - Consider OT consult to assist with ADL evaluation and planning for discharge  - Provide patient education as appropriate  Outcome: Progressing  Goal: Maintains/Returns to pre admission functional level  Description: INTERVENTIONS:  - Perform BMAT or MOVE assessment daily.   - Set and communicate daily mobility goal to care team and patient/family/caregiver. - Collaborate with rehabilitation services on mobility goals if consulted  - Perform Range of Motion 4 times a day. - Reposition patient every 2 hours.   - Dangle patient 3 times a day  - Stand patient 3 times a day  - Ambulate patient 3 times a day  - Out of bed to chair 3 times a day   - Out of bed for meals 3 times a day  - Out of bed for toileting  - Record patient progress and toleration of activity level   Outcome: Progressing     Problem: PAIN - ADULT  Goal: Verbalizes/displays adequate comfort level or baseline comfort level  Description: Interventions:  - Encourage patient to monitor pain and request assistance  - Assess pain using appropriate pain scale  - Administer analgesics based on type and severity of pain and evaluate response  - Implement non-pharmacological measures as appropriate and evaluate response  - Consider cultural and social influences on pain and pain management  - Notify physician/advanced practitioner if interventions unsuccessful or patient reports new pain  Outcome: Progressing     Problem: INFECTION - ADULT  Goal: Absence or prevention of progression during hospitalization  Description: INTERVENTIONS:  - Assess and monitor for signs and symptoms of infection  - Monitor lab/diagnostic results  - Monitor all insertion sites, i.e. indwelling lines, tubes, and drains  - Monitor endotracheal if appropriate and nasal secretions for changes in amount and color  - Rocky appropriate cooling/warming therapies per order  - Administer medications as ordered  - Instruct and encourage patient and family to use good hand hygiene technique  - Identify and instruct in appropriate isolation precautions for identified infection/condition  Outcome: Progressing     Problem: SAFETY ADULT  Goal: Maintain or return to baseline ADL function  Description: INTERVENTIONS:  -  Assess patient's ability to carry out ADLs; assess patient's baseline for ADL function and identify physical deficits which impact ability to perform ADLs (bathing, care of mouth/teeth, toileting, grooming, dressing, etc.)  - Assess/evaluate cause of self-care deficits   - Assess range of motion  - Assess patient's mobility; develop plan if impaired  - Assess patient's need for assistive devices and provide as appropriate  - Encourage maximum independence but intervene and supervise when necessary  - Involve family in performance of ADLs  - Assess for home care needs following discharge   - Consider OT consult to assist with ADL evaluation and planning for discharge  - Provide patient education as appropriate  Outcome: Progressing  Goal: Maintains/Returns to pre admission functional level  Description: INTERVENTIONS:  - Perform BMAT or MOVE assessment daily.   - Set and communicate daily mobility goal to care team and patient/family/caregiver.    - Collaborate with rehabilitation services on mobility goals if consulted  - Perform Range of Motion 4 times a day. - Reposition patient every 2 hours.   - Dangle patient 3 times a day  - Stand patient 3 times a day  - Ambulate patient 3 times a day  - Out of bed to chair 3 times a day   - Out of bed for meals 3 times a day  - Out of bed for toileting  - Record patient progress and toleration of activity level   Outcome: Progressing  Goal: Patient will remain free of falls  Description: INTERVENTIONS:  - Educate patient/family on patient safety including physical limitations  - Instruct patient to call for assistance with activity   - Consult OT/PT to assist with strengthening/mobility   - Keep Call bell within reach  - Keep bed low and locked with side rails adjusted as appropriate  - Keep care items and personal belongings within reach  - Initiate and maintain comfort rounds  - Make Fall Risk Sign visible to staff  - Offer Toileting every 2 Hours, in advance of need  - Apply yellow socks and bracelet for high fall risk patients  - Consider moving patient to room near nurses station  Outcome: Progressing     Problem: DISCHARGE PLANNING  Goal: Discharge to home or other facility with appropriate resources  Description: INTERVENTIONS:  - Identify barriers to discharge w/patient and caregiver  - Arrange for needed discharge resources and transportation as appropriate  - Identify discharge learning needs (meds, wound care, etc.)  - Arrange for interpretive services to assist at discharge as needed  - Refer to Case Management Department for coordinating discharge planning if the patient needs post-hospital services based on physician/advanced practitioner order or complex needs related to functional status, cognitive ability, or social support system  Outcome: Progressing     Problem: Knowledge Deficit  Goal: Patient/family/caregiver demonstrates understanding of disease process, treatment plan, medications, and discharge instructions  Description: Complete learning assessment and assess knowledge base.   Interventions:  - Provide teaching at level of understanding  - Provide teaching via preferred learning methods  Outcome: Progressing     Problem: RESPIRATORY - ADULT  Goal: Achieves optimal ventilation and oxygenation  Description: INTERVENTIONS:  - Assess for changes in respiratory status  - Assess for changes in mentation and behavior  - Position to facilitate oxygenation and minimize respiratory effort  - Oxygen administered by appropriate delivery if ordered  - Initiate smoking cessation education as indicated  - Encourage broncho-pulmonary hygiene including cough, deep breathe, Incentive Spirometry  - Assess the need for suctioning and aspirate as needed  - Assess and instruct to report SOB or any respiratory difficulty  - Respiratory Therapy support as indicated  Outcome: Progressing     Problem: Prexisting or High Potential for Compromised Skin Integrity  Goal: Skin integrity is maintained or improved  Description: INTERVENTIONS:  - Identify patients at risk for skin breakdown  - Assess and monitor skin integrity  - Assess and monitor nutrition and hydration status  - Monitor labs   - Assess for incontinence   - Turn and reposition patient  - Assist with mobility/ambulation  - Relieve pressure over bony prominences  - Avoid friction and shearing  - Provide appropriate hygiene as needed including keeping skin clean and dry  - Evaluate need for skin moisturizer/barrier cream  - Collaborate with interdisciplinary team   - Patient/family teaching  - Consider wound care consult   Outcome: Progressing

## 2023-08-21 LAB
ATRIAL RATE: 71 BPM
P AXIS: 48 DEGREES
PR INTERVAL: 170 MS
QRS AXIS: 29 DEGREES
QRSD INTERVAL: 92 MS
QT INTERVAL: 446 MS
QTC INTERVAL: 484 MS
T WAVE AXIS: 58 DEGREES
VENTRICULAR RATE: 71 BPM

## 2023-08-21 PROCEDURE — 93010 ELECTROCARDIOGRAM REPORT: CPT | Performed by: INTERNAL MEDICINE

## 2023-08-21 PROCEDURE — 94660 CPAP INITIATION&MGMT: CPT

## 2023-08-21 PROCEDURE — 99232 SBSQ HOSP IP/OBS MODERATE 35: CPT | Performed by: INTERNAL MEDICINE

## 2023-08-21 PROCEDURE — 94760 N-INVAS EAR/PLS OXIMETRY 1: CPT

## 2023-08-21 PROCEDURE — 99232 SBSQ HOSP IP/OBS MODERATE 35: CPT | Performed by: FAMILY MEDICINE

## 2023-08-21 PROCEDURE — 94640 AIRWAY INHALATION TREATMENT: CPT

## 2023-08-21 PROCEDURE — 93005 ELECTROCARDIOGRAM TRACING: CPT

## 2023-08-21 RX ORDER — AMIODARONE HYDROCHLORIDE 200 MG/1
400 TABLET ORAL 2 TIMES DAILY WITH MEALS
Status: DISCONTINUED | OUTPATIENT
Start: 2023-08-21 | End: 2023-08-23 | Stop reason: HOSPADM

## 2023-08-21 RX ORDER — AMIODARONE HYDROCHLORIDE 200 MG/1
400 TABLET ORAL 2 TIMES DAILY WITH MEALS
Status: DISCONTINUED | OUTPATIENT
Start: 2023-08-21 | End: 2023-08-21

## 2023-08-21 RX ORDER — AMIODARONE HYDROCHLORIDE 200 MG/1
200 TABLET ORAL
Status: DISCONTINUED | OUTPATIENT
Start: 2023-08-28 | End: 2023-08-23 | Stop reason: HOSPADM

## 2023-08-21 RX ADMIN — BENZONATATE 100 MG: 100 CAPSULE ORAL at 20:26

## 2023-08-21 RX ADMIN — LEVALBUTEROL HYDROCHLORIDE 1.25 MG: 1.25 SOLUTION RESPIRATORY (INHALATION) at 14:25

## 2023-08-21 RX ADMIN — AMIODARONE HYDROCHLORIDE 400 MG: 200 TABLET ORAL at 16:53

## 2023-08-21 RX ADMIN — BUDESONIDE 0.25 MG: 0.25 INHALANT RESPIRATORY (INHALATION) at 07:22

## 2023-08-21 RX ADMIN — APIXABAN 5 MG: 5 TABLET, FILM COATED ORAL at 16:52

## 2023-08-21 RX ADMIN — AMIODARONE HYDROCHLORIDE 200 MG: 200 TABLET ORAL at 12:33

## 2023-08-21 RX ADMIN — LEVALBUTEROL HYDROCHLORIDE 1.25 MG: 1.25 SOLUTION RESPIRATORY (INHALATION) at 20:25

## 2023-08-21 RX ADMIN — PANTOPRAZOLE SODIUM 20 MG: 20 TABLET, DELAYED RELEASE ORAL at 05:10

## 2023-08-21 RX ADMIN — BENZONATATE 100 MG: 100 CAPSULE ORAL at 16:53

## 2023-08-21 RX ADMIN — IPRATROPIUM BROMIDE 0.5 MG: 0.5 SOLUTION RESPIRATORY (INHALATION) at 20:25

## 2023-08-21 RX ADMIN — METOPROLOL TARTRATE 25 MG: 25 TABLET, FILM COATED ORAL at 20:27

## 2023-08-21 RX ADMIN — BUDESONIDE 0.25 MG: 0.25 INHALANT RESPIRATORY (INHALATION) at 20:25

## 2023-08-21 RX ADMIN — APIXABAN 5 MG: 5 TABLET, FILM COATED ORAL at 09:34

## 2023-08-21 RX ADMIN — GUAIFENESIN 600 MG: 600 TABLET, EXTENDED RELEASE ORAL at 16:54

## 2023-08-21 RX ADMIN — IPRATROPIUM BROMIDE 0.5 MG: 0.5 SOLUTION RESPIRATORY (INHALATION) at 14:25

## 2023-08-21 RX ADMIN — ALECTINIB HYDROCHLORIDE 600 MG: 150 CAPSULE ORAL at 09:35

## 2023-08-21 RX ADMIN — ATORVASTATIN CALCIUM 40 MG: 40 TABLET, FILM COATED ORAL at 16:53

## 2023-08-21 RX ADMIN — AMIODARONE HYDROCHLORIDE 200 MG: 200 TABLET ORAL at 09:33

## 2023-08-21 RX ADMIN — METOPROLOL TARTRATE 25 MG: 25 TABLET, FILM COATED ORAL at 09:33

## 2023-08-21 RX ADMIN — BENZONATATE 100 MG: 100 CAPSULE ORAL at 09:34

## 2023-08-21 RX ADMIN — LEVALBUTEROL HYDROCHLORIDE 1.25 MG: 1.25 SOLUTION RESPIRATORY (INHALATION) at 07:22

## 2023-08-21 RX ADMIN — GUAIFENESIN 600 MG: 600 TABLET, EXTENDED RELEASE ORAL at 09:34

## 2023-08-21 RX ADMIN — ALECTINIB HYDROCHLORIDE 600 MG: 150 CAPSULE ORAL at 16:54

## 2023-08-21 RX ADMIN — FUROSEMIDE 20 MG: 20 TABLET ORAL at 09:34

## 2023-08-21 RX ADMIN — FLUTICASONE PROPIONATE 2 SPRAY: 50 SPRAY, METERED NASAL at 09:36

## 2023-08-21 RX ADMIN — IPRATROPIUM BROMIDE 0.5 MG: 0.5 SOLUTION RESPIRATORY (INHALATION) at 07:22

## 2023-08-21 NOTE — PLAN OF CARE
Problem: MOBILITY - ADULT  Goal: Maintains/Returns to pre admission functional level  Description: INTERVENTIONS:  - Perform BMAT or MOVE assessment daily.   - Set and communicate daily mobility goal to care team and patient/family/caregiver. - Collaborate with rehabilitation services on mobility goals if consulted  - Perform Range of Motion 4 times a day. - Reposition patient every 2 hours. - Dangle patient 3 times a day  - Stand patient 3 times a day  - Ambulate patient 3 times a day  - Out of bed to chair 3 times a day   - Out of bed for meals 3 times a day  - Out of bed for toileting  - Record patient progress and toleration of activity level   Outcome: Progressing     Problem: SAFETY ADULT  Goal: Maintain or return to baseline ADL function  Description: INTERVENTIONS:  -  Assess patient's ability to carry out ADLs; assess patient's baseline for ADL function and identify physical deficits which impact ability to perform ADLs (bathing, care of mouth/teeth, toileting, grooming, dressing, etc.)  - Assess/evaluate cause of self-care deficits   - Assess range of motion  - Assess patient's mobility; develop plan if impaired  - Assess patient's need for assistive devices and provide as appropriate  - Encourage maximum independence but intervene and supervise when necessary  - Involve family in performance of ADLs  - Assess for home care needs following discharge   - Consider OT consult to assist with ADL evaluation and planning for discharge  - Provide patient education as appropriate  Outcome: Progressing  Goal: Maintains/Returns to pre admission functional level  Description: INTERVENTIONS:  - Perform BMAT or MOVE assessment daily.   - Set and communicate daily mobility goal to care team and patient/family/caregiver. - Collaborate with rehabilitation services on mobility goals if consulted  - Perform Range of Motion 4 times a day. - Reposition patient every 2 hours.   - Dangle patient 3 times a day  - Stand patient 3 times a day  - Ambulate patient 3 times a day  - Out of bed to chair 3 times a day   - Out of bed for meals 3 times a day  - Out of bed for toileting  - Record patient progress and toleration of activity level   Outcome: Progressing  Goal: Patient will remain free of falls  Description: INTERVENTIONS:  - Educate patient/family on patient safety including physical limitations  - Instruct patient to call for assistance with activity   - Consult OT/PT to assist with strengthening/mobility   - Keep Call bell within reach  - Keep bed low and locked with side rails adjusted as appropriate  - Keep care items and personal belongings within reach  - Initiate and maintain comfort rounds  - Make Fall Risk Sign visible to staff  - Offer Toileting every 2 Hours, in advance of need  - Apply yellow socks and bracelet for high fall risk patients  - Consider moving patient to room near nurses station  Outcome: Progressing

## 2023-08-21 NOTE — RESTORATIVE TECHNICIAN NOTE
Restorative Technician Note      Patient Name: Lyndsey Salinas     Restorative Tech Visit Date: 08/21/23  Note Type: Mobility  Patient Position Upon Consult: Bedside chair  Activity Performed: Ambulated  Assistive Device: Cane  Patient Position at End of Consult: Bedside chair;  All needs within reach

## 2023-08-21 NOTE — PROGRESS NOTES
Progress Note - Cardiology   Des Iyer 70 y.o. female MRN: [id number]  Unit/Bed#: [address] 209-01 Encounter: [id number]    Assessment:    • Paroxysmal atrial fibs/atrial flutter  • Severe asthma with recent acute exacerbation  • Chronic kidney disease stage III  • Chronic diastolic heart failure EF 61%  • Coronary artery disease with coronary artery calcifications on CT  • Obstructive sleep apnea  • Stage IV non-small cell lung cancer  • Hypertension  • Thrombocytopenia  • GERD  • Overweight    Plan:    • Will increase amiodarone loading dose to 400 mg twice daily for 6 more days with the first day being tomorrow and ending on 8/27 after the last dose of the day. Total loading dose will be a little over 8 g amiodarone  • Begin maintenance amiodarone on 8/28 with 200 mg daily      Interval history:  Last saw patient on 8/18/2023. Called to see patient today when her heart rate went up to 1 when trying to ambulate. Presently on monitor in sinus rhythm in the low 70s to high 60s with PACs. Patient has a history of paroxysmal atrial fibs/atrial flutter. She has received a loading dose of amiodarone presently of 3.4 g.     PROBLEM LIST:    Patient Active Problem List    Diagnosis Date Noted   • Atrial flutter ([address]) 08/15/2023   • Dysphagia 08/14/2023   • Acute respiratory failure with hypoxia ([address]) 18/88/2596   • Diastolic CHF ([address]) 07/37/5256   • Coronary artery disease involving native coronary artery of native heart without angina pectoris 07/10/2023   • Dyslipidemia 07/10/2023   • Malignant neoplasm determined by biopsy of lung ([address]) 05/02/2023   • Primary localized osteoarthritis of right knee 12/14/2022   • Severe persistent asthma with acute exacerbation 11/15/2022   • Chronic seasonal allergic rhinitis due to pollen 11/15/2022   • Allergic rhinitis due to animal (cat) (dog) hair and dander 11/15/2022   • Allergic rhinitis due to house dust mite 11/15/2022   • Need for vaccination 07/18/2022   • Thrombocytopenia, unspecified ([address]) 08/20/2021   • Depression, recurrent ([address]) 05/24/2021   • Abnormal renal function 02/23/2021   • Mild persistent asthma 11/11/2020   • Malignant neoplasm metastatic to bone ([address]) 09/17/2020   • Non-small cell lung cancer ([address]) 08/31/2020   • MONO (obstructive sleep apnea)    • Chronic rhinitis 04/12/2019   • Morbid obesity ([address]) 04/11/2019   • Chronic gastritis without bleeding 03/18/2019   • Other headache syndrome 02/13/2019   • Colon cancer screening 01/22/2019   • Gastroesophageal reflux disease without esophagitis 01/22/2019   • Spinal stenosis of 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 01/17/2019   • Lumbar facet arthropathy 01/17/2019   • 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 01/17/2019   • Essential hypertension 10/31/2018   • Vitamin D deficiency 10/31/2018   • Hiatal hernia 10/31/2018   • Premature atrial contractions 10/31/2017   • Primary osteoarthritis of right wrist 05/05/2017       Vitals: /81   Pulse (!) 139   Temp 98.2 °F (36.8 °C) (Oral)   Resp 16   Ht 5' 1" (1.549 m)   Wt 107 kg (235 lb 14.3 oz)   SpO2 95%   BMI 44.57 kg/m²   PREVIOUS WEIGHTS:   Weight (last 2 days)     Date/Time Weight    08/21/23 0600 107 (235.89)    08/20/23 0600 107 (236.77)    08/19/23 0600 109 (240.3)          Wt Readings from Last 2 Encounters:   08/21/23 107 kg (235 lb 14.3 oz)   07/20/23 107 kg (236 lb)       Labs/Data:        Results from last 7 days   Lab Units 08/19/23  0614   WBC Thousand/uL 9.45   HEMOGLOBIN g/dL 10.9*   HEMATOCRIT % 33.4*   MCV fL 90   MCH pg 29.4   MCHC g/dL 32.6   PLATELETS Thousands/uL 101*     Results from last 7 days   Lab Units 08/19/23  0830 08/18/23  0452 08/17/23  0555   EGFR ml/min/1.73sq m 34 30 39   SODIUM mmol/L 139 139 141   POTASSIUM mmol/L 3.6 4.2 4.2   CHLORIDE mmol/L 96 98 101   CO2 mmol/L 37* 36* 37*   BUN mg/dL 36* 32* 33*   CREATININE mg/dL 1.48* 1.63* 1.30     Results from last 7 days   Lab Units 08/19/23  0830 08/18/23  0459 08/17/23  0555 08/16/23  0940   CALCIUM mg/dL 9.1 9.0 9.1 9.0   AST U/L  --   --   --  29   ALT U/L  --   --   --  32   ALK PHOS U/L  --   --   --  70   MAGNESIUM mg/dL  --   --   --  2.4     Lab Results   Component Value Date    NTBNP 277 (H) 07/10/2021     Lab Results   Component Value Date    CHOLESTEROL 220 (H) 07/03/2023    TRIG 208 (H) 07/03/2023    HDL 54 07/03/2023    LDLCALC 124 (H) 07/03/2023         Current Facility-Administered Medications:   •  acetaminophen (TYLENOL) tablet 650 mg, 650 mg, Oral, Q6H PRN, David Chowhtel, DO  •  albuterol inhalation solution 2.5 mg, 2.5 mg, Nebulization, Q6H PRN, David Chowhtel, DO  •  Alectinib HCl CAPS 600 mg, 600 mg, Oral, BID, Ruthie Flores PA-C, 600 mg at 08/21/23 0935  •  [START ON 8/28/2023] amiodarone tablet 200 mg, 200 mg, Oral, Daily With Breakfast, Kaylee Palomo MD  •  amiodarone tablet 400 mg, 400 mg, Oral, BID With Meals, Kaylee Palomo MD  •  apixaban (ELIQUIS) tablet 5 mg, 5 mg, Oral, BID, Zeeshan Reid MD, 5 mg at 08/21/23 3255  •  atorvastatin (LIPITOR) tablet 40 mg, 40 mg, Oral, Daily With Chio Martinez, DO, 40 mg at 08/20/23 1713  •  benzonatate (TESSALON PERLES) capsule 100 mg, 100 mg, Oral, TID, Lynda Rush PA-C, 100 mg at 08/21/23 0934  •  budesonide (PULMICORT) inhalation solution 0.25 mg, 0.25 mg, Nebulization, Q12H, David Chowhtel, DO, 0.25 mg at 08/21/23 1142  •  fluticasone (FLONASE) 50 mcg/act nasal spray 2 spray, 2 spray, Each Nare, Daily, Daviderlin Martinez DO, 2 spray at 08/21/23 0936  •  furosemide (LASIX) tablet 20 mg, 20 mg, Oral, Daily, WILDA Thomas, 20 mg at 08/21/23 8362  •  guaiFENesin (MUCINEX) 12 hr tablet 600 mg, 600 mg, Oral, BID, Ruthie Flores PA-C, 600 mg at 08/21/23 9523  •  ipratropium (ATROVENT) 0.02 % inhalation solution 0.5 mg, 0.5 mg, Nebulization, TID, David Martinez DO, 0.5 mg at 08/21/23 9174  •  levalbuterol (XOPENEX) inhalation solution 1.25 mg, 1.25 mg, Nebulization, Q6H PRN **AND** sodium chloride 0.9 % inhalation solution 3 mL, 3 mL, Nebulization, Q6H PRN, Clide Dirk Prechtel, DO  •  levalbuterol (XOPENEX) inhalation solution 1.25 mg, 1.25 mg, Nebulization, TID, 1.25 mg at 08/21/23 0722 **AND** [DISCONTINUED] sodium chloride 0.9 % inhalation solution 3 mL, 3 mL, Nebulization, TID, David S Prechtel, DO, 3 mL at 08/13/23 0808  •  metoprolol (LOPRESSOR) injection 5 mg, 5 mg, Intravenous, Q6H PRN, WILDA Thomas, 5 mg at 08/16/23 0944  •  metoprolol tartrate (LOPRESSOR) tablet 25 mg, 25 mg, Oral, Q12H [facility], Abebe Beaulieu MD, 25 mg at 08/21/23 0933  •  ondansetron (ZOFRAN) injection 4 mg, 4 mg, Intravenous, Q6H PRN, Clide Dirk Prechtel, DO  •  oxyCODONE-acetaminophen (PERCOCET) 5-325 mg per tablet 1 tablet, 1 tablet, Oral, Q4H PRN, David S Prechtel, DO  •  pantoprazole (PROTONIX) EC tablet 20 mg, 20 mg, Oral, Early Morning, David S Prechtel, DO, 20 mg at 08/21/23 0510  No Known Allergies    No intake or output data in the 24 hours ending 08/21/23 1352    Invasive Devices     Peripheral Intravenous Line  Duration           Peripheral IV 08/17/23 Left;Ventral (anterior) Forearm 4 days                Review of Systems   Respiratory: Negative for cough, choking, chest tightness, shortness of breath and wheezing. Cardiovascular: Negative for chest pain, palpitations and leg swelling. Musculoskeletal: Negative for gait problem. Skin: Negative for rash. Neurological: Negative for dizziness, tremors, syncope, weakness, light-headedness, numbness and headaches. Psychiatric/Behavioral: Negative for agitation and behavioral problems. The patient is not hyperactive. Physical Exam  Constitutional:       General: She is not in acute distress. Appearance: She is well-developed. HENT:      Head: Normocephalic and atraumatic. Neck:      Thyroid: No thyromegaly. Vascular: No carotid bruit or JVD. Trachea: No tracheal deviation. Cardiovascular:      Rate and Rhythm: Normal rate and regular rhythm. Pulses: Normal pulses. Heart sounds: Normal heart sounds. No murmur heard. No friction rub. No gallop. Pulmonary:      Effort: Pulmonary effort is normal. No respiratory distress. Breath sounds: Decreased air movement present. Decreased breath sounds present. No wheezing, rhonchi or rales. Chest:      Chest wall: No tenderness. Musculoskeletal:         General: Normal range of motion. Cervical back: Normal range of motion and neck supple. Right lower leg: No edema. Left lower leg: No edema. Skin:     General: Skin is warm and dry. Neurological:      General: No focal deficit present. Mental Status: She is alert and oriented to person, place, and time. Gait: Gait normal.   Psychiatric:         Mood and Affect: Mood normal.         Behavior: Behavior normal.         Thought Content: Thought content normal.         Judgment: Judgment normal.         ======================================================     Imaging:   I have personally reviewed pertinent reports. EKG: Sinus rhythm with PACs    Portions of the record may have been created with voice recognition software. Occasional wrong word or "sound a like" substitutions may have occurred due to the inherent limitations of voice recognition software. Read the chart carefully and recognize, using context, where substitutions have occurred.

## 2023-08-21 NOTE — ASSESSMENT & PLAN NOTE
· Patient is a 61-year-old female with past medical history of severe persistent asthma, non-small cell lung cancer, diastolic CHF, hypertension, apnea, CAD presenting with shortness of breath concerning for asthma exacerbation  · Chest x-ray no acute cardiopulmonary disease  · Continues to require oxygen prn at daytime up to 2 L, but at nighttime up to 10 L.case management arranging cpap machine. will repeat amb o2 req study tomorrow morning  · Followed by pulmonology, finished steroids and 3 days of azithromycin  · Continue bronchodilators- pt has nebs at home and pulmicort. · She had been on budesonide outpt but fairly expensive for her of 100 a month. advair is 40 a month. Pulmonary cleared pt to restart advair and d/c budesonide which will help her afford other medications. She has 3 boxes of budesonide at home. She may finish budesonide prior to starting advair as change is to help her afford meds.   She will also continue fluticasone nasal spray and spiriva  · She follows with Dr. Beatris Argueta

## 2023-08-21 NOTE — RESTORATIVE TECHNICIAN NOTE
Restorative Technician Note      Patient Name: Danise Dakins     Restorative Tech Visit Date: 08/21/23  Note Type: Mobility  Patient Position Upon Consult: Supine  Activity Performed: Ambulated  Assistive Device: Cane  Patient Position at End of Consult: Supine;  All needs within reach

## 2023-08-21 NOTE — ASSESSMENT & PLAN NOTE
· New onset aflutter  · Continue amiodarone loading dose 200 mg 3 times daily for 1 week then 200 mg daily starting 8/23  · Continue metoprolol tartrate 25 mg twice daily  · Continue eliquis (which is 40 dollars for pt)   · 8/21:patient noted to have hr 130-140 today with minimal exertion. placed on telemetry and asked cardio to re evaluate the patient

## 2023-08-21 NOTE — PLAN OF CARE
Problem: MOBILITY - ADULT  Goal: Maintain or return to baseline ADL function  Description: INTERVENTIONS:  -  Assess patient's ability to carry out ADLs; assess patient's baseline for ADL function and identify physical deficits which impact ability to perform ADLs (bathing, care of mouth/teeth, toileting, grooming, dressing, etc.)  - Assess/evaluate cause of self-care deficits   - Assess range of motion  - Assess patient's mobility; develop plan if impaired  - Assess patient's need for assistive devices and provide as appropriate  - Encourage maximum independence but intervene and supervise when necessary  - Involve family in performance of ADLs  - Assess for home care needs following discharge   - Consider OT consult to assist with ADL evaluation and planning for discharge  - Provide patient education as appropriate  Outcome: Progressing  Goal: Maintains/Returns to pre admission functional level  Description: INTERVENTIONS:  - Perform BMAT or MOVE assessment daily.   - Set and communicate daily mobility goal to care team and patient/family/caregiver. - Collaborate with rehabilitation services on mobility goals if consulted  - Perform Range of Motion 4 times a day. - Reposition patient every 2 hours.   - Dangle patient 3 times a day  - Stand patient 3 times a day  - Ambulate patient 3 times a day  - Out of bed to chair 3 times a day   - Out of bed for meals 3 times a day  - Out of bed for toileting  - Record patient progress and toleration of activity level   Outcome: Progressing     Problem: PAIN - ADULT  Goal: Verbalizes/displays adequate comfort level or baseline comfort level  Description: Interventions:  - Encourage patient to monitor pain and request assistance  - Assess pain using appropriate pain scale  - Administer analgesics based on type and severity of pain and evaluate response  - Implement non-pharmacological measures as appropriate and evaluate response  - Consider cultural and social influences on pain and pain management  - Notify physician/advanced practitioner if interventions unsuccessful or patient reports new pain  Outcome: Progressing     Problem: INFECTION - ADULT  Goal: Absence or prevention of progression during hospitalization  Description: INTERVENTIONS:  - Assess and monitor for signs and symptoms of infection  - Monitor lab/diagnostic results  - Monitor all insertion sites, i.e. indwelling lines, tubes, and drains  - Monitor endotracheal if appropriate and nasal secretions for changes in amount and color  - Hilton appropriate cooling/warming therapies per order  - Administer medications as ordered  - Instruct and encourage patient and family to use good hand hygiene technique  - Identify and instruct in appropriate isolation precautions for identified infection/condition  Outcome: Progressing     Problem: SAFETY ADULT  Goal: Maintain or return to baseline ADL function  Description: INTERVENTIONS:  -  Assess patient's ability to carry out ADLs; assess patient's baseline for ADL function and identify physical deficits which impact ability to perform ADLs (bathing, care of mouth/teeth, toileting, grooming, dressing, etc.)  - Assess/evaluate cause of self-care deficits   - Assess range of motion  - Assess patient's mobility; develop plan if impaired  - Assess patient's need for assistive devices and provide as appropriate  - Encourage maximum independence but intervene and supervise when necessary  - Involve family in performance of ADLs  - Assess for home care needs following discharge   - Consider OT consult to assist with ADL evaluation and planning for discharge  - Provide patient education as appropriate  Outcome: Progressing  Goal: Maintains/Returns to pre admission functional level  Description: INTERVENTIONS:  - Perform BMAT or MOVE assessment daily.   - Set and communicate daily mobility goal to care team and patient/family/caregiver.    - Collaborate with rehabilitation services on mobility goals if consulted  - Perform Range of Motion 4 times a day. - Reposition patient every 2 hours.   - Dangle patient 3 times a day  - Stand patient 3 times a day  - Ambulate patient 3 times a day  - Out of bed to chair 3 times a day   - Out of bed for meals 3 times a day  - Out of bed for toileting  - Record patient progress and toleration of activity level   Outcome: Progressing  Goal: Patient will remain free of falls  Description: INTERVENTIONS:  - Educate patient/family on patient safety including physical limitations  - Instruct patient to call for assistance with activity   - Consult OT/PT to assist with strengthening/mobility   - Keep Call bell within reach  - Keep bed low and locked with side rails adjusted as appropriate  - Keep care items and personal belongings within reach  - Initiate and maintain comfort rounds  - Make Fall Risk Sign visible to staff  - Offer Toileting every 2 Hours, in advance of need  - Apply yellow socks and bracelet for high fall risk patients  - Consider moving patient to room near nurses station  Outcome: Progressing     Problem: DISCHARGE PLANNING  Goal: Discharge to home or other facility with appropriate resources  Description: INTERVENTIONS:  - Identify barriers to discharge w/patient and caregiver  - Arrange for needed discharge resources and transportation as appropriate  - Identify discharge learning needs (meds, wound care, etc.)  - Arrange for interpretive services to assist at discharge as needed  - Refer to Case Management Department for coordinating discharge planning if the patient needs post-hospital services based on physician/advanced practitioner order or complex needs related to functional status, cognitive ability, or social support system  Outcome: Progressing     Problem: Knowledge Deficit  Goal: Patient/family/caregiver demonstrates understanding of disease process, treatment plan, medications, and discharge instructions  Description: Complete learning assessment and assess knowledge base.   Interventions:  - Provide teaching at level of understanding  - Provide teaching via preferred learning methods  Outcome: Progressing     Problem: RESPIRATORY - ADULT  Goal: Achieves optimal ventilation and oxygenation  Description: INTERVENTIONS:  - Assess for changes in respiratory status  - Assess for changes in mentation and behavior  - Position to facilitate oxygenation and minimize respiratory effort  - Oxygen administered by appropriate delivery if ordered  - Initiate smoking cessation education as indicated  - Encourage broncho-pulmonary hygiene including cough, deep breathe, Incentive Spirometry  - Assess the need for suctioning and aspirate as needed  - Assess and instruct to report SOB or any respiratory difficulty  - Respiratory Therapy support as indicated  Outcome: Progressing     Problem: Prexisting or High Potential for Compromised Skin Integrity  Goal: Skin integrity is maintained or improved  Description: INTERVENTIONS:  - Identify patients at risk for skin breakdown  - Assess and monitor skin integrity  - Assess and monitor nutrition and hydration status  - Monitor labs   - Assess for incontinence   - Turn and reposition patient  - Assist with mobility/ambulation  - Relieve pressure over bony prominences  - Avoid friction and shearing  - Provide appropriate hygiene as needed including keeping skin clean and dry  - Evaluate need for skin moisturizer/barrier cream  - Collaborate with interdisciplinary team   - Patient/family teaching  - Consider wound care consult   Outcome: Progressing

## 2023-08-21 NOTE — PROGRESS NOTES
[facility]  Progress Note  Name: Divine Quinn  MRN: [id number]  Unit/Bed#: [facility] 2 -01 I Date of Admission: 8/12/2023   Date of Service: 8/21/2023 I Hospital Day: 9    Assessment/Plan   * Severe persistent asthma with acute exacerbation  Assessment & Plan  · Patient is a 53-year-old female with past medical history of severe persistent asthma, non-small cell lung cancer, diastolic CHF, hypertension, apnea, CAD presenting with shortness of breath concerning for asthma exacerbation  · Chest x-ray no acute cardiopulmonary disease  · Continues to require oxygen prn at daytime up to 2 L, but at nighttime up to 10 L.case management arranging cpap machine. will repeat amb o2 req study tomorrow morning  · Followed by pulmonology, finished steroids and 3 days of azithromycin  · Continue bronchodilators- pt has nebs at home and pulmicort. · She had been on budesonide outpt but fairly expensive for her of 100 a month. advair is 40 a month. Pulmonary cleared pt to restart advair and d/c budesonide which will help her afford other medications. She has 3 boxes of budesonide at home. She may finish budesonide prior to starting advair as change is to help her afford meds. She will also continue fluticasone nasal spray and spiriva  · She follows with Dr. Fabio Dailey outpt     Atrial flutter [facility])  Assessment & Plan  · New onset aflutter  · Continue amiodarone loading dose 200 mg 3 times daily for 1 week then 200 mg daily starting 8/23  · Continue metoprolol tartrate 25 mg twice daily  · Continue eliquis (which is 40 dollars for pt)   · 8/21:patient noted to have hr 130-140 today with minimal exertion. placed on telemetry and asked cardio to re evaluate the patient    Non-small cell lung cancer [facility])  Assessment & Plan  · 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 Alicia Caldwell outpatient  · She is following every 6 months outpatient last seen in May  · Continues on Alectinib 600 mg twice daily  · Also receives Zometa infusions every 3 months    Morbid obesity ([address])  Assessment & Plan  BMI 45 noted contributing to comorbidities  Would benefit from weight loss and lifestyle modifications    Gastroesophageal reflux disease without esophagitis  Assessment & Plan  · Continue PPI    Essential hypertension  Assessment & Plan  bp  stable   · atenolol changed for metoprolol due to new onset aflutter. VTE Pharmacologic Prophylaxis:   Pharmacologic: Apixaban (Eliquis)  Mechanical VTE Prophylaxis in Place: Yes    Patient Centered Rounds: I have performed bedside rounds with nursing staff today. Discussions with Specialists or Other Care Team Provider: margret cardiology    Education and Discussions with Family / Patient: margret patient and will update family    Time Spent for Care: 30 minutes. More than 50% of total time spent on counseling and coordination of care as described above. Current Length of Stay: 9 day(s)    Current Patient Status: Inpatient   Certification Statement: The patient will continue to require additional inpatient hospital stay due to aflutter    Discharge Plan: dc in 1-2 days    Code Status: Level 1 - Full Code      Subjective:   Patient noticed hr in 130s when walking to bathroom today. feels her breathing is improving but worried abt the palpitations    Objective:     Vitals:   Temp (24hrs), Av.2 °F (36.8 °C), Min:98 °F (36.7 °C), Max:98.2 °F (36.8 °C)    Temp:  [98 °F (36.7 °C)-98.2 °F (36.8 °C)] 98.2 °F (36.8 °C)  HR:  [] 139  Resp:  [16] 16  BP: (134-149)/(59-83) 146/81  SpO2:  [90 %-95 %] 95 %  Body mass index is 44.57 kg/m². Input and Output Summary (last 24 hours):     No intake or output data in the 24 hours ending 23 1316    Physical Exam:     Physical Exam  Vitals and nursing note reviewed. Constitutional:       Appearance: Normal appearance. HENT:      Head: Normocephalic and atraumatic.       Right Ear: External ear normal.      Left Ear: External ear normal.      Nose: Nose normal.      Mouth/Throat:      Pharynx: Oropharynx is clear. Cardiovascular:      Rate and Rhythm: Regular rhythm. Tachycardia present. Heart sounds: Normal heart sounds. Pulmonary:      Effort: Pulmonary effort is normal.      Comments: Mod air entry b/l chest with diminished breath sounds on bases  Abdominal:      General: Bowel sounds are normal.      Palpations: Abdomen is soft. Tenderness: There is no abdominal tenderness. Musculoskeletal:         General: Normal range of motion. Cervical back: Normal range of motion and neck supple. Skin:     General: Skin is warm and dry. Capillary Refill: Capillary refill takes less than 2 seconds. Neurological:      General: No focal deficit present. Mental Status: She is alert and oriented to person, place, and time. Psychiatric:         Mood and Affect: Mood normal.           Additional Data:     Labs:    Results from last 7 days   Lab Units 08/19/23  0614   WBC Thousand/uL 9.45   HEMOGLOBIN g/dL 10.9*   HEMATOCRIT % 33.4*   PLATELETS Thousands/uL 101*     Results from last 7 days   Lab Units 08/19/23  0830 08/17/23  0555 08/16/23  0940   SODIUM mmol/L 139   < > 140   POTASSIUM mmol/L 3.6   < > 3.6   CHLORIDE mmol/L 96   < > 102   CO2 mmol/L 37*   < > 32   BUN mg/dL 36*   < > 30*   CREATININE mg/dL 1.48*   < > 1.34*   ANION GAP mmol/L 6   < > 6   CALCIUM mg/dL 9.1   < > 9.0   ALBUMIN g/dL  --   --  3.9   TOTAL BILIRUBIN mg/dL  --   --  0.77   ALK PHOS U/L  --   --  70   ALT U/L  --   --  32   AST U/L  --   --  29   GLUCOSE RANDOM mg/dL 109   < > 181*    < > = values in this interval not displayed. * I Have Reviewed All Lab Data Listed Above. * Additional Pertinent Lab Tests Reviewed:  All Labs For Current Hospital Admission Reviewed    Imaging:    Imaging Reports Reviewed Today Include:cxray  Imaging Personally Reviewed by Myself Includes:  cxray    Recent Cultures (last 7 days):           Last 24 Hours Medication List:   Current Facility-Administered Medications   Medication Dose Route Frequency Provider Last Rate   • acetaminophen  650 mg Oral Q6H PRN Clide Dirk Prechtel, DO     • albuterol  2.5 mg Nebulization Q6H PRN Clide Dirk Prechtel, DO     • Alectinib HCl  600 mg Oral BID Vijay Lou PA-C     • [START ON 8/23/2023] amiodarone  200 mg Oral Daily With Breakfast WILDA Thomas     • amiodarone  200 mg Oral TID With Meals WILDA Thomas     • apixaban  5 mg Oral BID Abebe Beaulieu MD     • atorvastatin  40 mg Oral Daily With [employer] S Prechtel, DO     • benzonatate  100 mg Oral TID Lynda Rush PA-C     • budesonide  0.25 mg Nebulization Q12H David S Prechtel, DO     • fluticasone  2 spray Each Nare Daily David S Prechtel, DO     • furosemide  20 mg Oral Daily WILDA Thomas     • guaiFENesin  600 mg Oral BID Vijay Lou PA-C     • ipratropium  0.5 mg Nebulization TID Clide Dirk Prechtel, DO     • levalbuterol  1.25 mg Nebulization Q6H PRN Clide Dirk Prechtel, DO      And   • sodium chloride  3 mL Nebulization Q6H PRN Clide Dirk Prechtel, DO     • levalbuterol  1.25 mg Nebulization TID Clide Dirk Prechtel, DO     • metoprolol  5 mg Intravenous Q6H PRN WILDA Thomas     • metoprolol tartrate  25 mg Oral Q12H [facility] Abebe Beaulieu MD     • ondansetron  4 mg Intravenous Q6H PRN Clide Dirk Prechtel, DO     • oxyCODONE-acetaminophen  1 tablet Oral Q4H PRN David S Prechtel, DO     • pantoprazole  20 mg Oral Early Morning Ankit Garcia DO          Today, Patient Was Seen By: Hillary Zuniga MD    ** Please Note: Dictation voice to text software may have been used in the creation of this document.  **

## 2023-08-22 PROBLEM — J45.51 SEVERE PERSISTENT ASTHMA WITH ACUTE EXACERBATION: Status: RESOLVED | Noted: 2022-11-15 | Resolved: 2023-08-22

## 2023-08-22 LAB
ALBUMIN SERPL BCP-MCNC: 4 G/DL (ref 3.5–5)
ALP SERPL-CCNC: 96 U/L (ref 34–104)
ALT SERPL W P-5'-P-CCNC: 52 U/L (ref 7–52)
ANION GAP SERPL CALCULATED.3IONS-SCNC: 7 MMOL/L
AST SERPL W P-5'-P-CCNC: 44 U/L (ref 13–39)
BILIRUB SERPL-MCNC: 1.11 MG/DL (ref 0.2–1)
BUN SERPL-MCNC: 31 MG/DL (ref 5–25)
CALCIUM SERPL-MCNC: 9.1 MG/DL (ref 8.4–10.2)
CHLORIDE SERPL-SCNC: 94 MMOL/L (ref 96–108)
CO2 SERPL-SCNC: 35 MMOL/L (ref 21–32)
CREAT SERPL-MCNC: 1.57 MG/DL (ref 0.6–1.3)
GFR SERPL CREATININE-BSD FRML MDRD: 31 ML/MIN/1.73SQ M
GLUCOSE SERPL-MCNC: 210 MG/DL (ref 65–140)
POTASSIUM SERPL-SCNC: 3.8 MMOL/L (ref 3.5–5.3)
PROT SERPL-MCNC: 6.6 G/DL (ref 6.4–8.4)
SODIUM SERPL-SCNC: 136 MMOL/L (ref 135–147)

## 2023-08-22 PROCEDURE — 94760 N-INVAS EAR/PLS OXIMETRY 1: CPT

## 2023-08-22 PROCEDURE — 94640 AIRWAY INHALATION TREATMENT: CPT

## 2023-08-22 PROCEDURE — 94660 CPAP INITIATION&MGMT: CPT

## 2023-08-22 PROCEDURE — 99232 SBSQ HOSP IP/OBS MODERATE 35: CPT | Performed by: STUDENT IN AN ORGANIZED HEALTH CARE EDUCATION/TRAINING PROGRAM

## 2023-08-22 PROCEDURE — 80053 COMPREHEN METABOLIC PANEL: CPT | Performed by: NURSE PRACTITIONER

## 2023-08-22 PROCEDURE — 99232 SBSQ HOSP IP/OBS MODERATE 35: CPT | Performed by: PHYSICIAN ASSISTANT

## 2023-08-22 RX ADMIN — APIXABAN 5 MG: 5 TABLET, FILM COATED ORAL at 08:04

## 2023-08-22 RX ADMIN — AMIODARONE HYDROCHLORIDE 400 MG: 200 TABLET ORAL at 08:04

## 2023-08-22 RX ADMIN — LEVALBUTEROL HYDROCHLORIDE 1.25 MG: 1.25 SOLUTION RESPIRATORY (INHALATION) at 06:49

## 2023-08-22 RX ADMIN — ALECTINIB HYDROCHLORIDE 600 MG: 150 CAPSULE ORAL at 16:26

## 2023-08-22 RX ADMIN — METOPROLOL TARTRATE 25 MG: 25 TABLET, FILM COATED ORAL at 20:06

## 2023-08-22 RX ADMIN — GUAIFENESIN 600 MG: 600 TABLET, EXTENDED RELEASE ORAL at 08:04

## 2023-08-22 RX ADMIN — IPRATROPIUM BROMIDE 0.5 MG: 0.5 SOLUTION RESPIRATORY (INHALATION) at 13:45

## 2023-08-22 RX ADMIN — ALECTINIB HYDROCHLORIDE 600 MG: 150 CAPSULE ORAL at 08:04

## 2023-08-22 RX ADMIN — APIXABAN 5 MG: 5 TABLET, FILM COATED ORAL at 16:26

## 2023-08-22 RX ADMIN — BUDESONIDE 0.25 MG: 0.25 INHALANT RESPIRATORY (INHALATION) at 06:49

## 2023-08-22 RX ADMIN — LEVALBUTEROL HYDROCHLORIDE 1.25 MG: 1.25 SOLUTION RESPIRATORY (INHALATION) at 20:49

## 2023-08-22 RX ADMIN — BUDESONIDE 0.25 MG: 0.25 INHALANT RESPIRATORY (INHALATION) at 20:49

## 2023-08-22 RX ADMIN — ATORVASTATIN CALCIUM 40 MG: 40 TABLET, FILM COATED ORAL at 16:26

## 2023-08-22 RX ADMIN — GUAIFENESIN 600 MG: 600 TABLET, EXTENDED RELEASE ORAL at 16:26

## 2023-08-22 RX ADMIN — FLUTICASONE PROPIONATE 2 SPRAY: 50 SPRAY, METERED NASAL at 08:04

## 2023-08-22 RX ADMIN — IPRATROPIUM BROMIDE 0.5 MG: 0.5 SOLUTION RESPIRATORY (INHALATION) at 06:49

## 2023-08-22 RX ADMIN — BENZONATATE 100 MG: 100 CAPSULE ORAL at 08:04

## 2023-08-22 RX ADMIN — BENZONATATE 100 MG: 100 CAPSULE ORAL at 20:05

## 2023-08-22 RX ADMIN — FUROSEMIDE 20 MG: 20 TABLET ORAL at 08:04

## 2023-08-22 RX ADMIN — METOPROLOL TARTRATE 25 MG: 25 TABLET, FILM COATED ORAL at 08:04

## 2023-08-22 RX ADMIN — IPRATROPIUM BROMIDE 0.5 MG: 0.5 SOLUTION RESPIRATORY (INHALATION) at 20:49

## 2023-08-22 RX ADMIN — PANTOPRAZOLE SODIUM 20 MG: 20 TABLET, DELAYED RELEASE ORAL at 06:49

## 2023-08-22 RX ADMIN — LEVALBUTEROL HYDROCHLORIDE 1.25 MG: 1.25 SOLUTION RESPIRATORY (INHALATION) at 13:45

## 2023-08-22 RX ADMIN — AMIODARONE HYDROCHLORIDE 400 MG: 200 TABLET ORAL at 16:26

## 2023-08-22 RX ADMIN — BENZONATATE 100 MG: 100 CAPSULE ORAL at 16:26

## 2023-08-22 NOTE — ASSESSMENT & PLAN NOTE
· Multifactorial secondary to asthma exacerbation vs acute CHF, new onset Afib/flutter with intermittent RVR, MONO   · Did not qualify for home O2 which was days ago, will repeat prior to d/c   · Case management has sent referral for CPAP at home and is checking when it is being delivered

## 2023-08-22 NOTE — ASSESSMENT & PLAN NOTE
· Chronic thrombocytopenia noted back in 2020  · Likely due to non-small cell lung cancer  · Monitor on Eliquis

## 2023-08-22 NOTE — ASSESSMENT & PLAN NOTE
· New onset aflutter here , seen by cardiology   · Started on anticoagulation with Eliquis 5 mg twice daily  · Continues with intermittent RVR, likely obstructive sleep apnea is contributing -we are arranging for CPAP at home  · Amiodarone was increased 8/21 to 400 mg twice daily for loading period and then will continue 200 mg once daily 8/28  · Continued RVR last evening, continue on telemetry  · Spoke with cardiology today  · Continue metoprolol tartrate 25 mg twice daily  · Echocardiogram from July: EF 61%, normal wall motion, grade 1 diastolic dysfunction, mild mid ventricular obstruction with systolic gradient up to 27 mmHg with valsalva

## 2023-08-22 NOTE — PROGRESS NOTES
[facility]  Progress Note  Name: Tam Davis  MRN: [id number]  Unit/Bed#: Bo 2 -01 I Date of Admission: 8/12/2023   Date of Service: 8/22/2023 I Hospital Day: 10    Assessment/Plan   * Atrial flutter [facility])  Assessment & Plan  · New onset aflutter here , seen by cardiology   · Started on anticoagulation with Eliquis 5 mg twice daily  · Continues with intermittent RVR, likely obstructive sleep apnea is contributing -we are arranging for CPAP at home  · Amiodarone was increased 8/21 to 400 mg twice daily for loading period and then will continue 200 mg once daily 8/28  · Continued RVR last evening, continue on telemetry  · Spoke with cardiology today  · Continue metoprolol tartrate 25 mg twice daily  · Echocardiogram from July: EF 61%, normal wall motion, grade 1 diastolic dysfunction, mild mid ventricular obstruction with systolic gradient up to 27 mmHg with valsalva     Severe persistent asthma with acute exacerbation-resolved as of 8/22/2023  Assessment & Plan  Patient presented with severe persistent asthma  Seen in consultation with pulmonology  Completed course of steroids and azithromycin  Continue ICS/LABA/LAMA at home , as needed albuterol  Arranging for CPAP outpatient    Acute respiratory failure with hypoxia ([address])  Assessment & Plan  · Multifactorial secondary to asthma exacerbation vs acute CHF, new onset Afib/flutter with intermittent RVR, MONO   · Did not qualify for home O2 which was days ago, will repeat prior to d/c   · Case management has sent referral for CPAP at home and is checking when it is being delivered       Diastolic CHF [facility])  Assessment & Plan  Wt Readings from Last 3 Encounters:   08/21/23 107 kg (235 lb 14.3 oz)   07/20/23 107 kg (236 lb)   07/10/23 107 kg (236 lb 12.8 oz)     · Chest x-ray without acute cardiopulmonary disease  ·   · Recently had updated echo with EF 24%, grade 1 diastolic dysfunction  · Follows with Dr. Abimbola Graff outpatient  · Seen by cardiology, she was diuresed with IV Lasix, then transitioned to Lasix 20 mg daily  · Euvolemic      Dyslipidemia  Assessment & Plan  · Maintained on Crestor per outpatient regimen, substituted for Lipitor while hospitalized    Coronary artery disease involving native coronary artery of native heart without angina pectoris  Assessment & Plan  · Continue metoprolol and statin  · Now on Eliquis      Thrombocytopenia, unspecified (HCC)  Assessment & Plan  · Chronic thrombocytopenia noted back in 2020  · Likely due to non-small cell lung cancer  · Monitor on Eliquis    Abnormal renal function  Assessment & Plan  · Patient creatinine previously running 1.0-1.3, since July through August has been running 1.4-1.7  · Continue Lasix 20 mg daily  · May be new baseline around 1.4-1.6   · Ultrasound kidney and bladder: 0, right simple renal cyst, ureteral jets not detected, bladder otherwise unremarkable   · should have outpt follow up with nephrology     Non-small cell lung cancer ([address])  Assessment & Plan  · 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 Bev Pierre outpatient  · She is following every 6 months outpatient last seen in May  · Continues on Alectinib 600 mg twice daily  · Also receives Zometa infusions every 3 months    MONO (obstructive sleep apnea)  Assessment & Plan  · Severe obstructive sleep apnea, required 8 to 10 L of oxygen at night while inpatient  · Could not tolerate CPAP as outpatient in the past and she had to return her machine  · Discussed with pulmonology during hosptialization, plaved on CPAP here toelrating and pulm ordered outpt Autocpap .  Outpatient follow up         Morbid obesity ([address])  Assessment & Plan  BMI 45 noted contributing to comorbidities  Would benefit from weight loss and lifestyle modifications    Gastroesophageal reflux disease without esophagitis  Assessment & Plan  · Continue PPI    Essential hypertension  Assessment & Plan  Blood pressure stable, continue metoprolol and Lasix  Monitor on amiodarone             VTE Pharmacologic Prophylaxis: VTE Score: 5 High Risk (Score >/= 5) - Pharmacological DVT Prophylaxis Ordered: apixaban (Eliquis). Sequential Compression Devices Ordered. Patient Centered Rounds: I performed bedside rounds with nursing staff today. Discussions with Specialists or Other Care Team Provider: CM, cardiology     Education and Discussions with Family / Patient: Patient declined call to . Total Time Spent on Date of Encounter in care of patient: 25 minutes This time was spent on one or more of the following: performing physical exam; counseling and coordination of care; obtaining or reviewing history; documenting in the medical record; reviewing/ordering tests, medications or procedures; communicating with other healthcare professionals and discussing with patient's family/caregivers. Current Length of Stay: 10 day(s)  Current Patient Status: Inpatient   Certification Statement: The patient will continue to require additional inpatient hospital stay due to Atrial flutter/fib   Discharge Plan: Anticipate discharge tomorrow to home. Code Status: Level 1 - Full Code    Subjective:   Patient doing better. Breathing has improved. Tolerated CPAP last evening. Intermittent palpitations with tachycardia. Currently normal sinus rhythm. No fevers chills abdominal pain nausea vomiting no leg swelling. Objective:     Vitals:   Temp (24hrs), Av.2 °F (36.8 °C), Min:98 °F (36.7 °C), Max:98.5 °F (36.9 °C)    Temp:  [98 °F (36.7 °C)-98.5 °F (36.9 °C)] 98 °F (36.7 °C)  HR:  [67-75] 71  Resp:  [17-20] 20  BP: (146-150)/(68-71) 150/69  SpO2:  [91 %-99 %] 93 %  Body mass index is 44.57 kg/m². Input and Output Summary (last 24 hours):   No intake or output data in the 24 hours ending 23 1055    Physical Exam:   Physical Exam  Vitals and nursing note reviewed. Constitutional:       Appearance: She is obese. Cardiovascular:      Rate and Rhythm: Normal rate and regular rhythm. Pulmonary:      Effort: Pulmonary effort is normal. No respiratory distress. Breath sounds: Normal breath sounds. No wheezing. Abdominal:      General: Bowel sounds are normal.      Palpations: Abdomen is soft. Musculoskeletal:      Right lower leg: No edema. Left lower leg: No edema. Skin:     General: Skin is warm. Neurological:      Mental Status: She is alert. Mental status is at baseline. Psychiatric:         Mood and Affect: Mood normal.          Additional Data:     Labs:  Results from last 7 days   Lab Units 08/19/23  0614   WBC Thousand/uL 9.45   HEMOGLOBIN g/dL 10.9*   HEMATOCRIT % 33.4*   PLATELETS Thousands/uL 101*     Results from last 7 days   Lab Units 08/22/23  0912   SODIUM mmol/L 136   POTASSIUM mmol/L 3.8   CHLORIDE mmol/L 94*   CO2 mmol/L 35*   BUN mg/dL 31*   CREATININE mg/dL 1.57*   ANION GAP mmol/L 7   CALCIUM mg/dL 9.1   ALBUMIN g/dL 4.0   TOTAL BILIRUBIN mg/dL 1.11*   ALK PHOS U/L 96   ALT U/L 52   AST U/L 44*   GLUCOSE RANDOM mg/dL 210*                       Lines/Drains:  Invasive Devices     Peripheral Intravenous Line  Duration           Peripheral IV 08/17/23 Left;Ventral (anterior) Forearm 5 days                  Telemetry:  Telemetry Orders (From admission, onward)             24 Hour Telemetry Monitoring  Continuous x 24 Hours (Telem)        Question:  Reason for 24 Hour Telemetry  Answer:  Metabolic/electrolyte disturbance with high probability of dysrhythmia.  K level <3 or >6 OR KCL infusion >10mEq/hr                            Imaging: Reviewed radiology reports from this admission including: ultrasound(s)    Recent Cultures (last 7 days):         Last 24 Hours Medication List:   Current Facility-Administered Medications   Medication Dose Route Frequency Provider Last Rate   • acetaminophen  650 mg Oral Q6H PRN Kianna Pratt DO     • albuterol  2.5 mg Nebulization Q6H PRN Haroldine Dart Prechtel, DO     • Alectinib HCl  600 mg Oral BID Perry Guthrie PA-C     • [START ON 8/28/2023] amiodarone  200 mg Oral Daily With Breakfast Sheila Tran MD     • amiodarone  400 mg Oral BID With Meals Sheila Tran MD     • apixaban  5 mg Oral BID Stella Garcia MD     • atorvastatin  40 mg Oral Daily With [employer] S Prechtel, DO     • benzonatate  100 mg Oral TID Lynda Rush PA-C     • budesonide  0.25 mg Nebulization Q12H David S Prechtel, DO     • fluticasone  2 spray Each Nare Daily David S Prechtel, DO     • furosemide  20 mg Oral Daily WILDA Thomas     • guaiFENesin  600 mg Oral BID Perry Guthrie PA-C     • ipratropium  0.5 mg Nebulization TID Haroldine Dart Prechtel, DO     • levalbuterol  1.25 mg Nebulization Q6H PRN Haroldine Dart Prechtel, DO      And   • sodium chloride  3 mL Nebulization Q6H PRN Haroldine Dart Prechtel, DO     • levalbuterol  1.25 mg Nebulization TID Haroldine Dart Prechtel, DO     • metoprolol  5 mg Intravenous Q6H PRN WILDA Thomas     • metoprolol tartrate  25 mg Oral Q12H [address] Stella Garcia MD     • ondansetron  4 mg Intravenous Q6H PRN Haroldine Dart Prechtel, DO     • oxyCODONE-acetaminophen  1 tablet Oral Q4H PRN Haroldine Dart Prechtel, DO     • pantoprazole  20 mg Oral Early Morning Los Chew DO          Today, Patient Was Seen By: Perry Guthrie PA-C    **Please Note: This note may have been constructed using a voice recognition system. **

## 2023-08-22 NOTE — ASSESSMENT & PLAN NOTE
Wt Readings from Last 3 Encounters:   08/21/23 107 kg (235 lb 14.3 oz)   07/20/23 107 kg (236 lb)   07/10/23 107 kg (236 lb 12.8 oz)     · Chest x-ray without acute cardiopulmonary disease  ·   · Recently had updated echo with EF 59%, grade 1 diastolic dysfunction  · Follows with Dr. Lucas Gutierrez outpatient  · Seen by cardiology, she was diuresed with IV Lasix, then transitioned to Lasix 20 mg daily  · Euvolemic

## 2023-08-22 NOTE — ASSESSMENT & PLAN NOTE
Patient presented with severe persistent asthma  Seen in consultation with pulmonology  Completed course of steroids and azithromycin  Continue ICS/LABA/LAMA at home , as needed albuterol  Arranging for CPAP outpatient

## 2023-08-22 NOTE — ASSESSMENT & PLAN NOTE
· Severe obstructive sleep apnea, required 8 to 10 L of oxygen at night while inpatient  · Could not tolerate CPAP as outpatient in the past and she had to return her machine  · Discussed with pulmonology during hosptialization, plaved on CPAP here toelrating and pulm ordered outpt Autocpap .  Outpatient follow up

## 2023-08-22 NOTE — ASSESSMENT & PLAN NOTE
· Patient creatinine previously running 1.0-1.3, since July through August has been running 1.4-1.7  · Continue Lasix 20 mg daily  · May be new baseline around 1.4-1.6   · Ultrasound kidney and bladder: 0, right simple renal cyst, ureteral jets not detected, bladder otherwise unremarkable   · should have outpt follow up with nephrology

## 2023-08-22 NOTE — ASSESSMENT & PLAN NOTE
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 Ankit Xiao outpatient  · She is following every 6 months outpatient last seen in May  · Continues on Alectinib 600 mg twice daily  · Also receives Zometa infusions every 3 months

## 2023-08-22 NOTE — PLAN OF CARE
Problem: MOBILITY - ADULT  Goal: Maintain or return to baseline ADL function  Description: INTERVENTIONS:  -  Assess patient's ability to carry out ADLs; assess patient's baseline for ADL function and identify physical deficits which impact ability to perform ADLs (bathing, care of mouth/teeth, toileting, grooming, dressing, etc.)  - Assess/evaluate cause of self-care deficits   - Assess range of motion  - Assess patient's mobility; develop plan if impaired  - Assess patient's need for assistive devices and provide as appropriate  - Encourage maximum independence but intervene and supervise when necessary  - Involve family in performance of ADLs  - Assess for home care needs following discharge   - Consider OT consult to assist with ADL evaluation and planning for discharge  - Provide patient education as appropriate  Outcome: Progressing  Goal: Maintains/Returns to pre admission functional level  Description: INTERVENTIONS:  - Perform BMAT or MOVE assessment daily.   - Set and communicate daily mobility goal to care team and patient/family/caregiver. - Collaborate with rehabilitation services on mobility goals if consulted  - Perform Range of Motion 4 times a day. - Reposition patient every 2 hours.   - Dangle patient 3 times a day  - Stand patient 3 times a day  - Ambulate patient 3 times a day  - Out of bed to chair 3 times a day   - Out of bed for meals 3 times a day  - Out of bed for toileting  - Record patient progress and toleration of activity level   Outcome: Progressing     Problem: PAIN - ADULT  Goal: Verbalizes/displays adequate comfort level or baseline comfort level  Description: Interventions:  - Encourage patient to monitor pain and request assistance  - Assess pain using appropriate pain scale  - Administer analgesics based on type and severity of pain and evaluate response  - Implement non-pharmacological measures as appropriate and evaluate response  - Consider cultural and social influences on pain and pain management  - Notify physician/advanced practitioner if interventions unsuccessful or patient reports new pain  Outcome: Progressing     Problem: INFECTION - ADULT  Goal: Absence or prevention of progression during hospitalization  Description: INTERVENTIONS:  - Assess and monitor for signs and symptoms of infection  - Monitor lab/diagnostic results  - Monitor all insertion sites, i.e. indwelling lines, tubes, and drains  - Monitor endotracheal if appropriate and nasal secretions for changes in amount and color  - [place] appropriate cooling/warming therapies per order  - Administer medications as ordered  - Instruct and encourage patient and family to use good hand hygiene technique  - Identify and instruct in appropriate isolation precautions for identified infection/condition  Outcome: Progressing     Problem: SAFETY ADULT  Goal: Maintain or return to baseline ADL function  Description: INTERVENTIONS:  -  Assess patient's ability to carry out ADLs; assess patient's baseline for ADL function and identify physical deficits which impact ability to perform ADLs (bathing, care of mouth/teeth, toileting, grooming, dressing, etc.)  - Assess/evaluate cause of self-care deficits   - Assess range of motion  - Assess patient's mobility; develop plan if impaired  - Assess patient's need for assistive devices and provide as appropriate  - Encourage maximum independence but intervene and supervise when necessary  - Involve family in performance of ADLs  - Assess for home care needs following discharge   - Consider OT consult to assist with ADL evaluation and planning for discharge  - Provide patient education as appropriate  Outcome: Progressing  Goal: Maintains/Returns to pre admission functional level  Description: INTERVENTIONS:  - Perform BMAT or MOVE assessment daily.   - Set and communicate daily mobility goal to care team and patient/family/caregiver.    - Collaborate with rehabilitation services on mobility goals if consulted  - Perform Range of Motion 4 times a day. - Reposition patient every 2 hours.   - Dangle patient 3 times a day  - Stand patient 3 times a day  - Ambulate patient 3 times a day  - Out of bed to chair 3 times a day   - Out of bed for meals 3 times a day  - Out of bed for toileting  - Record patient progress and toleration of activity level   Outcome: Progressing  Goal: Patient will remain free of falls  Description: INTERVENTIONS:  - Educate patient/family on patient safety including physical limitations  - Instruct patient to call for assistance with activity   - Consult OT/PT to assist with strengthening/mobility   - Keep Call bell within reach  - Keep bed low and locked with side rails adjusted as appropriate  - Keep care items and personal belongings within reach  - Initiate and maintain comfort rounds  - Make Fall Risk Sign visible to staff  - Offer Toileting every 2 Hours, in advance of need  - Apply yellow socks and bracelet for high fall risk patients  - Consider moving patient to room near nurses station  Outcome: Progressing     Problem: DISCHARGE PLANNING  Goal: Discharge to home or other facility with appropriate resources  Description: INTERVENTIONS:  - Identify barriers to discharge w/patient and caregiver  - Arrange for needed discharge resources and transportation as appropriate  - Identify discharge learning needs (meds, wound care, etc.)  - Arrange for interpretive services to assist at discharge as needed  - Refer to Case Management Department for coordinating discharge planning if the patient needs post-hospital services based on physician/advanced practitioner order or complex needs related to functional status, cognitive ability, or social support system  Outcome: Progressing     Problem: Knowledge Deficit  Goal: Patient/family/caregiver demonstrates understanding of disease process, treatment plan, medications, and discharge instructions  Description: Complete learning assessment and assess knowledge base.   Interventions:  - Provide teaching at level of understanding  - Provide teaching via preferred learning methods  Outcome: Progressing     Problem: RESPIRATORY - ADULT  Goal: Achieves optimal ventilation and oxygenation  Description: INTERVENTIONS:  - Assess for changes in respiratory status  - Assess for changes in mentation and behavior  - Position to facilitate oxygenation and minimize respiratory effort  - Oxygen administered by appropriate delivery if ordered  - Initiate smoking cessation education as indicated  - Encourage broncho-pulmonary hygiene including cough, deep breathe, Incentive Spirometry  - Assess the need for suctioning and aspirate as needed  - Assess and instruct to report SOB or any respiratory difficulty  - Respiratory Therapy support as indicated  Outcome: Progressing     Problem: Prexisting or High Potential for Compromised Skin Integrity  Goal: Skin integrity is maintained or improved  Description: INTERVENTIONS:  - Identify patients at risk for skin breakdown  - Assess and monitor skin integrity  - Assess and monitor nutrition and hydration status  - Monitor labs   - Assess for incontinence   - Turn and reposition patient  - Assist with mobility/ambulation  - Relieve pressure over bony prominences  - Avoid friction and shearing  - Provide appropriate hygiene as needed including keeping skin clean and dry  - Evaluate need for skin moisturizer/barrier cream  - Collaborate with interdisciplinary team   - Patient/family teaching  - Consider wound care consult   Outcome: Progressing

## 2023-08-23 VITALS
SYSTOLIC BLOOD PRESSURE: 156 MMHG | TEMPERATURE: 99.1 F | HEIGHT: 61 IN | BODY MASS INDEX: 44.54 KG/M2 | OXYGEN SATURATION: 95 % | RESPIRATION RATE: 16 BRPM | WEIGHT: 235.89 LBS | DIASTOLIC BLOOD PRESSURE: 88 MMHG | HEART RATE: 66 BPM

## 2023-08-23 DIAGNOSIS — J30.9 ALLERGIC RHINITIS, UNSPECIFIED SEASONALITY, UNSPECIFIED TRIGGER: ICD-10-CM

## 2023-08-23 PROBLEM — J96.01 ACUTE RESPIRATORY FAILURE WITH HYPOXIA (HCC): Status: RESOLVED | Noted: 2023-08-14 | Resolved: 2023-08-23

## 2023-08-23 PROBLEM — R13.10 DYSPHAGIA: Status: RESOLVED | Noted: 2023-08-14 | Resolved: 2023-08-23

## 2023-08-23 LAB
ANION GAP SERPL CALCULATED.3IONS-SCNC: 7 MMOL/L
BUN SERPL-MCNC: 31 MG/DL (ref 5–25)
CALCIUM SERPL-MCNC: 9.1 MG/DL (ref 8.4–10.2)
CHLORIDE SERPL-SCNC: 95 MMOL/L (ref 96–108)
CO2 SERPL-SCNC: 35 MMOL/L (ref 21–32)
CREAT SERPL-MCNC: 1.57 MG/DL (ref 0.6–1.3)
ERYTHROCYTE [DISTWIDTH] IN BLOOD BY AUTOMATED COUNT: 15.7 % (ref 11.6–15.1)
GFR SERPL CREATININE-BSD FRML MDRD: 31 ML/MIN/1.73SQ M
GLUCOSE SERPL-MCNC: 122 MG/DL (ref 65–140)
HCT VFR BLD AUTO: 32.3 % (ref 34.8–46.1)
HGB BLD-MCNC: 10.5 G/DL (ref 11.5–15.4)
MCH RBC QN AUTO: 29.2 PG (ref 26.8–34.3)
MCHC RBC AUTO-ENTMCNC: 32.5 G/DL (ref 31.4–37.4)
MCV RBC AUTO: 90 FL (ref 82–98)
PLATELET # BLD AUTO: 76 THOUSANDS/UL (ref 149–390)
POTASSIUM SERPL-SCNC: 3.6 MMOL/L (ref 3.5–5.3)
RBC # BLD AUTO: 3.6 MILLION/UL (ref 3.81–5.12)
SODIUM SERPL-SCNC: 137 MMOL/L (ref 135–147)
WBC # BLD AUTO: 7.09 THOUSAND/UL (ref 4.31–10.16)

## 2023-08-23 PROCEDURE — 94660 CPAP INITIATION&MGMT: CPT

## 2023-08-23 PROCEDURE — 94640 AIRWAY INHALATION TREATMENT: CPT

## 2023-08-23 PROCEDURE — 94760 N-INVAS EAR/PLS OXIMETRY 1: CPT

## 2023-08-23 PROCEDURE — 99232 SBSQ HOSP IP/OBS MODERATE 35: CPT | Performed by: STUDENT IN AN ORGANIZED HEALTH CARE EDUCATION/TRAINING PROGRAM

## 2023-08-23 PROCEDURE — 80048 BASIC METABOLIC PNL TOTAL CA: CPT | Performed by: PHYSICIAN ASSISTANT

## 2023-08-23 PROCEDURE — 99239 HOSP IP/OBS DSCHRG MGMT >30: CPT | Performed by: PHYSICIAN ASSISTANT

## 2023-08-23 PROCEDURE — 85027 COMPLETE CBC AUTOMATED: CPT | Performed by: PHYSICIAN ASSISTANT

## 2023-08-23 RX ORDER — AMIODARONE HYDROCHLORIDE 200 MG/1
200 TABLET ORAL
Qty: 30 TABLET | Refills: 0 | Status: SHIPPED | OUTPATIENT
Start: 2023-08-28 | End: 2023-09-27

## 2023-08-23 RX ORDER — AMIODARONE HYDROCHLORIDE 400 MG/1
400 TABLET ORAL 2 TIMES DAILY WITH MEALS
Qty: 9 TABLET | Refills: 0 | Status: SHIPPED | OUTPATIENT
Start: 2023-08-23 | End: 2023-08-30 | Stop reason: ALTCHOICE

## 2023-08-23 RX ORDER — POTASSIUM CHLORIDE 20 MEQ/1
20 TABLET, EXTENDED RELEASE ORAL ONCE
Status: COMPLETED | OUTPATIENT
Start: 2023-08-23 | End: 2023-08-23

## 2023-08-23 RX ORDER — POTASSIUM CHLORIDE 20 MEQ/1
20 TABLET, EXTENDED RELEASE ORAL DAILY
Qty: 30 TABLET | Refills: 0 | Status: SHIPPED | OUTPATIENT
Start: 2023-08-23

## 2023-08-23 RX ADMIN — BENZONATATE 100 MG: 100 CAPSULE ORAL at 08:14

## 2023-08-23 RX ADMIN — FLUTICASONE PROPIONATE 2 SPRAY: 50 SPRAY, METERED NASAL at 08:14

## 2023-08-23 RX ADMIN — LEVALBUTEROL HYDROCHLORIDE 1.25 MG: 1.25 SOLUTION RESPIRATORY (INHALATION) at 07:37

## 2023-08-23 RX ADMIN — METOPROLOL TARTRATE 25 MG: 25 TABLET, FILM COATED ORAL at 08:14

## 2023-08-23 RX ADMIN — POTASSIUM CHLORIDE 20 MEQ: 1500 TABLET, EXTENDED RELEASE ORAL at 12:12

## 2023-08-23 RX ADMIN — PANTOPRAZOLE SODIUM 20 MG: 20 TABLET, DELAYED RELEASE ORAL at 05:45

## 2023-08-23 RX ADMIN — GUAIFENESIN 600 MG: 600 TABLET, EXTENDED RELEASE ORAL at 08:14

## 2023-08-23 RX ADMIN — APIXABAN 5 MG: 5 TABLET, FILM COATED ORAL at 08:14

## 2023-08-23 RX ADMIN — ALECTINIB HYDROCHLORIDE 600 MG: 150 CAPSULE ORAL at 08:14

## 2023-08-23 RX ADMIN — FUROSEMIDE 20 MG: 20 TABLET ORAL at 08:14

## 2023-08-23 RX ADMIN — AMIODARONE HYDROCHLORIDE 400 MG: 200 TABLET ORAL at 08:14

## 2023-08-23 RX ADMIN — IPRATROPIUM BROMIDE 0.5 MG: 0.5 SOLUTION RESPIRATORY (INHALATION) at 07:37

## 2023-08-23 NOTE — ASSESSMENT & PLAN NOTE
· Multifactorial secondary to asthma exacerbation vs acute CHF, new onset Afib/flutter with intermittent RVR, MONO   · Did not qualify for home O2 ,stable on room air   · Case management has sent referral for CPAP at home

## 2023-08-23 NOTE — NURSING NOTE
avs provided to patient. Pts medications all sent to home pharmacy.  Pt discharged to home with all belongings 82

## 2023-08-23 NOTE — PROGRESS NOTES
Progress Note - Cardiology   Ryan Riojas 70 y.o. female MRN: [id number]  Unit/Bed#: [address] [id number] Encounter: [id number]    Assessment:  • Paroxysmal atrial flutter and atrial fibrillation               - new onset, 8/2023.              - with no prior history of atrial fibrillation or atrial flutter.              - with spontaneous conversion to SR at 2200 on 8/13/23, however with recurrent intermittent rapid atrial fibrillation and flutter.              - in light of elevated [id number] score of 5, AC with Eliquis 5 mg twice daily initiated.              - current AV blocker Rx, metoprolol tartrate 25 mg twice daily (previously on atenolol 25 mg daily). • Severe allergic/eosinophilic asthma with acute exacerbation, history of tobacco use   • Acute hypoxic respiratory failure  • Acute kidney injury on chronic kidney disease, stage III  • Chronic diastolic congestive heart failure with preserved ejection fraction              - TTE 7/20/23: LVEF 61%, grade 1 DD, probable mild mid ventricular obstruction with a peak gradient of 27 mmHg with Valsalva, moderate LA. • Coronary artery calcifications via CT chest, April 2023   • Hypertension   • Thrombocytopenia   • GERD  • Obesity   • Obstructive sleep apnea, unable to tolerate CPAP   • Stage IV non- small cell lung cancer     Plan/ Discussion:  • Given recurrent atrial flutter with rapid ventricular response via telemetry review, amiodarone had been increased to 400 mg twice daily (8/21/23 > from 200 mg TID) with plan to resume 200 mg daily thereafter beginning on 8/28/23. Continue metoprolol to tartrate 25 mg twice daily. • Currently on furosemide 20 mg daily. • Continue anticoagulation with Eliquis 5 mg twice daily, statin therapy with atorvastatin 40 mg daily. • Patient is stable for discharge from a cardiovascular stand point. Message sent for cardiology follow-up. • Monitor volume status closely with strict intake/output, daily weight.  Request standing weight please. • Monitor renal function and electrolytes; maintain potassium > 4, magnesium > 2.   • Potassium 3.6 today, will provide K- Dur 20 mEq x 1 today. Subjective:  Patient seen and evaluated at the bedside. Patient without shortness of breath or chest pain.     Vitals:  Vitals:    08/20/23 0600 08/21/23 0600   Weight: 107 kg (236 lb 12.4 oz) 107 kg (235 lb 14.3 oz)   ,  Vitals:    08/23/23 0300 08/23/23 0518 08/23/23 0724 08/23/23 0737   BP:  147/70 148/72    BP Location:       Pulse:  59 65    Resp:   16    Temp:   (!) 97.3 °F (36.3 °C)    TempSrc:       SpO2: 96% 97% 98% 95%   Weight:       Height:           Exam:  General: Alert awake and oriented, no acute distress  Heart:  regular rate with controlled rhythm   Respiratory effort/ Lungs:  breathing comfortably on room air, clear bilaterally   Abdominal: non-tender to palpation, + bowel sounds, soft, no masses or distension  Lower Limbs: no overt edema            Telemetry:       SR     Medications:    Current Facility-Administered Medications:   •  acetaminophen (TYLENOL) tablet 650 mg, 650 mg, Oral, Q6H PRN, David Martinez, DO  •  albuterol inhalation solution 2.5 mg, 2.5 mg, Nebulization, Q6H PRN, David Martinez, DO  •  Alectinib HCl CAPS 600 mg, 600 mg, Oral, BID, Ruthie Flores PA-C, 600 mg at 08/23/23 1386  •  [START ON 8/28/2023] amiodarone tablet 200 mg, 200 mg, Oral, Daily With Breakfast, Fabien Riojas MD  •  amiodarone tablet 400 mg, 400 mg, Oral, BID With Meals, Fabien Riojas MD, 400 mg at 08/23/23 2778  •  apixaban (ELIQUIS) tablet 5 mg, 5 mg, Oral, BID, iWlberto Blackman MD, 5 mg at 08/23/23 2787  •  atorvastatin (LIPITOR) tablet 40 mg, 40 mg, Oral, Daily With [facility] Karoel, DO, 40 mg at 08/22/23 1626  •  benzonatate (TESSALON PERLES) capsule 100 mg, 100 mg, Oral, TID, Lynda Rush PA-C, 100 mg at 08/23/23 7650  •  budesonide (PULMICORT) inhalation solution 0.25 mg, 0.25 mg, Nebulization, Q12H, Tete Gilbert AUDREY Martinez, DO, 0.25 mg at 08/22/23 2049  •  fluticasone (FLONASE) 50 mcg/act nasal spray 2 spray, 2 spray, Each Nare, Daily, David Martinez DO, 2 spray at 08/23/23 0387  •  furosemide (LASIX) tablet 20 mg, 20 mg, Oral, Daily, WILDA Thomas, 20 mg at 08/23/23 9948  •  guaiFENesin (MUCINEX) 12 hr tablet 600 mg, 600 mg, Oral, BID, Ruthie Flores PA-C, 600 mg at 08/23/23 2077  •  ipratropium (ATROVENT) 0.02 % inhalation solution 0.5 mg, 0.5 mg, Nebulization, TID, David Martinez, DO, 0.5 mg at 08/23/23 4751  •  levalbuterol (XOPENEX) inhalation solution 1.25 mg, 1.25 mg, Nebulization, Q6H PRN **AND** sodium chloride 0.9 % inhalation solution 3 mL, 3 mL, Nebulization, Q6H PRN, Chelsie Martinez, DO  •  levalbuterol (XOPENEX) inhalation solution 1.25 mg, 1.25 mg, Nebulization, TID, 1.25 mg at 08/23/23 0737 **AND** [DISCONTINUED] sodium chloride 0.9 % inhalation solution 3 mL, 3 mL, Nebulization, TID, David Martinez, DO, 3 mL at 08/13/23 0808  •  metoprolol (LOPRESSOR) injection 5 mg, 5 mg, Intravenous, Q6H PRN, WILDA Thomas, 5 mg at 08/16/23 0944  •  metoprolol tartrate (LOPRESSOR) tablet 25 mg, 25 mg, Oral, Q12H [address], Isaiah Russell MD, 25 mg at 08/23/23 0814  •  ondansetron (ZOFRAN) injection 4 mg, 4 mg, Intravenous, Q6H PRN, Chelsie Huddlestonel, DO  •  oxyCODONE-acetaminophen (PERCOCET) 5-325 mg per tablet 1 tablet, 1 tablet, Oral, Q4H PRN, David Huddlestonel, DO  •  pantoprazole (PROTONIX) EC tablet 20 mg, 20 mg, Oral, Early Morning, David Huddlestonel, DO, 20 mg at 08/23/23 0545      Labs/Data:        Results from last 7 days   Lab Units 08/23/23  0513 08/19/23  0614   WBC Thousand/uL 7.09 9.45   HEMOGLOBIN g/dL 10.5* 10.9*   HEMATOCRIT % 32.3* 33.4*   PLATELETS Thousands/uL 76* 101*     Results from last 7 days   Lab Units 08/23/23  0513 08/22/23  0912 08/19/23  0830   POTASSIUM mmol/L 3.6 3.8 3.6   CHLORIDE mmol/L 95* 94* 96   CO2 mmol/L 35* 35* 37*   BUN mg/dL 31* 31* 36* CREATININE mg/dL 1.57* 1.57* 1.48*

## 2023-08-23 NOTE — PLAN OF CARE
Problem: MOBILITY - ADULT  Goal: Maintain or return to baseline ADL function  Description: INTERVENTIONS:  -  Assess patient's ability to carry out ADLs; assess patient's baseline for ADL function and identify physical deficits which impact ability to perform ADLs (bathing, care of mouth/teeth, toileting, grooming, dressing, etc.)  - Assess/evaluate cause of self-care deficits   - Assess range of motion  - Assess patient's mobility; develop plan if impaired  - Assess patient's need for assistive devices and provide as appropriate  - Encourage maximum independence but intervene and supervise when necessary  - Involve family in performance of ADLs  - Assess for home care needs following discharge   - Consider OT consult to assist with ADL evaluation and planning for discharge  - Provide patient education as appropriate  Outcome: Progressing  Goal: Maintains/Returns to pre admission functional level  Description: INTERVENTIONS:  - Perform BMAT or MOVE assessment daily.   - Set and communicate daily mobility goal to care team and patient/family/caregiver. - Collaborate with rehabilitation services on mobility goals if consulted  - Perform Range of Motion 4 times a day. - Reposition patient every 2 hours.   - Dangle patient 3 times a day  - Stand patient 3 times a day  - Ambulate patient 3 times a day  - Out of bed to chair 3 times a day   - Out of bed for meals 3 times a day  - Out of bed for toileting  - Record patient progress and toleration of activity level   Outcome: Progressing     Problem: PAIN - ADULT  Goal: Verbalizes/displays adequate comfort level or baseline comfort level  Description: Interventions:  - Encourage patient to monitor pain and request assistance  - Assess pain using appropriate pain scale  - Administer analgesics based on type and severity of pain and evaluate response  - Implement non-pharmacological measures as appropriate and evaluate response  - Consider cultural and social influences on pain and pain management  - Notify physician/advanced practitioner if interventions unsuccessful or patient reports new pain  Outcome: Progressing     Problem: INFECTION - ADULT  Goal: Absence or prevention of progression during hospitalization  Description: INTERVENTIONS:  - Assess and monitor for signs and symptoms of infection  - Monitor lab/diagnostic results  - Monitor all insertion sites, i.e. indwelling lines, tubes, and drains  - Monitor endotracheal if appropriate and nasal secretions for changes in amount and color  - [place] appropriate cooling/warming therapies per order  - Administer medications as ordered  - Instruct and encourage patient and family to use good hand hygiene technique  - Identify and instruct in appropriate isolation precautions for identified infection/condition  Outcome: Progressing     Problem: SAFETY ADULT  Goal: Maintain or return to baseline ADL function  Description: INTERVENTIONS:  -  Assess patient's ability to carry out ADLs; assess patient's baseline for ADL function and identify physical deficits which impact ability to perform ADLs (bathing, care of mouth/teeth, toileting, grooming, dressing, etc.)  - Assess/evaluate cause of self-care deficits   - Assess range of motion  - Assess patient's mobility; develop plan if impaired  - Assess patient's need for assistive devices and provide as appropriate  - Encourage maximum independence but intervene and supervise when necessary  - Involve family in performance of ADLs  - Assess for home care needs following discharge   - Consider OT consult to assist with ADL evaluation and planning for discharge  - Provide patient education as appropriate  Outcome: Progressing  Goal: Maintains/Returns to pre admission functional level  Description: INTERVENTIONS:  - Perform BMAT or MOVE assessment daily.   - Set and communicate daily mobility goal to care team and patient/family/caregiver.    - Collaborate with rehabilitation services on mobility goals if consulted  - Perform Range of Motion 4 times a day. - Reposition patient every 2 hours.   - Dangle patient 3 times a day  - Stand patient 3 times a day  - Ambulate patient 3 times a day  - Out of bed to chair 3 times a day   - Out of bed for meals 3 times a day  - Out of bed for toileting  - Record patient progress and toleration of activity level   Outcome: Progressing  Goal: Patient will remain free of falls  Description: INTERVENTIONS:  - Educate patient/family on patient safety including physical limitations  - Instruct patient to call for assistance with activity   - Consult OT/PT to assist with strengthening/mobility   - Keep Call bell within reach  - Keep bed low and locked with side rails adjusted as appropriate  - Keep care items and personal belongings within reach  - Initiate and maintain comfort rounds  - Make Fall Risk Sign visible to staff  - Offer Toileting every 2 Hours, in advance of need  - Apply yellow socks and bracelet for high fall risk patients  - Consider moving patient to room near nurses station  Outcome: Progressing     Problem: DISCHARGE PLANNING  Goal: Discharge to home or other facility with appropriate resources  Description: INTERVENTIONS:  - Identify barriers to discharge w/patient and caregiver  - Arrange for needed discharge resources and transportation as appropriate  - Identify discharge learning needs (meds, wound care, etc.)  - Arrange for interpretive services to assist at discharge as needed  - Refer to Case Management Department for coordinating discharge planning if the patient needs post-hospital services based on physician/advanced practitioner order or complex needs related to functional status, cognitive ability, or social support system  Outcome: Progressing     Problem: Knowledge Deficit  Goal: Patient/family/caregiver demonstrates understanding of disease process, treatment plan, medications, and discharge instructions  Description: Complete learning assessment and assess knowledge base.   Interventions:  - Provide teaching at level of understanding  - Provide teaching via preferred learning methods  Outcome: Progressing     Problem: RESPIRATORY - ADULT  Goal: Achieves optimal ventilation and oxygenation  Description: INTERVENTIONS:  - Assess for changes in respiratory status  - Assess for changes in mentation and behavior  - Position to facilitate oxygenation and minimize respiratory effort  - Oxygen administered by appropriate delivery if ordered  - Initiate smoking cessation education as indicated  - Encourage broncho-pulmonary hygiene including cough, deep breathe, Incentive Spirometry  - Assess the need for suctioning and aspirate as needed  - Assess and instruct to report SOB or any respiratory difficulty  - Respiratory Therapy support as indicated  Outcome: Progressing     Problem: Prexisting or High Potential for Compromised Skin Integrity  Goal: Skin integrity is maintained or improved  Description: INTERVENTIONS:  - Identify patients at risk for skin breakdown  - Assess and monitor skin integrity  - Assess and monitor nutrition and hydration status  - Monitor labs   - Assess for incontinence   - Turn and reposition patient  - Assist with mobility/ambulation  - Relieve pressure over bony prominences  - Avoid friction and shearing  - Provide appropriate hygiene as needed including keeping skin clean and dry  - Evaluate need for skin moisturizer/barrier cream  - Collaborate with interdisciplinary team   - Patient/family teaching  - Consider wound care consult   Outcome: Progressing

## 2023-08-23 NOTE — DISCHARGE SUMMARY
[address] [facility]  Discharge- [facility] 1945, 70 y.o. female MRN: [id number]  Unit/Bed#: [address] [place] 209-01 Encounter: [id number]  Primary Care Provider: Mackenzie Mehta MD   Date and time admitted to hospital: 8/12/2023 11:49 AM    * Atrial flutter ([address])  Assessment & Plan  · New onset aflutter/fib here , seen by cardiology   · Started on anticoagulation with Eliquis 5 mg twice daily  ·  likely obstructive sleep apnea is contributing -we are arranging for CPAP at home  · Amiodarone was increased 8/21 to 400 mg twice daily for loading period due to intermittent RVR, now stable , then will continue 200 mg once daily 8/28   · Okay for discharge from cardiology standpoint today, outpatient follow-up  · Continue metoprolol tartrate 25 mg twice daily  · Echocardiogram from July: EF 61%, normal wall motion, grade 1 diastolic dysfunction, mild mid ventricular obstruction with systolic gradient up to 27 mmHg with valsalva   · Continue Lasix 20 mg daily with daily potassium replacement  · Continue statin    Severe persistent asthma with acute exacerbation-resolved as of 8/22/2023  Assessment & Plan  Patient presented with severe persistent asthma  Seen in consultation with pulmonology  Completed course of steroids and azithromycin  Continue ICS/LABA/LAMA at home , as needed albuterol  Arranging for CPAP outpatient  Pulmonary rehab referral  Outpatient PFTs    Diastolic CHF ([address])  Assessment & Plan  Wt Readings from Last 3 Encounters:   08/21/23 107 kg (235 lb 14.3 oz)   07/20/23 107 kg (236 lb)   07/10/23 107 kg (236 lb 12.8 oz)     · Chest x-ray without acute cardiopulmonary disease  ·   · Recently had updated echo with EF 17%, grade 1 diastolic dysfunction  · Follows with Dr. Abimbola Graff outpatient  · Seen by cardiology, she was diuresed with IV Lasix, then transitioned to Lasix 20 mg daily  · Euvolemic      Dyslipidemia  Assessment & Plan  · Maintained on Crestor per outpatient regimen, substituted for Lipitor while hospitalized    Coronary artery disease involving native coronary artery of native heart without angina pectoris  Assessment & Plan  · Continue metoprolol and statin  · Now on Eliquis      Thrombocytopenia, unspecified (HCC)  Assessment & Plan  · Chronic thrombocytopenia noted back in 2020  · Likely due to non-small cell lung cancer  · Monitor on Eliquis  · CBC with platelets in 1 week    Abnormal renal function  Assessment & Plan  · Patient creatinine previously running 1.0-1.3, since July through August has been running 1.4-1.7  · Continue Lasix 20 mg daily  · Stopped ARB   · May be new baseline around 1.4-1.6 with underlyign CKD   · Referral placed for outpt nephrology   · Ultrasound kidney and bladder: 0, right simple renal cyst, ureteral jets not detected, bladder otherwise unremarkable       Non-small cell lung cancer ([address])  Assessment & Plan  · 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 Lydia Roberson outpatient  · She is following every 6 months outpatient last seen in May  · Continues on Alectinib 600 mg twice daily  · Also receives Zometa infusions every 3 months    MONO (obstructive sleep apnea)  Assessment & Plan  · Severe obstructive sleep apnea, required 8 to 10 L of oxygen at night while inpatient  · Could not tolerate CPAP as outpatient in the past and she had to return her machine  · Discussed with pulmonology during hosptialization, plaved on CPAP here toelrating and pulm ordered outpt [employer] .  Outpatient follow up         Morbid obesity ([address])  Assessment & Plan  BMI 45 noted contributing to comorbidities  Would benefit from weight loss and lifestyle modifications    Gastroesophageal reflux disease without esophagitis  Assessment & Plan  · Continue PPI  · Without GERD symptoms    Essential hypertension  Assessment & Plan  Blood pressure stable, continue metoprolol and Lasix      Acute respiratory failure with hypoxia (HCC)-resolved as of 8/23/2023  Assessment & Plan  · Multifactorial secondary to asthma exacerbation vs acute CHF, new onset Afib/flutter with intermittent RVR, MONO   · Did not qualify for home O2 ,stable on room air   · Case management has sent referral for CPAP at home     Dysphagia-resolved as of 8/23/2023  Assessment & Plan  Reports food getting stuck  Appreciate speech eval.   Tolerating diet. She is no longer complaining of dysphagia  Recommend follow up with gi outpt       Medical Problems     Resolved Problems  Date Reviewed: 8/23/2023          Resolved    Severe persistent asthma with acute exacerbation 8/22/2023     Resolved by  Paola Kathleen PA-C    Dysphagia 8/23/2023     Resolved by  Paola Kathleen PA-C    Acute respiratory failure with hypoxia ([address]) 8/23/2023     Resolved by  Paola Kathleen PA-C        Discharging Physician / Practitioner: Paola Kathleen PA-C  PCP: Verónica Wilkinson MD  Admission Date:   Admission Orders (From admission, onward)     Ordered        08/12/23 1437  INPATIENT ADMISSION  Once                      Discharge Date: 08/23/23    Consultations During Hospital Stay:  · Cardiology   · Pulmonology     Procedures Performed:   · echo  Interpretation Summary       •  Left Ventricle: Left ventricular cavity size is normal. Wall thickness is moderately increased. The left ventricular ejection fraction is 61%. Systolic function is normal. Wall motion is normal. Diastolic function is mildly abnormal, consistent with grade I (abnormal) relaxation. Probable mild mid ventricular obstruction with systolic gradient up to 27 mmHg with Valsalva. •  Left Atrium: The atrium is moderately dilated. •  Mitral Valve: There is moderate annular calcification.     Findings    Left Ventricle Left ventricular cavity size is normal. Wall thickness is moderately increased. The left ventricular ejection fraction is 61%.  Systolic function is normal.  Wall motion is normal. Diastolic function is mildly abnormal, consistent with grade I (abnormal) relaxation. Probable mild mid ventricular obstruction with systolic gradient, up to 27 mmHg with Valsalva. Right Ventricle Right ventricular cavity size is normal. Systolic function is normal. Wall thickness is normal.      Left Atrium The atrium is moderately dilated. Right Atrium The atrium is normal in size. Aortic Valve The aortic valve is trileaflet. The leaflets are mildly thickened. The leaflets are not calcified. The leaflets exhibit normal mobility. There is trace regurgitation. The aortic valve has no significant stenosis. Mitral Valve The leaflets exhibit normal mobility. There is moderate annular calcification. There is no evidence of regurgitation. There is no evidence of stenosis. The valve has normal function. Tricuspid Valve Tricuspid valve structure is normal. There is no evidence of regurgitation. There is no evidence of stenosis. Pulmonic Valve Pulmonic valve structure is normal. There is trace regurgitation. There is no evidence of stenosis. Ascending Aorta The aortic root is normal in size. IVC/SVC The inferior vena cava is normal in size. Pericardium There is no pericardial effusion. The pericardium is normal in appearance. Significant Findings / Test Results:   · CXR:   FINDINGS:     Heart shadow appears unremarkable. Atherosclerotic vascular calcifications are noted.     The chest radiograph is taken in expiratory phase, with crowding of bronchovascular markings. Bibasilar hazy opacities, favored to represent atelectasis and less likely, focal pneumonitis. Sebastián Spine No pneumothorax or pleural effusion.     Osseous structures appear within normal limits for patient age.     IMPRESSION:     Radiograph obtained in expiratory phase, limiting evaluation. Right greater than left basilar patchy airspace opacity, which can represent atelectasis or focal pneumonitis.  Clinical correlation is recommended. · US kidney and bladder   FINDINGS:     KIDNEYS:  Symmetric and normal size. Right kidney:  10.9 x 4.1 x 4.2 cm. Volume 98.0 mL  Left kidney:  9.4 x 3.9 x 3.9 cm. Volume 75.5 mL     Right kidney  Normal echogenicity and contour. No mass is identified. A 4.1 cm right interpolar simple renal cyst.  No hydronephrosis. No shadowing calculi. No perinephric fluid collections.     Left kidney  Normal echogenicity and contour. No mass is identified. No hydronephrosis. No shadowing calculi. No perinephric fluid collections.     URETERS:  Nonvisualized.     BLADDER:  Partially distended. No focal thickening or mass lesions. Bilateral ureteral jets not detected.           IMPRESSION:     No hydronephrosis.     Right simple renal cyst.     Bilateral ureteral jets not detected. Bladder otherwise unremarkable. · CXR  FINDINGS:     Cardiomediastinal silhouette appears unremarkable.     The lungs are clear. Left humeral head anchor. No pneumothorax or pleural effusion.     Osseous structures appear within normal limits for patient age.     IMPRESSION:     No acute cardiopulmonary disease. Incidental Findings:   · none      Test Results Pending at Discharge (will require follow up): · BMP and CBC with platelets in 1-2 weeks   · Referral for pulmonary rehab     Outpatient Tests Requested:  · PCP   · Nephrology   · Cardiology   · Pulmonology     Complications: New onset atrial flutter/fib    Reason for Admission: Severe asthma with acute exacerbation    Hospital Course:   Eusebio Torres is a 70 y.o. female patient who originally presented to the hospital on 8/12/2023 due to shortness of breath concerning for acute asthma exacerbation. Patient was seen in consultation with pulmonology for acute respiratory failure with hypoxia secondary to severe asthma with acute exacerbation. She completed a 5-day total course of steroids and a course of antibiotics with azithromycin.   Patient will need ongoing obstructive sleep apnea work-up outpatient. We have arranged for CPAP at home at discharge. Referral placed for pulmonary rehab. Fortunately she was stable on room air at rest and with exertion prior to discharge. Continue on Allegra, fluticasone, Advair and Spiriva. She should follow-up with pulmonology in 1 to 2 weeks. Of note, the evening of 8/13 patient went into atrial fibrillation/flutter and was seen in consultation with cardiology. This is new onset. She was started on amiodarone that was increased for loading period of 400 mg twice daily through 8/28 and then will continue 200 mg once daily. She was started on Eliquis 5 mg twice daily for anticoagulation. She is in normal sinus rhythm prior to discharge. She will need outpatient follow-up with cardiology. She should continue on Lasix 20 mg daily and potassium supplementation. She is to have BMP in 1 to 2 weeks to monitor renal function electrolytes. She was educated return to the hospital with any palpitations, tachycardia, syncope or near syncope. She has noted to have rising elevation in creatinine from prior which may be close to her new baseline. A referral has been placed for nephrology follow-up outpatient. She should have repeat BMP in 1 to 2 weeks. Her ARB has been discontinued. She did have renal ultrasound as noted above. Please see above list of diagnoses and related plan for additional information. Condition at Discharge: stable    Discharge Day Visit / Exam:   Subjective: Doing well today. Claudene Fail to go home. No lightheadedness dizziness chest pain palpitation shortness of breath wheezing.   No abdominal pain nausea vomiting or diarrhea  Vitals: Blood Pressure: 156/88 (08/23/23 1059)  Pulse: 66 (08/23/23 1059)  Temperature: 99.1 °F (37.3 °C) (08/23/23 1059)  Temp Source: Oral (08/22/23 1931)  Respirations: 16 (08/23/23 1059)  Height: 5' 1" (154.9 cm) (08/12/23 1609)  Weight - Scale: 107 kg (235 lb 14.3 oz) (08/21/23 0600)  SpO2: 95 % (08/23/23 1059)  Exam:   Physical Exam  Vitals and nursing note reviewed. Constitutional:       General: She is not in acute distress. Appearance: She is obese. She is not ill-appearing, toxic-appearing or diaphoretic. Cardiovascular:      Rate and Rhythm: Normal rate and regular rhythm. Pulmonary:      Effort: Pulmonary effort is normal. No respiratory distress. Breath sounds: Normal breath sounds. No wheezing or rhonchi. Abdominal:      General: Bowel sounds are normal.      Palpations: Abdomen is soft. Musculoskeletal:      Right lower leg: No edema. Left lower leg: No edema. Skin:     General: Skin is warm. Neurological:      Mental Status: She is alert. Mental status is at baseline. Psychiatric:         Mood and Affect: Mood normal.          Discussion with Family: Patient declined call to . Discharge instructions/Information to patient and family:   See after visit summary for information provided to patient and family. Provisions for Follow-Up Care:  See after visit summary for information related to follow-up care and any pertinent home health orders. Disposition:   Home    Planned Readmission: none     Discharge Statement:  I spent 45 minutes discharging the patient. This time was spent on the day of discharge. I had direct contact with the patient on the day of discharge. Greater than 50% of the total time was spent examining patient, answering all patient questions, arranging and discussing plan of care with patient as well as directly providing post-discharge instructions. Additional time then spent on discharge activities. Discharge Medications:  See after visit summary for reconciled discharge medications provided to patient and/or family.       **Please Note: This note may have been constructed using a voice recognition system**

## 2023-08-23 NOTE — ASSESSMENT & PLAN NOTE
Reports food getting stuck  Appreciate speech eval.   Tolerating diet.   She is no longer complaining of dysphagia  Recommend follow up with gi outpt

## 2023-08-23 NOTE — ASSESSMENT & PLAN NOTE
· Patient creatinine previously running 1.0-1.3, since July through August has been running 1.4-1.7  · Continue Lasix 20 mg daily  · Stopped ARB   · May be new baseline around 1.4-1.6 with underlyign CKD   · Referral placed for outpt nephrology   · Ultrasound kidney and bladder: 0, right simple renal cyst, ureteral jets not detected, bladder otherwise unremarkable

## 2023-08-23 NOTE — CASE MANAGEMENT
Case Management Discharge Planning Note    Patient name Danise Dakins  Location [place] [address] [address] [facility] [address] Dariela Monterroso* MRN [id number]  : 1945 Date 2023       Current Admission Date: 2023  Current Admission Diagnosis:Atrial flutter [facility])   Patient Active Problem List    Diagnosis Date Noted   • Atrial flutter ([address]) 08/15/2023   • Dysphagia 2023   • Acute respiratory failure with hypoxia ([address])    • Diastolic CHF ([address])    • Coronary artery disease involving native coronary artery of native heart without angina pectoris 07/10/2023   • Dyslipidemia 07/10/2023   • Malignant neoplasm determined by biopsy of lung ([address]) 2023   • Primary localized osteoarthritis of right knee 2022   • Chronic seasonal allergic rhinitis due to pollen 11/15/2022   • Allergic rhinitis due to animal (cat) (dog) hair and dander 11/15/2022   • Allergic rhinitis due to house dust mite 11/15/2022   • Need for vaccination 2022   • Thrombocytopenia, unspecified ([address]) 2021   • Depression, recurrent ([address]) 2021   • Abnormal renal function 2021   • Mild persistent asthma 2020   • Malignant neoplasm metastatic to bone ([address]) 2020   • Non-small cell lung cancer ([address]) 2020   • MONO (obstructive sleep apnea)    • Chronic rhinitis 2019   • Morbid obesity ([address]) 2019   • Chronic gastritis without bleeding 2019   • Other headache syndrome 2019   • Colon cancer screening 2019   • Gastroesophageal reflux disease without esophagitis 2019   • Spinal stenosis of 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 2019   • Lumbar facet arthropathy 2019   • 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 2019   • Essential hypertension 10/31/2018   • Vitamin D deficiency 10/31/2018   • Hiatal hernia 10/31/2018   • Premature atrial contractions 10/31/2017   • Primary osteoarthritis of right wrist 2017      LOS (days): 11  Geometric Mean LOS (GMLOS) (days): 3.50  Days to GMLOS:-7.3     OBJECTIVE:  Risk of Unplanned Readmission Score: 26.15         Current admission status: Inpatient   Preferred Pharmacy:   [address] [place] [address], [address] [place] [address] Street  Phone: [phone] Fax: [phone]    Primary Care Provider: Raúl Reis MD    Primary Insurance: [facility]  Secondary Insurance:     DISCHARGE DETAILS:          IMM Given (Date):: 08/23/23  IMM Given to[de-identified] Patient (Read IMM, copy given again and copy put in bin to be scan.)     Additional Comments: Sent [employer] message to reach out to pt about the setting up CPAP Machine at home as pt is being discharge today. Took off house phone as pt only has cell phone and informed [employer] not to use house phone only cell. Also wrote phone number for [employer] on [facility] and instructed pt if she does not hear today to reach out to them.

## 2023-08-23 NOTE — ASSESSMENT & PLAN NOTE
Wt Readings from Last 3 Encounters:   08/21/23 107 kg (235 lb 14.3 oz)   07/20/23 107 kg (236 lb)   07/10/23 107 kg (236 lb 12.8 oz)     · Chest x-ray without acute cardiopulmonary disease  ·   · Recently had updated echo with EF 17%, grade 1 diastolic dysfunction  · Follows with Dr. Dilip Greene outpatient  · Seen by cardiology, she was diuresed with IV Lasix, then transitioned to Lasix 20 mg daily  · Euvolemic

## 2023-08-23 NOTE — RESTORATIVE TECHNICIAN NOTE
Restorative Technician Note      Patient Name: Asael Holden     Restorative Tech Visit Date: 08/23/23  Note Type: Mobility  Patient Position Upon Consult: Bedside chair  Activity Performed: Ambulated  Patient Position at End of Consult: Bedside chair;  All needs within reach

## 2023-08-23 NOTE — PLAN OF CARE
Problem: MOBILITY - ADULT  Goal: Maintain or return to baseline ADL function  Description: INTERVENTIONS:  -  Assess patient's ability to carry out ADLs; assess patient's baseline for ADL function and identify physical deficits which impact ability to perform ADLs (bathing, care of mouth/teeth, toileting, grooming, dressing, etc.)  - Assess/evaluate cause of self-care deficits   - Assess range of motion  - Assess patient's mobility; develop plan if impaired  - Assess patient's need for assistive devices and provide as appropriate  - Encourage maximum independence but intervene and supervise when necessary  - Involve family in performance of ADLs  - Assess for home care needs following discharge   - Consider OT consult to assist with ADL evaluation and planning for discharge  - Provide patient education as appropriate  8/23/2023 1223 by Lala Tejeda RN  Outcome: Adequate for Discharge  8/23/2023 0935 by Lala Tejeda RN  Outcome: Progressing  Goal: Maintains/Returns to pre admission functional level  Description: INTERVENTIONS:  - Perform BMAT or MOVE assessment daily.   - Set and communicate daily mobility goal to care team and patient/family/caregiver. - Collaborate with rehabilitation services on mobility goals if consulted  - Perform Range of Motion 4 times a day. - Reposition patient every 2 hours.   - Dangle patient 3 times a day  - Stand patient 3 times a day  - Ambulate patient 3 times a day  - Out of bed to chair 3 times a day   - Out of bed for meals 3 times a day  - Out of bed for toileting  - Record patient progress and toleration of activity level   8/23/2023 1223 by Lala Tejeda RN  Outcome: Adequate for Discharge  8/23/2023 0935 by Lala Tejeda RN  Outcome: Progressing     Problem: PAIN - ADULT  Goal: Verbalizes/displays adequate comfort level or baseline comfort level  Description: Interventions:  - Encourage patient to monitor pain and request assistance  - Assess pain using appropriate pain scale  - Administer analgesics based on type and severity of pain and evaluate response  - Implement non-pharmacological measures as appropriate and evaluate response  - Consider cultural and social influences on pain and pain management  - Notify physician/advanced practitioner if interventions unsuccessful or patient reports new pain  8/23/2023 1223 by Miguelangel Berg RN  Outcome: Adequate for Discharge  8/23/2023 0935 by Miguelangel Berg RN  Outcome: Progressing     Problem: INFECTION - ADULT  Goal: Absence or prevention of progression during hospitalization  Description: INTERVENTIONS:  - Assess and monitor for signs and symptoms of infection  - Monitor lab/diagnostic results  - Monitor all insertion sites, i.e. indwelling lines, tubes, and drains  - Monitor endotracheal if appropriate and nasal secretions for changes in amount and color  - [place] appropriate cooling/warming therapies per order  - Administer medications as ordered  - Instruct and encourage patient and family to use good hand hygiene technique  - Identify and instruct in appropriate isolation precautions for identified infection/condition  8/23/2023 1223 by Miguelangel Berg RN  Outcome: Adequate for Discharge  8/23/2023 0935 by Miguelangel Berg RN  Outcome: Progressing     Problem: SAFETY ADULT  Goal: Maintain or return to baseline ADL function  Description: INTERVENTIONS:  -  Assess patient's ability to carry out ADLs; assess patient's baseline for ADL function and identify physical deficits which impact ability to perform ADLs (bathing, care of mouth/teeth, toileting, grooming, dressing, etc.)  - Assess/evaluate cause of self-care deficits   - Assess range of motion  - Assess patient's mobility; develop plan if impaired  - Assess patient's need for assistive devices and provide as appropriate  - Encourage maximum independence but intervene and supervise when necessary  - Involve family in performance of ADLs  - Assess for home care needs following discharge   - Consider OT consult to assist with ADL evaluation and planning for discharge  - Provide patient education as appropriate  8/23/2023 1223 by Thane Dakin, RN  Outcome: Adequate for Discharge  8/23/2023 0935 by Thane Dakin, RN  Outcome: Progressing  Goal: Maintains/Returns to pre admission functional level  Description: INTERVENTIONS:  - Perform BMAT or MOVE assessment daily.   - Set and communicate daily mobility goal to care team and patient/family/caregiver. - Collaborate with rehabilitation services on mobility goals if consulted  - Perform Range of Motion 4 times a day. - Reposition patient every 2 hours.   - Dangle patient 3 times a day  - Stand patient 3 times a day  - Ambulate patient 3 times a day  - Out of bed to chair 3 times a day   - Out of bed for meals 3 times a day  - Out of bed for toileting  - Record patient progress and toleration of activity level   8/23/2023 1223 by Thane Dakin, RN  Outcome: Adequate for Discharge  8/23/2023 0935 by Thane Dakin, RN  Outcome: Progressing  Goal: Patient will remain free of falls  Description: INTERVENTIONS:  - Educate patient/family on patient safety including physical limitations  - Instruct patient to call for assistance with activity   - Consult OT/PT to assist with strengthening/mobility   - Keep Call bell within reach  - Keep bed low and locked with side rails adjusted as appropriate  - Keep care items and personal belongings within reach  - Initiate and maintain comfort rounds  - Make Fall Risk Sign visible to staff  - Offer Toileting every 2 Hours, in advance of need  - Apply yellow socks and bracelet for high fall risk patients  - Consider moving patient to room near nurses station  8/23/2023 1223 by Thane Dakin, RN  Outcome: Adequate for Discharge  8/23/2023 0935 by Thane Dakin, RN  Outcome: Progressing     Problem: DISCHARGE PLANNING  Goal: Discharge to home or other facility with appropriate resources  Description: INTERVENTIONS:  - Identify barriers to discharge w/patient and caregiver  - Arrange for needed discharge resources and transportation as appropriate  - Identify discharge learning needs (meds, wound care, etc.)  - Arrange for interpretive services to assist at discharge as needed  - Refer to Case Management Department for coordinating discharge planning if the patient needs post-hospital services based on physician/advanced practitioner order or complex needs related to functional status, cognitive ability, or social support system  8/23/2023 1223 by Ami Self RN  Outcome: Adequate for Discharge  8/23/2023 0935 by Ami Self RN  Outcome: Progressing     Problem: Knowledge Deficit  Goal: Patient/family/caregiver demonstrates understanding of disease process, treatment plan, medications, and discharge instructions  Description: Complete learning assessment and assess knowledge base.   Interventions:  - Provide teaching at level of understanding  - Provide teaching via preferred learning methods  8/23/2023 1223 by Ami Self RN  Outcome: Adequate for Discharge  8/23/2023 0935 by Ami Self RN  Outcome: Progressing     Problem: RESPIRATORY - ADULT  Goal: Achieves optimal ventilation and oxygenation  Description: INTERVENTIONS:  - Assess for changes in respiratory status  - Assess for changes in mentation and behavior  - Position to facilitate oxygenation and minimize respiratory effort  - Oxygen administered by appropriate delivery if ordered  - Initiate smoking cessation education as indicated  - Encourage broncho-pulmonary hygiene including cough, deep breathe, Incentive Spirometry  - Assess the need for suctioning and aspirate as needed  - Assess and instruct to report SOB or any respiratory difficulty  - Respiratory Therapy support as indicated  8/23/2023 1223 by Ami Self RN  Outcome: Adequate for Discharge  8/23/2023 0935 by Ami Self RN  Outcome: Progressing     Problem: Prexisting or High Potential for Compromised Skin Integrity  Goal: Skin integrity is maintained or improved  Description: INTERVENTIONS:  - Identify patients at risk for skin breakdown  - Assess and monitor skin integrity  - Assess and monitor nutrition and hydration status  - Monitor labs   - Assess for incontinence   - Turn and reposition patient  - Assist with mobility/ambulation  - Relieve pressure over bony prominences  - Avoid friction and shearing  - Provide appropriate hygiene as needed including keeping skin clean and dry  - Evaluate need for skin moisturizer/barrier cream  - Collaborate with interdisciplinary team   - Patient/family teaching  - Consider wound care consult   8/23/2023 1223 by Geraldo Ruff RN  Outcome: Adequate for Discharge  8/23/2023 0935 by Geraldo Ruff RN  Outcome: Progressing

## 2023-08-23 NOTE — ASSESSMENT & PLAN NOTE
· New onset aflutter/fib here , seen by cardiology   · Started on anticoagulation with Eliquis 5 mg twice daily  ·  likely obstructive sleep apnea is contributing -we are arranging for CPAP at home  · Amiodarone was increased 8/21 to 400 mg twice daily for loading period due to intermittent RVR, now stable , then will continue 200 mg once daily 8/28   · Okay for discharge from cardiology standpoint today, outpatient follow-up  · Continue metoprolol tartrate 25 mg twice daily  · Echocardiogram from July: EF 61%, normal wall motion, grade 1 diastolic dysfunction, mild mid ventricular obstruction with systolic gradient up to 27 mmHg with valsalva   · Continue Lasix 20 mg daily with daily potassium replacement  · Continue statin

## 2023-08-23 NOTE — ASSESSMENT & PLAN NOTE
· Chronic thrombocytopenia noted back in 2020  · Likely due to non-small cell lung cancer  · Monitor on Eliquis  · CBC with platelets in 1 week

## 2023-08-23 NOTE — TELEPHONE ENCOUNTER
Please schedule TCM appointment. Verify how patient is feeling and if they are in need of anything before their visit. Please send appt date and patient questions/concerns to ELIZABETH to complete TCM.

## 2023-08-23 NOTE — ASSESSMENT & PLAN NOTE
Patient presented with severe persistent asthma  Seen in consultation with pulmonology  Completed course of steroids and azithromycin  Continue ICS/LABA/LAMA at home , as needed albuterol  Arranging for CPAP outpatient  Pulmonary rehab referral  Outpatient PFTs

## 2023-08-23 NOTE — PLAN OF CARE
Problem: MOBILITY - ADULT  Goal: Maintain or return to baseline ADL function  Description: INTERVENTIONS:  -  Assess patient's ability to carry out ADLs; assess patient's baseline for ADL function and identify physical deficits which impact ability to perform ADLs (bathing, care of mouth/teeth, toileting, grooming, dressing, etc.)  - Assess/evaluate cause of self-care deficits   - Assess range of motion  - Assess patient's mobility; develop plan if impaired  - Assess patient's need for assistive devices and provide as appropriate  - Encourage maximum independence but intervene and supervise when necessary  - Involve family in performance of ADLs  - Assess for home care needs following discharge   - Consider OT consult to assist with ADL evaluation and planning for discharge  - Provide patient education as appropriate  Outcome: Progressing  Goal: Maintains/Returns to pre admission functional level  Description: INTERVENTIONS:  - Perform BMAT or MOVE assessment daily.   - Set and communicate daily mobility goal to care team and patient/family/caregiver. - Collaborate with rehabilitation services on mobility goals if consulted  - Perform Range of Motion 4 times a day. - Reposition patient every 2 hours.   - Dangle patient 3 times a day  - Stand patient 3 times a day  - Ambulate patient 3 times a day  - Out of bed to chair 3 times a day   - Out of bed for meals 3 times a day  - Out of bed for toileting  - Record patient progress and toleration of activity level   Outcome: Progressing     Problem: PAIN - ADULT  Goal: Verbalizes/displays adequate comfort level or baseline comfort level  Description: Interventions:  - Encourage patient to monitor pain and request assistance  - Assess pain using appropriate pain scale  - Administer analgesics based on type and severity of pain and evaluate response  - Implement non-pharmacological measures as appropriate and evaluate response  - Consider cultural and social influences on pain and pain management  - Notify physician/advanced practitioner if interventions unsuccessful or patient reports new pain  Outcome: Progressing     Problem: INFECTION - ADULT  Goal: Absence or prevention of progression during hospitalization  Description: INTERVENTIONS:  - Assess and monitor for signs and symptoms of infection  - Monitor lab/diagnostic results  - Monitor all insertion sites, i.e. indwelling lines, tubes, and drains  - Monitor endotracheal if appropriate and nasal secretions for changes in amount and color  - [place] appropriate cooling/warming therapies per order  - Administer medications as ordered  - Instruct and encourage patient and family to use good hand hygiene technique  - Identify and instruct in appropriate isolation precautions for identified infection/condition  Outcome: Progressing     Problem: SAFETY ADULT  Goal: Maintain or return to baseline ADL function  Description: INTERVENTIONS:  -  Assess patient's ability to carry out ADLs; assess patient's baseline for ADL function and identify physical deficits which impact ability to perform ADLs (bathing, care of mouth/teeth, toileting, grooming, dressing, etc.)  - Assess/evaluate cause of self-care deficits   - Assess range of motion  - Assess patient's mobility; develop plan if impaired  - Assess patient's need for assistive devices and provide as appropriate  - Encourage maximum independence but intervene and supervise when necessary  - Involve family in performance of ADLs  - Assess for home care needs following discharge   - Consider OT consult to assist with ADL evaluation and planning for discharge  - Provide patient education as appropriate  Outcome: Progressing  Goal: Maintains/Returns to pre admission functional level  Description: INTERVENTIONS:  - Perform BMAT or MOVE assessment daily.   - Set and communicate daily mobility goal to care team and patient/family/caregiver.    - Collaborate with rehabilitation services on mobility goals if consulted  - Perform Range of Motion 4 times a day. - Reposition patient every 2 hours.   - Dangle patient 3 times a day  - Stand patient 3 times a day  - Ambulate patient 3 times a day  - Out of bed to chair 3 times a day   - Out of bed for meals 3 times a day  - Out of bed for toileting  - Record patient progress and toleration of activity level   Outcome: Progressing  Goal: Patient will remain free of falls  Description: INTERVENTIONS:  - Educate patient/family on patient safety including physical limitations  - Instruct patient to call for assistance with activity   - Consult OT/PT to assist with strengthening/mobility   - Keep Call bell within reach  - Keep bed low and locked with side rails adjusted as appropriate  - Keep care items and personal belongings within reach  - Initiate and maintain comfort rounds  - Make Fall Risk Sign visible to staff  - Offer Toileting every 2 Hours, in advance of need  - Apply yellow socks and bracelet for high fall risk patients  - Consider moving patient to room near nurses station  Outcome: Progressing     Problem: DISCHARGE PLANNING  Goal: Discharge to home or other facility with appropriate resources  Description: INTERVENTIONS:  - Identify barriers to discharge w/patient and caregiver  - Arrange for needed discharge resources and transportation as appropriate  - Identify discharge learning needs (meds, wound care, etc.)  - Arrange for interpretive services to assist at discharge as needed  - Refer to Case Management Department for coordinating discharge planning if the patient needs post-hospital services based on physician/advanced practitioner order or complex needs related to functional status, cognitive ability, or social support system  Outcome: Progressing     Problem: Knowledge Deficit  Goal: Patient/family/caregiver demonstrates understanding of disease process, treatment plan, medications, and discharge instructions  Description: Complete learning assessment and assess knowledge base.   Interventions:  - Provide teaching at level of understanding  - Provide teaching via preferred learning methods  Outcome: Progressing     Problem: RESPIRATORY - ADULT  Goal: Achieves optimal ventilation and oxygenation  Description: INTERVENTIONS:  - Assess for changes in respiratory status  - Assess for changes in mentation and behavior  - Position to facilitate oxygenation and minimize respiratory effort  - Oxygen administered by appropriate delivery if ordered  - Initiate smoking cessation education as indicated  - Encourage broncho-pulmonary hygiene including cough, deep breathe, Incentive Spirometry  - Assess the need for suctioning and aspirate as needed  - Assess and instruct to report SOB or any respiratory difficulty  - Respiratory Therapy support as indicated  Outcome: Progressing     Problem: Prexisting or High Potential for Compromised Skin Integrity  Goal: Skin integrity is maintained or improved  Description: INTERVENTIONS:  - Identify patients at risk for skin breakdown  - Assess and monitor skin integrity  - Assess and monitor nutrition and hydration status  - Monitor labs   - Assess for incontinence   - Turn and reposition patient  - Assist with mobility/ambulation  - Relieve pressure over bony prominences  - Avoid friction and shearing  - Provide appropriate hygiene as needed including keeping skin clean and dry  - Evaluate need for skin moisturizer/barrier cream  - Collaborate with interdisciplinary team   - Patient/family teaching  - Consider wound care consult   Outcome: Progressing

## 2023-08-24 DIAGNOSIS — Z71.89 COMPLEX CARE COORDINATION: Primary | ICD-10-CM

## 2023-08-24 LAB

## 2023-08-24 RX ORDER — FLUTICASONE PROPIONATE 50 MCG
SPRAY, SUSPENSION (ML) NASAL
Qty: 48 ML | Refills: 1 | Status: SHIPPED | OUTPATIENT
Start: 2023-08-24

## 2023-08-24 NOTE — UTILIZATION REVIEW
NOTIFICATION OF ADMISSION DISCHARGE   This is a Notification of Discharge from [address] [place] [address]. Please be advised that this patient has been discharge from our facility. Below you will find the admission and discharge date and time including the patient’s disposition. UTILIZATION REVIEW CONTACT:  Richard Carrasco  Utilization   Network Utilization Review Department  Phone: [phone] x carefully listen to the prompts. All voicemails are confidential.  Email: [email or web address]     ADMISSION INFORMATION  PRESENTATION DATE: 8/12/2023 11:49 AM  OBERVATION ADMISSION DATE:   INPATIENT ADMISSION DATE: 8/12/23  2:37 PM   DISCHARGE DATE: 8/23/2023  3:26 PM   DISPOSITION:Home/Self Care    IMPORTANT INFORMATION:  Send all requests for admission clinical reviews, approved or denied determinations and any other requests to dedicated fax number below belonging to the campus where the patient is receiving treatment.  List of dedicated fax numbers:  [place] DENIALS (Administrative/Medical Necessity) [phone] [address] [facility] (Maternity/NICU/Pediatrics) [phone]   [facility] [phone]   [facility] [phone] [address] [facility] [phone]   [address] [place] [address] [phone]   [facility] [phone]   [address] [place] [address] [address] [facility] [phone]   [address] [place] [address] [phone]   [address] [facility] [phone]   [address] [facility] [address] [facility] [phone]

## 2023-08-25 ENCOUNTER — PATIENT OUTREACH (OUTPATIENT)
Dept: FAMILY MEDICINE CLINIC | Facility: CLINIC | Age: 78
End: 2023-08-25

## 2023-08-25 NOTE — PROGRESS NOTES
HRR.    I called the patient, explained my role and she declines at this time. The patient states she has all her meds that were ordered on discharge. She is aware of her Pulm appointment 8/30 and appointment at [facility] 8/31. The patient has my contact information if she finds she needs help in the future. Case is being closed. Chart reviewed.

## 2023-08-26 DIAGNOSIS — Z13.9 ENCOUNTER FOR SCREENING: Primary | ICD-10-CM

## 2023-08-28 LAB

## 2023-08-28 NOTE — PROGRESS NOTES
Pulmonary Follow Up Note   Cindy Cunha 70 y.o. female MRN: [id number]  8/28/2023      Assessment and Plan:    Severe allergic asthma  - type 1 with high eos 480 and multiple possitive allergens in 2020 and Total IgE 109,  Multiple allergies identified in the past.  with 1-3x a year exacerbaitons, improved since starting fasenra Jan 2023 by allergy doctor. With recent exacerbation in 8/2023  - PFT 2022: FEV1 52% however, lung volumes not ordered    - takes allegra. Cannot afford Advair 500-50 and Spiriva due to cost  - will change to Budesonide BID, Albuterol TID and ipratropium TID nebulizer due to cost  - continue with Fasenra injection by rheumatologist (last injection 6/2023, should contact her rheumatologist to make another appointment for injection)  - pul rehab enrolled. Will start in 9/2023      Severe MONO   - sleep study in 2019 showed severe MONO   - home sleep study 5/2022 with AHI 85.2 with significant hypoxia to 70%  - recently started Cpap and cannot tolerate the setting. Will change to a trial of auto-titrating CPAP 5-20 cc of H2O pressure with 2L of oxygen as per recommendation in 2022.  - should come back in 1 mo to reaccess the compliance and adjust as needed. Metastatic Non-small cell carcinoma of Right lung (8/2020) with ALK +  - s/p palliative radiation therapy to T8 x 10 treatments  - on Alectinib as per hem/onc - doing well so far   - had a PET scan 1/2021 shows response to therapy   - CT chest/ abd/pelvis ordered by hem/onc 5/2021 showed resolution of RUL nodule, R hilum not well visualized, however, was similar to PET scan, osseous metastasis ( ribs, spine, and pelvis), GGO   - CTA 7/2021 showed persistent multifocal patchy GGO slightly increased     - CT chest/abdomen/pelvis 4/2022: tree-in-bed opacities in RLL and some in lingula likely secondary to allergic asthma  - CT chest 4/2023: no adenocarcinoma recurrence  - another CT scan ordered by hem.  Continue to follow        Morbid obesity  - counseled       No follow-ups on file. History of Present Illness   HPI:  Lyndon Montoya is a 70 y.o. female 70 y.o. 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 She also has severe eosinophilic asthma with previous frequent exacerbation, on Fasenra and triple therapy, and with last exacerbation in 8/2023. Today she presented for a follow up visit. She completed steroid taper after last hospitalization for asthma exacerbation. During last admission she also started Cpap due to noctual hypoxia. Patient stated that she just got her Cpap machine 2 days ago and started to use yesterday but she cannot tolerate the pressure and awake at 4am. She is not aware of the setting. Otherwise denies any fever/cough/shortness of breath. She is using nebuizer because previous advair and spiriva were too expensive. She will be enrollled in pul rehab program in 9/2023. Review of Systems   Constitutional: Negative for appetite change and fever. HENT: Negative for ear pain, rhinorrhea, sneezing, sore throat and trouble swallowing. Cardiovascular: Negative for chest pain. Neurological: Negative for headaches. All other systems reviewed and are negative. Historical Information   Past Medical History:   Diagnosis Date   • Asthma    • Degenerative joint disease    • GERD (gastroesophageal reflux disease)    • Hypertension    • Lumbar disc disease    • Lung cancer ([address])    • Sleep apnea     suspected    • Ventricular arrhythmia    • Vitamin D deficiency      Past Surgical History:   Procedure Laterality Date   • APPENDECTOMY     • COLONOSCOPY N/A 03/18/2019    Procedure: COLONOSCOPY;  Surgeon: Valorie Marquez DO;  [facility];   Service: Gastroenterology   • ESOPHAGOGASTRODUODENOSCOPY N/A 03/18/2019    Procedure: ESOPHAGOGASTRODUODENOSCOPY (EGD); Surgeon: Dee Dee Davenport DO;  Location: [facility];   Service: Gastroenterology   • KNEE SURGERY     • ROTATOR CUFF REPAIR     • SHOULDER SURGERY       Family History   Problem Relation Age of Onset   • Stroke Mother    • Diabetes Mother    • Hyperlipidemia Mother    • Hypertension Mother    • Allergies Mother         Environmental   • Asthma Mother    • Diabetes Father    • Hyperlipidemia Father    • Hypertension Father    • Lung cancer Father 79   • Allergies Father         Environmental   • Asthma Father    • Lymphoma Sister 71   • Heart disease Sister         Pacemaker   • Asthma Brother    • Aneurysm Brother         Brain - had surgery   • Other Brother         Lymes disease   • Coronary artery disease Daughter         2 bypass done   • No Known Problems Daughter    • No Known Problems Daughter    • No Known Problems Son    • Kidney disease Son    • Liver disease Son    • Obesity Son      Pets: still has a cat     Meds/Allergies     Current Outpatient Medications:   •  acetaminophen (TYLENOL) 650 mg CR tablet, TAKE 1 TABLET (650 MG TOTAL) BY MOUTH EVERY 8 (EIGHT) HOURS AS NEEDED FOR MILD PAIN, Disp: , Rfl:   •  albuterol (2.5 mg/3 mL) 0.083 % nebulizer solution, TAKE 3 ML (2.5 MG TOTAL) BY NEBULIZATION EVERY 6 (SIX) HOURS AS NEEDED FOR WHEEZING, Disp: 375 mL, Rfl: 5  •  Alectinib HCl 150 MG CAPS, Take 4 capsules (600 mg total) by mouth 2 (two) times a day, Disp: 240 capsule, Rfl: 5  •  amiodarone (PACERONE) 400 MG tablet, Take 1 tablet (400 mg total) by mouth 2 (two) times a day with meals for 9 doses, Disp: 9 tablet, Rfl: 0  •  amiodarone 200 mg tablet, Take 1 tablet (200 mg total) by mouth daily with breakfast Do not start before August 28, 2023., Disp: 30 tablet, Rfl: 0  •  apixaban (ELIQUIS) 5 mg, Take 1 tablet (5 mg total) by mouth 2 (two) times a day, Disp: 60 tablet, Rfl: 0  •  benralizumab (FASENRA) subcutaneous injection, Inject 1 mL (30 mg total) under the skin every 56 days, Disp: 1 mL, Rfl: 6  • budesonide (PULMICORT) 0.25 mg/2 mL nebulizer solution, TAKE 2 ML BY NEBULIZATION 2 (TWO) TIMES A DAY RINSE MOUTH AFTER USE, Disp: 360 mL, Rfl: 2  •  ferrous sulfate 324 (65 Fe) mg, Take 1 tablet (324 mg total) by mouth every other day, Disp: 90 tablet, Rfl: 1  •  fexofenadine (ALLEGRA) 180 MG tablet, Take 180 mg by mouth daily, Disp: , Rfl:   •  fluticasone (FLONASE) 50 mcg/act nasal spray, SPRAY 2 SPRAYS INTO EACH NOSTRIL EVERY DAY, Disp: 48 mL, Rfl: 1  •  Fluticasone-Salmeterol (Advair) 500-50 mcg/dose inhaler, Inhale 1 puff 2 (two) times a day Rinse mouth after use., Disp: 60 blister, Rfl: 0  •  furosemide (LASIX) 20 mg tablet, Take 1 tablet (20 mg total) by mouth daily, Disp: 90 tablet, Rfl: 3  •  metoprolol tartrate (LOPRESSOR) 25 mg tablet, Take 1 tablet (25 mg total) by mouth every 12 (twelve) hours, Disp: 60 tablet, Rfl: 0  •  omeprazole (PriLOSEC) 20 mg delayed release capsule, Take 1 capsule (20 mg total) by mouth daily, Disp: 90 capsule, Rfl: 1  •  oxyCODONE-acetaminophen (PERCOCET) 5-325 mg per tablet, Take 1 tablet by mouth every 4 (four) hours as needed for moderate pain For ongoing pain Max Daily Amount: 6 tablets, Disp: 60 tablet, Rfl: 0  •  potassium chloride (K-DUR,KLOR-CON) 20 mEq tablet, Take 1 tablet (20 mEq total) by mouth daily, Disp: 30 tablet, Rfl: 0  •  rosuvastatin (CRESTOR) 10 MG tablet, Take 1 tablet (10 mg total) by mouth daily, Disp: 90 tablet, Rfl: 3  •  Ventolin  (90 Base) MCG/ACT inhaler, INHALE 2 PUFFS EVERY 6 HOURS AS NEEDED FOR WHEEZING, Disp: 8.5 g, Rfl: 2  No Known Allergies    Vitals: not currently breastfeeding. There is no height or weight on file to calculate BMI. Physical Exam  Physical Exam  Vitals reviewed. Constitutional:       Appearance: Normal appearance. She is obese. HENT:      Head: Normocephalic.       Nose: Nose normal.      Mouth/Throat:      Mouth: Mucous membranes are moist.   Eyes:      Pupils: Pupils are equal, round, and reactive to light. Cardiovascular:      Rate and Rhythm: Normal rate and regular rhythm. Pulses: Normal pulses. Heart sounds: Normal heart sounds. Pulmonary:      Effort: Pulmonary effort is normal.      Breath sounds: Normal breath sounds. Abdominal:      General: Abdomen is flat. Palpations: Abdomen is soft. Musculoskeletal:         General: Swelling present. Cervical back: Normal range of motion. Right lower leg: Edema present. Left lower leg: Edema present. Comments: R knee warm, ecchymosis with red streak to lower leg    Skin:     General: Skin is warm and dry. Neurological:      General: No focal deficit present. Mental Status: She is alert and oriented to person, place, and time. Labs: I have personally reviewed pertinent lab results. Lab Results   Component Value Date    WBC 7.09 2023    HGB 10.5 (L) 2023    HCT 32.3 (L) 2023    MCV 90 2023    PLT 76 (L) 2023     Lab Results   Component Value Date    CALCIUM 9.1 2023    K 3.6 2023    CO2 35 (H) 2023    CL 95 (L) 2023    BUN 31 (H) 2023    CREATININE 1.57 (H) 2023     Lab Results   Component Value Date     2020     Lab Results   Component Value Date    ALT 52 2023    AST 44 (H) 2023    ALKPHOS 96 2023       Imaging and other studies: I have personally reviewed pertinent reports. and I have personally reviewed pertinent films in PACS    Pulmonary function testin2021  FEV1/FVC Ratio: 71 %  Forced Vital Capacity: 1.82 L    61 % predicted    2022: FEV1 52%      EKG, Pathology, and Other Studies: I have personally reviewed pertinent reports.    and I have personally reviewed pertinent films in PACS      Duncan Osman MD  Pulmonary and Critical Care Fellow  [facility]

## 2023-08-30 ENCOUNTER — APPOINTMENT (OUTPATIENT)
Dept: LAB | Facility: CLINIC | Age: 78
End: 2023-08-30
Payer: COMMERCIAL

## 2023-08-30 ENCOUNTER — OFFICE VISIT (OUTPATIENT)
Dept: PULMONOLOGY | Facility: CLINIC | Age: 78
End: 2023-08-30
Payer: COMMERCIAL

## 2023-08-30 VITALS
HEART RATE: 73 BPM | SYSTOLIC BLOOD PRESSURE: 138 MMHG | OXYGEN SATURATION: 98 % | WEIGHT: 235 LBS | BODY MASS INDEX: 44.4 KG/M2 | DIASTOLIC BLOOD PRESSURE: 68 MMHG | TEMPERATURE: 98 F

## 2023-08-30 DIAGNOSIS — J45.50 SEVERE PERSISTENT ASTHMA WITHOUT COMPLICATION: Primary | ICD-10-CM

## 2023-08-30 DIAGNOSIS — C34.91 MALIGNANT NEOPLASM OF RIGHT LUNG, UNSPECIFIED PART OF LUNG (HCC): ICD-10-CM

## 2023-08-30 DIAGNOSIS — E66.01 MORBID OBESITY (HCC): ICD-10-CM

## 2023-08-30 DIAGNOSIS — Z13.9 ENCOUNTER FOR SCREENING: ICD-10-CM

## 2023-08-30 DIAGNOSIS — G47.33 OSA (OBSTRUCTIVE SLEEP APNEA): ICD-10-CM

## 2023-08-30 DIAGNOSIS — J31.0 CHRONIC RHINITIS: ICD-10-CM

## 2023-08-30 PROBLEM — J45.909 SEVERE ASTHMA: Status: ACTIVE | Noted: 2020-11-11

## 2023-08-30 LAB
ALBUMIN SERPL BCP-MCNC: 3.8 G/DL (ref 3.5–5)
ALP SERPL-CCNC: 98 U/L (ref 34–104)
ALT SERPL W P-5'-P-CCNC: 85 U/L (ref 7–52)
ANION GAP SERPL CALCULATED.3IONS-SCNC: 10 MMOL/L
AST SERPL W P-5'-P-CCNC: 73 U/L (ref 13–39)
BASOPHILS # BLD AUTO: 0.01 THOUSANDS/ÂΜL (ref 0–0.1)
BASOPHILS NFR BLD AUTO: 0 % (ref 0–1)
BILIRUB SERPL-MCNC: 1.06 MG/DL (ref 0.2–1)
BUN SERPL-MCNC: 29 MG/DL (ref 5–25)
CALCIUM SERPL-MCNC: 9.1 MG/DL (ref 8.4–10.2)
CHLORIDE SERPL-SCNC: 102 MMOL/L (ref 96–108)
CO2 SERPL-SCNC: 28 MMOL/L (ref 21–32)
CREAT SERPL-MCNC: 1.81 MG/DL (ref 0.6–1.3)
EOSINOPHIL # BLD AUTO: 0 THOUSAND/ÂΜL (ref 0–0.61)
EOSINOPHIL NFR BLD AUTO: 0 % (ref 0–6)
ERYTHROCYTE [DISTWIDTH] IN BLOOD BY AUTOMATED COUNT: 16.3 % (ref 11.6–15.1)
GFR SERPL CREATININE-BSD FRML MDRD: 26 ML/MIN/1.73SQ M
GLUCOSE P FAST SERPL-MCNC: 185 MG/DL (ref 65–99)
HCT VFR BLD AUTO: 30.6 % (ref 34.8–46.1)
HGB BLD-MCNC: 9.8 G/DL (ref 11.5–15.4)
IMM GRANULOCYTES # BLD AUTO: 0.07 THOUSAND/UL (ref 0–0.2)
IMM GRANULOCYTES NFR BLD AUTO: 1 % (ref 0–2)
LYMPHOCYTES # BLD AUTO: 1.35 THOUSANDS/ÂΜL (ref 0.6–4.47)
LYMPHOCYTES NFR BLD AUTO: 21 % (ref 14–44)
MCH RBC QN AUTO: 29.6 PG (ref 26.8–34.3)
MCHC RBC AUTO-ENTMCNC: 32 G/DL (ref 31.4–37.4)
MCV RBC AUTO: 92 FL (ref 82–98)
MONOCYTES # BLD AUTO: 0.55 THOUSAND/ÂΜL (ref 0.17–1.22)
MONOCYTES NFR BLD AUTO: 9 % (ref 4–12)
NEUTROPHILS # BLD AUTO: 4.44 THOUSANDS/ÂΜL (ref 1.85–7.62)
NEUTS SEG NFR BLD AUTO: 69 % (ref 43–75)
NRBC BLD AUTO-RTO: 0 /100 WBCS
PLATELET # BLD AUTO: 90 THOUSANDS/UL (ref 149–390)
POTASSIUM SERPL-SCNC: 3.9 MMOL/L (ref 3.5–5.3)
PROT SERPL-MCNC: 6.4 G/DL (ref 6.4–8.4)
RBC # BLD AUTO: 3.31 MILLION/UL (ref 3.81–5.12)
SODIUM SERPL-SCNC: 140 MMOL/L (ref 135–147)
WBC # BLD AUTO: 6.42 THOUSAND/UL (ref 4.31–10.16)

## 2023-08-30 PROCEDURE — 99214 OFFICE O/P EST MOD 30 MIN: CPT | Performed by: INTERNAL MEDICINE

## 2023-08-30 PROCEDURE — 80053 COMPREHEN METABOLIC PANEL: CPT

## 2023-08-30 PROCEDURE — 85025 COMPLETE CBC W/AUTO DIFF WBC: CPT

## 2023-08-30 PROCEDURE — 36415 COLL VENOUS BLD VENIPUNCTURE: CPT

## 2023-08-30 RX ORDER — FLUTICASONE PROPIONATE AND SALMETEROL 500; 50 UG/1; UG/1
1 POWDER RESPIRATORY (INHALATION) 2 TIMES DAILY
Qty: 60 BLISTER | Refills: 2 | Status: CANCELLED | OUTPATIENT
Start: 2023-08-30

## 2023-08-30 RX ORDER — DICLOFENAC SODIUM 75 MG/1
75 TABLET, DELAYED RELEASE ORAL
COMMUNITY
Start: 2023-08-10 | End: 2023-09-01

## 2023-08-30 NOTE — LETTER
August 30, 2023     Marieglenroy Montaño, [address] [address] [address] [facility]    Patient: Hesham Bolaños   YOB: 1945   Date of Visit: 8/30/2023       Dear Dr. Briceno Raw:    Thank you for referring Hesham Bolaños to me for evaluation. Below are my notes for this consultation. If you have questions, please do not hesitate to call me. I look forward to following your patient along with you. Sincerely,        Maria C Laird MD        CC: No Recipients    Maria C Laird MD  8/30/2023 10:29 AM  Attested  Pulmonary Follow Up Note   Hesham Bolaños 70 y.o. female MRN: [id number]  8/28/2023      Assessment and Plan:    Severe allergic asthma  - type 1 with high eos 480 and multiple possitive allergens in 2020 and Total IgE 109,  Multiple allergies identified in the past.  with 1-3x a year exacerbaitons, improved since starting fasenra Jan 2023 by allergy doctor. With recent exacerbation in 8/2023  - PFT 2022: FEV1 52% however, lung volumes not ordered    - takes allegra. Cannot afford Advair 500-50 and Spiriva due to cost  - will change to Budesonide BID, Albuterol TID and ipratropium TID nebulizer due to cost  - continue with Fasenra injection by rheumatologist (last injection 6/2023, should contact her rheumatologist to make another appointment for injection)  - pul rehab enrolled. Will start in 9/2023      Severe MONO   - sleep study in 2019 showed severe MONO   - home sleep study 5/2022 with AHI 85.2 with significant hypoxia to 70%  - recently started Cpap and cannot tolerate the setting. Will change to a trial of auto-titrating CPAP 5-20 cc of H2O pressure with 2L of oxygen as per recommendation in 2022.  - should come back in 1 mo to reaccess the compliance and adjust as needed.     Metastatic Non-small cell carcinoma of Right lung (8/2020) with ALK +  - s/p palliative radiation therapy to T8 x 10 treatments  - on Alectinib as per hem/onc - doing well so far   - had a PET scan 1/2021 shows response to therapy   - CT chest/ abd/pelvis ordered by hem/onc 5/2021 showed resolution of RUL nodule, R hilum not well visualized, however, was similar to PET scan, osseous metastasis ( ribs, spine, and pelvis), GGO   - CTA 7/2021 showed persistent multifocal patchy GGO slightly increased     - CT chest/abdomen/pelvis 4/2022: tree-in-bed opacities in RLL and some in lingula likely secondary to allergic asthma  - CT chest 4/2023: no adenocarcinoma recurrence  - another CT scan ordered by hem. Continue to follow        Morbid obesity  - counseled       No follow-ups on file. History of Present Illness   HPI:  Shanta Hicks is a 70 y.o. female 70 y.o. 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 She also has severe eosinophilic asthma with previous frequent exacerbation, on Fasenra and triple therapy, and with last exacerbation in 8/2023. Today she presented for a follow up visit. She completed steroid taper after last hospitalization for asthma exacerbation. During last admission she also started Cpap due to noctual hypoxia. Patient stated that she just got her Cpap machine 2 days ago and started to use yesterday but she cannot tolerate the pressure and awake at 4am. She is not aware of the setting. Otherwise denies any fever/cough/shortness of breath. She is using nebuizer because previous advair and spiriva were too expensive. She will be enrollled in pul rehab program in 9/2023. Review of Systems   Constitutional: Negative for appetite change and fever. HENT: Negative for ear pain, rhinorrhea, sneezing, sore throat and trouble swallowing. Cardiovascular: Negative for chest pain. Neurological: Negative for headaches. All other systems reviewed and are negative.       Historical Information   Past Medical History:   Diagnosis Date   • Asthma    • Degenerative joint disease    • GERD (gastroesophageal reflux disease)    • Hypertension    • Lumbar disc disease    • Lung cancer ([address])    • Sleep apnea     suspected    • Ventricular arrhythmia    • Vitamin D deficiency      Past Surgical History:   Procedure Laterality Date   • APPENDECTOMY     • COLONOSCOPY N/A 03/18/2019    Procedure: COLONOSCOPY;  Surgeon: Kush Stuart DO;  [facility]; Service: Gastroenterology   • ESOPHAGOGASTRODUODENOSCOPY N/A 03/18/2019    Procedure: ESOPHAGOGASTRODUODENOSCOPY (EGD); Surgeon: Kush Stuart DO;  [facility];   Service: Gastroenterology   • KNEE SURGERY     • ROTATOR CUFF REPAIR     • SHOULDER SURGERY       Family History   Problem Relation Age of Onset   • Stroke Mother    • Diabetes Mother    • Hyperlipidemia Mother    • Hypertension Mother    • Allergies Mother         Environmental   • Asthma Mother    • Diabetes Father    • Hyperlipidemia Father    • Hypertension Father    • Lung cancer Father 79   • Allergies Father         Environmental   • Asthma Father    • Lymphoma Sister 71   • Heart disease Sister         Pacemaker   • Asthma Brother    • Aneurysm Brother         Brain - had surgery   • Other Brother         Lymes disease   • Coronary artery disease Daughter         2 bypass done   • No Known Problems Daughter    • No Known Problems Daughter    • No Known Problems Son    • Kidney disease Son    • Liver disease Son    • Obesity Son      Pets: still has a cat     Meds/Allergies     Current Outpatient Medications:   •  acetaminophen (TYLENOL) 650 mg CR tablet, TAKE 1 TABLET (650 MG TOTAL) BY MOUTH EVERY 8 (EIGHT) HOURS AS NEEDED FOR MILD PAIN, Disp: , Rfl:   •  albuterol (2.5 mg/3 mL) 0.083 % nebulizer solution, TAKE 3 ML (2.5 MG TOTAL) BY NEBULIZATION EVERY 6 (SIX) HOURS AS NEEDED FOR WHEEZING, Disp: 375 mL, Rfl: 5  •  Alectinib HCl 150 MG CAPS, Take 4 capsules (600 mg total) by mouth 2 (two) times a day, Disp: 240 capsule, Rfl: 5  • amiodarone (PACERONE) 400 MG tablet, Take 1 tablet (400 mg total) by mouth 2 (two) times a day with meals for 9 doses, Disp: 9 tablet, Rfl: 0  •  amiodarone 200 mg tablet, Take 1 tablet (200 mg total) by mouth daily with breakfast Do not start before August 28, 2023., Disp: 30 tablet, Rfl: 0  •  apixaban (ELIQUIS) 5 mg, Take 1 tablet (5 mg total) by mouth 2 (two) times a day, Disp: 60 tablet, Rfl: 0  •  benralizumab (FASENRA) subcutaneous injection, Inject 1 mL (30 mg total) under the skin every 56 days, Disp: 1 mL, Rfl: 6  •  budesonide (PULMICORT) 0.25 mg/2 mL nebulizer solution, TAKE 2 ML BY NEBULIZATION 2 (TWO) TIMES A DAY RINSE MOUTH AFTER USE, Disp: 360 mL, Rfl: 2  •  ferrous sulfate 324 (65 Fe) mg, Take 1 tablet (324 mg total) by mouth every other day, Disp: 90 tablet, Rfl: 1  •  fexofenadine (ALLEGRA) 180 MG tablet, Take 180 mg by mouth daily, Disp: , Rfl:   •  fluticasone (FLONASE) 50 mcg/act nasal spray, SPRAY 2 SPRAYS INTO EACH NOSTRIL EVERY DAY, Disp: 48 mL, Rfl: 1  •  Fluticasone-Salmeterol (Advair) 500-50 mcg/dose inhaler, Inhale 1 puff 2 (two) times a day Rinse mouth after use., Disp: 60 blister, Rfl: 0  •  furosemide (LASIX) 20 mg tablet, Take 1 tablet (20 mg total) by mouth daily, Disp: 90 tablet, Rfl: 3  •  metoprolol tartrate (LOPRESSOR) 25 mg tablet, Take 1 tablet (25 mg total) by mouth every 12 (twelve) hours, Disp: 60 tablet, Rfl: 0  •  omeprazole (PriLOSEC) 20 mg delayed release capsule, Take 1 capsule (20 mg total) by mouth daily, Disp: 90 capsule, Rfl: 1  •  oxyCODONE-acetaminophen (PERCOCET) 5-325 mg per tablet, Take 1 tablet by mouth every 4 (four) hours as needed for moderate pain For ongoing pain Max Daily Amount: 6 tablets, Disp: 60 tablet, Rfl: 0  •  potassium chloride (K-DUR,KLOR-CON) 20 mEq tablet, Take 1 tablet (20 mEq total) by mouth daily, Disp: 30 tablet, Rfl: 0  •  rosuvastatin (CRESTOR) 10 MG tablet, Take 1 tablet (10 mg total) by mouth daily, Disp: 90 tablet, Rfl: 3  • Ventolin  (90 Base) MCG/ACT inhaler, INHALE 2 PUFFS EVERY 6 HOURS AS NEEDED FOR WHEEZING, Disp: 8.5 g, Rfl: 2  No Known Allergies    Vitals: not currently breastfeeding. There is no height or weight on file to calculate BMI. Physical Exam  Physical Exam  Vitals reviewed. Constitutional:       Appearance: Normal appearance. She is obese. HENT:      Head: Normocephalic. Nose: Nose normal.      Mouth/Throat:      Mouth: Mucous membranes are moist.   Eyes:      Pupils: Pupils are equal, round, and reactive to light. Cardiovascular:      Rate and Rhythm: Normal rate and regular rhythm. Pulses: Normal pulses. Heart sounds: Normal heart sounds. Pulmonary:      Effort: Pulmonary effort is normal.      Breath sounds: Normal breath sounds. Abdominal:      General: Abdomen is flat. Palpations: Abdomen is soft. Musculoskeletal:         General: Swelling present. Cervical back: Normal range of motion. Right lower leg: Edema present. Left lower leg: Edema present. Comments: R knee warm, ecchymosis with red streak to lower leg    Skin:     General: Skin is warm and dry. Neurological:      General: No focal deficit present. Mental Status: She is alert and oriented to person, place, and time. Labs: I have personally reviewed pertinent lab results. Lab Results   Component Value Date    WBC 7.09 08/23/2023    HGB 10.5 (L) 08/23/2023    HCT 32.3 (L) 08/23/2023    MCV 90 08/23/2023    PLT 76 (L) 08/23/2023     Lab Results   Component Value Date    CALCIUM 9.1 08/23/2023    K 3.6 08/23/2023    CO2 35 (H) 08/23/2023    CL 95 (L) 08/23/2023    BUN 31 (H) 08/23/2023    CREATININE 1.57 (H) 08/23/2023     Lab Results   Component Value Date     11/17/2020     Lab Results   Component Value Date    ALT 52 08/22/2023    AST 44 (H) 08/22/2023    ALKPHOS 96 08/22/2023       Imaging and other studies: I have personally reviewed pertinent reports.    and I have personally reviewed pertinent films in PACS    Pulmonary function testin2021  FEV1/FVC Ratio: 71 %  Forced Vital Capacity: 1.82 L    61 % predicted    2022: FEV1 52%      EKG, Pathology, and Other Studies: I have personally reviewed pertinent reports. and I have personally reviewed pertinent films in PACS      Radha Neal MD  Pulmonary and Critical Care Fellow  [facility]        Attestation signed by Chuck Mai DO at 2023 10:51 AM:  Attending Statement:  I have seen and examined the patient. I have discussed the patient's care with the pulmonary fellow. Chart and history reviewed. 71 yo F PMH morbid obesity, MONO, severe persistent asthma, metastatic lung cancer s/p resection, HTN, atrial flutter here for follow up. Trialed CPAP last night. Woke up at 4AM feeling like air in throat with palpitations and panic attack. On Fasenra, using nebulizers. Hospitalized - for asthma exacerbation and new onset afib/flutter. Imaging Studies were reviewed personally on the [facility] system:  CXR per my review shows clear b/l lungs    EXAMINATION: no distress; happy; comfortable; speaking full sentences     Home sleep study 22: SERJIO 85.2: severe MONO    I agree with the findings, assessment, and plan as outlined in the note by Dr. Hoang Blanco. Severe persistent eosinophilic asthma with recent exacerbation requiring hospitalization  Severe MONO on CPAP  Metastatic [facility] lung cancer  Morbid obesity BMI 44.40  Recent new onset atrial fibrillation  GERD  CHFpEF  Cad  HLD    Cont. Fasenra  Cont. Nebulized triple bronchodilators. Discussed going back on Advair and Spiriva but pt states it is cost prohibitive  Avoid triggers as able  Cont. CPAP. Trial wearing when awake to help get used to machine. Instructed her to contact DME to trial full face mask per her wish  Weight loss as able. Activity as able. Cancer surveillance/tx per oncology.     Jen ALBRECHT Dostal, DO

## 2023-08-31 ENCOUNTER — TELEPHONE (OUTPATIENT)
Dept: NEPHROLOGY | Facility: CLINIC | Age: 78
End: 2023-08-31

## 2023-08-31 ENCOUNTER — OFFICE VISIT (OUTPATIENT)
Dept: FAMILY MEDICINE CLINIC | Facility: CLINIC | Age: 78
End: 2023-08-31

## 2023-08-31 VITALS
BODY MASS INDEX: 44.37 KG/M2 | TEMPERATURE: 96.8 F | HEART RATE: 78 BPM | OXYGEN SATURATION: 94 % | RESPIRATION RATE: 18 BRPM | DIASTOLIC BLOOD PRESSURE: 80 MMHG | SYSTOLIC BLOOD PRESSURE: 142 MMHG | WEIGHT: 235 LBS | HEIGHT: 61 IN

## 2023-08-31 DIAGNOSIS — I50.9 CONGESTIVE HEART FAILURE, UNSPECIFIED HF CHRONICITY, UNSPECIFIED HEART FAILURE TYPE (HCC): ICD-10-CM

## 2023-08-31 DIAGNOSIS — J45.51 SEVERE PERSISTENT ASTHMA WITH ACUTE EXACERBATION: Primary | ICD-10-CM

## 2023-08-31 DIAGNOSIS — I48.92 ATRIAL FLUTTER (HCC): ICD-10-CM

## 2023-08-31 DIAGNOSIS — C79.51 MALIGNANT NEOPLASM METASTATIC TO BONE (HCC): ICD-10-CM

## 2023-08-31 DIAGNOSIS — N18.31 STAGE 3A CHRONIC KIDNEY DISEASE (HCC): ICD-10-CM

## 2023-08-31 PROCEDURE — 99213 OFFICE O/P EST LOW 20 MIN: CPT | Performed by: STUDENT IN AN ORGANIZED HEALTH CARE EDUCATION/TRAINING PROGRAM

## 2023-08-31 RX ORDER — FUROSEMIDE 40 MG/1
40 TABLET ORAL 2 TIMES DAILY
Qty: 60 TABLET | Refills: 3 | Status: SHIPPED | OUTPATIENT
Start: 2023-08-31 | End: 2023-09-07

## 2023-08-31 NOTE — TELEPHONE ENCOUNTER
New Patient Intake Form   Patient Details   Chandrika Potter     1945     [id number]     Insurance Information   Name of [facility] plan   Does the patient need an insurance referral? No   If patient has [employer]ney Maribell, please ask if they will be using their Pitney Maribell. Appointment Information   Who is calling to schedule? If not patient, what is callers name? Aleida Leyva   Referring Provider Dr. Leonie Deng   Reason for Appt (Diagnosis) CKD3a   Does Patient have labs/urine done at [facility]? If not, where do they go? List the date of last lab / urine  *Please try to get labs 2 years back if not at [address] Yes 8/30/23   Has patient been hospitalized recently? If yes, list name and location of hospital they were In Yes 8/12/23   Has patient been seen by a Nephrologist before? If yes, list name, location and phone number No   Has the patient had renal imaging done? If so, list the most recent date and type of imagineg Renal ultrasound 8/13   Does patient have a history of Kidney Stones? No   Appointment Details   Is there a referral on file?  Yes   Appointment Date 09/01/23   Location [place]   Miscellaneous

## 2023-08-31 NOTE — PROGRESS NOTES
Assessment/Plan:    1. Severe persistent asthma with acute exacerbation  Assessment & Plan:  Currently followed by pulmonology. Suboptimal therapy with budesonide and nebulizer treatment. Unable to afford other medications at this time. Also recommended patient follow-up with pulm rehab. Patient having difficulties managing copayments for frequent visits. Plan  : Engage with social work to see if patient has better motivation so we can get patient on better treatment regimen-and participating in pulmonary rehab  -We will have patient come back in a week with her inhaler to go over usage  -ED precautions given      Orders:  -     Referral to [address]; Future    2. Atrial flutter ([address])  Assessment & Plan:  Currently on amiodarone 200 mg. Also on Eliquis at this time. Encourage patient to follow-up with cardiology. Rate controlled during this visit. Orders:  -     apixaban (ELIQUIS) 5 mg; Take 1 tablet (5 mg total) by mouth 2 (two) times a day  -     Referral to [address]; Future    3. Malignant neoplasm metastatic to bone [facility])  Comments:  Continue to follow with heme-onc. Orders:  -     Referral to [address]; Future    4. Stage 3a chronic kidney disease ([address])  Comments:  Progressively getting worse. We will need as soon as possible nephrology appointment. Orders:  -     Ambulatory Referral to Nephrology; Future    5. Congestive heart failure, unspecified HF chronicity, unspecified heart failure type (HCC)  -     furosemide (LASIX) 40 mg tablet; Take 1 tablet (40 mg total) by mouth 2 (two) times a day (Patient taking differently: Take 40 mg by mouth daily)  -     Referral to [address]; Future       Subjective:      Patient ID: Lyndon Montoya is a 70 y.o. female.     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 History also notable for eosinophilic asthma with frequent exacerbations who was discharged from the hospital 8/12/2023 after she was found to have shortness of breath thought to be an asthma exacerbation. Hospital stay was complicated by HF coagulation where she was initiated on amiodarone and started on Eliquis for her atrial fibrillation. She was also started on Lasix as she was started volume overloaded at that time. Patient recent visit to pulmonology made recommendations for Budesonide BID, Albuterol TID and ipratropium TID nebulizer due to cost goal suboptimal therapy for her current condition. Patient currently lives at home with her son who she helps take care of. He is currently unemployed. And she is struggling to pay for all her co-pays and therapy thereby putting her in a hard place. Patient continues to endorse shortness of breath. She is unable to walk long distances without getting short of breath. She states that this has been progressively getting worse. The following portions of the patient's history were reviewed and updated as appropriate: allergies, current medications, past family history, past medical history, past social history, past surgical history, and problem list.    Review of Systems   Constitutional: Negative for chills and fever. HENT: Negative for ear pain and sore throat. Eyes: Negative for pain and visual disturbance. Respiratory: Positive for shortness of breath. Negative for cough. Cardiovascular: Negative for chest pain and palpitations. Gastrointestinal: Negative for abdominal pain and vomiting. Genitourinary: Negative for dysuria and hematuria. Musculoskeletal: Negative for arthralgias and back pain. Skin: Negative for color change and rash. Neurological: Negative for seizures and syncope. All other systems reviewed and are negative.         Objective:      /80 (BP Location: Left arm, Patient Position: Sitting, Cuff Size: Standard) Comment (BP Location): lower arm  Pulse 78   Temp (!) 96.8 °F (36 °C) (Temporal)   Resp 18   Ht 5' 1" (1.549 m)   Wt 107 kg (235 lb)   SpO2 94%   BMI 44.40 kg/m²          Physical Exam  Constitutional:       General: She is not in acute distress. Appearance: Normal appearance. She is obese. HENT:      Nose: No congestion or rhinorrhea. Eyes:      Extraocular Movements: Extraocular movements intact. Pupils: Pupils are equal, round, and reactive to light. Cardiovascular:      Rate and Rhythm: Normal rate and regular rhythm. Pulses: Normal pulses. Heart sounds: Normal heart sounds. No murmur heard. No gallop. Pulmonary:      Effort: Respiratory distress (After short ambulatory. Patient becomes dyspneic) present. Breath sounds: Wheezing present. No rhonchi or rales. Abdominal:      General: Bowel sounds are normal. There is no distension. Palpations: Abdomen is soft. There is no mass. Tenderness: There is no abdominal tenderness. Hernia: No hernia is present. Skin:     General: Skin is warm. Coloration: Skin is not jaundiced. Findings: No bruising. Neurological:      General: No focal deficit present. Mental Status: She is alert and oriented to person, place, and time. Psychiatric:         Mood and Affect: Mood normal.         Behavior: Behavior normal.         Thought Content:  Thought content normal.           Jamshid Arce DO   Family Medicine PGY-2  9/2/2023

## 2023-09-01 ENCOUNTER — CONSULT (OUTPATIENT)
Dept: NEPHROLOGY | Facility: CLINIC | Age: 78
End: 2023-09-01

## 2023-09-01 VITALS
HEIGHT: 61 IN | WEIGHT: 233 LBS | SYSTOLIC BLOOD PRESSURE: 136 MMHG | DIASTOLIC BLOOD PRESSURE: 64 MMHG | BODY MASS INDEX: 43.99 KG/M2

## 2023-09-01 DIAGNOSIS — E78.5 DYSLIPIDEMIA: ICD-10-CM

## 2023-09-01 DIAGNOSIS — N18.31 STAGE 3A CHRONIC KIDNEY DISEASE (HCC): ICD-10-CM

## 2023-09-01 DIAGNOSIS — I50.9 CONGESTIVE HEART FAILURE, UNSPECIFIED HF CHRONICITY, UNSPECIFIED HEART FAILURE TYPE (HCC): ICD-10-CM

## 2023-09-01 DIAGNOSIS — C34.90 MALIGNANT NEOPLASM DETERMINED BY BIOPSY OF LUNG (HCC): ICD-10-CM

## 2023-09-01 DIAGNOSIS — J45.50 SEVERE PERSISTENT ASTHMA WITHOUT COMPLICATION: ICD-10-CM

## 2023-09-01 DIAGNOSIS — N28.9 ABNORMAL RENAL FUNCTION: ICD-10-CM

## 2023-09-01 DIAGNOSIS — N17.9 AKI (ACUTE KIDNEY INJURY) (HCC): Primary | ICD-10-CM

## 2023-09-01 DIAGNOSIS — I10 ESSENTIAL HYPERTENSION: ICD-10-CM

## 2023-09-01 DIAGNOSIS — C34.90 NON-SMALL CELL LUNG CANCER, UNSPECIFIED LATERALITY (HCC): ICD-10-CM

## 2023-09-01 DIAGNOSIS — E55.9 VITAMIN D DEFICIENCY: ICD-10-CM

## 2023-09-01 DIAGNOSIS — I48.3 TYPICAL ATRIAL FLUTTER (HCC): ICD-10-CM

## 2023-09-01 DIAGNOSIS — I50.31 ACUTE DIASTOLIC CONGESTIVE HEART FAILURE (HCC): ICD-10-CM

## 2023-09-01 NOTE — PROGRESS NOTES
Nephrology Initial Consultation  Leanord Dandy 70 y.o. female MRN: [id number]            REASON FOR CONSULTATION:  Leanord Dandy is a 70 y. o.female who was referred by Liz Ruiz DO for evaluation of Consult, Hypertension, and Abnormal Lab  . ASSESSMENT / PLAN:   70 y.o.  female with pmh of multiple co-morbidities including hypertension (x20yrs), morbid obesity, GERD, arthritis, severe asthma, CAD, a.flutter, CHF and metastatic lung cancer presents to the office for evaluation and management of abnormal renal parameters. Acute kidney injury on CKD stage 3:  - After review of records in [facility] as well as Care everywhere patient has a baseline creatinine of 1-1.3 mg/dL dating as far back as 2021. Most recent labs show a Creatinine of 1.81 mg/dL on 8/31/2023. Renal function remains stable however slowly rising.   -Etiology of acute kidney injury unclear at this time could have some contribution secondary to nephrotoxicity from alectinib plus cardiorenal syndrome versus overdiuresis however clinically appears to be hypervolemic and patient reports improved urine after dose of 40 mg of Lasix today. We will reach out to hematology oncology discussed this with patient also that alectinib can also cause edema as well as worsening renal function we will also add CK level to rule out rhabdomyolysis and BNP. -For now continue on Lasix 40 mg p.o. daily get blood work in 2 weeks and call with updates. Will work-up with UA and urine protein creatinine ratio also  -Likely has underlying CKD secondary to age-related nephron loss plus hypertensive nephrosclerosis plus cardiorenal syndrome plus obesity related hyperfiltration plus NSAID nephropathy plus smoking associated nephropathy. We will still rule out paraproteinemia check SPEP UPEP free light chain ratio.   At risk for worsening renal parameters secondary to use of alectinib since it is nephrotoxic.  -Renal ultrasound from 8/12/2023 bilateral kidneys approximately 1 cm no hydronephrosis simple cyst on right kidney. - Acid base and lytes stable. - Clinically the patient appears to be normally hypervolemic agree with continuing Lasix 40 mg p.o. daily for now  - Recommend to avoid use of NSAIDs, nephrotoxins. Caution advised with regards to exposure to IV contrast dye.   - Discussed with the patient in depth her renal status, including the possible etiologies for CKD. - Advised the patient that when her GFR is close to 20mL/min then will start discussing about RRT(renal replacement therapy) options such as renal transplant, peritoneal dialysis and hemodialysis. - Informed the patient about the various options for Renal Replacement therapy. - Discussed with the patient how we need to work together to delay the progression of CKD with optimal BP control based on their age and co-morbidities and trying to reduce proteinuria by the use of anti-proteinuric agents. -Advised patient to avoid Voltaren. Advised patient if possible to switch over from Prilosec to either Pepcid or Zantac due to association of long-term use of PPI with CKD. Hypertension:  - Patient is on Lasix 40 mg p.o.  daily, Toprol 25 mg p.o. twice daily, K-Dur 20 milligrams p.o. daily.   -Lasix dose was just increased as of 9/1/2023 advised patient to continue 40 mg p.o. daily for now check blood work in 2 weeks and call with updates  - Goal BP of <  130/80 based on age and comorbidities  - Instructed to follow low sodium (2gm)diet.  -Continue to hold ACE inhibitor or ARB for now    Hemoglobin:  - Goal Hb of 10-12 g/dL  - Most recent labs suggestive of 9.8 g/dL. -On p.o. iron  -Follow-up with hematology oncology for further management    CKD-MBD(Mineral Bone Disease): - Based on patients CKD stage following is the goal of therapy.   - Maintain calcium phosphorus product of < 55.  - Stage 3 CKD - Goal Ca 8.5-10 mg/dL , goal Phos 2.7-4.6 mg/dL  , goal iPTH 30-70 pg/mL  - Continue patient on no vitamin D  -Check intact PTH vitamin D prior to next visit    Proteinuria:  -At risk for proteinuria most likely secondary to obesity related hyperfiltration.  - most recent UA from 2020 no blood or protein. Check UA after the visit. - check protein creatinine ratio, SPEP UPEP free light chain ratio  - currently on therapy for proteinuria with Crestor    Lipids:  -On Crestor  - goal LDL less than 70  - Management as per PCP    Metastatic lung cancer:  - management as per Primary team  -Follow-up with oncology last seen May 2023 notes reviewed  -On alectinib. Message sent over to oncology to consider discontinuation of alectinib    Severe asthma:  -Follow-up with pulmonary  -On Fasenra, Pulmicort, albuterol, Flonase, Allegra, Ventolin    CHF/CAD/ aflutter:  - Management as per primary team  -Follow-up with cardiology last seen  -Recent echo from 7/20/2023 EF of 34% grade 1 diastolic dysfunction. Nutrition:  - Encouraged patient to follow a renal diet comprising of moderate potassium, low phosphorus and protein restriction to 0.8gm/kg. - Will check serum albumin with next blood work. Followup:  - Patient is to follow-up in 4 months, with lab work to be performed in 2 weeks and then again in a few days prior to the next visit. Advised patient to call me in 10 days to review the results if they do not hear back from me, as I may have not received the results. Jennifer Medeiros MD, [facility], 9/1/2023, 2:37 PM             HISTORY OF PRESENT ILLNESS: 70 y.o. female with multiple comorbidities including hypertension (x20yrs), morbid obesity, GERD, arthritis, severe asthma, CAD, a.flutter, CHF and metastatic lung cancer presents to the office for evaluation and management of abnormal renal parameters.   Review of record shows patient has a baseline creatinine of 1 to 1.3 mg/dL dating as far back as 2021 up until July 2023 since then creatinine has been gradually rising most recent creatinine at 1.81 mg/dL on 8/30/2023 status post hospitalization from 8/12/2023 until 8/23/2023 for acute asthma exacerbation. Continues to follow-up with pulmonary for severe asthma. Was on ARB prior to hospitalization in August which has been on hold for now. Has been on po cancer pills for about 2 yrs now. Was discharged on 20mg once a day, but since hosp discharge edema has been getting worse then pcp yesterday changed it to 40mg twice a day but she only took it this am. Not taken ibuprofen for about 12 days. Prior to that was taking ibuprofen for years. Review of Systems   Constitutional: Negative for chills and fever. HENT: Negative for congestion and sore throat. Respiratory: Positive for cough and wheezing. Negative for shortness of breath. Cardiovascular: Positive for leg swelling. Gastrointestinal: Negative for diarrhea, nausea and vomiting. Genitourinary: Negative for difficulty urinating, dysuria and hematuria. Musculoskeletal: Negative for back pain. Skin: Negative for wound. Neurological: Negative for dizziness, light-headedness and headaches. Psychiatric/Behavioral: Negative for agitation and confusion. All other systems reviewed and are negative.       PAST MEDICAL HISTORY:  Past Medical History:   Diagnosis Date   • Allergic rhinitis    • Anemia    • Asthma    • Chronic bronchitis (HCC)    • COPD (chronic obstructive pulmonary disease) ([address])    • Coronary artery disease    • Degenerative joint disease    • GERD (gastroesophageal reflux disease)    • Glaucoma    • Hypertension    • Lumbar disc disease    • Lung cancer ([address])    • Pneumonia    • Sleep apnea     suspected    • Sleep apnea, obstructive    • Ventricular arrhythmia    • Vitamin D deficiency        PROBLEM LIST    Patient Active Problem List   Diagnosis   • Premature atrial contractions   • Primary osteoarthritis of right wrist   • Essential hypertension   • Vitamin D deficiency   • Hiatal hernia   • Spinal stenosis of 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   • Lumbar facet arthropathy   • 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   • Colon cancer screening   • Gastroesophageal reflux disease without esophagitis   • Other headache syndrome   • Chronic gastritis without bleeding   • Morbid obesity (HCC)   • Chronic rhinitis   • MONO (obstructive sleep apnea)   • Non-small cell lung cancer (HCC)   • Malignant neoplasm metastatic to bone (HCC)   • Severe asthma   • Abnormal renal function   • Depression, recurrent (HCC)   • Thrombocytopenia, unspecified (HCC)   • Need for vaccination   • Chronic seasonal allergic rhinitis due to pollen   • Allergic rhinitis due to animal (cat) (dog) hair and dander   • Allergic rhinitis due to house dust mite   • Primary localized osteoarthritis of right knee   • Malignant neoplasm determined by biopsy of lung ([address])   • Coronary artery disease involving native coronary artery of native heart without angina pectoris   • Dyslipidemia   • Diastolic CHF ([address])   • Atrial flutter ([address])   • Stage 3a chronic kidney disease ([address])   • CARLOS ENRIQUE (acute kidney injury) ([address])       PAST SURGICAL HISTORY:  Past Surgical History:   Procedure Laterality Date   • APPENDECTOMY     • COLON SURGERY     • COLONOSCOPY N/A 03/18/2019    Procedure: COLONOSCOPY;  Surgeon: Antonio Jernigan DO;  Location: [facility]; Service: Gastroenterology   • ESOPHAGOGASTRODUODENOSCOPY N/A 03/18/2019    Procedure: ESOPHAGOGASTRODUODENOSCOPY (EGD); Surgeon: Antonio Jernigan DO;  Location: [facility]; Service: Gastroenterology   • KNEE SURGERY     • LUNG BIOPSY     • ROTATOR CUFF REPAIR     • SHOULDER SURGERY         SOCIAL HISTORY :   reports that she quit smoking about 36 years ago. Her smoking use included cigarettes. She started smoking about 61 years ago. She has a 6.25 pack-year smoking history. She has quit using smokeless tobacco. She reports current alcohol use. She reports that she does not use drugs.     FAMILY HISTORY:  Family History   Problem Relation Age of Onset   • Stroke Mother    • Diabetes Mother    • Hyperlipidemia Mother    • Hypertension Mother    • Allergies Mother         Environmental   • Asthma Mother    • Diabetes Father    • Hyperlipidemia Father    • Hypertension Father    • Lung cancer Father 79   • Allergies Father         Environmental   • Asthma Father    • Lymphoma Sister 71   • Heart disease Sister         Pacemaker   • Asthma Brother    • Aneurysm Brother         Brain - had surgery   • Other Brother         Lymes disease   • Coronary artery disease Daughter         2 bypass done   • No Known Problems Daughter    • No Known Problems Daughter    • No Known Problems Son    • Kidney disease Son    • Liver disease Son    • Obesity Son        ALLERGIES:  No Known Allergies        PHYSICAL EXAM:  Vitals:    09/01/23 1353   BP: 136/64   BP Location: Left arm   Patient Position: Sitting   Cuff Size: Large   Weight: 106 kg (233 lb)   Height: 5' 1" (1.549 m)     Body mass index is 44.02 kg/m². Physical Exam  Vitals reviewed. Constitutional:       General: She is not in acute distress. Appearance: Normal appearance. She is obese. She is not ill-appearing, toxic-appearing or diaphoretic. HENT:      Head: Normocephalic and atraumatic. Mouth/Throat:      Pharynx: No oropharyngeal exudate. Eyes:      General: No scleral icterus. Conjunctiva/sclera: Conjunctivae normal.   Cardiovascular:      Rate and Rhythm: Normal rate. Heart sounds: No friction rub. Pulmonary:      Effort: No respiratory distress. Breath sounds: Normal breath sounds. No stridor. Abdominal:      General: There is no distension. Palpations: Abdomen is soft. There is no mass. Tenderness: There is no right CVA tenderness or left CVA tenderness. Musculoskeletal:         General: Swelling present. Cervical back: Normal range of motion. No rigidity. Skin:     General: Skin is warm. Coloration: Skin is not jaundiced. Neurological:      General: No focal deficit present. Mental Status: She is alert and oriented to person, place, and time. Psychiatric:         Mood and Affect: Mood normal.         Behavior: Behavior normal.           LABORATORY DATA:     Results from last 6 Months   Lab Units 08/30/23  0906 08/23/23  0513 08/22/23  0912 08/19/23  0830 08/19/23  1858 08/17/23  0555 08/16/23  0940 08/13/23  2339 08/13/23  0452   WBC Thousand/uL 6.42 7.09  --   --  9.45  --   --    < > 9.26   HEMOGLOBIN g/dL 9.8* 10.5*  --   --  10.9*  --   --    < > 10.5*   HEMATOCRIT % 30.6* 32.3*  --   --  33.4*  --   --    < > 32.2*   PLATELETS Thousands/uL 90* 76*  --   --  101*  --   --    < > 90*   POTASSIUM mmol/L 3.9 3.6 3.8   < >  --    < > 3.6   < > 4.1   CHLORIDE mmol/L 102 95* 94*   < >  --    < > 102   < > 103   CO2 mmol/L 28 35* 35*   < >  --    < > 32   < > 31   BUN mg/dL 29* 31* 31*   < >  --    < > 30*   < > 32*   CREATININE mg/dL 1.81* 1.57* 1.57*   < >  --    < > 1.34*   < > 1.71*   CALCIUM mg/dL 9.1 9.1 9.1   < >  --    < > 9.0   < > 8.9   MAGNESIUM mg/dL  --   --   --   --   --   --  2.4  --  2.3    < > = values in this interval not displayed.         rest all reviewed    RADIOLOGY:  No orders to display     Rest all reviewed        MEDICATIONS:    Current Outpatient Medications:   •  acetaminophen (TYLENOL) 650 mg CR tablet, TAKE 1 TABLET (650 MG TOTAL) BY MOUTH EVERY 8 (EIGHT) HOURS AS NEEDED FOR MILD PAIN, Disp: , Rfl:   •  albuterol (2.5 mg/3 mL) 0.083 % nebulizer solution, TAKE 3 ML (2.5 MG TOTAL) BY NEBULIZATION EVERY 6 (SIX) HOURS AS NEEDED FOR WHEEZING, Disp: 375 mL, Rfl: 5  •  Alectinib HCl 150 MG CAPS, Take 4 capsules (600 mg total) by mouth 2 (two) times a day, Disp: 240 capsule, Rfl: 5  •  amiodarone 200 mg tablet, Take 1 tablet (200 mg total) by mouth daily with breakfast Do not start before August 28, 2023., Disp: 30 tablet, Rfl: 0  •  apixaban (ELIQUIS) 5 mg, Take 1 tablet (5 mg total) by mouth 2 (two) times a day, Disp: 60 tablet, Rfl: 0  •  benralizumab (FASENRA) subcutaneous injection, Inject 1 mL (30 mg total) under the skin every 56 days, Disp: 1 mL, Rfl: 6  •  budesonide (PULMICORT) 0.25 mg/2 mL nebulizer solution, TAKE 2 ML BY NEBULIZATION 2 (TWO) TIMES A DAY RINSE MOUTH AFTER USE, Disp: 360 mL, Rfl: 2  •  fexofenadine (ALLEGRA) 180 MG tablet, Take 180 mg by mouth daily, Disp: , Rfl:   •  fluticasone (FLONASE) 50 mcg/act nasal spray, SPRAY 2 SPRAYS INTO EACH NOSTRIL EVERY DAY, Disp: 48 mL, Rfl: 1  •  furosemide (LASIX) 40 mg tablet, Take 1 tablet (40 mg total) by mouth 2 (two) times a day (Patient taking differently: Take 40 mg by mouth daily), Disp: 60 tablet, Rfl: 3  •  ipratropium (ATROVENT) 0.02 % nebulizer solution, Take 2.5 mL (0.5 mg total) by nebulization 3 (three) times a day, Disp: 225 mL, Rfl: 2  •  metoprolol tartrate (LOPRESSOR) 25 mg tablet, Take 1 tablet (25 mg total) by mouth every 12 (twelve) hours, Disp: 60 tablet, Rfl: 0  •  omeprazole (PriLOSEC) 20 mg delayed release capsule, Take 1 capsule (20 mg total) by mouth daily, Disp: 90 capsule, Rfl: 1  •  oxyCODONE-acetaminophen (PERCOCET) 5-325 mg per tablet, Take 1 tablet by mouth every 4 (four) hours as needed for moderate pain For ongoing pain Max Daily Amount: 6 tablets, Disp: 60 tablet, Rfl: 0  •  potassium chloride (K-DUR,KLOR-CON) 20 mEq tablet, Take 1 tablet (20 mEq total) by mouth daily, Disp: 30 tablet, Rfl: 0  •  rosuvastatin (CRESTOR) 10 MG tablet, Take 1 tablet (10 mg total) by mouth daily, Disp: 90 tablet, Rfl: 3  •  Ventolin  (90 Base) MCG/ACT inhaler, INHALE 2 PUFFS EVERY 6 HOURS AS NEEDED FOR WHEEZING, Disp: 8.5 g, Rfl: 2  •  ferrous sulfate 324 (65 Fe) mg, Take 1 tablet (324 mg total) by mouth every other day (Patient not taking: Reported on 9/1/2023), Disp: 90 tablet, Rfl: 1        Portions of the record may have been created with voice recognition software.  Occasional wrong word or "sound a like" substitutions may have occurred due to the inherent limitations of voice recognition software. Read the chart carefully and recognize, using context, where substitutions have occurred. If you have any questions, please contact the dictating provider.

## 2023-09-01 NOTE — PATIENT INSTRUCTIONS
- increase lasix to 40mg once a day  - get  blood work in 2 weeks and call with updates    - Please call me in 10 days after having your blood work done to review the results if you do not hear back from me or my office, as I may have not received the results. - please remember to perform blood work prior to the next visit. - Please call if the blood pressure top number is greater than 140 or less than 110 consistently. - Please call if you are gaining more than 2lbs in 2 days for adjustment of water pills.  ~ Please AVOID the following pain medications. LIST OF NSAIDS (NONSTEROIDAL ANTI-INFLAMMATORY DRUGS) AND LUTZ-2 INHIBITORS    DIFLUNISAL (DOLOBID)  IBUPROFEN (MOTRIN, ADVIL)  FLURBIPROFEN (ANSAID)  KETOPROFEN (ORUDIS, ORUVAIL)  FENOPROFEN (NALFON)  NABUMETONE (RELAFEN)  PIROXICAM (FELDENE)  NAPROXEN (ALEVE, NAPROSYN, NAPRELAN, ANAPROX)  DICLOFENAC (VOLTAREN, CATAFLAM)  INDOMETHACIN (INDOCIN)  SULINDAC (CLINORIL)  TOLMETIN (TOLETIN)  ETODOLAC (LODINE)  MELOXICAM (MOBIC)  KETOROLAC (TORADOL)  OXAPROZIN (DAYPRO)  CELECOXIB (CELEBREX)    Phosphorus diet  Follow a low phosphorus diet.     Avoid these higher phosphorus foods: Choose these lower phosphorus foods:   Milk, pudding or yogurt (from animals and from many soy varieties) Rice milk (unfortified), nondairy creamer (if it doesn't have terms in the ingredients list that contain the letters "phos")   Hard cheeses, ricotta or cottage cheese, fat-free cream cheese Regular and low-fat cream cheese   Ice cream or frozen yogurt Sherbet or frozen fruit pops   Soups made with higher phosphorus ingredients (milk, dried peas, beans, lentils) Soups made with lower phosphorus ingredients (broth- or water-based with other lower phosphorus ingredients)   Whole grains, including whole-grain breads, crackers, cereal, rice and pasta Refined grains, including white bread, crackers, cereals, rice and pasta   Quick breads, biscuits, cornbread, muffins, pancakes or waffles Homemade refined (white) dinner rolls, bagels or English muffins   Dried peas (split, black-eyed), beans (black, garbanzo, lima, kidney, navy, drummond) or lentils Green peas (canned, frozen), green beans or wax beans   Organ meats, walleye, pollock or sardines Lean beef, pork, lamb, poultry or other fish   Nuts and seeds Popcorn   Peanut butter and other nut butters Jam, jelly or honey   Chocolate, including chocolate drinks Carob (chocolate-flavored) candy, hard candy or gumdrops   Medardo and pepper-type sodas, flavored stephenson, bottled teas (if a term in the ingredients list contains the letters "phos") Lemon-lime soda, ginger ale or root beer, plain water   Follow a moderate potassium diet. Things to do to reduce your blood pressure include working with all your physician to do the following:  ~ stop smoking if you smoke. ~ increase cardiovascular exercise like walking and swimming.   ~ modify your diet to decrease fat and salt intake. ~ reduce your weight if you are overweight or obese.  ~ increase the consumption of fruits, vegetables and whole grains. ~ decrease alcohol consumption if you consume alcohol.   ~ try to minimize stress in your life with lifestyle modifications. ~ be compliant with your anti-hypertensive medications. ~ adjust your medications to help improve your vascular stiffness and decrease risks for heart attacks and strokes. Exercise to Lose Weight     Exercise and a healthy diet may help you lose weight. Your doctor may suggest specific exercises. EXERCISE IDEAS AND TIPS     Choose low-cost things you enjoy doing, such as walking, bicycling, or exercising to workout videos. Take stairs instead of the elevator. Walk during your lunch break. Park your car further away from work or school. Go to a gym or an exercise class. Start with 5 to 10 minutes of exercise each day. Build up to 30 minutes of exercise 4 to 6 days a week.    Wear shoes with good support and comfortable clothes. Stretch before and after working out. Work out until you breathe harder and your heart beats faster. Drink extra water when you exercise. Do not do so much that you hurt yourself, feel dizzy, or get very short of breath. Exercises that burn about 150 calories:   Running 1 ½ miles in 15 minutes. Playing volleyball for 45 to 60 minutes. Washing and waxing a car for 45 to 60 minutes. Playing touch football for 45 minutes. Walking 1 ¾ miles in 35 minutes. Pushing a stroller 1 ½ miles in 30 minutes. Playing basketball for 30 minutes. Raking leaves for 30 minutes. Bicycling 5 miles in 30 minutes. Walking 2 miles in 30 minutes. Dancing for 30 minutes. Shoveling snow for 15 minutes. Swimming laps for 20 minutes. Walking up stairs for 15 minutes. Bicycling 4 miles in 15 minutes. Gardening for 30 to 45 minutes. Jumping rope for 15 minutes. Washing windows or floors for 45 to 60 minutes.

## 2023-09-03 NOTE — ASSESSMENT & PLAN NOTE
Currently on amiodarone 200 mg. Also on Eliquis at this time. Encourage patient to follow-up with cardiology. Rate controlled during this visit.

## 2023-09-03 NOTE — ASSESSMENT & PLAN NOTE
Currently followed by pulmonology. Suboptimal therapy with budesonide and nebulizer treatment. Unable to afford other medications at this time. Also recommended patient follow-up with pulm rehab. Patient having difficulties managing copayments for frequent visits.       Plan  : Engage with social work to see if patient has better motivation so we can get patient on better treatment regimen-and participating in pulmonary rehab  -We will have patient come back in a week with her inhaler to go over usage  -ED precautions given

## 2023-09-05 DIAGNOSIS — C34.90 MALIGNANT NEOPLASM DETERMINED BY BIOPSY OF LUNG (HCC): ICD-10-CM

## 2023-09-06 RX ORDER — OXYCODONE HYDROCHLORIDE AND ACETAMINOPHEN 5; 325 MG/1; MG/1
1 TABLET ORAL EVERY 4 HOURS PRN
Qty: 60 TABLET | Refills: 0 | Status: SHIPPED | OUTPATIENT
Start: 2023-09-06

## 2023-09-06 NOTE — TELEPHONE ENCOUNTER
Will send the refill.  If not already referred, I think she will benefit from palliative referral for pain management and other beneficial support that they provide

## 2023-09-07 ENCOUNTER — CLINICAL SUPPORT (OUTPATIENT)
Dept: PULMONOLOGY | Facility: CLINIC | Age: 78
End: 2023-09-07
Payer: COMMERCIAL

## 2023-09-07 DIAGNOSIS — J45.50 SEVERE PERSISTENT ASTHMA WITHOUT COMPLICATION: Primary | ICD-10-CM

## 2023-09-07 DIAGNOSIS — J96.01 ACUTE RESPIRATORY FAILURE WITH HYPOXIA (HCC): ICD-10-CM

## 2023-09-07 RX ORDER — FUROSEMIDE 40 MG/1
40 TABLET ORAL DAILY
Qty: 60 TABLET | Refills: 3 | Status: SHIPPED | OUTPATIENT
Start: 2023-09-07

## 2023-09-07 NOTE — PROGRESS NOTES
PULMONARY REHAB ASSESSMENT    Today's date: 2023  Patient name: Iliana Kelly     : [id number]       MRN: [id number]  PCP: Toña Hamlin DO  Referring Physician: Laxmi Carrillo MD  Pulmonologist: Doyle Carlos MD    Dx:   Encounter Diagnoses   Name Primary?    • Severe persistent asthma without complication    • Acute respiratory failure with hypoxia (HCC)          Date of onset: 2023    Weight:    Wt Readings from Last 1 Encounters:   23 106 kg (233 lb)      Height:   Ht Readings from Last 1 Encounters:   23 5' 1" (1.549 m)     Medical History:   Past Medical History:   Diagnosis Date   • Allergic rhinitis    • Anemia    • Asthma    • Atrial fibrillation (HCC)    • Chronic bronchitis (HCC)    • COPD (chronic obstructive pulmonary disease) (HCC)    • Coronary artery disease    • CPAP (continuous positive airway pressure) dependence    • Degenerative joint disease    • GERD (gastroesophageal reflux disease)    • Glaucoma    • Hypertension    • Lumbar disc disease    • Lung cancer ([address])    • Periodic heart flutter    • Pneumonia    • Sleep apnea     suspected    • Sleep apnea, obstructive    • Ventricular arrhythmia    • Vitamin D deficiency          Physical Limitations: scoliosis of spine, has bone cancer in cervical spine, R knee pain- gets steroid shots every 3 months     Fall Risk: Moderate   Comments: Patient uses walking assist device (walker/cane/rollator), Denies a fall in the past 6 months and often feels unsteady on feet but sits often to avoid falls    Oxygen needs:   Rest:  room air  Exercise/physical activity:  room air  Sleep:   room air and CPAP/BiPap    Patient has Rx for supplemental O2:  no    Rating of Perceived Dyspnea at rest:  0-2/10    Does Pt monitor home SpO2? yes   Average SpO2 at rest:  95%   Average SpO2 with ADLs/physical activity:  87%    History of Toxic Exposure:  Chemicals  Radiation  chemotherapy    Use of Rescue Inhaler: Yes- only uses as needed, mostly 1x/day    Use of Nebulizer Treatments:  Yes:  3 times per day    Patient practices breathing techniques at home:  no      Risk Factors   Cholesterol: Yes  Smoking: Former user  Quit 30 years, 0.5ppd for 10 years  HTN: Yes  DM: No  Obesity: Yes   Inactivity: Yes  Stress:  perceived  stress: 4/10   Stressors: health   Goals for Stress Management: Practice Relaxation Techniques and Exercise    Family History:  Family History   Problem Relation Age of Onset   • Stroke Mother    • Diabetes Mother    • Hyperlipidemia Mother    • Hypertension Mother    • Allergies Mother         Environmental   • Asthma Mother    • Diabetes Father    • Hyperlipidemia Father    • Hypertension Father    • Lung cancer Father 79   • Allergies Father         Environmental   • Asthma Father    • Lymphoma Sister 71   • Heart disease Sister         Pacemaker   • Asthma Brother    • Aneurysm Brother         Brain - had surgery   • Other Brother         Lymes disease   • Coronary artery disease Daughter         2 bypass done   • No Known Problems Daughter    • No Known Problems Daughter    • No Known Problems Son    • Kidney disease Son    • Liver disease Son    • Obesity Son        Allergies: Patient has no known allergies.   ETOH:   Social History     Substance and Sexual Activity   Alcohol Use Yes    Comment: occasionally         Current Medications:   Current Outpatient Medications   Medication Sig Dispense Refill   • acetaminophen (TYLENOL) 650 mg CR tablet TAKE 1 TABLET (650 MG TOTAL) BY MOUTH EVERY 8 (EIGHT) HOURS AS NEEDED FOR MILD PAIN     • albuterol (2.5 mg/3 mL) 0.083 % nebulizer solution TAKE 3 ML (2.5 MG TOTAL) BY NEBULIZATION EVERY 6 (SIX) HOURS AS NEEDED FOR WHEEZING 375 mL 5   • Alectinib HCl 150 MG CAPS Take 4 capsules (600 mg total) by mouth 2 (two) times a day 240 capsule 5   • amiodarone 200 mg tablet Take 1 tablet (200 mg total) by mouth daily with breakfast Do not start before August 28, 2023. 30 tablet 0   • apixaban (ELIQUIS) 5 mg Take 1 tablet (5 mg total) by mouth 2 (two) times a day 60 tablet 0   • benralizumab (FASENRA) subcutaneous injection Inject 1 mL (30 mg total) under the skin every 56 days 1 mL 6   • budesonide (PULMICORT) 0.25 mg/2 mL nebulizer solution TAKE 2 ML BY NEBULIZATION 2 (TWO) TIMES A DAY RINSE MOUTH AFTER  mL 2   • fexofenadine (ALLEGRA) 180 MG tablet Take 180 mg by mouth daily     • fluticasone (FLONASE) 50 mcg/act nasal spray SPRAY 2 SPRAYS INTO EACH NOSTRIL EVERY DAY 48 mL 1   • furosemide (LASIX) 40 mg tablet Take 1 tablet (40 mg total) by mouth daily 60 tablet 3   • ipratropium (ATROVENT) 0.02 % nebulizer solution Take 2.5 mL (0.5 mg total) by nebulization 3 (three) times a day 225 mL 2   • metoprolol tartrate (LOPRESSOR) 25 mg tablet Take 1 tablet (25 mg total) by mouth every 12 (twelve) hours 60 tablet 0   • omeprazole (PriLOSEC) 20 mg delayed release capsule Take 1 capsule (20 mg total) by mouth daily 90 capsule 1   • oxyCODONE-acetaminophen (PERCOCET) 5-325 mg per tablet Take 1 tablet by mouth every 4 (four) hours as needed for moderate pain For ongoing pain Max Daily Amount: 6 tablets 60 tablet 0   • potassium chloride (K-DUR,KLOR-CON) 20 mEq tablet Take 1 tablet (20 mEq total) by mouth daily 30 tablet 0   • rosuvastatin (CRESTOR) 10 MG tablet Take 1 tablet (10 mg total) by mouth daily 90 tablet 3   • Ventolin  (90 Base) MCG/ACT inhaler INHALE 2 PUFFS EVERY 6 HOURS AS NEEDED FOR WHEEZING 8.5 g 2     No current facility-administered medications for this visit.            Functional Status Prior to Diagnosis for Treatment   Occupation: retired  ADL’s: No limitations  Clarke: No limitations  Exercise: None    Current Functional Status  Occupation: retired  ADL’s:Capable of performing light to moderate ADLs limited by Dyspnea  [place]: Capable of performing light to moderate ADLs limited by Dyspnea  Exercise: None    Patient Specific Goals:  Weight loss, decrease SOB and fear with SOB    Short Term Program Goals: dietary modifications increased strength improved energy/stamina with ADLs exercise 120-150 mins/wk wt loss 1-2 ppw reduce work of breathing demonstrate proper PLB technique SpO2 > 88% with exercise    Long Term Goals: increased maximal walking duration  increased intial training workload  Improved functional capacity based on initial fitness assessment  improved exercise tolerance  Improved Quality of Life - [facility] score reduced  decreased shortness of breath    Oxygen Goals: Maintain SpO2>88% titrating supplemental oxygen as needed     Ability to reach goals/rehabilitation potential:  Good    Projected return to function: 8-12 weeks  Objective tests: 6 MWT      Nutritional   Reviewed details of Rate your Plate. Discussed key elements of heart healthy eating. Reviewed patient goals for dietary modifications and their clinical implications. Reviewed most recent lipid profile. Goals for dietary modification: poultry without the skin  eliminate processed meats  reduced fat cheese  increase whole grains      Emotional/Social  Patient reports feelings of anxiety associated with SOB, fear  Denies depression  Supports good support from sons, daughter and other family    SOCIAL SUPPORT NETWORK    Marital status:       Domestic Violence Screening: No    Comments: hospitalized for 12 days- asthma exacerbation, couldn't breath or walk. Heart started fluttering, med controlled amiodarone now but sometimes feels fluttering at night, states she feels scared, could possibly due to anxiety. Breathing treatments. Sees Dr. Eduardo Parsons for chest pain.   Lung cancer takes chemo pill 3 years, had radiation therapy on spine   No SOB at rest, SOB when moving and walking

## 2023-09-07 NOTE — PROGRESS NOTES
Pulmonary Rehabilitation Plan of Care   Initial Care Plan      Today's date: 2023   # of Exercise Sessions Completed: 1-Evaluation  Patient name: Derke Mcdonnell      :   Age: 70 y.o. MRN: [id number]  Referring Physician: Venancio Fu MD  Pulmonologist: Apolonia May MD  Provider: [facility]  Clinician: Balbina Pepe MS, CEP    Dx:    Encounter Diagnoses   Name Primary? • Severe persistent asthma without complication    • Acute respiratory failure with hypoxia [facility])      Date of onset: 2023      SUMMARY OF PROGRESS:  Today is Jayla's initial evaluation to begin Pulmonary Rehab for the diagnosis of asthma. She reports having asthma for years and was hospitalized on  with an exacerbation. During that hospital stay, she had episodes of symptomatic aflutter and is now taking amiodarone. Donn Molina reports experiencing fluttering on occ since, mostly at night. She has HTN, HLD, CKD, GERD and MONO (cpap compliant). She also has hx of lung cancer, which spread to her cervical bones and required radiation therapy. Donn Molina completes all ADLs herself but requires rest breaks and will often complete activities while seated. The patient currently does not follow a formal exercise program at home and has no history of exercise. Pt reports the following physical limitations: back pain, L knee pain- possible replacement needed. Depression screening using the PHQ-9 interprets the patient's score of 5 5-9 = Mild Depression. Anxiety screening using the AL-7 interprets the patients score of 3  0-4  = Not anxious. When addressed, the patient denies having depression but reports some anxiety with fear of SOB. Patient reports excellent social/emotional support from family. Information to begin using [employer] was provided as well as contact information for counseling through [employer]. PHQ-9 score will be reassessed in 30 days. The patient is a former smoker (quit 30 years ago). Patient admits to 100% medication compliance. At rest, the patient rated dyspnea 0/10 with SpO2 95% on room air. They completed an initial 6MWT, walking 360 ft on room air. The patient’s rating of perceived dyspnea during the 6MWT was 6/10 with SpO2 91%. Patient took 2 rest breaks. Telemetry revealed NSR. Resting  /70 with appropriate hemodynamic response to exercise reaching 148/72. Patient will exercise on room air. Education on breathing techniques, pulmonary anatomy, exercise for the pulmonary patient, healthy eating, stress, and relaxation will be provided. Patient goals include: weight loss, decrease SOB and fear associated with SOB. [place] will attend 24 exercise sessions, 2x/wk for 12-18 weeks beginning 9/12/23. Will progress patient as tolerated over the next 30 days.       Medication compliance: Yes   Comments: Pt reports to be compliant with medications    Fall Risk: Moderate   Comments: Patient uses walking assist device (walker/cane/rollator), Denies a fall in the past 6 months and reports unsteadiness on feet but sits to avoid falls    Smoking: Former user  Smoked 0.5ppd for 10 years, quit 30 years ago    RPD at Rest: 0/10  RPD with Exercise:  6/10    Assessment of progression of lung disease and functional status:  CAT: 18/40  Shortness of breath questionnaire: 70/120      EXERCISE ASSESSMENT and PLAN    Current Exercise Program in Rehab:       Frequency: 2 days/week   Supplement with home exercise 2+ days/wk as tolerated        Minutes: 18-26         METS: 1.5-2.0              SpO2: >88%              RPD: 4-6                      HR: RHR+30bpm   RPE: 4-5         Modalities: UBE, NuStep and Room walking      Exercise Progression 30 Day Goals :    Frequency: 2 days/week    Supplement with home exercise 2+ days/wk as tolerated       Minutes: 30-35         METS: 2.2-2.8              SpO2: >88%              RPD: 4-6                      HR: RHR+30bpm   RPE: 4-5        Modalities: UBE, NuStep, Recumbent bike and Room walking     Strength trainin-3 days / week  12-15 repetitions  1-2 sets per modality   Will be added following 2-3 weeks of monitored exercise sessions   Modalities: Lateral Raise, Arm Curl, Sit to Stands, Wall push-ups, Front Raises, Shoulder Shrugs and Calf Raises    Oxygen needs:   Rest:  room air  Exercise/physical activity:  room air  Sleep:   room air and CPAP/BiPap    Oxygen Goal: Maintain SpO2>88% during exercise    Home Exercise: none  Education: pursed lipped breathing, benefits of exercise for pulmonary disease, RPD scale, O2 saturation monitoring and appropriate O2 response to exercise    Goals: reduced score on  USCD Shortness of Breath Questionnaire, Improved 6MW distance by 10%, reduced dyspnea during exercise , improved exercise tolerance (max METs tolerated in pulmonary rehab), SpO2 >88% during exercise, reduced score on CAT, attend pulmonary rehab regularly, decrease sitting time at home, start a walking program, reduced pulmonary related hospital readmissions , decreased rest needed with physical activity, increased muscular strength, increased energy, increased stamina with ADLs and demonstrate proper pursed lipped breathing  Progressing:  Reviewed Pt goals and determined plan of care    Plan: learn to conserve energy with ADLs , practice diaphragmatic breathing, reduce time sitting at home, increased strength of respiratory muscles, utilize PLB with physical activity and begin a home exercise program     Readiness to change: Preparation:  (Getting ready to change)       NUTRITION ASSESSMENT AND PLAN    Weight control:    Starting weight: 231.6 lb   Current weight:       Goals: Increase water intake to reduce/thin mucus production eat less CO2 producing foods reduced body weight Improve hydration  Education: low sodium diet  hydration  Progressing:Reviewed Pt goals and determined plan of care  Plan: Education Class: Heart Healthy Eating, replace refined grain bread with whole grain bread, avoid processed foods, drink more water and learn how to read food labels  Readiness to change: Preparation:  (Getting ready to change)       PSYCHOSOCIAL ASSESSMENT AND PLAN    Emotional:  Depression assessment:  PHQ-9 = 5 5-9 = Mild Depression            Anxiety measure:  AL-7 = 3 0-4  = Not anxious  Self-reported stress level: 4   Social support: Patient reports excellent emotional/social support from family    Goals:  Reduce perceived stress to 1-3/10, improved [facility] QOL < 27, PHQ-9 - reduced severity by one level, Physical Fitness in [place] Score < 3, Daily Activity in [place] Score < 3, Social Activities in [place] Score < 3, Pain in [place] Score < 3, Overall Health in [facility] Score < 3, Quality of Life in [place] Score < 3 , Change in Health in [facility] Score < 3 , Increased interest in doing things, improved sleep, increased energy and reduced anxiety/fear of becoming SOB  Education: stress management techniques    Progressing:Reviewed Pt goals and determined plan of care  Plan: Class: Stress and Your Health, Practice relaxation techniques, Exercise, Enjoy a hobby, Return to previous social activity and Avoid triggers  Readiness to change: Preparation:  (Getting ready to change)       OTHER CORE COMPONENTS     Tobacco:   Social History     Tobacco Use   Smoking Status Former   • Packs/day: 0.25   • Years: 25.00   • Total pack years: 6.25   • Types: Cigarettes   • Start date: 48   • Quit date: 28   • Years since quittin.7   Smokeless Tobacco Former       Tobacco Use Intervention: Referral to tobacco expert:   Pt quit 30 years ago   and has abstained    Blood pressure:    Restin/70   Exercise: 148/72    Goals: consistent BP < 130/80, moderate intensity exercise >150 mins/wk and medication compliance  Education:  pathophysiology of pulmonary disease, control coughing, inspiratory muscle training, environmental triggers and nebulizer use  Progressing:Reviewed Pt goals and determined plan of care  Plan: engage in regular exercise, monitor home BP, class: Pulmonary Anatomy and Physiology and class:  Pulmonary Medications  Readiness to change: Preparation:  (Getting ready to change)

## 2023-09-12 ENCOUNTER — APPOINTMENT (OUTPATIENT)
Dept: PULMONOLOGY | Facility: CLINIC | Age: 78
End: 2023-09-12
Payer: COMMERCIAL

## 2023-09-12 ENCOUNTER — LAB (OUTPATIENT)
Dept: LAB | Facility: HOSPITAL | Age: 78
End: 2023-09-12
Payer: COMMERCIAL

## 2023-09-12 ENCOUNTER — TELEPHONE (OUTPATIENT)
Dept: NEPHROLOGY | Facility: CLINIC | Age: 78
End: 2023-09-12

## 2023-09-12 DIAGNOSIS — N18.31 STAGE 3A CHRONIC KIDNEY DISEASE (HCC): Primary | ICD-10-CM

## 2023-09-12 DIAGNOSIS — E55.9 VITAMIN D DEFICIENCY: ICD-10-CM

## 2023-09-12 DIAGNOSIS — I10 ESSENTIAL HYPERTENSION: ICD-10-CM

## 2023-09-12 DIAGNOSIS — C34.90 MALIGNANT NEOPLASM DETERMINED BY BIOPSY OF LUNG (HCC): ICD-10-CM

## 2023-09-12 DIAGNOSIS — I48.3 TYPICAL ATRIAL FLUTTER (HCC): ICD-10-CM

## 2023-09-12 DIAGNOSIS — E78.5 DYSLIPIDEMIA: ICD-10-CM

## 2023-09-12 DIAGNOSIS — N28.9 ABNORMAL RENAL FUNCTION: ICD-10-CM

## 2023-09-12 DIAGNOSIS — N17.9 AKI (ACUTE KIDNEY INJURY) (HCC): ICD-10-CM

## 2023-09-12 DIAGNOSIS — N18.31 STAGE 3A CHRONIC KIDNEY DISEASE (HCC): ICD-10-CM

## 2023-09-12 DIAGNOSIS — I50.31 ACUTE DIASTOLIC CONGESTIVE HEART FAILURE (HCC): ICD-10-CM

## 2023-09-12 DIAGNOSIS — C34.90 NON-SMALL CELL LUNG CANCER, UNSPECIFIED LATERALITY (HCC): ICD-10-CM

## 2023-09-12 DIAGNOSIS — J45.50 SEVERE PERSISTENT ASTHMA WITHOUT COMPLICATION: ICD-10-CM

## 2023-09-12 LAB
ALBUMIN SERPL BCP-MCNC: 4.1 G/DL (ref 3.5–5)
ANION GAP SERPL CALCULATED.3IONS-SCNC: 11 MMOL/L
BACTERIA UR QL AUTO: ABNORMAL /HPF
BILIRUB UR QL STRIP: NEGATIVE
BNP SERPL-MCNC: 68 PG/ML (ref 0–100)
BUN SERPL-MCNC: 26 MG/DL (ref 5–25)
CALCIUM SERPL-MCNC: 9.2 MG/DL (ref 8.4–10.2)
CHLORIDE SERPL-SCNC: 101 MMOL/L (ref 96–108)
CK SERPL-CCNC: 103 U/L (ref 26–192)
CLARITY UR: CLEAR
CO2 SERPL-SCNC: 32 MMOL/L (ref 21–32)
COLOR UR: YELLOW
CREAT SERPL-MCNC: 2.14 MG/DL (ref 0.6–1.3)
CREAT UR-MCNC: 32.3 MG/DL
GFR SERPL CREATININE-BSD FRML MDRD: 21 ML/MIN/1.73SQ M
GLUCOSE P FAST SERPL-MCNC: 86 MG/DL (ref 65–99)
GLUCOSE UR STRIP-MCNC: NEGATIVE MG/DL
HGB UR QL STRIP.AUTO: NEGATIVE
KETONES UR STRIP-MCNC: NEGATIVE MG/DL
LEUKOCYTE ESTERASE UR QL STRIP: NEGATIVE
MUCOUS THREADS UR QL AUTO: ABNORMAL
NITRITE UR QL STRIP: NEGATIVE
NON-SQ EPI CELLS URNS QL MICRO: ABNORMAL /HPF
PH UR STRIP.AUTO: 7 [PH]
PHOSPHATE SERPL-MCNC: 2.9 MG/DL (ref 2.3–4.1)
POTASSIUM SERPL-SCNC: 3.6 MMOL/L (ref 3.5–5.3)
PROT UR STRIP-MCNC: NEGATIVE MG/DL
PROT UR-MCNC: 9 MG/DL
PROT/CREAT UR: 0.28 MG/G{CREAT} (ref 0–0.1)
RBC #/AREA URNS AUTO: ABNORMAL /HPF
SODIUM SERPL-SCNC: 144 MMOL/L (ref 135–147)
SP GR UR STRIP.AUTO: 1.01 (ref 1–1.04)
UROBILINOGEN UA: 4 MG/DL
WBC #/AREA URNS AUTO: ABNORMAL /HPF

## 2023-09-12 PROCEDURE — 81001 URINALYSIS AUTO W/SCOPE: CPT

## 2023-09-12 PROCEDURE — 84165 PROTEIN E-PHORESIS SERUM: CPT

## 2023-09-12 PROCEDURE — 36415 COLL VENOUS BLD VENIPUNCTURE: CPT

## 2023-09-12 PROCEDURE — 84156 ASSAY OF PROTEIN URINE: CPT

## 2023-09-12 PROCEDURE — 84166 PROTEIN E-PHORESIS/URINE/CSF: CPT

## 2023-09-12 PROCEDURE — 82550 ASSAY OF CK (CPK): CPT

## 2023-09-12 PROCEDURE — 83521 IG LIGHT CHAINS FREE EACH: CPT

## 2023-09-12 PROCEDURE — 83880 ASSAY OF NATRIURETIC PEPTIDE: CPT

## 2023-09-12 PROCEDURE — 80069 RENAL FUNCTION PANEL: CPT

## 2023-09-12 PROCEDURE — 82570 ASSAY OF URINE CREATININE: CPT

## 2023-09-12 NOTE — TELEPHONE ENCOUNTER
I spoke to Candie , she is understanding of decreasing lasix to 20 mg daily. - repeat labs in 2 weeks. -Monitor weights and call us in 1 weeks. ----- Message from SINTIA MONK MD sent at 9/12/2023  2:28 PM EDT -----  Please advise patient that her creatinine level is slightly elevated from prior and all the work-up is suggesting that she is getting likely dehydrated by the current dose of Lasix. She needs to continue wearing compression stockings. The fluid in her legs is not something that we want to try to remove with increased doses of diuretics. Please have her decrease her Lasix to 20 mg once a day and get repeat renal function panel in 2 weeks. Please also have her try her best to get her daily weights and let us know what her weights have trended. Also let her know that few test results are still pending if anything abnormal comes back in them I will inform her.

## 2023-09-12 NOTE — RESULT ENCOUNTER NOTE
Please advise patient that her creatinine level is slightly elevated from prior and all the work-up is suggesting that she is getting likely dehydrated by the current dose of Lasix. She needs to continue wearing compression stockings. The fluid in her legs is not something that we want to try to remove with increased doses of diuretics. Please have her decrease her Lasix to 20 mg once a day and get repeat renal function panel in 2 weeks. Please also have her try her best to get her daily weights and let us know what her weights have trended. Also let her know that few test results are still pending if anything abnormal comes back in them I will inform her.

## 2023-09-13 LAB
KAPPA LC FREE SER-MCNC: 29.7 MG/L (ref 3.3–19.4)
KAPPA LC FREE/LAMBDA FREE SER: 1.66 {RATIO} (ref 0.26–1.65)
LAMBDA LC FREE SERPL-MCNC: 17.9 MG/L (ref 5.7–26.3)

## 2023-09-14 ENCOUNTER — CLINICAL SUPPORT (OUTPATIENT)
Dept: PULMONOLOGY | Facility: CLINIC | Age: 78
End: 2023-09-14
Payer: COMMERCIAL

## 2023-09-14 ENCOUNTER — RA CDI HCC (OUTPATIENT)
Dept: OTHER | Facility: HOSPITAL | Age: 78
End: 2023-09-14

## 2023-09-14 ENCOUNTER — OFFICE VISIT (OUTPATIENT)
Dept: CARDIOLOGY CLINIC | Facility: CLINIC | Age: 78
End: 2023-09-14
Payer: COMMERCIAL

## 2023-09-14 VITALS
SYSTOLIC BLOOD PRESSURE: 124 MMHG | DIASTOLIC BLOOD PRESSURE: 64 MMHG | HEIGHT: 61 IN | HEART RATE: 58 BPM | BODY MASS INDEX: 44.37 KG/M2 | WEIGHT: 235 LBS

## 2023-09-14 DIAGNOSIS — I48.92 ATRIAL FLUTTER (HCC): ICD-10-CM

## 2023-09-14 DIAGNOSIS — I25.10 CORONARY ARTERY DISEASE INVOLVING NATIVE CORONARY ARTERY OF NATIVE HEART WITHOUT ANGINA PECTORIS: ICD-10-CM

## 2023-09-14 DIAGNOSIS — E66.01 MORBID OBESITY (HCC): ICD-10-CM

## 2023-09-14 DIAGNOSIS — I48.3 TYPICAL ATRIAL FLUTTER (HCC): Primary | ICD-10-CM

## 2023-09-14 DIAGNOSIS — I50.32 CHRONIC DIASTOLIC CONGESTIVE HEART FAILURE (HCC): ICD-10-CM

## 2023-09-14 DIAGNOSIS — J45.50 SEVERE PERSISTENT ASTHMA WITHOUT COMPLICATION: Primary | ICD-10-CM

## 2023-09-14 DIAGNOSIS — I10 ESSENTIAL HYPERTENSION: ICD-10-CM

## 2023-09-14 LAB
ALBUMIN SERPL ELPH-MCNC: 3.91 G/DL (ref 3.2–5.1)
ALBUMIN SERPL ELPH-MCNC: 64.1 % (ref 48–70)
ALBUMIN UR ELPH-MCNC: 100 %
ALPHA1 GLOB MFR UR ELPH: 0 %
ALPHA1 GLOB SERPL ELPH-MCNC: 0.33 G/DL (ref 0.15–0.47)
ALPHA1 GLOB SERPL ELPH-MCNC: 5.4 % (ref 1.8–7)
ALPHA2 GLOB MFR UR ELPH: 0 %
ALPHA2 GLOB SERPL ELPH-MCNC: 0.57 G/DL (ref 0.42–1.04)
ALPHA2 GLOB SERPL ELPH-MCNC: 9.3 % (ref 5.9–14.9)
B-GLOBULIN MFR UR ELPH: 0 %
BETA GLOB ABNORMAL SERPL ELPH-MCNC: 0.35 G/DL (ref 0.31–0.57)
BETA1 GLOB SERPL ELPH-MCNC: 5.7 % (ref 4.7–7.7)
BETA2 GLOB SERPL ELPH-MCNC: 4.4 % (ref 3.1–7.9)
BETA2+GAMMA GLOB SERPL ELPH-MCNC: 0.27 G/DL (ref 0.2–0.58)
GAMMA GLOB ABNORMAL SERPL ELPH-MCNC: 0.68 G/DL (ref 0.4–1.66)
GAMMA GLOB MFR UR ELPH: 0 %
GAMMA GLOB SERPL ELPH-MCNC: 11.1 % (ref 6.9–22.3)
IGG/ALB SER: 1.79 {RATIO} (ref 1.1–1.8)
PROT PATTERN SERPL ELPH-IMP: ABNORMAL
PROT PATTERN UR ELPH-IMP: NORMAL
PROT SERPL-MCNC: 6.1 G/DL (ref 6.4–8.4)
PROT UR-MCNC: 9.4 MG/DL

## 2023-09-14 PROCEDURE — 84165 PROTEIN E-PHORESIS SERUM: CPT | Performed by: PATHOLOGY

## 2023-09-14 PROCEDURE — 99214 OFFICE O/P EST MOD 30 MIN: CPT | Performed by: INTERNAL MEDICINE

## 2023-09-14 PROCEDURE — 84166 PROTEIN E-PHORESIS/URINE/CSF: CPT | Performed by: PATHOLOGY

## 2023-09-14 PROCEDURE — G0239 OTH RESP PROC, GROUP: HCPCS

## 2023-09-14 PROCEDURE — 93000 ELECTROCARDIOGRAM COMPLETE: CPT | Performed by: INTERNAL MEDICINE

## 2023-09-14 RX ORDER — AMIODARONE HYDROCHLORIDE 200 MG/1
200 TABLET ORAL
Qty: 30 TABLET | Refills: 11 | Status: SHIPPED | OUTPATIENT
Start: 2023-09-14 | End: 2023-10-14

## 2023-09-14 NOTE — PROGRESS NOTES
General Cardiology - Outpatient Progress Note   Derek Mcdonnell 70 y.o. female   MRN: [id number]  @ Encounter: [id number]    Spring Chow DO  Consults      Cardiac History Summary:   Derek Mcdonnell is a 70y.o. 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 The patient was hospitalized 8/12-8/23 initially with an asthma exacerbation however the patient then developed atrial flutter with 2:1 conduction at a rate of ~140 bpm.  She spontaneously converted however when back into flutter and was then started on amiodarone. She was also started on apixaban. She was discharged in NSR and has continued to take amiodarone. She was recently referred to nephrology for rising Cr to 1.8 after the hospitalization and recheck of Cr was 2.1 so her lasix was decreased from 40mg to 20mg daily. She has been participating in pulmonary rehab and feeling well with it. She feels as though her exercise tolerance has been improving. She denies any chest pain or significant palpitations. A few times a week she feels very mild short duration palpitations that lasts for seconds. Objective:  Review of Systems  Review of system was conducted and was negative except for as stated in the interval history    Physical Exam:  Vitals: Blood pressure 124/64, pulse 58, height 5' 1" (1.549 m), weight 107 kg (235 lb), not currently breastfeeding., Body mass index is 44.4 kg/m².     GEN: Derek Mcdonnell appears well, alert and oriented x 3, pleasant and cooperative   HEENT:  Normocephalic, atraumatic, anicteric, moist mucous membranes  NECK:  No JVD or carotid bruits   HEART: reg rhythm, reg rate, normal S1 and S2, no murmurs, clicks, gallops or rubs   LUNGS: Clear to auscultation bilaterally; no wheezes, rales, or rhonchi; respiration nonlabored   ABDOMEN:  Normoactive bowel sounds, soft, no tenderness, no distention  EXTREMITIES: there is 1+ bilateral lower extremity edema.    NEURO: no gross focal findings; cranial nerves grossly intact   SKIN:  Dry, intact, warm to touch    Home Medications: (Not in a hospital admission)      Current Outpatient Medications:   •  acetaminophen (TYLENOL) 650 mg CR tablet, TAKE 1 TABLET (650 MG TOTAL) BY MOUTH EVERY 8 (EIGHT) HOURS AS NEEDED FOR MILD PAIN, Disp: , Rfl:   •  albuterol (2.5 mg/3 mL) 0.083 % nebulizer solution, TAKE 3 ML (2.5 MG TOTAL) BY NEBULIZATION EVERY 6 (SIX) HOURS AS NEEDED FOR WHEEZING, Disp: 375 mL, Rfl: 5  •  Alectinib HCl 150 MG CAPS, Take 4 capsules (600 mg total) by mouth 2 (two) times a day, Disp: 240 capsule, Rfl: 5  •  amiodarone 200 mg tablet, Take 1 tablet (200 mg total) by mouth daily with breakfast Do not start before August 28, 2023., Disp: 30 tablet, Rfl: 0  •  apixaban (ELIQUIS) 5 mg, Take 1 tablet (5 mg total) by mouth 2 (two) times a day, Disp: 60 tablet, Rfl: 0  •  benralizumab (FASENRA) subcutaneous injection, Inject 1 mL (30 mg total) under the skin every 56 days, Disp: 1 mL, Rfl: 6  •  budesonide (PULMICORT) 0.25 mg/2 mL nebulizer solution, TAKE 2 ML BY NEBULIZATION 2 (TWO) TIMES A DAY RINSE MOUTH AFTER USE, Disp: 360 mL, Rfl: 2  •  fexofenadine (ALLEGRA) 180 MG tablet, Take 180 mg by mouth daily, Disp: , Rfl:   •  fluticasone (FLONASE) 50 mcg/act nasal spray, SPRAY 2 SPRAYS INTO EACH NOSTRIL EVERY DAY, Disp: 48 mL, Rfl: 1  •  furosemide (LASIX) 40 mg tablet, Take 1 tablet (40 mg total) by mouth daily, Disp: 60 tablet, Rfl: 3  •  ipratropium (ATROVENT) 0.02 % nebulizer solution, Take 2.5 mL (0.5 mg total) by nebulization 3 (three) times a day, Disp: 225 mL, Rfl: 2  •  metoprolol tartrate (LOPRESSOR) 25 mg tablet, Take 1 tablet (25 mg total) by mouth every 12 (twelve) hours, Disp: 60 tablet, Rfl: 0  • omeprazole (PriLOSEC) 20 mg delayed release capsule, Take 1 capsule (20 mg total) by mouth daily, Disp: 90 capsule, Rfl: 1  •  oxyCODONE-acetaminophen (PERCOCET) 5-325 mg per tablet, Take 1 tablet by mouth every 4 (four) hours as needed for moderate pain For ongoing pain Max Daily Amount: 6 tablets, Disp: 60 tablet, Rfl: 0  •  potassium chloride (K-DUR,KLOR-CON) 20 mEq tablet, Take 1 tablet (20 mEq total) by mouth daily, Disp: 30 tablet, Rfl: 0  •  rosuvastatin (CRESTOR) 10 MG tablet, Take 1 tablet (10 mg total) by mouth daily, Disp: 90 tablet, Rfl: 3  •  Ventolin  (90 Base) MCG/ACT inhaler, INHALE 2 PUFFS EVERY 6 HOURS AS NEEDED FOR WHEEZING, Disp: 8.5 g, Rfl: 2    Labs & Results:  Lab Results   Component Value Date    HSTNI0 20 08/13/2023    HSTNI2 24 08/14/2023    HSTNI4 26 08/14/2023     Lab Results   Component Value Date    NTBNP 277 (H) 07/10/2021     Lab Results   Component Value Date    TRIG 208 (H) 07/03/2023    HDL 54 07/03/2023    LDLCALC 124 (H) 07/03/2023     Lab Results   Component Value Date    K 3.6 09/12/2023    CO2 32 09/12/2023     09/12/2023    BUN 26 (H) 09/12/2023    CREATININE 2.14 (H) 09/12/2023    ALT 85 (H) 08/30/2023    AST 73 (H) 08/30/2023     Lab Results   Component Value Date    WBC 6.42 08/30/2023    HGB 9.8 (L) 08/30/2023    HCT 30.6 (L) 08/30/2023    MCV 92 08/30/2023    PLT 90 (L) 08/30/2023     No results found for: "INR"    EKG:  Today in office. Sinus bradycardia. HR 58 bpm.     ECHO:  No results found for this or any previous visit. Results for orders placed during the hospital encounter of 07/20/23    Echo complete w/ contrast if indicated    Interpretation Summary  •  Left Ventricle: Left ventricular cavity size is normal. Wall thickness is moderately increased. The left ventricular ejection fraction is 61%. Systolic function is normal. Wall motion is normal. Diastolic function is mildly abnormal, consistent with grade I (abnormal) relaxation.   Probable mild mid ventricular obstruction with systolic gradient up to 27 mmHg with Valsalva. •  Left Atrium: The atrium is moderately dilated. •  Mitral Valve: There is moderate annular calcification. Assessment/Plan   Cali De Paz is a 70y.o. 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 #Aflutter  New diagnosis during August 2023 admission for asthma. ekgs all shows aflutter however notes indicate she may have also been in afib. [id number] 5. She was started on amiodarone and eliquis during the hospitalization. Since then the patient has had no significant palpitations. Will continue with rhythm control with amiodarone for now however we may not want to continue this strategy long term given side effects.   -check 1 week Zio to document afib/flutter burden  -continue amiodarone 200mg daily (TSH checked, liver enzymes were slightly elevated-- will check at next visit, pt goes for eye exams, yearly chest imaging). -continue apixaban 5mg BID  -continue metoprolol 25mg BID  -pending her improvement and cancer prognosis will discuss at follow upother therapies (different meds or ablation) in order to discontinue amiodarone     #HFpEF  #mildly dilated RV with mildly elevated RV pressure  Currently euvolemic despite mild peripheral edema which may be from Alectanib. Weight 235 lbs. Her Cr worsened with diuresis and was recently decreased to lasix 20mg daily.  -continue lasix 20mg daily    #HLD: recently started on rosuvastatin 10mg.      #HTN /64 today. Continue metoprolol 25mg BID. #asthma: pt following with pulm and going to pulmonary rehab.      #Stage 4 lung cancer: right hilar mass and diffuse osseous metastasis. Patient was started on ALK targeted therapy with Alectinib with no toxicity, resulting in near complete resolution of lung mass. She is currently on alectinib and     #CKD  Cr 2.1 most recently. Recently established with nephrology. Case discussed and reviewed with Dr. Lucas Gutierrez who agrees with my assessment and plan.       Danny Jacobson MD  Cardiology Fellow   PGY-5    ==============================================================

## 2023-09-14 NOTE — PROGRESS NOTES
[address] coding opportunities          Chart Reviewed number of suggestions sent to Provider: 1     Patients Insurance     Medicare Insurance: [employer]          I13.0 : Hypertensive heart and chronic kidney disease with heart failure and stage 1 through stage 4 chronic kidney disease, or unspecified chronic kidney disease ([address])     The classification presumes a causal relationship between hypertension and heart involvement and between hypertension and kidney involvement unless documented as unrelated

## 2023-09-15 RX ORDER — POTASSIUM CHLORIDE 1500 MG/1
20 TABLET, EXTENDED RELEASE ORAL DAILY
Qty: 90 TABLET | Refills: 1 | Status: SHIPPED | OUTPATIENT
Start: 2023-09-15

## 2023-09-19 ENCOUNTER — CLINICAL SUPPORT (OUTPATIENT)
Dept: PULMONOLOGY | Facility: CLINIC | Age: 78
End: 2023-09-19
Payer: COMMERCIAL

## 2023-09-19 DIAGNOSIS — K44.9 HIATAL HERNIA: ICD-10-CM

## 2023-09-19 DIAGNOSIS — J45.50 SEVERE PERSISTENT ASTHMA WITHOUT COMPLICATION: Primary | ICD-10-CM

## 2023-09-19 PROCEDURE — G0239 OTH RESP PROC, GROUP: HCPCS

## 2023-09-19 RX ORDER — OMEPRAZOLE 20 MG/1
20 CAPSULE, DELAYED RELEASE ORAL DAILY
Qty: 90 CAPSULE | Refills: 1 | Status: SHIPPED | OUTPATIENT
Start: 2023-09-19

## 2023-09-20 ENCOUNTER — TELEPHONE (OUTPATIENT)
Dept: HEMATOLOGY ONCOLOGY | Facility: CLINIC | Age: 78
End: 2023-09-20

## 2023-09-20 ENCOUNTER — LAB (OUTPATIENT)
Dept: LAB | Facility: HOSPITAL | Age: 78
End: 2023-09-20
Payer: COMMERCIAL

## 2023-09-20 ENCOUNTER — CLINICAL SUPPORT (OUTPATIENT)
Dept: PULMONOLOGY | Facility: CLINIC | Age: 78
End: 2023-09-20
Payer: COMMERCIAL

## 2023-09-20 DIAGNOSIS — N18.31 STAGE 3A CHRONIC KIDNEY DISEASE (HCC): ICD-10-CM

## 2023-09-20 DIAGNOSIS — C34.91 NON-SMALL CELL CARCINOMA OF LUNG, STAGE 4, RIGHT (HCC): ICD-10-CM

## 2023-09-20 DIAGNOSIS — J45.50 SEVERE PERSISTENT ASTHMA WITHOUT COMPLICATION: Primary | ICD-10-CM

## 2023-09-20 DIAGNOSIS — C34.91 NON-SMALL CELL CARCINOMA OF LUNG, STAGE 4, RIGHT (HCC): Primary | ICD-10-CM

## 2023-09-20 LAB
ACANTHOCYTES BLD QL SMEAR: PRESENT
ALBUMIN SERPL BCP-MCNC: 4.1 G/DL (ref 3.5–5)
ALP SERPL-CCNC: 101 U/L (ref 34–104)
ALT SERPL W P-5'-P-CCNC: 54 U/L (ref 7–52)
ANION GAP SERPL CALCULATED.3IONS-SCNC: 6 MMOL/L
AST SERPL W P-5'-P-CCNC: 61 U/L (ref 13–39)
BASOPHILS # BLD MANUAL: 0 THOUSAND/UL (ref 0–0.1)
BASOPHILS NFR MAR MANUAL: 0 % (ref 0–1)
BILIRUB SERPL-MCNC: 1.39 MG/DL (ref 0.2–1)
BUN SERPL-MCNC: 27 MG/DL (ref 5–25)
CALCIUM SERPL-MCNC: 9.2 MG/DL (ref 8.4–10.2)
CHLORIDE SERPL-SCNC: 102 MMOL/L (ref 96–108)
CO2 SERPL-SCNC: 31 MMOL/L (ref 21–32)
CREAT SERPL-MCNC: 1.63 MG/DL (ref 0.6–1.3)
EOSINOPHIL # BLD MANUAL: 0 THOUSAND/UL (ref 0–0.4)
EOSINOPHIL NFR BLD MANUAL: 0 % (ref 0–6)
ERYTHROCYTE [DISTWIDTH] IN BLOOD BY AUTOMATED COUNT: 16.5 % (ref 11.6–15.1)
GFR SERPL CREATININE-BSD FRML MDRD: 30 ML/MIN/1.73SQ M
GLUCOSE SERPL-MCNC: 109 MG/DL (ref 65–140)
HCT VFR BLD AUTO: 28.6 % (ref 34.8–46.1)
HELMET CELLS BLD QL SMEAR: PRESENT
HGB BLD-MCNC: 9.2 G/DL (ref 11.5–15.4)
LYMPHOCYTES # BLD AUTO: 1.51 THOUSAND/UL (ref 0.6–4.47)
LYMPHOCYTES # BLD AUTO: 23 % (ref 14–44)
MCH RBC QN AUTO: 30 PG (ref 26.8–34.3)
MCHC RBC AUTO-ENTMCNC: 32.2 G/DL (ref 31.4–37.4)
MCV RBC AUTO: 93 FL (ref 82–98)
MONOCYTES # BLD AUTO: 0.98 THOUSAND/UL (ref 0–1.22)
MONOCYTES NFR BLD: 15 % (ref 4–12)
MYELOCYTES NFR BLD MANUAL: 1 % (ref 0–1)
NEUTROPHILS # BLD MANUAL: 4 THOUSAND/UL (ref 1.85–7.62)
NEUTS SEG NFR BLD AUTO: 61 % (ref 43–75)
PLATELET # BLD AUTO: 107 THOUSANDS/UL (ref 149–390)
PLATELET BLD QL SMEAR: ABNORMAL
POTASSIUM SERPL-SCNC: 4 MMOL/L (ref 3.5–5.3)
PROT SERPL-MCNC: 6.5 G/DL (ref 6.4–8.4)
RBC # BLD AUTO: 3.07 MILLION/UL (ref 3.81–5.12)
SODIUM SERPL-SCNC: 139 MMOL/L (ref 135–147)
WBC # BLD AUTO: 6.55 THOUSAND/UL (ref 4.31–10.16)

## 2023-09-20 PROCEDURE — 85027 COMPLETE CBC AUTOMATED: CPT

## 2023-09-20 PROCEDURE — G0239 OTH RESP PROC, GROUP: HCPCS

## 2023-09-20 PROCEDURE — 80053 COMPREHEN METABOLIC PANEL: CPT

## 2023-09-20 PROCEDURE — 85007 BL SMEAR W/DIFF WBC COUNT: CPT

## 2023-09-20 PROCEDURE — 36415 COLL VENOUS BLD VENIPUNCTURE: CPT

## 2023-09-20 NOTE — TELEPHONE ENCOUNTER
Lab Inquiry   Who are you speaking with? [facility]     If it is not the patient, are they listed on an active communication consent form? N/A   Name of ordering provider Dr. Elsie Seo   What is being requested? Lab orders need to be entered again, the expected date on the lab work that is in the patient's chart is too far out. Lab draw location [facility]   What is the best call back number? [phone]                                                   Patient is currently at the lab waiting.

## 2023-09-21 ENCOUNTER — TELEPHONE (OUTPATIENT)
Dept: NEPHROLOGY | Facility: CLINIC | Age: 78
End: 2023-09-21

## 2023-09-21 DIAGNOSIS — N18.31 STAGE 3A CHRONIC KIDNEY DISEASE (HCC): Primary | ICD-10-CM

## 2023-09-21 DIAGNOSIS — N17.9 AKI (ACUTE KIDNEY INJURY) (HCC): ICD-10-CM

## 2023-09-21 NOTE — TELEPHONE ENCOUNTER
Lm for Leblanc regarding message below. I mailed out bmp to be done in 2 weeks again. If patient has any questions asked to call the office. ----- Message from SINTIA MONK MD sent at 9/21/2023  2:21 PM EDT -----  Please advise pt to continue the lower dose of lasix 20mg po Q24 for now. Creatinine is improved, cont compression stockings. Have her check BMP in 2 weeks.  Thanks

## 2023-09-21 NOTE — RESULT ENCOUNTER NOTE
Please advise pt to continue the lower dose of lasix 20mg po Q24 for now. Creatinine is improved, cont compression stockings. Have her check BMP in 2 weeks.  Thanks

## 2023-09-22 NOTE — TELEPHONE ENCOUNTER
Patient had left a message on the phone yesterday evening, Called back and discussed results she is agreeable to plan no questions or concerns at this time.

## 2023-09-26 ENCOUNTER — APPOINTMENT (OUTPATIENT)
Dept: PULMONOLOGY | Facility: CLINIC | Age: 78
End: 2023-09-26
Payer: COMMERCIAL

## 2023-09-27 ENCOUNTER — APPOINTMENT (OUTPATIENT)
Dept: PULMONOLOGY | Facility: CLINIC | Age: 78
End: 2023-09-27
Payer: COMMERCIAL

## 2023-09-27 ENCOUNTER — HOSPITAL ENCOUNTER (OUTPATIENT)
Dept: INFUSION CENTER | Facility: HOSPITAL | Age: 78
Discharge: HOME | End: 2023-09-27
Attending: INTERNAL MEDICINE
Payer: COMMERCIAL

## 2023-09-27 VITALS
OXYGEN SATURATION: 96 % | TEMPERATURE: 97.7 F | DIASTOLIC BLOOD PRESSURE: 74 MMHG | SYSTOLIC BLOOD PRESSURE: 159 MMHG | RESPIRATION RATE: 18 BRPM | HEART RATE: 71 BPM

## 2023-09-27 DIAGNOSIS — C79.51 MALIGNANT NEOPLASM METASTATIC TO BONE (HCC): ICD-10-CM

## 2023-09-27 DIAGNOSIS — C34.90 NON-SMALL CELL LUNG CANCER (HCC): Primary | ICD-10-CM

## 2023-09-27 DIAGNOSIS — C34.90 NON-SMALL CELL LUNG CANCER, UNSPECIFIED LATERALITY (HCC): ICD-10-CM

## 2023-09-27 PROCEDURE — 96365 THER/PROPH/DIAG IV INF INIT: CPT

## 2023-09-27 RX ORDER — SODIUM CHLORIDE 9 MG/ML
20 INJECTION, SOLUTION INTRAVENOUS ONCE
Status: CANCELLED | OUTPATIENT
Start: 2023-10-09 | Stop reason: HOSPADM

## 2023-09-27 RX ORDER — SODIUM CHLORIDE 9 MG/ML
20 INJECTION, SOLUTION INTRAVENOUS ONCE
Status: COMPLETED | OUTPATIENT
Start: 2023-09-27 | End: 2023-09-27

## 2023-09-27 RX ADMIN — ZOLEDRONIC ACID 3 MG: 4 INJECTION, SOLUTION, CONCENTRATE INTRAVENOUS at 14:50

## 2023-09-27 RX ADMIN — SODIUM CHLORIDE 20 ML/HR: 9 INJECTION, SOLUTION INTRAVENOUS at 14:49

## 2023-09-27 NOTE — PROGRESS NOTES
Calcium from 9/20/2023 CMP 9.2, OK to proceed with treatment. Patient tolerated IV zometa without issue. Next appointment confirmed, AVS given.

## 2023-09-28 ENCOUNTER — TELEPHONE (OUTPATIENT)
Dept: FAMILY MEDICINE CLINIC | Facility: CLINIC | Age: 78
End: 2023-09-28

## 2023-09-28 ENCOUNTER — APPOINTMENT (OUTPATIENT)
Dept: PULMONOLOGY | Facility: CLINIC | Age: 78
End: 2023-09-28
Payer: COMMERCIAL

## 2023-09-28 ENCOUNTER — OFFICE VISIT (OUTPATIENT)
Dept: FAMILY MEDICINE CLINIC | Facility: CLINIC | Age: 78
End: 2023-09-28

## 2023-09-28 ENCOUNTER — PATIENT OUTREACH (OUTPATIENT)
Dept: FAMILY MEDICINE CLINIC | Facility: CLINIC | Age: 78
End: 2023-09-28

## 2023-09-28 VITALS
OXYGEN SATURATION: 90 % | WEIGHT: 235 LBS | HEART RATE: 88 BPM | RESPIRATION RATE: 18 BRPM | DIASTOLIC BLOOD PRESSURE: 80 MMHG | SYSTOLIC BLOOD PRESSURE: 140 MMHG | TEMPERATURE: 98.7 F | HEIGHT: 61 IN | BODY MASS INDEX: 44.37 KG/M2

## 2023-09-28 DIAGNOSIS — J45.31 MILD PERSISTENT ASTHMA WITH ACUTE EXACERBATION: ICD-10-CM

## 2023-09-28 DIAGNOSIS — R60.0 LOWER EXTREMITY EDEMA: ICD-10-CM

## 2023-09-28 DIAGNOSIS — J45.20 MILD INTERMITTENT ASTHMA WITHOUT COMPLICATION: Primary | ICD-10-CM

## 2023-09-28 DIAGNOSIS — Z13.9 ENCOUNTER FOR SCREENING INVOLVING SOCIAL DETERMINANTS OF HEALTH (SDOH): Primary | ICD-10-CM

## 2023-09-28 RX ORDER — FLUTICASONE PROPIONATE AND SALMETEROL 500; 50 UG/1; UG/1
1 POWDER RESPIRATORY (INHALATION) 2 TIMES DAILY
Qty: 60 BLISTER | Refills: 0 | Status: SHIPPED | OUTPATIENT
Start: 2023-09-28 | End: 2023-09-29

## 2023-09-28 NOTE — TELEPHONE ENCOUNTER
Discussed asthma thereapy with social workers and PCP    Pt has medicare, likely in donut-hole, inhalers now very expensive    Recommend [facility]    Printed out Airduo coupon, this is cheapest [facility] to my knowledge.      Renea Norris PharmD, [facility]  Ambulatory Care Clinical Pharmacist     -----------------------    Pharmacist Tracking Tool  Reason For Outreach: Embedded Pharmacist  Demographics:  Intervention Method: Chart Review  Type of Intervention: New  Topics Addressed: Asthma  Pharmacologic Interventions: Medication Conversion  Non-Pharmacologic Interventions: Care coordination, Chart update, Cost and Medication/Device education  Time:  Direct Patient Care: 0 mins  Care Coordination: 10 mins  Recommendation Recipient: Provider  Outcome: Accepted       Renea Norris PharmD, [facility]  Ambulatory Care Clinical Pharmacist

## 2023-09-29 ENCOUNTER — PATIENT OUTREACH (OUTPATIENT)
Dept: FAMILY MEDICINE CLINIC | Facility: CLINIC | Age: 78
End: 2023-09-29

## 2023-09-29 RX ORDER — FLUTICASONE PROPIONATE AND SALMETEROL 232; 14 UG/1; UG/1
1 POWDER, METERED RESPIRATORY (INHALATION) 2 TIMES DAILY
Qty: 3 EACH | Refills: 3 | Status: SHIPPED | OUTPATIENT
Start: 2023-09-29 | End: 2023-10-04 | Stop reason: ALTCHOICE

## 2023-09-29 RX ORDER — IRBESARTAN 300 MG/1
300 TABLET ORAL
COMMUNITY
Start: 2023-06-24

## 2023-09-29 NOTE — PROGRESS NOTES
OPAL SEMAJ received in person consult from provider requesting assistance with pt as she expressed difficulty paying for her medications. OPAL CHA met with pt and she reported that her medication costs are high. Pt stated that she get gets $1600 a month from social security. Pt expressed that she lives by herself in her home as her son recently passed away. Pt stated that he was being buried this weekend. OPAL CHA inquired about social supports and pt stated that her children and family have been supportive she stated that her dtr who lives in [place] flew in and is staying with her and she has another dtr who is in [place] is coming down and she has another son who lives locally and he is helpful. OPAL CHA inquired about transportation and pt states that she drives herself to appointments. She denied being behind on any utilities and states she owns her home. She stated that her home also has stair lifts and she uses a SPC for ambulation. OPAL SEMAJ discussed ways to afford her medications with provider and pt. Pt stated that she tried to apply for MA but was told that her income was too high. She stated that she did complete the application for [facility] and is waiting to hear back. OPAL CHA, pt and provider discussed other options such as a coupon or PAP. OPAL CHA provided pt with contact information to contact OPAL CHA if needed. It was decided that OPAL SEMAJ will follow up with pt next week with an update once an affordable option has been decided as pt had reported that she will be burying her son this weekend. OPAL CHA and pt's provider met with practice pharmacist and they stated that Ayad  would be a cheaper option and a coupon was found for $42.82. OPAL CHA will continue to be available for any additional needs as requested.

## 2023-09-29 NOTE — PROGRESS NOTES
OPAL CHA called pt's pharmacy to price check her medication OPAL CHA was made aware that pt's medication would be $120 for a three month supply. OPAL CHA inquired about coupon from [facility] and was made aware that it would be $110 for the three month supply. OPAL CHA discussed with pt's provider and was made aware that maybe a one month supply would need to be sent. OPAL CHA called [facility] ([phone]) and was made that they do not see any application for her. OPAL CHA was made aware that pt can complete application over the phone, mail or online or OPAL CHA could complete application on behalf of the pt if OPAL CHA had all the needed information. OPAL CHA was made aware that turnaround time is typically three days if they get everything that they need including , SS#, medical card, ID and tax income. OPAL CHA will continue to be available for any additional needs as requested.

## 2023-10-01 NOTE — PROGRESS NOTES
Assessment/Plan:    1. Mild intermittent asthma without complication  Comments:  Formulary appears to be cheaper for AirDuo. we will send to the pharmacy to assess the price. Continue with rehab. Follow-up in a month. Orders:  -     fluticasone-salmeterol (AirDuo RespiClick 158/61) 340-14 mcg/act dry powder inhaler; Inhale 1 puff 2 (two) times a day Rinse mouth after use. Subjective:      Patient ID: Alysia Maya is a 70 y.o. female. pmh of multiple co-morbidities including hypertension (x20yrs), morbid obesity, GERD, arthritis, severe asthma, CAD, a.flutter, CHF and metastatic lung cancer,  discharged 1 month ago from the hospital for CKD is coming in for follow-up visit. COPD exacerbation. Since our last visit she was seen nephrology made changes to her medication regiment decreasing the diuresis from 40 mg daily to 20 mg daily. She is also ongoing with her chemotherapy reagent. Her ability to manage her lung disease is complicated by the fact that the patient is unable to afford her medication. Currently she is using nebulizer treatment, Budesonide BID, Albuterol TID and ipratropium TID. Per pulmonology recommendation they would like her to be on Advair and Spiriva. Patient has been compliant with her current therapy and is also going pulmonology rehab. Patient is engaged with our social work service and trying to apply for medical assistance  so that she is able to afford her medication. Patient reports to be feeling overall better. Patient reports that her shortness of breath continues to improve. The following portions of the patient's history were reviewed and updated as appropriate: allergies, current medications, past family history, past medical history, past social history, past surgical history, and problem list.    Review of Systems   Constitutional: Negative for chills and fever. HENT: Negative for ear pain and sore throat.     Eyes: Negative for pain and visual disturbance. Respiratory: Positive for shortness of breath. Negative for cough. Cardiovascular: Negative for chest pain and palpitations. Gastrointestinal: Negative for abdominal pain and vomiting. Genitourinary: Negative for dysuria and hematuria. Musculoskeletal: Negative for arthralgias and back pain. Skin: Negative for color change and rash. Neurological: Negative for seizures and syncope. All other systems reviewed and are negative. Objective:      /80 (BP Location: Right arm, Patient Position: Sitting, Cuff Size: Large)   Pulse 88   Temp 98.7 °F (37.1 °C) (Temporal)   Resp 18   Ht 5' 1" (1.549 m)   Wt 107 kg (235 lb)   SpO2 90%   BMI 44.40 kg/m²          Physical Exam  Constitutional:       General: She is not in acute distress. Appearance: Normal appearance. HENT:      Nose: No congestion or rhinorrhea. Eyes:      Extraocular Movements: Extraocular movements intact. Pupils: Pupils are equal, round, and reactive to light. Cardiovascular:      Rate and Rhythm: Normal rate and regular rhythm. Pulses: Normal pulses. Heart sounds: Normal heart sounds. No murmur heard. No gallop. Pulmonary:      Effort: Pulmonary effort is normal.      Breath sounds: Wheezing present. No rhonchi or rales. Abdominal:      General: Bowel sounds are normal. There is no distension. Palpations: Abdomen is soft. There is no mass. Tenderness: There is no abdominal tenderness. Hernia: No hernia is present. Skin:     General: Skin is warm. Coloration: Skin is not jaundiced. Findings: No bruising. Neurological:      General: No focal deficit present. Mental Status: She is alert and oriented to person, place, and time. Psychiatric:         Mood and Affect: Mood normal.         Behavior: Behavior normal.         Thought Content:  Thought content normal.           Champ Dunbar DO   Family Medicine PGY-2  10/1/2023

## 2023-10-03 ENCOUNTER — TELEPHONE (OUTPATIENT)
Dept: HEMATOLOGY ONCOLOGY | Facility: CLINIC | Age: 78
End: 2023-10-03

## 2023-10-03 ENCOUNTER — CLINICAL SUPPORT (OUTPATIENT)
Dept: PULMONOLOGY | Facility: CLINIC | Age: 78
End: 2023-10-03
Payer: COMMERCIAL

## 2023-10-03 DIAGNOSIS — J45.50 SEVERE PERSISTENT ASTHMA WITHOUT COMPLICATION: Primary | ICD-10-CM

## 2023-10-03 PROCEDURE — G0239 OTH RESP PROC, GROUP: HCPCS

## 2023-10-03 NOTE — TELEPHONE ENCOUNTER
Patient needs an appointment with new MD provider as [facility] from Dr. Rachael Harding. Please let me know who new doctor is so that I can send over enrollment form to them to sign for her chemo medication.

## 2023-10-03 NOTE — TELEPHONE ENCOUNTER
I called [facility] regarding an appointment that they have scheduled with Dr. Lydia Roberson scheduled on 11/6     I left a voicemail explaining to patient that this appointment will need to be rescheduled due to a change in the providers schedule. Patient was advised to call [facility] to reschedule.   Another attempt will be made to reschedule

## 2023-10-03 NOTE — PROGRESS NOTES
Pulmonary Follow Up Note   Chandrika Potter 70 y.o. female MRN: [id number]  10/4/2023      Assessment and Plan:    Severe allergic asthma  - type 1 with high eos 480 and multiple possitive allergens in 2020 and Total IgE 109,  Multiple allergies identified in the past.  with 1-3x a year exacerbaitons, improved since starting fasenra Jan 2023 by allergy doctor. With recent exacerbation in 8/2023. No smoking hx  - PFT 2022: FEV1 52% however, lung volumes not ordered    - takes allegra. On Budesonide BID, Albuterol Tid and ipratropium TID nebulizer. Cannot afford Advair 500-50 and Spiriva due to cost  - continue with Fasenra injection by rheumatologist (last injection 6/2023, should contact her rheumatologist to make another appointment for injection)  - pul rehab enrolled. Started in 9/2023      Severe MONO   - sleep study in 2019 showed severe MONO   - home sleep study 5/2022 with AHI 85.2 with significant hypoxia to 70%  - Currently tolerating auto-titrating CPAP 5-20 cc of H2O pressure with 2L of oxygen at night. Compliance report in 10/2023 showing 90% compliance with AHI 3.9  - continue current setting. Will follow up for compliance study       Metastatic Non-small cell carcinoma of Right lung (8/2020) with ALK +  - s/p palliative radiation therapy to T8 x 10 treatments  - on Alectinib as per hem/onc - doing well so far   - had a PET scan 1/2021 shows response to therapy   - CT chest/ abd/pelvis ordered by hem/onc 5/2021 showed resolution of RUL nodule, R hilum not well visualized, however, was similar to PET scan, osseous metastasis ( ribs, spine, and pelvis), GGO   - CTA 7/2021 showed persistent multifocal patchy GGO slightly increased     - CT chest/abdomen/pelvis 4/2022: tree-in-bed opacities in RLL and some in lingula likely secondary to allergic asthma  - CT chest 4/2023: no adenocarcinoma recurrence  - another CT scan ordered by hem.  Continue to follow        Morbid obesity  - counseled       Return in about 1 year (around 10/4/2024) for Next scheduled follow up MONO. Patient will need to follow up with us for MONO. She can follow up with her allergy specialist for Fasenra injection and for her asthma which is now getting better control. History of Present Illness   HPI:  Des Iyer is a 70 y.o. female 70 y.o. 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 She also has severe eosinophilic asthma with previous frequent exacerbation, on Fasenra and triple therapy, and with last exacerbation in 8/2023. Patient follows in 8/2023 following asthma exacerbation. Her home breathing treatment was changed to nebulizer due to cost. She is now on Budesonide BID, Albuterol Tid and ipratropium TID nebulizer. She also stated that she started her Cpap due to MONO and could not tolerate the setting. Her CPAP was advsied to changed to CPAP 5-20 cc of H2O pressure with 2L of oxygen in 8/2023 and ever since she is compliant with it. She also enrolled in pulm rehab program in 9/2023. She again presented today for follow up. She feels fine. No new compliant. She is tolerating nebulizer and only needs 1-2 times of ventolin PRN. She is scheduled to follow up with her allergy doctor for fasenra injection. Review of Systems   Constitutional: Negative for appetite change and fever. HENT: Negative for ear pain, rhinorrhea, sneezing, sore throat and trouble swallowing. Cardiovascular: Negative for chest pain. Neurological: Negative for headaches. All other systems reviewed and are negative.       Historical Information   Past Medical History:   Diagnosis Date   • Allergic rhinitis    • Anemia    • Asthma    • Atrial fibrillation ([address])    • Chronic bronchitis (HCC)    • COPD (chronic obstructive pulmonary disease) ([address])    • Coronary artery disease    • CPAP (continuous positive airway pressure) dependence    • Degenerative joint disease    • GERD (gastroesophageal reflux disease)    • Glaucoma    • Hypertension    • Lumbar disc disease    • Lung cancer (HCC)    • Periodic heart flutter    • Pneumonia    • Sleep apnea     suspected    • Sleep apnea, obstructive    • Ventricular arrhythmia    • Vitamin D deficiency      Past Surgical History:   Procedure Laterality Date   • APPENDECTOMY     • COLON SURGERY     • COLONOSCOPY N/A 03/18/2019    Procedure: COLONOSCOPY;  Surgeon: Ruperto Heimlich, DO;  Location: [facility]; Service: Gastroenterology   • ESOPHAGOGASTRODUODENOSCOPY N/A 03/18/2019    Procedure: ESOPHAGOGASTRODUODENOSCOPY (EGD); Surgeon: Ruperto Heimlich, DO;  Location: [facility];   Service: Gastroenterology   • KNEE SURGERY     • LUNG BIOPSY     • ROTATOR CUFF REPAIR     • SHOULDER SURGERY       Family History   Problem Relation Age of Onset   • Stroke Mother    • Diabetes Mother    • Hyperlipidemia Mother    • Hypertension Mother    • Allergies Mother         Environmental   • Asthma Mother    • Diabetes Father    • Hyperlipidemia Father    • Hypertension Father    • Lung cancer Father 79   • Allergies Father         Environmental   • Asthma Father    • Lymphoma Sister 71   • Heart disease Sister         Pacemaker   • Asthma Brother    • Aneurysm Brother         Brain - had surgery   • Other Brother         Lymes disease   • Coronary artery disease Daughter         2 bypass done   • No Known Problems Daughter    • No Known Problems Daughter    • No Known Problems Son    • Kidney disease Son    • Liver disease Son    • Obesity Son      Pets: still has a cat     Meds/Allergies     Current Outpatient Medications:   •  acetaminophen (TYLENOL) 650 mg CR tablet, TAKE 1 TABLET (650 MG TOTAL) BY MOUTH EVERY 8 (EIGHT) HOURS AS NEEDED FOR MILD PAIN, Disp: , Rfl:   •  albuterol (2.5 mg/3 mL) 0.083 % nebulizer solution, Take 3 mL (2.5 mg total) by nebulization 3 (three) times a day, Disp: 810 mL, Rfl: 2  •  albuterol (Ventolin HFA) 90 mcg/act inhaler, Inhale 2 puffs every 6 (six) hours as needed for wheezing, Disp: 8.5 g, Rfl: 2  •  Alectinib HCl 150 MG CAPS, Take 4 capsules (600 mg total) by mouth 2 (two) times a day, Disp: 240 capsule, Rfl: 5  •  amiodarone 200 mg tablet, Take 1 tablet (200 mg total) by mouth daily with breakfast, Disp: 30 tablet, Rfl: 11  •  apixaban (ELIQUIS) 5 mg, Take 1 tablet (5 mg total) by mouth 2 (two) times a day, Disp: 60 tablet, Rfl: 11  •  benralizumab (FASENRA) subcutaneous injection, Inject 1 mL (30 mg total) under the skin every 56 days, Disp: 1 mL, Rfl: 6  •  budesonide (PULMICORT) 0.25 mg/2 mL nebulizer solution, Take 2 mL (0.25 mg total) by nebulization 2 (two) times a day Rinse mouth after use., Disp: 360 mL, Rfl: 2  •  fexofenadine (ALLEGRA) 180 MG tablet, Take 180 mg by mouth daily, Disp: , Rfl:   •  fluticasone (FLONASE) 50 mcg/act nasal spray, SPRAY 2 SPRAYS INTO EACH NOSTRIL EVERY DAY, Disp: 48 mL, Rfl: 1  •  furosemide (LASIX) 40 mg tablet, Take 1 tablet (40 mg total) by mouth daily (Patient taking differently: Take 20 mg by mouth daily), Disp: 60 tablet, Rfl: 3  •  ipratropium (ATROVENT) 0.02 % nebulizer solution, Take 2.5 mL (0.5 mg total) by nebulization 3 (three) times a day, Disp: 225 mL, Rfl: 2  •  irbesartan (AVAPRO) 300 mg tablet, Take 300 mg by mouth, Disp: , Rfl:   •  Klor-Con M20 20 MEQ tablet, TAKE 1 TABLET BY MOUTH EVERY DAY, Disp: 90 tablet, Rfl: 1  •  metoprolol tartrate (LOPRESSOR) 25 mg tablet, TAKE 1 TABLET (25 MG TOTAL) BY MOUTH EVERY 12 (TWELVE) HOURS, Disp: 180 tablet, Rfl: 1  •  omeprazole (PriLOSEC) 20 mg delayed release capsule, TAKE 1 CAPSULE BY MOUTH EVERY DAY, Disp: 90 capsule, Rfl: 1  •  oxyCODONE-acetaminophen (PERCOCET) 5-325 mg per tablet, Take 1 tablet by mouth every 4 (four) hours as needed for moderate pain For ongoing pain Max Daily Amount: 6 tablets, Disp: 60 tablet, Rfl: 0  •  rosuvastatin (CRESTOR) 10 MG tablet, Take 1 tablet (10 mg total) by mouth daily, Disp: 90 tablet, Rfl: 3  No Known Allergies    Vitals: Blood pressure 136/62, pulse 70, temperature 99 °F (37.2 °C), temperature source Tympanic, height 5' 1" (1.549 m), weight 107 kg (235 lb), SpO2 97 %, not currently breastfeeding. Body mass index is 44.4 kg/m². Oxygen Therapy  SpO2: 97 %  Oxygen Therapy: None (Room air)      Physical Exam  Physical Exam  Vitals reviewed. Constitutional:       Appearance: Normal appearance. She is obese. HENT:      Head: Normocephalic. Nose: Nose normal.      Mouth/Throat:      Mouth: Mucous membranes are moist.   Eyes:      Pupils: Pupils are equal, round, and reactive to light. Cardiovascular:      Rate and Rhythm: Normal rate and regular rhythm. Pulses: Normal pulses. Heart sounds: Normal heart sounds. Pulmonary:      Effort: Pulmonary effort is normal.      Breath sounds: Normal breath sounds. Abdominal:      General: Abdomen is flat. Palpations: Abdomen is soft. Musculoskeletal:         General: Swelling present. Cervical back: Normal range of motion. Right lower leg: Edema present. Left lower leg: Edema present. Skin:     General: Skin is warm and dry. Neurological:      General: No focal deficit present. Mental Status: She is alert and oriented to person, place, and time. Labs: I have personally reviewed pertinent lab results.   Lab Results   Component Value Date    WBC 6.55 09/20/2023    HGB 9.2 (L) 09/20/2023    HCT 28.6 (L) 09/20/2023    MCV 93 09/20/2023     (L) 09/20/2023     Lab Results   Component Value Date    CALCIUM 9.2 09/20/2023    K 4.0 09/20/2023    CO2 31 09/20/2023     09/20/2023    BUN 27 (H) 09/20/2023    CREATININE 1.63 (H) 09/20/2023     Lab Results   Component Value Date     11/17/2020     Lab Results   Component Value Date    ALT 54 (H) 09/20/2023    AST 61 (H) 09/20/2023    ALKPHOS 101 09/20/2023       Imaging and other studies: I have personally reviewed pertinent reports. and I have personally reviewed pertinent films in PACS    Pulmonary function testin2021  FEV1/FVC Ratio: 71 %  Forced Vital Capacity: 1.82 L    61 % predicted    2022: FEV1 52%      EKG, Pathology, and Other Studies: I have personally reviewed pertinent reports.    and I have personally reviewed pertinent films in PACS      Toño Enamorado MD  Pulmonary and Critical Care Fellow  [facility]

## 2023-10-03 NOTE — PROGRESS NOTES
Pulmonary Rehabilitation Plan of Care   30 Day Reassessment      Today's date: 10/3/2023   # of Exercise Sessions Completed: 5  Patient name: Eloy Ovalles      :   Age: 70 y.o. MRN: [id number]  Referring Physician: Sophia Arizmendi PA-C  Pulmonologist: Maggie Echavarria MD  Provider: [facility]  Clinician: Carol Culp MS, CEP    Dx:    Encounter Diagnosis   Name Primary? • Severe persistent asthma without complication Yes     Date of onset: 2023      SUMMARY OF PROGRESS:  Jennifer Sanchez is compliant attending pulmonary rehab exercise sessions 2x/wk having attended 5 sessions in the past 30 days. She tolerates 22 mins at 1.8 - 2.1 METs on room air. A light strength training component will be added in a future exercise session. Resting SpO2 93 - 95% with slightly decreased O2 saturation during exercise 90 - 94%. Resting //78. RHR 65 - 72,  ExHR 75 - 85. Jennifer Sanchez is currently wearing a Sproutkinel continuous heart monitoring patch for 1 week on her skin ordered by Dr. Prince Coburn due to her hx of atrial flutter. She remains compliant with amiodarone and eliquis but still experiences fluttering mostly at night. Jennifer Sanchez reports some SOB with exercise 2-4/10. She continues to do her activities around the house at her own pace, requiring multiple rest breaks. She does admit that sometimes things just don't get done because she cannot do them. Depression and anxiety screening using the PHQ-9/AL-7 was not reassessed due to initial low scores of 5 and 3. When addressed, the patient denies  feelings of depression but reports some anxiety relating to fear of SOB but hopes this gets better over time. Jennifer Sanchez has not made any dietary changes but has a goal to lose weight. Patient attends group educational classes on pulmonary disease. Pursed lipped breathing and Diaphragmatic breathing continues to be demonstrated, practiced, and reinforced with the patient.    Her exercise program will be progressed as tolerated. The patient has the following personal goals he hopes to achieve by discharge: weight loss and decrease fear related to SOB. Pt will continue to be educated on lifestyle modifications and encouraged to supplement with a home exercise program to reach her goals.      Medication compliance: Yes   Comments: Pt reports to be compliant with medications    Fall Risk: Moderate   Comments: Patient uses walking assist device (walker/cane/rollator), Denies a fall in the past 6 months and reports unsteadiness on feet but sits to avoid falls    Smoking: Former user  Smoked 0.5ppd for 10 years, quit 30 years ago    RPD at Rest: 010  RPD with Exercise:  2-4/10    Assessment of progression of lung disease and functional status:  CAT: 40 initial  Shortness of breath questionnaire: 70/120 initial      EXERCISE ASSESSMENT and PLAN    Current Exercise Program in Rehab:       Frequency: 2 days/week   Supplement with home exercise 2+ days/wk as tolerated        Minutes: 22        METS: 1.8-2.1              SpO2: 90-94%              RPD: 2-4                      HR: 75-85   RPE: 4-5         Modalities: UBE, NuStep and Room walking      Exercise Progression 30 Day Goals :    Frequency: 2 days/week    Supplement with home exercise 2+ days/wk as tolerated       Minutes: 30-35         METS: 2.2-2.8              SpO2: >88%              RPD: 4-6                      HR: RHR+30bpm   RPE: 4-5        Modalities: UBE, NuStep, Recumbent bike and Room walking     Strength trainin-3 days / week  12-15 repetitions  1-2 sets per modality   Will be added following 2-3 weeks of monitored exercise sessions   Modalities: Lateral Raise, Arm Curl, Sit to Stands, Wall push-ups, Front Raises, Shoulder Shrugs and Calf Raises    Oxygen needs:   Rest:  room air  Exercise/physical activity:  room air  Sleep:   room air and CPAP/BiPap    Oxygen Goal: Maintain SpO2>88% during exercise    Home Exercise: none  Education: pursed lipped breathing, benefits of exercise for pulmonary disease, RPD scale, O2 saturation monitoring and appropriate O2 response to exercise    Goals: reduced score on  [employer]CD Shortness of Breath Questionnaire, Improved 6MW distance by 10%, reduced dyspnea during exercise , improved exercise tolerance (max METs tolerated in pulmonary rehab), SpO2 >88% during exercise, reduced score on CAT, attend pulmonary rehab regularly, decrease sitting time at home, start a walking program, reduced pulmonary related hospital readmissions , decreased rest needed with physical activity, increased muscular strength, increased energy, increased stamina with ADLs and demonstrate proper pursed lipped breathing  Progressing:  Pt is progressing and showing improvement  toward the following goals:  attends regularly 2x/week.  , Patient will begin home walking in the next 30 days, Will continue to educate and progress as tolerated. Plan: learn to conserve energy with ADLs , practice diaphragmatic breathing, reduce time sitting at home, increased strength of respiratory muscles, utilize PLB with physical activity and begin a home exercise program     Readiness to change: Action:  (Changing behavior)      NUTRITION ASSESSMENT AND PLAN    Weight control:    Starting weight: 231.6 lb   Current weight: 234.8 lb    Goals: Increase water intake to reduce/thin mucus production eat less CO2 producing foods reduced body weight Improve hydration  Education: low sodium diet  hydration  Progressing:Pt has not made progress toward the following goals: no dietary changes made; gained 3lbs since initial evaluation. , Will continue to educate and progress as tolerated.   Plan: Education Class: Heart Healthy Eating, replace refined grain bread with whole grain bread, avoid processed foods, drink more water and learn how to read food labels  Readiness to change: Preparation:  (Getting ready to change)       PSYCHOSOCIAL ASSESSMENT AND PLAN    Emotional: Depression assessment:  PHQ-9 = 5 5-9 = Mild Depression            Anxiety measure:  AL-7 = 3 0-4  = Not anxious  Self-reported stress level: 4   Social support: Patient reports excellent emotional/social support from family    Goals:  Reduce perceived stress to 1-3/10, improved [facility] QOL < 27, PHQ-9 - reduced severity by one level, Physical Fitness in [facility] Score < 3, Daily Activity in [facility] Score < 3, Social Activities in [facility] Score < 3, Pain in [facility] Score < 3, Overall Health in [facility] Score < 3, Quality of Life in [facility] Score < 3 , Change in Health in [facility] Score < 3 , Increased interest in doing things, improved sleep, increased energy and reduced anxiety/fear of becoming SOB  Education: stress management techniques    Progressing:Pt is progressing and showing improvement  toward the following goals:  no repeat PHQ9/GAD7 completed due to initial low scores; has some fear associated with SOB with not significant.  , Will continue to educate and progress as tolerated.   Plan: Class: Stress and Your Health, Practice relaxation techniques, Exercise, Enjoy a hobby, Return to previous social activity and Avoid triggers  Readiness to change: Action:  (Changing behavior)      OTHER CORE COMPONENTS     Tobacco:   Social History     Tobacco Use   Smoking Status Former   • Packs/day: 0.25   • Years: 25.00   • Total pack years: 6.25   • Types: Cigarettes   • Start date: 48   • Quit date: 28   • Years since quittin.7   Smokeless Tobacco Former       Tobacco Use Intervention: Referral to tobacco expert:   Pt quit 30 years ago   and has abstained    Blood pressure:    Restin//78    Goals: consistent BP < 130/80, moderate intensity exercise >150 mins/wk and medication compliance  Education:  pathophysiology of pulmonary disease, control coughing, inspiratory muscle training, environmental triggers and nebulizer use  Progressing:Pt is progressing and showing improvement toward the following goals:  normal resting BP; takes meds. , Patient will begin home walking  in the next 30 days, Will continue to educate and progress as tolerated.   Plan: engage in regular exercise, monitor home BP, class: Pulmonary Anatomy and Physiology and class:  Pulmonary Medications  Readiness to change: Action:  (Changing behavior)

## 2023-10-03 NOTE — TELEPHONE ENCOUNTER
SANTOS  DR siva MAY   Who are you speaking with? Patient   If it is not the patient, are they listed on an active communication consent form? Yes   Is this a SANTOS or DR siva MAY SANTOS   Which provider is patient currently scheduled or established with? Dr. Kari Brown   What is the original appointment date and time? 11/9/23   At which location is the appointment scheduled to take place? [facility]   Which provider is the patient transitioning care to? Dr. Rafiq Dominguez   What is the new appointment date and time? 11/8/23   At which location is the new appointment scheduled to take place? [facility]   What is the reason for this change?  Provider

## 2023-10-04 ENCOUNTER — LAB (OUTPATIENT)
Dept: LAB | Facility: CLINIC | Age: 78
End: 2023-10-04
Payer: COMMERCIAL

## 2023-10-04 ENCOUNTER — OFFICE VISIT (OUTPATIENT)
Dept: PULMONOLOGY | Facility: CLINIC | Age: 78
End: 2023-10-04
Payer: COMMERCIAL

## 2023-10-04 VITALS
HEIGHT: 61 IN | BODY MASS INDEX: 44.37 KG/M2 | DIASTOLIC BLOOD PRESSURE: 62 MMHG | SYSTOLIC BLOOD PRESSURE: 136 MMHG | HEART RATE: 70 BPM | TEMPERATURE: 99 F | OXYGEN SATURATION: 97 % | WEIGHT: 235 LBS

## 2023-10-04 DIAGNOSIS — N17.9 AKI (ACUTE KIDNEY INJURY) (HCC): ICD-10-CM

## 2023-10-04 DIAGNOSIS — J45.30 MILD PERSISTENT ASTHMA WITHOUT COMPLICATION: ICD-10-CM

## 2023-10-04 DIAGNOSIS — J45.20 MILD INTERMITTENT ASTHMA WITHOUT COMPLICATION: ICD-10-CM

## 2023-10-04 DIAGNOSIS — J45.50 SEVERE PERSISTENT ASTHMA WITHOUT COMPLICATION: ICD-10-CM

## 2023-10-04 DIAGNOSIS — N18.31 STAGE 3A CHRONIC KIDNEY DISEASE (HCC): ICD-10-CM

## 2023-10-04 LAB
ANION GAP SERPL CALCULATED.3IONS-SCNC: 7 MMOL/L
BUN SERPL-MCNC: 33 MG/DL (ref 5–25)
CALCIUM SERPL-MCNC: 9.6 MG/DL (ref 8.4–10.2)
CHLORIDE SERPL-SCNC: 104 MMOL/L (ref 96–108)
CO2 SERPL-SCNC: 31 MMOL/L (ref 21–32)
CREAT SERPL-MCNC: 1.84 MG/DL (ref 0.6–1.3)
GFR SERPL CREATININE-BSD FRML MDRD: 26 ML/MIN/1.73SQ M
GLUCOSE SERPL-MCNC: 125 MG/DL (ref 65–140)
POTASSIUM SERPL-SCNC: 3.5 MMOL/L (ref 3.5–5.3)
SODIUM SERPL-SCNC: 142 MMOL/L (ref 135–147)

## 2023-10-04 PROCEDURE — 99214 OFFICE O/P EST MOD 30 MIN: CPT | Performed by: INTERNAL MEDICINE

## 2023-10-04 PROCEDURE — 80048 BASIC METABOLIC PNL TOTAL CA: CPT

## 2023-10-04 PROCEDURE — 36415 COLL VENOUS BLD VENIPUNCTURE: CPT

## 2023-10-04 RX ORDER — ALBUTEROL SULFATE 2.5 MG/3ML
2.5 SOLUTION RESPIRATORY (INHALATION) 3 TIMES DAILY
Qty: 810 ML | Refills: 2 | Status: SHIPPED | OUTPATIENT
Start: 2023-10-04 | End: 2024-01-02

## 2023-10-04 RX ORDER — ALBUTEROL SULFATE 90 UG/1
2 AEROSOL, METERED RESPIRATORY (INHALATION) EVERY 6 HOURS PRN
Qty: 8.5 G | Refills: 2 | Status: SHIPPED | OUTPATIENT
Start: 2023-10-04

## 2023-10-04 RX ORDER — BUDESONIDE 0.25 MG/2ML
0.25 INHALANT ORAL 2 TIMES DAILY
Qty: 360 ML | Refills: 2 | Status: SHIPPED | OUTPATIENT
Start: 2023-10-04 | End: 2024-01-02

## 2023-10-05 ENCOUNTER — TELEPHONE (OUTPATIENT)
Dept: NEPHROLOGY | Facility: HOSPITAL | Age: 78
End: 2023-10-05

## 2023-10-05 ENCOUNTER — CLINICAL SUPPORT (OUTPATIENT)
Dept: PULMONOLOGY | Facility: CLINIC | Age: 78
End: 2023-10-05
Payer: COMMERCIAL

## 2023-10-05 ENCOUNTER — DOCUMENTATION (OUTPATIENT)
Dept: HEMATOLOGY ONCOLOGY | Facility: CLINIC | Age: 78
End: 2023-10-05

## 2023-10-05 DIAGNOSIS — J45.50 SEVERE PERSISTENT ASTHMA WITHOUT COMPLICATION: Primary | ICD-10-CM

## 2023-10-05 PROCEDURE — G0239 OTH RESP PROC, GROUP: HCPCS

## 2023-10-05 NOTE — TELEPHONE ENCOUNTER
I lm for Mckenzie Lovelace that renal function is stable but slightly elevated from 2 weeks ago. Is patient calls please make sure she is only taking lasix 20 mg daily and repeat labs in 1 month. They are in the [facility]'s system already.

## 2023-10-05 NOTE — PROGRESS NOTES
Received renewal application for Alectinib, patient is due to have first visit with Dr. Trinity Rahman. Application forwarded to providers office for signature. 10-9-23  Patients renewal application was submitted without standalone prescription due to provider not wanting to sign script. This is due to provider not having seen patient as of yet.  Requested if samples can be obtained to assist patient until renewal application is processed

## 2023-10-05 NOTE — TELEPHONE ENCOUNTER
----- Message from SINTIA MONK MD sent at 10/5/2023  8:12 AM EDT -----  Please let the patient know that most recent lab work in terms of renal parameters are stable, however slightly elevated from 2 weeks ago, continue to make sure that she is only taking 20 mg of Lasix and have her repeat renal function panel in 1 month. Will discuss further at the upcoming visit, let me know if they have any questions or concerns.     Thanks

## 2023-10-05 NOTE — RESULT ENCOUNTER NOTE
Please let the patient know that most recent lab work in terms of renal parameters are stable, however slightly elevated from 2 weeks ago, continue to make sure that she is only taking 20 mg of Lasix and have her repeat renal function panel in 1 month. Will discuss further at the upcoming visit, let me know if they have any questions or concerns.     Thanks

## 2023-10-06 ENCOUNTER — TELEPHONE (OUTPATIENT)
Dept: FAMILY MEDICINE CLINIC | Facility: CLINIC | Age: 78
End: 2023-10-06

## 2023-10-06 ENCOUNTER — PATIENT OUTREACH (OUTPATIENT)
Dept: FAMILY MEDICINE CLINIC | Facility: CLINIC | Age: 78
End: 2023-10-06

## 2023-10-06 NOTE — PROGRESS NOTES
OPAL CHA called pt to follow up with pt and discuss applying for PACENET. The call went to voicemail and OPAL CHA left a message requesting a return call. OPAL CHA will continue to be available for any additional needs as requested.

## 2023-10-06 NOTE — TELEPHONE ENCOUNTER
IEB FORM RECEIVED ON 10/6/2023   GIVEN TO DARRCIK JOHNSTON TO BE COMPLETED, SIGNED BY MD/ (INCLUDE LISC.  NUMBER), AND FAXED BACK IN 3 DAYS

## 2023-10-09 ENCOUNTER — TELEPHONE (OUTPATIENT)
Dept: HEMATOLOGY ONCOLOGY | Facility: CLINIC | Age: 78
End: 2023-10-09

## 2023-10-09 ENCOUNTER — PATIENT OUTREACH (OUTPATIENT)
Dept: FAMILY MEDICINE CLINIC | Facility: CLINIC | Age: 78
End: 2023-10-09

## 2023-10-09 NOTE — TELEPHONE ENCOUNTER
Patient called in regarding her medication Alectinib. She says that the foundation has been trying to get in touch with our office but a return call has not been made. Please f/u with foundation and patient please!

## 2023-10-09 NOTE — PROGRESS NOTES
OPAL CHA called pt to follow up with her regarding Pacenet application. OPAL CHA was able to speak to pt and OPAL CHA inquired when the application was submitted and pt stated that it was mailed in two weeks ago. OPAL CHA and pt agreed that if it was not confirmed to be in by Friday we would complete the application online. OPAL CHA also updated her as to the status of her medication costs. Pt expressed that she wanted to discuss her medications with her provider as she had some concerns. OPAL CHA will continue to be available for any additional needs as requested.

## 2023-10-10 ENCOUNTER — APPOINTMENT (OUTPATIENT)
Dept: PULMONOLOGY | Facility: CLINIC | Age: 78
End: 2023-10-10
Payer: COMMERCIAL

## 2023-10-10 ENCOUNTER — TELEPHONE (OUTPATIENT)
Dept: PULMONOLOGY | Facility: CLINIC | Age: 78
End: 2023-10-10

## 2023-10-10 DIAGNOSIS — C34.91 NON-SMALL CELL CARCINOMA OF LUNG, STAGE 4, RIGHT (HCC): Primary | ICD-10-CM

## 2023-10-10 DIAGNOSIS — C34.91 NON-SMALL CELL CANCER OF RIGHT LUNG (HCC): ICD-10-CM

## 2023-10-10 NOTE — TELEPHONE ENCOUNTER
Patient called in asking if the doctor can order her a full face mask for her cpap. She uses [employer] for her DME company. Please advise.

## 2023-10-11 ENCOUNTER — OFFICE VISIT (OUTPATIENT)
Dept: FAMILY MEDICINE CLINIC | Facility: CLINIC | Age: 78
End: 2023-10-11

## 2023-10-11 VITALS
BODY MASS INDEX: 43.92 KG/M2 | SYSTOLIC BLOOD PRESSURE: 136 MMHG | WEIGHT: 232.6 LBS | RESPIRATION RATE: 22 BRPM | HEIGHT: 61 IN | HEART RATE: 75 BPM | TEMPERATURE: 97.5 F | OXYGEN SATURATION: 94 % | DIASTOLIC BLOOD PRESSURE: 72 MMHG

## 2023-10-11 DIAGNOSIS — I10 ESSENTIAL HYPERTENSION: ICD-10-CM

## 2023-10-11 DIAGNOSIS — Z23 ENCOUNTER FOR IMMUNIZATION: ICD-10-CM

## 2023-10-11 DIAGNOSIS — J45.20 MILD INTERMITTENT ASTHMA WITHOUT COMPLICATION: ICD-10-CM

## 2023-10-11 DIAGNOSIS — I48.3 TYPICAL ATRIAL FLUTTER (HCC): Primary | ICD-10-CM

## 2023-10-11 PROCEDURE — 99213 OFFICE O/P EST LOW 20 MIN: CPT | Performed by: FAMILY MEDICINE

## 2023-10-11 PROCEDURE — 90662 IIV NO PRSV INCREASED AG IM: CPT | Performed by: FAMILY MEDICINE

## 2023-10-11 PROCEDURE — G0008 ADMIN INFLUENZA VIRUS VAC: HCPCS | Performed by: FAMILY MEDICINE

## 2023-10-11 RX ORDER — ALBUTEROL SULFATE 90 UG/1
2 AEROSOL, METERED RESPIRATORY (INHALATION) EVERY 6 HOURS PRN
Qty: 8.5 G | Refills: 2 | Status: SHIPPED | OUTPATIENT
Start: 2023-10-11

## 2023-10-11 NOTE — PROGRESS NOTES
Name: Fanny Mejía      : [birth year]      MRN: [id number]  Encounter Provider: Deb Bird MD  Encounter Date: 10/11/2023   Encounter department: [address] [facility],[address]     1. Typical atrial flutter [facility])  Assessment & Plan:  Currently following with cardiology after being admitted to hospital 2 months ago after A-fib. Home Medications: amiodarone 200 mg qam to maintain sinus rhythm,   Eliquis 5 mg qd for thromboembolic prophylaxis, metoprolol tartate 25 mg qd    -Continue following with cardiology  -F/U with pcp in 3 months   -Educated to go to ED if notices excessive bleeding while on eliquis and educated on adverse effects of medications such as easy bruising         2. Essential hypertension  Assessment & Plan:     /72 mmhg   Follows with cardiology  Home medications: lasix 40 mg qd, lopressor 25 mg qd, ibersartan 300 mg qd    -Continue home meds  -f/u in 3 months       3. Encounter for immunization  -     influenza vaccine, high-dose, PF 0.7 mL (FLUZONE HIGH-DOSE)    4. Mild intermittent asthma without complication  -     albuterol (Ventolin HFA) 90 mcg/act inhaler; Inhale 2 puffs every 6 (six) hours as needed for wheezing           Subjective      Fanny Mejía presents to clinic todayto follow up on her chronic conditions. Patient was hospitalized apx 2 months ago for a fib. She was recently started on amiodarone and eliquis and is currently following with cardiology. Patient reports concern for 2 nonpainful bruises on her left arm and chest. Patient educated that medications such as eliquis can have increased risk for bruising and bleeding. Patient advised to continue following with cardiology and PCP. Flu shot provided at today's encounter       Review of Systems   Constitutional:  Negative for chills and fever. HENT:  Negative for ear pain and sore throat. Eyes:  Negative for pain and visual disturbance.    Respiratory: Negative for cough and shortness of breath. Cardiovascular:  Negative for chest pain and palpitations. Gastrointestinal:  Negative for abdominal pain and vomiting. Genitourinary:  Negative for dysuria and hematuria. Musculoskeletal:  Positive for arthralgias and back pain. Skin:  Negative for color change and rash. Neurological:  Negative for seizures and syncope. All other systems reviewed and are negative. Current Outpatient Medications on File Prior to Visit   Medication Sig    acetaminophen (TYLENOL) 650 mg CR tablet TAKE 1 TABLET (650 MG TOTAL) BY MOUTH EVERY 8 (EIGHT) HOURS AS NEEDED FOR MILD PAIN    albuterol (2.5 mg/3 mL) 0.083 % nebulizer solution Take 3 mL (2.5 mg total) by nebulization 3 (three) times a day    Alectinib HCl 150 MG CAPS Take 4 capsules (600 mg total) by mouth 2 (two) times a day    amiodarone 200 mg tablet Take 1 tablet (200 mg total) by mouth daily with breakfast    apixaban (ELIQUIS) 5 mg Take 1 tablet (5 mg total) by mouth 2 (two) times a day    benralizumab (FASENRA) subcutaneous injection Inject 1 mL (30 mg total) under the skin every 56 days    budesonide (PULMICORT) 0.25 mg/2 mL nebulizer solution Take 2 mL (0.25 mg total) by nebulization 2 (two) times a day Rinse mouth after use.     fexofenadine (ALLEGRA) 180 MG tablet Take 180 mg by mouth daily    fluticasone (FLONASE) 50 mcg/act nasal spray SPRAY 2 SPRAYS INTO EACH NOSTRIL EVERY DAY    furosemide (LASIX) 40 mg tablet Take 1 tablet (40 mg total) by mouth daily (Patient taking differently: Take 20 mg by mouth daily)    ipratropium (ATROVENT) 0.02 % nebulizer solution Take 2.5 mL (0.5 mg total) by nebulization 3 (three) times a day    irbesartan (AVAPRO) 300 mg tablet Take 300 mg by mouth    Klor-Con M20 20 MEQ tablet TAKE 1 TABLET BY MOUTH EVERY DAY    metoprolol tartrate (LOPRESSOR) 25 mg tablet TAKE 1 TABLET (25 MG TOTAL) BY MOUTH EVERY 12 (TWELVE) HOURS    omeprazole (PriLOSEC) 20 mg delayed release capsule TAKE 1 CAPSULE BY MOUTH EVERY DAY    oxyCODONE-acetaminophen (PERCOCET) 5-325 mg per tablet Take 1 tablet by mouth every 4 (four) hours as needed for moderate pain For ongoing pain Max Daily Amount: 6 tablets    rosuvastatin (CRESTOR) 10 MG tablet Take 1 tablet (10 mg total) by mouth daily    [DISCONTINUED] albuterol (Ventolin HFA) 90 mcg/act inhaler Inhale 2 puffs every 6 (six) hours as needed for wheezing    [DISCONTINUED] diclofenac (VOLTAREN) 75 mg EC tablet Take 75 mg by mouth (Patient not taking: Reported on 9/1/2023)       Objective     /72 (BP Location: Left arm, Patient Position: Sitting, Cuff Size: Large)   Pulse 75   Temp 97.5 °F (36.4 °C) (Temporal)   Resp 22   Ht 5' 1" (1.549 m)   Wt 106 kg (232 lb 9.6 oz)   SpO2 94%   BMI 43.95 kg/m²     Physical Exam  Vitals reviewed. Constitutional:       Appearance: She is obese. HENT:      Head: Normocephalic. Nose: Nose normal.   Eyes:      Conjunctiva/sclera: Conjunctivae normal.   Cardiovascular:      Rate and Rhythm: Normal rate and regular rhythm. Pulses: Normal pulses. Pulmonary:      Breath sounds: Normal breath sounds. Abdominal:      General: Bowel sounds are normal.   Musculoskeletal:         General: Normal range of motion. Skin:     General: Skin is warm. Comments: Healing Eccymois present on left arm and on chest    Neurological:      General: No focal deficit present. Mental Status: She is alert.        Aileen Walsh MD

## 2023-10-11 NOTE — ASSESSMENT & PLAN NOTE
/72 mmhg   Follows with cardiology  Home medications: lasix 40 mg qd, lopressor 25 mg qd, ibersartan 300 mg qd    -Continue home meds  -f/u in 3 months

## 2023-10-11 NOTE — ASSESSMENT & PLAN NOTE
Currently following with cardiology after being admitted to hospital 2 months ago after A-fib.    Home Medications: amiodarone 200 mg qam to maintain sinus rhythm,   Eliquis 5 mg qd for thromboembolic prophylaxis, metoprolol tartate 25 mg qd    -Continue following with cardiology  -F/U with pcp in 3 months   -Educated to go to ED if notices excessive bleeding while on eliquis and educated on adverse effects of medications such as easy bruising

## 2023-10-12 ENCOUNTER — CLINICAL SUPPORT (OUTPATIENT)
Dept: PULMONOLOGY | Facility: CLINIC | Age: 78
End: 2023-10-12
Payer: COMMERCIAL

## 2023-10-12 DIAGNOSIS — J45.50 SEVERE PERSISTENT ASTHMA WITHOUT COMPLICATION: Primary | ICD-10-CM

## 2023-10-12 DIAGNOSIS — I50.9 CONGESTIVE HEART FAILURE, UNSPECIFIED HF CHRONICITY, UNSPECIFIED HEART FAILURE TYPE (HCC): ICD-10-CM

## 2023-10-12 PROCEDURE — G0239 OTH RESP PROC, GROUP: HCPCS

## 2023-10-12 RX ORDER — FUROSEMIDE 20 MG/1
20 TABLET ORAL DAILY
Qty: 90 TABLET | Refills: 3 | Status: SHIPPED | OUTPATIENT
Start: 2023-10-12

## 2023-10-13 ENCOUNTER — PATIENT OUTREACH (OUTPATIENT)
Dept: FAMILY MEDICINE CLINIC | Facility: CLINIC | Age: 78
End: 2023-10-13

## 2023-10-13 NOTE — PROGRESS NOTES
[employer] called [employer] to follow up on pt's application. [employer] [facility] was made aware that pt was approved on 10/10/2023 and they mailed her card out on that day as well. [employer] was made aware that her ID number is [id number] and her copay would be $8 for generic and $15 for brand name. [employer] called pt and provided her update that she was approved. Pt reported that she would like to keep her current insurance as she wants to keep her doctors. [employer] inquired if pt had any other needs at this time and she denied needing anything else. [employer] emailed pt information for [employer] to [email or web address]. [employer] encouraged pt to call [employer] if she had any other needs. [employer] will close referral at this time as they are no other needs at this time. [employer] will be available for any additional needs as requested.

## 2023-10-17 ENCOUNTER — CLINICAL SUPPORT (OUTPATIENT)
Dept: PULMONOLOGY | Facility: CLINIC | Age: 78
End: 2023-10-17
Payer: COMMERCIAL

## 2023-10-17 DIAGNOSIS — J45.50 SEVERE PERSISTENT ASTHMA WITHOUT COMPLICATION: Primary | ICD-10-CM

## 2023-10-17 DIAGNOSIS — C34.91 NON-SMALL CELL CANCER OF RIGHT LUNG (HCC): ICD-10-CM

## 2023-10-17 DIAGNOSIS — G47.33 OSA (OBSTRUCTIVE SLEEP APNEA): Primary | ICD-10-CM

## 2023-10-17 DIAGNOSIS — C34.91 NON-SMALL CELL CARCINOMA OF LUNG, STAGE 4, RIGHT (HCC): ICD-10-CM

## 2023-10-17 PROCEDURE — G0239 OTH RESP PROC, GROUP: HCPCS

## 2023-10-19 ENCOUNTER — CLINICAL SUPPORT (OUTPATIENT)
Dept: PULMONOLOGY | Facility: CLINIC | Age: 78
End: 2023-10-19
Payer: COMMERCIAL

## 2023-10-19 DIAGNOSIS — J45.50 SEVERE PERSISTENT ASTHMA WITHOUT COMPLICATION: Primary | ICD-10-CM

## 2023-10-19 PROCEDURE — G0239 OTH RESP PROC, GROUP: HCPCS

## 2023-10-20 ENCOUNTER — CLINICAL SUPPORT (OUTPATIENT)
Dept: CARDIOLOGY CLINIC | Facility: CLINIC | Age: 78
End: 2023-10-20
Payer: COMMERCIAL

## 2023-10-20 DIAGNOSIS — I48.3 TYPICAL ATRIAL FLUTTER (HCC): ICD-10-CM

## 2023-10-20 LAB

## 2023-10-20 PROCEDURE — 93228 REMOTE 30 DAY ECG REV/REPORT: CPT | Performed by: INTERNAL MEDICINE

## 2023-10-23 LAB
DME PARACHUTE DELIVERY DATE ACTUAL: NORMAL
DME PARACHUTE DELIVERY DATE REQUESTED: NORMAL
DME PARACHUTE DELIVERY NOTE: NORMAL
DME PARACHUTE ITEM DESCRIPTION: NORMAL
DME PARACHUTE ORDER STATUS: NORMAL
DME PARACHUTE SUPPLIER NAME: NORMAL
DME PARACHUTE SUPPLIER PHONE: NORMAL

## 2023-10-24 ENCOUNTER — CLINICAL SUPPORT (OUTPATIENT)
Dept: PULMONOLOGY | Facility: CLINIC | Age: 78
End: 2023-10-24
Payer: COMMERCIAL

## 2023-10-24 DIAGNOSIS — J45.50 SEVERE PERSISTENT ASTHMA WITHOUT COMPLICATION: Primary | ICD-10-CM

## 2023-10-24 PROCEDURE — G0239 OTH RESP PROC, GROUP: HCPCS

## 2023-10-26 ENCOUNTER — CLINICAL SUPPORT (OUTPATIENT)
Dept: PULMONOLOGY | Facility: CLINIC | Age: 78
End: 2023-10-26
Payer: COMMERCIAL

## 2023-10-26 DIAGNOSIS — J45.50 SEVERE PERSISTENT ASTHMA WITHOUT COMPLICATION: Primary | ICD-10-CM

## 2023-10-26 PROCEDURE — G0239 OTH RESP PROC, GROUP: HCPCS

## 2023-10-26 NOTE — PROGRESS NOTES
Pulmonary Rehabilitation Plan of Care   60 Day Reassessment      Today's date: 10/26/2023   # of Exercise Sessions Completed: ***  Patient name: Benjamin Sinha      : [birth year]  Age: 70 y.o. MRN: [id number]  Referring Physician: Bry Jasso PA-C  Pulmonologist: Bryson Quintero MD  Provider: [facility]  Clinician: Odessa Moritz, MS, CEP    Dx:    No diagnosis found. Date of onset: 2023      SUMMARY OF PROGRESS:  Rosibel Norwood is mostly compliant attending pulmonary rehab exercise sessions 2x/wk having attended 5 sessions in the past 30 days. She tolerates 38 mins at 2.0-2.2 METs on room air. The following chair exercises have been added to her program: sit to stands, seated leg lifts, and standing calf raises. Resting SpO2 94 - 99% with maintained O2 saturation during exercise 91 - 94%. Resting //82. RHR 61 - 72,  ExHR 76 - 87. Rosibel Norwood was wearing a Biotel continuous heart monitoring patch for 1 week on her skin ordered by Dr. Susan Duval due to her hx of atrial flutter and is awaiting the results. *** She remains compliant with amiodarone and eliquis but still experiences fluttering mostly at night. Rosibel Norwood reports some SOB with exercise 0-3/10 but states her main limitation is overall leg weakness and fatigue. She continues to do her activities around the house at her own pace, requiring multiple rest breaks. She does admit that sometimes things just don't get done because she cannot do them, which sometimes frustrates her. Depression and anxiety screening using the PHQ-9/AL-7 was not reassessed due to initial low scores of 5 and 3. When addressed, the patient denies feelings of depression but reports some anxiety relating to fear of SOB but hopes this gets better over time. Rosibel Norwood has not made any dietary changes but has a goal to lose weight.***Patient attends group educational classes on pulmonary disease.  Pursed lipped breathing and Diaphragmatic breathing continues to be demonstrated, practiced, and reinforced with the patient. Her exercise program will be progressed as tolerated. The patient has the following personal goals she hopes to achieve by discharge: weight loss and decrease fear related to SOB. Pt will continue to be educated on lifestyle modifications and encouraged to supplement with a home exercise program to reach her goals.      Medication compliance: Yes   Comments: Pt reports to be compliant with medications    Fall Risk: Moderate   Comments: Patient uses walking assist device (walker/cane/rollator), Denies a fall in the past 6 months and reports unsteadiness on feet but sits to avoid falls    Smoking: Former user  Smoked 0.5ppd for 10 years, quit 30 years ago    RPD at Rest: 0-210  RPD with Exercise:  0-310    Assessment of progression of lung disease and functional status:  CAT: 1840 initial  Shortness of breath questionnaire: 70/120 initial      EXERCISE ASSESSMENT and PLAN    Current Exercise Program in Rehab:       Frequency: 2 days/week   Supplement with home exercise 2+ days/wk as tolerated        Minutes: 38        METS: 2.0-2.2              SpO2: 91-94%              RPD: 0-3                      HR: 76-87   RPE: 4-5         Modalities: UBE, NuStep and Room walking      Exercise Progression 30 Day Goals :    Frequency: 2 days/week    Supplement with home exercise 2+ days/wk as tolerated       Minutes: 40         METS: 2.2-3.0              SpO2: >88%              RPD: 4-6                      HR: RHR+30bpm   RPE: 4-5        Modalities: UBE, NuStep, Recumbent bike and Room walking     Strength trainin-3 days / week  12-15 repetitions  1-2 sets per modality    Modalities: Sit to Stands, Calf Raises, and seated leg lifts    Oxygen needs:   Rest:  room air  Exercise/physical activity:  room air  Sleep:   room air and CPAP/BiPap    Oxygen Goal: Maintain SpO2>88% during exercise    Home Exercise: none  Education: pursed lipped breathing, benefits of exercise for pulmonary disease, RPD scale, O2 saturation monitoring, and appropriate O2 response to exercise    Goals: reduced score on  [employer]CD Shortness of Breath Questionnaire, Improved 6MW distance by 10%, reduced dyspnea during exercise , improved exercise tolerance (max METs tolerated in pulmonary rehab), SpO2 >88% during exercise, reduced score on CAT, attend pulmonary rehab regularly, decrease sitting time at home, start a walking program, reduced pulmonary related hospital readmissions , decreased rest needed with physical activity, increased muscular strength, increased energy, increased stamina with ADLs and demonstrate proper pursed lipped breathing  Progressing:  Pt is progressing and showing improvement  toward the following goals:  attends regularly 2x/week; increased METs tolerated in rehab, increased duration. , Patient will begin home walking in the next 30 days, Will continue to educate and progress as tolerated. Plan: learn to conserve energy with ADLs , practice diaphragmatic breathing, reduce time sitting at home, increased strength of respiratory muscles, utilize PLB with physical activity and begin a home exercise program     Readiness to change: Action:  (Changing behavior)      NUTRITION ASSESSMENT AND PLAN    Weight control:    Starting weight: 231.6 lb   Current weight: *** lb    Goals: Increase water intake to reduce/thin mucus production eat less CO2 producing foods reduced body weight Improve hydration  Education: low sodium diet  hydration  Progressing:Pt has not made progress toward the following goals: no dietary changes made; gained 3lbs since initial evaluation. , Will continue to educate and progress as tolerated.   Plan: Education Class: Heart Healthy Eating, replace refined grain bread with whole grain bread, avoid processed foods, drink more water and learn how to read food labels  Readiness to change: Preparation:  (Getting ready to change)       PSYCHOSOCIAL ASSESSMENT AND PLAN    Emotional:  Depression assessment:  PHQ-9 = 5 5-9 = Mild Depression            Anxiety measure:  AL-7 = 3 0-4  = Not anxious  Self-reported stress level: 4   Social support: Patient reports excellent emotional/social support from family    Goals:  Reduce perceived stress to 1-3/10, improved [facility] QOL < 27, PHQ-9 - reduced severity by one level, Physical Fitness in [facility] Score < 3, Daily Activity in [facility] Score < 3, Social Activities in [facility] Score < 3, Pain in [facility] Score < 3, Overall Health in [facility] Score < 3, Quality of Life in [facility] Score < 3 , Change in Health in [facility] Score < 3 , Increased interest in doing things, improved sleep, increased energy and reduced anxiety/fear of becoming SOB  Education: stress management techniques and class:  Stress and Pulmonary Disease    Progressing:Pt is progressing and showing improvement  toward the following goals:  no repeat PHQ9/GAD7 completed due to initial low scores; has some fear associated with SOB with not significant; was educated on Stress and Your Health with pulmonary disease.  , Will continue to educate and progress as tolerated.   Plan: Practice relaxation techniques, Exercise, Enjoy a hobby, Return to previous social activity, and Avoid triggers  Readiness to change: Action:  (Changing behavior)      OTHER CORE COMPONENTS     Tobacco:   Social History     Tobacco Use   Smoking Status Former    Packs/day: 0.25    Years: 25.00    Total pack years: 6.25    Types: Cigarettes    Start date: 48    Quit date:     Years since quittin.8   Smokeless Tobacco Former       Tobacco Use Intervention: Referral to tobacco expert:   Pt quit 30 years ago   and has abstained    Blood pressure:    Restin//82    Goals: consistent BP < 130/80, moderate intensity exercise >150 mins/wk and medication compliance  Education:  pathophysiology of pulmonary disease, control coughing, inspiratory muscle training, environmental triggers, nebulizer use, and education: Pulmonary Anatomy and Physiology  Progressing:Pt is progressing and showing improvement  toward the following goals:  normal resting BP; takes meds. , Patient will begin home walking  in the next 30 days, Will continue to educate and progress as tolerated.   Plan: engage in regular exercise, monitor home BP, and class:  Pulmonary Medications  Readiness to change: Action:  (Changing behavior)

## 2023-10-26 NOTE — PROGRESS NOTES
Pulmonary Rehabilitation Plan of Care   62 Day Reassessment      Today's date: 10/26/2023   # of Exercise Sessions Completed: 11  Patient name: Chandrika Potter      : [id number]  Age: 70 y.o. MRN: [id number]  Referring Physician: Vijay Lou PA-C  Pulmonologist: Vega Browning MD  Provider: [facility]  Clinician: Todd Wall MS, CEP    Dx:    Encounter Diagnosis   Name Primary? Severe persistent asthma without complication Yes       Date of onset: 2023      SUMMARY OF PROGRESS:  Merry Wilson is mostly compliant attending pulmonary rehab exercise sessions 2x/wk having attended 5 sessions in the past 30 days. She tolerates 38-40 mins at 2.0-2.2 METs on room air. The following chair exercises have been added to her program: sit to stands, seated leg lifts, and standing calf raises. Resting SpO2 94 - 99% with maintained O2 saturation during exercise 91 - 94%. Resting //82. RHR 61 - 72,  ExHR 76 - 87. Merry Wilson was wearing a Soleil Insulation continuous heart monitoring patch for 1 week ordered by Dr. Bart Del Castillo due to her hx of atrial flutter and is awaiting the results. She reports still feeling "fluttering", like someone scared her, at night when laying down. She remains compliant with amiodarone and Eliquis but was concerned about her bruising from the Eliquis. She was advised of the side effects, with easy bruising being one of them. Merry Wilson reports some SOB with exercise 0-3/10 but states her main limitation is overall leg weakness and fatigue. Merry Wilson reports not having her chemo drug, Alectinib, for 4 days and states she did not experience any muscle pain during this time. She received that medication in the mail yesterday and is back on schedule. She continues to do her activities around the house at her own pace, requiring multiple rest breaks. She does admit that sometimes things just don't get done because she cannot do them, which sometimes frustrates her.  She has begun doing her chair exercises at home on her own, a few times a day. Depression and anxiety screening using the PHQ-9/AL-7 was not reassessed due to initial low scores of 5 and 3. When addressed, the patient denies feelings of depression but reports some anxiety relating to fear of SOB but hopes this gets better over time. Eloy Guerra has not made any dietary changes but has continued watching her sodium content. She has maintained her weight of 231lbs. Patient attends group educational classes on pulmonary disease. Pursed lipped breathing and Diaphragmatic breathing continues to be demonstrated, practiced, and reinforced with the patient. Her exercise program will be progressed as tolerated. The patient has the following personal goals she hopes to achieve by discharge: weight loss and decrease fear related to SOB. Pt will continue to be educated on lifestyle modifications and encouraged to supplement with a home exercise program to reach her goals.      Medication compliance: Yes   Comments: Pt reports to be compliant with medications    Fall Risk: Moderate   Comments: Patient uses walking assist device (walker/cane/rollator), Denies a fall in the past 6 months and reports unsteadiness on feet but sits to avoid falls    Smoking: Former user  Smoked 0.5ppd for 10 years, quit 30 years ago    RPD at Rest: 0-2/10  RPD with Exercise:  0-3/10    Assessment of progression of lung disease and functional status:  CAT: 18/40 initial  Shortness of breath questionnaire: 70/120 initial      EXERCISE ASSESSMENT and PLAN    Current Exercise Program in Rehab:       Frequency: 2 days/week   Supplement with home exercise 2+ days/wk as tolerated        Minutes: 38-40        METS: 2.0-2.2              SpO2: 91-94%              RPD: 0-3                      HR: 76-87   RPE: 4-5         Modalities: UBE, NuStep and Room walking      Exercise Progression 30 Day Goals :    Frequency: 2 days/week    Supplement with home exercise 2+ days/wk as tolerated Minutes: 40-45         METS: 2.2-3.0              SpO2: >88%              RPD: 4-6                      HR: RHR+30bpm   RPE: 4-5        Modalities: UBE, NuStep, Recumbent bike and Room walking     Strength trainin-3 days / week  12-15 repetitions  1-2 sets per modality    Modalities: Sit to Stands, Calf Raises, and seated leg lifts    Oxygen needs:   Rest:  room air  Exercise/physical activity:  room air  Sleep:   room air and CPAP/BiPap    Oxygen Goal: Maintain SpO2>88% during exercise    Home Exercise: none- but began completing her chair exercises daily at home on her own  Education: pursed lipped breathing, benefits of exercise for pulmonary disease, RPD scale, O2 saturation monitoring, and appropriate O2 response to exercise    Goals: reduced score on  [facility] Shortness of Breath Questionnaire, Improved 6MW distance by 10%, reduced dyspnea during exercise , improved exercise tolerance (max METs tolerated in pulmonary rehab), SpO2 >88% during exercise, reduced score on CAT, attend pulmonary rehab regularly, decrease sitting time at home, start a walking program, reduced pulmonary related hospital readmissions , decreased rest needed with physical activity, increased muscular strength, increased energy, increased stamina with ADLs and demonstrate proper pursed lipped breathing  Progressing:  Pt is progressing and showing improvement  toward the following goals:  attends regularly 2x/week; increased METs tolerated in rehab, increased duration; began doing her chair exercises on her own.  , Patient will begin home walking in the next 30 days, Will continue to educate and progress as tolerated.     Plan: learn to conserve energy with ADLs , practice diaphragmatic breathing, reduce time sitting at home, increased strength of respiratory muscles, utilize PLB with physical activity and begin a home exercise program     Readiness to change: Action:  (Changing behavior)      NUTRITION ASSESSMENT AND PLAN    Weight control:    Starting weight: 231.6 lb   Current weight: 231.8 lb    Goals: Increase water intake to reduce/thin mucus production eat less CO2 producing foods reduced body weight Improve hydration  Education: low sodium diet  hydration  wt. loss   portion control  education class: healthy eating for managing pulmonary illness  Progressing:Pt is progressing and showing improvement  toward the following goals:  was educated on healthy eating and controlling her portions related to serving size.  , Pt has not made progress toward the following goals: no weight loss noted- maintained weight of 231lbs. , Will continue to educate and progress as tolerated.   Plan: Education Class: Heart Healthy Eating, replace refined grain bread with whole grain bread, avoid processed foods, drink more water and learn how to read food labels  Readiness to change: Action:  (Changing behavior)      PSYCHOSOCIAL ASSESSMENT AND PLAN    Emotional:  Depression assessment:  PHQ-9 = 5 5-9 = Mild Depression            Anxiety measure:  AL-7 = 3 0-4  = Not anxious  Self-reported stress level: 4   Social support: Patient reports excellent emotional/social support from family    Goals:  Reduce perceived stress to 1-3/10, improved [facility] QOL < 27, PHQ-9 - reduced severity by one level, Physical Fitness in [facility] Score < 3, Daily Activity in [facility] Score < 3, Social Activities in [facility] Score < 3, Pain in [facility] Score < 3, Overall Health in [facility] Score < 3, Quality of Life in [facility] Score < 3 , Change in Health in [facility] Score < 3 , Increased interest in doing things, improved sleep, increased energy and reduced anxiety/fear of becoming SOB  Education: stress management techniques and class:  Stress and Pulmonary Disease    Progressing:Pt is progressing and showing improvement  toward the following goals:  no repeat PHQ9/GAD7 completed due to initial low scores; has some fear associated with SOB with not significant; was educated on Stress and Your Health with pulmonary disease.  , Will continue to educate and progress as tolerated. Plan: Practice relaxation techniques, Exercise, Enjoy a hobby, Return to previous social activity, and Avoid triggers  Readiness to change: Action:  (Changing behavior)      OTHER CORE COMPONENTS     Tobacco:   Social History     Tobacco Use   Smoking Status Former    Packs/day: 0.25    Years: 25.00    Total pack years: 6.25    Types: Cigarettes    Start date: 48    Quit date:     Years since quittin.8   Smokeless Tobacco Former       Tobacco Use Intervention: Referral to tobacco expert:   Pt quit 30 years ago   and has abstained    Blood pressure:    Restin//82    Goals: consistent BP < 130/80, moderate intensity exercise >150 mins/wk and medication compliance  Education:  pathophysiology of pulmonary disease, control coughing, inspiratory muscle training, environmental triggers, nebulizer use, and education: Pulmonary Anatomy and Physiology  Progressing:Pt is progressing and showing improvement  toward the following goals:  normal resting BP; takes meds. , Patient will begin home walking  in the next 30 days, Will continue to educate and progress as tolerated.   Plan: engage in regular exercise, monitor home BP, and class:  Pulmonary Medications  Readiness to change: Action:  (Changing behavior)

## 2023-10-29 PROBLEM — C34.90: Status: RESOLVED | Noted: 2023-05-02 | Resolved: 2023-10-29

## 2023-10-29 NOTE — PROGRESS NOTES
Hematology/Oncology Outpatient Office Note    Date of Service: 2023    [facility]  [phone]    Reason for Consultation:   Chief Complaint   Patient presents with    Follow-up       Cancer Stage at diagnosis: IVB    Referral Physician: No ref. provider found    Primary Care Physician:  Pily Anthony DO     Nickname: '[id number] [facility]'    Lives alone    Original ECO    Today's ECO (uses a cane; up and about >50% of the day)    Goals and Barriers:  Current Goal: Minimize effects of disease burden, extend life. Barriers to accomplishing this: chronic lower back pain    Patient's Capacity to Self Care:  Patient is able to self care      ASSESSMENT & PLAN      Diagnosis ICD-10-CM Associated Orders   1. Non-small cell carcinoma of lung, stage 4, right (HCC)  C34.91 Ambulatory referral to Palliative Care     CT chest abdomen pelvis wo contrast     CBC and differential     Comprehensive metabolic panel      2. Malignant neoplasm metastatic to bone (HCC)  C79.51       3. Non-small cell lung cancer, unspecified laterality ([address])  C34.90             This is a 70 y.o. c PMHx notable for A-fib, remote nicotine abuse, asthma, COPD, MONO s/p CPAP, GERD, HTN, glaucoma, being seen in consultation for metastatic ALK+ Lung cancer       The patient was diagnosed 2020 incidentally with a right hilar mass and diffuse osseous metastasis. She was started on alectinib with near complete resolution of the lung mass, stable bone mets. She has had bilateral lower extremity swelling while on treatment (noted in up to 30% of these patients). She gets Zometa every 3 months.       ALK-EML4 translocation           Discussion of decision making  Oncology history updated, accordingly, during this visit  Goals of care/patient communication  I discussed with the patient the clinical course leading up to their cancer diagnosis. I reviewed relevant office notes, imaging reports and pathology result as well. I told the patient that this is a case of incurable disease and what this means. We discussed that the goal of anti-cancer therapy is to provide best quality of life, extend overall survival, and progression free survival as shown in clinical trials. We also discussed that there might be a point when the cancer will no longer respond to this anti-neoplastic therapy. As a result, we also discussed the role of the palliative care team being introduced early in the treatment course. We will be making this referral  I explained the risks/benefits of the proposed cancer therapy: Alectinib and after discussion including understanding risks of possible life-threatening complications and therapy-related malignancy development, informed consent for blood products and treatment has been signed and obtained. TNM/Staging At Diagnosis  Cancer Staging   IVB  Disease Features/Tumor Markers/Genetics  Tumor Marker: n.a  Notable Path Features:   8/20/2020 Lymph Node, Level 4R  ( ThinPrep, smear preparations and cell block ):  Conclusive evidence of malignancy. Non-small cell carcinoma, compatible with lung origin. -  Immunohistochemical stains performed with appropriate controls show the tumor cells to be positive for TTF1 and napsin with partial positivity for CK5/6 and p40 and negative for synaptophysin, and chromogranin, supporting the diagnosis  Treatment  Other Supportive care:   Treatment Team Members  Surgeon  Ross Aquino  8/10/2020 PET/CT: Dominant right upper perihilar lung nodule with soft tissue extending to the right hilum along the right mainstem bronchus, and mediastinum, demonstrates intense FDG activity, most compatible with malignancy. Tissue sampling recommended. Several hypermetabolic osseous lesions most compatible with metastases.  Sub-centimeter right upper lung nodular densities that are too small for accurate PET evaluation. 4/27/2023 CT CAP w/o c: Chest: near CR  Examination is limited by respiratory motion. Nothing to suggest adenocarcinoma recurrence. Abdomen and pelvis:  No metastases, within the confines of an examination limited without contrast.   Osseous:  Stable diffuse sclerotic metastatic disease. No new pathologic fracture. Unchanged healing pathologic bilateral rib fractures. 11/2/2023 CT CAP w/o c: No findings of recurrent disease in the chest. Stable numerous sclerotic metastatic lesions throughout the thoracolumbar spine and bony pelvis, in keeping with patient's treated metastases. No metastatic disease in the abdomen or pelvis    Discussion of decision making    I personally reviewed the following lab results, the image studies, pathology, other specialty/physicians consult notes and recommendations, and outside medical records. I had a lengthy discussion with the patient and shared the work-up findings. We discussed the diagnosis and management plan as below. I spent 42 minutes reviewing the records (labs, clinician notes, outside records, medical history, ordering medicine/tests/procedures, interpreting the imaging/labs previously done) and coordination of care as well as direct time with the patient today, of which greater than 50% of the time was spent in counseling and coordination of care with the patient/family. Plan/Labs  Cont Zometa q12 weeks  Cont Alectinib 600mg BID   Consider Palliative care referral if POD  Guardant NGS if POD  Restaging CT CAP w/o c ordered in 4 months  CBC, CMP in 4 months         Follow Up: 4 months    All questions were answered to the patient's satisfaction during this encounter. The patient knows the contact information for our office and knows to reach out for any relevant concerns related to this encounter.  They are to call for any temperature 100.4 or higher, new symptoms including but not restricted to shaking chills, decreased appetite, nausea, vomiting, diarrhea, increased fatigue, shortness of breath or chest pain, confusion, and not feeling the strength to come to the clinic. For all other listed problems and medical diagnosis in their chart - they are managed by PCP and/or other specialists, which the patient acknowledges. Thank you very much for your consultation and making us a part of this patient's care. We are continuing to follow closely with you. Please do not hesitate to reach out to me with any additional questions or concerns. Ryan Sanchez MD  Hematology & Medical Oncology Staff Physician             Disclaimer: This document was prepared using [employer] technology. If a word or phrase is confusing, or does not make sense, this is likely due to recognition error which was not discovered during this clinician's review. If you believe an error has occurred, please contact me through [address] [facility] line for felton? cation. ONCOLOGY HISTORY OF PRESENT ILLNESS        Oncology History Overview Note   70year old female with stage IV non-small cell right upper lung. She has evidence of bone metastases particular T8 although not symptomatic may be at risk for neurologic problems, palliative radiation was recommended. She completed a course of palliative radiation therapy 30 Gy in 10 treatments to prevent neurologic complication on 9/44/15. Today is her first telephone follow-up    9/17/20 Dr. Leodan Gauthier starting Alectinib 600 mg b.i.d.    10/29/20 Dr. Keron Lindsey follow-up  11/11/20 Pulmonology follow-up         Non-small cell lung cancer ([address])   8/20/2020 Biopsy    FNA Level 4R  NSCC    RUL brushing: Conclusive evidence of malignancy. Non small cell carcinoma.      Level 4R tissue sampling     8/31/2020 Initial Diagnosis    Non-small cell cancer of right lung ([address])     9/8/2020 - 9/21/2020 Radiation        Treatments:  Course: C1    Plan ID Energy Fractions Dose per Fraction (cGy) Dose Correction (cGy) Total Dose Delivered (cGy) Elapsed Days   T7-T9 Spine 10X 10 / 10 300 0 3,000 13      Treatment dates:  2020 - 2020. 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 Chronic constipation. I have reviewed the relevant past medical, surgical, social and family history. I have also reviewed allergies and medications for this patient. Review of Systems    Baseline weight: 235 lbs    She has lost 10 lbs since  since her son  from Legionellae's disease as she doesn't cook as much anymore with him not living with her. Denies F/C, N/V, CP, LH, HA, rash, itching, gen weakness, melena, hematuria, hematochezia, falls, diarrhea. A 10-point review of system was performed, pertinent positive and negative were detailed as above. Otherwise, the 10-point review of system was negative.     Past Medical History:   Diagnosis Date    Allergic rhinitis     Anemia     Asthma     Atrial fibrillation (HCC)     Chronic bronchitis (HCC)     COPD (chronic obstructive pulmonary disease) (HCC)     Coronary artery disease     CPAP (continuous positive airway pressure) dependence     Degenerative joint disease     GERD (gastroesophageal reflux disease)     Glaucoma     Hypertension     Lumbar disc disease     Lung cancer ([address])     Periodic heart flutter     Pneumonia     Sleep apnea     suspected     Sleep apnea, obstructive     Ventricular arrhythmia     Vitamin D deficiency        Past Surgical History:   Procedure Laterality Date    APPENDECTOMY      COLON SURGERY      COLONOSCOPY N/A 2019    Procedure: COLONOSCOPY;  Surgeon: Ruperto Heimlich, DO;  Location: [facility]; Service: Gastroenterology    ESOPHAGOGASTRODUODENOSCOPY N/A 2019    Procedure: ESOPHAGOGASTRODUODENOSCOPY (EGD); Surgeon: Ruperto Heimlich, DO;  Location: [facility];   Service: Gastroenterology    KNEE SURGERY      LUNG BIOPSY      ROTATOR CUFF REPAIR      SHOULDER SURGERY         Family History   Problem Relation Age of Onset    Stroke Mother     Diabetes Mother     Hyperlipidemia Mother     Hypertension Mother     Allergies Mother         Environmental    Asthma Mother     Diabetes Father     Hyperlipidemia Father     Hypertension Father     Lung cancer Father 79    Allergies Father         Environmental    Asthma Father     Lymphoma Sister 71    Heart disease Sister         Pacemaker    Asthma Brother     Aneurysm Brother         Brain - had surgery    Other Brother         Lymes disease    Coronary artery disease Daughter         2 bypass done    No Known Problems Daughter     No Known Problems Daughter     No Known Problems Son     Kidney disease Son     Liver disease Son     Obesity Son        Social History     Socioeconomic History    Marital status: Single     Spouse name: Not on file    Number of children: 5    Years of education: Not on file    Highest education level: Not on file   Occupational History    Not on file   Tobacco Use    Smoking status: Former     Packs/day: 0.25     Years: 25.00     Total pack years: 6.25     Types: Cigarettes     Start date:      Quit date:      Years since quittin.8    Smokeless tobacco: Former   Vaping Use    Vaping Use: Never used   Substance and Sexual Activity    Alcohol use: Yes     Comment: occasionally    Drug use: Never    Sexual activity: Not on file   Other Topics Concern    Not on file   Social History Narrative    Who lives in your home: son    What type of home do you live in: Single house    Age of your home: 82 yrs old    How long have you been living there: 30 years    Type of heat: Forced hot air    Type of fuel: Gas    What type of rome is in your bedroom: Hardwood floor    Do you have the following in or near your home:    Air products: Window air conditioning and Air     Pests: None    Pets: 1 Cat    Are pets allowed in bedroom: No    Open fields, wooded areas nearby: Open fields and Wooded areas    Basement: Damp at times and Unfinished with cracks in cement    Exposure to second hand smoke: No        Habits:    Caffeine: Current; Amount: 1 cups/day coffee, #years 60    Chocolate: occasionally    Other:              Social Determinants of Health     Financial Resource Strain: High Risk (4/27/2023)    Overall Financial Resource Strain (CARDIA)     Difficulty of Paying Living Expenses: Hard   Food Insecurity: No Food Insecurity (8/16/2023)    Hunger Vital Sign     Worried About Running Out of Food in the Last Year: Never true     Ran Out of Food in the Last Year: Never true   Transportation Needs: No Transportation Needs (8/16/2023)    PRAPARE - Transportation     Lack of Transportation (Medical): No     Lack of Transportation (Non-Medical):  No   Physical Activity: Inactive (11/15/2022)    Exercise Vital Sign     Days of Exercise per Week: 0 days     Minutes of Exercise per Session: 0 min   Stress: Not on file   Social Connections: Not on file   Intimate Partner Violence: Not on file   Housing Stability: Low Risk  (8/16/2023)    Housing Stability Vital Sign     Unable to Pay for Housing in the Last Year: No     Number of Places Lived in the Last Year: 1     Unstable Housing in the Last Year: No       No Known Allergies    Current Outpatient Medications   Medication Sig Dispense Refill    acetaminophen (TYLENOL) 650 mg CR tablet TAKE 1 TABLET (650 MG TOTAL) BY MOUTH EVERY 8 (EIGHT) HOURS AS NEEDED FOR MILD PAIN albuterol (2.5 mg/3 mL) 0.083 % nebulizer solution Take 3 mL (2.5 mg total) by nebulization 3 (three) times a day 810 mL 2    albuterol (Ventolin HFA) 90 mcg/act inhaler Inhale 2 puffs every 6 (six) hours as needed for wheezing 8.5 g 2    Alectinib HCl 150 MG CAPS Take 4 capsules (600 mg total) by mouth 2 (two) times a day 240 capsule 5    benralizumab (FASENRA) subcutaneous injection Inject 1 mL (30 mg total) under the skin every 56 days 1 mL 6    budesonide (PULMICORT) 0.25 mg/2 mL nebulizer solution Take 2 mL (0.25 mg total) by nebulization 2 (two) times a day Rinse mouth after use. 360 mL 2    fexofenadine (ALLEGRA) 180 MG tablet Take 180 mg by mouth daily      fluticasone (FLONASE) 50 mcg/act nasal spray SPRAY 2 SPRAYS INTO EACH NOSTRIL EVERY DAY 48 mL 1    furosemide (LASIX) 20 mg tablet Take 1 tablet (20 mg total) by mouth daily 90 tablet 3    ipratropium (ATROVENT) 0.02 % nebulizer solution Take 2.5 mL (0.5 mg total) by nebulization 3 (three) times a day 225 mL 2    Klor-Con M20 20 MEQ tablet TAKE 1 TABLET BY MOUTH EVERY DAY 90 tablet 1    metoprolol tartrate (LOPRESSOR) 25 mg tablet TAKE 1 TABLET (25 MG TOTAL) BY MOUTH EVERY 12 (TWELVE) HOURS 180 tablet 1    omeprazole (PriLOSEC) 20 mg delayed release capsule TAKE 1 CAPSULE BY MOUTH EVERY DAY 90 capsule 1    oxyCODONE-acetaminophen (PERCOCET) 5-325 mg per tablet Take 1 tablet by mouth every 4 (four) hours as needed for moderate pain For ongoing pain Max Daily Amount: 6 tablets 60 tablet 0    amiodarone 200 mg tablet Take 1 tablet (200 mg total) by mouth daily with breakfast 30 tablet 11    apixaban (ELIQUIS) 5 mg Take 1 tablet (5 mg total) by mouth 2 (two) times a day 60 tablet 11    irbesartan (AVAPRO) 300 mg tablet Take 300 mg by mouth      rosuvastatin (CRESTOR) 10 MG tablet Take 1 tablet (10 mg total) by mouth daily 90 tablet 3     No current facility-administered medications for this visit.        (Not in a hospital admission)      Objective:     24 Hour Vitals Assessment:     Vitals:    11/08/23 1334   BP: 124/80   Pulse: 76   Resp: 18   Temp: (!) 96.9 °F (36.1 °C)   SpO2: 96%       PHYSICIAN EXAM     General: Appearance: alert, cooperative, no distress. HEENT: Normocephalic, atraumatic. No scleral icterus. conjunctivae clear. EOMI. Chest: No tenderness to palpation. No open wound noted. Lungs: Clear to auscultation bilaterally, Respirations unlabored. Cardiac: Regular rate and rhythm, +S1and S2  Abdomen: Soft, non-tender, non-distended. Bowel sounds are normal.  Extremities:  No edema, cyanosis, clubbing. Skin: Skin color, turgor are normal. No rashes. Lymphatics: no palpable supra-cervical, axillary, or inguinal adenopathy  Neurologic: Awake, Alert, and oriented, no gross focal deficits noted b/l. DATA REVIEW:    Pathology Result:    Final Diagnosis   Date Value Ref Range Status   08/20/2020   Final    A. Lung, Right Upper Lobe Bronchial Brushing, :  Conclusive evidence of malignancy. Non small cell carcinoma. Satisfactory for evaluation. Note:  Correlate with concurrent case [id number].     08/20/2020   Final    A.B.C. Lymph Node, Level 4R  ( ThinPrep, smear preparations and cell block ):  Conclusive evidence of malignancy. Non-small cell carcinoma, compatible with lung origin. -  Immunohistochemical stains performed with appropriate controls show the tumor cells to be positive for TTF1 and napsin with partial positivity for CK5/6 and p40 and negative for synaptophysin, and chromogranin, supporting the diagnosis. Note:  The findings are diagnostic of non small-cell carcinoma of lung origin with features favoring adenocarcinoma. Morphologic and immunohistochemical features raise the possibility of squamous differentiation, though squamous contaminant cannot be entirely excluded. Correlation with clinical impression and any future tissue sampling is recommended to exclude the possibility of adenosquamous carcinoma.     Satisfactory for evaluation. 03/18/2019   Final    A. Stomach, endoscopic biopsy:  - Gastric antral mucosa with mild chronic, inactive gastritis. - Negative for intestinal metaplasia, dysplasia or carcinoma. - Immunohistochemistry for Helicobacter pylori is negative. B.  Stomach, erosion, endoscopic biopsy:  - Gastric antral and oxyntic mucosa with reactive foveolar hyperplasia, fibrosis and acte inflammation consistent with tissue adjacent to an erosion or ulcer.  - Negative for intestinal metaplasia, dysplasia or carcinoma. Note: Special stain for alcian blue/PAS and immunohistochemical stain for pan CK AE1/3 highlight benign glands. Image Results:   Image result are reviewed and documented in Hematology/Oncology history    CT chest abdomen pelvis wo contrast  Narrative: CT CHEST, ABDOMEN AND PELVIS WITHOUT IV CONTRAST    INDICATION:   C34.91: Malignant neoplasm of unspecified part of right bronchus or lung. Progress note from 5/4/2023 states: History of lung cancer, metastatic to the bones diagnosed in August 2020, status post ALK targeted therapy with Alectinib,   resulting in near complete resolution of lung mass. Patient also had radiation therapy to thoracic spine. Follow-up. COMPARISON:  CT chest abdomen and pelvis dated April 27, 2023    TECHNIQUE: CT examination of the chest, abdomen and pelvis was performed without intravenous contrast. Multiplanar 2D reformatted images were created from the source data. This examination, like all CT scans performed in the [facility], was performed utilizing techniques to minimize radiation dose exposure, including the use of iterative reconstruction and automated exposure control. Radiation dose length   product (DLP) for this visit:  1078 mGy-cm    Enteric contrast was not administered. FINDINGS:    CHEST    LUNGS: Patent central airways. No focal consolidation.  Patient's treated right hilar adenocarcinoma remains nonvisualized. No mass or suspicious nodule to suggest recurrent tumor. PLEURA:  Unremarkable. HEART/GREAT VESSELS: Normal cardiac size. No pericardial effusion. Moderate coronary artery calcifications. Dense mitral annular calcification. No thoracic aortic aneurysm. MEDIASTINUM AND ARACELY:  Unremarkable. CHEST WALL AND LOWER NECK: Soft tissue nodule in the right breast measuring 2.1 x 1.5 cm (2/60, 5/52), new from prior exam.    ABDOMEN-lack of IV contrast limits evaluation of solid organs. LIVER/BILIARY TREE:  Unremarkable. GALLBLADDER:  No calcified gallstones. No pericholecystic inflammatory change. SPLEEN:  Unremarkable. PANCREAS:  Unremarkable. ADRENAL GLANDS:  Unremarkable. KIDNEYS/URETERS: Stable right renal cysts. No hydronephrosis. STOMACH AND BOWEL:  There is colonic diverticulosis without evidence of acute diverticulitis. APPENDIX:  No findings to suggest appendicitis. ABDOMINOPELVIC CAVITY:  No ascites. No pneumoperitoneum. No lymphadenopathy. VESSELS: Atherosclerotic calcifications. No aneurysm. PELVIS    REPRODUCTIVE ORGANS: Calcified uterine leiomyoma. Phleboliths in the pelvis. URINARY BLADDER:  Unremarkable. ABDOMINAL WALL/INGUINAL REGIONS:  Unremarkable. OSSEOUS STRUCTURES: Stable numerous sclerotic metastatic lesions throughout the thoracolumbar spine and bony pelvis, in keeping with patient's treated metastases. Old partially healed right posterior ninth rib fracture. Metallic anchor in the right   humeral head, likely from prior rotator cuff surgery. Impression: 1. No findings of recurrent disease in the chest.    2. Stable numerous sclerotic metastatic lesions throughout the thoracolumbar spine and bony pelvis, in keeping with patient's treated metastases. 3. No metastatic disease in the abdomen or pelvis. Workstation performed: [id number]      LABS:  Lab data are reviewed and documented in HemOnc history.        Lab Results Component Value Date    HGB 9.2 (L) 09/20/2023    HCT 28.6 (L) 09/20/2023    MCV 93 09/20/2023     (L) 09/20/2023    WBC 6.55 09/20/2023    NRBC 0 08/30/2023    ATYLMPCT 1 (H) 06/07/2021     Lab Results   Component Value Date    K 3.5 10/04/2023     10/04/2023    CO2 31 10/04/2023    BUN 33 (H) 10/04/2023    CREATININE 1.84 (H) 10/04/2023    GLUF 86 09/12/2023    CALCIUM 9.6 10/04/2023    AST 61 (H) 09/20/2023    ALT 54 (H) 09/20/2023    ALKPHOS 101 09/20/2023    EGFR 26 10/04/2023       No results found for: "IRON", "TIBC", "FERRITIN"    No results found for: "Sola Carney"    No results for input(s): "WBC", "CREAT", "PLT" in the last 72 hours.     By:  Waleska Dyson MD, 11/8/2023, 2:28 PM

## 2023-10-31 ENCOUNTER — CLINICAL SUPPORT (OUTPATIENT)
Dept: PULMONOLOGY | Facility: CLINIC | Age: 78
End: 2023-10-31
Payer: COMMERCIAL

## 2023-10-31 DIAGNOSIS — J45.50 SEVERE PERSISTENT ASTHMA WITHOUT COMPLICATION: Primary | ICD-10-CM

## 2023-10-31 PROCEDURE — G0239 OTH RESP PROC, GROUP: HCPCS

## 2023-11-02 ENCOUNTER — HOSPITAL ENCOUNTER (OUTPATIENT)
Dept: CT IMAGING | Facility: HOSPITAL | Age: 78
End: 2023-11-02
Attending: INTERNAL MEDICINE
Payer: COMMERCIAL

## 2023-11-02 ENCOUNTER — APPOINTMENT (OUTPATIENT)
Dept: PULMONOLOGY | Facility: CLINIC | Age: 78
End: 2023-11-02
Payer: COMMERCIAL

## 2023-11-02 DIAGNOSIS — C34.91 NON-SMALL CELL CARCINOMA OF LUNG, STAGE 4, RIGHT (HCC): ICD-10-CM

## 2023-11-02 PROCEDURE — 71250 CT THORAX DX C-: CPT

## 2023-11-02 PROCEDURE — 74176 CT ABD & PELVIS W/O CONTRAST: CPT

## 2023-11-02 PROCEDURE — G1004 CDSM NDSC: HCPCS

## 2023-11-03 ENCOUNTER — CLINICAL SUPPORT (OUTPATIENT)
Dept: PULMONOLOGY | Facility: CLINIC | Age: 78
End: 2023-11-03
Payer: COMMERCIAL

## 2023-11-03 DIAGNOSIS — J45.50 SEVERE PERSISTENT ASTHMA WITHOUT COMPLICATION: Primary | ICD-10-CM

## 2023-11-03 PROCEDURE — G0239 OTH RESP PROC, GROUP: HCPCS

## 2023-11-06 ENCOUNTER — TELEPHONE (OUTPATIENT)
Dept: HEMATOLOGY ONCOLOGY | Facility: CLINIC | Age: 78
End: 2023-11-06

## 2023-11-06 NOTE — TELEPHONE ENCOUNTER
Called the radiology reading room to have patient's CT read prior to her 11/8 apt.       CT-CAP DONE ON 11/2 ACCESSION #[id number]

## 2023-11-07 ENCOUNTER — CLINICAL SUPPORT (OUTPATIENT)
Dept: PULMONOLOGY | Facility: CLINIC | Age: 78
End: 2023-11-07
Payer: COMMERCIAL

## 2023-11-07 DIAGNOSIS — J45.50 SEVERE PERSISTENT ASTHMA WITHOUT COMPLICATION: Primary | ICD-10-CM

## 2023-11-07 PROCEDURE — G0239 OTH RESP PROC, GROUP: HCPCS

## 2023-11-08 ENCOUNTER — OFFICE VISIT (OUTPATIENT)
Dept: HEMATOLOGY ONCOLOGY | Facility: CLINIC | Age: 78
End: 2023-11-08
Payer: COMMERCIAL

## 2023-11-08 ENCOUNTER — TELEPHONE (OUTPATIENT)
Dept: HEMATOLOGY ONCOLOGY | Facility: CLINIC | Age: 78
End: 2023-11-08

## 2023-11-08 VITALS
HEIGHT: 61 IN | WEIGHT: 225 LBS | DIASTOLIC BLOOD PRESSURE: 80 MMHG | SYSTOLIC BLOOD PRESSURE: 124 MMHG | RESPIRATION RATE: 18 BRPM | HEART RATE: 76 BPM | TEMPERATURE: 96.9 F | OXYGEN SATURATION: 96 % | BODY MASS INDEX: 42.48 KG/M2

## 2023-11-08 DIAGNOSIS — C34.90 NON-SMALL CELL LUNG CANCER, UNSPECIFIED LATERALITY (HCC): ICD-10-CM

## 2023-11-08 DIAGNOSIS — C79.51 MALIGNANT NEOPLASM METASTATIC TO BONE (HCC): ICD-10-CM

## 2023-11-08 DIAGNOSIS — C34.91 NON-SMALL CELL CARCINOMA OF LUNG, STAGE 4, RIGHT (HCC): Primary | ICD-10-CM

## 2023-11-08 PROCEDURE — 99215 OFFICE O/P EST HI 40 MIN: CPT | Performed by: INTERNAL MEDICINE

## 2023-11-08 RX ORDER — SODIUM CHLORIDE 9 MG/ML
20 INJECTION, SOLUTION INTRAVENOUS ONCE
OUTPATIENT
Start: 2023-11-08

## 2023-11-09 ENCOUNTER — CLINICAL SUPPORT (OUTPATIENT)
Dept: PULMONOLOGY | Facility: CLINIC | Age: 78
End: 2023-11-09
Payer: COMMERCIAL

## 2023-11-09 DIAGNOSIS — J45.50 SEVERE PERSISTENT ASTHMA WITHOUT COMPLICATION: Primary | ICD-10-CM

## 2023-11-09 PROCEDURE — G0239 OTH RESP PROC, GROUP: HCPCS

## 2023-11-09 NOTE — TELEPHONE ENCOUNTER
Pt requested refills on omeprazole and losartan-hctz  Pharmacy advised me they have both refills and pt just has to   Pt has been advised 
Assistance with ambulation/Assistance OOB with selected safe patient handling equipment/Communicate Risk of Fall with Harm to all staff/Discuss with provider need for PT consult/Monitor gait and stability/Reinforce activity limits and safety measures with patient and family/Sit up slowly, dangle for a short time, stand at bedside before walking/Tailored Fall Risk Interventions/Use of alarms - bed, chair and/or voice tab/Visual Cue: Yellow wristband and red socks/Bed in lowest position, wheels locked, appropriate side rails in place/Call bell, personal items and telephone in reach/Instruct patient to call for assistance before getting out of bed or chair/Non-slip footwear when patient is out of bed/[place] to call system/Physically safe environment - no spills, clutter or unnecessary equipment/Purposeful Proactive Rounding/Room/bathroom lighting operational, light cord in reach

## 2023-11-13 DIAGNOSIS — I48.3 TYPICAL ATRIAL FLUTTER (HCC): ICD-10-CM

## 2023-11-13 RX ORDER — AMIODARONE HYDROCHLORIDE 200 MG/1
200 TABLET ORAL
Qty: 30 TABLET | Refills: 0 | Status: SHIPPED | OUTPATIENT
Start: 2023-11-13 | End: 2023-12-13

## 2023-11-14 ENCOUNTER — APPOINTMENT (OUTPATIENT)
Dept: PULMONOLOGY | Facility: CLINIC | Age: 78
End: 2023-11-14
Payer: COMMERCIAL

## 2023-11-16 ENCOUNTER — CLINICAL SUPPORT (OUTPATIENT)
Dept: PULMONOLOGY | Facility: CLINIC | Age: 78
End: 2023-11-16
Payer: COMMERCIAL

## 2023-11-16 DIAGNOSIS — J45.50 SEVERE PERSISTENT ASTHMA WITHOUT COMPLICATION: Primary | ICD-10-CM

## 2023-11-16 PROCEDURE — G0239 OTH RESP PROC, GROUP: HCPCS

## 2023-11-17 ENCOUNTER — APPOINTMENT (OUTPATIENT)
Dept: PULMONOLOGY | Facility: CLINIC | Age: 78
End: 2023-11-17
Payer: COMMERCIAL

## 2023-11-21 ENCOUNTER — CLINICAL SUPPORT (OUTPATIENT)
Dept: PULMONOLOGY | Facility: CLINIC | Age: 78
End: 2023-11-21
Payer: COMMERCIAL

## 2023-11-21 DIAGNOSIS — J45.50 SEVERE PERSISTENT ASTHMA WITHOUT COMPLICATION: Primary | ICD-10-CM

## 2023-11-21 PROCEDURE — G0239 OTH RESP PROC, GROUP: HCPCS

## 2023-11-21 NOTE — PROGRESS NOTES
Pulmonary Rehabilitation Plan of Care   90 Day Reassessment      Today's date: 2023   # of Exercise Sessions Completed: 17  Patient name: Ryan Riojas      :   Age: 70 y.o. MRN: [id number]  Referring Physician: Sukumar Echeverria PA-C  Pulmonologist: Grant Alvarado MD  Provider: [facility]  Clinician: Francheska Salmeron MS, CEP    Dx:    Encounter Diagnosis   Name Primary? Severe persistent asthma without complication Yes         Date of onset: 2023      SUMMARY OF PROGRESS:  Radha Allen is compliant attending pulmonary rehab exercise sessions 2x/wk having attended 6 sessions in the past 30 days. She tolerates 37-40 mins at 2.0-2.4 METs on room air plus light weight training. Resting SpO2 95 - 98% with maintained O2 saturation during exercise 92 - 95%. Resting //80. RHR 68 - 70,  ExHR 73 - 90. [employer]'s Biotel continuous heart monitoring patch that she wore for 1 week, showed that her Afib/aflutter burden is not significant. She reports still feeling "fluttering", like someone scared her, at night when laying down. She remains compliant with amiodarone and Eliquis and will continue to follow up with Cardiology with any concerns. Radha Allen reports some SOB with exercise 2-4/10, more in the last 30 days possibly weather and allergy related, but states her main limitation is overall leg weakness and fatigue. She continues to do her activities around the house at her own pace, requiring multiple rest breaks. She does admit that sometimes things just don't get done because she cannot do them, which sometimes frustrates her. She does however report feeling much better since starting pulmonary rehab. Her SOB has improved and she is very happy about the progress she has made. She continues doing her chair exercises at home on her own, a few times a day and began decreasing her sitting time, walking around her house during TV commercials.  Depression and anxiety screening using the PHQ-9/AL-7 was not reassessed due to initial low scores of 5 and 3. When addressed, the patient denies feelings of depression but reports some anxiety relating to fear of SOB that continues to improve over time. Bg Bass has not made any dietary changes but has continued watching her sodium content. She has lost 7lbs in the last 30 days. Patient attends group educational classes on pulmonary disease. Pursed lipped breathing and Diaphragmatic breathing continues to be demonstrated, practiced, and reinforced with the patient. Her exercise program will be progressed as tolerated. The patient has the following personal goals she hopes to achieve by discharge: weight loss and decrease fear related to SOB. Pt will continue to be educated on lifestyle modifications and encouraged to supplement with a home exercise program to reach her goals.      Medication compliance: Yes   Comments: Pt reports to be compliant with medications    Fall Risk: Moderate   Comments: Patient uses walking assist device (walker/cane/rollator), Denies a fall in the past 6 months and reports unsteadiness on feet but sits to avoid falls    Smoking: Former user  Smoked 0.5ppd for 10 years, quit 30 years ago    RPD at Rest: 0-2/10  RPD with Exercise:  2-4/10    Assessment of progression of lung disease and functional status:  CAT: 18/40 initial  Shortness of breath questionnaire: 70/120 initial      EXERCISE ASSESSMENT and PLAN    Current Exercise Program in Rehab:       Frequency: 2 days/week   Supplement with home exercise 2+ days/wk as tolerated        Minutes: 37-40        METS: 2.0-2.4              SpO2: 92-95%              RPD: 2-4                      HR: 73-90   RPE: 4-5         Modalities: UBE, NuStep and Room walking      Exercise Progression 30 Day Goals :    Frequency: 2 days/week    Supplement with home exercise 2+ days/wk as tolerated       Minutes: 40         METS: 2.2-3.0              SpO2: >88%              RPD: 4-6 HR: RHR+30bpm   RPE: 4-5        Modalities: UBE, NuStep, Recumbent bike, and Room walking     Strength trainin-3 days / week  12-15 repetitions  1-2 sets per modality    Modalities: Arm Curl, Sit to Stands, Shoulder Shrugs, Calf Raises, and seated leg lifts    Oxygen needs:   Rest:  room air  Exercise/physical activity:  room air  Sleep:   room air and CPAP/BiPap    Oxygen Goal: Maintain SpO2>88% during exercise    Home Exercise: none- completes her chair exercises daily while watching TV  Education: pursed lipped breathing, benefits of exercise for pulmonary disease, RPD scale, O2 saturation monitoring, appropriate O2 response to exercise, and education class: Exercise For The Pulmonary Patient    Goals: reduced score on  USCD Shortness of Breath Questionnaire, Improved 6MW distance by 10%, reduced dyspnea during exercise , improved exercise tolerance (max METs tolerated in pulmonary rehab), SpO2 >88% during exercise, reduced score on CAT, attend pulmonary rehab regularly, decrease sitting time at home, start a walking program, reduced pulmonary related hospital readmissions , decreased rest needed with physical activity, increased muscular strength, increased energy, increased stamina with ADLs and demonstrate proper pursed lipped breathing  Progressing:  Pt is progressing and showing improvement  toward the following goals:  attends regularly 2x/week; increased METs tolerated in rehab, continues to complete daily chair exercises and began decreasing her sitting time at home.  , Will continue to educate and progress as tolerated.     Plan: learn to conserve energy with ADLs , practice diaphragmatic breathing, reduce time sitting at home, increased strength of respiratory muscles, utilize PLB with physical activity and begin a home exercise program     Readiness to change: Action:  (Changing behavior)      NUTRITION ASSESSMENT AND PLAN    Weight control:    Starting weight: 231.6 lb   Current weight: 224 lb    Goals: Increase water intake to reduce/thin mucus production eat less CO2 producing foods reduced body weight Improve hydration  Education: low sodium diet  hydration  wt. loss   portion control  education class: healthy eating for managing pulmonary illness  Progressing:Pt is progressing and showing improvement  toward the following goals:  was educated on healthy eating and controlling her portions related to serving size; down 7 lbs in the last 30 days. , Will continue to educate and progress as tolerated.   Plan: increase daily intake of fruits and vegetables, reduce intake and /or  rarely eat processed meats , use "light" tub margarine, use light or fat-free salad dressings and mayonnaise, and begin reading food labels  Readiness to change: Action:  (Changing behavior)      PSYCHOSOCIAL ASSESSMENT AND PLAN    Emotional:  Depression assessment:  PHQ-9 = 5 5-9 = Mild Depression            Anxiety measure:  AL-7 = 3 0-4  = Not anxious  Self-reported stress level: 4   Social support: Patient reports excellent emotional/social support from family    Goals:  Reduce perceived stress to 1-3/10, improved [facility] QOL < 27, PHQ-9 - reduced severity by one level, Physical Fitness in [facility] Score < 3, Daily Activity in [facility] Score < 3, Social Activities in [facility] Score < 3, Pain in [facility] Score < 3, Overall Health in [facility] Score < 3, Quality of Life in [facility] Score < 3 , Change in Health in [facility] Score < 3 , Increased interest in doing things, improved sleep, increased energy and reduced anxiety/fear of becoming SOB  Education: stress management techniques and class:  Stress and Pulmonary Disease    Progressing:Pt is progressing and showing improvement  toward the following goals:  no repeat PHQ9/GAD7 completed due to initial low scores; has some fear associated with SOB but reports improvement; was educated on Stress and Your Health with pulmonary disease.  , Will continue to educate and progress as tolerated. Plan: Practice relaxation techniques, Exercise, Enjoy a hobby, Return to previous social activity, and Avoid triggers  Readiness to change: Action:  (Changing behavior)      OTHER CORE COMPONENTS     Tobacco:   Social History     Tobacco Use   Smoking Status Former    Packs/day: 0.25    Years: 25.00    Total pack years: 6.25    Types: Cigarettes    Start date: 48    Quit date:     Years since quittin.9   Smokeless Tobacco Former       Tobacco Use Intervention: Referral to tobacco expert:   Pt quit 30 years ago   and has abstained    Blood pressure:    Restin//80    Goals: consistent BP < 130/80, moderate intensity exercise >150 mins/wk and medication compliance  Education:  pathophysiology of pulmonary disease, control coughing, inspiratory muscle training, environmental triggers, nebulizer use, and education: Pulmonary Anatomy and Physiology  Progressing:Pt is progressing and showing improvement  toward the following goals:  normal resting BP; takes meds. , Patient will increase home walking in the next 30 days, Will continue to educate and progress as tolerated.   Plan: engage in regular exercise, monitor home BP, and class:  Pulmonary Medications  Readiness to change: Action:  (Changing behavior)

## 2023-11-24 ENCOUNTER — CLINICAL SUPPORT (OUTPATIENT)
Dept: PULMONOLOGY | Facility: CLINIC | Age: 78
End: 2023-11-24
Payer: COMMERCIAL

## 2023-11-24 DIAGNOSIS — J45.50 SEVERE PERSISTENT ASTHMA WITHOUT COMPLICATION: Primary | ICD-10-CM

## 2023-11-24 PROCEDURE — G0239 OTH RESP PROC, GROUP: HCPCS

## 2023-11-28 ENCOUNTER — TELEPHONE (OUTPATIENT)
Dept: HEMATOLOGY ONCOLOGY | Facility: CLINIC | Age: 78
End: 2023-11-28

## 2023-11-28 ENCOUNTER — CLINICAL SUPPORT (OUTPATIENT)
Dept: PULMONOLOGY | Facility: CLINIC | Age: 78
End: 2023-11-28
Payer: COMMERCIAL

## 2023-11-28 DIAGNOSIS — C34.90 NON-SMALL CELL LUNG CANCER, UNSPECIFIED LATERALITY (HCC): ICD-10-CM

## 2023-11-28 DIAGNOSIS — J45.50 SEVERE PERSISTENT ASTHMA WITHOUT COMPLICATION: Primary | ICD-10-CM

## 2023-11-28 DIAGNOSIS — C79.51 MALIGNANT NEOPLASM METASTATIC TO BONE (HCC): Primary | ICD-10-CM

## 2023-11-28 PROCEDURE — G0239 OTH RESP PROC, GROUP: HCPCS

## 2023-11-28 NOTE — TELEPHONE ENCOUNTER
Lab Inquiry   Who are you speaking with? Patient     If it is not the patient, are they listed on an active communication consent form? Yes   Name of ordering provider Dr. Thomas April   What is being requested? Lab orders need to be entered for infusion appmts   Lab draw location [facility]   What is the best call back number?  [phone]

## 2023-11-28 NOTE — TELEPHONE ENCOUNTER
Phoned patient. Aware lab orders are available for upcoming infusion on 12/20.   No additional questions

## 2023-11-29 ENCOUNTER — CONSULT (OUTPATIENT)
Dept: PALLIATIVE MEDICINE | Facility: CLINIC | Age: 78
End: 2023-11-29
Payer: COMMERCIAL

## 2023-11-29 VITALS
RESPIRATION RATE: 18 BRPM | HEIGHT: 58 IN | DIASTOLIC BLOOD PRESSURE: 84 MMHG | HEART RATE: 64 BPM | OXYGEN SATURATION: 98 % | TEMPERATURE: 97.1 F | WEIGHT: 225 LBS | SYSTOLIC BLOOD PRESSURE: 160 MMHG | BODY MASS INDEX: 47.23 KG/M2

## 2023-11-29 DIAGNOSIS — G89.29 CHRONIC BILATERAL LOW BACK PAIN: ICD-10-CM

## 2023-11-29 DIAGNOSIS — Z71.89 COUNSELING REGARDING ADVANCED CARE PLANNING AND GOALS OF CARE: ICD-10-CM

## 2023-11-29 DIAGNOSIS — G89.3 CANCER-RELATED PAIN: Primary | ICD-10-CM

## 2023-11-29 DIAGNOSIS — Z51.5 PALLIATIVE CARE ENCOUNTER: ICD-10-CM

## 2023-11-29 DIAGNOSIS — M54.50 CHRONIC BILATERAL LOW BACK PAIN: ICD-10-CM

## 2023-11-29 DIAGNOSIS — C34.90 NON-SMALL CELL LUNG CANCER, UNSPECIFIED LATERALITY (HCC): ICD-10-CM

## 2023-11-29 DIAGNOSIS — C34.91 NON-SMALL CELL CARCINOMA OF LUNG, STAGE 4, RIGHT (HCC): ICD-10-CM

## 2023-11-29 PROCEDURE — 99204 OFFICE O/P NEW MOD 45 MIN: CPT | Performed by: INTERNAL MEDICINE

## 2023-11-29 NOTE — PROGRESS NOTES
Palliative and Supportive Care   Lisa Villalta 70 y.o. female [id number]    Assessment/Plan:  1. Cancer-related pain    2. Non-small cell carcinoma of lung, stage 4, right ([address])    3. Chronic bilateral low back pain    4. Non-small cell lung cancer, unspecified laterality ([address])    5. Palliative care encounter    6. Counseling regarding advanced care planning and goals of care      Symptoms - very intermittent cancer related pain in the back/ L hip  Continue oxy-acetaminophen 5-325mg PO q4H prn - last intake was a month ago, averaging maybe 1 a day if needed. Call for refills  She takes rarely and only as needed. Noted some weird feeling with it, so is limiting dose appropriately  Senokot prn for constipation  May benefit to PT, already on pulmo rehab now    GOC/Support  She lives by herself. She was  and  twice. She had 5 children, 1 son  2 months ago. 1 son lives locally, 3 daughters live further (2 in [place] and 1 in [facility]). Her son and his family checks in on her regularly. She said she already has a POA, encouraged to bring next time    Follow up  RTO in 4 months, call sooner if needed    Controlled Substance Review    [place] PDMP or [place]  reviewed: No red flags were identified; safe to proceed with prescription. Barb Salgado  Written  ID  Drug  QTY  Days  Prescriber  RX #  Dispenser  Refill  Daily Dose*  Pymt Type      2023 1 Oxycodone-Acetaminophen 5-325 60.00 10 Ma Cis [id number] Pen (4349) 0 45.00 MME Private Pay [place]   08/10/2023 08/10/2023 1 Diazepam 5 Mg Tablet 2.00 1 Ja Sun [id number] Pen (4349) 0 1.00 LME Private Pay [place]   2023 1 Oxycodone-Acetaminophen 5-325 60.00 10 Lo Wit [id number] Pen (3581) 0 45.00 MME Private Pay [place]   10/12/2022 10/12/2022 1 Oxycodone-Acetaminophen 5-325 60.00 10 Ge Pro [id number] Pen (4349) 0 45.00 MME Private Pay [place]   2022 1 Oxycodone-Acetaminophen 5-325 60.00 10 Al Zel [id number] Pen (3909) 0 45.00 MME Private Pay [place]     Requested Prescriptions      No prescriptions requested or ordered in this encounter     There are no discontinued medications. Representatives have queried the patient's controlled substance dispensing history in the Prescription Drug Monitoring Program in compliance with regulations before I have prescribed any controlled substances. The prescription history is consistent with prescribed therapy and our practice policies. 40 minutes were spent face to face with Marcela Mcclellan with greater than 50% of the time spent in counseling or coordination of care including discussions of etiology of diagnosis, pathogenesis of diagnosis, diagnostic results, impression, and recommendations, risks and benefits of treatment, instructions for disease self management, treatment instructions, follow up requirements, risk factors and risk reduction of disease, patient and family counseling/involvement in care, compliance with treatment regimen, and advanced care planning. All of the patient's questions were answered during this discussion. No follow-ups on file. Subjective:   Chief Complaint  New consultation for:  symptom management, goal of care assessment and decisional support, disease process education and discussion of prognosis, advance care planning, emotional support in the setting of serious illness  HPI     Marcela Mcclellan is a 70 y.o. female with stage IVB non-small cell lung cancer with stable numerous metastatic lesions in the TL spine and bony pelvis as per latest CT in 11/2/2023. She is currently on alectinib that she has been on for a long time. Referred to palliative medicine for supportive cares. She also has a Hx of chronic HFpEF, CKD3, severe MONO, and severe persistent asthma. Met with patient and introduced palliative care. She denies any significant cancer-related symptoms at this time. She does get pain in the back and/or L hip but this is very rare.  She does have percocet prn from her PCP and the last time she took one was a month ago. She does have fatigue and SOB when walking long distances. But she manages by resting. She is independent. She still drives. Her son and his family regularly checks in on her and would bring her food or whatever else she needs when she asks. Social: She lives by herself. She was  and  twice. She had 5 children, 1 son  2 months ago. 1 son lives locally, 3 daughters live further (2 in [facility] and 1 in [facility]). Her son and his family checks in on her regularly. Her ex-husbands also check in on her and would bring her food. They remained friends. She said she already has a POA, encouraged to bring next time    The following portions of the medical history were reviewed: past medical history, problem list, medication list, and social history.     Current Outpatient Medications:     albuterol (2.5 mg/3 mL) 0.083 % nebulizer solution, Take 3 mL (2.5 mg total) by nebulization 3 (three) times a day, Disp: 810 mL, Rfl: 2    albuterol (Ventolin HFA) 90 mcg/act inhaler, Inhale 2 puffs every 6 (six) hours as needed for wheezing, Disp: 8.5 g, Rfl: 2    Alectinib HCl 150 MG CAPS, Take 4 capsules (600 mg total) by mouth 2 (two) times a day, Disp: 240 capsule, Rfl: 5    amiodarone 200 mg tablet, Take 1 tablet (200 mg total) by mouth daily with breakfast, Disp: 30 tablet, Rfl: 0    benralizumab (FASENRA) subcutaneous injection, Inject 1 mL (30 mg total) under the skin every 56 days, Disp: 1 mL, Rfl: 6    budesonide (PULMICORT) 0.25 mg/2 mL nebulizer solution, Take 2 mL (0.25 mg total) by nebulization 2 (two) times a day Rinse mouth after use., Disp: 360 mL, Rfl: 2    fexofenadine (ALLEGRA) 180 MG tablet, Take 180 mg by mouth daily, Disp: , Rfl:     fluticasone (FLONASE) 50 mcg/act nasal spray, SPRAY 2 SPRAYS INTO EACH NOSTRIL EVERY DAY, Disp: 48 mL, Rfl: 1    furosemide (LASIX) 20 mg tablet, Take 1 tablet (20 mg total) by mouth daily, Disp: 90 tablet, Rfl: 3    ipratropium (ATROVENT) 0.02 % nebulizer solution, Take 2.5 mL (0.5 mg total) by nebulization 3 (three) times a day, Disp: 225 mL, Rfl: 2    Klor-Con M20 20 MEQ tablet, TAKE 1 TABLET BY MOUTH EVERY DAY, Disp: 90 tablet, Rfl: 1    metoprolol tartrate (LOPRESSOR) 25 mg tablet, TAKE 1 TABLET (25 MG TOTAL) BY MOUTH EVERY 12 (TWELVE) HOURS, Disp: 180 tablet, Rfl: 1    omeprazole (PriLOSEC) 20 mg delayed release capsule, TAKE 1 CAPSULE BY MOUTH EVERY DAY, Disp: 90 capsule, Rfl: 1    oxyCODONE-acetaminophen (PERCOCET) 5-325 mg per tablet, Take 1 tablet by mouth every 4 (four) hours as needed for moderate pain For ongoing pain Max Daily Amount: 6 tablets, Disp: 60 tablet, Rfl: 0    acetaminophen (TYLENOL) 650 mg CR tablet, TAKE 1 TABLET (650 MG TOTAL) BY MOUTH EVERY 8 (EIGHT) HOURS AS NEEDED FOR MILD PAIN (Patient not taking: Reported on 11/29/2023), Disp: , Rfl:     apixaban (ELIQUIS) 5 mg, Take 1 tablet (5 mg total) by mouth 2 (two) times a day, Disp: 60 tablet, Rfl: 11    irbesartan (AVAPRO) 300 mg tablet, Take 300 mg by mouth, Disp: , Rfl:     rosuvastatin (CRESTOR) 10 MG tablet, Take 1 tablet (10 mg total) by mouth daily, Disp: 90 tablet, Rfl: 3  Review of Systems   Constitutional:  Positive for fatigue. Negative for activity change and appetite change. HENT:  Negative for trouble swallowing. Respiratory:  Positive for shortness of breath. Cardiovascular:  Negative for chest pain. Gastrointestinal:  Negative for abdominal pain, constipation, diarrhea, nausea and vomiting. Musculoskeletal:         Very intermittent back and L hip pain   Neurological:  Negative for weakness. Psychiatric/Behavioral:  Negative for sleep disturbance. The patient is not nervous/anxious. All other systems reviewed and are negative.       All other systems negative    Objective:  Vital Signs  /84 (BP Location: Right arm, Patient Position: Sitting, Cuff Size: Adult) Comment (BP Location): right wrist Comment (Cuff Size): small cuff Pulse 64   Temp (!) 97.1 °F (36.2 °C) (Temporal)   Resp 18   Ht 4' 10" (1.473 m)   Wt 102 kg (225 lb)   SpO2 98%   BMI 47.03 kg/m²    Physical Exam    Constitutional: Appears well-developed and well-nourished. Appears as stated age. Does not appear too sickly. Very pleasant and friendly. Obese. In no acute physical or emotional distress. Head: Normocephalic and atraumatic. Eyes: EOM are normal. No ocular discharge. No scleral icterus. Neck: No visible adenopathy or masses  Respiratory: Effort normal. No stridor. No respiratory distress. Gastrointestinal: No abdominal distension. Musculoskeletal: No edema. Neurological: Alert, oriented and appropriately conversant. Skin: No diaphoresis, no rashes seen on exposed areas of skin. Psychiatric: Displays a normal mood and affect.  Behavior, judgement and thought content appear normal.     Jerome Riggins MD  Palliative Medicine & Supportive Care  Internal Medicine  Available via [facility] [employer]  Office: [phone]  Fax: [phone]

## 2023-11-30 ENCOUNTER — CLINICAL SUPPORT (OUTPATIENT)
Dept: PULMONOLOGY | Facility: CLINIC | Age: 78
End: 2023-11-30
Payer: COMMERCIAL

## 2023-11-30 DIAGNOSIS — J45.50 SEVERE PERSISTENT ASTHMA WITHOUT COMPLICATION: Primary | ICD-10-CM

## 2023-11-30 PROCEDURE — G0239 OTH RESP PROC, GROUP: HCPCS

## 2023-12-05 ENCOUNTER — CLINICAL SUPPORT (OUTPATIENT)
Dept: PULMONOLOGY | Facility: CLINIC | Age: 78
End: 2023-12-05
Payer: COMMERCIAL

## 2023-12-05 DIAGNOSIS — J45.50 SEVERE PERSISTENT ASTHMA WITHOUT COMPLICATION: Primary | ICD-10-CM

## 2023-12-05 PROCEDURE — G0239 OTH RESP PROC, GROUP: HCPCS

## 2023-12-07 ENCOUNTER — CLINICAL SUPPORT (OUTPATIENT)
Dept: PULMONOLOGY | Facility: CLINIC | Age: 78
End: 2023-12-07
Payer: COMMERCIAL

## 2023-12-07 DIAGNOSIS — J45.50 SEVERE PERSISTENT ASTHMA WITHOUT COMPLICATION: Primary | ICD-10-CM

## 2023-12-07 PROCEDURE — G0239 OTH RESP PROC, GROUP: HCPCS

## 2023-12-11 ENCOUNTER — APPOINTMENT (OUTPATIENT)
Dept: LAB | Facility: CLINIC | Age: 78
End: 2023-12-11
Payer: COMMERCIAL

## 2023-12-11 DIAGNOSIS — C34.90 NON-SMALL CELL LUNG CANCER, UNSPECIFIED LATERALITY (HCC): ICD-10-CM

## 2023-12-11 DIAGNOSIS — C79.51 MALIGNANT NEOPLASM METASTATIC TO BONE (HCC): ICD-10-CM

## 2023-12-11 LAB
ALBUMIN SERPL BCP-MCNC: 3.9 G/DL (ref 3.5–5)
ALP SERPL-CCNC: 92 U/L (ref 34–104)
ALT SERPL W P-5'-P-CCNC: 51 U/L (ref 7–52)
ANION GAP SERPL CALCULATED.3IONS-SCNC: 10 MMOL/L
AST SERPL W P-5'-P-CCNC: 50 U/L (ref 13–39)
BILIRUB SERPL-MCNC: 1.52 MG/DL (ref 0.2–1)
BUN SERPL-MCNC: 30 MG/DL (ref 5–25)
CALCIUM SERPL-MCNC: 9.6 MG/DL (ref 8.4–10.2)
CHLORIDE SERPL-SCNC: 101 MMOL/L (ref 96–108)
CO2 SERPL-SCNC: 32 MMOL/L (ref 21–32)
CREAT SERPL-MCNC: 1.72 MG/DL (ref 0.6–1.3)
GFR SERPL CREATININE-BSD FRML MDRD: 28 ML/MIN/1.73SQ M
GLUCOSE SERPL-MCNC: 90 MG/DL (ref 65–140)
POTASSIUM SERPL-SCNC: 3.5 MMOL/L (ref 3.5–5.3)
PROT SERPL-MCNC: 6.3 G/DL (ref 6.4–8.4)
SODIUM SERPL-SCNC: 143 MMOL/L (ref 135–147)

## 2023-12-11 PROCEDURE — 36415 COLL VENOUS BLD VENIPUNCTURE: CPT

## 2023-12-11 PROCEDURE — 80053 COMPREHEN METABOLIC PANEL: CPT

## 2023-12-12 ENCOUNTER — APPOINTMENT (OUTPATIENT)
Dept: PULMONOLOGY | Facility: CLINIC | Age: 78
End: 2023-12-12
Payer: COMMERCIAL

## 2023-12-14 ENCOUNTER — CLINICAL SUPPORT (OUTPATIENT)
Dept: PULMONOLOGY | Facility: CLINIC | Age: 78
End: 2023-12-14
Payer: COMMERCIAL

## 2023-12-14 DIAGNOSIS — J45.50 SEVERE PERSISTENT ASTHMA WITHOUT COMPLICATION: Primary | ICD-10-CM

## 2023-12-14 PROCEDURE — G0239 OTH RESP PROC, GROUP: HCPCS

## 2023-12-18 ENCOUNTER — TELEPHONE (OUTPATIENT)
Dept: OTHER | Facility: HOSPITAL | Age: 78
End: 2023-12-18

## 2023-12-18 ENCOUNTER — TELEPHONE (OUTPATIENT)
Dept: NEPHROLOGY | Facility: CLINIC | Age: 78
End: 2023-12-18

## 2023-12-18 ENCOUNTER — TELEPHONE (OUTPATIENT)
Dept: HEMATOLOGY ONCOLOGY | Facility: CLINIC | Age: 78
End: 2023-12-18

## 2023-12-18 DIAGNOSIS — I10 ESSENTIAL HYPERTENSION: Primary | ICD-10-CM

## 2023-12-18 NOTE — PROGRESS NOTES
Call out to patient and reviewed CrCL less than 30. Patient does not meet parameters for zometa at this time. Please reschedule.

## 2023-12-18 NOTE — TELEPHONE ENCOUNTER
Call out to patient and reviewed CrCL less than 30. Patient does not meet parameters for zometa at this time. Please reschedule for next due date.

## 2023-12-18 NOTE — PROGRESS NOTES
Call received from Elie Rojo in pharmacy.  Per Dr. Arriaga, Zometa to be held on 12/19 due to low creatinine clearance.

## 2023-12-18 NOTE — TELEPHONE ENCOUNTER
Called and spoke to patient addressed her concern she reports she has chronic edema no changes overall feels well.  Wanted to try lowering her diuretics advised her to decrease her Lasix to 20 mg every 48 hours keep close monitoring of her daily weights and lower extremity edema and volume status if any issues she may need to go up on the dosage.  Advised patient to get blood work in 2 weeks placed the order.  Advised patient to call back if any questions or concerns most recent creatinine from 12/11/2023 at 1.72 mg/dL slightly improved from 10/4/2023.

## 2023-12-19 ENCOUNTER — HOSPITAL ENCOUNTER (OUTPATIENT)
Dept: INFUSION CENTER | Facility: HOSPITAL | Age: 78
Discharge: HOME | End: 2023-12-19
Attending: INTERNAL MEDICINE

## 2023-12-19 ENCOUNTER — APPOINTMENT (OUTPATIENT)
Dept: PULMONOLOGY | Facility: CLINIC | Age: 78
End: 2023-12-19
Payer: COMMERCIAL

## 2023-12-21 ENCOUNTER — CLINICAL SUPPORT (OUTPATIENT)
Dept: PULMONOLOGY | Facility: CLINIC | Age: 78
End: 2023-12-21
Payer: COMMERCIAL

## 2023-12-21 DIAGNOSIS — J45.50 SEVERE PERSISTENT ASTHMA WITHOUT COMPLICATION: Primary | ICD-10-CM

## 2023-12-21 PROCEDURE — G0239 OTH RESP PROC, GROUP: HCPCS

## 2023-12-21 NOTE — PROGRESS NOTES
Pulmonary Rehabilitation Plan of Care   Discharge      Today's date: 2023   # of Exercise Sessions Completed: 24  Patient name: Jayla Moody      : 1945  Age: 78 y.o.       MRN: [id number]  Referring Physician: Ruthie Flores PA-C  Pulmonologist: Yareli Chavez MD  Provider: [place]  Clinician: Daniella Allen MS, CEP    Dx:    Encounter Diagnosis   Name Primary?    Severe persistent asthma without complication Yes         Date of onset: 2023      SUMMARY OF PROGRESS:  Discharge note for Jayla.  She had 6.7% improvement in functional capacity increasing 6 MW distance by 13.9% walking 410 ft on room air.  Resting SpO2 94 - 96% with decreased O2 saturation during exercise 88 - 94% room air. Her max METs in pulmonary rehab increased from 1.8 to 2.6. Her [facility] QOL improved by 34.5%.  PHQ-9 score decreased from 5 to 3.  AL-7 increased from 3 to 4. CAT score increased from 18 to 19/40. SOBQ score increased  from 70 to 76.   Her weight decreased by 11 pounds. Jayla has not been supplementing IN sessions with home exercise and reports that her exercise is walking from the couch to the kitchen/bathroom. In pulmonary rehab, Jayla tolerates 35-40 mins at 1.5 - 2.5 METs plus wt training.  RHR 67 - 73, ExHR 79 - 94. Resting /72 - 144/70. Jayla had the following personal goals She hoped to achieve by discharge: weight loss and decrease fear related to SOB (both goals have been achieved).  Encouraged Pt to continue exercise as much as she can, especially walking. She was also encouraged to remain compliant with medications and f/u with pulmonologist with any pulmonary symptoms and medication management.       Medication compliance: Yes   Comments: Pt reports to be compliant with medications    Fall Risk: Moderate   Comments: Patient uses walking assist device (walker/cane/rollator), Denies a fall in the past 6 months and reports unsteadiness on feet but sits to avoid  falls    Smoking: Former user  Smoked 0.5ppd for 10 years, quit 30 years ago    RPD at Rest: 0-2/10  RPD with Exercise:  4-6/10 while walking    Assessment of progression of lung disease and functional status:  CAT: 1940   Shortness of breath questionnaire: 76/120       EXERCISE ASSESSMENT and PLAN    Current Exercise Program in Rehab:       Frequency: 2 days/week         Minutes: 35-40        METS: 1.5-2.5              SpO2: 88-94%              RPD: 0-6                      HR: 79-94   RPE: 4-5         Modalities: UBE, NuStep and Room walking      Exercise Progression after Discharge:    Frequency: walking daily   Minutes: 3-5 min at a time with cane  >150 mins/wk of moderate intensity exercise   METS: 1.5 - 2.5   HR:     RPE: 4-6   Modalities: Room walking     Strength trainin-3 days / week  12-15 repetitions  1-2 sets per modality    Modalities: Arm Curl, Sit to Stands, Shoulder Shrugs, Calf Raises, and seated leg lifts    Oxygen needs:   Rest:  room air  Exercise/physical activity:  room air  Sleep:   room air and CPAP/BiPap    Oxygen Goal: Maintain SpO2>88% during exercise    Home Exercise: none- completes her chair exercises daily while watching TV  Education: pursed lipped breathing, benefits of exercise for pulmonary disease, RPD scale, O2 saturation monitoring, appropriate O2 response to exercise, and education class: Exercise For The Pulmonary Patient    Goals: reduced dyspnea during exercise, SpO2 >88% during exercise, decrease sitting time at home, start a walking program, reduced pulmonary related hospital readmissions , decreased rest needed with physical activity, increased muscular strength, increased energy, increased stamina with ADLs and demonstrate proper pursed lipped breathing  Progressing:  Goals not met: reduced score on  USCD Shortness of Breath Questionnaire, reduced score on CAT.  , Goals met: Improved 6MW distance by 10%, improved exercise tolerance (max METs tolerated in  "pulmonary rehab), SpO2 >88% during exercise, attend pulmonary rehab regularly, decrease sitting time at home ., Patient will be encouraged to focus on lifestyle modifications following discharge.    Plan: learn to conserve energy with ADLs , practice diaphragmatic breathing, reduce time sitting at home, increased strength of respiratory muscles, utilize PLB with physical activity and begin a home exercise program     Readiness to change: Maintenance: (Maintaining the behavior change)      NUTRITION ASSESSMENT AND PLAN    Weight control:    Starting weight: 231.6 lb   Current weight: 220.6 lb    Goals:Increase water intake to reduce/thin mucus production eat less CO2 producing foods reduced body weight Improve hydration  Education: low sodium diet  hydration  wt. loss   portion control  education class: healthy eating for managing pulmonary illness  Progressing:Goals met: Increase water intake to reduce/thin mucus production eat less CO2 producing foods reduced body weight Improve hydration and weight loss 11 lb., Patient will be encouraged to focus on lifestyle modifications following discharge.  Plan: increase daily intake of fruits and vegetables, reduce intake and /or  rarely eat processed meats , use \"light\" tub margarine, use light or fat-free salad dressings and mayonnaise, and begin reading food labels  Readiness to change: Maintenance: (Maintaining the behavior change)      PSYCHOSOCIAL ASSESSMENT AND PLAN    Emotional:  Depression assessment:  PHQ-9 = 3 1-4 = Minimal Depression            Anxiety measure:  AL-7 = 4 0-4  = Not anxious  Self-reported stress level: 3   Social support: Patient reports excellent emotional/social support from family    Goals:  Increased interest in doing things, improved sleep, increased energy, and reduced anxiety/fear of becoming SOB  Education: stress management techniques and class:  Stress and Pulmonary Disease    Progressing:Goals met: Reduce perceived stress to 1-3/10, " improved [facility] QOL < 27, PHQ-9 - reduced severity by one leve., Patient will be encouraged to focus on lifestyle modifications following discharge.  Plan: Practice relaxation techniques, Exercise, Enjoy a hobby, Return to previous social activity, and Avoid triggers  Readiness to change: Maintenance: (Maintaining the behavior change)      OTHER CORE COMPONENTS     Tobacco:   Social History     Tobacco Use   Smoking Status Former    Current packs/day: 0.00    Average packs/day: 0.3 packs/day for 25.0 years (6.3 ttl pk-yrs)    Types: Cigarettes    Start date:     Quit date:     Years since quittin.9   Smokeless Tobacco Former       Tobacco Use Intervention: Referral to tobacco expert:   Pt quit 30 years ago   and has abstained    Blood pressure:    Restin/72 - 144/70    Goals: consistent BP < 130/80, moderate intensity exercise >150 mins/wk and medication compliance  Education:  pathophysiology of pulmonary disease, control coughing, inspiratory muscle training, environmental triggers, nebulizer use, and education: Pulmonary Anatomy and Physiology  Progressing:Goals not met: moderate intensity exercise >150 mins/wk.  , Goals met: consistent BP < 130/80 and medication compliance., Patient will be encouraged to focus on lifestyle modifications following discharge.  Plan: medication compliance, engage in regular exercise, and monitor home BP  Readiness to change: Action:  (Changing behavior)

## 2023-12-26 ENCOUNTER — APPOINTMENT (OUTPATIENT)
Dept: LAB | Facility: CLINIC | Age: 78
End: 2023-12-26
Payer: COMMERCIAL

## 2023-12-26 DIAGNOSIS — I48.3 TYPICAL ATRIAL FLUTTER (HCC): ICD-10-CM

## 2023-12-26 DIAGNOSIS — I10 ESSENTIAL HYPERTENSION: ICD-10-CM

## 2023-12-26 LAB
ALBUMIN SERPL BCP-MCNC: 3.9 G/DL (ref 3.5–5)
ANION GAP SERPL CALCULATED.3IONS-SCNC: 10 MMOL/L
BUN SERPL-MCNC: 29 MG/DL (ref 5–25)
CALCIUM SERPL-MCNC: 9.4 MG/DL (ref 8.4–10.2)
CHLORIDE SERPL-SCNC: 105 MMOL/L (ref 96–108)
CO2 SERPL-SCNC: 26 MMOL/L (ref 21–32)
CREAT SERPL-MCNC: 1.63 MG/DL (ref 0.6–1.3)
GFR SERPL CREATININE-BSD FRML MDRD: 30 ML/MIN/1.73SQ M
GLUCOSE SERPL-MCNC: 102 MG/DL (ref 65–140)
PHOSPHATE SERPL-MCNC: 2.6 MG/DL (ref 2.3–4.1)
POTASSIUM SERPL-SCNC: 4.2 MMOL/L (ref 3.5–5.3)
SODIUM SERPL-SCNC: 141 MMOL/L (ref 135–147)

## 2023-12-26 PROCEDURE — 36415 COLL VENOUS BLD VENIPUNCTURE: CPT

## 2023-12-26 PROCEDURE — 80069 RENAL FUNCTION PANEL: CPT

## 2023-12-26 RX ORDER — AMIODARONE HYDROCHLORIDE 200 MG/1
200 TABLET ORAL
Qty: 90 TABLET | Refills: 3 | Status: SHIPPED | OUTPATIENT
Start: 2023-12-26 | End: 2024-03-25

## 2023-12-26 NOTE — TELEPHONE ENCOUNTER
Requested medication(s) are due for refill today: Yes  Patient has already received a courtesy refill: No  Other reason request has been forwarded to provider: failed protocol, patient only has 2 doses left

## 2024-01-03 ENCOUNTER — TELEPHONE (OUTPATIENT)
Dept: NEPHROLOGY | Facility: CLINIC | Age: 79
End: 2024-01-03

## 2024-01-03 NOTE — RESULT ENCOUNTER NOTE
Please let the patient know that most recent lab work in terms of renal parameters are stable.    Will discuss further at the upcoming visit, let me know if they have any questions or concerns.    Thanks

## 2024-01-03 NOTE — TELEPHONE ENCOUNTER
----- Message from Dot Germain MD sent at 1/3/2024  1:31 PM EST -----  Please let the patient know that most recent lab work in terms of renal parameters are stable.    Will discuss further at the upcoming visit, let me know if they have any questions or concerns.    Thanks

## 2024-01-05 ENCOUNTER — RA CDI HCC (OUTPATIENT)
Dept: OTHER | Facility: HOSPITAL | Age: 79
End: 2024-01-05

## 2024-01-05 NOTE — PROGRESS NOTES
HCC coding opportunities          Chart Reviewed number of suggestions sent to Provider: 1     Patients Insurance     Medicare Insurance: [employer]vant[employer]          I13.0 : Hypertensive heart and chronic kidney disease with heart failure and stage 1 through stage 4 chronic kidney disease, or unspecified chronic kidney disease (HCC)     The classification presumes a causal relationship between hypertension and heart involvement and between hypertension and kidney involvement unless documented as unrelated

## 2024-01-08 ENCOUNTER — TELEPHONE (OUTPATIENT)
Dept: NEPHROLOGY | Facility: CLINIC | Age: 79
End: 2024-01-08

## 2024-01-08 ENCOUNTER — TELEPHONE (OUTPATIENT)
Dept: CARDIOLOGY CLINIC | Facility: CLINIC | Age: 79
End: 2024-01-08

## 2024-01-08 NOTE — TELEPHONE ENCOUNTER
"Pt called states she has noticed she is getting\"more pink marks under her skin which turn to bruises, she wanted to decrease her Eliquis to one daily, explained that that medication needs to be taken BID for correct coverage.  She states it is not severe but concerning Please advise.   "

## 2024-01-08 NOTE — TELEPHONE ENCOUNTER
Reschedule Appointment   Person speaking to  Patient   Date of original appointment 1/16/24    New appointment date 1/25/24- Kaylin Pires   Location [place]    Provider Yojana Lozano    Additional Information Change in provider schedule- pt ok going to [place] office to see Kaylin.

## 2024-01-09 DIAGNOSIS — I50.32 CHRONIC DIASTOLIC CONGESTIVE HEART FAILURE (HCC): Primary | ICD-10-CM

## 2024-01-09 DIAGNOSIS — I48.3 TYPICAL ATRIAL FLUTTER (HCC): ICD-10-CM

## 2024-01-11 ENCOUNTER — APPOINTMENT (OUTPATIENT)
Dept: LAB | Facility: CLINIC | Age: 79
End: 2024-01-11
Payer: COMMERCIAL

## 2024-01-11 DIAGNOSIS — I48.3 TYPICAL ATRIAL FLUTTER (HCC): ICD-10-CM

## 2024-01-11 DIAGNOSIS — I50.32 CHRONIC DIASTOLIC CONGESTIVE HEART FAILURE (HCC): ICD-10-CM

## 2024-01-11 LAB
APTT PPP: 34 SECONDS (ref 23–37)
INR PPP: 1.64 (ref 0.84–1.19)
PROTHROMBIN TIME: 19.1 SECONDS (ref 11.6–14.5)

## 2024-01-11 PROCEDURE — 36415 COLL VENOUS BLD VENIPUNCTURE: CPT

## 2024-01-11 PROCEDURE — 85610 PROTHROMBIN TIME: CPT

## 2024-01-11 PROCEDURE — 85730 THROMBOPLASTIN TIME PARTIAL: CPT

## 2024-01-18 ENCOUNTER — OFFICE VISIT (OUTPATIENT)
Dept: OBGYN CLINIC | Facility: CLINIC | Age: 79
End: 2024-01-18
Payer: COMMERCIAL

## 2024-01-18 ENCOUNTER — OFFICE VISIT (OUTPATIENT)
Dept: FAMILY MEDICINE CLINIC | Facility: CLINIC | Age: 79
End: 2024-01-18

## 2024-01-18 VITALS
WEIGHT: 233 LBS | HEIGHT: 58 IN | BODY MASS INDEX: 48.91 KG/M2 | DIASTOLIC BLOOD PRESSURE: 82 MMHG | SYSTOLIC BLOOD PRESSURE: 126 MMHG

## 2024-01-18 VITALS
DIASTOLIC BLOOD PRESSURE: 66 MMHG | HEART RATE: 94 BPM | SYSTOLIC BLOOD PRESSURE: 150 MMHG | OXYGEN SATURATION: 97 % | WEIGHT: 224 LBS | RESPIRATION RATE: 18 BRPM | TEMPERATURE: 98 F | BODY MASS INDEX: 47.02 KG/M2 | HEIGHT: 58 IN

## 2024-01-18 DIAGNOSIS — I48.3 TYPICAL ATRIAL FLUTTER (HCC): ICD-10-CM

## 2024-01-18 DIAGNOSIS — M17.11 PRIMARY OSTEOARTHRITIS OF RIGHT KNEE: ICD-10-CM

## 2024-01-18 DIAGNOSIS — M25.561 RIGHT KNEE PAIN, UNSPECIFIED CHRONICITY: Primary | ICD-10-CM

## 2024-01-18 DIAGNOSIS — J45.20 MILD INTERMITTENT ASTHMA WITHOUT COMPLICATION: ICD-10-CM

## 2024-01-18 DIAGNOSIS — J45.51 SEVERE PERSISTENT ASTHMA WITH ACUTE EXACERBATION: Primary | ICD-10-CM

## 2024-01-18 DIAGNOSIS — M25.361 INSTABILITY OF RIGHT KNEE JOINT: ICD-10-CM

## 2024-01-18 DIAGNOSIS — J45.31 MILD PERSISTENT ASTHMA WITH ACUTE EXACERBATION: ICD-10-CM

## 2024-01-18 DIAGNOSIS — I10 ESSENTIAL HYPERTENSION: ICD-10-CM

## 2024-01-18 DIAGNOSIS — K59.00 CONSTIPATION, UNSPECIFIED CONSTIPATION TYPE: ICD-10-CM

## 2024-01-18 PROCEDURE — 99214 OFFICE O/P EST MOD 30 MIN: CPT | Performed by: FAMILY MEDICINE

## 2024-01-18 NOTE — PROGRESS NOTES
Assessment:     1. Right knee pain, unspecified chronicity  XR knee 4+ vw right injury    Medial  Knee Brace    Ambulatory Referral to Physical Therapy      2. BMI 45.0-49.9, adult (HCC)  Medial  Knee Brace    Ambulatory Referral to Physical Therapy      3. Primary osteoarthritis of right knee  Ambulatory Referral to Physical Therapy      4. Instability of right knee joint          Orders Placed This Encounter   Procedures    Medial  Knee Brace    XR knee 4+ vw right injury    Ambulatory Referral to Physical Therapy        Impression:   Right knee  pain likely secondary to primary osteoarthritis.        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      Conservative Management   We discussed different treatment options:  Osteoarthritis treatment goal is to minimize pain and optimize function.  First-line management  Ice or Heat Therapy as needed 1-2 times daily for 10-20 minutes. As tolerated.   Over the counter Tylenol and/or NSAIDs  as needed based off your Past Medical Hx. Please follow product label for dosing and maximum limits.  If you have concerns about utilizing Tylenol/NSAIDs please discuss with your primary care physician.  Trial of over the counter Topical Analgesics such as Lidocaine cream or Voltaren Gel, as tolerated. If skin becomes irritated, discontinue use.   Please range joint through gentle range of motion, as tolerated.   Initiate Home Exercise Program for Stretching and Strengthening affected area.    Weight management may benefit lower extremity osteoarthritis.  Normal BMI 18 through 24.  May request referral to weight management or nutritionist/dietitian.  Initiate Formal Physical Therapy at any preferred location.    Prescription provided.    Additional management  Optional treatment measures include the following  For hip/knee osteoarthritis: Medial  brace for patients with medial  compartmental osteoarthritis.     Due to instability and MCL laxity will provide medial  brace today.  For hip/knee osteoarthritis assistive ambulation devices: Cane, walker     Patient is currently using cane for ambulation.  Interarticular glucocorticoid steroid if needing short-term pain relief   Patient has just received corticosteroid injection in November 2023.  May consider viscosupplementation and/or PRP injections   May consider viscosupplementation's in the future.  Referral to an orthopedic surgeon to discuss potential surgical management    Imaging   All imaging from today was reviewed by myself and explained to the patient.   01/18/2024 right knee x-ray: No acute osseous abnormality, moderate to severe osteoarthritis  Kellgren-Gilmar Classification     Present Grade Radiological findings   [] 0 No radiological findings   [] 1 Doubtful narrowing of joint space and possible osteophytic lipping   [] 2 Definite osteophytes and possible narrowing of joint space   [x] 3 Moderate multiple osteophytes, definitive narrowing of joint space, small pseudocystic areas with sclerotic walls and possibly deformity of bone contour   [x] 4 Large osteophytes, marked narrowing of joint space, severe sclerosis and definitive deformity of bone contour   **If more than 1 [x] is present patient is between grades       Procedure  No Injection performed today. May consider future corticosteroid injection depending on clinical exam/imaging.    Shared decision making, patient agreeable to plan.      Return for Follow up as needed or if symptoms do NOT improve.    HPI:   Jayla Moody is a 78 y.o. female  who presents for evaluation of   Chief Complaint   Patient presents with    Right Knee - Pain, Swelling, Numbness       Occupation: Retired   Injury Related: Yes, Date of Injury: Fell down 1 year ago    Onset/Mechanism: 3 years knee pain approximately 5 years off-and-on.  Pain has been worsening ever since fall 1 year  ago.  Location: Patella, deep to patella, medial and lateral joint line.  Severity: Current severity: 4/10.   Pain described as: stiff, ache   Radiation: radiate up into hip   Provocative: walking, standing.  Associated symptoms: Instability, swelling.    Denies any hx of fracture of affected limb.   Denies any surgical history of affected limb.      Summary of treatment to-date:   Tylenol  Formal therapy  Corticosteroid injections      Following History Reviewed and Updated     Past Medical History:   Diagnosis Date    Allergic rhinitis     Anemia     Asthma     Atrial fibrillation (HCC)     Chronic bronchitis (HCC)     COPD (chronic obstructive pulmonary disease) (HCC)     Coronary artery disease     CPAP (continuous positive airway pressure) dependence     Degenerative joint disease     GERD (gastroesophageal reflux disease)     Glaucoma     Hypertension     Lumbar disc disease     Lung cancer (HCC)     Periodic heart flutter     Pneumonia     Sleep apnea     suspected     Sleep apnea, obstructive     Ventricular arrhythmia     Vitamin D deficiency      Past Surgical History:   Procedure Laterality Date    APPENDECTOMY      COLON SURGERY      COLONOSCOPY N/A 03/18/2019    Procedure: COLONOSCOPY;  Surgeon: Alessia Wilson DO;  Location: [facility];  Service: Gastroenterology    ESOPHAGOGASTRODUODENOSCOPY N/A 03/18/2019    Procedure: ESOPHAGOGASTRODUODENOSCOPY (EGD);  Surgeon: Alessia Wilson DO;  Location: [facility];  Service: Gastroenterology    KNEE SURGERY      LUNG BIOPSY      ROTATOR CUFF REPAIR      SHOULDER SURGERY       Family History   Problem Relation Age of Onset    Stroke Mother     Diabetes Mother     Hyperlipidemia Mother     Hypertension Mother     Allergies Mother         Environmental    Asthma Mother     Diabetes Father     Hyperlipidemia Father     Hypertension Father     Lung cancer Father 70    Allergies Father         Environmental    Asthma Father     Lymphoma Sister 69    Heart  disease Sister         Pacemaker    Asthma Brother     Aneurysm Brother         Brain - had surgery    Other Brother         Lymes disease    Coronary artery disease Daughter         2 bypass done    No Known Problems Daughter     No Known Problems Daughter     No Known Problems Son     Kidney disease Son     Liver disease Son     Obesity Son        Social History     Substance and Sexual Activity   Alcohol Use Yes    Comment: occasionally     Social History     Substance and Sexual Activity   Drug Use Never     Social History     Tobacco Use   Smoking Status Former    Current packs/day: 0.00    Average packs/day: 0.3 packs/day for 25.0 years (6.3 ttl pk-yrs)    Types: Cigarettes    Start date:     Quit date:     Years since quittin.0   Smokeless Tobacco Former       Social Determinants of Health     Tobacco Use: Medium Risk (2024)    Patient History     Smoking Tobacco Use: Former     Smokeless Tobacco Use: Former     Passive Exposure: Not on file   Alcohol Use: Not At Risk (2021)    AUDIT-C     Frequency of Alcohol Consumption: Monthly or less     Average Number of Drinks: 1 or 2     Frequency of Binge Drinking: Never   Financial Resource Strain: High Risk (2023)    Overall Financial Resource Strain (CARDIA)     Difficulty of Paying Living Expenses: Hard   Food Insecurity: No Food Insecurity (2023)    Hunger Vital Sign     Worried About Running Out of Food in the Last Year: Never true     Ran Out of Food in the Last Year: Never true   Transportation Needs: No Transportation Needs (2023)    PRAPARE - Transportation     Lack of Transportation (Medical): No     Lack of Transportation (Non-Medical): No   Physical Activity: Inactive (11/15/2022)    Exercise Vital Sign     Days of Exercise per Week: 0 days     Minutes of Exercise per Session: 0 min   Stress: Not on file   Social Connections: Not on file   Intimate Partner Violence: Not on file   Depression: Not at risk (2021)  "   PHQ-2     PHQ-2 Score: 0   Housing Stability: Low Risk  (8/16/2023)    Housing Stability Vital Sign     Unable to Pay for Housing in the Last Year: No     Number of Places Lived in the Last Year: 1     Unstable Housing in the Last Year: No   Utilities: Not on file   Health Literacy: Not on file        No Known Allergies    Review of Systems      Review of Systems   Review of Systems   Constitutional: Negative for chills and fever.   HENT: Negative for drooling and sneezing.    Eyes: Negative for redness.   Respiratory: Negative for cough and wheezing.    Gastrointestinal: Negative for vomiting.   Psychiatric/Behavioral: Negative for behavioral problems. The patient is not nervous/anxious.      All other systems negative.   Physical Exam   Physical Exam    Vitals and nursing note reviewed.  Constitutional:   Appearance. Normal Appearance.  /82   Ht 4' 10\" (1.473 m)   Wt 106 kg (233 lb)   BMI 48.70 kg/m²     Body mass index is 48.7 kg/m².   HENT:  Head: Atraumatic.  Nose: Nose normal  Eyes: Conjunctiva/sclera: Conjunctivae normal.  Cardiovascular:   Rate and Rhythm: Bilateral equal distal pulses  Pulmonary:   Effort: Pulmonary effort is normal  Skin:   General: Skin is warm and dry.  Neurological:   General: No focal deficit present.  Mental Status: Alert and oriented to person, place, and time.   Psychiatric:   Mood and Affect: mood normal.  Behavior: Behavior normal     Musculoskeletal Exam     Ortho Exam     Right knee:     Inspection:  Gross deformity  Effusion Muscle atrophy Retracted muscle   Negative Negative Negative Negative     Palpation:  Increased warmth Crepitus Palpable muscle depression   Negative Negative Negative     Tenderness:  Quadriceps tendon Patella Patellar tendon  Joint line Tibial tubercle Pes anserine bursa Posterior knee   Neg. Neg. Neg. Medial and lateral joint line . Neg. Neg. Neg.      Bilateral Range of Motion:  Active flexion Passive flexion   (normal 135 degrees) (normal " "135 degrees)   Grossly, intact N/A     Active extension Passive extension   (normal 0 degrees) (normal 0 degrees)   Grossly, intact N/A       Bilateral strength:  Seated hip flexion Seated knee flexion Seated knee extension   5/5 5/5 5/5     Seated great toe extension Seated ankle dorsiflexion Seated ankle plantarflexion   5/5 5/5 5/5     Ligament testing:  Anterior cruciate ligament (ACL)  Posterior cruciate ligament (PCL) Medial Collateral Ligament (MCL)  Lateral Collateral Ligament (LCL):   Lachman's Posterior drawer's Valgus Varus   N/A N/A Mild laxity with discomfort Negative for laxity     Meniscal testing:  Medial Bar Lateral Bar Apley's Thessaly's Bounce home test   Discomfort Negative N/A N/A N/A     Neurovascular:  Sensation to light touch Posterior tibial artery   Intact and equal bilaterally Intact and equal bilaterally              Procedures       Portions of the record may have been created with voice recognition software. Occasional wrong word or \"sound alike\" substitutions may have occurred due to the inherent limitations of voice recognition software. Please review the chart carefully and recognize, using context, where substitutions/typographical errors may have occurred.   "

## 2024-01-19 NOTE — ASSESSMENT & PLAN NOTE
Mixed asthma and severe COPD picture.  Patient would prefer not to be on powdered inhalers.  At this point we will put patient on antimuscarinic and long-acting beta agonist. Will follow up with patient in 6 weeks to assess for symptoms.  Instructed patient to wean down her every 3 albuterol and ipratropium inhaler.

## 2024-01-19 NOTE — ASSESSMENT & PLAN NOTE
BP Readings from Last 3 Encounters:   01/18/24 150/66   01/18/24 126/82   12/11/23 126/82          Follows with cardiology  Home medications: lasix 20 mg qd, lopressor 25 mg qd,  -Continue home meds  -f/u in 3 months

## 2024-01-19 NOTE — PROGRESS NOTES
Assessment/Plan:    1. Severe persistent asthma with acute exacerbation  Assessment & Plan:  Mixed asthma and severe COPD picture.  Patient would prefer not to be on powdered inhalers.  At this point we will put patient on antimuscarinic and long-acting beta agonist. Will follow up with patient in 6 weeks to assess for symptoms.  Instructed patient to wean down her every 3 albuterol and ipratropium inhaler.    Orders:  -     glycopyrrolate-formoterol (BEVESPI AEROSPHERE) 9-4.8 MCG/ACT inhaler; Inhale 2 puffs 2 (two) times a day    2. Essential hypertension  Assessment & Plan:  BP Readings from Last 3 Encounters:   01/18/24 150/66   01/18/24 126/82   12/11/23 126/82          Follows with cardiology  Home medications: lasix 20 mg qd, lopressor 25 mg qd,  -Continue home meds  -f/u in 3 months       3. Constipation, unspecified constipation type  Comments:  Will do a trial  of milk of magnesium  Orders:  -     magnesium hydroxide (MILK OF MAGNESIA) 400 mg/5 mL oral suspension; Take 15 mL by mouth daily as needed for constipation    4. Typical atrial flutter (HCC)  Assessment & Plan:  Currently following with cardiology .   Home Medications: amiodarone 200 mg qam to maintain sinus rhythm,   Eliquis 5 mg qd for thromboembolic prophylaxis, metoprolol tartate 25 mg qd    -Continue following with cardiology  -F/U with pcp in 3 months           5. Mild persistent asthma with acute exacerbation  -     glycopyrrolate-formoterol (BEVESPI AEROSPHERE) 9-4.8 MCG/ACT inhaler; Inhale 2 puffs 2 (two) times a day    6. Mild intermittent asthma without complication  -     glycopyrrolate-formoterol (BEVESPI AEROSPHERE) 9-4.8 MCG/ACT inhaler; Inhale 2 puffs 2 (two) times a day         Subjective:      Patient ID: Jayla Moody is a 78 y.o. female.    Past medical history notable for social determinants of health recently attending [facility], recent medicine for this patient has been limited on her inhalers due to not having insurance  "this coming in for follow-up appointment.  Currently patient is using ipratropium and albuterol nebulizer 3 times daily.  Patient reports that her wheezing and shortness of breath has improved.  Patient denies any current chest pain or shortness of breath at this time.        The following portions of the patient's history were reviewed and updated as appropriate: allergies, current medications, past family history, past medical history, past social history, past surgical history, and problem list.    Review of Systems   Constitutional:  Negative for chills and fever.   HENT:  Negative for ear pain and sore throat.    Eyes:  Negative for pain and visual disturbance.   Respiratory:  Negative for cough and shortness of breath.    Cardiovascular:  Negative for chest pain and palpitations.   Gastrointestinal:  Negative for abdominal pain and vomiting.   Genitourinary:  Negative for dysuria and hematuria.   Musculoskeletal:  Negative for arthralgias and back pain.   Skin:  Negative for color change and rash.   Neurological:  Negative for seizures and syncope.   All other systems reviewed and are negative.        Objective:      /66 (BP Location: Left arm, Patient Position: Sitting, Cuff Size: Large)   Pulse 94   Temp 98 °F (36.7 °C) (Temporal)   Resp 18   Ht 4' 10\" (1.473 m)   Wt 102 kg (224 lb)   SpO2 97%   BMI 46.82 kg/m²          Physical Exam  Constitutional:       General: She is not in acute distress.     Appearance: Normal appearance.   HENT:      Nose: No congestion or rhinorrhea.   Eyes:      Extraocular Movements: Extraocular movements intact.      Pupils: Pupils are equal, round, and reactive to light.   Cardiovascular:      Rate and Rhythm: Normal rate and regular rhythm.      Pulses: Normal pulses.      Heart sounds: Normal heart sounds. No murmur heard.     No gallop.   Pulmonary:      Effort: Pulmonary effort is normal.      Breath sounds: Normal breath sounds. No wheezing, rhonchi or rales. "   Abdominal:      General: Bowel sounds are normal. There is no distension.      Palpations: Abdomen is soft. There is no mass.      Tenderness: There is no abdominal tenderness.      Hernia: No hernia is present.   Skin:     General: Skin is warm.      Coloration: Skin is not jaundiced.      Findings: No bruising.   Neurological:      General: No focal deficit present.      Mental Status: She is alert and oriented to person, place, and time.   Psychiatric:         Mood and Affect: Mood normal.         Behavior: Behavior normal.         Thought Content: Thought content normal.           Kaiser Kohler DO   Family Medicine PGY-3  1/19/2024

## 2024-01-22 ENCOUNTER — TELEPHONE (OUTPATIENT)
Dept: HEMATOLOGY ONCOLOGY | Facility: CLINIC | Age: 79
End: 2024-01-22

## 2024-01-22 NOTE — TELEPHONE ENCOUNTER
Patient Call    Who are you speaking with? Patient    If it is not the patient, are they listed on an active communication consent form? N/A   What is the reason for this call? Pt asked if there is prep for test on 3/4. Advised no prep is required   Does this require a call back? N/A   If a call back is required, please list best call back number N/a   If a call back is required, advise that a message will be forwarded to their care team and someone will return their call as soon as possible.   Did you relay this information to the patient? N/A

## 2024-01-25 ENCOUNTER — OFFICE VISIT (OUTPATIENT)
Dept: NEPHROLOGY | Facility: CLINIC | Age: 79
End: 2024-01-25
Payer: COMMERCIAL

## 2024-01-25 VITALS
DIASTOLIC BLOOD PRESSURE: 68 MMHG | BODY MASS INDEX: 46.39 KG/M2 | HEIGHT: 58 IN | SYSTOLIC BLOOD PRESSURE: 138 MMHG | WEIGHT: 221 LBS

## 2024-01-25 DIAGNOSIS — N18.32 STAGE 3B CHRONIC KIDNEY DISEASE (HCC): ICD-10-CM

## 2024-01-25 DIAGNOSIS — E55.9 VITAMIN D DEFICIENCY: ICD-10-CM

## 2024-01-25 DIAGNOSIS — C34.90 NON-SMALL CELL LUNG CANCER, UNSPECIFIED LATERALITY (HCC): ICD-10-CM

## 2024-01-25 DIAGNOSIS — I10 ESSENTIAL HYPERTENSION: Primary | ICD-10-CM

## 2024-01-25 DIAGNOSIS — I48.3 TYPICAL ATRIAL FLUTTER (HCC): ICD-10-CM

## 2024-01-25 DIAGNOSIS — R60.0 LOWER EXTREMITY EDEMA: ICD-10-CM

## 2024-01-25 DIAGNOSIS — G47.33 OSA (OBSTRUCTIVE SLEEP APNEA): ICD-10-CM

## 2024-01-25 DIAGNOSIS — I50.30 DIASTOLIC CHF (HCC): ICD-10-CM

## 2024-01-25 DIAGNOSIS — E66.01 MORBID OBESITY (HCC): ICD-10-CM

## 2024-01-25 DIAGNOSIS — I50.31 ACUTE DIASTOLIC CONGESTIVE HEART FAILURE (HCC): ICD-10-CM

## 2024-01-25 DIAGNOSIS — J45.50 SEVERE PERSISTENT ASTHMA WITHOUT COMPLICATION: ICD-10-CM

## 2024-01-25 PROCEDURE — 99214 OFFICE O/P EST MOD 30 MIN: CPT | Performed by: NURSE PRACTITIONER

## 2024-01-25 RX ORDER — MELATONIN
2000 DAILY
Qty: 180 TABLET | Refills: 0 | Status: SHIPPED | OUTPATIENT
Start: 2024-01-25

## 2024-01-25 NOTE — PATIENT INSTRUCTIONS
We are seeing you today for follow up on your kidneys. Your function remains stable. Continue to hydrate. Avoid motrin products. We discussed stopping your lasix. Please call if you are having weight gain or increased swelling. You may restart your lasix at that time. Please avoid a high salt diet. I would like blood test in one month and then again in 4 months prior to your next appointment. We started vitamin D today.     Please call us in the meantime if you have additional questions or concerns. You may also message in my chart.

## 2024-01-25 NOTE — PROGRESS NOTES
OFFICE FOLLOW UP - Nephrology   Jayla Moody 78 y.o. female MRN: [id number]       ASSESSMENT and PLAN:  Jayla was seen today for follow-up and chronic kidney disease.    Diagnoses and all orders for this visit:    Essential hypertension    Acute diastolic congestive heart failure (HCC)    Typical atrial flutter (HCC)    MONO (obstructive sleep apnea)    Severe persistent asthma without complication    Non-small cell lung cancer, unspecified laterality (HCC)    Stage 3b chronic kidney disease (HCC)  -     Basic metabolic panel; Future  -     Basic metabolic panel; Future  -     CBC; Future  -     Urinalysis Macroscopic; Future    Lower extremity edema    Morbid obesity (HCC)    Vitamin D deficiency  -     cholecalciferol (VITAMIN D3) 1,000 units tablet; Take 2 tablets (2,000 Units total) by mouth daily  -     Vitamin D 25 hydroxy; Future    Diastolic CHF (HCC)        CKD stage IIIB: Etiology of chronic disease suspected secondary to age-related nephron loss, hypertensive nephrosclerosis, possible cardiorenal syndrome, as well as nephrotoxicity in the setting of alectinib.  Baseline creatinine 1.0-1.3.  More recently 1.6-1.7 since end of 2023. I believe that her elevation is from her cancer treatment. Follows with Dr. Germain.  -most recent labs showed creatinine of 1.7 to.  -Most recent UA: negative for blood pr protein.   -Urine microalbumin to creatinine ratio: 0.28.  -SPEP/UPEP negative for monoclonal banding.  -Most recent renal imaging with 1 cm simple cyst on right, negative for hydro.  -Maintained on Lasix 20 mg daily, decreased from prior 40 mg daily in December 2023.  -recommend avoiding nephrotoxins including ibuprofen, motrin , and advil.   -encourage oral hydration.   -check labs including RFP, CBC, UA prior to next appointment.     Hypokalemia: Noted normal mag and Phos.  Currently on Klor-Con 20 mEq daily.  -Lasix stopped today. Repeat level in a couple of weeks. Continue to have potassium I her  foods.    Hypertension: Blood pressure currently 138/68 mmHg.  Goal blood pressure less than 130/80.  -Current medications: Metoprolol 25 mg every 12 hours, Lasix 20 mg daily, amiodarone 200 mg daily.  -medication changes: discussed stopping lasix today.  -recommend low salt diet.   -please check blood pressure daily and keep record.     Volume status/CHF: Most recent echo with EF of 61% and grade 1 diastolic dysfunction. Chronic LE edema. Reduction in Lasix dose in December D/T increasing creatinine. Her chemo can cause LE edema. She reports drinking about <1 L per day. She reports her swelling worsens throughout the day and is better in the morning. She has chronic shortness of breathe.   -check weight daily. Call office for 2-3 lb weight gain in 1 day or 5 lbs in one week. Also, for increased LE edema or shortness of breathe.   -current diuretic:Lasix 20 mg po daily. Discussed stopping today. Patient would like to stop. We discussed checking her weight daily and for increased fluids in her legs. She is to message or call Dr. Germain if this is getting worse so she can restart and repeat a blood test.   -dry weight: reports about 10 lb weight loss over the past 2 months.   -cardiologist: Dr. butt.    MBD in CKD:  -Vitamin D level slightly low at 26.3, .7. Normal phos and Mag level.   -start on vitamin D supplement.     Asthma/COPD/MONO on CPAP: Maintained on maintenance inhalers.    Atrial flutter: Following with cardiology.  Maintained on Eliquis for anticoagulation, beta-blocker, and amiodarone.    History of lung cancer: Maintained on alectinib. May need to discuss with oncology if creatinine continues to increase.    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  Receiving Zometa infusions.    Other: Osteoarthritis, GERD    Patient will follow up in 4 months  with Dr. Germain.        HPI: Jayla Moody is a 78 y.o. female who is here for routine follow up. She reports doing well. She denies  hospitalizations. She denies chest pain or shortness of breathe. She denies nausea, vomiting, or diarrhea. She is urinating well. She reports stable LE edema. She has a good appetite. She continues to loose weight since stopping her salt intake. She ocntinues ot have arthritis in her knee.     ROS:   A complete review of systems was done. Pertinent positives and negatives as noted in the HPI, otherwise the review of systems is negative.    Allergies: Patient has no known allergies.    Medications:   Current Outpatient Medications:     acetaminophen (TYLENOL) 650 mg CR tablet, , Disp: , Rfl:     albuterol (Ventolin HFA) 90 mcg/act inhaler, Inhale 2 puffs every 6 (six) hours as needed for wheezing, Disp: 8.5 g, Rfl: 2    Alectinib HCl 150 MG CAPS, Take 4 capsules (600 mg total) by mouth 2 (two) times a day, Disp: 240 capsule, Rfl: 5    amiodarone 200 mg tablet, Take 1 tablet (200 mg total) by mouth daily with breakfast, Disp: 90 tablet, Rfl: 3    apixaban (ELIQUIS) 5 mg, Take 1 tablet (5 mg total) by mouth 2 (two) times a day, Disp: 60 tablet, Rfl: 11    benralizumab (FASENRA) subcutaneous injection, Inject 1 mL (30 mg total) under the skin every 56 days, Disp: 1 mL, Rfl: 6    budesonide (PULMICORT) 0.25 mg/2 mL nebulizer solution, Take 2 mL (0.25 mg total) by nebulization 2 (two) times a day Rinse mouth after use., Disp: 360 mL, Rfl: 2    cholecalciferol (VITAMIN D3) 1,000 units tablet, Take 2 tablets (2,000 Units total) by mouth daily, Disp: 180 tablet, Rfl: 0    fexofenadine (ALLEGRA) 180 MG tablet, Take 180 mg by mouth daily, Disp: , Rfl:     fluticasone (FLONASE) 50 mcg/act nasal spray, SPRAY 2 SPRAYS INTO EACH NOSTRIL EVERY DAY, Disp: 48 mL, Rfl: 1    glycopyrrolate-formoterol (BEVESPI AEROSPHERE) 9-4.8 MCG/ACT inhaler, Inhale 2 puffs 2 (two) times a day, Disp: 10.7 g, Rfl: 5    ipratropium (ATROVENT) 0.02 % nebulizer solution, Take 2.5 mL (0.5 mg total) by nebulization 3 (three) times a day, Disp: 225 mL, Rfl:  2    Klor-Con M20 20 MEQ tablet, TAKE 1 TABLET BY MOUTH EVERY DAY, Disp: 90 tablet, Rfl: 1    magnesium hydroxide (MILK OF MAGNESIA) 400 mg/5 mL oral suspension, Take 15 mL by mouth daily as needed for constipation, Disp: 354 mL, Rfl: 3    metoprolol tartrate (LOPRESSOR) 25 mg tablet, TAKE 1 TABLET (25 MG TOTAL) BY MOUTH EVERY 12 (TWELVE) HOURS, Disp: 180 tablet, Rfl: 1    omeprazole (PriLOSEC) 20 mg delayed release capsule, TAKE 1 CAPSULE BY MOUTH EVERY DAY, Disp: 90 capsule, Rfl: 1    oxyCODONE-acetaminophen (PERCOCET) 5-325 mg per tablet, Take 1 tablet by mouth every 4 (four) hours as needed for moderate pain For ongoing pain Max Daily Amount: 6 tablets, Disp: 60 tablet, Rfl: 0    rosuvastatin (CRESTOR) 10 MG tablet, Take 1 tablet (10 mg total) by mouth daily, Disp: 90 tablet, Rfl: 3    Past Medical History:   Diagnosis Date    Allergic rhinitis     Anemia     Asthma     Atrial fibrillation (HCC)     Chronic bronchitis (HCC)     COPD (chronic obstructive pulmonary disease) (HCC)     Coronary artery disease     CPAP (continuous positive airway pressure) dependence     Degenerative joint disease     GERD (gastroesophageal reflux disease)     Glaucoma     Hypertension     Lumbar disc disease     Lung cancer (HCC)     Periodic heart flutter     Pneumonia     Sleep apnea     suspected     Sleep apnea, obstructive     Ventricular arrhythmia     Vitamin D deficiency      Past Surgical History:   Procedure Laterality Date    APPENDECTOMY      COLON SURGERY      COLONOSCOPY N/A 03/18/2019    Procedure: COLONOSCOPY;  Surgeon: Alessia Wilson DO;  Location: [facility];  Service: Gastroenterology    ESOPHAGOGASTRODUODENOSCOPY N/A 03/18/2019    Procedure: ESOPHAGOGASTRODUODENOSCOPY (EGD);  Surgeon: Alessia Wilson DO;  Location: [facility];  Service: Gastroenterology    KNEE SURGERY      LUNG BIOPSY      ROTATOR CUFF REPAIR      SHOULDER SURGERY       Family History   Problem Relation Age of Onset    Stroke Mother      "Diabetes Mother     Hyperlipidemia Mother     Hypertension Mother     Allergies Mother         Environmental    Asthma Mother     Diabetes Father     Hyperlipidemia Father     Hypertension Father     Lung cancer Father 70    Allergies Father         Environmental    Asthma Father     Lymphoma Sister 69    Heart disease Sister         Pacemaker    Asthma Brother     Aneurysm Brother         Brain - had surgery    Other Brother         Lymes disease    Coronary artery disease Daughter         2 bypass done    No Known Problems Daughter     No Known Problems Daughter     No Known Problems Son     Kidney disease Son     Liver disease Son     Obesity Son       reports that she quit smoking about 37 years ago. Her smoking use included cigarettes. She started smoking about 62 years ago. She has a 6.3 pack-year smoking history. She has quit using smokeless tobacco. She reports current alcohol use. She reports that she does not use drugs.      Physical Exam:   Vitals:    01/25/24 1347   BP: 138/68   BP Location: Left arm   Patient Position: Sitting   Cuff Size: Large   Weight: 100 kg (221 lb)   Height: 4' 10\" (1.473 m)     Body mass index is 46.19 kg/m².    General: no acute distress   Eyes: conjunctivae pink, anicteric sclerae  ENT: mucous membranes moist  Neck: supple, no JVD  Chest: clear to auscultation bilaterally with no wheezes, rale or rhochi  CVS: regular rate and rhythm   Abdomen: soft, non-tender, non-distended  Extremities: B/L lower extremity edema   Skin: no rash  Neuro: awake and alert       Lab Results:  Results for orders placed or performed in visit on 01/11/24   Protime-INR   Result Value Ref Range    Protime 19.1 (H) 11.6 - 14.5 seconds    INR 1.64 (H) 0.84 - 1.19   APTT   Result Value Ref Range    PTT 34 23 - 37 seconds             Invalid input(s): \"ALBUMIN\"      Portions of the record may have been created with voice recognition software. Occasional wrong word or \"sound a like\" substitutions may have " occurred due to the inherent limitations of voice recognition software. Read the chart carefully and recognize, using context, where substitutions have occurred.If you have any questions, please contact the dictating provider.

## 2024-02-05 ENCOUNTER — TELEPHONE (OUTPATIENT)
Dept: NEPHROLOGY | Facility: CLINIC | Age: 79
End: 2024-02-05

## 2024-02-05 DIAGNOSIS — N28.9 ABNORMAL RENAL FUNCTION: Primary | ICD-10-CM

## 2024-02-05 NOTE — TELEPHONE ENCOUNTER
Patient stated she did not take her water pills for a week and her fingers started to swell. Patient stated she is now back on 20 mg of lasix a day and her swelling is decreasing. Patient stated she was told to call and inform Kaylin if she did go back on her lasix.

## 2024-02-06 ENCOUNTER — TELEPHONE (OUTPATIENT)
Dept: FAMILY MEDICINE CLINIC | Facility: CLINIC | Age: 79
End: 2024-02-06

## 2024-02-06 NOTE — TELEPHONE ENCOUNTER
Received message on teams from Manager, Dayan to contact patient because she has been trying to reach us. Desireelupe stated that she has left messages on the nurse line for over a week and a half and nobody has gotten back to her. She says she saw her allergist yesterday and they were able to send inhaler for her. At this time she does not need anything else.

## 2024-02-12 ENCOUNTER — TRANSCRIBE ORDERS (OUTPATIENT)
Dept: LAB | Facility: CLINIC | Age: 79
End: 2024-02-12

## 2024-02-12 ENCOUNTER — APPOINTMENT (OUTPATIENT)
Dept: LAB | Facility: CLINIC | Age: 79
End: 2024-02-12
Payer: COMMERCIAL

## 2024-02-12 DIAGNOSIS — C79.51 MALIGNANT NEOPLASM METASTATIC TO BONE (HCC): ICD-10-CM

## 2024-02-12 DIAGNOSIS — E55.9 VITAMIN D DEFICIENCY: ICD-10-CM

## 2024-02-12 DIAGNOSIS — N18.32 CHRONIC KIDNEY DISEASE (CKD) STAGE G3B/A1, MODERATELY DECREASED GLOMERULAR FILTRATION RATE (GFR) BETWEEN 30-44 ML/MIN/1.73 SQUARE METER AND ALBUMINURIA CREATININE RATIO LESS THAN 30 MG/G (HCC): Primary | ICD-10-CM

## 2024-02-12 DIAGNOSIS — N28.9 ABNORMAL RENAL FUNCTION: ICD-10-CM

## 2024-02-12 DIAGNOSIS — N18.32 STAGE 3B CHRONIC KIDNEY DISEASE (HCC): ICD-10-CM

## 2024-02-12 DIAGNOSIS — C34.90 NON-SMALL CELL LUNG CANCER, UNSPECIFIED LATERALITY (HCC): ICD-10-CM

## 2024-02-12 LAB
25(OH)D3 SERPL-MCNC: 31.2 NG/ML (ref 30–100)
ANION GAP SERPL CALCULATED.3IONS-SCNC: 9 MMOL/L
BUN SERPL-MCNC: 30 MG/DL (ref 5–25)
CALCIUM SERPL-MCNC: 9.2 MG/DL (ref 8.4–10.2)
CHLORIDE SERPL-SCNC: 100 MMOL/L (ref 96–108)
CO2 SERPL-SCNC: 34 MMOL/L (ref 21–32)
CREAT SERPL-MCNC: 1.63 MG/DL (ref 0.6–1.3)
ERYTHROCYTE [DISTWIDTH] IN BLOOD BY AUTOMATED COUNT: 16.1 % (ref 11.6–15.1)
GFR SERPL CREATININE-BSD FRML MDRD: 30 ML/MIN/1.73SQ M
GLUCOSE P FAST SERPL-MCNC: 103 MG/DL (ref 65–99)
HCT VFR BLD AUTO: 30.4 % (ref 34.8–46.1)
HGB BLD-MCNC: 10 G/DL (ref 11.5–15.4)
MCH RBC QN AUTO: 31.3 PG (ref 26.8–34.3)
MCHC RBC AUTO-ENTMCNC: 32.9 G/DL (ref 31.4–37.4)
MCV RBC AUTO: 95 FL (ref 82–98)
PLATELET # BLD AUTO: 108 THOUSANDS/UL (ref 149–390)
POTASSIUM SERPL-SCNC: 4 MMOL/L (ref 3.5–5.3)
RBC # BLD AUTO: 3.19 MILLION/UL (ref 3.81–5.12)
SODIUM SERPL-SCNC: 143 MMOL/L (ref 135–147)
WBC # BLD AUTO: 7.22 THOUSAND/UL (ref 4.31–10.16)

## 2024-02-12 PROCEDURE — 80048 BASIC METABOLIC PNL TOTAL CA: CPT

## 2024-02-12 PROCEDURE — 36415 COLL VENOUS BLD VENIPUNCTURE: CPT

## 2024-02-12 PROCEDURE — 82306 VITAMIN D 25 HYDROXY: CPT

## 2024-02-12 PROCEDURE — 85027 COMPLETE CBC AUTOMATED: CPT

## 2024-02-14 ENCOUNTER — EVALUATION (OUTPATIENT)
Dept: PHYSICAL THERAPY | Facility: CLINIC | Age: 79
End: 2024-02-14
Payer: COMMERCIAL

## 2024-02-14 DIAGNOSIS — M25.561 RIGHT KNEE PAIN, UNSPECIFIED CHRONICITY: ICD-10-CM

## 2024-02-14 DIAGNOSIS — M17.11 PRIMARY OSTEOARTHRITIS OF RIGHT KNEE: ICD-10-CM

## 2024-02-14 PROCEDURE — 97161 PT EVAL LOW COMPLEX 20 MIN: CPT

## 2024-02-14 PROCEDURE — 97110 THERAPEUTIC EXERCISES: CPT

## 2024-02-14 PROCEDURE — 97112 NEUROMUSCULAR REEDUCATION: CPT

## 2024-02-14 NOTE — PROGRESS NOTES
PT Evaluation     Today's date: 2024  Patient name: Jayla Moody  : 1945  MRN: [id number]  Referring provider: Ramila Holder*  Dx:   Encounter Diagnosis     ICD-10-CM    1. Right knee pain, unspecified chronicity  M25.561 Ambulatory Referral to Physical Therapy      2. BMI 45.0-49.9, adult ([facility])  Z68.42 Ambulatory Referral to Physical Therapy      3. Primary osteoarthritis of right knee  M17.11 Ambulatory Referral to Physical Therapy          Start Time: 1530  Stop Time: 1615  Total time in clinic (min): 45 minutes    Assessment  Assessment details: Pt is a 78 y.o. year old female presenting to physical therapy for chronic right knee pain, BMI 45.0-49.9, adult ([facility]), and primary osteoarthritis of right knee.  She presents with the following impairments: decreased strength in all right hip and knee musculature, increased tension in right hamstring, ITB, hip flexor, and gastroc-soleus complex, fair/poor activation and endurance of quad musculature, gait dysfunction, and poor squatting mechanics affecting her function with walking, bending, squatting, navigating stairs, and ADLs. Pt will benefit from skilled physical therapy to address functional limitations noted in evaluation and meet patient goals.   Impairments: abnormal muscle firing, abnormal or restricted ROM, activity intolerance, impaired physical strength, lacks appropriate home exercise program, pain with function and poor body mechanics    Symptom irritability: moderateUnderstanding of Dx/Px/POC: good   Prognosis: good    Goals  ST. Pt will be independent with HEP.  2. Pt will improve right knee mobility deficits by 50%   3. Pt will improve FOTO score from baseline set during initial evaluation  LT. Pt will be able to squat 10 times with proper form and no increase in symptoms  2. Pt will improve right knee strength grossly by 2 grades or more  3. Pt will be able to navigate 3 flights of stairs without increase in  symptoms  4. Pt will reach FOTO goal set by gail during initial evaluation     Plan  Plan details: 1x a week discussed due to high co-pay.  Patient would benefit from: PT eval and skilled physical therapy  Planned modality interventions: biofeedback, manual electrical stimulation, microcurrent electrical stimulation, TENS, electrical stimulation/Russian stimulation, thermotherapy: hydrocollator packs, cryotherapy and unattended electrical stimulation  Planned therapy interventions: abdominal trunk stabilization, joint mobilization, manual therapy, massage, ADL retraining, neuromuscular re-education, body mechanics training, patient education, postural training, strengthening, stretching, therapeutic activities, therapeutic exercise, flexibility, functional ROM exercises and home exercise program  Frequency: 1x week  Duration in weeks: 6  Treatment plan discussed with: patient      Subjective Evaluation    History of Present Illness  Mechanism of injury: Pt is a 78 y.o. female presenting with right knee pain. Pt reports that this pain has been present since November 2023 now, with no MACHELLE. Pt received X-rays of her right knee on 01/18/2024 which showed moderate to severe arthritis. Pt noted their pain is usually located on the medial portion of her right knee. Pt reports that prolonged walking (90 feet), bending, squatting, and navigating stairs is what causes them the most pain and are the most difficult movements for them to perfrom. Pt reports that medication helps relieve the pain. Pt stated that their main goals for therapy are pain reduction and improved function with walking, bending, squatting, navigating stairs, and ADLs. Pt also noted that she has a pulmonary condition that heavily impacts her conditioning and blood flow to her legs.  Quality of life: good    Patient Goals  Patient goals for therapy: decreased edema, decreased pain, improved balance, increased motion, increased strength and independence with  "ADLs/IADLs    Pain  Current pain ratin  At best pain ratin  At worst pain ratin  Quality: pressure and discomfort  Relieving factors: medications  Aggravating factors: stair climbing, standing and lifting        Objective     Active Range of Motion   Left Knee   Flexion: WFL  Extension: WFL    Right Knee   Flexion: WFL  Extension: WFL    Strength/Myotome Testing     Left Hip   Planes of Motion   Flexion: 4+  Extension: 4  Abduction: 4  External rotation: 5  Internal rotation: 5    Right Hip   Planes of Motion   Flexion: 3+  Extension: 3+  Abduction: 3+  External rotation: 3+  Internal rotation: 3+    Left Knee   Flexion: 4  Extension: 5    Right Knee   Flexion: 3+  Extension: 5    Tests     Left Knee   Negative anterior drawer, anterior Lachman, lateral Bar, medial Bar, posterior drawer, valgus stress test at 0 degrees and varus stress test at 0 degrees.     Right Knee   Negative anterior drawer, anterior Lachman, lateral Bar, medial Bar, posterior drawer, valgus stress test at 0 degrees and varus stress test at 0 degrees.     General Comments:      Knee Comments  Gait: Antalgic gait, weight shifted to the left.  Squatting: Poor depth, weight forward, heel leave ground  Increased tension in right hamstring, ITB, hip flexor, and gastroc soleus complex  Fair/Poor quad activation and endurance.              Precautions: BMI 40+, Pulmonary condition effecting endurance and blood flow to extemities      Date             Visit # IE            FOTO IE             Re-eval IE               Manuals             Knee PROM             Patellar Mob             Quad/ITB STM                          Neuro Re-Ed             Hamstring Str 3x20\"            Seated Calf Str             ITB Str             Bridges 10x PTB            Clamshells 10x PTB            Ankle Pumps             Slantboard             Hip Flexor Str             Hamstring Curl             SLS             Ther Ex       " "      Bike             SLR             Quad Set 10x5\"            SAQ 10x5\"            LAQ             SL Hip Abd 10x             Leg Press             SL Leg Press             Side Stepping             Monster Walks             3-way Hip kicks             Split Squats             Ther Activity 2/14            Squatting             Step-Ups             Gait Training 2/14                                      Modalities 2/14                                              "

## 2024-02-15 ENCOUNTER — TELEPHONE (OUTPATIENT)
Dept: CARDIOLOGY CLINIC | Facility: CLINIC | Age: 79
End: 2024-02-15

## 2024-02-15 NOTE — TELEPHONE ENCOUNTER
"Patient calling to say she has been on Eliquis 5 mgs bid for the past 6 months, but recently she has bruising all over and seems to be excessive. No external bleeding at all. She was looking for advisement or lower dose?  \"What is considered too much bruising?\"  "

## 2024-02-18 DIAGNOSIS — J45.20 MILD INTERMITTENT ASTHMA WITHOUT COMPLICATION: ICD-10-CM

## 2024-02-19 RX ORDER — ALBUTEROL SULFATE 90 UG/1
AEROSOL, METERED RESPIRATORY (INHALATION)
Qty: 18 G | Refills: 2 | Status: SHIPPED | OUTPATIENT
Start: 2024-02-19

## 2024-02-21 ENCOUNTER — OFFICE VISIT (OUTPATIENT)
Dept: PHYSICAL THERAPY | Facility: CLINIC | Age: 79
End: 2024-02-21
Payer: COMMERCIAL

## 2024-02-21 DIAGNOSIS — M17.11 PRIMARY OSTEOARTHRITIS OF RIGHT KNEE: ICD-10-CM

## 2024-02-21 DIAGNOSIS — M25.561 RIGHT KNEE PAIN, UNSPECIFIED CHRONICITY: Primary | ICD-10-CM

## 2024-02-21 PROBLEM — Z12.11 COLON CANCER SCREENING: Status: RESOLVED | Noted: 2019-01-22 | Resolved: 2024-02-21

## 2024-02-21 PROCEDURE — 97112 NEUROMUSCULAR REEDUCATION: CPT

## 2024-02-21 PROCEDURE — 97110 THERAPEUTIC EXERCISES: CPT

## 2024-02-21 NOTE — PROGRESS NOTES
"Daily Note     Today's date: 2024  Patient name: Jayla Moody  : 1945  MRN: [id number]  Referring provider: Ramila Holder*  Dx:   Encounter Diagnosis     ICD-10-CM    1. Right knee pain, unspecified chronicity  M25.561       2. Primary osteoarthritis of right knee  M17.11       3. BMI 45.0-49.9, adult ([facility])  Z68.42           Start Time: 1430  Stop Time: 1510  Total time in clinic (min): 40 minutes    Subjective: Pt reports that her knee is feeling better sine the initial evaluation.       Objective: See treatment diary below      Assessment: Pt tolerated treatment well. Used warm up bike to help improve blood flow and initiate activation of LE musculature. Pt was able to complete all 6' of warm-up with no exacerbation of symptoms in knee, but was slightly challenged with cardiovascular system. Pt showed improved activation of her quad in her right knee with all quad sets, SAQ, LAQ, and SLR. Pt did experience increased fatigue and needed rest breaks during quad activation exercises, but once rest breaks were taken she was able to complete with proper form. Added slantboard to decrease tension in gastroc-soleus complex. Continue to challenge pt as protocol permits. Patient exhibited good technique with therapeutic exercises and would benefit from continued PT      Plan: Continue per plan of care.  Progress treatment as tolerated.       Precautions: BMI 40+, Pulmonary condition effecting endurance and blood flow to extemities      Date            Visit # IE 2           FOTO IE             Re-eval IE               Manuals            Knee PROM             Patellar Mob             Quad/ITB STM                          Neuro Re-Ed            Hamstring Str 3x20\" 3x30\"           Seated Calf Str             ITB Str  3x30\"           Bridges 10x PTB 20x PTB           Clamshells 10x PTB 20x PTB           Ankle Pumps             Slantboard  3x30\"           Hip Flexor Str          " "   Hamstring Curl             SLS             Ther Ex 2/14 2/21           Bike  6'           SLR             Quad Set 10x5\" 15x5\" towel under knee           SAQ 10x5\" 15x 5\"            LAQ  20x 5\"            SLR  2x10 3\"           SL Hip Abd 10x  20x 3\"           Leg Press             SL Leg Press             Side Stepping             Monster Walks             3-way Hip kicks             Split Squats             Ther Activity 2/14 2/21           Squatting             Step-Ups             Gait Training 2/14 2/21                                     Modalities 2/14                                              "

## 2024-02-22 ENCOUNTER — TELEPHONE (OUTPATIENT)
Dept: NON INVASIVE DIAGNOSTICS | Facility: HOSPITAL | Age: 79
End: 2024-02-22

## 2024-02-22 NOTE — TELEPHONE ENCOUNTER
Called patient to discuss bruising on apixaban.  No active bleeding. CBC checked recently and hgb and plts stable.     She is agreeable to continue AC.     Moise Pimentel.

## 2024-02-25 DIAGNOSIS — I48.92 ATRIAL FLUTTER (HCC): ICD-10-CM

## 2024-02-26 RX ORDER — POTASSIUM CHLORIDE 1500 MG/1
20 TABLET, EXTENDED RELEASE ORAL DAILY
Qty: 90 TABLET | Refills: 1 | Status: SHIPPED | OUTPATIENT
Start: 2024-02-26

## 2024-02-26 NOTE — TELEPHONE ENCOUNTER
Requested medication(s) are due for refill today: Yes  Patient has already received a courtesy refill: No  Other reason request has been forwarded to provider:

## 2024-02-28 ENCOUNTER — OFFICE VISIT (OUTPATIENT)
Dept: PHYSICAL THERAPY | Facility: CLINIC | Age: 79
End: 2024-02-28
Payer: COMMERCIAL

## 2024-02-28 DIAGNOSIS — M17.11 PRIMARY OSTEOARTHRITIS OF RIGHT KNEE: ICD-10-CM

## 2024-02-28 DIAGNOSIS — M25.561 RIGHT KNEE PAIN, UNSPECIFIED CHRONICITY: Primary | ICD-10-CM

## 2024-02-28 PROCEDURE — 97530 THERAPEUTIC ACTIVITIES: CPT

## 2024-02-28 PROCEDURE — 97112 NEUROMUSCULAR REEDUCATION: CPT

## 2024-02-28 PROCEDURE — 97110 THERAPEUTIC EXERCISES: CPT

## 2024-02-28 NOTE — PROGRESS NOTES
"Daily Note     Today's date: 2024  Patient name: Jayla Moody  : 1945  MRN: [id number]  Referring provider: Ramila Holder*  Dx:   Encounter Diagnosis     ICD-10-CM    1. Right knee pain, unspecified chronicity  M25.561       2. Primary osteoarthritis of right knee  M17.11       3. BMI 45.0-49.9, adult ([facility])  Z68.42           Start Time: 1410  Stop Time: 1455  Total time in clinic (min): 45 minutes    Subjective: Pt reports that her knee is feeling better since starting PT, but she is still having difficulty lifting her let up and getting into her car.      Objective: See treatment diary below      Assessment: Pt tolerated treatment well. Added marching, leg press, sit to stands, and step ups during today's therapy session to improve functional strength of right knee and LE grossly, in order to improve performance in leg lifting activities such as ambulation and getting into her car. Pt was challenged by additional exercises, and needed rest breaks due to increase difficulty and fatigue during exercises. Once sufficient rest breaks were taken, pt was able to complete remaining sets and reps with proper form and appropriate levels of fatigue post session. Continue to improve LE strength so pt can perform ADLs and challenging tasks with less difficulty. Patient exhibited good technique with therapeutic exercises and would benefit from continued PT      Plan: Continue per plan of care.  Progress treatment as tolerated.       Precautions: BMI 40+, Pulmonary condition effecting endurance and blood flow to extemities      Date           Visit # IE 2 3          FOTO IE             Re-eval IE               Manuals           Knee PROM             Patellar Mob             Quad/ITB STM                          Neuro Re-Ed           Hamstring Str 3x20\" 3x30\" 3x30\"          Seated Calf Str   3x30\"          ITB Str  3x30\" 3x30\"          Bridges 10x PTB 20x PTB       " "    Clamshells 10x PTB 20x PTB           Ankle Pumps             Slantboard  3x30\"           Marching   2x10 ea           Hip Flexor Str             Hamstring Curl             SLS             Ther Ex 2/14 2/21 2/28          Bike  6' 6'          Quad Set 10x5\" 15x5\" towel under knee 20x 5\" w/ LAQ          SAQ 10x5\" 15x 5\"            LAQ  20x 5\"  20x 5\" w/ quad set          SLR  2x10 3\" 2x10 3\"          SL Hip Abd 10x  20x 3\"           Leg Press   3x10 75#          SL Leg Press             Side Stepping             Monster Walks             3-way Hip kicks   10x ea           Split Squats             Ther Activity 2/14 2/21 2/28          Squatting             Sit to Stand   2x5           Step-Ups   5x ea 0R          Gait Training 2/14 2/21                                     Modalities 2/14                                                "

## 2024-02-29 ENCOUNTER — OFFICE VISIT (OUTPATIENT)
Dept: FAMILY MEDICINE CLINIC | Facility: CLINIC | Age: 79
End: 2024-02-29

## 2024-02-29 VITALS
OXYGEN SATURATION: 96 % | HEIGHT: 61 IN | SYSTOLIC BLOOD PRESSURE: 132 MMHG | TEMPERATURE: 96.7 F | RESPIRATION RATE: 18 BRPM | WEIGHT: 219.19 LBS | HEART RATE: 76 BPM | DIASTOLIC BLOOD PRESSURE: 76 MMHG | BODY MASS INDEX: 41.38 KG/M2

## 2024-02-29 DIAGNOSIS — M54.50 CHRONIC BILATERAL LOW BACK PAIN, UNSPECIFIED WHETHER SCIATICA PRESENT: ICD-10-CM

## 2024-02-29 DIAGNOSIS — G89.29 CHRONIC BILATERAL LOW BACK PAIN, UNSPECIFIED WHETHER SCIATICA PRESENT: ICD-10-CM

## 2024-02-29 DIAGNOSIS — I10 ESSENTIAL HYPERTENSION: Primary | ICD-10-CM

## 2024-02-29 DIAGNOSIS — N18.32 STAGE 3B CHRONIC KIDNEY DISEASE (HCC): ICD-10-CM

## 2024-02-29 PROCEDURE — G2211 COMPLEX E/M VISIT ADD ON: HCPCS | Performed by: FAMILY MEDICINE

## 2024-02-29 PROCEDURE — 99213 OFFICE O/P EST LOW 20 MIN: CPT | Performed by: FAMILY MEDICINE

## 2024-02-29 RX ORDER — SENNOSIDES 8.6 MG
650 CAPSULE ORAL EVERY 8 HOURS PRN
Qty: 60 TABLET | Refills: 3 | Status: SHIPPED | OUTPATIENT
Start: 2024-02-29

## 2024-02-29 NOTE — PROGRESS NOTES
"Assessment/Plan:    1. Essential hypertension  Assessment & Plan:  BP Readings from Last 3 Encounters:   02/29/24 132/76   02/05/24 137/75   01/25/24 138/68          Follows with cardiology  Home medications:lopressor 25 mg qd,  -Continue home meds  -f/u in 3 months       2. Chronic bilateral low back pain, unspecified whether sciatica present  Comments:  With acetaminophen.  Continue as needed medication.  Orders:  -     acetaminophen (TYLENOL) 650 mg CR tablet; Take 1 tablet (650 mg total) by mouth every 8 (eight) hours as needed for mild pain    3. Stage 3b chronic kidney disease (HCC)  Assessment & Plan:  Lab Results   Component Value Date    EGFR 30 02/12/2024    EGFR 28 01/11/2024    EGFR 30 12/26/2023    CREATININE 1.63 (H) 02/12/2024    CREATININE 1.72 (H) 01/11/2024    CREATININE 1.63 (H) 12/26/2023       Followed by nephrologist.  Continue to avoid nephrotoxic agents.      4. BMI 45.0-49.9, adult (HCC)  Assessment & Plan:  BMI Counseling: Body mass index is 41.42 kg/m². The BMI is above normal. Nutrition recommendations include reducing portion sizes.            Subjective:      Patient ID: Jayla Moody is a 78 y.o. female.    who presents to the clinic for management of their chronic medical conditions.  Patient's medical conditions are stable unless noted otherwise above.  Patient has not had any recent hospitalizations, or medical emergencies since last visit.  Patient has no further complaints other than what is mentioned in the ROS.          The following portions of the patient's history were reviewed and updated as appropriate: allergies, current medications, past family history, past medical history, past social history, past surgical history, and problem list.    Review of Systems      Objective:      /76 (BP Location: Right arm, Patient Position: Sitting, Cuff Size: Large)   Pulse 76   Temp (!) 96.7 °F (35.9 °C) (Temporal)   Resp 18   Ht 5' 1\" (1.549 m)   Wt 99.4 kg (219 lb 3 oz)   " SpO2 96%   BMI 41.42 kg/m²          Physical Exam      Kaiser Kohler, DO   Family Medicine PGY-3  3/3/2024

## 2024-03-04 ENCOUNTER — HOSPITAL ENCOUNTER (OUTPATIENT)
Dept: CT IMAGING | Facility: HOSPITAL | Age: 79
Discharge: HOME | End: 2024-03-04
Attending: INTERNAL MEDICINE
Payer: COMMERCIAL

## 2024-03-04 DIAGNOSIS — C34.91 NON-SMALL CELL CARCINOMA OF LUNG, STAGE 4, RIGHT (HCC): ICD-10-CM

## 2024-03-04 PROCEDURE — G1004 CDSM NDSC: HCPCS

## 2024-03-04 PROCEDURE — 74176 CT ABD & PELVIS W/O CONTRAST: CPT

## 2024-03-04 PROCEDURE — 71250 CT THORAX DX C-: CPT

## 2024-03-04 NOTE — ASSESSMENT & PLAN NOTE
BP Readings from Last 3 Encounters:   02/29/24 132/76   02/05/24 137/75   01/25/24 138/68          Follows with cardiology  Home medications:lopressor 25 mg qd,  -Continue home meds  -f/u in 3 months

## 2024-03-04 NOTE — ASSESSMENT & PLAN NOTE
BMI Counseling: Body mass index is 41.42 kg/m². The BMI is above normal. Nutrition recommendations include reducing portion sizes.

## 2024-03-04 NOTE — ASSESSMENT & PLAN NOTE
Lab Results   Component Value Date    EGFR 30 02/12/2024    EGFR 28 01/11/2024    EGFR 30 12/26/2023    CREATININE 1.63 (H) 02/12/2024    CREATININE 1.72 (H) 01/11/2024    CREATININE 1.63 (H) 12/26/2023       Followed by nephrologist.  Continue to avoid nephrotoxic agents.

## 2024-03-04 NOTE — ASSESSMENT & PLAN NOTE
Patient previously using DuoNeb therapy every 3 hours.  Patient recently enrolled in the assistance for her insurance.  Now medication is affordable.  Recently put patient on Breztri Aesophere and her asthma has been under control.

## 2024-03-05 NOTE — PROGRESS NOTES
Hematology/Oncology Outpatient Office Note    Date of Service: 3/14/2024    [facility] [place]  [place] [place] [id number]  [phone]    Reason for Consultation:   Chief Complaint   Patient presents with    Follow-up       Cancer Stage at diagnosis: IVB    Referral Physician: No ref. provider found    Primary Care Physician:  Kaiser Kohler DO     Nickname: 'Lambert'    Lives alone    Original ECO    Today's ECO (uses a cane; up and about >50% of the day)    Goals and Barriers:  Current Goal: Minimize effects of disease burden, extend life.   Barriers to accomplishing this: chronic lower back pain    Patient's Capacity to Self Care:  Patient is able to self care      ASSESSMENT & PLAN      Diagnosis ICD-10-CM Associated Orders   1. Non-small cell carcinoma of lung, stage 4, right (HCC)  C34.91             This is a 78 y.o. c PMHx notable for A-fib, remote nicotine abuse, asthma, COPD, MONO s/p CPAP, GERD, HTN, chronic thrombocytopenia (since at least 2018), glaucoma, being seen in consultation for metastatic ALK+ Lung cancer       The patient was diagnosed 2020 incidentally with a right hilar mass and diffuse osseous metastasis.  She was started on alectinib with near complete resolution of the lung mass, stable bone mets.  She has had bilateral lower extremity swelling while on treatment (noted in up to 30% of these patients).  She gets Zometa every 3 months.      ALK-EML4 translocation           Discussion of decision making  Oncology history updated, accordingly, during this visit  Goals of care/patient communication  I discussed with the patient the clinical course leading up to their cancer diagnosis. I reviewed relevant office notes, imaging reports and pathology result as well.  I told the patient that this is a case of incurable disease and what this means. We discussed that the goal of anti-cancer therapy  is to provide best quality of life, extend overall survival, and progression free survival as shown in clinical trials. We also discussed that there might be a point when the cancer will no longer respond to this anti-neoplastic therapy. As a result, we also discussed the role of the palliative care team being introduced early in the treatment course. We will be making this referral  I explained the risks/benefits of the proposed cancer therapy: Alectinib and after discussion including understanding risks of possible life-threatening complications and therapy-related malignancy development, informed consent for blood products and treatment has been signed and obtained.  TNM/Staging At Diagnosis  Cancer Staging   IVB  Disease Features/Tumor Markers/Genetics  Tumor Marker: n.a  Notable Path Features:   8/20/2020 Lymph Node, Level 4R  ( ThinPrep, smear preparations and cell block ):  Conclusive evidence of malignancy.  Non-small cell carcinoma, compatible with lung origin.  -  Immunohistochemical stains performed with appropriate controls show the tumor cells to be positive for TTF1 and napsin with partial positivity for CK5/6 and p40 and negative for synaptophysin, and chromogranin, supporting the diagnosis  Treatment: Alectinib   Other Supportive care:   Treatment Team Members  Surgeon  Rad Onc  Palliative: Dr. Siddiqui  Labs  Diagnostics  8/10/2020 PET/CT: Dominant right upper perihilar lung nodule with soft tissue extending to the right hilum along the right mainstem bronchus, and mediastinum, demonstrates intense FDG activity, most compatible with malignancy.  Tissue sampling recommended. Several hypermetabolic osseous lesions most compatible with metastases. Sub-centimeter right upper lung nodular densities that are too small for accurate PET evaluation.  4/27/2023 CT CAP w/o c: Chest: near CR  Examination is limited by respiratory motion. Nothing to suggest adenocarcinoma recurrence.   Abdomen and pelvis:  No  metastases, within the confines of an examination limited without contrast.   Osseous: Stable diffuse sclerotic metastatic disease. No new pathologic fracture. Unchanged healing pathologic bilateral rib fractures.  11/2/2023 CT CAP w/o c: No findings of recurrent disease in the chest. Stable numerous sclerotic metastatic lesions throughout the thoracolumbar spine and bony pelvis, in keeping with patient's treated metastases. No metastatic disease in the abdomen or pelvis  3/4/2024 CT CAP w/o c: No evidence of residual or recurrent disease within the chest, abdomen or pelvis. Interval resolution of previously noted right breast nodule. There is a smaller right lateral 7 mm right breast nodule that was likely excluded from the field-of-view on the prior examination. Stable sclerotic osseous foci consistent with treated metastatic disease.    Discussion of decision making    I personally reviewed the following lab results, the image studies, pathology, other specialty/physicians consult notes and recommendations, and outside medical records. I had a lengthy discussion with the patient and shared the work-up findings. We discussed the diagnosis and management plan as below. I spent 41 minutes reviewing the records (labs, clinician notes, outside records, medical history, ordering medicine/tests/procedures, interpreting the imaging/labs previously done) and coordination of care as well as direct time with the patient today, of which greater than 50% of the time was spent in counseling and coordination of care with the patient/family.      Plan/Labs  D/c Zometa q12 weeks  Start Xgeva 120 mg SC q4 weeks  F/u Nephrology for HTN/Alectinib induced CKD  Cont Alectinib 600mg BID   Consider Palliative care   Guardant NGS if POD  Restaging CT CAP w/o c ordered in 4 months  CBC, CMP in 4 months         Follow Up: 4 months    All questions were answered to the patient's satisfaction during this encounter. The patient knows the  contact information for our office and knows to reach out for any relevant concerns related to this encounter. They are to call for any temperature 100.4 or higher, new symptoms including but not restricted to shaking chills, decreased appetite, nausea, vomiting, diarrhea, increased fatigue, shortness of breath or chest pain, confusion, and not feeling the strength to come to the clinic. For all other listed problems and medical diagnosis in their chart - they are managed by PCP and/or other specialists, which the patient acknowledges. Thank you very much for your consultation and making us a part of this patient's care. We are continuing to follow closely with you. Please do not hesitate to reach out to me with any additional questions or concerns.    Ryan Arriaga MD  Hematology & Medical Oncology Staff Physician             Disclaimer: This document was prepared using [employer] Direct technology. If a word or phrase is confusing, or does not make sense, this is likely due to recognition error which was not discovered during this clinician's review. If you believe an error has occurred, please contact me through [employer] service line for felton?cation.      ONCOLOGY HISTORY OF PRESENT ILLNESS        Oncology History Overview Note   75 year old female with stage IV non-small cell right upper lung.  She has evidence of bone metastases particular T8 although not symptomatic may be at risk for neurologic problems, palliative radiation was recommended. She completed a course of palliative radiation therapy 30 Gy in 10 treatments to prevent neurologic complication on 9/21/20.    Today is her first telephone follow-up    9/17/20 Dr. Weaver  Recommend starting Alectinib 600 mg b.i.d.    10/29/20 Dr. Weaver follow-up  11/11/20 Pulmonology follow-up         Non-small cell lung cancer (HCC)   8/20/2020 Biopsy    FNA Level 4R  NSCC    RUL brushing: Conclusive evidence of malignancy.  Non small cell carcinoma.      Level 4R tissue sampling     2020 Initial Diagnosis    Non-small cell cancer of right lung (HCC)     2020 - 2020 Radiation        Treatments:  Course: C1    Plan ID Energy Fractions Dose per Fraction (cGy) Dose Correction (cGy) Total Dose Delivered (cGy) Elapsed Days   T7-T9 Spine 10X 10 / 10 300 0 3,000 13      Treatment dates:  2020 - 2020.      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 Chronic constipation. Moderate fatigue.    I have reviewed the relevant past medical, surgical, social and family history. I have also reviewed allergies and medications for this patient.    Review of Systems    Baseline weight: 235 lbs    She has lost 10 lbs since  since her son  from Legionellae's disease as she doesn't cook as much anymore with him not living with her.    Denies F/C, N/V, CP, LH, HA, rash, itching, gen weakness, melena, hematuria, hematochezia, falls, diarrhea.       A 10-point review of system was performed, pertinent positive and negative were detailed as above. Otherwise, the 10-point review of system was negative.    Past Medical History:   Diagnosis Date    Allergic rhinitis     Anemia     Asthma     Atrial fibrillation (HCC)     Chronic bronchitis (HCC)     COPD (chronic obstructive pulmonary disease) (HCC)     Coronary artery disease     CPAP (continuous positive  airway pressure) dependence     Degenerative joint disease     Fluid retention 2024    GERD (gastroesophageal reflux disease)     Glaucoma     Hypertension     Lumbar disc disease     Lung cancer (HCC)     Periodic heart flutter     Pneumonia     Sleep apnea     suspected     Sleep apnea, obstructive     Ventricular arrhythmia     Vitamin D deficiency        Past Surgical History:   Procedure Laterality Date    APPENDECTOMY      COLON SURGERY      COLONOSCOPY N/A 03/18/2019    Procedure: COLONOSCOPY;  Surgeon: Alessia Wilson DO;  Location: [facility];  Service: Gastroenterology    ESOPHAGOGASTRODUODENOSCOPY N/A 03/18/2019    Procedure: ESOPHAGOGASTRODUODENOSCOPY (EGD);  Surgeon: Alessia Wilson DO;  Location: [facility];  Service: Gastroenterology    KNEE SURGERY      LUNG BIOPSY      ROTATOR CUFF REPAIR      SHOULDER SURGERY         Family History   Problem Relation Age of Onset    Stroke Mother     Diabetes Mother     Hyperlipidemia Mother     Hypertension Mother     Allergies Mother         Environmental    Asthma Mother     Diabetes Father     Hyperlipidemia Father     Hypertension Father     Lung cancer Father 70    Allergies Father         Environmental    Asthma Father     Lymphoma Sister 69    Heart disease Sister         Pacemaker    Asthma Brother     Aneurysm Brother         Brain - had surgery    Other Brother         Lymes disease    Coronary artery disease Daughter         2 bypass done    No Known Problems Daughter     No Known Problems Daughter     No Known Problems Son     Kidney disease Son     Liver disease Son     Obesity Son        Social History     Socioeconomic History    Marital status: Single     Spouse name: Not on file    Number of children: 5    Years of education: Not on file    Highest education level: Not on file   Occupational History    Not on file   Tobacco Use    Smoking status: Former     Current packs/day: 0.00     Average packs/day: 0.3 packs/day for 25.0 years (6.3 ttl  pk-yrs)     Types: Cigarettes     Start date:      Quit date:      Years since quittin.2    Smokeless tobacco: Former   Vaping Use    Vaping status: Never Used   Substance and Sexual Activity    Alcohol use: Yes     Comment: occasionally    Drug use: Never    Sexual activity: Not on file   Other Topics Concern    Not on file   Social History Narrative    Who lives in your home: son    What type of home do you live in: Single house    Age of your home: 89 yrs old    How long have you been living there: 30 years    Type of heat: Forced hot air    Type of fuel: Gas    What type of rome is in your bedroom: Hardwood floor    Do you have the following in or near your home:    Air products: Window air conditioning and Air     Pests: None    Pets: 1 Cat    Are pets allowed in bedroom: No    Open fields, wooded areas nearby: Open fields and Wooded areas    Basement: Damp at times and Unfinished with cracks in cement    Exposure to second hand smoke: No        Habits:    Caffeine: Current; Amount: 1 cups/day coffee, #years 60    Chocolate: occasionally    Other:              Social Determinants of Health     Financial Resource Strain: Low Risk  (2024)    Overall Financial Resource Strain (CARDIA)     Difficulty of Paying Living Expenses: Not very hard   Food Insecurity: No Food Insecurity (2024)    Hunger Vital Sign     Worried About Running Out of Food in the Last Year: Never true     Ran Out of Food in the Last Year: Never true   Transportation Needs: No Transportation Needs (2024)    PRAPARE - Transportation     Lack of Transportation (Medical): No     Lack of Transportation (Non-Medical): No   Physical Activity: Inactive (11/15/2022)    Exercise Vital Sign     Days of Exercise per Week: 0 days     Minutes of Exercise per Session: 0 min   Stress: Not on file   Social Connections: Not on file   Intimate Partner Violence: Not on file   Housing Stability: Low Risk  (2023)    Housing  Stability Vital Sign     Unable to Pay for Housing in the Last Year: No     Number of Places Lived in the Last Year: 1     Unstable Housing in the Last Year: No       No Known Allergies    Current Outpatient Medications   Medication Sig Dispense Refill    acetaminophen (TYLENOL) 650 mg CR tablet Take 1 tablet (650 mg total) by mouth every 8 (eight) hours as needed for mild pain 60 tablet 3    albuterol (PROVENTIL HFA,VENTOLIN HFA) 90 mcg/act inhaler INHALE 2 PUFFS EVERY 6 HOURS AS NEEDED FOR WHEEZING 18 g 2    Alectinib HCl 150 MG CAPS Take 4 capsules (600 mg total) by mouth 2 (two) times a day 240 capsule 5    amiodarone 200 mg tablet Take 1 tablet (200 mg total) by mouth daily with breakfast 90 tablet 3    benralizumab (FASENRA) subcutaneous injection Inject 1 mL (30 mg total) under the skin every 56 days 1 mL 6    Budeson-Glycopyrrol-Formoterol (Breztri Aerosphere) 160-9-4.8 MCG/ACT AERO Inhale 2 puffs 2 (two) times a day Rinse mouth after use. 10.7 g 11    cholecalciferol (VITAMIN D3) 1,000 units tablet Take 2 tablets (2,000 Units total) by mouth daily 180 tablet 0    fexofenadine (ALLEGRA) 180 MG tablet Take 180 mg by mouth daily      fluticasone (FLONASE) 50 mcg/act nasal spray SPRAY 2 SPRAYS INTO EACH NOSTRIL EVERY DAY 48 mL 1    glycopyrrolate-formoterol (BEVESPI AEROSPHERE) 9-4.8 MCG/ACT inhaler Inhale 2 puffs 2 (two) times a day 10.7 g 5    Klor-Con M20 20 MEQ tablet TAKE 1 TABLET BY MOUTH EVERY DAY 90 tablet 1    magnesium hydroxide (MILK OF MAGNESIA) 400 mg/5 mL oral suspension Take 15 mL by mouth daily as needed for constipation 354 mL 3    metoprolol tartrate (LOPRESSOR) 25 mg tablet TAKE 1 TABLET (25 MG TOTAL) BY MOUTH EVERY 12 (TWELVE) HOURS 180 tablet 1    omeprazole (PriLOSEC) 20 mg delayed release capsule TAKE 1 CAPSULE BY MOUTH EVERY DAY 90 capsule 1    oxyCODONE-acetaminophen (PERCOCET) 5-325 mg per tablet Take 1 tablet by mouth every 4 (four) hours as needed for moderate pain For ongoing pain  Max Daily Amount: 6 tablets 60 tablet 0    apixaban (ELIQUIS) 5 mg Take 1 tablet (5 mg total) by mouth 2 (two) times a day 60 tablet 11    budesonide (PULMICORT) 0.25 mg/2 mL nebulizer solution Take 2 mL (0.25 mg total) by nebulization 2 (two) times a day Rinse mouth after use. 360 mL 2    ipratropium (ATROVENT) 0.02 % nebulizer solution Take 2.5 mL (0.5 mg total) by nebulization 3 (three) times a day 225 mL 2    rosuvastatin (CRESTOR) 10 MG tablet Take 1 tablet (10 mg total) by mouth daily 90 tablet 3     No current facility-administered medications for this visit.       (Not in a hospital admission)      Objective:     24 Hour Vitals Assessment:     Vitals:    03/14/24 1401   BP: 128/76   Pulse: 68   Resp: 18   Temp: (!) 96.9 °F (36.1 °C)   SpO2: 98%         PHYSICIAN EXAM     General: Appearance: alert, cooperative, no distress.  HEENT: Normocephalic, atraumatic. No scleral icterus. conjunctivae clear. EOMI.  Chest: No tenderness to palpation. No open wound noted.  Lungs: Clear to auscultation bilaterally, Respirations unlabored.  Cardiac: Regular rate and rhythm, +S1and S2  Abdomen: Soft, non-tender, non-distended. Bowel sounds are normal.  Extremities:  No edema, cyanosis, clubbing.  Skin: Skin color, turgor are normal. No rashes.  Lymphatics: no palpable supra-cervical, axillary, or inguinal adenopathy  Neurologic: Awake, Alert, and oriented, no gross focal deficits noted b/l.       DATA REVIEW:    Pathology Result:    Final Diagnosis   Date Value Ref Range Status   08/20/2020   Final    A. Lung, Right Upper Lobe Bronchial Brushing, :  Conclusive evidence of malignancy.  Non small cell carcinoma.    Satisfactory for evaluation.     Note:  Correlate with concurrent case [id number].     08/20/2020   Final    A.B.C. Lymph Node, Level 4R  ( ThinPrep, smear preparations and cell block ):  Conclusive evidence of malignancy.  Non-small cell carcinoma, compatible with lung origin.  -  Immunohistochemical stains  performed with appropriate controls show the tumor cells to be positive for TTF1 and napsin with partial positivity for CK5/6 and p40 and negative for synaptophysin, and chromogranin, supporting the diagnosis.    Note:  The findings are diagnostic of non small-cell carcinoma of lung origin with features favoring adenocarcinoma.  Morphologic and immunohistochemical features raise the possibility of squamous differentiation, though squamous contaminant cannot be entirely excluded.  Correlation with clinical impression and any future tissue sampling is recommended to exclude the possibility of adenosquamous carcinoma.    Satisfactory for evaluation.       03/18/2019   Final    A.  Stomach, endoscopic biopsy:  - Gastric antral mucosa with mild chronic, inactive gastritis.  - Negative for intestinal metaplasia, dysplasia or carcinoma.  - Immunohistochemistry for Helicobacter pylori is negative.    B.  Stomach, erosion, endoscopic biopsy:  - Gastric antral and oxyntic mucosa with reactive foveolar hyperplasia, fibrosis and acte inflammation consistent with tissue adjacent to an erosion or ulcer.  - Negative for intestinal metaplasia, dysplasia or carcinoma.    Note: Special stain for alcian blue/PAS and immunohistochemical stain for pan CK AE1/3 highlight benign glands.                Image Results:   Image result are reviewed and documented in Hematology/Oncology history    CT chest abdomen pelvis wo contrast  Narrative: CT CHEST, ABDOMEN AND PELVIS WITHOUT IV CONTRAST    INDICATION: C34.91: Malignant neoplasm of unspecified part of right bronchus or lung.    COMPARISON: 11/2/2023.    TECHNIQUE: CT examination of the chest, abdomen and pelvis was performed without intravenous contrast. Multiplanar 2D reformatted images were created from the source data.    This examination, like all CT scans performed in the [facility], was performed utilizing techniques to minimize radiation dose exposure, including the  use of iterative reconstruction and automated exposure control. Radiation dose length   product (DLP) for this visit: 949 mGy-cm    Enteric Contrast: Not administered.    FINDINGS:    CHEST    LUNGS: Lungs are clear. No tracheal or endobronchial lesion. Treated right hilar adenocarcinoma nonvisualized on the current exam as before. No pulmonary nodule identified.    PLEURA: Unremarkable.    HEART/GREAT VESSELS: Heavy mitral valve annular calcification. No thoracic aortic aneurysm.    MEDIASTINUM AND ARACELY: Unremarkable.    CHEST WALL AND LOWER NECK: Previously noted right breast nodule is no longer apparent. There is a tiny right lateral breast nodule measuring 6 mm that was likely excluded from the field-of-view on prior exam.    ABDOMEN    LIVER/BILIARY TREE: Unremarkable.    GALLBLADDER: Cholelithiasis without findings of acute cholecystitis.    SPLEEN: Unremarkable.    PANCREAS: Unremarkable.    ADRENAL GLANDS: Unremarkable.    KIDNEYS/URETERS: Renal cysts redemonstrated.    STOMACH AND BOWEL: Colonic diverticulosis without findings of acute diverticulitis. Mild constipation.    APPENDIX: No findings to suggest appendicitis.    ABDOMINOPELVIC CAVITY: No ascites. No pneumoperitoneum. No lymphadenopathy.    VESSELS: Unremarkable for patient's age.    PELVIS    REPRODUCTIVE ORGANS: Calcified uterine fibroids noted.    URINARY BLADDER: Unremarkable.    ABDOMINAL WALL/INGUINAL REGIONS: Unremarkable.    BONES: Redemonstration of osseous sclerotic foci consistent with treated metastases, most notable thoracic spine.  Impression: 1. No evidence of residual or recurrent disease within the chest, abdomen or pelvis.  2. Interval resolution of previously noted right breast nodule. There is a smaller right lateral 7 mm right breast nodule that was likely excluded from the field-of-view on the prior examination.  3. Stable sclerotic osseous foci consistent with treated metastatic disease.  4. Cholelithiasis.    Workstation  "performed: [id number]      LABS:  Lab data are reviewed and documented in HemOnc history.       Lab Results   Component Value Date    HGB 9.9 (L) 03/12/2024    HCT 30.7 (L) 03/12/2024    MCV 97 03/12/2024    PLT 97 (L) 03/12/2024    WBC 8.16 03/12/2024    NRBC 0 03/12/2024    ATYLMPCT 1 (H) 06/07/2021     Lab Results   Component Value Date    K 4.7 03/12/2024     03/12/2024    CO2 33 (H) 03/12/2024    BUN 27 (H) 03/12/2024    CREATININE 1.95 (H) 03/12/2024    GLUF 103 (H) 02/12/2024    CALCIUM 9.6 03/12/2024    AST 59 (H) 03/12/2024    ALT 45 03/12/2024    ALKPHOS 85 03/12/2024    EGFR 24 03/12/2024       No results found for: \"IRON\", \"TIBC\", \"FERRITIN\"    No results found for: \"MORACRSI12\"    Recent Labs     03/12/24  1336   WBC 8.16   PLT 97*       By:  Ryan Arriaga MD, 3/14/2024, 2:05 PM                                  "

## 2024-03-06 ENCOUNTER — OFFICE VISIT (OUTPATIENT)
Dept: PHYSICAL THERAPY | Facility: CLINIC | Age: 79
End: 2024-03-06
Payer: COMMERCIAL

## 2024-03-06 DIAGNOSIS — M25.561 RIGHT KNEE PAIN, UNSPECIFIED CHRONICITY: Primary | ICD-10-CM

## 2024-03-06 DIAGNOSIS — M17.11 PRIMARY OSTEOARTHRITIS OF RIGHT KNEE: ICD-10-CM

## 2024-03-06 PROCEDURE — 97110 THERAPEUTIC EXERCISES: CPT

## 2024-03-06 PROCEDURE — 97530 THERAPEUTIC ACTIVITIES: CPT

## 2024-03-06 PROCEDURE — 97112 NEUROMUSCULAR REEDUCATION: CPT

## 2024-03-06 NOTE — PROGRESS NOTES
"Daily Note     Today's date: 3/6/2024  Patient name: Jayal Moody  : 1945  MRN: [id number]  Referring provider: Ramila Holder*  Dx:   Encounter Diagnosis     ICD-10-CM    1. Right knee pain, unspecified chronicity  M25.561       2. Primary osteoarthritis of right knee  M17.11       3. BMI 45.0-49.9, adult ([facility])  Z68.42                      Subjective: Pt reports that overall her knee continues to get better but still is challenged with lifting it at times. Notes that she was sore following last session for about a day.       Objective: See treatment diary below      Assessment: Pt tolerated treatment well. Pt continues to demonstrate weakness in her RLE today being challenged with exercises performed but able to complete with frequent rest breaks. Continue to improve LE strength so pt can perform ADLs and challenging tasks with less difficulty. Pt was challenged with RLE step ups today due to increased pain levels. Patient exhibited good technique with therapeutic exercises and would benefit from continued PT      Plan: Continue per plan of care.  Progress treatment as tolerated.       Precautions: BMI 40+, Pulmonary condition effecting endurance and blood flow to extemities      Date  3/6         Visit # IE 2 3 4         FOTO IE             Re-eval IE               Manuals  3/6         Knee PROM             Patellar Mob             Quad/ITB STM                          Neuro Re-Ed  3/6         Hamstring Str 3x20\" 3x30\" 3x30\" 3x30\"         Seated Calf Str   3x30\" 3x30\"         ITB Str  3x30\" 3x30\" 3x30\"         Bridges 10x PTB 20x PTB           Clamshells 10x PTB 20x PTB           Ankle Pumps             Slantboard  3x30\"           Marching   2x10 ea  2x10 ea         Hip Flexor Str             Hamstring Curl             SLS             Ther Ex  3/6         Bike  6' 6' 6'         Quad Set 10x5\" 15x5\" towel under knee 20x 5\" w/ LAQ 20x 5\" w/ " "LAQ         SAQ 10x5\" 15x 5\"            LAQ  20x 5\"  20x 5\" w/ quad set 20x 5\" w/ quad set         SLR  2x10 3\" 2x10 3\" 2x10 3\"         SL Hip Abd 10x  20x 3\"           Leg Press   3x10 75# 3x10 75#         SL Leg Press             Side Stepping             Monster Walks             3-way Hip kicks   10x ea  10x ea         Split Squats             Ther Activity 2/14 2/21 2/28 3/6         Squatting             Sit to Stand   2x5  2x5         Step-Ups   5x ea 0R P!         Gait Training 2/14 2/21                                     Modalities 2/14                                                "

## 2024-03-07 ENCOUNTER — TELEPHONE (OUTPATIENT)
Dept: HEMATOLOGY ONCOLOGY | Facility: CLINIC | Age: 79
End: 2024-03-07

## 2024-03-07 NOTE — TELEPHONE ENCOUNTER
Called the radiology reading room to have CT read on patient prior to her follow up appointment with  on 3/14/24.    CT-CAP FROM 3/4/24-ACCESSION #[id number]

## 2024-03-11 ENCOUNTER — TELEPHONE (OUTPATIENT)
Dept: HEMATOLOGY ONCOLOGY | Facility: CLINIC | Age: 79
End: 2024-03-11

## 2024-03-11 DIAGNOSIS — C34.91 NON-SMALL CELL CANCER OF RIGHT LUNG (HCC): ICD-10-CM

## 2024-03-11 DIAGNOSIS — C34.91 NON-SMALL CELL CARCINOMA OF LUNG, STAGE 4, RIGHT (HCC): ICD-10-CM

## 2024-03-11 DIAGNOSIS — C79.51 MALIGNANT NEOPLASM METASTATIC TO BONE (HCC): Primary | ICD-10-CM

## 2024-03-11 DIAGNOSIS — C34.90 NON-SMALL CELL LUNG CANCER, UNSPECIFIED LATERALITY (HCC): ICD-10-CM

## 2024-03-11 NOTE — TELEPHONE ENCOUNTER
Patient Call    Who are you speaking with? [facility] April     If it is not the patient, are they listed on an active communication consent form? N/A   What is the reason for this call? April calling to see if patient needs updated lab work done for her appointment with Dr Arriaga on 3/14/24.  Patient would like a call back to discuss if she needs updated labs for this appointment.      Phone call made to Neda Torres in regards to lab orders.  I left a message on teams phone number to contact patient.    Does this require a call back? Yes   If a call back is required, please list [facility] call back number [phone] - [place] [facility]   If a call back is required, advise that a message will be forwarded to their care team and someone will return their call as soon as possible.   Did you relay this information to the patient? Yes

## 2024-03-11 NOTE — TELEPHONE ENCOUNTER
"\"Christopher Blanc, This is Ruthie calling from the oncology HOPE line. I have a lab on the phone in regards to patient lead, Silvana Moody, date of birth [birth year]. She's calling to see if the patient needs updated lab work for her appointment with Doctor Roberto Durán on March 14th. I'm just calling to confirm if she needs lab work or not, so I will send you a message through [employer] if you could reach out to the patient just to confirm if she would need updated lab work or not. OK. Thank you. Have a good day. Bye.\"    Cbcd and cmp ordered.    Call out to patient and left a vm with RN call back number and about nonfasting labs ordered.   "

## 2024-03-12 ENCOUNTER — APPOINTMENT (OUTPATIENT)
Dept: LAB | Facility: CLINIC | Age: 79
End: 2024-03-12
Payer: COMMERCIAL

## 2024-03-12 DIAGNOSIS — C34.90 NON-SMALL CELL LUNG CANCER, UNSPECIFIED LATERALITY (HCC): ICD-10-CM

## 2024-03-12 DIAGNOSIS — C79.51 MALIGNANT NEOPLASM METASTATIC TO BONE (HCC): ICD-10-CM

## 2024-03-12 DIAGNOSIS — C34.91 NON-SMALL CELL CARCINOMA OF LUNG, STAGE 4, RIGHT (HCC): ICD-10-CM

## 2024-03-12 DIAGNOSIS — C34.91 NON-SMALL CELL CANCER OF RIGHT LUNG (HCC): ICD-10-CM

## 2024-03-12 LAB
ALBUMIN SERPL BCP-MCNC: 4.3 G/DL (ref 3.5–5)
ALP SERPL-CCNC: 85 U/L (ref 34–104)
ALT SERPL W P-5'-P-CCNC: 45 U/L (ref 7–52)
ANION GAP SERPL CALCULATED.3IONS-SCNC: 10 MMOL/L (ref 4–13)
AST SERPL W P-5'-P-CCNC: 59 U/L (ref 13–39)
BASOPHILS # BLD AUTO: 0.01 THOUSANDS/ÂΜL (ref 0–0.1)
BASOPHILS NFR BLD AUTO: 0 % (ref 0–1)
BILIRUB SERPL-MCNC: 1.83 MG/DL (ref 0.2–1)
BUN SERPL-MCNC: 27 MG/DL (ref 5–25)
CALCIUM SERPL-MCNC: 9.6 MG/DL (ref 8.4–10.2)
CHLORIDE SERPL-SCNC: 101 MMOL/L (ref 96–108)
CO2 SERPL-SCNC: 33 MMOL/L (ref 21–32)
CREAT SERPL-MCNC: 1.95 MG/DL (ref 0.6–1.3)
EOSINOPHIL # BLD AUTO: 0 THOUSAND/ÂΜL (ref 0–0.61)
EOSINOPHIL NFR BLD AUTO: 0 % (ref 0–6)
ERYTHROCYTE [DISTWIDTH] IN BLOOD BY AUTOMATED COUNT: 15.7 % (ref 11.6–15.1)
GFR SERPL CREATININE-BSD FRML MDRD: 24 ML/MIN/1.73SQ M
GLUCOSE SERPL-MCNC: 88 MG/DL (ref 65–140)
HCT VFR BLD AUTO: 30.7 % (ref 34.8–46.1)
HGB BLD-MCNC: 9.9 G/DL (ref 11.5–15.4)
IMM GRANULOCYTES # BLD AUTO: 0.13 THOUSAND/UL (ref 0–0.2)
IMM GRANULOCYTES NFR BLD AUTO: 2 % (ref 0–2)
LYMPHOCYTES # BLD AUTO: 1.77 THOUSANDS/ÂΜL (ref 0.6–4.47)
LYMPHOCYTES NFR BLD AUTO: 22 % (ref 14–44)
MCH RBC QN AUTO: 31.2 PG (ref 26.8–34.3)
MCHC RBC AUTO-ENTMCNC: 32.2 G/DL (ref 31.4–37.4)
MCV RBC AUTO: 97 FL (ref 82–98)
MONOCYTES # BLD AUTO: 0.94 THOUSAND/ÂΜL (ref 0.17–1.22)
MONOCYTES NFR BLD AUTO: 12 % (ref 4–12)
NEUTROPHILS # BLD AUTO: 5.31 THOUSANDS/ÂΜL (ref 1.85–7.62)
NEUTS SEG NFR BLD AUTO: 64 % (ref 43–75)
NRBC BLD AUTO-RTO: 0 /100 WBCS
PLATELET # BLD AUTO: 97 THOUSANDS/UL (ref 149–390)
POTASSIUM SERPL-SCNC: 4.7 MMOL/L (ref 3.5–5.3)
PROT SERPL-MCNC: 6.5 G/DL (ref 6.4–8.4)
RBC # BLD AUTO: 3.17 MILLION/UL (ref 3.81–5.12)
SODIUM SERPL-SCNC: 144 MMOL/L (ref 135–147)
WBC # BLD AUTO: 8.16 THOUSAND/UL (ref 4.31–10.16)

## 2024-03-12 PROCEDURE — 85025 COMPLETE CBC W/AUTO DIFF WBC: CPT

## 2024-03-12 PROCEDURE — 80053 COMPREHEN METABOLIC PANEL: CPT

## 2024-03-12 PROCEDURE — 36415 COLL VENOUS BLD VENIPUNCTURE: CPT

## 2024-03-13 ENCOUNTER — OFFICE VISIT (OUTPATIENT)
Dept: PHYSICAL THERAPY | Facility: CLINIC | Age: 79
End: 2024-03-13
Payer: COMMERCIAL

## 2024-03-13 DIAGNOSIS — M17.11 PRIMARY OSTEOARTHRITIS OF RIGHT KNEE: ICD-10-CM

## 2024-03-13 DIAGNOSIS — M25.561 RIGHT KNEE PAIN, UNSPECIFIED CHRONICITY: Primary | ICD-10-CM

## 2024-03-13 PROCEDURE — 97110 THERAPEUTIC EXERCISES: CPT

## 2024-03-13 PROCEDURE — 97112 NEUROMUSCULAR REEDUCATION: CPT

## 2024-03-13 NOTE — PROGRESS NOTES
"Daily Note     Today's date: 3/13/2024  Patient name: Jayla Moody  : 1945  MRN: [id number]  Referring provider: Ramila Holder*  Dx:   Encounter Diagnosis     ICD-10-CM    1. Right knee pain, unspecified chronicity  M25.561       2. Primary osteoarthritis of right knee  M17.11       3. BMI 45.0-49.9, adult ([facility])  Z68.42           Start Time: 1425  Stop Time: 1505  Total time in clinic (min): 40 minutes    Subjective: Pt reports that her knee is feeling better coming into today's therapy session, with less intense pain or discomfort noted.      Objective: See treatment diary below      Assessment: Pt tolerated treatment well. Added and progressed several exercises during today's therapy session, to continue to challenge the strength and endurance of her right and LE strength globally. Pt was challenged by several exercises during today's session and needed several rest breaks due to increased difficulty and fatigue during session. Once rest breaks were taken, pt was able to complete remainder of sets and reps with proper form. Patient exhibited good technique with therapeutic exercises and would benefit from continued PT      Plan: Continue per plan of care.  Progress treatment as tolerated.       Precautions: BMI 40+, Pulmonary condition effecting endurance and blood flow to extemities      Date 2/14 2/21 2/28 3/6 3/13        Visit # IE 2 3 4 5        FOTO IE     XX        Re-eval IE               Manuals 2/14 2/21 2/28 3/6 3/13        Knee PROM             Patellar Mob             Quad/ITB STM                          Neuro Re-Ed 2/14 2/21 2/28 3/6 3/13        Hamstring Str 3x20\" 3x30\" 3x30\" 3x30\"         Seated Calf Str   3x30\" 3x30\"         ITB Str  3x30\" 3x30\" 3x30\"         Bridges 10x PTB 20x PTB           Clamshells 10x PTB 20x PTB           Ankle Pumps             Slantboard  3x30\"   3x30\"        Marching   2x10 ea  2x10 ea 20x PTB seated        Hip Flexor Str             Hamstring Curl     " "        SLS             Ther Ex 2/14 2/21 2/28 3/6 3/13        Bike  6' 6' 6' 6'        Quad Set 10x5\" 15x5\" towel under knee 20x 5\" w/ LAQ 20x 5\" w/ LAQ 20x 5\" w/ LAQ        SAQ 10x5\" 15x 5\"            LAQ  20x 5\"  20x 5\" w/ quad set 20x 5\" w/ quad set 20x 5\" w/ quad set        SLR  2x10 3\" 2x10 3\" 2x10 3\"         SL Hip Abd 10x  20x 3\"           Leg Press   3x10 75# 3x10 75# 3x10 85#        SL Leg Press             Side Stepping     5 laps PTB        Monster Walks     5 laps PTB        3-way Hip kicks   10x ea  10x ea 10x PTB        Split Squats             Ther Activity 2/14 2/21 2/28 3/6 3/13        Squatting             Sit to Stand   2x5  2x5         Step-Ups   5x ea 0R P!         Gait Training 2/14 2/21   3/13                                  Modalities 2/14    3/13                                              "

## 2024-03-14 ENCOUNTER — TELEPHONE (OUTPATIENT)
Dept: HEMATOLOGY ONCOLOGY | Facility: CLINIC | Age: 79
End: 2024-03-14

## 2024-03-14 ENCOUNTER — OFFICE VISIT (OUTPATIENT)
Dept: HEMATOLOGY ONCOLOGY | Facility: CLINIC | Age: 79
End: 2024-03-14
Payer: COMMERCIAL

## 2024-03-14 VITALS
OXYGEN SATURATION: 98 % | HEIGHT: 61 IN | HEART RATE: 68 BPM | DIASTOLIC BLOOD PRESSURE: 76 MMHG | TEMPERATURE: 96.9 F | SYSTOLIC BLOOD PRESSURE: 128 MMHG | WEIGHT: 221 LBS | RESPIRATION RATE: 18 BRPM | BODY MASS INDEX: 41.72 KG/M2

## 2024-03-14 DIAGNOSIS — C34.90 NON-SMALL CELL LUNG CANCER, UNSPECIFIED LATERALITY (HCC): ICD-10-CM

## 2024-03-14 DIAGNOSIS — D69.6 THROMBOCYTOPENIA, UNSPECIFIED (HCC): ICD-10-CM

## 2024-03-14 DIAGNOSIS — C34.91 NON-SMALL CELL CARCINOMA OF LUNG, STAGE 4, RIGHT (HCC): Primary | ICD-10-CM

## 2024-03-14 DIAGNOSIS — C79.51 MALIGNANT NEOPLASM METASTATIC TO BONE (HCC): ICD-10-CM

## 2024-03-14 PROCEDURE — 99215 OFFICE O/P EST HI 40 MIN: CPT | Performed by: INTERNAL MEDICINE

## 2024-03-14 PROCEDURE — G2211 COMPLEX E/M VISIT ADD ON: HCPCS | Performed by: INTERNAL MEDICINE

## 2024-03-15 DIAGNOSIS — K44.9 HIATAL HERNIA: ICD-10-CM

## 2024-03-15 RX ORDER — OMEPRAZOLE 20 MG/1
20 CAPSULE, DELAYED RELEASE ORAL DAILY
Qty: 90 CAPSULE | Refills: 1 | Status: SHIPPED | OUTPATIENT
Start: 2024-03-15

## 2024-03-15 NOTE — TELEPHONE ENCOUNTER
Left detailed message with all dates & times. Can view on [employer]. Gave my Teams # to call back to confirm.

## 2024-03-19 DIAGNOSIS — E55.9 VITAMIN D DEFICIENCY: ICD-10-CM

## 2024-03-19 RX ORDER — CHOLECALCIFEROL (VITAMIN D3) 25 MCG
TABLET ORAL
Qty: 180 TABLET | Refills: 1 | Status: SHIPPED | OUTPATIENT
Start: 2024-03-19

## 2024-03-20 ENCOUNTER — APPOINTMENT (OUTPATIENT)
Dept: PHYSICAL THERAPY | Facility: CLINIC | Age: 79
End: 2024-03-20
Payer: COMMERCIAL

## 2024-03-22 ENCOUNTER — OFFICE VISIT (OUTPATIENT)
Dept: PHYSICAL THERAPY | Facility: CLINIC | Age: 79
End: 2024-03-22
Payer: COMMERCIAL

## 2024-03-22 DIAGNOSIS — M25.561 RIGHT KNEE PAIN, UNSPECIFIED CHRONICITY: Primary | ICD-10-CM

## 2024-03-22 DIAGNOSIS — M17.11 PRIMARY OSTEOARTHRITIS OF RIGHT KNEE: ICD-10-CM

## 2024-03-22 DIAGNOSIS — C34.90 NON-SMALL CELL LUNG CANCER, UNSPECIFIED LATERALITY (HCC): ICD-10-CM

## 2024-03-22 DIAGNOSIS — C79.51 MALIGNANT NEOPLASM METASTATIC TO BONE (HCC): Primary | ICD-10-CM

## 2024-03-22 PROCEDURE — 97112 NEUROMUSCULAR REEDUCATION: CPT

## 2024-03-22 PROCEDURE — 97530 THERAPEUTIC ACTIVITIES: CPT

## 2024-03-22 PROCEDURE — 97110 THERAPEUTIC EXERCISES: CPT

## 2024-03-22 NOTE — PROGRESS NOTES
"Daily Note     Today's date: 3/22/2024  Patient name: Jayla Moody  : 1945  MRN: [id number]  Referring provider: Ramila Holder*  Dx:   Encounter Diagnosis     ICD-10-CM    1. Right knee pain, unspecified chronicity  M25.561       2. Primary osteoarthritis of right knee  M17.11       3. BMI 45.0-49.9, adult ([facility])  Z68.42           Start Time: 920  Stop Time: 1000  Total time in clinic (min): 40 minutes    Subjective: Pt reports that she was sore after last therapy session, but once increased soreness dissipated, pt did note improvement in knee function and decreased symptom intensity.      Objective: See treatment diary below      Assessment: Pt tolerated treatment well. Re-introduced step-ups during today's session to continue to improve functional strength in he LE musculature and improve tolerance to difficult activity. Added squatting to challenge and improve knee and posterior chain musculature. Pt was challenged by both added exercises during today's session, but was able to complete all sets and reps after rest breaks were taken. Continue to challenge progress pt as she tolerates. Patient exhibited good technique with therapeutic exercises and would benefit from continued PT      Plan: Continue per plan of care.  Progress treatment as tolerated.       Precautions: BMI 40+, Pulmonary condition effecting endurance and blood flow to extemities      Date 2/14 2/21 2/28 3/6 3/13 3/22       Visit # IE 2 3 4 5 6       FOTO IE      DC       Re-eval IE               Manuals 2/14 2/21 2/28 3/6 3/13 3/22       Knee PROM             Patellar Mob             Quad/ITB STM                          Neuro Re-Ed 2/14 2/21 2/28 3/6 3/13 3/22       Hamstring Str 3x20\" 3x30\" 3x30\" 3x30\"         Seated Calf Str   3x30\" 3x30\"         ITB Str  3x30\" 3x30\" 3x30\"         Bridges 10x PTB 20x PTB           Clamshells 10x PTB 20x PTB           Ankle Pumps             Slantboard  3x30\"   3x30\" 3x30\"       Marching   " "2x10 ea  2x10 ea 20x PTB seated 20x PTB seated       Hip Flexor Str             Hamstring Curl             SLS             Ther Ex 2/14 2/21 2/28 3/6 3/13 3/22       Bike  6' 6' 6' 6' 6'       Quad Set 10x5\" 15x5\" towel under knee 20x 5\" w/ LAQ 20x 5\" w/ LAQ 20x 5\" w/ LAQ        SAQ 10x5\" 15x 5\"            LAQ  20x 5\"  20x 5\" w/ quad set 20x 5\" w/ quad set 20x 5\" w/ quad set        SLR  2x10 3\" 2x10 3\" 2x10 3\"         SL Hip Abd 10x  20x 3\"           Leg Press   3x10 75# 3x10 75# 3x10 85# 3x10 85#       SL Leg Press             Side Stepping     5 laps PTB 5 laps PTB       Monster Walks     5 laps PTB 5 laps PTB       3-way Hip kicks   10x ea  10x ea 10x PTB 5 laps PTB       Split Squats             Ther Activity 2/14 2/21 2/28 3/6 3/13 3/22       Squatting      15x       Sit to Stand   2x5  2x5  2x10       Step-Ups   5x ea 0R P!  15x ea        Gait Training 2/14 2/21   3/13 3/22                                 Modalities 2/14    3/13 3/22                                               "

## 2024-03-25 ENCOUNTER — APPOINTMENT (OUTPATIENT)
Dept: LAB | Facility: CLINIC | Age: 79
End: 2024-03-25
Payer: COMMERCIAL

## 2024-03-25 DIAGNOSIS — C34.90 NON-SMALL CELL LUNG CANCER, UNSPECIFIED LATERALITY (HCC): ICD-10-CM

## 2024-03-25 DIAGNOSIS — C79.51 MALIGNANT NEOPLASM METASTATIC TO BONE (HCC): ICD-10-CM

## 2024-03-25 LAB
ALBUMIN SERPL BCP-MCNC: 4.1 G/DL (ref 3.5–5)
ALP SERPL-CCNC: 80 U/L (ref 34–104)
ALT SERPL W P-5'-P-CCNC: 32 U/L (ref 7–52)
ANION GAP SERPL CALCULATED.3IONS-SCNC: 9 MMOL/L (ref 4–13)
AST SERPL W P-5'-P-CCNC: 44 U/L (ref 13–39)
BILIRUB SERPL-MCNC: 1.39 MG/DL (ref 0.2–1)
BUN SERPL-MCNC: 31 MG/DL (ref 5–25)
CALCIUM SERPL-MCNC: 9.3 MG/DL (ref 8.4–10.2)
CHLORIDE SERPL-SCNC: 101 MMOL/L (ref 96–108)
CO2 SERPL-SCNC: 34 MMOL/L (ref 21–32)
CREAT SERPL-MCNC: 1.57 MG/DL (ref 0.6–1.3)
GFR SERPL CREATININE-BSD FRML MDRD: 31 ML/MIN/1.73SQ M
GLUCOSE P FAST SERPL-MCNC: 97 MG/DL (ref 65–99)
POTASSIUM SERPL-SCNC: 3.7 MMOL/L (ref 3.5–5.3)
PROT SERPL-MCNC: 6.3 G/DL (ref 6.4–8.4)
SODIUM SERPL-SCNC: 144 MMOL/L (ref 135–147)

## 2024-03-25 PROCEDURE — 36415 COLL VENOUS BLD VENIPUNCTURE: CPT

## 2024-03-25 PROCEDURE — 80053 COMPREHEN METABOLIC PANEL: CPT

## 2024-03-26 ENCOUNTER — HOSPITAL ENCOUNTER (OUTPATIENT)
Dept: INFUSION CENTER | Facility: HOSPITAL | Age: 79
Discharge: HOME | End: 2024-03-26
Attending: INTERNAL MEDICINE
Payer: COMMERCIAL

## 2024-03-26 VITALS — TEMPERATURE: 96.4 F

## 2024-03-26 DIAGNOSIS — C34.90 NON-SMALL CELL LUNG CANCER, UNSPECIFIED LATERALITY (HCC): ICD-10-CM

## 2024-03-26 DIAGNOSIS — C79.51 MALIGNANT NEOPLASM METASTATIC TO BONE (HCC): Primary | ICD-10-CM

## 2024-03-26 PROCEDURE — 96372 THER/PROPH/DIAG INJ SC/IM: CPT

## 2024-03-26 RX ADMIN — DENOSUMAB 120 MG: 120 INJECTION SUBCUTANEOUS at 13:47

## 2024-03-26 NOTE — PROGRESS NOTES
OK to treat with CrCl less than 30 per , see note in treatement plan. Calcium 9.3, within parameter for treatment. Patient tolerated right arm Xgeva injection without issue. Denies and dental pain or upcoming procedures. Next appointment confirmed for April 23rd at 1pm-SH infusion. AVS given.

## 2024-03-27 ENCOUNTER — OFFICE VISIT (OUTPATIENT)
Dept: PHYSICAL THERAPY | Facility: CLINIC | Age: 79
End: 2024-03-27
Payer: COMMERCIAL

## 2024-03-27 DIAGNOSIS — M17.11 PRIMARY OSTEOARTHRITIS OF RIGHT KNEE: ICD-10-CM

## 2024-03-27 DIAGNOSIS — M25.561 RIGHT KNEE PAIN, UNSPECIFIED CHRONICITY: Primary | ICD-10-CM

## 2024-03-27 PROCEDURE — 97112 NEUROMUSCULAR REEDUCATION: CPT

## 2024-03-27 PROCEDURE — 97110 THERAPEUTIC EXERCISES: CPT

## 2024-03-27 NOTE — PROGRESS NOTES
"Daily Note     Today's date: 3/27/2024  Patient name: Jayla Moody  : 1945  MRN: [id number]  Referring provider: Ramila Holder*  Dx:   Encounter Diagnosis     ICD-10-CM    1. Right knee pain, unspecified chronicity  M25.561       2. Primary osteoarthritis of right knee  M17.11       3. BMI 45.0-49.9, adult ([facility])  Z68.42           Start Time: 1430  Stop Time: 1515  Total time in clinic (min): 45 minutes    Subjective: Pt reports that her knee is sore coming into today's therapy session due the raining weather today.      Objective: See treatment diary below      Assessment: Pt tolerated treatment well. Progressed several exercises during today's PT session to continue to improve the strength and endurance of knee, posterior chain, and LE musculature globally. Progressed bridges, clamshells, 3-way hip kicks, side-stepping, and monster walks from PTB to GTB. Progressed LAQ and seated marching from no ankle weights to 2# ankle weights. Progressed leg press from 85# to 105#. Pt tolerated all progressions well, being able to complete all sets and reps with proper form, appropriate levels of fatigue, and no exacerbation of symptoms post session. Patient exhibited good technique with therapeutic exercises and would benefit from continued PT      Plan: Continue per plan of care.  Progress treatment as tolerated.       Precautions: BMI 40+, Pulmonary condition effecting endurance and blood flow to extemities      Date 2/14 2/21 2/28 3/6 3/13 3/22 3/27      Visit # IE 2 3 4 5 6 7      FOTO IE      DC       Re-eval IE               Manuals 2/14 2/21 2/28 3/6 3/13 3/22 3/27      Knee PROM             Patellar Mob             Quad/ITB STM                          Neuro Re-Ed 2/14 2/21 2/28 3/6 3/13 3/22 3/27      Hamstring Str 3x20\" 3x30\" 3x30\" 3x30\"         Seated Calf Str   3x30\" 3x30\"         ITB Str  3x30\" 3x30\" 3x30\"         Bridges 10x PTB 20x PTB     20x3\"GTB       Clamshells 10x PTB 20x PTB     20x3\"GTB " "     Ankle Pumps             Slantboard  3x30\"   3x30\" 3x30\"       Marching   2x10 ea  2x10 ea 20x PTB seated 20x PTB seated 20x 2# seated      Hip Flexor Str             Hamstring Curl             SLS             Ther Ex 2/14 2/21 2/28 3/6 3/13 3/22 3/27      Bike  6' 6' 6' 6' 6' 6'      Quad Set 10x5\" 15x5\" towel under knee 20x 5\" w/ LAQ 20x 5\" w/ LAQ 20x 5\" w/ LAQ        SAQ 10x5\" 15x 5\"            LAQ  20x 5\"  20x 5\" w/ quad set 20x 5\" w/ quad set 20x 5\" w/ quad set  20x 5\" 2#      SLR  2x10 3\" 2x10 3\" 2x10 3\"         SL Hip Abd 10x  20x 3\"           Leg Press   3x10 75# 3x10 75# 3x10 85# 3x10 85# 3x10 105#      SL Leg Press             Side Stepping     5 laps PTB 5 laps PTB 5 laps GTB      Monster Walks     5 laps PTB 5 laps PTB 5 laps GTB      3-way Hip kicks   10x ea  10x ea 10x PTB 5 laps PTB 5 laps GTB      Split Squats             Ther Activity 2/14 2/21 2/28 3/6 3/13 3/22 3/27      Squatting      15x       Sit to Stand   2x5  2x5  2x10       Step-Ups   5x ea 0R P!  15x ea        Gait Training 2/14 2/21   3/13 3/22                                 Modalities 2/14    3/13 3/22                                                 "

## 2024-03-28 ENCOUNTER — RA CDI HCC (OUTPATIENT)
Dept: OTHER | Facility: HOSPITAL | Age: 79
End: 2024-03-28

## 2024-04-04 ENCOUNTER — OFFICE VISIT (OUTPATIENT)
Dept: PHYSICAL THERAPY | Facility: CLINIC | Age: 79
End: 2024-04-04
Payer: COMMERCIAL

## 2024-04-04 DIAGNOSIS — M25.561 RIGHT KNEE PAIN, UNSPECIFIED CHRONICITY: Primary | ICD-10-CM

## 2024-04-04 DIAGNOSIS — M17.11 PRIMARY OSTEOARTHRITIS OF RIGHT KNEE: ICD-10-CM

## 2024-04-04 PROCEDURE — 97112 NEUROMUSCULAR REEDUCATION: CPT

## 2024-04-04 PROCEDURE — 97530 THERAPEUTIC ACTIVITIES: CPT

## 2024-04-04 PROCEDURE — 97110 THERAPEUTIC EXERCISES: CPT

## 2024-04-04 NOTE — PROGRESS NOTES
"Daily Note     Today's date: 2024  Patient name: Jayla Moody  : 1945  MRN: [id number]  Referring provider: Ramila Holder*  Dx:   Encounter Diagnosis     ICD-10-CM    1. Right knee pain, unspecified chronicity  M25.561       2. Primary osteoarthritis of right knee  M17.11       3. BMI 45.0-49.9, adult ([facility])  Z68.42           Start Time: 1520  Stop Time: 1600  Total time in clinic (min): 40 minutes    Subjective: Pt reports that her knee is slightly swollen coming into today's PT session due to the raining weather. Pt reports that she was sore after last PT session, and elects to have today's session be a little lighter.      Objective: See treatment diary below      Assessment: Pt tolerated treatment well. Added single leg, leg press to continue to challenge the strength and endurance of LE and posterior chain musculature. Pt responded well throughout full therapy session, being able to complete all sets and reps with proper form and appropriate levels of fatigue post session. Pt required few rest breaks during today's session due to decreased cardio vasular fitness, but after rest breaks pt was able to resume and finish all exercise. Patient exhibited good technique with therapeutic exercises and would benefit from continued PT      Plan: Continue per plan of care.  Progress treatment as tolerated.       Precautions: BMI 40+, Pulmonary condition effecting endurance and blood flow to extemities      Date 2/14 2/21 2/28 3/6 3/13 3/22 3/27 4/4     Visit # IE 2 3 4 5 6 7 8     FOTO IE      DC       Re-eval IE               Manuals  3/6 3/13 3/22 3/27 4/4     Knee PROM             Patellar Mob             Quad/ITB STM                          Neuro Re-Ed 2/14 2/21 2/28 3/6 3/13 3/22 3/27 4/4     Hamstring Str 3x20\" 3x30\" 3x30\" 3x30\"         Seated Calf Str   3x30\" 3x30\"         ITB Str  3x30\" 3x30\" 3x30\"         Bridges 10x PTB 20x PTB     20x3\"GTB  20x 3\" GTB     Clamshells 10x PTB " "20x PTB     20x3\"GTB 20x 3\" GTB     Ankle Pumps             Slantboard  3x30\"   3x30\" 3x30\"       Marching   2x10 ea  2x10 ea 20x PTB seated 20x PTB seated 20x 2# seated      Hip Flexor Str             Hamstring Curl             SLS             Ther Ex 2/14 2/21 2/28 3/6 3/13 3/22 3/27 4/4     Bike  6' 6' 6' 6' 6' 6' 6'     Quad Set 10x5\" 15x5\" towel under knee 20x 5\" w/ LAQ 20x 5\" w/ LAQ 20x 5\" w/ LAQ        SAQ 10x5\" 15x 5\"            LAQ  20x 5\"  20x 5\" w/ quad set 20x 5\" w/ quad set 20x 5\" w/ quad set  20x 5\" 2#      SLR  2x10 3\" 2x10 3\" 2x10 3\"         SL Hip Abd 10x  20x 3\"           Leg Press   3x10 75# 3x10 75# 3x10 85# 3x10 85# 3x10 105# 3x10 105#     SL Leg Press        20x ea 55#     Side Stepping     5 laps PTB 5 laps PTB 5 laps GTB      Monster Walks     5 laps PTB 5 laps PTB 5 laps GTB      3-way Hip kicks   10x ea  10x ea 10x PTB 5 laps PTB 5 laps GTB      Split Squats             Ther Activity 2/14 2/21 2/28 3/6 3/13 3/22 3/27 4/4     Squatting      15x       Sit to Stand   2x5  2x5  2x10  2x10      Step-Ups   5x ea 0R P!  15x ea   15x ea 1R     Gait Training 2/14 2/21   3/13 3/22  4/4                               Modalities 2/14    3/13 3/22                                                   "

## 2024-04-09 NOTE — PROGRESS NOTES
Assessment:     1. Right knee pain, unspecified chronicity  Ambulatory Referral to Orthopedic Surgery      2. BMI 45.0-49.9, adult (HCC)  Ambulatory Referral to Orthopedic Surgery      3. Primary osteoarthritis of right knee  Ambulatory Referral to Orthopedic Surgery      4. Instability of right knee joint  Ambulatory Referral to Orthopedic Surgery        Orders Placed This Encounter   Procedures    Ambulatory Referral to Orthopedic Surgery        Impression:   Right knee  pain likely secondary to primary osteoarthritis.          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  Undergoing therapy         Conservative Management   We discussed different treatment options:  Osteoarthritis treatment goal is to minimize pain and optimize function.  First-line management  Ice or Heat Therapy as needed 1-2 times daily for 10-20 minutes. As tolerated.   Over the counter Tylenol and/or NSAIDs  as needed based off your Past Medical Hx. Please follow product label for dosing and maximum limits.  If you have concerns about utilizing Tylenol/NSAIDs please discuss with your primary care physician.  Trial of over the counter Topical Analgesics such as Lidocaine cream or Voltaren Gel, as tolerated. If skin becomes irritated, discontinue use.   Please range joint through gentle range of motion, as tolerated.   Initiate Home Exercise Program for Stretching and Strengthening affected area.    Weight management may benefit lower extremity osteoarthritis.  Normal BMI 18 through 24.  May request referral to weight management or nutritionist/dietitian.  Initiate Formal Physical Therapy at any preferred location.                  Prescription provided.     Additional management  Optional treatment measures include the following  For hip/knee osteoarthritis: Medial  brace for patients with medial compartmental osteoarthritis.                                                Due to instability and MCL laxity will provide medial  brace today.  For hip/knee osteoarthritis assistive ambulation devices: Cane, walker                                               Patient is currently using cane for ambulation.  Interarticular glucocorticoid steroid if needing short-term pain relief                 Patient has just received corticosteroid injection in November 2023.  May consider viscosupplementation and/or PRP injections                 May consider viscosupplementation's in the future.  Referral to an orthopedic surgeon to discuss potential surgical management     Referral placed for orthopedic surgeon to discuss potential knee replacement.     Imaging   All imaging from today was reviewed by myself and explained to the patient.   01/18/2024 right knee x-ray: No acute osseous abnormality, moderate to severe osteoarthritis  Kellgren-Gilmar Classification      Present Grade Radiological findings   []  0 No radiological findings   []  1 Doubtful narrowing of joint space and possible osteophytic lipping   []  2 Definite osteophytes and possible narrowing of joint space   [x]  3 Moderate multiple osteophytes, definitive narrowing of joint space, small pseudocystic areas with sclerotic walls and possibly deformity of bone contour   [x]  4 Large osteophytes, marked narrowing of joint space, severe sclerosis and definitive deformity of bone contour   **If more than 1 [x] is present patient is between grades           Procedure  No Injection performed today. May consider future corticosteroid injection depending on clinical exam/imaging.  Will await oncologist approval for corticosteroid injection to the knee.  If unable to complete corticosteroid injection would recommend aspiration and viscosupplementation.      Shared decision making, patient agreeable to plan.      Return for Follow up as needed or if symptoms do NOT improve.    HPI:   Jayla Moody is a 78 y.o. female  who  presents for evaluation of   Chief Complaint   Patient presents with    Right Knee - Numbness, Pain, Swelling     Today's visit  Denies any new trauma.  Location: Medial and lateral joint line..  Severity: Current severity: 5-6/10.   Pain described as: Pinching  Provocative: Prolonged walking.  Associated symptoms: Swelling.    Previous Visit: 01/18/2024  Occupation: Retired   Injury Related: Yes, Date of Injury: Fell down 1 year ago     Onset/Mechanism: 3 years knee pain approximately 5 years off-and-on.  Pain has been worsening ever since fall 1 year ago.  Location: Patella, deep to patella, medial and lateral joint line.  Severity: Current severity: 4/10.   Pain described as: stiff, ache   Radiation: radiate up into hip   Provocative: walking, standing.  Associated symptoms: Instability, swelling.     Denies any hx of fracture of affected limb.   Denies any surgical history of affected limb.       Summary of treatment to-date:   Tylenol  Formal therapy  Corticosteroid injections       Following History Reviewed and Updated     Past Medical History:   Diagnosis Date    Allergic rhinitis     Anemia     Asthma     Atrial fibrillation (HCC)     Chronic bronchitis (HCC)     COPD (chronic obstructive pulmonary disease) (HCC)     Coronary artery disease     CPAP (continuous positive airway pressure) dependence     Degenerative joint disease     Fluid retention 2024    GERD (gastroesophageal reflux disease)     Glaucoma     Hypertension     Lumbar disc disease     Lung cancer (HCC)     Periodic heart flutter     Pneumonia     Sleep apnea     suspected     Sleep apnea, obstructive     Ventricular arrhythmia     Vitamin D deficiency      Past Surgical History:   Procedure Laterality Date    APPENDECTOMY      COLON SURGERY      COLONOSCOPY N/A 03/18/2019    Procedure: COLONOSCOPY;  Surgeon: Alessia Wilson DO;  [facility];  Service: Gastroenterology    ESOPHAGOGASTRODUODENOSCOPY N/A 03/18/2019    Procedure:  ESOPHAGOGASTRODUODENOSCOPY (EGD);  Surgeon: Alessia Wilson DO;  [facility];  Service: Gastroenterology    KNEE SURGERY      LUNG BIOPSY      ROTATOR CUFF REPAIR      SHOULDER SURGERY       Family History   Problem Relation Age of Onset    Stroke Mother     Diabetes Mother     Hyperlipidemia Mother     Hypertension Mother     Allergies Mother         Environmental    Asthma Mother     Diabetes Father     Hyperlipidemia Father     Hypertension Father     Lung cancer Father 70    Allergies Father         Environmental    Asthma Father     Lymphoma Sister 69    Heart disease Sister         Pacemaker    Asthma Brother     Aneurysm Brother         Brain - had surgery    Other Brother         Lymes disease    Coronary artery disease Daughter         2 bypass done    No Known Problems Daughter     No Known Problems Daughter     No Known Problems Son     Kidney disease Son     Liver disease Son     Obesity Son        Social History     Substance and Sexual Activity   Alcohol Use Yes    Comment: occasionally     Social History     Substance and Sexual Activity   Drug Use Never     Social History     Tobacco Use   Smoking Status Former    Current packs/day: 0.00    Average packs/day: 0.3 packs/day for 25.0 years (6.3 ttl pk-yrs)    Types: Cigarettes    Start date:     Quit date:     Years since quittin.2   Smokeless Tobacco Former       Social Determinants of Health     Tobacco Use: Medium Risk (2024)    Patient History     Smoking Tobacco Use: Former     Smokeless Tobacco Use: Former     Passive Exposure: Not on file   Alcohol Use: Not At Risk (2021)    AUDIT-C     Frequency of Alcohol Consumption: Monthly or less     Average Number of Drinks: 1 or 2     Frequency of Binge Drinking: Never   Financial Resource Strain: Low Risk  (2024)    Overall Financial Resource Strain (CARDIA)     Difficulty of Paying Living Expenses: Not very hard   Food Insecurity: No Food Insecurity (2024)  "   Hunger Vital Sign     Worried About Running Out of Food in the Last Year: Never true     Ran Out of Food in the Last Year: Never true   Transportation Needs: No Transportation Needs (2/29/2024)    PRAPARE - Transportation     Lack of Transportation (Medical): No     Lack of Transportation (Non-Medical): No   Physical Activity: Inactive (11/15/2022)    Exercise Vital Sign     Days of Exercise per Week: 0 days     Minutes of Exercise per Session: 0 min   Stress: Not on file   Social Connections: Not on file   Intimate Partner Violence: Not on file   Depression: Not at risk (8/23/2021)    PHQ-2     PHQ-2 Score: 0   Housing Stability: Low Risk  (8/16/2023)    Housing Stability Vital Sign     Unable to Pay for Housing in the Last Year: No     Number of Places Lived in the Last Year: 1     Unstable Housing in the Last Year: No   Utilities: Not on file   Health Literacy: Not on file        No Known Allergies    Review of Systems      Review of Systems     Review of Systems   Constitutional: Negative for chills and fever.   HENT: Negative for drooling and sneezing.    Eyes: Negative for redness.   Respiratory: Negative for cough and wheezing.    Gastrointestinal: Negative for vomiting.   Psychiatric/Behavioral: Negative for behavioral problems. The patient is not nervous/anxious.      All other systems negative.   Physical Exam   Physical Exam    Vitals and nursing note reviewed.  Constitutional:   Appearance. Normal Appearance.  /75   Ht 5' 1\" (1.549 m)   Wt 100 kg (221 lb)   BMI 41.76 kg/m²     Body mass index is 41.76 kg/m².   HENT:  Head: Atraumatic.  Nose: Nose normal  Eyes: Conjunctiva/sclera: Conjunctivae normal.  Cardiovascular:   Rate and Rhythm: Bilateral equal distal pulses  Pulmonary:   Effort: Pulmonary effort is normal  Skin:   General: Skin is warm and dry.  Neurological:   General: No focal deficit present.  Mental Status: Alert and oriented to person, place, and time.   Psychiatric:   Mood and " "Affect: mood normal.  Behavior: Behavior normal     Musculoskeletal Exam     Ortho Exam     Right knee:     Inspection:  Erythema Bruising Effusion Muscle atrophy Retracted muscle   Negative negative negative Negative Negative     Palpation:  Increased warmth Crepitus Palpable muscle depression   Negative negative Negative     Tenderness:  Quadriceps tendon Patella Patellar tendon  Joint line   Neg. Neg. Neg. Medial lateral joint line.        Tibial tubercle Pooja's tubercle Pes anserine bursa Posterior knee   Neg. Neg. Neg. Neg.       Biceps femoris Semimembranosus/semitendinosis Popliteus tendon Fibular head   Neg. Neg. Neg. Neg.       Bilateral Range of Motion:  Active flexion Passive flexion   (normal 135 degrees) (normal 135 degrees)   Grossly Intact      Active extension Passive extension   (normal 0 degrees) (normal 0 degrees)   Grossly Intact        Bilateral strength:grossly intact   Seated hip flexion Seated knee flexion Seated knee extension          Seated great toe extension Seated ankle dorsiflexion Seated ankle plantarflexion          Seated hip adduction Seated hip abduction Prone knee flexion            Neurovascular:  Sensation to light touch Skin   Intact and equal bilaterally Warm and dry     (Test not completed if space left blank )           Procedures       Portions of the record may have been created with voice recognition software. Occasional wrong word or \"sound alike\" substitutions may have occurred due to the inherent limitations of voice recognition software. Please review the chart carefully and recognize, using context, where substitutions/typographical errors may have occurred.   "

## 2024-04-10 ENCOUNTER — TELEPHONE (OUTPATIENT)
Dept: HEMATOLOGY ONCOLOGY | Facility: CLINIC | Age: 79
End: 2024-04-10

## 2024-04-10 ENCOUNTER — APPOINTMENT (OUTPATIENT)
Dept: PHYSICAL THERAPY | Facility: CLINIC | Age: 79
End: 2024-04-10
Payer: COMMERCIAL

## 2024-04-10 ENCOUNTER — OFFICE VISIT (OUTPATIENT)
Dept: OBGYN CLINIC | Facility: CLINIC | Age: 79
End: 2024-04-10
Payer: COMMERCIAL

## 2024-04-10 VITALS
WEIGHT: 221 LBS | DIASTOLIC BLOOD PRESSURE: 75 MMHG | HEIGHT: 61 IN | SYSTOLIC BLOOD PRESSURE: 128 MMHG | BODY MASS INDEX: 41.72 KG/M2

## 2024-04-10 DIAGNOSIS — M25.361 INSTABILITY OF RIGHT KNEE JOINT: ICD-10-CM

## 2024-04-10 DIAGNOSIS — M17.11 PRIMARY OSTEOARTHRITIS OF RIGHT KNEE: ICD-10-CM

## 2024-04-10 DIAGNOSIS — M25.561 RIGHT KNEE PAIN, UNSPECIFIED CHRONICITY: Primary | ICD-10-CM

## 2024-04-10 PROCEDURE — 99213 OFFICE O/P EST LOW 20 MIN: CPT | Performed by: FAMILY MEDICINE

## 2024-04-10 NOTE — TELEPHONE ENCOUNTER
"Patient Call    Who are you speaking with? Patient    If it is not the patient, are they listed on an active communication consent form? N/A   What is the reason for this call? Patient would like to speak to the office in regards to needing an \"ok\" from Dr Arriaga to receive a cortisone injection in her knee.      Also needs to \"ok\" to maybe have knee surgery.   Does this require a call back? Yes   If a call back is required, please list [facility] call back number [phone]    If a call back is required, advise that a message will be forwarded to their care team and someone will return their call as soon as possible.   Did you relay this information to the patient? Yes     "

## 2024-04-10 NOTE — TELEPHONE ENCOUNTER
Spoke to patient. No date on the injection yet. The plan is to try and injection first and if no relief, they will proceed with surgery. I let her know that Dr. Arriaga is back in the office next week and I will send this message to him for when he returns. She should expect a phonecall back next week.

## 2024-04-20 ENCOUNTER — APPOINTMENT (OUTPATIENT)
Dept: LAB | Facility: CLINIC | Age: 79
End: 2024-04-20
Payer: COMMERCIAL

## 2024-04-20 DIAGNOSIS — C79.51 MALIGNANT NEOPLASM METASTATIC TO BONE (HCC): ICD-10-CM

## 2024-04-20 DIAGNOSIS — C34.90 NON-SMALL CELL LUNG CANCER, UNSPECIFIED LATERALITY (HCC): ICD-10-CM

## 2024-04-20 LAB
ALBUMIN SERPL BCP-MCNC: 4.1 G/DL (ref 3.5–5)
ALP SERPL-CCNC: 87 U/L (ref 34–104)
ALT SERPL W P-5'-P-CCNC: 35 U/L (ref 7–52)
ANION GAP SERPL CALCULATED.3IONS-SCNC: 9 MMOL/L (ref 4–13)
AST SERPL W P-5'-P-CCNC: 44 U/L (ref 13–39)
BILIRUB SERPL-MCNC: 1.55 MG/DL (ref 0.2–1)
BUN SERPL-MCNC: 38 MG/DL (ref 5–25)
CALCIUM SERPL-MCNC: 9.5 MG/DL (ref 8.4–10.2)
CHLORIDE SERPL-SCNC: 102 MMOL/L (ref 96–108)
CO2 SERPL-SCNC: 34 MMOL/L (ref 21–32)
CREAT SERPL-MCNC: 1.97 MG/DL (ref 0.6–1.3)
GFR SERPL CREATININE-BSD FRML MDRD: 23 ML/MIN/1.73SQ M
GLUCOSE P FAST SERPL-MCNC: 107 MG/DL (ref 65–99)
POTASSIUM SERPL-SCNC: 4.4 MMOL/L (ref 3.5–5.3)
PROT SERPL-MCNC: 6.6 G/DL (ref 6.4–8.4)
SODIUM SERPL-SCNC: 145 MMOL/L (ref 135–147)

## 2024-04-20 PROCEDURE — 36415 COLL VENOUS BLD VENIPUNCTURE: CPT

## 2024-04-20 PROCEDURE — 80053 COMPREHEN METABOLIC PANEL: CPT

## 2024-04-22 ENCOUNTER — OFFICE VISIT (OUTPATIENT)
Dept: FAMILY MEDICINE CLINIC | Facility: CLINIC | Age: 79
End: 2024-04-22

## 2024-04-22 ENCOUNTER — TELEPHONE (OUTPATIENT)
Dept: HEMATOLOGY ONCOLOGY | Facility: CLINIC | Age: 79
End: 2024-04-22

## 2024-04-22 VITALS
OXYGEN SATURATION: 95 % | WEIGHT: 220 LBS | HEIGHT: 61 IN | SYSTOLIC BLOOD PRESSURE: 133 MMHG | TEMPERATURE: 97.2 F | HEART RATE: 71 BPM | BODY MASS INDEX: 41.54 KG/M2 | DIASTOLIC BLOOD PRESSURE: 62 MMHG | RESPIRATION RATE: 18 BRPM

## 2024-04-22 DIAGNOSIS — Z13.9 ENCOUNTER FOR SCREENING: ICD-10-CM

## 2024-04-22 DIAGNOSIS — I48.3 TYPICAL ATRIAL FLUTTER (HCC): ICD-10-CM

## 2024-04-22 DIAGNOSIS — C34.91 NON-SMALL CELL CANCER OF RIGHT LUNG (HCC): ICD-10-CM

## 2024-04-22 DIAGNOSIS — I10 ESSENTIAL HYPERTENSION: ICD-10-CM

## 2024-04-22 DIAGNOSIS — Z12.31 ENCOUNTER FOR SCREENING MAMMOGRAM FOR MALIGNANT NEOPLASM OF BREAST: ICD-10-CM

## 2024-04-22 DIAGNOSIS — J45.20 MILD INTERMITTENT ASTHMA WITHOUT COMPLICATION: Primary | ICD-10-CM

## 2024-04-22 DIAGNOSIS — C34.91 NON-SMALL CELL CARCINOMA OF LUNG, STAGE 4, RIGHT (HCC): ICD-10-CM

## 2024-04-22 DIAGNOSIS — J45.51 SEVERE PERSISTENT ASTHMA WITH ACUTE EXACERBATION: ICD-10-CM

## 2024-04-22 DIAGNOSIS — J45.31 MILD PERSISTENT ASTHMA WITH ACUTE EXACERBATION: ICD-10-CM

## 2024-04-22 PROCEDURE — 1160F RVW MEDS BY RX/DR IN RCRD: CPT | Performed by: FAMILY MEDICINE

## 2024-04-22 PROCEDURE — G2211 COMPLEX E/M VISIT ADD ON: HCPCS | Performed by: FAMILY MEDICINE

## 2024-04-22 PROCEDURE — 1159F MED LIST DOCD IN RCRD: CPT | Performed by: FAMILY MEDICINE

## 2024-04-22 PROCEDURE — 3078F DIAST BP <80 MM HG: CPT | Performed by: FAMILY MEDICINE

## 2024-04-22 PROCEDURE — 3075F SYST BP GE 130 - 139MM HG: CPT | Performed by: FAMILY MEDICINE

## 2024-04-22 PROCEDURE — 99213 OFFICE O/P EST LOW 20 MIN: CPT | Performed by: FAMILY MEDICINE

## 2024-04-22 RX ORDER — ALBUTEROL SULFATE 90 UG/1
2 AEROSOL, METERED RESPIRATORY (INHALATION) 4 TIMES DAILY
Qty: 18 G | Refills: 2 | Status: SHIPPED | OUTPATIENT
Start: 2024-04-22

## 2024-04-22 RX ORDER — FEXOFENADINE HCL 180 MG/1
180 TABLET ORAL DAILY
Qty: 90 TABLET | Refills: 3 | Status: SHIPPED | OUTPATIENT
Start: 2024-04-22

## 2024-04-22 RX ORDER — AMIODARONE HYDROCHLORIDE 200 MG/1
200 TABLET ORAL
Qty: 90 TABLET | Refills: 3 | Status: SHIPPED | OUTPATIENT
Start: 2024-04-22 | End: 2025-04-17

## 2024-04-22 NOTE — ASSESSMENT & PLAN NOTE
After Visit Summary   3/23/2017    Arlyn Stevens    MRN: [id number]           Patient Information     Date Of Birth          1943        Visit Information        Provider Department      3/23/2017 8:40 AM Jayleen Brownlee APRN CNP [facility]        Today's Diagnoses     Endometrial carcinoma (H)    -  1    Encounter for antineoplastic chemotherapy        Need for prophylactic measure        Chemotherapy-induced neuropathy (H)        Lymphedema of both lower extremities        Acute reaction to stress          Care Instructions    Have a FANTASTIC birthday!  Call the clinic if you develop a fever, vaginal bleeding, or fluids gushing from your vagina  Go to the ED if you develop a fever >100.4  Taking Claritin one tablet daily for the next seven days may help with your bone pain after Neulasta  Vanicream may help with dry skin  Keep your appointment tomorrow with Dr. Monroy- mention your neuropathy and emotional concerns    APPOINTMENTS:  Please follow up with physical therapy after you complete chemotherapy  Please have her see Silverio with lymphedema therapy  To see Dr. Sexton in three weeks for treatment planning/probable transition to surveillance      Appointments already in place today.  Lisa        Follow-ups after your visit        Your next 10 appointments already scheduled     Mar 24, 2017 12:30 PM CDT   New Visit with Renita Monroy MD   [facility] ([facility])    [facility]  [id number] [place] [address] [address]  [facility] [address]   [phone]            Apr 11, 2017 11:00 AM CDT   Return Visit with Mindy Archer MD   [facility] ([facility])    [facility]  [address]  [facility] [address]   [phone]            Apr 11, 2017 11:30 AM CDT   Level 1 with [facility] [address] [place] [address]  BP Readings from Last 3 Encounters:   04/22/24 133/62   04/10/24 128/75   03/14/24 128/76          Follows with cardiology  Home medications:lopressor 25 mg qd,  -Continue home meds  -f/u in 3 months    [facility]    [facility]  [address]  [facility] [id number] [phone]            Apr 12, 2017  1:00 PM CDT   Evaluation with Leslie Lowe, PT   [facility] ([facility])    [address]  [facility] [id number] [phone]              Who to contact     If you have questions or need follow up information about today's clinic visit or your schedule please contact [facility] directly at [phone].  Normal or non-critical lab and imaging results will be communicated to you by [employer], letter or phone within 4 business days after the clinic has received the results. If you do not hear from us within 7 days, please contact the clinic through [employer] or phone. If you have a critical or abnormal lab result, we will notify you by phone as soon as possible.  Submit refill requests through [employer] or call your pharmacy and they will forward the refill request to us. Please allow 3 business days for your refill to be completed.          Additional Information About Your Visit        [employer] Information     [employer] gives you secure access to your electronic health record. If you see a primary care provider, you can also send messages to your care team and make appointments. If you have questions, please call your primary care clinic.  If you do not have a primary care provider, please call [phone] and they will assist you.        Care EveryWhere ID     This is your Care EveryWhere ID. This could be used by other organizations to access your [facility] medical records  [id number]        Your Vitals Were     Pulse Temperature Respirations Height Pulse Oximetry BMI (Body Mass Index)    103 98.9  F (37.2  C) (Tympanic) 16 1.651 m (5' 5\") 97% 33.19 kg/m2       Blood Pressure from Last 3 Encounters:   03/23/17 128/79   03/13/17 127/63   03/12/17 124/66    Weight from " Last 3 Encounters:   03/23/17 90.5 kg (199 lb 7 oz)   03/12/17 87.5 kg (192 lb 14.4 oz)   03/12/17 89.8 kg (198 lb)              Today, you had the following     No orders found for display       Primary Care Provider Office Phone # Fax #    Vanessa Narcisa Conde [phone]       [place] [address]  [address]   [facility] [address]        Thank you!     Thank you for choosing [facility]  for your care. Our goal is always to provide you with excellent care. Hearing back from our patients is one way we can continue to improve our services. Please take a few minutes to complete the written survey that you may receive in the mail after your visit with us. Thank you!             Your Updated Medication List - Protect others around you: Learn how to safely use, store and throw away your medicines at www.disposemymeds.org.          This list is accurate as of: 3/23/17 11:23 AM.  Always use your most recent med list.                   Brand Name Dispense Instructions for use    albuterol 108 (90 BASE) MCG/ACT Inhaler    PROAIR HFA/PROVENTIL HFA/VENTOLIN HFA    3 Inhaler    Inhale 2 puffs into the lungs every 6 hours as needed for shortness of breath / dyspnea       diclofenac 1 % Gel topical gel    VOLTAREN    100 g    Apply  topically 4 times daily. Apply 4 grams to knees or 2 grams to hands four times daily using enclosed dosing card.       gabapentin 300 MG capsule    NEURONTIN    270 capsule    Take 1 capsule (300 mg) by mouth 3 times daily       ibuprofen 600 MG tablet    ADVIL/MOTRIN    30 tablet    Take 1 tablet (600 mg) by mouth every 6 hours as needed for other (inflammatory pain)       lidocaine 5 % Patch    LIDODERM     Place 1 patch onto the skin every 24 hours       lidocaine-prilocaine cream    EMLA    30 g    Apply small amount of cream to port area 30-60 minutes before chemotherapy, cover with saran wrap.       LORazepam 1 MG tablet    ATIVAN    20  tablet    Take 1 tablet (1 mg) by mouth every 8 hours as needed for anxiety, nausea or other (sleep at night)       * order for DME     1 each    Equipment being ordered: 1: Gradient Compression Wraps; 2: Cast Boots; 3: BLE 20-30 mm Hg compression stockings (knee or thigh high); 4: Custom BLE compression stockings (knee high or thigh high or full pant)       * order for DME     1 each    Equipment being ordered: Walker Wheels () and Walker () Treatment Diagnosis: difficulty with gait, fall risk       oxyCODONE 5 MG IR tablet    ROXICODONE    20 tablet    Take 1 tablet (5 mg) by mouth every 4 hours as needed for moderate to severe pain       prochlorperazine 10 MG tablet    COMPAZINE    30 tablet    Take 1 tablet (10 mg) by mouth every 6 hours as needed (nausea/vomiting)       senna-docusate 8.6-50 MG per tablet    SENOKOT-S;PERICOLACE    100 tablet    Take 1-2 tablets by mouth 2 times daily       TYLENOL PO          WOMENS 50+ MULTI VITAMIN/MIN PO      Take 1 tablet by mouth daily       * Notice:  This list has 2 medication(s) that are the same as other medications prescribed for you. Read the directions carefully, and ask your doctor or other care provider to review them with you.

## 2024-04-22 NOTE — TELEPHONE ENCOUNTER
Call out to patient and reviewed that due to kidney function patient's xgeva to be on hold. Left vm.

## 2024-04-22 NOTE — ASSESSMENT & PLAN NOTE
Currently following with cardiology .   Home Medications: amiodarone 200 mg qam to maintain sinus rhythm,   Eliquis 5 mg qd for thromboembolic prophylaxis, metoprolol tartate 25 mg qd

## 2024-04-22 NOTE — TELEPHONE ENCOUNTER
Returned call to patient to review questions and concerns. Patient stated she needed a prescription for her cancer medication. New prescription pending provider review.

## 2024-04-22 NOTE — PROGRESS NOTES
Assessment/Plan:    1. Mild intermittent asthma without complication  Comments:  Continue with current medication regiment.  Orders:  -     albuterol (PROVENTIL HFA,VENTOLIN HFA) 90 mcg/act inhaler; Inhale 2 puffs 4 (four) times a day  -     glycopyrrolate-formoterol (BEVESPI AEROSPHERE) 9-4.8 MCG/ACT inhaler; Inhale 2 puffs 2 (two) times a day    2. Severe persistent asthma with acute exacerbation  Comments:  Asymptomatic at this time.  Continue with current regiment.  Orders:  -     glycopyrrolate-formoterol (BEVESPI AEROSPHERE) 9-4.8 MCG/ACT inhaler; Inhale 2 puffs 2 (two) times a day    3. Mild persistent asthma with acute exacerbation  -     glycopyrrolate-formoterol (BEVESPI AEROSPHERE) 9-4.8 MCG/ACT inhaler; Inhale 2 puffs 2 (two) times a day  -     fexofenadine (ALLEGRA) 180 MG tablet; Take 1 tablet (180 mg total) by mouth daily    4. Typical atrial flutter (HCC)  Assessment & Plan:  Currently following with cardiology .   Home Medications: amiodarone 200 mg qam to maintain sinus rhythm,   Eliquis 5 mg qd for thromboembolic prophylaxis, metoprolol tartate 25 mg qd          Orders:  -     apixaban (ELIQUIS) 5 mg; Take 1 tablet (5 mg total) by mouth 2 (two) times a day  -     amiodarone 200 mg tablet; Take 1 tablet (200 mg total) by mouth daily with breakfast    5. Encounter for screening  -     Mammo screening bilateral w 3d & cad; Future    6. Encounter for screening mammogram for malignant neoplasm of breast  -     Mammo screening bilateral w 3d & cad; Future    7. Essential hypertension  Assessment & Plan:  BP Readings from Last 3 Encounters:   04/22/24 133/62   04/10/24 128/75   03/14/24 128/76          Follows with cardiology  Home medications:lopressor 25 mg qd,  -Continue home meds  -f/u in 3 months            Subjective:      Patient ID: Jayla Moody is a 79 y.o. female.    Past medical history notable for non-small cell lung carcinoma currently being followed by hematology, history also notable for  "asthma currently being managed with glycopyrrolate-f formoterol is coming in for follow-up visit on chronic condition.  Patient denies any symptoms at this time.  Patient has been doing overall well.  Of note, patient recently seen orthopedics for her bilateral knee pain.  Her pain has been refractory from physical therapy, however she thinks that she may benefit from knee replacement therapy.  Patient currently has orthopedic surgeon scheduled visit.  Will evaluate at that time for surgery.        The following portions of the patient's history were reviewed and updated as appropriate: allergies, current medications, past family history, past medical history, past social history, past surgical history, and problem list.    Review of Systems   Constitutional:  Negative for chills and fever.   HENT:  Negative for ear pain and sore throat.    Eyes:  Negative for pain and visual disturbance.   Respiratory:  Negative for cough and shortness of breath.    Cardiovascular:  Negative for chest pain and palpitations.   Gastrointestinal:  Negative for abdominal pain and vomiting.   Genitourinary:  Negative for dysuria and hematuria.   Musculoskeletal:  Positive for arthralgias (Bilateral knee). Negative for back pain.   Skin:  Negative for color change and rash.   Neurological:  Negative for seizures and syncope.   All other systems reviewed and are negative.        Objective:      /62 (BP Location: Left arm, Patient Position: Sitting, Cuff Size: Large)   Pulse 71   Temp (!) 97.2 °F (36.2 °C) (Temporal)   Resp 18   Ht 5' 1\" (1.549 m)   Wt 99.8 kg (220 lb)   SpO2 95%   BMI 41.57 kg/m²          Physical Exam  Constitutional:       General: She is not in acute distress.     Appearance: Normal appearance.   HENT:      Nose: No congestion or rhinorrhea.   Eyes:      Extraocular Movements: Extraocular movements intact.      Pupils: Pupils are equal, round, and reactive to light.   Cardiovascular:      Rate and Rhythm: " Normal rate and regular rhythm.      Pulses: Normal pulses.      Heart sounds: Normal heart sounds. No murmur heard.     No gallop.   Pulmonary:      Effort: Pulmonary effort is normal.      Breath sounds: Normal breath sounds. No wheezing, rhonchi or rales.   Abdominal:      General: Bowel sounds are normal. There is no distension.      Palpations: Abdomen is soft. There is no mass.      Tenderness: There is no abdominal tenderness.      Hernia: No hernia is present.   Musculoskeletal:      Right knee: Decreased range of motion. Tenderness present over the medial joint line.      Left knee: Decreased range of motion. Tenderness present over the medial joint line.   Skin:     General: Skin is warm.      Coloration: Skin is not jaundiced.      Findings: No bruising.   Neurological:      General: No focal deficit present.      Mental Status: She is alert and oriented to person, place, and time.   Psychiatric:         Mood and Affect: Mood normal.         Behavior: Behavior normal.         Thought Content: Thought content normal.           Kaiser Kohler DO   Family Medicine PGY-3  4/22/2024

## 2024-04-23 ENCOUNTER — TELEPHONE (OUTPATIENT)
Dept: HEMATOLOGY ONCOLOGY | Facility: CLINIC | Age: 79
End: 2024-04-23

## 2024-04-23 ENCOUNTER — TELEPHONE (OUTPATIENT)
Age: 79
End: 2024-04-23

## 2024-04-23 ENCOUNTER — HOSPITAL ENCOUNTER (OUTPATIENT)
Dept: INFUSION CENTER | Facility: HOSPITAL | Age: 79
Discharge: HOME | End: 2024-04-23
Attending: INTERNAL MEDICINE

## 2024-04-23 NOTE — TELEPHONE ENCOUNTER
Call out to patient in regards to xgeva infusion appointment for 4/23 cancelled, patient confirmed. Reviewed creatinine level. Patient stated asymptomatic.     Patient stated she called [employer] and confirmed patient should have oral cancer medication by Friday.

## 2024-04-23 NOTE — TELEPHONE ENCOUNTER
Reason for call: Not on active medication list   [x] Refill   [] Prior Auth  [] Other:     Office:   [] PCP/Provider -   [x] Specialty/Provider - Nephrology: Dr Germain     Medication: furosemide     Dose/Frequency: 20 mg daily     Quantity: 90D w refill     Pharmacy: [facility] [address]     Does the patient have enough for 3 days?   [x] Yes   [] No - Send as HP to POD

## 2024-04-24 DIAGNOSIS — I10 ESSENTIAL HYPERTENSION: Primary | ICD-10-CM

## 2024-04-24 RX ORDER — FUROSEMIDE 20 MG/1
20 TABLET ORAL DAILY
Qty: 30 TABLET | Refills: 0 | Status: SHIPPED | OUTPATIENT
Start: 2024-04-24

## 2024-04-24 NOTE — TELEPHONE ENCOUNTER
Patient called the RX Refill Line. Message is being forwarded to the office.     Patient is requesting - Pt called refill line to verify refill status for furosemide. Pt stated she has no medication left and if she waits until upcoming appt in May her legs will get swollen. Pt is requesting Dr. Germain to please send Rx to her pharmacy. Please review and advise Thank you.    Please contact patient at - [phone]

## 2024-04-24 NOTE — TELEPHONE ENCOUNTER
Called patient and left a voicemail going over the following information:    Advised Dr. Germain has sent in a one month supply to her pharmacy.    I asked the patient please call the office with further questions.

## 2024-05-01 ENCOUNTER — OFFICE VISIT (OUTPATIENT)
Dept: OBGYN CLINIC | Facility: MEDICAL CENTER | Age: 79
End: 2024-05-01
Payer: COMMERCIAL

## 2024-05-01 VITALS
HEIGHT: 61 IN | HEART RATE: 54 BPM | SYSTOLIC BLOOD PRESSURE: 125 MMHG | BODY MASS INDEX: 41.57 KG/M2 | DIASTOLIC BLOOD PRESSURE: 72 MMHG

## 2024-05-01 DIAGNOSIS — M17.11 PRIMARY OSTEOARTHRITIS OF RIGHT KNEE: ICD-10-CM

## 2024-05-01 DIAGNOSIS — M25.561 RIGHT KNEE PAIN, UNSPECIFIED CHRONICITY: ICD-10-CM

## 2024-05-01 DIAGNOSIS — M25.361 INSTABILITY OF RIGHT KNEE JOINT: ICD-10-CM

## 2024-05-01 PROCEDURE — 99214 OFFICE O/P EST MOD 30 MIN: CPT | Performed by: ORTHOPAEDIC SURGERY

## 2024-05-01 PROCEDURE — 20610 DRAIN/INJ JOINT/BURSA W/O US: CPT | Performed by: ORTHOPAEDIC SURGERY

## 2024-05-01 RX ORDER — ALBUTEROL SULFATE 2.5 MG/3ML
SOLUTION RESPIRATORY (INHALATION)
COMMUNITY
Start: 2024-04-26

## 2024-05-01 RX ORDER — RESVER/WINE/BFL/GRPSD/PC/C/POM 200MG-60MG
CAPSULE ORAL
COMMUNITY
Start: 2024-04-16

## 2024-05-01 RX ADMIN — TRIAMCINOLONE ACETONIDE 40 MG: 40 INJECTION, SUSPENSION INTRA-ARTICULAR; INTRAMUSCULAR at 11:45

## 2024-05-01 RX ADMIN — LIDOCAINE HYDROCHLORIDE 3 ML: 10 INJECTION, SOLUTION INFILTRATION; PERINEURAL at 11:45

## 2024-05-01 NOTE — LETTER
May 7, 2024     Kaiser Kohler, DO  [address]  [address]  [place] [address]    Patient: Jayla Moody   YOB: 1945   Date of Visit: 5/1/2024       Dear Dr. Kohler:    Thank you for referring Jayla Moody to me for evaluation. Below are my notes for this consultation.    If you have questions, please do not hesitate to call me. I look forward to following your patient along with you.         Sincerely,        Elizabeth Paula, DO        CC: DO Elziabeth Padilla DO  5/7/2024  1:53 PM  Sign when Signing Visit   Assessment/Plan    1. Primary osteoarthritis of right knee    2. Right knee pain, unspecified chronicity    3. BMI 45.0-49.9, adult (HCC)    4. Instability of right knee joint      Orders Placed This Encounter   Procedures   • Large joint arthrocentesis: R knee       The patient has severe right knee arthritis.  We discussed treatment options as well as risks and benefits of treatment options.  Patient is not a surgical candidate due to BMI greater than 40.   After a discussion of risks and benefits the patient elected to proceed with a right knee steroid injection today.  Patient should ice and avoid strenuous activity for 1-2 days if needed.  Patient should avoid vaccines for 2 weeks if possible.  If patient is diabetic should also monitor glucose over the next 7 to 10 days.    Can take Tylenol 1,000mg by mouth every 8 hours as needed for pain.  Do not exceed 3,000mg per day.  No NSAIDs due to being on blood thinners    Return in about 3 months (around 8/1/2024).    I answered all of the patient's questions during the visit and provided education of the patient's condition during the visit.  The patient verbalized understanding of the information given and agrees with the plan.  This note was dictated using [employer] software.  It may contain errors including improperly dictated words.  Please contact physician directly for any  questions.    History of Present Illness  Chief complaint:   Chief Complaint   Patient presents with   • Right Knee - Pain       HPI: Jayla Moody is a 79 y.o. female that c/o right knee pain.    Length of time knee pain has been present: 2-3 years  Any falls or trauma associated with onset of pain: no isabel  Location of pain: medial  Intermittent or constant: constant  Description of pain: sharp burning   Aggravating factors: weightbearing activities  Instability or locking: reports instability   Pain medication that has been tried: Tylenol  Topical mediation that has been tried: yes  Has heat/ice/elevation been tried: yes  Can NSAIDs be taken?  If not why?: No due to being on blood thinners.  Has PT or home exercises been tried?: yes last month which caused more pain.   Has bracing been tried? OTC or rx?  Yes no relief  Have injections been tried?  Steroid/visco?: no gel cause insurance doesn't cover it.   Right knee CSI December 2023 at [facility] which provided 2 months of relief.   Any history of surgery on that knee?:  no    Nondiabetic  Nonsmoker            ROS:    See HPI for musculoskeletal review.   All other systems reviewed are negative     Historical Information  Past Medical History:   Diagnosis Date   • Allergic rhinitis    • Anemia    • Asthma    • Atrial fibrillation (HCC)    • Chronic bronchitis (HCC)    • COPD (chronic obstructive pulmonary disease) (HCC)    • Coronary artery disease    • CPAP (continuous positive airway pressure) dependence    • Degenerative joint disease    • Fluid retention 2024   • GERD (gastroesophageal reflux disease)    • Glaucoma    • Hypertension    • Lumbar disc disease    • Lung cancer (HCC)    • Periodic heart flutter    • Pneumonia    • Sleep apnea     suspected    • Sleep apnea, obstructive    • Ventricular arrhythmia    • Vitamin D deficiency      Past Surgical History:   Procedure Laterality Date   • APPENDECTOMY     • COLON SURGERY     • COLONOSCOPY N/A 03/18/2019     Procedure: COLONOSCOPY;  Surgeon: Alessia Wilson DO;  Location: [facility];  Service: Gastroenterology   • ESOPHAGOGASTRODUODENOSCOPY N/A 2019    Procedure: ESOPHAGOGASTRODUODENOSCOPY (EGD);  Surgeon: Alessia Wilson DO;  Location: [facility];  Service: Gastroenterology   • KNEE SURGERY     • LUNG BIOPSY     • ROTATOR CUFF REPAIR     • SHOULDER SURGERY       Social History  Social History     Substance and Sexual Activity   Alcohol Use Yes    Comment: occasionally     Social History     Substance and Sexual Activity   Drug Use Never     Social History     Tobacco Use   Smoking Status Former   • Current packs/day: 0.00   • Average packs/day: 0.3 packs/day for 25.0 years (6.3 ttl pk-yrs)   • Types: Cigarettes   • Start date:    • Quit date:    • Years since quittin.3   Smokeless Tobacco Former     Family History:   Family History   Problem Relation Age of Onset   • Stroke Mother    • Diabetes Mother    • Hyperlipidemia Mother    • Hypertension Mother    • Allergies Mother         Environmental   • Asthma Mother    • Diabetes Father    • Hyperlipidemia Father    • Hypertension Father    • Lung cancer Father 70   • Allergies Father         Environmental   • Asthma Father    • Lymphoma Sister 69   • Heart disease Sister         Pacemaker   • Asthma Brother    • Aneurysm Brother         Brain - had surgery   • Other Brother         Lymes disease   • Coronary artery disease Daughter         2 bypass done   • No Known Problems Daughter    • No Known Problems Daughter    • No Known Problems Son    • Kidney disease Son    • Liver disease Son    • Obesity Son        Current Outpatient Medications on File Prior to Visit   Medication Sig Dispense Refill   • acetaminophen (TYLENOL) 650 mg CR tablet Take 1 tablet (650 mg total) by mouth every 8 (eight) hours as needed for mild pain 60 tablet 3   • albuterol (2.5 mg/3 mL) 0.083 % nebulizer solution      • albuterol (PROVENTIL HFA,VENTOLIN HFA) 90 mcg/act  inhaler Inhale 2 puffs 4 (four) times a day 18 g 2   • Alectinib HCl 150 MG CAPS Take 4 capsules (600 mg total) by mouth 2 (two) times a day 240 capsule 5   • amiodarone 200 mg tablet Take 1 tablet (200 mg total) by mouth daily with breakfast 90 tablet 3   • apixaban (ELIQUIS) 5 mg Take 1 tablet (5 mg total) by mouth 2 (two) times a day 60 tablet 11   • benralizumab (FASENRA) subcutaneous injection Inject 1 mL (30 mg total) under the skin every 56 days 1 mL 6   • Budeson-Glycopyrrol-Formoterol (Breztri Aerosphere) 160-9-4.8 MCG/ACT AERO Inhale 2 puffs 2 (two) times a day Rinse mouth after use. 10.7 g 11   • D-1000 Extra Strength 25 MCG (1000 UT) tablet TAKE 2 TABLETS (2,000 UNITS TOTAL) BY MOUTH DAILY     • fexofenadine (ALLEGRA) 180 MG tablet Take 1 tablet (180 mg total) by mouth daily 90 tablet 3   • fluticasone (FLONASE) 50 mcg/act nasal spray SPRAY 2 SPRAYS INTO EACH NOSTRIL EVERY DAY 48 mL 1   • furosemide (LASIX) 20 mg tablet Take 1 tablet (20 mg total) by mouth daily 30 tablet 0   • glycopyrrolate-formoterol (BEVESPI AEROSPHERE) 9-4.8 MCG/ACT inhaler Inhale 2 puffs 2 (two) times a day 10.7 g 5   • Klor-Con M20 20 MEQ tablet TAKE 1 TABLET BY MOUTH EVERY DAY 90 tablet 1   • metoprolol tartrate (LOPRESSOR) 25 mg tablet TAKE 1 TABLET (25 MG TOTAL) BY MOUTH EVERY 12 (TWELVE) HOURS 180 tablet 1   • omeprazole (PriLOSEC) 20 mg delayed release capsule TAKE 1 CAPSULE BY MOUTH EVERY DAY 90 capsule 1   • oxyCODONE-acetaminophen (PERCOCET) 5-325 mg per tablet Take 1 tablet by mouth every 4 (four) hours as needed for moderate pain For ongoing pain Max Daily Amount: 6 tablets 60 tablet 0   • budesonide (PULMICORT) 0.25 mg/2 mL nebulizer solution Take 2 mL (0.25 mg total) by nebulization 2 (two) times a day Rinse mouth after use. 360 mL 2   • ipratropium (ATROVENT) 0.02 % nebulizer solution Take 2.5 mL (0.5 mg total) by nebulization 3 (three) times a day 225 mL 2   • rosuvastatin (CRESTOR) 10 MG tablet Take 1 tablet (10 mg  total) by mouth daily 90 tablet 3   • [DISCONTINUED] diclofenac (VOLTAREN) 75 mg EC tablet Take 75 mg by mouth (Patient not taking: Reported on 9/1/2023)       No current facility-administered medications on file prior to visit.     No Known Allergies    Current Outpatient Medications on File Prior to Visit   Medication Sig Dispense Refill   • acetaminophen (TYLENOL) 650 mg CR tablet Take 1 tablet (650 mg total) by mouth every 8 (eight) hours as needed for mild pain 60 tablet 3   • albuterol (2.5 mg/3 mL) 0.083 % nebulizer solution      • albuterol (PROVENTIL HFA,VENTOLIN HFA) 90 mcg/act inhaler Inhale 2 puffs 4 (four) times a day 18 g 2   • Alectinib HCl 150 MG CAPS Take 4 capsules (600 mg total) by mouth 2 (two) times a day 240 capsule 5   • amiodarone 200 mg tablet Take 1 tablet (200 mg total) by mouth daily with breakfast 90 tablet 3   • apixaban (ELIQUIS) 5 mg Take 1 tablet (5 mg total) by mouth 2 (two) times a day 60 tablet 11   • benralizumab (FASENRA) subcutaneous injection Inject 1 mL (30 mg total) under the skin every 56 days 1 mL 6   • Budeson-Glycopyrrol-Formoterol (Breztri Aerosphere) 160-9-4.8 MCG/ACT AERO Inhale 2 puffs 2 (two) times a day Rinse mouth after use. 10.7 g 11   • D-1000 Extra Strength 25 MCG (1000 UT) tablet TAKE 2 TABLETS (2,000 UNITS TOTAL) BY MOUTH DAILY     • fexofenadine (ALLEGRA) 180 MG tablet Take 1 tablet (180 mg total) by mouth daily 90 tablet 3   • fluticasone (FLONASE) 50 mcg/act nasal spray SPRAY 2 SPRAYS INTO EACH NOSTRIL EVERY DAY 48 mL 1   • furosemide (LASIX) 20 mg tablet Take 1 tablet (20 mg total) by mouth daily 30 tablet 0   • glycopyrrolate-formoterol (BEVESPI AEROSPHERE) 9-4.8 MCG/ACT inhaler Inhale 2 puffs 2 (two) times a day 10.7 g 5   • Klor-Con M20 20 MEQ tablet TAKE 1 TABLET BY MOUTH EVERY DAY 90 tablet 1   • metoprolol tartrate (LOPRESSOR) 25 mg tablet TAKE 1 TABLET (25 MG TOTAL) BY MOUTH EVERY 12 (TWELVE) HOURS 180 tablet 1   • omeprazole (PriLOSEC) 20 mg  "delayed release capsule TAKE 1 CAPSULE BY MOUTH EVERY DAY 90 capsule 1   • oxyCODONE-acetaminophen (PERCOCET) 5-325 mg per tablet Take 1 tablet by mouth every 4 (four) hours as needed for moderate pain For ongoing pain Max Daily Amount: 6 tablets 60 tablet 0   • budesonide (PULMICORT) 0.25 mg/2 mL nebulizer solution Take 2 mL (0.25 mg total) by nebulization 2 (two) times a day Rinse mouth after use. 360 mL 2   • ipratropium (ATROVENT) 0.02 % nebulizer solution Take 2.5 mL (0.5 mg total) by nebulization 3 (three) times a day 225 mL 2   • rosuvastatin (CRESTOR) 10 MG tablet Take 1 tablet (10 mg total) by mouth daily 90 tablet 3   • [DISCONTINUED] diclofenac (VOLTAREN) 75 mg EC tablet Take 75 mg by mouth (Patient not taking: Reported on 9/1/2023)       No current facility-administered medications on file prior to visit.       Objective  Vitals: Blood pressure 125/72, pulse (!) 54, height 5' 1\" (1.549 m), not currently breastfeeding.,Body mass index is 41.57 kg/m².    PE:  AAOx 3  WDWN  Hearing intact, no drainage from eyes  Regular rate  no audible wheezing  no abdominal distension  LE compartments soft, skin intact    rightknee:    Appearance:  no swelling   No ecchymosis  no obvious joint deformity   No effusion  Palpation/Tenderness:  +TTP over medial joint line  No TTP over lateral joint line   No TTP over patella  No TTP over patellar tendon  No TTP over pes anserine bursa  Active Range of Motion:  AROM: 0-105  Special Tests:  Medial Bar's Test:  Positive  Lateral Bar's Test:  Negative  Apley's compression test:  Negative  Lachman's Test:  negative  Anterior Drawer Test:  Negative  Patellar grind:  Negative  Valgus Stress Test:  negative  Varus Stress Test:  negative     No ipsilateral hip pain with ROM    rightLE:    Sensation grossly intact  Palpable 2+ pulse  AT/GS/EHL intact    Imaging Studies: I have personally reviewed pertinent films in PACS  XR rightknee: Severe right knee " osteoarthritis      Large joint arthrocentesis: R knee  Universal Protocol:  Consent: Verbal consent obtained.  Risks and benefits: risks, benefits and alternatives were discussed  Consent given by: patient  Patient understanding: patient states understanding of the procedure being performed  Site marked: the operative site was marked  Patient identity confirmed: verbally with patient  Supporting Documentation  Indications: pain   Procedure Details  Location: knee - R knee  Needle size: 22 G  Ultrasound guidance: no  Approach: anterolateral  Medications administered: 3 mL lidocaine 1 %; 40 mg triamcinolone acetonide 40 mg/mL    Patient tolerance: patient tolerated the procedure well with no immediate complications  Dressing:  Sterile dressing applied            Scribe Attestation      I,:  Rios Grey am acting as a scribe while in the presence of the attending physician.:       I,:  Elizabeth Paula DO personally performed the services described in this documentation    as scribed in my presence.:         '

## 2024-05-01 NOTE — PROGRESS NOTES
Assessment/Plan     1. Primary osteoarthritis of right knee    2. Right knee pain, unspecified chronicity    3. BMI 45.0-49.9, adult ([facility])    4. Instability of right knee joint      Orders Placed This Encounter   Procedures    Large joint arthrocentesis: R knee       The patient has severe right knee arthritis.  We discussed treatment options as well as risks and benefits of treatment options.  Patient is not a surgical candidate due to BMI greater than 40.   After a discussion of risks and benefits the patient elected to proceed with a right knee steroid injection today.  Patient should ice and avoid strenuous activity for 1-2 days if needed.  Patient should avoid vaccines for 2 weeks if possible.  If patient is diabetic should also monitor glucose over the next 7 to 10 days.    Can take Tylenol 1,000mg by mouth every 8 hours as needed for pain.  Do not exceed 3,000mg per day.  No NSAIDs due to being on blood thinners    Return in about 3 months (around 8/1/2024).    I answered all of the patient's questions during the visit and provided education of the patient's condition during the visit.  The patient verbalized understanding of the information given and agrees with the plan.  This note was dictated using [employer] software.  It may contain errors including improperly dictated words.  Please contact physician directly for any questions.    History of Present Illness   Chief complaint:   Chief Complaint   Patient presents with    Right Knee - Pain       HPI: Jayla Moody is a 79 y.o. female that c/o right knee pain.    Length of time knee pain has been present: 2-3 years  Any falls or trauma associated with onset of pain: no isabel  Location of pain: medial  Intermittent or constant: constant  Description of pain: sharp burning   Aggravating factors: weightbearing activities  Instability or locking: reports instability   Pain medication that has been tried: Tylenol  Topical mediation that has been tried: yes  Has  heat/ice/elevation been tried: yes  Can NSAIDs be taken?  If not why?: No due to being on blood thinners.  Has PT or home exercises been tried?: yes last month which caused more pain.   Has bracing been tried? OTC or rx?  Yes no relief  Have injections been tried?  Steroid/visco?: no gel cause insurance doesn't cover it.   Right knee CSI December 2023 at [facility] which provided 2 months of relief.   Any history of surgery on that knee?:  no    Nondiabetic  Nonsmoker            ROS:    See HPI for musculoskeletal review.   All other systems reviewed are negative     Historical Information   Past Medical History:   Diagnosis Date    Allergic rhinitis     Anemia     Asthma     Atrial fibrillation (HCC)     Chronic bronchitis (HCC)     COPD (chronic obstructive pulmonary disease) (HCC)     Coronary artery disease     CPAP (continuous positive airway pressure) dependence     Degenerative joint disease     Fluid retention 2024    GERD (gastroesophageal reflux disease)     Glaucoma     Hypertension     Lumbar disc disease     Lung cancer (HCC)     Periodic heart flutter     Pneumonia     Sleep apnea     suspected     Sleep apnea, obstructive     Ventricular arrhythmia     Vitamin D deficiency      Past Surgical History:   Procedure Laterality Date    APPENDECTOMY      COLON SURGERY      COLONOSCOPY N/A 03/18/2019    Procedure: COLONOSCOPY;  Surgeon: Alessia Wilson DO;  Location: [facility];  Service: Gastroenterology    ESOPHAGOGASTRODUODENOSCOPY N/A 03/18/2019    Procedure: ESOPHAGOGASTRODUODENOSCOPY (EGD);  Surgeon: Alessia Wilson DO;  Location: [facility];  Service: Gastroenterology    KNEE SURGERY      LUNG BIOPSY      ROTATOR CUFF REPAIR      SHOULDER SURGERY       Social History   Social History     Substance and Sexual Activity   Alcohol Use Yes    Comment: occasionally     Social History     Substance and Sexual Activity   Drug Use Never     Social History     Tobacco Use   Smoking Status Former    Current  packs/day: 0.00    Average packs/day: 0.3 packs/day for 25.0 years (6.3 ttl pk-yrs)    Types: Cigarettes    Start date:     Quit date:     Years since quittin.3   Smokeless Tobacco Former     Family History:   Family History   Problem Relation Age of Onset    Stroke Mother     Diabetes Mother     Hyperlipidemia Mother     Hypertension Mother     Allergies Mother         Environmental    Asthma Mother     Diabetes Father     Hyperlipidemia Father     Hypertension Father     Lung cancer Father 70    Allergies Father         Environmental    Asthma Father     Lymphoma Sister 69    Heart disease Sister         Pacemaker    Asthma Brother     Aneurysm Brother         Brain - had surgery    Other Brother         Lymes disease    Coronary artery disease Daughter         2 bypass done    No Known Problems Daughter     No Known Problems Daughter     No Known Problems Son     Kidney disease Son     Liver disease Son     Obesity Son        Current Outpatient Medications on File Prior to Visit   Medication Sig Dispense Refill    acetaminophen (TYLENOL) 650 mg CR tablet Take 1 tablet (650 mg total) by mouth every 8 (eight) hours as needed for mild pain 60 tablet 3    albuterol (2.5 mg/3 mL) 0.083 % nebulizer solution       albuterol (PROVENTIL HFA,VENTOLIN HFA) 90 mcg/act inhaler Inhale 2 puffs 4 (four) times a day 18 g 2    Alectinib HCl 150 MG CAPS Take 4 capsules (600 mg total) by mouth 2 (two) times a day 240 capsule 5    amiodarone 200 mg tablet Take 1 tablet (200 mg total) by mouth daily with breakfast 90 tablet 3    apixaban (ELIQUIS) 5 mg Take 1 tablet (5 mg total) by mouth 2 (two) times a day 60 tablet 11    benralizumab (FASENRA) subcutaneous injection Inject 1 mL (30 mg total) under the skin every 56 days 1 mL 6    Budeson-Glycopyrrol-Formoterol (Breztri Aerosphere) 160-9-4.8 MCG/ACT AERO Inhale 2 puffs 2 (two) times a day Rinse mouth after use. 10.7 g 11    D-1000 Extra Strength 25 MCG (1000 UT) tablet  TAKE 2 TABLETS (2,000 UNITS TOTAL) BY MOUTH DAILY      fexofenadine (ALLEGRA) 180 MG tablet Take 1 tablet (180 mg total) by mouth daily 90 tablet 3    fluticasone (FLONASE) 50 mcg/act nasal spray SPRAY 2 SPRAYS INTO EACH NOSTRIL EVERY DAY 48 mL 1    furosemide (LASIX) 20 mg tablet Take 1 tablet (20 mg total) by mouth daily 30 tablet 0    glycopyrrolate-formoterol (BEVESPI AEROSPHERE) 9-4.8 MCG/ACT inhaler Inhale 2 puffs 2 (two) times a day 10.7 g 5    Klor-Con M20 20 MEQ tablet TAKE 1 TABLET BY MOUTH EVERY DAY 90 tablet 1    metoprolol tartrate (LOPRESSOR) 25 mg tablet TAKE 1 TABLET (25 MG TOTAL) BY MOUTH EVERY 12 (TWELVE) HOURS 180 tablet 1    omeprazole (PriLOSEC) 20 mg delayed release capsule TAKE 1 CAPSULE BY MOUTH EVERY DAY 90 capsule 1    oxyCODONE-acetaminophen (PERCOCET) 5-325 mg per tablet Take 1 tablet by mouth every 4 (four) hours as needed for moderate pain For ongoing pain Max Daily Amount: 6 tablets 60 tablet 0    budesonide (PULMICORT) 0.25 mg/2 mL nebulizer solution Take 2 mL (0.25 mg total) by nebulization 2 (two) times a day Rinse mouth after use. 360 mL 2    ipratropium (ATROVENT) 0.02 % nebulizer solution Take 2.5 mL (0.5 mg total) by nebulization 3 (three) times a day 225 mL 2    rosuvastatin (CRESTOR) 10 MG tablet Take 1 tablet (10 mg total) by mouth daily 90 tablet 3    [DISCONTINUED] diclofenac (VOLTAREN) 75 mg EC tablet Take 75 mg by mouth (Patient not taking: Reported on 9/1/2023)       No current facility-administered medications on file prior to visit.     No Known Allergies    Current Outpatient Medications on File Prior to Visit   Medication Sig Dispense Refill    acetaminophen (TYLENOL) 650 mg CR tablet Take 1 tablet (650 mg total) by mouth every 8 (eight) hours as needed for mild pain 60 tablet 3    albuterol (2.5 mg/3 mL) 0.083 % nebulizer solution       albuterol (PROVENTIL HFA,VENTOLIN HFA) 90 mcg/act inhaler Inhale 2 puffs 4 (four) times a day 18 g 2    Alectinib HCl 150 MG CAPS  Take 4 capsules (600 mg total) by mouth 2 (two) times a day 240 capsule 5    amiodarone 200 mg tablet Take 1 tablet (200 mg total) by mouth daily with breakfast 90 tablet 3    apixaban (ELIQUIS) 5 mg Take 1 tablet (5 mg total) by mouth 2 (two) times a day 60 tablet 11    benralizumab (FASENRA) subcutaneous injection Inject 1 mL (30 mg total) under the skin every 56 days 1 mL 6    Budeson-Glycopyrrol-Formoterol (Breztri Aerosphere) 160-9-4.8 MCG/ACT AERO Inhale 2 puffs 2 (two) times a day Rinse mouth after use. 10.7 g 11    D-1000 Extra Strength 25 MCG (1000 UT) tablet TAKE 2 TABLETS (2,000 UNITS TOTAL) BY MOUTH DAILY      fexofenadine (ALLEGRA) 180 MG tablet Take 1 tablet (180 mg total) by mouth daily 90 tablet 3    fluticasone (FLONASE) 50 mcg/act nasal spray SPRAY 2 SPRAYS INTO EACH NOSTRIL EVERY DAY 48 mL 1    furosemide (LASIX) 20 mg tablet Take 1 tablet (20 mg total) by mouth daily 30 tablet 0    glycopyrrolate-formoterol (BEVESPI AEROSPHERE) 9-4.8 MCG/ACT inhaler Inhale 2 puffs 2 (two) times a day 10.7 g 5    Klor-Con M20 20 MEQ tablet TAKE 1 TABLET BY MOUTH EVERY DAY 90 tablet 1    metoprolol tartrate (LOPRESSOR) 25 mg tablet TAKE 1 TABLET (25 MG TOTAL) BY MOUTH EVERY 12 (TWELVE) HOURS 180 tablet 1    omeprazole (PriLOSEC) 20 mg delayed release capsule TAKE 1 CAPSULE BY MOUTH EVERY DAY 90 capsule 1    oxyCODONE-acetaminophen (PERCOCET) 5-325 mg per tablet Take 1 tablet by mouth every 4 (four) hours as needed for moderate pain For ongoing pain Max Daily Amount: 6 tablets 60 tablet 0    budesonide (PULMICORT) 0.25 mg/2 mL nebulizer solution Take 2 mL (0.25 mg total) by nebulization 2 (two) times a day Rinse mouth after use. 360 mL 2    ipratropium (ATROVENT) 0.02 % nebulizer solution Take 2.5 mL (0.5 mg total) by nebulization 3 (three) times a day 225 mL 2    rosuvastatin (CRESTOR) 10 MG tablet Take 1 tablet (10 mg total) by mouth daily 90 tablet 3    [DISCONTINUED] diclofenac (VOLTAREN) 75 mg EC tablet Take 75  "mg by mouth (Patient not taking: Reported on 9/1/2023)       No current facility-administered medications on file prior to visit.       Objective   Vitals: Blood pressure 125/72, pulse (!) 54, height 5' 1\" (1.549 m), not currently breastfeeding.,Body mass index is 41.57 kg/m².    PE:  AAOx 3  WDWN  Hearing intact, no drainage from eyes  Regular rate  no audible wheezing  no abdominal distension  LE compartments soft, skin intact    rightknee:    Appearance:  no swelling   No ecchymosis  no obvious joint deformity   No effusion  Palpation/Tenderness:  +TTP over medial joint line  No TTP over lateral joint line   No TTP over patella  No TTP over patellar tendon  No TTP over pes anserine bursa  Active Range of Motion:  AROM: 0-105  Special Tests:  Medial Bar's Test:  Positive  Lateral Bar's Test:  Negative  Apley's compression test:  Negative  Lachman's Test:  negative  Anterior Drawer Test:  Negative  Patellar grind:  Negative  Valgus Stress Test:  negative  Varus Stress Test:  negative     No ipsilateral hip pain with ROM    rightLE:    Sensation grossly intact  Palpable 2+ pulse  AT/GS/EHL intact    Imaging Studies: I have personally reviewed pertinent films in PACS  XR rightknee: Severe right knee osteoarthritis      Large joint arthrocentesis: R knee  Universal Protocol:  Consent: Verbal consent obtained.  Risks and benefits: risks, benefits and alternatives were discussed  Consent given by: patient  Patient understanding: patient states understanding of the procedure being performed  Site marked: the operative site was marked  Patient identity confirmed: verbally with patient  Supporting Documentation  Indications: pain   Procedure Details  Location: knee - R knee  Needle size: 22 G  Ultrasound guidance: no  Approach: anterolateral  Medications administered: 3 mL lidocaine 1 %; 40 mg triamcinolone acetonide 40 mg/mL    Patient tolerance: patient tolerated the procedure well with no immediate " complications  Dressing:  Sterile dressing applied            Scribe Attestation      I,:  Rios Grey am acting as a scribe while in the presence of the attending physician.:       I,:  Elizabeth Paula DO personally performed the services described in this documentation    as scribed in my presence.:         '

## 2024-05-07 RX ORDER — TRIAMCINOLONE ACETONIDE 40 MG/ML
40 INJECTION, SUSPENSION INTRA-ARTICULAR; INTRAMUSCULAR
Status: COMPLETED | OUTPATIENT
Start: 2024-05-01 | End: 2024-05-01

## 2024-05-07 RX ORDER — LIDOCAINE HYDROCHLORIDE 10 MG/ML
3 INJECTION, SOLUTION INFILTRATION; PERINEURAL
Status: COMPLETED | OUTPATIENT
Start: 2024-05-01 | End: 2024-05-01

## 2024-05-13 ENCOUNTER — OFFICE VISIT (OUTPATIENT)
Dept: OBGYN CLINIC | Facility: MEDICAL CENTER | Age: 79
End: 2024-05-13
Payer: COMMERCIAL

## 2024-05-13 VITALS
HEIGHT: 61 IN | DIASTOLIC BLOOD PRESSURE: 72 MMHG | HEART RATE: 54 BPM | SYSTOLIC BLOOD PRESSURE: 125 MMHG | WEIGHT: 220 LBS | BODY MASS INDEX: 41.54 KG/M2

## 2024-05-13 DIAGNOSIS — M75.121 COMPLETE TEAR OF RIGHT ROTATOR CUFF, UNSPECIFIED WHETHER TRAUMATIC: ICD-10-CM

## 2024-05-13 DIAGNOSIS — M25.511 CHRONIC RIGHT SHOULDER PAIN: Primary | ICD-10-CM

## 2024-05-13 DIAGNOSIS — G89.29 CHRONIC RIGHT SHOULDER PAIN: Primary | ICD-10-CM

## 2024-05-13 PROCEDURE — 99213 OFFICE O/P EST LOW 20 MIN: CPT | Performed by: EMERGENCY MEDICINE

## 2024-05-13 PROCEDURE — 20610 DRAIN/INJ JOINT/BURSA W/O US: CPT | Performed by: EMERGENCY MEDICINE

## 2024-05-13 RX ORDER — TRIAMCINOLONE ACETONIDE 40 MG/ML
40 INJECTION, SUSPENSION INTRA-ARTICULAR; INTRAMUSCULAR
Status: COMPLETED | OUTPATIENT
Start: 2024-05-13 | End: 2024-05-13

## 2024-05-13 RX ORDER — LIDOCAINE HYDROCHLORIDE 10 MG/ML
4 INJECTION, SOLUTION INFILTRATION; PERINEURAL
Status: COMPLETED | OUTPATIENT
Start: 2024-05-13 | End: 2024-05-13

## 2024-05-13 RX ADMIN — TRIAMCINOLONE ACETONIDE 40 MG: 40 INJECTION, SUSPENSION INTRA-ARTICULAR; INTRAMUSCULAR at 13:00

## 2024-05-13 RX ADMIN — LIDOCAINE HYDROCHLORIDE 4 ML: 10 INJECTION, SOLUTION INFILTRATION; PERINEURAL at 13:00

## 2024-05-13 NOTE — PROGRESS NOTES
Assessment/Plan:    Diagnoses and all orders for this visit:    Chronic right shoulder pain  -     Ambulatory Referral to Orthopedic Surgery; Future  -     Large joint arthrocentesis: R subacromial bursa    Complete tear of right rotator cuff, unspecified whether traumatic  -     Ambulatory Referral to Orthopedic Surgery; Future  -     Large joint arthrocentesis: R subacromial bursa    Referral placed to orthopedic surgeon Dr. Kitchen to discuss further surgical treatments for her chronic pain including reverse total shoulder replacement.  Patient may schedule at her convenience.  However we have provided a subacromial CSI as requested today for which she has benefited in the past most recent 12/2023  Reviewed outside Ortho note and MRI    No follow-ups on file.      Subjective:   Patient ID: Jayla Moody is a 79 y.o. female.    NP presents for chronic Right shoulder pain with previous evaluation including MRI showing FT RTCTs s/p CSI.          Review of Systems    The following portions of the patient's chart were reviewed and updated as appropriate:   Allergy:  No Known Allergies    Medications:    Current Outpatient Medications:     acetaminophen (TYLENOL) 650 mg CR tablet, Take 1 tablet (650 mg total) by mouth every 8 (eight) hours as needed for mild pain, Disp: 60 tablet, Rfl: 3    albuterol (2.5 mg/3 mL) 0.083 % nebulizer solution, , Disp: , Rfl:     albuterol (PROVENTIL HFA,VENTOLIN HFA) 90 mcg/act inhaler, Inhale 2 puffs 4 (four) times a day, Disp: 18 g, Rfl: 2    Alectinib HCl 150 MG CAPS, Take 4 capsules (600 mg total) by mouth 2 (two) times a day, Disp: 240 capsule, Rfl: 5    amiodarone 200 mg tablet, Take 1 tablet (200 mg total) by mouth daily with breakfast, Disp: 90 tablet, Rfl: 3    apixaban (ELIQUIS) 5 mg, Take 1 tablet (5 mg total) by mouth 2 (two) times a day, Disp: 60 tablet, Rfl: 11    benralizumab (FASENRA) subcutaneous injection, Inject 1 mL (30 mg total) under the skin every 56 days,  Disp: 1 mL, Rfl: 6    Budeson-Glycopyrrol-Formoterol (Breztri Aerosphere) 160-9-4.8 MCG/ACT AERO, Inhale 2 puffs 2 (two) times a day Rinse mouth after use., Disp: 10.7 g, Rfl: 11    D-1000 Extra Strength 25 MCG (1000 UT) tablet, TAKE 2 TABLETS (2,000 UNITS TOTAL) BY MOUTH DAILY, Disp: , Rfl:     fexofenadine (ALLEGRA) 180 MG tablet, Take 1 tablet (180 mg total) by mouth daily, Disp: 90 tablet, Rfl: 3    fluticasone (FLONASE) 50 mcg/act nasal spray, SPRAY 2 SPRAYS INTO EACH NOSTRIL EVERY DAY, Disp: 48 mL, Rfl: 1    furosemide (LASIX) 20 mg tablet, Take 1 tablet (20 mg total) by mouth daily, Disp: 30 tablet, Rfl: 0    glycopyrrolate-formoterol (BEVESPI AEROSPHERE) 9-4.8 MCG/ACT inhaler, Inhale 2 puffs 2 (two) times a day, Disp: 10.7 g, Rfl: 5    Klor-Con M20 20 MEQ tablet, TAKE 1 TABLET BY MOUTH EVERY DAY, Disp: 90 tablet, Rfl: 1    metoprolol tartrate (LOPRESSOR) 25 mg tablet, TAKE 1 TABLET (25 MG TOTAL) BY MOUTH EVERY 12 (TWELVE) HOURS, Disp: 180 tablet, Rfl: 1    omeprazole (PriLOSEC) 20 mg delayed release capsule, TAKE 1 CAPSULE BY MOUTH EVERY DAY, Disp: 90 capsule, Rfl: 1    oxyCODONE-acetaminophen (PERCOCET) 5-325 mg per tablet, Take 1 tablet by mouth every 4 (four) hours as needed for moderate pain For ongoing pain Max Daily Amount: 6 tablets, Disp: 60 tablet, Rfl: 0    budesonide (PULMICORT) 0.25 mg/2 mL nebulizer solution, Take 2 mL (0.25 mg total) by nebulization 2 (two) times a day Rinse mouth after use., Disp: 360 mL, Rfl: 2    ipratropium (ATROVENT) 0.02 % nebulizer solution, Take 2.5 mL (0.5 mg total) by nebulization 3 (three) times a day, Disp: 225 mL, Rfl: 2    rosuvastatin (CRESTOR) 10 MG tablet, Take 1 tablet (10 mg total) by mouth daily, Disp: 90 tablet, Rfl: 3    Patient Active Problem List   Diagnosis    Premature atrial contractions    Primary osteoarthritis of right wrist    Essential hypertension    Vitamin D deficiency    Hiatal hernia    Spinal stenosis of lumbar region without neurogenic  "claudication    Lumbar facet arthropathy    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    Gastroesophageal reflux disease without esophagitis    Other headache syndrome    Chronic gastritis without bleeding    Morbid obesity (HCC)    Chronic rhinitis    MONO (obstructive sleep apnea)    Non-small cell lung cancer (HCC)    Malignant neoplasm metastatic to bone (HCC)    Severe asthma    Abnormal renal function    Depression, recurrent (HCC)    Thrombocytopenia, unspecified (HCC)    Need for vaccination    Severe persistent asthma with acute exacerbation    Chronic seasonal allergic rhinitis due to pollen    Allergic rhinitis due to animal (cat) (dog) hair and dander    Allergic rhinitis due to house dust mite    Primary localized osteoarthritis of right knee    Coronary artery disease involving native coronary artery of native heart without angina pectoris    Dyslipidemia    Diastolic CHF (HCC)    Atrial flutter (HCC)    Stage 3b chronic kidney disease (HCC)    CARLOS ENRIQUE (acute kidney injury) (HCC)    Lower extremity edema    Cancer-related pain    Chronic bilateral low back pain    Palliative care encounter    Counseling regarding advanced care planning and goals of care    Right knee pain, unspecified chronicity    BMI 45.0-49.9, adult ([facility])       Objective:  /72   Pulse (!) 54   Ht 5' 1\" (1.549 m)   Wt 99.8 kg (220 lb)   BMI 41.57 kg/m²     Right Shoulder Exam     Range of Motion   Active abduction:  abnormal     Tests   Drop arm: negative    Other   Erythema: absent            Physical Exam      Neurologic Exam    Large joint arthrocentesis: R subacromial bursa  Universal Protocol:  Consent: Verbal consent obtained.  Risks and benefits: risks, benefits and alternatives were discussed  Consent given by: patient  Time out: Immediately prior to procedure a \"time out\" was called to verify the correct patient, procedure, equipment, support staff and site/side marked as required.  Timeout called at: 5/13/2024 12:59 PM.  Patient " understanding: patient states understanding of the procedure being performed  Test results: test results available and properly labeled  Site marked: the operative site was marked  Patient identity confirmed: verbally with patient  Supporting Documentation  Indications: pain   Procedure Details  Location: shoulder - R subacromial bursa  Preparation: Patient was prepped and draped in the usual sterile fashion  Needle size: 22 G  Ultrasound guidance: no  Approach: posterolateral  Medications administered: 4 mL lidocaine 1 %; 40 mg triamcinolone acetonide 40 mg/mL    Patient tolerance: patient tolerated the procedure well with no immediate complications  Dressing:  Sterile dressing applied    No erythema of Shoulder(s)          I have personally reviewed the written report of the pertinent studies.   MRI SHOULDER RIGHT ARTHROGRAM W CONTRAST    Anatomical Region Laterality Modality   Shoulder -- Magnetic Resonance   Chest -- --   Arm -- --   MRMSK -- --     Impression    IMPRESSION:    1. Large full-thickness/complete tear of the supraspinatus tendon. Medial  retraction of the torn supraspinatus tendon to the level of the glenoid, with  atretic appearance of the tendon and medial retraction measuring 4.5 cm medial  to the greater tuberosity. Moderate to moderate/severe atrophy and fatty  infiltration of the supraspinatus muscle.    2. Large full-thickness/complete tear of the infraspinatus tendon. Medial  retraction the torn infraspinatus tendon to the level of the posterior glenoid,  approximately 4.3 cm medial to the greater tuberosity, with atretic appearance.  Extensive/severe atrophy of the infraspinatus muscle.    3. Moderate to severe tendinopathy of the subscapularis tendon with attenuated  appearance suggestive of very to high-grade partial-thickness tears of the  distal fibers, with deformity more pronounced along the articular surface, and  small areas of intrasubstance delamination adjacent to the biceps  tendon groove.  Mild atrophy/fatty infiltration of the subscapularis muscle.    4. Age-indeterminate complete tear of the long head biceps tendon which is not  identified in the joint space or groove.    5. Mild to moderate glenohumeral arthrosis small tiny areas of grade 3/4  cartilage fissuring at the central glenoid measuring up to 4 mm anteroposterior  dimension. Mild diffuse thinning of the cartilage of the humeral head noted  medially. Small areas of grade 2/3 cartilage loss along the superior and lateral  aspect of the humeral head.            Past Medical History:   Diagnosis Date    Allergic rhinitis     Anemia     Asthma     Atrial fibrillation (HCC)     Chronic bronchitis (HCC)     COPD (chronic obstructive pulmonary disease) (HCC)     Coronary artery disease     CPAP (continuous positive airway pressure) dependence     Degenerative joint disease     Fluid retention 2024    GERD (gastroesophageal reflux disease)     Glaucoma     Hypertension     Lumbar disc disease     Lung cancer (HCC)     Periodic heart flutter     Pneumonia     Sleep apnea     suspected     Sleep apnea, obstructive     Ventricular arrhythmia     Vitamin D deficiency        Past Surgical History:   Procedure Laterality Date    APPENDECTOMY      COLON SURGERY      COLONOSCOPY N/A 03/18/2019    Procedure: COLONOSCOPY;  Surgeon: Alessia Wilson DO;  Location: [facility];  Service: Gastroenterology    ESOPHAGOGASTRODUODENOSCOPY N/A 03/18/2019    Procedure: ESOPHAGOGASTRODUODENOSCOPY (EGD);  Surgeon: Alessia Wilson DO;  Location: [facility];  Service: Gastroenterology    KNEE SURGERY      LUNG BIOPSY      ROTATOR CUFF REPAIR      SHOULDER SURGERY         Social History     Socioeconomic History    Marital status: Single     Spouse name: Not on file    Number of children: 5    Years of education: Not on file    Highest education level: Not on file   Occupational History    Not on file   Tobacco Use    Smoking status: Former     Current  packs/day: 0.00     Average packs/day: 0.3 packs/day for 25.0 years (6.3 ttl pk-yrs)     Types: Cigarettes     Start date:      Quit date:      Years since quittin.3    Smokeless tobacco: Former   Vaping Use    Vaping status: Never Used   Substance and Sexual Activity    Alcohol use: Yes     Comment: occasionally    Drug use: Never    Sexual activity: Not on file   Other Topics Concern    Not on file   Social History Narrative    Who lives in your home: son    What type of home do you live in: Single house    Age of your home: 89 yrs old    How long have you been living there: 30 years    Type of heat: Forced hot air    Type of fuel: Gas    What type of rome is in your bedroom: Hardwood floor    Do you have the following in or near your home:    Air products: Window air conditioning and Air     Pests: None    Pets: 1 Cat    Are pets allowed in bedroom: No    Open fields, wooded areas nearby: Open fields and Wooded areas    Basement: Damp at times and Unfinished with cracks in cement    Exposure to second hand smoke: No        Habits:    Caffeine: Current; Amount: 1 cups/day coffee, #years 60    Chocolate: occasionally    Other:              Social Determinants of Health     Financial Resource Strain: Low Risk  (2024)    Overall Financial Resource Strain (CARDIA)     Difficulty of Paying Living Expenses: Not very hard   Food Insecurity: Patient Declined (2024)    Hunger Vital Sign     Worried About Running Out of Food in the Last Year: Patient declined     Ran Out of Food in the Last Year: Patient declined   Transportation Needs: No Transportation Needs (2024)    PRAPARE - Transportation     Lack of Transportation (Medical): No     Lack of Transportation (Non-Medical): No   Physical Activity: Inactive (11/15/2022)    Exercise Vital Sign     Days of Exercise per Week: 0 days     Minutes of Exercise per Session: 0 min   Stress: Not on file   Social Connections: Not on file    Intimate Partner Violence: Not on file   Housing Stability: Low Risk  (4/20/2024)    Housing Stability Vital Sign     Unable to Pay for Housing in the Last Year: No     Number of Places Lived in the Last Year: 0     Unstable Housing in the Last Year: No       Family History   Problem Relation Age of Onset    Stroke Mother     Diabetes Mother     Hyperlipidemia Mother     Hypertension Mother     Allergies Mother         Environmental    Asthma Mother     Diabetes Father     Hyperlipidemia Father     Hypertension Father     Lung cancer Father 70    Allergies Father         Environmental    Asthma Father     Lymphoma Sister 69    Heart disease Sister         Pacemaker    Asthma Brother     Aneurysm Brother         Brain - had surgery    Other Brother         Lymes disease    Coronary artery disease Daughter         2 bypass done    No Known Problems Daughter     No Known Problems Daughter     No Known Problems Son     Kidney disease Son     Liver disease Son     Obesity Son

## 2024-05-17 DIAGNOSIS — I10 ESSENTIAL HYPERTENSION: ICD-10-CM

## 2024-05-17 RX ORDER — FUROSEMIDE 20 MG/1
20 TABLET ORAL DAILY
Qty: 90 TABLET | Refills: 1 | Status: SHIPPED | OUTPATIENT
Start: 2024-05-17

## 2024-05-18 ENCOUNTER — APPOINTMENT (OUTPATIENT)
Dept: LAB | Facility: CLINIC | Age: 79
End: 2024-05-18
Payer: COMMERCIAL

## 2024-05-18 DIAGNOSIS — C34.90 NON-SMALL CELL LUNG CANCER, UNSPECIFIED LATERALITY (HCC): ICD-10-CM

## 2024-05-18 DIAGNOSIS — C79.51 MALIGNANT NEOPLASM METASTATIC TO BONE (HCC): ICD-10-CM

## 2024-05-18 LAB
ALBUMIN SERPL BCP-MCNC: 4.1 G/DL (ref 3.5–5)
ALP SERPL-CCNC: 82 U/L (ref 34–104)
ALT SERPL W P-5'-P-CCNC: 44 U/L (ref 7–52)
ANION GAP SERPL CALCULATED.3IONS-SCNC: 11 MMOL/L (ref 4–13)
AST SERPL W P-5'-P-CCNC: 50 U/L (ref 13–39)
BILIRUB SERPL-MCNC: 1.63 MG/DL (ref 0.2–1)
BUN SERPL-MCNC: 36 MG/DL (ref 5–25)
CALCIUM SERPL-MCNC: 9.7 MG/DL (ref 8.4–10.2)
CHLORIDE SERPL-SCNC: 100 MMOL/L (ref 96–108)
CO2 SERPL-SCNC: 31 MMOL/L (ref 21–32)
CREAT SERPL-MCNC: 1.88 MG/DL (ref 0.6–1.3)
GFR SERPL CREATININE-BSD FRML MDRD: 25 ML/MIN/1.73SQ M
GLUCOSE P FAST SERPL-MCNC: 97 MG/DL (ref 65–99)
POTASSIUM SERPL-SCNC: 4 MMOL/L (ref 3.5–5.3)
PROT SERPL-MCNC: 6.4 G/DL (ref 6.4–8.4)
SODIUM SERPL-SCNC: 142 MMOL/L (ref 135–147)

## 2024-05-18 PROCEDURE — 80053 COMPREHEN METABOLIC PANEL: CPT

## 2024-05-20 DIAGNOSIS — C79.51 MALIGNANT NEOPLASM METASTATIC TO BONE (HCC): Primary | ICD-10-CM

## 2024-05-20 DIAGNOSIS — C34.90 NON-SMALL CELL LUNG CANCER, UNSPECIFIED LATERALITY (HCC): ICD-10-CM

## 2024-05-21 ENCOUNTER — TELEPHONE (OUTPATIENT)
Dept: HEMATOLOGY ONCOLOGY | Facility: CLINIC | Age: 79
End: 2024-05-21

## 2024-05-21 ENCOUNTER — OFFICE VISIT (OUTPATIENT)
Dept: NEPHROLOGY | Facility: CLINIC | Age: 79
End: 2024-05-21
Payer: COMMERCIAL

## 2024-05-21 ENCOUNTER — HOSPITAL ENCOUNTER (OUTPATIENT)
Dept: INFUSION CENTER | Facility: HOSPITAL | Age: 79
Discharge: HOME | End: 2024-05-21
Attending: INTERNAL MEDICINE

## 2024-05-21 VITALS — BODY MASS INDEX: 39.68 KG/M2 | WEIGHT: 210 LBS

## 2024-05-21 VITALS
BODY MASS INDEX: 39.65 KG/M2 | HEIGHT: 61 IN | WEIGHT: 210 LBS | DIASTOLIC BLOOD PRESSURE: 70 MMHG | SYSTOLIC BLOOD PRESSURE: 124 MMHG

## 2024-05-21 DIAGNOSIS — E66.01 CLASS 2 SEVERE OBESITY DUE TO EXCESS CALORIES WITH SERIOUS COMORBIDITY AND BODY MASS INDEX (BMI) OF 39.0 TO 39.9 IN ADULT (HCC): ICD-10-CM

## 2024-05-21 DIAGNOSIS — C34.90 NON-SMALL CELL LUNG CANCER, UNSPECIFIED LATERALITY (HCC): ICD-10-CM

## 2024-05-21 DIAGNOSIS — I50.32 CHRONIC DIASTOLIC CONGESTIVE HEART FAILURE (HCC): ICD-10-CM

## 2024-05-21 DIAGNOSIS — I10 ESSENTIAL HYPERTENSION: ICD-10-CM

## 2024-05-21 DIAGNOSIS — C79.51 MALIGNANT NEOPLASM METASTATIC TO BONE (HCC): ICD-10-CM

## 2024-05-21 DIAGNOSIS — C79.51 MALIGNANT NEOPLASM METASTATIC TO BONE (HCC): Primary | ICD-10-CM

## 2024-05-21 DIAGNOSIS — N18.4 CKD (CHRONIC KIDNEY DISEASE) STAGE 4, GFR 15-29 ML/MIN (HCC): Primary | ICD-10-CM

## 2024-05-21 DIAGNOSIS — E55.9 VITAMIN D DEFICIENCY: ICD-10-CM

## 2024-05-21 DIAGNOSIS — C34.90 NON-SMALL CELL LUNG CANCER, UNSPECIFIED LATERALITY (HCC): Primary | ICD-10-CM

## 2024-05-21 DIAGNOSIS — N18.32 STAGE 3B CHRONIC KIDNEY DISEASE (HCC): ICD-10-CM

## 2024-05-21 PROBLEM — N28.9 ABNORMAL RENAL FUNCTION: Status: RESOLVED | Noted: 2021-02-23 | Resolved: 2024-05-21

## 2024-05-21 PROBLEM — N17.9 AKI (ACUTE KIDNEY INJURY) (HCC): Status: RESOLVED | Noted: 2023-09-01 | Resolved: 2024-05-21

## 2024-05-21 PROBLEM — E66.812 CLASS 2 SEVERE OBESITY DUE TO EXCESS CALORIES WITH SERIOUS COMORBIDITY AND BODY MASS INDEX (BMI) OF 39.0 TO 39.9 IN ADULT (HCC): Status: ACTIVE | Noted: 2024-01-18

## 2024-05-21 PROCEDURE — 99214 OFFICE O/P EST MOD 30 MIN: CPT | Performed by: INTERNAL MEDICINE

## 2024-05-21 RX ORDER — RESVER/WINE/BFL/GRPSD/PC/C/POM 200MG-60MG
1000 CAPSULE ORAL DAILY
Qty: 90 TABLET | Refills: 3 | Status: SHIPPED | OUTPATIENT
Start: 2024-05-21

## 2024-05-21 RX ORDER — CALCITRIOL 0.25 UG/1
0.25 CAPSULE, LIQUID FILLED ORAL 3 TIMES WEEKLY
Qty: 45 CAPSULE | Refills: 3 | Status: SHIPPED | OUTPATIENT
Start: 2024-05-22

## 2024-05-21 NOTE — PROGRESS NOTES
Nephrology Follow up Consultation  Jayla Moody 79 y.o. female MRN: [id number]   ?         BACKGROUND:  Jayla Moody is a 79 y.o.female who was referred by Kaiser Kohler DO for evaluation of Follow-up and Hypertension  .      ASSESSMENT / PLAN:   79 y.o.  female with pmh of multiple co-morbidities including hypertension (x20yrs), morbid obesity, GERD, arthritis, severe asthma, CAD, a.flutter, CHF and metastatic lung cancer presents to the office for evaluation and management of abnormal renal parameters.    CKD stage 3b/4:  - After review of records in [facility] as well as Care everywhere patient had a baseline creatinine of 1-1.3 mg/dL dating as far back as 2021 however since August 2023 new baseline creatinine has been around 1.6 to 1.9 mg/dL. Most recent labs show a Creatinine of 1.88 mg/dL on 5/18/24. Renal function remains stable .   -Likely has underlying CKD secondary to age-related nephron loss plus hypertensive nephrosclerosis plus cardiorenal syndrome plus obesity related hyperfiltration plus NSAID nephropathy plus smoking associated nephropathy plus episodes of acute kidney injury.  At risk for worsening renal parameters secondary to use of alectinib since it is nephrotoxic.  -Renal ultrasound from 8/12/2023 bilateral kidneys approximately 1 cm no hydronephrosis simple cyst on right kidney.  - Acid base and lytes stable.  - Clinically the patient appears to be euvolemic continue Lasix 20 mg p.o. daily for now discussed with patient if she has increasing weights and volume status worsening to reach out so that we can increase Lasix dosage.  - Recommend to avoid use of NSAIDs, nephrotoxins. Caution advised with regards to exposure to IV contrast dye.   - Discussed with the patient in depth her renal status, including the possible etiologies for CKD.   - Advised the patient that when her GFR is close to 20mL/min then will start discussing about RRT(renal replacement therapy) options such as renal  transplant, peritoneal dialysis and hemodialysis.   - Informed the patient about the various options for Renal Replacement therapy.  - Discussed with the patient how we need to work together to delay the progression of CKD with optimal BP control based on their age and co-morbidities and trying to reduce proteinuria by the use of anti-proteinuric agents.   - Advised patient if possible to switch over from Prilosec to either Pepcid or Zantac due to association of long-term use of PPI with CKD.  -Referral to CKD education/kidney smart placed on 5/21/2024    Hypertension:  - Patient is on Lasix 20 mg p.o. 24 hours Toprol 25 mg p.o. twice daily, K-Dur 20 milligrams p.o. daily.   -No changes today.  Stable  - Goal BP of <  130/80 based on age and comorbidities  - Instructed to follow low sodium (2gm)diet.  -Continue to hold ACE inhibitor or ARB for now to preserve residual renal function    Hemoglobin:  - Goal Hb of 10-12 g/dL  - Most recent labs suggestive of 9.9 g/dL from March 2024.   -On p.o. 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  - Maintain calcium phosphorus product of < 55.  - Stage 4 CKD - Goal Ca 8.5-10 mg/dL , goal Phos 2.7-4.6 mg/dL  , goal iPTH  pg/mL  -Decrease to vitamin D 1000 units p.o. daily  - most recent vitamin D 31.2 as of February 2024 and iPTH of 167.7 as of January 2024  -Start calcitriol 0.25 mcg 3 times a week  -Check intact PTH vitamin D prior to next visit    Proteinuria:  -At risk for proteinuria most likely secondary to obesity related hyperfiltration.  - most recent micral and creatinine ratio 55 mg/dL as of January 2024  - check protein creatinine ratio,  -Paraproteinemia workup negative  - currently on therapy for proteinuria with Crestor    Lipids:  -On Crestor  - goal LDL less than 70  - Management as per  PCP    Metastatic lung cancer:  - management as per Primary team  -Follow-up with oncology last seen March 2024 notes reviewed  -On alectinib.  Message sent over to oncology to consider discontinuation of alectinib    Severe asthma:  -Follow-up with pulmonary  -On Fasenra, Pulmicort, albuterol, Flonase, Allegra, Ventolin    CHF/CAD/ aflutter:  - Management as per primary team  -Follow-up with cardiology last seen  -Recent echo from 7/20/2023 EF of 61% grade 1 diastolic dysfunction.    Nutrition/obesity:  - Encouraged patient to follow a renal diet comprising of moderate potassium, low phosphorus and protein restriction to 0.8gm/kg.  - Will check serum albumin with next blood work.  Advised of dietary habits and weight loss    Followup:  - Patient is to follow-up in 4 months, with lab work to be performed in a few days prior to the next visit.  Advised patient to call me in 10 days to review the results if they do not hear back from me, as I may have not received the results.    Dot Germain MD, [facility], 5/21/2024, 2:55 PM             SUBJECTIVE: 79 y.o. female presents to the office for follow-up.  Was lost to follow-up now here following up with hematology on treatment.  Feels well no issues with edema reported current dose of Lasix is doing well she does have some edema at the end of the day however when she wakes up in the morning it is gone.  Weights have been actually decreasing.  No NSAID use thankful for the care information that she is gone today agreeable to attending CKD education/kidney smart modality session.  Has cut out salt from her diet altogether.      Review of Systems   Constitutional:  Negative for chills and fever.   HENT:  Negative for congestion.    Respiratory:  Positive for shortness of breath. Negative for wheezing.         Chronic   Cardiovascular:  Negative for leg swelling.   Gastrointestinal:  Positive for constipation. Negative for abdominal pain and diarrhea.   Genitourinary:   Negative for difficulty urinating, dysuria and hematuria.   Musculoskeletal:  Negative for back pain.   Neurological:  Negative for dizziness, light-headedness and headaches.   Psychiatric/Behavioral:  Negative for agitation and confusion.    All other systems reviewed and are negative.      PAST MEDICAL HISTORY:  Past Medical History:   Diagnosis Date   • Allergic rhinitis    • Anemia    • Asthma    • Atrial fibrillation (HCC)    • Chronic bronchitis (HCC)    • COPD (chronic obstructive pulmonary disease) (HCC)    • Coronary artery disease    • CPAP (continuous positive airway pressure) dependence    • Degenerative joint disease    • Fluid retention 2024   • GERD (gastroesophageal reflux disease)    • Glaucoma    • Hypertension    • Lumbar disc disease    • Lung cancer (HCC)    • Periodic heart flutter    • Pneumonia    • Sleep apnea     suspected    • Sleep apnea, obstructive    • Ventricular arrhythmia    • Vitamin D deficiency        PROBLEM LIST    Patient Active Problem List   Diagnosis   • Premature atrial contractions   • Primary osteoarthritis of right wrist   • Essential hypertension   • Vitamin D deficiency   • Hiatal hernia   • Spinal stenosis of 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   • Lumbar facet arthropathy   • 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   • Gastroesophageal reflux disease without esophagitis   • Other headache syndrome   • Chronic gastritis without bleeding   • Chronic rhinitis   • MONO (obstructive sleep apnea)   • Non-small cell lung cancer (HCC)   • Malignant neoplasm metastatic to bone (HCC)   • Severe asthma   • Depression, recurrent (HCC)   • Thrombocytopenia, unspecified (HCC)   • Need for vaccination   • Severe persistent asthma with acute exacerbation   • Chronic seasonal allergic rhinitis due to pollen   • Allergic rhinitis due to animal (cat) (dog) hair and dander   • Allergic rhinitis due to house dust mite   • Primary localized osteoarthritis of right knee   • Coronary artery  disease involving native coronary artery of native heart without angina pectoris   • Dyslipidemia   • Diastolic CHF ([facility])   • Atrial flutter (HCC)   • Stage 3b chronic kidney disease ([facility])   • Lower extremity edema   • Cancer-related pain   • Chronic bilateral low back pain   • Palliative care encounter   • Counseling regarding advanced care planning and goals of care   • Right knee pain, unspecified chronicity   • Class 2 severe obesity due to excess calories with serious comorbidity and body mass index (BMI) of 39.0 to 39.9 in adult ([facility])   • CKD (chronic kidney disease) stage 4, GFR 15-29 ml/min ([facility])       PAST SURGICAL HISTORY:  Past Surgical History:   Procedure Laterality Date   • APPENDECTOMY     • COLON SURGERY     • COLONOSCOPY N/A 03/18/2019    Procedure: COLONOSCOPY;  Surgeon: Alessia Wilson DO;  Location: [facility];  Service: Gastroenterology   • ESOPHAGOGASTRODUODENOSCOPY N/A 03/18/2019    Procedure: ESOPHAGOGASTRODUODENOSCOPY (EGD);  Surgeon: Alessia Wilson DO;  Location: [facility];  Service: Gastroenterology   • KNEE SURGERY     • LUNG BIOPSY     • ROTATOR CUFF REPAIR     • SHOULDER SURGERY         SOCIAL HISTORY :   reports that she quit smoking about 37 years ago. Her smoking use included cigarettes. She started smoking about 62 years ago. She has a 6.3 pack-year smoking history. She has quit using smokeless tobacco. She reports current alcohol use. She reports that she does not use drugs.    FAMILY HISTORY:  Family History   Problem Relation Age of Onset   • Stroke Mother    • Diabetes Mother    • Hyperlipidemia Mother    • Hypertension Mother    • Allergies Mother         Environmental   • Asthma Mother    • Diabetes Father    • Hyperlipidemia Father    • Hypertension Father    • Lung cancer Father 70   • Allergies Father         Environmental   • Asthma Father    • Lymphoma Sister 69   • Heart disease Sister         Pacemaker   • Asthma Brother    • Aneurysm Brother         Brain - had  "surgery   • Other Brother         Lymes disease   • Coronary artery disease Daughter         2 bypass done   • No Known Problems Daughter    • No Known Problems Daughter    • No Known Problems Son    • Kidney disease Son    • Liver disease Son    • Obesity Son        ALLERGIES:  No Known Allergies        PHYSICAL EXAM:  Vitals:    05/21/24 1435   BP: 124/70   BP Location: Left arm   Patient Position: Sitting   Cuff Size: Large   Weight: 95.3 kg (210 lb)   Height: 5' 1\" (1.549 m)     Body mass index is 39.68 kg/m².    Physical Exam  Vitals reviewed.   Constitutional:       General: She is not in acute distress.     Appearance: Normal appearance. She is obese. She is not ill-appearing, toxic-appearing or diaphoretic.   HENT:      Head: Normocephalic and atraumatic.      Mouth/Throat:      Mouth: Mucous membranes are moist.      Pharynx: No oropharyngeal exudate.   Eyes:      General: No scleral icterus.  Cardiovascular:      Rate and Rhythm: Normal rate.   Pulmonary:      Effort: No respiratory distress.      Breath sounds: Normal breath sounds. No stridor. No wheezing.   Abdominal:      General: There is no distension.      Palpations: Abdomen is soft. There is no mass.      Tenderness: There is no abdominal tenderness. There is no right CVA tenderness or left CVA tenderness.   Musculoskeletal:         General: No swelling.      Cervical back: Normal range of motion. No rigidity.   Skin:     Coloration: Skin is not jaundiced.   Neurological:      General: No focal deficit present.      Mental Status: She is alert and oriented to person, place, and time.   Psychiatric:         Mood and Affect: Mood normal.         Behavior: Behavior normal.         LABORATORY DATA:     Results from last 6 Months   Lab Units 05/18/24  0815 04/20/24  0851 03/25/24  0848 03/12/24  1336 02/12/24  0913 01/11/24  1037 12/26/23  1112   WBC Thousand/uL  --   --   --  8.16 7.22 6.93  --    HEMOGLOBIN g/dL  --   --   --  9.9* 10.0* 9.0*  --  "   HEMATOCRIT %  --   --   --  30.7* 30.4* 28.4*  --    PLATELETS Thousands/uL  --   --   --  97* 108* 111*  --    POTASSIUM mmol/L 4.0 4.4 3.7 4.7 4.0 3.4* 4.2   CHLORIDE mmol/L 100 102 101 101 100 101 105   CO2 mmol/L 31 34* 34* 33* 34* 32 26   BUN mg/dL 36* 38* 31* 27* 30* 30* 29*   CREATININE mg/dL 1.88* 1.97* 1.57* 1.95* 1.63* 1.72* 1.63*   CALCIUM mg/dL 9.7 9.5 9.3 9.6 9.2 9.4 9.4   MAGNESIUM mg/dL  --   --   --   --   --  2.3  --    PHOSPHORUS mg/dL  --   --   --   --   --  3.8 2.6          rest all reviewed    RADIOLOGY:  No orders to display     Rest all reviewed        MEDICATIONS:    Current Outpatient Medications:   •  acetaminophen (TYLENOL) 650 mg CR tablet, Take 1 tablet (650 mg total) by mouth every 8 (eight) hours as needed for mild pain, Disp: 60 tablet, Rfl: 3  •  albuterol (2.5 mg/3 mL) 0.083 % nebulizer solution, , Disp: , Rfl:   •  albuterol (PROVENTIL HFA,VENTOLIN HFA) 90 mcg/act inhaler, Inhale 2 puffs 4 (four) times a day, Disp: 18 g, Rfl: 2  •  Alectinib HCl 150 MG CAPS, Take 4 capsules (600 mg total) by mouth 2 (two) times a day, Disp: 240 capsule, Rfl: 5  •  amiodarone 200 mg tablet, Take 1 tablet (200 mg total) by mouth daily with breakfast, Disp: 90 tablet, Rfl: 3  •  apixaban (ELIQUIS) 5 mg, Take 1 tablet (5 mg total) by mouth 2 (two) times a day, Disp: 60 tablet, Rfl: 11  •  benralizumab (FASENRA) subcutaneous injection, Inject 1 mL (30 mg total) under the skin every 56 days, Disp: 1 mL, Rfl: 6  •  Budeson-Glycopyrrol-Formoterol (Breztri Aerosphere) 160-9-4.8 MCG/ACT AERO, Inhale 2 puffs 2 (two) times a day Rinse mouth after use., Disp: 10.7 g, Rfl: 11  •  budesonide (PULMICORT) 0.25 mg/2 mL nebulizer solution, Take 2 mL (0.25 mg total) by nebulization 2 (two) times a day Rinse mouth after use., Disp: 360 mL, Rfl: 2  •  [START ON 5/22/2024] calcitriol (ROCALTROL) 0.25 mcg capsule, Take 1 capsule (0.25 mcg total) by mouth 3 (three) times a week On Monday , Wednesday, friday, Disp: 45  "capsule, Rfl: 3  •  D-1000 Extra Strength 25 MCG (1000 UT) tablet, Take 1 tablet (1,000 Units total) by mouth daily, Disp: 90 tablet, Rfl: 3  •  fexofenadine (ALLEGRA) 180 MG tablet, Take 1 tablet (180 mg total) by mouth daily, Disp: 90 tablet, Rfl: 3  •  fluticasone (FLONASE) 50 mcg/act nasal spray, SPRAY 2 SPRAYS INTO EACH NOSTRIL EVERY DAY, Disp: 48 mL, Rfl: 1  •  furosemide (LASIX) 20 mg tablet, TAKE 1 TABLET BY MOUTH EVERY DAY, Disp: 90 tablet, Rfl: 1  •  glycopyrrolate-formoterol (BEVESPI AEROSPHERE) 9-4.8 MCG/ACT inhaler, Inhale 2 puffs 2 (two) times a day, Disp: 10.7 g, Rfl: 5  •  ipratropium (ATROVENT) 0.02 % nebulizer solution, Take 2.5 mL (0.5 mg total) by nebulization 3 (three) times a day, Disp: 225 mL, Rfl: 2  •  Klor-Con M20 20 MEQ tablet, TAKE 1 TABLET BY MOUTH EVERY DAY, Disp: 90 tablet, Rfl: 1  •  metoprolol tartrate (LOPRESSOR) 25 mg tablet, TAKE 1 TABLET (25 MG TOTAL) BY MOUTH EVERY 12 (TWELVE) HOURS, Disp: 180 tablet, Rfl: 1  •  omeprazole (PriLOSEC) 20 mg delayed release capsule, TAKE 1 CAPSULE BY MOUTH EVERY DAY, Disp: 90 capsule, Rfl: 1  •  oxyCODONE-acetaminophen (PERCOCET) 5-325 mg per tablet, Take 1 tablet by mouth every 4 (four) hours as needed for moderate pain For ongoing pain Max Daily Amount: 6 tablets, Disp: 60 tablet, Rfl: 0  •  rosuvastatin (CRESTOR) 10 MG tablet, Take 1 tablet (10 mg total) by mouth daily, Disp: 90 tablet, Rfl: 3  No current facility-administered medications for this visit.    Facility-Administered Medications Ordered in Other Visits:   •  denosumab (XGEVA) subcutaneous injection 120 mg, 120 mg, Subcutaneous, Once, Ryan Arriaga MD          Portions of the record may have been created with voice recognition software. Occasional wrong word or \"sound a like\" substitutions may have occurred due to the inherent limitations of voice recognition software. Read the chart carefully and recognize, using context, where substitutions have occurred.If you have any " questions, please contact the dictating provider.

## 2024-05-21 NOTE — PATIENT INSTRUCTIONS
"- decrease to vit D 1000 units a day  - start calcitriol 0.25mcg three times a week  - attend the ckd education class    - Please call me in 10 days after having your blood work done to review the results if you do not hear back from me or my office, as I may have not received the results.  - please remember to perform blood work prior to the next visit.  - Please call if the blood pressure top number is greater than 140 or less than 110 consistently.  - Please call if you are gaining more than 2lbs in 2 days for adjustment of water pills.  ~ Please AVOID the following pain medications.  LIST OF NSAIDS (NONSTEROIDAL ANTI-INFLAMMATORY DRUGS) AND LUTZ-2 INHIBITORS    DIFLUNISAL (DOLOBID)  IBUPROFEN (MOTRIN, ADVIL)  FLURBIPROFEN (ANSAID)  KETOPROFEN (ORUDIS, ORUVAIL)  FENOPROFEN (NALFON)  NABUMETONE (RELAFEN)  PIROXICAM (FELDENE)  NAPROXEN (ALEVE, NAPROSYN, NAPRELAN, ANAPROX)  DICLOFENAC (VOLTAREN, CATAFLAM)  INDOMETHACIN (INDOCIN)  SULINDAC (CLINORIL)  TOLMETIN (TOLETIN)  ETODOLAC (LODINE)  MELOXICAM (MOBIC)  KETOROLAC (TORADOL)  OXAPROZIN (DAYPRO)  CELECOXIB (CELEBREX)    Phosphorus diet  Follow a low phosphorus diet.    Avoid these higher phosphorus foods: Choose these lower phosphorus foods:   Milk, pudding or yogurt (from animals and from many soy varieties) Rice milk (unfortified), nondairy creamer (if it doesn't have terms in the ingredients list that contain the letters \"phos\")   Hard cheeses, ricotta or cottage cheese, fat-free cream cheese Regular and low-fat cream cheese   Ice cream or frozen yogurt Sherbet or frozen fruit pops   Soups made with higher phosphorus ingredients (milk, dried peas, beans, lentils) Soups made with lower phosphorus ingredients (broth- or water-based with other lower phosphorus ingredients)   Whole grains, including whole-grain breads, crackers, cereal, rice and pasta Refined grains, including white bread, crackers, cereals, rice and pasta   Quick breads, biscuits, cornbread, " "muffins, pancakes or waffles Homemade refined (white) dinner rolls, bagels or English muffins   Dried peas (split, black-eyed), beans (black, garbanzo, lima, kidney, navy, drummond) or lentils Green peas (canned, frozen), green beans or wax beans   Organ meats, walleye, pollock or sardines Lean beef, pork, lamb, poultry or other fish   Nuts and seeds Popcorn   Peanut butter and other nut butters Jam, jelly or honey   Chocolate, including chocolate drinks Carob (chocolate-flavored) candy, hard candy or gumdrops   Medardo and pepper-type sodas, flavored stephenson, bottled teas (if a term in the ingredients list contains the letters \"phos\") Lemon-lime soda, ginger ale or root beer, plain water   Follow a moderate potassium diet.      Things to do to reduce your blood pressure include working with all your physician to do the following:  ~ stop smoking if you smoke.  ~ increase cardiovascular exercise like walking and swimming.   ~ modify your diet to decrease fat and salt intake.  ~ reduce your weight if you are overweight or obese.  ~ increase the consumption of fruits, vegetables and whole grains.  ~ decrease alcohol consumption if you consume alcohol.   ~ try to minimize stress in your life with lifestyle modifications.   ~ be compliant with your anti-hypertensive medications.   ~ adjust your medications to help improve your vascular stiffness and decrease risks for heart attacks and strokes.   Exercise to Lose Weight     Exercise and a healthy diet may help you lose weight. Your doctor may suggest specific exercises.     EXERCISE IDEAS AND TIPS     Choose low-cost things you enjoy doing, such as walking, bicycling, or exercising to workout videos.   Take stairs instead of the elevator.   Walk during your lunch break.   Park your car further away from work or school.   Go to a gym or an exercise class.   Start with 5 to 10 minutes of exercise each day. Build up to 30 minutes of exercise 4 to 6 days a week.   Wear shoes with " good support and comfortable clothes.   Stretch before and after working out.   Work out until you breathe harder and your heart beats faster.   Drink extra water when you exercise.   Do not do so much that you hurt yourself, feel dizzy, or get very short of breath.     Exercises that burn about 150 calories:   Running 1 ½ miles in 15 minutes.   Playing volleyball for 45 to 60 minutes.   Washing and waxing a car for 45 to 60 minutes.   Playing touch football for 45 minutes.   Walking 1 ¾ miles in 35 minutes.   Pushing a stroller 1 ½ miles in 30 minutes.   Playing basketball for 30 minutes.   Raking leaves for 30 minutes.   Bicycling 5 miles in 30 minutes.   Walking 2 miles in 30 minutes.   Dancing for 30 minutes.   Shoveling snow for 15 minutes.   Swimming laps for 20 minutes.   Walking up stairs for 15 minutes.   Bicycling 4 miles in 15 minutes.   Gardening for 30 to 45 minutes.   Jumping rope for 15 minutes.   Washing windows or floors for 45 to 60 minutes.

## 2024-05-21 NOTE — PROGRESS NOTES
Pt arrived for xgeva inj. CrCl=23.6. Notified Jayashree torres RN to clarify parameter orders. Awaiting to hear back from Dr. Aguayo. Pt had another appt @ 4650 & could not wait. Notified Jayashree Torres RN. Pt will be rescheduled

## 2024-05-21 NOTE — PROGRESS NOTES
Per infusion RN at  infusion therapy plan orders were released and not given.     Therapy treatment plan rescheduled from 5/21/24 to 5/24/24.

## 2024-05-21 NOTE — PROGRESS NOTES
Her CrCl=23.6. In the plan it says OK to give for a CrCl<30     Per Dr. Arriaga patient okay to receive.    Patient rescheduled due to other appointment at 230 pm.    Reviewed with  infusion patient to be rescheduled. My chart message sent to patient.

## 2024-05-21 NOTE — TELEPHONE ENCOUNTER
Due to miscommunication with provider and other appointment at 230pm patient to be rescheduled from today.  thanks

## 2024-05-23 ENCOUNTER — OFFICE VISIT (OUTPATIENT)
Dept: CARDIOLOGY CLINIC | Facility: CLINIC | Age: 79
End: 2024-05-23
Payer: COMMERCIAL

## 2024-05-23 VITALS
SYSTOLIC BLOOD PRESSURE: 130 MMHG | HEART RATE: 59 BPM | DIASTOLIC BLOOD PRESSURE: 60 MMHG | WEIGHT: 212 LBS | BODY MASS INDEX: 40.06 KG/M2

## 2024-05-23 DIAGNOSIS — I50.32 CHRONIC HEART FAILURE WITH PRESERVED EJECTION FRACTION (HCC): ICD-10-CM

## 2024-05-23 DIAGNOSIS — I48.3 TYPICAL ATRIAL FLUTTER (HCC): ICD-10-CM

## 2024-05-23 DIAGNOSIS — I25.10 CORONARY ARTERY CALCIFICATION: ICD-10-CM

## 2024-05-23 DIAGNOSIS — R06.09 DYSPNEA ON EXERTION: Primary | ICD-10-CM

## 2024-05-23 DIAGNOSIS — I25.84 CORONARY ARTERY CALCIFICATION: ICD-10-CM

## 2024-05-23 PROCEDURE — 99214 OFFICE O/P EST MOD 30 MIN: CPT

## 2024-05-23 PROCEDURE — 93000 ELECTROCARDIOGRAM COMPLETE: CPT

## 2024-05-23 RX ORDER — AMIODARONE HYDROCHLORIDE 200 MG/1
200 TABLET ORAL
Qty: 90 TABLET | Refills: 3 | Status: SHIPPED | OUTPATIENT
Start: 2024-05-23 | End: 2025-05-18

## 2024-05-23 RX ORDER — ROSUVASTATIN CALCIUM 10 MG/1
10 TABLET, COATED ORAL DAILY
Qty: 90 TABLET | Refills: 3 | Status: SHIPPED | OUTPATIENT
Start: 2024-05-23 | End: 2025-05-18

## 2024-05-23 NOTE — PROGRESS NOTES
General Cardiology - Outpatient Progress Note   Jayla Moody 79 y.o. female   MRN: [id number]  @ Encounter: [id number]    Kaiser Kohler DO  Consults    S:   Patient is feeling about the same. She continues to have dyspnea on exertion. No chest pain or palpitations.  She does have mild lower extremity edema which worsens throughout the day and then gets better with elevating her legs.  She denies any orthopnea.  Her shortness of breath is main thing that is bothering her.  She gets short of breath walking approximately 90 steps.  She says she cannot walk up a flight of stairs without having to stop due to shortness of breath.  Patient has mild superficial bruising since starting the apixaban.  No blood in the stool.    She think she is tolerating all medications and not having any significant side effects.    Assessment & Plan   Jayla Moody is a 78 y.o. 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  Her main uncontrolled symptoms at this point is shortness of breath.     #Dyspnea on exertion  Patient has chronic dyspnea on exertion which limits her from walking very far on short grounds and she cannot walk up a flight of stairs without having to stop and take a break.  She has multiple reasons to feel dyspnea including lung cancer, HFpEF, asthma.  She has not been worked up for CAD and has multiple CAD risk factors and significant coronary artery calcifications on CT chest imaging.  She also has significant mitral calcifications.  Will repeat echocardiogram evaluating for possible mitral stenosis and order a pharmacological nuclear stress test to evaluate for CAD.    #Aflutter  New diagnosis during August 2023 admission for asthma. ekgs all shows aflutter. [id number] 5. She was started on amiodarone and eliquis  during the hospitalization.  Zio monitoring afterwards showed <1% fib/flutter.  She has had good rhythm control with amiodarone.  I have discussed the side effects of amiodarone in detail with the patient and today we discussed the options about discontinuing amiodarone and monitoring for symptoms of recurrent flutter which may come back.  If that were the case we can consider restarting amiodarone or an ablation procedure.  The patient feels comfortable at this time continuing amiodarone but we will continue to revisit each visit.  -continue amiodarone 200mg daily (TSH checked, liver enzymes have been slightly elevated, pt goes for eye exams, yearly chest imaging).   -continue apixaban 5mg BID  -continue metoprolol 25mg BID     #HFpEF  Currently euvolemic despite mild peripheral edema which may be from Alectanib.  In the past creatinine worsened with further diuresis so Lasix was decreased to 20 mg daily.    -Continue p.o. Lasix 20 mg daily    #HLD: Continue rosuvastatin 10mg.      #HTN Continue metoprolol 25mg BID.      #Stage 4 lung cancer: right hilar mass and diffuse osseous metastasis.  On Alectanib and benralizumab.  Patient reports she is getting CT imaging in the upcoming weeks to monitor for response to therapy.    #CKD  Cr 1.8 most recently. Recently established with nephrology.       Objective:  Review of Systems  Review of system was conducted and was negative except for as stated in the interval history      Physical Exam:  Vitals: Blood pressure 130/60, pulse 59, weight 96.2 kg (212 lb), not currently breastfeeding., Body mass index is 40.06 kg/m².    GEN: Jayla Moody appears well, alert and oriented x 3, pleasant and cooperative   HEENT:  Normocephalic, atraumatic, anicteric, moist mucous membranes  NECK:  no JVD  HEART: regular rate, regular rhythm, normal S1 and S2, no murmurs, clicks, gallops or rubs   LUNGS: Clear to auscultation bilaterally; no wheezes, rales, or rhonchi; respiration  nonlabored   ABDOMEN:  Normoactive bowel sounds, soft, no tenderness, no distention  EXTREMITIES: radial pulses are palpable; trace edema  NEURO: no gross focal findings  SKIN:  Dry, intact, warm to touch    Home Medications: Not in a hospital admission.    Current Outpatient Medications:     acetaminophen (TYLENOL) 650 mg CR tablet, Take 1 tablet (650 mg total) by mouth every 8 (eight) hours as needed for mild pain, Disp: 60 tablet, Rfl: 3    albuterol (2.5 mg/3 mL) 0.083 % nebulizer solution, , Disp: , Rfl:     albuterol (PROVENTIL HFA,VENTOLIN HFA) 90 mcg/act inhaler, Inhale 2 puffs 4 (four) times a day, Disp: 18 g, Rfl: 2    Alectinib HCl 150 MG CAPS, Take 4 capsules (600 mg total) by mouth 2 (two) times a day, Disp: 240 capsule, Rfl: 5    amiodarone 200 mg tablet, Take 1 tablet (200 mg total) by mouth daily with breakfast, Disp: 90 tablet, Rfl: 3    apixaban (ELIQUIS) 5 mg, Take 1 tablet (5 mg total) by mouth 2 (two) times a day, Disp: 60 tablet, Rfl: 11    benralizumab (FASENRA) subcutaneous injection, Inject 1 mL (30 mg total) under the skin every 56 days, Disp: 1 mL, Rfl: 6    Budeson-Glycopyrrol-Formoterol (Breztri Aerosphere) 160-9-4.8 MCG/ACT AERO, Inhale 2 puffs 2 (two) times a day Rinse mouth after use., Disp: 10.7 g, Rfl: 11    calcitriol (ROCALTROL) 0.25 mcg capsule, Take 1 capsule (0.25 mcg total) by mouth 3 (three) times a week On Monday , Wednesday, friday, Disp: 45 capsule, Rfl: 3    D-1000 Extra Strength 25 MCG (1000 UT) tablet, Take 1 tablet (1,000 Units total) by mouth daily, Disp: 90 tablet, Rfl: 3    fluticasone (FLONASE) 50 mcg/act nasal spray, SPRAY 2 SPRAYS INTO EACH NOSTRIL EVERY DAY, Disp: 48 mL, Rfl: 1    furosemide (LASIX) 20 mg tablet, TAKE 1 TABLET BY MOUTH EVERY DAY, Disp: 90 tablet, Rfl: 1    glycopyrrolate-formoterol (BEVESPI AEROSPHERE) 9-4.8 MCG/ACT inhaler, Inhale 2 puffs 2 (two) times a day, Disp: 10.7 g, Rfl: 5    Klor-Con M20 20 MEQ tablet, TAKE 1 TABLET BY MOUTH EVERY DAY,  Disp: 90 tablet, Rfl: 1    metoprolol tartrate (LOPRESSOR) 25 mg tablet, TAKE 1 TABLET (25 MG TOTAL) BY MOUTH EVERY 12 (TWELVE) HOURS, Disp: 180 tablet, Rfl: 1    omeprazole (PriLOSEC) 20 mg delayed release capsule, TAKE 1 CAPSULE BY MOUTH EVERY DAY, Disp: 90 capsule, Rfl: 1    oxyCODONE-acetaminophen (PERCOCET) 5-325 mg per tablet, Take 1 tablet by mouth every 4 (four) hours as needed for moderate pain For ongoing pain Max Daily Amount: 6 tablets, Disp: 60 tablet, Rfl: 0    rosuvastatin (CRESTOR) 10 MG tablet, Take 1 tablet (10 mg total) by mouth daily, Disp: 90 tablet, Rfl: 3    budesonide (PULMICORT) 0.25 mg/2 mL nebulizer solution, Take 2 mL (0.25 mg total) by nebulization 2 (two) times a day Rinse mouth after use., Disp: 360 mL, Rfl: 2    fexofenadine (ALLEGRA) 180 MG tablet, Take 1 tablet (180 mg total) by mouth daily (Patient not taking: Reported on 5/23/2024), Disp: 90 tablet, Rfl: 3    ipratropium (ATROVENT) 0.02 % nebulizer solution, Take 2.5 mL (0.5 mg total) by nebulization 3 (three) times a day, Disp: 225 mL, Rfl: 2  No current facility-administered medications for this visit.    Facility-Administered Medications Ordered in Other Visits:     denosumab (XGEVA) subcutaneous injection 120 mg, 120 mg, Subcutaneous, Once, Ryan Arriaga MD      EKG:  Date: sinus bradycardia. HR 59. Otherwise normal EKG.     Case discussed and reviewed with Dr. Hood who agrees with my assessment and plan.      Moise Pimentel MD  Cardiology Fellow   PGY-5

## 2024-05-24 ENCOUNTER — HOSPITAL ENCOUNTER (OUTPATIENT)
Dept: INFUSION CENTER | Facility: HOSPITAL | Age: 79
End: 2024-05-24
Attending: INTERNAL MEDICINE
Payer: COMMERCIAL

## 2024-05-24 VITALS — BODY MASS INDEX: 39.57 KG/M2 | WEIGHT: 209.44 LBS

## 2024-05-24 DIAGNOSIS — C34.90 NON-SMALL CELL LUNG CANCER, UNSPECIFIED LATERALITY (HCC): ICD-10-CM

## 2024-05-24 DIAGNOSIS — C79.51 MALIGNANT NEOPLASM METASTATIC TO BONE (HCC): Primary | ICD-10-CM

## 2024-05-24 PROCEDURE — 96372 THER/PROPH/DIAG INJ SC/IM: CPT

## 2024-05-24 RX ADMIN — DENOSUMAB 120 MG: 120 INJECTION SUBCUTANEOUS at 10:54

## 2024-05-24 NOTE — PROGRESS NOTES
Per Dr. Arriaga patient crcl parameter to be changed to     Prior to administration, notify physician if CrCl is less than 20 mL/min or calcium is less than 8 mg/dL.     Kvng COCHRAN at  Infusion notified. Plan updated.

## 2024-05-24 NOTE — PROGRESS NOTES
Jayla Moody  tolerated treatment well with no complications.      Jayla Moody is aware of future appt on 6/21/24 at 11am.     AVS printed and given to Jayla Moody:  No (Declined by Jayla Moody)      
Oriented - self; Oriented - place; Oriented - time

## 2024-05-28 ENCOUNTER — TELEPHONE (OUTPATIENT)
Dept: FAMILY MEDICINE CLINIC | Facility: CLINIC | Age: 79
End: 2024-05-28

## 2024-05-28 NOTE — TELEPHONE ENCOUNTER
PCP SIGNATURE NEEDED FOR PACE FORM RECEIVED VIA FAX AND PLACED IN PCP FOLDER TO BE DELIVERED AT ASSIGNED TIMES.        Procedure rejection claims

## 2024-06-04 ENCOUNTER — HOSPITAL ENCOUNTER (OUTPATIENT)
Dept: ULTRASOUND IMAGING | Facility: CLINIC | Age: 79
Discharge: HOME | End: 2024-06-04
Payer: COMMERCIAL

## 2024-06-04 ENCOUNTER — HOSPITAL ENCOUNTER (OUTPATIENT)
Dept: MAMMOGRAPHY | Facility: CLINIC | Age: 79
Discharge: HOME | End: 2024-06-04
Payer: COMMERCIAL

## 2024-06-04 VITALS — WEIGHT: 209 LBS | BODY MASS INDEX: 39.46 KG/M2 | HEIGHT: 61 IN

## 2024-06-04 DIAGNOSIS — R93.89 ABNORMAL CT SCAN: ICD-10-CM

## 2024-06-04 DIAGNOSIS — Z12.31 ENCOUNTER FOR SCREENING MAMMOGRAM FOR MALIGNANT NEOPLASM OF BREAST: ICD-10-CM

## 2024-06-04 DIAGNOSIS — R92.8 ABNORMAL MAMMOGRAM: ICD-10-CM

## 2024-06-04 DIAGNOSIS — Z13.9 ENCOUNTER FOR SCREENING: ICD-10-CM

## 2024-06-04 PROCEDURE — 76642 ULTRASOUND BREAST LIMITED: CPT

## 2024-06-04 PROCEDURE — G0279 TOMOSYNTHESIS, MAMMO: HCPCS

## 2024-06-04 PROCEDURE — 77066 DX MAMMO INCL CAD BI: CPT

## 2024-06-17 ENCOUNTER — APPOINTMENT (OUTPATIENT)
Dept: LAB | Facility: CLINIC | Age: 79
End: 2024-06-17
Payer: COMMERCIAL

## 2024-06-17 DIAGNOSIS — C79.51 MALIGNANT NEOPLASM METASTATIC TO BONE (HCC): ICD-10-CM

## 2024-06-17 DIAGNOSIS — C34.90 NON-SMALL CELL LUNG CANCER, UNSPECIFIED LATERALITY (HCC): ICD-10-CM

## 2024-06-17 LAB
ALBUMIN SERPL BCP-MCNC: 3.7 G/DL (ref 3.5–5)
ALP SERPL-CCNC: 88 U/L (ref 34–104)
ALT SERPL W P-5'-P-CCNC: 44 U/L (ref 7–52)
ANION GAP SERPL CALCULATED.3IONS-SCNC: 11 MMOL/L (ref 4–13)
AST SERPL W P-5'-P-CCNC: 68 U/L (ref 13–39)
BILIRUB SERPL-MCNC: 1.55 MG/DL (ref 0.2–1)
BUN SERPL-MCNC: 31 MG/DL (ref 5–25)
CALCIUM SERPL-MCNC: 8.7 MG/DL (ref 8.4–10.2)
CHLORIDE SERPL-SCNC: 103 MMOL/L (ref 96–108)
CO2 SERPL-SCNC: 29 MMOL/L (ref 21–32)
CREAT SERPL-MCNC: 1.73 MG/DL (ref 0.6–1.3)
GFR SERPL CREATININE-BSD FRML MDRD: 28 ML/MIN/1.73SQ M
GLUCOSE SERPL-MCNC: 158 MG/DL (ref 65–140)
POTASSIUM SERPL-SCNC: 3.7 MMOL/L (ref 3.5–5.3)
PROT SERPL-MCNC: 5.9 G/DL (ref 6.4–8.4)
SODIUM SERPL-SCNC: 143 MMOL/L (ref 135–147)

## 2024-06-17 PROCEDURE — 80053 COMPREHEN METABOLIC PANEL: CPT

## 2024-06-19 ENCOUNTER — TELEPHONE (OUTPATIENT)
Age: 79
End: 2024-06-19

## 2024-06-19 ENCOUNTER — TELEPHONE (OUTPATIENT)
Dept: FAMILY MEDICINE CLINIC | Facility: CLINIC | Age: 79
End: 2024-06-19

## 2024-06-19 ENCOUNTER — TELEPHONE (OUTPATIENT)
Dept: CARDIOLOGY CLINIC | Facility: CLINIC | Age: 79
End: 2024-06-19

## 2024-06-19 NOTE — TELEPHONE ENCOUNTER
Returned call to patient told her that is usually not a medication prescribed by cardiology and ask her to call her PCP for advisement.  Pt understood and will call PCP,

## 2024-06-19 NOTE — TELEPHONE ENCOUNTER
Pt called in stating that the Cardiologist had give pt a stool softener a while back and pt is out of it and  would like a new script sent to the [facility] on [address]. Pt could not remember the name of the medication. Could not find it in the chart. Please call pt when called in to pharmacy

## 2024-06-19 NOTE — TELEPHONE ENCOUNTER
Caller: Patient    Doctor: Fidelina    Reason for call:     She is not having problems with her right shoulder right now, she will see how it goes and call back if pain persists.    Call back#: n/a

## 2024-06-21 ENCOUNTER — HOSPITAL ENCOUNTER (OUTPATIENT)
Dept: INFUSION CENTER | Facility: HOSPITAL | Age: 79
End: 2024-06-21
Attending: INTERNAL MEDICINE
Payer: COMMERCIAL

## 2024-06-21 VITALS — WEIGHT: 214 LBS | BODY MASS INDEX: 40.4 KG/M2 | HEIGHT: 61 IN

## 2024-06-21 DIAGNOSIS — C79.51 MALIGNANT NEOPLASM METASTATIC TO BONE (HCC): ICD-10-CM

## 2024-06-21 DIAGNOSIS — C34.90 NON-SMALL CELL LUNG CANCER, UNSPECIFIED LATERALITY (HCC): Primary | ICD-10-CM

## 2024-06-21 PROCEDURE — 96372 THER/PROPH/DIAG INJ SC/IM: CPT

## 2024-06-21 RX ADMIN — DENOSUMAB 120 MG: 120 INJECTION SUBCUTANEOUS at 11:03

## 2024-06-21 NOTE — PROGRESS NOTES
Received for xgeva. No complaints offered. Calcium 8.7 on 6/17/24. Cr clearance 74.5. Injection given in Left upper arm, tolerated well. Pt to return 7/19. AVS declined.

## 2024-06-25 DIAGNOSIS — K59.00 CONSTIPATION, UNSPECIFIED CONSTIPATION TYPE: Primary | ICD-10-CM

## 2024-06-25 RX ORDER — AMOXICILLIN 250 MG
1 CAPSULE ORAL DAILY
Qty: 60 TABLET | Refills: 3 | Status: SHIPPED | OUTPATIENT
Start: 2024-06-25

## 2024-07-11 ENCOUNTER — RA CDI HCC (OUTPATIENT)
Dept: OTHER | Facility: HOSPITAL | Age: 79
End: 2024-07-11

## 2024-07-13 ENCOUNTER — APPOINTMENT (OUTPATIENT)
Dept: LAB | Facility: CLINIC | Age: 79
End: 2024-07-13
Payer: COMMERCIAL

## 2024-07-13 DIAGNOSIS — C34.90 NON-SMALL CELL LUNG CANCER, UNSPECIFIED LATERALITY (HCC): ICD-10-CM

## 2024-07-13 DIAGNOSIS — C79.51 MALIGNANT NEOPLASM METASTATIC TO BONE (HCC): ICD-10-CM

## 2024-07-13 LAB
ALBUMIN SERPL BCG-MCNC: 3.3 G/DL (ref 3.5–5)
ALP SERPL-CCNC: 74 U/L (ref 34–104)
ALT SERPL W P-5'-P-CCNC: 25 U/L (ref 7–52)
ANION GAP SERPL CALCULATED.3IONS-SCNC: 10 MMOL/L (ref 4–13)
AST SERPL W P-5'-P-CCNC: 38 U/L (ref 13–39)
BILIRUB SERPL-MCNC: 1.75 MG/DL (ref 0.2–1)
BUN SERPL-MCNC: 43 MG/DL (ref 5–25)
CALCIUM ALBUM COR SERPL-MCNC: 9.2 MG/DL (ref 8.3–10.1)
CALCIUM SERPL-MCNC: 8.6 MG/DL (ref 8.4–10.2)
CHLORIDE SERPL-SCNC: 103 MMOL/L (ref 96–108)
CO2 SERPL-SCNC: 29 MMOL/L (ref 21–32)
CREAT SERPL-MCNC: 1.66 MG/DL (ref 0.6–1.3)
GFR SERPL CREATININE-BSD FRML MDRD: 29 ML/MIN/1.73SQ M
GLUCOSE P FAST SERPL-MCNC: 116 MG/DL (ref 65–99)
POTASSIUM SERPL-SCNC: 4 MMOL/L (ref 3.5–5.3)
PROT SERPL-MCNC: 5.4 G/DL (ref 6.4–8.4)
SODIUM SERPL-SCNC: 142 MMOL/L (ref 135–147)

## 2024-07-13 PROCEDURE — 80053 COMPREHEN METABOLIC PANEL: CPT

## 2024-07-13 PROCEDURE — 36415 COLL VENOUS BLD VENIPUNCTURE: CPT

## 2024-07-15 ENCOUNTER — HOSPITAL ENCOUNTER (OUTPATIENT)
Dept: CT IMAGING | Facility: HOSPITAL | Age: 79
Discharge: HOME | End: 2024-07-15
Attending: INTERNAL MEDICINE
Payer: COMMERCIAL

## 2024-07-15 DIAGNOSIS — C34.91 NON-SMALL CELL CARCINOMA OF LUNG, STAGE 4, RIGHT (HCC): ICD-10-CM

## 2024-07-15 PROCEDURE — 74176 CT ABD & PELVIS W/O CONTRAST: CPT

## 2024-07-15 PROCEDURE — 71250 CT THORAX DX C-: CPT

## 2024-07-16 ENCOUNTER — ANESTHESIA (INPATIENT)
Dept: GASTROENTEROLOGY | Facility: HOSPITAL | Age: 79
DRG: 378 | End: 2024-07-16
Payer: COMMERCIAL

## 2024-07-16 ENCOUNTER — ANESTHESIA EVENT (INPATIENT)
Dept: GASTROENTEROLOGY | Facility: HOSPITAL | Age: 79
DRG: 378 | End: 2024-07-16
Payer: COMMERCIAL

## 2024-07-16 ENCOUNTER — APPOINTMENT (EMERGENCY)
Dept: RADIOLOGY | Facility: HOSPITAL | Age: 79
DRG: 378 | End: 2024-07-16
Payer: COMMERCIAL

## 2024-07-16 ENCOUNTER — HOSPITAL ENCOUNTER (INPATIENT)
Facility: HOSPITAL | Age: 79
LOS: 3 days | Discharge: HOME HEALTH | DRG: 378 | End: 2024-07-19
Admitting: INTERNAL MEDICINE
Payer: COMMERCIAL

## 2024-07-16 ENCOUNTER — APPOINTMENT (EMERGENCY)
Dept: CT IMAGING | Facility: HOSPITAL | Age: 79
DRG: 378 | End: 2024-07-16
Payer: COMMERCIAL

## 2024-07-16 ENCOUNTER — APPOINTMENT (INPATIENT)
Dept: GASTROENTEROLOGY | Facility: HOSPITAL | Age: 79
DRG: 378 | End: 2024-07-16
Payer: COMMERCIAL

## 2024-07-16 DIAGNOSIS — D64.9 SYMPTOMATIC ANEMIA: ICD-10-CM

## 2024-07-16 DIAGNOSIS — K92.1 MELENA: ICD-10-CM

## 2024-07-16 DIAGNOSIS — D64.9 ANEMIA: Primary | ICD-10-CM

## 2024-07-16 DIAGNOSIS — N17.9 AKI (ACUTE KIDNEY INJURY) (HCC): ICD-10-CM

## 2024-07-16 PROBLEM — I50.32 CHRONIC DIASTOLIC CONGESTIVE HEART FAILURE (HCC): Status: ACTIVE | Noted: 2023-08-12

## 2024-07-16 PROBLEM — Z23 NEED FOR VACCINATION: Status: RESOLVED | Noted: 2022-07-18 | Resolved: 2024-07-16

## 2024-07-16 PROBLEM — G44.89 OTHER HEADACHE SYNDROME: Status: RESOLVED | Noted: 2019-02-13 | Resolved: 2024-07-16

## 2024-07-16 PROBLEM — M47.816 LUMBAR FACET ARTHROPATHY: Status: RESOLVED | Noted: 2019-01-17 | Resolved: 2024-07-16

## 2024-07-16 PROBLEM — E55.9 VITAMIN D DEFICIENCY: Status: RESOLVED | Noted: 2018-10-31 | Resolved: 2024-07-16

## 2024-07-16 PROBLEM — J30.81 ALLERGIC RHINITIS DUE TO ANIMAL (CAT) (DOG) HAIR AND DANDER: Status: RESOLVED | Noted: 2022-11-15 | Resolved: 2024-07-16

## 2024-07-16 PROBLEM — J30.89 ALLERGIC RHINITIS DUE TO HOUSE DUST MITE: Status: RESOLVED | Noted: 2022-11-15 | Resolved: 2024-07-16

## 2024-07-16 PROBLEM — R94.31 PROLONGED Q-T INTERVAL ON ECG: Status: ACTIVE | Noted: 2024-07-16

## 2024-07-16 PROBLEM — M19.031 PRIMARY OSTEOARTHRITIS OF RIGHT WRIST: Status: RESOLVED | Noted: 2017-05-05 | Resolved: 2024-07-16

## 2024-07-16 PROBLEM — M48.061 SPINAL STENOSIS OF LUMBAR REGION WITHOUT NEUROGENIC CLAUDICATION: Status: RESOLVED | Noted: 2019-01-17 | Resolved: 2024-07-16

## 2024-07-16 PROBLEM — G89.29 CHRONIC BILATERAL LOW BACK PAIN: Status: RESOLVED | Noted: 2023-11-29 | Resolved: 2024-07-16

## 2024-07-16 PROBLEM — Z71.89 COUNSELING REGARDING ADVANCED CARE PLANNING AND GOALS OF CARE: Status: RESOLVED | Noted: 2023-11-29 | Resolved: 2024-07-16

## 2024-07-16 PROBLEM — J30.1 CHRONIC SEASONAL ALLERGIC RHINITIS DUE TO POLLEN: Status: RESOLVED | Noted: 2022-11-15 | Resolved: 2024-07-16

## 2024-07-16 PROBLEM — M54.50 CHRONIC BILATERAL LOW BACK PAIN: Status: RESOLVED | Noted: 2023-11-29 | Resolved: 2024-07-16

## 2024-07-16 PROBLEM — E78.5 DYSLIPIDEMIA: Status: RESOLVED | Noted: 2023-07-10 | Resolved: 2024-07-16

## 2024-07-16 PROBLEM — M17.11 PRIMARY LOCALIZED OSTEOARTHRITIS OF RIGHT KNEE: Status: RESOLVED | Noted: 2022-12-14 | Resolved: 2024-07-16

## 2024-07-16 PROBLEM — Z78.0 OSTEOPENIA AFTER MENOPAUSE: Status: RESOLVED | Noted: 2019-01-17 | Resolved: 2024-07-16

## 2024-07-16 PROBLEM — N18.32 STAGE 3B CHRONIC KIDNEY DISEASE (HCC): Status: RESOLVED | Noted: 2023-09-01 | Resolved: 2024-07-16

## 2024-07-16 PROBLEM — J45.51 SEVERE PERSISTENT ASTHMA WITH ACUTE EXACERBATION: Status: RESOLVED | Noted: 2022-11-15 | Resolved: 2024-07-16

## 2024-07-16 PROBLEM — Z51.5 PALLIATIVE CARE ENCOUNTER: Status: RESOLVED | Noted: 2023-11-29 | Resolved: 2024-07-16

## 2024-07-16 PROBLEM — M85.80 OSTEOPENIA AFTER MENOPAUSE: Status: RESOLVED | Noted: 2019-01-17 | Resolved: 2024-07-16

## 2024-07-16 PROBLEM — R60.0 LOWER EXTREMITY EDEMA: Status: RESOLVED | Noted: 2023-09-28 | Resolved: 2024-07-16

## 2024-07-16 PROBLEM — M25.561 RIGHT KNEE PAIN, UNSPECIFIED CHRONICITY: Status: RESOLVED | Noted: 2024-01-18 | Resolved: 2024-07-16

## 2024-07-16 PROBLEM — I49.1 PREMATURE ATRIAL CONTRACTIONS: Status: RESOLVED | Noted: 2017-10-31 | Resolved: 2024-07-16

## 2024-07-16 LAB
2HR DELTA HS TROPONIN: -1 NG/L
4HR DELTA HS TROPONIN: 0 NG/L
ABO GROUP BLD: NORMAL
ABO GROUP BLD: NORMAL
ALBUMIN SERPL BCG-MCNC: 3.2 G/DL (ref 3.5–5)
ALP SERPL-CCNC: 66 U/L (ref 34–104)
ALT SERPL W P-5'-P-CCNC: 20 U/L (ref 7–52)
ANION GAP SERPL CALCULATED.3IONS-SCNC: 10 MMOL/L (ref 4–13)
ANION GAP SERPL CALCULATED.3IONS-SCNC: 4 MMOL/L (ref 4–13)
ANISOCYTOSIS BLD QL SMEAR: PRESENT
ANISOCYTOSIS BLD QL SMEAR: PRESENT
AST SERPL W P-5'-P-CCNC: 30 U/L (ref 13–39)
ATRIAL RATE: 65 BPM
ATRIAL RATE: 73 BPM
ATRIAL RATE: 74 BPM
ATRIAL RATE: 74 BPM
BACTERIA UR QL AUTO: NORMAL /HPF
BASOPHILS # BLD MANUAL: 0 THOUSAND/UL (ref 0–0.1)
BASOPHILS # BLD MANUAL: 0 THOUSAND/UL (ref 0–0.1)
BASOPHILS NFR MAR MANUAL: 0 % (ref 0–1)
BASOPHILS NFR MAR MANUAL: 0 % (ref 0–1)
BILIRUB SERPL-MCNC: 2.1 MG/DL (ref 0.2–1)
BILIRUB UR QL STRIP: NEGATIVE
BLD GP AB SCN SERPL QL: NEGATIVE
BNP SERPL-MCNC: 180 PG/ML (ref 0–100)
BUN SERPL-MCNC: 48 MG/DL (ref 5–25)
BUN SERPL-MCNC: 50 MG/DL (ref 5–25)
BURR CELLS BLD QL SMEAR: PRESENT
BURR CELLS BLD QL SMEAR: PRESENT
CALCIUM ALBUM COR SERPL-MCNC: 9.5 MG/DL (ref 8.3–10.1)
CALCIUM SERPL-MCNC: 8.4 MG/DL (ref 8.4–10.2)
CALCIUM SERPL-MCNC: 8.9 MG/DL (ref 8.4–10.2)
CARDIAC TROPONIN I PNL SERPL HS: 10 NG/L
CARDIAC TROPONIN I PNL SERPL HS: 11 NG/L
CARDIAC TROPONIN I PNL SERPL HS: 11 NG/L
CHLORIDE SERPL-SCNC: 103 MMOL/L (ref 96–108)
CHLORIDE SERPL-SCNC: 106 MMOL/L (ref 96–108)
CLARITY UR: CLEAR
CO2 SERPL-SCNC: 28 MMOL/L (ref 21–32)
CO2 SERPL-SCNC: 31 MMOL/L (ref 21–32)
COLOR UR: YELLOW
CREAT SERPL-MCNC: 1.81 MG/DL (ref 0.6–1.3)
CREAT SERPL-MCNC: 1.99 MG/DL (ref 0.6–1.3)
DAT POLY-SP REAG RBC QL: NEGATIVE
DIFFERENTIAL COMMENT: ABNORMAL
EOSINOPHIL # BLD MANUAL: 0 THOUSAND/UL (ref 0–0.4)
EOSINOPHIL # BLD MANUAL: 0 THOUSAND/UL (ref 0–0.4)
EOSINOPHIL NFR BLD MANUAL: 0 % (ref 0–6)
EOSINOPHIL NFR BLD MANUAL: 0 % (ref 0–6)
ERYTHROCYTE [DISTWIDTH] IN BLOOD BY AUTOMATED COUNT: 18.6 % (ref 11.6–15.1)
ERYTHROCYTE [DISTWIDTH] IN BLOOD BY AUTOMATED COUNT: 21.1 % (ref 11.6–15.1)
FERRITIN SERPL-MCNC: 59 NG/ML (ref 11–307)
FOLATE SERPL-MCNC: 11.2 NG/ML
GFR SERPL CREATININE-BSD FRML MDRD: 23 ML/MIN/1.73SQ M
GFR SERPL CREATININE-BSD FRML MDRD: 26 ML/MIN/1.73SQ M
GLUCOSE SERPL-MCNC: 120 MG/DL (ref 65–140)
GLUCOSE SERPL-MCNC: 122 MG/DL (ref 65–140)
GLUCOSE UR STRIP-MCNC: NEGATIVE MG/DL
HCT VFR BLD AUTO: 15 % (ref 34.8–46.1)
HCT VFR BLD AUTO: 18.1 % (ref 34.8–46.1)
HCT VFR BLD AUTO: 24.4 % (ref 34.8–46.1)
HGB BLD-MCNC: 4.3 G/DL (ref 11.5–15.4)
HGB BLD-MCNC: 5.4 G/DL (ref 11.5–15.4)
HGB BLD-MCNC: 8 G/DL (ref 11.5–15.4)
HGB RETIC QN AUTO: 26.5 PG (ref 30–38.3)
HGB UR QL STRIP.AUTO: NEGATIVE
IMM RETICS NFR: 52.9 % (ref 0–14)
IRON SATN MFR SERPL: 16 % (ref 15–50)
IRON SERPL-MCNC: 55 UG/DL (ref 50–212)
KETONES UR STRIP-MCNC: NEGATIVE MG/DL
LDH SERPL-CCNC: 213 U/L (ref 140–271)
LEUKOCYTE ESTERASE UR QL STRIP: NEGATIVE
LYMPHOCYTES # BLD AUTO: 1.25 THOUSAND/UL (ref 0.6–4.47)
LYMPHOCYTES # BLD AUTO: 1.88 THOUSAND/UL (ref 0.6–4.47)
LYMPHOCYTES # BLD AUTO: 16 % (ref 14–44)
LYMPHOCYTES # BLD AUTO: 21 % (ref 14–44)
MACROCYTES BLD QL AUTO: PRESENT
MACROCYTES BLD QL AUTO: PRESENT
MAGNESIUM SERPL-MCNC: 2 MG/DL (ref 1.9–2.7)
MCH RBC QN AUTO: 30.1 PG (ref 26.8–34.3)
MCH RBC QN AUTO: 30.2 PG (ref 26.8–34.3)
MCHC RBC AUTO-ENTMCNC: 28.7 G/DL (ref 31.4–37.4)
MCHC RBC AUTO-ENTMCNC: 29.8 G/DL (ref 31.4–37.4)
MCV RBC AUTO: 101 FL (ref 82–98)
MCV RBC AUTO: 105 FL (ref 82–98)
METAMYELOCYTE ABSOLUTE CT: 0.08 THOUSAND/UL (ref 0–0.1)
METAMYELOCYTES NFR BLD MANUAL: 1 % (ref 0–1)
MONOCYTES # BLD AUTO: 0.39 THOUSAND/UL (ref 0–1.22)
MONOCYTES # BLD AUTO: 0.54 THOUSAND/UL (ref 0–1.22)
MONOCYTES NFR BLD: 5 % (ref 4–12)
MONOCYTES NFR BLD: 6 % (ref 4–12)
NEUTROPHILS # BLD MANUAL: 6.11 THOUSAND/UL (ref 1.85–7.62)
NEUTROPHILS # BLD MANUAL: 6.54 THOUSAND/UL (ref 1.85–7.62)
NEUTS BAND NFR BLD MANUAL: 1 % (ref 0–8)
NEUTS SEG NFR BLD AUTO: 73 % (ref 43–75)
NEUTS SEG NFR BLD AUTO: 77 % (ref 43–75)
NITRITE UR QL STRIP: NEGATIVE
NON-SQ EPI CELLS URNS QL MICRO: NORMAL /HPF
NRBC BLD AUTO-RTO: 5 /100 WBC (ref 0–2)
NRBC BLD AUTO-RTO: 5 /100 WBC (ref 0–2)
P AXIS: 49 DEGREES
P AXIS: 54 DEGREES
P AXIS: 55 DEGREES
P AXIS: 61 DEGREES
PATHOLOGY REVIEW: YES
PH UR STRIP.AUTO: 6 [PH]
PLATELET # BLD AUTO: 88 THOUSANDS/UL (ref 149–390)
PLATELET # BLD AUTO: 95 THOUSANDS/UL (ref 149–390)
PLATELET BLD QL SMEAR: ABNORMAL
PLATELET BLD QL SMEAR: ABNORMAL
PMV BLD AUTO: 12.9 FL (ref 8.9–12.7)
POIKILOCYTOSIS BLD QL SMEAR: PRESENT
POIKILOCYTOSIS BLD QL SMEAR: PRESENT
POLYCHROMASIA BLD QL SMEAR: PRESENT
POLYCHROMASIA BLD QL SMEAR: PRESENT
POTASSIUM SERPL-SCNC: 3.3 MMOL/L (ref 3.5–5.3)
POTASSIUM SERPL-SCNC: 3.5 MMOL/L (ref 3.5–5.3)
PR INTERVAL: 158 MS
PR INTERVAL: 162 MS
PR INTERVAL: 170 MS
PR INTERVAL: 200 MS
PROT SERPL-MCNC: 5.1 G/DL (ref 6.4–8.4)
PROT UR STRIP-MCNC: ABNORMAL MG/DL
QRS AXIS: 28 DEGREES
QRS AXIS: 49 DEGREES
QRS AXIS: 50 DEGREES
QRS AXIS: 55 DEGREES
QRSD INTERVAL: 102 MS
QRSD INTERVAL: 104 MS
QT INTERVAL: 458 MS
QT INTERVAL: 462 MS
QT INTERVAL: 468 MS
QT INTERVAL: 474 MS
QTC INTERVAL: 492 MS
QTC INTERVAL: 508 MS
QTC INTERVAL: 508 MS
QTC INTERVAL: 519 MS
RBC # BLD AUTO: 1.43 MILLION/UL (ref 3.81–5.12)
RBC # BLD AUTO: 1.79 MILLION/UL (ref 3.81–5.12)
RBC #/AREA URNS AUTO: NORMAL /HPF
RBC MORPH BLD: PRESENT
RBC MORPH BLD: PRESENT
RETICS # AUTO: ABNORMAL 10*3/UL (ref 14097–95744)
RETICS # CALC: 12.12 % (ref 0.37–1.87)
RH BLD: POSITIVE
RH BLD: POSITIVE
SCHISTOCYTES BLD QL SMEAR: PRESENT
SODIUM SERPL-SCNC: 141 MMOL/L (ref 135–147)
SODIUM SERPL-SCNC: 141 MMOL/L (ref 135–147)
SP GR UR STRIP.AUTO: 1.02 (ref 1–1.03)
SPECIMEN EXPIRATION DATE: NORMAL
T WAVE AXIS: 36 DEGREES
T WAVE AXIS: 62 DEGREES
T WAVE AXIS: 63 DEGREES
T WAVE AXIS: 66 DEGREES
TIBC SERPL-MCNC: 348 UG/DL (ref 250–450)
UIBC SERPL-MCNC: 293 UG/DL (ref 155–355)
UROBILINOGEN UR STRIP-ACNC: 12 MG/DL
VENTRICULAR RATE: 65 BPM
VENTRICULAR RATE: 73 BPM
VENTRICULAR RATE: 74 BPM
VENTRICULAR RATE: 74 BPM
VIT B12 SERPL-MCNC: 224 PG/ML (ref 180–914)
WBC # BLD AUTO: 7.83 THOUSAND/UL (ref 4.31–10.16)
WBC # BLD AUTO: 8.96 THOUSAND/UL (ref 4.31–10.16)
WBC #/AREA URNS AUTO: NORMAL /HPF

## 2024-07-16 PROCEDURE — 96365 THER/PROPH/DIAG IV INF INIT: CPT

## 2024-07-16 PROCEDURE — 99223 1ST HOSP IP/OBS HIGH 75: CPT | Performed by: INTERNAL MEDICINE

## 2024-07-16 PROCEDURE — 83880 ASSAY OF NATRIURETIC PEPTIDE: CPT

## 2024-07-16 PROCEDURE — 83550 IRON BINDING TEST: CPT | Performed by: STUDENT IN AN ORGANIZED HEALTH CARE EDUCATION/TRAINING PROGRAM

## 2024-07-16 PROCEDURE — 86880 COOMBS TEST DIRECT: CPT | Performed by: INTERNAL MEDICINE

## 2024-07-16 PROCEDURE — P9016 RBC LEUKOCYTES REDUCED: HCPCS

## 2024-07-16 PROCEDURE — 99285 EMERGENCY DEPT VISIT HI MDM: CPT

## 2024-07-16 PROCEDURE — 82728 ASSAY OF FERRITIN: CPT | Performed by: STUDENT IN AN ORGANIZED HEALTH CARE EDUCATION/TRAINING PROGRAM

## 2024-07-16 PROCEDURE — 99291 CRITICAL CARE FIRST HOUR: CPT

## 2024-07-16 PROCEDURE — 85007 BL SMEAR W/DIFF WBC COUNT: CPT

## 2024-07-16 PROCEDURE — 85018 HEMOGLOBIN: CPT | Performed by: INTERNAL MEDICINE

## 2024-07-16 PROCEDURE — 84484 ASSAY OF TROPONIN QUANT: CPT

## 2024-07-16 PROCEDURE — 93010 ELECTROCARDIOGRAM REPORT: CPT | Performed by: INTERNAL MEDICINE

## 2024-07-16 PROCEDURE — 86923 COMPATIBILITY TEST ELECTRIC: CPT

## 2024-07-16 PROCEDURE — 83615 LACTATE (LD) (LDH) ENZYME: CPT | Performed by: INTERNAL MEDICINE

## 2024-07-16 PROCEDURE — 85014 HEMATOCRIT: CPT | Performed by: INTERNAL MEDICINE

## 2024-07-16 PROCEDURE — 0W3P8ZZ CONTROL BLEEDING IN GASTROINTESTINAL TRACT, VIA NATURAL OR ARTIFICIAL OPENING ENDOSCOPIC: ICD-10-PCS | Performed by: INTERNAL MEDICINE

## 2024-07-16 PROCEDURE — 94664 DEMO&/EVAL PT USE INHALER: CPT

## 2024-07-16 PROCEDURE — 86850 RBC ANTIBODY SCREEN: CPT

## 2024-07-16 PROCEDURE — 86901 BLOOD TYPING SEROLOGIC RH(D): CPT

## 2024-07-16 PROCEDURE — 85027 COMPLETE CBC AUTOMATED: CPT | Performed by: INTERNAL MEDICINE

## 2024-07-16 PROCEDURE — 93010 ELECTROCARDIOGRAM REPORT: CPT | Performed by: STUDENT IN AN ORGANIZED HEALTH CARE EDUCATION/TRAINING PROGRAM

## 2024-07-16 PROCEDURE — 30233N1 TRANSFUSION OF NONAUTOLOGOUS RED BLOOD CELLS INTO PERIPHERAL VEIN, PERCUTANEOUS APPROACH: ICD-10-PCS | Performed by: INTERNAL MEDICINE

## 2024-07-16 PROCEDURE — 85007 BL SMEAR W/DIFF WBC COUNT: CPT | Performed by: INTERNAL MEDICINE

## 2024-07-16 PROCEDURE — 82746 ASSAY OF FOLIC ACID SERUM: CPT | Performed by: STUDENT IN AN ORGANIZED HEALTH CARE EDUCATION/TRAINING PROGRAM

## 2024-07-16 PROCEDURE — 85046 RETICYTE/HGB CONCENTRATE: CPT | Performed by: STUDENT IN AN ORGANIZED HEALTH CARE EDUCATION/TRAINING PROGRAM

## 2024-07-16 PROCEDURE — 83540 ASSAY OF IRON: CPT | Performed by: STUDENT IN AN ORGANIZED HEALTH CARE EDUCATION/TRAINING PROGRAM

## 2024-07-16 PROCEDURE — 71045 X-RAY EXAM CHEST 1 VIEW: CPT

## 2024-07-16 PROCEDURE — 85027 COMPLETE CBC AUTOMATED: CPT

## 2024-07-16 PROCEDURE — 83735 ASSAY OF MAGNESIUM: CPT | Performed by: INTERNAL MEDICINE

## 2024-07-16 PROCEDURE — 86900 BLOOD TYPING SEROLOGIC ABO: CPT

## 2024-07-16 PROCEDURE — 94640 AIRWAY INHALATION TREATMENT: CPT

## 2024-07-16 PROCEDURE — 36430 TRANSFUSION BLD/BLD COMPNT: CPT

## 2024-07-16 PROCEDURE — 93005 ELECTROCARDIOGRAM TRACING: CPT

## 2024-07-16 PROCEDURE — 80053 COMPREHEN METABOLIC PANEL: CPT

## 2024-07-16 PROCEDURE — 86920 COMPATIBILITY TEST SPIN: CPT

## 2024-07-16 PROCEDURE — 82607 VITAMIN B-12: CPT | Performed by: STUDENT IN AN ORGANIZED HEALTH CARE EDUCATION/TRAINING PROGRAM

## 2024-07-16 PROCEDURE — 83010 ASSAY OF HAPTOGLOBIN QUANT: CPT | Performed by: INTERNAL MEDICINE

## 2024-07-16 PROCEDURE — 43270 EGD LESION ABLATION: CPT | Performed by: INTERNAL MEDICINE

## 2024-07-16 PROCEDURE — 85060 BLOOD SMEAR INTERPRETATION: CPT | Performed by: PATHOLOGY

## 2024-07-16 PROCEDURE — 94760 N-INVAS EAR/PLS OXIMETRY 1: CPT

## 2024-07-16 PROCEDURE — 74176 CT ABD & PELVIS W/O CONTRAST: CPT

## 2024-07-16 PROCEDURE — 80048 BASIC METABOLIC PNL TOTAL CA: CPT | Performed by: INTERNAL MEDICINE

## 2024-07-16 PROCEDURE — 36415 COLL VENOUS BLD VENIPUNCTURE: CPT

## 2024-07-16 PROCEDURE — 81001 URINALYSIS AUTO W/SCOPE: CPT | Performed by: INTERNAL MEDICINE

## 2024-07-16 RX ORDER — PRAVASTATIN SODIUM 80 MG/1
80 TABLET ORAL
Status: DISCONTINUED | OUTPATIENT
Start: 2024-07-16 | End: 2024-07-19 | Stop reason: HOSPADM

## 2024-07-16 RX ORDER — ONDANSETRON 2 MG/ML
4 INJECTION INTRAMUSCULAR; INTRAVENOUS EVERY 6 HOURS PRN
Status: DISCONTINUED | OUTPATIENT
Start: 2024-07-16 | End: 2024-07-17

## 2024-07-16 RX ORDER — SODIUM CHLORIDE 9 MG/ML
125 INJECTION, SOLUTION INTRAVENOUS CONTINUOUS
Status: DISCONTINUED | OUTPATIENT
Start: 2024-07-16 | End: 2024-07-16

## 2024-07-16 RX ORDER — CALCIUM CHLORIDE 100 MG/ML
INJECTION INTRAVENOUS; INTRAVENTRICULAR AS NEEDED
Status: DISCONTINUED | OUTPATIENT
Start: 2024-07-16 | End: 2024-07-16

## 2024-07-16 RX ORDER — BUDESONIDE 0.25 MG/2ML
0.25 INHALANT ORAL 2 TIMES DAILY
Status: DISCONTINUED | OUTPATIENT
Start: 2024-07-16 | End: 2024-07-19 | Stop reason: HOSPADM

## 2024-07-16 RX ORDER — ONDANSETRON 2 MG/ML
4 INJECTION INTRAMUSCULAR; INTRAVENOUS EVERY 6 HOURS PRN
Status: DISCONTINUED | OUTPATIENT
Start: 2024-07-16 | End: 2024-07-19 | Stop reason: HOSPADM

## 2024-07-16 RX ORDER — SODIUM CHLORIDE 9 MG/ML
3 INJECTION INTRAVENOUS
Status: DISCONTINUED | OUTPATIENT
Start: 2024-07-16 | End: 2024-07-19 | Stop reason: HOSPADM

## 2024-07-16 RX ORDER — AMIODARONE HYDROCHLORIDE 200 MG/1
200 TABLET ORAL
Status: DISCONTINUED | OUTPATIENT
Start: 2024-07-16 | End: 2024-07-19 | Stop reason: HOSPADM

## 2024-07-16 RX ORDER — FLUTICASONE FUROATE AND VILANTEROL 200; 25 UG/1; UG/1
1 POWDER RESPIRATORY (INHALATION) DAILY
Status: DISCONTINUED | OUTPATIENT
Start: 2024-07-16 | End: 2024-07-19 | Stop reason: HOSPADM

## 2024-07-16 RX ORDER — PANTOPRAZOLE SODIUM 40 MG/10ML
40 INJECTION, POWDER, LYOPHILIZED, FOR SOLUTION INTRAVENOUS EVERY 12 HOURS SCHEDULED
Status: DISCONTINUED | OUTPATIENT
Start: 2024-07-16 | End: 2024-07-19 | Stop reason: HOSPADM

## 2024-07-16 RX ORDER — POTASSIUM CHLORIDE 20 MEQ/1
40 TABLET, EXTENDED RELEASE ORAL ONCE
Status: COMPLETED | OUTPATIENT
Start: 2024-07-16 | End: 2024-07-16

## 2024-07-16 RX ORDER — PROPOFOL 10 MG/ML
INJECTION, EMULSION INTRAVENOUS AS NEEDED
Status: DISCONTINUED | OUTPATIENT
Start: 2024-07-16 | End: 2024-07-16

## 2024-07-16 RX ORDER — ACETAMINOPHEN 325 MG/1
650 TABLET ORAL EVERY 4 HOURS PRN
Status: DISCONTINUED | OUTPATIENT
Start: 2024-07-16 | End: 2024-07-19 | Stop reason: HOSPADM

## 2024-07-16 RX ADMIN — PROPOFOL 100 MG: 10 INJECTION, EMULSION INTRAVENOUS at 14:56

## 2024-07-16 RX ADMIN — PROPOFOL 30 MG: 10 INJECTION, EMULSION INTRAVENOUS at 14:58

## 2024-07-16 RX ADMIN — METOPROLOL TARTRATE 25 MG: 25 TABLET, FILM COATED ORAL at 12:00

## 2024-07-16 RX ADMIN — PROPOFOL 30 MG: 10 INJECTION, EMULSION INTRAVENOUS at 15:00

## 2024-07-16 RX ADMIN — Medication 40 MG: at 14:59

## 2024-07-16 RX ADMIN — CYANOCOBALAMIN TAB 500 MCG 1000 MCG: 500 TAB at 17:35

## 2024-07-16 RX ADMIN — METOPROLOL TARTRATE 25 MG: 25 TABLET, FILM COATED ORAL at 21:06

## 2024-07-16 RX ADMIN — PROPOFOL 30 MG: 10 INJECTION, EMULSION INTRAVENOUS at 15:13

## 2024-07-16 RX ADMIN — PANTOPRAZOLE SODIUM 40 MG: 40 INJECTION, POWDER, FOR SOLUTION INTRAVENOUS at 21:06

## 2024-07-16 RX ADMIN — SODIUM CHLORIDE 125 ML/HR: 0.9 INJECTION, SOLUTION INTRAVENOUS at 14:43

## 2024-07-16 RX ADMIN — BUDESONIDE 0.25 MG: 0.25 INHALANT RESPIRATORY (INHALATION) at 19:54

## 2024-07-16 RX ADMIN — PROPOFOL 30 MG: 10 INJECTION, EMULSION INTRAVENOUS at 15:08

## 2024-07-16 RX ADMIN — PROPOFOL 20 MG: 10 INJECTION, EMULSION INTRAVENOUS at 15:22

## 2024-07-16 RX ADMIN — CALCIUM CHLORIDE 0.5 G: 100 INJECTION INTRAVENOUS; INTRAVENTRICULAR at 15:23

## 2024-07-16 RX ADMIN — AMIODARONE HYDROCHLORIDE 200 MG: 200 TABLET ORAL at 12:00

## 2024-07-16 RX ADMIN — PRAVASTATIN SODIUM 80 MG: 80 TABLET ORAL at 17:35

## 2024-07-16 RX ADMIN — SODIUM CHLORIDE 80 MG: 9 INJECTION, SOLUTION INTRAVENOUS at 04:59

## 2024-07-16 RX ADMIN — POTASSIUM CHLORIDE 40 MEQ: 1500 TABLET, EXTENDED RELEASE ORAL at 05:01

## 2024-07-16 RX ADMIN — PROPOFOL 40 MG: 10 INJECTION, EMULSION INTRAVENOUS at 15:04

## 2024-07-16 RX ADMIN — PROPOFOL 20 MG: 10 INJECTION, EMULSION INTRAVENOUS at 15:19

## 2024-07-16 RX ADMIN — FLUTICASONE FUROATE AND VILANTEROL TRIFENATATE 1 PUFF: 200; 25 POWDER RESPIRATORY (INHALATION) at 12:03

## 2024-07-16 NOTE — ASSESSMENT & PLAN NOTE
Last 2 weeks of dark stools, worsening shortness of breath. Hgb 4.3  CT abd/pelvis reviewed: no acute abdominopelvic process, colonic diverticulosis without diverticulitis    Plan:  Transfuse 2u PRBC  Trend CBC, maintain active T&S  Transfuse Hgb <7 or hemodynamic instability  Hold antiplatelet, anticoagulants   PPI  GI consulted

## 2024-07-16 NOTE — PROCEDURES
Insert Complex Venous Access Line    Date/Time: 7/16/2024 1:27 PM    Performed by: Beata Candelario RN  Authorized by: Larissa Aragon MD    Patient location:  Bedside  Other Assisting Provider: No    Consent:     Consent obtained: per protocol no consent needed.  Universal protocol:     Procedure explained and questions answered to patient or proxy's satisfaction: yes    Pre-procedure details:     Hand hygiene: Hand hygiene performed prior to insertion      Sterile barrier technique: All elements of maximal sterile technique followed      Skin preparation:  ChloraPrep    Skin preparation agent: Skin preparation agent completely dried prior to procedure    Procedure details:     Complex Venous Access Line Type: Midline      Complex Venous Access Line Indications: no peripheral vascular access and other (comment)      Complex Venous Access Line Indications comment:  Pt is a difficult stick and currently needs blood transfusion and f/u bloodwork    Orientation:  Right    Location:  Brachial    Catheter size:  20 gauge    Total catheter length (cm):  10    Catheter out on skin (cm):  0    Arm circumference:  37    Patient evaluated for contraindications to access (i.e. fistula, thrombosis, etc): Yes      Ultrasound image availability:  Not saved    Sterile ultrasound techniques: Sterile gel and sterile probe covers were used      Number of attempts:  1    Successful placement: yes      Landmarks identified: yes      Vessel of catheter tip end:  Sherlock 3CG confirmed  Anesthesia (see MAR for exact dosages):     Anesthesia method:  None  Post-procedure details:     Post-procedure:  Securement device placed    Assessment:  Blood return through all ports    Post-procedure complications: none      Patient tolerance of procedure:  Tolerated well, no immediate complications    As patient has hx of CKD stage 4 and GFR 23,  spoke with nephrologist  who gave clearance to proceed with midline insertion.

## 2024-07-16 NOTE — ASSESSMENT & PLAN NOTE
"Patient notes a 2-week history of melena  Presented with acute blood loss anemia with a hemoglobin of 4 decreased from her baseline of approximately 10  CT scan abdomen/pelvis: \"No evidence of acute abdominopelvic process. Colonic diverticulosis without diverticulitis.\"  Start PPI BID  GI team consulted  "

## 2024-07-16 NOTE — ASSESSMENT & PLAN NOTE
"Lab Results   Component Value Date    EGFR 23 07/16/2024    EGFR 29 07/13/2024    EGFR 28 06/17/2024    CREATININE 1.99 (H) 07/16/2024    CREATININE 1.66 (H) 07/13/2024    CREATININE 1.73 (H) 06/17/2024     Patient has chronic kidney disease stage IIIB-IV with baseline creatinine that appears to range 1.5-1.9  She follows with [facility] nephrology, Dr. 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

## 2024-07-16 NOTE — ASSESSMENT & PLAN NOTE
Per outpatient heme-onc office notes, patient has chronic thrombocytopenia  Platelet baseline appears to range 80s-100s  Currently at baseline

## 2024-07-16 NOTE — ED NOTES
Provider, DO Jesus, placed two US IV's. First of which, infiltrated. Second, now appears to be blown. Provider aware.      Chris Slade RN  07/16/24 0601

## 2024-07-16 NOTE — ASSESSMENT & PLAN NOTE
Rates controlled  EKG reviewed: NSR HR 74    Plan:  Rate/rhythm control: metoprolol tartrate, amiodarone 200mg daily monitor on telemetry given dyspnea w/exertion  Anticoagulation: Eliquis, held

## 2024-07-16 NOTE — PROGRESS NOTES
"[facility]  Progress Note  Name: Jayla Moody I  MRN: [id number]  Unit/Bed#: [place] [address] -02 I Date of Admission: 7/16/2024   Date of Service: 7/16/2024 I Hospital Day: 0    Assessment & Plan   * Symptomatic anemia  Assessment & Plan  Patient is a 79-year-old female with past medical history significant for stage IV non-small cell lung cancer with bone mets, currently on Alectinib maintenance therapy with monthly Xgeva, CKD who presented to the ER with symptomatic anemia    Baseline hemoglobin appears to range 9-10  Patient presented with a hemoglobin of 4.3  Also noted melanotic stools  Patient was ordered 2 units of packed red blood cells in the ER and referred for admission  Etiology of anemia being evaluated  A) GI bleed: Patient noted melanotic stool  Will start proton pump inhibitor and confer with GI team  B) medication related:   Alectinib associated with hemolytic anemia: Peripheral smear ordered, will check hemolysis panel with LDH and haptoglobin  Xgeva also associated with anemia and thrombocytopenia  Patient's anemia appears macrocytic: B12 and folic acid levels pending  Will keep patient's oncology team updated    Melena  Assessment & Plan  Patient notes a 2-week history of melena  Presented with acute blood loss anemia with a hemoglobin of 4 decreased from her baseline of approximately 10  CT scan abdomen/pelvis: \"No evidence of acute abdominopelvic process. Colonic diverticulosis without diverticulitis.\"  Start PPI BID  GI team consulted    Non-small cell lung cancer (HCC)  Assessment & Plan  Patient follows with [facility] oncology, Dr. Fernandez for stage IVb non-small cell carcinoma of right lung  Initially diagnosed 8/2020 as a right hilar mass with diffuse osseous metastasis, and started on Alectinib with near complete resolution of the lung mass and stable bone mets (per oncology 3/24 office note)  Was referred to nephrology for alectinib induced chronic kidney " "disease  Initially had been receiving Zometa every 3 months: Currently on Xgeva every 4 weeks  Patient presents with anemia and thrombocytopenia: Not leukopenic    CKD (chronic kidney disease) stage 4, GFR 15-29 ml/min ([facility])  Assessment & Plan  Lab Results   Component Value Date    EGFR 23 07/16/2024    EGFR 29 07/13/2024    EGFR 28 06/17/2024    CREATININE 1.99 (H) 07/16/2024    CREATININE 1.66 (H) 07/13/2024    CREATININE 1.73 (H) 06/17/2024     Patient has chronic kidney disease stage IIIB-IV with baseline creatinine that appears to range 1.5-1.9  She follows with [facility] nephrology, Dr. 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  Per outpatient nephrology office notes from 5/24 note, patient was suspected to have underlying chronic kidney disease secondary to \"age-related nephron loss plus hypertensive nephrosclerosis plus cardiorenal syndrome\", plus nephropathy    Atrial flutter ([facility])  Assessment & Plan  Patient follows with [facility] cardiology for atrial flutter, CHF, mitral stenosis, hypertension hyperlipidemia  Was recently seen in the office 5/24: Nuclear stress and echocardiogram ordered--currently pending  Continue amiodarone 200 mg daily, metoprolol 25 mg twice daily  Eliquis placed on hold due to hemoglobin of 4    Chronic diastolic congestive heart failure (HCC)  Assessment & Plan  Wt Readings from Last 3 Encounters:   07/16/24 97.7 kg (215 lb 6.2 oz)   06/21/24 97.1 kg (214 lb)   06/04/24 94.8 kg (209 lb)     Patient has a history of chronic diastolic congestive heart failure follows [facility] Cardiology  Recent 2D echocardiogram 7/23 LVEF 61%, grade I diastolic dysfunction, LA dilation, MV calcification   Home regimen: metoprolol tartrate 25mg twice daily, Lasix 20mg daily   Lasix temporarily on hold  Continue beta-blocker    Severe asthma  Assessment & Plan  Patient has a history of severe asthma  Continue home inhalers  No evidence of acute " exacerbation  Nebulizers as needed    Essential hypertension  Assessment & Plan  Home regimen: metoprolol tartrate 25mg BID, Lasix 20mg daily   Cont metoprolol  Due to elevated creatinine at high end of baseline and clinical dehydration:  lasix temporarily on hold      Thrombocytopenia (HCC)  Assessment & Plan  Per outpatient heme-onc office notes, patient has chronic thrombocytopenia  Platelet baseline appears to range 80s-100s  Currently at baseline         Anemia likely due to ALBA with melanotic stool  BMI 40 c/w morbid obesity      VTE Pharmacologic Prophylaxis: VTE Score: 5 High Risk (Score >/= 5) - Pharmacological DVT Prophylaxis Contraindicated. Sequential Compression Devices Ordered.    Mobility:   Basic Mobility Inpatient Raw Score: 14  -[facility] Goal: 4: Move to chair/commode  -[facility] Goal NOT achieved. Continue with multidisciplinary rounding and encourage appropriate mobility to improve upon -[facility] goals.    Patient Centered Rounds: I performed bedside rounds with nursing staff today.   Discussions with Specialists or Other Care Team Provider: Gi: Dr. Agarwal-- for EGD today    Education and Discussions with Family / Patient: updated son Jimmy via phone    Total Time Spent on Date of Encounter in care of patient: 55 mins. This time was spent on one or more of the following: performing physical exam; counseling and coordination of care; obtaining or reviewing history; documenting in the medical record; reviewing/ordering tests, medications or procedures; communicating with other healthcare professionals and discussing with patient's family/caregivers.    Current Length of Stay: 0 day(s)  Current Patient Status: Inpatient   Certification Statement: The patient will continue to require additional inpatient hospital stay due to anemia  Discharge Plan: Anticipate discharge in 48-72 hrs to home.    Code Status: Level 1 - Full Code    Subjective:   Patient relates she has been feeling weak, fatigued, lightheaded  with ambulating, as well as dyspneic with exertion for many days.    She notes 2 weeks of melanotic stool.  She noted poor appetite, minimal p.o. intake.  Denies any nausea or vomiting.  No hematemesis or coffee-ground emesis.  Denies any bright red blood per rectum or any other bleeding.  Notes she had some mild discomfort in her abdomen but no specific abdominal pain.  Denies any postprandial pain    Notes that she has been taking Eliquis.  Does not take aspirin.  She specifically denied any over-the-counter NSAIDs.  Takes only Tylenol if needed for pain.    Patient denies any shortness of breath at rest.  No cough.    Patient like she has a history of asthma.  Notes it is at her baseline without acute exacerbation    Objective:     Vitals:   Temp (24hrs), Av.2 °F (36.8 °C), Min:97.9 °F (36.6 °C), Max:98.7 °F (37.1 °C)    Temp:  [97.9 °F (36.6 °C)-98.7 °F (37.1 °C)] 97.9 °F (36.6 °C)  HR:  [70-75] 72  Resp:  [16-20] 20  BP: (121-149)/(49-65) 149/49  SpO2:  [96 %-100 %] 100 %  Body mass index is 40.7 kg/m².     Input and Output Summary (last 24 hours):     Intake/Output Summary (Last 24 hours) at 2024 1043  Last data filed at 2024 0820  Gross per 24 hour   Intake 350 ml   Output --   Net 350 ml       Physical Exam:   Physical Exam   General: Very pleasant female.  No acute distress.  Nontachypneic.  Smiling and interactive.  Seated at her bed  Heart: Regular rate and rhythm.  No murmur, rub, gallop.  S1-S2 present  Lungs: Clear to auscultation bilaterally.  No wheezes, crackles, rhonchi.  Good air movement.  No accessory muscle use or respiratory distress.  Abdomen: Soft, mild suprapubic tenderness.  No guarding or rebound.  Normoactive bowel sounds present.  No other tenderness elicited.  No peritoneal signs  Extremities: No clubbing, cyanosis, edema.  Trace pedal and pretibial edema.  2+ pedal pulses  Neurologic: Awake and alert.  Interactive.  Fluent speech.  Moving all 4 extremities  symmetrically.    Additional Data:     Labs:  Results from last 7 days   Lab Units 24  0339   WBC Thousand/uL 8.96   HEMOGLOBIN g/dL 4.3*   HEMATOCRIT % 15.0*   PLATELETS Thousands/uL 95*   LYMPHO PCT % 21   MONO PCT % 6   EOS PCT % 0     Results from last 7 days   Lab Units 24  0339   SODIUM mmol/L 141   POTASSIUM mmol/L 3.3*   CHLORIDE mmol/L 103   CO2 mmol/L 28   BUN mg/dL 50*   CREATININE mg/dL 1.99*   ANION GAP mmol/L 10   CALCIUM mg/dL 8.9   ALBUMIN g/dL 3.2*   TOTAL BILIRUBIN mg/dL 2.10*   ALK PHOS U/L 66   ALT U/L 20   AST U/L 30   GLUCOSE RANDOM mg/dL 122                       Lines/Drains:  Invasive Devices       Peripheral Intravenous Line  Duration             Peripheral IV 24 Left Antecubital <1 day                      Telemetry:  Telemetry Orders (From admission, onward)               24 Hour Telemetry Monitoring  Continuous x 24 Hours (Telem)        Question:  Reason for 24 Hour Telemetry  Answer:  Arrhythmias requiring acute medical intervention / PPM or ICD malfunction                     Telemetry Reviewed: Normal Sinus Rhythm  Indication for Continued Telemetry Use: Arrthymias requiring medical therapy           ===========================    Imagin/16 chest x-ray  no acute cardiopulmonary disease. Suboptimal inspiratory effort.      CT abdomen/pelvis  No evidence of acute abdominopelvic process.  Colonic diverticulosis without diverticulitis    Recent Cultures (last 7 days):         Last 24 Hours Medication List:   Current Facility-Administered Medications   Medication Dose Route Frequency Provider Last Rate    Alectinib HCl  600 mg Oral BID Lynda Rush PA-C      amiodarone  200 mg Oral Daily With Breakfast Lynda Rush PA-C      budesonide  0.25 mg Nebulization BID Lynda Rush PA-C      fluticasone-vilanterol  1 puff Inhalation Daily Lynda Rush PA-C      metoprolol tartrate  25 mg Oral Q12H JAIRON Lynda Rush PA-C      ondansetron  4  mg Intravenous Q6H PRN Lynda Rush PA-C      pantoprazole  40 mg Intravenous Q12H JAIRON Lynda Rush PA-C      pravastatin  80 mg Oral Daily With Dinner Lynda Rush PA-C      sodium chloride (PF)  3 mL Intravenous Q1H PRN Lynda Rush PA-C          Today, Patient Was Seen By: Larissa Aragon MD    **Please Note: This note may have been constructed using a voice recognition system.**

## 2024-07-16 NOTE — ED PROVIDER NOTES
History  Chief Complaint   Patient presents with    Chest Pain     Pt arrived via EMS. Pt states she got R sided chest pain and R sided back pain that woke her up out of her sleep. Pt states she got up to go to the bathroom, got dizzy, so instead she sat her in chair in the bedroom. Pt states chest and back pain went away in ambulance when they put 2 L O2 on pt for comfort. Pt denies HA, SOB, or dizziness. Pt is poor historian. States she goes to cardiologist but doesn't know what for exactly.      Patient is a 79-year-old female with a significant past medical history of atrial fibrillation, anticoagulated on Eliquis, COPD, CAD, presenting for evaluation of chest pain.  Patient reports that over the last 2 weeks or so she has been having some dark stools.  She reports that she has also been having some bilateral lower extremity edema over this period of time.  She says that this morning she woke up from sleep with some increased shortness of breath as well as chest pain.  She reports that this is since improved.  She does state that she had some lightheadedness at this time but did not syncopized.  Additionally, she is reporting some left lower quadrant abdominal pain that has been intermittent over the last few days or so.  She is otherwise without complaint.        Prior to Admission Medications   Prescriptions Last Dose Informant Patient Reported? Taking?   Alectinib HCl 150 MG CAPS   No Yes   Sig: Take 4 capsules (600 mg total) by mouth 2 (two) times a day   Budeson-Glycopyrrol-Formoterol (Breztri Aerosphere) 160-9-4.8 MCG/ACT AERO   No Yes   Sig: Inhale 2 puffs 2 (two) times a day Rinse mouth after use.   D-1000 Extra Strength 25 MCG (1000 UT) tablet   No Yes   Sig: Take 1 tablet (1,000 Units total) by mouth daily   Klor-Con M20 20 MEQ tablet   No Yes   Sig: TAKE 1 TABLET BY MOUTH EVERY DAY   acetaminophen (TYLENOL) 650 mg CR tablet   No Yes   Sig: Take 1 tablet (650 mg total) by mouth every 8 (eight) hours as  needed for mild pain   albuterol (2.5 mg/3 mL) 0.083 % nebulizer solution   No Yes   Sig: Take 3 mL (2.5 mg total) by nebulization 2 (two) times a day   albuterol (PROVENTIL HFA,VENTOLIN HFA) 90 mcg/act inhaler   No Yes   Sig: Inhale 2 puffs 4 (four) times a day   amiodarone 200 mg tablet   No Yes   Sig: Take 1 tablet (200 mg total) by mouth daily with breakfast   apixaban (ELIQUIS) 5 mg   No Yes   Sig: Take 1 tablet (5 mg total) by mouth 2 (two) times a day   benralizumab (FASENRA) subcutaneous injection  Self No Yes   Sig: Inject 1 mL (30 mg total) under the skin every 56 days   budesonide (PULMICORT) 0.25 mg/2 mL nebulizer solution   No No   Sig: Take 2 mL (0.25 mg total) by nebulization 2 (two) times a day Rinse mouth after use.   calcitriol (ROCALTROL) 0.25 mcg capsule   No Yes   Sig: Take 1 capsule (0.25 mcg total) by mouth 3 (three) times a week On Monday , Wednesday, friday   fexofenadine (ALLEGRA) 180 MG tablet   No Yes   Sig: Take 1 tablet (180 mg total) by mouth daily   fluticasone (FLONASE) 50 mcg/act nasal spray  Self No Yes   Sig: SPRAY 2 SPRAYS INTO EACH NOSTRIL EVERY DAY   furosemide (LASIX) 20 mg tablet   No Yes   Sig: TAKE 1 TABLET BY MOUTH EVERY DAY   glycopyrrolate-formoterol (BEVESPI AEROSPHERE) 9-4.8 MCG/ACT inhaler   No Yes   Sig: Inhale 2 puffs 2 (two) times a day   ipratropium (ATROVENT) 0.02 % nebulizer solution   No Yes   Sig: Take 2.5 mL (0.5 mg total) by nebulization 3 (three) times a day   metoprolol tartrate (LOPRESSOR) 25 mg tablet   No Yes   Sig: TAKE 1 TABLET (25 MG TOTAL) BY MOUTH EVERY 12 (TWELVE) HOURS   omeprazole (PriLOSEC) 20 mg delayed release capsule   No Yes   Sig: TAKE 1 CAPSULE BY MOUTH EVERY DAY   oxyCODONE-acetaminophen (PERCOCET) 5-325 mg per tablet  Self No Yes   Sig: Take 1 tablet by mouth every 4 (four) hours as needed for moderate pain For ongoing pain Max Daily Amount: 6 tablets   rosuvastatin (CRESTOR) 10 MG tablet   No Yes   Sig: Take 1 tablet (10 mg total) by  mouth daily   senna-docusate sodium (SENOKOT S) 8.6-50 mg per tablet   No Yes   Sig: Take 1 tablet by mouth daily      Facility-Administered Medications: None       Past Medical History:   Diagnosis Date    Allergic rhinitis     Anemia     Asthma     Atrial fibrillation (HCC)     Chronic bronchitis (HCC)     COPD (chronic obstructive pulmonary disease) (HCC)     Coronary artery disease     CPAP (continuous positive airway pressure) dependence     Degenerative joint disease     Fluid retention 2024    GERD (gastroesophageal reflux disease)     Glaucoma     Hypertension     Lumbar disc disease     Lung cancer (HCC)     Pelvis cancer (HCC)     Periodic heart flutter     Pneumonia     Premature atrial contractions 10/31/2017    Sleep apnea     suspected     Sleep apnea, obstructive     Ventricular arrhythmia     Vitamin D deficiency        Past Surgical History:   Procedure Laterality Date    APPENDECTOMY      BREAST BIOPSY Right     years ago    COLON SURGERY      COLONOSCOPY N/A 03/18/2019    Procedure: COLONOSCOPY;  Surgeon: Alessia Wilson DO;  Location: [facility];  Service: Gastroenterology    ESOPHAGOGASTRODUODENOSCOPY N/A 03/18/2019    Procedure: ESOPHAGOGASTRODUODENOSCOPY (EGD);  Surgeon: Alessia Wilson DO;  Location: [facility];  Service: Gastroenterology    KNEE SURGERY      LUNG BIOPSY      ROTATOR CUFF REPAIR      SHOULDER SURGERY         Family History   Problem Relation Age of Onset    Stroke Mother     Diabetes Mother     Hyperlipidemia Mother     Hypertension Mother     Allergies Mother         Environmental    Asthma Mother     Diabetes Father     Hyperlipidemia Father     Hypertension Father     Lung cancer Father 70    Allergies Father         Environmental    Asthma Father     Lymphoma Sister 69    Heart disease Sister         Pacemaker    Asthma Brother     Aneurysm Brother         Brain - had surgery    Other Brother         Lymes disease    Coronary artery disease Daughter         2 bypass  done    No Known Problems Daughter     No Known Problems Daughter     No Known Problems Son     Kidney disease Son     Liver disease Son     Obesity Son      I have reviewed and agree with the history as documented.    E-Cigarette/Vaping    E-Cigarette Use Never User      E-Cigarette/Vaping Substances    Nicotine No     THC No     CBD No     Flavoring No     Other No     Unknown No      Social History     Tobacco Use    Smoking status: Former     Current packs/day: 0.00     Average packs/day: 0.3 packs/day for 25.0 years (6.3 ttl pk-yrs)     Types: Cigarettes     Start date:      Quit date:      Years since quittin.5    Smokeless tobacco: Former   Vaping Use    Vaping status: Never Used   Substance Use Topics    Alcohol use: Yes     Comment: occasionally    Drug use: Never       Review of Systems   Constitutional:  Negative for fever.   Respiratory:  Positive for shortness of breath.    Cardiovascular:  Positive for chest pain and leg swelling.   Gastrointestinal:  Positive for abdominal pain and blood in stool (melena). Negative for nausea and vomiting.       Physical Exam  Physical Exam  Constitutional:       Appearance: She is obese. She is ill-appearing.   HENT:      Head: Normocephalic and atraumatic.      Mouth/Throat:      Mouth: Mucous membranes are moist.   Eyes:      Extraocular Movements: Extraocular movements intact.   Cardiovascular:      Rate and Rhythm: Normal rate.      Heart sounds: Normal heart sounds.   Pulmonary:      Effort: Pulmonary effort is normal.      Breath sounds: Normal breath sounds.   Abdominal:      General: Abdomen is flat.      Palpations: Abdomen is soft.      Tenderness: There is abdominal tenderness in the left lower quadrant.   Neurological:      General: No focal deficit present.         Vital Signs  ED Triage Vitals   Temperature Pulse Respirations Blood Pressure SpO2   24 0317 24 0317 24 0317 24 0317 24 0317   98.7 °F (37.1 °C) 75  20 142/65 96 %      Temp Source Heart Rate Source Patient Position - Orthostatic VS BP Location FiO2 (%)   07/16/24 0317 07/16/24 0317 07/16/24 0317 07/16/24 0317 --   Oral Monitor Lying Right arm       Pain Score       07/16/24 0912       No Pain           Vitals:    07/16/24 1059 07/16/24 1100 07/16/24 1152 07/16/24 1207   BP: 134/56 141/58 137/58 134/53   Pulse: 76 72 69 69   Patient Position - Orthostatic VS: Sitting - Orthostatic VS Standing - Orthostatic VS           Visual Acuity      ED Medications  Medications   sodium chloride (PF) 0.9 % injection 3 mL (has no administration in time range)   pantoprazole (PROTONIX) injection 40 mg (has no administration in time range)   Alectinib HCl CAPS 600 mg (600 mg Oral Not Given 7/16/24 1039)   amiodarone tablet 200 mg (200 mg Oral Given 7/16/24 1200)   fluticasone-vilanterol 200-25 mcg/actuation 1 puff (1 puff Inhalation Given 7/16/24 1203)   budesonide (PULMICORT) inhalation solution 0.25 mg ( Nebulization Canceled Entry 7/16/24 1140)   metoprolol tartrate (LOPRESSOR) tablet 25 mg (25 mg Oral Given 7/16/24 1200)   pravastatin (PRAVACHOL) tablet 80 mg (has no administration in time range)   ondansetron (ZOFRAN) injection 4 mg (has no administration in time range)   acetaminophen (TYLENOL) tablet 650 mg (has no administration in time range)   pantoprazole (PROTONIX) 80 mg in sodium chloride 0.9 % 100 mL IVPB (0 mg Intravenous Stopped 7/16/24 0647)   potassium chloride (Klor-Con M20) CR tablet 40 mEq (40 mEq Oral Given 7/16/24 0501)       Diagnostic Studies  Results Reviewed       Procedure Component Value Units Date/Time    Folate [[id number]]  (Normal) Collected: 07/16/24 0339    Lab Status: Final result Specimen: Blood Updated: 07/16/24 1058     Folate 11.2 ng/mL     Vitamin B12 [[id number]]  (Normal) Collected: 07/16/24 0339    Lab Status: Final result Specimen: Blood Updated: 07/16/24 1058     Vitamin B-12 224 pg/mL     Ferritin [[id number]]  (Normal) Collected:  07/16/24 0339    Lab Status: Final result Specimen: Blood Updated: 07/16/24 1058     Ferritin 59 ng/mL     Retic Count with Reticulocyte HGB [[id number]]  (Abnormal) Collected: 07/16/24 0339    Lab Status: Final result Specimen: Blood from Arm, Right Updated: 07/16/24 1046     Immature Retic Fract 52.9 %      Retic Ct Pct 12.12 %      Retic Ct Abs 175,700     RETIC HGB 26.5 pg     HS Troponin I 4hr [[id number]]  (Normal) Collected: 07/16/24 0742    Lab Status: Final result Specimen: Blood from Arm, Left Updated: 07/16/24 0810     hs TnI 4hr 11 ng/L      Delta 4hr hsTnI 0 ng/L     TIBC Panel (incl. Iron, TIBC, % Iron Saturation) [[id number]] Collected: 07/16/24 0339    Lab Status: In process Specimen: Blood Updated: 07/16/24 0758    HS Troponin I 2hr [[id number]]  (Normal) Collected: 07/16/24 0624    Lab Status: Final result Specimen: Blood from Arm, Left Updated: 07/16/24 0653     hs TnI 2hr 10 ng/L      Delta 2hr hsTnI -1 ng/L     Manual Differential(PHLEBS Do Not Order) [[id number]]  (Abnormal) Collected: 07/16/24 0339    Lab Status: Final result Specimen: Blood from Arm, Right Updated: 07/16/24 0531     Segmented % 73 %      Lymphocytes % 21 %      Monocytes % 6 %      Eosinophils % 0 %      Basophils % 0 %      Absolute Neutrophils 6.54 Thousand/uL      Absolute Lymphocytes 1.88 Thousand/uL      Absolute Monocytes 0.54 Thousand/uL      Absolute Eosinophils 0.00 Thousand/uL      Absolute Basophils 0.00 Thousand/uL      Total Counted --     nRBC 5 /100 WBC      RBC Morphology Present     Platelet Estimate Decreased     Pathology Review Yes     Differential Comment see note     Anisocytosis Present     [place] Cells Present     Macrocytes Present     Poikilocytes Present     Polychromasia Present    HS Troponin 0hr (reflex protocol) [[id number]]  (Normal) Collected: 07/16/24 0339    Lab Status: Final result Specimen: Blood from Arm, Right Updated: 07/16/24 0406     hs TnI 0hr 11 ng/L     B-Type Natriuretic Peptide(BNP)  [[id number]]  (Abnormal) Collected: 07/16/24 0339    Lab Status: Final result Specimen: Blood from Arm, Right Updated: 07/16/24 0406      pg/mL     Comprehensive metabolic panel [[id number]]  (Abnormal) Collected: 07/16/24 0339    Lab Status: Final result Specimen: Blood from Arm, Right Updated: 07/16/24 0402     Sodium 141 mmol/L      Potassium 3.3 mmol/L      Chloride 103 mmol/L      CO2 28 mmol/L      ANION GAP 10 mmol/L      BUN 50 mg/dL      Creatinine 1.99 mg/dL      Glucose 122 mg/dL      Calcium 8.9 mg/dL      Corrected Calcium 9.5 mg/dL      AST 30 U/L      ALT 20 U/L      Alkaline Phosphatase 66 U/L      Total Protein 5.1 g/dL      Albumin 3.2 g/dL      Total Bilirubin 2.10 mg/dL      eGFR 23 ml/min/1.73sq m     Narrative:      [employer] guidelines for Chronic Kidney Disease (CKD):     Stage 1 with normal or high GFR (GFR > 90 mL/min/1.73 square meters)    Stage 2 Mild CKD (GFR = 60-89 mL/min/1.73 square meters)    Stage 3A Moderate CKD (GFR = 45-59 mL/min/1.73 square meters)    Stage 3B Moderate CKD (GFR = 30-44 mL/min/1.73 square meters)    Stage 4 Severe CKD (GFR = 15-29 mL/min/1.73 square meters)    Stage 5 End Stage CKD (GFR <15 mL/min/1.73 square meters)  Note: GFR calculation is accurate only with a steady state creatinine    CBC and differential [[id number]]  (Abnormal) Collected: 07/16/24 0339    Lab Status: Final result Specimen: Blood from Arm, Right Updated: 07/16/24 0353     WBC 8.96 Thousand/uL      RBC 1.43 Million/uL      Hemoglobin 4.3 g/dL      Hematocrit 15.0 %       fL      MCH 30.1 pg      MCHC 28.7 g/dL      RDW 21.1 %      Platelets 95 Thousands/uL                    X-ray chest 1 view portable   ED Interpretation by Abimael Lee DO (07/16 0547)   No acute cardiopulmonary disease      Final Result by David Garcia MD (07/16 0912)      No acute cardiopulmonary disease. Suboptimal inspiratory effort.            Workstation performed:  [id number]         CT abdomen pelvis wo contrast   Final Result by Monster Christie MD (07/16 0533)      No evidence of acute abdominopelvic process.      Colonic diverticulosis without diverticulitis.      Chronic findings and negatives as above.      Workstation performed: [id number]                    Procedures  CriticalCare Time    Date/Time: 7/16/2024 4:47 AM    Performed by: Abimael Lee DO  Authorized by: Abimael Lee DO    Critical care provider statement:     Critical care time (minutes):  32    Critical care start time:  7/16/2024 4:15 AM    Critical care end time:  7/16/2024 4:47 AM    Critical care time was exclusive of:  Separately billable procedures and treating other patients    Critical care was necessary to treat or prevent imminent or life-threatening deterioration of the following conditions:  Circulatory failure    Critical care was time spent personally by me on the following activities:  Obtaining history from patient or surrogate, development of treatment plan with patient or surrogate, evaluation of patient's response to treatment, examination of patient, ordering and performing treatments and interventions, ordering and review of laboratory studies, ordering and review of radiographic studies and re-evaluation of patient's condition  Comments:      Life threatening acute blood loss anemia with resultant chest pain and need for pRBC transfusion.  US Guided Peripheral IV    Date/Time: 7/16/2024 4:40 AM    Performed by: Abimael Lee DO  Authorized by: Abimael Lee DO    Patient location:  ED  Performed by:  Attending  Other Assisting Provider: No    Indications:     Indications: difficulty obtaining IV access      Image availability:  Not saved  Procedure details:     Patient evaluated for contraindications to access (i.e. fistula, thrombosis, etc): Yes      Standard clean technique used for ultrasound access: Yes      Location:  Right arm     Catheter size:  20 gauge    Number of attempts:  1    Successful placement: yes    Post-procedure details:     Post-procedure:  Dressing applied    Assessment: free fluid flow and no signs of infiltration      Post-procedure complications: none      Patient tolerance of procedure:  Tolerated well, no immediate complications  US Guided Peripheral IV    Date/Time: 7/16/2024 5:20 AM    Performed by: Abimael Lee DO  Authorized by: Abimael Lee DO    Patient location:  ED  Performed by:  Attending  Other Assisting Provider: No    Indications:     Indications: difficulty obtaining IV access      Image availability:  Not saved  Procedure details:     Patient evaluated for contraindications to access (i.e. fistula, thrombosis, etc): Yes      Standard clean technique used for ultrasound access: Yes      Location:  Left arm    Catheter size:  18 gauge    Number of attempts:  1    Successful placement: yes    Post-procedure details:     Post-procedure:  Dressing applied    Assessment: free fluid flow and no signs of infiltration      Post-procedure complications: none      Patient tolerance of procedure:  Tolerated well, no immediate complications  US Guided Peripheral IV    Date/Time: 7/16/2024 6:10 AM    Performed by: Abimael Lee DO  Authorized by: Abimael Lee DO    Patient location:  ED  Performed by:  Attending  Other Assisting Provider: No    Indications:     Indications: difficulty obtaining IV access      Image availability:  Not saved  Procedure details:     Patient evaluated for contraindications to access (i.e. fistula, thrombosis, etc): Yes      Standard clean technique used for ultrasound access: Yes      Location:  Left arm    Catheter size:  20 gauge    Number of attempts:  1    Successful placement: yes    Post-procedure details:     Post-procedure:  Dressing applied    Assessment: free fluid flow and no signs of infiltration      Post-procedure complications: none       Patient tolerance of procedure:  Tolerated well, no immediate complications           ED Course  ED Course as of 07/16/24 1235   Tue Jul 16, 2024   0333 Procedure Note: EKG  Date/Time: 07/16/24 3:33 AM   Interpreted by: Abimael Lee   Indications / Diagnosis: CP  ECG reviewed by me, the ED Provider: yes   The EKG demonstrates:  Rhythm: normal sinus  Intervals: prolonged QTc  Axis: normal axis  QRS/Blocks: normal QRS  ST Changes: No acute ST Changes, no STD/FIDEL.   0415 Hemoglobin(!!): 4.3  Will transfuse 2 units                                               Medical Decision Making  Patient with history as above presented with chest pain. History obtained from patient.    Differential diagnosis includes: ACS, arrhythmia, volume overload, anemia, electrolyte disturbance    Plan: CBC, CMP, troponin, BNP, ECG, CXR, CT a/p    ECG independently interpreted by myself as above. Labs reviewed and remarkable for anemia, likely acute blood loss anemia given melanotic stools. Given elevated BUN concern for upper GI bleed so protonix initiated. Transfusion 2 units pRBC ordered. Independently reviewed imaging without acute emergent pathology. Presentation most consistent with acute blood loss anemia. Discussed patient's management with medicine who agreed to admit patient under their service.    Amount and/or Complexity of Data Reviewed  Labs: ordered. Decision-making details documented in ED Course.  Radiology: ordered and independent interpretation performed.    Risk  Prescription drug management.  Decision regarding hospitalization.                 Disposition  Final diagnoses:   Anemia   CARLOS ENRIQUE (acute kidney injury) (HCC)     Time reflects when diagnosis was documented in both MDM as applicable and the Disposition within this note       Time User Action Codes Description Comment    7/16/2024  5:33 AM Abimael Lee Add [D64.9] Anemia     7/16/2024  6:19 AM Abimael Lee Add [N17.9] CARLOS ENRIQUE (acute kidney injury) (HCC)      7/16/2024  7:03 AM Eric Rushn M Add [K92.1] Melena     7/16/2024 12:02 PM Larissa Aragon Add [D64.9] Symptomatic anemia           ED Disposition       ED Disposition   Admit    Condition   Stable    Date/Time   Tue Jul 16, 2024  6:19 AM    Comment   Case was discussed with JAYE and the patient's admission status was agreed to be Admission Status: inpatient status to the service of Dr. Gerard.               Follow-up Information    None         Current Discharge Medication List        CONTINUE these medications which have NOT CHANGED    Details   acetaminophen (TYLENOL) 650 mg CR tablet Take 1 tablet (650 mg total) by mouth every 8 (eight) hours as needed for mild pain  Qty: 60 tablet, Refills: 3    Associated Diagnoses: Chronic bilateral low back pain, unspecified whether sciatica present      albuterol (2.5 mg/3 mL) 0.083 % nebulizer solution Take 3 mL (2.5 mg total) by nebulization 2 (two) times a day  Qty: 180 mL, Refills: 11    Associated Diagnoses: Severe persistent asthma without complication; Mucopurulent chronic bronchitis (HCC)      albuterol (PROVENTIL HFA,VENTOLIN HFA) 90 mcg/act inhaler Inhale 2 puffs 4 (four) times a day  Qty: 18 g, Refills: 2    Comments: Substitution to a formulary equivalent within the same pharmaceutical class is authorized.  Associated Diagnoses: Mild intermittent asthma without complication      Alectinib HCl 150 MG CAPS Take 4 capsules (600 mg total) by mouth 2 (two) times a day  Qty: 240 capsule, Refills: 5    Associated Diagnoses: Non-small cell carcinoma of lung, stage 4, right (HCC); Non-small cell cancer of right lung (HCC)      amiodarone 200 mg tablet Take 1 tablet (200 mg total) by mouth daily with breakfast  Qty: 90 tablet, Refills: 3    Associated Diagnoses: Typical atrial flutter (HCC)      apixaban (ELIQUIS) 5 mg Take 1 tablet (5 mg total) by mouth 2 (two) times a day  Qty: 60 tablet, Refills: 11    Associated Diagnoses: Typical atrial flutter (HCC)       benralizumab (FASENRA) subcutaneous injection Inject 1 mL (30 mg total) under the skin every 56 days  Qty: 1 mL, Refills: 6    Associated Diagnoses: Severe persistent asthma without complication      Budeson-Glycopyrrol-Formoterol (Breztri Aerosphere) 160-9-4.8 MCG/ACT AERO Inhale 2 puffs 2 (two) times a day Rinse mouth after use.  Qty: 10.7 g, Refills: 11    Associated Diagnoses: Severe persistent asthma without complication; Mucopurulent chronic bronchitis ([facility])      calcitriol (ROCALTROL) 0.25 mcg capsule Take 1 capsule (0.25 mcg total) by mouth 3 (three) times a week On Monday , Wednesday, friday  Qty: 45 capsule, Refills: 3    Associated Diagnoses: Essential hypertension; Chronic diastolic congestive heart failure (HCC); Non-small cell lung cancer, unspecified laterality ([facility]); Stage 3b chronic kidney disease ([facility]); Class 2 severe obesity due to excess calories with serious comorbidity and body mass index (BMI) of 39.0 to 39.9 in adult ([facility]); Vitamin D deficiency; CKD (chronic kidney disease) stage 4, GFR 15-29 ml/min ([facility])      D-1000 Extra Strength 25 MCG (1000 UT) tablet Take 1 tablet (1,000 Units total) by mouth daily  Qty: 90 tablet, Refills: 3    Associated Diagnoses: Essential hypertension; Chronic diastolic congestive heart failure (HCC); Non-small cell lung cancer, unspecified laterality ([facility]); Stage 3b chronic kidney disease ([facility]); Class 2 severe obesity due to excess calories with serious comorbidity and body mass index (BMI) of 39.0 to 39.9 in adult ([facility]); Vitamin D deficiency; CKD (chronic kidney disease) stage 4, GFR 15-29 ml/min ([facility])      fexofenadine (ALLEGRA) 180 MG tablet Take 1 tablet (180 mg total) by mouth daily  Qty: 90 tablet, Refills: 3    Associated Diagnoses: Mild persistent asthma with acute exacerbation      fluticasone (FLONASE) 50 mcg/act nasal spray SPRAY 2 SPRAYS INTO EACH NOSTRIL EVERY DAY  Qty: 48 mL, Refills: 1    Associated Diagnoses: Allergic rhinitis, unspecified  seasonality, unspecified trigger      furosemide (LASIX) 20 mg tablet TAKE 1 TABLET BY MOUTH EVERY DAY  Qty: 90 tablet, Refills: 1    Associated Diagnoses: Essential hypertension      glycopyrrolate-formoterol (BEVESPI AEROSPHERE) 9-4.8 MCG/ACT inhaler Inhale 2 puffs 2 (two) times a day  Qty: 10.7 g, Refills: 5    Associated Diagnoses: Mild intermittent asthma without complication; Severe persistent asthma with acute exacerbation; Mild persistent asthma with acute exacerbation      ipratropium (ATROVENT) 0.02 % nebulizer solution Take 2.5 mL (0.5 mg total) by nebulization 3 (three) times a day  Qty: 225 mL, Refills: 2    Associated Diagnoses: Severe persistent asthma without complication      Klor-Con M20 20 MEQ tablet TAKE 1 TABLET BY MOUTH EVERY DAY  Qty: 90 tablet, Refills: 1    Associated Diagnoses: Atrial flutter (HCC)      metoprolol tartrate (LOPRESSOR) 25 mg tablet TAKE 1 TABLET (25 MG TOTAL) BY MOUTH EVERY 12 (TWELVE) HOURS  Qty: 180 tablet, Refills: 1    Associated Diagnoses: Atrial flutter (HCC)      omeprazole (PriLOSEC) 20 mg delayed release capsule TAKE 1 CAPSULE BY MOUTH EVERY DAY  Qty: 90 capsule, Refills: 1    Associated Diagnoses: Hiatal hernia      oxyCODONE-acetaminophen (PERCOCET) 5-325 mg per tablet Take 1 tablet by mouth every 4 (four) hours as needed for moderate pain For ongoing pain Max Daily Amount: 6 tablets  Qty: 60 tablet, Refills: 0    Associated Diagnoses: Malignant neoplasm determined by biopsy of lung (HCC)      rosuvastatin (CRESTOR) 10 MG tablet Take 1 tablet (10 mg total) by mouth daily  Qty: 90 tablet, Refills: 3    Associated Diagnoses: Coronary artery calcification      senna-docusate sodium (SENOKOT S) 8.6-50 mg per tablet Take 1 tablet by mouth daily  Qty: 60 tablet, Refills: 3    Associated Diagnoses: Constipation, unspecified constipation type      budesonide (PULMICORT) 0.25 mg/2 mL nebulizer solution Take 2 mL (0.25 mg total) by nebulization 2 (two) times a day Rinse  mouth after use.  Qty: 360 mL, Refills: 2    Associated Diagnoses: Mild persistent asthma without complication             No discharge procedures on file.    PDMP Review         Value Time User    PDMP Reviewed  Yes 11/29/2023 12:57 PM Jessie Siddiqui MD            ED Provider  Electronically Signed by             Abimael Lee DO  07/16/24 3153

## 2024-07-16 NOTE — ASSESSMENT & PLAN NOTE
Wt Readings from Last 3 Encounters:   07/16/24 97.7 kg (215 lb 6.2 oz)   06/21/24 97.1 kg (214 lb)   06/04/24 94.8 kg (209 lb)     Patient has a history of chronic diastolic congestive heart failure follows [facility]  Recent 2D echocardiogram 7/23 LVEF 61%, grade I diastolic dysfunction, LA dilation, MV calcification   Home regimen: metoprolol tartrate 25mg twice daily, Lasix 20mg daily   Lasix temporarily on hold  Continue beta-blocker

## 2024-07-16 NOTE — Clinical Note
Case was discussed with JAYE and the patient's admission status was agreed to be Admission Status: inpatient status to the service of Dr. Crocker

## 2024-07-16 NOTE — APP STUDENT NOTE
"Assessment and Plan:   Symptomatic Anemia     Patient initially presented to the ED regarding concerns of melanotic stool, fatigue, shortness of breath, and chest pain for the past 2 weeks  Prior hemoglobin 9.9 in March.   Hemoglobin initially 4.3 requiring 2 units of pRBCs in the ER.  Repeat hemoglobin 5.7. Plan to give 2 additional units of pRBCs.   Hemolytic labs pending - LDH and haptoglobin.   Heme states that alectinib has potential SE of hemolytic anemia - will complete hemolysis workup.  LDH currently normal  Hold alectinib while hospitalized. Recommended to continue after discharged       Melena    Due to dark stools x 2 weeks and initial hemoglobin of 4.3 suspect upper GI bleed - possibly PUD or AVM.  GI following and plan to do an EGD today for further evaluation.   CT abdomen and pelvis showing \"No evidence of acute abdominopelvic process. Colonic diverticulosis without diverticulitis.\"  Continue IV Protonix BID       Non-small cell lung cancer    Follows with  Oncology.   Has metastatic non-small cell lung cancer that is ALK positive.   Currently well controlled with alectinib 600 mg twice daily since August 2020  Heme states that alectinib has potential SE of hemolytic anemia - will complete hemolysis workup.  LDH currently normal  Hold alectinib while hospitalized. Recommended to continue after discharged     Atrial flutter     Patient following with  cardiology for CHF, mitral stenosis, hypertension and hyperlipidemia   EKG revealing NSR with prolonged QT interval.  Currently taking metoprolol 25 mg twice daily for rate control and amiodarone 200 mg once daily for rhythm control.   Typically on Eliquis for anticoagulation however held due potential UGI bleed.     Chronic diastolic congestive heart failure    Appears to be euvolemic  Last ECHO 7/23 revealing LVEF of 61%  ECHO and NM myocardial perfusion stress test ordered but not completed.   Continue lopressor 25 mg twice daily  Will hold lasix " 20 mg once daily due to kidney disease     Chronic kidney disease stage 4     Creatine was 1.99 and BUN 50 upon admission.   Currently down-trending as repeat Cr was 1.81 and BUN 48.   Held lasix due to elevated Cr levels.   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 Platelets currently 88 which appears to be her baseline    Essential hypertension    Home regimen includes lasix 20 mg once daily and lopressor 25 mg twice daily  Hold lasix due to CKD as creatinine was 1.99 and BUN 50 upon admission.   Currently down-trending as repeat Cr was 1.81 and BUN 48.     Severe asthma    Does not appear to be in an acute exacerbation  Continue budesonide twice daily and fluticasone-vilanterol once daily    Prolonged QT interval on ECG     EKG revealing NSR with prolonged QTC  interval of 519 ms.    Subjective:   Patient is resting comfortably in the bed upon examination. She states that over the course of the past 2 weeks she has been feeling very fatigued and nauseous. She has also noticed her stools have become black in color and hard. She denies bright red blood streaking her stools but admits to occasional red blood upon wiping. Also reports lower left quadrant abdominal pain and fullness within the past 2 weeks.  She relates that she has also become increasingly short of breath with ambulation and has associated chest pain, dizziness, and leg weakness. Denies syncopal episodes or falls.     She reports that she is feeling alittle better after her transfusion last night.     Denies current shortness of breath, chest pain, headache, dizziness, abdominal pain, or diarrhea.     Objective:     Vitals:   Temp (24hrs), Av.3 °F (36.8 °C), Min:97.9 °F (36.6 °C), Max:98.7 °F (37.1 °C)    Temp:  [97.9 °F (36.6 °C)-98.7 °F (37.1 °C)] 98.4 °F (36.9 °C)  HR:  [69-76] 69  Resp:  [16-20] 17  BP: (121-153)/(49-65) 134/53  SpO2:  [95 %-100 %] 96 %  Body mass index is  40.7 kg/m².     Input and Output Summary (last 24 hours):     Intake/Output Summary (Last 24 hours) at 7/16/2024 1331  Last data filed at 7/16/2024 1047  Gross per 24 hour   Intake 761.25 ml   Output --   Net 761.25 ml       Physical Exam:   Physical Exam  Vitals and nursing note reviewed.   Constitutional:       General: She is not in acute distress.     Appearance: She is obese. She is not ill-appearing.   HENT:      Head: Normocephalic and atraumatic.   Cardiovascular:      Rate and Rhythm: Normal rate and regular rhythm.      Pulses: Normal pulses.      Heart sounds: Murmur heard.   Pulmonary:      Effort: Pulmonary effort is normal.      Breath sounds: No decreased air movement. Examination of the right-upper field reveals wheezing. Examination of the left-upper field reveals wheezing. Examination of the right-middle field reveals wheezing. Examination of the left-middle field reveals wheezing. Wheezing present. No decreased breath sounds.      Comments: Expiratory wheezing present.  Abdominal:      General: Bowel sounds are normal.      Palpations: Abdomen is soft.      Tenderness: There is no abdominal tenderness.   Musculoskeletal:      Cervical back: Normal range of motion.   Skin:     General: Skin is warm.      Capillary Refill: Capillary refill takes less than 2 seconds.   Neurological:      Mental Status: She is alert. Mental status is at baseline.   Psychiatric:         Mood and Affect: Mood normal.         Behavior: Behavior normal. Behavior is cooperative.         Thought Content: Thought content normal.         Judgment: Judgment normal.          Additional Data:     Labs:  Results from last 7 days   Lab Units 07/16/24  1148 07/16/24  0339   WBC Thousand/uL 7.83 8.96   HEMOGLOBIN g/dL 5.4* 4.3*   HEMATOCRIT % 18.1* 15.0*   PLATELETS Thousands/uL 88* 95*   LYMPHO PCT %  --  21   MONO PCT %  --  6   EOS PCT %  --  0     Results from last 7 days   Lab Units 07/16/24  1148 07/16/24  0339   SODIUM mmol/L  141 141   POTASSIUM mmol/L 3.5 3.3*   CHLORIDE mmol/L 106 103   CO2 mmol/L 31 28   BUN mg/dL 48* 50*   CREATININE mg/dL 1.81* 1.99*   ANION GAP mmol/L 4 10   CALCIUM mg/dL 8.4 8.9   ALBUMIN g/dL  --  3.2*   TOTAL BILIRUBIN mg/dL  --  2.10*   ALK PHOS U/L  --  66   ALT U/L  --  20   AST U/L  --  30   GLUCOSE RANDOM mg/dL 120 122                       Lines/Drains:  Invasive Devices       Peripheral Intravenous Line  Duration             Peripheral IV 07/16/24 Left Antecubital <1 day                      Telemetry:  Telemetry Orders (From admission, onward)               24 Hour Telemetry Monitoring  Continuous x 24 Hours (Telem)        Question:  Reason for 24 Hour Telemetry  Answer:  Arrhythmias requiring acute medical intervention / PPM or ICD malfunction                     Telemetry Reviewed: Normal Sinus Rhythm and prolonged QT  Indication for Continued Telemetry Use:              Imaging: Reviewed radiology reports from this admission including: chest xray and abdominal/pelvic CT    Recent Cultures (last 7 days):         Last 24 Hours Medication List:   Current Facility-Administered Medications   Medication Dose Route Frequency Provider Last Rate    acetaminophen  650 mg Oral Q4H PRN Larissa Aragon MD      Alectinib HCl  600 mg Oral BID Lynda Rush PA-C      amiodarone  200 mg Oral Daily With Breakfast Lynda Rush PA-C      budesonide  0.25 mg Nebulization BID Lynda Rush PA-C      fluticasone-vilanterol  1 puff Inhalation Daily Lynda Rush PA-C      metoprolol tartrate  25 mg Oral Q12H JAIRON Lynda Rush PA-C      ondansetron  4 mg Intravenous Q6H PRN Lynda Rush PA-C      pantoprazole  40 mg Intravenous Q12H JAIRON Lynda Rush PA-C      pravastatin  80 mg Oral Daily With Dinner Lynda Rush PA-C      sodium chloride (PF)  3 mL Intravenous Q1H PRN Lynda Rush PA-C          Today, Patient Was Seen By: Sydney Souliere    **Please Note: This note may have  been constructed using a voice recognition system.**

## 2024-07-16 NOTE — ASSESSMENT & PLAN NOTE
Controlled  Home regimen: metoprolol tartrate 25mg BID, Lasix 20mg daily     Plan:  Continue home metoprolol with hold parameters

## 2024-07-16 NOTE — PLAN OF CARE
Problem: Prexisting or High Potential for Compromised Skin Integrity  Goal: Skin integrity is maintained or improved  Description: INTERVENTIONS:  - Identify patients at risk for skin breakdown  - Assess and monitor skin integrity  - Assess and monitor nutrition and hydration status  - Monitor labs   - Assess for incontinence   - Turn and reposition patient  - Assist with mobility/ambulation  - Relieve pressure over bony prominences  - Avoid friction and shearing  - Provide appropriate hygiene as needed including keeping skin clean and dry  - Evaluate need for skin moisturizer/barrier cream  - Collaborate with interdisciplinary team   - Patient/family teaching  - Consider wound care consult   7/16/2024 0930 by Daniella Clark RN  Outcome: Progressing  7/16/2024 0929 by Daniella Clark RN  Outcome: Progressing     Problem: PAIN - ADULT  Goal: Verbalizes/displays adequate comfort level or baseline comfort level  Description: Interventions:  - Encourage patient to monitor pain and request assistance  - Assess pain using appropriate pain scale  - Administer analgesics based on type and severity of pain and evaluate response  - Implement non-pharmacological measures as appropriate and evaluate response  - Consider cultural and social influences on pain and pain management  - Notify physician/advanced practitioner if interventions unsuccessful or patient reports new pain  7/16/2024 0930 by Daniella Clark RN  Outcome: Progressing  7/16/2024 0929 by Daniella Clark RN  Outcome: Progressing     Problem: DISCHARGE PLANNING  Goal: Discharge to home or other facility with appropriate resources  Description: INTERVENTIONS:  - Identify barriers to discharge w/patient and caregiver  - Arrange for needed discharge resources and transportation as appropriate  - Identify discharge learning needs (meds, wound care, etc.)  - Arrange for interpretive services to assist at discharge as needed  - Refer to Case Management Department for  coordinating discharge planning if the patient needs post-hospital services based on physician/advanced practitioner order or complex needs related to functional status, cognitive ability, or social support system  7/16/2024 0930 by Daniella Clark RN  Outcome: Progressing  7/16/2024 0929 by Daniella Clark RN  Outcome: Progressing     Problem: Knowledge Deficit  Goal: Patient/family/caregiver demonstrates understanding of disease process, treatment plan, medications, and discharge instructions  Description: Complete learning assessment and assess knowledge base.  Interventions:  - Provide teaching at level of understanding  - Provide teaching via preferred learning methods  7/16/2024 0930 by Daniella Clark RN  Outcome: Progressing  7/16/2024 0929 by Daniella Clark RN  Outcome: Progressing     Problem: METABOLIC, FLUID AND ELECTROLYTES - ADULT  Goal: Electrolytes maintained within normal limits  Description: INTERVENTIONS:  - Monitor labs and assess patient for signs and symptoms of electrolyte imbalances  - Administer electrolyte replacement as ordered  - Monitor response to electrolyte replacements, including repeat lab results as appropriate  - Instruct patient on fluid and nutrition as appropriate  7/16/2024 0930 by Daniella Clark RN  Outcome: Progressing  7/16/2024 0929 by Daniella Clark RN  Outcome: Progressing  Goal: Fluid balance maintained  Description: INTERVENTIONS:  - Monitor labs   - Monitor I/O and WT  - Instruct patient on fluid and nutrition as appropriate  - Assess for signs & symptoms of volume excess or deficit  7/16/2024 0930 by Daniella Clark RN  Outcome: Progressing  7/16/2024 0929 by Daniella Clakr RN  Outcome: Progressing     Problem: SKIN/TISSUE INTEGRITY - ADULT  Goal: Skin Integrity remains intact(Skin Breakdown Prevention)  Description: Assess:  -Perform Rubén assessment every q 12 hours  -Clean and moisturize skin every day  -Inspect skin when repositioning, toileting, and assisting with  ADLS  -Assess extremities for adequate circulation and sensation     Bed Management:  -Have minimal linens on bed & keep smooth, unwrinkled  -Change linens as needed when moist or perspiring    Toileting:  -Offer bedside commode    Activity:  -Mobilize patient 3 times a day  -Encourage activity and walks on unit  -Encourage or provide ROM exercises   -Turn and reposition patient every 2 Hours  -Use appropriate equipment to lift or move patient in bed    Skin Care:  -Avoid use of baby powder, tape, friction and shearing, hot water or constrictive clothing        7/16/2024 0930 by Daniella Clark RN  Outcome: Progressing  7/16/2024 0929 by Daniella Clark RN  Outcome: Progressing  Goal: Incision(s), wounds(s) or drain site(s) healing without S/S of infection  Description: INTERVENTIONS  - Assess and document dressing, incision, wound bed, drain sites and surrounding tissue  - Provide patient and family education  7/16/2024 0930 by Daniella Clark RN  Outcome: Progressing  7/16/2024 0929 by Daniella Clark RN  Outcome: Progressing  Goal: Pressure injury heals and does not worsen  Description: Interventions:  - Implement low air loss mattress or specialty surface (Criteria met)  - Apply silicone foam dressing  - Apply fecal or urinary incontinence containment device     - Turn and reposition patient & offload bony prominences every 2 hours   - Utilize friction reducing device or surface for transfers       - Consider nutrition services referral as needed  7/16/2024 0930 by Daniella Clark RN  Outcome: Progressing  7/16/2024 0929 by Daniella Clark RN  Outcome: Progressing     Problem: HEMATOLOGIC - ADULT  Goal: Maintains hematologic stability  Description: INTERVENTIONS  - Assess for signs and symptoms of bleeding or hemorrhage  - Monitor labs  - Administer supportive blood products/factors as ordered and appropriate  7/16/2024 0930 by Daniella Clark RN  Outcome: Progressing  7/16/2024 0929 by Daniella Clark RN  Outcome:  Progressing

## 2024-07-16 NOTE — ED NOTES
Four nurses at this time have attempted to place an IV in pt both with and without US. Provider, DO Jesus, aware.      Chris Slade RN  07/16/24 2424

## 2024-07-16 NOTE — CONSULTS
Consultation -  [facility]  Patient: Jayla Moody 79 y.o. female   MRN: [id number]  PCP: Kaiser Kohler DO  Unit/Bed#: ED-06 Encounter: [id number]  Length of Stay: 0 days    Inpatient consult to gastroenterology  Consult performed by: Rohan Agarwal MD  Consult ordered by: Lynda Rush PA-C        Assessment/Plan:  Jayla Moody is a 79 y.o. female with a PMH of MONO, HTN, GERD, COPD, CAD, Afib/flutter on eliquis, CHF, NSCLC on alectinib who presents with symptomatic anemia and melena for 2 weeks. Hb 4.3 from a baseline of 9-10.    # melena  # acute on chronic macrocytic anemia   - UGIB, Ddx includes includes esophagitis, MWT, gastritis, PUD, GAVE, Dieulafoy lesion, AVMs, small bowel Crohn's, malignancy, less likely variceal   - noted to have elevated BUN/Cr, moreso than baseline   - Hb > 7 (trend q8h), plt > 50, INR < 1.5, T&S q3d, 2 large bore IV access   - hold AC, antiplatelets and BB   - IV protonix 40 bid   - NPO for EGD today   - currently HDS; if becomes HDUS w/ large-volume bleeding, check CTA ± embolization w/ IR    - check anemia panel    History of Present Illness:  Jayla Moody is a 79 y.o. female with a PMH of MONO, HTN, GERD, COPD, CAD, Afib/flutter on eliquis, CHF, NSCLC on alectinib who presents with symptomatic anemia and melena for 2 weeks. Hb 4.3 from a baseline of 9-10.    Last 2 weeks she has been having melena. She thought it was from her diet of eating veggies but even after removing that she was still having melena. Denies any heartburn, reflux or abd pain. Progressively she developed exertional symptoms, such as BERTRAND, fatigue, sleeping more, and her legs feeling heavy. She has felt similar to this before, albeit not as severe.     She does take her eliquis, however has not had melena previously. She did note streaks of blood when wiping.    No NSAIDs, alcohol, PO iron or pepto. Takes tylenol prn for aches/pains but doesn't do anything.     Doesn't take her  omeprazole often.    EGD/colon 03/18/19:    - normal eso, mild gastritis (inflammation suggestive of tissue near erosion vs ulcer), normal duo   - pancolonic tics and tortuous colon, no polyp    CTAP today: decompressed stomach, multiple calcified gallstones, colonic stool w/ tics    GI HPI NEGATIVES:  Denies any recent N/V/D/C, hematemesis, heartburn, dysphagia, odynophagia, hematochezia, stool incontinence, jaundice, pruritis, anorexia, weight loss.     REVIEW OF SYSTEMS:  Otherwise, pt denies any fevers/chills, urinary symptoms, weakness/numbness/tingling.    Past Medical History:   Diagnosis Date    Allergic rhinitis     Anemia     Asthma     Atrial fibrillation (HCC)     Chronic bronchitis (HCC)     COPD (chronic obstructive pulmonary disease) (HCC)     Coronary artery disease     CPAP (continuous positive airway pressure) dependence     Degenerative joint disease     Fluid retention 2024    GERD (gastroesophageal reflux disease)     Glaucoma     Hypertension     Lumbar disc disease     Lung cancer (HCC)     Pelvis cancer (HCC)     Periodic heart flutter     Pneumonia     Premature atrial contractions 10/31/2017    Sleep apnea     suspected     Sleep apnea, obstructive     Ventricular arrhythmia     Vitamin D deficiency      Past Surgical History:   Procedure Laterality Date    APPENDECTOMY      BREAST BIOPSY Right     years ago    COLON SURGERY      COLONOSCOPY N/A 03/18/2019    Procedure: COLONOSCOPY;  Surgeon: Alessia Wilson DO;  Location: [facility];  Service: Gastroenterology    ESOPHAGOGASTRODUODENOSCOPY N/A 03/18/2019    Procedure: ESOPHAGOGASTRODUODENOSCOPY (EGD);  Surgeon: Alessia Wilson DO;  Location: [facility];  Service: Gastroenterology    KNEE SURGERY      LUNG BIOPSY      ROTATOR CUFF REPAIR      SHOULDER SURGERY       Prior to Admission medications    Medication Sig Start Date End Date Taking? Authorizing Provider   acetaminophen (TYLENOL) 650 mg CR tablet Take 1 tablet (650 mg total) by  mouth every 8 (eight) hours as needed for mild pain 2/29/24  Yes Kaiser Kohler DO   albuterol (2.5 mg/3 mL) 0.083 % nebulizer solution Take 3 mL (2.5 mg total) by nebulization 2 (two) times a day 6/3/24 5/29/25 Yes Brittany Rodney DO   albuterol (PROVENTIL HFA,VENTOLIN HFA) 90 mcg/act inhaler Inhale 2 puffs 4 (four) times a day 4/22/24  Yes Kaiser Kohler DO   Alectinib HCl 150 MG CAPS Take 4 capsules (600 mg total) by mouth 2 (two) times a day 4/22/24  Yes Ryan Arriaga MD   amiodarone 200 mg tablet Take 1 tablet (200 mg total) by mouth daily with breakfast 5/23/24 5/18/25 Yes Moise Pimentel MD   apixaban (ELIQUIS) 5 mg Take 1 tablet (5 mg total) by mouth 2 (two) times a day 4/22/24 4/17/25 Yes Kaiser Kohler DO   benralizumab (FASENRA) subcutaneous injection Inject 1 mL (30 mg total) under the skin every 56 days 6/26/23 7/16/24 Yes Brittany Rodney DO   Budeson-Glycopyrrol-Formoterol (Breztri Aerosphere) 160-9-4.8 MCG/ACT AERO Inhale 2 puffs 2 (two) times a day Rinse mouth after use. 6/3/24  Yes Brittany Rodney DO   calcitriol (ROCALTROL) 0.25 mcg capsule Take 1 capsule (0.25 mcg total) by mouth 3 (three) times a week On Monday , Wednesday, friday 5/22/24  Yes Dot Germain MD   D-1000 Extra Strength 25 MCG (1000 UT) tablet Take 1 tablet (1,000 Units total) by mouth daily 5/21/24  Yes Dot Germain MD   fexofenadine (ALLEGRA) 180 MG tablet Take 1 tablet (180 mg total) by mouth daily 4/22/24  Yes Kaiser Kohler DO   fluticasone (FLONASE) 50 mcg/act nasal spray SPRAY 2 SPRAYS INTO EACH NOSTRIL EVERY DAY 8/24/23  Yes Kari Kuhn MD   furosemide (LASIX) 20 mg tablet TAKE 1 TABLET BY MOUTH EVERY DAY 5/17/24  Yes Dot Germain MD   glycopyrrolate-formoterol (BEVESPI AEROSPHERE) 9-4.8 MCG/ACT inhaler Inhale 2 puffs 2 (two) times a day 4/22/24  Yes Kaiser Kohler DO   ipratropium (ATROVENT) 0.02 % nebulizer solution Take 2.5 mL (0.5 mg  total) by nebulization 3 (three) times a day 10/4/23 7/16/24 Yes Yareli Chavez MD   Klor-Con M20 20 MEQ tablet TAKE 1 TABLET BY MOUTH EVERY DAY 24  Yes Kaiser Kohler DO   metoprolol tartrate (LOPRESSOR) 25 mg tablet TAKE 1 TABLET (25 MG TOTAL) BY MOUTH EVERY 12 (TWELVE) HOURS 24  Yes Kaiser Kohler DO   omeprazole (PriLOSEC) 20 mg delayed release capsule TAKE 1 CAPSULE BY MOUTH EVERY DAY 3/15/24  Yes Kaiser Kohler DO   oxyCODONE-acetaminophen (PERCOCET) 5-325 mg per tablet Take 1 tablet by mouth every 4 (four) hours as needed for moderate pain For ongoing pain Max Daily Amount: 6 tablets 23  Yes Noemi Avelar MD   rosuvastatin (CRESTOR) 10 MG tablet Take 1 tablet (10 mg total) by mouth daily 24 Yes Moise Pimentel MD   senna-docusate sodium (SENOKOT S) 8.6-50 mg per tablet Take 1 tablet by mouth daily 24  Yes Kaiser Kohler DO   budesonide (PULMICORT) 0.25 mg/2 mL nebulizer solution Take 2 mL (0.25 mg total) by nebulization 2 (two) times a day Rinse mouth after use. 10/4/23 7/16/24  Yareli Chavez MD   diclofenac (VOLTAREN) 75 mg EC tablet Take 75 mg by mouth  Patient not taking: Reported on 2023 8/10/23 9/1/23  Historical Provider, MD     No Known Allergies  Social History     Tobacco Use    Smoking status: Former     Current packs/day: 0.00     Average packs/day: 0.3 packs/day for 25.0 years (6.3 ttl pk-yrs)     Types: Cigarettes     Start date:      Quit date:      Years since quittin.5    Smokeless tobacco: Former   Vaping Use    Vaping status: Never Used   Substance Use Topics    Alcohol use: Yes     Comment: occasionally    Drug use: Never     Family History   Problem Relation Age of Onset    Stroke Mother     Diabetes Mother     Hyperlipidemia Mother     Hypertension Mother     Allergies Mother         Environmental    Asthma Mother     Diabetes Father     Hyperlipidemia Father     Hypertension Father     Lung  cancer Father 70    Allergies Father         Environmental    Asthma Father     Lymphoma Sister 69    Heart disease Sister         Pacemaker    Asthma Brother     Aneurysm Brother         Brain - had surgery    Other Brother         Lymes disease    Coronary artery disease Daughter         2 bypass done    No Known Problems Daughter     No Known Problems Daughter     No Known Problems Son     Kidney disease Son     Liver disease Son     Obesity Son        Objective:  /65 (BP Location: Right arm)   Pulse 72   Temp 98.7 °F (37.1 °C) (Oral)   Resp 20   Wt 97.7 kg (215 lb 6.2 oz)   SpO2 96%   BMI 40.70 kg/m²   No intake or output data in the 24 hours ending 07/16/24 0747  Body mass index is 40.7 kg/m².  Physical Exam  Constitutional:       General: She is not in acute distress.     Appearance: Normal appearance.   HENT:      Head: Normocephalic and atraumatic.      Nose: Nose normal.      Mouth/Throat:      Mouth: Mucous membranes are moist.   Eyes:      General: No scleral icterus.     Extraocular Movements: Extraocular movements intact.      Conjunctiva/sclera: Conjunctivae normal.      Pupils: Pupils are equal, round, and reactive to light.   Cardiovascular:      Rate and Rhythm: Normal rate and regular rhythm.   Pulmonary:      Effort: Pulmonary effort is normal.   Abdominal:      General: Abdomen is flat. There is no distension.      Palpations: There is no mass.      Tenderness: There is no abdominal tenderness.   Musculoskeletal:         General: No swelling. Normal range of motion.   Skin:     General: Skin is warm and dry.      Capillary Refill: Capillary refill takes less than 2 seconds.      Coloration: Skin is pale.   Neurological:      General: No focal deficit present.      Mental Status: She is alert.   Psychiatric:         Mood and Affect: Mood normal.         Labs:  I have reviewed all available labs and imaging results in Epic.  Results from last 7 days   Lab Units 07/16/24  0339  07/13/24  0842   SODIUM mmol/L 141 142   POTASSIUM mmol/L 3.3* 4.0   CHLORIDE mmol/L 103 103   CO2 mmol/L 28 29   BUN mg/dL 50* 43*   CREATININE mg/dL 1.99* 1.66*   GLUCOSE RANDOM mg/dL 122  --    CALCIUM mg/dL 8.9 8.6   ALBUMIN g/dL 3.2* 3.3*   ALK PHOS U/L 66 74   ALT U/L 20 25   AST U/L 30 38   EGFR ml/min/1.73sq m 23 29     Results from last 7 days   Lab Units 07/16/24  0339   WBC Thousand/uL 8.96   HEMOGLOBIN g/dL 4.3*   HEMATOCRIT % 15.0*   PLATELETS Thousands/uL 95*   LYMPHO PCT % 21   MONO PCT % 6   EOS PCT % 0   BASOS PCT % 0   EOS ABS Thousand/uL 0.00   BASOS ABS Thousand/uL 0.00         Results from last 7 days   Lab Units 07/16/24  0339   BNP pg/mL 180*       Additional Labs:                 Imaging:  X-ray chest 1 view portable   ED Interpretation by Abimael Lee DO (07/16 0547)   No acute cardiopulmonary disease      CT abdomen pelvis wo contrast   Final Result by Monster Christie MD (07/16 0533)      No evidence of acute abdominopelvic process.      Colonic diverticulosis without diverticulitis.      Chronic findings and negatives as above.      Workstation performed: [id number]             Medications:   Current Facility-Administered Medications   Medication Dose Route Frequency Provider Last Rate    pantoprazole  40 mg Intravenous Q12H [facility] Lynda Rush PA-C      sodium chloride (PF)  3 mL Intravenous Q1H PRN Abimael Lee DO         Problem List:  Patient Active Problem List   Diagnosis    Essential hypertension    Hiatal hernia    Gastroesophageal reflux disease without esophagitis    Chronic gastritis without bleeding    Chronic rhinitis    MONO (obstructive sleep apnea)    Non-small cell lung cancer (HCC)    Malignant neoplasm metastatic to bone (HCC)    Severe asthma    Depression, recurrent (HCC)    Thrombocytopenia, unspecified (HCC)    Coronary artery disease involving native coronary artery of native heart without angina pectoris    Diastolic CHF (HCC)     Atrial flutter (HCC)    Cancer-related pain    Class 2 severe obesity due to excess calories with serious comorbidity and body mass index (BMI) of 39.0 to 39.9 in adult (HCC)    CKD (chronic kidney disease) stage 4, GFR 15-29 ml/min (HCC)    Melena    Symptomatic anemia       Case discussed with attending physician Dr. Jaiyeola. All recs are preliminary pending final attestation.    Rohan Agarwal, PGY-5

## 2024-07-16 NOTE — ED NOTES
Provider, DO Jesus, attempting to place another US IV at this time. Provider aware of RN's inability to draw troponin at this time.      Chris Slade RN  07/16/24 0608       Chris Slade RN  07/16/24 0609

## 2024-07-16 NOTE — ASSESSMENT & PLAN NOTE
Wt Readings from Last 3 Encounters:   07/16/24 97.7 kg (215 lb 6.2 oz)   06/21/24 97.1 kg (214 lb)   06/04/24 94.8 kg (209 lb)     Compensated, follows [facility]  TTE 07/2023 reviewed: LVEF 61%, grade I diastolic dysfunction, LA dilation, MV calcification   Home regimen: metoprolol tartrate 25mg twice daily, Lasix 20mg daily     Plan:  Hold home Lasix, continue home metoprolol  Daily weights, I&O, cardiac diet, monitor and replenish electrolytes

## 2024-07-16 NOTE — ASSESSMENT & PLAN NOTE
Patient follows with [facility]'s cardiology for atrial flutter, CHF, mitral stenosis, hypertension hyperlipidemia  Was recently seen in the office 5/24: Nuclear stress and echocardiogram ordered--currently pending  Continue amiodarone 200 mg daily, metoprolol 25 mg twice daily  Eliquis placed on hold due to hemoglobin of 4

## 2024-07-16 NOTE — ANESTHESIA POSTPROCEDURE EVALUATION
Post-Op Assessment Note    CV Status:  Stable    Pain management: adequate       Mental Status:  Awake   Hydration Status:  Stable   PONV Controlled:  Controlled   Airway Patency:  Patent     Post Op Vitals Reviewed: Yes    No anethesia notable event occurred.    Staff: Anesthesiologist           /61   Pulse 69   Temp 98.1 °F (36.7 °C)   Resp 16   Wt 97.7 kg (215 lb 6.2 oz)   SpO2 95%   BMI 40.70 kg/m²       BP      Temp      Pulse     Resp      SpO2

## 2024-07-16 NOTE — ANESTHESIA PREPROCEDURE EVALUATION
Procedure:  EGD    Relevant Problems   CARDIO  Left ventricular cavity size is normal. Wall thickness is moderately increased. The left ventricular ejection fraction is 61%. Systolic function is normal.  Wall motion is normal. Diastolic function is mildly abnormal, consistent with grade I (abnormal) relaxation.  Probable mild mid ventricular obstruction with systolic gradient, up to 27 mmHg with Valsalva.  Right Ventricle Right ventricular cavity size is normal. Systolic function is normal. Wall thickness is normal.  Left Atrium The atrium is moderately dilated.  Right Atrium The atrium is normal in size.  Aortic Valve The aortic valve is trileaflet. The leaflets are mildly thickened. The leaflets are not calcified. The leaflets exhibit normal mobility. There is trace regurgitation. The aortic valve has no significant stenosis.  Mitral Valve The leaflets exhibit normal mobility. There is moderate annular calcification. There is no evidence of regurgitation. There is no evidence of stenosis. The valve has normal function.  Tricuspid Valve Tricuspid valve structure is normal. There is no evidence of regurgitation. There is no evidence of stenosis.  Pulmonic Valve Pulmonic valve structure is normal. There is trace regurgitation. There is no evidence of stenosis.  Ascending Aorta The aortic root is normal in size.  IVC/SVC The inferior vena cava is normal in size.  Pericardium There is no pericardial effusion. The pericardium is normal in appearance.       (+) Atrial flutter (HCC)   (+) Chronic diastolic congestive heart failure (HCC)   (+) Coronary artery disease involving native coronary artery of native heart without angina pectoris   (+) Essential hypertension      GI/HEPATIC   (+) Gastroesophageal reflux disease without esophagitis   (+) Hiatal hernia      /RENAL   (+) CKD (chronic kidney disease) stage 4, GFR 15-29 ml/min ([facility])      HEMATOLOGY   (+) Symptomatic anemia   (+) Thrombocytopenia (HCC)   (+)  Thrombocytopenia, unspecified (HCC)      MUSCULOSKELETAL   (+) Hiatal hernia      NEURO/PSYCH   (+) Depression, recurrent (HCC)      PULMONARY   (+) MONO (obstructive sleep apnea)   (+) Severe asthma             Anesthesia Plan  ASA Score- 3     Anesthesia Type- general with ASA Monitors.         Additional Monitors:     Airway Plan: ETT.    Comment: PRBC ordered .       Plan Factors-    Chart reviewed.  Imaging results reviewed. Existing labs reviewed. Patient summary reviewed.    Patient is not a current smoker.              Induction- intravenous and rapid sequence induction.    Postoperative Plan-     Perioperative Resuscitation Plan - Level 1 - Full Code.       Informed Consent- Anesthetic plan and risks discussed with patient.

## 2024-07-16 NOTE — H&P
Formerly [facility]  H&P  Name: Jayla Moody 79 y.o. female I MRN: [id number]  Unit/Bed#: ED-06 I Date of Admission: 7/16/2024   Date of Service: 7/16/2024 I Hospital Day: 0      Assessment & Plan   * Symptomatic anemia  Assessment & Plan  In setting of melena & Alectinib   Hgb 4.3    Plan:  Transfuse 2u PRBC  Trend CBC, consult GI    Melena  Assessment & Plan  Last 2 weeks of dark stools, worsening shortness of breath. Hgb 4.3  CT abd/pelvis reviewed: no acute abdominopelvic process, colonic diverticulosis without diverticulitis    Plan:  Transfuse 2u PRBC  Trend CBC, maintain active T&S  Transfuse Hgb <7 or hemodynamic instability  Hold antiplatelet, anticoagulants   PPI  GI consulted    CKD (chronic kidney disease) stage 4, GFR 15-29 ml/min (Formerly [facility])  Assessment & Plan  Lab Results   Component Value Date    EGFR 23 07/16/2024    EGFR 29 07/13/2024    EGFR 28 06/17/2024    CREATININE 1.99 (H) 07/16/2024    CREATININE 1.66 (H) 07/13/2024    CREATININE 1.73 (H) 06/17/2024     CKD in setting of age-related, hypertensive nephrosclerosis   Baseline Cr: 1.6-1.9  Admit Cr: 1.99     Plan:  Monitor renal function, renal dose medications, maintain normotension/euvolemia, avoid nephrotoxic agents, I&Os    Atrial flutter (HCC)  Assessment & Plan  Rates controlled  EKG reviewed: NSR HR 74    Plan:  Rate/rhythm control: metoprolol tartrate, amiodarone 200mg daily monitor on telemetry given dyspnea w/exertion  Anticoagulation: Eliquis, held    Diastolic CHF (HCC)  Assessment & Plan  Wt Readings from Last 3 Encounters:   07/16/24 97.7 kg (215 lb 6.2 oz)   06/21/24 97.1 kg (214 lb)   06/04/24 94.8 kg (209 lb)     Compensated, follows [facility]  TTE 07/2023 reviewed: LVEF 61%, grade I diastolic dysfunction, LA dilation, MV calcification   Home regimen: metoprolol tartrate 25mg twice daily, Lasix 20mg daily     Plan:  Hold home Lasix, continue home metoprolol  Daily weights, I&O, cardiac diet, monitor and  replenish electrolytes    Non-small cell lung cancer (HCC)  Assessment & Plan  Follows [facility]    Plan:  On Alectinib pt family bringing it in    Essential hypertension  Assessment & Plan  Controlled  Home regimen: metoprolol tartrate 25mg BID, Lasix 20mg daily     Plan:  Continue home metoprolol with hold parameters            VTE Pharmacologic Prophylaxis:   High Risk (Score >/= 5) - Pharmacological DVT Prophylaxis Contraindicated. Sequential Compression Devices Ordered.  Code Status fc  Discussion with family:     Anticipated Length of Stay: Patient will be admitted on an inpatient basis with an anticipated length of stay of greater than 2 midnights secondary to gib.    Total Time Spent on Date of Encounter in care of patient:  mins. This time was spent on one or more of the following: performing physical exam; counseling and coordination of care; obtaining or reviewing history; documenting in the medical record; reviewing/ordering tests, medications or procedures; communicating with other healthcare professionals and discussing with patient's family/caregivers.    Chief Complaint: melena x 2 week    History of Present Illness:  Jayla Moody is a 79 y.o. female with a PMH of a flutter on eliquis, non small cell lung ca htn chronic diastolic chf who presents with melena x 2 weeks sob fatigue.  Patient notes on last 2 weeks she has been having tarry black stools with intermittent lower abdominal discomfort no nausea vomiting no fevers or chills.  She she notes she has been having progressive dyspnea with exertion as well as fatigue occasional lightheadedness and presyncope without falls or injuries.  She has noted some palpitations well.  She notes that this is starting to interfere with her activities of daily living and got tired of dealing with this so came to the ED for evaluation given her presyncopal symptoms and was found to have a hemoglobin of 4.3.  She was consented for 2 units of PRBC and as she  is on Eliquis for a flutter with melena and does not take any oral iron or Pepto-Bismol concern for GI bleeding was raised and admission was requested  She has never had this before.  She had colonoscopy 10 years ago w/polypectomy but no features per her description of precancerous lesions..    Review of Systems:  Review of Systems   Constitutional:  Negative for chills and fever.   Respiratory:  Positive for shortness of breath.    Cardiovascular:  Positive for palpitations. Negative for chest pain.   Gastrointestinal:  Positive for abdominal pain. Negative for nausea and vomiting.   All other systems reviewed and are negative.      Past Medical and Surgical History:   Past Medical History:   Diagnosis Date    Allergic rhinitis     Anemia     Asthma     Atrial fibrillation (HCC)     Chronic bronchitis (HCC)     COPD (chronic obstructive pulmonary disease) (HCC)     Coronary artery disease     CPAP (continuous positive airway pressure) dependence     Degenerative joint disease     Fluid retention 2024    GERD (gastroesophageal reflux disease)     Glaucoma     Hypertension     Lumbar disc disease     Lung cancer (HCC)     Pelvis cancer (HCC)     Periodic heart flutter     Pneumonia     Premature atrial contractions 10/31/2017    Sleep apnea     suspected     Sleep apnea, obstructive     Ventricular arrhythmia     Vitamin D deficiency        Past Surgical History:   Procedure Laterality Date    APPENDECTOMY      BREAST BIOPSY Right     years ago    COLON SURGERY      COLONOSCOPY N/A 03/18/2019    Procedure: COLONOSCOPY;  Surgeon: Alessia Wilson DO;  Location: [facility];  Service: Gastroenterology    ESOPHAGOGASTRODUODENOSCOPY N/A 03/18/2019    Procedure: ESOPHAGOGASTRODUODENOSCOPY (EGD);  Surgeon: Alessia Wilson DO;  Location: [facility];  Service: Gastroenterology    KNEE SURGERY      LUNG BIOPSY      ROTATOR CUFF REPAIR      SHOULDER SURGERY         Meds/Allergies:  Prior to Admission medications     Medication Sig Start Date End Date Taking? Authorizing Provider   acetaminophen (TYLENOL) 650 mg CR tablet Take 1 tablet (650 mg total) by mouth every 8 (eight) hours as needed for mild pain 2/29/24  Yes Kaiser Kohler DO   albuterol (2.5 mg/3 mL) 0.083 % nebulizer solution Take 3 mL (2.5 mg total) by nebulization 2 (two) times a day 6/3/24 5/29/25 Yes Brittany Rodney DO   albuterol (PROVENTIL HFA,VENTOLIN HFA) 90 mcg/act inhaler Inhale 2 puffs 4 (four) times a day 4/22/24  Yes Kaiser Kohler DO   Alectinib HCl 150 MG CAPS Take 4 capsules (600 mg total) by mouth 2 (two) times a day 4/22/24  Yes Ryan Arriaga MD   amiodarone 200 mg tablet Take 1 tablet (200 mg total) by mouth daily with breakfast 5/23/24 5/18/25 Yes Moise Pimentel MD   apixaban (ELIQUIS) 5 mg Take 1 tablet (5 mg total) by mouth 2 (two) times a day 4/22/24 4/17/25 Yes Kaiser Kohler DO   benralizumab (FASENRA) subcutaneous injection Inject 1 mL (30 mg total) under the skin every 56 days 6/26/23 7/16/24 Yes Brittany Rodney DO   Budeson-Glycopyrrol-Formoterol (Breztri Aerosphere) 160-9-4.8 MCG/ACT AERO Inhale 2 puffs 2 (two) times a day Rinse mouth after use. 6/3/24  Yes Brittany Rodney DO   calcitriol (ROCALTROL) 0.25 mcg capsule Take 1 capsule (0.25 mcg total) by mouth 3 (three) times a week On Monday , Wednesday, friday 5/22/24  Yes Dot Germain MD   D-1000 Extra Strength 25 MCG (1000 UT) tablet Take 1 tablet (1,000 Units total) by mouth daily 5/21/24  Yes Dot Germain MD   fexofenadine (ALLEGRA) 180 MG tablet Take 1 tablet (180 mg total) by mouth daily 4/22/24  Yes Kaiser Kohler DO   fluticasone (FLONASE) 50 mcg/act nasal spray SPRAY 2 SPRAYS INTO EACH NOSTRIL EVERY DAY 8/24/23  Yes Krai Kuhn MD   furosemide (LASIX) 20 mg tablet TAKE 1 TABLET BY MOUTH EVERY DAY 5/17/24  Yes Dot Germain MD   glycopyrrolate-formoterol (BEVESPI AEROSPHERE) 9-4.8 MCG/ACT inhaler Inhale 2  puffs 2 (two) times a day 4/22/24  Yes Kaiser Kohler DO   ipratropium (ATROVENT) 0.02 % nebulizer solution Take 2.5 mL (0.5 mg total) by nebulization 3 (three) times a day 10/4/23 7/16/24 Yes Yareli Chavez MD   Klor-Con M20 20 MEQ tablet TAKE 1 TABLET BY MOUTH EVERY DAY 2/26/24  Yes Kaiser Kohler DO   metoprolol tartrate (LOPRESSOR) 25 mg tablet TAKE 1 TABLET (25 MG TOTAL) BY MOUTH EVERY 12 (TWELVE) HOURS 2/26/24  Yes Kaiser Kohler DO   omeprazole (PriLOSEC) 20 mg delayed release capsule TAKE 1 CAPSULE BY MOUTH EVERY DAY 3/15/24  Yes Kaiser Kohler DO   oxyCODONE-acetaminophen (PERCOCET) 5-325 mg per tablet Take 1 tablet by mouth every 4 (four) hours as needed for moderate pain For ongoing pain Max Daily Amount: 6 tablets 9/6/23  Yes Noemi Avelar MD   rosuvastatin (CRESTOR) 10 MG tablet Take 1 tablet (10 mg total) by mouth daily 5/23/24 5/18/25 Yes Moise Pimentel MD   senna-docusate sodium (SENOKOT S) 8.6-50 mg per tablet Take 1 tablet by mouth daily 6/25/24  Yes Kaiser Kohler DO   budesonide (PULMICORT) 0.25 mg/2 mL nebulizer solution Take 2 mL (0.25 mg total) by nebulization 2 (two) times a day Rinse mouth after use. 10/4/23 7/16/24  Yareli Chavez MD   diclofenac (VOLTAREN) 75 mg EC tablet Take 75 mg by mouth  Patient not taking: Reported on 9/1/2023 8/10/23 9/1/23  Historical Provider, MD     I have reviewed home medications with patient personally.    Allergies: No Known Allergies    Social History:  Marital Status: Single   Occupation:   Patient Pre-hospital Living Situation:   Patient Pre-hospital Level of Mobility:   Patient Pre-hospital Diet Restrictions:   Substance Use History:   Social History     Substance and Sexual Activity   Alcohol Use Yes    Comment: occasionally     Social History     Tobacco Use   Smoking Status Former    Current packs/day: 0.00    Average packs/day: 0.3 packs/day for 25.0 years (6.3 ttl pk-yrs)    Types: Cigarettes     Start date:     Quit date:     Years since quittin.5   Smokeless Tobacco Former     Social History     Substance and Sexual Activity   Drug Use Never       Family History:  Family History   Problem Relation Age of Onset    Stroke Mother     Diabetes Mother     Hyperlipidemia Mother     Hypertension Mother     Allergies Mother         Environmental    Asthma Mother     Diabetes Father     Hyperlipidemia Father     Hypertension Father     Lung cancer Father 70    Allergies Father         Environmental    Asthma Father     Lymphoma Sister 69    Heart disease Sister         Pacemaker    Asthma Brother     Aneurysm Brother         Brain - had surgery    Other Brother         Lymes disease    Coronary artery disease Daughter         2 bypass done    No Known Problems Daughter     No Known Problems Daughter     No Known Problems Son     Kidney disease Son     Liver disease Son     Obesity Son        Physical Exam:     Vitals:   Blood Pressure: 139/65 (24)  Pulse: 72 (24)  Temperature: 98.7 °F (37.1 °C) (24)  Temp Source: Oral (24)  Respirations: 20 (24)  Weight - Scale: 97.7 kg (215 lb 6.2 oz) (24)  SpO2: 96 % (24)    Physical Exam  Vitals reviewed.   Constitutional:       General: She is not in acute distress.     Appearance: She is obese. She is not ill-appearing, toxic-appearing or diaphoretic.   HENT:      Head: Normocephalic and atraumatic.      Right Ear: External ear normal.      Left Ear: External ear normal.   Eyes:      Extraocular Movements: Extraocular movements intact.   Cardiovascular:      Rate and Rhythm: Normal rate.      Heart sounds: No murmur heard.     No friction rub. No gallop.   Pulmonary:      Breath sounds: No wheezing, rhonchi or rales.   Abdominal:      General: There is no distension.      Palpations: There is no mass.      Tenderness: There is no abdominal tenderness. There is no guarding or rebound.  "     Hernia: No hernia is present.   Musculoskeletal:      Right lower leg: No edema.      Left lower leg: No edema.   Skin:     General: Skin is warm and dry.   Neurological:      Mental Status: She is alert. Mental status is at baseline.          Additional Data:     Lab Results:  Results from last 7 days   Lab Units 07/16/24  0339   WBC Thousand/uL 8.96   HEMOGLOBIN g/dL 4.3*   HEMATOCRIT % 15.0*   PLATELETS Thousands/uL 95*   LYMPHO PCT % 21   MONO PCT % 6   EOS PCT % 0     Results from last 7 days   Lab Units 07/16/24  0339   SODIUM mmol/L 141   POTASSIUM mmol/L 3.3*   CHLORIDE mmol/L 103   CO2 mmol/L 28   BUN mg/dL 50*   CREATININE mg/dL 1.99*   ANION GAP mmol/L 10   CALCIUM mg/dL 8.9   ALBUMIN g/dL 3.2*   TOTAL BILIRUBIN mg/dL 2.10*   ALK PHOS U/L 66   ALT U/L 20   AST U/L 30   GLUCOSE RANDOM mg/dL 122             No results found for: \"HGBA1C\"        Lines/Drains:  Invasive Devices       Peripheral Intravenous Line  Duration             Peripheral IV 07/16/24 Left Antecubital <1 day                        Imaging: Reviewed radiology reports from this admission including: abdominal/pelvic CT  X-ray chest 1 view portable   ED Interpretation by Abimael Lee DO (07/16 0557)   No acute cardiopulmonary disease      CT abdomen pelvis wo contrast   Final Result by Monster Christie MD (07/16 0533)      No evidence of acute abdominopelvic process.      Colonic diverticulosis without diverticulitis.      Chronic findings and negatives as above.      Workstation performed: [id number]             EKG and Other Studies Reviewed on Admission:   EKG:     ** Please Note: This note has been constructed using a voice recognition system. **    "

## 2024-07-16 NOTE — ASSESSMENT & PLAN NOTE
Lab Results   Component Value Date    EGFR 23 07/16/2024    EGFR 29 07/13/2024    EGFR 28 06/17/2024    CREATININE 1.99 (H) 07/16/2024    CREATININE 1.66 (H) 07/13/2024    CREATININE 1.73 (H) 06/17/2024     CKD in setting of age-related, hypertensive nephrosclerosis   Baseline Cr: 1.6-1.9  Admit Cr: 1.99     Plan:  Monitor renal function, renal dose medications, maintain normotension/euvolemia, avoid nephrotoxic agents, I&Os

## 2024-07-16 NOTE — ASSESSMENT & PLAN NOTE
Prolonged QT noted on admission EKG  Suspect secondary to hypokalemia  Check Mag  Monitor on tele  Replete and recheck

## 2024-07-16 NOTE — CONSULTS
Medical Oncology/Hematology Consult Note  Jayla Moody, female, 79 y.o., 1945,  [place] [address] /[place] [address] M*, [phone]     Assessment and Plan  1.  Metastatic non-small cell lung cancer: ALK positive, well-controlled with alectinib 600 mg twice a day which was started around 2020.  Recent imaging during this hospital stay did not reveal any obvious hint of progression of her metastatic disease.  Alectinib can be placed on hold during this hospital stay then she can be restarted on it upon discharge.  She is to follow-up with her primary oncologist as an outpatient after discharge.    2.  Acute on chronic macrocytic anemia: The exact etiology of the significant drop of her hemoglobin level is most likely related to upper GI bleeding since the patient complained about dark stool for about 2 weeks.  GI evaluation is pending.  Alectinib has a potential rare side effects of hemolytic anemia.  For completeness sake, hemolysis workup was ordered which is still pending.  Her LDH is normal which makes active hemolysis less likely.  The alectinib can be placed on hold for the time being during the hospital stay until she gets discharged.    3.  Vitamin B12 deficiency: Her vitamin B12 level seems to be borderline low.  I do recommend oral vitamin B12 supplements.    Reason for consultation: History of metastatic non-small cell lung cancer.  New onset of anemia.    History of present illness:   This is a 77-year-old female with multiple comorbid conditions including COPD, atrial fibrillation on chronic anticoagulation with Eliquis, hypertension, glaucoma, etc. she also has a well-known diagnosis of metastatic adenocarcinoma of the lung which was ALK positive, diagnosed in August 2020.  She has been on alectinib 600 mg twice a day which is controlling her non-small cell lung cancer very well.  The patient apparently was admitted to the hospital after she complained about chest pain.  She did noticed tarry stool for about 2  weeks.  On admission her hemoglobin was 4.3 with normal white cell count.  Platelet count was 95,000.  Iron panel showed saturation of 16% with ferritin of 59.  Vitamin B12 low at level 224.  Creatinine 1.9 with normal calcium and liver enzymes.  Total bili was 2.1.    Review of Systems:   Review of Systems   Constitutional:  Positive for activity change and fatigue. Negative for chills and fever.   HENT:  Negative for ear pain and sore throat.    Eyes:  Negative for pain and visual disturbance.   Respiratory:  Positive for shortness of breath. Negative for cough.    Cardiovascular:  Negative for chest pain and palpitations.   Gastrointestinal:  Positive for blood in stool. Negative for abdominal pain and vomiting.   Genitourinary:  Negative for dysuria and hematuria.   Musculoskeletal:  Negative for arthralgias and back pain.   Skin:  Negative for color change and rash.   Neurological:  Negative for seizures and syncope.   All other systems reviewed and are negative.      Past Medical History:   Diagnosis Date    Allergic rhinitis     Anemia     Asthma     Atrial fibrillation (HCC)     Chronic bronchitis (HCC)     COPD (chronic obstructive pulmonary disease) (HCC)     Coronary artery disease     CPAP (continuous positive airway pressure) dependence     Degenerative joint disease     Fluid retention 2024    GERD (gastroesophageal reflux disease)     Glaucoma     Hypertension     Lumbar disc disease     Lung cancer (HCC)     Pelvis cancer (HCC)     Periodic heart flutter     Pneumonia     Premature atrial contractions 10/31/2017    Sleep apnea     suspected     Sleep apnea, obstructive     Ventricular arrhythmia     Vitamin D deficiency        Past Surgical History:   Procedure Laterality Date    APPENDECTOMY      BREAST BIOPSY Right     years ago    COLON SURGERY      COLONOSCOPY N/A 03/18/2019    Procedure: COLONOSCOPY;  Surgeon: Alessia Wilson DO;  Location: [facility];  Service: Gastroenterology     ESOPHAGOGASTRODUODENOSCOPY N/A 2019    Procedure: ESOPHAGOGASTRODUODENOSCOPY (EGD);  Surgeon: lAessia Wilson DO;  Location: [facility];  Service: Gastroenterology    KNEE SURGERY      LUNG BIOPSY      ROTATOR CUFF REPAIR      SHOULDER SURGERY         Family History   Problem Relation Age of Onset    Stroke Mother     Diabetes Mother     Hyperlipidemia Mother     Hypertension Mother     Allergies Mother         Environmental    Asthma Mother     Diabetes Father     Hyperlipidemia Father     Hypertension Father     Lung cancer Father 70    Allergies Father         Environmental    Asthma Father     Lymphoma Sister 69    Heart disease Sister         Pacemaker    Asthma Brother     Aneurysm Brother         Brain - had surgery    Other Brother         Lymes disease    Coronary artery disease Daughter         2 bypass done    No Known Problems Daughter     No Known Problems Daughter     No Known Problems Son     Kidney disease Son     Liver disease Son     Obesity Son        Social History     Socioeconomic History    Marital status: Single     Spouse name: None    Number of children: 5    Years of education: None    Highest education level: None   Occupational History    None   Tobacco Use    Smoking status: Former     Current packs/day: 0.00     Average packs/day: 0.3 packs/day for 25.0 years (6.3 ttl pk-yrs)     Types: Cigarettes     Start date:      Quit date:      Years since quittin.5    Smokeless tobacco: Former   Vaping Use    Vaping status: Never Used   Substance and Sexual Activity    Alcohol use: Yes     Comment: occasionally    Drug use: Never    Sexual activity: None   Other Topics Concern    None   Social History Narrative    Who lives in your home: son    What type of home do you live in: Single house    Age of your home: 95 yrs old    How long have you been living there: 30 years    Type of heat: Forced hot air    Type of fuel: Gas    What type of rome is in your bedroom:  Hardwood floor    Do you have the following in or near your home:    Air products: Window air conditioning and Air     Pests: None    Pets: 1 Cat    Are pets allowed in bedroom: No    Open fields, wooded areas nearby: Open fields and Wooded areas    Basement: Damp at times and Unfinished with cracks in cement    Exposure to second hand smoke: No        Habits:    Caffeine: Current; Amount: 1 cups/day coffee, #years 60    Chocolate: occasionally    Other:              Social Determinants of Health     Financial Resource Strain: Low Risk  (2/29/2024)    Overall Financial Resource Strain (CARDIA)     Difficulty of Paying Living Expenses: Not very hard   Food Insecurity: Patient Declined (4/20/2024)    Hunger Vital Sign     Worried About Running Out of Food in the Last Year: Patient declined     Ran Out of Food in the Last Year: Patient declined   Transportation Needs: No Transportation Needs (4/20/2024)    PRAPARE - Transportation     Lack of Transportation (Medical): No     Lack of Transportation (Non-Medical): No   Physical Activity: Inactive (11/15/2022)    Exercise Vital Sign     Days of Exercise per Week: 0 days     Minutes of Exercise per Session: 0 min   Stress: Not on file   Social Connections: Not on file   Intimate Partner Violence: Not on file   Housing Stability: Low Risk  (4/20/2024)    Housing Stability Vital Sign     Unable to Pay for Housing in the Last Year: No     Number of Times Moved in the Last Year: 0     Homeless in the Last Year: No         Current Facility-Administered Medications:     acetaminophen (TYLENOL) tablet 650 mg, 650 mg, Oral, Q4H PRN, Larissa Aragon MD    Alectinib HCl CAPS 600 mg, 600 mg, Oral, BID, Lynda Rush PA-C    amiodarone tablet 200 mg, 200 mg, Oral, Daily With Breakfast, Lynda Rush PA-C, 200 mg at 07/16/24 1200    budesonide (PULMICORT) inhalation solution 0.25 mg, 0.25 mg, Nebulization, BID, Lynda Rush PA-C    fluticasone-vilanterol 200-25  mcg/actuation 1 puff, 1 puff, Inhalation, Daily, Lynda Rush PA-C, 1 puff at 07/16/24 1203    metoprolol tartrate (LOPRESSOR) tablet 25 mg, 25 mg, Oral, Q12H JAIRON, Lynda Rush PA-C, 25 mg at 07/16/24 1200    ondansetron (ZOFRAN) injection 4 mg, 4 mg, Intravenous, Q6H PRN, Lynda Rush PA-C    pantoprazole (PROTONIX) injection 40 mg, 40 mg, Intravenous, Q12H JAIRON, Lynda Rush PA-C    pravastatin (PRAVACHOL) tablet 80 mg, 80 mg, Oral, Daily With Dinner, Lynda Rush PA-C    Insert peripheral IV, , , Once **AND** sodium chloride (PF) 0.9 % injection 3 mL, 3 mL, Intravenous, Q1H PRN, Lynda Rush PA-C      Medications Prior to Admission:     acetaminophen (TYLENOL) 650 mg CR tablet    albuterol (2.5 mg/3 mL) 0.083 % nebulizer solution    albuterol (PROVENTIL HFA,VENTOLIN HFA) 90 mcg/act inhaler    Alectinib HCl 150 MG CAPS    amiodarone 200 mg tablet    apixaban (ELIQUIS) 5 mg    benralizumab (FASENRA) subcutaneous injection    Budeson-Glycopyrrol-Formoterol (Breztri Aerosphere) 160-9-4.8 MCG/ACT AERO    calcitriol (ROCALTROL) 0.25 mcg capsule    D-1000 Extra Strength 25 MCG (1000 UT) tablet    fexofenadine (ALLEGRA) 180 MG tablet    fluticasone (FLONASE) 50 mcg/act nasal spray    furosemide (LASIX) 20 mg tablet    glycopyrrolate-formoterol (BEVESPI AEROSPHERE) 9-4.8 MCG/ACT inhaler    ipratropium (ATROVENT) 0.02 % nebulizer solution    Klor-Con M20 20 MEQ tablet    metoprolol tartrate (LOPRESSOR) 25 mg tablet    omeprazole (PriLOSEC) 20 mg delayed release capsule    oxyCODONE-acetaminophen (PERCOCET) 5-325 mg per tablet    rosuvastatin (CRESTOR) 10 MG tablet    senna-docusate sodium (SENOKOT S) 8.6-50 mg per tablet    budesonide (PULMICORT) 0.25 mg/2 mL nebulizer solution    No Known Allergies      Physical Exam:    /53   Pulse 69   Temp 98.4 °F (36.9 °C)   Resp 17   Wt 97.7 kg (215 lb 6.2 oz)   SpO2 96%   BMI 40.70 kg/m²     Physical Exam  Constitutional:       General:  She is not in acute distress.     Appearance: She is well-developed. She is obese. She is not diaphoretic.   HENT:      Head: Normocephalic and atraumatic.      Nose: Nose normal.   Eyes:      General: No scleral icterus.        Right eye: No discharge.         Left eye: No discharge.      Conjunctiva/sclera: Conjunctivae normal.      Pupils: Pupils are equal, round, and reactive to light.   Neck:      Thyroid: No thyromegaly.      Vascular: No JVD.      Trachea: No tracheal deviation.   Cardiovascular:      Rate and Rhythm: Normal rate and regular rhythm.      Heart sounds: Normal heart sounds. No murmur heard.     No friction rub.   Pulmonary:      Effort: Pulmonary effort is normal. No respiratory distress.      Breath sounds: Normal breath sounds. No stridor. No wheezing or rales.   Chest:      Chest wall: No tenderness.   Abdominal:      General: Bowel sounds are normal. There is no distension.      Palpations: Abdomen is soft. There is no hepatomegaly, splenomegaly or mass.      Tenderness: There is no abdominal tenderness. There is no guarding or rebound.   Musculoskeletal:         General: No tenderness or deformity. Normal range of motion.      Cervical back: Normal range of motion and neck supple.   Lymphadenopathy:      Cervical: No cervical adenopathy.   Skin:     General: Skin is warm and dry.      Coloration: Skin is not pale.      Findings: No erythema or rash.   Neurological:      Mental Status: She is alert and oriented to person, place, and time.      Cranial Nerves: No cranial nerve deficit.      Coordination: Coordination normal.      Deep Tendon Reflexes: Reflexes are normal and symmetric.   Psychiatric:         Behavior: Behavior normal.         Thought Content: Thought content normal.         Judgment: Judgment normal.         Recent Results (from the past 48 hour(s))   ECG 12 lead    Collection Time: 07/16/24  3:21 AM   Result Value Ref Range    Ventricular Rate 73 BPM    Atrial Rate 73 BPM  "   MD Interval 170 ms    QRSD Interval 104 ms    QT Interval 462 ms    QTC Interval 508 ms    P Axis 61 degrees    QRS Axis 55 degrees    T Wave Axis 63 degrees   CBC and differential    Collection Time: 07/16/24  3:39 AM   Result Value Ref Range    WBC 8.96 4.31 - 10.16 Thousand/uL    RBC 1.43 (L) 3.81 - 5.12 Million/uL    Hemoglobin 4.3 (LL) 11.5 - 15.4 g/dL    Hematocrit 15.0 (L) 34.8 - 46.1 %     (H) 82 - 98 fL    MCH 30.1 26.8 - 34.3 pg    MCHC 28.7 (L) 31.4 - 37.4 g/dL    RDW 21.1 (H) 11.6 - 15.1 %    Platelets 95 (L) 149 - 390 Thousands/uL   HS Troponin 0hr (reflex protocol)    Collection Time: 07/16/24  3:39 AM   Result Value Ref Range    hs TnI 0hr 11 \"Refer to ACS Flowchart\"- see link ng/L   Comprehensive metabolic panel    Collection Time: 07/16/24  3:39 AM   Result Value Ref Range    Sodium 141 135 - 147 mmol/L    Potassium 3.3 (L) 3.5 - 5.3 mmol/L    Chloride 103 96 - 108 mmol/L    CO2 28 21 - 32 mmol/L    ANION GAP 10 4 - 13 mmol/L    BUN 50 (H) 5 - 25 mg/dL    Creatinine 1.99 (H) 0.60 - 1.30 mg/dL    Glucose 122 65 - 140 mg/dL    Calcium 8.9 8.4 - 10.2 mg/dL    Corrected Calcium 9.5 8.3 - 10.1 mg/dL    AST 30 13 - 39 U/L    ALT 20 7 - 52 U/L    Alkaline Phosphatase 66 34 - 104 U/L    Total Protein 5.1 (L) 6.4 - 8.4 g/dL    Albumin 3.2 (L) 3.5 - 5.0 g/dL    Total Bilirubin 2.10 (H) 0.20 - 1.00 mg/dL    eGFR 23 ml/min/1.73sq m   B-Type Natriuretic Peptide(BNP)    Collection Time: 07/16/24  3:39 AM   Result Value Ref Range     (H) 0 - 100 pg/mL   Manual Differential(PHLEBS Do Not Order)    Collection Time: 07/16/24  3:39 AM   Result Value Ref Range    Segmented % 73 43 - 75 %    Lymphocytes % 21 14 - 44 %    Monocytes % 6 4 - 12 %    Eosinophils % 0 0 - 6 %    Basophils % 0 0 - 1 %    Absolute Neutrophils 6.54 1.85 - 7.62 Thousand/uL    Absolute Lymphocytes 1.88 0.60 - 4.47 Thousand/uL    Absolute Monocytes 0.54 0.00 - 1.22 Thousand/uL    Absolute Eosinophils 0.00 0.00 - 0.40 Thousand/uL    " Absolute Basophils 0.00 0.00 - 0.10 Thousand/uL    Total Counted      nRBC 5 (H) 0 - 2 /100 WBC    RBC Morphology Present     Platelet Estimate Decreased (A) Adequate    Pathology Review Yes (A) No    Differential Comment see note     Anisocytosis Present     Frank Cells Present     Macrocytes Present     Poikilocytes Present     Polychromasia Present    Path Slide Review    Collection Time: 07/16/24  3:39 AM   Result Value Ref Range    Case Report       Clinical Pathology Report                         Case: [id number]                                  Authorizing Provider:  Abimael Lee DO   Collected:           07/16/2024 0339              Ordering Location:     [facility]        Received:            07/16/2024 0530                                     [facility]                                                                                 Department                                                                   Pathologist:           Lenin Jimenes MD                                                          Specimen:    Arm, Right                                                                                 Path Slide       Peripheral blood smear review shows marked anemia with mild macrocytosis and significant anisopoikilocytosis including microcytes, macrocytes, teardrop cells, elliptocytes, spherocytes, target cells, and poikilocytes with scattered nucleated red blood cells present.  White blood cells show normal morphology and distribution without distinct features of atypia/dysplasia or circulating blasts.  Platelets are decreased with scattered large forms without significant satellitosis or clustering.  Overall, the combination of findings is not definitively specific though the differential diagnosis may include acute blood loss with reactive change, nutritional deficiency, toxin exposure, splenic sequestration, or other reactive/inflammatory condition.  Alternatively, if  "the cause of cytopenias is undetermined then consideration for primary bone marrow disorder such as myelodysplastic syndrome may be entertained in the correct clinical setting with consideration for further testing and hematology/oncology consultation as clinically indicated.     Retic Count with Reticulocyte HGB    Collection Time: 07/16/24  3:39 AM   Result Value Ref Range    Immature Retic Fract 52.9 (H) 0.0 - 14.0 %    Retic Ct Pct 12.12 (H) 0.37 - 1.87 %    Retic Ct Abs 175,700 (H) 14,097 - 95,744    RETIC HGB 26.5 (L) 30.0 - 38.3 pg   Folate    Collection Time: 07/16/24  3:39 AM   Result Value Ref Range    Folate 11.2 >5.9 ng/mL   Vitamin B12    Collection Time: 07/16/24  3:39 AM   Result Value Ref Range    Vitamin B-12 224 180 - 914 pg/mL   Ferritin    Collection Time: 07/16/24  3:39 AM   Result Value Ref Range    Ferritin 59 11 - 307 ng/mL   Type and screen    Collection Time: 07/16/24  3:57 AM   Result Value Ref Range    ABO Grouping O     Rh Factor Positive     Antibody Screen Negative     Specimen Expiration Date [id number]    ABORh Recheck - Contact Blood Bank Prior to Collection    Collection Time: 07/16/24  4:03 AM   Result Value Ref Range    ABO Grouping O     Rh Factor Positive    ECG 12 lead    Collection Time: 07/16/24  5:39 AM   Result Value Ref Range    Ventricular Rate 74 BPM    Atrial Rate 74 BPM    AZ Interval 162 ms    QRSD Interval 102 ms    QT Interval 458 ms    QTC Interval 508 ms    P Wilkinson 55 degrees    QRS Axis 49 degrees    T Wave Wilkinson 62 degrees   HS Troponin I 2hr    Collection Time: 07/16/24  6:24 AM   Result Value Ref Range    hs TnI 2hr 10 \"Refer to ACS Flowchart\"- see link ng/L    Delta 2hr hsTnI -1 <20 ng/L   ECG 12 lead    Collection Time: 07/16/24  7:39 AM   Result Value Ref Range    Ventricular Rate 74 BPM    Atrial Rate 74 BPM    AZ Interval 158 ms    QRSD Interval 104 ms    QT Interval 468 ms    QTC Interval 519 ms    P Axis 49 degrees    QRS Axis 50 degrees    T Wave Axis 66 " "degrees   HS Troponin I 4hr    Collection Time: 07/16/24  7:42 AM   Result Value Ref Range    hs TnI 4hr 11 \"Refer to ACS Flowchart\"- see link ng/L    Delta 4hr hsTnI 0 <20 ng/L   Prepare Leukoreduced RBC: 2 Units    Collection Time: 07/16/24 11:12 AM   Result Value Ref Range    Unit Product Code H7726Y69     Unit Number [id number]     Unit ABO O     Unit RH POS     Crossmatch Compatible     Unit Dispense Status Issued     Unit Product Volume 350 ml    Unit Product Code Z4117R99     Unit Number [id number]     Unit ABO O     Unit RH POS     Crossmatch Compatible     Unit Dispense Status Issued     Unit Product Volume 350 ml   CBC and differential    Collection Time: 07/16/24 11:48 AM   Result Value Ref Range    WBC 7.83 4.31 - 10.16 Thousand/uL    RBC 1.79 (L) 3.81 - 5.12 Million/uL    Hemoglobin 5.4 (LL) 11.5 - 15.4 g/dL    Hematocrit 18.1 (L) 34.8 - 46.1 %     (H) 82 - 98 fL    MCH 30.2 26.8 - 34.3 pg    MCHC 29.8 (L) 31.4 - 37.4 g/dL    RDW 18.6 (H) 11.6 - 15.1 %    MPV 12.9 (H) 8.9 - 12.7 fL    Platelets 88 (L) 149 - 390 Thousands/uL   Basic metabolic panel    Collection Time: 07/16/24 11:48 AM   Result Value Ref Range    Sodium 141 135 - 147 mmol/L    Potassium 3.5 3.5 - 5.3 mmol/L    Chloride 106 96 - 108 mmol/L    CO2 31 21 - 32 mmol/L    ANION GAP 4 4 - 13 mmol/L    BUN 48 (H) 5 - 25 mg/dL    Creatinine 1.81 (H) 0.60 - 1.30 mg/dL    Glucose 120 65 - 140 mg/dL    Calcium 8.4 8.4 - 10.2 mg/dL    eGFR 26 ml/min/1.73sq m   Magnesium    Collection Time: 07/16/24 11:48 AM   Result Value Ref Range    Magnesium 2.0 1.9 - 2.7 mg/dL   LD,Blood    Collection Time: 07/16/24 11:48 AM   Result Value Ref Range     140 - 271 U/L       CT chest abdomen pelvis wo contrast    Result Date: 7/16/2024  Narrative: CT CHEST, ABDOMEN AND PELVIS WITHOUT IV CONTRAST INDICATION:   Malignant neoplasm of unspecified part of right bronchus or lung. . COMPARISON: 3/4/2024. TECHNIQUE: CT examination of the chest, " abdomen and pelvis was performed without intravenous contrast. Multiplanar 2D reformatted images were created from the source data. This examination, like all CT scans performed in the [facility], was performed utilizing techniques to minimize radiation dose exposure, including the use of iterative reconstruction and automated exposure control. Radiation dose length product (DLP) for this visit: Enteric contrast was not administered. FINDINGS: CHEST LUNGS:  Lungs are clear. Treated right hilar adenocarcinoma is no longer visualized. There is no tracheal or endobronchial lesion. PLEURA:  Unremarkable. HEART/GREAT VESSELS: Heavy atherosclerotic coronary artery calcification is noted.  Heart is otherwise unremarkable. Mitral annular calcification. No thoracic aortic aneurysm. MEDIASTINUM AND ARACELY:  Unremarkable. CHEST WALL AND LOWER NECK:  Unremarkable. ABDOMEN LIVER/BILIARY TREE:  Unremarkable. GALLBLADDER:  There are gallstone(s) within the gallbladder, without pericholecystic inflammatory changes. SPLEEN:  Unremarkable. PANCREAS:  Unremarkable. ADRENAL GLANDS:  Unremarkable. KIDNEYS/URETERS:  Unremarkable. No hydronephrosis. STOMACH AND BOWEL: Small sliding hiatal hernia. There is colonic diverticulosis without evidence of acute diverticulitis. APPENDIX:  No findings to suggest appendicitis. ABDOMINOPELVIC CAVITY:  No ascites.  No pneumoperitoneum.  No lymphadenopathy. VESSELS:  Atherosclerotic changes are present.  No evidence of aneurysm. PELVIS REPRODUCTIVE ORGANS:  Unremarkable for patient's age. URINARY BLADDER:  Unremarkable. ABDOMINAL WALL/INGUINAL REGIONS:  Unremarkable. OSSEOUS STRUCTURES: Stable sclerotic lesions in the appendicular and axial skeleton most prominent in the thoracic spine.  Spinal degenerative changes are noted.     Impression: 1. No interval change since previous study. Stable treated osseous metastatic lesions.. Treated right hilar adenocarcinoma is no longer visualized.  No new pulmonary lesions. No thoracic lymphadenopathy. Continued surveillance as per oncology. 2. Cholelithiasis. Electronically signed: 07/16/2024 09:23 AM Isidro Beasley MD    X-ray chest 1 view portable    Result Date: 7/16/2024  Narrative: XR CHEST PORTABLE INDICATION: chest pain. COMPARISON: 08/17/2023 FINDINGS: Clear lungs. No pneumothorax or pleural effusion. Suboptimal inspiratory effort. Cardiomegaly. Bones are unremarkable for age. Surgical anchor in the left humeral head. Normal upper abdomen.     Impression: No acute cardiopulmonary disease. Suboptimal inspiratory effort. Workstation performed: [id number]     CT abdomen pelvis wo contrast    Result Date: 7/16/2024  Narrative: CT ABDOMEN AND PELVIS WITHOUT IV CONTRAST INDICATION: LLQ abdominal pain, dark stools. COMPARISON: CT chest abdomen pelvis 7/15/2024 TECHNIQUE: CT examination of the abdomen and pelvis was performed without intravenous contrast. Multiplanar 2D reformatted images were created from the source data. This examination, like all CT scans performed in the [facility], was performed utilizing techniques to minimize radiation dose exposure, including the use of iterative reconstruction and automated exposure control. Radiation dose length product (DLP) for this visit: 1266 mGy-cm Enteric Contrast: Not administered. FINDINGS: ABDOMEN LOWER CHEST: Mitral annular calcification. LIVER/BILIARY TREE: Unremarkable. GALLBLADDER: Cholelithiasis without findings of acute cholecystitis. SPLEEN: Unremarkable. PANCREAS: Mild diffuse atrophy of the pancreas. ADRENAL GLANDS: Unremarkable. KIDNEYS/URETERS: 2 right renal cysts, the larger of which measures approximately 5 cm. Otherwise unremarkable. No perinephric collection. STOMACH AND BOWEL: GI evaluation limited without IV and enteric contrast. Colonic diverticulosis without diverticulitis. Nondistended stomach. No bowel obstruction. APPENDIX: Prior appendectomy per history in epic.  ABDOMINOPELVIC CAVITY: No ascites. No pneumoperitoneum. No enlarged lymph nodes. VESSELS: Atherosclerotic disease. No abdominal aortic aneurysm. PELVIS REPRODUCTIVE ORGANS: Leiomyomatous uterus. URINARY BLADDER: Unremarkable. ABDOMINAL WALL/INGUINAL REGIONS: Unremarkable. BONES: No acute fracture or osseous destructive lesion identified. Scattered sclerotic osseous lesions compatible with known treated metastasis. No significant interval change. Degenerative changes of the spine, pubic symphysis, and multiple joints. Mild  levoconvex lumbar scoliosis. Grade 1 degenerative anterolisthesis of L3 on L4.     Impression: No evidence of acute abdominopelvic process. Colonic diverticulosis without diverticulitis. Chronic findings and negatives as above. Workstation performed: [id number]       Labs and pertinent reports reviewed.      This note has been generated by voice recognition software system.  Therefore, there may be spelling, grammar, and or syntax errors. Please contact if questions arise.

## 2024-07-16 NOTE — ASSESSMENT & PLAN NOTE
Patient follows with [facility] oncology, Dr. 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

## 2024-07-16 NOTE — ASSESSMENT & PLAN NOTE
Home regimen: metoprolol tartrate 25mg BID, Lasix 20mg daily   Cont metoprolol  Due to elevated creatinine at high end of baseline and clinical dehydration:  lasix temporarily on hold

## 2024-07-16 NOTE — ASSESSMENT & PLAN NOTE
Patient has a history of severe asthma  Continue home inhalers  No evidence of acute exacerbation  Nebulizers as needed

## 2024-07-16 NOTE — ASSESSMENT & PLAN NOTE
Patient is a 79-year-old female with past medical history significant for stage IV non-small cell lung cancer with bone mets, currently on Alectinib maintenance therapy with monthly Xgeva, CKD who presented to the ER with symptomatic anemia    Baseline hemoglobin appears to range 9-10  Patient presented with a hemoglobin of 4.3  Also noted melanotic stools  Patient was ordered 2 units of packed red blood cells in the ER and referred for admission  Etiology of anemia being evaluated  A) GI bleed: Patient noted melanotic stool  Will start proton pump inhibitor and confer with GI team  B) medication related:   Alectinib associated with hemolytic anemia: Peripheral smear ordered, will check hemolysis panel with LDH and haptoglobin  Xgeva also associated with anemia and thrombocytopenia  Patient's anemia appears macrocytic: B12 and folic acid levels pending  Will keep patient's oncology team updated

## 2024-07-17 ENCOUNTER — TELEPHONE (OUTPATIENT)
Dept: GASTROENTEROLOGY | Facility: MEDICAL CENTER | Age: 79
End: 2024-07-17

## 2024-07-17 LAB
ABO GROUP BLD BPU: NORMAL
ANION GAP SERPL CALCULATED.3IONS-SCNC: 6 MMOL/L (ref 4–13)
BASOPHILS # BLD MANUAL: 0 THOUSAND/UL (ref 0–0.1)
BASOPHILS NFR MAR MANUAL: 0 % (ref 0–1)
BPU ID: NORMAL
BUN SERPL-MCNC: 37 MG/DL (ref 5–25)
CALCIUM SERPL-MCNC: 8.2 MG/DL (ref 8.4–10.2)
CHLORIDE SERPL-SCNC: 110 MMOL/L (ref 96–108)
CO2 SERPL-SCNC: 26 MMOL/L (ref 21–32)
CREAT SERPL-MCNC: 1.53 MG/DL (ref 0.6–1.3)
CROSSMATCH: NORMAL
EOSINOPHIL # BLD MANUAL: 0 THOUSAND/UL (ref 0–0.4)
EOSINOPHIL NFR BLD MANUAL: 0 % (ref 0–6)
ERYTHROCYTE [DISTWIDTH] IN BLOOD BY AUTOMATED COUNT: 19.9 % (ref 11.6–15.1)
GFR SERPL CREATININE-BSD FRML MDRD: 32 ML/MIN/1.73SQ M
GLUCOSE SERPL-MCNC: 92 MG/DL (ref 65–140)
HAPTOGLOB SERPL-MCNC: <10 MG/DL (ref 42–346)
HCT VFR BLD AUTO: 23.6 % (ref 34.8–46.1)
HCT VFR BLD AUTO: 23.7 % (ref 34.8–46.1)
HCT VFR BLD AUTO: 25.7 % (ref 34.8–46.1)
HCT VFR BLD AUTO: 25.9 % (ref 34.8–46.1)
HGB BLD-MCNC: 7.5 G/DL (ref 11.5–15.4)
HGB BLD-MCNC: 7.5 G/DL (ref 11.5–15.4)
HGB BLD-MCNC: 8.2 G/DL (ref 11.5–15.4)
HGB BLD-MCNC: 8.2 G/DL (ref 11.5–15.4)
LYMPHOCYTES # BLD AUTO: 1.37 THOUSAND/UL (ref 0.6–4.47)
LYMPHOCYTES # BLD AUTO: 18 % (ref 14–44)
MACROCYTES BLD QL AUTO: PRESENT
MCH RBC QN AUTO: 30.9 PG (ref 26.8–34.3)
MCHC RBC AUTO-ENTMCNC: 31.6 G/DL (ref 31.4–37.4)
MCV RBC AUTO: 98 FL (ref 82–98)
MONOCYTES # BLD AUTO: 0.3 THOUSAND/UL (ref 0–1.22)
MONOCYTES NFR BLD: 4 % (ref 4–12)
MYELOCYTE ABSOLUTE CT: 0.15 THOUSAND/UL (ref 0–0.1)
MYELOCYTES NFR BLD MANUAL: 2 % (ref 0–1)
NEUTROPHILS # BLD MANUAL: 5.78 THOUSAND/UL (ref 1.85–7.62)
NEUTS SEG NFR BLD AUTO: 76 % (ref 43–75)
NRBC BLD AUTO-RTO: 3 /100 WBC (ref 0–2)
OVALOCYTES BLD QL SMEAR: PRESENT
PLATELET # BLD AUTO: 87 THOUSANDS/UL (ref 149–390)
PLATELET BLD QL SMEAR: ABNORMAL
PMV BLD AUTO: 13.8 FL (ref 8.9–12.7)
POLYCHROMASIA BLD QL SMEAR: PRESENT
POTASSIUM SERPL-SCNC: 3.6 MMOL/L (ref 3.5–5.3)
RBC # BLD AUTO: 2.43 MILLION/UL (ref 3.81–5.12)
RBC MORPH BLD: PRESENT
SODIUM SERPL-SCNC: 142 MMOL/L (ref 135–147)
UNIT DISPENSE STATUS: NORMAL
UNIT PRODUCT CODE: NORMAL
UNIT PRODUCT VOLUME: 350 ML
UNIT RH: NORMAL
WBC # BLD AUTO: 7.61 THOUSAND/UL (ref 4.31–10.16)

## 2024-07-17 PROCEDURE — 94760 N-INVAS EAR/PLS OXIMETRY 1: CPT

## 2024-07-17 PROCEDURE — 85018 HEMOGLOBIN: CPT | Performed by: PHYSICIAN ASSISTANT

## 2024-07-17 PROCEDURE — 85014 HEMATOCRIT: CPT | Performed by: PHYSICIAN ASSISTANT

## 2024-07-17 PROCEDURE — 97162 PT EVAL MOD COMPLEX 30 MIN: CPT

## 2024-07-17 PROCEDURE — 99232 SBSQ HOSP IP/OBS MODERATE 35: CPT | Performed by: INTERNAL MEDICINE

## 2024-07-17 PROCEDURE — 97166 OT EVAL MOD COMPLEX 45 MIN: CPT

## 2024-07-17 PROCEDURE — 97530 THERAPEUTIC ACTIVITIES: CPT

## 2024-07-17 PROCEDURE — 80048 BASIC METABOLIC PNL TOTAL CA: CPT | Performed by: PHYSICIAN ASSISTANT

## 2024-07-17 PROCEDURE — 85027 COMPLETE CBC AUTOMATED: CPT | Performed by: PHYSICIAN ASSISTANT

## 2024-07-17 PROCEDURE — 85007 BL SMEAR W/DIFF WBC COUNT: CPT | Performed by: PHYSICIAN ASSISTANT

## 2024-07-17 PROCEDURE — 94640 AIRWAY INHALATION TREATMENT: CPT

## 2024-07-17 RX ORDER — HEPARIN SODIUM 5000 [USP'U]/ML
5000 INJECTION, SOLUTION INTRAVENOUS; SUBCUTANEOUS EVERY 8 HOURS SCHEDULED
Status: DISCONTINUED | OUTPATIENT
Start: 2024-07-17 | End: 2024-07-18

## 2024-07-17 RX ORDER — DOCUSATE SODIUM 100 MG/1
100 CAPSULE, LIQUID FILLED ORAL 2 TIMES DAILY
Status: DISCONTINUED | OUTPATIENT
Start: 2024-07-17 | End: 2024-07-19 | Stop reason: HOSPADM

## 2024-07-17 RX ORDER — POLYETHYLENE GLYCOL 3350 17 G/17G
17 POWDER, FOR SOLUTION ORAL DAILY
Status: DISCONTINUED | OUTPATIENT
Start: 2024-07-17 | End: 2024-07-19 | Stop reason: HOSPADM

## 2024-07-17 RX ORDER — SENNOSIDES 8.6 MG
1 TABLET ORAL
Status: DISCONTINUED | OUTPATIENT
Start: 2024-07-17 | End: 2024-07-19 | Stop reason: HOSPADM

## 2024-07-17 RX ADMIN — CYANOCOBALAMIN TAB 500 MCG 1000 MCG: 500 TAB at 08:47

## 2024-07-17 RX ADMIN — DOCUSATE SODIUM 100 MG: 100 CAPSULE, LIQUID FILLED ORAL at 17:33

## 2024-07-17 RX ADMIN — PANTOPRAZOLE SODIUM 40 MG: 40 INJECTION, POWDER, FOR SOLUTION INTRAVENOUS at 08:47

## 2024-07-17 RX ADMIN — BUDESONIDE 0.25 MG: 0.25 INHALANT RESPIRATORY (INHALATION) at 19:41

## 2024-07-17 RX ADMIN — AMIODARONE HYDROCHLORIDE 200 MG: 200 TABLET ORAL at 08:47

## 2024-07-17 RX ADMIN — METOPROLOL TARTRATE 25 MG: 25 TABLET, FILM COATED ORAL at 08:47

## 2024-07-17 RX ADMIN — SENNOSIDES 8.6 MG: 8.6 TABLET, FILM COATED ORAL at 21:13

## 2024-07-17 RX ADMIN — POLYETHYLENE GLYCOL 3350 17 G: 17 POWDER, FOR SOLUTION ORAL at 15:12

## 2024-07-17 RX ADMIN — BUDESONIDE 0.25 MG: 0.25 INHALANT RESPIRATORY (INHALATION) at 07:31

## 2024-07-17 RX ADMIN — HEPARIN SODIUM 5000 UNITS: 5000 INJECTION INTRAVENOUS; SUBCUTANEOUS at 21:13

## 2024-07-17 RX ADMIN — METOPROLOL TARTRATE 25 MG: 25 TABLET, FILM COATED ORAL at 21:13

## 2024-07-17 RX ADMIN — PANTOPRAZOLE SODIUM 40 MG: 40 INJECTION, POWDER, FOR SOLUTION INTRAVENOUS at 21:13

## 2024-07-17 RX ADMIN — FLUTICASONE FUROATE AND VILANTEROL TRIFENATATE 1 PUFF: 200; 25 POWDER RESPIRATORY (INHALATION) at 08:48

## 2024-07-17 RX ADMIN — PRAVASTATIN SODIUM 80 MG: 80 TABLET ORAL at 17:34

## 2024-07-17 NOTE — ASSESSMENT & PLAN NOTE
Prolonged QT noted on admission EKG  Suspect secondary to hypokalemia  Repleted and improved  Repeat EKG with improvement in QT

## 2024-07-17 NOTE — ASSESSMENT & PLAN NOTE
Wt Readings from Last 3 Encounters:   07/16/24 97.7 kg (215 lb 6.2 oz)   06/21/24 97.1 kg (214 lb)   06/04/24 94.8 kg (209 lb)     Patient has a history of chronic diastolic congestive heart failure follows [facility]iology  Recent 2D echocardiogram 7/23 LVEF 61%, grade I diastolic dysfunction, LA dilation, MV calcification   Home regimen: metoprolol tartrate 25mg twice daily, Lasix 20mg daily   Lasix temporarily on hold  Continue beta-blocker  Euvolemic today, status post transfusion of packed red blood cells  Anticipate restarting Lasix in a.m.

## 2024-07-17 NOTE — ASSESSMENT & PLAN NOTE
Home regimen: metoprolol tartrate 25mg BID, Lasix 20mg daily   Cont metoprolol  Due to elevated creatinine at high end of baseline and clinical dehydration:  lasix temporarily on hold  Blood pressure adequately controlled

## 2024-07-17 NOTE — ASSESSMENT & PLAN NOTE
Patient is a 79-year-old female with past medical history significant for stage IV non-small cell lung cancer with bone mets, currently on Alectinib maintenance therapy with monthly Xgeva, CKD who presented to the ER with symptomatic anemia.  Patient noted to a 2-month history of worsening dyspnea on exertion.  Presented with profound weakness    Baseline hemoglobin appears to range 9-10  Patient presented with a hemoglobin of 4.3  Also noted melanotic stools  Patient was ordered 2 units of packed red blood cells in the ER and referred for admission  Etiology of anemia being evaluated  A) GI bleed: Patient noted melanotic stool  Patient was started on a proton pump inhibitor  Underwent an EGD with angioectasias that were treated with APC, but no active bleeding or stigmata of recent bleeding  Will follow-up with GI as an outpatient for colonoscopy  B) medication related:   Alectinib associated with hemolytic anemia:   Xgeva also associated with anemia and thrombocytopenia  Patient was advised by the oncology team: Did not appear consistent with hemolysis due to normal LDH  Noted patient's alectinib can be placed on hold during her hospitalization and restarted at discharge  C)Normal folic acid  B12 deficient: Started on supplements  Hemoglobin has improved posttransfusion to 8  With current prior blood per rectum continue to monitor

## 2024-07-17 NOTE — CASE MANAGEMENT
Case Management Assessment & Discharge Planning Note    Patient name Jayla Moody  Location [facility] 2 [facility]South 2 M* MRN [id number]  : 1945 Date 2024       Current Admission Date: 2024  Current Admission Diagnosis:Symptomatic anemia   Patient Active Problem List    Diagnosis Date Noted Date Diagnosed    Melena 2024     Symptomatic anemia 2024     Prolonged Q-T interval on ECG 2024     CKD (chronic kidney disease) stage 4, GFR 15-29 ml/min ([facility]) 2024     Class 2 severe obesity due to excess calories with serious comorbidity and body mass index (BMI) of 39.0 to 39.9 in adult ([facility]) 2024     Cancer-related pain 2023     Atrial flutter ([facility]) 08/15/2023     Chronic diastolic congestive heart failure ([facility]) 2023     Coronary artery disease involving native coronary artery of native heart without angina pectoris 07/10/2023     Thrombocytopenia, unspecified ([facility]) 2021     Depression, recurrent ([facility]) 2021     Severe asthma 2020     Malignant neoplasm metastatic to bone ([facility]) 2020     Non-small cell lung cancer ([facility]) 2020     MONO (obstructive sleep apnea)      Chronic rhinitis 2019     Chronic gastritis without bleeding 2019     Gastroesophageal reflux disease without esophagitis 2019     Thrombocytopenia ([facility]) 10/31/2018     Essential hypertension 10/31/2018     Hiatal hernia 10/31/2018       LOS (days): 1  Geometric Mean LOS (GMLOS) (days): 3  Days to GMLOS:1.7     OBJECTIVE:    Risk of Unplanned Readmission Score: 27.7         Current admission status: Inpatient       Preferred Pharmacy:   [id number] [place] - [address]  [address]  [place] [address]  Phone: [phone] Fax: [phone]    Primary Care Provider: Kaiser Kohler DO    Primary Insurance: BL[employer] CR[employer] REP  Secondary Insurance:     ASSESSMENT:  Active Health Care Proxies       [facility]   - Son   Primary Phone: [phone] (Mobile)                 Advance Directives  Primary Contact: Jimmy (son) [phone]              Patient Information  Admitted from:: Home  Mental Status: Alert  During Assessment patient was accompanied by: Not accompanied during assessment  Assessment information provided by:: Patient  Primary Caregiver: Self  Support Systems: Son, Daughter, Family members  County of Residence: [place]  What [facility] do you live in?: [place]  Home entry access options. Select all that apply.: Stairs  Number of steps to enter home.: 3  Type of Current Residence: 2 story home  Upon entering residence, is there a bedroom on the main floor (no further steps)?: No  A bedroom is located on the following floor levels of residence (select all that apply):: 2nd Floor  Upon entering residence, is there a bathroom on the main floor (no further steps)?: No  Indicate which floors of current residence have a bathroom (select all the apply):: 2nd Floor  Living Arrangements: Lives Alone    Activities of Daily Living Prior to Admission  Functional Status: Independent  Completes ADLs independently?: Yes  Ambulates independently?: Yes  Does patient use assisted devices?: Yes  Assisted Devices (DME) used: Straight Cane, Shower Chair, Stair Chair/[place], CPAP  DME Company Name (respiratory supplies): [employer]  Does patient currently own DME?: Yes  What DME does the patient currently own?: CPAP, Shower Chair, Stair Chair/Glide, Straight Cane, Other (Comment) (transport chair)  Does patient have a history of Outpatient Therapy (PT/OT)?: Yes  Does the patient have a history of Short-Term Rehab?: No  Does patient have a history of HHC?: No  Does patient currently have HHC?: No         Patient Information Continued  Income Source: SSI/SSD  Does patient receive dialysis treatments?: No  Does patient have a history of substance abuse?: No  Does patient have a history of Mental Health Diagnosis?:   (depression)         Means of Transportation  Means of Transport to Appts:: Drives Self      Social Determinants of Health (SDOH)      Flowsheet Row Most Recent Value   Housing Stability    In the last 12 months, was there a time when you were not able to pay the mortgage or rent on time? N   In the past 12 months, how many times have you moved where you were living? 0   At any time in the past 12 months, were you homeless or living in a shelter (including now)? N   Transportation Needs    In the past 12 months, has lack of transportation kept you from medical appointments or from getting medications? no   In the past 12 months, has lack of transportation kept you from meetings, work, or from getting things needed for daily living? No   Food Insecurity    Within the past 12 months, you worried that your food would run out before you got the money to buy more. Never true   Within the past 12 months, the food you bought just didn't last and you didn't have money to get more. Never true   Utilities    In the past 12 months has the electric, gas, oil, or water company threatened to shut off services in your home? No            DISCHARGE DETAILS:    Discharge planning discussed with:: Patient     Comments - Freedom of Choice: awaiting PT eval, interested in HHC                          DME Referral Provided  Referral made for DME?: Yes  DME referral completed for the following items:: [employer]  DME Supplier Name:: [employer]    Other Referral/Resources/Interventions Provided:  Interventions: DME               Transport at Discharge : Family                                      Additional Comments: CM met with pt at bedside to complete assessment. Pt interested in HHC with [facility] if possible. Would also like rollator. PT eval pending. CM dept to continue to follow.

## 2024-07-17 NOTE — ASSESSMENT & PLAN NOTE
Patient follows with [facility] oncology, Dr. 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

## 2024-07-17 NOTE — PROGRESS NOTES
"[facility]  Progress Note  Name: Jayla Moody I  MRN: [id number]  Unit/Bed#: [place] [address] -01 I Date of Admission: 7/16/2024   Date of Service: 7/17/2024 I Hospital Day: 1    Assessment & Plan   * Symptomatic anemia  Assessment & Plan  Patient is a 79-year-old female with past medical history significant for stage IV non-small cell lung cancer with bone mets, currently on Alectinib maintenance therapy with monthly Xgeva, CKD who presented to the ER with symptomatic anemia.  Patient noted to a 2-month history of worsening dyspnea on exertion.  Presented with profound weakness    Baseline hemoglobin appears to range 9-10  Patient presented with a hemoglobin of 4.3  Also noted melanotic stools  Patient was ordered 2 units of packed red blood cells in the ER and referred for admission  Etiology of anemia being evaluated  A) GI bleed: Patient noted melanotic stool  Patient was started on a proton pump inhibitor  Underwent an EGD with angioectasias that were treated with APC, but no active bleeding or stigmata of recent bleeding  Will follow-up with GI as an outpatient for colonoscopy  B) medication related:   Alectinib associated with hemolytic anemia:   Xgeva also associated with anemia and thrombocytopenia  Patient was advised by the oncology team: Did not appear consistent with hemolysis due to normal LDH  Noted patient's alectinib can be placed on hold during her hospitalization and restarted at discharge  C)Normal folic acid  B12 deficient: Started on supplements  Hemoglobin has improved posttransfusion to 8  With current prior blood per rectum continue to monitor    Melena  Assessment & Plan  Patient noted a 2-week history of melena, as well as a 2-month history of dyspnea on exertion  Presented with acute blood loss anemia with a hemoglobin of 4 decreased from her baseline of approximately 10  CT scan abdomen/pelvis: \"No evidence of acute abdominopelvic process. Colonic diverticulosis " "without diverticulitis.\"  Patient was started on a proton pump inhibitor twice daily and evaluated by the GI team  7/16 patient underwent EGD that revealed angioectasias in the stomach, and duodenum treated with APC.  No active or stigmata of recent bleeding noted  Patient notes some bright red blood per rectum today however associated with constipation  Will hold Eliquis for additional day and monitor  Per GI: If no additional bleeding anticipate outpatient colonoscopy, and if unremarkable, possible capsule pill endoscopy    Non-small cell lung cancer (HCC)  Assessment & Plan  Patient follows with [facility] oncology, Dr. 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  Initially had been receiving Zometa every 3 months: Currently on Xgeva every 4 weeks  Patient presents with anemia and thrombocytopenia: Not leukopenic  Patient was evaluated by the oncology service: Her disease is noted to be well-controlled on her current therapy: It was noted her alectinib can be placed on hold and restarted at discharge      Prolonged Q-T interval on ECG  Assessment & Plan  Prolonged QT noted on admission EKG  Suspect secondary to hypokalemia  Repleted and improved  Repeat EKG with improvement in QT    CKD (chronic kidney disease) stage 4, GFR 15-29 ml/min ([facility])  Assessment & Plan  Lab Results   Component Value Date    EGFR 32 07/17/2024    EGFR 26 07/16/2024    EGFR 23 07/16/2024    CREATININE 1.53 (H) 07/17/2024    CREATININE 1.81 (H) 07/16/2024    CREATININE 1.99 (H) 07/16/2024     Patient has chronic kidney disease stage IIIB-IV with baseline creatinine that appears to range 1.5-1.9  She follows with [facility] nephrology, Dr. Germain as " "an outpatient  Was referred for possible alectinib associated renal disease  Per outpatient nephrology office notes from 5/24 note, patient was suspected to have underlying chronic kidney disease secondary to \"age-related nephron loss plus hypertensive nephrosclerosis plus cardiorenal syndrome\", plus nephropathy  Creatinine currently at her baseline    Atrial flutter (HCC)  Assessment & Plan  Patient follows with [facility] cardiology for atrial flutter, CHF, mitral stenosis, hypertension hyperlipidemia  Was recently seen in the office 5/24: Nuclear stress and echocardiogram ordered--currently pending  Continue amiodarone 200 mg daily, metoprolol 25 mg twice daily  Eliquis placed on hold due to hemoglobin of 4  Patient underwent GI workup with EGD without evidence of active bleeding: GI notes her Eliquis may be restarted discharge patient with rectal bleeding today: will hold an additional day and monitor    Chronic diastolic congestive heart failure (HCC)  Assessment & Plan  Wt Readings from Last 3 Encounters:   07/16/24 97.7 kg (215 lb 6.2 oz)   06/21/24 97.1 kg (214 lb)   06/04/24 94.8 kg (209 lb)     Patient has a history of chronic diastolic congestive heart failure follows [facility]iology  Recent 2D echocardiogram 7/23 LVEF 61%, grade I diastolic dysfunction, LA dilation, MV calcification   Home regimen: metoprolol tartrate 25mg twice daily, Lasix 20mg daily   Lasix temporarily on hold  Continue beta-blocker  Euvolemic today, status post transfusion of packed red blood cells  Anticipate restarting Lasix in a.m.    Severe asthma  Assessment & Plan  Patient has a history of severe asthma  Continue home inhalers  No evidence of acute exacerbation  Nebulizers as needed    Essential hypertension  Assessment & Plan  Home regimen: metoprolol tartrate 25mg BID, Lasix 20mg daily   Cont metoprolol  Due to elevated creatinine at high end of baseline and clinical dehydration:  lasix temporarily on hold  Blood pressure " adequately controlled      Thrombocytopenia (HCC)  Assessment & Plan  Per outpatient heme-onc office notes, patient has chronic thrombocytopenia  Platelet baseline appears to range 80s-100s  Currently at baseline               VTE Pharmacologic Prophylaxis: VTE Score: 5 High Risk (Score >/= 5) - Pharmacological DVT Prophylaxis Ordered: heparin. Sequential Compression Devices Ordered.    Mobility:   Basic Mobility Inpatient Raw Score: 21  JH-HLM Goal: 6: Walk 10 steps or more  JH-HLM Achieved: 7: Walk 25 feet or more  JH-HLM Goal achieved. Continue to encourage appropriate mobility.    Patient Centered Rounds: I performed bedside rounds with nursing staff today.   Discussions with Specialists or Other Care Team Provider: SEMAJ  Education and Discussions with Family / Patient: called son Jimmy Moody and gave update    Total Time Spent on Date of Encounter in care of patient: 38 mins. This time was spent on one or more of the following: performing physical exam; counseling and coordination of care; obtaining or reviewing history; documenting in the medical record; reviewing/ordering tests, medications or procedures; communicating with other healthcare professionals and discussing with patient's family/caregivers.    Current Length of Stay: 1 day(s)  Current Patient Status: Inpatient   Certification Statement: The patient will continue to require additional inpatient hospital stay due to gi bleed  Discharge Plan: Anticipate discharge in 24-48 hrs to home.    Code Status: Level 1 - Full Code    Subjective:   Patient lates she feels better today.  She notes she is tolerating p.o.  Denies any nausea or vomiting.  She relates she was very constipated.  She initially had stomach pain from the constipation.  That has improved.  She notes she passed a very small, hard bowel movement with bright red blood on it.  She has just drank MiraLAX (was examined earlier today).  Denies any pain anywhere else.  Denies any sob/majano.  Denies  any n/v.  Denies any cp, sob/majano.  No other dizziness.    Objective:     Vitals:   Temp (24hrs), Av.1 °F (36.7 °C), Min:97.2 °F (36.2 °C), Max:99.3 °F (37.4 °C)    Temp:  [97.2 °F (36.2 °C)-99.3 °F (37.4 °C)] 97.2 °F (36.2 °C)  HR:  [66-81] 70  Resp:  [18-20] 20  BP: (123-138)/(54-61) 135/61  SpO2:  [94 %-97 %] 97 %  Body mass index is 40.7 kg/m².     Input and Output Summary (last 24 hours):     Intake/Output Summary (Last 24 hours) at 2024 1838  Last data filed at 2024 0830  Gross per 24 hour   Intake 840 ml   Output 800 ml   Net 40 ml       Physical Exam:   Physical Exam   Gen: pleasant female.  Nad  Heart: RRR no m/r/g  Lungs; CTA bilat.  No w/r/g  Abd: soft, nt/nd, nabs  Ext: no c/c/e.  2+pulses  Neuro: awake and alert.  Fluent speech.  Steady gait.    Additional Data:     Labs:  Results from last 7 days   Lab Units 24  1738 24  1137 24  0456 24  1612 24  1148   WBC Thousand/uL  --   --  7.61  --  7.83   HEMOGLOBIN g/dL 8.2*   < > 7.5*   < > 5.4*   HEMATOCRIT % 25.7*   < > 23.7*   < > 18.1*   PLATELETS Thousands/uL  --   --  87*  --  88*   BANDS PCT %  --   --   --   --  1   LYMPHO PCT %  --   --  18  --  16   MONO PCT %  --   --  4  --  5   EOS PCT %  --   --  0  --  0    < > = values in this interval not displayed.     Results from last 7 days   Lab Units 24  0456 24  1148 24  0339   SODIUM mmol/L 142   < > 141   POTASSIUM mmol/L 3.6   < > 3.3*   CHLORIDE mmol/L 110*   < > 103   CO2 mmol/L 26   < > 28   BUN mg/dL 37*   < > 50*   CREATININE mg/dL 1.53*   < > 1.99*   ANION GAP mmol/L 6   < > 10   CALCIUM mg/dL 8.2*   < > 8.9   ALBUMIN g/dL  --   --  3.2*   TOTAL BILIRUBIN mg/dL  --   --  2.10*   ALK PHOS U/L  --   --  66   ALT U/L  --   --  20   AST U/L  --   --  30   GLUCOSE RANDOM mg/dL 92   < > 122    < > = values in this interval not displayed.                       Lines/Drains:  Invasive Devices       Peripheral Intravenous Line  Duration              Long-Dwell Peripheral IV (Midline) 24 Right Brachial 1 day                      Telemetry:  Telemetry Orders (From admission, onward)               24 Hour Telemetry Monitoring  Continuous x 24 Hours (Telem)           Question:  Reason for 24 Hour Telemetry  Answer:  Arrhythmias requiring acute medical intervention / PPM or ICD malfunction                     Telemetry Reviewed: Normal Sinus Rhythm  Indication for Continued Telemetry Use: No indication for continued use. Will discontinue.            =========================================================    Imagin/16 chest x-ray  no acute cardiopulmonary disease. Suboptimal inspiratory effort.         CT abdomen/pelvis  No evidence of acute abdominopelvic process.  Colonic diverticulosis without diverticulitis    EGD:  The upper third of the esophagus, middle third of the esophagus and lower third of the esophagus appeared normal.  4 cm type I hiatal hernia  2 small angioectasias in the greater curve of the stomach; tissue was ablated with argon plasma coagulation  2 small angioectasias in the 3rd part of the duodenum; tissue was ablated with argon plasma coagulation  Lipoma noted in the distal 2nd portion of the duodenum  The duodenal bulb, 1st part of the duodenum and 2nd part of the duodenum appeared normal.     No active or stigmata of recent bleeding seen.   Resume diet and eliquis.   Can plan for outpatient colonoscopy to further evaluate for lower GI source of blood loss. If colonoscopy is unremarkable she would require video capsule endoscopy.     =========================================================    Recent Cultures (last 7 days):         Last 24 Hours Medication List:   Current Facility-Administered Medications   Medication Dose Route Frequency Provider Last Rate    acetaminophen  650 mg Oral Q4H PRN Larissa Aragon MD      amiodarone  200 mg Oral Daily With Breakfast Lynda Rush PA-C      budesonide  0.25 mg  Nebulization BID Lynda Rush PA-C      cyanocobalamin  1,000 mcg Oral Daily Larissa Aragon MD      docusate sodium  100 mg Oral BID Larissa Aragon MD      fluticasone-vilanterol  1 puff Inhalation Daily Lynda Rush PA-C      metoprolol tartrate  25 mg Oral Q12H JAIRON Lynda Rush PA-C      ondansetron  4 mg Intravenous Q6H PRN Lynda Rush PA-C      pantoprazole  40 mg Intravenous Q12H JAIRON Lynda Rush PA-C      polyethylene glycol  17 g Oral Daily Larissa Aragon MD      pravastatin  80 mg Oral Daily With Dinner Lynda Rush PA-C      sodium chloride (PF)  3 mL Intravenous Q1H PRN Lynda Rush PA-C          Today, Patient Was Seen By: Larissa Aragon MD    **Please Note: This note may have been constructed using a voice recognition system.**

## 2024-07-17 NOTE — OCCUPATIONAL THERAPY NOTE
Occupational Therapy Evaluation     Patient Name: Jayla Moody  Today's Date: 7/17/2024  Problem List  Principal Problem:    Symptomatic anemia  Active Problems:    Thrombocytopenia (HCC)    Essential hypertension    Non-small cell lung cancer (HCC)    Severe asthma    Chronic diastolic congestive heart failure (HCC)    Atrial flutter (HCC)    CKD (chronic kidney disease) stage 4, GFR 15-29 ml/min (HCC)    Melena    Prolonged Q-T interval on ECG    Past Medical History  Past Medical History:   Diagnosis Date    Allergic rhinitis     Anemia     Asthma     Atrial fibrillation (HCC)     Chronic bronchitis (HCC)     COPD (chronic obstructive pulmonary disease) (HCC)     Coronary artery disease     CPAP (continuous positive airway pressure) dependence     Degenerative joint disease     Fluid retention 2024    GERD (gastroesophageal reflux disease)     Glaucoma     Hypertension     Lumbar disc disease     Lung cancer (HCC)     Pelvis cancer (HCC)     Periodic heart flutter     Pneumonia     Premature atrial contractions 10/31/2017    Sleep apnea     suspected     Sleep apnea, obstructive     Ventricular arrhythmia     Vitamin D deficiency      Past Surgical History  Past Surgical History:   Procedure Laterality Date    APPENDECTOMY      BREAST BIOPSY Right     years ago    COLON SURGERY      COLONOSCOPY N/A 03/18/2019    Procedure: COLONOSCOPY;  Surgeon: Alessia Wilson DO;  Location: [facility];  Service: Gastroenterology    ESOPHAGOGASTRODUODENOSCOPY N/A 03/18/2019    Procedure: ESOPHAGOGASTRODUODENOSCOPY (EGD);  Surgeon: Alessia Wilson DO;  Location: [facility];  Service: Gastroenterology    KNEE SURGERY      LUNG BIOPSY      ROTATOR CUFF REPAIR      SHOULDER SURGERY        07/17/24 0850   Note Type   Note type Evaluation   Pain Assessment   Pain Assessment Tool 0-10   Pain Score 4   Pain Location/Orientation Orientation: Lower;Location: Back;Location: Abdomen   Restrictions/Precautions   Weight Bearing  "Precautions Per Order No   Other Precautions Fall Risk;Pain   Home Living   Type of Home House   Home Layout Two level;1/2 bath on main level;Bed/bath upstairs  (3 jesus with railing; stairglide)   Bathroom Shower/Tub Tub/shower unit   Bathroom Toilet Standard   Bathroom Equipment Grab bars in shower;Shower chair   Home Equipment Cane   Prior Function   Level of [place] Independent with ADLs;Independent with functional mobility;Independent with IADLS   Lives With Alone   Falls in the last 6 months 0   Comments PTA pt states independence with all aspects of her ADLs, transfers, ambulation--with SPC; neg falls, +   Lifestyle   Autonomy PTA pt states independence with all aspects of her ADLs, transfers, ambulation--with SPC; neg falls, +   Reciprocal Relationships supportive DIL   Service to Others homemaker   Intrinsic Gratification watching TV   Subjective   Subjective \"I'm feeling better today.\"   ADL   Where Assessed Edge of bed   Eating Assistance 6  Modified independent   Grooming Assistance 6  Modified Independent   UB Bathing Assistance 6  Modified Independent   LB Bathing Assistance 6  Modified Independent   UB Dressing Assistance 6  Modified independent   LB Dressing Assistance 6  Modified independent   Toileting Assistance  6  Modified independent   Bed Mobility   Rolling R 6  Modified independent   Rolling L 6  Modified independent   Supine to Sit 6  Modified independent   Sit to Supine 6  Modified independent   Transfers   Sit to Stand 7  Independent   Stand to Sit 7  Independent   Additional Comments SPo2=86-95% on RA(with activity--at rest); bp's=138/58(supine/EOB), 138/61(standing)   Functional Mobility   Functional Mobility 5  Supervision   Additional items SPC   Balance   Static Sitting Good   Dynamic Sitting Fair +   Static Standing Fair +   Dynamic Standing Fair   RUE Assessment   RUE Assessment X  (limited shr AROM(i.e.flex=50-60*);elbow-distal=WFLs; pt states hx shoulder \"problems\") "   RUE Strength   RUE Overall Strength   (shr=3-/5, elbow-distal=3+/5)   LUE Assessment   LUE Assessment WFL   LUE Strength   LUE Overall Strength Within Functional Limits - able to perform ADL tasks with strength  (4/5 throughout)   Hand Function   Gross Motor Coordination Functional   Fine Motor Coordination Functional   Sensation   Light Touch No apparent deficits   Proprioception   Proprioception No apparent deficits   Vision-Basic Assessment   Current Vision Wears glasses all the time   Vision - Complex Assessment   Acuity Able to read clock/calendar on wall without difficulty   Psychosocial   Psychosocial (WDL) WDL   Perception   Inattention/Neglect Appears intact   Cognition   Overall Cognitive Status WFL   Arousal/Participation Alert   Attention Within functional limits   Orientation Level Oriented X4   Memory Within functional limits   Following Commands Follows all commands and directions without difficulty   Assessment   Limitation Decreased UE ROM;Decreased UE strength;Decreased endurance;Decreased high-level ADLs   Prognosis Good   Assessment Pt is a 80y/o female admitted to the hospital 2* symptoms of melena x 2wks. Pt noted with low hemoglobin levels(i.e.4.3), requiring a transfusion. Pt noted with symptomatic anemia; s/p EGD(7/16), CT(abd)=neg acute findings. Pt with PMH anemia, A-fib, COPD, HTN, lung Ca with bone mets, CAD, DJD, s/p RTC repain, knee sx. PTA pt states independence with all aspects of her ADLs, transfers, ambulation--with SPC; neg falls, +. During initial eval, pt demonstrated slight deficits with her functional balance, activity tolerance, and R shr AROM. Pt was able to demonstrate good ADL status and states being close to her functional baseline. Pt would benefit from a restorative program on the unit to improve her overall endurance, balance, and mobility. Acute OT tx not indicated at this time 2* limited ADL deficits. The patient's raw score on the AM-PAC Daily Activity  "Inpatient Short Form is 22. A raw score of greater than or equal to 19 suggests the patient may benefit from discharge to home. Please refer to the recommendation of the Occupational Therapist for safe discharge planning.   Goals   Patient Goals \"to go home\"   Plan   OT Frequency Eval only   Discharge Recommendation   Rehab Resource Intensity Level, OT III (Minimum Resource Intensity)  (OPPT ?)   Equipment Recommended   (RW)   AM-PAC Daily Activity Inpatient   Lower Body Dressing 3   Bathing 3   Toileting 4   Upper Body Dressing 4   Grooming 4   Eating 4   Daily Activity Raw Score 22   Daily Activity Standardized Score (Calc for Raw Score >=11) 47.1   AM-PAC Applied Cognition Inpatient   Following a Speech/Presentation 4   Understanding Ordinary Conversation 4   Taking Medications 4   Remembering Where Things Are Placed or Put Away 4   Remembering List of 4-5 Errands 4   Taking Care of Complicated Tasks 4   Applied Cognition Raw Score 24   Applied Cognition Standardized Score 62.21   Arturo Graham       " Yes

## 2024-07-17 NOTE — PLAN OF CARE
Problem: Prexisting or High Potential for Compromised Skin Integrity  Goal: Skin integrity is maintained or improved  Description: INTERVENTIONS:  - Identify patients at risk for skin breakdown  - Assess and monitor skin integrity  - Assess and monitor nutrition and hydration status  - Monitor labs   - Assess for incontinence   - Turn and reposition patient  - Assist with mobility/ambulation  - Relieve pressure over bony prominences  - Avoid friction and shearing  - Provide appropriate hygiene as needed including keeping skin clean and dry  - Evaluate need for skin moisturizer/barrier cream  - Collaborate with interdisciplinary team   - Patient/family teaching  - Consider wound care consult   Outcome: Progressing     Problem: PAIN - ADULT  Goal: Verbalizes/displays adequate comfort level or baseline comfort level  Description: Interventions:  - Encourage patient to monitor pain and request assistance  - Assess pain using appropriate pain scale  - Administer analgesics based on type and severity of pain and evaluate response  - Implement non-pharmacological measures as appropriate and evaluate response  - Consider cultural and social influences on pain and pain management  - Notify physician/advanced practitioner if interventions unsuccessful or patient reports new pain  Outcome: Progressing     Problem: DISCHARGE PLANNING  Goal: Discharge to home or other facility with appropriate resources  Description: INTERVENTIONS:  - Identify barriers to discharge w/patient and caregiver  - Arrange for needed discharge resources and transportation as appropriate  - Identify discharge learning needs (meds, wound care, etc.)  - Arrange for interpretive services to assist at discharge as needed  - Refer to Case Management Department for coordinating discharge planning if the patient needs post-hospital services based on physician/advanced practitioner order or complex needs related to functional status, cognitive ability, or  social support system  Outcome: Progressing     Problem: Knowledge Deficit  Goal: Patient/family/caregiver demonstrates understanding of disease process, treatment plan, medications, and discharge instructions  Description: Complete learning assessment and assess knowledge base.  Interventions:  - Provide teaching at level of understanding  - Provide teaching via preferred learning methods  Outcome: Progressing     Problem: METABOLIC, FLUID AND ELECTROLYTES - ADULT  Goal: Electrolytes maintained within normal limits  Description: INTERVENTIONS:  - Monitor labs and assess patient for signs and symptoms of electrolyte imbalances  - Administer electrolyte replacement as ordered  - Monitor response to electrolyte replacements, including repeat lab results as appropriate  - Instruct patient on fluid and nutrition as appropriate  Outcome: Progressing  Goal: Fluid balance maintained  Description: INTERVENTIONS:  - Monitor labs   - Monitor I/O and WT  - Instruct patient on fluid and nutrition as appropriate  - Assess for signs & symptoms of volume excess or deficit  Outcome: Progressing     Problem: SKIN/TISSUE INTEGRITY - ADULT  Goal: Skin Integrity remains intact(Skin Breakdown Prevention)  Description: Assess:  -Perform Rubén assessment every q 12 hours  -Clean and moisturize skin every day  -Inspect skin when repositioning, toileting, and assisting with ADLS  -Assess extremities for adequate circulation and sensation     Bed Management:  -Have minimal linens on bed & keep smooth, unwrinkled  -Change linens as needed when moist or perspiring    Toileting:  -Offer bedside commode    Activity:  -Mobilize patient 3 times a day  -Encourage activity and walks on unit  -Encourage or provide ROM exercises   -Turn and reposition patient every 2 Hours  -Use appropriate equipment to lift or move patient in bed    Skin Care:  -Avoid use of baby powder, tape, friction and shearing, hot water or constrictive clothing        Outcome:  Progressing  Goal: Incision(s), wounds(s) or drain site(s) healing without S/S of infection  Description: INTERVENTIONS  - Assess and document dressing, incision, wound bed, drain sites and surrounding tissue  - Provide patient and family education  - Perform skin care/dressing changes every   Outcome: Progressing  Goal: Pressure injury heals and does not worsen  Description: Interventions:  - Implement low air loss mattress or specialty surface (Criteria met)  - Apply silicone foam dressing  - Apply fecal or urinary incontinence containment device     - Turn and reposition patient & offload bony prominences every 2 hours   - Utilize friction reducing device or surface for transfers       - Consider nutrition services referral as needed  Outcome: Progressing     Problem: HEMATOLOGIC - ADULT  Goal: Maintains hematologic stability  Description: INTERVENTIONS  - Assess for signs and symptoms of bleeding or hemorrhage  - Monitor labs  - Administer supportive blood products/factors as ordered and appropriate  Outcome: Progressing

## 2024-07-17 NOTE — PHYSICAL THERAPY NOTE
PHYSICAL THERAPY EVALUATION     NAME:  Jayla Moody  DATE: 07/17/24    AGE:   79 y.o.  Mrn:   [id number]  ADMIT DX:  Melena [K92.1]  Chest pain [R07.9]  Anemia [D64.9]  CARLOS ENRIQUE (acute kidney injury) (HCC) [N17.9]    Patient Active Problem List   Diagnosis    Thrombocytopenia (HCC)    Essential hypertension    Hiatal hernia    Gastroesophageal reflux disease without esophagitis    Chronic gastritis without bleeding    Chronic rhinitis    MONO (obstructive sleep apnea)    Non-small cell lung cancer (HCC)    Malignant neoplasm metastatic to bone (HCC)    Severe asthma    Depression, recurrent (HCC)    Thrombocytopenia, unspecified (HCC)    Coronary artery disease involving native coronary artery of native heart without angina pectoris    Chronic diastolic congestive heart failure (HCC)    Atrial flutter (HCC)    Cancer-related pain    Class 2 severe obesity due to excess calories with serious comorbidity and body mass index (BMI) of 39.0 to 39.9 in adult (HCC)    CKD (chronic kidney disease) stage 4, GFR 15-29 ml/min (HCC)    Melena    Symptomatic anemia    Prolonged Q-T interval on ECG       Past Medical History:   Diagnosis Date    Allergic rhinitis     Anemia     Asthma     Atrial fibrillation (HCC)     Chronic bronchitis (HCC)     COPD (chronic obstructive pulmonary disease) (HCC)     Coronary artery disease     CPAP (continuous positive airway pressure) dependence     Degenerative joint disease     Fluid retention 2024    GERD (gastroesophageal reflux disease)     Glaucoma     Hypertension     Lumbar disc disease     Lung cancer (HCC)     Pelvis cancer (HCC)     Periodic heart flutter     Pneumonia     Premature atrial contractions 10/31/2017    Sleep apnea     suspected     Sleep apnea, obstructive     Ventricular arrhythmia     Vitamin D deficiency        Past Surgical History:   Procedure Laterality Date    APPENDECTOMY      BREAST BIOPSY Right     years ago    COLON SURGERY      COLONOSCOPY N/A 03/18/2019     Procedure: COLONOSCOPY;  Surgeon: Alessia Wilson DO;  Location: [facility];  Service: Gastroenterology    ESOPHAGOGASTRODUODENOSCOPY N/A 03/18/2019    Procedure: ESOPHAGOGASTRODUODENOSCOPY (EGD);  Surgeon: Alessia Wilson DO;  Location: [facility];  Service: Gastroenterology    KNEE SURGERY      LUNG BIOPSY      ROTATOR CUFF REPAIR      SHOULDER SURGERY         Imaging Studies:  X-ray chest 1 view portable   ED Interpretation by Abimael Lee DO (07/16 0545)   No acute cardiopulmonary disease      Final Result by David Garcia MD (07/16 0912)      No acute cardiopulmonary disease. Suboptimal inspiratory effort.            Workstation performed: [id number]         CT abdomen pelvis wo contrast   Final Result by Monster Christie MD (07/16 0533)      No evidence of acute abdominopelvic process.      Colonic diverticulosis without diverticulitis.      Chronic findings and negatives as above.      Workstation performed: [id number]             Past Medical History:   Diagnosis Date    Allergic rhinitis     Anemia     Asthma     Atrial fibrillation (HCC)     Chronic bronchitis (HCC)     COPD (chronic obstructive pulmonary disease) (HCC)     Coronary artery disease     CPAP (continuous positive airway pressure) dependence     Degenerative joint disease     Fluid retention 2024    GERD (gastroesophageal reflux disease)     Glaucoma     Hypertension     Lumbar disc disease     Lung cancer (HCC)     Pelvis cancer (HCC)     Periodic heart flutter     Pneumonia     Premature atrial contractions 10/31/2017    Sleep apnea     suspected     Sleep apnea, obstructive     Ventricular arrhythmia     Vitamin D deficiency      Length Of Stay: 1  Performed at least 2 patient identifiers during session: Name and Birthday  PHYSICAL THERAPY EVALUATION :        07/17/24 1516   PT Last Visit   PT Visit Date 07/17/24   Note Type   Note type Evaluation  (and treatment)   Additional Comments Greeted supine in bed,  "amenable to PT session.   Pain Assessment   Pain Assessment Tool 0-10   Pain Score No Pain   Restrictions/Precautions   Weight Bearing Precautions Per Order No   Other Precautions Fall Risk;Hard of hearing  (hearing aids not present)   Home Living   Type of Home House   Home Layout Two level;1/2 bath on main level;Bed/bath upstairs;Stairs to enter with rails  (3 jesus with L railing; stairglide to second)   Bathroom Shower/Tub Tub/shower unit   Bathroom Toilet Standard   Bathroom Equipment Grab bars in shower;Shower chair   Home Equipment Cane   Additional Comments Lives alone-Reports leaving home mainly for medical appointments and grocery shopping   Prior Function   Level of [place] Independent with ADLs;Independent with functional mobility;Independent with IADLS   Lives With Alone   Falls in the last 6 months 0   Comments PTA, pt reports functioning at mod I w/ SPC for functional mobility and transfers, independent w/ ADLs, (+) drive, (-) falls. Lives alone, however supportive DIL.   General   Family/Caregiver Present No   Cognition   Overall Cognitive Status WFL   Arousal/Participation Alert   Attention Within functional limits   Orientation Level Oriented X4   Memory Within functional limits   Following Commands Follows all commands and directions without difficulty   Subjective   Subjective \"I am doing better. I get tired easily. Sometimes my oxygen goes down to 86 because of the hemoglobin levels.\"   RLE Assessment   RLE Assessment WFL  (Hip flx limited by body habitus, knee ext 4-/5, ankle 4/5)   LLE Assessment   LLE Assessment WFL  (Hip flx limited by body habitus, knee ext 4-/5, ankle 4/5)   Coordination   Sensation WFL   Bed Mobility   Rolling R 6  Modified independent   Rolling L 6  Modified independent   Supine to Sit 6  Modified independent   Sit to Supine 6  Modified independent   Transfers   Sit to Stand 7  Independent   Stand to Sit 7  Independent   Additional Comments Education provided on not " using external objects on wheels to support w/ transfer. SPC   Ambulation/Elevation   Gait pattern Improper Weight shift;Wide SUSIE;Decreased foot clearance;Short stride;Step through pattern  (inc medial/lateral sway)   Gait Assistance 5  Supervision   Additional items Assist x 1;Verbal cues   Assistive Device SPC   Distance 40'x1   Stair Management Assistance 5  Supervision   Additional items Assist x 1;Increased time required;Verbal cues   Stair Management Technique Nonreciprocal;With cane   Number of Stairs 3   Ambulation/Elevation Additional Comments semi-steady step through patterning w/o gross LOB. Cues for SPC placement, gait mechanics. Pt stating she often trips/kicks cane at baseline. Stairs w/ cues for proper sequencing w/ SPC. Dec activity tolerance 2/2 fatigue w/ min BERTRAND. SpO2 90-95% (w/ activity vs at rest.) Educated on activity pacing, breathing techniques/talk test. Able to recall sequencing for stairs   Balance   Static Sitting Good   Dynamic Sitting Fair +   Static Standing Fair +   Dynamic Standing Fair   Ambulatory Fair -   Endurance Deficit   Endurance Deficit Yes   Endurance Deficit Description (S)  Limited ambulatory distances; fatigue w/ min BERTRAND. SpO2 90-95% (w/ activity vs at rest.) Educated on activity pacing, breathing techniques/talk test.   Activity Tolerance   Activity Tolerance Patient tolerated treatment well;Patient limited by fatigue   Medical Staff Made Aware RN cleared/updated   Nurse Made Aware Claudia   Assessment   Prognosis Good   Problem List Decreased strength;Decreased endurance;Impaired balance;Decreased mobility;Impaired hearing;Obesity   Assessment Pt is a 79 y.o. female y/o presenting to [facility] on 7/16/2024 with melena x2 weeks. Hospital stay w/ low hgb (I.e. 4.3) requiring transfusions. S/p EGD (7/16), CT abdomen (-) acute findings. Primary dx: Symptomatic anemia. Significant pmhx per chart: anemia, A-fib/a-flutter, COPD, obesity, HTN, lung Ca with bone mets  "and CA related pain, CAD, DJD, s/p RTC repain, knee sgx, h/o hiatal hernia, depression, asthma. PT consulted to assess strength/functional mobility, activity tolerance and d/c needs. Active PT orders and activity orders for Up and OOB as tolerated . PTA, pt reports functioning at mod I w/ SPC for functional mobility and transfers, independent w/ ADLs, (+) drive, (-) falls. Lives alone, however supportive DIL.      During PT IE, pt presenting with above (see flowsheet) outlined functional impairments including dec strength, balance, gait, and deficits that limit functional mobility and activity tolerance relative to baseline. Pt currently requires mod I for bed mobility, independent for transfers w/ safety cues, supervision for ambulation with SPC for limited household distances, and supervision for elevations. Pt most limited 2/2 overall dec activity tolerance w/ dec SpO2 to 90% on RA w/ min BERTRAND during activity requiring rest breaks. Pt currently demonstrating inc fall risk. Fall risk education provided with verbal understanding. Nsg staff most recent vital signs as follows: /59 (BP Location: Left arm)   Pulse 67   Temp 97.8 °F (36.6 °C) (Oral)   Resp 18   Wt 97.7 kg (215 lb 6.2 oz)   SpO2 95%   BMI 40.70 kg/m² . Denied additional sxs throughout session. Recommend continued mobilization of pt with nsg staff as tolerated to prevent further decline in function. Pt will benefit from continued PT services to progress mobility independence necessary for return to PLOF. Based on pt presentation and impairments, pt would most appropriately benefit from d/c to home with level III (min) rehab intensity resources  pending progress. Refer to additional tx session assessment below for rollator trial.   Barriers to Discharge None   Goals   Patient Goals \"go home\"   STG Expiration Date 07/31/24   Short Term Goal #1 2)  Perform all transfers with Zeke demonstrating safe and appropriate technique 100% of the time in " order to improve ability to negotiate safely in home environment.3) Amb with least restrictive AD > 75'x1 with mod I in order to demonstrate ability to negotiate in home environment. 4)  Improve overall strength and balance 1/2 grade in order to optimize ability to perform functional tasks and reduce fall risk.5) Increase activity tolerance to 45 minutes in order to improve endurance to functional tasks. 6)  Negotiate >/= 4 stairs using most appropriate technique and mod I in order to be able to negotiate safely in home environment. 7) PT for ongoing patient and family/caregiver education, DME needs and d/c planning in order to promote highest level of function in least restrictive environment.   PT Treatment Day 0   Plan   Treatment/Interventions Functional transfer training;LE strengthening/ROM;Therapeutic exercise;Elevations;Endurance training;Equipment eval/education;Gait training;Compensatory technique education;Spoke to nursing;Spoke to case management;OT   PT Frequency 2-3x/wk  (w/ restorative)   Discharge Recommendation   Rehab Resource Intensity Level, PT (S)  III (Minimum Resource Intensity)  (PT vs. possible cardiopulmonary rehab?)   Equipment Recommended Other (Comment);Walker  (Rollator)   Walker Package Recommended Rollator   Change/add to Walker Package? No   Additional Comments The patient's AM-PAC Basic Mobility Inpatient Short Form Raw Score is 21. A Raw score of greater than 16 suggests the patient may benefit from discharge to home. Please also refer to the recommendation of the Physical Therapist for safe discharge planning.   AM-PAC Basic Mobility Inpatient   Turning in Flat Bed Without Bedrails 4   Lying on Back to Sitting on Edge of Flat Bed Without Bedrails 4   Moving Bed to Chair 3   Standing Up From Chair Using Arms 4   Walk in Room 3   Climb 3-5 Stairs With Railing 3   Basic Mobility Inpatient Raw Score 21   Basic Mobility Standardized Score 45.55   [facility] Highest Level Of Mobility    JH-HLM Goal 6: Walk 10 steps or more   JH-HLM Achieved 7: Walk 25 feet or more   Additional Treatment Session   Start Time 1504   End Time 1516   Treatment Assessment Additional tx session following IE w/ focus on rollator trial, functional transfers, gait training, and pt education. Fair tolerance to session, however most limited 2/2 fatigue requiring seated rest breaks w/ dec SpO2 on RA. Progressing towards functional goals w/ ability to safely navigate limited household distances w/ use of rollator required for safe d/c to home. Pt able to recall safety cues for brakes and transfers. Verbal understanding of education using demo/teachback. Improved stability w/ rollator. Pt w/ slight deficits in balance, strength and overall endurance that limit activity tolerance and will continue to benefit from skilled PT while in hospital to maximize function and promote safe transition to home. Pt stating no c/f home when medically appropriate.   Equipment Use Rollator edu: proper use of brakes, safe transfer technique, use of rollator for temporary rest breaks NOT as full time chair, placement against wall vs awareness to prevent rollator from moving during transfer impacting fall risk, assistance to transport up/down stairs, activity pacing/sx self-monitoring. 1 STS transfer from bed w/ mod I to rollator. 3 STS w/ rollator initially supervision progressing to mod I. Cues for controlled descent and positioning to prevent translation of rollator posteriorly during descent. Amb 30'x2 initially supervision progressing to mod I w/ rollator requiring seated rest break. Steady, step through patterning. Spo2 90-95%. Returned seated at EOB end of session. stable. RN aware.   Additional Treatment Day 1   End of Consult   Patient Position at End of Consult Seated edge of bed;All needs within reach;Other (comment)  (Pt stable)       (Please find full objective findings from PT assessment regarding body systems outlined above).      Hx/personal factors: depression, step(s) to enter environment, multi-level environment, limited home support, advanced age, and use of more restrictive AD, co-morbidities, inaccessible home, home alone, advanced age, use of AD, fall risk, and obesity,   Examination: assessed/impairments of systems including multiple body structures involved; dec mobility, dec balance, dec endurance, dec amb, risk for falls, impairements in locomotion, musculoskeletal, balance, endurance, posture, coordination, assessed cognition, vitals, AM-PAC score suggesting inc assistance/supervision vs baseline, impaired judgment/safety awareness/impulsivity, gait deviations , dec activity tolerance/endurance vs baseline ,   Clinical: unpredictable (ongoing medical status, abnormal lab values, risk for falls, and need for input for mobility technique/safety)  Complexity: moderate     Navdeep Santos, PT,DPT   07/17/24

## 2024-07-17 NOTE — ASSESSMENT & PLAN NOTE
"Lab Results   Component Value Date    EGFR 32 07/17/2024    EGFR 26 07/16/2024    EGFR 23 07/16/2024    CREATININE 1.53 (H) 07/17/2024    CREATININE 1.81 (H) 07/16/2024    CREATININE 1.99 (H) 07/16/2024     Patient has chronic kidney disease stage IIIB-IV with baseline creatinine that appears to range 1.5-1.9  She follows with [facility] nephrology, Dr. 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

## 2024-07-17 NOTE — ASSESSMENT & PLAN NOTE
Patient follows with [facility]'s cardiology for atrial flutter, CHF, mitral stenosis, hypertension hyperlipidemia  Was recently seen in the office 5/24: Nuclear stress and echocardiogram ordered--currently pending  Continue amiodarone 200 mg daily, metoprolol 25 mg twice daily  Eliquis placed on hold due to hemoglobin of 4  Patient underwent GI workup with EGD without evidence of active bleeding: GI notes her Eliquis may be restarted discharge patient with rectal bleeding today: will hold an additional day and monitor

## 2024-07-17 NOTE — UTILIZATION REVIEW
Initial Clinical Review    Admission: Date/Time/Statement:   Admission Orders (From admission, onward)       Ordered        07/16/24 0619  INPATIENT ADMISSION  Once                          Orders Placed This Encounter   Procedures    INPATIENT ADMISSION     Standing Status:   Standing     Number of Occurrences:   1     Order Specific Question:   Level of Care     Answer:   Med Surg [16]     Order Specific Question:   Estimated length of stay     Answer:   More than 2 Midnights     Order Specific Question:   Certification     Answer:   I certify that inpatient services are medically necessary for this patient for a duration of greater than two midnights. See H&P and MD Progress Notes for additional information about the patient's course of treatment.     ED Arrival Information       Expected   -    Arrival   7/16/2024 03:09    Acuity   Urgent              Means of arrival   Ambulance    Escorted by   [facility] ([facility])    Service   Hospitalist    Admission type   Emergency              Arrival complaint   -             Chief Complaint   Patient presents with    Chest Pain     Pt arrived via EMS. Pt states she got R sided chest pain and R sided back pain that woke her up out of her sleep. Pt states she got up to go to the bathroom, got dizzy, so instead she sat her in chair in the bedroom. Pt states chest and back pain went away in ambulance when they put 2 L O2 on pt for comfort. Pt denies HA, SOB, or dizziness. Pt is poor historian. States she goes to cardiologist but doesn't know what for exactly.        Initial Presentation: 79 y.o. female presents to the ED via EMS from home with c/o melena, SOB, fatigue, x 2 wks, intemittent lower abd pain, progressive BERTRAND, fatigues, occasional lightheadedness, presyncope,  palpitations interfering with ADLs.  PMH: A flutter on eliquis, NSC lung CA, HTN, chronic dHF.  In the ED NS stable, painfree.  Labs - Hgb 4.3, elevated BUN/Cr, BNP.  Imaging - no acute CP  disease, chronic diverticulosis w/o diverticulitis.  ECG - NSR. Treated with IV Protonix x 2, PO KCL.  On exam no abd pain, normal lung sounds.  Admitted to INPATIENT status with Symptomatic anemia, Melena, thrombocytopenia - transfuse 2 U PRBCs, GI consult, PPI, hemolysis panel with LDH and haptoglobin, hold Eliquis, Lasix, continue BB.  POST ADMIT - Pt has PICC line insertion today and had EGD which was negative for acute bleed.     Anticipated Length of Stay/Certification Statement:  Patient will be admitted on an inpatient basis with an anticipated length of stay of greater than 2 midnights secondary to GI Bleeding.     7/16 GI Consult - fatigue and melena, worsening SOB, dark, tarry stools x several wks, hgb 4.3 with transfusion, diverticulosis on imaging.  UGI bleed poss d/t PUD, AVM,  will do EGD, BID PPI.      7/16 Oncology Consult - Metastatic non-small cell lung cancer: ALK positive, well-controlled with alectinib 600 mg BID which can be placed on hold w/ restart on d/c.  Anemia likely d/t UGI Bleed. Hemolysis workup.  Alectinib can cause hemolytic anemia as SE, start oral B12 supplements.     Date: 7/17   Day 2:   Anemia - Hgb today is 8.2.  VS stable.  Notes she is feeling better.  No other BRBPR today.  No abd pain.  On exam abd soft, nontender, normal bowel sounds.  Hgb 7.4.  BUN/CR trending down.  Worked with therapy today, mobility score 21/16.  Likely home with [facility] services.      ED Triage Vitals   Temperature Pulse Respirations Blood Pressure SpO2 Pain Score   07/16/24 0317 07/16/24 0317 07/16/24 0317 07/16/24 0317 07/16/24 0317 07/16/24 0912   98.7 °F (37.1 °C) 75 20 142/65 96 % No Pain     Weight (last 2 days)       None            Vital Signs (last 3 days)       Date/Time Temp Pulse Resp BP MAP (mmHg) SpO2 Calculated FIO2 (%) - Nasal Cannula Nasal Cannula O2 Flow Rate (L/min) O2 Device O2 Interface Device Patient Position - Orthostatic VS Pain    07/19/24 0920 -- -- -- -- -- 95 % -- -- None  (Room air) -- -- 3    07/19/24 0717 -- 59 -- 153/71 98 100 % -- -- None (Room air) -- Sitting --    07/19/24 0716 97.4 °F (36.3 °C) 61 16 -- -- 100 % -- -- -- -- -- --    07/19/24 0709 -- -- -- -- -- -- -- -- None (Room air) -- -- --    07/19/24 0418 -- -- -- -- -- 100 % -- -- -- Full face mask -- --    07/19/24 0001 -- -- -- -- -- 98 % -- -- CPAP -- -- --    07/18/24 2338 -- -- -- -- -- 99 % -- -- -- Full face mask -- --    07/18/24 22:14:07 -- 70 -- 129/60 83 95 % -- -- -- -- -- --    07/18/24 2212 -- 71 -- 129/60 -- -- -- -- -- -- -- --    07/18/24 21:13:39 98 °F (36.7 °C) 70 18 132/60 84 96 % -- -- -- -- -- --    07/18/24 2100 -- -- -- -- -- -- -- -- -- -- -- No Pain    07/18/24 1955 -- 66 18 -- -- 100 % -- -- None (Room air) -- -- --    07/18/24 15:08:59 97.9 °F (36.6 °C) 68 -- 132/59 83 98 % -- -- -- -- -- --    07/18/24 0711 -- -- -- -- -- -- -- -- None (Room air) -- -- --    07/18/24 07:05:31 98 °F (36.7 °C) 69 18 134/60 85 97 % -- -- -- -- -- --    07/17/24 21:26:36 97.9 °F (36.6 °C) 67 18 136/60 85 93 % -- -- -- -- -- --    07/17/24 2113 -- 70 -- 137/59 -- -- -- -- -- -- -- --    07/17/24 2009 -- -- -- -- -- 96 % -- -- -- -- -- --    07/17/24 1942 -- -- -- -- -- 94 % -- -- -- -- -- --    07/17/24 1931 -- -- -- -- -- -- -- -- None (Room air) -- -- No Pain    07/17/24 19:06:12 98.1 °F (36.7 °C) 72 20 138/96 110 97 % -- -- -- -- -- --    07/17/24 15:25:48 97.2 °F (36.2 °C) 70 20 135/61 86 97 % -- -- -- -- -- --    07/17/24 1516 -- -- -- -- -- -- -- -- -- -- -- No Pain    07/17/24 11:38:10 97.8 °F (36.6 °C) 67 18 131/59 83 95 % -- -- None (Room air) -- Sitting --    07/17/24 09:02:58 -- 81 -- 138/61 87 96 % -- -- -- -- -- --    07/17/24 09:01:06 -- 77 -- 135/58 84 95 % -- -- -- -- -- --    07/17/24 08:59:24 -- 72 -- 138/58 85 95 % -- -- -- -- -- --    07/17/24 0853 -- -- -- -- -- -- -- -- None (Room air) -- -- 3    07/17/24 0850 -- -- -- -- -- -- -- -- -- -- -- 4    07/17/24 07:54:21 98.2 °F (36.8 °C) 66 20  126/56 79 94 % -- -- -- -- -- --    07/17/24 0732 -- -- -- -- -- -- -- -- None (Room air) -- -- --    07/16/24 23:02:25 98 °F (36.7 °C) 72 20 123/54 77 95 % -- -- None (Room air) -- Lying --    07/16/24 21:01:30 -- 70 -- 127/55 79 94 % -- -- -- -- -- --    07/16/24 2000 -- -- -- -- -- -- -- -- None (Room air) -- -- No Pain    07/16/24 1957 -- -- -- -- -- 96 % -- -- -- -- -- --    07/16/24 19:46:22 99.3 °F (37.4 °C) 70 20 133/60 84 -- -- -- -- -- -- --    07/16/24 16:06:13 98.1 °F (36.7 °C) 69 16 133/61 85 95 % -- -- -- -- -- --    07/16/24 1549 -- 65 18 140/66 -- 96 % -- -- None (Room air) -- -- No Pain    07/16/24 1534 -- 72 18 133/62 -- 97 % -- -- None (Room air) -- -- No Pain    07/16/24 1434 98.2 °F (36.8 °C) 67 18 141/61 -- 97 % -- -- None (Room air) -- -- No Pain    07/16/24 14:00:34 98.7 °F (37.1 °C) 68 18 132/59 83 97 % -- -- -- -- -- --    07/16/24 1400 98.7 °F (37.1 °C) 68 18 132/59 -- 97 % -- -- None (Room air) -- -- --    07/16/24 12:07:01 98.4 °F (36.9 °C) 69 17 134/53 80 96 % -- -- -- -- -- --    07/16/24 11:52:12 98.3 °F (36.8 °C) 69 16 137/58 84 95 % -- -- -- -- -- --    07/16/24 1100 -- 72 -- 141/58 -- -- -- -- -- -- Standing - Orthostatic VS --    07/16/24 1059 -- 76 -- 134/56 -- -- -- -- -- -- Sitting - Orthostatic VS --    07/16/24 1058 -- 76 -- 153/65 -- -- -- -- -- -- Lying - Orthostatic VS --    07/16/24 1047 98.1 °F (36.7 °C) 72 16 153/65 -- 100 % 28 2 L/min Nasal cannula -- -- --    07/16/24 1010 97.9 °F (36.6 °C) 72 -- -- -- 100 % -- -- -- -- -- --    07/16/24 0914 -- 71 20 149/49 82 100 % -- -- -- -- -- --    07/16/24 0912 -- -- -- -- -- -- -- -- -- -- -- No Pain    07/16/24 0849 98.2 °F (36.8 °C) 72 16 121/55 -- 100 % 28 2 L/min Nasal cannula -- -- --    07/16/24 0833 98.3 °F (36.8 °C) 70 16 121/55 -- 100 % 28 2 L/min Nasal cannula -- -- --    07/16/24 0820 98.1 °F (36.7 °C) 71 16 121/53 -- 100 % 28 2 L/min Nasal cannula -- -- --    07/16/24 0515 -- 72 20 139/65 94 96 % -- -- None (Room  air) -- Lying --    07/16/24 0317 98.7 °F (37.1 °C) 75 20 142/65 -- 96 % -- -- None (Room air) -- Lying --              Pertinent Labs/Diagnostic Test Results:   Radiology:  X-ray chest 1 view portable   ED Interpretation by Abimael Lee DO (07/16 0279)   No acute cardiopulmonary disease      Final Interpretation by David Garcia MD (07/16 1912)      No acute cardiopulmonary disease. Suboptimal inspiratory effort.            Workstation performed: [id number]         CT abdomen pelvis wo contrast   Final Interpretation by Monster Christie MD (07/16 9425)      No evidence of acute abdominopelvic process.      Colonic diverticulosis without diverticulitis.      Chronic findings and negatives as above.      Workstation performed: [id number]           Cardiology:  ECG 12 lead   Final Result by Osmar Frias MD (07/16 1450)   Normal sinus rhythm   Prolonged QT   Abnormal ECG   When compared with ECG of 16-JUL-2024 07:39,   No significant change was found   Confirmed by Osmar Frias ([id number]) on 7/16/2024 2:50:53 PM        GI:  EGD   Final Result by Diana M Jaiyeola, MD (07/16 1533)   The upper third of the esophagus, middle third of the esophagus and lower    third of the esophagus appeared normal.   4 cm type I hiatal hernia   2 small angioectasias in the greater curve of the stomach; tissue was    ablated with argon plasma coagulation   2 small angioectasias in the 3rd part of the duodenum; tissue was ablated    with argon plasma coagulation   Lipoma noted in the distal 2nd portion of the duodenum   The duodenal bulb, 1st part of the duodenum and 2nd part of the duodenum    appeared normal.         RECOMMENDATION:   No active or stigmata of recent bleeding seen.    Resume diet and eliquis.    Can plan for outpatient colonoscopy to further evaluate for lower GI    source of blood loss. If colonoscopy is unremarkable she would require    video capsule endoscopy.            Results  from last 7 days   Lab Units 07/19/24  0503 07/18/24  0559 07/17/24  1738 07/17/24  1137 07/17/24  0456 07/16/24  1612 07/16/24  1148   WBC Thousand/uL 5.89 6.16  --   --  7.61  --  7.83   HEMOGLOBIN g/dL 7.8* 7.4* 8.2* 8.2* 7.5*   < > 5.4*   HEMATOCRIT % 25.5* 23.9* 25.7* 25.9* 23.7*   < > 18.1*   PLATELETS Thousands/uL 97* 88*  --   --  87*  --  88*   BANDS PCT % 2  --   --   --   --   --  1    < > = values in this interval not displayed.     Results from last 7 days   Lab Units 07/16/24  0339   RETIC CT ABS  175,700*   RETIC CT PCT % 12.12*     Results from last 7 days   Lab Units 07/19/24  0503 07/18/24  0559 07/17/24  0456 07/16/24  1148 07/16/24  0339   SODIUM mmol/L 141 141 142 141 141   POTASSIUM mmol/L 4.1 3.7 3.6 3.5 3.3*   CHLORIDE mmol/L 109* 109* 110* 106 103   CO2 mmol/L 27 28 26 31 28   ANION GAP mmol/L 5 4 6 4 10   BUN mg/dL 26* 31* 37* 48* 50*   CREATININE mg/dL 1.27 1.48* 1.53* 1.81* 1.99*   EGFR ml/min/1.73sq m 40 33 32 26 23   CALCIUM mg/dL 8.1* 7.8* 8.2* 8.4 8.9   MAGNESIUM mg/dL  --   --   --  2.0  --      Results from last 7 days   Lab Units 07/16/24  0339 07/13/24  0842   AST U/L 30 38   ALT U/L 20 25   ALK PHOS U/L 66 74   TOTAL PROTEIN g/dL 5.1* 5.4*   ALBUMIN g/dL 3.2* 3.3*   TOTAL BILIRUBIN mg/dL 2.10* 1.75*         Results from last 7 days   Lab Units 07/19/24  0503 07/18/24  0559 07/17/24  0456 07/16/24  1148 07/16/24  0339   GLUCOSE RANDOM mg/dL 84 95 92 120 122       Results from last 7 days   Lab Units 07/16/24  0742 07/16/24  0624 07/16/24  0339   HS TNI 0HR ng/L  --   --  11   HS TNI 2HR ng/L  --  10  --    HSTNI D2 ng/L  --  -1  --    HS TNI 4HR ng/L 11  --   --    HSTNI D4 ng/L 0  --   --      Results from last 7 days   Lab Units 07/16/24  0339   BNP pg/mL 180*     Results from last 7 days   Lab Units 07/16/24  0339   FERRITIN ng/mL 59   IRON SATURATION % 16   IRON ug/dL 55   TIBC ug/dL 348         Results from last 7 days   Lab Units 07/17/24  0530   UNIT PRODUCT CODE  G1119T08   [id number]  T1335T35   UNIT NUMBER  [id number]  [id number]  [id number]   UNITABO  O  O  O   UNITRH  POS  POS  POS   CROSSMATCH  Compatible  Compatible  Compatible   UNIT DISPENSE STATUS  Presumed Trans  Presumed Trans  Presumed Trans   UNIT PRODUCT VOL ml 350  350  350     Results from last 7 days   Lab Units 07/16/24  1427   CLARITY UA  Clear   COLOR UA  Yellow   SPEC GRAV UA  1.018   PH UA  6.0   GLUCOSE UA mg/dl Negative   KETONES UA mg/dl Negative   BLOOD UA  Negative   PROTEIN UA mg/dl Trace*   NITRITE UA  Negative   BILIRUBIN UA  Negative   UROBILINOGEN UA (BE) mg/dl 12.0*   LEUKOCYTES UA  Negative   WBC UA /hpf 1-2   RBC UA /hpf None Seen   BACTERIA UA /hpf None Seen   EPITHELIAL CELLS WET PREP /hpf None Seen     ED Treatment-Medication Administration from 07/16/2024 0309 to 07/16/2024 0907         Date/Time Order Dose Route Action     07/16/2024 0459 pantoprazole (PROTONIX) 80 mg in sodium chloride 0.9 % 100 mL IVPB 80 mg Intravenous New Bag     07/16/2024 0625 pantoprazole (PROTONIX) 80 mg in sodium chloride 0.9 % 100 mL IVPB -- Intravenous Restarted     07/16/2024 0501 potassium chloride (Klor-Con M20) CR tablet 40 mEq 40 mEq Oral Given            Past Medical History:   Diagnosis Date    Allergic rhinitis     Anemia     Asthma     Atrial fibrillation (HCC)     Chronic bronchitis (HCC)     COPD (chronic obstructive pulmonary disease) (HCC)     Coronary artery disease     CPAP (continuous positive airway pressure) dependence     Degenerative joint disease     Fluid retention 2024    GERD (gastroesophageal reflux disease)     Glaucoma     Hypertension     Lumbar disc disease     Lung cancer (HCC)     Pelvis cancer (HCC)     Periodic heart flutter     Pneumonia     Premature atrial contractions 10/31/2017    Sleep apnea     suspected     Sleep apnea, obstructive     Ventricular arrhythmia     Vitamin D deficiency      Present on Admission:   Essential hypertension   Chronic  diastolic congestive heart failure (HCC)   Atrial flutter (HCC)   Melena   Symptomatic anemia   CKD (chronic kidney disease) stage 4, GFR 15-29 ml/min (HCC)   Non-small cell lung cancer (HCC)   Thrombocytopenia (HCC)   Severe asthma      Admitting Diagnosis: Melena [K92.1]  Chest pain [R07.9]  Anemia [D64.9]  CARLOS ENRIQUE (acute kidney injury) (HCC) [N17.9]  Age/Sex: 79 y.o. female  Admission Orders:  Scheduled Medications:  amiodarone, 200 mg, Oral, Daily With Breakfast  apixaban, 5 mg, Oral, BID  budesonide, 0.25 mg, Nebulization, BID  cyanocobalamin, 1,000 mcg, Oral, Daily  docusate sodium, 100 mg, Oral, BID  fluticasone-vilanterol, 1 puff, Inhalation, Daily  furosemide, 20 mg, Oral, Daily  metoprolol tartrate, 25 mg, Oral, Q12H JAIRON  pantoprazole, 40 mg, Intravenous, Q12H JAIRON  polyethylene glycol, 17 g, Oral, Daily  pravastatin, 80 mg, Oral, Daily With Dinner  senna, 1 tablet, Oral, HS      Continuous IV Infusions:    IV NSS @ 125/hr - d/c 7/16     PRN Meds:  acetaminophen, 650 mg, Oral, Q4H PRN - x 1 7/20  ondansetron, 4 mg, Intravenous, Q6H PRN  sodium chloride (PF), 3 mL, Intravenous, Q1H PRN    H/H q 6 hr   Haptoglobin  IV PPI  Cont pulse ox  NC oxygen  VS q 4 hr   Transfusion   Regular diet   IP CONSULT TO GASTROENTEROLOGY  IP CONSULT TO VENOUS ACCESS TEAM  IP CONSULT TO ONCOLOGY    Network Utilization Review Department  ATTENTION: Please call with any questions or concerns to [phone] and carefully listen to the prompts so that you are directed to the right person. All voicemails are confidential.   For Discharge needs, contact Care Management DC Support Team at [phone] opt. [phone]  Send all requests for admission clinical reviews, approved or denied determinations and any other requests to dedicated fax number below belonging to the campus where the patient is receiving treatment. List of dedicated fax numbers for the Facilities:  FACILITY NAME UR FAX NUMBER   ADMISSION DENIALS (Administrative/Medical  Necessity) [phone]   DISCHARGE SUPPORT TEAM (NETWORK) [phone]   PARENT CHILD HEALTH (Maternity/NICU/Pediatrics) [phone]   [facility] [phone]   [facility] [phone]   [facility] [phone]   [facility] [phone]   [facility] [phone]   [facility] [phone]   [facility] [phone]   [facility] [phone]   [facility] [phone]   [facility] [phone]   [facility] [phone]   [facility] [phone]

## 2024-07-17 NOTE — PLAN OF CARE
Problem: PHYSICAL THERAPY ADULT  Goal: Performs mobility at highest level of function for planned discharge setting.  See evaluation for individualized goals.  Description: Treatment/Interventions: Functional transfer training, LE strengthening/ROM, Therapeutic exercise, Elevations, Endurance training, Equipment eval/education, Gait training, Compensatory technique education, Spoke to nursing, Spoke to case management, OT  Equipment Recommended: Other (Comment), Walker (Rollator)       See flowsheet documentation for full assessment, interventions and recommendations.  Outcome: Progressing  Note: Prognosis: Good  Problem List: Decreased strength, Decreased endurance, Impaired balance, Decreased mobility, Impaired hearing, Obesity  Assessment: Pt is a 79 y.o. female y/o presenting to [facility] on 7/16/2024 with melena x2 weeks. Hospital stay w/ low hgb (I.e. 4.3) requiring transfusions. S/p EGD (7/16), CT abdomen (-) acute findings. Primary dx: Symptomatic anemia. Significant pmhx per chart: anemia, A-fib/a-flutter, COPD, obesity, HTN, lung Ca with bone mets and CA related pain, CAD, DJD, s/p RTC repain, knee sgx, h/o hiatal hernia, depression, asthma. PT consulted to assess strength/functional mobility, activity tolerance and d/c needs. Active PT orders and activity orders for Up and OOB as tolerated . PTA, pt reports functioning at mod I w/ SPC for functional mobility and transfers, independent w/ ADLs, (+) drive, (-) falls. Lives alone, however supportive DIL.      During PT IE, pt presenting with above (see flowsheet) outlined functional impairments including dec strength, balance, gait, and deficits that limit functional mobility and activity tolerance relative to baseline. Pt currently requires mod I for bed mobility, independent for transfers w/ safety cues, supervision for ambulation with SPC for limited household distances, and supervision for elevations. Pt most limited 2/2 overall dec activity  tolerance w/ dec SpO2 to 90% on RA w/ min BERTRAND during activity requiring rest breaks. Pt currently demonstrating inc fall risk. Fall risk education provided with verbal understanding. Nsg staff most recent vital signs as follows: /59 (BP Location: Left arm)   Pulse 67   Temp 97.8 °F (36.6 °C) (Oral)   Resp 18   Wt 97.7 kg (215 lb 6.2 oz)   SpO2 95%   BMI 40.70 kg/m² . Denied additional sxs throughout session. Recommend continued mobilization of pt with nsg staff as tolerated to prevent further decline in function. Pt will benefit from continued PT services to progress mobility independence necessary for return to PLOF. Based on pt presentation and impairments, pt would most appropriately benefit from d/c to home with level III (min) rehab intensity resources  pending progress. Refer to additional tx session assessment below for rollator trial.  Barriers to Discharge: None     Rehab Resource Intensity Level, PT: (S) III (Minimum Resource Intensity) (PT vs. possible cardiopulmonary rehab?)    See flowsheet documentation for full assessment.

## 2024-07-17 NOTE — ASSESSMENT & PLAN NOTE
"Patient noted a 2-week history of melena, as well as a 2-month history of dyspnea on exertion  Presented with acute blood loss anemia with a hemoglobin of 4 decreased from her baseline of approximately 10  CT scan abdomen/pelvis: \"No evidence of acute abdominopelvic process. Colonic diverticulosis without diverticulitis.\"  Patient was started on a proton pump inhibitor twice daily and evaluated by the GI team  7/16 patient underwent EGD that revealed angioectasias in the stomach, and duodenum treated with APC.  No active or stigmata of recent bleeding noted  Patient notes some bright red blood per rectum today however associated with constipation  Will hold Eliquis for additional day and monitor  Per GI: If no additional bleeding anticipate outpatient colonoscopy, and if unremarkable, possible capsule pill endoscopy  "

## 2024-07-17 NOTE — TELEPHONE ENCOUNTER
Left voicemail and requested call back     Called patient to schedule follow up with Gastro       Message  Received: Yesterday   Schedule follow-up appointment  Rohan Agarwal MD  [facility]  Hey can we schedule a f/u appt for this lady to discuss colonoscopy as outpatient ± capsule endoscopy.

## 2024-07-18 ENCOUNTER — TELEPHONE (OUTPATIENT)
Dept: HEMATOLOGY ONCOLOGY | Facility: CLINIC | Age: 79
End: 2024-07-18

## 2024-07-18 ENCOUNTER — TELEPHONE (OUTPATIENT)
Dept: FAMILY MEDICINE CLINIC | Facility: CLINIC | Age: 79
End: 2024-07-18

## 2024-07-18 ENCOUNTER — HOME HEALTH ADMISSION (OUTPATIENT)
Dept: HOME HEALTH SERVICES | Facility: HOME HEALTHCARE | Age: 79
End: 2024-07-18
Payer: COMMERCIAL

## 2024-07-18 LAB
ANION GAP SERPL CALCULATED.3IONS-SCNC: 4 MMOL/L (ref 4–13)
BUN SERPL-MCNC: 31 MG/DL (ref 5–25)
CALCIUM SERPL-MCNC: 7.8 MG/DL (ref 8.4–10.2)
CHLORIDE SERPL-SCNC: 109 MMOL/L (ref 96–108)
CO2 SERPL-SCNC: 28 MMOL/L (ref 21–32)
CREAT SERPL-MCNC: 1.48 MG/DL (ref 0.6–1.3)
DME PARACHUTE DELIVERY DATE REQUESTED: NORMAL
DME PARACHUTE ITEM DESCRIPTION: NORMAL
DME PARACHUTE ITEM DESCRIPTION: NORMAL
DME PARACHUTE ORDER STATUS: NORMAL
DME PARACHUTE SUPPLIER NAME: NORMAL
DME PARACHUTE SUPPLIER PHONE: NORMAL
ERYTHROCYTE [DISTWIDTH] IN BLOOD BY AUTOMATED COUNT: 19.7 % (ref 11.6–15.1)
GFR SERPL CREATININE-BSD FRML MDRD: 33 ML/MIN/1.73SQ M
GLUCOSE SERPL-MCNC: 95 MG/DL (ref 65–140)
HCT VFR BLD AUTO: 23.9 % (ref 34.8–46.1)
HGB BLD-MCNC: 7.4 G/DL (ref 11.5–15.4)
MCH RBC QN AUTO: 30.3 PG (ref 26.8–34.3)
MCHC RBC AUTO-ENTMCNC: 31 G/DL (ref 31.4–37.4)
MCV RBC AUTO: 98 FL (ref 82–98)
NRBC BLD AUTO-RTO: 1 /100 WBCS
PLATELET # BLD AUTO: 88 THOUSANDS/UL (ref 149–390)
PMV BLD AUTO: 13.4 FL (ref 8.9–12.7)
POTASSIUM SERPL-SCNC: 3.7 MMOL/L (ref 3.5–5.3)
RBC # BLD AUTO: 2.44 MILLION/UL (ref 3.81–5.12)
SODIUM SERPL-SCNC: 141 MMOL/L (ref 135–147)
WBC # BLD AUTO: 6.16 THOUSAND/UL (ref 4.31–10.16)

## 2024-07-18 PROCEDURE — 85027 COMPLETE CBC AUTOMATED: CPT | Performed by: INTERNAL MEDICINE

## 2024-07-18 PROCEDURE — 99232 SBSQ HOSP IP/OBS MODERATE 35: CPT | Performed by: INTERNAL MEDICINE

## 2024-07-18 PROCEDURE — 80048 BASIC METABOLIC PNL TOTAL CA: CPT | Performed by: INTERNAL MEDICINE

## 2024-07-18 PROCEDURE — 94760 N-INVAS EAR/PLS OXIMETRY 1: CPT

## 2024-07-18 PROCEDURE — 94660 CPAP INITIATION&MGMT: CPT

## 2024-07-18 PROCEDURE — 94640 AIRWAY INHALATION TREATMENT: CPT

## 2024-07-18 PROCEDURE — 94002 VENT MGMT INPAT INIT DAY: CPT

## 2024-07-18 RX ORDER — FUROSEMIDE 20 MG/1
20 TABLET ORAL DAILY
Status: DISCONTINUED | OUTPATIENT
Start: 2024-07-19 | End: 2024-07-19 | Stop reason: HOSPADM

## 2024-07-18 RX ADMIN — AMIODARONE HYDROCHLORIDE 200 MG: 200 TABLET ORAL at 08:20

## 2024-07-18 RX ADMIN — APIXABAN 5 MG: 5 TABLET, FILM COATED ORAL at 17:19

## 2024-07-18 RX ADMIN — PRAVASTATIN SODIUM 80 MG: 80 TABLET ORAL at 16:01

## 2024-07-18 RX ADMIN — HEPARIN SODIUM 5000 UNITS: 5000 INJECTION INTRAVENOUS; SUBCUTANEOUS at 13:44

## 2024-07-18 RX ADMIN — BUDESONIDE 0.25 MG: 0.25 INHALANT RESPIRATORY (INHALATION) at 07:10

## 2024-07-18 RX ADMIN — METOPROLOL TARTRATE 25 MG: 25 TABLET, FILM COATED ORAL at 08:20

## 2024-07-18 RX ADMIN — FLUTICASONE FUROATE AND VILANTEROL TRIFENATATE 1 PUFF: 200; 25 POWDER RESPIRATORY (INHALATION) at 08:45

## 2024-07-18 RX ADMIN — HEPARIN SODIUM 5000 UNITS: 5000 INJECTION INTRAVENOUS; SUBCUTANEOUS at 05:51

## 2024-07-18 RX ADMIN — METOPROLOL TARTRATE 25 MG: 25 TABLET, FILM COATED ORAL at 22:12

## 2024-07-18 RX ADMIN — DOCUSATE SODIUM 100 MG: 100 CAPSULE, LIQUID FILLED ORAL at 08:20

## 2024-07-18 RX ADMIN — CYANOCOBALAMIN TAB 500 MCG 1000 MCG: 500 TAB at 08:20

## 2024-07-18 RX ADMIN — SENNOSIDES 8.6 MG: 8.6 TABLET, FILM COATED ORAL at 22:12

## 2024-07-18 RX ADMIN — PANTOPRAZOLE SODIUM 40 MG: 40 INJECTION, POWDER, FOR SOLUTION INTRAVENOUS at 08:19

## 2024-07-18 RX ADMIN — BUDESONIDE 0.25 MG: 0.25 INHALANT RESPIRATORY (INHALATION) at 19:51

## 2024-07-18 RX ADMIN — POLYETHYLENE GLYCOL 3350 17 G: 17 POWDER, FOR SOLUTION ORAL at 08:20

## 2024-07-18 RX ADMIN — PANTOPRAZOLE SODIUM 40 MG: 40 INJECTION, POWDER, FOR SOLUTION INTRAVENOUS at 22:12

## 2024-07-18 RX ADMIN — DOCUSATE SODIUM 100 MG: 100 CAPSULE, LIQUID FILLED ORAL at 16:01

## 2024-07-18 NOTE — PLAN OF CARE
Problem: Prexisting or High Potential for Compromised Skin Integrity  Goal: Skin integrity is maintained or improved  Description: INTERVENTIONS:  - Identify patients at risk for skin breakdown  - Assess and monitor skin integrity  - Assess and monitor nutrition and hydration status  - Monitor labs   - Assess for incontinence   - Turn and reposition patient  - Assist with mobility/ambulation  - Relieve pressure over bony prominences  - Avoid friction and shearing  - Provide appropriate hygiene as needed including keeping skin clean and dry  - Evaluate need for skin moisturizer/barrier cream  - Collaborate with interdisciplinary team   - Patient/family teaching  - Consider wound care consult   Outcome: Progressing     Problem: PAIN - ADULT  Goal: Verbalizes/displays adequate comfort level or baseline comfort level  Description: Interventions:  - Encourage patient to monitor pain and request assistance  - Assess pain using appropriate pain scale  - Administer analgesics based on type and severity of pain and evaluate response  - Implement non-pharmacological measures as appropriate and evaluate response  - Consider cultural and social influences on pain and pain management  - Notify physician/advanced practitioner if interventions unsuccessful or patient reports new pain  Outcome: Progressing     Problem: DISCHARGE PLANNING  Goal: Discharge to home or other facility with appropriate resources  Description: INTERVENTIONS:  - Identify barriers to discharge w/patient and caregiver  - Arrange for needed discharge resources and transportation as appropriate  - Identify discharge learning needs (meds, wound care, etc.)  - Arrange for interpretive services to assist at discharge as needed  - Refer to Case Management Department for coordinating discharge planning if the patient needs post-hospital services based on physician/advanced practitioner order or complex needs related to functional status, cognitive ability, or  social support system  Outcome: Progressing     Problem: Knowledge Deficit  Goal: Patient/family/caregiver demonstrates understanding of disease process, treatment plan, medications, and discharge instructions  Description: Complete learning assessment and assess knowledge base.  Interventions:  - Provide teaching at level of understanding  - Provide teaching via preferred learning methods  Outcome: Progressing     Problem: METABOLIC, FLUID AND ELECTROLYTES - ADULT  Goal: Electrolytes maintained within normal limits  Description: INTERVENTIONS:  - Monitor labs and assess patient for signs and symptoms of electrolyte imbalances  - Administer electrolyte replacement as ordered  - Monitor response to electrolyte replacements, including repeat lab results as appropriate  - Instruct patient on fluid and nutrition as appropriate  Outcome: Progressing  Goal: Fluid balance maintained  Description: INTERVENTIONS:  - Monitor labs   - Monitor I/O and WT  - Instruct patient on fluid and nutrition as appropriate  - Assess for signs & symptoms of volume excess or deficit  Outcome: Progressing     Problem: SKIN/TISSUE INTEGRITY - ADULT  Goal: Skin Integrity remains intact(Skin Breakdown Prevention)  Description: Assess:  -Perform Rubén assessment every q 12 hours  -Clean and moisturize skin every day  -Inspect skin when repositioning, toileting, and assisting with ADLS  -Assess extremities for adequate circulation and sensation     Bed Management:  -Have minimal linens on bed & keep smooth, unwrinkled  -Change linens as needed when moist or perspiring    Toileting:  -Offer bedside commode    Activity:  -Mobilize patient 3 times a day  -Encourage activity and walks on unit  -Encourage or provide ROM exercises   -Turn and reposition patient every 2 Hours  -Use appropriate equipment to lift or move patient in bed    Skin Care:  -Avoid use of baby powder, tape, friction and shearing, hot water or constrictive clothing        Outcome:  Progressing  Goal: Incision(s), wounds(s) or drain site(s) healing without S/S of infection  Description: INTERVENTIONS  - Assess and document dressing, incision, wound bed, drain sites and surrounding tissue  - Provide patient and family education  - Perform skin care/dressing changes ev  Outcome: Progressing  Goal: Pressure injury heals and does not worsen  Description: Interventions:  - Implement low air loss mattress or specialty surface (Criteria met)  - Apply silicone foam dressing  - Apply fecal or urinary incontinence containment device     - Turn and reposition patient & offload bony prominences every 2 hours   - Utilize friction reducing device or surface for transfers       - Consider nutrition services referral as needed  Outcome: Progressing     Problem: HEMATOLOGIC - ADULT  Goal: Maintains hematologic stability  Description: INTERVENTIONS  - Assess for signs and symptoms of bleeding or hemorrhage  - Monitor labs  - Administer supportive blood products/factors as ordered and appropriate  Outcome: Progressing

## 2024-07-18 NOTE — CASE MANAGEMENT
Case Management Discharge Planning Note    Patient name Jayla Moody  Location South 2 /South 2 M* MRN [id number]  : 1945 Date 2024       Current Admission Date: 2024  Current Admission Diagnosis:Symptomatic anemia   Patient Active Problem List    Diagnosis Date Noted Date Diagnosed    Melena 2024     Symptomatic anemia 2024     Prolonged Q-T interval on ECG 2024     CKD (chronic kidney disease) stage 4, GFR 15-29 ml/min ([facility]) 2024     Class 2 severe obesity due to excess calories with serious comorbidity and body mass index (BMI) of 39.0 to 39.9 in adult ([facility]) 2024     Cancer-related pain 2023     Atrial flutter ([facility]) 08/15/2023     Chronic diastolic congestive heart failure ([facility]) 2023     Coronary artery disease involving native coronary artery of native heart without angina pectoris 07/10/2023     Thrombocytopenia, unspecified ([facility]) 2021     Depression, recurrent ([facility]) 2021     Severe asthma 2020     Malignant neoplasm metastatic to bone ([facility]) 2020     Non-small cell lung cancer ([facility]) 2020     MONO (obstructive sleep apnea)      Chronic rhinitis 2019     Chronic gastritis without bleeding 2019     Gastroesophageal reflux disease without esophagitis 2019     Thrombocytopenia ([facility]) 10/31/2018     Essential hypertension 10/31/2018     Hiatal hernia 10/31/2018       LOS (days): 2  Geometric Mean LOS (GMLOS) (days): 3  Days to GMLOS:0.7     OBJECTIVE:  Risk of Unplanned Readmission Score: 28.81         Current admission status: Inpatient   Preferred Pharmacy:   [id number] - [place] - [address]  [address]  [place] [address]  Phone: [phone] Fax: [phone]    Primary Care Provider: Kaiser Kohler DO    Primary Insurance: BLUE CROSS  REP  Secondary Insurance:     DISCHARGE DETAILS:                                Requested Home Health Care         Is the patient  interested in HHC at discharge?: Yes  Home Health Discipline requested:: Physical Therapy, Home Health Aide  Home Health Agency Name:: [facility]  Home Health Follow-Up Provider:: PCP  Home Health Services Needed:: Evaluate Functional Status and Safety, Gait/ADL Training, Strengthening/Theraputic Exercises to Improve Function  Homebound Criteria Met:: Uses an Assist Device (i.e. cane, walker, etc), Requires the Assistance of Another Person for Safe Ambulation or to Leave the Home  Supporting Clincal Findings:: Limited Endurance, Fatigues Easliy in Short Distances                   Treatment Team Recommendation: Home with Home Health Care  Discharge Destination Plan:: Home with Home Health Care  Transport at Discharge : Auto with designated , Family        Transported by (Company and Unit #): Son                    IMM Given (Date):: 07/18/24  IMM Given to:: Patient

## 2024-07-18 NOTE — CASE MANAGEMENT
Case Management Discharge Planning Note    Patient name Jayla Moody  Location South 2 /South 2 M* MRN [id number]  : 1945 Date 2024         OBJECTIVE:  Risk of Unplanned Readmission Score: 28.81         Current admission status: Inpatient     Secondary Insurance:     DISCHARGE DETAILS:    Discharge planning discussed with:: pt  Freedom of Choice: Yes  Comments - Freedom of Choice: Discussed available agencies able to accept pt- verified no copays informing pt of same.  Pt has chosen [facility] for SN/HHA- reserved in Aidin and contact information placed on AVS  CM contacted family/caregiver?: No- see comments  Were Treatment Team discharge recommendations reviewed with patient/caregiver?: Yes  Did patient/caregiver verbalize understanding of patient care needs?: Yes       Contacts  Patient Contacts: Jimmy Moody-son  Relationship to Patient:: Family  Contact Method: Phone  Phone Number: [phone]  Reason/Outcome: Emergency Contact    Requested Home Health Care         Is the patient interested in HHC at discharge?: Yes  Home Health Discipline requested:: Physical Therapy, Home Health Aide  Home Health Agency Name:: [facility] Follow-Up Provider:: PCP  Home Health Services Needed:: Evaluate Functional Status and Safety, Gait/ADL Training, Strengthening/Theraputic Exercises to Improve Function  Homebound Criteria Met:: Uses an Assist Device (i.e. cane, walker, etc), Requires the Assistance of Another Person for Safe Ambulation or to Leave the Home  Supporting Clincal Findings:: Limited Endurance, Fatigues Easliy in Short Distances         Other Referral/Resources/Interventions Provided:  Interventions: PCP (Pt medium risk for readmission with electronic message to [facility] to Schedule a TCM appointment for pt with anticipated d/c Friday.)         Treatment Team Recommendation: Home with Home Health Care  Discharge Destination Plan:: Home with Home Health Care  Transport at  Discharge : Auto with designated , Family        Transported by (Company and Unit #): Son                    IMM Given (Date):: 07/18/24  IMM Given to:: Patient

## 2024-07-18 NOTE — PROGRESS NOTES
Patient:    MRN:  [id number]    [place] Request ID:  [id number]    Level of care reserved:  Home Health Agency    Partner Reserved:  [facility], [place] [address] [phone]    Clinical needs requested:    Geography searched:  [id number]    Start of Service:    Request sent:  5:48pm EDT on 7/17/2024 by Shelley Santos    Partner reserved:  12:38pm EDT on 7/18/2024 by Shelley Santos    Choice list shared:  12:38pm EDT on 7/18/2024 by Shelley Santos

## 2024-07-18 NOTE — ASSESSMENT & PLAN NOTE
Wt Readings from Last 3 Encounters:   07/16/24 97.7 kg (215 lb 6.2 oz)   06/21/24 97.1 kg (214 lb)   06/04/24 94.8 kg (209 lb)     Patient has a history of chronic diastolic congestive heart failure follows [facility]  Recent 2D echocardiogram 7/23 LVEF 61%, grade I diastolic dysfunction, LA dilation, MV calcification   Home regimen: metoprolol tartrate 25mg twice daily, Lasix 20mg daily   Lasix temporarily on hold  Continue beta-blocker  Euvolemic today, status post transfusion of packed red blood cells  Restart Lasix

## 2024-07-18 NOTE — ASSESSMENT & PLAN NOTE
"Patient noted a 2-week history of melena, as well as a 2-month history of dyspnea on exertion  Presented with acute blood loss anemia with a hemoglobin of 4 decreased from her baseline of approximately 10  CT scan abdomen/pelvis: \"No evidence of acute abdominopelvic process. Colonic diverticulosis without diverticulitis.\"  Patient was started on a proton pump inhibitor twice daily and evaluated by the GI team  7/16 patient underwent EGD that revealed angioectasias in the stomach, and duodenum treated with APC.  No active or stigmata of recent bleeding noted  Per GI: If no additional bleeding anticipate outpatient colonoscopy, and if unremarkable, possible capsule pill endoscopy  Patient noted hematochezia yesterday however in the setting of constipation: Was given a laxative with resolution of constipation  Patient notes she is passing her bowels without any additional melena/hematochezia  Restart Eliquis today and monitor  Follow-up hemoglobin  "

## 2024-07-18 NOTE — ASSESSMENT & PLAN NOTE
"Lab Results   Component Value Date    EGFR 33 07/18/2024    EGFR 32 07/17/2024    EGFR 26 07/16/2024    CREATININE 1.48 (H) 07/18/2024    CREATININE 1.53 (H) 07/17/2024    CREATININE 1.81 (H) 07/16/2024     Patient has chronic kidney disease stage IIIB-IV with baseline creatinine that appears to range 1.5-1.9  She follows with [facility] nephrology, Dr. 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

## 2024-07-18 NOTE — ASSESSMENT & PLAN NOTE
Patient follows with [facility] oncology, Dr. 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

## 2024-07-18 NOTE — ASSESSMENT & PLAN NOTE
Patient is a 79-year-old female with past medical history significant for stage IV non-small cell lung cancer with bone mets, currently on Alectinib maintenance therapy with monthly Xgeva, CKD who presented to the ER with symptomatic anemia.  Patient noted to a 2-month history of worsening dyspnea on exertion.  Presented with profound weakness    Baseline hemoglobin appears to range 9-10  Patient presented with a hemoglobin of 4.3  Also noted melanotic stools  Patient was ordered 2 units of packed red blood cells in the ER and referred for admission  Etiology of anemia being evaluated  A) GI bleed: Patient noted melanotic stool  Patient was started on a proton pump inhibitor  Underwent an EGD with angioectasias that were treated with APC, but no active bleeding or stigmata of recent bleeding  Will follow-up with GI as an outpatient for colonoscopy  B) medication related:   Alectinib associated with hemolytic anemia:   Xgeva also associated with anemia and thrombocytopenia  Patient was advised by the oncology team: Did not appear consistent with hemolysis due to normal LDH  Noted patient's alectinib can be placed on hold during her hospitalization and restarted at discharge  C)Normal folic acid  B12 deficient: Started on supplements  Hemoglobin has improved posttransfusion to 8  Decreased to 7.4 today: Possibly equilibration  Will restart Eliquis today, repeat hemoglobin in the a.m., if stable anticipate discharge home tomorrow

## 2024-07-18 NOTE — APP STUDENT NOTE
"Assessment and Plan:   Symptomatic Anemia     Patient initially presented to the ED regarding concerns of melanotic stool, fatigue, shortness of breath, and chest pain for the past 2 weeks  Prior hemoglobin 9.9 in March.   Hemoglobin initially 4.3 requiring 2 units of pRBCs in the ER.  Required 2 addition units of pRBCs due to Hg of 5.4   Hg since has ranged from 7-8 with most recent being 7.4.   Patient reports that she remains fatigued at rest.  Hemolytic labs pending - LDH and haptoglobin.   Heme states that alectinib has potential SE of hemolytic anemia - will complete hemolysis workup.  LDH normal and haptoglobin decreased. Unlikely hemolytic etiology since LDH is normal  Hold alectinib while hospitalized. Recommended to continue after discharged   Vitamin B12 levels found to be < 400 in an elderly patient - continue oral B12 supplementation  Folate levels normal      Melena    Due to dark stools x 2 weeks and initial hemoglobin of 4.3 suspect upper GI bleed - possibly PUD or AVM.  CT abdomen and pelvis showing \"No evidence of acute abdominopelvic process. Colonic diverticulosis without diverticulitis.\"  EGD performed revealing multiple angiectasias however no sign of active or recent bleeding.   GI recommended outpatient colonoscopy to evaluate a lower GI source and if that is unremarkable, they recommend a video capsule endoscopy outpatient  Pt was experiencing nausea and constipation secondary to constipation. Episode of BRBPR likely secondary to straining. Started on miralax and has since had BM.   Continue IV Protonix BID       Non-small cell lung cancer    Follows with [facility].   Has metastatic non-small cell lung cancer that is ALK positive.   Currently well controlled with maintenance therapy - alectinib 600 mg twice daily since August 2020  Also on monthly Xgeva injections  Heme states that alectinib has potential SE of hemolytic anemia - hemolysis workup completed (LDH normal, haptoglobin " decreased). Less likely hemolytic etiology  Hold alectinib while hospitalized. Recommended to continue after discharged     Atrial flutter     Patient following with  cardiology for CHF, mitral stenosis, hypertension and hyperlipidemia   EKG revealing NSR with prolonged QT interval.  Currently taking metoprolol 25 mg twice daily for rate control and amiodarone 200 mg once daily for rhythm control.   Typically on Eliquis for anticoagulation however held due potential GI bleed.     Chronic diastolic congestive heart failure    Appears to be euvolemic  Last ECHO 7/23 revealing LVEF of 61%  ECHO and NM myocardial perfusion stress test ordered but not completed.   Continue lopressor 25 mg twice daily  Will continue hold lasix 20 mg once daily due to kidney disease     Chronic kidney disease stage 4     Creatine was 1.99 and BUN 50 upon admission.   Currently down-trending as repeat Cr 1.48 and BUN 31  Held lasix due to elevated Cr levels.   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 Platelets currently 88 which appears to be her baseline  Xgeva has known side effects of thrombocytopenia     Essential hypertension    Home regimen includes lasix 20 mg once daily and lopressor 25 mg twice daily  Hold lasix due to CKD as creatinine was 1.99 and BUN 50 upon admission.   Currently down-trending as repeat Cr was  1.48 and BUN 31     Severe asthma    Does not appear to be in an acute exacerbation  Continue budesonide twice daily and fluticasone-vilanterol once daily    Prolonged QT interval on ECG     EKG revealing NSR with prolonged QTC  interval of 519 ms.    Subjective:   Patient is resting comfortably in the bed upon examination. She reports that she is feeling much better but still very fatigued. She denies current chest pain, palpitations, shortness of breath, or dizziness. She states she has had a BM after taking the miralax and is no longer experiencing  abdominal pain or nausea.       Objective:     Vitals:   Temp (24hrs), Av.8 °F (36.6 °C), Min:97.2 °F (36.2 °C), Max:98.1 °F (36.7 °C)    Temp:  [97.2 °F (36.2 °C)-98.1 °F (36.7 °C)] 98 °F (36.7 °C)  HR:  [67-72] 69  Resp:  [18-20] 18  BP: (131-138)/(59-96) 134/60  SpO2:  [93 %-97 %] 97 %  Body mass index is 40.7 kg/m².     Input and Output Summary (last 24 hours):     Intake/Output Summary (Last 24 hours) at 2024 1101  Last data filed at 2024 0846  Gross per 24 hour   Intake 1200 ml   Output 250 ml   Net 950 ml       Physical Exam:   Physical Exam  Vitals and nursing note reviewed.   Constitutional:       General: She is not in acute distress.     Appearance: She is obese. She is not ill-appearing.   HENT:      Head: Normocephalic and atraumatic.   Cardiovascular:      Rate and Rhythm: Normal rate and regular rhythm.      Pulses: Normal pulses.      Heart sounds: Murmur heard.   Pulmonary:      Effort: Pulmonary effort is normal.      Breath sounds: No decreased air movement. Examination of the left-upper field reveals decreased breath sounds. Examination of the left-middle field reveals decreased breath sounds. Decreased breath sounds present. No wheezing.   Abdominal:      General: Bowel sounds are normal.      Palpations: Abdomen is soft.      Tenderness: There is no abdominal tenderness.   Musculoskeletal:      Cervical back: Normal range of motion.   Skin:     General: Skin is warm.      Capillary Refill: Capillary refill takes less than 2 seconds.   Neurological:      Mental Status: She is alert. Mental status is at baseline.   Psychiatric:         Mood and Affect: Mood normal.         Behavior: Behavior normal. Behavior is cooperative.         Thought Content: Thought content normal.         Judgment: Judgment normal.          Additional Data:     Labs:  Results from last 7 days   Lab Units 24  0559 24  1137 24  0456 24  1612 24  1148   WBC Thousand/uL 6.16  --   7.61  --  7.83   HEMOGLOBIN g/dL 7.4*   < > 7.5*   < > 5.4*   HEMATOCRIT % 23.9*   < > 23.7*   < > 18.1*   PLATELETS Thousands/uL 88*  --  87*  --  88*   BANDS PCT %  --   --   --   --  1   LYMPHO PCT %  --   --  18  --  16   MONO PCT %  --   --  4  --  5   EOS PCT %  --   --  0  --  0    < > = values in this interval not displayed.     Results from last 7 days   Lab Units 07/18/24  0559 07/16/24  1148 07/16/24  0339   SODIUM mmol/L 141   < > 141   POTASSIUM mmol/L 3.7   < > 3.3*   CHLORIDE mmol/L 109*   < > 103   CO2 mmol/L 28   < > 28   BUN mg/dL 31*   < > 50*   CREATININE mg/dL 1.48*   < > 1.99*   ANION GAP mmol/L 4   < > 10   CALCIUM mg/dL 7.8*   < > 8.9   ALBUMIN g/dL  --   --  3.2*   TOTAL BILIRUBIN mg/dL  --   --  2.10*   ALK PHOS U/L  --   --  66   ALT U/L  --   --  20   AST U/L  --   --  30   GLUCOSE RANDOM mg/dL 95   < > 122    < > = values in this interval not displayed.                       Lines/Drains:  Invasive Devices       Peripheral Intravenous Line  Duration             Long-Dwell Peripheral IV (Midline) 07/16/24 Right Brachial 1 day                      Telemetry:  Telemetry Orders (From admission, onward)               24 Hour Telemetry Monitoring  Continuous x 24 Hours (Telem)        Question:  Reason for 24 Hour Telemetry  Answer:  Arrhythmias requiring acute medical intervention / PPM or ICD malfunction                     Telemetry Reviewed: Normal Sinus Rhythm and prolonged QT  Indication for Continued Telemetry Use:              Imaging: Reviewed radiology reports from this admission including: chest xray and abdominal/pelvic CT    Recent Cultures (last 7 days):         Last 24 Hours Medication List:   Current Facility-Administered Medications   Medication Dose Route Frequency Provider Last Rate    acetaminophen  650 mg Oral Q4H PRN Larissa Aragon MD      amiodarone  200 mg Oral Daily With Breakfast Lynda Rush PA-C      budesonide  0.25 mg Nebulization BID Lynda Rush PA-C       cyanocobalamin  1,000 mcg Oral Daily Larissa Aragon MD      docusate sodium  100 mg Oral BID Larissa Aragon MD      fluticasone-vilanterol  1 puff Inhalation Daily Lynda Rush PA-C      heparin (porcine)  5,000 Units Subcutaneous Q8H [facility] Larissa Aragon MD      metoprolol tartrate  25 mg Oral Q12H [facility] Lynda Rush PA-C      ondansetron  4 mg Intravenous Q6H PRN Lynda Rush PA-C      pantoprazole  40 mg Intravenous Q12H [facility] Lynda Rush PA-C      polyethylene glycol  17 g Oral Daily Larissa Aragon MD      pravastatin  80 mg Oral Daily With Dinner Lynda Rush PA-C      senna  1 tablet Oral HS Larissa Aragon MD      sodium chloride (PF)  3 mL Intravenous Q1H PRN Lynda Rush PA-C          Today, Patient Was Seen By: Sydney Souliere    **Please Note: This note may have been constructed using a voice recognition system.**

## 2024-07-18 NOTE — PLAN OF CARE
Problem: Prexisting or High Potential for Compromised Skin Integrity  Goal: Skin integrity is maintained or improved  Description: INTERVENTIONS:  - Identify patients at risk for skin breakdown  - Assess and monitor skin integrity  - Assess and monitor nutrition and hydration status  - Monitor labs   - Assess for incontinence   - Turn and reposition patient  - Assist with mobility/ambulation  - Relieve pressure over bony prominences  - Avoid friction and shearing  - Provide appropriate hygiene as needed including keeping skin clean and dry  - Evaluate need for skin moisturizer/barrier cream  - Collaborate with interdisciplinary team   - Patient/family teaching  - Consider wound care consult   Outcome: Progressing     Problem: PAIN - ADULT  Goal: Verbalizes/displays adequate comfort level or baseline comfort level  Description: Interventions:  - Encourage patient to monitor pain and request assistance  - Assess pain using appropriate pain scale  - Administer analgesics based on type and severity of pain and evaluate response  - Implement non-pharmacological measures as appropriate and evaluate response  - Consider cultural and social influences on pain and pain management  - Notify physician/advanced practitioner if interventions unsuccessful or patient reports new pain  Outcome: Progressing     Problem: DISCHARGE PLANNING  Goal: Discharge to home or other facility with appropriate resources  Description: INTERVENTIONS:  - Identify barriers to discharge w/patient and caregiver  - Arrange for needed discharge resources and transportation as appropriate  - Identify discharge learning needs (meds, wound care, etc.)  - Arrange for interpretive services to assist at discharge as needed  - Refer to Case Management Department for coordinating discharge planning if the patient needs post-hospital services based on physician/advanced practitioner order or complex needs related to functional status, cognitive ability, or  social support system  Outcome: Progressing     Problem: Knowledge Deficit  Goal: Patient/family/caregiver demonstrates understanding of disease process, treatment plan, medications, and discharge instructions  Description: Complete learning assessment and assess knowledge base.  Interventions:  - Provide teaching at level of understanding  - Provide teaching via preferred learning methods  Outcome: Progressing     Problem: METABOLIC, FLUID AND ELECTROLYTES - ADULT  Goal: Electrolytes maintained within normal limits  Description: INTERVENTIONS:  - Monitor labs and assess patient for signs and symptoms of electrolyte imbalances  - Administer electrolyte replacement as ordered  - Monitor response to electrolyte replacements, including repeat lab results as appropriate  - Instruct patient on fluid and nutrition as appropriate  Outcome: Progressing  Goal: Fluid balance maintained  Description: INTERVENTIONS:  - Monitor labs   - Monitor I/O and WT  - Instruct patient on fluid and nutrition as appropriate  - Assess for signs & symptoms of volume excess or deficit  Outcome: Progressing     Problem: SKIN/TISSUE INTEGRITY - ADULT  Goal: Skin Integrity remains intact(Skin Breakdown Prevention)  Description: Assess:  -Perform Rubén assessment every q 12 hours  -Clean and moisturize skin every day  -Inspect skin when repositioning, toileting, and assisting with ADLS  -Assess extremities for adequate circulation and sensation     Bed Management:  -Have minimal linens on bed & keep smooth, unwrinkled  -Change linens as needed when moist or perspiring    Toileting:  -Offer bedside commode    Activity:  -Mobilize patient 3 times a day  -Encourage activity and walks on unit  -Encourage or provide ROM exercises   -Turn and reposition patient every 2 Hours  -Use appropriate equipment to lift or move patient in bed    Skin Care:  -Avoid use of baby powder, tape, friction and shearing, hot water or constrictive clothing        Outcome:  Progressing  Goal: Incision(s), wounds(s) or drain site(s) healing without S/S of infection  Description: INTERVENTIONS  - Assess and document dressing, incision, wound bed, drain sites and surrounding tissue  - Provide patient and family education  - Perform skin care/dressing changes.  Outcome: Progressing  Goal: Pressure injury heals and does not worsen  Description: Interventions:  - Implement low air loss mattress or specialty surface (Criteria met)  - Apply silicone foam dressing  - Apply fecal or urinary incontinence containment device     - Turn and reposition patient & offload bony prominences every 2 hours   - Utilize friction reducing device or surface for transfers       - Consider nutrition services referral as needed  Outcome: Progressing     Problem: HEMATOLOGIC - ADULT  Goal: Maintains hematologic stability  Description: INTERVENTIONS  - Assess for signs and symptoms of bleeding or hemorrhage  - Monitor labs  - Administer supportive blood products/factors as ordered and appropriate  Outcome: Progressing

## 2024-07-18 NOTE — PROGRESS NOTES
[facility]  Progress Note  Name: Jayla Moody I  MRN: [id number]  Unit/Bed#: [place] [address] -01 I Date of Admission: 7/16/2024   Date of Service: 7/18/2024 I Hospital Day: 2    Assessment & Plan   * Symptomatic anemia  Assessment & Plan  Patient is a 79-year-old female with past medical history significant for stage IV non-small cell lung cancer with bone mets, currently on Alectinib maintenance therapy with monthly Xgeva, CKD who presented to the ER with symptomatic anemia.  Patient noted to a 2-month history of worsening dyspnea on exertion.  Presented with profound weakness    Baseline hemoglobin appears to range 9-10  Patient presented with a hemoglobin of 4.3  Also noted melanotic stools  Patient was ordered 2 units of packed red blood cells in the ER and referred for admission  Etiology of anemia being evaluated  A) GI bleed: Patient noted melanotic stool  Patient was started on a proton pump inhibitor  Underwent an EGD with angioectasias that were treated with APC, but no active bleeding or stigmata of recent bleeding  Will follow-up with GI as an outpatient for colonoscopy  B) medication related:   Alectinib associated with hemolytic anemia:   Xgeva also associated with anemia and thrombocytopenia  Patient was advised by the oncology team: Did not appear consistent with hemolysis due to normal LDH  Noted patient's alectinib can be placed on hold during her hospitalization and restarted at discharge  C)Normal folic acid  B12 deficient: Started on supplements  Hemoglobin has improved posttransfusion to 8  Decreased to 7.4 today: Possibly equilibration  Will restart Eliquis today, repeat hemoglobin in the a.m., if stable anticipate discharge home tomorrow    Melena  Assessment & Plan  Patient noted a 2-week history of melena, as well as a 2-month history of dyspnea on exertion  Presented with acute blood loss anemia with a hemoglobin of 4 decreased from her baseline of approximately  "10  CT scan abdomen/pelvis: \"No evidence of acute abdominopelvic process. Colonic diverticulosis without diverticulitis.\"  Patient was started on a proton pump inhibitor twice daily and evaluated by the GI team  7/16 patient underwent EGD that revealed angioectasias in the stomach, and duodenum treated with APC.  No active or stigmata of recent bleeding noted  Per GI: If no additional bleeding anticipate outpatient colonoscopy, and if unremarkable, possible capsule pill endoscopy  Patient noted hematochezia yesterday however in the setting of constipation: Was given a laxative with resolution of constipation  Patient notes she is passing her bowels without any additional melena/hematochezia  Restart Eliquis today and monitor  Follow-up hemoglobin    Non-small cell lung cancer (HCC)  Assessment & Plan  Patient follows with [facility] oncology, Dr. 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  Initially had been receiving Zometa every 3 months: Currently on Xgeva every 4 weeks  Patient presents with anemia and thrombocytopenia: Not leukopenic  Patient was evaluated by the oncology service: Her disease is noted to be well-controlled on her current therapy: It was noted her alectinib can be placed on hold and restarted at discharge      Prolonged Q-T interval on ECG  Assessment & Plan  Prolonged QT noted on admission EKG  Suspect secondary to hypokalemia  Repleted and improved  Repeat EKG with improvement in QT    CKD (chronic kidney disease) stage 4, GFR 15-29 ml/min ([facility])  Assessment & Plan  Lab Results   Component Value Date    EGFR 33 07/18/2024    EGFR 32 07/17/2024    EGFR 26 07/16/2024    CREATININE 1.48 (H) 07/18/2024 " "   CREATININE 1.53 (H) 07/17/2024    CREATININE 1.81 (H) 07/16/2024     Patient has chronic kidney disease stage IIIB-IV with baseline creatinine that appears to range 1.5-1.9  She follows with [facility] nephrology, Dr. 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  Per outpatient nephrology office notes from 5/24 note, patient was suspected to have underlying chronic kidney disease secondary to \"age-related nephron loss plus hypertensive nephrosclerosis plus cardiorenal syndrome\", plus nephropathy  Creatinine currently at her baseline    Atrial flutter (HCC)  Assessment & Plan  Patient follows with [facility] cardiology for atrial flutter, CHF, mitral stenosis, hypertension hyperlipidemia  Was recently seen in the office 5/24: Nuclear stress and echocardiogram ordered--currently pending  Continue amiodarone 200 mg daily, metoprolol 25 mg twice daily  Eliquis placed on hold due to hemoglobin of 4  Patient underwent GI workup with EGD without evidence of active bleeding: GI notes her Eliquis may be restarted discharge patient with rectal bleeding today: will hold an additional day and monitor    Chronic diastolic congestive heart failure (HCC)  Assessment & Plan  Wt Readings from Last 3 Encounters:   07/16/24 97.7 kg (215 lb 6.2 oz)   06/21/24 97.1 kg (214 lb)   06/04/24 94.8 kg (209 lb)     Patient has a history of chronic diastolic congestive heart failure follows [facility]  Recent 2D echocardiogram 7/23 LVEF 61%, grade I diastolic dysfunction, LA dilation, MV calcification   Home regimen: metoprolol tartrate 25mg twice daily, Lasix 20mg daily   Lasix temporarily on hold  Continue beta-blocker  Euvolemic today, status post transfusion of packed red blood cells  Restart Lasix    Severe asthma  Assessment & Plan  Patient has a history of severe asthma  Continue home inhalers  No evidence of acute exacerbation  Nebulizers as needed    Essential hypertension  Assessment & " Plan  Home regimen: metoprolol tartrate 25mg BID, Lasix 20mg daily   Cont metoprolol  Due to elevated creatinine at high end of baseline and clinical dehydration:  lasix temporarily on hold  Blood pressure adequately controlled  Cont current regimen      Thrombocytopenia (HCC)  Assessment & Plan  Per outpatient heme-onc office notes, patient has chronic thrombocytopenia  Platelet baseline appears to range 80s-100s  Currently at baseline             VTE Pharmacologic Prophylaxis: VTE Score: 5 High Risk (Score >/= 5) - Pharmacological DVT Prophylaxis Ordered: apixaban (Eliquis). Sequential Compression Devices Ordered.    Mobility:   Basic Mobility Inpatient Raw Score: 24  JH-HLM Goal: 8: Walk 250 feet or more  JH-HLM Achieved: 7: Walk 25 feet or more  JH-HLM Goal NOT achieved. Continue with multidisciplinary rounding and encourage appropriate mobility to improve upon JH-HLM goals.    Patient Centered Rounds: I performed bedside rounds with nursing staff today.   Discussions with Specialists or Other Care Team Provider: CM  Education and Discussions with Family / Patient: updated son via phone.  Reviewed test results and plan to restart eliquis today:  reviewed risk of cva with a flutter/fib off eliuqis, and incrased risk of bleeding from eliquis.  Chads vasc 2 is 4:  recommend AC;  son is in agreement with restarting and montiro    Total Time Spent on Date of Encounter in care of patient: 38 mins. This time was spent on one or more of the following: performing physical exam; counseling and coordination of care; obtaining or reviewing history; documenting in the medical record; reviewing/ordering tests, medications or procedures; communicating with other healthcare professionals and discussing with patient's family/caregivers.    Current Length of Stay: 2 day(s)  Current Patient Status: Inpatient   Certification Statement: The patient will continue to require additional inpatient hospital stay due to gi bleed.  anemia  Discharge Plan: Anticipate discharge tomorrow to home.    Code Status: Level 1 - Full Code    Subjective:   Patient notes that she feels better.  She relates yesterday she had bright red blood per rectum with a hard bowel movement.  She states however after taking laxatives she was able to pass her bowels.  She did not have any other bright red blood per rectum.  She denies any melena or hematochezia.  Denies any abdominal pain or discomfort.  Notes she is tolerating p.o.  Denies any difficulty breathing.  She denies any pain anywhere else.  Denies any chest pain, back pain, abdominal pain.  Denies any headache.  Previous shortness of breath and dyspnea on exertion have resolved    Objective:     Vitals:   Temp (24hrs), Av °F (36.7 °C), Min:97.9 °F (36.6 °C), Max:98.1 °F (36.7 °C)    Temp:  [97.9 °F (36.6 °C)-98.1 °F (36.7 °C)] 97.9 °F (36.6 °C)  HR:  [67-72] 68  Resp:  [18-20] 18  BP: (132-138)/(59-96) 132/59  SpO2:  [93 %-98 %] 98 %  Body mass index is 40.7 kg/m².     Input and Output Summary (last 24 hours):     Intake/Output Summary (Last 24 hours) at 2024 1722  Last data filed at 2024 0846  Gross per 24 hour   Intake 1200 ml   Output 250 ml   Net 950 ml       Physical Exam:   Physical Exam   General: Very pleasant female.  No acute distress.  Nontachypneic.  Able to speak in complete sentences without a stop or dyspnea  Heart: Regular rate and rhythm.  S1-S2 present.  No murmur, rub, gallop  Lungs: Clear to auscultation bilaterally.  No wheezes, crackles, rhonchi.  Good air movement.  No accessory muscle use or respiratory distress  Abdomen: Soft, nontender, nondistended, normal active bowel sounds present.  No guarding or rebound.  No peritoneal signs or mass  Extremities: No clubbing, cyanosis, edema.  2+ pedal pulses bilaterally   Neurologic: Awake and alert.  Fluent speech.  Interactive    Additional Data:     Labs:  Results from last 7 days   Lab Units 24  0559 24  1137  24  0456 24  1612 24  1148   WBC Thousand/uL 6.16  --  7.61  --  7.83   HEMOGLOBIN g/dL 7.4*   < > 7.5*   < > 5.4*   HEMATOCRIT % 23.9*   < > 23.7*   < > 18.1*   PLATELETS Thousands/uL 88*  --  87*  --  88*   BANDS PCT %  --   --   --   --  1   LYMPHO PCT %  --   --  18  --  16   MONO PCT %  --   --  4  --  5   EOS PCT %  --   --  0  --  0    < > = values in this interval not displayed.     Results from last 7 days   Lab Units 24  0559 24  1148 24  0339   SODIUM mmol/L 141   < > 141   POTASSIUM mmol/L 3.7   < > 3.3*   CHLORIDE mmol/L 109*   < > 103   CO2 mmol/L 28   < > 28   BUN mg/dL 31*   < > 50*   CREATININE mg/dL 1.48*   < > 1.99*   ANION GAP mmol/L 4   < > 10   CALCIUM mg/dL 7.8*   < > 8.9   ALBUMIN g/dL  --   --  3.2*   TOTAL BILIRUBIN mg/dL  --   --  2.10*   ALK PHOS U/L  --   --  66   ALT U/L  --   --  20   AST U/L  --   --  30   GLUCOSE RANDOM mg/dL 95   < > 122    < > = values in this interval not displayed.                       Lines/Drains:  Invasive Devices       Peripheral Intravenous Line  Duration             Long-Dwell Peripheral IV (Midline) 24 Right Brachial 2 days                    Imagin/16 chest x-ray  no acute cardiopulmonary disease. Suboptimal inspiratory effort.          CT abdomen/pelvis  No evidence of acute abdominopelvic process.  Colonic diverticulosis without diverticulitis     EGD:  The upper third of the esophagus, middle third of the esophagus and lower third of the esophagus appeared normal.  4 cm type I hiatal hernia  2 small angioectasias in the greater curve of the stomach; tissue was ablated with argon plasma coagulation  2 small angioectasias in the 3rd part of the duodenum; tissue was ablated with argon plasma coagulation  Lipoma noted in the distal 2nd portion of the duodenum  The duodenal bulb, 1st part of the duodenum and 2nd part of the duodenum appeared normal.     No active or stigmata of recent bleeding seen.    Resume diet and eliquis.   Can plan for outpatient colonoscopy to further evaluate for lower GI source of blood loss. If colonoscopy is unremarkable she would require video capsule endoscopy.       Recent Cultures (last 7 days):         Last 24 Hours Medication List:   Current Facility-Administered Medications   Medication Dose Route Frequency Provider Last Rate    acetaminophen  650 mg Oral Q4H PRN Larissa Aragon MD      amiodarone  200 mg Oral Daily With Breakfast Lynda Rush PA-C      apixaban  5 mg Oral BID Larissa Aragon MD      budesonide  0.25 mg Nebulization BID Lynda Rush PA-C      cyanocobalamin  1,000 mcg Oral Daily Larissa Aragon MD      docusate sodium  100 mg Oral BID Larissa Aragon MD      fluticasone-vilanterol  1 puff Inhalation Daily Lynda Rush PA-C      metoprolol tartrate  25 mg Oral Q12H JAIRON Lynda Rush PA-C      ondansetron  4 mg Intravenous Q6H PRN Lynda Rush PA-C      pantoprazole  40 mg Intravenous Q12H JAIRON Lynda Rush PA-C      polyethylene glycol  17 g Oral Daily Larissa Aragon MD      pravastatin  80 mg Oral Daily With Dinner Lynda Rush PA-C      senna  1 tablet Oral HS Larissa Aragon MD      sodium chloride (PF)  3 mL Intravenous Q1H PRN Lynda Rush PA-C          Today, Patient Was Seen By: Larissa Aragon MD    **Please Note: This note may have been constructed using a voice recognition system.**

## 2024-07-18 NOTE — ASSESSMENT & PLAN NOTE
Home regimen: metoprolol tartrate 25mg BID, Lasix 20mg daily   Cont metoprolol  Due to elevated creatinine at high end of baseline and clinical dehydration:  lasix temporarily on hold  Blood pressure adequately controlled  Cont current regimen

## 2024-07-18 NOTE — PLAN OF CARE
Problem: Prexisting or High Potential for Compromised Skin Integrity  Goal: Skin integrity is maintained or improved  Description: INTERVENTIONS:  - Identify patients at risk for skin breakdown  - Assess and monitor skin integrity  - Assess and monitor nutrition and hydration status  - Monitor labs   - Assess for incontinence   - Turn and reposition patient  - Assist with mobility/ambulation  - Relieve pressure over bony prominences  - Avoid friction and shearing  - Provide appropriate hygiene as needed including keeping skin clean and dry  - Evaluate need for skin moisturizer/barrier cream  - Collaborate with interdisciplinary team   - Patient/family teaching  - Consider wound care consult   Outcome: Progressing     Problem: PAIN - ADULT  Goal: Verbalizes/displays adequate comfort level or baseline comfort level  Description: Interventions:  - Encourage patient to monitor pain and request assistance  - Assess pain using appropriate pain scale  - Administer analgesics based on type and severity of pain and evaluate response  - Implement non-pharmacological measures as appropriate and evaluate response  - Consider cultural and social influences on pain and pain management  - Notify physician/advanced practitioner if interventions unsuccessful or patient reports new pain  Outcome: Progressing     Problem: DISCHARGE PLANNING  Goal: Discharge to home or other facility with appropriate resources  Description: INTERVENTIONS:  - Identify barriers to discharge w/patient and caregiver  - Arrange for needed discharge resources and transportation as appropriate  - Identify discharge learning needs (meds, wound care, etc.)  - Arrange for interpretive services to assist at discharge as needed  - Refer to Case Management Department for coordinating discharge planning if the patient needs post-hospital services based on physician/advanced practitioner order or complex needs related to functional status, cognitive ability, or  social support system  Outcome: Progressing     Problem: Knowledge Deficit  Goal: Patient/family/caregiver demonstrates understanding of disease process, treatment plan, medications, and discharge instructions  Description: Complete learning assessment and assess knowledge base.  Interventions:  - Provide teaching at level of understanding  - Provide teaching via preferred learning methods  Outcome: Progressing     Problem: METABOLIC, FLUID AND ELECTROLYTES - ADULT  Goal: Electrolytes maintained within normal limits  Description: INTERVENTIONS:  - Monitor labs and assess patient for signs and symptoms of electrolyte imbalances  - Administer electrolyte replacement as ordered  - Monitor response to electrolyte replacements, including repeat lab results as appropriate  - Instruct patient on fluid and nutrition as appropriate  Outcome: Progressing  Goal: Fluid balance maintained  Description: INTERVENTIONS:  - Monitor labs   - Monitor I/O and WT  - Instruct patient on fluid and nutrition as appropriate  - Assess for signs & symptoms of volume excess or deficit  Outcome: Progressing     Problem: SKIN/TISSUE INTEGRITY - ADULT  Goal: Skin Integrity remains intact(Skin Breakdown Prevention)  Description: Assess:  -Perform Rubén assessment every q 12 hours  -Clean and moisturize skin every day  -Inspect skin when repositioning, toileting, and assisting with ADLS  -Assess extremities for adequate circulation and sensation     Bed Management:  -Have minimal linens on bed & keep smooth, unwrinkled  -Change linens as needed when moist or perspiring    Toileting:  -Offer bedside commode    Activity:  -Mobilize patient 3 times a day  -Encourage activity and walks on unit  -Encourage or provide ROM exercises   -Turn and reposition patient every 2 Hours  -Use appropriate equipment to lift or move patient in bed    Skin Care:  -Avoid use of baby powder, tape, friction and shearing, hot water or constrictive clothing        Outcome:  Progressing  Goal: Incision(s), wounds(s) or drain site(s) healing without S/S of infection  Description: INTERVENTIONS  - Assess and document dressing, incision, wound bed, drain sites and surrounding tissue  - Provide patient and family education  - Perform skin care/dressing changes every shift   Outcome: Progressing  Goal: Pressure injury heals and does not worsen  Description: Interventions:  - Implement low air loss mattress or specialty surface (Criteria met)  - Apply silicone foam dressing  - Apply fecal or urinary incontinence containment device     - Turn and reposition patient & offload bony prominences every 2 hours   - Utilize friction reducing device or surface for transfers       - Consider nutrition services referral as needed  Outcome: Progressing     Problem: HEMATOLOGIC - ADULT  Goal: Maintains hematologic stability  Description: INTERVENTIONS  - Assess for signs and symptoms of bleeding or hemorrhage  - Monitor labs  - Administer supportive blood products/factors as ordered and appropriate  Outcome: Progressing

## 2024-07-18 NOTE — ASSESSMENT & PLAN NOTE
Patient follows with [facility] cardiology for atrial flutter, CHF, mitral stenosis, hypertension hyperlipidemia  Was recently seen in the office 5/24: Nuclear stress and echocardiogram ordered--currently pending  Continue amiodarone 200 mg daily, metoprolol 25 mg twice daily  Eliquis placed on hold due to hemoglobin of 4  Patient underwent GI workup with EGD without evidence of active bleeding: GI notes her Eliquis may be restarted discharge patient with rectal bleeding today: will hold an additional day and monitor

## 2024-07-19 ENCOUNTER — HOME CARE VISIT (OUTPATIENT)
Dept: HOME HEALTH SERVICES | Facility: HOME HEALTHCARE | Age: 79
End: 2024-07-19

## 2024-07-19 ENCOUNTER — TELEPHONE (OUTPATIENT)
Dept: HEMATOLOGY ONCOLOGY | Facility: CLINIC | Age: 79
End: 2024-07-19

## 2024-07-19 ENCOUNTER — HOSPITAL ENCOUNTER (OUTPATIENT)
Dept: INFUSION CENTER | Facility: HOSPITAL | Age: 79
End: 2024-07-19
Attending: INTERNAL MEDICINE

## 2024-07-19 VITALS
RESPIRATION RATE: 16 BRPM | OXYGEN SATURATION: 95 % | WEIGHT: 215.39 LBS | HEART RATE: 59 BPM | DIASTOLIC BLOOD PRESSURE: 71 MMHG | BODY MASS INDEX: 40.7 KG/M2 | TEMPERATURE: 97.4 F | SYSTOLIC BLOOD PRESSURE: 153 MMHG

## 2024-07-19 PROBLEM — R94.31 PROLONGED Q-T INTERVAL ON ECG: Status: RESOLVED | Noted: 2024-07-16 | Resolved: 2024-07-19

## 2024-07-19 LAB
ANION GAP SERPL CALCULATED.3IONS-SCNC: 5 MMOL/L (ref 4–13)
ANISOCYTOSIS BLD QL SMEAR: PRESENT
BASOPHILS # BLD MANUAL: 0 THOUSAND/UL (ref 0–0.1)
BASOPHILS NFR MAR MANUAL: 0 % (ref 0–1)
BUN SERPL-MCNC: 26 MG/DL (ref 5–25)
CALCIUM SERPL-MCNC: 8.1 MG/DL (ref 8.4–10.2)
CHLORIDE SERPL-SCNC: 109 MMOL/L (ref 96–108)
CO2 SERPL-SCNC: 27 MMOL/L (ref 21–32)
CREAT SERPL-MCNC: 1.27 MG/DL (ref 0.6–1.3)
EOSINOPHIL # BLD MANUAL: 0 THOUSAND/UL (ref 0–0.4)
EOSINOPHIL NFR BLD MANUAL: 0 % (ref 0–6)
ERYTHROCYTE [DISTWIDTH] IN BLOOD BY AUTOMATED COUNT: 19.5 % (ref 11.6–15.1)
GFR SERPL CREATININE-BSD FRML MDRD: 40 ML/MIN/1.73SQ M
GLUCOSE SERPL-MCNC: 84 MG/DL (ref 65–140)
HCT VFR BLD AUTO: 25.5 % (ref 34.8–46.1)
HELMET CELLS BLD QL SMEAR: PRESENT
HGB BLD-MCNC: 7.8 G/DL (ref 11.5–15.4)
LYMPHOCYTES # BLD AUTO: 1.18 THOUSAND/UL (ref 0.6–4.47)
LYMPHOCYTES # BLD AUTO: 20 % (ref 14–44)
MACROCYTES BLD QL AUTO: PRESENT
MCH RBC QN AUTO: 30.6 PG (ref 26.8–34.3)
MCHC RBC AUTO-ENTMCNC: 30.6 G/DL (ref 31.4–37.4)
MCV RBC AUTO: 100 FL (ref 82–98)
METAMYELOCYTE ABSOLUTE CT: 0.06 THOUSAND/UL (ref 0–0.1)
METAMYELOCYTES NFR BLD MANUAL: 1 % (ref 0–1)
MONOCYTES # BLD AUTO: 0.53 THOUSAND/UL (ref 0–1.22)
MONOCYTES NFR BLD: 9 % (ref 4–12)
MYELOCYTE ABSOLUTE CT: 0.06 THOUSAND/UL (ref 0–0.1)
MYELOCYTES NFR BLD MANUAL: 1 % (ref 0–1)
NEUTROPHILS # BLD MANUAL: 4.06 THOUSAND/UL (ref 1.85–7.62)
NEUTS BAND NFR BLD MANUAL: 2 % (ref 0–8)
NEUTS SEG NFR BLD AUTO: 67 % (ref 43–75)
PLATELET # BLD AUTO: 97 THOUSANDS/UL (ref 149–390)
PLATELET BLD QL SMEAR: ABNORMAL
PMV BLD AUTO: 13.9 FL (ref 8.9–12.7)
POIKILOCYTOSIS BLD QL SMEAR: PRESENT
POLYCHROMASIA BLD QL SMEAR: PRESENT
POTASSIUM SERPL-SCNC: 4.1 MMOL/L (ref 3.5–5.3)
RBC # BLD AUTO: 2.55 MILLION/UL (ref 3.81–5.12)
RBC MORPH BLD: PRESENT
SCHISTOCYTES BLD QL SMEAR: PRESENT
SODIUM SERPL-SCNC: 141 MMOL/L (ref 135–147)
WBC # BLD AUTO: 5.89 THOUSAND/UL (ref 4.31–10.16)

## 2024-07-19 PROCEDURE — 80048 BASIC METABOLIC PNL TOTAL CA: CPT | Performed by: INTERNAL MEDICINE

## 2024-07-19 PROCEDURE — 85027 COMPLETE CBC AUTOMATED: CPT | Performed by: INTERNAL MEDICINE

## 2024-07-19 PROCEDURE — 99239 HOSP IP/OBS DSCHRG MGMT >30: CPT | Performed by: INTERNAL MEDICINE

## 2024-07-19 PROCEDURE — 85007 BL SMEAR W/DIFF WBC COUNT: CPT | Performed by: INTERNAL MEDICINE

## 2024-07-19 PROCEDURE — 94640 AIRWAY INHALATION TREATMENT: CPT

## 2024-07-19 PROCEDURE — 94760 N-INVAS EAR/PLS OXIMETRY 1: CPT

## 2024-07-19 RX ORDER — PANTOPRAZOLE SODIUM 40 MG/1
40 TABLET, DELAYED RELEASE ORAL 2 TIMES DAILY
Qty: 60 TABLET | Refills: 0 | Status: SHIPPED | OUTPATIENT
Start: 2024-07-19 | End: 2024-08-18

## 2024-07-19 RX ADMIN — METOPROLOL TARTRATE 25 MG: 25 TABLET, FILM COATED ORAL at 08:53

## 2024-07-19 RX ADMIN — CYANOCOBALAMIN TAB 500 MCG 1000 MCG: 500 TAB at 08:53

## 2024-07-19 RX ADMIN — PANTOPRAZOLE SODIUM 40 MG: 40 INJECTION, POWDER, FOR SOLUTION INTRAVENOUS at 08:53

## 2024-07-19 RX ADMIN — ACETAMINOPHEN 650 MG: 325 TABLET, FILM COATED ORAL at 09:20

## 2024-07-19 RX ADMIN — FLUTICASONE FUROATE AND VILANTEROL TRIFENATATE 1 PUFF: 200; 25 POWDER RESPIRATORY (INHALATION) at 08:54

## 2024-07-19 RX ADMIN — FUROSEMIDE 20 MG: 20 TABLET ORAL at 08:53

## 2024-07-19 RX ADMIN — PRAVASTATIN SODIUM 80 MG: 80 TABLET ORAL at 16:04

## 2024-07-19 RX ADMIN — BUDESONIDE 0.25 MG: 0.25 INHALANT RESPIRATORY (INHALATION) at 07:10

## 2024-07-19 RX ADMIN — AMIODARONE HYDROCHLORIDE 200 MG: 200 TABLET ORAL at 08:53

## 2024-07-19 RX ADMIN — APIXABAN 5 MG: 5 TABLET, FILM COATED ORAL at 08:53

## 2024-07-19 NOTE — DISCHARGE INSTR - AVS FIRST PAGE
==================================================================  Continue your home medications    Please see the GI doctor in 1-2 weeks for a check up.  They will schedule your colonoscopy    Continue the stomach pill-  protonix 40mg twice a day until you see them    You can continue your eliquis blood thinner,  to prevent strokes.    Monitor closely for any bleeding, especially any red or BLACK blood in your bowel movements or in your vomit and return immediately to the ER with any.  Also return with any weakness, fatigue, difficulty breathing, or symptoms similar to the last few weeks.      See your family doctor in 1 week for a check up and to repeat your LABS for your blood count    You are also B12 deficient and need to take B vitamins    ================================================================

## 2024-07-19 NOTE — ASSESSMENT & PLAN NOTE
"Patient noted a 2-week history of melena, as well as a 2-month history of dyspnea on exertion  Presented with acute blood loss anemia with a hemoglobin of 4 decreased from her baseline of approximately 10  CT scan abdomen/pelvis: \"No evidence of acute abdominopelvic process. Colonic diverticulosis without diverticulitis.\"  Patient was started on a proton pump inhibitor twice daily and evaluated by the GI team  7/16 patient underwent EGD that revealed angioectasias in the stomach, and duodenum treated with APC.  No active or stigmata of recent bleeding noted  Per GI: If no additional bleeding anticipate outpatient colonoscopy, and if unremarkable, possible capsule pill endoscopy  Patient noted hematochezia yesterday however in the setting of constipation: Was given a laxative with resolution of constipation  Patient notes she is passing her bowels without any additional melena/hematochezia  Restart Eliquis yesterday.  No bleeding, Hb stable  Will dc home with outpt GI fu  "

## 2024-07-19 NOTE — ASSESSMENT & PLAN NOTE
Patient follows with [facility] cardiology for atrial flutter, CHF, mitral stenosis, hypertension hyperlipidemia  Was recently seen in the office 5/24: Nuclear stress and echocardiogram ordered--currently pending  Continue amiodarone 200 mg daily, metoprolol 25 mg twice daily  Eliquis placed on hold due to hemoglobin of 4  Patient underwent GI workup with EGD without evidence of active bleeding: GI notes her Eliquis may be restarted discharge  Eliquis restarted.

## 2024-07-19 NOTE — ASSESSMENT & PLAN NOTE
"Lab Results   Component Value Date    EGFR 40 07/19/2024    EGFR 33 07/18/2024    EGFR 32 07/17/2024    CREATININE 1.27 07/19/2024    CREATININE 1.48 (H) 07/18/2024    CREATININE 1.53 (H) 07/17/2024     Patient has chronic kidney disease stage IIIB-IV with baseline creatinine that appears to range 1.5-1.9  She follows with [facility] nephrology, Dr. 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

## 2024-07-19 NOTE — TELEPHONE ENCOUNTER
[facility] inform Rx re-enrollment for patient medication (ALECENSA) is being worked and to provide more time for writer to reach Pt for required household eligibility information. Pt currently in ED and was admitted on 7/16/24. Writer granted [facility] to renew Pt enrollment before expiration due to Pt circumstances. Writer was informed that [facility] would reach out to writer prior to any expiration would be made if Pt conditioned stays current. Writer provided contact information to do so if needed.        Marco Ospina  Phone:[phone]  Email:[email or web address]

## 2024-07-19 NOTE — ASSESSMENT & PLAN NOTE
Patient is a 79-year-old female with past medical history significant for stage IV non-small cell lung cancer with bone mets, currently on Alectinib maintenance therapy with monthly Xgeva, CKD who presented to the ER with symptomatic anemia.  Patient noted to a 2-month history of worsening dyspnea on exertion.  Presented with profound weakness    Baseline hemoglobin appears to range 9-10  Patient presented with a hemoglobin of 4.3  Also noted melanotic stools  Patient was ordered 3 units of packed red blood cells in the ER and referred for admission  Etiology of anemia evaluated  A) GI bleed: Patient noted melanotic stool  Patient was started on a proton pump inhibitor  Underwent an EGD with angioectasias that were treated with APC, but no active bleeding or stigmata of recent bleeding  Will follow-up with GI as an outpatient for colonoscopy and possible capsule pill endoscopy if needed  B) medication related:   Alectinib associated with hemolytic anemia:   Xgeva also associated with anemia and thrombocytopenia  Patient was evaluated by the oncology team: Did not appear consistent with hemolysis due to normal LDH  Recommended patient's alectinib can be placed on hold during her hospitalization and restarted at discharge  C)Normal folic acid  B12 deficient: Started on supplements  Hemoglobin has improved posttransfusion to 8  Prior to dc Hb was stable at 7.8  Eliquis restarted yesterday w/o any bleeding.  Will be dc home with outpt GI follow up

## 2024-07-19 NOTE — RESPIRATORY THERAPY NOTE
RT Ventilator Management Note  Jayla Moody 79 y.o. female MRN: [id number]  Unit/Bed#: [address] 224-01 Encounter: [id number]      Daily Screen    No data found in the last 10 encounters.          07/18/24 0137   Respiratory Assessment   Assessment Type Assess only   General Appearance Sleeping   Respiratory Pattern Assisted;Spontaneous   Chest Assessment Chest expansion symmetrical   Resp Comments placed on CPAP for hours of sleep   Non-Invasive Information   O2 Interface Device Full face mask   Non-Invasive Ventilation Mode CPAP   $ Continous NIV Initial   $ Intermittent NIV Yes   SpO2 99 %   $ Pulse Oximetry Spot Check Charge Completed   Non-Invasive Settings   FiO2 (%) 30   PEEP/CPAP (cm H2O) 6   Non-Invasive Readings   Skin Intervention Skin intact   Total Rate 17   Spontaneous Vt (mL) 669   Spontaneous MV (mL) 11.1   Leak (lpm) 9   Non-Invasive Alarms   Insp Pressure High (cm H20) 50   Insp Pressure Low (cm H20) 5   Low Insp Pressure Time (sec) 20 sec   MV Low (L/min) 2   Vt High (mL) 1500   Vt Low (mL) 150   High Resp Rate (BPM) 50 BPM   Low Resp Rate (BPM) 5 BPM   Apnea Interval (sec) 20         Physical Exam:   Assessment Type: Assess only  General Appearance: Sleeping  Respiratory Pattern: Assisted, Spontaneous  Chest Assessment: Chest expansion symmetrical  Bilateral Breath Sounds: Diminished, Clear  Cough: None      Resp Comments: placed on CPAP for hours of sleep

## 2024-07-19 NOTE — DISCHARGE SUMMARY
[facility]  Discharge- Jayla Moody 1945, 79 y.o. 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  Patient noted to a 2-month history of worsening dyspnea on exertion.  Presented with profound weakness    Baseline hemoglobin appears to range 9-10  Patient presented with a hemoglobin of 4.3  Also noted melanotic stools  Patient was ordered 3 units of packed red blood cells in the ER and referred for admission  Etiology of anemia evaluated  A) GI bleed: Patient noted melanotic stool  Patient was started on a proton pump inhibitor  Underwent an EGD with angioectasias that were treated with APC, but no active bleeding or stigmata of recent bleeding  Will follow-up with GI as an outpatient for colonoscopy and possible capsule pill endoscopy if needed  B) medication related:   Alectinib associated with hemolytic anemia:   Xgeva also associated with anemia and thrombocytopenia  Patient was evaluated by the oncology team: Did not appear consistent with hemolysis due to normal LDH  Recommended patient's alectinib can be placed on hold during her hospitalization and restarted at discharge  C)Normal folic acid  B12 deficient: Started on supplements  Hemoglobin has improved posttransfusion to 8  Prior to dc Hb was stable at 7.8  Eliquis restarted yesterday w/o any bleeding.  Will be dc home with outpt GI follow up    Melena  Assessment & Plan  Patient noted a 2-week history of melena, as well as a 2-month history of dyspnea on exertion  Presented with acute blood loss anemia with  "a hemoglobin of 4 decreased from her baseline of approximately 10  CT scan abdomen/pelvis: \"No evidence of acute abdominopelvic process. Colonic diverticulosis without diverticulitis.\"  Patient was started on a proton pump inhibitor twice daily and evaluated by the GI team  7/16 patient underwent EGD that revealed angioectasias in the stomach, and duodenum treated with APC.  No active or stigmata of recent bleeding noted  Per GI: If no additional bleeding anticipate outpatient colonoscopy, and if unremarkable, possible capsule pill endoscopy  Patient noted hematochezia yesterday however in the setting of constipation: Was given a laxative with resolution of constipation  Patient notes she is passing her bowels without any additional melena/hematochezia  Restart Eliquis yesterday.  No bleeding, Hb stable  Will dc home with outpt GI fu    Non-small cell lung cancer (HCC)  Assessment & Plan  Patient follows with [facility] oncology, Dr. 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)  Assessment & Plan  Lab Results   Component Value Date    EGFR 40 07/19/2024    EGFR 33 07/18/2024    EGFR 32 07/17/2024    CREATININE 1.27 07/19/2024    CREATININE 1.48 (H) 07/18/2024    CREATININE 1.53 (H) 07/17/2024     Patient has chronic " "kidney disease stage IIIB-IV with baseline creatinine that appears to range 1.5-1.9  She follows with [facility] nephrology, Dr. 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  Per outpatient nephrology office notes from 5/24 note, patient was suspected to have underlying chronic kidney disease secondary to \"age-related nephron loss plus hypertensive nephrosclerosis plus cardiorenal syndrome\", plus nephropathy  Creatinine currently at her baseline    Atrial flutter (HCC)  Assessment & Plan  Patient follows with [facility] cardiology for atrial flutter, CHF, mitral stenosis, hypertension hyperlipidemia  Was recently seen in the office 5/24: Nuclear stress and echocardiogram ordered--currently pending  Continue amiodarone 200 mg daily, metoprolol 25 mg twice daily  Eliquis placed on hold due to hemoglobin of 4  Patient underwent GI workup with EGD without evidence of active bleeding: GI notes her Eliquis may be restarted discharge  Eliquis restarted.    Chronic diastolic congestive heart failure (HCC)  Assessment & Plan  Wt Readings from Last 3 Encounters:   07/16/24 97.7 kg (215 lb 6.2 oz)   06/21/24 97.1 kg (214 lb)   06/04/24 94.8 kg (209 lb)     Patient has a history of chronic diastolic congestive heart failure follows [facility]  Recent 2D echocardiogram 7/23 LVEF 61%, grade I diastolic dysfunction, LA dilation, MV calcification   Home regimen: metoprolol tartrate 25mg twice daily, Lasix 20mg daily   Lasix temporarily on hold, since restarted  Continue beta-blocker    Severe asthma  Assessment & Plan  Patient has a history of severe asthma  Continue home inhalers  No evidence of acute exacerbation  Nebulizers as needed    Essential hypertension  Assessment & Plan  Home regimen: metoprolol tartrate 25mg BID, Lasix 20mg daily   Cont metoprolol  Due to elevated creatinine at high end of baseline and clinical dehydration:  lasix temporarily on hold, since restarted  Bp " adequate:  will be dc on home regimen    Thrombocytopenia (HCC)  Assessment & Plan  Per outpatient heme-onc office notes, patient has chronic thrombocytopenia  Platelet baseline appears to range 80s-100s  Currently at baseline    Prolonged Q-T interval on ECG-resolved as of 2024  Assessment & Plan  Prolonged QT noted on admission EKG  Suspect secondary to hypokalemia  Repleted and improved  Repeat EKG with improvement in QT        Medical Problems       Resolved Problems  Date Reviewed: 2024            Resolved    Prolonged Q-T interval on ECG 2024     Resolved by  Larissa Aragon MD        Discharging Physician / Practitioner: Larissa Aragon MD  PCP: Kaiser Kohler DO  Admission Date:   Admission Orders (From admission, onward)       Ordered        24 06  INPATIENT ADMISSION  Once                          Discharge Date: 24    Consultations During Hospital Stay:  Oncology: DR. Malone  GI: dr jaiyeola    Procedures Performed:   EGD:  The upper third of the esophagus, middle third of the esophagus and lower third of the esophagus appeared normal.  4 cm type I hiatal hernia  2 small angioectasias in the greater curve of the stomach; tissue was ablated with argon plasma coagulation  2 small angioectasias in the 3rd part of the duodenum; tissue was ablated with argon plasma coagulation  Lipoma noted in the distal 2nd portion of the duodenum  The duodenal bulb, 1st part of the duodenum and 2nd part of the duodenum appeared normal.     No active or stigmata of recent bleeding seen.   Resume diet and eliquis.   Can plan for outpatient colonoscopy to further evaluate for lower GI source of blood loss. If colonoscopy is unremarkable she would require video capsule endoscopy.       Significant Findings / Test Results:   Imagin/16 chest x-ray  no acute cardiopulmonary disease. Suboptimal inspiratory effort.          CT abdomen/pelvis  No evidence of acute abdominopelvic  process.  Colonic diverticulosis without diverticulitis    Incidental Findings:   none    Test Results Pending at Discharge (will require follow up):   none     Outpatient Tests Requested:  GI fu    Complications:  none    Reason for Admission: weakness, sob, symptomatic anemia    Hospital Course:   Jayla Moody is a 79 y.o. female patient who originally presented to the hospital on 7/16/2024 due to symptomatic anemia    Please see above list of diagnoses and related plan for additional information.     Condition at Discharge: good    Discharge Day Visit / Exam:   Subjective:    Pt notes she feels well.  Denies any pain today.  Ambulating and denies any dizziness, lightheadedness.  Denies any abd pain, n/v/d.  No melena/hematochezia.  Denies any sob/chest pain . Denies any majano.  Notes prior symptoms have resolved . Eating well. No bleeding/melena.  Feels improved and agreeable to dc home      Vitals: Blood Pressure: 153/71 (07/19/24 0717)  Pulse: 59 (07/19/24 0717)  Temperature: (!) 97.4 °F (36.3 °C) (07/19/24 0716)  Temp Source: Axillary (07/19/24 0716)  Respirations: 16 (07/19/24 0716)  Weight - Scale: 97.7 kg (215 lb 6.2 oz) (07/16/24 0317)  SpO2: 95 % (07/19/24 0920)  Exam:   Physical Exam   Gen: pleasant female.  Nad  Heart: RRR no m/r/g  Lungs CTA bilat.  No w/c/r  Abd ;soft, nt/nd, nabs  Ext :no c/c.  Trace pre-tibial and b pedal edema  Neuro; awake and alert      Discussion with Family: updated son Jimmy Moody via phone      Discharge instructions/Information to patient and family:   See after visit summary for information provided to patient and family.      Provisions for Follow-Up Care:  See after visit summary for information related to follow-up care and any pertinent home health orders.      Mobility at time of Discharge:   Basic Mobility Inpatient Raw Score: 23  JH-HLM Goal: 7: Walk 25 feet or more  JH-HLM Achieved: 8: Walk 250 feet ot more  HLM Goal achieved. Continue to encourage appropriate  mobility.     Disposition:   Home    Planned Readmission: no    Discharge Statement:  I spent 38 minutes discharging the patient. This time was spent on the day of discharge. I had direct contact with the patient on the day of discharge. Greater than 50% of the total time was spent examining patient, answering all patient questions, arranging and discussing plan of care with patient as well as directly providing post-discharge instructions.  Additional time then spent on discharge activities.    Discharge Medications:  See after visit summary for reconciled discharge medications provided to patient and/or family.      **Please Note: This note may have been constructed using a voice recognition system**

## 2024-07-19 NOTE — ASSESSMENT & PLAN NOTE
Home regimen: metoprolol tartrate 25mg BID, Lasix 20mg daily   Cont metoprolol  Due to elevated creatinine at high end of baseline and clinical dehydration:  lasix temporarily on hold, since restarted  Bp adequate:  will be dc on home regimen

## 2024-07-19 NOTE — NURSING NOTE
Patient discharged 7/19/2024 6:07 PM. AVS and discharge instructions reviewed with patient. All questions answered. Patient verbalized having all belongings for discharge. Patient taken to exit to be driven home by family.    Christy 7/19/2024 6:08 PM

## 2024-07-19 NOTE — ASSESSMENT & PLAN NOTE
Patient follows with [facility] oncology, Dr. 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

## 2024-07-19 NOTE — PLAN OF CARE
Problem: Prexisting or High Potential for Compromised Skin Integrity  Goal: Skin integrity is maintained or improved  Description: INTERVENTIONS:  - Identify patients at risk for skin breakdown  - Assess and monitor skin integrity  - Assess and monitor nutrition and hydration status  - Monitor labs   - Assess for incontinence   - Turn and reposition patient  - Assist with mobility/ambulation  - Relieve pressure over bony prominences  - Avoid friction and shearing  - Provide appropriate hygiene as needed including keeping skin clean and dry  - Evaluate need for skin moisturizer/barrier cream  - Collaborate with interdisciplinary team   - Patient/family teaching  - Consider wound care consult   7/19/2024 1806 by Nuria Harrison RN  Outcome: Adequate for Discharge  7/19/2024 0935 by Nuria Harrison RN  Outcome: Progressing     Problem: PAIN - ADULT  Goal: Verbalizes/displays adequate comfort level or baseline comfort level  Description: Interventions:  - Encourage patient to monitor pain and request assistance  - Assess pain using appropriate pain scale  - Administer analgesics based on type and severity of pain and evaluate response  - Implement non-pharmacological measures as appropriate and evaluate response  - Consider cultural and social influences on pain and pain management  - Notify physician/advanced practitioner if interventions unsuccessful or patient reports new pain  7/19/2024 1806 by Nuria Harrison RN  Outcome: Adequate for Discharge  7/19/2024 0935 by Nuria Harrison RN  Outcome: Progressing     Problem: DISCHARGE PLANNING  Goal: Discharge to home or other facility with appropriate resources  Description: INTERVENTIONS:  - Identify barriers to discharge w/patient and caregiver  - Arrange for needed discharge resources and transportation as appropriate  - Identify discharge learning needs (meds, wound care, etc.)  - Arrange for interpretive services to assist at discharge as needed  - Refer to Case Management  Department for coordinating discharge planning if the patient needs post-hospital services based on physician/advanced practitioner order or complex needs related to functional status, cognitive ability, or social support system  7/19/2024 1806 by Nuria Harrison RN  Outcome: Adequate for Discharge  7/19/2024 0935 by Nuria Harrison RN  Outcome: Progressing     Problem: Knowledge Deficit  Goal: Patient/family/caregiver demonstrates understanding of disease process, treatment plan, medications, and discharge instructions  Description: Complete learning assessment and assess knowledge base.  Interventions:  - Provide teaching at level of understanding  - Provide teaching via preferred learning methods  7/19/2024 1806 by Nuria Harrison RN  Outcome: Adequate for Discharge  7/19/2024 0935 by Nuria Harrison RN  Outcome: Progressing     Problem: METABOLIC, FLUID AND ELECTROLYTES - ADULT  Goal: Electrolytes maintained within normal limits  Description: INTERVENTIONS:  - Monitor labs and assess patient for signs and symptoms of electrolyte imbalances  - Administer electrolyte replacement as ordered  - Monitor response to electrolyte replacements, including repeat lab results as appropriate  - Instruct patient on fluid and nutrition as appropriate  7/19/2024 1806 by Nuria Harrison RN  Outcome: Adequate for Discharge  7/19/2024 0935 by Nuria Harrison RN  Outcome: Progressing  Goal: Fluid balance maintained  Description: INTERVENTIONS:  - Monitor labs   - Monitor I/O and WT  - Instruct patient on fluid and nutrition as appropriate  - Assess for signs & symptoms of volume excess or deficit  7/19/2024 1806 by Nuria Harrison RN  Outcome: Adequate for Discharge  7/19/2024 0935 by Nuria Harrison RN  Outcome: Progressing     Problem: SKIN/TISSUE INTEGRITY - ADULT  Goal: Skin Integrity remains intact(Skin Breakdown Prevention)  Description: Assess:  -Perform Rubén assessment every q 12 hours  -Clean and moisturize skin every day  -Inspect skin when  repositioning, toileting, and assisting with ADLS  -Assess extremities for adequate circulation and sensation     Bed Management:  -Have minimal linens on bed & keep smooth, unwrinkled  -Change linens as needed when moist or perspiring    Toileting:  -Offer bedside commode    Activity:  -Mobilize patient 3 times a day  -Encourage activity and walks on unit  -Encourage or provide ROM exercises   -Turn and reposition patient every 2 Hours  -Use appropriate equipment to lift or move patient in bed    Skin Care:  -Avoid use of baby powder, tape, friction and shearing, hot water or constrictive clothing        7/19/2024 1806 by Nuria Harrison RN  Outcome: Adequate for Discharge  7/19/2024 0935 by Nuria Harrison RN  Outcome: Progressing  Goal: Incision(s), wounds(s) or drain site(s) healing without S/S of infection  Description: INTERVENTIONS  - Assess and document dressing, incision, wound bed, drain sites and surrounding tissue  - Provide patient and family education  7/19/2024 1806 by Nuria Harrison RN  Outcome: Adequate for Discharge  7/19/2024 0935 by Nuria Harrison RN  Outcome: Progressing  Goal: Pressure injury heals and does not worsen  Description: Interventions:  - Implement low air loss mattress or specialty surface (Criteria met)  - Apply silicone foam dressing  - Apply fecal or urinary incontinence containment device     - Turn and reposition patient & offload bony prominences every 2 hours   - Utilize friction reducing device or surface for transfers       - Consider nutrition services referral as needed  7/19/2024 1806 by Nuria Harrison RN  Outcome: Adequate for Discharge  7/19/2024 0935 by Nuria Harrison RN  Outcome: Progressing     Problem: HEMATOLOGIC - ADULT  Goal: Maintains hematologic stability  Description: INTERVENTIONS  - Assess for signs and symptoms of bleeding or hemorrhage  - Monitor labs  - Administer supportive blood products/factors as ordered and appropriate  7/19/2024 1806 by Nuria Harrison RN  Outcome:  Adequate for Discharge  7/19/2024 0935 by Nuria Harrison, RN  Outcome: Progressing

## 2024-07-19 NOTE — PLAN OF CARE
Problem: Prexisting or High Potential for Compromised Skin Integrity  Goal: Skin integrity is maintained or improved  Description: INTERVENTIONS:  - Identify patients at risk for skin breakdown  - Assess and monitor skin integrity  - Assess and monitor nutrition and hydration status  - Monitor labs   - Assess for incontinence   - Turn and reposition patient  - Assist with mobility/ambulation  - Relieve pressure over bony prominences  - Avoid friction and shearing  - Provide appropriate hygiene as needed including keeping skin clean and dry  - Evaluate need for skin moisturizer/barrier cream  - Collaborate with interdisciplinary team   - Patient/family teaching  - Consider wound care consult   Outcome: Progressing     Problem: PAIN - ADULT  Goal: Verbalizes/displays adequate comfort level or baseline comfort level  Description: Interventions:  - Encourage patient to monitor pain and request assistance  - Assess pain using appropriate pain scale  - Administer analgesics based on type and severity of pain and evaluate response  - Implement non-pharmacological measures as appropriate and evaluate response  - Consider cultural and social influences on pain and pain management  - Notify physician/advanced practitioner if interventions unsuccessful or patient reports new pain  Outcome: Progressing     Problem: DISCHARGE PLANNING  Goal: Discharge to home or other facility with appropriate resources  Description: INTERVENTIONS:  - Identify barriers to discharge w/patient and caregiver  - Arrange for needed discharge resources and transportation as appropriate  - Identify discharge learning needs (meds, wound care, etc.)  - Arrange for interpretive services to assist at discharge as needed  - Refer to Case Management Department for coordinating discharge planning if the patient needs post-hospital services based on physician/advanced practitioner order or complex needs related to functional status, cognitive ability, or  social support system  Outcome: Progressing     Problem: Knowledge Deficit  Goal: Patient/family/caregiver demonstrates understanding of disease process, treatment plan, medications, and discharge instructions  Description: Complete learning assessment and assess knowledge base.  Interventions:  - Provide teaching at level of understanding  - Provide teaching via preferred learning methods  Outcome: Progressing     Problem: METABOLIC, FLUID AND ELECTROLYTES - ADULT  Goal: Electrolytes maintained within normal limits  Description: INTERVENTIONS:  - Monitor labs and assess patient for signs and symptoms of electrolyte imbalances  - Administer electrolyte replacement as ordered  - Monitor response to electrolyte replacements, including repeat lab results as appropriate  - Instruct patient on fluid and nutrition as appropriate  Outcome: Progressing  Goal: Fluid balance maintained  Description: INTERVENTIONS:  - Monitor labs   - Monitor I/O and WT  - Instruct patient on fluid and nutrition as appropriate  - Assess for signs & symptoms of volume excess or deficit  Outcome: Progressing     Problem: SKIN/TISSUE INTEGRITY - ADULT  Goal: Skin Integrity remains intact(Skin Breakdown Prevention)  Description: Assess:  -Perform Rubén assessment every q 12 hours  -Clean and moisturize skin every day  -Inspect skin when repositioning, toileting, and assisting with ADLS  -Assess extremities for adequate circulation and sensation     Bed Management:  -Have minimal linens on bed & keep smooth, unwrinkled  -Change linens as needed when moist or perspiring    Toileting:  -Offer bedside commode    Activity:  -Mobilize patient 3 times a day  -Encourage activity and walks on unit  -Encourage or provide ROM exercises   -Turn and reposition patient every 2 Hours  -Use appropriate equipment to lift or move patient in bed    Skin Care:  -Avoid use of baby powder, tape, friction and shearing, hot water or constrictive clothing        Outcome:  Progressing  Goal: Incision(s), wounds(s) or drain site(s) healing without S/S of infection  Description: INTERVENTIONS  - Assess and document dressing, incision, wound bed, drain sites and surrounding tissue  - Provide patient and family education  Outcome: Progressing  Goal: Pressure injury heals and does not worsen  Description: Interventions:  - Implement low air loss mattress or specialty surface (Criteria met)  - Apply silicone foam dressing  - Apply fecal or urinary incontinence containment device     - Turn and reposition patient & offload bony prominences every 2 hours   - Utilize friction reducing device or surface for transfers       - Consider nutrition services referral as needed  Outcome: Progressing     Problem: HEMATOLOGIC - ADULT  Goal: Maintains hematologic stability  Description: INTERVENTIONS  - Assess for signs and symptoms of bleeding or hemorrhage  - Monitor labs  - Administer supportive blood products/factors as ordered and appropriate  Outcome: Progressing

## 2024-07-19 NOTE — ASSESSMENT & PLAN NOTE
Wt Readings from Last 3 Encounters:   07/16/24 97.7 kg (215 lb 6.2 oz)   06/21/24 97.1 kg (214 lb)   06/04/24 94.8 kg (209 lb)     Patient has a history of chronic diastolic congestive heart failure follows [facility]  Recent 2D echocardiogram 7/23 LVEF 61%, grade I diastolic dysfunction, LA dilation, MV calcification   Home regimen: metoprolol tartrate 25mg twice daily, Lasix 20mg daily   Lasix temporarily on hold, since restarted  Continue beta-blocker

## 2024-07-19 NOTE — TELEPHONE ENCOUNTER
Writer left voicemail to Pt to obtain household information in order to re-enroll Pt for her free drug of ALECENSA. Writer aware Pt is currently in ED and assured no rush on a return call. Writer provided contact information for Pt to use for future use.        Marco Ospina  Phone:[phone]  Email:[email or web address]

## 2024-07-20 DIAGNOSIS — J45.31 MILD PERSISTENT ASTHMA WITH ACUTE EXACERBATION: ICD-10-CM

## 2024-07-22 ENCOUNTER — HOME CARE VISIT (OUTPATIENT)
Dept: HOME HEALTH SERVICES | Facility: HOME HEALTHCARE | Age: 79
End: 2024-07-22
Payer: COMMERCIAL

## 2024-07-22 ENCOUNTER — OFFICE VISIT (OUTPATIENT)
Dept: FAMILY MEDICINE CLINIC | Facility: CLINIC | Age: 79
End: 2024-07-22

## 2024-07-22 VITALS — DIASTOLIC BLOOD PRESSURE: 78 MMHG | HEART RATE: 67 BPM | SYSTOLIC BLOOD PRESSURE: 140 MMHG | OXYGEN SATURATION: 95 %

## 2024-07-22 VITALS
BODY MASS INDEX: 40.59 KG/M2 | WEIGHT: 215 LBS | TEMPERATURE: 97.7 F | OXYGEN SATURATION: 97 % | HEART RATE: 83 BPM | RESPIRATION RATE: 22 BRPM | HEIGHT: 61 IN | DIASTOLIC BLOOD PRESSURE: 55 MMHG | SYSTOLIC BLOOD PRESSURE: 110 MMHG

## 2024-07-22 DIAGNOSIS — D69.6 THROMBOCYTOPENIA (HCC): ICD-10-CM

## 2024-07-22 DIAGNOSIS — C34.91 NON-SMALL CELL CANCER OF RIGHT LUNG (HCC): ICD-10-CM

## 2024-07-22 DIAGNOSIS — J45.50 SEVERE PERSISTENT ASTHMA WITHOUT COMPLICATION: ICD-10-CM

## 2024-07-22 DIAGNOSIS — I48.3 TYPICAL ATRIAL FLUTTER (HCC): ICD-10-CM

## 2024-07-22 DIAGNOSIS — I10 ESSENTIAL HYPERTENSION: ICD-10-CM

## 2024-07-22 DIAGNOSIS — Z71.89 COMPLEX CARE COORDINATION: Primary | ICD-10-CM

## 2024-07-22 DIAGNOSIS — D64.9 SYMPTOMATIC ANEMIA: Primary | ICD-10-CM

## 2024-07-22 DIAGNOSIS — N18.32 STAGE 3B CHRONIC KIDNEY DISEASE (HCC): ICD-10-CM

## 2024-07-22 DIAGNOSIS — I50.32 CHRONIC DIASTOLIC CONGESTIVE HEART FAILURE (HCC): ICD-10-CM

## 2024-07-22 PROCEDURE — 99496 TRANSJ CARE MGMT HIGH F2F 7D: CPT | Performed by: STUDENT IN AN ORGANIZED HEALTH CARE EDUCATION/TRAINING PROGRAM

## 2024-07-22 PROCEDURE — G0151 HHCP-SERV OF PT,EA 15 MIN: HCPCS

## 2024-07-22 PROCEDURE — 400013 VN SOC

## 2024-07-22 PROCEDURE — 1111F DSCHRG MED/CURRENT MED MERGE: CPT | Performed by: STUDENT IN AN ORGANIZED HEALTH CARE EDUCATION/TRAINING PROGRAM

## 2024-07-22 NOTE — PROGRESS NOTES
Transition of Care Visit  Name: Jayla Moody      : 1945      MRN: [id number]  Encounter Provider: Michelle Chatterjee MD  Encounter Date: 2024   [facility]    Assessment & Plan   1. Symptomatic anemia  Assessment & Plan:  Asymptomatic.    Instructed to obtain CBC to monitor Hgb.  Follow with scheduled appointment with Heme-Onc.  2. Typical atrial flutter (HCC)  Assessment & Plan:  Continue amiodarone 200 mg daily, metoprolol 25 mg twice daily, Eliquis 5 mg twice daily.   Follow up with  cardiology.  Complete nuclear stress test.  3. Chronic diastolic congestive heart failure (HCC)  Assessment & Plan:  Wt Readings from Last 3 Encounters:   24 93.4 kg (206 lb)   24 97.1 kg (214 lb)   24 93 kg (205 lb)     Asymptomatic; euvolemic.  ECHO : LVEF 61%, grade I diastolic dysfunction, LA dilation, MV calcification.   Continue metoprolol tartrate 25mg twice daily, Lasix 20mg daily.      4. Essential hypertension  Assessment & Plan:  Blood Pressure: 110/55 ; stable, at goal per JNC-8.    Continue Metoprolol tartrate 25 mg twice daily, Lasix 20 mg daily.    5. Non-small cell cancer of right lung (HCC)  Assessment & Plan:  Continue Xgeva every 4 weeks and Alectinib 600 mg BID.  Follow up as scheduled with Heme-Onc.  6. Severe persistent asthma without complication  Assessment & Plan:  Asymptomatic.  Continue albuterol nebs twice daily, budesonide 0.5 mg nebs twice daily, Flonase nasal spray daily, Atrovent nebulizer 3 times daily.  7. Stage 3b chronic kidney disease (HCC)  Assessment & Plan:  Lab Results   Component Value Date    EGFR 40 2024    EGFR 33 2024    EGFR 32 2024    CREATININE 1.27 2024    CREATININE 1.48 (H) 2024    CREATININE 1.53 (H) 2024     At baseline on discharge.     Instructed to obtain CMP to monitor creatinine while she continues with Alectinib.   Follow up [facility] nephrology,   "Marcellus given hx of Alectinib induced chronic kidney disease.  8. Thrombocytopenia (HCC)  Assessment & Plan:  Chronic; baseline ranging in 80s-100s.  Instructed to obtain CBC outpatient.         History of Present Illness     Transitional Care Management Review:   Jayla Moody is a 79 y.o. female here for TCM follow up.     During the TCM phone call patient stated:  TCM Call       None          TCM Call       None          Jayla Moody is a very pleasant 79 y.o. female who has a PMH of stage IV non-small cell lung cancer with bone mets, currently on Alectinib maintenance therapy with monthly Xgeva, stage 3b CKD, HTN, and chronic thrombocytopenia. She presents today for TCM visit. She was admitted to [facility] on 7/16 for symptomatic anemia (2 month melena). She was transfused with 3 units PRBC with resolution of sxs. GI bleed was ruled out as a cause of acute anemia. Possible etiology include medication regimen, Alectinib and Eliquid were held during admission. Home meds were restarted on day of discharge and she no episode of melena or signs of amenia were observed.      Review of Systems   Constitutional:  Negative for fatigue.   Respiratory:  Negative for chest tightness and shortness of breath.    Cardiovascular:  Negative for chest pain and palpitations.   Gastrointestinal:  Negative for abdominal pain and blood in stool.   Genitourinary:  Negative for vaginal bleeding, vaginal discharge and vaginal pain.   Neurological:  Negative for dizziness, weakness, light-headedness and headaches.     Objective     /55 (Patient Position: Sitting, Cuff Size: Standard) Comment (BP Location): Left wrist; cuff size too small  Pulse 83   Temp 97.7 °F (36.5 °C) (Temporal)   Resp 22   Ht 5' 1\" (1.549 m)   Wt 97.5 kg (215 lb)   SpO2 97%   BMI 40.62 kg/m²     Physical Exam  Vitals reviewed.   Constitutional:       General: She is not in acute distress.     Appearance: Normal appearance.   HENT:      Nose: Nose normal.     "  Mouth/Throat:      Mouth: Mucous membranes are moist.   Eyes:      Extraocular Movements: Extraocular movements intact.   Cardiovascular:      Rate and Rhythm: Normal rate and regular rhythm.      Pulses: Normal pulses.      Heart sounds: Normal heart sounds.   Pulmonary:      Effort: Pulmonary effort is normal. No respiratory distress.      Breath sounds: Normal breath sounds. No wheezing.   Chest:      Chest wall: No tenderness.   Abdominal:      General: Abdomen is flat. Bowel sounds are normal.      Palpations: Abdomen is soft.      Tenderness: There is no abdominal tenderness.   Musculoskeletal:         General: Normal range of motion.      Cervical back: Normal range of motion.      Right lower leg: No edema.      Left lower leg: No edema.   Skin:     General: Skin is warm.      Capillary Refill: Capillary refill takes less than 2 seconds.   Neurological:      Mental Status: She is alert and oriented to person, place, and time.   Psychiatric:         Mood and Affect: Mood normal.         Behavior: Behavior normal.       Medications have been reviewed by provider in current encounter    Administrative Statements

## 2024-07-22 NOTE — UTILIZATION REVIEW
NOTIFICATION OF ADMISSION DISCHARGE   This is a Notification of Discharge from [facility]. Please be advised that this patient has been discharge from our facility. Below you will find the admission and discharge date and time including the patient’s disposition.   UTILIZATION REVIEW CONTACT:  Neda Haynes MA  Utilization   Network Utilization Review Department  Phone: [phone] x carefully listen to the prompts. All voicemails are confidential.  Email: [email or web address]     ADMISSION INFORMATION  PRESENTATION DATE: 7/16/2024  3:09 AM  OBERVATION ADMISSION DATE: N/A  INPATIENT ADMISSION DATE: 7/16/24  6:19 AM   DISCHARGE DATE: 7/19/2024  6:08 PM   DISPOSITION:Home with Home Health Care    Network Utilization Review Department  ATTENTION: Please call with any questions or concerns to [phone] and carefully listen to the prompts so that you are directed to the right person. All voicemails are confidential.   For Discharge needs, contact Care Management DC Support Team at [phone] opt. 2  Send all requests for admission clinical reviews, approved or denied determinations and any other requests to dedicated fax number below belonging to the campus where the patient is receiving treatment. List of dedicated fax numbers for the Facilities:  FACILITY NAME UR FAX NUMBER   ADMISSION DENIALS (Administrative/Medical Necessity) [phone]   DISCHARGE SUPPORT TEAM ([facility]) [phone]   PARENT CHILD HEALTH (Maternity/NICU/Pediatrics) [phone]   [facility] [phone]   [facility] [phone]   [facility] [phone]   [facility] [phone]   [facility] [phone]   [facility] [phone]   [facility] [phone]   [facility]  [place] [phone]   [facility] [phone]   [facility] [phone]   [facility] [phone]   [facility] [phone]

## 2024-07-23 ENCOUNTER — TELEPHONE (OUTPATIENT)
Dept: HEMATOLOGY ONCOLOGY | Facility: CLINIC | Age: 79
End: 2024-07-23

## 2024-07-23 ENCOUNTER — OFFICE VISIT (OUTPATIENT)
Dept: HEMATOLOGY ONCOLOGY | Facility: CLINIC | Age: 79
End: 2024-07-23
Payer: COMMERCIAL

## 2024-07-23 ENCOUNTER — PATIENT OUTREACH (OUTPATIENT)
Dept: FAMILY MEDICINE CLINIC | Facility: CLINIC | Age: 79
End: 2024-07-23

## 2024-07-23 VITALS
RESPIRATION RATE: 18 BRPM | TEMPERATURE: 97.7 F | WEIGHT: 205 LBS | BODY MASS INDEX: 38.71 KG/M2 | OXYGEN SATURATION: 97 % | SYSTOLIC BLOOD PRESSURE: 124 MMHG | HEIGHT: 61 IN | DIASTOLIC BLOOD PRESSURE: 68 MMHG | HEART RATE: 90 BPM

## 2024-07-23 DIAGNOSIS — G89.3 CANCER-RELATED PAIN: ICD-10-CM

## 2024-07-23 DIAGNOSIS — D69.6 THROMBOCYTOPENIA (HCC): ICD-10-CM

## 2024-07-23 DIAGNOSIS — C79.51 MALIGNANT NEOPLASM METASTATIC TO BONE (HCC): ICD-10-CM

## 2024-07-23 DIAGNOSIS — C34.90 NON-SMALL CELL LUNG CANCER, UNSPECIFIED LATERALITY (HCC): Primary | ICD-10-CM

## 2024-07-23 PROCEDURE — 99215 OFFICE O/P EST HI 40 MIN: CPT | Performed by: INTERNAL MEDICINE

## 2024-07-23 PROCEDURE — G2211 COMPLEX E/M VISIT ADD ON: HCPCS | Performed by: INTERNAL MEDICINE

## 2024-07-23 NOTE — PROGRESS NOTES
IB message received re HRR.    I called the patient who states she is feeling well after a recent hospitalization for melena/anemia.  She notes she has all her medications and is taking them as prescribed and states she had a PCP appointment yesterday.  The patient reported seeing blood in her stool earlier today.  She states she has a GI appointment scheduled for September.  I asked her to keep a close watch on her stools and if she sees blood more often to let us know.    The patient had no questions or concerns.  She has my contact info and will call if she needs assistance in the future.  I reminded her of her H/O appointment for this afternoon at 245; she plans on attending.    Chart reviewed.  Case is being closed.

## 2024-07-23 NOTE — TELEPHONE ENCOUNTER
Writer spoke with Pt on 7/23/24 @ 9 AM for formal introductions and to obtain household information for continuation of free drug enrollment requested by [employer] for pt Alecensa medication. Pt was able to provide required information to writer. Writer submitted information requested to [employer] via fax #[phone]. Writer provided all contact information to Pt for future use if needed.          Marco Ospina  Phone:[phone]  Email:[email or web address]

## 2024-07-23 NOTE — CASE COMMUNICATION
Medication discrepancies or Major drug interactions: major interactions between (metoprolol tartrate and amiodarone)  Pt does not have or take the following meds: Cholecalciferol and oxyCODONE-acetaminophen. Pt reports using Breztri inhaler instead of glycopyrrolate-formoterol inhaler.  Abnormal clinical findings: Pt reports that she does not weigh herself and is unaware of weight gain guidelines related to CHF diagnosis. Encouraged katie ly weight assessment and logging as pt reports history of BLE swelling and SOB.  Response needed, please respond via In Basket as necessary regarding medication interactions/discrepancies  [facility] has Admitted your patient to Home Health service with the following disciplines: PT  Patient stated goals of care: to get my legs stronger and work on the steps  Potential barriers to goal achievement: pt resides alone with limited supp ort  Primary focus of home health care: Hematology/Infectious Disease  Anticipated visit pattern and next visit date: 2wk3, Next visit 7/24/24  Thank you for allowing us to participate in the care of your patient.      Dilma Kyle PT

## 2024-07-23 NOTE — PROGRESS NOTES
Hematology/Oncology Outpatient Office Note    Date of Service: 2024    [facility]ATOLOG[facility] [address] [place] [address]  [phone]    Reason for Consultation:   Chief Complaint   Patient presents with    Follow-up       Cancer Stage at diagnosis: IVB    Referral Physician: No ref. provider found    Primary Care Physician:  Michelle Chatterjee MD     Nickname: 'Lambert'    Lives alone    Original ECO    Today's ECO (uses a cane; up and about >50% of the day)    Goals and Barriers:  Current Goal: Minimize effects of disease burden, extend life.   Barriers to accomplishing this: chronic lower back pain    Patient's Capacity to Self Care:  Patient is able to self care      ASSESSMENT & PLAN      Diagnosis ICD-10-CM Associated Orders   1. Non-small cell lung cancer, unspecified laterality (HCC)  C34.90             This is a 79 y.o. c PMHx notable for A-fib, remote nicotine abuse, asthma, COPD, MONO s/p CPAP, GERD, HTN, chronic thrombocytopenia (since at least 2018), glaucoma, Vit B12 deficiency, being seen in consultation for metastatic ALK+ Lung cancer       The patient was diagnosed 2020 incidentally with a right hilar mass and diffuse osseous metastasis.  She was started on alectinib with near complete resolution of the lung mass, stable bone mets.  She has had bilateral lower extremity swelling while on treatment (noted in up to 30% of these patients).  She gets Zometa every 3 months.      ALK-EML4 translocation           Discussion of decision making  Oncology history updated, accordingly, during this visit  Goals of care/patient communication  I discussed with the patient the clinical course leading up to their cancer diagnosis. I reviewed relevant office notes, imaging reports and pathology result as well.  I told the patient that this is a case of incurable disease and what this means. We discussed that the goal of  anti-cancer therapy is to provide best quality of life, extend overall survival, and progression free survival as shown in clinical trials. We also discussed that there might be a point when the cancer will no longer respond to this anti-neoplastic therapy. As a result, we also discussed the role of the palliative care team being introduced early in the treatment course. We will be making this referral  I explained the risks/benefits of the proposed cancer therapy: Alectinib and after discussion including understanding risks of possible life-threatening complications and therapy-related malignancy development, informed consent for blood products and treatment has been signed and obtained.  TNM/Staging At Diagnosis  Cancer Staging   IVB  Disease Features/Tumor Markers/Genetics  Tumor Marker: n.a  Notable Path Features:   8/20/2020 Lymph Node, Level 4R  ( ThinPrep, smear preparations and cell block ):  Conclusive evidence of malignancy.  Non-small cell carcinoma, compatible with lung origin.  -  Immunohistochemical stains performed with appropriate controls show the tumor cells to be positive for TTF1 and napsin with partial positivity for CK5/6 and p40 and negative for synaptophysin, and chromogranin, supporting the diagnosis  Treatment: Alectinib   Other Supportive care:   Treatment Team Members  Surgeon  Rad Onc  Palliative: Dr. Siddiqui  Labs  Diagnostics  8/10/2020 PET/CT: Dominant right upper perihilar lung nodule with soft tissue extending to the right hilum along the right mainstem bronchus, and mediastinum, demonstrates intense FDG activity, most compatible with malignancy.  Tissue sampling recommended. Several hypermetabolic osseous lesions most compatible with metastases. Sub-centimeter right upper lung nodular densities that are too small for accurate PET evaluation.  4/27/2023 CT CAP w/o c: Chest: near CR  Examination is limited by respiratory motion. Nothing to suggest adenocarcinoma recurrence.    Abdomen and pelvis:  No metastases, within the confines of an examination limited without contrast.   Osseous: Stable diffuse sclerotic metastatic disease. No new pathologic fracture. Unchanged healing pathologic bilateral rib fractures.  11/2/2023 CT CAP w/o c: No findings of recurrent disease in the chest. Stable numerous sclerotic metastatic lesions throughout the thoracolumbar spine and bony pelvis, in keeping with patient's treated metastases. No metastatic disease in the abdomen or pelvis  3/4/2024 CT CAP w/o c: No evidence of residual or recurrent disease within the chest, abdomen or pelvis. Interval resolution of previously noted right breast nodule. There is a smaller right lateral 7 mm right breast nodule that was likely excluded from the field-of-view on the prior examination. Stable sclerotic osseous foci consistent with treated metastatic disease.  7/15/2024 CT CAP w/o c:  near CR. No interval change since previous study. Stable treated osseous metastatic lesions.. Treated right hilar adenocarcinoma is no longer visualized. No new pulmonary lesions. No thoracic lymphadenopathy  7/2024: EGD with angioectasia, but no active bleeding     Discussion of decision making    I personally reviewed the following lab results, the image studies, pathology, other specialty/physicians consult notes and recommendations, and outside medical records. I had a lengthy discussion with the patient and shared the work-up findings. We discussed the diagnosis and management plan as below. I spent 42 minutes reviewing the records (labs, clinician notes, outside records, medical history, ordering medicine/tests/procedures, interpreting the imaging/labs previously done) and coordination of care as well as direct time with the patient today, of which greater than 50% of the time was spent in counseling and coordination of care with the patient/family.      Plan/Labs  Cont Xgeva 120 mg SC q4 weeks  F/u Nephrology for HTN/Alectinib  induced CKD  Restart Alectinib 600mg BID (she is thinking of taking herself off of the medicine to see if her SAEs resolve and she feels well off of it)  Cont Vit B12 supplementation  F/u GI for C-scope, PPI monitoring  Consider Palliative care   Guardant NGS if POD  Restaging CT CAP w/o c ordered in 4 months  CBC, CMP in 4 months         Follow Up: 4 months    All questions were answered to the patient's satisfaction during this encounter. The patient knows the contact information for our office and knows to reach out for any relevant concerns related to this encounter. They are to call for any temperature 100.4 or higher, new symptoms including but not restricted to shaking chills, decreased appetite, nausea, vomiting, diarrhea, increased fatigue, shortness of breath or chest pain, confusion, and not feeling the strength to come to the clinic. For all other listed problems and medical diagnosis in their chart - they are managed by PCP and/or other specialists, which the patient acknowledges. Thank you very much for your consultation and making us a part of this patient's care. We are continuing to follow closely with you. Please do not hesitate to reach out to me with any additional questions or concerns.    Ryan Arriaga MD  Hematology & Medical Oncology Staff Physician             Disclaimer: This document was prepared using [employer] Fluency Direct technology. If a word or phrase is confusing, or does not make sense, this is likely due to recognition error which was not discovered during this clinician's review. If you believe an error has occurred, please contact me through [facility] service line for felton?cation.      ONCOLOGY HISTORY OF PRESENT ILLNESS        Oncology History Overview Note   75 year old female with stage IV non-small cell right upper lung.  She has evidence of bone metastases particular T8 although not symptomatic may be at risk for neurologic problems, palliative radiation was recommended.  She completed a course of palliative radiation therapy 30 Gy in 10 treatments to prevent neurologic complication on 9/21/20.    Today is her first telephone follow-up    9/17/20 Dr. Weaver  Recommend starting Alectinib 600 mg b.i.d.    10/29/20 Dr. Weaver follow-up  11/11/20 Pulmonology follow-up         Non-small cell lung cancer (HCC)   8/20/2020 Biopsy    FNA Level 4R  NSCC    RUL brushing: Conclusive evidence of malignancy.  Non small cell carcinoma.     Level 4R tissue sampling     8/31/2020 Initial Diagnosis    Non-small cell cancer of right lung (HCC)     9/8/2020 - 9/21/2020 Radiation        Treatments:  Course: C1    Plan ID Energy Fractions Dose per Fraction (cGy) Dose Correction (cGy) Total Dose Delivered (cGy) Elapsed Days   T7-T9 Spine 10X 10 / 10 300 0 3,000 13      Treatment dates:  9/8/2020 - 9/21/2020.      Chemotherapy    Alectinib 600 mg PO BID         9/2020: Zometa  q12 weeks started  10/2020: Alectinib 600mg BID started   3/12/2024: T bili 1.83, Chronic T bili elevation.CKD is stable  7/16/2024 admission admitting diagnosis of symptomatic anemia.  Hb was 4.  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 Chronic constipation. Moderate fatigue.    I have reviewed the relevant past medical, surgical, social and family history. I have also reviewed allergies and medications for this patient.    Review of Systems    Baseline weight: 235 lbs    She has lost 10 lbs since 2022  since her son  from Legionellae's disease as she doesn't cook as much anymore with him not living with her.    Denies F/C, N/V, CP, LH, HA, rash, itching, gen weakness, melena, hematuria, hematochezia, falls, diarrhea.       A 10-point review of system was performed, pertinent positive and negative were detailed as above. Otherwise, the 10-point review of system was negative.    Past Medical History:   Diagnosis Date    Allergic rhinitis     Anemia     Asthma     Atrial fibrillation (HCC)     Chronic bronchitis (HCC)     COPD (chronic obstructive pulmonary disease) (HCC)     Coronary artery disease     CPAP (continuous positive airway pressure) dependence     Degenerative joint disease     Fluid retention     GERD (gastroesophageal reflux disease)     Glaucoma     Hypertension     Lumbar disc disease     Lung cancer (HCC)     Pelvis cancer (HCC)     Periodic heart flutter     Pneumonia     Premature atrial contractions 10/31/2017    Sleep apnea     suspected     Sleep apnea, obstructive     Ventricular arrhythmia     Vitamin D deficiency        Past Surgical History:   Procedure Laterality Date    APPENDECTOMY      BREAST BIOPSY Right     years ago    COLON SURGERY      COLONOSCOPY N/A 2019    Procedure: COLONOSCOPY;  Surgeon: Alessia Wilson DO;  Location: [facility];  Service: Gastroenterology    ESOPHAGOGASTRODUODENOSCOPY N/A 2019    Procedure: ESOPHAGOGASTRODUODENOSCOPY (EGD);  Surgeon: Alessia Wilson DO;  Location: [facility];  Service: Gastroenterology    KNEE SURGERY      LUNG BIOPSY      ROTATOR CUFF REPAIR      SHOULDER SURGERY         Family History   Problem Relation Age of Onset    Stroke Mother     Diabetes Mother     Hyperlipidemia Mother     Hypertension Mother     Allergies Mother         Environmental    Asthma Mother     Diabetes Father     Hyperlipidemia Father     Hypertension Father     Lung cancer Father 70    Allergies Father         Environmental    Asthma Father      Lymphoma Sister 69    Heart disease Sister         Pacemaker    Asthma Brother     Aneurysm Brother         Brain - had surgery    Other Brother         Lymes disease    Coronary artery disease Daughter         2 bypass done    No Known Problems Daughter     No Known Problems Daughter     No Known Problems Son     Kidney disease Son     Liver disease Son     Obesity Son        Social History     Socioeconomic History    Marital status: Single     Spouse name: Not on file    Number of children: 5    Years of education: Not on file    Highest education level: Not on file   Occupational History    Not on file   Tobacco Use    Smoking status: Former     Current packs/day: 0.00     Average packs/day: 0.3 packs/day for 25.0 years (6.3 ttl pk-yrs)     Types: Cigarettes     Start date:      Quit date:      Years since quittin.5    Smokeless tobacco: Former   Vaping Use    Vaping status: Never Used   Substance and Sexual Activity    Alcohol use: Not Currently    Drug use: Never    Sexual activity: Not on file   Other Topics Concern    Not on file   Social History Narrative    Who lives in your home: son    What type of home do you live in: Single house    Age of your home: 89 yrs old    How long have you been living there: 30 years    Type of heat: Forced hot air    Type of fuel: Gas    What type of rome is in your bedroom: Hardwood floor    Do you have the following in or near your home:    Air products: Window air conditioning and Air     Pests: None    Pets: 1 Cat    Are pets allowed in bedroom: No    Open fields, wooded areas nearby: Open fields and Wooded areas    Basement: Damp at times and Unfinished with cracks in cement    Exposure to second hand smoke: No        Habits:    Caffeine: Current; Amount: 1 cups/day coffee, #years 60    Chocolate: occasionally    Other:              Social Determinants of Health     Financial Resource Strain: Medium Risk (2024)    Overall Financial  Resource Strain (CARDIA)     Difficulty of Paying Living Expenses: Somewhat hard   Food Insecurity: Food Insecurity Present (7/19/2024)    Hunger Vital Sign     Worried About Running Out of Food in the Last Year: Sometimes true     Ran Out of Food in the Last Year: Sometimes true   Transportation Needs: No Transportation Needs (7/19/2024)    PRAPARE - Transportation     Lack of Transportation (Medical): No     Lack of Transportation (Non-Medical): No   Physical Activity: Inactive (11/15/2022)    Exercise Vital Sign     Days of Exercise per Week: 0 days     Minutes of Exercise per Session: 0 min   Stress: Not on file   Social Connections: Not on file   Intimate Partner Violence: Not on file   Housing Stability: Low Risk  (7/19/2024)    Housing Stability Vital Sign     Unable to Pay for Housing in the Last Year: No     Number of Times Moved in the Last Year: 0     Homeless in the Last Year: No       No Known Allergies    Current Outpatient Medications   Medication Sig Dispense Refill    acetaminophen (TYLENOL) 650 mg CR tablet Take 1 tablet (650 mg total) by mouth every 8 (eight) hours as needed for mild pain 60 tablet 3    albuterol (2.5 mg/3 mL) 0.083 % nebulizer solution Take 3 mL (2.5 mg total) by nebulization 2 (two) times a day 180 mL 11    albuterol (PROVENTIL HFA,VENTOLIN HFA) 90 mcg/act inhaler Inhale 2 puffs 4 (four) times a day 18 g 2    Alectinib HCl 150 MG CAPS Take 4 capsules (600 mg total) by mouth 2 (two) times a day 240 capsule 5    amiodarone 200 mg tablet Take 1 tablet (200 mg total) by mouth daily with breakfast 90 tablet 3    apixaban (ELIQUIS) 5 mg Take 1 tablet (5 mg total) by mouth 2 (two) times a day 60 tablet 11    Budeson-Glycopyrrol-Formoterol (Breztri Aerosphere) 160-9-4.8 MCG/ACT AERO Inhale 2 puffs 2 (two) times a day Rinse mouth after use. 10.7 g 11    calcitriol (ROCALTROL) 0.25 mcg capsule Take 1 capsule (0.25 mcg total) by mouth 3 (three) times a week On Monday , Wednesday, friday 45  capsule 3    cyanocobalamin (VITAMIN B-12) 1000 MCG tablet Take 1 tablet (1,000 mcg total) by mouth daily 30 tablet 0    fexofenadine (ALLEGRA) 180 MG tablet Take 1 tablet (180 mg total) by mouth daily 90 tablet 3    fluticasone (FLONASE) 50 mcg/act nasal spray SPRAY 2 SPRAYS INTO EACH NOSTRIL EVERY DAY 48 mL 1    furosemide (LASIX) 20 mg tablet TAKE 1 TABLET BY MOUTH EVERY DAY 90 tablet 1    glycopyrrolate-formoterol (BEVESPI AEROSPHERE) 9-4.8 MCG/ACT inhaler Inhale 2 puffs 2 (two) times a day 10.7 g 5    Klor-Con M20 20 MEQ tablet TAKE 1 TABLET BY MOUTH EVERY DAY 90 tablet 1    metoprolol tartrate (LOPRESSOR) 25 mg tablet TAKE 1 TABLET (25 MG TOTAL) BY MOUTH EVERY 12 (TWELVE) HOURS 180 tablet 1    pantoprazole (PROTONIX) 40 mg tablet Take 1 tablet (40 mg total) by mouth 2 (two) times a day 60 tablet 0    rosuvastatin (CRESTOR) 10 MG tablet Take 1 tablet (10 mg total) by mouth daily 90 tablet 3    senna-docusate sodium (SENOKOT S) 8.6-50 mg per tablet Take 1 tablet by mouth daily 60 tablet 3    benralizumab (FASENRA) subcutaneous injection Inject 1 mL (30 mg total) under the skin every 56 days 1 mL 6     No current facility-administered medications for this visit.       (Not in a hospital admission)      Objective:     24 Hour Vitals Assessment:     Vitals:    07/23/24 1439   BP: 124/68   Pulse: 90   Resp: 18   Temp: 97.7 °F (36.5 °C)   SpO2: 97%           PHYSICIAN EXAM     General: Appearance: alert, cooperative, no distress.  HEENT: Normocephalic, atraumatic. No scleral icterus. conjunctivae clear. EOMI.  Chest: No tenderness to palpation. No open wound noted.  Lungs: Clear to auscultation bilaterally, Respirations unlabored.  Cardiac: Regular rate and rhythm, +S1and S2  Abdomen: Soft, non-tender, non-distended. Bowel sounds are normal.  Extremities:  No edema, cyanosis, clubbing.  Skin: Skin color, turgor are normal. No rashes.  Lymphatics: no palpable supra-cervical, axillary, or inguinal  adenopathy  Neurologic: Awake, Alert, and oriented, no gross focal deficits noted b/l.       DATA REVIEW:    Pathology Result:    Final Diagnosis   Date Value Ref Range Status   08/20/2020   Final    A. Lung, Right Upper Lobe Bronchial Brushing, :  Conclusive evidence of malignancy.  Non small cell carcinoma.    Satisfactory for evaluation.     Note:  Correlate with concurrent case [id number].     08/20/2020   Final    A.B.C. Lymph Node, Level 4R  ( ThinPrep, smear preparations and cell block ):  Conclusive evidence of malignancy.  Non-small cell carcinoma, compatible with lung origin.  -  Immunohistochemical stains performed with appropriate controls show the tumor cells to be positive for TTF1 and napsin with partial positivity for CK5/6 and p40 and negative for synaptophysin, and chromogranin, supporting the diagnosis.    Note:  The findings are diagnostic of non small-cell carcinoma of lung origin with features favoring adenocarcinoma.  Morphologic and immunohistochemical features raise the possibility of squamous differentiation, though squamous contaminant cannot be entirely excluded.  Correlation with clinical impression and any future tissue sampling is recommended to exclude the possibility of adenosquamous carcinoma.    Satisfactory for evaluation.       03/18/2019   Final    A.  Stomach, endoscopic biopsy:  - Gastric antral mucosa with mild chronic, inactive gastritis.  - Negative for intestinal metaplasia, dysplasia or carcinoma.  - Immunohistochemistry for Helicobacter pylori is negative.    B.  Stomach, erosion, endoscopic biopsy:  - Gastric antral and oxyntic mucosa with reactive foveolar hyperplasia, fibrosis and acte inflammation consistent with tissue adjacent to an erosion or ulcer.  - Negative for intestinal metaplasia, dysplasia or carcinoma.    Note: Special stain for alcian blue/PAS and immunohistochemical stain for pan CK AE1/3 highlight benign glands.                Image Results:   Image  result are reviewed and documented in Hematology/Oncology history    EGD  Narrative: Table formatting from the original result was not included.  [facility]  [address] [address]  [phone]    DATE OF SERVICE:  7/16/24    PHYSICIAN(S):  Attending:   Diana M Jaiyeola, MD     Fellow:   Rohan Agarwal MD    INDICATION:  Melena    POST-OP DIAGNOSIS:  See the impression below.    PREPROCEDURE:  Informed consent was obtained for the procedure, including sedation.    Risks of perforation, hemorrhage, adverse drug reaction and aspiration   were discussed. The patient was placed in the left lateral decubitus   position.    Patient was explained about the risks and benefits of the procedure. Risks   including but not limited to bleeding, infection, and perforation were   explained in detail. Also explained about less than 100% sensitivity with   the exam and other alternatives.    PROCEDURE: EGD    DETAILS OF PROCEDURE:   Patient was taken to the procedure room where a time out was performed to   confirm correct patient and correct procedure. The patient underwent   monitored anesthesia care, which was administered by an anesthesia   professional. The patient's blood pressure, heart rate, level of   consciousness, respirations, oxygen, ECG and ETCO2 were monitored   throughout the procedure. The scope was introduced through the mouth and   advanced to the proximal part of the jejunum. Retroflexion was performed   in the fundus. Prior to the procedure, the patient's H. Pylori status was   negative. The patient experienced no blood loss. The procedure was not   difficult. The patient tolerated the procedure well. There were no   apparent adverse events.     ANESTHESIA INFORMATION:  ASA: III  Anesthesia Type: General    MEDICATIONS:  simethicone (MYLICON) 40 mg in sterile water 120 mL 40 mg   (Totals for administrations occurring from 1449 to 1526 on 07/16/24)     FINDINGS:  The upper  third of the esophagus, middle third of the esophagus and lower   third of the esophagus appeared normal.  4 cm sliding hiatal hernia (type I hiatal hernia) - GE junction 35 cm from   the incisors, diaphragmatic impression 39 cm from the incisors:  Hill   classification: Grade II  2 small angioectasias in the greater curve of the stomach; no bleeding was   identified; the lesion was ablated with argon plasma coagulation using a   straight fire probe  2 small angioectasias in the 3rd part of the duodenum; no bleeding was   identified; the lesion was ablated with argon plasma coagulation using a   straight fire probe  Lipoma noted in the distal 2nd portion of the duodenum  The duodenal bulb, 1st part of the duodenum and 2nd part of the duodenum   appeared normal.    SPECIMENS:  * No specimens in log *  Impression: The upper third of the esophagus, middle third of the esophagus and lower   third of the esophagus appeared normal.  4 cm type I hiatal hernia  2 small angioectasias in the greater curve of the stomach; tissue was   ablated with argon plasma coagulation  2 small angioectasias in the 3rd part of the duodenum; tissue was ablated   with argon plasma coagulation  Lipoma noted in the distal 2nd portion of the duodenum  The duodenal bulb, 1st part of the duodenum and 2nd part of the duodenum   appeared normal.    RECOMMENDATION:  No active or stigmata of recent bleeding seen.   Resume diet and eliquis.   Can plan for outpatient colonoscopy to further evaluate for lower GI   source of blood loss. If colonoscopy is unremarkable she would require   video capsule endoscopy.                Diana M Jaiyeola, MD   CT chest abdomen pelvis wo contrast  Narrative: CT CHEST, ABDOMEN AND PELVIS WITHOUT IV CONTRAST    INDICATION:   Malignant neoplasm of unspecified part of right bronchus or lung. .    COMPARISON: 3/4/2024.    TECHNIQUE: CT examination of the chest, abdomen and pelvis was performed without intravenous contrast.  Multiplanar 2D reformatted images were created from the source data.    This examination, like all CT scans performed in the [facility], was performed utilizing techniques to minimize radiation dose exposure, including the use of iterative reconstruction and automated exposure control. Radiation dose length   product (DLP) for this visit:    Enteric contrast was not administered.     FINDINGS:    CHEST    LUNGS:  Lungs are clear. Treated right hilar adenocarcinoma is no longer visualized. There is no tracheal or endobronchial lesion.    PLEURA:  Unremarkable.    HEART/GREAT VESSELS: Heavy atherosclerotic coronary artery calcification is noted.  Heart is otherwise unremarkable. Mitral annular calcification. No thoracic aortic aneurysm.    MEDIASTINUM AND ARACELY:  Unremarkable.    CHEST WALL AND LOWER NECK:  Unremarkable.    ABDOMEN    LIVER/BILIARY TREE:  Unremarkable.    GALLBLADDER:  There are gallstone(s) within the gallbladder, without pericholecystic inflammatory changes.    SPLEEN:  Unremarkable.    PANCREAS:  Unremarkable.    ADRENAL GLANDS:  Unremarkable.    KIDNEYS/URETERS:  Unremarkable. No hydronephrosis.    STOMACH AND BOWEL: Small sliding hiatal hernia. There is colonic diverticulosis without evidence of acute diverticulitis.    APPENDIX:  No findings to suggest appendicitis.    ABDOMINOPELVIC CAVITY:  No ascites.  No pneumoperitoneum.  No lymphadenopathy.    VESSELS:  Atherosclerotic changes are present.  No evidence of aneurysm.    PELVIS    REPRODUCTIVE ORGANS:  Unremarkable for patient's age.    URINARY BLADDER:  Unremarkable.    ABDOMINAL WALL/INGUINAL REGIONS:  Unremarkable.    OSSEOUS STRUCTURES: Stable sclerotic lesions in the appendicular and axial skeleton most prominent in the thoracic spine.  Spinal degenerative changes are noted.  Impression: 1. No interval change since previous study. Stable treated osseous metastatic lesions.. Treated right hilar adenocarcinoma is no longer  visualized. No new pulmonary lesions. No thoracic lymphadenopathy. Continued surveillance as per oncology.    2. Cholelithiasis.    Electronically signed: 07/16/2024 09:23 AM Isidro Beasley MD  X-ray chest 1 view portable  Narrative: XR CHEST PORTABLE    INDICATION: chest pain.    COMPARISON: 08/17/2023    FINDINGS:    Clear lungs. No pneumothorax or pleural effusion. Suboptimal inspiratory effort.    Cardiomegaly.    Bones are unremarkable for age. Surgical anchor in the left humeral head.    Normal upper abdomen.  Impression: No acute cardiopulmonary disease. Suboptimal inspiratory effort.    Workstation performed: [id number]  CT abdomen pelvis wo contrast  Narrative: CT ABDOMEN AND PELVIS WITHOUT IV CONTRAST    INDICATION: LLQ abdominal pain, dark stools.    COMPARISON: CT chest abdomen pelvis 7/15/2024    TECHNIQUE: CT examination of the abdomen and pelvis was performed without intravenous contrast. Multiplanar 2D reformatted images were created from the source data.    This examination, like all CT scans performed in the [facility], was performed utilizing techniques to minimize radiation dose exposure, including the use of iterative reconstruction and automated exposure control. Radiation dose length   product (DLP) for this visit: 1266 mGy-cm    Enteric Contrast: Not administered.    FINDINGS:    ABDOMEN    LOWER CHEST: Mitral annular calcification.    LIVER/BILIARY TREE: Unremarkable.    GALLBLADDER: Cholelithiasis without findings of acute cholecystitis.    SPLEEN: Unremarkable.    PANCREAS: Mild diffuse atrophy of the pancreas.    ADRENAL GLANDS: Unremarkable.    KIDNEYS/URETERS: 2 right renal cysts, the larger of which measures approximately 5 cm. Otherwise unremarkable. No perinephric collection.    STOMACH AND BOWEL: GI evaluation limited without IV and enteric contrast. Colonic diverticulosis without diverticulitis. Nondistended stomach. No bowel obstruction.    APPENDIX: Prior  "appendectomy per history in epic.    ABDOMINOPELVIC CAVITY: No ascites. No pneumoperitoneum. No enlarged lymph nodes.    VESSELS: Atherosclerotic disease. No abdominal aortic aneurysm.    PELVIS    REPRODUCTIVE ORGANS: Leiomyomatous uterus.    URINARY BLADDER: Unremarkable.    ABDOMINAL WALL/INGUINAL REGIONS: Unremarkable.    BONES: No acute fracture or osseous destructive lesion identified. Scattered sclerotic osseous lesions compatible with known treated metastasis. No significant interval change. Degenerative changes of the spine, pubic symphysis, and multiple joints. Mild   levoconvex lumbar scoliosis. Grade 1 degenerative anterolisthesis of L3 on L4.  Impression: No evidence of acute abdominopelvic process.    Colonic diverticulosis without diverticulitis.    Chronic findings and negatives as above.    Workstation performed: [id number]      LABS:  Lab data are reviewed and documented in HemOnc history.       Lab Results   Component Value Date    HGB 7.8 (L) 07/19/2024    HCT 25.5 (L) 07/19/2024     (H) 07/19/2024    PLT 97 (L) 07/19/2024    WBC 5.89 07/19/2024    NRBC 1 07/18/2024    BANDSPCT 2 07/19/2024    ATYLMPCT 1 (H) 06/07/2021     Lab Results   Component Value Date    K 4.1 07/19/2024     (H) 07/19/2024    CO2 27 07/19/2024    BUN 26 (H) 07/19/2024    CREATININE 1.27 07/19/2024    GLUF 116 (H) 07/13/2024    CALCIUM 8.1 (L) 07/19/2024    CORRECTEDCA 9.5 07/16/2024    AST 30 07/16/2024    ALT 20 07/16/2024    ALKPHOS 66 07/16/2024    EGFR 40 07/19/2024       Lab Results   Component Value Date    IRON 55 07/16/2024    TIBC 348 07/16/2024    FERRITIN 59 07/16/2024       Lab Results   Component Value Date    [id number] 224 07/16/2024       No results for input(s): \"WBC\", \"CREAT\", \"PLT\" in the last 72 hours.      By:  Ryan Arriaga MD, 7/23/2024, 2:44 PM                                  "

## 2024-07-24 ENCOUNTER — DOCUMENTATION (OUTPATIENT)
Dept: HEMATOLOGY ONCOLOGY | Facility: CLINIC | Age: 79
End: 2024-07-24

## 2024-07-24 ENCOUNTER — HOME CARE VISIT (OUTPATIENT)
Dept: HOME HEALTH SERVICES | Facility: HOME HEALTHCARE | Age: 79
End: 2024-07-24
Payer: COMMERCIAL

## 2024-07-24 VITALS
OXYGEN SATURATION: 96 % | WEIGHT: 214 LBS | DIASTOLIC BLOOD PRESSURE: 82 MMHG | BODY MASS INDEX: 40.43 KG/M2 | HEART RATE: 72 BPM | SYSTOLIC BLOOD PRESSURE: 140 MMHG

## 2024-07-24 DIAGNOSIS — C34.90 NON-SMALL CELL LUNG CANCER, UNSPECIFIED LATERALITY (HCC): ICD-10-CM

## 2024-07-24 DIAGNOSIS — C79.51 MALIGNANT NEOPLASM METASTATIC TO BONE (HCC): Primary | ICD-10-CM

## 2024-07-24 PROCEDURE — G0151 HHCP-SERV OF PT,EA 15 MIN: HCPCS

## 2024-07-24 RX ORDER — FEXOFENADINE HCL 180 MG/1
180 TABLET ORAL DAILY
Qty: 90 TABLET | Refills: 4 | Status: SHIPPED | OUTPATIENT
Start: 2024-07-24

## 2024-07-24 NOTE — PROGRESS NOTES
Spoke with [employer] and was transferred to [employer] [phone]. Alectinib script deactivated. New script needed if/when pt resumes.

## 2024-07-25 ENCOUNTER — HOME CARE VISIT (OUTPATIENT)
Dept: HOME HEALTH SERVICES | Facility: HOME HEALTHCARE | Age: 79
End: 2024-07-25
Payer: COMMERCIAL

## 2024-07-26 ENCOUNTER — HOSPITAL ENCOUNTER (OUTPATIENT)
Dept: INFUSION CENTER | Facility: HOSPITAL | Age: 79
End: 2024-07-26
Attending: INTERNAL MEDICINE
Payer: COMMERCIAL

## 2024-07-26 VITALS — HEIGHT: 61 IN | BODY MASS INDEX: 38.89 KG/M2 | WEIGHT: 206 LBS

## 2024-07-26 DIAGNOSIS — C79.51 MALIGNANT NEOPLASM METASTATIC TO BONE (HCC): Primary | ICD-10-CM

## 2024-07-26 DIAGNOSIS — C34.90 NON-SMALL CELL LUNG CANCER, UNSPECIFIED LATERALITY (HCC): ICD-10-CM

## 2024-07-26 PROCEDURE — 96372 THER/PROPH/DIAG INJ SC/IM: CPT

## 2024-07-26 RX ADMIN — DENOSUMAB 120 MG: 120 INJECTION SUBCUTANEOUS at 12:55

## 2024-07-26 NOTE — PLAN OF CARE
Problem: Knowledge Deficit  Goal: Patient/family/caregiver demonstrates understanding of disease process, treatment plan, medications, and discharge instructions  Description: Complete learning assessment and assess knowledge base.  Interventions:  - Provide teaching at level of understanding  - Provide teaching via preferred learning methods  Outcome: Progressing      Received request via: Patient    Was the patient seen in the last year in this department? Yes    Does the patient have an active prescription (recently filled or refills available) for medication(s) requested? No

## 2024-07-26 NOTE — PROGRESS NOTES
Patient creatinine clearance 34.6 from [facility] 7/19. Denies any jaw/dental pain or upcoming procedures. Tolerated R arm Xgeva injection without issue. Next appointment confirmed August 23 at 10:30am-SH infusion. AVS declined.

## 2024-07-28 PROBLEM — D69.3 CHRONIC IDIOPATHIC THROMBOCYTOPENIA (HCC): Status: ACTIVE | Noted: 2018-10-31

## 2024-07-28 NOTE — ASSESSMENT & PLAN NOTE
Wt Readings from Last 3 Encounters:   07/26/24 93.4 kg (206 lb)   07/24/24 97.1 kg (214 lb)   07/23/24 93 kg (205 lb)     Asymptomatic; euvolemic.  ECHO 7/23: LVEF 61%, grade I diastolic dysfunction, LA dilation, MV calcification.   Continue metoprolol tartrate 25mg twice daily, Lasix 20mg daily.

## 2024-07-28 NOTE — ASSESSMENT & PLAN NOTE
Lab Results   Component Value Date    EGFR 40 07/19/2024    EGFR 33 07/18/2024    EGFR 32 07/17/2024    CREATININE 1.27 07/19/2024    CREATININE 1.48 (H) 07/18/2024    CREATININE 1.53 (H) 07/17/2024     At baseline on discharge.     Instructed to obtain CMP to monitor creatinine while she continues with Alectinib.   Follow up [facility] nephrology, Dr. Germain given hx of Alectinib induced chronic kidney disease.   no

## 2024-07-28 NOTE — ASSESSMENT & PLAN NOTE
Continue amiodarone 200 mg daily, metoprolol 25 mg twice daily, Eliquis 5 mg twice daily.   Follow up with  cardiology.  Complete nuclear stress test.

## 2024-07-28 NOTE — ASSESSMENT & PLAN NOTE
Asymptomatic.  Continue albuterol nebs twice daily, budesonide 0.5 mg nebs twice daily, Flonase nasal spray daily, Atrovent nebulizer 3 times daily.

## 2024-07-28 NOTE — ASSESSMENT & PLAN NOTE
Asymptomatic.    Instructed to obtain CBC to monitor Hgb.  Follow with scheduled appointment with Heme-Onc.

## 2024-07-28 NOTE — ASSESSMENT & PLAN NOTE
Blood Pressure: 110/55 ; stable, at goal per JNC-8.    Continue Metoprolol tartrate 25 mg twice daily, Lasix 20 mg daily.

## 2024-07-29 ENCOUNTER — TELEPHONE (OUTPATIENT)
Dept: FAMILY MEDICINE CLINIC | Facility: CLINIC | Age: 79
End: 2024-07-29

## 2024-07-29 DIAGNOSIS — D64.9 SYMPTOMATIC ANEMIA: Primary | ICD-10-CM

## 2024-07-29 NOTE — TELEPHONE ENCOUNTER
Pt called and stated that a nurse from her insurance reached out to her in regards to having her hemoglobin checked again. She was calling to see if you could put that order in.

## 2024-07-30 ENCOUNTER — HOME CARE VISIT (OUTPATIENT)
Dept: HOME HEALTH SERVICES | Facility: HOME HEALTHCARE | Age: 79
End: 2024-07-30
Payer: COMMERCIAL

## 2024-07-30 ENCOUNTER — HOME CARE VISIT (OUTPATIENT)
Dept: HOME HEALTH SERVICES | Facility: HOME HEALTHCARE | Age: 79
End: 2024-07-30

## 2024-07-31 ENCOUNTER — TELEPHONE (OUTPATIENT)
Dept: PULMONOLOGY | Facility: CLINIC | Age: 79
End: 2024-07-31

## 2024-07-31 NOTE — TELEPHONE ENCOUNTER
Called patient to schedule appointment for the 1Y FU from the Recall List and left a voicemail message.

## 2024-08-01 ENCOUNTER — HOME CARE VISIT (OUTPATIENT)
Dept: HOME HEALTH SERVICES | Facility: HOME HEALTHCARE | Age: 79
End: 2024-08-01
Payer: COMMERCIAL

## 2024-08-01 VITALS
SYSTOLIC BLOOD PRESSURE: 152 MMHG | WEIGHT: 207 LBS | DIASTOLIC BLOOD PRESSURE: 80 MMHG | BODY MASS INDEX: 39.11 KG/M2 | HEART RATE: 73 BPM | OXYGEN SATURATION: 97 %

## 2024-08-01 PROCEDURE — G0151 HHCP-SERV OF PT,EA 15 MIN: HCPCS

## 2024-08-01 PROCEDURE — G0180 MD CERTIFICATION HHA PATIENT: HCPCS | Performed by: INTERNAL MEDICINE

## 2024-08-02 ENCOUNTER — APPOINTMENT (OUTPATIENT)
Dept: RADIOLOGY | Facility: MEDICAL CENTER | Age: 79
End: 2024-08-02
Payer: COMMERCIAL

## 2024-08-02 ENCOUNTER — OFFICE VISIT (OUTPATIENT)
Dept: OBGYN CLINIC | Facility: MEDICAL CENTER | Age: 79
End: 2024-08-02
Payer: COMMERCIAL

## 2024-08-02 VITALS
WEIGHT: 205 LBS | HEART RATE: 76 BPM | RESPIRATION RATE: 18 BRPM | BODY MASS INDEX: 38.71 KG/M2 | HEIGHT: 61 IN | DIASTOLIC BLOOD PRESSURE: 74 MMHG | SYSTOLIC BLOOD PRESSURE: 125 MMHG

## 2024-08-02 DIAGNOSIS — G89.29 CHRONIC RIGHT SHOULDER PAIN: Primary | ICD-10-CM

## 2024-08-02 DIAGNOSIS — G89.29 CHRONIC RIGHT SHOULDER PAIN: ICD-10-CM

## 2024-08-02 DIAGNOSIS — M25.511 CHRONIC RIGHT SHOULDER PAIN: ICD-10-CM

## 2024-08-02 DIAGNOSIS — M25.511 CHRONIC RIGHT SHOULDER PAIN: Primary | ICD-10-CM

## 2024-08-02 DIAGNOSIS — M75.101 TEAR OF RIGHT SUPRASPINATUS TENDON: ICD-10-CM

## 2024-08-02 PROCEDURE — 99213 OFFICE O/P EST LOW 20 MIN: CPT | Performed by: ORTHOPAEDIC SURGERY

## 2024-08-02 PROCEDURE — 73030 X-RAY EXAM OF SHOULDER: CPT

## 2024-08-02 PROCEDURE — 1160F RVW MEDS BY RX/DR IN RCRD: CPT | Performed by: ORTHOPAEDIC SURGERY

## 2024-08-02 PROCEDURE — 1159F MED LIST DOCD IN RCRD: CPT | Performed by: ORTHOPAEDIC SURGERY

## 2024-08-02 NOTE — PROGRESS NOTES
"Orthopaedic Surgery - Office Note  Jayla Moody (79 y.o. female)   : 1945   MRN: [id number]  Encounter Date: 2024    Assessment / Plan    Right rotator cuff tear arthropathy  Patient is only 2 months out from her last corticosteroid injection to her right shoulder so she cannot receive another 1 at this time.  She may receive another 1 in 2 months if desired  She will start physical therapy for rotator cuff strengthening program and stretching right shoulder pain  Follow-up:  No follow-ups on file.      Chief Complaint / Date of Onset  Right shoulder pain  Injury Mechanism / Date  None  Surgery / Date  Remote rotator cuff repair that she cannot remember when    History of Present Illness   Jayla Moody is a 79 y.o. female who presents for multiple year history of right shoulder pain and weakness.  She cannot identify any antecedent event.  She has not undergone any physical therapy.  She has undergone at least 2 corticosteroid injections most recent one in May of 2024.  She has done activity modification, over-the-counter pain medication.  She is a previous rotator cuff repair done by an unknown surgeon she says many years ago but cannot remember when.  She states that she cannot undergo surgery due to her cardiac condition with congestive heart failure and atrial flutter.  So she is not interested in surgery at this time.    Treatment Summary  Medications / Modalities  Over-the-counter pain medications including Tylenol, icing, resting  Bracing / Immobilization  None  Physical Therapy  None  Injections  Previous subacromial corticosteroid injection on 2023 and 2024  Prior Surgeries  Previous rotator cuff repair at outside hospital by unknown surgeon  Other Treatments  None        Review of Systems  Pertinent items are noted in HPI.  All other systems were reviewed and are negative.      Physical Exam  /74   Pulse 76   Resp 18   Ht 5' 1\" (1.549 m)   Wt 93 kg (205 lb)   BMI 38.73 " kg/m²   Cons: Appears well.  No apparent distress.  Psych: Alert. Oriented x3.  Mood and affect normal.  Eyes: PERRLA, EOMI  Resp: Normal effort.  No audible wheezing or stridor.  CV: Palpable pulse.  No discernable arrhythmia.  No LE edema.  Lymph:  No palpable cervical, axillary, or inguinal lymphadenopathy.  Skin: Warm.  No palpable masses.  No visible lesions.  Neuro: Normal muscle tone.  Normal and symmetric DTR's.     Right Shoulder Exam  Alignment / Posture:  Normal shoulder posture. Normal scapular position.  Inspection:  No swelling.  Palpation:  No tenderness.  ROM:  Shoulder . Shoulder ER 0 active, 40 passive. Shoulder IR L4.  Strength:  Supraspinatus 4/5. Infraspinatus 2/5. Subscapularis 5/5.  Stability:  Not tested.  Tests: (-) Drop arm. (-) Painful arc. (-) Bear hug.  Neurovascular:  Sensation intact in Ax/R/M/U nerve distributions. Palpable DP & PT pulses.        Studies Reviewed  X-rays of the right shoulder demonstrate no acute fracture or dislocation, there is superior migration of the humeral head and mild glenohumeral osteoarthritis      Procedures  No procedures today.    Medical, Surgical, Family, and Social History  The patient's medical history, family history, and social history, were reviewed and updated as appropriate.    Past Medical History:   Diagnosis Date    Allergic rhinitis     Anemia     Asthma     Atrial fibrillation (HCC)     Chronic bronchitis (HCC)     COPD (chronic obstructive pulmonary disease) (HCC)     Coronary artery disease     CPAP (continuous positive airway pressure) dependence     Degenerative joint disease     Fluid retention 2024    GERD (gastroesophageal reflux disease)     Glaucoma     Hypertension     Lumbar disc disease     Lung cancer (HCC)     Pelvis cancer (HCC)     Periodic heart flutter     Pneumonia     Premature atrial contractions 10/31/2017    Sleep apnea     suspected     Sleep apnea, obstructive     Ventricular arrhythmia     Vitamin D  deficiency        Past Surgical History:   Procedure Laterality Date    APPENDECTOMY      BREAST BIOPSY Right     years ago    COLON SURGERY      COLONOSCOPY N/A 2019    Procedure: COLONOSCOPY;  Surgeon: Alessia Wilson DO;  Location: [facility];  Service: Gastroenterology    ESOPHAGOGASTRODUODENOSCOPY N/A 2019    Procedure: ESOPHAGOGASTRODUODENOSCOPY (EGD);  Surgeon: Alessia Wilson DO;  Location: [facility];  Service: Gastroenterology    KNEE SURGERY      LUNG BIOPSY      ROTATOR CUFF REPAIR      SHOULDER SURGERY         Family History   Problem Relation Age of Onset    Stroke Mother     Diabetes Mother     Hyperlipidemia Mother     Hypertension Mother     Allergies Mother         Environmental    Asthma Mother     Diabetes Father     Hyperlipidemia Father     Hypertension Father     Lung cancer Father 70    Allergies Father         Environmental    Asthma Father     Lymphoma Sister 69    Heart disease Sister         Pacemaker    Asthma Brother     Aneurysm Brother         Brain - had surgery    Other Brother         Lymes disease    Coronary artery disease Daughter         2 bypass done    No Known Problems Daughter     No Known Problems Daughter     No Known Problems Son     Kidney disease Son     Liver disease Son     Obesity Son        Social History     Occupational History    Not on file   Tobacco Use    Smoking status: Former     Current packs/day: 0.00     Average packs/day: 0.3 packs/day for 25.0 years (6.3 ttl pk-yrs)     Types: Cigarettes     Start date:      Quit date:      Years since quittin.6    Smokeless tobacco: Former   Vaping Use    Vaping status: Never Used   Substance and Sexual Activity    Alcohol use: Not Currently    Drug use: Never    Sexual activity: Not on file       No Known Allergies      Current Outpatient Medications:     acetaminophen (TYLENOL) 650 mg CR tablet, Take 1 tablet (650 mg total) by mouth every 8 (eight) hours as needed for mild pain, Disp:  60 tablet, Rfl: 3    albuterol (2.5 mg/3 mL) 0.083 % nebulizer solution, Take 3 mL (2.5 mg total) by nebulization 2 (two) times a day, Disp: 180 mL, Rfl: 11    albuterol (PROVENTIL HFA,VENTOLIN HFA) 90 mcg/act inhaler, Inhale 2 puffs 4 (four) times a day, Disp: 18 g, Rfl: 2    Alectinib HCl 150 MG CAPS, Take 4 capsules (600 mg total) by mouth 2 (two) times a day, Disp: 240 capsule, Rfl: 5    amiodarone 200 mg tablet, Take 1 tablet (200 mg total) by mouth daily with breakfast, Disp: 90 tablet, Rfl: 3    apixaban (ELIQUIS) 5 mg, Take 1 tablet (5 mg total) by mouth 2 (two) times a day, Disp: 60 tablet, Rfl: 11    Budeson-Glycopyrrol-Formoterol (Breztri Aerosphere) 160-9-4.8 MCG/ACT AERO, Inhale 2 puffs 2 (two) times a day Rinse mouth after use., Disp: 10.7 g, Rfl: 11    calcitriol (ROCALTROL) 0.25 mcg capsule, Take 1 capsule (0.25 mcg total) by mouth 3 (three) times a week On Monday , Wednesday, friday, Disp: 45 capsule, Rfl: 3    cyanocobalamin (VITAMIN B-12) 1000 MCG tablet, Take 1 tablet (1,000 mcg total) by mouth daily, Disp: 30 tablet, Rfl: 0    fexofenadine (ALLEGRA) 180 MG tablet, TAKE 1 TABLET BY MOUTH EVERY DAY, Disp: 90 tablet, Rfl: 4    fluticasone (FLONASE) 50 mcg/act nasal spray, SPRAY 2 SPRAYS INTO EACH NOSTRIL EVERY DAY, Disp: 48 mL, Rfl: 1    furosemide (LASIX) 20 mg tablet, TAKE 1 TABLET BY MOUTH EVERY DAY, Disp: 90 tablet, Rfl: 1    Klor-Con M20 20 MEQ tablet, TAKE 1 TABLET BY MOUTH EVERY DAY, Disp: 90 tablet, Rfl: 1    metoprolol tartrate (LOPRESSOR) 25 mg tablet, TAKE 1 TABLET (25 MG TOTAL) BY MOUTH EVERY 12 (TWELVE) HOURS, Disp: 180 tablet, Rfl: 1    pantoprazole (PROTONIX) 40 mg tablet, Take 1 tablet (40 mg total) by mouth 2 (two) times a day, Disp: 60 tablet, Rfl: 0    rosuvastatin (CRESTOR) 10 MG tablet, Take 1 tablet (10 mg total) by mouth daily, Disp: 90 tablet, Rfl: 3    senna-docusate sodium (SENOKOT S) 8.6-50 mg per tablet, Take 1 tablet by mouth daily, Disp: 60 tablet, Rfl: 3     benralizumab (FASENRA) subcutaneous injection, Inject 1 mL (30 mg total) under the skin every 56 days, Disp: 1 mL, Rfl: 6    glycopyrrolate-formoterol (BEVESPI AEROSPHERE) 9-4.8 MCG/ACT inhaler, Inhale 2 puffs 2 (two) times a day (Patient not taking: Reported on 7/24/2024), Disp: 10.7 g, Rfl: 5      Johann Mendoza MD    Scribe Attestation      I,:   am acting as a scribe while in the presence of the attending physician.:       I,:   personally performed the services described in this documentation    as scribed in my presence.:

## 2024-08-03 ENCOUNTER — APPOINTMENT (OUTPATIENT)
Dept: LAB | Facility: CLINIC | Age: 79
End: 2024-08-03
Payer: COMMERCIAL

## 2024-08-03 DIAGNOSIS — D64.9 SYMPTOMATIC ANEMIA: ICD-10-CM

## 2024-08-03 LAB
ALBUMIN SERPL BCG-MCNC: 3.9 G/DL (ref 3.5–5)
ALP SERPL-CCNC: 52 U/L (ref 34–104)
ALT SERPL W P-5'-P-CCNC: 34 U/L (ref 7–52)
ANION GAP SERPL CALCULATED.3IONS-SCNC: 7 MMOL/L (ref 4–13)
AST SERPL W P-5'-P-CCNC: 43 U/L (ref 13–39)
BASOPHILS # BLD AUTO: 0 THOUSANDS/ÂΜL (ref 0–0.1)
BASOPHILS NFR BLD AUTO: 0 % (ref 0–1)
BILIRUB SERPL-MCNC: 0.94 MG/DL (ref 0.2–1)
BUN SERPL-MCNC: 26 MG/DL (ref 5–25)
CALCIUM SERPL-MCNC: 9.3 MG/DL (ref 8.4–10.2)
CHLORIDE SERPL-SCNC: 106 MMOL/L (ref 96–108)
CO2 SERPL-SCNC: 29 MMOL/L (ref 21–32)
CREAT SERPL-MCNC: 1.38 MG/DL (ref 0.6–1.3)
EOSINOPHIL # BLD AUTO: 0 THOUSAND/ÂΜL (ref 0–0.61)
EOSINOPHIL NFR BLD AUTO: 0 % (ref 0–6)
ERYTHROCYTE [DISTWIDTH] IN BLOOD BY AUTOMATED COUNT: 16 % (ref 11.6–15.1)
GFR SERPL CREATININE-BSD FRML MDRD: 36 ML/MIN/1.73SQ M
GLUCOSE P FAST SERPL-MCNC: 93 MG/DL (ref 65–99)
HCT VFR BLD AUTO: 30 % (ref 34.8–46.1)
HGB BLD-MCNC: 9.1 G/DL (ref 11.5–15.4)
IMM GRANULOCYTES # BLD AUTO: 0.02 THOUSAND/UL (ref 0–0.2)
IMM GRANULOCYTES NFR BLD AUTO: 0 % (ref 0–2)
LYMPHOCYTES # BLD AUTO: 1.07 THOUSANDS/ÂΜL (ref 0.6–4.47)
LYMPHOCYTES NFR BLD AUTO: 19 % (ref 14–44)
MCH RBC QN AUTO: 29.2 PG (ref 26.8–34.3)
MCHC RBC AUTO-ENTMCNC: 30.3 G/DL (ref 31.4–37.4)
MCV RBC AUTO: 96 FL (ref 82–98)
MONOCYTES # BLD AUTO: 0.59 THOUSAND/ÂΜL (ref 0.17–1.22)
MONOCYTES NFR BLD AUTO: 11 % (ref 4–12)
NEUTROPHILS # BLD AUTO: 3.91 THOUSANDS/ÂΜL (ref 1.85–7.62)
NEUTS SEG NFR BLD AUTO: 70 % (ref 43–75)
NRBC BLD AUTO-RTO: 0 /100 WBCS
PLATELET # BLD AUTO: 140 THOUSANDS/UL (ref 149–390)
PMV BLD AUTO: 13.1 FL (ref 8.9–12.7)
POTASSIUM SERPL-SCNC: 3.9 MMOL/L (ref 3.5–5.3)
PROT SERPL-MCNC: 6.5 G/DL (ref 6.4–8.4)
RBC # BLD AUTO: 3.12 MILLION/UL (ref 3.81–5.12)
SODIUM SERPL-SCNC: 142 MMOL/L (ref 135–147)
WBC # BLD AUTO: 5.59 THOUSAND/UL (ref 4.31–10.16)

## 2024-08-03 PROCEDURE — 36415 COLL VENOUS BLD VENIPUNCTURE: CPT

## 2024-08-03 PROCEDURE — 85025 COMPLETE CBC W/AUTO DIFF WBC: CPT

## 2024-08-03 PROCEDURE — 80053 COMPREHEN METABOLIC PANEL: CPT

## 2024-08-06 ENCOUNTER — HOME CARE VISIT (OUTPATIENT)
Dept: HOME HEALTH SERVICES | Facility: HOME HEALTHCARE | Age: 79
End: 2024-08-06
Payer: COMMERCIAL

## 2024-08-06 VITALS — DIASTOLIC BLOOD PRESSURE: 90 MMHG | OXYGEN SATURATION: 97 % | SYSTOLIC BLOOD PRESSURE: 148 MMHG

## 2024-08-06 PROCEDURE — G0151 HHCP-SERV OF PT,EA 15 MIN: HCPCS

## 2024-08-09 ENCOUNTER — HOME CARE VISIT (OUTPATIENT)
Dept: HOME HEALTH SERVICES | Facility: HOME HEALTHCARE | Age: 79
End: 2024-08-09
Payer: COMMERCIAL

## 2024-08-09 VITALS
OXYGEN SATURATION: 94 % | SYSTOLIC BLOOD PRESSURE: 146 MMHG | HEART RATE: 70 BPM | DIASTOLIC BLOOD PRESSURE: 90 MMHG | WEIGHT: 204 LBS | BODY MASS INDEX: 38.55 KG/M2

## 2024-08-09 PROCEDURE — G0151 HHCP-SERV OF PT,EA 15 MIN: HCPCS

## 2024-08-10 DIAGNOSIS — D64.9 ANEMIA: ICD-10-CM

## 2024-08-10 DIAGNOSIS — K92.1 MELENA: ICD-10-CM

## 2024-08-12 RX ORDER — LANOLIN ALCOHOL/MO/W.PET/CERES
1000 CREAM (GRAM) TOPICAL DAILY
Qty: 90 TABLET | Refills: 1 | Status: SHIPPED | OUTPATIENT
Start: 2024-08-12

## 2024-08-12 RX ORDER — PANTOPRAZOLE SODIUM 40 MG/1
40 TABLET, DELAYED RELEASE ORAL 2 TIMES DAILY
Qty: 180 TABLET | Refills: 1 | Status: SHIPPED | OUTPATIENT
Start: 2024-08-12

## 2024-08-20 ENCOUNTER — OFFICE VISIT (OUTPATIENT)
Dept: FAMILY MEDICINE CLINIC | Facility: CLINIC | Age: 79
End: 2024-08-20

## 2024-08-20 VITALS
HEART RATE: 80 BPM | WEIGHT: 204 LBS | BODY MASS INDEX: 38.51 KG/M2 | SYSTOLIC BLOOD PRESSURE: 126 MMHG | TEMPERATURE: 96.8 F | OXYGEN SATURATION: 96 % | DIASTOLIC BLOOD PRESSURE: 60 MMHG | HEIGHT: 61 IN | RESPIRATION RATE: 16 BRPM

## 2024-08-20 DIAGNOSIS — Z00.00 MEDICARE ANNUAL WELLNESS VISIT, SUBSEQUENT: Primary | ICD-10-CM

## 2024-08-20 DIAGNOSIS — I10 ESSENTIAL HYPERTENSION: ICD-10-CM

## 2024-08-20 PROCEDURE — G0439 PPPS, SUBSEQ VISIT: HCPCS | Performed by: FAMILY MEDICINE

## 2024-08-20 RX ORDER — FUROSEMIDE 20 MG
20 TABLET ORAL DAILY
Qty: 90 TABLET | Refills: 1 | Status: SHIPPED | OUTPATIENT
Start: 2024-08-20

## 2024-08-20 NOTE — PROGRESS NOTES
Ambulatory Visit  Name: Jayla Moody      : 1945      MRN: [id number]  Encounter Provider: Michelle Chatterjee MD  Encounter Date: 2024   Encounter department: [facility]    Assessment & Plan   1. Medicare annual wellness visit, subsequent  2. Essential hypertension  -     furosemide (LASIX) 20 mg tablet; Take 1 tablet (20 mg total) by mouth daily       Preventive health issues were discussed with patient, and age appropriate screening tests were ordered as noted in patient's After Visit Summary. Personalized health advice and appropriate referrals for health education or preventive services given if needed, as noted in patient's After Visit Summary.    History of Present Illness     Jayla Moody is a very pleasant 79 y.o. female who has a PMH of  HTN, stage IV non-small cell lung cancer with bone mets, currently on Alectinib maintenance therapy with monthly Xgeva, and stage 3b CKD and presents today for medicare annual wellness visit. She is doing well since her last visit. She has improvement in walking and breathing. She has regular bowel movements with use of senokot, denies blood in stool. She has noticed the swelling in her legs and body have decreased. She is in high spirits. She requests refills of her furosemide. She is aware of her future appointments with Pulm, GI, ortho, and radiology.           Patient Care Team:  Michelle Chatterjee MD as PCP - General (Family Medicine)  DO Milton Osorio MD Kimberly Jegel Chaput, DO as Endoscopist  Rubens Veloz MD (Radiation Oncology)  Dot Germain MD (Nephrology)  WILDA Andre as Nurse Practitioner (Nephrology)  Ryan Arriaga MD (Hematology and Oncology)  Marco Ospina as     Review of Systems   Constitutional:  Negative for chills, fatigue and fever.   HENT:  Negative for hearing loss, sinus pressure, sore throat, tinnitus and trouble swallowing.     Respiratory:  Negative for choking, chest tightness and shortness of breath.    Cardiovascular:  Negative for chest pain and palpitations.   Gastrointestinal:  Negative for abdominal pain, blood in stool, constipation, diarrhea, nausea and vomiting.   Genitourinary:  Negative for vaginal bleeding.   Neurological:  Negative for dizziness, weakness, light-headedness and headaches.   Hematological:  Bruises/bleeds easily.   Psychiatric/Behavioral:  Negative for self-injury and suicidal ideas. The patient is not nervous/anxious.      Medical History Reviewed by provider this encounter:       Annual Wellness Visit Questionnaire   Jayla is here for her Subsequent Wellness visit.     Health Risk Assessment:   Patient rates overall health as good. Patient feels that their physical health rating is much better. Patient is satisfied with their life. Eyesight was rated as same. Hearing was rated as same. Patient feels that their emotional and mental health rating is much better. Patients states they are never, rarely angry. Patient states they are sometimes unusually tired/fatigued. Pain experienced in the last 7 days has been some. Patient's pain rating has been 3/10. Patient states that she has experienced weight loss or gain in last 6 months.     Fall Risk Screening:   In the past year, patient has experienced: no history of falling in past year      Urinary Incontinence Screening:   Patient has not leaked urine accidently in the last six months.     Home Safety:  Patient has trouble with stairs inside or outside of their home. Patient has working smoke alarms and has working carbon monoxide detector. Home safety hazards include: none and medications that cause fatigue. Home safety hazards include: none and medications that cause fatigue.     Nutrition:   Current diet is Low Saturated Fat, No Added Salt and Limited junk food.     Medications:   Patient is not currently taking any over-the-counter supplements. Patient  is able to manage medications.     Activities of Daily Living (ADLs)/Instrumental Activities of Daily Living (IADLs):   Walk and transfer into and out of bed and chair?: Yes  Dress and groom yourself?: Yes    Bathe or shower yourself?: Yes    Feed yourself? Yes  Do your laundry/housekeeping?: Yes  Manage your money, pay your bills and track your expenses?: Yes  Make your own meals?: Yes    Do your own shopping?: Yes    Previous Hospitalizations:   Any hospitalizations or ED visits within the last 12 months?: Yes    How many hospitalizations have you had in the last year?: 3-4    Advance Care Planning:   Living will: Yes    Durable POA for healthcare: No    Advanced directive: Yes      PREVENTIVE SCREENINGS      Cardiovascular Screening:    General: Screening Current      Diabetes Screening:     General: Screening Current      Breast Cancer Screening:     General: Screening Current      Cervical Cancer Screening:    General: Screening Not Indicated      Lung Cancer Screening:     General: Screening Not Indicated and History Lung Cancer      Hepatitis C Screening:    General: Screening Current    Screening, Brief Intervention, and Referral to Treatment (SBIRT)    Screening  Typical number of drinks in a day: 0  Typical number of drinks in a week: 0  Interpretation: Low risk drinking behavior.    AUDIT-C Screenin) How often did you have a drink containing alcohol in the past year? never  2) How many drinks did you have on a typical day when you were drinking in the past year? 0  3) How often did you have 6 or more drinks on one occasion in the past year? never    AUDIT-C Score: 0  Interpretation: Score 0-2 (female): Negative screen for alcohol misuse    Single Item Drug Screening:  How often have you used an illegal drug (including marijuana) or a prescription medication for non-medical reasons in the past year? never    Single Item Drug Screen Score: 0  Interpretation: Negative screen for possible drug use  "disorder    Brief Intervention  Alcohol & drug use screenings were reviewed. No concerns regarding substance use disorder identified.     SDOH Risk Assessment  Social determinants of health (SDOH) risk assesment tool was completed. The tool at a minimum covered housing stability, food insecurity, transportation needs, and utility difficulty. Patient had at risk responses for the following SDOH domains: food insecurity.     Social Determinants of Health     Financial Resource Strain: Medium Risk (8/16/2024)    Overall Financial Resource Strain (CARDIA)     Difficulty of Paying Living Expenses: Somewhat hard   Food Insecurity: Food Insecurity Present (8/16/2024)    Hunger Vital Sign     Worried About Running Out of Food in the Last Year: Sometimes true     Ran Out of Food in the Last Year: Sometimes true   Transportation Needs: No Transportation Needs (8/16/2024)    PRAPARE - Transportation     Lack of Transportation (Medical): No     Lack of Transportation (Non-Medical): No   Housing Stability: Low Risk  (8/16/2024)    Housing Stability Vital Sign     Unable to Pay for Housing in the Last Year: No     Number of Times Moved in the Last Year: 0     Homeless in the Last Year: No   Utilities: Not At Risk (8/16/2024)    [facility] Utilities     Threatened with loss of utilities: No     No results found.    Objective     /60 (BP Location: Left arm, Patient Position: Sitting, Cuff Size: Large)   Pulse 80   Temp (!) 96.8 °F (36 °C) (Temporal)   Resp 16   Ht 5' 1\" (1.549 m)   Wt 92.5 kg (204 lb)   SpO2 96%   BMI 38.55 kg/m²     Physical Exam  Constitutional:       Appearance: Normal appearance.   HENT:      Nose: Nose normal.      Mouth/Throat:      Mouth: Mucous membranes are moist.   Eyes:      Extraocular Movements: Extraocular movements intact.   Cardiovascular:      Rate and Rhythm: Normal rate and regular rhythm.      Pulses: Normal pulses.      Heart sounds: Normal heart sounds.   Pulmonary:      Effort: " Pulmonary effort is normal. No respiratory distress.      Breath sounds: Normal breath sounds.   Abdominal:      General: Bowel sounds are normal. There is no distension.      Palpations: Abdomen is soft.      Tenderness: There is no abdominal tenderness.   Musculoskeletal:      Cervical back: Normal range of motion.      Right lower leg: No edema.      Left lower leg: No edema.   Skin:     Capillary Refill: Capillary refill takes less than 2 seconds.      Findings: Bruising present. No rash.   Neurological:      Mental Status: She is alert.

## 2024-08-22 DIAGNOSIS — J30.9 ALLERGIC RHINITIS, UNSPECIFIED SEASONALITY, UNSPECIFIED TRIGGER: ICD-10-CM

## 2024-08-23 ENCOUNTER — HOSPITAL ENCOUNTER (OUTPATIENT)
Dept: INFUSION CENTER | Facility: HOSPITAL | Age: 79
Discharge: HOME | End: 2024-08-23
Attending: INTERNAL MEDICINE

## 2024-08-26 RX ORDER — FLUTICASONE PROPIONATE 50 MCG
SPRAY, SUSPENSION (ML) NASAL
Qty: 48 ML | Refills: 1 | Status: SHIPPED | OUTPATIENT
Start: 2024-08-26

## 2024-08-27 ENCOUNTER — OFFICE VISIT (OUTPATIENT)
Dept: OBGYN CLINIC | Facility: MEDICAL CENTER | Age: 79
End: 2024-08-27
Payer: COMMERCIAL

## 2024-08-27 VITALS
DIASTOLIC BLOOD PRESSURE: 70 MMHG | HEART RATE: 79 BPM | SYSTOLIC BLOOD PRESSURE: 149 MMHG | WEIGHT: 204.8 LBS | BODY MASS INDEX: 38.67 KG/M2 | HEIGHT: 61 IN

## 2024-08-27 DIAGNOSIS — M17.11 PRIMARY OSTEOARTHRITIS OF RIGHT KNEE: Primary | ICD-10-CM

## 2024-08-27 PROCEDURE — 99213 OFFICE O/P EST LOW 20 MIN: CPT | Performed by: ORTHOPAEDIC SURGERY

## 2024-08-27 NOTE — PROGRESS NOTES
Assessment/Plan:  1. Primary osteoarthritis of right knee      No orders of the defined types were placed in this encounter.    -patient has severe right knee osteoarthritis but continues to note benefit from CSI 3 months ago  -patient will call if her symptoms worsen to schedule repeat CSI  -continue with tylenol 1000mg TID as needed due to being on eliquis  -BMI 38.7 currently and patient could consider TKA if conservative measures fail.   -stationary bike, elliptical for low impact exercise  -see patient back as needed    Return if symptoms worsen or fail to improve.    I answered all of the patient's questions during the visit and provided education of the patient's condition during the visit.  The patient verbalized understanding of the information given and agrees with the plan.  This note was dictated using [employer] software.  It may contain errors including improperly dictated words.  Please contact physician directly for any questions.    Subjective   Chief Complaint: No chief complaint on file.      Jayla Moody is a 79 y.o. female who presents for follow up for right knee arthritis. Severe right knee osteoarthritis. Last CSI 5/1/24 with benefit. She states she really isn't having much pain today. She will on occasion get medial and posterior knee pain but this doesn't stop her from her daily activities. She is using tylenol for pain control only due to being on eliquis. Her BMI is under 40. She is using SPC to ambulate.     Review of Systems  ROS:    See HPI for musculoskeletal review.   All other systems reviewed are negative     History:  Past Medical History:   Diagnosis Date    Allergic rhinitis     Anemia     Anxiety     Arthritis     Asthma     Atrial fibrillation (HCC)     Cancer (HCC)     Chronic bronchitis (HCC)     COPD (chronic obstructive pulmonary disease) (HCC)     Coronary artery disease     CPAP (continuous positive airway pressure) dependence     Degenerative joint disease     Fluid  retention     GERD (gastroesophageal reflux disease)     Glaucoma     HL (hearing loss)     Hypertension     Lumbar disc disease     Lung cancer (HCC)     Obesity     Pelvis cancer (HCC)     Periodic heart flutter     Pneumonia     Premature atrial contractions 10/31/2017    Sleep apnea     suspected     Sleep apnea, obstructive     Ventricular arrhythmia     Vitamin D deficiency      Past Surgical History:   Procedure Laterality Date    APPENDECTOMY      BREAST BIOPSY Right     years ago    COLON SURGERY      COLONOSCOPY N/A 2019    Procedure: COLONOSCOPY;  Surgeon: Alessia Wilson DO;  Location: [facility];  Service: Gastroenterology    ESOPHAGOGASTRODUODENOSCOPY N/A 2019    Procedure: ESOPHAGOGASTRODUODENOSCOPY (EGD);  Surgeon: Alessia Wilson DO;  Location: [facility];  Service: Gastroenterology    KNEE SURGERY      LUNG BIOPSY      ROTATOR CUFF REPAIR      SHOULDER SURGERY       Social History   Social History     Substance and Sexual Activity   Alcohol Use Not Currently     Social History     Substance and Sexual Activity   Drug Use Not Currently    Types: Marijuana     Social History     Tobacco Use   Smoking Status Light Smoker    Current packs/day: 0.00    Average packs/day: 0.3 packs/day for 25.0 years (6.3 ttl pk-yrs)    Types: Cigarettes    Start date: 1962    Last attempt to quit:     Years since quittin.6   Smokeless Tobacco Former     Family History:   Family History   Problem Relation Age of Onset    Stroke Mother     Diabetes Mother     Hyperlipidemia Mother     Hypertension Mother     Allergies Mother         Environmental    Asthma Mother     Diabetes Father     Hyperlipidemia Father     Hypertension Father     Lung cancer Father 70    Allergies Father         Environmental    Asthma Father     Lymphoma Sister 69    Heart disease Sister         Pacemaker    Asthma Brother     Aneurysm Brother         Brain - had surgery    Other Brother         Lymes disease     "Coronary artery disease Daughter         2 bypass done    No Known Problems Daughter     No Known Problems Daughter     No Known Problems Son     Kidney disease Son     Liver disease Son     Obesity Son        Meds/Allergies   Not in a hospital admission.  No Known Allergies       Objective     /70   Pulse 79   Ht 5' 1\" (1.549 m)   Wt 92.9 kg (204 lb 12.8 oz)   BMI 38.70 kg/m²      PE:  AAOx 3  WDWN  Hearing intact, no drainage from eyes  no audible wheezing  no abdominal distension  LE compartments soft, skin intact    Ortho Exam:  right Knee:   No erythema  no swelling  no effusion  no warmth  AROM: 0-115  Stable to varus/valgus stress    Imaging Studies:  no new imaging to review      Scribe Attestation      I,:  Kvng Salazar am acting as a scribe while in the presence of the attending physician.:       I,:  Elizabeth Paula DO personally performed the services described in this documentation    as scribed in my presence.:                   "

## 2024-08-28 DIAGNOSIS — C34.90 NON-SMALL CELL LUNG CANCER, UNSPECIFIED LATERALITY (HCC): ICD-10-CM

## 2024-08-28 DIAGNOSIS — C79.51 MALIGNANT NEOPLASM METASTATIC TO BONE (HCC): Primary | ICD-10-CM

## 2024-08-30 ENCOUNTER — HOSPITAL ENCOUNTER (OUTPATIENT)
Dept: INFUSION CENTER | Facility: HOSPITAL | Age: 79
End: 2024-08-30
Attending: INTERNAL MEDICINE
Payer: COMMERCIAL

## 2024-08-30 DIAGNOSIS — C34.90 NON-SMALL CELL LUNG CANCER, UNSPECIFIED LATERALITY (HCC): ICD-10-CM

## 2024-08-30 DIAGNOSIS — C79.51 MALIGNANT NEOPLASM METASTATIC TO BONE (HCC): Primary | ICD-10-CM

## 2024-08-30 PROCEDURE — 96372 THER/PROPH/DIAG INJ SC/IM: CPT

## 2024-08-30 RX ADMIN — DENOSUMAB 120 MG: 120 INJECTION SUBCUTANEOUS at 13:08

## 2024-08-30 NOTE — PROGRESS NOTES
Pt arrives on unit for Xgeva. Cratnine clearance 31.9 and calcium 9.3. Pt tolerated Xgeva sq in the right upper arm. Declined AVS, next apt 9/27/24 @1:00. Left unit ambulatory with a steady gait.

## 2024-09-04 DIAGNOSIS — M54.50 CHRONIC BILATERAL LOW BACK PAIN, UNSPECIFIED WHETHER SCIATICA PRESENT: ICD-10-CM

## 2024-09-04 DIAGNOSIS — G89.29 CHRONIC BILATERAL LOW BACK PAIN, UNSPECIFIED WHETHER SCIATICA PRESENT: ICD-10-CM

## 2024-09-05 RX ORDER — SENNOSIDES 8.6 MG
CAPSULE ORAL
Qty: 60 TABLET | Refills: 3 | Status: SHIPPED | OUTPATIENT
Start: 2024-09-05

## 2024-09-09 ENCOUNTER — EVALUATION (OUTPATIENT)
Dept: PHYSICAL THERAPY | Facility: REHABILITATION | Age: 79
End: 2024-09-09
Payer: COMMERCIAL

## 2024-09-09 DIAGNOSIS — M75.101 TEAR OF RIGHT SUPRASPINATUS TENDON: Primary | ICD-10-CM

## 2024-09-09 PROCEDURE — 97161 PT EVAL LOW COMPLEX 20 MIN: CPT

## 2024-09-09 PROCEDURE — 97110 THERAPEUTIC EXERCISES: CPT

## 2024-09-09 NOTE — PROGRESS NOTES
PT Evaluation     Today's date: 2024  Patient name: Jayla Moody  : 1945  MRN: [id number]  Referring provider: Milton Kitchen, *  Dx:   Encounter Diagnosis     ICD-10-CM    1. Tear of right supraspinatus tendon  M75.101 Ambulatory Referral to Physical Therapy          Start Time: 0500  Stop Time: 0545  Total time in clinic (min): 45 minutes    Assessment  Impairments: abnormal muscle tone, abnormal or restricted ROM, activity intolerance, impaired physical strength, lacks appropriate home exercise program, pain with function, poor posture  and poor body mechanics  Functional limitations: overhead reaching, carrying things, doing her hair  Symptom irritability: moderate    Assessment details: Patient is a 79 y.o. female presenting to initial examination with chief complaint of chronic R shoulder pain. Signs and symptoms are consistent with referred diagnosis. Primary impairments include gross weakness of the B/L shoulders, painful/restricted shoulder ROM, poor seated/standing posture, hypertonicity of the periscapular musculature, and poor biomechanics. As a result of impairments patient experiences limitations with functional/daily activities including those listed above. Educated patient regarding plan of care and answered all patient questions to patient satisfaction. Patient would benefit from skilled PT interventions to address above impairments, achieve goals, and to maximize function. Thank you for the referral.    Understanding of Dx/Px/POC: good     Prognosis: good    Goals  Impairment Goals: 4-6 weeks  - Patient to decrease pain to 1-2/10  - Patient to improve shoulder AROM to equal to uninvolved side in all planes  - Patient to increase shoulder strength to 5/5 throughout    Functional Goals: by discharge  - Patient to discharge to independent [facility]  - Patient to return to prior level of function  - Patient to be able to reach overhead without increased pain/compensation/difficulty  -  Patient to be able to reach behind back without increased pain/compensation/difficulty   - Patient to improve tolerance to brushing her hair without increased pain or difficulty   - Patient to improve tolerance to carrying things like a bag of groceries without increased pain or difficulty         Plan  Patient would benefit from: skilled physical therapy  Referral necessary: No    Planned therapy interventions: joint mobilization, manual therapy, abdominal trunk stabilization, behavior modification, body mechanics training, neuromuscular re-education, patient/caregiver education, postural training, strengthening, stretching, therapeutic activities, therapeutic exercise, home exercise program and functional ROM exercises    Frequency: 1x week  Duration in weeks: 4  Plan of Care beginning date: 9/9/2024  Plan of Care expiration date: 10/11/2024  Treatment plan discussed with: patient      Subjective Evaluation    History of Present Illness  Mechanism of injury: Patient reports to PT with chief complaint of chronic R shoulder pain that has worsened over the course of the year.  Had past injury at one point as well as rotator cuff surgery on the same shoulder. Notes past injury to the bicep as well years ago.    She describes the following regarding her current symptoms and subsequent restrictions:  - pain started up on its own without specific injury but correlated with a general a general increase in activity with the shoulder  - was seen by ortho recently and did imaging and revealed a tear in the rotator cuff (supraspinatus)    Pain Location:   - pain localized around the front of the shoulder and some pain up into the side of the neck  - no radiation of pain or numbness or tingling    Previous/Current Treatment:   - lidocaine patch helps slightly  - takes tylenol from time to time but this helps very little  - has tried heat with some success but ice seems to make things worse    Pain Exacerbated by:  - brushing  hair  - overhead activity like reaching into cabinets  - using arm rail for support with stair navigation  - carrying things like groceries             Recurrent probem    Quality of life: good    Patient Goals  Patient goals for therapy: increased strength, independence with ADLs/IADLs, return to sport/leisure activities, increased motion, improved balance and decreased pain    Pain  Current pain rating: 3  At best pain ratin  At worst pain ratin  Quality: burning, dull ache and sharp  Relieving factors: heat and medications  Aggravating factors: overhead activity and lifting  Progression: worsening    Hand dominance: ambidextrous    Treatments  Previous treatment: injection treatment and physical therapy      Objective     Postural Observations  Seated posture: poor  Standing posture: poor  Correction of posture: has no consistent effect      Palpation   Left   Hypertonic in the levator scapulae, pectoralis major, pectoralis minor and upper trapezius.   Tenderness of the levator scapulae and upper trapezius.     Right   No palpable tenderness to the posterior deltoid and triceps.   Hypertonic in the levator scapulae, pectoralis major, pectoralis minor and upper trapezius.   Tenderness of the anterior deltoid, biceps, levator scapulae, middle deltoid and upper trapezius.     Tenderness     Right Shoulder  Tenderness in the scapular spine and supraspinatus tendon. No tenderness in the AC joint, acromion, biceps tendon (proximal), bicipital groove, clavicle, coracoid process, lateral scapula, medial scapula, SC joint, subacromial bursa and subscapularis tendon.     Active Range of Motion   Left Shoulder   Flexion: 160 degrees   Abduction: 160 degrees   External rotation 0°: 75 degrees     Right Shoulder   Flexion: 150 degrees with pain  Abduction: 80 degrees with pain  External rotation 0°: 30 degrees with pain    Additional Active Range of Motion Details  Functional IR:  R UE: T12  L UE:  T10    Strength/Myotome Testing     Left Shoulder     Planes of Motion   Flexion: 4-   Abduction: 4-   External rotation at 0°: 4-   Internal rotation at 0°: 4     Right Shoulder     Planes of Motion   Flexion: 3+   Abduction: 3+   External rotation at 0°: 4-   Internal rotation at 0°: 4     Tests     Right Shoulder   Positive external rotation lag sign and painful arc.   Negative belly press, drop arm and internal rotation lag sign.              Precautions: N/A      Manuals 9/9                                                                Neuro Re-Ed             Scapular Retractions HEP            Rows vs TB             B/L ER vs TB             Horizontal ABD vs TB             Serratus Punch vs TB                                       Ther Ex             UT/LS Stretch HEP            Seated Thoracic EXT HEP            Arm Bike             Wall Slides Flex/ABD                                                                 Ther Activity                                       Gait Training                                       Modalities

## 2024-09-12 ENCOUNTER — TELEPHONE (OUTPATIENT)
Dept: GASTROENTEROLOGY | Facility: MEDICAL CENTER | Age: 79
End: 2024-09-12

## 2024-09-12 ENCOUNTER — OFFICE VISIT (OUTPATIENT)
Dept: GASTROENTEROLOGY | Facility: MEDICAL CENTER | Age: 79
End: 2024-09-12
Payer: COMMERCIAL

## 2024-09-12 VITALS
BODY MASS INDEX: 38.89 KG/M2 | SYSTOLIC BLOOD PRESSURE: 138 MMHG | DIASTOLIC BLOOD PRESSURE: 74 MMHG | HEART RATE: 66 BPM | TEMPERATURE: 98.3 F | WEIGHT: 206 LBS | OXYGEN SATURATION: 98 % | HEIGHT: 61 IN

## 2024-09-12 DIAGNOSIS — D50.0 IRON DEFICIENCY ANEMIA DUE TO CHRONIC BLOOD LOSS: Primary | ICD-10-CM

## 2024-09-12 DIAGNOSIS — K21.9 GASTROESOPHAGEAL REFLUX DISEASE WITHOUT ESOPHAGITIS: ICD-10-CM

## 2024-09-12 PROCEDURE — 99214 OFFICE O/P EST MOD 30 MIN: CPT | Performed by: INTERNAL MEDICINE

## 2024-09-12 NOTE — TELEPHONE ENCOUNTER
Procedure: Colonoscopy  Date: 11/06/2024  Physician performing: Dr. Jaiyeola  Location of procedure:    Instructions given to patient: Yes  Diabetic: No  Clearances: No

## 2024-09-12 NOTE — TELEPHONE ENCOUNTER
Our mutual patient, Jayla Moody, is scheduled for the following   procedure: Colonoscopy              On: 11/06/2024              With: Dr. Jaiyeola    Jayla Moody is taking the following blood thinner: Eliquis       Can this be stopped 2 days prior to the procedure?         Thank you,  Beata CONDE  [facility]

## 2024-09-12 NOTE — PROGRESS NOTES
[facility] - Outpatient Follow-up Note  Jayla Moody 79 y.o. female MRN: [id number]  Encounter: [id number]          ASSESSMENT AND PLAN:   79-year-old female with history of atrial flutter on Eliquis, non-small cell lung cancer, CHF who presents for follow-up evaluation.    1. Iron deficiency anemia due to chronic blood loss  She was recently seen in the hospital for melena and profound iron deficiency anemia with a hemoglobin of 4.5 and iron studies which were consistent with iron deficiency.  She received PRBC transfusion with appropriate hemoglobin response.  Her EGD showed small gastric and duodenal AVMs which were treated with APC.  She was recommended to schedule up-to-date colonoscopy as an outpatient for further evaluation given her significant anemia.  Thankfully, her melena has resolved and her hemoglobin is continuing to improve.  If she has additional evidence of iron deficiency anemia on her repeat blood work she may benefit from IV iron with her hematology infusions.    I obtained informed consent from the patient. The risks/benefits/alternatives of the procedure were discussed with the patient. Risks included, but not limited to, infection, bleeding, perforation, injury to organs in the abdomen, missed lesion and incomplete procedure were discussed. Patient was agreeable.    - Colonoscopy; Future    2. Gastroesophageal reflux disease without esophagitis  She has history of chronic reflux with symptoms currently controlled on pantoprazole twice daily.  She will continue this regimen in addition to dietary/lifestyle antireflux measures.    ______________________________________________________________________    SUBJECTIVE: 79-year-old female with history of atrial flutter on Eliquis, non-small cell lung cancer, CHF who presents for follow-up evaluation.    She was admitted to [facility] in July 2024 for fatigue and melena.  At that time she had worsening  shortness of breath in the setting of dark tarry stools prior to admission.  On presentation her hemoglobin was 4.3, decreased from her prior hemoglobin of 9.9 in March she underwent CT chest abdomen pelvis showing no acute findings..    Interval history: She underwent EGD as an inpatient showing 4 cm hiatal hernia, 2 AVMs in the stomach and 2 on the third portion of the duodenum which were treated with APC.    She states that she has been overall feeling well since having a drug holiday with her oncology medications.  She denies additional melena or hematochezia.  She has no abdominal pain, nausea or vomiting.  She continues to take pantoprazole twice daily with good control of her reflux symptoms      8/3/2024 labs show hemoglobin 9.1, MCV 96    Prior EGD/colonoscopy     2019 colonoscopy showed pancolonic diverticulosis otherwise normal.  EGD was notable for mild gastritis otherwise it was a normal exam.  Gastric biopsies showed inflammation consistent with erosion versus ulcer.       REVIEW OF SYSTEMS IS OTHERWISE NEGATIVE.  10 point review of systems is negative other than stated as per HPI    Historical Information   Past Medical History:   Diagnosis Date    Allergic rhinitis     Anemia     Anxiety     Arthritis     Asthma     Atrial fibrillation (HCC)     Cancer (HCC)     Chronic bronchitis (HCC)     COPD (chronic obstructive pulmonary disease) (HCC)     Coronary artery disease     CPAP (continuous positive airway pressure) dependence     Degenerative joint disease     Fluid retention 2024    GERD (gastroesophageal reflux disease)     Glaucoma     HL (hearing loss)     Hypertension     Lumbar disc disease     Lung cancer (HCC)     Obesity     Pelvis cancer (HCC)     Periodic heart flutter     Pneumonia     Premature atrial contractions 10/31/2017    Sleep apnea     suspected     Sleep apnea, obstructive     Ventricular arrhythmia     Vitamin D deficiency      Past Surgical History:   Procedure Laterality Date     APPENDECTOMY      BREAST BIOPSY Right     years ago    COLON SURGERY      COLONOSCOPY N/A 2019    Procedure: COLONOSCOPY;  Surgeon: Alessia Wilson DO;  Location: [facility];  Service: Gastroenterology    ESOPHAGOGASTRODUODENOSCOPY N/A 2019    Procedure: ESOPHAGOGASTRODUODENOSCOPY (EGD);  Surgeon: Alessia Wilson DO;  Location: [facility];  Service: Gastroenterology    KNEE SURGERY      LUNG BIOPSY      ROTATOR CUFF REPAIR      SHOULDER SURGERY       Social History   Social History     Substance and Sexual Activity   Alcohol Use Not Currently     Social History     Substance and Sexual Activity   Drug Use Not Currently    Types: Marijuana     Social History     Tobacco Use   Smoking Status Light Smoker    Current packs/day: 0.00    Average packs/day: 0.3 packs/day for 25.0 years (6.3 ttl pk-yrs)    Types: Cigarettes    Start date: 1962    Last attempt to quit:     Years since quittin.7   Smokeless Tobacco Former     Family History   Problem Relation Age of Onset    Stroke Mother     Diabetes Mother     Hyperlipidemia Mother     Hypertension Mother     Allergies Mother         Environmental    Asthma Mother     Diabetes Father     Hyperlipidemia Father     Hypertension Father     Lung cancer Father 70    Allergies Father         Environmental    Asthma Father     Lymphoma Sister 69    Heart disease Sister         Pacemaker    Asthma Brother     Aneurysm Brother         Brain - had surgery    Other Brother         Lymes disease    Coronary artery disease Daughter         2 bypass done    No Known Problems Daughter     No Known Problems Daughter     No Known Problems Son     Kidney disease Son     Liver disease Son     Obesity Son        Meds/Allergies       Current Outpatient Medications:     acetaminophen (TYLENOL) 650 mg CR tablet    albuterol (2.5 mg/3 mL) 0.083 % nebulizer solution    albuterol (PROVENTIL HFA,VENTOLIN HFA) 90 mcg/act inhaler    Alectinib HCl 150 MG CAPS     "amiodarone 200 mg tablet    apixaban (ELIQUIS) 5 mg    benralizumab (FASENRA) subcutaneous injection    Budeson-Glycopyrrol-Formoterol (Breztri Aerosphere) 160-9-4.8 MCG/ACT AERO    calcitriol (ROCALTROL) 0.25 mcg capsule    fexofenadine (ALLEGRA) 180 MG tablet    fluticasone (FLONASE) 50 mcg/act nasal spray    furosemide (LASIX) 20 mg tablet    glycopyrrolate-formoterol (BEVESPI AEROSPHERE) 9-4.8 MCG/ACT inhaler    Klor-Con M20 20 MEQ tablet    metoprolol tartrate (LOPRESSOR) 25 mg tablet    pantoprazole (PROTONIX) 40 mg tablet    rosuvastatin (CRESTOR) 10 MG tablet    senna-docusate sodium (SENOKOT S) 8.6-50 mg per tablet    vitamin B-12 (VITAMIN B-12) 1,000 mcg tablet    No Known Allergies        Objective     Blood pressure 138/74, pulse 66, temperature 98.3 °F (36.8 °C), temperature source Tympanic, height 5' 1\" (1.549 m), weight 93.4 kg (206 lb), SpO2 98%, not currently breastfeeding. Body mass index is 38.92 kg/m².      PHYSICAL EXAM:      General Appearance:   Alert, cooperative, no distress   HEENT:   Normocephalic, atraumatic, anicteric.     Neck:  Supple, symmetrical, trachea midline   Lungs:   Clear to auscultation bilaterally; no rales, rhonchi or wheezing; respirations unlabored    Heart::   Regular rate and rhythm; no murmur, rub, or gallop.   Abdomen:   Soft, non-tender, non-distended; normal bowel sounds; no masses, no organomegaly    Genitalia:   Deferred    Rectal:   Deferred    Extremities:  No cyanosis, clubbing or edema    Pulses:  2+ and symmetric    Skin:  No jaundice, rashes, or lesions    Lymph nodes:  No palpable cervical lymphadenopathy        Lab Results:   No visits with results within 1 Day(s) from this visit.   Latest known visit with results is:   Appointment on 08/03/2024   Component Date Value    Sodium 08/03/2024 142     Potassium 08/03/2024 3.9     Chloride 08/03/2024 106     CO2 08/03/2024 29     ANION GAP 08/03/2024 7     BUN 08/03/2024 26 (H)     Creatinine 08/03/2024 1.38 (H) "     Glucose, Fasting 08/03/2024 93     Calcium 08/03/2024 9.3     AST 08/03/2024 43 (H)     ALT 08/03/2024 34     Alkaline Phosphatase 08/03/2024 52     Total Protein 08/03/2024 6.5     Albumin 08/03/2024 3.9     Total Bilirubin 08/03/2024 0.94     eGFR 08/03/2024 36     WBC 08/03/2024 5.59     RBC 08/03/2024 3.12 (L)     Hemoglobin 08/03/2024 9.1 (L)     Hematocrit 08/03/2024 30.0 (L)     MCV 08/03/2024 96     MCH 08/03/2024 29.2     MCHC 08/03/2024 30.3 (L)     RDW 08/03/2024 16.0 (H)     MPV 08/03/2024 13.1 (H)     Platelets 08/03/2024 140 (L)     nRBC 08/03/2024 0     Segmented % 08/03/2024 70     Immature Grans % 08/03/2024 0     Lymphocytes % 08/03/2024 19     Monocytes % 08/03/2024 11     Eosinophils Relative 08/03/2024 0     Basophils Relative 08/03/2024 0     Absolute Neutrophils 08/03/2024 3.91     Absolute Immature Grans 08/03/2024 0.02     Absolute Lymphocytes 08/03/2024 1.07     Absolute Monocytes 08/03/2024 0.59     Eosinophils Absolute 08/03/2024 0.00     Basophils Absolute 08/03/2024 0.00          Radiology Results:   No results found.    This note was completed in part utilizing Dragon Software. Grammatical errors, random word insertions, spelling mistakes, and incomplete sentences may be an occasional consequence of this system secondary to software limitations, ambient noise, and hardware issues. If you have any questions or concerns about the content, text, or information contained within the body of this dictation, please contact the provider for clarification.

## 2024-09-13 ENCOUNTER — HOSPITAL ENCOUNTER (OUTPATIENT)
Dept: ULTRASOUND IMAGING | Facility: CLINIC | Age: 79
Discharge: HOME | End: 2024-09-13
Payer: COMMERCIAL

## 2024-09-13 DIAGNOSIS — R92.8 ABNORMAL FINDINGS ON DIAGNOSTIC IMAGING OF BREAST: ICD-10-CM

## 2024-09-13 PROCEDURE — 76642 ULTRASOUND BREAST LIMITED: CPT

## 2024-09-16 ENCOUNTER — OFFICE VISIT (OUTPATIENT)
Dept: PHYSICAL THERAPY | Facility: REHABILITATION | Age: 79
End: 2024-09-16
Payer: COMMERCIAL

## 2024-09-16 DIAGNOSIS — M75.101 TEAR OF RIGHT SUPRASPINATUS TENDON: Primary | ICD-10-CM

## 2024-09-16 DIAGNOSIS — C79.51 MALIGNANT NEOPLASM METASTATIC TO BONE (HCC): Primary | ICD-10-CM

## 2024-09-16 PROCEDURE — 97110 THERAPEUTIC EXERCISES: CPT

## 2024-09-16 PROCEDURE — 97112 NEUROMUSCULAR REEDUCATION: CPT

## 2024-09-16 NOTE — PROGRESS NOTES
"Daily Note     Today's date: 2024  Patient name: Jayla Moody  : 1945  MRN: [id number]  Referring provider: Milton Kitchen, *  Dx:   Encounter Diagnosis     ICD-10-CM    1. Tear of right supraspinatus tendon  M75.101                      Subjective: Patient reports that things are going well with her HEP, though notes that her wrist has been hurting since she was cleaning her bathroom over the weekend.      Objective: See treatment diary below      Assessment: Tolerated treatment well. Patient demonstrated fatigue post treatment, exhibited good technique with therapeutic exercises, and would benefit from continued PT. Patient capable initiating postural/scapular strengthening with appropriate symptom response.  Intervention reviewed in clinic provided to HEP and patient educated regarding proper form and dosage.  Plan to progress as symptoms allow.       Plan: Continue per plan of care.      Precautions: N/A      Manuals                                                                Neuro Re-Ed             Scapular Retractions HEP 5\"x10           Rows vs TB  2x10 (RTB)           B/L ER vs TB  HOLD           Horizontal ABD vs TB  HOLD           Serratus Punch vs TB             TB Pull Down  2x10 (RTB)                        Ther Ex             UT/LS Stretch HEP            Seated Thoracic EXT HEP            Arm Bike  4'/4'           Wall Slides Flex/ABD             Pec Stretch @ Doorway  5\"x15                                                  Ther Activity                                       Gait Training                                       Modalities                                            "

## 2024-09-19 DIAGNOSIS — I10 ESSENTIAL HYPERTENSION: ICD-10-CM

## 2024-09-19 DIAGNOSIS — I48.92 ATRIAL FLUTTER (HCC): ICD-10-CM

## 2024-09-19 RX ORDER — FUROSEMIDE 20 MG
20 TABLET ORAL DAILY
Qty: 90 TABLET | Refills: 1 | OUTPATIENT
Start: 2024-09-19

## 2024-09-19 RX ORDER — METOPROLOL TARTRATE 25 MG/1
25 TABLET, FILM COATED ORAL EVERY 12 HOURS SCHEDULED
Qty: 180 TABLET | Refills: 1 | Status: SHIPPED | OUTPATIENT
Start: 2024-09-19

## 2024-09-20 ENCOUNTER — TELEPHONE (OUTPATIENT)
Dept: PULMONOLOGY | Facility: CLINIC | Age: 79
End: 2024-09-20

## 2024-09-20 NOTE — TELEPHONE ENCOUNTER
LVM for patient in regards to rescheduling her appt for 10/8 with  in the [place] office.  will not be in the office that day and advises that patient be scheduled for the next available with her. As Per Jasmin

## 2024-09-25 ENCOUNTER — APPOINTMENT (OUTPATIENT)
Dept: PHYSICAL THERAPY | Facility: REHABILITATION | Age: 79
End: 2024-09-25
Payer: COMMERCIAL

## 2024-09-26 ENCOUNTER — LAB (OUTPATIENT)
Dept: LAB | Facility: HOSPITAL | Age: 79
End: 2024-09-26
Payer: COMMERCIAL

## 2024-09-26 DIAGNOSIS — E66.812 CLASS 2 SEVERE OBESITY DUE TO EXCESS CALORIES WITH SERIOUS COMORBIDITY AND BODY MASS INDEX (BMI) OF 39.0 TO 39.9 IN ADULT (HCC): ICD-10-CM

## 2024-09-26 DIAGNOSIS — C79.51 MALIGNANT NEOPLASM METASTATIC TO BONE (HCC): ICD-10-CM

## 2024-09-26 DIAGNOSIS — E66.01 CLASS 2 SEVERE OBESITY DUE TO EXCESS CALORIES WITH SERIOUS COMORBIDITY AND BODY MASS INDEX (BMI) OF 39.0 TO 39.9 IN ADULT (HCC): ICD-10-CM

## 2024-09-26 DIAGNOSIS — E55.9 VITAMIN D DEFICIENCY: ICD-10-CM

## 2024-09-26 DIAGNOSIS — N18.4 CKD (CHRONIC KIDNEY DISEASE) STAGE 4, GFR 15-29 ML/MIN (HCC): ICD-10-CM

## 2024-09-26 DIAGNOSIS — I50.32 CHRONIC DIASTOLIC CONGESTIVE HEART FAILURE (HCC): ICD-10-CM

## 2024-09-26 DIAGNOSIS — C34.90 NON-SMALL CELL LUNG CANCER, UNSPECIFIED LATERALITY (HCC): ICD-10-CM

## 2024-09-26 DIAGNOSIS — N18.32 STAGE 3B CHRONIC KIDNEY DISEASE (HCC): ICD-10-CM

## 2024-09-26 DIAGNOSIS — I10 ESSENTIAL HYPERTENSION: ICD-10-CM

## 2024-09-26 LAB
25(OH)D3 SERPL-MCNC: 32.8 NG/ML (ref 30–100)
MAGNESIUM SERPL-MCNC: 2.3 MG/DL (ref 1.9–2.7)
PHOSPHATE SERPL-MCNC: 4 MG/DL (ref 2.3–4.1)
PTH-INTACT SERPL-MCNC: 82.2 PG/ML (ref 12–88)

## 2024-09-26 PROCEDURE — 82570 ASSAY OF URINE CREATININE: CPT

## 2024-09-26 PROCEDURE — 82043 UR ALBUMIN QUANTITATIVE: CPT

## 2024-09-26 PROCEDURE — 83970 ASSAY OF PARATHORMONE: CPT

## 2024-09-26 PROCEDURE — 84100 ASSAY OF PHOSPHORUS: CPT

## 2024-09-26 PROCEDURE — 36415 COLL VENOUS BLD VENIPUNCTURE: CPT

## 2024-09-26 PROCEDURE — 83735 ASSAY OF MAGNESIUM: CPT

## 2024-09-26 PROCEDURE — 82306 VITAMIN D 25 HYDROXY: CPT

## 2024-09-27 ENCOUNTER — HOSPITAL ENCOUNTER (OUTPATIENT)
Dept: INFUSION CENTER | Facility: HOSPITAL | Age: 79
End: 2024-09-27
Attending: INTERNAL MEDICINE
Payer: COMMERCIAL

## 2024-09-27 DIAGNOSIS — C79.51 MALIGNANT NEOPLASM METASTATIC TO BONE (HCC): ICD-10-CM

## 2024-09-27 DIAGNOSIS — C34.90 NON-SMALL CELL LUNG CANCER, UNSPECIFIED LATERALITY (HCC): Primary | ICD-10-CM

## 2024-09-27 LAB
CREAT UR-MCNC: 168 MG/DL
MICROALBUMIN UR-MCNC: 43.8 MG/L
MICROALBUMIN/CREAT 24H UR: 26 MG/G CREATININE (ref 0–30)

## 2024-09-27 PROCEDURE — 96372 THER/PROPH/DIAG INJ SC/IM: CPT

## 2024-09-27 RX ADMIN — DENOSUMAB 120 MG: 120 INJECTION SUBCUTANEOUS at 13:02

## 2024-09-27 NOTE — RESULT ENCOUNTER NOTE
Labs stable.  Renal function stable.  Results to be reviewed at scheduled follow-up appointment with Dr. Germain

## 2024-09-27 NOTE — PROGRESS NOTES
Pt arrives on unit for Xgeva. Cratnine clearance 33.4 and calcium 9.8. Pt tolerated Xgeva sq in the left upper arm. Declined AVS, next apt 10/25/24 @1:00. Left unit ambulatory with a steady gait.

## 2024-09-30 ENCOUNTER — OFFICE VISIT (OUTPATIENT)
Dept: PHYSICAL THERAPY | Facility: REHABILITATION | Age: 79
End: 2024-09-30
Payer: COMMERCIAL

## 2024-09-30 DIAGNOSIS — M75.101 TEAR OF RIGHT SUPRASPINATUS TENDON: Primary | ICD-10-CM

## 2024-09-30 PROCEDURE — 97112 NEUROMUSCULAR REEDUCATION: CPT

## 2024-09-30 PROCEDURE — 97110 THERAPEUTIC EXERCISES: CPT

## 2024-09-30 NOTE — PROGRESS NOTES
"Daily Note     Today's date: 2024  Patient name: Jayla Moody  : 1945  MRN: [id number]  Referring provider: Milton Kitchen, *  Dx:   Encounter Diagnosis     ICD-10-CM    1. Tear of right supraspinatus tendon  M75.101           Start Time: 1100  Stop Time: 1145  Total time in clinic (min): 45 minutes    Subjective: Patient reports that she has been doing her exercise at home and they have been going well. On days her shoulder is more uncomfortable, she works primary on her postural stretches and this goes well. Thinks that her overhead reaching is getting a bit better such as to do her hair.    Objective: See treatment diary below    Assessment: Tolerated treatment well. Patient demonstrated fatigue post treatment, exhibited good technique with therapeutic exercises, and would benefit from continued PT. Patient capable progressing postural/scapular strengthening with appropriate symptom response.  Improved tolerability for elevation today into ABD and is now capable of wall slides, as well as increased tolerability for strengthening into ER with addition of isometrics.  Slight pain in the posterior R wrist today but tolerable and not increasing with interventions. Provided gentle wrist flexion stretch to address mobility deficits observed in clinic with minimally addressed patient's discomfort. Intervention reviewed in clinic provided to HEP and patient educated regarding proper form and dosage. Plan to reassess at time of [place] to determine if skilled services are still required or if patient is ready to trial hold period with transition to independent HEP.      Plan: Continue per plan of care.      Precautions: N/A      Manuals                                                               Neuro Re-Ed             Scapular Retractions HEP 5\"x10           Rows vs TB  2x10 (RTB) 2x10 - seated (GTB)          B/L ER vs TB  HOLD           Horizontal ABD vs TB  HOLD           Serratus Wall " "Slides   1x10          TB Pull Down  2x10 (RTB) 2x10 (GTB)          ER ISO vs Wall   5\"x10          Ther Ex             UT/LS Stretch HEP            Seated Thoracic EXT HEP            Arm Bike  4'/4' 4'/4'          Wall Slides ABD   5\"x10          Pec Stretch @ Doorway  5\"x15 5\"x15          Wrist Flexion Stretch   3x30\"                                    Ther Activity                                       Gait Training                                       Modalities                                            "

## 2024-10-14 ENCOUNTER — OFFICE VISIT (OUTPATIENT)
Dept: PULMONOLOGY | Facility: CLINIC | Age: 79
End: 2024-10-14
Payer: COMMERCIAL

## 2024-10-14 VITALS
SYSTOLIC BLOOD PRESSURE: 130 MMHG | DIASTOLIC BLOOD PRESSURE: 74 MMHG | OXYGEN SATURATION: 97 % | TEMPERATURE: 98.7 F | HEART RATE: 75 BPM

## 2024-10-14 DIAGNOSIS — E66.01 MORBID OBESITY (HCC): ICD-10-CM

## 2024-10-14 DIAGNOSIS — G47.33 OSA (OBSTRUCTIVE SLEEP APNEA): Primary | ICD-10-CM

## 2024-10-14 DIAGNOSIS — C34.91 MALIGNANT NEOPLASM OF RIGHT LUNG, UNSPECIFIED PART OF LUNG (HCC): ICD-10-CM

## 2024-10-14 DIAGNOSIS — J45.50 SEVERE PERSISTENT ASTHMA WITHOUT COMPLICATION: ICD-10-CM

## 2024-10-14 PROCEDURE — 99214 OFFICE O/P EST MOD 30 MIN: CPT | Performed by: INTERNAL MEDICINE

## 2024-10-14 PROCEDURE — G2211 COMPLEX E/M VISIT ADD ON: HCPCS | Performed by: INTERNAL MEDICINE

## 2024-10-14 NOTE — PROGRESS NOTES
Pulmonary Follow Up Note   Jayla Moody 79 y.o. female MRN: [id number]  10/14/2024      Assessment and Plan:    Severe allergic asthma  - type 1 with high eos 480 and multiple possitive allergens in 2020 and Total IgE 109,  Multiple allergies identified in the past.  with 1-3x a year exacerbaitons, improved since starting fasenra Jan 2023 by allergy doctor. With last exacerbation in 8/2023. No smoking hx  - PFT 2022: FEV1 52% however, lung volumes not ordered    - takes allegra. She is on Bretzi 160 BID, and albuterol BID nebulizer.   - continue with Fasenra injection by rheumatologist (last injection 9/23/2024)  - completed pulmonary rehab in 2023.  - no need for adjustment. Currently her asthma is managed by her immunologist      Severe MONO   - sleep study in 2019 showed severe MONO   - home sleep study 5/2022 with AHI 85.2 with significant hypoxia to 70%  - Currently tolerating auto-titrating CPAP 4-20 cc of H2O pressure with 2L of oxygen at night. Compliance report in 10/2024 showing 100% compliance with AHI 1.1. She has some leakage with maximum of 104.2 but with median leakage of 1.6. She stated this is because her nasal mask sometimes will fall off but she will adjust it.   - continue current setting. Will follow up for compliance study       Metastatic Non-small cell carcinoma of Right lung (8/2020) with ALK +  - s/p palliative radiation therapy to T8 x 10 treatments  - on Alectinib as per hem/onc - on hold per oncology (stopped since 7/2024) and will get re evaluate in 11/2024  - had a PET scan 1/2021 shows response to therapy   - CT chest 7/2024: no adenocarcinoma recurrence          Morbid obesity  - counseled       Return in about 1 year (around 10/14/2025).   Patient will need to follow up with us for MONO. She can follow up with her allergy specialist for Fasenra injection and for her asthma which is now getting better control.     History of Present Illness   HPI:  Jayla Moody is a 79 y.o. female 75  y.o. 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 She also has severe eosinophilic asthma with previous frequent exacerbation, on Fasenra and triple therapy, and with last exacerbation in 8/2023.     Patient follows in 8/2023 following asthma exacerbation. Her home breathing treatment was changed to nebulizer due to cost. She is now on Budesonide BID, Albuterol Tid and ipratropium TID nebulizer. She also stated that she started her Cpap due to MONO and could not tolerate the setting. Her CPAP was advsied to changed to CPAP 5-20 cc of H2O pressure with 2L of oxygen in 8/2023 and ever since she is compliant with it. She also enrolled in pulm rehab program in 9/2023.     She again presented today for follow up. She feels fine. No new compliant. Her rheumatologist changed her nebulizer to Breztri BID and she is compliant with it. She has no further exacerbation. She is also taking a break from her immunotherapy for her lung cancer, but is scheduled to follow up with her oncologist. She has 100% compliance with Cpap and no issues with it. No daytime sleepiness noted.    Review of Systems   Constitutional:  Negative for appetite change and fever.   HENT:  Positive for postnasal drip. Negative for ear pain, rhinorrhea, sneezing, sore throat and trouble swallowing.    Respiratory:  Negative for cough and wheezing.    Cardiovascular:  Negative for chest pain.   Musculoskeletal:  Negative for myalgias.   Neurological:  Negative for headaches.   All other systems reviewed and are negative.      Historical Information   Past Medical History:   Diagnosis Date    Allergic rhinitis     Anemia     Anxiety     Arthritis     Asthma     Atrial fibrillation (HCC)     Cancer (HCC)     Chronic bronchitis (HCC)     COPD (chronic obstructive pulmonary disease) (HCC)      Coronary artery disease     CPAP (continuous positive airway pressure) dependence     Degenerative joint disease     Fluid retention 2024    GERD (gastroesophageal reflux disease)     Glaucoma     HL (hearing loss)     Hypertension     Lumbar disc disease     Lung cancer (HCC)     Obesity     Pelvis cancer (HCC)     Periodic heart flutter     Pneumonia     Premature atrial contractions 10/31/2017    Sleep apnea     suspected     Sleep apnea, obstructive     Ventricular arrhythmia     Vitamin D deficiency      Past Surgical History:   Procedure Laterality Date    APPENDECTOMY      BREAST BIOPSY Right     years ago    COLON SURGERY      COLONOSCOPY N/A 03/18/2019    Procedure: COLONOSCOPY;  Surgeon: Alessia Wilson DO;  Location: [facility];  Service: Gastroenterology    ESOPHAGOGASTRODUODENOSCOPY N/A 03/18/2019    Procedure: ESOPHAGOGASTRODUODENOSCOPY (EGD);  Surgeon: Alessia Wilson DO;  Location: [facility];  Service: Gastroenterology    KNEE SURGERY      LUNG BIOPSY      ROTATOR CUFF REPAIR      SHOULDER SURGERY       Family History   Problem Relation Age of Onset    Stroke Mother     Diabetes Mother     Hyperlipidemia Mother     Hypertension Mother     Allergies Mother         Environmental    Asthma Mother     Diabetes Father     Hyperlipidemia Father     Hypertension Father     Lung cancer Father 70    Allergies Father         Environmental    Asthma Father     Lymphoma Sister 69    Heart disease Sister         Pacemaker    Asthma Brother     Aneurysm Brother         Brain - had surgery    Other Brother         Lymes disease    Coronary artery disease Daughter         2 bypass done    No Known Problems Daughter     No Known Problems Daughter     No Known Problems Son     Kidney disease Son     Liver disease Son     Obesity Son      Pets: still has a cat     Meds/Allergies     Current Outpatient Medications:     acetaminophen (TYLENOL) 650 mg CR tablet, TAKE 1 TABLET (650 MG) BY MOUTH EVERY 8 HOURS AS  NEEDED FOR PAIN (MILD), Disp: 60 tablet, Rfl: 3    albuterol (2.5 mg/3 mL) 0.083 % nebulizer solution, Take 3 mL (2.5 mg total) by nebulization 2 (two) times a day, Disp: 180 mL, Rfl: 11    albuterol (PROVENTIL HFA,VENTOLIN HFA) 90 mcg/act inhaler, Inhale 2 puffs 4 (four) times a day, Disp: 18 g, Rfl: 2    amiodarone 200 mg tablet, Take 1 tablet (200 mg total) by mouth daily with breakfast, Disp: 90 tablet, Rfl: 3    apixaban (ELIQUIS) 5 mg, Take 1 tablet (5 mg total) by mouth 2 (two) times a day, Disp: 60 tablet, Rfl: 11    Budeson-Glycopyrrol-Formoterol (Breztri Aerosphere) 160-9-4.8 MCG/ACT AERO, Inhale 2 puffs 2 (two) times a day Rinse mouth after use., Disp: 10.7 g, Rfl: 11    calcitriol (ROCALTROL) 0.25 mcg capsule, Take 1 capsule (0.25 mcg total) by mouth 3 (three) times a week On Monday , Wednesday, friday, Disp: 45 capsule, Rfl: 3    fexofenadine (ALLEGRA) 180 MG tablet, TAKE 1 TABLET BY MOUTH EVERY DAY, Disp: 90 tablet, Rfl: 4    fluticasone (FLONASE) 50 mcg/act nasal spray, SPRAY 2 SPRAYS INTO EACH NOSTRIL EVERY DAY, Disp: 48 mL, Rfl: 1    furosemide (LASIX) 20 mg tablet, Take 1 tablet (20 mg total) by mouth daily, Disp: 90 tablet, Rfl: 1    Klor-Con M20 20 MEQ tablet, TAKE 1 TABLET BY MOUTH EVERY DAY, Disp: 90 tablet, Rfl: 1    metoprolol tartrate (LOPRESSOR) 25 mg tablet, Take 1 tablet (25 mg total) by mouth every 12 (twelve) hours, Disp: 180 tablet, Rfl: 1    pantoprazole (PROTONIX) 40 mg tablet, TAKE 1 TABLET BY MOUTH TWICE A DAY, Disp: 180 tablet, Rfl: 1    rosuvastatin (CRESTOR) 10 MG tablet, Take 1 tablet (10 mg total) by mouth daily, Disp: 90 tablet, Rfl: 3    senna-docusate sodium (SENOKOT S) 8.6-50 mg per tablet, Take 1 tablet by mouth daily, Disp: 60 tablet, Rfl: 3    vitamin B-12 (VITAMIN B-12) 1,000 mcg tablet, TAKE 1 TABLET BY MOUTH EVERY DAY, Disp: 90 tablet, Rfl: 1    Alectinib HCl 150 MG CAPS, Take 4 capsules (600 mg total) by mouth 2 (two) times a day (Patient not taking: Reported on  9/12/2024), Disp: 240 capsule, Rfl: 5    benralizumab (FASENRA) subcutaneous injection, Inject 1 mL (30 mg total) under the skin every 56 days, Disp: 1 mL, Rfl: 6  No Known Allergies    Vitals: Blood pressure 130/74, pulse 75, temperature 98.7 °F (37.1 °C), temperature source Tympanic, SpO2 97%, not currently breastfeeding. There is no height or weight on file to calculate BMI. Oxygen Therapy  SpO2: 97 %  Oxygen Therapy: None (Room air)      Physical Exam  Physical Exam  Vitals reviewed.   Constitutional:       Appearance: Normal appearance. She is obese.   HENT:      Head: Normocephalic.      Nose: Nose normal.      Mouth/Throat:      Mouth: Mucous membranes are moist.   Eyes:      Pupils: Pupils are equal, round, and reactive to light.   Cardiovascular:      Rate and Rhythm: Normal rate and regular rhythm.      Pulses: Normal pulses.      Heart sounds: Normal heart sounds.   Pulmonary:      Effort: Pulmonary effort is normal.      Breath sounds: Normal breath sounds.   Abdominal:      General: Abdomen is flat.      Palpations: Abdomen is soft.   Musculoskeletal:         General: No swelling.      Cervical back: Normal range of motion.      Right lower leg: No edema.      Left lower leg: No edema.   Skin:     General: Skin is warm and dry.   Neurological:      General: No focal deficit present.      Mental Status: She is alert and oriented to person, place, and time.            Labs: I have personally reviewed pertinent lab results.  Lab Results   Component Value Date    WBC 5.59 08/03/2024    HGB 9.1 (L) 08/03/2024    HCT 30.0 (L) 08/03/2024    MCV 96 08/03/2024     (L) 08/03/2024     Lab Results   Component Value Date    CALCIUM 9.8 09/26/2024    K 4.0 09/26/2024    CO2 29 09/26/2024     09/26/2024    BUN 33 (H) 09/26/2024    CREATININE 1.31 (H) 09/26/2024     Lab Results   Component Value Date     11/17/2020     Lab Results   Component Value Date    ALT 38 09/26/2024    AST 34 09/26/2024     ALKPHOS 60 2024       Imaging and other studies: I have personally reviewed pertinent reports.   and I have personally reviewed pertinent films in PACS    Pulmonary function testin2021  FEV1/FVC Ratio: 71 %  Forced Vital Capacity: 1.82 L    61 % predicted    2022: FEV1 52%      EKG, Pathology, and Other Studies: I have personally reviewed pertinent reports.   and I have personally reviewed pertinent films in PACS      Bonnie Chavez MD  Pulmonary and Critical Care Fellow  [facility]        Answers submitted by the patient for this visit:  Pulmonology Questionnaire (Submitted on 10/9/2024)  Chief Complaint: Primary symptoms  Do you experience frequent throat clearing?: Yes  Chronicity: recurrent  When did you first notice your symptoms?: more than 1 month ago  How often do your symptoms occur?: every several days  Since you first noticed this problem, how has it changed?: unchanged  Do you have shortness of breath that occurs with effort or exertion?: Yes  Do you have ear congestion?: No  Do you have heartburn?: No  Do you have fatigue?: Yes  Do you have nasal congestion?: Yes  Do you have shortness of breath when lying flat?: No  Do you have shortness of breath when you wake up?: No  Do you have sweats?: No  Have you experienced weight loss?: Yes  Which of the following makes your symptoms worse?: any activity, change in weather, climbing stairs, exercise, exposure to fumes, exposure to smoke, minimal activity, pollen, strenuous activity, URI  Which of the following makes your symptoms better?: oral steroids, prescription cough suppressant, steroid inhaler  Risk factors for lung disease: animal exposure, smoking/tobacco exposure

## 2024-10-16 ENCOUNTER — OFFICE VISIT (OUTPATIENT)
Dept: NEPHROLOGY | Facility: CLINIC | Age: 79
End: 2024-10-16
Payer: COMMERCIAL

## 2024-10-16 VITALS
BODY MASS INDEX: 39.65 KG/M2 | HEIGHT: 61 IN | SYSTOLIC BLOOD PRESSURE: 122 MMHG | WEIGHT: 210 LBS | DIASTOLIC BLOOD PRESSURE: 62 MMHG

## 2024-10-16 DIAGNOSIS — M89.9 CHRONIC KIDNEY DISEASE-MINERAL AND BONE DISORDER (CKD-MBD): ICD-10-CM

## 2024-10-16 DIAGNOSIS — Z91.89 AT RISK FOR NUTRITION PROBLEM: ICD-10-CM

## 2024-10-16 DIAGNOSIS — N18.32 HYPERTENSIVE KIDNEY DISEASE WITH STAGE 3B CHRONIC KIDNEY DISEASE (HCC): Primary | ICD-10-CM

## 2024-10-16 DIAGNOSIS — E83.9 CHRONIC KIDNEY DISEASE-MINERAL AND BONE DISORDER (CKD-MBD): ICD-10-CM

## 2024-10-16 DIAGNOSIS — E66.812 CLASS 2 SEVERE OBESITY DUE TO EXCESS CALORIES WITH SERIOUS COMORBIDITY AND BODY MASS INDEX (BMI) OF 39.0 TO 39.9 IN ADULT (HCC): ICD-10-CM

## 2024-10-16 DIAGNOSIS — R80.1 PERSISTENT PROTEINURIA: ICD-10-CM

## 2024-10-16 DIAGNOSIS — D63.1 ANEMIA DUE TO STAGE 3B CHRONIC KIDNEY DISEASE  (HCC): ICD-10-CM

## 2024-10-16 DIAGNOSIS — I12.9 HYPERTENSIVE KIDNEY DISEASE WITH STAGE 3B CHRONIC KIDNEY DISEASE (HCC): Primary | ICD-10-CM

## 2024-10-16 DIAGNOSIS — E66.01 CLASS 2 SEVERE OBESITY DUE TO EXCESS CALORIES WITH SERIOUS COMORBIDITY AND BODY MASS INDEX (BMI) OF 39.0 TO 39.9 IN ADULT (HCC): ICD-10-CM

## 2024-10-16 DIAGNOSIS — N18.32 ANEMIA DUE TO STAGE 3B CHRONIC KIDNEY DISEASE  (HCC): ICD-10-CM

## 2024-10-16 DIAGNOSIS — N18.9 CHRONIC KIDNEY DISEASE-MINERAL AND BONE DISORDER (CKD-MBD): ICD-10-CM

## 2024-10-16 PROBLEM — N18.4 CKD (CHRONIC KIDNEY DISEASE) STAGE 4, GFR 15-29 ML/MIN (HCC): Status: RESOLVED | Noted: 2024-05-21 | Resolved: 2024-10-16

## 2024-10-16 PROCEDURE — 99214 OFFICE O/P EST MOD 30 MIN: CPT | Performed by: INTERNAL MEDICINE

## 2024-10-16 NOTE — ASSESSMENT & PLAN NOTE
Encouraged patient to follow a renal diet comprising of moderate potassium, low phosphorus and protein restriction to 0.8gm/kg.  Will check serum albumin with next blood work.

## 2024-10-16 NOTE — PATIENT INSTRUCTIONS
"- blood work in 3months  - start vit D 1000 units daily over the counter  - start taking lasix again If you gain more than 5 pounds in 24 hours.     - Please call me in 10 days after having your blood work done to review the results if you do not hear back from me or my office, as I may have not received the results.  - please remember to perform blood work prior to the next visit.  - Please call if the blood pressure top number is greater than 140 or less than 110 consistently.  - Please call if you are gaining more than 2lbs in 2 days for adjustment of water pills.  ~ Please AVOID the following pain medications.  LIST OF NSAIDS (NONSTEROIDAL ANTI-INFLAMMATORY DRUGS) AND LUTZ-2 INHIBITORS    DIFLUNISAL (DOLOBID)  IBUPROFEN (MOTRIN, ADVIL)  FLURBIPROFEN (ANSAID)  KETOPROFEN (ORUDIS, ORUVAIL)  FENOPROFEN (NALFON)  NABUMETONE (RELAFEN)  PIROXICAM (FELDENE)  NAPROXEN (ALEVE, NAPROSYN, NAPRELAN, ANAPROX)  DICLOFENAC (VOLTAREN, CATAFLAM)  INDOMETHACIN (INDOCIN)  SULINDAC (CLINORIL)  TOLMETIN (TOLETIN)  ETODOLAC (LODINE)  MELOXICAM (MOBIC)  KETOROLAC (TORADOL)  OXAPROZIN (DAYPRO)  CELECOXIB (CELEBREX)    Phosphorus diet  Follow a low phosphorus diet.    Avoid these higher phosphorus foods: Choose these lower phosphorus foods:   Milk, pudding or yogurt (from animals and from many soy varieties) Rice milk (unfortified), nondairy creamer (if it doesn't have terms in the ingredients list that contain the letters \"phos\")   Hard cheeses, ricotta or cottage cheese, fat-free cream cheese Regular and low-fat cream cheese   Ice cream or frozen yogurt Sherbet or frozen fruit pops   Soups made with higher phosphorus ingredients (milk, dried peas, beans, lentils) Soups made with lower phosphorus ingredients (broth- or water-based with other lower phosphorus ingredients)   Whole grains, including whole-grain breads, crackers, cereal, rice and pasta Refined grains, including white bread, crackers, cereals, rice and pasta   Quick breads, " "biscuits, cornbread, muffins, pancakes or waffles Homemade refined (white) dinner rolls, bagels or English muffins   Dried peas (split, black-eyed), beans (black, garbanzo, lima, kidney, navy, drummond) or lentils Green peas (canned, frozen), green beans or wax beans   Organ meats, walleye, pollock or sardines Lean beef, pork, lamb, poultry or other fish   Nuts and seeds Popcorn   Peanut butter and other nut butters Jam, jelly or honey   Chocolate, including chocolate drinks Carob (chocolate-flavored) candy, hard candy or gumdrops   Medardo and pepper-type sodas, flavored stephenson, bottled teas (if a term in the ingredients list contains the letters \"phos\") Lemon-lime soda, ginger ale or root beer, plain water   Follow a moderate potassium diet.      Things to do to reduce your blood pressure include working with all your physician to do the following:  ~ stop smoking if you smoke.  ~ increase cardiovascular exercise like walking and swimming.   ~ modify your diet to decrease fat and salt intake.  ~ reduce your weight if you are overweight or obese.  ~ increase the consumption of fruits, vegetables and whole grains.  ~ decrease alcohol consumption if you consume alcohol.   ~ try to minimize stress in your life with lifestyle modifications.   ~ be compliant with your anti-hypertensive medications.   ~ adjust your medications to help improve your vascular stiffness and decrease risks for heart attacks and strokes.     Exercise to Lose Weight     Exercise and a healthy diet may help you lose weight. Your doctor may suggest specific exercises.     EXERCISE IDEAS AND TIPS     Choose low-cost things you enjoy doing, such as walking, bicycling, or exercising to workout videos.   Take stairs instead of the elevator.   Walk during your lunch break.   Park your car further away from work or school.   Go to a gym or an exercise class.   Start with 5 to 10 minutes of exercise each day. Build up to 30 minutes of exercise 4 to 6 days a " week.   Wear shoes with good support and comfortable clothes.   Stretch before and after working out.   Work out until you breathe harder and your heart beats faster.   Drink extra water when you exercise.   Do not do so much that you hurt yourself, feel dizzy, or get very short of breath.     Exercises that burn about 150 calories:   Running 1 ½ miles in 15 minutes.   Playing volleyball for 45 to 60 minutes.   Washing and waxing a car for 45 to 60 minutes.   Playing touch football for 45 minutes.   Walking 1 ¾ miles in 35 minutes.   Pushing a stroller 1 ½ miles in 30 minutes.   Playing basketball for 30 minutes.   Raking leaves for 30 minutes.   Bicycling 5 miles in 30 minutes.   Walking 2 miles in 30 minutes.   Dancing for 30 minutes.   Shoveling snow for 15 minutes.   Swimming laps for 20 minutes.   Walking up stairs for 15 minutes.   Bicycling 4 miles in 15 minutes.   Gardening for 30 to 45 minutes.   Jumping rope for 15 minutes.   Washing windows or floors for 45 to 60 minutes.

## 2024-10-16 NOTE — ASSESSMENT & PLAN NOTE
Encouraged patient to follow a renal diet comprising of moderate potassium, low phosphorus and protein restriction to 0.8gm/kg.  Will check serum albumin with next blood work.     Orders:    Renal function panel; Future    PTH, intact; Future    Vitamin D 25 hydroxy; Future    PTH, intact; Future    Renal function panel; Future    Vitamin D 25 hydroxy; Future    Albumin / creatinine urine ratio; Future    Phosphorus; Future    Magnesium; Future    Calcium, ionized; Future

## 2024-10-16 NOTE — PROGRESS NOTES
Ambulatory Visit  Name: Jayla Moody      : 1945      MRN: [id number]  Encounter Provider: Dot Germain MD  Encounter Date: 10/16/2024   Encounter department: [facility] [place]  79 y.o.  female with pmh of multiple co-morbidities including hypertension (x20yrs), morbid obesity, GERD, arthritis, severe asthma, CAD, a.flutter, CHF and metastatic lung cancer presents to the office for evaluation and management of abnormal renal parameters.     Assessment & Plan  Hypertensive kidney disease with stage 3b chronic kidney disease (HCC)  Lab Results   Component Value Date    EGFR 39 2024    EGFR 36 2024    EGFR 40 2024    CREATININE 1.31 (H) 2024    CREATININE 1.38 (H) 2024    CREATININE 1.27 2024   after review of records In [facility] as well as Care everywhere patient has a baseline creatinine of 1-1.3 mg/dL dating as far back as  however since 2023 new baseline had been around 1.6 to 1.9 mg/dL however more recently appears to be around 1.3 to 1.7 mg/dL.   Most recent labs show a Creatinine of 1.31 mg/dL on 2024. Renal function remains stable.  Blood work in 3 months and then again prior to next visit  Likely has underlying CKD due to hypertensive nephrosclerosis plus obesity due to hyperfiltration plus cardiorenal syndrome plus smoking associated nephropathy plus episode of acute kidney injury nondialysis requiring.  Patient continues to be at risk for CARLOS ENRIQUE secondary to use of alectinib, currently on hold.  Imaging:   Renal ultrasound 2023 bilateral kidneys approximately 11 cm no hydronephrosis or cysts.  Recommend to avoid use of NSAIDs, nephrotoxins. Caution advised with regards to exposure to IV contrast dye.   Discussed with the patient in depth her renal status, including the possible etiologies for CKD.   Advised the patient that when her GFR is close to 20mL/min then will start discussing about RRT(renal replacement therapy)  options such as renal transplant, peritoneal dialysis and hemodialysis.   Informed the patient about the various options for Renal Replacement therapy.  Discussed with the patient how we need to work together to delay the progression of CKD with optimal BP control based on their age and co-morbidities, optimal BS control with HbA1c of <7% and trying to reduce proteinuria by the use of anti-proteinuric agents.   CKD education/Kidney smart: Referral placed 5/21/2024  Follow-up: Patient is to follow-up in 6 months, with lab work to be performed in 3 months and then again in a few days prior to the next visit. Advised patient to call me in 10 days to review the results if they do not hear back from me, as I may have not received the results.  Current medications: Lasix 20 mg p.o. daily (on hold for about a few weeks) Toprol-XL 25 mg p.o. twice daily  Changes made today: No changes today.  Stable if blood pressure is elevated may consider addition of ACE inhibitor or ARB previously was held to preserve residual renal function.  Currently Lasix on hold advised patient if she gains more than 5 pounds in 24 hours then to restart the Lasix.  Today on exam clinically euvolemic  Goal BP of < 130/80 based on age and comorbidities  Instructed to follow low sodium (2gm)diet.    Chronic kidney disease-mineral and bone disorder (CKD-MBD)  Lab Results   Component Value Date    EGFR 39 09/26/2024    EGFR 36 08/03/2024    EGFR 40 07/19/2024    CREATININE 1.31 (H) 09/26/2024    CREATININE 1.38 (H) 08/03/2024    CREATININE 1.27 07/19/2024     Based on patients CKD stage following is the goal of therapy.  Maintain calcium phosphorus product of < 55.  Stage 3 CKD - Goal Ca 8.5-10 mg/dL , goal Phos 2.7-4.6 mg/dL  , goal iPTH 30-70 pg/m  Currently on: Calcitrol 0.25 mcg 3 times a week  Changes made today: Increase to vitamin D 1000 units p.o. daily for now  most recent ipth 82.2 and vit D 32.8 as of 9/26/2024  Check iPTH vitamin D prior to  next visit and in 3 months    Class 2 severe obesity due to excess calories with serious comorbidity and body mass index (BMI) of 39.0 to 39.9 in adult ([facility])    Encouraged patient to follow a renal diet comprising of moderate potassium, low phosphorus and protein restriction to 0.8gm/kg.  Will check serum albumin with next blood work.     Orders:    Renal function panel; Future    PTH, intact; Future    Vitamin D 25 hydroxy; Future    PTH, intact; Future    Renal function panel; Future    Vitamin D 25 hydroxy; Future    Albumin / creatinine urine ratio; Future    Phosphorus; Future    Magnesium; Future    Calcium, ionized; Future    Anemia due to stage 3b chronic kidney disease  (HCC)  Lab Results   Component Value Date    EGFR 39 09/26/2024    EGFR 36 08/03/2024    EGFR 40 07/19/2024    CREATININE 1.31 (H) 09/26/2024    CREATININE 1.38 (H) 08/03/2024    CREATININE 1.27 07/19/2024     Goal Hb of 10-12 g/dL  Most recent labs suggestive of 9.1 g/dL as of 8/3/2024.   History of metastatic lung cancer  Continue follow-up with hematology oncology  Follow-up with oncology avoid BLAINE due to history of malignancy    At risk for nutrition problem  Encouraged patient to follow a renal diet comprising of moderate potassium, low phosphorus and protein restriction to 0.8gm/kg.  Will check serum albumin with next blood work.     Persistent proteinuria    likely has underlying proteinuria due to obesity due to hyperfiltration  most recent MACR is 26 mg/dL from September 2024 recheck prior to next visit, improved  For proteinuria reduction continue on Crestor.  If renal parameters continue to be stable and there is worsening proteinuria may consider trial of ACE inhibitor or ARB.  Previously held to preserve residual renal function      History of Present Illness     Jayla Moody is a 79 y.o. female who presents to the office for routine follow-up no complaints feels well has held her Lasix for the last few weeks since her chemo  has been on hold and she has not been getting fluid weight.  No shortness of breath.  Happy renal parameters stable agrees with current plan that if she restarts to gain fluid/weight then she will go back on the Lasix.      Review of Systems   Constitutional:  Negative for fever.   Respiratory:  Negative for cough and shortness of breath.    Cardiovascular:  Negative for leg swelling.   Gastrointestinal:  Negative for abdominal pain.   Genitourinary:  Negative for dysuria and hematuria.   Musculoskeletal:  Negative for back pain.   Neurological:  Negative for headaches.   Psychiatric/Behavioral:  Negative for agitation and confusion.    All other systems reviewed and are negative.    Current Outpatient Medications on File Prior to Visit   Medication Sig Dispense Refill    acetaminophen (TYLENOL) 650 mg CR tablet TAKE 1 TABLET (650 MG) BY MOUTH EVERY 8 HOURS AS NEEDED FOR PAIN (MILD) 60 tablet 3    albuterol (2.5 mg/3 mL) 0.083 % nebulizer solution Take 3 mL (2.5 mg total) by nebulization 2 (two) times a day 180 mL 11    albuterol (PROVENTIL HFA,VENTOLIN HFA) 90 mcg/act inhaler Inhale 2 puffs 4 (four) times a day 18 g 2    amiodarone 200 mg tablet Take 1 tablet (200 mg total) by mouth daily with breakfast 90 tablet 3    apixaban (ELIQUIS) 5 mg Take 1 tablet (5 mg total) by mouth 2 (two) times a day 60 tablet 11    benralizumab (FASENRA) subcutaneous injection Inject 1 mL (30 mg total) under the skin every 56 days 1 mL 6    Budeson-Glycopyrrol-Formoterol (Breztri Aerosphere) 160-9-4.8 MCG/ACT AERO Inhale 2 puffs 2 (two) times a day Rinse mouth after use. 10.7 g 11    calcitriol (ROCALTROL) 0.25 mcg capsule Take 1 capsule (0.25 mcg total) by mouth 3 (three) times a week On Monday , Wednesday, friday 45 capsule 3    fexofenadine (ALLEGRA) 180 MG tablet TAKE 1 TABLET BY MOUTH EVERY DAY 90 tablet 4    fluticasone (FLONASE) 50 mcg/act nasal spray SPRAY 2 SPRAYS INTO EACH NOSTRIL EVERY DAY 48 mL 1    metoprolol tartrate  "(LOPRESSOR) 25 mg tablet Take 1 tablet (25 mg total) by mouth every 12 (twelve) hours 180 tablet 1    pantoprazole (PROTONIX) 40 mg tablet TAKE 1 TABLET BY MOUTH TWICE A  tablet 1    rosuvastatin (CRESTOR) 10 MG tablet Take 1 tablet (10 mg total) by mouth daily 90 tablet 3    senna-docusate sodium (SENOKOT S) 8.6-50 mg per tablet Take 1 tablet by mouth daily 60 tablet 3    vitamin B-12 (VITAMIN B-12) 1,000 mcg tablet TAKE 1 TABLET BY MOUTH EVERY DAY 90 tablet 1    Alectinib HCl 150 MG CAPS Take 4 capsules (600 mg total) by mouth 2 (two) times a day (Patient not taking: Reported on 9/12/2024) 240 capsule 5    furosemide (LASIX) 20 mg tablet Take 1 tablet (20 mg total) by mouth daily (Patient not taking: Reported on 10/16/2024) 90 tablet 1    [DISCONTINUED] diclofenac (VOLTAREN) 75 mg EC tablet Take 75 mg by mouth (Patient not taking: Reported on 9/1/2023)      [DISCONTINUED] Klor-Con M20 20 MEQ tablet TAKE 1 TABLET BY MOUTH EVERY DAY (Patient not taking: Reported on 10/16/2024) 90 tablet 1     No current facility-administered medications on file prior to visit.          Objective     /62 (BP Location: Left arm, Patient Position: Sitting, Cuff Size: Adult)   Ht 5' 1\" (1.549 m)   Wt 95.3 kg (210 lb)   BMI 39.68 kg/m²     Physical Exam  Vitals reviewed.   Constitutional:       General: She is not in acute distress.     Appearance: Normal appearance. She is obese. She is not ill-appearing, toxic-appearing or diaphoretic.   HENT:      Head: Normocephalic and atraumatic.      Mouth/Throat:      Pharynx: No oropharyngeal exudate.   Eyes:      General: No scleral icterus.  Cardiovascular:      Rate and Rhythm: Normal rate.   Pulmonary:      Effort: No respiratory distress.      Breath sounds: Normal breath sounds. No stridor. No wheezing.   Abdominal:      Palpations: There is no mass.      Tenderness: There is no abdominal tenderness. There is no right CVA tenderness or left CVA tenderness. "   Musculoskeletal:         General: No swelling.      Cervical back: Normal range of motion. No rigidity.   Skin:     Coloration: Skin is not jaundiced.   Neurological:      General: No focal deficit present.      Mental Status: She is alert and oriented to person, place, and time.   Psychiatric:         Mood and Affect: Mood normal.         Behavior: Behavior normal.

## 2024-10-16 NOTE — ASSESSMENT & PLAN NOTE
likely has underlying proteinuria due to obesity due to hyperfiltration  most recent MACR is 26 mg/dL from September 2024 recheck prior to next visit, improved  For proteinuria reduction continue on Crestor.  If renal parameters continue to be stable and there is worsening proteinuria may consider trial of ACE inhibitor or ARB.  Previously held to preserve residual renal function

## 2024-10-16 NOTE — ASSESSMENT & PLAN NOTE
Lab Results   Component Value Date    EGFR 39 09/26/2024    EGFR 36 08/03/2024    EGFR 40 07/19/2024    CREATININE 1.31 (H) 09/26/2024    CREATININE 1.38 (H) 08/03/2024    CREATININE 1.27 07/19/2024     Goal Hb of 10-12 g/dL  Most recent labs suggestive of 9.1 g/dL as of 8/3/2024.   History of metastatic lung cancer  Continue follow-up with hematology oncology  Follow-up with oncology avoid BLAINE due to history of malignancy

## 2024-10-23 ENCOUNTER — APPOINTMENT (OUTPATIENT)
Dept: LAB | Facility: HOSPITAL | Age: 79
End: 2024-10-23
Payer: COMMERCIAL

## 2024-10-23 DIAGNOSIS — C79.51 MALIGNANT NEOPLASM METASTATIC TO BONE (HCC): ICD-10-CM

## 2024-10-23 DIAGNOSIS — C34.90 NON-SMALL CELL LUNG CANCER, UNSPECIFIED LATERALITY (HCC): ICD-10-CM

## 2024-10-23 LAB
ALBUMIN SERPL BCG-MCNC: 4.3 G/DL (ref 3.5–5)
ALP SERPL-CCNC: 45 U/L (ref 34–104)
ALT SERPL W P-5'-P-CCNC: 39 U/L (ref 7–52)
ANION GAP SERPL CALCULATED.3IONS-SCNC: 10 MMOL/L (ref 4–13)
AST SERPL W P-5'-P-CCNC: 33 U/L (ref 13–39)
BILIRUB SERPL-MCNC: 0.5 MG/DL (ref 0.2–1)
BUN SERPL-MCNC: 28 MG/DL (ref 5–25)
CALCIUM SERPL-MCNC: 9.9 MG/DL (ref 8.4–10.2)
CHLORIDE SERPL-SCNC: 104 MMOL/L (ref 96–108)
CO2 SERPL-SCNC: 28 MMOL/L (ref 21–32)
CREAT SERPL-MCNC: 1.27 MG/DL (ref 0.6–1.3)
GFR SERPL CREATININE-BSD FRML MDRD: 40 ML/MIN/1.73SQ M
GLUCOSE P FAST SERPL-MCNC: 97 MG/DL (ref 65–99)
POTASSIUM SERPL-SCNC: 3.9 MMOL/L (ref 3.5–5.3)
PROT SERPL-MCNC: 7 G/DL (ref 6.4–8.4)
SODIUM SERPL-SCNC: 142 MMOL/L (ref 135–147)

## 2024-10-23 PROCEDURE — 80053 COMPREHEN METABOLIC PANEL: CPT

## 2024-10-23 PROCEDURE — 36415 COLL VENOUS BLD VENIPUNCTURE: CPT

## 2024-10-25 ENCOUNTER — TELEPHONE (OUTPATIENT)
Dept: GASTROENTEROLOGY | Facility: MEDICAL CENTER | Age: 79
End: 2024-10-25

## 2024-10-25 ENCOUNTER — HOSPITAL ENCOUNTER (OUTPATIENT)
Dept: INFUSION CENTER | Facility: HOSPITAL | Age: 79
End: 2024-10-25
Attending: INTERNAL MEDICINE
Payer: COMMERCIAL

## 2024-10-25 VITALS — BODY MASS INDEX: 39.55 KG/M2 | WEIGHT: 209.5 LBS | HEIGHT: 61 IN

## 2024-10-25 DIAGNOSIS — C79.51 MALIGNANT NEOPLASM METASTATIC TO BONE (HCC): ICD-10-CM

## 2024-10-25 DIAGNOSIS — C34.90 NON-SMALL CELL LUNG CANCER, UNSPECIFIED LATERALITY (HCC): Primary | ICD-10-CM

## 2024-10-25 PROCEDURE — 96372 THER/PROPH/DIAG INJ SC/IM: CPT

## 2024-10-25 RX ADMIN — DENOSUMAB 120 MG: 120 INJECTION SUBCUTANEOUS at 13:33

## 2024-10-25 NOTE — PROGRESS NOTES
Hematology/Oncology Outpatient Office Note    Date of Service: 2024    [facility] [address] [place] [id number]  [phone]    Reason for Consultation:   No chief complaint on file.      Cancer Stage at diagnosis: IVB    Referral Physician: No ref. provider found    Primary Care Physician:  Michelle Chatterjee MD     Nickname: 'Lambert'    Lives alone    Original ECO    Today's ECO (uses a cane; up and about >50% of the day)    Goals and Barriers:  Current Goal: Minimize effects of disease burden, extend life.   Barriers to accomplishing this: chronic lower back pain    Patient's Capacity to Self Care:  Patient is able to self care      ASSESSMENT & PLAN      Diagnosis ICD-10-CM Associated Orders   1. Non-small cell cancer of right lung (HCC)  C34.91 CT chest abdomen pelvis wo contrast     CBC and differential     Comprehensive metabolic panel            This is a 79 y.o. c PMHx notable for A-fib, remote nicotine abuse, asthma, COPD, MONO s/p CPAP, GERD, HTN, chronic thrombocytopenia (since at least 2018), glaucoma, Vit B12 deficiency, being seen in consultation for metastatic ALK+ Lung cancer       The patient was diagnosed 2020 incidentally with a right hilar mass and diffuse osseous metastasis.  She was started on alectinib with near complete resolution of the lung mass, stable bone mets.  She has had bilateral lower extremity swelling while on treatment (noted in up to 30% of these patients).  She gets Zometa every 3 months.      ALK-EML4 translocation           Discussion of decision making  Oncology history updated, accordingly, during this visit  Goals of care/patient communication  I discussed with the patient the clinical course leading up to their cancer diagnosis. I reviewed relevant office notes, imaging reports and pathology result as well.  I told the patient that this is a case of incurable disease and  what this means. We discussed that the goal of anti-cancer therapy is to provide best quality of life, extend overall survival, and progression free survival as shown in clinical trials. We also discussed that there might be a point when the cancer will no longer respond to this anti-neoplastic therapy. As a result, we also discussed the role of the palliative care team being introduced early in the treatment course. We will be making this referral  I explained the risks/benefits of the proposed cancer therapy: Alectinib and after discussion including understanding risks of possible life-threatening complications and therapy-related malignancy development, informed consent for blood products and treatment has been signed and obtained.  TNM/Staging At Diagnosis  Cancer Staging   IVB  Disease Features/Tumor Markers/Genetics  Tumor Marker: n.a  Notable Path Features:   8/20/2020 Lymph Node, Level 4R  ( ThinPrep, smear preparations and cell block ):  Conclusive evidence of malignancy.  Non-small cell carcinoma, compatible with lung origin.  -  Immunohistochemical stains performed with appropriate controls show the tumor cells to be positive for TTF1 and napsin with partial positivity for CK5/6 and p40 and negative for synaptophysin, and chromogranin, supporting the diagnosis  Treatment: Alectinib   Other Supportive care:   Treatment Team Members  Surgeon  Rad Onc  Palliative: Dr. Siddiqui  Labs  Diagnostics  8/10/2020 PET/CT: Dominant right upper perihilar lung nodule with soft tissue extending to the right hilum along the right mainstem bronchus, and mediastinum, demonstrates intense FDG activity, most compatible with malignancy.  Tissue sampling recommended. Several hypermetabolic osseous lesions most compatible with metastases. Sub-centimeter right upper lung nodular densities that are too small for accurate PET evaluation.  4/27/2023 CT CAP w/o c: Chest: near CR  Examination is limited by respiratory motion. Nothing  to suggest adenocarcinoma recurrence.   Abdomen and pelvis:  No metastases, within the confines of an examination limited without contrast.   Osseous: Stable diffuse sclerotic metastatic disease. No new pathologic fracture. Unchanged healing pathologic bilateral rib fractures.  11/2/2023 CT CAP w/o c: No findings of recurrent disease in the chest. Stable numerous sclerotic metastatic lesions throughout the thoracolumbar spine and bony pelvis, in keeping with patient's treated metastases. No metastatic disease in the abdomen or pelvis  3/4/2024 CT CAP w/o c: No evidence of residual or recurrent disease within the chest, abdomen or pelvis. Interval resolution of previously noted right breast nodule. There is a smaller right lateral 7 mm right breast nodule that was likely excluded from the field-of-view on the prior examination. Stable sclerotic osseous foci consistent with treated metastatic disease.  7/15/2024 CT CAP w/o c:  near CR. No interval change since previous study. Stable treated osseous metastatic lesions.. Treated right hilar adenocarcinoma is no longer visualized. No new pulmonary lesions. No thoracic lymphadenopathy  7/2024: EGD with angioectasia, but no active bleeding   10/28/2024 CT CAP w/o c: appears to be stable per verbal read by radiology today    Discussion of decision making    I personally reviewed the following lab results, the image studies, pathology, other specialty/physicians consult notes and recommendations, and outside medical records. I had a lengthy discussion with the patient and shared the work-up findings. We discussed the diagnosis and management plan as below. I spent 41 minutes reviewing the records (labs, clinician notes, outside records, medical history, ordering medicine/tests/procedures, interpreting the imaging/labs previously done) and coordination of care as well as direct time with the patient today, of which greater than 50% of the time was spent in counseling and  coordination of care with the patient/family.      Plan/Labs  Cont Xgeva 120 mg SC q4 weeks  F/u Nephrology for HTN/Alectinib induced CKD  She is continuing to hold Alectinib 600mg BID   Cont Vit B12 supplementation  F/u GI for C-scope, PPI monitoring  Consider Palliative care   Guardant NGS if POD  Restaging CT CAP w/o c ordered in 4 months, 2/18/2025  CBC, CMP in 3 months         Follow Up: 3 months    All questions were answered to the patient's satisfaction during this encounter. The patient knows the contact information for our office and knows to reach out for any relevant concerns related to this encounter. They are to call for any temperature 100.4 or higher, new symptoms including but not restricted to shaking chills, decreased appetite, nausea, vomiting, diarrhea, increased fatigue, shortness of breath or chest pain, confusion, and not feeling the strength to come to the clinic. For all other listed problems and medical diagnosis in their chart - they are managed by PCP and/or other specialists, which the patient acknowledges. Thank you very much for your consultation and making us a part of this patient's care. We are continuing to follow closely with you. Please do not hesitate to reach out to me with any additional questions or concerns.    Ryan Arriaga MD  Hematology & Medical Oncology Staff Physician             Disclaimer: This document was prepared using [employer] Fluency Direct technology. If a word or phrase is confusing, or does not make sense, this is likely due to recognition error which was not discovered during this clinician's review. If you believe an error has occurred, please contact me through [facility] service line for christiancation.      ONCOLOGY HISTORY OF PRESENT ILLNESS        Oncology History Overview Note   75 year old female with stage IV non-small cell right upper lung.  She has evidence of bone metastases particular T8 although not symptomatic may be at risk for neurologic  problems, palliative radiation was recommended. She completed a course of palliative radiation therapy 30 Gy in 10 treatments to prevent neurologic complication on 9/21/20.    Today is her first telephone follow-up    9/17/20 Dr. Weaver  Recommend starting Alectinib 600 mg b.i.d.    10/29/20 Dr. Weaver follow-up  11/11/20 Pulmonology follow-up         Non-small cell lung cancer (HCC)   8/20/2020 Biopsy    FNA Level 4R  NSCC    RUL brushing: Conclusive evidence of malignancy.  Non small cell carcinoma.     Level 4R tissue sampling     8/31/2020 Initial Diagnosis    Non-small cell cancer of right lung (HCC)     9/8/2020 - 9/21/2020 Radiation        Treatments:  Course: C1    Plan ID Energy Fractions Dose per Fraction (cGy) Dose Correction (cGy) Total Dose Delivered (cGy) Elapsed Days   T7-T9 Spine 10X 10 / 10 300 0 3,000 13      Treatment dates:  9/8/2020 - 9/21/2020.      Chemotherapy    Alectinib 600 mg PO BID         9/2020: Zometa  q12 weeks started  10/2020: Alectinib 600mg BID started   3/12/2024: T bili 1.83, Chronic T bili elevation.CKD is stable  7/16/2024 admission admitting diagnosis of symptomatic anemia.  Hb was 4.  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 Resolved constipation. Moderate fatigue. Reduced edema in both legs.    I have reviewed the relevant past medical, surgical, social and family history. I have also reviewed allergies and medications for this  patient.    Review of Systems    Baseline weight: 235 lbs    She has lost 10 lbs since  since her son  from Legionellae's disease as she doesn't cook as much anymore with him not living with her.    Denies F/C, N/V, CP, LH, HA, rash, itching, gen weakness, melena, hematuria, hematochezia, falls, diarrhea.       A 10-point review of system was performed, pertinent positive and negative were detailed as above. Otherwise, the 10-point review of system was negative.    Past Medical History:   Diagnosis Date    Allergic rhinitis     Anemia     Anxiety     Arthritis     Asthma     Atrial fibrillation (HCC)     Cancer (HCC)     Chronic bronchitis (HCC)     COPD (chronic obstructive pulmonary disease) (HCC)     Coronary artery disease     CPAP (continuous positive airway pressure) dependence     Degenerative joint disease     Fluid retention     GERD (gastroesophageal reflux disease)     Glaucoma     HL (hearing loss)     Hypertension     Lumbar disc disease     Lung cancer (HCC)     Obesity     Pelvis cancer (HCC)     Periodic heart flutter     Pneumonia     Premature atrial contractions 10/31/2017    Sleep apnea     suspected     Sleep apnea, obstructive     Ventricular arrhythmia     Vitamin D deficiency        Past Surgical History:   Procedure Laterality Date    APPENDECTOMY      BREAST BIOPSY Right     years ago    COLON SURGERY      COLONOSCOPY N/A 2019    Procedure: COLONOSCOPY;  Surgeon: Alessia Wilson DO;  Location: [facility];  Service: Gastroenterology    ESOPHAGOGASTRODUODENOSCOPY N/A 2019    Procedure: ESOPHAGOGASTRODUODENOSCOPY (EGD);  Surgeon: Alessia Wilson DO;  Location: [facility];  Service: Gastroenterology    KNEE SURGERY      LUNG BIOPSY      ROTATOR CUFF REPAIR      SHOULDER SURGERY         Family History   Problem Relation Age of Onset    Stroke Mother     Diabetes Mother     Hyperlipidemia Mother     Hypertension Mother     Allergies Mother         Environmental     Asthma Mother     Diabetes Father     Hyperlipidemia Father     Hypertension Father     Lung cancer Father 70    Allergies Father         Environmental    Asthma Father     Cancer Father     Lymphoma Sister 69    Heart disease Sister         Pacemaker    Asthma Brother     Aneurysm Brother         Brain - had surgery    Other Brother         Lymes disease    Coronary artery disease Daughter         2 bypass done    No Known Problems Daughter     No Known Problems Daughter     No Known Problems Son     Kidney disease Son     Liver disease Son     Obesity Son        Social History     Socioeconomic History    Marital status: Single     Spouse name: Not on file    Number of children: 5    Years of education: Not on file    Highest education level: Not on file   Occupational History    Not on file   Tobacco Use    Smoking status: Former     Current packs/day: 0.00     Average packs/day: 0.3 packs/day for 25.0 years (6.3 ttl pk-yrs)     Types: Cigarettes     Start date: 1962     Quit date:      Years since quittin.8     Passive exposure: Never    Smokeless tobacco: Former   Vaping Use    Vaping status: Never Used   Substance and Sexual Activity    Alcohol use: Yes    Drug use: Not Currently     Types: Marijuana    Sexual activity: Not Currently   Other Topics Concern    Not on file   Social History Narrative    Who lives in your home: son    What type of home do you live in: Single house    Age of your home: 95 yrs old    How long have you been living there: 30 years    Type of heat: Forced hot air    Type of fuel: Gas    What type of rome is in your bedroom: Hardwood floor    Do you have the following in or near your home:    Air products: Window air conditioning and Air     Pests: None    Pets: 1 Cat    Are pets allowed in bedroom: No    Open fields, wooded areas nearby: Open fields and Wooded areas    Basement: Damp at times and Unfinished with cracks in cement    Exposure to second hand smoke:  No        Habits:    Caffeine: Current; Amount: 1 cups/day coffee, #years 60    Chocolate: occasionally    Other:              Social Determinants of Health     Financial Resource Strain: Medium Risk (8/16/2024)    Overall Financial Resource Strain (CARDIA)     Difficulty of Paying Living Expenses: Somewhat hard   Food Insecurity: Food Insecurity Present (8/16/2024)    Nursing - Inadequate Food Risk Classification     Worried About Running Out of Food in the Last Year: Sometimes true     Ran Out of Food in the Last Year: Sometimes true     Ran Out of Food in the Last Year: Not on file   Transportation Needs: No Transportation Needs (8/16/2024)    PRAPARE - Transportation     Lack of Transportation (Medical): No     Lack of Transportation (Non-Medical): No   Physical Activity: Inactive (11/15/2022)    Exercise Vital Sign     Days of Exercise per Week: 0 days     Minutes of Exercise per Session: 0 min   Stress: Not on file   Social Connections: Not on file   Intimate Partner Violence: Not on file   Housing Stability: Low Risk  (8/16/2024)    Housing Stability Vital Sign     Unable to Pay for Housing in the Last Year: No     Number of Times Moved in the Last Year: 0     Homeless in the Last Year: No       No Known Allergies    Current Outpatient Medications   Medication Sig Dispense Refill    acetaminophen (TYLENOL) 650 mg CR tablet TAKE 1 TABLET (650 MG) BY MOUTH EVERY 8 HOURS AS NEEDED FOR PAIN (MILD) 60 tablet 3    albuterol (2.5 mg/3 mL) 0.083 % nebulizer solution Take 3 mL (2.5 mg total) by nebulization 2 (two) times a day 180 mL 11    albuterol (PROVENTIL HFA,VENTOLIN HFA) 90 mcg/act inhaler Inhale 2 puffs 4 (four) times a day 18 g 2    amiodarone 200 mg tablet Take 1 tablet (200 mg total) by mouth daily with breakfast 90 tablet 3    apixaban (ELIQUIS) 5 mg Take 1 tablet (5 mg total) by mouth 2 (two) times a day 60 tablet 11    Budeson-Glycopyrrol-Formoterol (Breztri Aerosphere) 160-9-4.8 MCG/ACT AERO Inhale 2  puffs 2 (two) times a day Rinse mouth after use. 10.7 g 11    calcitriol (ROCALTROL) 0.25 mcg capsule Take 1 capsule (0.25 mcg total) by mouth 3 (three) times a week On Monday , Wednesday, friday 45 capsule 3    fexofenadine (ALLEGRA) 180 MG tablet TAKE 1 TABLET BY MOUTH EVERY DAY 90 tablet 4    fluticasone (FLONASE) 50 mcg/act nasal spray SPRAY 2 SPRAYS INTO EACH NOSTRIL EVERY DAY 48 mL 1    metoprolol tartrate (LOPRESSOR) 25 mg tablet Take 1 tablet (25 mg total) by mouth every 12 (twelve) hours 180 tablet 1    pantoprazole (PROTONIX) 40 mg tablet TAKE 1 TABLET BY MOUTH TWICE A  tablet 1    rosuvastatin (CRESTOR) 10 MG tablet Take 1 tablet (10 mg total) by mouth daily 90 tablet 3    senna-docusate sodium (SENOKOT S) 8.6-50 mg per tablet Take 1 tablet by mouth daily 60 tablet 3    vitamin B-12 (VITAMIN B-12) 1,000 mcg tablet TAKE 1 TABLET BY MOUTH EVERY DAY 90 tablet 1    Alectinib HCl 150 MG CAPS Take 4 capsules (600 mg total) by mouth 2 (two) times a day (Patient not taking: Reported on 9/12/2024) 240 capsule 5    benralizumab (FASENRA) subcutaneous injection Inject 1 mL (30 mg total) under the skin every 56 days 1 mL 6    furosemide (LASIX) 20 mg tablet Take 1 tablet (20 mg total) by mouth daily (Patient not taking: Reported on 10/16/2024) 90 tablet 1     No current facility-administered medications for this visit.       (Not in a hospital admission)      Objective:     24 Hour Vitals Assessment:     Vitals:    11/04/24 1357   BP: 126/64   Pulse: 88   Resp: 16   Temp: (!) 97.4 °F (36.3 °C)   SpO2: 98%         PHYSICIAN EXAM     General: Appearance: alert, cooperative, no distress.  HEENT: Normocephalic, atraumatic. No scleral icterus. conjunctivae clear. EOMI.  Chest: No tenderness to palpation. No open wound noted.  Lungs: Clear to auscultation bilaterally, Respirations unlabored.  Cardiac: Regular rate and rhythm, +S1and S2  Abdomen: Soft, non-tender, non-distended. Bowel sounds are normal.  Extremities:   No edema, cyanosis, clubbing.  Skin: Skin color, turgor are normal. No rashes.  Lymphatics: no palpable supra-cervical, axillary, or inguinal adenopathy  Neurologic: Awake, Alert, and oriented, no gross focal deficits noted b/l.       DATA REVIEW:    Pathology Result:    Final Diagnosis   Date Value Ref Range Status   08/20/2020   Final    A. Lung, Right Upper Lobe Bronchial Brushing, :  Conclusive evidence of malignancy.  Non small cell carcinoma.    Satisfactory for evaluation.     Note:  Correlate with concurrent case [id number].     08/20/2020   Final    A.B.C. Lymph Node, Level 4R  ( ThinPrep, smear preparations and cell block ):  Conclusive evidence of malignancy.  Non-small cell carcinoma, compatible with lung origin.  -  Immunohistochemical stains performed with appropriate controls show the tumor cells to be positive for TTF1 and napsin with partial positivity for CK5/6 and p40 and negative for synaptophysin, and chromogranin, supporting the diagnosis.    Note:  The findings are diagnostic of non small-cell carcinoma of lung origin with features favoring adenocarcinoma.  Morphologic and immunohistochemical features raise the possibility of squamous differentiation, though squamous contaminant cannot be entirely excluded.  Correlation with clinical impression and any future tissue sampling is recommended to exclude the possibility of adenosquamous carcinoma.    Satisfactory for evaluation.       03/18/2019   Final    A.  Stomach, endoscopic biopsy:  - Gastric antral mucosa with mild chronic, inactive gastritis.  - Negative for intestinal metaplasia, dysplasia or carcinoma.  - Immunohistochemistry for Helicobacter pylori is negative.    B.  Stomach, erosion, endoscopic biopsy:  - Gastric antral and oxyntic mucosa with reactive foveolar hyperplasia, fibrosis and acte inflammation consistent with tissue adjacent to an erosion or ulcer.  - Negative for intestinal metaplasia, dysplasia or carcinoma.    Note:  Special stain for alcian blue/PAS and immunohistochemical stain for pan CK AE1/3 highlight benign glands.                Image Results:   Image result are reviewed and documented in Hematology/Oncology history    US breast bilateral limited (diagnostic)  Narrative: DIAGNOSIS: Abnormal findings on diagnostic imaging of breast    TECHNIQUE:   Ultrasound of the left and right  breast(s) was performed.    COMPARISONS: Prior breast imaging dated: 06/04/2024, 06/04/2024,   01/04/2023, 11/11/2021, 11/11/2021, 11/06/2021, 09/16/2020, 12/18/2018,   12/15/2017, 08/30/2017, 08/30/2017, 08/23/2017, and 03/21/2016    RELEVANT HISTORY:   Family Breast Cancer History: No known family history of breast cancer.  Family Medical History: No known relevant family medical history.   Personal History: No known relevant hormone history. Surgical history   includes breast biopsy. No known relevant medical history.    RISK ASSESSMENT:   5 Year Tyrer-Cuzick: 0.77%  10 Year Tyrer-Cuzick: No Score  Lifetime Tyrer-Cuzick: 1.28%    INDICATION: Jayla Moody is a 79 y.o. female presenting for   3MFU.    FINDINGS:   LEFT  1) MASS [C]:    At 11:00, 13 cm from the nipple there is a 5 x 4 x 3 mm echogenic mass.    This may correspond with the previously demonstrated mass which measured   up to 7 mm.    At 3:00, 4 cm from the nipple there is a 9 x 6 x 11 mm mixed echogenicity   mass, previously 12 x 7 x 11 mm.  At 11:00, 8 cm from the nipple there is a 9 mm echogenic mass which may   represent residual from the previously seen larger echogenic mass   (measuring approximately 13 mm).    RIGHT  2) MASS [D]: Previously described finding in the right breast 8:00, 2 cm   from the nipple does not persist.  At 8:00, 3 cm from the nipple there is   a mixed echogenicity mass measuring 6 x 3 x 7 mm.  This previously   measured 7 x 4 x 7 mm.  Impression:    There are bilateral mixed echogenicity and echogenic masses which likely   represents sequelae of minor  "trauma (related to the patient being on   anticoagulation therapy).  The patient does report easy bruising.  These   are probably benign.  3-month follow-up ultrasound is recommended.  The   patient is aware of this recommendation.    ASSESSMENT/BI-RADS CATEGORY:  Left: 3 - Probably Benign  Overall: 3 - Probably Benign    RECOMMENDATION:       - Ultrasound in 6 months for the left breast.    Workstation ID: [id number]    Signed by:  Mercedes Larsen MD      LABS:  Lab data are reviewed and documented in [facility] history.       Lab Results   Component Value Date    HGB 9.1 (L) 08/03/2024    HCT 30.0 (L) 08/03/2024    MCV 96 08/03/2024     (L) 08/03/2024    WBC 5.59 08/03/2024    NRBC 0 08/03/2024    BANDSPCT 2 07/19/2024    ATYLMPCT 1 (H) 06/07/2021     Lab Results   Component Value Date    K 3.9 10/23/2024     10/23/2024    CO2 28 10/23/2024    BUN 28 (H) 10/23/2024    CREATININE 1.27 10/23/2024    GLUF 97 10/23/2024    CALCIUM 9.9 10/23/2024    CORRECTEDCA 9.5 07/16/2024    AST 33 10/23/2024    ALT 39 10/23/2024    ALKPHOS 45 10/23/2024    EGFR 40 10/23/2024       Lab Results   Component Value Date    IRON 55 07/16/2024    TIBC 348 07/16/2024    FERRITIN 59 07/16/2024       Lab Results   Component Value Date    [id number] 224 07/16/2024       No results for input(s): \"WBC\", \"CREAT\", \"PLT\" in the last 72 hours.      By:  Ryan Arraiga MD, 11/4/2024, 2:39 PM                                  "

## 2024-10-25 NOTE — TELEPHONE ENCOUNTER
Called and spoke to patient to confirm procedure for 11/06/2024 with Dr. Jaiyeola, Patient has confirmed and only asked about her medications, did inform patient that  Eliquis has to be hold 2 days prior to procedure and other medications can be taken 2 hours prior to procedure with a little bit of water. Patient understands

## 2024-10-25 NOTE — PLAN OF CARE
Problem: Potential for Falls  Goal: Patient will remain free of falls  Description: INTERVENTIONS:  - Educate patient/family on patient safety including physical limitations  - Instruct patient to call for assistance with activity   - Consult OT/PT to assist with strengthening/mobility   - Keep Call bell within reach  - Apply yellow socks and bracelet for high fall risk patients  - Consider moving patient to room near nurses station  Outcome: Progressing

## 2024-10-25 NOTE — PROGRESS NOTES
Creatinine clearance 34.9, Calcium 9.9 from labs 10/23. Patient denies any dental pain or upcoming procedures. Tolerated R arm Xgeva injection without issue. Next appointment confirmed 11/22, AVS declined.

## 2024-10-28 ENCOUNTER — HOSPITAL ENCOUNTER (OUTPATIENT)
Dept: CT IMAGING | Facility: HOSPITAL | Age: 79
Discharge: HOME | End: 2024-10-28
Attending: INTERNAL MEDICINE
Payer: COMMERCIAL

## 2024-10-28 DIAGNOSIS — C34.90 NON-SMALL CELL LUNG CANCER, UNSPECIFIED LATERALITY (HCC): ICD-10-CM

## 2024-10-28 PROCEDURE — 74176 CT ABD & PELVIS W/O CONTRAST: CPT

## 2024-10-28 PROCEDURE — 71250 CT THORAX DX C-: CPT

## 2024-11-04 ENCOUNTER — OFFICE VISIT (OUTPATIENT)
Dept: HEMATOLOGY ONCOLOGY | Facility: CLINIC | Age: 79
End: 2024-11-04
Payer: COMMERCIAL

## 2024-11-04 ENCOUNTER — TELEPHONE (OUTPATIENT)
Dept: FAMILY MEDICINE CLINIC | Facility: CLINIC | Age: 79
End: 2024-11-04

## 2024-11-04 VITALS
HEIGHT: 61 IN | OXYGEN SATURATION: 98 % | HEART RATE: 88 BPM | SYSTOLIC BLOOD PRESSURE: 126 MMHG | DIASTOLIC BLOOD PRESSURE: 64 MMHG | BODY MASS INDEX: 39.27 KG/M2 | WEIGHT: 208 LBS | TEMPERATURE: 97.4 F | RESPIRATION RATE: 16 BRPM

## 2024-11-04 DIAGNOSIS — C34.91 NON-SMALL CELL CANCER OF RIGHT LUNG (HCC): Primary | ICD-10-CM

## 2024-11-04 PROCEDURE — 99215 OFFICE O/P EST HI 40 MIN: CPT | Performed by: INTERNAL MEDICINE

## 2024-11-04 PROCEDURE — G2211 COMPLEX E/M VISIT ADD ON: HCPCS | Performed by: INTERNAL MEDICINE

## 2024-11-04 NOTE — TELEPHONE ENCOUNTER
----- Message from Deepa VENTURA sent at 11/1/2024 12:00 PM EDT -----  Regarding: FW: Medication hold prior to colonoscopy    ----- Message -----  From: Michelle Chatterjee MD  Sent: 11/1/2024   7:00 AM EDT  To: [facility]  Subject: Medication hold prior to colonoscopy             Please remind patient of the following:    Do not take Eliquis on 11/4 and 11/5. 2 days before your scheduled colonoscopy on 11/6.    Thank you.  Michelle Chatterjee MD

## 2024-11-06 ENCOUNTER — ANESTHESIA EVENT (OUTPATIENT)
Dept: GASTROENTEROLOGY | Facility: HOSPITAL | Age: 79
End: 2024-11-06
Payer: COMMERCIAL

## 2024-11-06 ENCOUNTER — HOSPITAL ENCOUNTER (OUTPATIENT)
Dept: GASTROENTEROLOGY | Facility: HOSPITAL | Age: 79
Setting detail: OUTPATIENT SURGERY
Discharge: HOME | End: 2024-11-06
Attending: INTERNAL MEDICINE
Payer: COMMERCIAL

## 2024-11-06 ENCOUNTER — ANESTHESIA (OUTPATIENT)
Dept: GASTROENTEROLOGY | Facility: HOSPITAL | Age: 79
End: 2024-11-06
Payer: COMMERCIAL

## 2024-11-06 VITALS
WEIGHT: 208 LBS | SYSTOLIC BLOOD PRESSURE: 158 MMHG | BODY MASS INDEX: 39.27 KG/M2 | HEIGHT: 61 IN | DIASTOLIC BLOOD PRESSURE: 76 MMHG | TEMPERATURE: 97.8 F | HEART RATE: 89 BPM | RESPIRATION RATE: 17 BRPM | OXYGEN SATURATION: 98 %

## 2024-11-06 DIAGNOSIS — D50.0 IRON DEFICIENCY ANEMIA DUE TO CHRONIC BLOOD LOSS: ICD-10-CM

## 2024-11-06 PROCEDURE — 45382 COLONOSCOPY W/CONTROL BLEED: CPT | Performed by: INTERNAL MEDICINE

## 2024-11-06 RX ORDER — PROPOFOL 10 MG/ML
INJECTION, EMULSION INTRAVENOUS AS NEEDED
Status: DISCONTINUED | OUTPATIENT
Start: 2024-11-06 | End: 2024-11-06

## 2024-11-06 RX ORDER — SODIUM CHLORIDE, SODIUM LACTATE, POTASSIUM CHLORIDE, CALCIUM CHLORIDE 600; 310; 30; 20 MG/100ML; MG/100ML; MG/100ML; MG/100ML
125 INJECTION, SOLUTION INTRAVENOUS CONTINUOUS
Status: DISCONTINUED | OUTPATIENT
Start: 2024-11-06 | End: 2024-11-10 | Stop reason: HOSPADM

## 2024-11-06 RX ADMIN — SODIUM CHLORIDE, SODIUM LACTATE, POTASSIUM CHLORIDE, AND CALCIUM CHLORIDE 125 ML/HR: .6; .31; .03; .02 INJECTION, SOLUTION INTRAVENOUS at 08:14

## 2024-11-06 RX ADMIN — PROPOFOL 50 MG: 10 INJECTION, EMULSION INTRAVENOUS at 09:13

## 2024-11-06 RX ADMIN — PROPOFOL 50 MG: 10 INJECTION, EMULSION INTRAVENOUS at 09:18

## 2024-11-06 RX ADMIN — PROPOFOL 100 MG: 10 INJECTION, EMULSION INTRAVENOUS at 09:04

## 2024-11-06 RX ADMIN — Medication 40 MG: at 09:11

## 2024-11-06 RX ADMIN — PROPOFOL 50 MG: 10 INJECTION, EMULSION INTRAVENOUS at 09:07

## 2024-11-06 NOTE — ANESTHESIA POSTPROCEDURE EVALUATION
Post-Op Assessment Note    CV Status:  Stable  Pain Score: 0    Pain management: adequate       Mental Status:  Alert and awake   Hydration Status:  Euvolemic   PONV Controlled:  Controlled   Airway Patency:  Patent     Post Op Vitals Reviewed: Yes    No anethesia notable event occurred.    Staff: Anesthesiologist, CRNA           Last Filed PACU Vitals:  Vitals Value Taken Time   Temp 98.1 °F (36.7 °C) 11/06/24 0925   Pulse     BP     Resp 15 11/06/24 0925   SpO2         Modified Javad:  Activity: 2 (11/6/2024  7:16 AM)  Respiration: 2 (11/6/2024  7:16 AM)  Circulation: 2 (11/6/2024  7:16 AM)  Consciousness: 2 (11/6/2024  7:16 AM)  Oxygen Saturation: 2 (11/6/2024  7:16 AM)  Modified Javad Score: 10 (11/6/2024  7:16 AM)

## 2024-11-06 NOTE — H&P
H&P - Gastroenterology   Name: Jayla Moody 79 y.o. female I MRN: [id number]  Unit/Bed#:  I Date of Admission: 11/6/2024   Date of Service: 11/6/2024 I Hospital Day: 0     Assessment & Plan   This is a 79 y.o. year old female here for colonoscopy, and she is stable and optimized for her procedure.    History of Present Illness    Jayla Moody is a 79 y.o. year old female who presents for iron deficiency anemia    REVIEW OF SYSTEMS: Per the HPI, and otherwise unremarkable.    Historical Information   Past Medical History:   Diagnosis Date    Allergic rhinitis     Anemia     Anxiety     Arthritis     Asthma     Atrial fibrillation (HCC)     Cancer (HCC)     Chronic bronchitis (HCC)     COPD (chronic obstructive pulmonary disease) (HCC)     Coronary artery disease     CPAP (continuous positive airway pressure) dependence     Degenerative joint disease     Fluid retention 2024    GERD (gastroesophageal reflux disease)     Glaucoma     HL (hearing loss)     Hypertension     Lumbar disc disease     Lung cancer (HCC)     Obesity     Pelvis cancer (HCC)     Periodic heart flutter     Pneumonia     Premature atrial contractions 10/31/2017    Sleep apnea     suspected     Sleep apnea, obstructive     Ventricular arrhythmia     Vitamin D deficiency      Past Surgical History:   Procedure Laterality Date    APPENDECTOMY      BREAST BIOPSY Right     years ago    COLON SURGERY      COLONOSCOPY N/A 03/18/2019    Procedure: COLONOSCOPY;  Surgeon: Alessia Wilson DO;  Location: [facility];  Service: Gastroenterology    ESOPHAGOGASTRODUODENOSCOPY N/A 03/18/2019    Procedure: ESOPHAGOGASTRODUODENOSCOPY (EGD);  Surgeon: Alessia Wilson DO;  Location: [facility];  Service: Gastroenterology    KNEE SURGERY      LUNG BIOPSY      ROTATOR CUFF REPAIR      SHOULDER SURGERY       Social History     Tobacco Use    Smoking status: Former     Current packs/day: 0.00     Average packs/day: 0.3 packs/day for 25.0 years (6.3 ttl pk-yrs)      Types: Cigarettes     Start date: 1962     Quit date:      Years since quittin.8     Passive exposure: Never    Smokeless tobacco: Former   Vaping Use    Vaping status: Never Used   Substance and Sexual Activity    Alcohol use: Yes    Drug use: Not Currently     Types: Marijuana    Sexual activity: Not Currently     E-Cigarette/Vaping    E-Cigarette Use Never User      E-Cigarette/Vaping Substances    Nicotine No     THC No     CBD No     Flavoring No     Other No     Unknown No      Family history non-contributory    Meds/Allergies     Current Outpatient Medications:     albuterol (PROVENTIL HFA,VENTOLIN HFA) 90 mcg/act inhaler    amiodarone 200 mg tablet    Budeson-Glycopyrrol-Formoterol (Breztri Aerosphere) 160-9-4.8 MCG/ACT AERO    metoprolol tartrate (LOPRESSOR) 25 mg tablet    pantoprazole (PROTONIX) 40 mg tablet    rosuvastatin (CRESTOR) 10 MG tablet    senna-docusate sodium (SENOKOT S) 8.6-50 mg per tablet    vitamin B-12 (VITAMIN B-12) 1,000 mcg tablet    acetaminophen (TYLENOL) 650 mg CR tablet    albuterol (2.5 mg/3 mL) 0.083 % nebulizer solution    Alectinib HCl 150 MG CAPS    apixaban (ELIQUIS) 5 mg    benralizumab (FASENRA) subcutaneous injection    calcitriol (ROCALTROL) 0.25 mcg capsule    fexofenadine (ALLEGRA) 180 MG tablet    fluticasone (FLONASE) 50 mcg/act nasal spray    furosemide (LASIX) 20 mg tablet    Current Facility-Administered Medications:     lactated ringers infusion, 125 mL/hr, Intravenous, Continuous  No Known Allergies    Objective :  Temp:  [98.3 °F (36.8 °C)] 98.3 °F (36.8 °C)  HR:  [93] 93  BP: (167)/(87) 167/87  Resp:  [17] 17  SpO2:  [95 %] 95 %  O2 Device: None (Room air)    Physical Exam  Gen: NAD  Head: NCAT  CV: RRR  CHEST: Clear  ABD: soft, NT/ND  EXT: no edema

## 2024-11-06 NOTE — ANESTHESIA PREPROCEDURE EVALUATION
Procedure:  COLONOSCOPY    Relevant Problems   CARDIO   (+) Atrial flutter (HCC)   (+) Chronic diastolic congestive heart failure (HCC)   (+) Coronary artery disease involving native coronary artery of native heart without angina pectoris   (+) Essential hypertension      GI/HEPATIC   (+) Gastroesophageal reflux disease without esophagitis   (+) Hiatal hernia      /RENAL   (+) Anemia due to stage 3b chronic kidney disease  (HCC)      HEMATOLOGY   (+) Anemia due to stage 3b chronic kidney disease  (HCC)   (+) Symptomatic anemia   (+) Thrombocytopenia (HCC)      MUSCULOSKELETAL   (+) Hiatal hernia      NEURO/PSYCH   (+) Depression, recurrent (HCC)      PULMONARY   (+) MONO (obstructive sleep apnea)   (+) Severe asthma      Digestive   (+) Melena      Ear/Nose/Throat   (+) Chronic rhinitis      Oncology   (+) Cancer-related pain   (+) Malignant neoplasm metastatic to bone (HCC)   (+) Non-small cell lung cancer (HCC)      FEN/Gastrointestinal   (+) Chronic gastritis without bleeding      Other   (+) At risk for nutrition problem   (+) Class 2 severe obesity due to excess calories with serious comorbidity and body mass index (BMI) of 39.0 to 39.9 in adult (HCC)   (+) Persistent proteinuria        Physical Exam    Airway      TM Distance: <3 FB  Neck ROM: full     Dental    upper dentures    Cardiovascular  Cardiovascular exam normal    Pulmonary  Pulmonary exam normal     Other Findings  post-pubertal.      Anesthesia Plan  ASA Score- 3     Anesthesia Type- IV sedation with anesthesia with ASA Monitors.         Additional Monitors:     Airway Plan:            Plan Factors-Exercise tolerance (METS): >4 METS.    Chart reviewed. EKG reviewed.  Existing labs reviewed.     Patient is not a current smoker. Patient not instructed to abstain from smoking on day of procedure. Patient did not smoke on day of surgery.            Induction- intravenous.    Postoperative Plan-         Informed Consent- Anesthetic plan and risks  discussed with patient.  I personally reviewed this patient with the CRNA. Discussed and agreed on the Anesthesia Plan with the CRNA..         Statement Selected

## 2024-11-08 ENCOUNTER — RA CDI HCC (OUTPATIENT)
Dept: OTHER | Facility: HOSPITAL | Age: 79
End: 2024-11-08

## 2024-11-09 DIAGNOSIS — J45.20 MILD INTERMITTENT ASTHMA WITHOUT COMPLICATION: ICD-10-CM

## 2024-11-11 RX ORDER — ALBUTEROL SULFATE 90 UG/1
INHALANT RESPIRATORY (INHALATION)
Qty: 18 G | Refills: 2 | Status: SHIPPED | OUTPATIENT
Start: 2024-11-11

## 2024-11-20 ENCOUNTER — OFFICE VISIT (OUTPATIENT)
Dept: FAMILY MEDICINE CLINIC | Facility: CLINIC | Age: 79
End: 2024-11-20

## 2024-11-20 ENCOUNTER — APPOINTMENT (OUTPATIENT)
Dept: LAB | Facility: CLINIC | Age: 79
End: 2024-11-20
Payer: COMMERCIAL

## 2024-11-20 VITALS
SYSTOLIC BLOOD PRESSURE: 140 MMHG | BODY MASS INDEX: 40.02 KG/M2 | HEIGHT: 61 IN | DIASTOLIC BLOOD PRESSURE: 76 MMHG | HEART RATE: 72 BPM | RESPIRATION RATE: 16 BRPM | WEIGHT: 212 LBS | TEMPERATURE: 97.2 F | OXYGEN SATURATION: 97 %

## 2024-11-20 DIAGNOSIS — N63.20 MASS OF BOTH BREASTS ON MAMMOGRAM: Primary | ICD-10-CM

## 2024-11-20 DIAGNOSIS — C34.91 NON-SMALL CELL CANCER OF RIGHT LUNG (HCC): ICD-10-CM

## 2024-11-20 DIAGNOSIS — C79.51 MALIGNANT NEOPLASM METASTATIC TO BONE (HCC): ICD-10-CM

## 2024-11-20 DIAGNOSIS — I10 ESSENTIAL HYPERTENSION: ICD-10-CM

## 2024-11-20 DIAGNOSIS — N63.10 MASS OF BOTH BREASTS ON MAMMOGRAM: Primary | ICD-10-CM

## 2024-11-20 DIAGNOSIS — C34.90 NON-SMALL CELL LUNG CANCER, UNSPECIFIED LATERALITY (HCC): ICD-10-CM

## 2024-11-20 LAB
ALBUMIN SERPL BCG-MCNC: 4.3 G/DL (ref 3.5–5)
ALP SERPL-CCNC: 60 U/L (ref 34–104)
ALT SERPL W P-5'-P-CCNC: 35 U/L (ref 7–52)
ANION GAP SERPL CALCULATED.3IONS-SCNC: 9 MMOL/L (ref 4–13)
AST SERPL W P-5'-P-CCNC: 33 U/L (ref 13–39)
BASOPHILS # BLD AUTO: 0.01 THOUSANDS/ÂΜL (ref 0–0.1)
BASOPHILS NFR BLD AUTO: 0 % (ref 0–1)
BILIRUB SERPL-MCNC: 0.4 MG/DL (ref 0.2–1)
BUN SERPL-MCNC: 26 MG/DL (ref 5–25)
CALCIUM SERPL-MCNC: 9.3 MG/DL (ref 8.4–10.2)
CHLORIDE SERPL-SCNC: 105 MMOL/L (ref 96–108)
CO2 SERPL-SCNC: 27 MMOL/L (ref 21–32)
CREAT SERPL-MCNC: 1.04 MG/DL (ref 0.6–1.3)
EOSINOPHIL # BLD AUTO: 0.01 THOUSAND/ÂΜL (ref 0–0.61)
EOSINOPHIL NFR BLD AUTO: 0 % (ref 0–6)
ERYTHROCYTE [DISTWIDTH] IN BLOOD BY AUTOMATED COUNT: 16 % (ref 11.6–15.1)
GFR SERPL CREATININE-BSD FRML MDRD: 51 ML/MIN/1.73SQ M
GLUCOSE P FAST SERPL-MCNC: 80 MG/DL (ref 65–99)
HCT VFR BLD AUTO: 37.2 % (ref 34.8–46.1)
HGB BLD-MCNC: 11.2 G/DL (ref 11.5–15.4)
IMM GRANULOCYTES # BLD AUTO: 0.02 THOUSAND/UL (ref 0–0.2)
IMM GRANULOCYTES NFR BLD AUTO: 0 % (ref 0–2)
LYMPHOCYTES # BLD AUTO: 0.92 THOUSANDS/ÂΜL (ref 0.6–4.47)
LYMPHOCYTES NFR BLD AUTO: 11 % (ref 14–44)
MCH RBC QN AUTO: 26.3 PG (ref 26.8–34.3)
MCHC RBC AUTO-ENTMCNC: 30.1 G/DL (ref 31.4–37.4)
MCV RBC AUTO: 87 FL (ref 82–98)
MONOCYTES # BLD AUTO: 0.88 THOUSAND/ÂΜL (ref 0.17–1.22)
MONOCYTES NFR BLD AUTO: 11 % (ref 4–12)
NEUTROPHILS # BLD AUTO: 6.2 THOUSANDS/ÂΜL (ref 1.85–7.62)
NEUTS SEG NFR BLD AUTO: 78 % (ref 43–75)
NRBC BLD AUTO-RTO: 0 /100 WBCS
PLATELET # BLD AUTO: 103 THOUSANDS/UL (ref 149–390)
POTASSIUM SERPL-SCNC: 4.7 MMOL/L (ref 3.5–5.3)
PROT SERPL-MCNC: 6.9 G/DL (ref 6.4–8.4)
RBC # BLD AUTO: 4.26 MILLION/UL (ref 3.81–5.12)
SODIUM SERPL-SCNC: 141 MMOL/L (ref 135–147)
WBC # BLD AUTO: 8.04 THOUSAND/UL (ref 4.31–10.16)

## 2024-11-20 PROCEDURE — 99213 OFFICE O/P EST LOW 20 MIN: CPT | Performed by: FAMILY MEDICINE

## 2024-11-20 PROCEDURE — G2211 COMPLEX E/M VISIT ADD ON: HCPCS | Performed by: FAMILY MEDICINE

## 2024-11-20 PROCEDURE — 80053 COMPREHEN METABOLIC PANEL: CPT

## 2024-11-20 PROCEDURE — 85025 COMPLETE CBC W/AUTO DIFF WBC: CPT

## 2024-11-20 PROCEDURE — 36415 COLL VENOUS BLD VENIPUNCTURE: CPT

## 2024-11-20 NOTE — PROGRESS NOTES
Name: Jayla Moody      : 1945      MRN: [id number]  Encounter Provider: Michelle Chatterjee MD  Encounter Date: 2024   Encounter department: [facility]  :  Assessment & Plan  Mass of both breasts on mammogram  Mammogram on 2024 showed bilateral mixed echogenicity and acholic masses possibly in the setting of minor trauma as patient is on Eliquis chronically.  Mammogram reordered for evaluation.  Orders:    US Breast Axilla Bilateral; Future    Essential hypertension  BP Readings from Last 3 Encounters:   24 140/76   24 158/76   24 126/64     Follows with cardiology.  Continue metoprolol titrate 25 mg BID and low sodium diet.         Non-small cell cancer of right lung (HCC)  Follows with [facility] oncology, Dr. 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  Initially diagnosed 2020 as a right hilar mass with diffuse osseous metastasis, and started on Alectinib with near complete resolution of the lung mass and stable bone mets (per oncology 3/24 office note).  Currently on Zometa every 3 months and Alectinib 600 mg BID.              History of Present Illness     Jayla Moody is a very pleasant 79 y.o. female who has a PMH of Non-small cell lung cancer right lung, A-fib asthma, COPD, chronic thrombocytopenia presents today to review recent lab and imaging results.  She is following closely with hematology and is receiving alectinib therapy. No acute conerns.          Review of Systems   Constitutional:  Negative for appetite change, chills, fatigue, fever and unexpected weight change.   Respiratory:  Negative for chest tightness and shortness of breath.    Cardiovascular:  Negative for chest pain and palpitations.   Gastrointestinal:  Negative for constipation, diarrhea, nausea and vomiting.   Neurological:  Negative for dizziness, weakness, light-headedness and headaches.   Hematological:  Negative for adenopathy.  "Bruises/bleeds easily.   Psychiatric/Behavioral:  Negative for dysphoric mood, self-injury and suicidal ideas. The patient is not nervous/anxious.           Objective   /76 (BP Location: Left arm, Patient Position: Sitting, Cuff Size: Large)   Pulse 72   Temp (!) 97.2 °F (36.2 °C) (Temporal)   Resp 16   Ht 5' 1\" (1.549 m)   Wt 96.2 kg (212 lb)   SpO2 97%   BMI 40.06 kg/m²      Physical Exam  Constitutional:       Comments: Ambulating with cane   HENT:      Ears:      Comments: Hard of hearing; forgot hearing aids at home     Nose: Nose normal.      Mouth/Throat:      Mouth: Mucous membranes are moist.   Eyes:      Extraocular Movements: Extraocular movements intact.   Cardiovascular:      Rate and Rhythm: Normal rate and regular rhythm.      Pulses: Normal pulses.      Heart sounds: Normal heart sounds.   Pulmonary:      Effort: Pulmonary effort is normal. No respiratory distress.      Breath sounds: Normal breath sounds.   Chest:      Chest wall: No tenderness.   Abdominal:      General: Bowel sounds are normal. There is no distension.      Palpations: Abdomen is soft.      Tenderness: There is no abdominal tenderness.   Musculoskeletal:         General: Normal range of motion.      Cervical back: Normal range of motion.      Right lower leg: No edema.      Left lower leg: No edema.   Skin:     General: Skin is warm.      Capillary Refill: Capillary refill takes less than 2 seconds.      Findings: Bruising (on arms due to chronic eliquis) present.   Neurological:      Mental Status: She is alert. Mental status is at baseline.   Psychiatric:         Mood and Affect: Mood normal.         Behavior: Behavior normal.         Thought Content: Thought content normal.         Judgment: Judgment normal.         "

## 2024-11-22 ENCOUNTER — HOSPITAL ENCOUNTER (OUTPATIENT)
Dept: INFUSION CENTER | Facility: HOSPITAL | Age: 79
End: 2024-11-22
Attending: INTERNAL MEDICINE
Payer: COMMERCIAL

## 2024-11-22 DIAGNOSIS — C34.90 NON-SMALL CELL LUNG CANCER, UNSPECIFIED LATERALITY (HCC): Primary | ICD-10-CM

## 2024-11-22 DIAGNOSIS — C79.51 MALIGNANT NEOPLASM METASTATIC TO BONE (HCC): ICD-10-CM

## 2024-11-22 PROCEDURE — 96372 THER/PROPH/DIAG INJ SC/IM: CPT

## 2024-11-22 RX ADMIN — DENOSUMAB 120 MG: 120 INJECTION SUBCUTANEOUS at 13:15

## 2024-11-22 NOTE — PROGRESS NOTES
Pt arrives on unit for Xgeva. CrClr 42.5 and calcium 9.3. Pt tolerated Xgeva sq in the right upper arm. Declined AVS, next apt 12/20/24 @1:00. Left unit ambulatory with a steady gait.

## 2024-11-23 PROBLEM — D64.9 SYMPTOMATIC ANEMIA: Status: RESOLVED | Noted: 2024-07-16 | Resolved: 2024-11-23

## 2024-11-23 NOTE — ASSESSMENT & PLAN NOTE
Follows with [facility] oncology, Dr. 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  Initially diagnosed 8/2020 as a right hilar mass with diffuse osseous metastasis, and started on Alectinib with near complete resolution of the lung mass and stable bone mets (per oncology 3/24 office note).  Currently on Zometa every 3 months and Alectinib 600 mg BID.

## 2024-11-23 NOTE — ASSESSMENT & PLAN NOTE
BP Readings from Last 3 Encounters:   11/20/24 140/76   11/06/24 158/76   11/04/24 126/64     Follows with cardiology.  Continue metoprolol titrate 25 mg BID and low sodium diet.

## 2024-12-06 ENCOUNTER — OFFICE VISIT (OUTPATIENT)
Dept: FAMILY MEDICINE CLINIC | Facility: CLINIC | Age: 79
End: 2024-12-06

## 2024-12-06 VITALS
RESPIRATION RATE: 16 BRPM | SYSTOLIC BLOOD PRESSURE: 142 MMHG | OXYGEN SATURATION: 97 % | HEART RATE: 86 BPM | DIASTOLIC BLOOD PRESSURE: 92 MMHG | WEIGHT: 211 LBS | HEIGHT: 61 IN | TEMPERATURE: 98 F | BODY MASS INDEX: 39.84 KG/M2

## 2024-12-06 DIAGNOSIS — R52 ACUTE PAIN: Primary | ICD-10-CM

## 2024-12-06 DIAGNOSIS — Z00.00 EXAMINATION FOR, MEDICAL, GENERAL: ICD-10-CM

## 2024-12-06 PROCEDURE — 99213 OFFICE O/P EST LOW 20 MIN: CPT | Performed by: FAMILY MEDICINE

## 2024-12-06 PROCEDURE — G2211 COMPLEX E/M VISIT ADD ON: HCPCS | Performed by: FAMILY MEDICINE

## 2024-12-06 RX ORDER — PREDNISONE 10 MG/1
10 TABLET ORAL DAILY
Qty: 5 TABLET | Refills: 0 | Status: SHIPPED | OUTPATIENT
Start: 2024-12-06 | End: 2024-12-11

## 2024-12-06 NOTE — PROGRESS NOTES
Name: Jayla Moody      : 1945      MRN: [id number]  Encounter Provider: Michelle Chatterjee MD  Encounter Date: 2024   Encounter department: [facility]  :  Assessment & Plan  Acute pain  Possibly greater trochanteric bursitis vs iliotibial band syndrome vs sacroiliitis vs metastasis of non-small cell cancer of the lung.  No fecal incontinence, fever, progressive motor weakness, saddle anesthesia, significant trauma, or urinary retention.  Discussed ED visit for further evaluation given extensive cancer hx. Patient opted for symptomatic treatment and close follow up.  Trial short course of prednisone and Voltaren gel for pain relief. Follow up in 1 week to monitor response to therapy.   If symptoms do not improve, consider imaging to evaluate for metastasis.   Consider re-establishing care with Palliative Care (Dr. Lopez - last office visit on 23).    Orders:    predniSONE 10 mg tablet; Take 1 tablet (10 mg total) by mouth daily for 5 days    Diclofenac Sodium (VOLTAREN) 1 %; Apply 2 g topically 4 (four) times a day    Examination for, medical, general  Medical clearance for family dentistry from received.  Patient is medically optimized for dental procedures. No need to hold anticoagulant. No need to SBP prophylaxis. See medical clearance form for details.             Depression Screening and Follow-up Plan: Patient was screened for depression during today's encounter. They screened negative with a PHQ-9 score of 0.      History of Present Illness     Jayla Moody is a very pleasant 79 y.o. female who has a PMH of  Non-small cell lung cancer right lung, A-fib asthma, COPD, chronic thrombocytopenia and presents today for dental clearance and acute low back pain.   Lower back pain started a few days before  and described as mild, managed with stretching and hot and cold compresses. Pain starts in her lower back/pelvis with radiation to right inner  "thigh sporadically. Today pain rates 7-8/10. Pain is no longer alleviated with hot and cold compresses or daily stretching. She has tried Tylenol in the past which does not resolve pain. She cannot take NSAIDs. Pain makes it difficult for her to stand but she is able to walk and drive (right side dominant). Denies recent fevers, nausea, vomiting, unintended weight loss, saddle anesthesia, or unsteadiness. Denies recent trauma or falls. She recently received infusion therapy of Xgeva on 11/22/24. Previously tolerated infusions well without adverse effects.  At baseline ambulates with a cane and able to complete ADLs independently around the home (household cleaning) and stretches daily.       Review of Systems   Constitutional:  Negative for appetite change, chills, fatigue, fever and unexpected weight change.   Respiratory:  Negative for chest tightness and shortness of breath.    Cardiovascular:  Negative for chest pain and palpitations.   Musculoskeletal:  Positive for back pain and gait problem. Negative for joint swelling and myalgias.   Neurological:  Negative for dizziness, weakness, light-headedness and headaches.   Hematological:  Bruises/bleeds easily.          Objective   /92 (BP Location: Right arm, Patient Position: Sitting, Cuff Size: Large)   Pulse 86   Temp 98 °F (36.7 °C) (Temporal)   Resp 16   Ht 5' 1\" (1.549 m)   Wt 95.7 kg (211 lb)   SpO2 97%   BMI 39.87 kg/m²      Physical Exam  Constitutional:       General: She is not in acute distress.     Appearance: Normal appearance.      Comments: Hard of hearing; uses hearing aids. Ambulated with cane at baseline.   HENT:      Nose: Nose normal.      Mouth/Throat:      Mouth: Mucous membranes are moist.   Eyes:      Extraocular Movements: Extraocular movements intact.   Cardiovascular:      Rate and Rhythm: Normal rate and regular rhythm.      Pulses: Normal pulses.      Heart sounds: Normal heart sounds.   Pulmonary:      Effort: Pulmonary " effort is normal.      Breath sounds: Normal breath sounds.   Abdominal:      General: Bowel sounds are normal.      Palpations: Abdomen is soft.      Tenderness: There is no abdominal tenderness.   Musculoskeletal:      Lumbar back: Tenderness (paraspinal) and bony tenderness (sacroiliac joint) present.      Right hip: Tenderness present.      Right lower leg: No edema.      Left lower leg: No edema.   Skin:     Capillary Refill: Capillary refill takes less than 2 seconds.      Findings: Bruising (on chronic eliquis) present.   Neurological:      Mental Status: She is alert and oriented to person, place, and time.      Gait: Gait normal.

## 2024-12-09 ENCOUNTER — TELEPHONE (OUTPATIENT)
Dept: FAMILY MEDICINE CLINIC | Facility: CLINIC | Age: 79
End: 2024-12-09

## 2024-12-19 ENCOUNTER — APPOINTMENT (OUTPATIENT)
Dept: LAB | Facility: CLINIC | Age: 79
End: 2024-12-19
Payer: COMMERCIAL

## 2024-12-19 DIAGNOSIS — C34.90 NON-SMALL CELL LUNG CANCER, UNSPECIFIED LATERALITY (HCC): ICD-10-CM

## 2024-12-19 DIAGNOSIS — C79.51 MALIGNANT NEOPLASM METASTATIC TO BONE (HCC): ICD-10-CM

## 2024-12-19 LAB
ALBUMIN SERPL BCG-MCNC: 4.2 G/DL (ref 3.5–5)
ALP SERPL-CCNC: 52 U/L (ref 34–104)
ALT SERPL W P-5'-P-CCNC: 34 U/L (ref 7–52)
ANION GAP SERPL CALCULATED.3IONS-SCNC: 11 MMOL/L (ref 4–13)
AST SERPL W P-5'-P-CCNC: 31 U/L (ref 13–39)
BILIRUB SERPL-MCNC: 0.74 MG/DL (ref 0.2–1)
BUN SERPL-MCNC: 28 MG/DL (ref 5–25)
CALCIUM SERPL-MCNC: 9.8 MG/DL (ref 8.4–10.2)
CHLORIDE SERPL-SCNC: 104 MMOL/L (ref 96–108)
CO2 SERPL-SCNC: 28 MMOL/L (ref 21–32)
CREAT SERPL-MCNC: 1.21 MG/DL (ref 0.6–1.3)
GFR SERPL CREATININE-BSD FRML MDRD: 43 ML/MIN/1.73SQ M
GLUCOSE SERPL-MCNC: 133 MG/DL (ref 65–140)
POTASSIUM SERPL-SCNC: 3.9 MMOL/L (ref 3.5–5.3)
PROT SERPL-MCNC: 6.7 G/DL (ref 6.4–8.4)
SODIUM SERPL-SCNC: 143 MMOL/L (ref 135–147)

## 2024-12-19 PROCEDURE — 36415 COLL VENOUS BLD VENIPUNCTURE: CPT

## 2024-12-19 PROCEDURE — 80053 COMPREHEN METABOLIC PANEL: CPT

## 2024-12-20 ENCOUNTER — HOSPITAL ENCOUNTER (OUTPATIENT)
Dept: INFUSION CENTER | Facility: HOSPITAL | Age: 79
Discharge: HOME | End: 2024-12-20
Attending: INTERNAL MEDICINE

## 2024-12-26 DIAGNOSIS — C34.90 NON-SMALL CELL LUNG CANCER, UNSPECIFIED LATERALITY (HCC): ICD-10-CM

## 2024-12-26 DIAGNOSIS — C79.51 MALIGNANT NEOPLASM METASTATIC TO BONE (HCC): Primary | ICD-10-CM

## 2024-12-30 ENCOUNTER — HOSPITAL ENCOUNTER (OUTPATIENT)
Dept: INFUSION CENTER | Facility: HOSPITAL | Age: 79
Discharge: HOME | End: 2024-12-30
Attending: INTERNAL MEDICINE

## 2025-01-02 ENCOUNTER — OFFICE VISIT (OUTPATIENT)
Dept: FAMILY MEDICINE CLINIC | Facility: CLINIC | Age: 80
End: 2025-01-02

## 2025-01-02 VITALS
HEIGHT: 61 IN | WEIGHT: 212 LBS | BODY MASS INDEX: 40.02 KG/M2 | HEART RATE: 74 BPM | OXYGEN SATURATION: 96 % | RESPIRATION RATE: 16 BRPM | DIASTOLIC BLOOD PRESSURE: 110 MMHG | SYSTOLIC BLOOD PRESSURE: 164 MMHG

## 2025-01-02 DIAGNOSIS — I10 ESSENTIAL HYPERTENSION: ICD-10-CM

## 2025-01-02 DIAGNOSIS — C79.51 MALIGNANT NEOPLASM METASTATIC TO BONE (HCC): Primary | ICD-10-CM

## 2025-01-02 DIAGNOSIS — J20.9 ACUTE BRONCHITIS, UNSPECIFIED ORGANISM: Primary | ICD-10-CM

## 2025-01-02 DIAGNOSIS — C34.90 NON-SMALL CELL LUNG CANCER, UNSPECIFIED LATERALITY (HCC): ICD-10-CM

## 2025-01-02 PROCEDURE — G2211 COMPLEX E/M VISIT ADD ON: HCPCS | Performed by: PHYSICIAN ASSISTANT

## 2025-01-02 PROCEDURE — 99213 OFFICE O/P EST LOW 20 MIN: CPT | Performed by: PHYSICIAN ASSISTANT

## 2025-01-02 RX ORDER — BENZONATATE 200 MG/1
200 CAPSULE ORAL 3 TIMES DAILY PRN
Qty: 30 CAPSULE | Refills: 1 | Status: SHIPPED | OUTPATIENT
Start: 2025-01-02

## 2025-01-02 RX ORDER — GUAIFENESIN AND CODEINE PHOSPHATE 100; 10 MG/5ML; MG/5ML
5 SOLUTION ORAL 4 TIMES DAILY PRN
Qty: 237 ML | Refills: 0 | Status: SHIPPED | OUTPATIENT
Start: 2025-01-02

## 2025-01-02 RX ORDER — AZITHROMYCIN 250 MG/1
TABLET, FILM COATED ORAL
Qty: 6 TABLET | Refills: 0 | Status: SHIPPED | OUTPATIENT
Start: 2025-01-02 | End: 2025-01-08

## 2025-01-02 RX ORDER — PREDNISONE 20 MG/1
40 TABLET ORAL DAILY
Qty: 10 TABLET | Refills: 0 | Status: SHIPPED | OUTPATIENT
Start: 2025-01-02 | End: 2025-01-08

## 2025-01-02 NOTE — PROGRESS NOTES
Name: Jayla Moody      : 1945      MRN: [id number]  Encounter Provider: Kat Draper PA-C  Encounter Date: 2025   Encounter department: [facility]  :  Assessment & Plan  Acute bronchitis, unspecified organism  - Will prescribe Z-Ramez, take 500 mg on day 1, followed by 250 mg on days 2, 3, 4, 5.  Advised patient to take entire course of antibiotic even if feeling better before him.  - Will prescribe Tessalon Perles, to be taken as needed for cough/congestion.  - Will prescribe guaifensin-codeine, 5 mL, 4 times daily as needed.   - Assessed possible risk for misuse, abuse, or addiction based on patient's family and social history.  - Educated patient on possible side effects and risks with taking this medication including risks of abuse, misuse, and addiction.  - PDMP reviewed.  No red flags noted.  - Advised to rest and to stay well-hydrated.  - Continue Breztri twice daily, albuterol inhaler as needed, nebulizer BID.   - Advised to contact the office or report to ED if symptoms persist, worsen, or new symptoms arise.      Orders:    predniSONE 20 mg tablet; Take 2 tablets (40 mg total) by mouth daily for 5 days    azithromycin (Zithromax) 250 mg tablet; Take 2 tablets (500 mg total) by mouth daily for 1 day, THEN 1 tablet (250 mg total) daily for 4 days.    benzonatate (TESSALON) 200 MG capsule; Take 1 capsule (200 mg total) by mouth 3 (three) times a day as needed for cough    guaiFENesin-Codeine 200-20 MG/10ML SOLN; Take 5 mL by mouth 4 (four) times a day as needed (cough/congestion)    Essential hypertension  - Blood pressure significantly elevated today, however patient reports taking Mucinex DM earlier today.  Advised patient to avoid taking any medication with dextromethorphan as this could elevate blood pressure.  - Continue metoprolol 25 mg twice daily.             BMI Counseling: Body mass index is 40.06 kg/m². The BMI is above normal. Nutrition  recommendations include decreasing portion sizes, encouraging healthy choices of fruits and vegetables, consuming healthier snacks, limiting drinks that contain sugar, moderation in carbohydrate intake, increasing intake of lean protein and reducing intake of cholesterol. Exercise recommendations include moderate physical activity 150 minutes/week. No pharmacotherapy was ordered. Rationale for BMI follow-up plan is due to patient being overweight or obese.       History of Present Illness     Patient is a 79 y.o. female whom  has a past medical history of Allergic rhinitis, Anemia, Anxiety, Arthritis, Asthma, Atrial fibrillation (HCC), Cancer (HCC), Chronic bronchitis (HCC), COPD (chronic obstructive pulmonary disease) (HCC), Coronary artery disease, CPAP (continuous positive airway pressure) dependence, Degenerative joint disease, Fluid retention (2024), GERD (gastroesophageal reflux disease), Glaucoma, HL (hearing loss), Hypertension, Lumbar disc disease, Lung cancer (HCC), Obesity, Pelvis cancer (HCC), Periodic heart flutter, Pneumonia, Premature atrial contractions (10/31/2017), Sleep apnea, Sleep apnea, obstructive, Ventricular arrhythmia, and Vitamin D deficiency. who is seen today in office for cough and congestion.    - Patient presents for evaluation of chills, dyspnea, left ear congestion, nasal congestion, productive cough, and wheezing. Symptoms began 8 days ago and are gradually worsening since that time. Past history is significant for asthma, occasional episodes of bronchitis, and COPD. Patient notes she did go to her nephew's house for ACTV8me.  Patient notes her symptoms started the day after.  Patient notes she has been taking Mucinex DM and using her inhalers with little relief.    Review of Systems   Constitutional:  Positive for chills. Negative for fever.   HENT:  Positive for congestion, postnasal drip and sore throat.    Eyes:  Negative for pain.   Respiratory:  Positive for cough,  "chest tightness, shortness of breath and wheezing.    Cardiovascular:  Negative for chest pain, palpitations and leg swelling.   Gastrointestinal:  Negative for abdominal pain, constipation, diarrhea, nausea and vomiting.   Genitourinary:  Negative for dysuria.   Skin:  Negative for rash.   Neurological:  Negative for dizziness and headaches.       Objective   BP (!) 164/110 (BP Location: Left arm, Patient Position: Sitting, Cuff Size: Large)   Pulse 74   Resp 16   Ht 5' 1\" (1.549 m)   Wt 96.2 kg (212 lb)   SpO2 96%   BMI 40.06 kg/m²      Physical Exam  Vitals and nursing note reviewed.   Constitutional:       General: She is not in acute distress.     Appearance: She is well-developed.   HENT:      Head: Normocephalic and atraumatic.      Right Ear: Tympanic membrane and external ear normal.      Left Ear: Tympanic membrane and external ear normal.      Nose: Congestion present.      Mouth/Throat:      Pharynx: Uvula midline. No posterior oropharyngeal erythema.   Eyes:      Extraocular Movements: Extraocular movements intact.      Conjunctiva/sclera: Conjunctivae normal.      Pupils: Pupils are equal, round, and reactive to light.   Cardiovascular:      Rate and Rhythm: Normal rate and regular rhythm.      Pulses: Normal pulses.      Heart sounds: Normal heart sounds. No murmur heard.  Pulmonary:      Effort: Pulmonary effort is normal. No respiratory distress.      Breath sounds: Wheezing present.   Musculoskeletal:      Cervical back: Normal range of motion and neck supple.   Skin:     General: Skin is warm.   Neurological:      Mental Status: She is alert and oriented to person, place, and time.   Psychiatric:         Speech: Speech normal.         Behavior: Behavior normal.         "

## 2025-01-02 NOTE — ASSESSMENT & PLAN NOTE
- Blood pressure significantly elevated today, however patient reports taking Mucinex DM earlier today.  Advised patient to avoid taking any medication with dextromethorphan as this could elevate blood pressure.  - Continue metoprolol 25 mg twice daily.

## 2025-01-06 ENCOUNTER — HOSPITAL ENCOUNTER (OUTPATIENT)
Dept: INFUSION CENTER | Facility: HOSPITAL | Age: 80
Discharge: HOME | End: 2025-01-06
Attending: INTERNAL MEDICINE

## 2025-01-08 ENCOUNTER — OFFICE VISIT (OUTPATIENT)
Dept: FAMILY MEDICINE CLINIC | Facility: CLINIC | Age: 80
End: 2025-01-08

## 2025-01-08 ENCOUNTER — HOSPITAL ENCOUNTER (OUTPATIENT)
Dept: RADIOLOGY | Facility: HOSPITAL | Age: 80
Discharge: HOME | End: 2025-01-08
Payer: COMMERCIAL

## 2025-01-08 VITALS
RESPIRATION RATE: 16 BRPM | HEIGHT: 61 IN | WEIGHT: 212.8 LBS | DIASTOLIC BLOOD PRESSURE: 80 MMHG | HEART RATE: 74 BPM | SYSTOLIC BLOOD PRESSURE: 140 MMHG | OXYGEN SATURATION: 96 % | BODY MASS INDEX: 40.17 KG/M2 | TEMPERATURE: 97.1 F

## 2025-01-08 DIAGNOSIS — J45.51 SEVERE PERSISTENT ASTHMA WITH ACUTE EXACERBATION: ICD-10-CM

## 2025-01-08 DIAGNOSIS — J45.51 SEVERE PERSISTENT ASTHMA WITH ACUTE EXACERBATION: Primary | ICD-10-CM

## 2025-01-08 PROBLEM — K92.1 MELENA: Status: RESOLVED | Noted: 2024-07-16 | Resolved: 2025-01-08

## 2025-01-08 PROCEDURE — 99213 OFFICE O/P EST LOW 20 MIN: CPT

## 2025-01-08 PROCEDURE — 71046 X-RAY EXAM CHEST 2 VIEWS: CPT

## 2025-01-08 RX ORDER — IPRATROPIUM BROMIDE AND ALBUTEROL SULFATE 2.5; .5 MG/3ML; MG/3ML
3 SOLUTION RESPIRATORY (INHALATION) EVERY 6 HOURS PRN
Qty: 360 ML | Refills: 1 | Status: SHIPPED | OUTPATIENT
Start: 2025-01-08

## 2025-01-08 RX ORDER — PREDNISONE 20 MG/1
60 TABLET ORAL DAILY
Qty: 15 TABLET | Refills: 0 | Status: SHIPPED | OUTPATIENT
Start: 2025-01-08 | End: 2025-01-13

## 2025-01-08 RX ORDER — FUROSEMIDE 20 MG/1
1 TABLET ORAL DAILY
COMMUNITY
Start: 2024-12-21

## 2025-01-08 NOTE — PROGRESS NOTES
Name: Jayla Moody      : 1945      MRN: [id number]  Encounter Provider: WILDA Araya  Encounter Date: 2025   Encounter department: [facility]  :  Assessment & Plan  Severe persistent asthma with acute exacerbation  Lungs wheezy in all fields on auscultation.  No crackles noted.  No peripheral edema.  Suspect ongoing severe asthma exacerbation and therefore will increase prednisone and provide DuoNeb instead of albuterol nebulizer treatments at home.  Due to underlying lung cancer and history of CHF, will send for chest x-ray to further evaluate for alternate etiology.  Patient is not in acute distress and is afebrile.  No indication for repeat antibiotics at this time.  Advised to continue cough treatment as previously prescribed.  Return parameters and ED precautions given.    Orders:    ipratropium-albuterol (DUO-NEB) 0.5-2.5 mg/3 mL nebulizer solution; Take 3 mL by nebulization every 6 (six) hours as needed for wheezing or shortness of breath    predniSONE 20 mg tablet; Take 3 tablets (60 mg total) by mouth daily for 5 days    XR chest pa and lateral; Future           History of Present Illness     Jayla Moody is a 79 year old female with a history of HTN, GERD, MONO, metastatic Lung CA, CAD, CHF, a.flutter, obesity. She presents to the office today for a sick visit. She was initially seen on 2024 for nasal congestion, productive cough, and wheezing. She was diagnosed with acute bronchitis and given a prescription for cough medication, oral corticosteroids, and azythromycin. She denied fever at that time and continues to deny fever, chills, body aches. She states she completed all the medications as previously prescribed, but feels her breathing is not improved. She denies any sinus pain or pressure, or productive sputum.  She denies any chest pain, palpitations, or increasing peripheral edema.      Review of Systems   Constitutional:  Negative for  "chills and fever.   HENT:  Positive for congestion. Negative for sinus pressure, sinus pain, sneezing, sore throat and trouble swallowing.    Respiratory:  Positive for cough, chest tightness, shortness of breath and wheezing.    Cardiovascular:  Negative for chest pain and palpitations.   Gastrointestinal:  Negative for abdominal pain.   Musculoskeletal:  Negative for myalgias.     Patient Active Problem List   Diagnosis    Thrombocytopenia (HCC)    Essential hypertension    Hiatal hernia    Gastroesophageal reflux disease without esophagitis    Chronic gastritis without bleeding    Chronic rhinitis    MONO (obstructive sleep apnea)    Non-small cell lung cancer (HCC)    Malignant neoplasm metastatic to bone (HCC)    Severe asthma    Depression, recurrent (HCC)    Coronary artery disease involving native coronary artery of native heart without angina pectoris    Chronic diastolic congestive heart failure (HCC)    Atrial flutter (HCC)    Anemia due to stage 3b chronic kidney disease  (HCC)    Cancer-related pain    Class 2 severe obesity due to excess calories with serious comorbidity and body mass index (BMI) of 39.0 to 39.9 in adult (HCC)    At risk for nutrition problem    Persistent proteinuria       Objective   /80 (BP Location: Right arm, Patient Position: Sitting, Cuff Size: Large)   Pulse 74   Temp (!) 97.1 °F (36.2 °C) (Temporal)   Resp 16   Ht 5' 1\" (1.549 m)   Wt 96.5 kg (212 lb 12.8 oz)   SpO2 96%   BMI 40.21 kg/m²      Physical Exam  Vitals and nursing note reviewed.   Constitutional:       General: She is not in acute distress.     Appearance: She is well-developed.   HENT:      Head: Normocephalic and atraumatic.      Right Ear: Tympanic membrane, ear canal and external ear normal. There is no impacted cerumen.      Left Ear: Tympanic membrane, ear canal and external ear normal. There is no impacted cerumen.      Nose: Congestion present.      Mouth/Throat:      Mouth: Mucous membranes are " moist.      Pharynx: Oropharynx is clear. No oropharyngeal exudate or posterior oropharyngeal erythema.   Eyes:      Conjunctiva/sclera: Conjunctivae normal.      Pupils: Pupils are equal, round, and reactive to light.   Cardiovascular:      Rate and Rhythm: Normal rate and regular rhythm.      Heart sounds: Normal heart sounds. No murmur heard.  Pulmonary:      Effort: Pulmonary effort is normal. No respiratory distress.      Breath sounds: Wheezing present. No rhonchi or rales.   Musculoskeletal:      Cervical back: Neck supple.      Right lower leg: No edema.      Left lower leg: No edema.   Lymphadenopathy:      Cervical: No cervical adenopathy.   Skin:     Capillary Refill: Capillary refill takes less than 2 seconds.   Neurological:      Mental Status: She is alert.

## 2025-01-08 NOTE — ASSESSMENT & PLAN NOTE
Lungs wheezy in all fields on auscultation.  No crackles noted.  No peripheral edema.  Suspect ongoing severe asthma exacerbation and therefore will increase prednisone and provide DuoNeb instead of albuterol nebulizer treatments at home.  Due to underlying lung cancer and history of CHF, will send for chest x-ray to further evaluate for alternate etiology.  Patient is not in acute distress and is afebrile.  No indication for repeat antibiotics at this time.  Advised to continue cough treatment as previously prescribed.  Return parameters and ED precautions given.    Orders:    ipratropium-albuterol (DUO-NEB) 0.5-2.5 mg/3 mL nebulizer solution; Take 3 mL by nebulization every 6 (six) hours as needed for wheezing or shortness of breath    predniSONE 20 mg tablet; Take 3 tablets (60 mg total) by mouth daily for 5 days    XR chest pa and lateral; Future

## 2025-01-09 ENCOUNTER — APPOINTMENT (OUTPATIENT)
Dept: LAB | Facility: CLINIC | Age: 80
End: 2025-01-09
Payer: COMMERCIAL

## 2025-01-09 DIAGNOSIS — N18.32 HYPERTENSIVE KIDNEY DISEASE WITH STAGE 3B CHRONIC KIDNEY DISEASE (HCC): ICD-10-CM

## 2025-01-09 DIAGNOSIS — N18.9 CHRONIC KIDNEY DISEASE-MINERAL AND BONE DISORDER (CKD-MBD): ICD-10-CM

## 2025-01-09 DIAGNOSIS — C34.90 NON-SMALL CELL LUNG CANCER, UNSPECIFIED LATERALITY (HCC): ICD-10-CM

## 2025-01-09 DIAGNOSIS — C34.91 NON-SMALL CELL CANCER OF RIGHT LUNG (HCC): ICD-10-CM

## 2025-01-09 DIAGNOSIS — E83.9 CHRONIC KIDNEY DISEASE-MINERAL AND BONE DISORDER (CKD-MBD): ICD-10-CM

## 2025-01-09 DIAGNOSIS — M89.9 CHRONIC KIDNEY DISEASE-MINERAL AND BONE DISORDER (CKD-MBD): ICD-10-CM

## 2025-01-09 DIAGNOSIS — N18.32 ANEMIA DUE TO STAGE 3B CHRONIC KIDNEY DISEASE  (HCC): ICD-10-CM

## 2025-01-09 DIAGNOSIS — R80.1 PERSISTENT PROTEINURIA: ICD-10-CM

## 2025-01-09 DIAGNOSIS — D63.1 ANEMIA DUE TO STAGE 3B CHRONIC KIDNEY DISEASE  (HCC): ICD-10-CM

## 2025-01-09 DIAGNOSIS — Z91.89 AT RISK FOR NUTRITION PROBLEM: ICD-10-CM

## 2025-01-09 DIAGNOSIS — I12.9 HYPERTENSIVE KIDNEY DISEASE WITH STAGE 3B CHRONIC KIDNEY DISEASE (HCC): ICD-10-CM

## 2025-01-09 DIAGNOSIS — E66.01 CLASS 2 SEVERE OBESITY DUE TO EXCESS CALORIES WITH SERIOUS COMORBIDITY AND BODY MASS INDEX (BMI) OF 39.0 TO 39.9 IN ADULT (HCC): ICD-10-CM

## 2025-01-09 DIAGNOSIS — E66.812 CLASS 2 SEVERE OBESITY DUE TO EXCESS CALORIES WITH SERIOUS COMORBIDITY AND BODY MASS INDEX (BMI) OF 39.0 TO 39.9 IN ADULT (HCC): ICD-10-CM

## 2025-01-09 DIAGNOSIS — C79.51 MALIGNANT NEOPLASM METASTATIC TO BONE (HCC): ICD-10-CM

## 2025-01-09 LAB
25(OH)D3 SERPL-MCNC: 42.5 NG/ML (ref 30–100)
ALBUMIN SERPL BCG-MCNC: 4 G/DL (ref 3.5–5)
ALP SERPL-CCNC: 55 U/L (ref 34–104)
ALT SERPL W P-5'-P-CCNC: 39 U/L (ref 7–52)
ANION GAP SERPL CALCULATED.3IONS-SCNC: 8 MMOL/L (ref 4–13)
AST SERPL W P-5'-P-CCNC: 24 U/L (ref 13–39)
BASOPHILS # BLD AUTO: 0.02 THOUSANDS/ΜL (ref 0–0.1)
BASOPHILS NFR BLD AUTO: 0 % (ref 0–1)
BILIRUB SERPL-MCNC: 0.56 MG/DL (ref 0.2–1)
BUN SERPL-MCNC: 32 MG/DL (ref 5–25)
CALCIUM SERPL-MCNC: 9.2 MG/DL (ref 8.4–10.2)
CHLORIDE SERPL-SCNC: 105 MMOL/L (ref 96–108)
CO2 SERPL-SCNC: 29 MMOL/L (ref 21–32)
CREAT SERPL-MCNC: 1.18 MG/DL (ref 0.6–1.3)
EOSINOPHIL # BLD AUTO: 0 THOUSAND/ΜL (ref 0–0.61)
EOSINOPHIL NFR BLD AUTO: 0 % (ref 0–6)
ERYTHROCYTE [DISTWIDTH] IN BLOOD BY AUTOMATED COUNT: 17.3 % (ref 11.6–15.1)
GFR SERPL CREATININE-BSD FRML MDRD: 44 ML/MIN/1.73SQ M
GLUCOSE SERPL-MCNC: 114 MG/DL (ref 65–140)
HCT VFR BLD AUTO: 37.9 % (ref 34.8–46.1)
HGB BLD-MCNC: 11.8 G/DL (ref 11.5–15.4)
IMM GRANULOCYTES # BLD AUTO: 0.15 THOUSAND/UL (ref 0–0.2)
IMM GRANULOCYTES NFR BLD AUTO: 1 % (ref 0–2)
LYMPHOCYTES # BLD AUTO: 0.61 THOUSANDS/ΜL (ref 0.6–4.47)
LYMPHOCYTES NFR BLD AUTO: 5 % (ref 14–44)
MCH RBC QN AUTO: 26.6 PG (ref 26.8–34.3)
MCHC RBC AUTO-ENTMCNC: 31.1 G/DL (ref 31.4–37.4)
MCV RBC AUTO: 85 FL (ref 82–98)
MONOCYTES # BLD AUTO: 1.32 THOUSAND/ΜL (ref 0.17–1.22)
MONOCYTES NFR BLD AUTO: 10 % (ref 4–12)
NEUTROPHILS # BLD AUTO: 10.98 THOUSANDS/ΜL (ref 1.85–7.62)
NEUTS SEG NFR BLD AUTO: 84 % (ref 43–75)
NRBC BLD AUTO-RTO: 0 /100 WBCS
PHOSPHATE SERPL-MCNC: 3.7 MG/DL (ref 2.3–4.1)
PLATELET # BLD AUTO: 114 THOUSANDS/UL (ref 149–390)
POTASSIUM SERPL-SCNC: 4.5 MMOL/L (ref 3.5–5.3)
PROT SERPL-MCNC: 6.6 G/DL (ref 6.4–8.4)
PTH-INTACT SERPL-MCNC: 202.9 PG/ML (ref 12–88)
RBC # BLD AUTO: 4.44 MILLION/UL (ref 3.81–5.12)
SODIUM SERPL-SCNC: 142 MMOL/L (ref 135–147)
WBC # BLD AUTO: 13.08 THOUSAND/UL (ref 4.31–10.16)

## 2025-01-09 PROCEDURE — 84100 ASSAY OF PHOSPHORUS: CPT

## 2025-01-09 PROCEDURE — 36415 COLL VENOUS BLD VENIPUNCTURE: CPT

## 2025-01-09 PROCEDURE — 82306 VITAMIN D 25 HYDROXY: CPT

## 2025-01-09 PROCEDURE — 83970 ASSAY OF PARATHORMONE: CPT

## 2025-01-09 PROCEDURE — 85025 COMPLETE CBC W/AUTO DIFF WBC: CPT

## 2025-01-09 PROCEDURE — 80053 COMPREHEN METABOLIC PANEL: CPT

## 2025-01-10 ENCOUNTER — HOSPITAL ENCOUNTER (OUTPATIENT)
Dept: INFUSION CENTER | Facility: HOSPITAL | Age: 80
End: 2025-01-10
Attending: INTERNAL MEDICINE
Payer: COMMERCIAL

## 2025-01-10 DIAGNOSIS — C79.51 MALIGNANT NEOPLASM METASTATIC TO BONE (HCC): ICD-10-CM

## 2025-01-10 DIAGNOSIS — C34.90 NON-SMALL CELL LUNG CANCER, UNSPECIFIED LATERALITY (HCC): Primary | ICD-10-CM

## 2025-01-10 PROCEDURE — 96372 THER/PROPH/DIAG INJ SC/IM: CPT

## 2025-01-10 RX ADMIN — DENOSUMAB 120 MG: 120 INJECTION SUBCUTANEOUS at 13:19

## 2025-01-10 NOTE — PROGRESS NOTES
Pt tolerated Xgeva injection to R arm without difficulty.  Ca 9.2 and CrCl 37.8.  Confirmed next appt on 2/7 at 1300, and AVS provided.  Left ambulatory in stable condition.

## 2025-01-13 DIAGNOSIS — J20.9 ACUTE BRONCHITIS, UNSPECIFIED ORGANISM: ICD-10-CM

## 2025-01-14 ENCOUNTER — RESULTS FOLLOW-UP (OUTPATIENT)
Dept: NEPHROLOGY | Facility: CLINIC | Age: 80
End: 2025-01-14

## 2025-01-15 ENCOUNTER — HOSPITAL ENCOUNTER (EMERGENCY)
Facility: HOSPITAL | Age: 80
Discharge: HOME | End: 2025-01-15
Attending: EMERGENCY MEDICINE
Payer: COMMERCIAL

## 2025-01-15 ENCOUNTER — APPOINTMENT (EMERGENCY)
Dept: RADIOLOGY | Facility: HOSPITAL | Age: 80
End: 2025-01-15
Payer: COMMERCIAL

## 2025-01-15 VITALS
SYSTOLIC BLOOD PRESSURE: 172 MMHG | OXYGEN SATURATION: 97 % | RESPIRATION RATE: 20 BRPM | HEART RATE: 85 BPM | DIASTOLIC BLOOD PRESSURE: 75 MMHG | TEMPERATURE: 98.2 F

## 2025-01-15 DIAGNOSIS — J45.909 ASTHMATIC BRONCHITIS: Primary | ICD-10-CM

## 2025-01-15 LAB
ALBUMIN SERPL BCG-MCNC: 3.7 G/DL (ref 3.5–5)
ALP SERPL-CCNC: 46 U/L (ref 34–104)
ALT SERPL W P-5'-P-CCNC: 43 U/L (ref 7–52)
ANION GAP SERPL CALCULATED.3IONS-SCNC: 7 MMOL/L (ref 4–13)
AST SERPL W P-5'-P-CCNC: 31 U/L (ref 13–39)
ATRIAL RATE: 97 BPM
BASOPHILS # BLD AUTO: 0.02 THOUSANDS/ΜL (ref 0–0.1)
BASOPHILS NFR BLD AUTO: 0 % (ref 0–1)
BILIRUB SERPL-MCNC: 0.65 MG/DL (ref 0.2–1)
BNP SERPL-MCNC: 72 PG/ML (ref 0–100)
BUN SERPL-MCNC: 39 MG/DL (ref 5–25)
CALCIUM SERPL-MCNC: 9.1 MG/DL (ref 8.4–10.2)
CHLORIDE SERPL-SCNC: 105 MMOL/L (ref 96–108)
CO2 SERPL-SCNC: 30 MMOL/L (ref 21–32)
CREAT SERPL-MCNC: 1.49 MG/DL (ref 0.6–1.3)
EOSINOPHIL # BLD AUTO: 0 THOUSAND/ΜL (ref 0–0.61)
EOSINOPHIL NFR BLD AUTO: 0 % (ref 0–6)
ERYTHROCYTE [DISTWIDTH] IN BLOOD BY AUTOMATED COUNT: 17.9 % (ref 11.6–15.1)
GFR SERPL CREATININE-BSD FRML MDRD: 33 ML/MIN/1.73SQ M
GLUCOSE SERPL-MCNC: 102 MG/DL (ref 65–140)
HCT VFR BLD AUTO: 37.2 % (ref 34.8–46.1)
HGB BLD-MCNC: 11.7 G/DL (ref 11.5–15.4)
IMM GRANULOCYTES # BLD AUTO: 0.1 THOUSAND/UL (ref 0–0.2)
IMM GRANULOCYTES NFR BLD AUTO: 1 % (ref 0–2)
LYMPHOCYTES # BLD AUTO: 0.92 THOUSANDS/ΜL (ref 0.6–4.47)
LYMPHOCYTES NFR BLD AUTO: 10 % (ref 14–44)
MCH RBC QN AUTO: 27.1 PG (ref 26.8–34.3)
MCHC RBC AUTO-ENTMCNC: 31.5 G/DL (ref 31.4–37.4)
MCV RBC AUTO: 86 FL (ref 82–98)
MONOCYTES # BLD AUTO: 1.01 THOUSAND/ΜL (ref 0.17–1.22)
MONOCYTES NFR BLD AUTO: 11 % (ref 4–12)
NEUTROPHILS # BLD AUTO: 6.86 THOUSANDS/ΜL (ref 1.85–7.62)
NEUTS SEG NFR BLD AUTO: 78 % (ref 43–75)
NRBC BLD AUTO-RTO: 0 /100 WBCS
P AXIS: 50 DEGREES
PLATELET # BLD AUTO: 89 THOUSANDS/UL (ref 149–390)
POTASSIUM SERPL-SCNC: 3.9 MMOL/L (ref 3.5–5.3)
PR INTERVAL: 196 MS
PROT SERPL-MCNC: 6.1 G/DL (ref 6.4–8.4)
QRS AXIS: 33 DEGREES
QRSD INTERVAL: 96 MS
QT INTERVAL: 392 MS
QTC INTERVAL: 495 MS
RBC # BLD AUTO: 4.32 MILLION/UL (ref 3.81–5.12)
SODIUM SERPL-SCNC: 142 MMOL/L (ref 135–147)
T WAVE AXIS: 40 DEGREES
VENTRICULAR RATE: 96 BPM
WBC # BLD AUTO: 8.91 THOUSAND/UL (ref 4.31–10.16)

## 2025-01-15 PROCEDURE — 94664 DEMO&/EVAL PT USE INHALER: CPT

## 2025-01-15 PROCEDURE — 80053 COMPREHEN METABOLIC PANEL: CPT | Performed by: EMERGENCY MEDICINE

## 2025-01-15 PROCEDURE — 99285 EMERGENCY DEPT VISIT HI MDM: CPT

## 2025-01-15 PROCEDURE — 71046 X-RAY EXAM CHEST 2 VIEWS: CPT

## 2025-01-15 PROCEDURE — 99291 CRITICAL CARE FIRST HOUR: CPT | Performed by: EMERGENCY MEDICINE

## 2025-01-15 PROCEDURE — 85025 COMPLETE CBC W/AUTO DIFF WBC: CPT | Performed by: EMERGENCY MEDICINE

## 2025-01-15 PROCEDURE — 94760 N-INVAS EAR/PLS OXIMETRY 1: CPT

## 2025-01-15 PROCEDURE — 93010 ELECTROCARDIOGRAM REPORT: CPT

## 2025-01-15 PROCEDURE — 36415 COLL VENOUS BLD VENIPUNCTURE: CPT | Performed by: EMERGENCY MEDICINE

## 2025-01-15 PROCEDURE — 94644 CONT INHLJ TX 1ST HOUR: CPT

## 2025-01-15 PROCEDURE — 93005 ELECTROCARDIOGRAM TRACING: CPT

## 2025-01-15 PROCEDURE — 83880 ASSAY OF NATRIURETIC PEPTIDE: CPT | Performed by: EMERGENCY MEDICINE

## 2025-01-15 RX ORDER — PREDNISONE 10 MG/1
TABLET ORAL
Qty: 27 TABLET | Refills: 0 | Status: SHIPPED | OUTPATIENT
Start: 2025-01-15

## 2025-01-15 RX ORDER — ALBUTEROL SULFATE 5 MG/ML
10 SOLUTION RESPIRATORY (INHALATION) ONCE
Status: COMPLETED | OUTPATIENT
Start: 2025-01-15 | End: 2025-01-15

## 2025-01-15 RX ORDER — SODIUM CHLORIDE FOR INHALATION 0.9 %
12 VIAL, NEBULIZER (ML) INHALATION ONCE
Status: COMPLETED | OUTPATIENT
Start: 2025-01-15 | End: 2025-01-15

## 2025-01-15 RX ORDER — METHYLPREDNISOLONE SODIUM SUCCINATE 125 MG/2ML
125 INJECTION, POWDER, LYOPHILIZED, FOR SOLUTION INTRAMUSCULAR; INTRAVENOUS ONCE
Status: DISCONTINUED | OUTPATIENT
Start: 2025-01-15 | End: 2025-01-15

## 2025-01-15 RX ADMIN — PREDNISONE 50 MG: 10 TABLET ORAL at 12:41

## 2025-01-15 RX ADMIN — ALBUTEROL SULFATE 10 MG: 2.5 SOLUTION RESPIRATORY (INHALATION) at 12:08

## 2025-01-15 RX ADMIN — IPRATROPIUM BROMIDE 1 MG: 0.5 SOLUTION RESPIRATORY (INHALATION) at 12:08

## 2025-01-15 RX ADMIN — ISODIUM CHLORIDE 12 ML: 0.03 SOLUTION RESPIRATORY (INHALATION) at 12:08

## 2025-01-15 NOTE — ED PROVIDER NOTES
Time reflects when diagnosis was documented in both MDM as applicable and the Disposition within this note       Time User Action Codes Description Comment    1/15/2025  2:05 PM Vj Rene Add [J45.909] Asthmatic bronchitis           ED Disposition       ED Disposition   Discharge    Condition   Good    Date/Time   Wed Dewey 15, 2025  1:53 PM    Comment   Jayla Moody discharge to home/self care.                   Assessment & Plan       Medical Decision Making  Amount and/or Complexity of Data Reviewed  Labs: ordered. Decision-making details documented in ED Course.  Radiology:  Decision-making details documented in ED Course.    Risk  Prescription drug management.        ED Course as of 01/15/25 1507   Wed Dewey 15, 2025   1304 XR chest 2 views  Imaging reviewed and interpreted myself and concur with radiologist:   no acute findings   1327 BNP: 72  Normal   1403 Reviewed results with patient at bedside and updated on the plan.  After ARREOLA neb she is moving air much better, SpO2 is normal.  I am going to put her on course of steroid taper again, and I am encouraging her to follow up with pulmonologist.  She appears to have immunology appt for her asthma on 1/20/25 which will also be helpful. She is not diabetic, but I went over risks of repeated/prolonged steroids including high blood sugar, ulcers, decreased immunity, osteoporosis, etc.          Medications   albuterol inhalation solution 10 mg (10 mg Nebulization Given 1/15/25 1208)   ipratropium (ATROVENT) 0.02 % inhalation solution 1 mg (1 mg Nebulization Given 1/15/25 1208)   sodium chloride 0.9 % inhalation solution 12 mL (12 mL Nebulization Given 1/15/25 1208)   predniSONE tablet 50 mg (50 mg Oral Given 1/15/25 1241)       ED Risk Strat Scores                          SBIRT 22yo+      Flowsheet Row Most Recent Value   Initial Alcohol Screen: US AUDIT-C     1. How often do you have a drink containing alcohol? 1 Filed at: 01/15/2025 1324   2. How many  drinks containing alcohol do you have on a typical day you are drinking?  1 Filed at: 01/15/2025 1324   3a. Male UNDER 65: How often do you have five or more drinks on one occasion? 0 Filed at: 01/15/2025 1324   3b. FEMALE Any Age, or MALE 65+: How often do you have 4 or more drinks on one occassion? 0 Filed at: 01/15/2025 1324   Audit-C Score 2 Filed at: 01/15/2025 1324   SERVANDO: How many times in the past year have you...    Used an illegal drug or used a prescription medication for non-medical reasons? Never Filed at: 01/15/2025 1324                            History of Present Illness       Chief Complaint   Patient presents with    Shortness of Breath     SOB starting Dec 26, Chest pain occurs when pt coughs. Pt states coughing/ being sick since dec 26. Did take antibiotics, but finished them a week and a half ago.       Past Medical History:   Diagnosis Date    Allergic rhinitis     Anemia     Angio-edema     Anxiety     Arthritis     Asthma     Atrial fibrillation (HCC)     Cancer (HCC)     Chronic bronchitis (HCC)     Chronic kidney disease     COPD (chronic obstructive pulmonary disease) (HCC)     Coronary artery disease     CPAP (continuous positive airway pressure) dependence     Degenerative joint disease     Fluid retention 2024    GERD (gastroesophageal reflux disease)     Glaucoma     HL (hearing loss)     Hypertension     Lumbar disc disease     Lung cancer (HCC)     Obesity     Pelvis cancer (HCC)     Periodic heart flutter     Pneumonia     Premature atrial contractions 10/31/2017    Sleep apnea     suspected     Sleep apnea, obstructive     Ventricular arrhythmia     Vitamin D deficiency       Past Surgical History:   Procedure Laterality Date    APPENDECTOMY      BREAST BIOPSY Right     years ago    BRONCHOSCOPY      CAROTID STENT      COLON SURGERY      COLONOSCOPY N/A 03/18/2019    Procedure: COLONOSCOPY;  Surgeon: Alessia Wilson DO;  Location: [facility];  Service: Gastroenterology     ESOPHAGOGASTRODUODENOSCOPY N/A 2019    Procedure: ESOPHAGOGASTRODUODENOSCOPY (EGD);  Surgeon: Alessia Wilson DO;  Location: [facility];  Service: Gastroenterology    KNEE SURGERY      LUNG BIOPSY      ROTATOR CUFF REPAIR      SHOULDER SURGERY        Family History   Problem Relation Age of Onset    Stroke Mother     Diabetes Mother     Hyperlipidemia Mother     Hypertension Mother     Allergies Mother         Environmental    Asthma Mother     Diabetes Father     Hyperlipidemia Father     Hypertension Father     Lung cancer Father 70    Allergies Father         Environmental    Asthma Father     Cancer Father     Lymphoma Sister 69    Heart disease Sister         Pacemaker    Asthma Brother     Aneurysm Brother         Brain - had surgery    Coronary artery disease Daughter         2 bypass done    No Known Problems Daughter     No Known Problems Daughter     No Known Problems Son     Kidney disease Son     Liver disease Son     Obesity Son       Social History     Tobacco Use    Smoking status: Former     Current packs/day: 0.00     Average packs/day: 0.3 packs/day for 25.0 years (6.3 ttl pk-yrs)     Types: Cigarettes     Start date: 1962     Quit date:      Years since quittin.0     Passive exposure: Never    Smokeless tobacco: Former   Vaping Use    Vaping status: Never Used   Substance Use Topics    Alcohol use: Yes    Drug use: Not Currently     Types: Marijuana      E-Cigarette/Vaping    E-Cigarette Use Never User       E-Cigarette/Vaping Substances    Nicotine No     THC No     CBD No     Flavoring No     Other No     Unknown No       I have reviewed and agree with the history as documented.     80 yo F with h/o NSC lung cancer, under treatment, and asthma, c/o persistent cough, shortness of breath, since about 26, saw PCP 27, treated with azithromycin and prednisone, which she completed.  Presented again 25, and given another course of prednisone for 5 days, no repeat  abx.  She had temporary improvement, finished steroids, and is experiencing cough, wheezing again. No fever, no chills.            Review of Systems        Objective       ED Triage Vitals [01/15/25 1104]   Temperature Pulse Blood Pressure Respirations SpO2 Patient Position - Orthostatic VS   98.2 °F (36.8 °C) 96 (!) 176/95 (!) 24 97 % Lying      Temp Source Heart Rate Source BP Location FiO2 (%) Pain Score    Oral Monitor Right arm -- 5      Vitals      Date and Time Temp Pulse SpO2 Resp BP Pain Score FACES Pain Rating User   01/15/25 1400 -- -- -- -- -- No Pain -- MF   01/15/25 1400 -- 85 97 % 20 172/75 -- -- TA   01/15/25 1330 -- 82 97 % 20 145/68 -- -- EG   01/15/25 1209 -- -- 98 % -- -- -- -- SLO   01/15/25 1200 -- 82 95 % 20 134/65 -- -- EG   01/15/25 1115 -- 92 95 % 26 176/95 -- -- EG   01/15/25 1104 98.2 °F (36.8 °C) 96 97 % 24 176/95 5 -- EG            Physical Exam  Vitals and nursing note reviewed.   Constitutional:       General: She is not in acute distress.     Appearance: She is well-developed. She is not diaphoretic.   HENT:      Head: Normocephalic and atraumatic.      Right Ear: External ear normal.      Left Ear: External ear normal.      Nose: Nose normal.      Mouth/Throat:      Mouth: Mucous membranes are moist.   Eyes:      Conjunctiva/sclera: Conjunctivae normal.      Pupils: Pupils are equal, round, and reactive to light.   Cardiovascular:      Rate and Rhythm: Normal rate and regular rhythm.   Pulmonary:      Effort: Pulmonary effort is normal. Tachypnea and prolonged expiration present.      Breath sounds: Decreased breath sounds and wheezing present.   Abdominal:      Palpations: Abdomen is soft.      Tenderness: There is no abdominal tenderness.   Musculoskeletal:         General: Normal range of motion.      Cervical back: Normal range of motion and neck supple.   Skin:     General: Skin is warm and dry.      Capillary Refill: Capillary refill takes less than 2 seconds.      Findings:  No rash.   Neurological:      Mental Status: She is alert and oriented to person, place, and time.      Gait: Gait normal.   Psychiatric:         Behavior: Behavior normal.         Thought Content: Thought content normal.         Judgment: Judgment normal.         Results Reviewed       Procedure Component Value Units Date/Time    B-Type Natriuretic Peptide(BNP) [[id number]]  (Normal) Collected: 01/15/25 1231    Lab Status: Final result Specimen: Blood from Arm, Right Updated: 01/15/25 1315     BNP 72 pg/mL     Comprehensive metabolic panel [[id number]]  (Abnormal) Collected: 01/15/25 1231    Lab Status: Final result Specimen: Blood from Arm, Right Updated: 01/15/25 1302     Sodium 142 mmol/L      Potassium 3.9 mmol/L      Chloride 105 mmol/L      CO2 30 mmol/L      ANION GAP 7 mmol/L      BUN 39 mg/dL      Creatinine 1.49 mg/dL      Glucose 102 mg/dL      Calcium 9.1 mg/dL      AST 31 U/L      ALT 43 U/L      Alkaline Phosphatase 46 U/L      Total Protein 6.1 g/dL      Albumin 3.7 g/dL      Total Bilirubin 0.65 mg/dL      eGFR 33 ml/min/1.73sq m     Narrative:      National Kid[employer] Disease Foundation guidelines for Chronic Kidney Disease (CKD):     Stage 1 with normal or high GFR (GFR > 90 mL/min/1.73 square meters)    Stage 2 Mild CKD (GFR = 60-89 mL/min/1.73 square meters)    Stage 3A Moderate CKD (GFR = 45-59 mL/min/1.73 square meters)    Stage 3B Moderate CKD (GFR = 30-44 mL/min/1.73 square meters)    Stage 4 Severe CKD (GFR = 15-29 mL/min/1.73 square meters)    Stage 5 End Stage CKD (GFR <15 mL/min/1.73 square meters)  Note: GFR calculation is accurate only with a steady state creatinine    CBC and differential [[id number]]  (Abnormal) Collected: 01/15/25 1231    Lab Status: Final result Specimen: Blood from Arm, Right Updated: 01/15/25 1248     WBC 8.91 Thousand/uL      RBC 4.32 Million/uL      Hemoglobin 11.7 g/dL      Hematocrit 37.2 %      MCV 86 fL      MCH 27.1 pg      MCHC 31.5 g/dL      RDW 17.9 %       Platelets 89 Thousands/uL      nRBC 0 /100 WBCs      Segmented % 78 %      Immature Grans % 1 %      Lymphocytes % 10 %      Monocytes % 11 %      Eosinophils Relative 0 %      Basophils Relative 0 %      Absolute Neutrophils 6.86 Thousands/µL      Absolute Immature Grans 0.10 Thousand/uL      Absolute Lymphocytes 0.92 Thousands/µL      Absolute Monocytes 1.01 Thousand/µL      Eosinophils Absolute 0.00 Thousand/µL      Basophils Absolute 0.02 Thousands/µL             XR chest 2 views   Final Interpretation by Monster Garrett DO (01/15 1236)      No acute cardiopulmonary disease.            Resident: Gil Peterson I, the attending radiologist, have reviewed the images and agree with the final report above.      Workstation performed: [id number]             CriticalCare Time    Date/Time: 1/15/2025 2:00 PM    Performed by: Vj Rene MD  Authorized by: Vj Rene MD    Critical care provider statement:     Critical care time (minutes):  30    Critical care time was exclusive of:  Separately billable procedures and treating other patients and teaching time    Critical care was necessary to treat or prevent imminent or life-threatening deterioration of the following conditions:  Respiratory failure    Critical care was time spent personally by me on the following activities:  Blood draw for specimens, obtaining history from patient or surrogate, development of treatment plan with patient or surrogate, discussions with consultants, evaluation of patient's response to treatment, examination of patient, interpretation of cardiac output measurements, ordering and performing treatments and interventions, ordering and review of laboratory studies, ordering and review of radiographic studies, re-evaluation of patient's condition and review of old charts    I assumed direction of critical care for this patient from another provider in my specialty: no        ED Medication and Procedure Management    Prior to Admission Medications   Prescriptions Last Dose Informant Patient Reported? Taking?   Budeson-Glycopyrrol-Formoterol (Breztri Aerosphere) 160-9-4.8 MCG/ACT AERO  Self No No   Sig: Inhale 2 puffs 2 (two) times a day Rinse mouth after use.   Diclofenac Sodium (VOLTAREN) 1 %   No No   Sig: Apply 2 g topically 4 (four) times a day   albuterol (2.5 mg/3 mL) 0.083 % nebulizer solution  Self No No   Sig: Take 3 mL (2.5 mg total) by nebulization 2 (two) times a day   albuterol (PROVENTIL HFA,VENTOLIN HFA) 90 mcg/act inhaler   No No   Sig: TAKE 2 PUFFS BY MOUTH 4 TIMES A DAY   amiodarone 200 mg tablet  Self No No   Sig: Take 1 tablet (200 mg total) by mouth daily with breakfast   apixaban (ELIQUIS) 5 mg  Self No No   Sig: Take 1 tablet (5 mg total) by mouth 2 (two) times a day   benralizumab (FASENRA) subcutaneous injection  Self No No   Sig: Inject 1 mL (30 mg total) under the skin every 56 days   benzonatate (TESSALON) 200 MG capsule   No No   Sig: Take 1 capsule (200 mg total) by mouth 3 (three) times a day as needed for cough   calcitriol (ROCALTROL) 0.25 mcg capsule  Self No No   Sig: Take 1 capsule (0.25 mcg total) by mouth 3 (three) times a week On Monday , Wednesday, friday   fluticasone (FLONASE) 50 mcg/act nasal spray  Self No No   Sig: SPRAY 2 SPRAYS INTO EACH NOSTRIL EVERY DAY   furosemide (LASIX) 20 mg tablet   Yes No   Sig: Take 1 tablet by mouth in the morning   guaiFENesin-Codeine 200-20 MG/10ML SOLN   No No   Sig: Take 5 mL by mouth 4 (four) times a day as needed (cough/congestion)   ipratropium-albuterol (DUO-NEB) 0.5-2.5 mg/3 mL nebulizer solution   No No   Sig: Take 3 mL by nebulization every 6 (six) hours as needed for wheezing or shortness of breath   metoprolol tartrate (LOPRESSOR) 25 mg tablet  Self No No   Sig: Take 1 tablet (25 mg total) by mouth every 12 (twelve) hours   pantoprazole (PROTONIX) 40 mg tablet  Self No No   Sig: TAKE 1 TABLET BY MOUTH TWICE A DAY   rosuvastatin (CRESTOR) 10  MG tablet  Self No No   Sig: Take 1 tablet (10 mg total) by mouth daily   senna-docusate sodium (SENOKOT S) 8.6-50 mg per tablet  Self No No   Sig: Take 1 tablet by mouth daily   vitamin B-12 (VITAMIN B-12) 1,000 mcg tablet  Self No No   Sig: TAKE 1 TABLET BY MOUTH EVERY DAY      Facility-Administered Medications: None     Discharge Medication List as of 1/15/2025  2:08 PM        START taking these medications    Details   predniSONE 10 mg tablet START 4 po daily on days 1-5.  Then 3 po on Day 6.  2 po on Day 7.  1 po on Day 8.  1/2 po on Day 9 and 10.  STOP, Normal           CONTINUE these medications which have NOT CHANGED    Details   albuterol (2.5 mg/3 mL) 0.083 % nebulizer solution Take 3 mL (2.5 mg total) by nebulization 2 (two) times a day, Starting Mon 6/3/2024, Until Thu 5/29/2025, Normal      albuterol (PROVENTIL HFA,VENTOLIN HFA) 90 mcg/act inhaler TAKE 2 PUFFS BY MOUTH 4 TIMES A DAY, Normal      amiodarone 200 mg tablet Take 1 tablet (200 mg total) by mouth daily with breakfast, Starting Thu 5/23/2024, Until Sun 5/18/2025, Normal      apixaban (ELIQUIS) 5 mg Take 1 tablet (5 mg total) by mouth 2 (two) times a day, Starting Mon 4/22/2024, Until Thu 4/17/2025, Normal      benralizumab (FASENRA) subcutaneous injection Inject 1 mL (30 mg total) under the skin every 56 days, Starting Mon 6/26/2023, Until Wed 11/20/2024, Print      benzonatate (TESSALON) 200 MG capsule Take 1 capsule (200 mg total) by mouth 3 (three) times a day as needed for cough, Starting Thu 1/2/2025, Normal      Budeson-Glycopyrrol-Formoterol (Breztri Aerosphere) 160-9-4.8 MCG/ACT AERO Inhale 2 puffs 2 (two) times a day Rinse mouth after use., Starting Mon 6/3/2024, Normal      calcitriol (ROCALTROL) 0.25 mcg capsule Take 1 capsule (0.25 mcg total) by mouth 3 (three) times a week On Monday , Wednesday, friday, Starting Wed 5/22/2024, Normal      Diclofenac Sodium (VOLTAREN) 1 % Apply 2 g topically 4 (four) times a day, Starting Fri  12/6/2024, Normal      fluticasone (FLONASE) 50 mcg/act nasal spray SPRAY 2 SPRAYS INTO EACH NOSTRIL EVERY DAY, Normal      furosemide (LASIX) 20 mg tablet Take 1 tablet by mouth in the morning, Starting Sat 12/21/2024, Historical Med      guaiFENesin-Codeine 200-20 MG/10ML SOLN Take 5 mL by mouth 4 (four) times a day as needed (cough/congestion), Starting Thu 1/2/2025, Normal      ipratropium-albuterol (DUO-NEB) 0.5-2.5 mg/3 mL nebulizer solution Take 3 mL by nebulization every 6 (six) hours as needed for wheezing or shortness of breath, Starting Wed 1/8/2025, Normal      metoprolol tartrate (LOPRESSOR) 25 mg tablet Take 1 tablet (25 mg total) by mouth every 12 (twelve) hours, Starting Thu 9/19/2024, Normal      pantoprazole (PROTONIX) 40 mg tablet TAKE 1 TABLET BY MOUTH TWICE A DAY, Starting Mon 8/12/2024, Normal      rosuvastatin (CRESTOR) 10 MG tablet Take 1 tablet (10 mg total) by mouth daily, Starting Thu 5/23/2024, Until Sun 5/18/2025, Normal      senna-docusate sodium (SENOKOT S) 8.6-50 mg per tablet Take 1 tablet by mouth daily, Starting Tue 6/25/2024, Normal      vitamin B-12 (VITAMIN B-12) 1,000 mcg tablet TAKE 1 TABLET BY MOUTH EVERY DAY, Starting Mon 8/12/2024, Normal           No discharge procedures on file.  ED SEPSIS DOCUMENTATION   Time reflects when diagnosis was documented in both MDM as applicable and the Disposition within this note       Time User Action Codes Description Comment    1/15/2025  2:05 PM Vj Rene Add [J45.909] Asthmatic bronchitis                  Vj Rene MD  01/15/25 1505       Vj Rene MD  01/15/25 1503

## 2025-01-15 NOTE — DISCHARGE INSTRUCTIONS
You can use albuterol as often as needed even more than once per hour, but if you are needing it that often and not getting better, you should come to the hospital.  The Duoneb should be used every 6 hours--it has albuterol and ipratropium together .     Asthma   WHAT YOU NEED TO KNOW:   Asthma is a lung disease that makes breathing difficult. Chronic inflammation and reactions to triggers narrow the airways in the lungs. Asthma can become life-threatening if it is not managed.        DISCHARGE INSTRUCTIONS:   Return to the emergency department if:   You have severe shortness of breath.     Your lips or nails turn blue or gray.     The skin around your neck and ribs pulls in with each breath.    You have shortness of breath, even after you take your short-term medicine as directed.     Your peak flow numbers are worsening  Contact your healthcare provider if:   You run out of medicine before your next refill is due.    Your symptoms get worse.     You need to take more medicine than usual to control your symptoms.     You have questions or concerns about your condition or care.     Manage other health conditions , such as allergies, acid reflux, and sleep apnea.     Identify and avoid triggers.  These may include pets, dust mites, mold, and cockroaches.           Do not smoke or be around others who smoke.  Nicotine and other chemicals in cigarettes and cigars can cause lung damage. Ask your healthcare provider for information if you currently smoke and need help to quit. E-cigarettes or smokeless tobacco still contain nicotine. Talk to your healthcare provider before you use these products.      The flu can make your asthma worse. You may need a yearly flu shot.

## 2025-01-16 RX ORDER — CODEINE PHOSPHATE AND GUAIFENESIN 10; 100 MG/5ML; MG/5ML
5 SOLUTION ORAL 4 TIMES DAILY PRN
Qty: 237 ML | Refills: 0 | Status: SHIPPED | OUTPATIENT
Start: 2025-01-16

## 2025-01-26 NOTE — PROGRESS NOTES
Hematology/Oncology Outpatient Office Note    Date of Service: 2025    [facility] [address] [place] [id number]  [phone]    Reason for Consultation:   Chief Complaint   Patient presents with    Follow-up       Cancer Stage at diagnosis: IVB    Referral Physician: No ref. provider found    Primary Care Physician:  Michelle Chatterjee MD     Nickname: 'Lambert'    Lives alone    Original ECO    Today's ECO (uses a cane; up and about >50% of the day)    Goals and Barriers:  Current Goal: Minimize effects of disease burden, extend life.   Barriers to accomplishing this: chronic lower back pain    Patient's Capacity to Self Care:  Patient is able to self care      ASSESSMENT & PLAN      Diagnosis ICD-10-CM Associated Orders   1. Non-small cell lung cancer, unspecified laterality (HCC)  C34.90 CT chest abdomen pelvis wo contrast     CBC and differential     Comprehensive metabolic panel            This is a 79 y.o. c PMHx notable for A-fib, remote nicotine abuse, asthma, COPD, MONO s/p CPAP, GERD, HTN, chronic thrombocytopenia (since at least 2018), glaucoma, Vit B12 deficiency, being seen in consultation for metastatic ALK+ Lung cancer       The patient was diagnosed 2020 incidentally with a right hilar mass and diffuse osseous metastasis.  She was started on alectinib with near complete resolution of the lung mass, stable bone mets.  She has had bilateral lower extremity swelling while on treatment (noted in up to 30% of these patients).  She gets Zometa every 3 months.      ALK-EML4 translocation           Discussion of decision making  Oncology history updated, accordingly, during this visit  Goals of care/patient communication  I discussed with the patient the clinical course leading up to their cancer diagnosis. I reviewed relevant office notes, imaging reports and pathology result as well.  I told the patient that  this is a case of incurable disease and what this means. We discussed that the goal of anti-cancer therapy is to provide best quality of life, extend overall survival, and progression free survival as shown in clinical trials. We also discussed that there might be a point when the cancer will no longer respond to this anti-neoplastic therapy. As a result, we also discussed the role of the palliative care team being introduced early in the treatment course. We will be making this referral  I explained the risks/benefits of the proposed cancer therapy: Alectinib and after discussion including understanding risks of possible life-threatening complications and therapy-related malignancy development, informed consent for blood products and treatment has been signed and obtained.  TNM/Staging At Diagnosis  Cancer Staging   IVB  Disease Features/Tumor Markers/Genetics  Tumor Marker: n.a  Notable Path Features:   8/20/2020 Lymph Node, Level 4R  ( ThinPrep, smear preparations and cell block ):  Conclusive evidence of malignancy.  Non-small cell carcinoma, compatible with lung origin.  -  Immunohistochemical stains performed with appropriate controls show the tumor cells to be positive for TTF1 and napsin with partial positivity for CK5/6 and p40 and negative for synaptophysin, and chromogranin, supporting the diagnosis  Treatment: Alectinib   Other Supportive care:   Treatment Team Members  Surgeon  Rad Onc  Palliative: Dr. Siddiqui  Labs  Diagnostics  8/10/2020 PET/CT: Dominant right upper perihilar lung nodule with soft tissue extending to the right hilum along the right mainstem bronchus, and mediastinum, demonstrates intense FDG activity, most compatible with malignancy.  Tissue sampling recommended. Several hypermetabolic osseous lesions most compatible with metastases. Sub-centimeter right upper lung nodular densities that are too small for accurate PET evaluation.  4/27/2023 CT CAP w/o c: Chest: near CR  Examination  is limited by respiratory motion. Nothing to suggest adenocarcinoma recurrence.   Abdomen and pelvis:  No metastases, within the confines of an examination limited without contrast.   Osseous: Stable diffuse sclerotic metastatic disease. No new pathologic fracture. Unchanged healing pathologic bilateral rib fractures.  11/2/2023 CT CAP w/o c: No findings of recurrent disease in the chest. Stable numerous sclerotic metastatic lesions throughout the thoracolumbar spine and bony pelvis, in keeping with patient's treated metastases. No metastatic disease in the abdomen or pelvis  3/4/2024 CT CAP w/o c: No evidence of residual or recurrent disease within the chest, abdomen or pelvis. Interval resolution of previously noted right breast nodule. There is a smaller right lateral 7 mm right breast nodule that was likely excluded from the field-of-view on the prior examination. Stable sclerotic osseous foci consistent with treated metastatic disease.  7/15/2024 CT CAP w/o c:  near CR. No interval change since previous study. Stable treated osseous metastatic lesions.. Treated right hilar adenocarcinoma is no longer visualized. No new pulmonary lesions. No thoracic lymphadenopathy  7/2024: EGD with angioectasia, but no active bleeding   10/28/2024 CT CAP w/o c: appears to be stable per verbal read by radiology today  1/31/2025 CT CAP w/o c: 1. 5 mm (mean axis) pulmonary nodule at the basilar left lower lobe appears new or increased from prior. The relatively nonaggressive in appearance      Discussion of decision making    I personally reviewed the following lab results, the image studies, pathology, other specialty/physicians consult notes and recommendations, and outside medical records. I had a lengthy discussion with the patient and shared the work-up findings. We discussed the diagnosis and management plan as below. I spent 42 minutes reviewing the records (labs, clinician notes, outside records, medical history, ordering  medicine/tests/procedures, interpreting the imaging/labs previously done) and coordination of care as well as direct time with the patient today, of which greater than 50% of the time was spent in counseling and coordination of care with the patient/family.      Plan/Labs  Cont Xgeva 120 mg SC q4 weeks  F/u Nephrology for HTN/Alectinib induced CKD  She is continuing to hold Alectinib 600mg BID   Cont Vit B12 supplementation  F/u GI for C-scope, PPI monitoring  Consider Palliative care   Guardant NGS if POD  Restaging CT CAP w/o c ordered in 4 months  CBC, CMP in 4 months         Follow Up: 4 months    All questions were answered to the patient's satisfaction during this encounter. The patient knows the contact information for our office and knows to reach out for any relevant concerns related to this encounter. They are to call for any temperature 100.4 or higher, new symptoms including but not restricted to shaking chills, decreased appetite, nausea, vomiting, diarrhea, increased fatigue, shortness of breath or chest pain, confusion, and not feeling the strength to come to the clinic. For all other listed problems and medical diagnosis in their chart - they are managed by PCP and/or other specialists, which the patient acknowledges. Thank you very much for your consultation and making us a part of this patient's care. We are continuing to follow closely with you. Please do not hesitate to reach out to me with any additional questions or concerns.    Ryan Arriaga MD  Hematology & Medical Oncology Staff Physician             Disclaimer: This document was prepared using [employer] Direct technology. If a word or phrase is confusing, or does not make sense, this is likely due to recognition error which was not discovered during this clinician's review. If you believe an error has occurred, please contact me through [employer] service line for felton?cation.      ONCOLOGY HISTORY OF PRESENT ILLNESS         Oncology History Overview Note   75 year old female with stage IV non-small cell right upper lung.  She has evidence of bone metastases particular T8 although not symptomatic may be at risk for neurologic problems, palliative radiation was recommended. She completed a course of palliative radiation therapy 30 Gy in 10 treatments to prevent neurologic complication on 9/21/20.    Today is her first telephone follow-up    9/17/20 Dr. Weaver  Recommend starting Alectinib 600 mg b.i.d.    10/29/20 Dr. Weaver follow-up  11/11/20 Pulmonology follow-up         Non-small cell lung cancer (HCC)   8/20/2020 Biopsy    FNA Level 4R  NSCC    RUL brushing: Conclusive evidence of malignancy.  Non small cell carcinoma.     Level 4R tissue sampling     8/31/2020 Initial Diagnosis    Non-small cell cancer of right lung (HCC)     9/8/2020 - 9/21/2020 Radiation        Treatments:  Course: C1    Plan ID Energy Fractions Dose per Fraction (cGy) Dose Correction (cGy) Total Dose Delivered (cGy) Elapsed Days   T7-T9 Spine 10X 10 / 10 300 0 3,000 13      Treatment dates:  9/8/2020 - 9/21/2020.      Chemotherapy    Alectinib 600 mg PO BID         9/2020: Zometa  q12 weeks started  10/2020: Alectinib 600mg BID started   3/12/2024: T bili 1.83, Chronic T bili elevation.CKD is stable  7/16/2024 admission admitting diagnosis of symptomatic anemia.  Hb was 4.  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 Moderate fatigue. Minimal edema in both legs.    I have reviewed the relevant past medical, surgical, social and family history. I have also reviewed allergies and medications for this patient.    Review of Systems    Baseline weight: 235 lbs    She has lost 10 lbs since  since her son  from Legionellae's disease as she doesn't cook as much anymore with him not living with her.    Denies F/C, N/V, CP, LH, HA, rash, itching, gen weakness, melena, hematuria, hematochezia, falls, diarrhea.       A 10-point review of system was performed, pertinent positive and negative were detailed as above. Otherwise, the 10-point review of system was negative.    Past Medical History:   Diagnosis Date    Allergic rhinitis     Anemia     Angio-edema     Anxiety     Arthritis     Asthma     Atrial fibrillation (HCC)     Cancer (HCC)     Chronic bronchitis (HCC)     Chronic kidney disease     COPD (chronic obstructive pulmonary disease) (HCC)     Coronary artery disease     CPAP (continuous positive airway pressure) dependence     Degenerative joint disease     Fluid retention     GERD (gastroesophageal reflux disease)     Glaucoma     HL (hearing loss)     Hypertension     Lumbar disc disease     Lung cancer (HCC)     Obesity     Pelvis cancer (HCC)     Periodic heart flutter     Pneumonia     Premature atrial contractions 10/31/2017    Sleep apnea     suspected     Sleep apnea, obstructive     Ventricular arrhythmia     Vitamin D deficiency        Past Surgical History:   Procedure Laterality Date    APPENDECTOMY      BREAST BIOPSY Right     years ago    BRONCHOSCOPY      CAROTID STENT      COLON SURGERY      COLONOSCOPY N/A 2019    Procedure: COLONOSCOPY;  Surgeon: Alessia Wilson DO;  Location: [facility];  Service: Gastroenterology    ESOPHAGOGASTRODUODENOSCOPY N/A 2019    Procedure: ESOPHAGOGASTRODUODENOSCOPY (EGD);  Surgeon: Alessia Wilson DO;  Location: [facility];  Service:  Gastroenterology    KNEE SURGERY      LUNG BIOPSY      ROTATOR CUFF REPAIR      SHOULDER SURGERY         Family History   Problem Relation Age of Onset    Stroke Mother     Diabetes Mother     Hyperlipidemia Mother     Hypertension Mother     Allergies Mother         Environmental    Asthma Mother     Diabetes Father     Hyperlipidemia Father     Hypertension Father     Lung cancer Father 70    Allergies Father         Environmental    Asthma Father     Cancer Father     Lymphoma Sister 69    Heart disease Sister         Pacemaker    Asthma Brother     Aneurysm Brother         Brain - had surgery    Coronary artery disease Daughter         2 bypass done    No Known Problems Daughter     No Known Problems Daughter     No Known Problems Son     Kidney disease Son     Liver disease Son     Obesity Son        Social History     Socioeconomic History    Marital status: Single     Spouse name: Not on file    Number of children: 5    Years of education: Not on file    Highest education level: Not on file   Occupational History    Not on file   Tobacco Use    Smoking status: Former     Current packs/day: 0.00     Average packs/day: 0.3 packs/day for 25.0 years (6.3 ttl pk-yrs)     Types: Cigarettes     Start date: 1962     Quit date:      Years since quittin.1     Passive exposure: Never    Smokeless tobacco: Former   Vaping Use    Vaping status: Never Used   Substance and Sexual Activity    Alcohol use: Yes    Drug use: Not Currently     Types: Marijuana    Sexual activity: Not Currently   Other Topics Concern    Not on file   Social History Narrative    Who lives in your home: son    What type of home do you live in: Single house    Age of your home: 95 yrs old    How long have you been living there: 30 years    Type of heat: Forced hot air    Type of fuel: Gas    What type of rome is in your bedroom: Hardwood floor    Do you have the following in or near your home:    Air products: Window air  conditioning and Air     Pests: None    Pets: 1 Cat    Are pets allowed in bedroom: No    Open fields, wooded areas nearby: Open fields and Wooded areas    Basement: Damp at times and Unfinished with cracks in cement    Exposure to second hand smoke: No        Habits:    Caffeine: Current; Amount: 1 cups/day coffee, #years 60    Chocolate: occasionally    Other:              Social Drivers of Health     Financial Resource Strain: Medium Risk (8/16/2024)    Overall Financial Resource Strain (CARDIA)     Difficulty of Paying Living Expenses: Somewhat hard   Food Insecurity: Food Insecurity Present (8/16/2024)    Nursing - Inadequate Food Risk Classification     Worried About Running Out of Food in the Last Year: Sometimes true     Ran Out of Food in the Last Year: Sometimes true     Ran Out of Food in the Last Year: Not on file   Transportation Needs: No Transportation Needs (8/16/2024)    PRAPARE - Transportation     Lack of Transportation (Medical): No     Lack of Transportation (Non-Medical): No   Physical Activity: Inactive (11/15/2022)    Exercise Vital Sign     Days of Exercise per Week: 0 days     Minutes of Exercise per Session: 0 min   Stress: Not on file   Social Connections: Not on file   Intimate Partner Violence: Not on file   Housing Stability: Low Risk  (8/16/2024)    Housing Stability Vital Sign     Unable to Pay for Housing in the Last Year: No     Number of Times Moved in the Last Year: 0     Homeless in the Last Year: No       No Known Allergies    Current Outpatient Medications   Medication Sig Dispense Refill    albuterol (2.5 mg/3 mL) 0.083 % nebulizer solution Take 3 mL (2.5 mg total) by nebulization 2 (two) times a day 180 mL 11    albuterol (PROVENTIL HFA,VENTOLIN HFA) 90 mcg/act inhaler TAKE 2 PUFFS BY MOUTH 4 TIMES A DAY 18 g 2    amiodarone 200 mg tablet Take 1 tablet (200 mg total) by mouth daily with breakfast 90 tablet 3    apixaban (ELIQUIS) 5 mg Take 1 tablet (5 mg total) by  mouth 2 (two) times a day 60 tablet 11    benzonatate (TESSALON) 200 MG capsule Take 1 capsule (200 mg total) by mouth 3 (three) times a day as needed for cough 30 capsule 1    Budeson-Glycopyrrol-Formoterol (Breztri Aerosphere) 160-9-4.8 MCG/ACT AERO Inhale 2 puffs 2 (two) times a day Rinse mouth after use. 10.7 g 11    calcitriol (ROCALTROL) 0.25 mcg capsule Take 1 capsule (0.25 mcg total) by mouth 3 (three) times a week On Monday , Wednesday, friday 45 capsule 3    Diclofenac Sodium (VOLTAREN) 1 % Apply 2 g topically 4 (four) times a day 240 g 0    fluticasone (FLONASE) 50 mcg/act nasal spray SPRAY 2 SPRAYS INTO EACH NOSTRIL EVERY DAY 48 mL 1    furosemide (LASIX) 20 mg tablet Take 1 tablet by mouth in the morning      ipratropium-albuterol (DUO-NEB) 0.5-2.5 mg/3 mL nebulizer solution Take 3 mL by nebulization every 6 (six) hours as needed for wheezing or shortness of breath 360 mL 1    metoprolol tartrate (LOPRESSOR) 25 mg tablet Take 1 tablet (25 mg total) by mouth every 12 (twelve) hours 180 tablet 1    pantoprazole (PROTONIX) 40 mg tablet TAKE 1 TABLET BY MOUTH TWICE A  tablet 1    rosuvastatin (CRESTOR) 10 MG tablet Take 1 tablet (10 mg total) by mouth daily 90 tablet 3    senna-docusate sodium (SENOKOT S) 8.6-50 mg per tablet Take 1 tablet by mouth daily 60 tablet 3    vitamin B-12 (VITAMIN B-12) 1,000 mcg tablet TAKE 1 TABLET BY MOUTH EVERY DAY 90 tablet 1    benralizumab (FASENRA) subcutaneous injection Inject 1 mL (30 mg total) under the skin every 56 days 1 mL 6    guaiFENesin-codeine (ROBITUSSIN AC) 100-10 mg/5 mL oral solution Take 5 mL by mouth 4 (four) times a day as needed for cough 237 mL 0    predniSONE 10 mg tablet START 4 po daily on days 1-5.  Then 3 po on Day 6.  2 po on Day 7.  1 po on Day 8.  1/2 po on Day 9 and 10.  STOP 27 tablet 0     No current facility-administered medications for this visit.       (Not in a hospital admission)      Objective:     24 Hour Vitals Assessment:      Vitals:    02/04/25 1250   BP: 158/82   Pulse: 89   Resp: 16   Temp: 97.7 °F (36.5 °C)   SpO2: 98%           PHYSICIAN EXAM     General: Appearance: alert, cooperative, no distress.  HEENT: Normocephalic, atraumatic. No scleral icterus. conjunctivae clear. EOMI.  Chest: No tenderness to palpation. No open wound noted.  Lungs: Clear to auscultation bilaterally, Respirations unlabored.  Cardiac: Regular rate and rhythm, +S1and S2  Abdomen: Soft, non-tender, non-distended. Bowel sounds are normal.  Extremities:  No edema, cyanosis, clubbing.  Skin: Skin color, turgor are normal. No rashes.  Lymphatics: no palpable supra-cervical, axillary, or inguinal adenopathy  Neurologic: Awake, Alert, and oriented, no gross focal deficits noted b/l.       DATA REVIEW:    Pathology Result:    Final Diagnosis   Date Value Ref Range Status   08/20/2020   Final    A. Lung, Right Upper Lobe Bronchial Brushing, :  Conclusive evidence of malignancy.  Non small cell carcinoma.    Satisfactory for evaluation.     Note:  Correlate with concurrent case [id number].     08/20/2020   Final    A.B.C. Lymph Node, Level 4R  ( ThinPrep, smear preparations and cell block ):  Conclusive evidence of malignancy.  Non-small cell carcinoma, compatible with lung origin.  -  Immunohistochemical stains performed with appropriate controls show the tumor cells to be positive for TTF1 and napsin with partial positivity for CK5/6 and p40 and negative for synaptophysin, and chromogranin, supporting the diagnosis.    Note:  The findings are diagnostic of non small-cell carcinoma of lung origin with features favoring adenocarcinoma.  Morphologic and immunohistochemical features raise the possibility of squamous differentiation, though squamous contaminant cannot be entirely excluded.  Correlation with clinical impression and any future tissue sampling is recommended to exclude the possibility of adenosquamous carcinoma.    Satisfactory for evaluation.        03/18/2019   Final    A.  Stomach, endoscopic biopsy:  - Gastric antral mucosa with mild chronic, inactive gastritis.  - Negative for intestinal metaplasia, dysplasia or carcinoma.  - Immunohistochemistry for Helicobacter pylori is negative.    B.  Stomach, erosion, endoscopic biopsy:  - Gastric antral and oxyntic mucosa with reactive foveolar hyperplasia, fibrosis and acte inflammation consistent with tissue adjacent to an erosion or ulcer.  - Negative for intestinal metaplasia, dysplasia or carcinoma.    Note: Special stain for alcian blue/PAS and immunohistochemical stain for pan CK AE1/3 highlight benign glands.                Image Results:   Image result are reviewed and documented in Hematology/Oncology history    CT chest abdomen pelvis wo contrast  Narrative: CT CHEST, ABDOMEN AND PELVIS WITHOUT IV CONTRAST    INDICATION: C34.91: Malignant neoplasm of unspecified part of right bronchus or lung. Restaging of lung cancer, currently on treatment.    COMPARISON: CT chest/abdomen/pelvis without contrast 10/28/2024 and 7/16/2024    TECHNIQUE: CT examination of the chest, abdomen and pelvis was performed without intravenous contrast. Multiplanar 2D reformatted images were created from the source data.    This examination, like all CT scans performed in the [facility], was performed utilizing techniques to minimize radiation dose exposure, including the use of iterative reconstruction and automated exposure control. Radiation dose length   product (DLP) for this visit: 1137 mGy-cm    Enteric Contrast: Not administered.    FINDINGS:    CHEST    LUNGS: 6 mm x 4 mm solid, noncalcified, ovoid pulmonary nodule in the basilar left lower lobe (6/77) appears increased from prior.. Central airways are patent. No acute consolidations or interstitial lung abnormalities.    PLEURA: Unremarkable.    HEART/GREAT VESSELS: Overall heart size within allowable limits though there is left atrial enlargement. No  pericardial effusion. Coronary artery and mitral annular calcifications. Atherosclerosis of the aortic arch and great vessels. No thoracic aortic   aneurysm. Enlargement of the central pulmonary arterial tree which can be seen with pulmonary hypertension.    MEDIASTINUM AND ARACELY: Stable appearance of the known, treated right hilar lesion. No new or suspicious mediastinal or hilar adenopathy.    CHEST WALL AND LOWER NECK: Unremarkable.    ABDOMEN    LIVER/BILIARY TREE: Hepatic parenchyma is diffusely hyperdense (86 Hounsfield units), though without discrete mass. Correlate for previous amiodarone exposure or evidence for iron or copper overload. Hepatomegaly. Contour remains smooth. No biliary   ductal dilatation.    GALLBLADDER: Cholelithiasis without findings of acute cholecystitis.    SPLEEN: Unremarkable.    PANCREAS: Unremarkable.    ADRENAL GLANDS: Unremarkable.    KIDNEYS/URETERS: Stable right lower pole simple cyst. Vascular calcifications at the left hilum. No true urolithiasis.. No hydroureteronephrosis.    STOMACH AND BOWEL: Colonic diverticulosis without findings of acute diverticulitis.    APPENDIX: No findings to suggest appendicitis.    ABDOMINOPELVIC CAVITY: No ascites. No pneumoperitoneum. No lymphadenopathy.    VESSELS: Atherosclerosis without abdominal aortic aneurysm.    PELVIS    REPRODUCTIVE ORGANS: Calcified uterine fibroids.    URINARY BLADDER: Unremarkable.    ABDOMINAL WALL/INGUINAL REGIONS: Unremarkable.    BONES: No acute fracture or suspicious osseous lesion. Stable appearance of multiple sclerotic skeletal lesions involving the vertebral bodies and pelvis in keeping with history of treated metastases. Multilevel spinal degenerative changes with unchanged   grade 1 anterolisthesis of L3-L4. Stable appearance of mild compression deformity of the T8 superior endplate. No new or progressive pathologic fractures.  Impression: 1. 5 mm (mean axis) pulmonary nodule at the basilar left lower lobe  "appears new or increased from prior. The relatively nonaggressive in appearance, history of malignancy warrants short interval follow-up. Recommend chest CT follow-up in 3 months.    2. Otherwise, no evidence for progressive metastatic disease. Specifically, stable sclerotic skeletal lesions in keeping with history of treated osseous metastases.    Note: The study was marked in EPIC for significant notification. Imaging follow-up reminder notification was scheduled in the electronic medical record.    Resident: TENZIN DONAHUE I, the attending radiologist, have reviewed the images and agree with the final report above.    Workstation performed: [id number]      LABS:  Lab data are reviewed and documented in [facility] history.       Lab Results   Component Value Date    HGB 11.7 01/15/2025    HCT 37.2 01/15/2025    MCV 86 01/15/2025    PLT 89 (L) 01/15/2025    WBC 8.91 01/15/2025    NRBC 0 01/15/2025    BANDSPCT 2 07/19/2024    ATYLMPCT 1 (H) 06/07/2021     Lab Results   Component Value Date    K 3.9 02/01/2025     02/01/2025    CO2 27 02/01/2025    BUN 25 02/01/2025    CREATININE 1.12 02/01/2025    GLUF 114 (H) 02/01/2025    CALCIUM 9.2 02/01/2025    CORRECTEDCA 9.5 07/16/2024    AST 23 02/01/2025    ALT 25 02/01/2025    ALKPHOS 48 02/01/2025    EGFR 47 02/01/2025       Lab Results   Component Value Date    IRON 55 07/16/2024    TIBC 348 07/16/2024    FERRITIN 59 07/16/2024       Lab Results   Component Value Date    UYIWTMVT50 224 07/16/2024       No results for input(s): \"WBC\", \"CREAT\", \"PLT\" in the last 72 hours.      By:  Ryan Arriaga MD, 2/4/2025, 12:59 PM                                  "

## 2025-01-31 ENCOUNTER — HOSPITAL ENCOUNTER (OUTPATIENT)
Dept: CT IMAGING | Facility: HOSPITAL | Age: 80
Discharge: HOME | End: 2025-01-31
Attending: INTERNAL MEDICINE
Payer: COMMERCIAL

## 2025-01-31 DIAGNOSIS — C34.91 NON-SMALL CELL CANCER OF RIGHT LUNG (HCC): ICD-10-CM

## 2025-01-31 PROCEDURE — 74176 CT ABD & PELVIS W/O CONTRAST: CPT

## 2025-01-31 PROCEDURE — 71250 CT THORAX DX C-: CPT

## 2025-02-01 ENCOUNTER — APPOINTMENT (OUTPATIENT)
Dept: LAB | Facility: CLINIC | Age: 80
End: 2025-02-01
Payer: COMMERCIAL

## 2025-02-01 DIAGNOSIS — C79.51 MALIGNANT NEOPLASM METASTATIC TO BONE (HCC): ICD-10-CM

## 2025-02-01 DIAGNOSIS — C34.90 NON-SMALL CELL LUNG CANCER, UNSPECIFIED LATERALITY (HCC): ICD-10-CM

## 2025-02-01 LAB
ALBUMIN SERPL BCG-MCNC: 4 G/DL (ref 3.5–5)
ALP SERPL-CCNC: 48 U/L (ref 34–104)
ALT SERPL W P-5'-P-CCNC: 25 U/L (ref 7–52)
ANION GAP SERPL CALCULATED.3IONS-SCNC: 9 MMOL/L (ref 4–13)
AST SERPL W P-5'-P-CCNC: 23 U/L (ref 13–39)
BILIRUB SERPL-MCNC: 0.51 MG/DL (ref 0.2–1)
BUN SERPL-MCNC: 25 MG/DL (ref 5–25)
CALCIUM SERPL-MCNC: 9.2 MG/DL (ref 8.4–10.2)
CHLORIDE SERPL-SCNC: 105 MMOL/L (ref 96–108)
CO2 SERPL-SCNC: 27 MMOL/L (ref 21–32)
CREAT SERPL-MCNC: 1.12 MG/DL (ref 0.6–1.3)
GFR SERPL CREATININE-BSD FRML MDRD: 47 ML/MIN/1.73SQ M
GLUCOSE P FAST SERPL-MCNC: 114 MG/DL (ref 65–99)
POTASSIUM SERPL-SCNC: 3.9 MMOL/L (ref 3.5–5.3)
PROT SERPL-MCNC: 6.4 G/DL (ref 6.4–8.4)
SODIUM SERPL-SCNC: 141 MMOL/L (ref 135–147)

## 2025-02-01 PROCEDURE — 36415 COLL VENOUS BLD VENIPUNCTURE: CPT

## 2025-02-01 PROCEDURE — 80053 COMPREHEN METABOLIC PANEL: CPT

## 2025-02-04 ENCOUNTER — OFFICE VISIT (OUTPATIENT)
Dept: HEMATOLOGY ONCOLOGY | Facility: CLINIC | Age: 80
End: 2025-02-04
Payer: COMMERCIAL

## 2025-02-04 VITALS
HEIGHT: 61 IN | TEMPERATURE: 97.7 F | BODY MASS INDEX: 39.18 KG/M2 | HEART RATE: 89 BPM | OXYGEN SATURATION: 98 % | RESPIRATION RATE: 16 BRPM | SYSTOLIC BLOOD PRESSURE: 158 MMHG | WEIGHT: 207.5 LBS | DIASTOLIC BLOOD PRESSURE: 82 MMHG

## 2025-02-04 DIAGNOSIS — C34.90 NON-SMALL CELL LUNG CANCER, UNSPECIFIED LATERALITY (HCC): Primary | ICD-10-CM

## 2025-02-04 PROCEDURE — G2211 COMPLEX E/M VISIT ADD ON: HCPCS | Performed by: INTERNAL MEDICINE

## 2025-02-04 PROCEDURE — 99215 OFFICE O/P EST HI 40 MIN: CPT | Performed by: INTERNAL MEDICINE

## 2025-02-07 ENCOUNTER — HOSPITAL ENCOUNTER (OUTPATIENT)
Dept: INFUSION CENTER | Facility: HOSPITAL | Age: 80
End: 2025-02-07
Attending: INTERNAL MEDICINE
Payer: COMMERCIAL

## 2025-02-07 VITALS — WEIGHT: 216 LBS | BODY MASS INDEX: 40.78 KG/M2 | HEIGHT: 61 IN

## 2025-02-07 DIAGNOSIS — C34.90 NON-SMALL CELL LUNG CANCER, UNSPECIFIED LATERALITY (HCC): Primary | ICD-10-CM

## 2025-02-07 DIAGNOSIS — C79.51 MALIGNANT NEOPLASM METASTATIC TO BONE (HCC): ICD-10-CM

## 2025-02-07 PROCEDURE — 96372 THER/PROPH/DIAG INJ SC/IM: CPT

## 2025-02-07 RX ADMIN — DENOSUMAB 120 MG: 120 INJECTION SUBCUTANEOUS at 13:12

## 2025-02-07 NOTE — PROGRESS NOTES
Calcium 9.2, adjusted creatinine clearance 40.1 from labs drawn on 2/1. Patient denies any dental pain or upcoming procedures. Tolerated R arm Xgeva injection without issue. Next appointment confirmed 3/7 at 1pm-SH infusion. AVS declined.

## 2025-02-11 ENCOUNTER — TELEPHONE (OUTPATIENT)
Dept: HEMATOLOGY ONCOLOGY | Facility: CLINIC | Age: 80
End: 2025-02-11

## 2025-02-11 NOTE — TELEPHONE ENCOUNTER
Writer spoke with PT on 2/11/25 @ 11:15 AM to open discussion on her medications expenses and gauge interest in FA. PT stated she is not concerned over any of her medication costs and manage them. PT interested in FA and writer is requesting the [facility] team mail her an application per her request. PT was grateful for the call and was provided with writers contact information for future use.            Marco Ospina  Phone:[phone]  Email:[email or web address]

## 2025-02-17 ENCOUNTER — RA CDI HCC (OUTPATIENT)
Dept: OTHER | Facility: HOSPITAL | Age: 80
End: 2025-02-17

## 2025-02-17 NOTE — PROGRESS NOTES
HCC coding opportunities          Chart Reviewed number of suggestions sent to Provider: 1     Patients Insurance     Medicare Insurance: [employer]          I13.0 : Hypertensive heart and chronic kidney disease with heart failure and stage 1 through stage 4 chronic kidney disease, or unspecified chronic kidney disease (HCC)    Per ICD 10 CM coding guidelines the classification presumes a causal relationship between hypertension and heart involvement and between CKD unless the documentation clearly states the conditions are unrelated

## 2025-02-21 ENCOUNTER — OFFICE VISIT (OUTPATIENT)
Dept: FAMILY MEDICINE CLINIC | Facility: CLINIC | Age: 80
End: 2025-02-21

## 2025-02-21 VITALS
RESPIRATION RATE: 18 BRPM | DIASTOLIC BLOOD PRESSURE: 74 MMHG | BODY MASS INDEX: 41.1 KG/M2 | TEMPERATURE: 98.3 F | SYSTOLIC BLOOD PRESSURE: 148 MMHG | HEART RATE: 79 BPM | OXYGEN SATURATION: 96 % | HEIGHT: 61 IN | WEIGHT: 217.7 LBS

## 2025-02-21 DIAGNOSIS — I10 ESSENTIAL HYPERTENSION: Primary | ICD-10-CM

## 2025-02-21 PROCEDURE — 99213 OFFICE O/P EST LOW 20 MIN: CPT | Performed by: FAMILY MEDICINE

## 2025-02-21 PROCEDURE — G2211 COMPLEX E/M VISIT ADD ON: HCPCS | Performed by: FAMILY MEDICINE

## 2025-02-21 NOTE — ASSESSMENT & PLAN NOTE
BP today 148/74; goal <=130/80 based on age and comorbidities.  Adherent with metoprolol tartrate 25mg twice daily, Lasix 20mg daily.  Continue optimal BP control to prevent worsening of CKD.

## 2025-02-21 NOTE — PROGRESS NOTES
"Name: Jayla Moody      : 1945      MRN: [id number]  Encounter Provider: Michelle Chatterjee MD  Encounter Date: 2025   Encounter department: [facility]  :  Assessment & Plan  Essential hypertension  BP today 148/74; goal <=130/80 based on age and comorbidities.  Adherent with metoprolol tartrate 25mg twice daily, Lasix 20mg daily.  Continue optimal BP control to prevent worsening of CKD.              History of Present Illness   Jayla Moody is a pleasant 79 y.o. female with a history of  Non-small cell lung cancer right lung, A-fib asthma, COPD, chronic thrombocytopenia and presents today for follow up of HTN.  She is adherent with her current regimen and adheres to a low salt diet. She ambulates with a cane so is unable to partake in majority of exercise routines, however tries to walk around as much as she can. She does not take her blood pressure at home but denies new headaches, chest pain, palpitations, SOB, or changes in her vision.         Review of Systems   Constitutional:  Negative for appetite change, chills, fatigue, fever and unexpected weight change.   Eyes:  Negative for visual disturbance.   Respiratory:  Negative for cough, chest tightness, shortness of breath and wheezing.    Cardiovascular:  Negative for chest pain and palpitations.   Gastrointestinal:  Negative for abdominal pain, blood in stool, nausea and vomiting.   Endocrine: Negative for polyuria.   Genitourinary:  Negative for flank pain and hematuria.   Neurological:  Negative for dizziness, weakness, light-headedness, numbness and headaches.       Objective   /74 (BP Location: Left arm, Patient Position: Sitting, Cuff Size: Large) Comment: took bp meds today  Pulse 79   Temp 98.3 °F (36.8 °C) (Temporal)   Resp 18   Ht 5' 1\" (1.549 m)   Wt 98.7 kg (217 lb 11.2 oz)   SpO2 96%   BMI 41.13 kg/m²      Physical Exam  Vitals reviewed.   Constitutional:       General: She is not in " acute distress.     Appearance: Normal appearance. She is not ill-appearing.   HENT:      Nose: Nose normal.      Mouth/Throat:      Mouth: Mucous membranes are moist.   Eyes:      Extraocular Movements: Extraocular movements intact.   Cardiovascular:      Rate and Rhythm: Normal rate and regular rhythm.      Pulses: Normal pulses.           Radial pulses are 2+ on the right side and 2+ on the left side.      Heart sounds: Normal heart sounds.   Pulmonary:      Effort: Pulmonary effort is normal.      Breath sounds: No stridor. No wheezing, rhonchi or rales.   Chest:      Chest wall: No tenderness.   Abdominal:      Tenderness: There is no abdominal tenderness.   Musculoskeletal:      Cervical back: Normal range of motion.   Skin:     Capillary Refill: Capillary refill takes less than 2 seconds.      Findings: Bruising (on Eliquis) present.   Neurological:      Mental Status: She is alert and oriented to person, place, and time.      Gait: Gait abnormal (ambulates with cane).

## 2025-02-26 ENCOUNTER — OFFICE VISIT (OUTPATIENT)
Dept: OBGYN CLINIC | Facility: MEDICAL CENTER | Age: 80
End: 2025-02-26
Payer: COMMERCIAL

## 2025-02-26 VITALS — WEIGHT: 216.2 LBS | BODY MASS INDEX: 40.82 KG/M2 | HEIGHT: 61 IN

## 2025-02-26 DIAGNOSIS — M17.11 PRIMARY OSTEOARTHRITIS OF RIGHT KNEE: Primary | ICD-10-CM

## 2025-02-26 PROCEDURE — 99213 OFFICE O/P EST LOW 20 MIN: CPT | Performed by: ORTHOPAEDIC SURGERY

## 2025-02-26 PROCEDURE — 20610 DRAIN/INJ JOINT/BURSA W/O US: CPT | Performed by: ORTHOPAEDIC SURGERY

## 2025-02-26 RX ORDER — TRIAMCINOLONE ACETONIDE 40 MG/ML
40 INJECTION, SUSPENSION INTRA-ARTICULAR; INTRAMUSCULAR
Status: COMPLETED | OUTPATIENT
Start: 2025-02-26 | End: 2025-02-26

## 2025-02-26 RX ORDER — BUPIVACAINE HYDROCHLORIDE 2.5 MG/ML
2 INJECTION, SOLUTION INFILTRATION; PERINEURAL
Status: COMPLETED | OUTPATIENT
Start: 2025-02-26 | End: 2025-02-26

## 2025-02-26 RX ADMIN — TRIAMCINOLONE ACETONIDE 40 MG: 40 INJECTION, SUSPENSION INTRA-ARTICULAR; INTRAMUSCULAR at 13:45

## 2025-02-26 RX ADMIN — BUPIVACAINE HYDROCHLORIDE 2 ML: 2.5 INJECTION, SOLUTION INFILTRATION; PERINEURAL at 13:45

## 2025-02-26 NOTE — PROGRESS NOTES
Name: Jayal Moody      : 1945      MRN: [id number]  Encounter Provider: Elizabeth Paula DO  Encounter Date: 2025   Encounter department: [facility] [place]  :  Assessment & Plan  Primary osteoarthritis of right knee  Patient has severe right knee osteoarthritis.   Treatment options were discussed with patient today.  Injections: Patient received right knee steroid injection today. Tolerated the procedure well. Post injection instructions reviewed including information on glucose monitoring for diabetic patients. Patient aware that they may repeat steroid injection every 3 months if needed.   Medications: Tylenol up to 3000 mg per day no NSAIDs due to being on blood thinners  PT: Continue home exercises.   Activity: Continue activity as tolerated.   Plan for next appt: Right knee CSI.  Orders:    Large joint arthrocentesis: R knee          Return if symptoms worsen or fail to improve.    I answered all of the patient's questions during the visit and provided education of the patient's condition during the visit.  The patient verbalized understanding of the information given and agrees with the plan.  This note was dictated using delicious software.  It may contain errors including improperly dictated words.  Please contact physician directly for any questions.    Subjective   Chief Complaint:   Chief Complaint   Patient presents with    Right Knee - Follow-up         History of Present Illness     HPI    Jayla Moody is a 79 y.o. female who presents for follow up for severe right knee osteoarthritis.  Patient was last seen 2024 where patient was receiving symptomatic relief from steroid injection received 2024.  Patient states her right knee pain has returned within the past 2 weeks and states it is along the medial aspect and states weightbearing activities have been aggravating her pain.  Patient has been taking Tylenol as needed for pain control.  No  NSAIDs due to being on blood thinners.  Patient has been doing HEP.  Patient is interested in repeat right knee CSI.          Review of Systems  ROS:    See HPI for musculoskeletal review.   All other systems reviewed are negative     History:  Past Medical History:   Diagnosis Date    Allergic rhinitis     Anemia     Angio-edema     Anxiety     Arthritis     Asthma     Atrial fibrillation (HCC)     Cancer (HCC)     Chronic bronchitis (HCC)     Chronic kidney disease     COPD (chronic obstructive pulmonary disease) (HCC)     Coronary artery disease     CPAP (continuous positive airway pressure) dependence     Degenerative joint disease     Fluid retention 2024    GERD (gastroesophageal reflux disease)     Glaucoma     HL (hearing loss)     Hypertension     Lumbar disc disease     Lung cancer (HCC)     Obesity     Pelvis cancer (HCC)     Periodic heart flutter     Pneumonia     Premature atrial contractions 10/31/2017    Sleep apnea     suspected     Sleep apnea, obstructive     Ventricular arrhythmia     Vitamin D deficiency      Past Surgical History:   Procedure Laterality Date    APPENDECTOMY      BREAST BIOPSY Right     years ago    BRONCHOSCOPY      CAROTID STENT      COLON SURGERY      COLONOSCOPY N/A 03/18/2019    Procedure: COLONOSCOPY;  Surgeon: Alessia Wilson DO;  Location: [facility];  Service: Gastroenterology    ESOPHAGOGASTRODUODENOSCOPY N/A 03/18/2019    Procedure: ESOPHAGOGASTRODUODENOSCOPY (EGD);  Surgeon: Alessia Wilson DO;  Location: [facility];  Service: Gastroenterology    KNEE SURGERY      LUNG BIOPSY      ROTATOR CUFF REPAIR      SHOULDER SURGERY       Social History   Social History     Substance and Sexual Activity   Alcohol Use Yes     Social History     Substance and Sexual Activity   Drug Use Not Currently    Types: Marijuana     Social History     Tobacco Use   Smoking Status Former    Current packs/day: 0.00    Average packs/day: 0.3 packs/day for 25.0 years (6.3 ttl pk-yrs)     Types: Cigarettes    Start date: 1962    Quit date:     Years since quittin.1    Passive exposure: Never   Smokeless Tobacco Former     Family History:   Family History   Problem Relation Age of Onset    Stroke Mother     Diabetes Mother     Hyperlipidemia Mother     Hypertension Mother     Allergies Mother         Environmental    Asthma Mother     Diabetes Father     Hyperlipidemia Father     Hypertension Father     Lung cancer Father 70    Allergies Father         Environmental    Asthma Father     Cancer Father     Lymphoma Sister 69    Heart disease Sister         Pacemaker    Asthma Brother     Aneurysm Brother         Brain - had surgery    Coronary artery disease Daughter         2 bypass done    No Known Problems Daughter     No Known Problems Daughter     No Known Problems Son     Kidney disease Son     Liver disease Son     Obesity Son        Current Outpatient Medications on File Prior to Visit   Medication Sig Dispense Refill    albuterol (2.5 mg/3 mL) 0.083 % nebulizer solution Take 3 mL (2.5 mg total) by nebulization 2 (two) times a day 180 mL 11    albuterol (PROVENTIL HFA,VENTOLIN HFA) 90 mcg/act inhaler TAKE 2 PUFFS BY MOUTH 4 TIMES A DAY 18 g 2    amiodarone 200 mg tablet Take 1 tablet (200 mg total) by mouth daily with breakfast 90 tablet 3    apixaban (ELIQUIS) 5 mg Take 1 tablet (5 mg total) by mouth 2 (two) times a day 60 tablet 11    benralizumab (FASENRA) subcutaneous injection Inject 1 mL (30 mg total) under the skin every 56 days 1 mL 6    benzonatate (TESSALON) 200 MG capsule Take 1 capsule (200 mg total) by mouth 3 (three) times a day as needed for cough 30 capsule 1    Budeson-Glycopyrrol-Formoterol (Breztri Aerosphere) 160-9-4.8 MCG/ACT AERO Inhale 2 puffs 2 (two) times a day Rinse mouth after use. 10.7 g 11    calcitriol (ROCALTROL) 0.25 mcg capsule Take 1 capsule (0.25 mcg total) by mouth 3 (three) times a week On Monday , Wednesday, friday 45 capsule 3    Diclofenac  "Sodium (VOLTAREN) 1 % Apply 2 g topically 4 (four) times a day 240 g 0    fluticasone (FLONASE) 50 mcg/act nasal spray SPRAY 2 SPRAYS INTO EACH NOSTRIL EVERY DAY 48 mL 1    furosemide (LASIX) 20 mg tablet Take 1 tablet by mouth in the morning      ipratropium-albuterol (DUO-NEB) 0.5-2.5 mg/3 mL nebulizer solution Take 3 mL by nebulization every 6 (six) hours as needed for wheezing or shortness of breath 360 mL 1    metoprolol tartrate (LOPRESSOR) 25 mg tablet Take 1 tablet (25 mg total) by mouth every 12 (twelve) hours 180 tablet 1    pantoprazole (PROTONIX) 40 mg tablet TAKE 1 TABLET BY MOUTH TWICE A  tablet 1    rosuvastatin (CRESTOR) 10 MG tablet Take 1 tablet (10 mg total) by mouth daily 90 tablet 3    senna-docusate sodium (SENOKOT S) 8.6-50 mg per tablet Take 1 tablet by mouth daily 60 tablet 3    vitamin B-12 (VITAMIN B-12) 1,000 mcg tablet TAKE 1 TABLET BY MOUTH EVERY DAY 90 tablet 1     No current facility-administered medications on file prior to visit.     No Known Allergies       Objective   Ht 5' 1\" (1.549 m)   Wt 98.1 kg (216 lb 3.2 oz)   BMI 40.85 kg/m²          PE:  AAOx 3  WDWN  Hearing intact, no drainage from eyes  no audible wheezing  no abdominal distension  LE compartments soft, skin intact    Ortho Exam:  right Knee:   No erythema  no swelling  no effusion  no warmth  AROM: 0-115  Stable to varus/valgus stress      Large joint arthrocentesis: R knee  [place] Protocol:  procedure performed by consultantConsent: Verbal consent obtained.  Risks and benefits: risks, benefits and alternatives were discussed  Consent given by: patient  Patient understanding: patient states understanding of the procedure being performed  Site marked: the operative site was marked  Patient identity confirmed: verbally with patient  Supporting Documentation  Indications: pain   Procedure Details  Location: knee - R knee  Needle size: 22 G  Ultrasound guidance: no  Approach: anterolateral  Medications " administered: 2 mL bupivacaine 0.25 %; 40 mg triamcinolone acetonide 40 mg/mL    Patient tolerance: patient tolerated the procedure well with no immediate complications  Dressing:  Sterile dressing applied          Scribe Attestation      I,:  Rios Grey am acting as a scribe while in the presence of the attending physician.:       I,:  Elizabeth Paula DO personally performed the services described in this documentation    as scribed in my presence.:

## 2025-03-06 ENCOUNTER — APPOINTMENT (OUTPATIENT)
Dept: LAB | Facility: HOSPITAL | Age: 80
End: 2025-03-06
Payer: COMMERCIAL

## 2025-03-06 DIAGNOSIS — C34.90 NON-SMALL CELL LUNG CANCER, UNSPECIFIED LATERALITY (HCC): ICD-10-CM

## 2025-03-06 DIAGNOSIS — C79.51 MALIGNANT NEOPLASM METASTATIC TO BONE (HCC): ICD-10-CM

## 2025-03-06 LAB
ALBUMIN SERPL BCG-MCNC: 4 G/DL (ref 3.5–5)
ALP SERPL-CCNC: 43 U/L (ref 34–104)
ALT SERPL W P-5'-P-CCNC: 30 U/L (ref 7–52)
ANION GAP SERPL CALCULATED.3IONS-SCNC: 8 MMOL/L (ref 4–13)
AST SERPL W P-5'-P-CCNC: 26 U/L (ref 13–39)
BASOPHILS # BLD AUTO: 0 THOUSANDS/ÂΜL (ref 0–0.1)
BASOPHILS NFR BLD AUTO: 0 % (ref 0–1)
BILIRUB SERPL-MCNC: 0.56 MG/DL (ref 0.2–1)
BUN SERPL-MCNC: 24 MG/DL (ref 5–25)
CALCIUM SERPL-MCNC: 9.1 MG/DL (ref 8.4–10.2)
CHLORIDE SERPL-SCNC: 105 MMOL/L (ref 96–108)
CO2 SERPL-SCNC: 27 MMOL/L (ref 21–32)
CREAT SERPL-MCNC: 1.21 MG/DL (ref 0.6–1.3)
EOSINOPHIL # BLD AUTO: 0.03 THOUSAND/ÂΜL (ref 0–0.61)
EOSINOPHIL NFR BLD AUTO: 1 % (ref 0–6)
ERYTHROCYTE [DISTWIDTH] IN BLOOD BY AUTOMATED COUNT: 16.1 % (ref 11.6–15.1)
GFR SERPL CREATININE-BSD FRML MDRD: 43 ML/MIN/1.73SQ M
GLUCOSE P FAST SERPL-MCNC: 102 MG/DL (ref 65–99)
HCT VFR BLD AUTO: 36.2 % (ref 34.8–46.1)
HGB BLD-MCNC: 11.4 G/DL (ref 11.5–15.4)
IMM GRANULOCYTES # BLD AUTO: 0.03 THOUSAND/UL (ref 0–0.2)
IMM GRANULOCYTES NFR BLD AUTO: 1 % (ref 0–2)
LYMPHOCYTES # BLD AUTO: 0.91 THOUSANDS/ÂΜL (ref 0.6–4.47)
LYMPHOCYTES NFR BLD AUTO: 16 % (ref 14–44)
MCH RBC QN AUTO: 27.7 PG (ref 26.8–34.3)
MCHC RBC AUTO-ENTMCNC: 31.5 G/DL (ref 31.4–37.4)
MCV RBC AUTO: 88 FL (ref 82–98)
MONOCYTES # BLD AUTO: 0.77 THOUSAND/ÂΜL (ref 0.17–1.22)
MONOCYTES NFR BLD AUTO: 13 % (ref 4–12)
NEUTROPHILS # BLD AUTO: 4.13 THOUSANDS/ÂΜL (ref 1.85–7.62)
NEUTS SEG NFR BLD AUTO: 69 % (ref 43–75)
NRBC BLD AUTO-RTO: 0 /100 WBCS
PLATELET # BLD AUTO: 78 THOUSANDS/UL (ref 149–390)
POTASSIUM SERPL-SCNC: 3.9 MMOL/L (ref 3.5–5.3)
PROT SERPL-MCNC: 6.5 G/DL (ref 6.4–8.4)
RBC # BLD AUTO: 4.12 MILLION/UL (ref 3.81–5.12)
SODIUM SERPL-SCNC: 140 MMOL/L (ref 135–147)
WBC # BLD AUTO: 5.87 THOUSAND/UL (ref 4.31–10.16)

## 2025-03-06 PROCEDURE — 85025 COMPLETE CBC W/AUTO DIFF WBC: CPT

## 2025-03-06 PROCEDURE — 80053 COMPREHEN METABOLIC PANEL: CPT

## 2025-03-06 PROCEDURE — 36415 COLL VENOUS BLD VENIPUNCTURE: CPT

## 2025-03-07 ENCOUNTER — HOSPITAL ENCOUNTER (OUTPATIENT)
Dept: INFUSION CENTER | Facility: HOSPITAL | Age: 80
End: 2025-03-07
Attending: INTERNAL MEDICINE
Payer: COMMERCIAL

## 2025-03-07 DIAGNOSIS — C79.51 MALIGNANT NEOPLASM METASTATIC TO BONE (HCC): ICD-10-CM

## 2025-03-07 DIAGNOSIS — C34.90 NON-SMALL CELL LUNG CANCER, UNSPECIFIED LATERALITY (HCC): Primary | ICD-10-CM

## 2025-03-07 PROCEDURE — 96372 THER/PROPH/DIAG INJ SC/IM: CPT

## 2025-03-07 RX ADMIN — DENOSUMAB 120 MG: 120 INJECTION SUBCUTANEOUS at 12:58

## 2025-03-07 NOTE — PROGRESS NOTES
Pt arrives on unit for Xgeva. CrClr 37.4 and calcium 9.1. Pt tolerated Xgeva sq in the left upper arm. AVS provided, next apt 4/4/25 @1:00. Left unit ambulatory with a steady gait.

## 2025-03-12 ENCOUNTER — TELEPHONE (OUTPATIENT)
Age: 80
End: 2025-03-12

## 2025-03-12 NOTE — TELEPHONE ENCOUNTER
Pt calling for NP appt for cyst on chest and SOC on face    Pt stated the cyst is located on her chest, has been present for a few years, has expressed contents in the past. Currently its red/inflamed, very painful. She also mentioned her white blood cell count was elevated on a recent test. I explained to pt cysts can become infected and its important she receives treatment quickly to rule out possible infection. I advised pt currently NP slots are booking into Dec, not appropriate for her to wait that long. Advised I can tfr pt to [facility] to check on availability to have cyst removed, but its important she also calls her PCP/goes to ED or Urgent Care for possible abx in meantime. Pt understood    Pt also mentioned she has a SOC on her face she would like checked/removed. Offered to schedule her with Plastics as we have much sooner availability. Pt accepted appt next week w/[facility] at [facility].

## 2025-03-13 ENCOUNTER — OFFICE VISIT (OUTPATIENT)
Dept: SURGERY | Facility: CLINIC | Age: 80
End: 2025-03-13
Payer: COMMERCIAL

## 2025-03-13 VITALS
HEART RATE: 74 BPM | DIASTOLIC BLOOD PRESSURE: 80 MMHG | OXYGEN SATURATION: 98 % | WEIGHT: 217 LBS | RESPIRATION RATE: 16 BRPM | SYSTOLIC BLOOD PRESSURE: 142 MMHG | TEMPERATURE: 98.7 F | BODY MASS INDEX: 40.97 KG/M2 | HEIGHT: 61 IN

## 2025-03-13 DIAGNOSIS — L08.9 INFECTED CYST OF SKIN: Primary | ICD-10-CM

## 2025-03-13 DIAGNOSIS — I48.92 ATRIAL FLUTTER, UNSPECIFIED TYPE (HCC): ICD-10-CM

## 2025-03-13 DIAGNOSIS — L72.9 INFECTED CYST OF SKIN: Primary | ICD-10-CM

## 2025-03-13 PROCEDURE — 99203 OFFICE O/P NEW LOW 30 MIN: CPT

## 2025-03-13 RX ORDER — SULFAMETHOXAZOLE AND TRIMETHOPRIM 800; 160 MG/1; MG/1
1 TABLET ORAL EVERY 12 HOURS SCHEDULED
Qty: 14 TABLET | Refills: 0 | Status: SHIPPED | OUTPATIENT
Start: 2025-03-13 | End: 2025-03-20

## 2025-03-13 RX ORDER — SENNOSIDES 8.6 MG
650 CAPSULE ORAL EVERY 8 HOURS PRN
COMMUNITY
Start: 2025-02-27

## 2025-03-13 NOTE — PROGRESS NOTES
"Name: Jayla Moody      : 1945      MRN: [id number]  Encounter Provider: WILDA Tapia  Encounter Date: 3/13/2025   Encounter department: [facility] [place]  :  Assessment & Plan  Infected cyst of skin  Patient has an inflamed cyst on her mid to left chest, painful to the touch. Consulted with Dr. Busby. With patient being on elliquis, will prescribe antibiotic to start today and have patient stop her Elliquis for 3 days. Will have her return to office on Monday to be reevaluated to determine if needs an I&D or not and if antibiotic helped.   Orders:    sulfamethoxazole-trimethoprim (BACTRIM DS) 800-160 mg per tablet; Take 1 tablet by mouth every 12 (twelve) hours for 7 days    Atrial flutter, unspecified type (HCC)  Currently on Elliquis           History of Present Illness   HPI  Jayla Moody is a 79 y.o. female who presents with infected cyst on her chest. She has had it for years. Started out as a little blackhead/pimple. Recently within the past week, it started to become inflamed and painful to the touch. Has not opened or drained. Denies fever, chills.       Review of Systems   Constitutional:  Negative for chills and fever.   Eyes:  Negative for pain and visual disturbance.   Respiratory:  Negative for cough and shortness of breath.    Cardiovascular:  Negative for chest pain and palpitations.   Gastrointestinal:  Negative for abdominal pain and vomiting.   Genitourinary:  Negative for dysuria and hematuria.   Musculoskeletal:  Negative for arthralgias and back pain.   Skin:  Negative for color change and rash.   Neurological:  Negative for seizures and syncope.   All other systems reviewed and are negative.         Objective   /80 (BP Location: Left arm, Patient Position: Sitting, Cuff Size: Large)   Pulse 74   Temp 98.7 °F (37.1 °C) (Tympanic)   Resp 16   Ht 5' 1\" (1.549 m)   Wt 98.4 kg (217 lb)   SpO2 98%   BMI 41.00 kg/m²      Physical " Exam  Vitals and nursing note reviewed.   Constitutional:       General: She is not in acute distress.     Appearance: She is well-developed.   HENT:      Head: Normocephalic and atraumatic.   Eyes:      Conjunctiva/sclera: Conjunctivae normal.   Cardiovascular:      Rate and Rhythm: Normal rate and regular rhythm.      Heart sounds: No murmur heard.  Pulmonary:      Effort: Pulmonary effort is normal. No respiratory distress.      Breath sounds: Normal breath sounds.   Abdominal:      Palpations: Abdomen is soft.      Tenderness: There is no abdominal tenderness.   Musculoskeletal:         General: No swelling.      Cervical back: Neck supple.   Skin:     General: Skin is warm and dry.      Capillary Refill: Capillary refill takes less than 2 seconds.             Comments: 2.5cm x 2.5cm round area with a black center pore with erythema. No fluctuance. No drainage. Painful to touch.    Neurological:      Mental Status: She is alert and oriented to person, place, and time.   Psychiatric:         Mood and Affect: Mood normal.

## 2025-03-14 NOTE — PROGRESS NOTES
Name: Jayla Moody      : 1945      MRN: [id number]  Encounter Provider: WILDA Tapia  Encounter Date: 3/17/2025   Encounter department: [facility] [place]  :  Assessment & Plan  Infected cyst of skin  Inflamed cyst on chest has improved. No need for I&D at this time. Advised patient to finish out antibiotic. Instructed to resume her Elliquis today or tomorrow. Will have her follow up in 2-3 months to discuss cyst removal after cyst wall has healed.           History of Present Illness   HPI  Jayla Moody is a 79 y.o. female who presents with infected cyst on her chest. She has had it for years. Started out as a little blackhead/pimple. Recently within the past week, it started to become inflamed and painful to the touch. Has not opened or drained. Denies fever, chills.     2025: Patient is doing well. Cyst has gotten better. Less erythema. No pain. States it may have opened during the night while she was sleeping. She woke up with some fluid on her shirt near her chest/cyst area. Still has a few days of antibiotic left. Denies fever, chills.       Review of Systems   Constitutional:  Negative for chills and fever.   Eyes:  Negative for pain and visual disturbance.   Respiratory:  Negative for cough and shortness of breath.    Cardiovascular:  Negative for chest pain and palpitations.   Gastrointestinal:  Negative for abdominal pain and vomiting.   Genitourinary:  Negative for dysuria and hematuria.   Musculoskeletal:  Negative for arthralgias and back pain.   Skin:  Negative for color change and rash.   Neurological:  Negative for seizures and syncope.   All other systems reviewed and are negative.         Objective   There were no vitals taken for this visit.     Physical Exam  Vitals and nursing note reviewed.   Constitutional:       General: She is not in acute distress.     Appearance: She is well-developed.   HENT:      Head: Normocephalic and atraumatic.    Eyes:      Conjunctiva/sclera: Conjunctivae normal.   Cardiovascular:      Rate and Rhythm: Normal rate and regular rhythm.      Heart sounds: No murmur heard.  Pulmonary:      Effort: Pulmonary effort is normal. No respiratory distress.      Breath sounds: Normal breath sounds.   Abdominal:      Palpations: Abdomen is soft.      Tenderness: There is no abdominal tenderness.   Musculoskeletal:         General: No swelling.      Cervical back: Neck supple.   Skin:     General: Skin is warm and dry.      Capillary Refill: Capillary refill takes less than 2 seconds.             Comments: 2cm round area with a black center pore. Slightly reddened. No drainage. No pain to touch. No signs of infection.   Neurological:      Mental Status: She is alert and oriented to person, place, and time.   Psychiatric:         Mood and Affect: Mood normal.

## 2025-03-17 ENCOUNTER — OFFICE VISIT (OUTPATIENT)
Dept: SURGERY | Facility: CLINIC | Age: 80
End: 2025-03-17
Payer: COMMERCIAL

## 2025-03-17 VITALS
HEIGHT: 61 IN | DIASTOLIC BLOOD PRESSURE: 78 MMHG | RESPIRATION RATE: 16 BRPM | OXYGEN SATURATION: 95 % | HEART RATE: 73 BPM | WEIGHT: 217 LBS | SYSTOLIC BLOOD PRESSURE: 122 MMHG | TEMPERATURE: 98 F | BODY MASS INDEX: 40.97 KG/M2

## 2025-03-17 DIAGNOSIS — L08.9 INFECTED CYST OF SKIN: Primary | ICD-10-CM

## 2025-03-17 DIAGNOSIS — L72.9 INFECTED CYST OF SKIN: Primary | ICD-10-CM

## 2025-03-17 PROCEDURE — 99212 OFFICE O/P EST SF 10 MIN: CPT

## 2025-03-23 DIAGNOSIS — I48.92 ATRIAL FLUTTER (HCC): ICD-10-CM

## 2025-03-23 DIAGNOSIS — I10 ESSENTIAL (PRIMARY) HYPERTENSION: ICD-10-CM

## 2025-03-24 RX ORDER — METOPROLOL TARTRATE 25 MG/1
25 TABLET, FILM COATED ORAL 2 TIMES DAILY
Qty: 180 TABLET | Refills: 1 | Status: SHIPPED | OUTPATIENT
Start: 2025-03-24

## 2025-03-24 RX ORDER — FUROSEMIDE 20 MG/1
20 TABLET ORAL DAILY
Qty: 90 TABLET | Refills: 1 | Status: SHIPPED | OUTPATIENT
Start: 2025-03-24 | End: 2025-03-27

## 2025-03-25 ENCOUNTER — HOSPITAL ENCOUNTER (OUTPATIENT)
Dept: MAMMOGRAPHY | Facility: CLINIC | Age: 80
Discharge: HOME | End: 2025-03-25
Payer: COMMERCIAL

## 2025-03-25 DIAGNOSIS — N63.10 MASS OF BOTH BREASTS ON MAMMOGRAM: ICD-10-CM

## 2025-03-25 DIAGNOSIS — N63.20 MASS OF BOTH BREASTS ON MAMMOGRAM: ICD-10-CM

## 2025-03-25 PROCEDURE — 76642 ULTRASOUND BREAST LIMITED: CPT

## 2025-03-26 NOTE — PROGRESS NOTES
General Cardiology - Outpatient Progress Note   Jayla Moody 79 y.o. female   MRN: [id number]  @ Encounter: [id number]    Michelle Chatterjee MD  Consults    Assessment & Plan     Jayla Moody is a 78 y.o. year old female with PMH of 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 on Alectanib and benralizumab, HFpEF, paroxsymal atrial flutter on amiodarone,  HTN, mild persistent asthma, MONO unable to tolerate CPAP, GERD, CKD, hx of GI bleed, here for follow up.       #Dyspnea on exertion  Patient has chronic dyspnea on exertion which limits her from walking very far on short grounds and she cannot walk up a flight of stairs without having to stop and take a break.  She has multiple reasons to feel dyspnea including lung cancer, HFpEF, asthma.  She has not been worked up for CAD and has multiple CAD risk factors and significant coronary artery calcifications on CT chest imaging.  She also has significant mitral calcifications.  Repeat TTE and pharm nuc were ordered last visit but she did not get the tests done.  Will arrange to have them scheduled.      #Aflutter  New diagnosis during August 2023 admission for asthma. [id number]. She was started on amiodarone and eliquis during the hospitalization.  Zio monitoring afterwards showed <1% fib/flutter.  She has had good rhythm control with amiodarone.  I have discussed the side effects of amiodarone in detail with the patient and today we discussed the options about discontinuing amiodarone and monitoring for symptoms of recurrent flutter which may come back.  At this point she would like to discontinue amiodarone and monitor for symptoms (she was symptomatic when in flutter).  If flutter comes back could conisder restarting amiodarone vs. Ablation. .  -Stop amiodarone.   -continue apixaban 5mg BID  -continue metoprolol 25mg BID     #HFpEF  Currently euvolemic. Has been on lasix in the past but it was dicontinued and she is maintaining euvolemia.      #coronary artery  "calcifications  #HLD: recheck lipids.  Last ldl not at goal. Continue rosuvastatin 10mg.      #HTN Continue metoprolol 25mg BID.      #Stage 4 lung cancer: right hilar mass and diffuse osseous metastasis.  On denosumab. Alectinib is on hold.  and benralizumab.  Patient reports she is getting CT imaging in the upcoming weeks to monitor for response to therapy.     #CKD  Cr improved since last visit.  CKD likely from alectinib.      #GI bleed: admitted with hgb of 4.3. 7/2024: now has been tolerating eliquis since then. Most recent hgb 11.4.      Subjective: she is still having shortness of breath on exertion but no worsening.  She has been taken off the lasix and doing well. No leg swelling or orthopnea. Denies any chest pain just pretty significant dyspnea on exertion.     Objective:  Review of Systems  Review of system was conducted and was negative except for as stated in the interval history    Physical Exam:  Vitals: Blood pressure 132/76, pulse 78, height 5' 1\" (1.549 m), weight 99.1 kg (218 lb 6.4 oz), not currently breastfeeding., Body mass index is 41.27 kg/m².    GEN: Jayla Moody appears well, alert and oriented x 3, pleasant and cooperative   HEENT:  Normocephalic, atraumatic, anicteric, moist mucous membranes  NECK:  no JVD or carotid bruits   HEART: regular rate, regular rhythm, normal S1 and S2, no murmurs, clicks, gallops or rubs   LUNGS: Clear to auscultation bilaterally; no wheezes, rales, or rhonchi; respiration nonlabored   ABDOMEN:  Normoactive bowel sounds, soft, no tenderness, no distention  EXTREMITIES: radial pulses are palpable; no edema  NEURO: no gross focal findings  SKIN:  Dry, intact, warm to touch    Home Medications: Not in a hospital admission.    Current Outpatient Medications:     acetaminophen (TYLENOL) 650 mg CR tablet, Take 650 mg by mouth every 8 (eight) hours as needed, Disp: , Rfl:     albuterol (2.5 mg/3 mL) 0.083 % nebulizer solution, Take 3 mL (2.5 mg total) by " nebulization 2 (two) times a day, Disp: 180 mL, Rfl: 11    albuterol (PROVENTIL HFA,VENTOLIN HFA) 90 mcg/act inhaler, Inhale 2 puffs every 6 (six) hours as needed for wheezing, Disp: 18 g, Rfl: 2    apixaban (ELIQUIS) 5 mg, Take 1 tablet (5 mg total) by mouth 2 (two) times a day, Disp: 60 tablet, Rfl: 11    benralizumab (FASENRA) subcutaneous injection, Inject 1 mL (30 mg total) under the skin every 56 days, Disp: 1 mL, Rfl: 6    Budeson-Glycopyrrol-Formoterol (Breztri Aerosphere) 160-9-4.8 MCG/ACT AERO, Inhale 2 puffs 2 (two) times a day Rinse mouth after use., Disp: 10.7 g, Rfl: 11    calcitriol (ROCALTROL) 0.25 mcg capsule, Take 1 capsule (0.25 mcg total) by mouth 3 (three) times a week On Monday , Wednesday, friday, Disp: 45 capsule, Rfl: 3    fluticasone (FLONASE) 50 mcg/act nasal spray, SPRAY 2 SPRAYS INTO EACH NOSTRIL EVERY DAY, Disp: 48 mL, Rfl: 1    ipratropium-albuterol (DUO-NEB) 0.5-2.5 mg/3 mL nebulizer solution, Take 3 mL by nebulization every 6 (six) hours as needed for wheezing or shortness of breath, Disp: 360 mL, Rfl: 1    levocetirizine (XYZAL) 5 MG tablet, Take 1 tablet (5 mg total) by mouth every evening, Disp: 30 tablet, Rfl: 11    metoprolol tartrate (LOPRESSOR) 25 mg tablet, TAKE 1 TABLET (25 MG TOTAL) BY MOUTH EVERY 12 (TWELVE) HOURS, Disp: 180 tablet, Rfl: 1    rosuvastatin (CRESTOR) 10 MG tablet, Take 1 tablet (10 mg total) by mouth daily, Disp: 90 tablet, Rfl: 3    senna-docusate sodium (SENOKOT S) 8.6-50 mg per tablet, Take 1 tablet by mouth daily, Disp: 60 tablet, Rfl: 3    vitamin B-12 (VITAMIN B-12) 1,000 mcg tablet, TAKE 1 TABLET BY MOUTH EVERY DAY, Disp: 90 tablet, Rfl: 1    pantoprazole (PROTONIX) 40 mg tablet, TAKE 1 TABLET BY MOUTH TWICE A DAY (Patient not taking: Reported on 3/13/2025), Disp: 180 tablet, Rfl: 1      EKG:  Date: NSR. Normal QTC. HR 72 bpm    Case discussed and reviewed with Dr. Ware who agrees with my assessment and plan.    Moise Pimentel MD  Cardiology  Fellow   PGY-6

## 2025-03-27 ENCOUNTER — OFFICE VISIT (OUTPATIENT)
Dept: CARDIOLOGY CLINIC | Facility: CLINIC | Age: 80
End: 2025-03-27
Payer: COMMERCIAL

## 2025-03-27 VITALS
DIASTOLIC BLOOD PRESSURE: 76 MMHG | HEIGHT: 61 IN | HEART RATE: 78 BPM | SYSTOLIC BLOOD PRESSURE: 132 MMHG | WEIGHT: 218.4 LBS | BODY MASS INDEX: 41.23 KG/M2

## 2025-03-27 DIAGNOSIS — I48.3 TYPICAL ATRIAL FLUTTER (HCC): ICD-10-CM

## 2025-03-27 DIAGNOSIS — E66.812 CLASS 2 SEVERE OBESITY DUE TO EXCESS CALORIES WITH SERIOUS COMORBIDITY AND BODY MASS INDEX (BMI) OF 39.0 TO 39.9 IN ADULT (HCC): ICD-10-CM

## 2025-03-27 DIAGNOSIS — E66.01 CLASS 2 SEVERE OBESITY DUE TO EXCESS CALORIES WITH SERIOUS COMORBIDITY AND BODY MASS INDEX (BMI) OF 39.0 TO 39.9 IN ADULT (HCC): ICD-10-CM

## 2025-03-27 DIAGNOSIS — N18.32 HYPERTENSIVE KIDNEY DISEASE WITH STAGE 3B CHRONIC KIDNEY DISEASE (HCC): ICD-10-CM

## 2025-03-27 DIAGNOSIS — E78.2 MIXED HYPERLIPIDEMIA: Primary | ICD-10-CM

## 2025-03-27 DIAGNOSIS — I12.9 HYPERTENSIVE KIDNEY DISEASE WITH STAGE 3B CHRONIC KIDNEY DISEASE (HCC): ICD-10-CM

## 2025-03-27 PROCEDURE — 99214 OFFICE O/P EST MOD 30 MIN: CPT | Performed by: INTERNAL MEDICINE

## 2025-03-27 PROCEDURE — G2211 COMPLEX E/M VISIT ADD ON: HCPCS | Performed by: INTERNAL MEDICINE

## 2025-03-27 PROCEDURE — 93000 ELECTROCARDIOGRAM COMPLETE: CPT | Performed by: INTERNAL MEDICINE

## 2025-04-02 ENCOUNTER — APPOINTMENT (OUTPATIENT)
Dept: LAB | Facility: CLINIC | Age: 80
End: 2025-04-02
Payer: COMMERCIAL

## 2025-04-02 ENCOUNTER — RESULTS FOLLOW-UP (OUTPATIENT)
Dept: NEPHROLOGY | Facility: CLINIC | Age: 80
End: 2025-04-02

## 2025-04-02 DIAGNOSIS — Z91.89 AT RISK FOR NUTRITION PROBLEM: ICD-10-CM

## 2025-04-02 DIAGNOSIS — E83.9 CHRONIC KIDNEY DISEASE-MINERAL AND BONE DISORDER (CKD-MBD): ICD-10-CM

## 2025-04-02 DIAGNOSIS — I12.9 HYPERTENSIVE KIDNEY DISEASE WITH STAGE 3B CHRONIC KIDNEY DISEASE (HCC): ICD-10-CM

## 2025-04-02 DIAGNOSIS — M89.9 CHRONIC KIDNEY DISEASE-MINERAL AND BONE DISORDER (CKD-MBD): ICD-10-CM

## 2025-04-02 DIAGNOSIS — C79.51 MALIGNANT NEOPLASM METASTATIC TO BONE (HCC): ICD-10-CM

## 2025-04-02 DIAGNOSIS — E78.2 MIXED HYPERLIPIDEMIA: ICD-10-CM

## 2025-04-02 DIAGNOSIS — C34.90 NON-SMALL CELL LUNG CANCER, UNSPECIFIED LATERALITY (HCC): ICD-10-CM

## 2025-04-02 DIAGNOSIS — N18.9 CHRONIC KIDNEY DISEASE-MINERAL AND BONE DISORDER (CKD-MBD): ICD-10-CM

## 2025-04-02 DIAGNOSIS — N18.32 HYPERTENSIVE KIDNEY DISEASE WITH STAGE 3B CHRONIC KIDNEY DISEASE (HCC): ICD-10-CM

## 2025-04-02 DIAGNOSIS — N18.32 ANEMIA DUE TO STAGE 3B CHRONIC KIDNEY DISEASE  (HCC): ICD-10-CM

## 2025-04-02 DIAGNOSIS — E66.812 CLASS 2 SEVERE OBESITY DUE TO EXCESS CALORIES WITH SERIOUS COMORBIDITY AND BODY MASS INDEX (BMI) OF 39.0 TO 39.9 IN ADULT (HCC): ICD-10-CM

## 2025-04-02 DIAGNOSIS — E66.01 CLASS 2 SEVERE OBESITY DUE TO EXCESS CALORIES WITH SERIOUS COMORBIDITY AND BODY MASS INDEX (BMI) OF 39.0 TO 39.9 IN ADULT (HCC): ICD-10-CM

## 2025-04-02 DIAGNOSIS — R80.1 PERSISTENT PROTEINURIA: ICD-10-CM

## 2025-04-02 DIAGNOSIS — D63.1 ANEMIA DUE TO STAGE 3B CHRONIC KIDNEY DISEASE  (HCC): ICD-10-CM

## 2025-04-02 LAB
25(OH)D3 SERPL-MCNC: 47.4 NG/ML (ref 30–100)
ALBUMIN SERPL BCG-MCNC: 4.2 G/DL (ref 3.5–5)
ALP SERPL-CCNC: 49 U/L (ref 34–104)
ALT SERPL W P-5'-P-CCNC: 33 U/L (ref 7–52)
ANION GAP SERPL CALCULATED.3IONS-SCNC: 7 MMOL/L (ref 4–13)
AST SERPL W P-5'-P-CCNC: 30 U/L (ref 13–39)
BILIRUB SERPL-MCNC: 0.55 MG/DL (ref 0.2–1)
BUN SERPL-MCNC: 22 MG/DL (ref 5–25)
CA-I BLD-SCNC: 1.14 MMOL/L (ref 1.12–1.32)
CALCIUM SERPL-MCNC: 9.1 MG/DL (ref 8.4–10.2)
CHLORIDE SERPL-SCNC: 105 MMOL/L (ref 96–108)
CHOLEST SERPL-MCNC: 157 MG/DL (ref ?–200)
CO2 SERPL-SCNC: 29 MMOL/L (ref 21–32)
CREAT SERPL-MCNC: 1.19 MG/DL (ref 0.6–1.3)
CREAT UR-MCNC: 101 MG/DL
GFR SERPL CREATININE-BSD FRML MDRD: 44 ML/MIN/1.73SQ M
GLUCOSE P FAST SERPL-MCNC: 96 MG/DL (ref 65–99)
HDLC SERPL-MCNC: 66 MG/DL
LDLC SERPL CALC-MCNC: 67 MG/DL (ref 0–100)
LDLC SERPL DIRECT ASSAY-MCNC: 69 MG/DL (ref 0–100)
MAGNESIUM SERPL-MCNC: 2.5 MG/DL (ref 1.9–2.7)
MICROALBUMIN UR-MCNC: 25.3 MG/L
MICROALBUMIN/CREAT 24H UR: 25 MG/G CREATININE (ref 0–30)
NONHDLC SERPL-MCNC: 91 MG/DL
PHOSPHATE SERPL-MCNC: 3.8 MG/DL (ref 2.3–4.1)
POTASSIUM SERPL-SCNC: 4.3 MMOL/L (ref 3.5–5.3)
PROT SERPL-MCNC: 7 G/DL (ref 6.4–8.4)
PTH-INTACT SERPL-MCNC: 103.6 PG/ML (ref 12–88)
SODIUM SERPL-SCNC: 141 MMOL/L (ref 135–147)
TRIGL SERPL-MCNC: 121 MG/DL (ref ?–150)

## 2025-04-02 PROCEDURE — 82330 ASSAY OF CALCIUM: CPT

## 2025-04-02 PROCEDURE — 83970 ASSAY OF PARATHORMONE: CPT

## 2025-04-02 PROCEDURE — 84100 ASSAY OF PHOSPHORUS: CPT

## 2025-04-02 PROCEDURE — 82306 VITAMIN D 25 HYDROXY: CPT

## 2025-04-02 PROCEDURE — 83721 ASSAY OF BLOOD LIPOPROTEIN: CPT

## 2025-04-02 PROCEDURE — 83735 ASSAY OF MAGNESIUM: CPT

## 2025-04-02 PROCEDURE — 80053 COMPREHEN METABOLIC PANEL: CPT

## 2025-04-02 PROCEDURE — 82570 ASSAY OF URINE CREATININE: CPT

## 2025-04-02 PROCEDURE — 82043 UR ALBUMIN QUANTITATIVE: CPT

## 2025-04-02 PROCEDURE — 80061 LIPID PANEL: CPT

## 2025-04-02 PROCEDURE — 36415 COLL VENOUS BLD VENIPUNCTURE: CPT

## 2025-04-04 ENCOUNTER — HOSPITAL ENCOUNTER (OUTPATIENT)
Dept: INFUSION CENTER | Facility: HOSPITAL | Age: 80
End: 2025-04-04
Attending: INTERNAL MEDICINE
Payer: COMMERCIAL

## 2025-04-04 DIAGNOSIS — C79.51 MALIGNANT NEOPLASM METASTATIC TO BONE (HCC): ICD-10-CM

## 2025-04-04 DIAGNOSIS — C34.90 NON-SMALL CELL LUNG CANCER, UNSPECIFIED LATERALITY (HCC): Primary | ICD-10-CM

## 2025-04-04 PROCEDURE — 96372 THER/PROPH/DIAG INJ SC/IM: CPT

## 2025-04-04 RX ADMIN — DENOSUMAB 120 MG: 120 INJECTION SUBCUTANEOUS at 12:56

## 2025-04-04 NOTE — PROGRESS NOTES
Pt tolerated Xgeva injection to R arm without difficulty.  CrCl 38.2 and Ca 9.1.  Confirmed next appt on 5/2 at 1300.  AVS declined.  Left ambulatory in stable condition.

## 2025-04-21 ENCOUNTER — NURSE TRIAGE (OUTPATIENT)
Age: 80
End: 2025-04-21

## 2025-04-21 NOTE — TELEPHONE ENCOUNTER
"FOLLOW UP: scheduled pt visit 5/16 with Carlene Delgado PA-C. Advised pt ED if symptoms worsen.    REASON FOR CONVERSATION: Palpitations    SYMPTOMS: pt started having return of palpitations last week.  Her amiodarone was discontinued at visit 3/27 with Dr. Pimentel.  Pt says she resumed amiodarone 200 mg daily due to palpitations. Yesterday they lasted from 8 am - 5 pm. HR as high as 112 bpm.  Today HR 80, and she has not felt palpitations. She reports intermittent sob.     DISPOSITION: Discuss with Provider and Call Back Patient, See Today in Office      Reason for Disposition   History of heart disease (i.e., heart attack, bypass surgery, angina, angioplasty)  (Exception: Brief heartbeat symptoms that went away and now feels well.)    Answer Assessment - Initial Assessment Questions  1. DESCRIPTION: \"Please describe your heart rate or heartbeat that you are having\" (e.g., fast/slow, regular/irregular, skipped or extra beats, \"palpitations\")      Palpitations   2. ONSET: \"When did it start?\" (e.g., minutes, hours, days)       Last week  3. DURATION: \"How long does it last\" (e.g., seconds, minutes, hours)      Yesterday lasted 8am-5pm  4. PATTERN \"Does it come and go, or has it been constant since it started?\"  \"Does it get worse with exertion?\"   \"Are you feeling it now?\"      Comes and goes  6. HEART RATE: \"Can you tell me your heart rate?\" \"How many beats in 15 seconds?\"  Note: Not all patients can do this.        Yesterday 112 bpm, trending 100's. 80 bpm today   7. RECURRENT SYMPTOM: \"Have you ever had this before?\" If Yes, ask: \"When was the last time?\" and \"What happened that time?\"       Yes, was taking amiodarone   8. CAUSE: \"What do you think is causing the palpitations?\"      Aflutter   9. CARDIAC HISTORY: \"Do you have any history of heart disease?\" (e.g., heart attack, angina, bypass surgery, angioplasty, arrhythmia)       Atrial flutter  10. OTHER SYMPTOMS: \"Do you have any other symptoms?\" (e.g., " dizziness, chest pain, sweating, difficulty breathing)        Sob, back pain, nausea on Friday    Protocols used: Heart Rate and Heartbeat Questions-Adult-[place]

## 2025-04-22 NOTE — TELEPHONE ENCOUNTER
Placed call to patient to schedule NV for EKG. No answer. Left detailed message for patient to return call and schedule NV.     Ok to discuss with CTS.

## 2025-04-23 ENCOUNTER — TELEPHONE (OUTPATIENT)
Dept: CARDIOLOGY CLINIC | Facility: CLINIC | Age: 80
End: 2025-04-23

## 2025-04-24 ENCOUNTER — OFFICE VISIT (OUTPATIENT)
Dept: NEPHROLOGY | Facility: CLINIC | Age: 80
End: 2025-04-24
Payer: COMMERCIAL

## 2025-04-24 VITALS
SYSTOLIC BLOOD PRESSURE: 142 MMHG | WEIGHT: 221 LBS | DIASTOLIC BLOOD PRESSURE: 90 MMHG | HEIGHT: 61 IN | BODY MASS INDEX: 41.72 KG/M2

## 2025-04-24 DIAGNOSIS — D63.1 ANEMIA DUE TO STAGE 3B CHRONIC KIDNEY DISEASE  (HCC): ICD-10-CM

## 2025-04-24 DIAGNOSIS — I12.9 HYPERTENSIVE KIDNEY DISEASE WITH STAGE 3B CHRONIC KIDNEY DISEASE (HCC): Primary | ICD-10-CM

## 2025-04-24 DIAGNOSIS — E66.01 CLASS 2 SEVERE OBESITY DUE TO EXCESS CALORIES WITH SERIOUS COMORBIDITY AND BODY MASS INDEX (BMI) OF 39.0 TO 39.9 IN ADULT (HCC): ICD-10-CM

## 2025-04-24 DIAGNOSIS — M89.9 CHRONIC KIDNEY DISEASE-MINERAL AND BONE DISORDER (CKD-MBD): ICD-10-CM

## 2025-04-24 DIAGNOSIS — N18.32 ANEMIA DUE TO STAGE 3B CHRONIC KIDNEY DISEASE  (HCC): ICD-10-CM

## 2025-04-24 DIAGNOSIS — E78.2 MIXED HYPERLIPIDEMIA: ICD-10-CM

## 2025-04-24 DIAGNOSIS — N18.32 HYPERTENSIVE KIDNEY DISEASE WITH STAGE 3B CHRONIC KIDNEY DISEASE (HCC): Primary | ICD-10-CM

## 2025-04-24 DIAGNOSIS — E83.9 CHRONIC KIDNEY DISEASE-MINERAL AND BONE DISORDER (CKD-MBD): ICD-10-CM

## 2025-04-24 DIAGNOSIS — R80.1 PERSISTENT PROTEINURIA: ICD-10-CM

## 2025-04-24 DIAGNOSIS — E66.812 CLASS 2 SEVERE OBESITY DUE TO EXCESS CALORIES WITH SERIOUS COMORBIDITY AND BODY MASS INDEX (BMI) OF 39.0 TO 39.9 IN ADULT (HCC): ICD-10-CM

## 2025-04-24 DIAGNOSIS — N18.9 CHRONIC KIDNEY DISEASE-MINERAL AND BONE DISORDER (CKD-MBD): ICD-10-CM

## 2025-04-24 PROCEDURE — 99214 OFFICE O/P EST MOD 30 MIN: CPT | Performed by: INTERNAL MEDICINE

## 2025-04-24 RX ORDER — CALCITRIOL 0.25 UG/1
CAPSULE, LIQUID FILLED ORAL
Qty: 60 CAPSULE | Refills: 3 | Status: SHIPPED | OUTPATIENT
Start: 2025-04-25

## 2025-04-24 RX ORDER — LISINOPRIL 5 MG/1
5 TABLET ORAL DAILY
Qty: 90 TABLET | Refills: 3 | Status: SHIPPED | OUTPATIENT
Start: 2025-04-24

## 2025-04-24 NOTE — ASSESSMENT & PLAN NOTE
Orders:  •  calcitriol (ROCALTROL) 0.25 mcg capsule; Take one tablet five days a week Do not start before April 25, 2025.

## 2025-04-24 NOTE — ASSESSMENT & PLAN NOTE
Lab Results   Component Value Date    EGFR 44 04/02/2025    EGFR 43 03/06/2025    EGFR 47 02/01/2025    CREATININE 1.19 04/02/2025    CREATININE 1.21 03/06/2025    CREATININE 1.12 02/01/2025     BP Readings from Last 3 Encounters:   04/24/25 142/90   03/27/25 132/76   03/24/25 133/78   Current medications: Toprol-XL 25 mg p.o. twice daily, Lasix 20 mg p.o. as needed for weight gain and no longer on it  Changes made today: Start lisinopril 5 mg p.o. daily check blood work in 2 weeks call with blood pressure update in 2 weeks  Goal BP of < 130/80 based on age and comorbidities  Instructed to follow low sodium (2gm)diet.  Advised to hold ACEI/ARBs if patient suffers from dehydration due to gastrointestinal losses due to risk of CARLOS ENRIQUE secondary to failure to autoregulate.  after review of records In [facility] as well as Care everywhere patient had a baseline creatinine of 1-1.3 mg/dL dating as far back as 2021 however since August 2023 new baseline had been around 1.6 to 1.9 mg/dL however since July 2024 new baseline has been around 1.1 to 1.5 mg/dL  Most recent labs show a Creatinine of 1.19 mg/dL on 4/2/25. Renal function remains stable.  Blood work in 3 months and then again prior to next visit  Likely has underlying CKD due to hypertensive nephrosclerosis plus obesity due to hyperfiltration plus cardiorenal syndrome plus smoking associated nephropathy plus episode of acute kidney injury nondialysis requiring.  Patient continues to be at risk for CARLOS ENRIQUE secondary to use of alectinib, currently on hold.  Imaging:   Renal ultrasound 8/12/2023 bilateral kidneys approximately 11 cm no hydronephrosis or cysts.Recommend to avoid use of NSAIDs, nephrotoxins. Caution advised with regards to exposure to IV contrast dye.   Discussed with the patient in depth her renal status, including the possible etiologies for CKD.   Advised the patient that when her GFR is close to 20mL/min then will start discussing about RRT(renal replacement  therapy) options such as renal transplant, peritoneal dialysis and hemodialysis.   Informed the patient about the various options for Renal Replacement therapy.  Discussed with the patient how we need to work together to delay the progression of CKD with optimal BP control based on their age and co-morbidities, optimal BS control with HbA1c of <7% and trying to reduce proteinuria by the use of anti-proteinuric agents.   CKD education/Kidney smart: referral placed 5/21/24.  Referral for advanced care planning placed 4/24/25  Follow-up: Patient is to follow-up in 6 months, with lab work to be performed in 2 weeks and then again in 3months a few days prior to the next visit. Advised patient to call me in 10 days to review the results if they do not hear back from me, as I may have not received the results.    Orders:  •  lisinopril (ZESTRIL) 5 mg tablet; Take 1 tablet (5 mg total) by mouth daily  •  Renal function panel; Future  •  PTH, intact; Future  •  Renal function panel; Future  •  Vitamin D 25 hydroxy; Future  •  CBC; Future  •  PTH, intact; Future  •  Renal function panel; Future  •  Vitamin D 25 hydroxy; Future  •  Albumin / creatinine urine ratio; Future  •  CBC; Future  •  Phosphorus; Future  •  Magnesium; Future  •  Calcium, ionized; Future  •  Ambulatory referral to advanced care planning; Future

## 2025-04-24 NOTE — PROGRESS NOTES
Name: Jayla Moody      : 1945      MRN: [id number]  Encounter Provider: Dot Germain MD  Encounter Date: 2025   Encounter department: [facility]  :79 y.o.  female with pmh of multiple co-morbidities including hypertension (x20yrs), morbid obesity, GERD, arthritis, severe asthma, CAD, a.flutter, CHF and metastatic lung cancer presents to the office for routine follow up.     Assessment & Plan  Hypertensive kidney disease with stage 3b chronic kidney disease (HCC)  Lab Results   Component Value Date    EGFR 44 2025    EGFR 43 2025    EGFR 47 2025    CREATININE 1.19 2025    CREATININE 1.21 2025    CREATININE 1.12 2025     BP Readings from Last 3 Encounters:   25 142/90   25 132/76   25 133/78   Current medications: Toprol-XL 25 mg p.o. twice daily, Lasix 20 mg p.o. as needed for weight gain and no longer on it  Changes made today: Start lisinopril 5 mg p.o. daily check blood work in 2 weeks call with blood pressure update in 2 weeks  Goal BP of < 130/80 based on age and comorbidities  Instructed to follow low sodium (2gm)diet.  Advised to hold ACEI/ARBs if patient suffers from dehydration due to gastrointestinal losses due to risk of CARLOS ENRIQUE secondary to failure to autoregulate.  after review of records In [facility] as well as Care everywhere patient had a baseline creatinine of 1-1.3 mg/dL dating as far back as  however since 2023 new baseline had been around 1.6 to 1.9 mg/dL however since 2024 new baseline has been around 1.1 to 1.5 mg/dL  Most recent labs show a Creatinine of 1.19 mg/dL on 25. Renal function remains stable.  Blood work in 3 months and then again prior to next visit  Likely has underlying CKD due to hypertensive nephrosclerosis plus obesity due to hyperfiltration plus cardiorenal syndrome plus smoking associated nephropathy plus episode of acute kidney injury nondialysis  requiring.  Patient continues to be at risk for CARLOS ENRIQUE secondary to use of alectinib, currently on hold.  Imaging:   Renal ultrasound 8/12/2023 bilateral kidneys approximately 11 cm no hydronephrosis or cysts.Recommend to avoid use of NSAIDs, nephrotoxins. Caution advised with regards to exposure to IV contrast dye.   Discussed with the patient in depth her renal status, including the possible etiologies for CKD.   Advised the patient that when her GFR is close to 20mL/min then will start discussing about RRT(renal replacement therapy) options such as renal transplant, peritoneal dialysis and hemodialysis.   Informed the patient about the various options for Renal Replacement therapy.  Discussed with the patient how we need to work together to delay the progression of CKD with optimal BP control based on their age and co-morbidities, optimal BS control with HbA1c of <7% and trying to reduce proteinuria by the use of anti-proteinuric agents.   CKD education/Kidney smart: referral placed 5/21/24.  Referral for advanced care planning placed 4/24/25  Follow-up: Patient is to follow-up in 6 months, with lab work to be performed in 2 weeks and then again in 3months a few days prior to the next visit. Advised patient to call me in 10 days to review the results if they do not hear back from me, as I may have not received the results.    Orders:  •  lisinopril (ZESTRIL) 5 mg tablet; Take 1 tablet (5 mg total) by mouth daily  •  Renal function panel; Future  •  PTH, intact; Future  •  Renal function panel; Future  •  Vitamin D 25 hydroxy; Future  •  CBC; Future  •  PTH, intact; Future  •  Renal function panel; Future  •  Vitamin D 25 hydroxy; Future  •  Albumin / creatinine urine ratio; Future  •  CBC; Future  •  Phosphorus; Future  •  Magnesium; Future  •  Calcium, ionized; Future  •  Ambulatory referral to advanced care planning; Future    Anemia due to stage 3b chronic kidney disease  (HCC)  Lab Results   Component Value Date     EGFR 44 04/02/2025    EGFR 43 03/06/2025    EGFR 47 02/01/2025    CREATININE 1.19 04/02/2025    CREATININE 1.21 03/06/2025    CREATININE 1.12 02/01/2025     Goal Hb of 10-12 g/dL  Most recent labs suggestive of 11.4 g.   Check CBC prior to next visit and in 2 weeks    Persistent proteinuria  likely has underlying proteinuria due to obesity related hyperfiltration  most recent MACR is 25 mg as of April 2025  Recheck MACR prior to next visit  For proteinuria reduction continue on Calcitrol, lisinopril, Crestor  Orders:  •  lisinopril (ZESTRIL) 5 mg tablet; Take 1 tablet (5 mg total) by mouth daily  •  Renal function panel; Future  •  PTH, intact; Future  •  Renal function panel; Future  •  Vitamin D 25 hydroxy; Future  •  CBC; Future  •  PTH, intact; Future  •  Renal function panel; Future  •  Vitamin D 25 hydroxy; Future  •  Albumin / creatinine urine ratio; Future  •  CBC; Future  •  Phosphorus; Future  •  Magnesium; Future  •  Calcium, ionized; Future    Chronic kidney disease-mineral and bone disorder (CKD-MBD)  Lab Results   Component Value Date    EGFR 44 04/02/2025    EGFR 43 03/06/2025    EGFR 47 02/01/2025    CREATININE 1.19 04/02/2025    CREATININE 1.21 03/06/2025    CREATININE 1.12 02/01/2025     Based on patients CKD stage following is the goal of therapy.  Maintain calcium phosphorus product of < 55.  Stage 3 CKD - Goal Ca 8.5-10 mg/dL , goal Phos 2.7-4.6 mg/dL  , goal iPTH 30-70 pg/m  Currently on: Calcitriol 0.25 mcg 3 times a week, vitamin D 1000 units p.o. daily  Changes made today: Increase to Calcitrol 0.25 mcg 5 days a week check ionized calcium prior to next visit  most recent ipth 103.6 and vit D 47.4 from 4/2/2025  Check iPTH vitamin D prior to next visit and in 3 months    Orders:  •  lisinopril (ZESTRIL) 5 mg tablet; Take 1 tablet (5 mg total) by mouth daily  •  Renal function panel; Future  •  PTH, intact; Future  •  Renal function panel; Future  •  Vitamin D 25 hydroxy; Future  •  CBC;  "Future  •  PTH, intact; Future  •  Renal function panel; Future  •  Vitamin D 25 hydroxy; Future  •  Albumin / creatinine urine ratio; Future  •  CBC; Future  •  Phosphorus; Future  •  Magnesium; Future  •  Calcium, ionized; Future  •  Ambulatory referral to advanced care planning; Future    Class 2 severe obesity due to excess calories with serious comorbidity and body mass index (BMI) of 39.0 to 39.9 in adult (HCC)  Encouraged patient to follow a renal diet comprising of moderate potassium, low phosphorus and protein restriction to 0.8gm/kg.  Dietary handouts provided  Encouraged weight loss.  Will check serum albumin with next blood work.     Mixed hyperlipidemia  Goal LDL less than 70  Continue on rosuvastatin             History of Present Illness   HPI  Jayla Moody is a 80 y.o. female who presents to the office for routine follow-up feels well has no complaints no recent hospitalization no issues with edema has been taken off of amiodarone but still having some issues with her heart will be read following up with cardiology soon agrees with starting lisinopril in the past was on losartan.  Has been off of Lasix volume status seems fine and stable no NSAID use thankful for the care information that she is gone today agreeable to attending ACP meeting      Review of Systems   Constitutional:  Negative for chills and fever.   HENT:  Negative for congestion.    Respiratory:  Negative for cough and wheezing.    Cardiovascular:  Negative for leg swelling.   Gastrointestinal:  Negative for constipation.   Genitourinary:  Negative for dysuria and hematuria.   Musculoskeletal:  Negative for back pain.   Neurological:  Negative for dizziness and headaches.   Psychiatric/Behavioral:  Negative for agitation and confusion.    All other systems reviewed and are negative.         Objective   /90 (BP Location: Left arm, Patient Position: Sitting, Cuff Size: Large)   Ht 5' 1\" (1.549 m)   Wt 100 kg (221 lb)   BMI " 41.76 kg/m²      Physical Exam  Vitals reviewed.   Constitutional:       General: She is not in acute distress.     Appearance: Normal appearance. She is obese. She is not ill-appearing, toxic-appearing or diaphoretic.   HENT:      Head: Normocephalic and atraumatic.      Mouth/Throat:      Mouth: Mucous membranes are moist.      Pharynx: No oropharyngeal exudate.   Eyes:      General: No scleral icterus.  Cardiovascular:      Rate and Rhythm: Normal rate.   Pulmonary:      Effort: No respiratory distress.      Breath sounds: Normal breath sounds. No stridor. No wheezing.   Abdominal:      General: There is no distension.      Palpations: There is no mass.      Tenderness: There is no right CVA tenderness or left CVA tenderness.   Musculoskeletal:         General: No swelling.      Cervical back: Normal range of motion. No rigidity.   Skin:     Coloration: Skin is not jaundiced.   Neurological:      General: No focal deficit present.      Mental Status: She is alert and oriented to person, place, and time. Mental status is at baseline.   Psychiatric:         Mood and Affect: Mood normal.         Behavior: Behavior normal.

## 2025-04-24 NOTE — ASSESSMENT & PLAN NOTE
Lab Results   Component Value Date    EGFR 44 04/02/2025    EGFR 43 03/06/2025    EGFR 47 02/01/2025    CREATININE 1.19 04/02/2025    CREATININE 1.21 03/06/2025    CREATININE 1.12 02/01/2025     Based on patients CKD stage following is the goal of therapy.  Maintain calcium phosphorus product of < 55.  Stage 3 CKD - Goal Ca 8.5-10 mg/dL , goal Phos 2.7-4.6 mg/dL  , goal iPTH 30-70 pg/m  Currently on: Calcitriol 0.25 mcg 3 times a week, vitamin D 1000 units p.o. daily  Changes made today: Increase to Calcitrol 0.25 mcg 5 days a week check ionized calcium prior to next visit  most recent ipth 103.6 and vit D 47.4 from 4/2/2025  Check iPTH vitamin D prior to next visit and in 3 months    Orders:  •  lisinopril (ZESTRIL) 5 mg tablet; Take 1 tablet (5 mg total) by mouth daily  •  Renal function panel; Future  •  PTH, intact; Future  •  Renal function panel; Future  •  Vitamin D 25 hydroxy; Future  •  CBC; Future  •  PTH, intact; Future  •  Renal function panel; Future  •  Vitamin D 25 hydroxy; Future  •  Albumin / creatinine urine ratio; Future  •  CBC; Future  •  Phosphorus; Future  •  Magnesium; Future  •  Calcium, ionized; Future  •  Ambulatory referral to advanced care planning; Future

## 2025-04-24 NOTE — ASSESSMENT & PLAN NOTE
likely has underlying proteinuria due to obesity related hyperfiltration  most recent MACR is 25 mg as of April 2025  Recheck MACR prior to next visit  For proteinuria reduction continue on Calcitrol, lisinopril, Crestor  Orders:  •  lisinopril (ZESTRIL) 5 mg tablet; Take 1 tablet (5 mg total) by mouth daily  •  Renal function panel; Future  •  PTH, intact; Future  •  Renal function panel; Future  •  Vitamin D 25 hydroxy; Future  •  CBC; Future  •  PTH, intact; Future  •  Renal function panel; Future  •  Vitamin D 25 hydroxy; Future  •  Albumin / creatinine urine ratio; Future  •  CBC; Future  •  Phosphorus; Future  •  Magnesium; Future  •  Calcium, ionized; Future

## 2025-04-24 NOTE — ASSESSMENT & PLAN NOTE
Lab Results   Component Value Date    EGFR 44 04/02/2025    EGFR 43 03/06/2025    EGFR 47 02/01/2025    CREATININE 1.19 04/02/2025    CREATININE 1.21 03/06/2025    CREATININE 1.12 02/01/2025     Goal Hb of 10-12 g/dL  Most recent labs suggestive of 11.4 g.   Check CBC prior to next visit and in 2 weeks

## 2025-04-24 NOTE — ASSESSMENT & PLAN NOTE
Wt Readings from Last 3 Encounters:   04/24/25 100 kg (221 lb)   03/27/25 99.1 kg (218 lb 6.4 oz)   03/24/25 98.4 kg (217 lb)               Orders:  •  calcitriol (ROCALTROL) 0.25 mcg capsule; Take one tablet five days a week Do not start before April 25, 2025.

## 2025-04-24 NOTE — ASSESSMENT & PLAN NOTE
Orders:  •  calcitriol (ROCALTROL) 0.25 mcg capsule; Take one tablet five days a week Do not start before April 25, 2025.     Called # listed. No answer. Left message to return call.

## 2025-04-24 NOTE — PATIENT INSTRUCTIONS
"Start lisinopril 5mg daily  Call with blood pressure updates in 2 weeks  Get blood work in 2 week  Get blood work in 3 months  Increase to calcitriol 0.25mcg five days a week  Attend advance care planning meeting    Please call me in 10 days after having your blood work done to review the results if you do not hear back from me or my office, as I may have not received the results.  please remember to perform blood work prior to the next visit.  Please call if the blood pressure top number is greater than 140 or less than 110 consistently.  Please call if you are gaining more than 2lbs in 2 days for adjustment of water pills.  ~ Please AVOID the following pain medications.  LIST OF NSAIDS (NONSTEROIDAL ANTI-INFLAMMATORY DRUGS) AND LUTZ-2 INHIBITORS    DIFLUNISAL (DOLOBID)  IBUPROFEN (MOTRIN, ADVIL)  FLURBIPROFEN (ANSAID)  KETOPROFEN (ORUDIS, ORUVAIL)  FENOPROFEN (NALFON)  NABUMETONE (RELAFEN)  PIROXICAM (FELDENE)  NAPROXEN (ALEVE, NAPROSYN, NAPRELAN, ANAPROX)  DICLOFENAC (VOLTAREN, CATAFLAM)  INDOMETHACIN (INDOCIN)  SULINDAC (CLINORIL)  TOLMETIN (TOLETIN)  ETODOLAC (LODINE)  MELOXICAM (MOBIC)  KETOROLAC (TORADOL)  OXAPROZIN (DAYPRO)  CELECOXIB (CELEBREX)Phosphorus diet  Follow a low phosphorus diet.    Avoid these higher phosphorus foods: Choose these lower phosphorus foods:   Milk, pudding or yogurt (from animals and from many soy varieties) Rice milk (unfortified), nondairy creamer (if it doesn't have terms in the ingredients list that contain the letters \"phos\")   Hard cheeses, ricotta or cottage cheese, fat-free cream cheese Regular and low-fat cream cheese   Ice cream or frozen yogurt Sherbet or frozen fruit pops   Soups made with higher phosphorus ingredients (milk, dried peas, beans, lentils) Soups made with lower phosphorus ingredients (broth- or water-based with other lower phosphorus ingredients)   Whole grains, including whole-grain breads, crackers, cereal, rice and pasta Refined grains, including white " "bread, crackers, cereals, rice and pasta   Quick breads, biscuits, cornbread, muffins, pancakes or waffles Homemade refined (white) dinner rolls, bagels or English muffins   Dried peas (split, black-eyed), beans (black, garbanzo, lima, kidney, navy, drummond) or lentils Green peas (canned, frozen), green beans or wax beans   Organ meats, walleye, pollock or sardines Lean beef, pork, lamb, poultry or other fish   Nuts and seeds Popcorn   Peanut butter and other nut butters Jam, jelly or honey   Chocolate, including chocolate drinks Carob (chocolate-flavored) candy, hard candy or gumdrops   [place] and pepper-type sodas, flavored stephenson, bottled teas (if a term in the ingredients list contains the letters \"phos\") Lemon-lime soda, ginger ale or root beer, plain water   Things to do to reduce your blood pressure include working with all your physician to do the following:  stop smoking if you smoke.  increase cardiovascular exercise like walking and swimming.   modify your diet to decrease fat and salt intake.  reduce your weight if you are overweight or obese.  increase the consumption of fruits, vegetables and whole grains.  decrease alcohol consumption if you consume alcohol.   try to minimize stress in your life with lifestyle modifications.   be compliant with your anti-hypertensive medications.   adjust your medications to help improve your vascular stiffness and decrease risks for heart attacks and strokes. Follow a moderate potassium diet.      Exercise to Lose Weight     Exercise and a healthy diet may help you lose weight. Your doctor may suggest specific exercises.     EXERCISE IDEAS AND TIPS     Choose low-cost things you enjoy doing, such as walking, bicycling, or exercising to workout videos.   Take stairs instead of the elevator.   Walk during your lunch break.   Park your car further away from work or school.   Go to a gym or an exercise class.   Start with 5 to 10 minutes of exercise each day. Build up to 30 " minutes of exercise 4 to 6 days a week.   Wear shoes with good support and comfortable clothes.   Stretch before and after working out.   Work out until you breathe harder and your heart beats faster.   Drink extra water when you exercise.   Do not do so much that you hurt yourself, feel dizzy, or get very short of breath.     Exercises that burn about 150 calories:   Running 1 ½ miles in 15 minutes.   Playing volleyball for 45 to 60 minutes.   Washing and waxing a car for 45 to 60 minutes.   Playing touch football for 45 minutes.   Walking 1 ¾ miles in 35 minutes.   Pushing a stroller 1 ½ miles in 30 minutes.   Playing basketball for 30 minutes.   Raking leaves for 30 minutes.   Bicycling 5 miles in 30 minutes.   Walking 2 miles in 30 minutes.   Dancing for 30 minutes.   Shoveling snow for 15 minutes.   Swimming laps for 20 minutes.   Walking up stairs for 15 minutes.   Bicycling 4 miles in 15 minutes.   Gardening for 30 to 45 minutes.   Jumping rope for 15 minutes.   Washing windows or floors for 45 to 60 minutes.

## 2025-04-24 NOTE — ASSESSMENT & PLAN NOTE
Encouraged patient to follow a renal diet comprising of moderate potassium, low phosphorus and protein restriction to 0.8gm/kg.  Dietary handouts provided  Encouraged weight loss.  Will check serum albumin with next blood work.

## 2025-04-28 ENCOUNTER — HOSPITAL ENCOUNTER (OUTPATIENT)
Dept: NUCLEAR MEDICINE | Facility: HOSPITAL | Age: 80
Discharge: HOME | End: 2025-04-28
Payer: COMMERCIAL

## 2025-04-28 ENCOUNTER — HOSPITAL ENCOUNTER (OUTPATIENT)
Dept: NON INVASIVE DIAGNOSTICS | Facility: HOSPITAL | Age: 80
Discharge: HOME | End: 2025-04-28
Payer: COMMERCIAL

## 2025-04-28 VITALS — WEIGHT: 221 LBS | BODY MASS INDEX: 41.72 KG/M2 | HEIGHT: 61 IN

## 2025-04-28 DIAGNOSIS — R06.09 DYSPNEA ON EXERTION: ICD-10-CM

## 2025-04-28 LAB
CHEST PAIN STATEMENT: NORMAL
MAX DIASTOLIC BP: 93 MMHG
MAX PREDICTED HEART RATE: 142 BPM
PROTOCOL NAME: NORMAL
REASON FOR TERMINATION: NORMAL
STRESS POST EXERCISE DUR MIN: 3 MIN
STRESS POST EXERCISE DUR SEC: 0 SEC
STRESS POST PEAK HR: 80 BPM
STRESS POST PEAK SYSTOLIC BP: 198 MMHG
TARGET HR FORMULA: NORMAL
TEST INDICATION: NORMAL

## 2025-04-28 PROCEDURE — 93016 CV STRESS TEST SUPVJ ONLY: CPT | Performed by: INTERNAL MEDICINE

## 2025-04-28 PROCEDURE — 78452 HT MUSCLE IMAGE SPECT MULT: CPT | Performed by: INTERNAL MEDICINE

## 2025-04-28 PROCEDURE — 93018 CV STRESS TEST I&R ONLY: CPT | Performed by: INTERNAL MEDICINE

## 2025-04-28 PROCEDURE — A9502 TC99M TETROFOSMIN: HCPCS

## 2025-04-28 PROCEDURE — 93017 CV STRESS TEST TRACING ONLY: CPT

## 2025-04-28 PROCEDURE — 78452 HT MUSCLE IMAGE SPECT MULT: CPT

## 2025-04-28 RX ORDER — REGADENOSON 0.08 MG/ML
0.4 INJECTION, SOLUTION INTRAVENOUS ONCE
Status: COMPLETED | OUTPATIENT
Start: 2025-04-28 | End: 2025-04-28

## 2025-04-28 RX ADMIN — REGADENOSON 0.4 MG: 0.08 INJECTION, SOLUTION INTRAVENOUS at 10:36

## 2025-04-29 ENCOUNTER — CLINICAL SUPPORT (OUTPATIENT)
Dept: CARDIOLOGY CLINIC | Facility: CLINIC | Age: 80
End: 2025-04-29
Payer: COMMERCIAL

## 2025-04-29 VITALS — HEART RATE: 63 BPM | BODY MASS INDEX: 41.98 KG/M2 | WEIGHT: 222.2 LBS

## 2025-04-29 DIAGNOSIS — I48.92 ATRIAL FLUTTER, UNSPECIFIED TYPE (HCC): Primary | ICD-10-CM

## 2025-04-29 PROCEDURE — 99211 OFF/OP EST MAY X REQ PHY/QHP: CPT

## 2025-04-29 RX ORDER — AMIODARONE HYDROCHLORIDE 200 MG/1
200 TABLET ORAL DAILY
Start: 2025-04-29

## 2025-04-29 NOTE — PROGRESS NOTES
Pt here for EKG. Pt placed herself back on her amiodorone.  Pt stated since restarting amiodorone palpitations are not bothering her. EKG done today NSR. Medications reviewed with patient. Please advise. EKG to Chaz to review

## 2025-04-30 ENCOUNTER — RESULTS FOLLOW-UP (OUTPATIENT)
Dept: NEPHROLOGY | Facility: CLINIC | Age: 80
End: 2025-04-30

## 2025-04-30 ENCOUNTER — APPOINTMENT (OUTPATIENT)
Dept: LAB | Facility: HOSPITAL | Age: 80
End: 2025-04-30
Payer: COMMERCIAL

## 2025-04-30 DIAGNOSIS — N17.9 AKI (ACUTE KIDNEY INJURY) (HCC): Primary | ICD-10-CM

## 2025-04-30 DIAGNOSIS — I10 ESSENTIAL HYPERTENSION: ICD-10-CM

## 2025-04-30 DIAGNOSIS — N18.9 CHRONIC KIDNEY DISEASE-MINERAL AND BONE DISORDER (CKD-MBD): ICD-10-CM

## 2025-04-30 DIAGNOSIS — C79.51 MALIGNANT NEOPLASM METASTATIC TO BONE (HCC): ICD-10-CM

## 2025-04-30 DIAGNOSIS — C34.90 NON-SMALL CELL LUNG CANCER, UNSPECIFIED LATERALITY (HCC): ICD-10-CM

## 2025-04-30 DIAGNOSIS — N18.32 HYPERTENSIVE KIDNEY DISEASE WITH STAGE 3B CHRONIC KIDNEY DISEASE (HCC): ICD-10-CM

## 2025-04-30 DIAGNOSIS — M89.9 CHRONIC KIDNEY DISEASE-MINERAL AND BONE DISORDER (CKD-MBD): ICD-10-CM

## 2025-04-30 DIAGNOSIS — E83.9 CHRONIC KIDNEY DISEASE-MINERAL AND BONE DISORDER (CKD-MBD): ICD-10-CM

## 2025-04-30 DIAGNOSIS — I12.9 HYPERTENSIVE KIDNEY DISEASE WITH STAGE 3B CHRONIC KIDNEY DISEASE (HCC): ICD-10-CM

## 2025-04-30 DIAGNOSIS — D63.1 ANEMIA DUE TO STAGE 3B CHRONIC KIDNEY DISEASE  (HCC): ICD-10-CM

## 2025-04-30 DIAGNOSIS — N18.32 ANEMIA DUE TO STAGE 3B CHRONIC KIDNEY DISEASE  (HCC): ICD-10-CM

## 2025-04-30 LAB
ALBUMIN SERPL BCG-MCNC: 4.2 G/DL (ref 3.5–5)
ALP SERPL-CCNC: 49 U/L (ref 34–104)
ALT SERPL W P-5'-P-CCNC: 26 U/L (ref 7–52)
ANION GAP SERPL CALCULATED.3IONS-SCNC: 9 MMOL/L (ref 4–13)
AST SERPL W P-5'-P-CCNC: 26 U/L (ref 13–39)
BILIRUB SERPL-MCNC: 0.67 MG/DL (ref 0.2–1)
BUN SERPL-MCNC: 20 MG/DL (ref 5–25)
CALCIUM SERPL-MCNC: 9.1 MG/DL (ref 8.4–10.2)
CHLORIDE SERPL-SCNC: 104 MMOL/L (ref 96–108)
CO2 SERPL-SCNC: 28 MMOL/L (ref 21–32)
CREAT SERPL-MCNC: 1.13 MG/DL (ref 0.6–1.3)
GFR SERPL CREATININE-BSD FRML MDRD: 46 ML/MIN/1.73SQ M
GLUCOSE P FAST SERPL-MCNC: 97 MG/DL (ref 65–99)
MAX HR PERCENT: 56 %
MAX HR: 80 BPM
PHOSPHATE SERPL-MCNC: 3.7 MG/DL (ref 2.3–4.1)
POTASSIUM SERPL-SCNC: 3.9 MMOL/L (ref 3.5–5.3)
PROT SERPL-MCNC: 6.9 G/DL (ref 6.4–8.4)
RATE PRESSURE PRODUCT: NORMAL
SL CV REST NUCLEAR ISOTOPE DOSE: 10.4 MCI
SL CV STRESS NUCLEAR ISOTOPE DOSE: 31.8 MCI
SL CV STRESS RECOVERY BP: NORMAL MMHG
SL CV STRESS RECOVERY HR: 74 BPM
SL CV STRESS RECOVERY O2 SAT: 98 %
SODIUM SERPL-SCNC: 141 MMOL/L (ref 135–147)
SPECT HRT GATED+EF W RNC IV: 67 %
STRESS ANGINA INDEX: 0
STRESS BASELINE BP: NORMAL MMHG
STRESS BASELINE HR: 63 BPM
STRESS O2 SAT REST: 97 %
STRESS PEAK HR: 80 BPM
STRESS POST ESTIMATED WORKLOAD: 1 METS
STRESS POST O2 SAT PEAK: 97 %
STRESS POST PEAK BP: 153 MMHG
STRESS/REST PERFUSION RATIO: 1.02

## 2025-04-30 PROCEDURE — 36415 COLL VENOUS BLD VENIPUNCTURE: CPT

## 2025-04-30 PROCEDURE — 80053 COMPREHEN METABOLIC PANEL: CPT

## 2025-04-30 PROCEDURE — 84100 ASSAY OF PHOSPHORUS: CPT

## 2025-04-30 PROCEDURE — 93000 ELECTROCARDIOGRAM COMPLETE: CPT

## 2025-05-01 ENCOUNTER — TELEPHONE (OUTPATIENT)
Dept: NON INVASIVE DIAGNOSTICS | Facility: HOSPITAL | Age: 80
End: 2025-05-01

## 2025-05-01 NOTE — TELEPHONE ENCOUNTER
Called patient and left a message.  Nuclear stress is abnormal with anterior ischemia.  CT chest has also showed heavy coronary calcifications.      Has not had chest pain but does have significant dyspnea on exertion.     She is on metoprolol 25mg BID.     Need to optimize meds for CAD, increase anti-anginals, discuss risk/benefit of LHC. No med changes made yet but once we connect will likely: increase rosuvastatin, increase metoprolol, start amlodipine.     Moise Pimentel MD

## 2025-05-02 ENCOUNTER — HOSPITAL ENCOUNTER (OUTPATIENT)
Dept: INFUSION CENTER | Facility: HOSPITAL | Age: 80
End: 2025-05-02
Attending: INTERNAL MEDICINE
Payer: COMMERCIAL

## 2025-05-02 DIAGNOSIS — C34.90 NON-SMALL CELL LUNG CANCER, UNSPECIFIED LATERALITY (HCC): Primary | ICD-10-CM

## 2025-05-02 DIAGNOSIS — C79.51 MALIGNANT NEOPLASM METASTATIC TO BONE (HCC): ICD-10-CM

## 2025-05-02 PROCEDURE — 96372 THER/PROPH/DIAG INJ SC/IM: CPT

## 2025-05-02 RX ADMIN — DENOSUMAB 120 MG: 120 INJECTION SUBCUTANEOUS at 13:10

## 2025-05-02 NOTE — TELEPHONE ENCOUNTER
Patient called, states she received message.  Advised will message Dr Pimentel and have him call patient, states she will be available at [phone] until 12 pm today.    Secure Chat message sent to DR Pimentel

## 2025-05-02 NOTE — PROGRESS NOTES
Patient tolerated Right upper arm Xgeva sq injection without complications. CrCL 39.6 and Ca 9.1. Declined AVS. Confirmed next apt May 19, 1300. Patient left unit ambulatory and with steady gain.

## 2025-05-02 NOTE — PLAN OF CARE
Problem: Potential for Falls  Goal: Patient will remain free of falls  Description: INTERVENTIONS:- Educate patient/family on patient safety including physical limitations- Instruct patient to call for assistance with activity - Consult OT/PT to assist with strengthening/mobility - Keep Call bell within reach- Keep bed low and locked with side rails adjusted as appropriate- Keep care items and personal belongings within reach- Initiate and maintain comfort rounds-  Problem: PAIN - ADULT  Goal: Verbalizes/displays adequate comfort level or baseline comfort level  Description: Interventions:- Encourage patient to monitor pain and request assistance- Assess pain using appropriate pain scale- Administer analgesics based on type and severity of pain and evaluate response- Implement non-pharmacological measures as appropriate and evaluate response- Consider cultural and social influences on pain and pain management- Notify physician/advanced practitioner if interventions unsuccessful or patient reports new pain  Outcome: Progressing     Problem: INFECTION - ADULT  Goal: Absence or prevention of progression during hospitalization  Description: INTERVENTIONS:- Assess and monitor for signs and symptoms of infection- Monitor lab/diagnostic results- Monitor all insertion sites, i.e. indwelling lines, tubes, and drains- Monitor endotracheal if appropriate and nasal secretions for changes in amount and color- [place] appropriate cooling/warming therapies per order- Administer medications as ordered- Instruct and encourage patient and family to use good hand hygiene technique- Identify and instruct in appropriate isolation precautions for identified infection/condition  Outcome: Progressing  Goal: Absence of fever/infection during neutropenic period  Description: INTERVENTIONS:- Monitor WBC  Outcome: Progressing     Problem: SAFETY ADULT  Goal: Patient will remain free of falls  Description: INTERVENTIONS:- Educate  patient/family on patient safety including physical limitations- Instruct patient to call for assistance with activity - Consult OT/PT to assist with strengthening/mobility - Keep Call bell within reach- Keep bed low and locked with side rails adjusted as appropriate- Keep care items and personal belongings within reach- Initiate and maintain comfort rounds-  Goal: Maintain or return to baseline ADL function  Description: INTERVENTIONS:-  Assess patient's ability to carry out ADLs; assess patient's baseline for ADL function and identify physical deficits which impact ability to perform ADLs (bathing, care of mouth/teeth, toileting, grooming, dressing, etc.)- Assess/evaluate cause of self-care deficits - Assess range of motion- Assess patient's mobility; develop plan if impaired- Assess patient's need for assistive devices and provide as appropriate- Encourage maximum independence but intervene and supervise when necessary- Involve family in performance of ADLs- Assess for home care needs following discharge - Consider OT consult to assist with ADL evaluation and planning for discharge- Provide patient education as appropriate  Outcome: Progressing    Problem: DISCHARGE PLANNING  Goal: Discharge to home or other facility with appropriate resources  Description: INTERVENTIONS:- Identify barriers to discharge w/patient and caregiver- Arrange for needed discharge resources and transportation as appropriate- Identify discharge learning needs (meds, wound care, etc.)- Arrange for interpretive services to assist at discharge as needed- Refer to Case Management Department for coordinating discharge planning if the patient needs post-hospital services based on physician/advanced practitioner order or complex needs related to functional status, cognitive ability, or social support system  Outcome: Progressing     Problem: Knowledge Deficit  Goal: Patient/family/caregiver demonstrates understanding of disease process,  treatment plan, medications, and discharge instructions  Description: Complete learning assessment and assess knowledge base.Interventions:- Provide teaching at level of understanding- Provide teaching via preferred learning methods  Outcome: Progressing

## 2025-05-12 ENCOUNTER — APPOINTMENT (EMERGENCY)
Dept: CT IMAGING | Facility: HOSPITAL | Age: 80
End: 2025-05-12
Payer: COMMERCIAL

## 2025-05-12 ENCOUNTER — HOSPITAL ENCOUNTER (EMERGENCY)
Facility: HOSPITAL | Age: 80
Discharge: HOME | End: 2025-05-12
Attending: EMERGENCY MEDICINE | Admitting: EMERGENCY MEDICINE
Payer: COMMERCIAL

## 2025-05-12 VITALS
DIASTOLIC BLOOD PRESSURE: 77 MMHG | TEMPERATURE: 98.8 F | SYSTOLIC BLOOD PRESSURE: 184 MMHG | RESPIRATION RATE: 18 BRPM | HEART RATE: 66 BPM | OXYGEN SATURATION: 96 %

## 2025-05-12 DIAGNOSIS — R91.1 PULMONARY NODULE: ICD-10-CM
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
## 2025-05-12 DIAGNOSIS — S22.41XA MULTIPLE FRACTURES OF RIBS, RIGHT SIDE, INITIAL ENCOUNTER FOR CLOSED FRACTURE: Primary | ICD-10-CM

## 2025-05-12 LAB
ANION GAP SERPL CALCULATED.3IONS-SCNC: 6 MMOL/L (ref 4–13)
BASOPHILS # BLD AUTO: 0 THOUSANDS/ÂΜL (ref 0–0.1)
BASOPHILS NFR BLD AUTO: 0 % (ref 0–1)
BUN SERPL-MCNC: 26 MG/DL (ref 5–25)
CALCIUM SERPL-MCNC: 9 MG/DL (ref 8.4–10.2)
CHLORIDE SERPL-SCNC: 105 MMOL/L (ref 96–108)
CO2 SERPL-SCNC: 24 MMOL/L (ref 21–32)
CREAT SERPL-MCNC: 1.14 MG/DL (ref 0.6–1.3)
EOSINOPHIL # BLD AUTO: 0 THOUSAND/ÂΜL (ref 0–0.61)
EOSINOPHIL NFR BLD AUTO: 0 % (ref 0–6)
ERYTHROCYTE [DISTWIDTH] IN BLOOD BY AUTOMATED COUNT: 15.1 % (ref 11.6–15.1)
GFR SERPL CREATININE-BSD FRML MDRD: 46 ML/MIN/1.73SQ M
GLUCOSE SERPL-MCNC: 98 MG/DL (ref 65–140)
HCT VFR BLD AUTO: 35.8 % (ref 34.8–46.1)
HGB BLD-MCNC: 11.6 G/DL (ref 11.5–15.4)
IMM GRANULOCYTES # BLD AUTO: 0.03 THOUSAND/UL (ref 0–0.2)
IMM GRANULOCYTES NFR BLD AUTO: 0 % (ref 0–2)
LYMPHOCYTES # BLD AUTO: 1.03 THOUSANDS/ÂΜL (ref 0.6–4.47)
LYMPHOCYTES NFR BLD AUTO: 15 % (ref 14–44)
MCH RBC QN AUTO: 28.5 PG (ref 26.8–34.3)
MCHC RBC AUTO-ENTMCNC: 32.4 G/DL (ref 31.4–37.4)
MCV RBC AUTO: 88 FL (ref 82–98)
MONOCYTES # BLD AUTO: 0.8 THOUSAND/ÂΜL (ref 0.17–1.22)
MONOCYTES NFR BLD AUTO: 11 % (ref 4–12)
NEUTROPHILS # BLD AUTO: 5.25 THOUSANDS/ÂΜL (ref 1.85–7.62)
NEUTS SEG NFR BLD AUTO: 74 % (ref 43–75)
NRBC BLD AUTO-RTO: 0 /100 WBCS
PLATELET # BLD AUTO: 85 THOUSANDS/UL (ref 149–390)
POTASSIUM SERPL-SCNC: 4.8 MMOL/L (ref 3.5–5.3)
POTASSIUM SERPL-SCNC: 6.2 MMOL/L (ref 3.5–5.3)
RBC # BLD AUTO: 4.07 MILLION/UL (ref 3.81–5.12)
SODIUM SERPL-SCNC: 135 MMOL/L (ref 135–147)
WBC # BLD AUTO: 7.11 THOUSAND/UL (ref 4.31–10.16)

## 2025-05-12 PROCEDURE — 99284 EMERGENCY DEPT VISIT MOD MDM: CPT

## 2025-05-12 PROCEDURE — 70450 CT HEAD/BRAIN W/O DYE: CPT

## 2025-05-12 PROCEDURE — 84132 ASSAY OF SERUM POTASSIUM: CPT | Performed by: EMERGENCY MEDICINE

## 2025-05-12 PROCEDURE — 71250 CT THORAX DX C-: CPT

## 2025-05-12 PROCEDURE — 74176 CT ABD & PELVIS W/O CONTRAST: CPT

## 2025-05-12 PROCEDURE — 36415 COLL VENOUS BLD VENIPUNCTURE: CPT | Performed by: EMERGENCY MEDICINE

## 2025-05-12 PROCEDURE — 85025 COMPLETE CBC W/AUTO DIFF WBC: CPT | Performed by: EMERGENCY MEDICINE

## 2025-05-12 PROCEDURE — 99284 EMERGENCY DEPT VISIT MOD MDM: CPT | Performed by: EMERGENCY MEDICINE

## 2025-05-12 PROCEDURE — 72125 CT NECK SPINE W/O DYE: CPT

## 2025-05-12 PROCEDURE — 80048 BASIC METABOLIC PNL TOTAL CA: CPT | Performed by: EMERGENCY MEDICINE

## 2025-05-12 RX ORDER — FENTANYL CITRATE 50 UG/ML
25 INJECTION, SOLUTION INTRAMUSCULAR; INTRAVENOUS ONCE
Refills: 0 | Status: DISCONTINUED | OUTPATIENT
Start: 2025-05-12 | End: 2025-05-12

## 2025-05-12 RX ORDER — LIDOCAINE 50 MG/G
1 PATCH TOPICAL ONCE
Status: DISCONTINUED | OUTPATIENT
Start: 2025-05-12 | End: 2025-05-12 | Stop reason: HOSPADM

## 2025-05-12 RX ORDER — LIDOCAINE 50 MG/G
1 PATCH TOPICAL DAILY
Qty: 5 PATCH | Refills: 0 | Status: SHIPPED | OUTPATIENT
Start: 2025-05-12

## 2025-05-12 RX ORDER — OXYCODONE HYDROCHLORIDE 5 MG/1
5 TABLET ORAL ONCE
Refills: 0 | Status: COMPLETED | OUTPATIENT
Start: 2025-05-12 | End: 2025-05-12

## 2025-05-12 RX ORDER — OXYCODONE HYDROCHLORIDE 5 MG/1
5 TABLET ORAL EVERY 6 HOURS PRN
Qty: 15 TABLET | Refills: 0 | Status: SHIPPED | OUTPATIENT
Start: 2025-05-12 | End: 2025-05-22

## 2025-05-12 RX ADMIN — OXYCODONE 5 MG: 5 TABLET ORAL at 20:18

## 2025-05-12 RX ADMIN — LIDOCAINE 1 PATCH: 700 PATCH TOPICAL at 20:16

## 2025-05-13 ENCOUNTER — TELEPHONE (OUTPATIENT)
Age: 80
End: 2025-05-13

## 2025-05-13 ENCOUNTER — RA CDI HCC (OUTPATIENT)
Dept: OTHER | Facility: HOSPITAL | Age: 80
End: 2025-05-13

## 2025-05-13 DIAGNOSIS — I48.3 TYPICAL ATRIAL FLUTTER (HCC): ICD-10-CM
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
## 2025-05-13 NOTE — TELEPHONE ENCOUNTER
[facility] is the original prescriber and patient wants them to manage this medication       Reason for call:   [x] Refill   [] Prior Auth  [] Other:     Office:   [x] PCP/Provider -   [] Specialty/Provider -     Medication: Apixaban 5 mg, take 1 tablet by mouth 2 times a day       Pharmacy: [facility] [address] [place]    Local Pharmacy   Does the patient have enough for 3 days?   [x] Yes   [] No - Send as HP to POD    Mail Away Pharmacy   Does the patient have enough for 10 days?   [] Yes   [] No - Send as HP to POD

## 2025-05-13 NOTE — DISCHARGE INSTRUCTIONS
Use incentive spirometer every hour while awake until pain starts to improve, this will help prevent pneumonia    For pain control:  1.) Lidoderm patch - apply for 12 hours, then remove for 12 hours  2.) Voltaren gel - apply up to four times per day  3.) Oxycodone - take one tablet every 6 hours as needed for breakthrough pain, this can make you drowsy    Return to the ED if pain is uncontrollable or you develop trouble breathing    Follow up with your oncologist to discuss worsening pulmonary metastasis
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
## 2025-05-16 ENCOUNTER — DOCUMENTATION (OUTPATIENT)
Dept: NON INVASIVE DIAGNOSTICS | Facility: HOSPITAL | Age: 80
End: 2025-05-16

## 2025-05-16 ENCOUNTER — OFFICE VISIT (OUTPATIENT)
Dept: CARDIOLOGY CLINIC | Facility: CLINIC | Age: 80
End: 2025-05-16
Payer: COMMERCIAL

## 2025-05-16 ENCOUNTER — TELEPHONE (OUTPATIENT)
Age: 80
End: 2025-05-16

## 2025-05-16 VITALS
SYSTOLIC BLOOD PRESSURE: 120 MMHG | HEART RATE: 67 BPM | BODY MASS INDEX: 41 KG/M2 | WEIGHT: 217 LBS | DIASTOLIC BLOOD PRESSURE: 70 MMHG

## 2025-05-16 DIAGNOSIS — E78.2 MIXED HYPERLIPIDEMIA: ICD-10-CM

## 2025-05-16 DIAGNOSIS — I10 ESSENTIAL HYPERTENSION: ICD-10-CM

## 2025-05-16 DIAGNOSIS — I25.10 CORONARY ARTERY CALCIFICATION: ICD-10-CM

## 2025-05-16 DIAGNOSIS — I12.9 HYPERTENSIVE KIDNEY DISEASE WITH STAGE 3B CHRONIC KIDNEY DISEASE (HCC): ICD-10-CM

## 2025-05-16 DIAGNOSIS — I25.10 CORONARY ARTERY DISEASE INVOLVING NATIVE CORONARY ARTERY OF NATIVE HEART WITHOUT ANGINA PECTORIS: ICD-10-CM

## 2025-05-16 DIAGNOSIS — N18.32 HYPERTENSIVE KIDNEY DISEASE WITH STAGE 3B CHRONIC KIDNEY DISEASE (HCC): ICD-10-CM

## 2025-05-16 DIAGNOSIS — E66.01 CLASS 2 SEVERE OBESITY DUE TO EXCESS CALORIES WITH SERIOUS COMORBIDITY AND BODY MASS INDEX (BMI) OF 39.0 TO 39.9 IN ADULT (HCC): ICD-10-CM

## 2025-05-16 DIAGNOSIS — Z79.899 ON AMIODARONE THERAPY: ICD-10-CM

## 2025-05-16 DIAGNOSIS — N18.9 CHRONIC KIDNEY DISEASE-MINERAL AND BONE DISORDER (CKD-MBD): ICD-10-CM

## 2025-05-16 DIAGNOSIS — E83.9 CHRONIC KIDNEY DISEASE-MINERAL AND BONE DISORDER (CKD-MBD): ICD-10-CM

## 2025-05-16 DIAGNOSIS — M89.9 CHRONIC KIDNEY DISEASE-MINERAL AND BONE DISORDER (CKD-MBD): ICD-10-CM

## 2025-05-16 DIAGNOSIS — I48.3 TYPICAL ATRIAL FLUTTER (HCC): ICD-10-CM

## 2025-05-16 DIAGNOSIS — N18.32 ANEMIA DUE TO STAGE 3B CHRONIC KIDNEY DISEASE  (HCC): ICD-10-CM

## 2025-05-16 DIAGNOSIS — E66.812 CLASS 2 SEVERE OBESITY DUE TO EXCESS CALORIES WITH SERIOUS COMORBIDITY AND BODY MASS INDEX (BMI) OF 39.0 TO 39.9 IN ADULT (HCC): ICD-10-CM

## 2025-05-16 DIAGNOSIS — I50.32 CHRONIC DIASTOLIC CONGESTIVE HEART FAILURE (HCC): Primary | ICD-10-CM

## 2025-05-16 DIAGNOSIS — D63.1 ANEMIA DUE TO STAGE 3B CHRONIC KIDNEY DISEASE  (HCC): ICD-10-CM

## 2025-05-16 DIAGNOSIS — I48.92 ATRIAL FLUTTER, UNSPECIFIED TYPE (HCC): ICD-10-CM

## 2025-05-16 DIAGNOSIS — R80.1 PERSISTENT PROTEINURIA: ICD-10-CM

## 2025-05-16 PROCEDURE — 93000 ELECTROCARDIOGRAM COMPLETE: CPT

## 2025-05-16 PROCEDURE — 99214 OFFICE O/P EST MOD 30 MIN: CPT

## 2025-05-16 RX ORDER — LISINOPRIL 2.5 MG/1
2.5 TABLET ORAL DAILY
Qty: 90 TABLET | Refills: 3 | Status: SHIPPED | OUTPATIENT
Start: 2025-05-16

## 2025-05-16 RX ORDER — AMIODARONE HYDROCHLORIDE 200 MG/1
200 TABLET ORAL DAILY
Qty: 30 TABLET | Refills: 3 | Status: SHIPPED | OUTPATIENT
Start: 2025-05-16

## 2025-05-16 RX ORDER — AMLODIPINE BESYLATE 2.5 MG/1
2.5 TABLET ORAL DAILY
Qty: 30 TABLET | Refills: 3 | Status: SHIPPED | OUTPATIENT
Start: 2025-05-16

## 2025-05-16 RX ORDER — ROSUVASTATIN CALCIUM 10 MG/1
10 TABLET, COATED ORAL DAILY
Qty: 90 TABLET | Refills: 3 | Status: SHIPPED | OUTPATIENT
Start: 2025-05-16 | End: 2026-05-11

## 2025-05-16 NOTE — TELEPHONE ENCOUNTER
Patient called stating she was in the hospital and had labs drawn. Asking if she still needs to have labs drawn for nephrology. Made aware a renal function panel is ordered and due. Patient verbalized understanding.

## 2025-05-21 NOTE — ASSESSMENT & PLAN NOTE
This is a 80 y.o. c PMHx notable for A-fib, remote nicotine abuse, asthma, COPD, MONO s/p CPAP, GERD, HTN, chronic thrombocytopenia (since at least 1/23/2018), glaucoma, Vit B12 deficiency, being seen in consultation for metastatic ALK+ Lung cancer  on surveillance  Orders:    CT chest abdomen pelvis wo contrast; Future    CBC and differential; Future    Comprehensive metabolic panel; Future
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
## 2025-05-23 ENCOUNTER — OFFICE VISIT (OUTPATIENT)
Dept: FAMILY MEDICINE CLINIC | Facility: CLINIC | Age: 80
End: 2025-05-23

## 2025-05-23 VITALS
OXYGEN SATURATION: 98 % | DIASTOLIC BLOOD PRESSURE: 86 MMHG | TEMPERATURE: 97.5 F | RESPIRATION RATE: 16 BRPM | HEART RATE: 70 BPM | WEIGHT: 215.5 LBS | SYSTOLIC BLOOD PRESSURE: 148 MMHG | BODY MASS INDEX: 40.69 KG/M2 | HEIGHT: 61 IN

## 2025-05-23 DIAGNOSIS — I10 ESSENTIAL HYPERTENSION: Primary | ICD-10-CM

## 2025-05-23 DIAGNOSIS — C34.91 NON-SMALL CELL CANCER OF RIGHT LUNG (HCC): ICD-10-CM
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
## 2025-05-23 NOTE — ASSESSMENT & PLAN NOTE
Mildly elevated BP today.  Continue Lopressor 25 mg twice daily, amlodipine 2.5 mg daily, and lisinopril 2.5 mg daily.  Continue routine follow-up with cardiology; next appointment on 9/25

## 2025-05-24 NOTE — ASSESSMENT & PLAN NOTE
Following with heme-onc.  Next appointment on 5/27 to discuss enlarging left lung nodule and new right lung nodules seen on CT C/A/P on 5/12.

## 2025-05-27 ENCOUNTER — OFFICE VISIT (OUTPATIENT)
Dept: HEMATOLOGY ONCOLOGY | Facility: CLINIC | Age: 80
End: 2025-05-27
Payer: COMMERCIAL

## 2025-05-27 VITALS
HEART RATE: 75 BPM | DIASTOLIC BLOOD PRESSURE: 70 MMHG | SYSTOLIC BLOOD PRESSURE: 128 MMHG | BODY MASS INDEX: 40.59 KG/M2 | OXYGEN SATURATION: 98 % | HEIGHT: 61 IN | TEMPERATURE: 97.7 F | WEIGHT: 215 LBS | RESPIRATION RATE: 16 BRPM

## 2025-05-27 DIAGNOSIS — C34.91 NON-SMALL CELL CANCER OF RIGHT LUNG (HCC): Primary | ICD-10-CM

## 2025-05-27 PROCEDURE — G2211 COMPLEX E/M VISIT ADD ON: HCPCS | Performed by: INTERNAL MEDICINE

## 2025-05-27 PROCEDURE — 99215 OFFICE O/P EST HI 40 MIN: CPT | Performed by: INTERNAL MEDICINE

## 2025-05-28 ENCOUNTER — DOCUMENTATION (OUTPATIENT)
Age: 80
End: 2025-05-28

## 2025-05-28 NOTE — PROGRESS NOTES
Received request from clinical for patient to start on ALECENSA. Auth has been submitted via cover my meds and this is been approved.    BIN:  [id number]  TIMMYN:  ESTEE  ID:  [id number]  GRP:

## 2025-05-29 ENCOUNTER — TELEPHONE (OUTPATIENT)
Dept: HEMATOLOGY ONCOLOGY | Facility: CLINIC | Age: 80
End: 2025-05-29

## 2025-05-30 ENCOUNTER — HOSPITAL ENCOUNTER (OUTPATIENT)
Dept: INFUSION CENTER | Facility: HOSPITAL | Age: 80
Discharge: HOME | End: 2025-05-30
Attending: INTERNAL MEDICINE
Payer: COMMERCIAL

## 2025-05-30 DIAGNOSIS — C34.90 NON-SMALL CELL LUNG CANCER, UNSPECIFIED LATERALITY (HCC): Primary | ICD-10-CM

## 2025-05-30 DIAGNOSIS — C79.51 MALIGNANT NEOPLASM METASTATIC TO BONE (HCC): ICD-10-CM

## 2025-05-30 PROCEDURE — 96372 THER/PROPH/DIAG INJ SC/IM: CPT

## 2025-05-30 RX ADMIN — DENOSUMAB 120 MG: 120 INJECTION SUBCUTANEOUS at 13:15

## 2025-05-30 NOTE — PROGRESS NOTES
Patient tolerated LEFT upper arm Xgeva sq injection without complications. CrCl 38.3 and Ca 9.8. AVS declined. Next appt confirmed for June 4, at 1:00 pm. Patient left unit ambulatory with a steady gait.

## 2025-06-02 DIAGNOSIS — C34.91 NON-SMALL CELL CANCER OF RIGHT LUNG (HCC): Primary | ICD-10-CM

## 2025-06-03 ENCOUNTER — HOSPITAL ENCOUNTER (OUTPATIENT)
Dept: NON INVASIVE DIAGNOSTICS | Facility: HOSPITAL | Age: 80
Discharge: HOME | End: 2025-06-03
Payer: COMMERCIAL

## 2025-06-03 VITALS
HEIGHT: 61 IN | WEIGHT: 215 LBS | DIASTOLIC BLOOD PRESSURE: 70 MMHG | HEART RATE: 55 BPM | SYSTOLIC BLOOD PRESSURE: 128 MMHG | BODY MASS INDEX: 40.59 KG/M2

## 2025-06-03 DIAGNOSIS — R06.09 DYSPNEA ON EXERTION: ICD-10-CM

## 2025-06-03 PROCEDURE — 93306 TTE W/DOPPLER COMPLETE: CPT

## 2025-06-03 PROCEDURE — 93306 TTE W/DOPPLER COMPLETE: CPT | Performed by: INTERNAL MEDICINE

## 2025-06-04 LAB
BSA FOR ECHO PROCEDURE: 1.95 M2
DOP CALC MV VTI: 55.45 CM
E WAVE DECELERATION TIME: 377 MS
E/A RATIO: 0.79
LAAS-AP2: 34 CM2
LAAS-AP4: 31.6 CM2
LEFT ATRIUM AREA SYSTOLE SINGLE PLANE A4C: 29.8 CM2
LEFT ATRIUM VOLUME (MOD BIPLANE): 136 ML
LEFT ATRIUM VOLUME INDEX (MOD BIPLANE): 69.7 ML/M2
LV EF US.2D.A4C+ESTIMATED: 63 %
MV MEAN GRADIENT: 4 MMHG
MV PEAK A VEL: 1.37 M/S
MV PEAK E VEL: 108 CM/S
MV PEAK GRADIENT: 10 MMHG
MV STENOSIS PRESSURE HALF TIME: 109 MS
MV VALVE AREA P 1/2 METHOD: 2.02
RIGHT ATRIUM AREA SYSTOLE A4C: 17.9 CM2
RIGHT VENTRICLE ID DIMENSION: 5.1 CM
SL CV LEFT ATRIUM LENGTH A2C: 6.1 CM
SL CV LV EF: 65
TR MAX PG: 23 MMHG
TR PEAK VELOCITY: 2.4 M/S
TRICUSPID ANNULAR PLANE SYSTOLIC EXCURSION: 2 CM
TRICUSPID VALVE PEAK REGURGITATION VELOCITY: 2.39 M/S

## 2025-06-16 ENCOUNTER — TELEPHONE (OUTPATIENT)
Dept: INFUSION CENTER | Facility: HOSPITAL | Age: 80
End: 2025-06-16

## 2025-06-16 ENCOUNTER — TELEPHONE (OUTPATIENT)
Dept: HEMATOLOGY ONCOLOGY | Facility: CLINIC | Age: 80
End: 2025-06-16

## 2025-06-16 ENCOUNTER — APPOINTMENT (OUTPATIENT)
Dept: LAB | Facility: HOSPITAL | Age: 80
DRG: 202 | End: 2025-06-16
Payer: COMMERCIAL

## 2025-06-16 DIAGNOSIS — C79.51 MALIGNANT NEOPLASM METASTATIC TO BONE (HCC): ICD-10-CM

## 2025-06-16 DIAGNOSIS — N18.9 CHRONIC KIDNEY DISEASE-MINERAL AND BONE DISORDER (CKD-MBD): ICD-10-CM

## 2025-06-16 DIAGNOSIS — M89.9 CHRONIC KIDNEY DISEASE-MINERAL AND BONE DISORDER (CKD-MBD): ICD-10-CM

## 2025-06-16 DIAGNOSIS — R80.1 PERSISTENT PROTEINURIA: ICD-10-CM

## 2025-06-16 DIAGNOSIS — D63.1 ANEMIA DUE TO STAGE 3B CHRONIC KIDNEY DISEASE  (HCC): ICD-10-CM

## 2025-06-16 DIAGNOSIS — E66.01 CLASS 2 SEVERE OBESITY DUE TO EXCESS CALORIES WITH SERIOUS COMORBIDITY AND BODY MASS INDEX (BMI) OF 39.0 TO 39.9 IN ADULT (HCC): ICD-10-CM

## 2025-06-16 DIAGNOSIS — I12.9 HYPERTENSIVE KIDNEY DISEASE WITH STAGE 3B CHRONIC KIDNEY DISEASE (HCC): ICD-10-CM

## 2025-06-16 DIAGNOSIS — N17.9 AKI (ACUTE KIDNEY INJURY) (HCC): Primary | ICD-10-CM

## 2025-06-16 DIAGNOSIS — C34.90 NON-SMALL CELL LUNG CANCER, UNSPECIFIED LATERALITY (HCC): ICD-10-CM

## 2025-06-16 DIAGNOSIS — N18.32 ANEMIA DUE TO STAGE 3B CHRONIC KIDNEY DISEASE  (HCC): ICD-10-CM

## 2025-06-16 DIAGNOSIS — E83.9 CHRONIC KIDNEY DISEASE-MINERAL AND BONE DISORDER (CKD-MBD): ICD-10-CM

## 2025-06-16 DIAGNOSIS — E66.812 CLASS 2 SEVERE OBESITY DUE TO EXCESS CALORIES WITH SERIOUS COMORBIDITY AND BODY MASS INDEX (BMI) OF 39.0 TO 39.9 IN ADULT (HCC): ICD-10-CM

## 2025-06-16 DIAGNOSIS — N18.32 HYPERTENSIVE KIDNEY DISEASE WITH STAGE 3B CHRONIC KIDNEY DISEASE (HCC): ICD-10-CM

## 2025-06-16 LAB
25(OH)D3 SERPL-MCNC: 48.3 NG/ML (ref 30–100)
ALBUMIN SERPL BCG-MCNC: 4.1 G/DL (ref 3.5–5)
ALP SERPL-CCNC: 86 U/L (ref 34–104)
ALT SERPL W P-5'-P-CCNC: 39 U/L (ref 7–52)
ANION GAP SERPL CALCULATED.3IONS-SCNC: 8 MMOL/L (ref 4–13)
AST SERPL W P-5'-P-CCNC: 44 U/L (ref 13–39)
BILIRUB SERPL-MCNC: 1.2 MG/DL (ref 0.2–1)
BUN SERPL-MCNC: 32 MG/DL (ref 5–25)
CALCIUM SERPL-MCNC: 9.3 MG/DL (ref 8.4–10.2)
CHLORIDE SERPL-SCNC: 102 MMOL/L (ref 96–108)
CO2 SERPL-SCNC: 28 MMOL/L (ref 21–32)
CREAT SERPL-MCNC: 1.53 MG/DL (ref 0.6–1.3)
GFR SERPL CREATININE-BSD FRML MDRD: 32 ML/MIN/1.73SQ M
GLUCOSE P FAST SERPL-MCNC: 106 MG/DL (ref 65–99)
PHOSPHATE SERPL-MCNC: 4.4 MG/DL (ref 2.3–4.1)
POTASSIUM SERPL-SCNC: 4.3 MMOL/L (ref 3.5–5.3)
PROT SERPL-MCNC: 6.6 G/DL (ref 6.4–8.4)
PTH-INTACT SERPL-MCNC: 120.3 PG/ML (ref 12–88)
SODIUM SERPL-SCNC: 138 MMOL/L (ref 135–147)

## 2025-06-16 PROCEDURE — 82306 VITAMIN D 25 HYDROXY: CPT

## 2025-06-16 PROCEDURE — 83970 ASSAY OF PARATHORMONE: CPT

## 2025-06-16 PROCEDURE — 36415 COLL VENOUS BLD VENIPUNCTURE: CPT

## 2025-06-16 PROCEDURE — 84100 ASSAY OF PHOSPHORUS: CPT

## 2025-06-16 PROCEDURE — 80053 COMPREHEN METABOLIC PANEL: CPT

## 2025-06-16 NOTE — TELEPHONE ENCOUNTER
Per Dr. Arriaga  Recommend 1/2 L IVF NS for CARLOS ENRIQUE   Please schedule.  Plan ordered. Patient notified.

## 2025-06-17 ENCOUNTER — HOSPITAL ENCOUNTER (OUTPATIENT)
Dept: INFUSION CENTER | Facility: HOSPITAL | Age: 80
Discharge: HOME | DRG: 202 | End: 2025-06-17
Attending: INTERNAL MEDICINE
Payer: COMMERCIAL

## 2025-06-17 ENCOUNTER — TELEPHONE (OUTPATIENT)
Dept: HEMATOLOGY ONCOLOGY | Facility: CLINIC | Age: 80
End: 2025-06-17

## 2025-06-17 VITALS
SYSTOLIC BLOOD PRESSURE: 120 MMHG | HEART RATE: 70 BPM | RESPIRATION RATE: 16 BRPM | DIASTOLIC BLOOD PRESSURE: 75 MMHG | TEMPERATURE: 97 F

## 2025-06-17 DIAGNOSIS — C79.51 MALIGNANT NEOPLASM METASTATIC TO BONE (HCC): ICD-10-CM

## 2025-06-17 DIAGNOSIS — C34.90 NON-SMALL CELL LUNG CANCER, UNSPECIFIED LATERALITY (HCC): Primary | ICD-10-CM

## 2025-06-17 DIAGNOSIS — C34.91 NON-SMALL CELL CANCER OF RIGHT LUNG (HCC): ICD-10-CM

## 2025-06-17 DIAGNOSIS — N17.9 AKI (ACUTE KIDNEY INJURY) (HCC): ICD-10-CM

## 2025-06-17 PROCEDURE — 96360 HYDRATION IV INFUSION INIT: CPT

## 2025-06-17 RX ADMIN — SODIUM CHLORIDE 500 ML: 0.9 INJECTION, SOLUTION INTRAVENOUS at 09:10

## 2025-06-17 NOTE — TELEPHONE ENCOUNTER
Patient having mild confusion, shortness of breath, aches and feels worse than the last time she was on this medicine.  I asked her to stop taking the medicine for 1 to 2 days until she feels much better and then to go back on 3 capsules in the morning and 3 capsules at bedtime    Ryan Arriaga

## 2025-06-17 NOTE — TELEPHONE ENCOUNTER
----- Message from WILDA Celeste sent at 6/16/2025  3:52 PM EDT -----  Please let patient know updated blood work has been reviewed creatinine is stable a little bit higher than most recent.  Make sure patient is hydrating.  Repeat BMP prior to her next appointment with   [facility] ensure stability  ----- Message -----  From: Lab, Background User  Sent: 6/16/2025  10:15 AM EDT  To: Dot Germain MD

## 2025-06-17 NOTE — TELEPHONE ENCOUNTER
Called patient and left a voicemail stating the following information:    Patients updated blood work has been reviewed creatinine is stable a little bit higher than most recent. Make sure patient is hydrating. Repeat BMP prior to her next appointment with [facility] ensure stability.    I asked the patient please call back with further questions.

## 2025-06-17 NOTE — PROGRESS NOTES
Pt tolerated 500 mL NSS hydration without difficulty.  Confirmed next appt on 6/27 at 1300 for Xgeva injection, and AVS provided.  Escorted out in w/c by volunteer.

## 2025-06-17 NOTE — PROGRESS NOTES
On arrival, pt had difficulty ambulating into the infusion center.  She appeared very SOB, and needed to sit down during the walk in.  She reported that she is having some side effects since being started back on the Alecensa.  She states it makes her feel foggy and confused, very winded, and has leg weakness.  She reports decreased ambulation the past few weeks.  Notified Neda Torres RN at Dr. Arriaga's office.  Dr. Arriaga phoned pt and told her to stop the Alecensa for 1-2 days until she feels better, then restart at reduced dose of 3 capsules in the morning and 3 capsules at bedtime.  Pt aware and agreeable.  States she will follow up with Dr. Arriaga's office with how she is feeling.

## 2025-06-18 ENCOUNTER — HOSPITAL ENCOUNTER (INPATIENT)
Facility: HOSPITAL | Age: 80
LOS: 7 days | Discharge: HOME HEALTH | DRG: 202 | End: 2025-06-25
Attending: EMERGENCY MEDICINE | Admitting: INTERNAL MEDICINE
Payer: COMMERCIAL

## 2025-06-18 ENCOUNTER — APPOINTMENT (EMERGENCY)
Dept: RADIOLOGY | Facility: HOSPITAL | Age: 80
DRG: 202 | End: 2025-06-18
Payer: COMMERCIAL

## 2025-06-18 DIAGNOSIS — I50.9 CHF (CONGESTIVE HEART FAILURE) (HCC): Primary | ICD-10-CM

## 2025-06-18 DIAGNOSIS — R53.1 WEAKNESS: ICD-10-CM

## 2025-06-18 DIAGNOSIS — J45.50 SEVERE PERSISTENT ASTHMA WITHOUT COMPLICATION: ICD-10-CM

## 2025-06-18 DIAGNOSIS — E66.813 CLASS 3 SEVERE OBESITY DUE TO EXCESS CALORIES WITH SERIOUS COMORBIDITY AND BODY MASS INDEX (BMI) OF 40.0 TO 44.9 IN ADULT: ICD-10-CM

## 2025-06-18 DIAGNOSIS — K92.2 GI BLEED: ICD-10-CM

## 2025-06-18 DIAGNOSIS — J90 PLEURAL EFFUSION: ICD-10-CM

## 2025-06-18 DIAGNOSIS — C34.90 NON-SMALL CELL LUNG CANCER, UNSPECIFIED LATERALITY (HCC): ICD-10-CM

## 2025-06-18 PROBLEM — R06.02 SHORTNESS OF BREATH: Status: ACTIVE | Noted: 2025-06-18

## 2025-06-18 LAB
2HR DELTA HS TROPONIN: 3 NG/L
4HR DELTA HS TROPONIN: 3 NG/L
ALBUMIN SERPL BCG-MCNC: 3.5 G/DL (ref 3.5–5)
ALP SERPL-CCNC: 74 U/L (ref 34–104)
ALT SERPL W P-5'-P-CCNC: 33 U/L (ref 7–52)
ANION GAP SERPL CALCULATED.3IONS-SCNC: 8 MMOL/L (ref 4–13)
AST SERPL W P-5'-P-CCNC: 47 U/L (ref 13–39)
ATRIAL RATE: 70 BPM
ATRIAL RATE: 85 BPM
BASOPHILS # BLD AUTO: 0 THOUSANDS/ÂΜL (ref 0–0.1)
BASOPHILS NFR BLD AUTO: 0 % (ref 0–1)
BILIRUB SERPL-MCNC: 0.99 MG/DL (ref 0.2–1)
BILIRUB UR QL STRIP: NEGATIVE
BNP SERPL-MCNC: 110 PG/ML (ref 0–100)
BUN SERPL-MCNC: 33 MG/DL (ref 5–25)
CALCIUM SERPL-MCNC: 8.3 MG/DL (ref 8.4–10.2)
CARDIAC TROPONIN I PNL SERPL HS: 20 NG/L (ref ?–50)
CARDIAC TROPONIN I PNL SERPL HS: 23 NG/L (ref ?–50)
CARDIAC TROPONIN I PNL SERPL HS: 23 NG/L (ref ?–50)
CHLORIDE SERPL-SCNC: 104 MMOL/L (ref 96–108)
CLARITY UR: CLEAR
CO2 SERPL-SCNC: 25 MMOL/L (ref 21–32)
COLOR UR: ABNORMAL
CREAT SERPL-MCNC: 1.28 MG/DL (ref 0.6–1.3)
D DIMER PPP FEU-MCNC: 0.56 UG/ML FEU
EOSINOPHIL # BLD AUTO: 0 THOUSAND/ÂΜL (ref 0–0.61)
EOSINOPHIL NFR BLD AUTO: 0 % (ref 0–6)
ERYTHROCYTE [DISTWIDTH] IN BLOOD BY AUTOMATED COUNT: 17.1 % (ref 11.6–15.1)
FOLATE SERPL-MCNC: 17.5 NG/ML
GFR SERPL CREATININE-BSD FRML MDRD: 40 ML/MIN/1.73SQ M
GLUCOSE SERPL-MCNC: 133 MG/DL (ref 65–140)
GLUCOSE SERPL-MCNC: 99 MG/DL (ref 65–140)
GLUCOSE UR STRIP-MCNC: NEGATIVE MG/DL
HCT VFR BLD AUTO: 25.6 % (ref 34.8–46.1)
HGB BLD-MCNC: 8.3 G/DL (ref 11.5–15.4)
HGB UR QL STRIP.AUTO: NEGATIVE
IMM GRANULOCYTES # BLD AUTO: 0.05 THOUSAND/UL (ref 0–0.2)
IMM GRANULOCYTES NFR BLD AUTO: 1 % (ref 0–2)
IRON SATN MFR SERPL: 25 % (ref 15–50)
IRON SERPL-MCNC: 73 UG/DL (ref 50–212)
KETONES UR STRIP-MCNC: NEGATIVE MG/DL
LEUKOCYTE ESTERASE UR QL STRIP: NEGATIVE
LYMPHOCYTES # BLD AUTO: 0.61 THOUSANDS/ÂΜL (ref 0.6–4.47)
LYMPHOCYTES NFR BLD AUTO: 12 % (ref 14–44)
MCH RBC QN AUTO: 28.4 PG (ref 26.8–34.3)
MCHC RBC AUTO-ENTMCNC: 32.4 G/DL (ref 31.4–37.4)
MCV RBC AUTO: 88 FL (ref 82–98)
MONOCYTES # BLD AUTO: 0.63 THOUSAND/ÂΜL (ref 0.17–1.22)
MONOCYTES NFR BLD AUTO: 12 % (ref 4–12)
NEUTROPHILS # BLD AUTO: 4.03 THOUSANDS/ÂΜL (ref 1.85–7.62)
NEUTS SEG NFR BLD AUTO: 75 % (ref 43–75)
NITRITE UR QL STRIP: NEGATIVE
NRBC BLD AUTO-RTO: 0 /100 WBCS
P AXIS: 45 DEGREES
P AXIS: 64 DEGREES
PH UR STRIP.AUTO: 7.5 [PH]
PLATELET # BLD AUTO: 70 THOUSANDS/UL (ref 149–390)
POTASSIUM SERPL-SCNC: 4.8 MMOL/L (ref 3.5–5.3)
PR INTERVAL: 170 MS
PR INTERVAL: 182 MS
PROT SERPL-MCNC: 6 G/DL (ref 6.4–8.4)
PROT UR STRIP-MCNC: NEGATIVE MG/DL
QRS AXIS: 44 DEGREES
QRS AXIS: 48 DEGREES
QRSD INTERVAL: 100 MS
QRSD INTERVAL: 102 MS
QT INTERVAL: 420 MS
QT INTERVAL: 442 MS
QTC INTERVAL: 477 MS
QTC INTERVAL: 499 MS
RBC # BLD AUTO: 2.92 MILLION/UL (ref 3.81–5.12)
SODIUM SERPL-SCNC: 137 MMOL/L (ref 135–147)
SP GR UR STRIP.AUTO: 1.01 (ref 1–1.03)
T WAVE AXIS: 62 DEGREES
T WAVE AXIS: 67 DEGREES
TIBC SERPL-MCNC: 289.8 UG/DL (ref 250–450)
TRANSFERRIN SERPL-MCNC: 207 MG/DL (ref 203–362)
UIBC SERPL-MCNC: 217 UG/DL (ref 155–355)
UROBILINOGEN UR STRIP-ACNC: 3 MG/DL
VENTRICULAR RATE: 70 BPM
VENTRICULAR RATE: 85 BPM
VIT B12 SERPL-MCNC: 966 PG/ML (ref 180–914)
WBC # BLD AUTO: 5.32 THOUSAND/UL (ref 4.31–10.16)

## 2025-06-18 PROCEDURE — 71046 X-RAY EXAM CHEST 2 VIEWS: CPT

## 2025-06-18 PROCEDURE — 99223 1ST HOSP IP/OBS HIGH 75: CPT

## 2025-06-18 PROCEDURE — 81003 URINALYSIS AUTO W/O SCOPE: CPT

## 2025-06-18 PROCEDURE — 97163 PT EVAL HIGH COMPLEX 45 MIN: CPT

## 2025-06-18 PROCEDURE — 82746 ASSAY OF FOLIC ACID SERUM: CPT | Performed by: INTERNAL MEDICINE

## 2025-06-18 PROCEDURE — 83550 IRON BINDING TEST: CPT | Performed by: INTERNAL MEDICINE

## 2025-06-18 PROCEDURE — 94644 CONT INHLJ TX 1ST HOUR: CPT

## 2025-06-18 PROCEDURE — 83880 ASSAY OF NATRIURETIC PEPTIDE: CPT

## 2025-06-18 PROCEDURE — 36415 COLL VENOUS BLD VENIPUNCTURE: CPT

## 2025-06-18 PROCEDURE — 85379 FIBRIN DEGRADATION QUANT: CPT

## 2025-06-18 PROCEDURE — 85025 COMPLETE CBC W/AUTO DIFF WBC: CPT

## 2025-06-18 PROCEDURE — 97166 OT EVAL MOD COMPLEX 45 MIN: CPT

## 2025-06-18 PROCEDURE — 82948 REAGENT STRIP/BLOOD GLUCOSE: CPT

## 2025-06-18 PROCEDURE — 96360 HYDRATION IV INFUSION INIT: CPT

## 2025-06-18 PROCEDURE — 82607 VITAMIN B-12: CPT | Performed by: INTERNAL MEDICINE

## 2025-06-18 PROCEDURE — 94760 N-INVAS EAR/PLS OXIMETRY 1: CPT

## 2025-06-18 PROCEDURE — RECHECK: Performed by: INTERNAL MEDICINE

## 2025-06-18 PROCEDURE — 93005 ELECTROCARDIOGRAM TRACING: CPT

## 2025-06-18 PROCEDURE — 99285 EMERGENCY DEPT VISIT HI MDM: CPT

## 2025-06-18 PROCEDURE — 83540 ASSAY OF IRON: CPT | Performed by: INTERNAL MEDICINE

## 2025-06-18 PROCEDURE — 93010 ELECTROCARDIOGRAM REPORT: CPT | Performed by: INTERNAL MEDICINE

## 2025-06-18 PROCEDURE — 80053 COMPREHEN METABOLIC PANEL: CPT

## 2025-06-18 PROCEDURE — 84484 ASSAY OF TROPONIN QUANT: CPT

## 2025-06-18 PROCEDURE — 94664 DEMO&/EVAL PT USE INHALER: CPT

## 2025-06-18 PROCEDURE — 94640 AIRWAY INHALATION TREATMENT: CPT

## 2025-06-18 RX ORDER — FLUTICASONE PROPIONATE 50 MCG
2 SPRAY, SUSPENSION (ML) NASAL DAILY
Status: DISCONTINUED | OUTPATIENT
Start: 2025-06-18 | End: 2025-06-25 | Stop reason: HOSPADM

## 2025-06-18 RX ORDER — ACETAMINOPHEN 325 MG/1
650 TABLET ORAL EVERY 6 HOURS PRN
Status: DISCONTINUED | OUTPATIENT
Start: 2025-06-18 | End: 2025-06-25 | Stop reason: HOSPADM

## 2025-06-18 RX ORDER — CALCITRIOL 0.25 UG/1
0.25 CAPSULE, LIQUID FILLED ORAL DAILY
Status: DISCONTINUED | OUTPATIENT
Start: 2025-06-18 | End: 2025-06-25 | Stop reason: HOSPADM

## 2025-06-18 RX ORDER — METOPROLOL TARTRATE 25 MG/1
25 TABLET, FILM COATED ORAL 2 TIMES DAILY
Status: DISCONTINUED | OUTPATIENT
Start: 2025-06-18 | End: 2025-06-25 | Stop reason: HOSPADM

## 2025-06-18 RX ORDER — POLYETHYLENE GLYCOL 3350 17 G/17G
17 POWDER, FOR SOLUTION ORAL DAILY PRN
Status: DISCONTINUED | OUTPATIENT
Start: 2025-06-18 | End: 2025-06-25 | Stop reason: HOSPADM

## 2025-06-18 RX ORDER — AMIODARONE HYDROCHLORIDE 200 MG/1
200 TABLET ORAL DAILY
Status: DISCONTINUED | OUTPATIENT
Start: 2025-06-18 | End: 2025-06-25 | Stop reason: HOSPADM

## 2025-06-18 RX ORDER — ALBUTEROL SULFATE 5 MG/ML
10 SOLUTION RESPIRATORY (INHALATION) ONCE
Status: COMPLETED | OUTPATIENT
Start: 2025-06-18 | End: 2025-06-18

## 2025-06-18 RX ORDER — METHYLPREDNISOLONE SODIUM SUCCINATE 40 MG/ML
40 INJECTION, POWDER, LYOPHILIZED, FOR SOLUTION INTRAMUSCULAR; INTRAVENOUS EVERY 8 HOURS SCHEDULED
Status: DISCONTINUED | OUTPATIENT
Start: 2025-06-18 | End: 2025-06-20

## 2025-06-18 RX ORDER — LORATADINE 10 MG/1
5 TABLET ORAL DAILY
Status: DISCONTINUED | OUTPATIENT
Start: 2025-06-18 | End: 2025-06-25 | Stop reason: HOSPADM

## 2025-06-18 RX ORDER — FUROSEMIDE 10 MG/ML
40 INJECTION INTRAMUSCULAR; INTRAVENOUS ONCE
Status: COMPLETED | OUTPATIENT
Start: 2025-06-18 | End: 2025-06-18

## 2025-06-18 RX ORDER — LIDOCAINE 50 MG/G
1 PATCH TOPICAL DAILY
Status: DISCONTINUED | OUTPATIENT
Start: 2025-06-18 | End: 2025-06-25 | Stop reason: HOSPADM

## 2025-06-18 RX ORDER — FUROSEMIDE 10 MG/ML
40 INJECTION INTRAMUSCULAR; INTRAVENOUS
Status: DISCONTINUED | OUTPATIENT
Start: 2025-06-18 | End: 2025-06-18

## 2025-06-18 RX ORDER — PRAVASTATIN SODIUM 80 MG/1
80 TABLET ORAL
Status: DISCONTINUED | OUTPATIENT
Start: 2025-06-18 | End: 2025-06-25 | Stop reason: HOSPADM

## 2025-06-18 RX ORDER — ONDANSETRON 2 MG/ML
4 INJECTION INTRAMUSCULAR; INTRAVENOUS EVERY 6 HOURS PRN
Status: DISCONTINUED | OUTPATIENT
Start: 2025-06-18 | End: 2025-06-25 | Stop reason: HOSPADM

## 2025-06-18 RX ORDER — IPRATROPIUM BROMIDE AND ALBUTEROL SULFATE 2.5; .5 MG/3ML; MG/3ML
3 SOLUTION RESPIRATORY (INHALATION)
Status: DISCONTINUED | OUTPATIENT
Start: 2025-06-18 | End: 2025-06-19

## 2025-06-18 RX ORDER — LISINOPRIL 2.5 MG/1
2.5 TABLET ORAL DAILY
Status: DISCONTINUED | OUTPATIENT
Start: 2025-06-18 | End: 2025-06-25 | Stop reason: HOSPADM

## 2025-06-18 RX ORDER — MAGNESIUM HYDROXIDE/ALUMINUM HYDROXICE/SIMETHICONE 120; 1200; 1200 MG/30ML; MG/30ML; MG/30ML
30 SUSPENSION ORAL EVERY 6 HOURS PRN
Status: DISCONTINUED | OUTPATIENT
Start: 2025-06-18 | End: 2025-06-25 | Stop reason: HOSPADM

## 2025-06-18 RX ORDER — UMECLIDINIUM BROMIDE AND VILANTEROL TRIFENATATE 62.5; 25 UG/1; UG/1
1 POWDER RESPIRATORY (INHALATION) DAILY
Status: DISCONTINUED | OUTPATIENT
Start: 2025-06-18 | End: 2025-06-25 | Stop reason: HOSPADM

## 2025-06-18 RX ORDER — AMLODIPINE BESYLATE 2.5 MG/1
2.5 TABLET ORAL DAILY
Status: DISCONTINUED | OUTPATIENT
Start: 2025-06-18 | End: 2025-06-25 | Stop reason: HOSPADM

## 2025-06-18 RX ORDER — AMOXICILLIN 250 MG
1 CAPSULE ORAL DAILY
Status: DISCONTINUED | OUTPATIENT
Start: 2025-06-18 | End: 2025-06-25 | Stop reason: HOSPADM

## 2025-06-18 RX ADMIN — FUROSEMIDE 40 MG: 10 INJECTION, SOLUTION INTRAVENOUS at 15:40

## 2025-06-18 RX ADMIN — APIXABAN 5 MG: 5 TABLET, FILM COATED ORAL at 08:40

## 2025-06-18 RX ADMIN — CALCITRIOL 0.25 MCG: 0.25 CAPSULE, LIQUID FILLED ORAL at 08:40

## 2025-06-18 RX ADMIN — UMECLIDINIUM BROMIDE AND VILANTEROL TRIFENATATE 1 PUFF: 62.5; 25 POWDER RESPIRATORY (INHALATION) at 08:39

## 2025-06-18 RX ADMIN — AMLODIPINE BESYLATE 2.5 MG: 2.5 TABLET ORAL at 08:40

## 2025-06-18 RX ADMIN — METHYLPREDNISOLONE SODIUM SUCCINATE 40 MG: 40 INJECTION, POWDER, FOR SOLUTION INTRAMUSCULAR; INTRAVENOUS at 06:09

## 2025-06-18 RX ADMIN — LISINOPRIL 2.5 MG: 2.5 TABLET ORAL at 08:39

## 2025-06-18 RX ADMIN — SENNOSIDES AND DOCUSATE SODIUM 1 TABLET: 50; 8.6 TABLET ORAL at 08:39

## 2025-06-18 RX ADMIN — IPRATROPIUM BROMIDE AND ALBUTEROL SULFATE 3 ML: .5; 3 SOLUTION RESPIRATORY (INHALATION) at 07:47

## 2025-06-18 RX ADMIN — LORATADINE 5 MG: 10 TABLET ORAL at 08:39

## 2025-06-18 RX ADMIN — SODIUM CHLORIDE 1000 ML: 0.9 INJECTION, SOLUTION INTRAVENOUS at 03:54

## 2025-06-18 RX ADMIN — APIXABAN 5 MG: 5 TABLET, FILM COATED ORAL at 17:10

## 2025-06-18 RX ADMIN — METHYLPREDNISOLONE SODIUM SUCCINATE 40 MG: 40 INJECTION, POWDER, FOR SOLUTION INTRAMUSCULAR; INTRAVENOUS at 13:55

## 2025-06-18 RX ADMIN — FUROSEMIDE 40 MG: 10 INJECTION, SOLUTION INTRAVENOUS at 05:14

## 2025-06-18 RX ADMIN — ALBUTEROL SULFATE 10 MG: 2.5 SOLUTION RESPIRATORY (INHALATION) at 05:09

## 2025-06-18 RX ADMIN — PRAVASTATIN SODIUM 80 MG: 80 TABLET ORAL at 15:40

## 2025-06-18 RX ADMIN — METOPROLOL TARTRATE 25 MG: 25 TABLET, FILM COATED ORAL at 08:39

## 2025-06-18 RX ADMIN — IPRATROPIUM BROMIDE AND ALBUTEROL SULFATE 3 ML: .5; 3 SOLUTION RESPIRATORY (INHALATION) at 19:10

## 2025-06-18 RX ADMIN — METHYLPREDNISOLONE SODIUM SUCCINATE 40 MG: 40 INJECTION, POWDER, FOR SOLUTION INTRAMUSCULAR; INTRAVENOUS at 21:15

## 2025-06-18 RX ADMIN — FLUTICASONE PROPIONATE 2 SPRAY: 50 SPRAY, METERED NASAL at 08:39

## 2025-06-18 RX ADMIN — AMIODARONE HYDROCHLORIDE 200 MG: 200 TABLET ORAL at 08:39

## 2025-06-18 RX ADMIN — IPRATROPIUM BROMIDE 1 MG: 0.5 SOLUTION RESPIRATORY (INHALATION) at 05:09

## 2025-06-18 RX ADMIN — IPRATROPIUM BROMIDE AND ALBUTEROL SULFATE 3 ML: .5; 3 SOLUTION RESPIRATORY (INHALATION) at 14:04

## 2025-06-18 RX ADMIN — LIDOCAINE 1 PATCH: 50 PATCH CUTANEOUS at 08:40

## 2025-06-18 NOTE — PLAN OF CARE
Problem: OCCUPATIONAL THERAPY ADULT  Goal: Performs self-care activities at highest level of function for planned discharge setting.  See evaluation for individualized goals.  Description: Treatment Interventions: ADL retraining, Functional transfer training, UE strengthening/ROM, Cognitive reorientation, Patient/family training, Equipment evaluation/education, Neuromuscular reeducation, Compensatory technique education, Energy conservation, Activityengagement          See flowsheet documentation for full assessment, interventions and recommendations.   6/18/2025 1258 by Taryn Morales OT  Outcome: Progressing  Note: Limitation: Decreased ADL status, Decreased UE strength, Decreased Safe judgement during ADL, Decreased endurance, Decreased self-care trans, Decreased high-level ADLs  Prognosis: Good  Assessment: Jayla Moody is a 80 y.o. female seen for OT evaluation s/p admit to [facility] on 6/18/2025 w/ Shortness of breath.  Comorbidities affecting pt's functional performance at time of assessment include:   asthma, atrial fibrillation, hypertension, lung cancer with mets to the bone, and hyperlipidemia  . Pt with active OT orders and activity orders for Up and OOB as tolerated. Personal factors affecting pt at time of IE include:FIDEL home environment, steps within home environment, difficulty performing ADLs, difficulty performing IADLs, and difficulty performing transfers/mobility. Prior to admission, pt lives in a 2 level home with 3 FIDEL and stair glide to 2nd floor. I with ADLs, IADLs and mobility with cane. 1 fall. Does not work. Drives. Upon evaluation: Pt currently requires supervision for UB ADLs, supervision for LB ADLs, supervision for toileting, Zeke for bed mobility, Zeke for functional transfers, and supervision RW mobility 2* the following deficits impacting occupational performance:weakness, decreased strength , decreased balance, and decreased activity tolerance. Pt to benefit from continued  skilled OT tx while in the hospital to address deficits as defined above and maximize level of functional independence w ADL's and functional mobility. Occupational Performance areas to address include: grooming, bathing/shower, toilet hygiene, dressing, functional mobility, and clothing management. From OT standpoint, recommendation at time of d/c would be level 3 resources. OT to follow pt on caseload 1-3x/wk.     Rehab Resource Intensity Level, OT: III (Minimum Resource Intensity)       6/18/2025 1258 by Taryn Morales OT  Note: Limitation: Decreased ADL status, Decreased UE strength, Decreased Safe judgement during ADL, Decreased endurance, Decreased self-care trans, Decreased high-level ADLs  Prognosis: Good  Assessment: Jayla Moody is a 80 y.o. female seen for OT evaluation s/p admit to [facility] on 6/18/2025 w/ Shortness of breath.  Comorbidities affecting pt's functional performance at time of assessment include:   asthma, atrial fibrillation, hypertension, lung cancer with mets to the bone, and hyperlipidemia  . Pt with active OT orders and activity orders for Up and OOB as tolerated. Personal factors affecting pt at time of IE include:FIDEL home environment, steps within home environment, difficulty performing ADLs, difficulty performing IADLs, and difficulty performing transfers/mobility. Prior to admission, pt lives in a 2 level home with 3 FIDEL and stair glide to 2nd floor. I with ADLs, IADLs and mobility with cane. 1 fall. Does not work. Drives. Upon evaluation: Pt currently requires supervision for UB ADLs, supervision for LB ADLs, supervision for toileting, Zeke for bed mobility, Zeke for functional transfers, and supervision RW mobility 2* the following deficits impacting occupational performance:weakness, decreased strength , decreased balance, and decreased activity tolerance. Pt to benefit from continued skilled OT tx while in the hospital to address deficits as defined above and maximize  level of functional independence w ADL's and functional mobility. Occupational Performance areas to address include: grooming, bathing/shower, toilet hygiene, dressing, functional mobility, and clothing management. From OT standpoint, recommendation at time of d/c would be level 3 resources. OT to follow pt on caseload 1-3x/wk.     Rehab Resource Intensity Level, OT: III (Minimum Resource Intensity)

## 2025-06-18 NOTE — PHYSICAL THERAPY NOTE
PT EVALUATION    Pt. Name: Jayla Moody  Pt. Age: 80 y.o.  MRN: [id number]  LENGTH OF STAY: 0      Admitting Diagnoses:   CHF (congestive heart failure) (HCC) [I50.9]  Weakness [R53.1]  SOB (shortness of breath) [R06.02]  Pleural effusion [J90]  Non-small cell lung cancer, unspecified laterality (HCC) [C34.90]  Class 3 severe obesity due to excess calories with serious comorbidity and body mass index (BMI) of 40.0 to 44.9 in adult [E66.813, Z68.41]    Past Medical History[1]    Past Surgical History[2]    Imaging Studies:  XR chest 2 views   ED Interpretation by Milton Virgen PA-C (06/18 0518)   Right pleural effusion with diffuse groundglass opacities consistent with fluid overload.  Cardiomegaly when compared to prior chest x-ray in January 2025.      Final Result by Pam Cornejo MD (06/18 0652)      Mild pulmonary venous congestion.      Question trace effusions.            Workstation performed: [id number]               06/18/25 1004   PT Last Visit   PT Visit Date 06/18/25   Note Type   Note type Evaluation   Pain Assessment   Pain Score No Pain   Restrictions/Precautions   Weight Bearing Precautions Per Order No   Other Precautions Chair Alarm;Bed Alarm;Fall Risk   Home Living   Type of Home House   Home Layout Two level;Bed/bath upstairs;Stairs to enter with rails;Other (Comment);1/2 bath on main level  (3STE; stair glide to 2nd floor)   Bathroom Shower/Tub Tub/shower unit   Bathroom Toilet Standard   Bathroom Equipment Grab bars in shower;Shower chair   Home Equipment Cane;Stair glide   Prior Function   Level of [place] Independent with functional mobility;Independent with ADLs;Independent with IADLS  (ambulates w/ SPC at baseline)   Lives With Alone   Receives Help From Friend(s)   Falls in the last 6 months 1 to 4  (1x)   Vocational Retired   Comments (+) ; (+) supportive friends   General   Additional Pertinent History lung CA w/ bone mets   Family/Caregiver Present No    Cognition   Overall Cognitive Status WFL   Arousal/Participation Alert   Attention Within functional limits   Orientation Level Oriented X4   Following Commands Follows all commands and directions without difficulty   Comments pleasant & cooperative   Subjective   Subjective Pt agreeable to PT/OT evals.   RUE Assessment   RUE Assessment   (refer to OT)   LUE Assessment   LUE Assessment   (refer to OT)   RLE Assessment   RLE Assessment WFL  (4-/5 grossly)   LLE Assessment   LLE Assessment WFL  (4-/5 grossly)   Coordination   Movements are Fluid and Coordinated 1   Sensation WFL   Bed Mobility   Supine to Sit 6  Modified independent   Additional items HOB elevated;Bedrails   Sit to Supine 6  Modified independent   Additional items Bedrails   Transfers   Sit to Stand 6  Modified independent   Additional items Bedrails   Stand to Sit 6  Modified independent   Additional items Bedrails   Additional Comments w/ SPC   Ambulation/Elevation   Gait pattern Wide SUSIE;Decreased foot clearance;Excessively slow   Gait Assistance 5  Supervision   Additional items Verbal cues   Assistive Device Straight cane   Distance ~25'x2   Ambulation/Elevation Additional Comments no gross LOB noted; (+) SOB require standing rest in between amb trial; SpO2 95% RA   Balance   Static Sitting Good   Dynamic Sitting Fair +   Static Standing Fair  (w/ SPC)   Dynamic Standing Fair -  (w/ SPC)   Ambulatory Fair -  (w/ SPC)   Endurance Deficit   Endurance Deficit Yes   Endurance Deficit Description SOB   Activity Tolerance   Activity Tolerance Treatment limited secondary to medical complications (Comment)   Medical Staff Made Aware OTPRESTON Clark   Nurse Made Aware yes   Assessment   Prognosis Good   Problem List Decreased strength;Decreased endurance;Impaired balance;Decreased mobility;Obesity   Assessment Pt. 80 y.o.female presented w/ SOB & dizziness. Pt admitted for Shortness of breath w/ pleural effusion vs CHF exacerbation. Pt referred  "to PT for mobility assessment & D/C planning. Please see above for information re: home set-up & PLOF as well as objective findings during PT assessment. PTA, pt reports being I w/ SPC. On eval, pt functioning below baseline hence will continue skilled PT to improve function & safety. Pt require modified I w/ bed mobility & transfers however require S for amb w/ SPC + cues for techniques & safety. Gait deviations as above but no gross LOB noted. Dec amb tolerance 2* to SOB. SpO2 95% RA. Pt require standing rests in between amb trial 2* to SOB.  The patient's AM-PAC Basic Mobility Inpatient Short Form Raw Score is 22. A Raw score of greater than 16 suggests the patient may benefit from discharge to home. Please also refer to the recommendation of the Physical Therapist for safe discharge planning. From PT standpoint, will recommend Level III (minimum resource intensity) rehab services and RW at D/C. No dizziness reported t/o session. Nsg staff most recent vital signs as follows: /54 (BP Location: Right arm)   Pulse 78   Temp 98.8 °F (37.1 °C) (Oral)   Resp 18   Ht 5' 1\" (1.549 m)   Wt 100 kg (220 lb 7.4 oz)   SpO2 94%   BMI 41.66 kg/m² . At end of session, pt back in bed in stable condition, call bell & phone in reach, bed alarm activated. Fall precautions reinforced w/ good understanding. CM to follow. Nsg staff to continue to mobilized pt (OOB in chair for all meals & ambulate in room/unit) as tolerated to prevent further decline in function. Will also recommend Restorative for daily amb &/or daily OOB in chair as appropriate. Nsg notified. Co-eval was necessary to complete this PT eval for the pt's best interest given pt's medical acuity & complexity.   Barriers to Discharge None   Goals   Patient Goals to get better   STG Expiration Date 06/25/25   Short Term Goal #1 Goals to be met in 7 days; pt will be able to: 1) inc strength & balance by 1/2 grade to improve overall functional mobility & dec fall " risk; 2) inc bed mobility to I for pt to be able to get in/OOB safely w/ proper techniques 100% of the time, to dec caregiver burden & safely function at home; 3) inc transfers to I for pt to transition safely from one surface to another w/o % of the time, to dec caregiver burden & safely function at home; 4) inc amb w/ appropriate AD approx. >150' w/ modified I for pt to ambulate household distances w/o any % of the time, to dec caregiver burden & safely function at home; 5) negotiate stairs w/ modified I for inc safety during stair mgt inside/outside of home & dec caregiver burden; 6) pt/caregiver ed   PT Treatment Day 0   Plan   Treatment/Interventions Functional transfer training;LE strengthening/ROM;Therapeutic exercise;Endurance training;Elevations;Patient/family training;Bed mobility;Gait training;Spoke to nursing;OT   PT Frequency 2-3x/wk   Discharge Recommendation   Rehab Resource Intensity Level, PT III (Minimum Resource Intensity)   Equipment Recommended Walker  (pt requesting to have a RW)   Walker Package Recommended Wheeled walker   Additional Comments restorative for daily amb   AM-PAC Basic Mobility Inpatient   Turning in Flat Bed Without Bedrails 4   Lying on Back to Sitting on Edge of Flat Bed Without Bedrails 4   Moving Bed to Chair 4   Standing Up From Chair Using Arms 4   Walk in Room 3   Climb 3-5 Stairs With Railing 3   Basic Mobility Inpatient Raw Score 22   Basic Mobility Standardized Score 47.4   [facility] Highest Level Of Mobility   -HLM Goal 7: Walk 25 feet or more   -HLM Achieved 7: Walk 25 feet or more   End of Consult   Patient Position at End of Consult Supine;Bed/Chair alarm activated;All needs within reach   End of Consult Comments Pt in stable condition. All needs in reach. Bed alarm activated.   Hx/personal factors: co-morbidities, inaccessible home, home alone, advanced age, use of AD, h/o of falls, fall risk, assist w/ ADL's, and obesity  Examination: dec  mobility, dec balance, dec endurance, dec amb, risk for falls  Clinical: unpredictable (ongoing medical status, abnormal lab values, and risk for falls)  Complexity: high    Nacho Douglas               [1]   Past Medical History:  Diagnosis Date    Allergic rhinitis     Anemia     Angio-edema     Anxiety     Arthritis     Asthma     Atrial fibrillation (HCC)     Cancer (HCC)     Chronic bronchitis (HCC)     Chronic kidney disease     COPD (chronic obstructive pulmonary disease) (HCC)     Coronary artery disease     CPAP (continuous positive airway pressure) dependence     Degenerative joint disease     Fluid retention 2024    GERD (gastroesophageal reflux disease)     Glaucoma     History of transfusion     HL (hearing loss)     Hypertension     Lumbar disc disease     Lung cancer (HCC)     Obesity     Pelvis cancer (HCC)     Periodic heart flutter     Pneumonia     Premature atrial contractions 10/31/2017    Sleep apnea     suspected     Sleep apnea, obstructive     Ventricular arrhythmia     Vitamin D deficiency    [2]   Past Surgical History:  Procedure Laterality Date    APPENDECTOMY      BREAST BIOPSY Right     years ago    BRONCHOSCOPY      CAROTID STENT      COLON SURGERY      COLONOSCOPY N/A 03/18/2019    Procedure: COLONOSCOPY;  Surgeon: Alessia Wilson DO;  Location: [facility];  Service: Gastroenterology    ESOPHAGOGASTRODUODENOSCOPY N/A 03/18/2019    Procedure: ESOPHAGOGASTRODUODENOSCOPY (EGD);  Surgeon: Alessia Wilson DO;  Location: [facility];  Service: Gastroenterology    KNEE SURGERY      LUNG BIOPSY      ROTATOR CUFF REPAIR      SHOULDER SURGERY

## 2025-06-18 NOTE — ASSESSMENT & PLAN NOTE
Schedule DuoNebs  Start Solu-Medrol  Breztri Aerosphere is nonformulary, continue dose equivalent  The patient has improved with the above treatment but still has significant dyspnea on exertion.

## 2025-06-18 NOTE — H&P
H&P - Hospitalist   Name: Jayla Moody 80 y.o. female I MRN: [id number]  Unit/Bed#: [place] [address] -01 I Date of Admission: 6/18/2025   Date of Service: 6/18/2025 I Hospital Day: 0     Assessment & Plan  Shortness of breath  Patient is a morbidly obese 80-year-old female past medical history of asthma, atrial fibrillation, hypertension, lung cancer with mets to the bone, and hyperlipidemia who presents to the emergency department with complaints of shortness of breath, worse on exertion, and dizziness for the last few days.  In the emergency room imaging suggests vascular congestion and pleural effusions, she was given IV Lasix and will be admitted for further treatment.  40 of IV Lasix given in the emergency room, continue twice daily for 2 more doses  Chest x-ray shows pleural effusions and vascular congestion, per my read  Patient may benefit from thoracentesis if diuresis unsuccessful  Oxygen saturation stable on room air  Continue nebulizers and steroids as below    D-dimer 0.56, when age corrected is stable    Severe asthma  Patient is status post nebulizer and is currently wheezing throughout her lung fields  Schedule DuoNebs  Start Solu-Medrol  Breztri Aerosphere is nonformulary, continue dose equivalent  MONO (obstructive sleep apnea)  Unable to tolerate CPAP outpatient, sent her machine back  Follow-up outpatient with pulmonology  Non-small cell lung cancer (HCC)  Follows with [facility] oncology  Was told to hold her alectinib today and tomorrow, resume on Friday  Malignant neoplasm metastatic to bone (HCC)  Patient had 1 treatment of radiation when bone mets were initially diagnosed  Continue outpatient follow-up with oncology  Atrial flutter (HCC)  Rate controlled  Continue beta-blocker and Eliquis at home doses  Continue to monitor  Anemia due to stage 3b chronic kidney disease  (HCC)  Current hemoglobin 8.3, baseline closer to 10  Patient denies any bleeding  Suspect hemodilution in the setting  of fluid overload  Continue to diurese and trend hemoglobin    Lab Results   Component Value Date    EGFR 40 06/18/2025    EGFR 32 06/16/2025    EGFR 46 05/12/2025    CREATININE 1.28 06/18/2025    CREATININE 1.53 (H) 06/16/2025    CREATININE 1.14 05/12/2025     Class 3 severe obesity due to excess calories with serious comorbidity and body mass index (BMI) of 40.0 to 44.9 in adult  BMI 40.62  Patient would benefit greatly from very low carbohydrate diet (less than 50 g daily) and significant caloric restriction (less than 2000 kcals per day).  Hypertensive kidney disease with stage 3b chronic kidney disease (HCC)  Blood pressure and renal function currently stable  Continue home antihypertensives  Trend renal function, monitor routine vital signs    Lab Results   Component Value Date    EGFR 40 06/18/2025    EGFR 32 06/16/2025    EGFR 46 05/12/2025    CREATININE 1.28 06/18/2025    CREATININE 1.53 (H) 06/16/2025    CREATININE 1.14 05/12/2025     Mixed hyperlipidemia  Rosuvastatin is nonformulary, continue dose equivalent pravastatin 80 mg daily      VTE Pharmacologic Prophylaxis: VTE Score: 8 High Risk (Score >/= 5) - Pharmacological DVT Prophylaxis Ordered: apixaban (Eliquis). Sequential Compression Devices Ordered.  Code Status: Level 3 - DNAR and DNI   Discussion with family: Patient, at bedside    Anticipated Length of Stay: Patient will be admitted on an observation basis with an anticipated length of stay of less than 2 midnights secondary to shortness of breath.    History of Present Illness   Chief Complaint: Dyspnea on exertion, cough, dizziness/weakness    Jayla Moody is a morbidly obese 80-year-old female past medical history of asthma, atrial fibrillation, hypertension, lung cancer with mets to the bone, and hyperlipidemia who presents to the emergency department with complaints of shortness of breath, worse on exertion, and dizziness for the last few days.  In the emergency room imaging suggests  vascular congestion and pleural effusions, she was given IV Lasix and will be admitted for further treatment.     Review of Systems   Constitutional:  Positive for fatigue. Negative for chills and fever.   HENT:  Negative for ear pain and sore throat.    Eyes:  Negative for pain and visual disturbance.   Respiratory:  Positive for cough, shortness of breath and wheezing.    Cardiovascular:  Negative for chest pain and palpitations.   Gastrointestinal:  Negative for abdominal pain and vomiting.   Genitourinary:  Negative for dysuria and hematuria.   Musculoskeletal:  Negative for arthralgias and back pain.   Skin:  Negative for color change and rash.   Neurological:  Positive for dizziness and weakness. Negative for seizures and syncope.   All other systems reviewed and are negative.      Historical Information   Past Medical History[1]  Past Surgical History[2]  Social History[3]  E-Cigarette/Vaping    E-Cigarette Use Never User      E-Cigarette/Vaping Substances    Nicotine No     THC No     CBD No     Flavoring No     Other No     Unknown No      Family History[4]  Social History:  Marital Status: Single   Occupation: Retired  Patient Pre-hospital Living Situation: Home with family  Patient Pre-hospital Level of Mobility: walks with cane  Patient Pre-hospital Diet Restrictions: None at all    Meds/Allergies   I have reviewed home medications with patient personally.  Prior to Admission medications    Medication Sig Start Date End Date Taking? Authorizing Provider   acetaminophen (TYLENOL) 650 mg CR tablet Take 650 mg by mouth every 8 (eight) hours as needed 2/27/25  Yes Historical Provider, MD   albuterol (2.5 mg/3 mL) 0.083 % nebulizer solution Take 3 mL (2.5 mg total) by nebulization 2 (two) times a day 3/24/25 3/19/26 Yes Brittany Rodney,    albuterol (PROVENTIL HFA,VENTOLIN HFA) 90 mcg/act inhaler Inhale 2 puffs every 6 (six) hours as needed for wheezing 3/24/25  Yes Brittany Rodney, DO   Alectinib HCl 150  MG CAPS Take 3 capsules (450 mg total) by mouth 2 (two) times a day 6/17/25  Yes Ryan Arriaga MD   amiodarone 200 mg tablet Take 1 tablet (200 mg total) by mouth daily 5/16/25  Yes Carlene Delgado PA-C   amLODIPine (NORVASC) 2.5 mg tablet Take 1 tablet (2.5 mg total) by mouth daily 5/16/25  Yes Carlene Delgado PA-C   apixaban (ELIQUIS) 5 mg Take 1 tablet (5 mg total) by mouth 2 (two) times a day 5/16/25 5/11/26 Yes Carlene Delgado PA-C   benralizumab (FASENRA) subcutaneous injection Inject 1 mL (30 mg total) under the skin every 56 days 4/30/25 4/30/26 Yes Brittany Rodney DO   Budeson-Glycopyrrol-Formoterol (Breztri Aerosphere) 160-9-4.8 MCG/ACT AERO Inhale 2 puffs 2 (two) times a day Rinse mouth after use. 3/24/25  Yes Brittany Rodney DO   calcitriol (ROCALTROL) 0.25 mcg capsule Take one tablet five days a week Do not start before April 25, 2025. 4/25/25  Yes Dot Germain MD   fluticasone (FLONASE) 50 mcg/act nasal spray SPRAY 2 SPRAYS INTO EACH NOSTRIL EVERY DAY 8/26/24  Yes Michelle Chatterjee MD   levocetirizine (XYZAL) 5 MG tablet TAKE 1 TABLET BY MOUTH EVERY DAY IN THE EVENING 4/16/25  Yes Brittany Rodney DO   lidocaine (Lidoderm) 5 % Apply 1 patch topically over 12 hours daily Remove & Discard patch within 12 hours or as directed by MD 5/12/25  Yes Vivian Bradshaw,    lisinopril (ZESTRIL) 2.5 mg tablet Take 1 tablet (2.5 mg total) by mouth daily 5/16/25  Yes Carlene Delgado PA-C   metoprolol tartrate (LOPRESSOR) 25 mg tablet TAKE 1 TABLET (25 MG TOTAL) BY MOUTH EVERY 12 (TWELVE) HOURS 3/24/25  Yes Michelle Chatterjee MD   rosuvastatin (CRESTOR) 10 MG tablet Take 1 tablet (10 mg total) by mouth daily 5/16/25 5/11/26 Yes Carlene Delgado PA-C   senna-docusate sodium (SENOKOT S) 8.6-50 mg per tablet Take 1 tablet by mouth daily 6/25/24  Yes Kaiser Kohler DO   vitamin B-12 (VITAMIN B-12) 1,000 mcg tablet TAKE 1 TABLET BY MOUTH EVERY DAY 8/12/24   "Yes Larissa Aragon MD     No Known Allergies    Objective :  Temp:  [98.9 °F (37.2 °C)] 98.9 °F (37.2 °C)  HR:  [65-68] 65  BP: (139-140)/(62-63) 140/63  Resp:  [16-22] 22  SpO2:  [98 %-99 %] 99 %  O2 Device: None (Room air)    Physical Exam  Vitals and nursing note reviewed.   Constitutional:       General: She is not in acute distress.     Appearance: She is well-developed. She is morbidly obese.   HENT:      Head: Normocephalic and atraumatic.     Eyes:      Conjunctiva/sclera: Conjunctivae normal.       Cardiovascular:      Rate and Rhythm: Normal rate and regular rhythm.      Pulses: Normal pulses.      Heart sounds: Normal heart sounds. No murmur heard.  Pulmonary:      Effort: Pulmonary effort is normal. No respiratory distress.      Breath sounds: Wheezing present.   Abdominal:      General: There is no distension.      Palpations: Abdomen is soft.      Tenderness: There is no abdominal tenderness.     Musculoskeletal:         General: No swelling.      Cervical back: Neck supple.     Skin:     General: Skin is warm and dry.      Capillary Refill: Capillary refill takes less than 2 seconds.     Neurological:      Mental Status: She is alert.     Psychiatric:         Mood and Affect: Mood normal.          Lines/Drains:            Lab Results: I have reviewed the following results:  Results from last 7 days   Lab Units 06/18/25  0352   WBC Thousand/uL 5.32   HEMOGLOBIN g/dL 8.3*   HEMATOCRIT % 25.6*   PLATELETS Thousands/uL 70*   SEGS PCT % 75   LYMPHO PCT % 12*   MONO PCT % 12   EOS PCT % 0     Results from last 7 days   Lab Units 06/18/25  0352   SODIUM mmol/L 137   POTASSIUM mmol/L 4.8   CHLORIDE mmol/L 104   CO2 mmol/L 25   BUN mg/dL 33*   CREATININE mg/dL 1.28   ANION GAP mmol/L 8   CALCIUM mg/dL 8.3*   ALBUMIN g/dL 3.5   TOTAL BILIRUBIN mg/dL 0.99   ALK PHOS U/L 74   ALT U/L 33   AST U/L 47*   GLUCOSE RANDOM mg/dL 99             No results found for: \"HGBA1C\"        Imaging Results Review: I personally " reviewed the following image studies/reports in PACS and discussed pertinent findings with Radiology: chest xray. My interpretation of the radiology images/reports is: Bilateral pleural effusions and vascular congestion.  Other Study Results Review: No additional pertinent studies reviewed.    ** Please Note: This note has been constructed using a voice recognition system. **         [1]   Past Medical History:  Diagnosis Date    Allergic rhinitis     Anemia     Angio-edema     Anxiety     Arthritis     Asthma     Atrial fibrillation (HCC)     Cancer (HCC)     Chronic bronchitis (HCC)     Chronic kidney disease     COPD (chronic obstructive pulmonary disease) (HCC)     Coronary artery disease     CPAP (continuous positive airway pressure) dependence     Degenerative joint disease     Fluid retention 2024    GERD (gastroesophageal reflux disease)     Glaucoma     History of transfusion     HL (hearing loss)     Hypertension     Lumbar disc disease     Lung cancer (HCC)     Obesity     Pelvis cancer (HCC)     Periodic heart flutter     Pneumonia     Premature atrial contractions 10/31/2017    Sleep apnea     suspected     Sleep apnea, obstructive     Ventricular arrhythmia     Vitamin D deficiency    [2]   Past Surgical History:  Procedure Laterality Date    APPENDECTOMY      BREAST BIOPSY Right     years ago    BRONCHOSCOPY      CAROTID STENT      COLON SURGERY      COLONOSCOPY N/A 03/18/2019    Procedure: COLONOSCOPY;  Surgeon: Alessia Wilson DO;  Location: [facility];  Service: Gastroenterology    ESOPHAGOGASTRODUODENOSCOPY N/A 03/18/2019    Procedure: ESOPHAGOGASTRODUODENOSCOPY (EGD);  Surgeon: Alessia Wilson DO;  Location: [facility];  Service: Gastroenterology    KNEE SURGERY      LUNG BIOPSY      ROTATOR CUFF REPAIR      SHOULDER SURGERY     [3]   Social History  Tobacco Use    Smoking status: Former     Current packs/day: 0.00     Average packs/day: 0.3 packs/day for 25.0 years (6.3 ttl pk-yrs)      Types: Cigarettes     Start date: 1962     Quit date:      Years since quittin.4     Passive exposure: Never    Smokeless tobacco: Former   Vaping Use    Vaping status: Never Used   Substance and Sexual Activity    Alcohol use: Not Currently    Drug use: Not Currently     Types: Marijuana    Sexual activity: Not Currently   [4]   Family History  Problem Relation Name Age of Onset    Stroke Mother Susanne     Diabetes Mother Susanne     Hyperlipidemia Mother Susanne     Hypertension Mother Susanne     Allergies Mother Susanne         Environmental    Asthma Mother Susanne     Diabetes Father Cecil     Hyperlipidemia Father Cecil     Hypertension Father Cecil     Lung cancer Father Cecil 70    Allergies Father Cecil         Environmental    Asthma Father Cecil     Cancer Father Cecil     Lymphoma Sister  69    Heart disease Sister Yessi Brooks         Pacemaker    Asthma Brother Puma Lopez     Aneurysm Brother          Brain - had surgery    Coronary artery disease Daughter          2 bypass done    No Known Problems Daughter      No Known Problems Daughter      No Known Problems Son      Kidney disease Son      Liver disease Son      Obesity Son

## 2025-06-18 NOTE — ED PROVIDER NOTES
Time reflects when diagnosis was documented in both MDM as applicable and the Disposition within this note       Time User Action Codes Description Comment    6/18/2025  5:11 AM Milton Virgen [I50.9] CHF (congestive heart failure) (HCC)     6/18/2025  5:11 AM Milton Virgen [E66.813,  Z68.41] Class 3 severe obesity due to excess calories with serious comorbidity and body mass index (BMI) of 40.0 to 44.9 in adult     6/18/2025  5:11 AM Milton Virgen [C34.90] Non-small cell lung cancer, unspecified laterality (HCC)     6/18/2025  5:11 AM Milton Virgen [J90] Pleural effusion     6/18/2025  5:11 AM Milton Virgen [R53.1] Weakness           ED Disposition       ED Disposition   Admit    Condition   Stable    Date/Time   Wed Jun 18, 2025  5:11 AM    Comment   Case was discussed with WILDA Rodriguez and the patient's admission status was agreed to be Admission Status: observation status to the service of Dr. Kaba .               Assessment & Plan       Medical Decision Making   80-year-old female past medical history of asthma and lung cancer presenting with department for worsening exertional dyspnea for the past few days.  Patient states that every time she gets acutely short of breath and keeps getting dizzy stating that she growing more and more weak over the past few days. Physical exam shows lungs clear to auscultation, exam limited by body habitus.  S1-S2 heard without murmurs rubs or gallops.  DDX including but not limited to: pneumonia, pleural effusion, CHF, SCAPE, PE, PTX, ACS, MI, asthma exacerbation, COPD exacerbation, COVID 19, EVALI, anemia, metabolic abnormality, renal failure.  CBC shows acute worsening of anemia at 8.3, CMP within normal limits.  D-dimer elevated 0.56, age-adjusted with patient's current history of cancer, low risk for PE at this time.  Troponin at 0 are elevated at 20, will repeat 2 for 2-hour.  BNP elevated at 110. Chest x-ray shows cardiomegaly, with right  pleural effusion, along with associated diffuse groundglass opacities findings consistent with fluid overload.  Malignant pleural effusion versus congestive heart failure exacerbation.  Patient started on 40 mg of IV Lasix.  Discussed findings with internal medicine, patient admitted for further management of exertional dyspnea, anemia, and fluid overload,/pleural effusion.  Discussed finding with patient, patient agrees to admission at this time.    Amount and/or Complexity of Data Reviewed  Labs: ordered.  Radiology: ordered and independent interpretation performed.    Risk  Prescription drug management.  Decision regarding hospitalization.             Medications   acetaminophen (TYLENOL) tablet 650 mg (has no administration in time range)   ondansetron (ZOFRAN) injection 4 mg (has no administration in time range)   aluminum-magnesium hydroxide-simethicone (MAALOX) oral suspension 30 mL (has no administration in time range)   methylPREDNISolone sodium succinate (Solu-MEDROL) injection 40 mg (40 mg Intravenous Given 6/18/25 0609)   ipratropium-albuterol (DUO-NEB) 0.5-2.5 mg/3 mL inhalation solution 3 mL (has no administration in time range)   sodium chloride 0.9 % bolus 1,000 mL (0 mL Intravenous Stopped 6/18/25 0454)   albuterol inhalation solution 10 mg (10 mg Nebulization Given 6/18/25 0509)   ipratropium (ATROVENT) 0.02 % inhalation solution 1 mg (1 mg Nebulization Given 6/18/25 0509)   furosemide (LASIX) injection 40 mg (40 mg Intravenous Given 6/18/25 0514)       ED Risk Strat Scores                    No data recorded                            History of Present Illness       Chief Complaint   Patient presents with    Shortness of Breath     Pt reports hx of asthma and lung cancer, shortness of breath worse with exertion. Pt took albuterol and reported feeling jittery. Pt also states dizziness        Past Medical History[1]   Past Surgical History[2]   Family History[3]   Social History[4]    E-Cigarette/Vaping    E-Cigarette Use Never User       E-Cigarette/Vaping Substances    Nicotine No     THC No     CBD No     Flavoring No     Other No     Unknown No       I have reviewed and agree with the history as documented.     Patient is a 80-year-old female past medical history of asthma and non-small cell lung cancer presenting to the emergency department for worsening exertional dyspnea for the past few days.  Patient states that she took albuterol at home and reports minimal improvement of symptoms.  Patient states that every time she gets acutely short of breath and keeps getting dizzy stating that she growing more and more weak over the past few days.  Patient states that she had to get an infusion yesterday for acute CARLOS ENRIQUE.  Patient denies any fever, body aches, chills, productive cough, nausea, vomiting, diarrhea, chest pain, palpitations or any  symptoms at this time.      Shortness of Breath  Associated symptoms: no abdominal pain, no chest pain, no cough, no ear pain, no headaches, no rash, no sore throat and no vomiting        Review of Systems   Constitutional:  Negative for chills and fatigue.   HENT:  Negative for congestion, dental problem, drooling, ear pain, postnasal drip, rhinorrhea, sinus pressure, sinus pain and sore throat.    Eyes:  Negative for pain, discharge and visual disturbance.   Respiratory:  Positive for shortness of breath. Negative for apnea, cough and chest tightness.    Cardiovascular:  Positive for leg swelling. Negative for chest pain and palpitations.        Exertional dyspnea   Gastrointestinal:  Negative for abdominal pain and vomiting.   Genitourinary:  Negative for dysuria and hematuria.   Musculoskeletal:  Negative for arthralgias and back pain.   Skin:  Negative for color change and rash.   Neurological:  Positive for dizziness and weakness. Negative for seizures, syncope, light-headedness and headaches.   All other systems reviewed and are  negative.          Objective       ED Triage Vitals [06/18/25 0253]   Temperature Pulse Blood Pressure Respirations SpO2 Patient Position - Orthostatic VS   98.9 °F (37.2 °C) 68 139/62 16 98 % Lying      Temp Source Heart Rate Source BP Location FiO2 (%) Pain Score    Oral Monitor Right arm -- --      Vitals      Date and Time Temp Pulse SpO2 Resp BP Pain Score FACES Pain Rating User   06/18/25 0514 -- 65 99 % 22 140/63 -- -- MLR   06/18/25 0253 98.9 °F (37.2 °C) 68 98 % 16 139/62 -- -- AJ            Physical Exam  Vitals and nursing note reviewed.   Constitutional:       General: She is not in acute distress.     Appearance: She is well-developed. She is not ill-appearing.      Interventions: She is not intubated.  HENT:      Head: Normocephalic and atraumatic.      Right Ear: There is no impacted cerumen.      Left Ear: There is no impacted cerumen.      Nose: No congestion or rhinorrhea.      Mouth/Throat:      Mouth: Mucous membranes are moist.      Pharynx: Oropharynx is clear. No pharyngeal swelling, oropharyngeal exudate or posterior oropharyngeal erythema.     Eyes:      General:         Right eye: No discharge.         Left eye: No discharge.      Extraocular Movements: Extraocular movements intact.      Conjunctiva/sclera: Conjunctivae normal.      Pupils: Pupils are equal, round, and reactive to light.     Neck:      Thyroid: No thyromegaly.      Vascular: No hepatojugular reflux or JVD.      Trachea: No tracheal deviation.     Cardiovascular:      Rate and Rhythm: Normal rate and regular rhythm. No extrasystoles are present.     Pulses: No decreased pulses.      Heart sounds: No murmur heard.     No friction rub. No gallop.   Pulmonary:      Effort: Pulmonary effort is normal. No tachypnea, bradypnea, accessory muscle usage or respiratory distress. She is not intubated.      Breath sounds: Normal breath sounds. No decreased breath sounds, wheezing, rhonchi or rales.      Comments: Pulmonary exam limited  by body habitus.  Chest:      Chest wall: No mass, deformity, tenderness, crepitus or edema.   Abdominal:      General: There is no distension.      Palpations: Abdomen is soft. There is no hepatomegaly, splenomegaly or mass.      Tenderness: There is no abdominal tenderness. There is no guarding.     Musculoskeletal:         General: No swelling.      Cervical back: Normal range of motion and neck supple.      Right lower le+ Edema present.      Left lower le+ Edema present.   Lymphadenopathy:      Cervical: No cervical adenopathy.     Skin:     General: Skin is warm and dry.      Capillary Refill: Capillary refill takes less than 2 seconds.      Coloration: Skin is not cyanotic, jaundiced or pale.      Findings: No bruising, ecchymosis, erythema, lesion or rash.     Neurological:      General: No focal deficit present.      Mental Status: She is alert and oriented to person, place, and time.     Psychiatric:         Mood and Affect: Mood normal.         Results Reviewed       Procedure Component Value Units Date/Time    HS Troponin I 2hr [[id number]] Collected: 25 0609    Lab Status: No result Specimen: Blood from Arm, Left     UA w Reflex to Microscopic w Reflex to Culture [[id number]]  (Abnormal) Collected: 25 0517    Lab Status: Final result Specimen: Urine, Clean Catch Updated: 25 0530     Color, UA Light Yellow     Clarity, UA Clear     Specific Gravity, UA 1.012     pH, UA 7.5     Leukocytes, UA Negative     Nitrite, UA Negative     Protein, UA Negative mg/dl      Glucose, UA Negative mg/dl      Ketones, UA Negative mg/dl      Urobilinogen, UA 3.0 mg/dl      Bilirubin, UA Negative     Occult Blood, UA Negative    D-Dimer [[id number]]  (Abnormal) Collected: 25 0352    Lab Status: Final result Specimen: Blood from Arm, Left Updated: 25 0447     D-Dimer, Quant 0.56 ug/ml FEU     Narrative:      In the evaluation for possible pulmonary embolism, in the appropriate (Well's Score  of 4 or less) patient, the age adjusted d-dimer cutoff for this patient can be calculated as:    Age x 0.01 (in ug/mL) for Age-adjusted D-dimer exclusion threshold for a patient over 50 years.    HS Troponin 0hr (reflex protocol) [[id number]]  (Normal) Collected: 06/18/25 0352    Lab Status: Final result Specimen: Blood from Arm, Left Updated: 06/18/25 0423     hs TnI 0hr 20 ng/L     HS Troponin I 4hr [[id number]]     Lab Status: No result Specimen: Blood     B-Type Natriuretic Peptide(BNP) [[id number]]  (Abnormal) Collected: 06/18/25 0352    Lab Status: Final result Specimen: Blood from Arm, Left Updated: 06/18/25 0422      pg/mL     Comprehensive metabolic panel [[id number]]  (Abnormal) Collected: 06/18/25 0352    Lab Status: Final result Specimen: Blood from Arm, Left Updated: 06/18/25 0420     Sodium 137 mmol/L      Potassium 4.8 mmol/L      Chloride 104 mmol/L      CO2 25 mmol/L      ANION GAP 8 mmol/L      BUN 33 mg/dL      Creatinine 1.28 mg/dL      Glucose 99 mg/dL      Calcium 8.3 mg/dL      AST 47 U/L      ALT 33 U/L      Alkaline Phosphatase 74 U/L      Total Protein 6.0 g/dL      Albumin 3.5 g/dL      Total Bilirubin 0.99 mg/dL      eGFR 40 ml/min/1.73sq m     Narrative:      [employer] guidelines for Chronic Kidney Disease (CKD):     Stage 1 with normal or high GFR (GFR > 90 mL/min/1.73 square meters)    Stage 2 Mild CKD (GFR = 60-89 mL/min/1.73 square meters)    Stage 3A Moderate CKD (GFR = 45-59 mL/min/1.73 square meters)    Stage 3B Moderate CKD (GFR = 30-44 mL/min/1.73 square meters)    Stage 4 Severe CKD (GFR = 15-29 mL/min/1.73 square meters)    Stage 5 End Stage CKD (GFR <15 mL/min/1.73 square meters)  Note: GFR calculation is accurate only with a steady state creatinine    CBC and differential [[id number]]  (Abnormal) Collected: 06/18/25 0352    Lab Status: Final result Specimen: Blood from Arm, Left Updated: 06/18/25 0403     WBC 5.32 Thousand/uL      RBC 2.92  Million/uL      Hemoglobin 8.3 g/dL      Hematocrit 25.6 %      MCV 88 fL      MCH 28.4 pg      MCHC 32.4 g/dL      RDW 17.1 %      Platelets 70 Thousands/uL      nRBC 0 /100 WBCs      Segmented % 75 %      Immature Grans % 1 %      Lymphocytes % 12 %      Monocytes % 12 %      Eosinophils Relative 0 %      Basophils Relative 0 %      Absolute Neutrophils 4.03 Thousands/µL      Absolute Immature Grans 0.05 Thousand/uL      Absolute Lymphocytes 0.61 Thousands/µL      Absolute Monocytes 0.63 Thousand/µL      Eosinophils Absolute 0.00 Thousand/µL      Basophils Absolute 0.00 Thousands/µL             XR chest 2 views   ED Interpretation by Milton Virgen PA-C (06/18 0518)   Right pleural effusion with diffuse groundglass opacities consistent with fluid overload.  Cardiomegaly when compared to prior chest x-ray in January 2025.          Procedures    ED Medication and Procedure Management   Prior to Admission Medications   Prescriptions Last Dose Informant Patient Reported? Taking?   Alectinib HCl 150 MG CAPS   No Yes   Sig: Take 3 capsules (450 mg total) by mouth 2 (two) times a day   Budeson-Glycopyrrol-Formoterol (Breztri Aerosphere) 160-9-4.8 MCG/ACT AERO  Self No Yes   Sig: Inhale 2 puffs 2 (two) times a day Rinse mouth after use.   acetaminophen (TYLENOL) 650 mg CR tablet  Self Yes Yes   Sig: Take 650 mg by mouth every 8 (eight) hours as needed   albuterol (2.5 mg/3 mL) 0.083 % nebulizer solution  Self No Yes   Sig: Take 3 mL (2.5 mg total) by nebulization 2 (two) times a day   albuterol (PROVENTIL HFA,VENTOLIN HFA) 90 mcg/act inhaler  Self No Yes   Sig: Inhale 2 puffs every 6 (six) hours as needed for wheezing   amLODIPine (NORVASC) 2.5 mg tablet  Self No Yes   Sig: Take 1 tablet (2.5 mg total) by mouth daily   amiodarone 200 mg tablet  Self No Yes   Sig: Take 1 tablet (200 mg total) by mouth daily   apixaban (ELIQUIS) 5 mg  Self No Yes   Sig: Take 1 tablet (5 mg total) by mouth 2 (two) times a day   benralizumab  (FASENRA) subcutaneous injection  Self No Yes   Sig: Inject 1 mL (30 mg total) under the skin every 56 days   calcitriol (ROCALTROL) 0.25 mcg capsule  Self No Yes   Sig: Take one tablet five days a week Do not start before April 25, 2025.   fluticasone (FLONASE) 50 mcg/act nasal spray  Self No Yes   Sig: SPRAY 2 SPRAYS INTO EACH NOSTRIL EVERY DAY   levocetirizine (XYZAL) 5 MG tablet  Self No Yes   Sig: TAKE 1 TABLET BY MOUTH EVERY DAY IN THE EVENING   lidocaine (Lidoderm) 5 %  Self No Yes   Sig: Apply 1 patch topically over 12 hours daily Remove & Discard patch within 12 hours or as directed by MD   lisinopril (ZESTRIL) 2.5 mg tablet  Self No Yes   Sig: Take 1 tablet (2.5 mg total) by mouth daily   metoprolol tartrate (LOPRESSOR) 25 mg tablet  Self No Yes   Sig: TAKE 1 TABLET (25 MG TOTAL) BY MOUTH EVERY 12 (TWELVE) HOURS   rosuvastatin (CRESTOR) 10 MG tablet  Self No Yes   Sig: Take 1 tablet (10 mg total) by mouth daily   senna-docusate sodium (SENOKOT S) 8.6-50 mg per tablet  Self No Yes   Sig: Take 1 tablet by mouth daily   vitamin B-12 (VITAMIN B-12) 1,000 mcg tablet  Self No Yes   Sig: TAKE 1 TABLET BY MOUTH EVERY DAY      Facility-Administered Medications: None     Patient's Medications   Discharge Prescriptions    No medications on file     No discharge procedures on file.  ED SEPSIS DOCUMENTATION   Time reflects when diagnosis was documented in both MDM as applicable and the Disposition within this note       Time User Action Codes Description Comment    6/18/2025  5:11 AM Milton Virgen [I50.9] CHF (congestive heart failure) (HCC)     6/18/2025  5:11 AM Milton Virgen [E66.813,  Z68.41] Class 3 severe obesity due to excess calories with serious comorbidity and body mass index (BMI) of 40.0 to 44.9 in adult     6/18/2025  5:11 AM Milton Virgen [C34.90] Non-small cell lung cancer, unspecified laterality (HCC)     6/18/2025  5:11 AM Milton Virgen [J90] Pleural effusion     6/18/2025  5:11 AM  Milton Virgen Add [R53.1] Weakness                    [1]   Past Medical History:  Diagnosis Date    Allergic rhinitis     Anemia     Angio-edema     Anxiety     Arthritis     Asthma     Atrial fibrillation (HCC)     Cancer (HCC)     Chronic bronchitis (HCC)     Chronic kidney disease     COPD (chronic obstructive pulmonary disease) (HCC)     Coronary artery disease     CPAP (continuous positive airway pressure) dependence     Degenerative joint disease     Fluid retention 2024    GERD (gastroesophageal reflux disease)     Glaucoma     History of transfusion     HL (hearing loss)     Hypertension     Lumbar disc disease     Lung cancer (HCC)     Obesity     Pelvis cancer (HCC)     Periodic heart flutter     Pneumonia     Premature atrial contractions 10/31/2017    Sleep apnea     suspected     Sleep apnea, obstructive     Ventricular arrhythmia     Vitamin D deficiency    [2]   Past Surgical History:  Procedure Laterality Date    APPENDECTOMY      BREAST BIOPSY Right     years ago    BRONCHOSCOPY      CAROTID STENT      COLON SURGERY      COLONOSCOPY N/A 03/18/2019    Procedure: COLONOSCOPY;  Surgeon: Alessia Wilson DO;  Location: [facility];  Service: Gastroenterology    ESOPHAGOGASTRODUODENOSCOPY N/A 03/18/2019    Procedure: ESOPHAGOGASTRODUODENOSCOPY (EGD);  Surgeon: Alessia Wilson DO;  Location: [facility];  Service: Gastroenterology    KNEE SURGERY      LUNG BIOPSY      ROTATOR CUFF REPAIR      SHOULDER SURGERY     [3]   Family History  Problem Relation Name Age of Onset    Stroke Mother Susanne     Diabetes Mother Susanne     Hyperlipidemia Mother Susanne     Hypertension Mother Susanne     Allergies Mother Susanne         Environmental    Asthma Mother Susanne     Diabetes Father Cecil     Hyperlipidemia Father Cecil     Hypertension Father Cecil     Lung cancer Father Cecil 70    Allergies Father Cecil         Environmental    Asthma Father Cecil     Cancer Father Cecil     Lymphoma Sister  69    Heart disease Sister  Yessi Brooks         Pacemaker    Asthma Brother Puma Lopez     Aneurysm Brother          Brain - had surgery    Coronary artery disease Daughter          2 bypass done    No Known Problems Daughter      No Known Problems Daughter      No Known Problems Son      Kidney disease Son      Liver disease Son      Obesity Son     [4]   Social History  Tobacco Use    Smoking status: Former     Current packs/day: 0.00     Average packs/day: 0.3 packs/day for 25.0 years (6.3 ttl pk-yrs)     Types: Cigarettes     Start date: 1962     Quit date:      Years since quittin.4     Passive exposure: Never    Smokeless tobacco: Former   Vaping Use    Vaping status: Never Used   Substance Use Topics    Alcohol use: Not Currently    Drug use: Not Currently     Types: Marijuana        Milton Virgen PA-C  25 0614

## 2025-06-18 NOTE — NURSING NOTE
This nurse agree's with the prior nurse's assessment of this patient.  Patient resting comfortably in bed.  Patient belongings and call bell within reach.  Bed alarm activated.

## 2025-06-18 NOTE — ASSESSMENT & PLAN NOTE
Patient is status post nebulizer and is currently wheezing throughout her lung fields  Schedule DuoNebs  Start Solu-Medrol  Breztri Aerosphere is nonformulary, continue dose equivalent

## 2025-06-18 NOTE — ASSESSMENT & PLAN NOTE
Patient is a morbidly obese 80-year-old female past medical history of asthma, atrial fibrillation, hypertension, lung cancer with mets to the bone, and hyperlipidemia who presents to the emergency department with complaints of shortness of breath, worse on exertion, and dizziness for the last few days.  In the emergency room imaging suggests vascular congestion and pleural effusions, she was given IV Lasix and will be admitted for further treatment.  40 of IV Lasix given in the emergency room, continue twice daily for 2 more doses  Chest x-ray shows pleural effusions and vascular congestion, per my read  Patient may benefit from thoracentesis if diuresis unsuccessful  Oxygen saturation stable on room air  Continue nebulizers and steroids as below    D-dimer 0.56, when age corrected is stable

## 2025-06-18 NOTE — PROGRESS NOTES
Progress Note - Hospitalist   Name: Jayla Moody 80 y.o. female I MRN: [id number]  Unit/Bed#: [place] [address] -01 I Date of Admission: 6/18/2025   Date of Service: 6/18/2025 I Hospital Day: 0    Assessment & Plan  Shortness of breath  Patient is a morbidly obese 80-year-old female past medical history of asthma, atrial fibrillation, hypertension, lung cancer with mets to the bone, and hyperlipidemia who presents to the emergency department with complaints of shortness of breath, worse on exertion, and dizziness for the last few days.  In the emergency room imaging suggests vascular congestion and pleural effusions, she was given IV Lasix and will be admitted for further treatment.  Official reading of the patient's chest x-ray suggests minimal pleural effusion  BNP is marginally elevated.  I suspect the patient's symptoms are mostly related to asthma rather than congestive heart failure.  Severe asthma    Schedule DuoNebs  Start Solu-Medrol  Breztri Aerosphere is nonformulary, continue dose equivalent  The patient has improved with the above treatment but still has significant dyspnea on exertion.  MONO (obstructive sleep apnea)  Unable to tolerate CPAP outpatient, sent her machine back  Follow-up outpatient with pulmonology  Non-small cell lung cancer (HCC)  Follows with [facility] oncology  Was told to hold her alectinib today and tomorrow, resume on Friday  Malignant neoplasm metastatic to bone (HCC)  Patient had 1 treatment of radiation when bone mets were initially diagnosed  Continue outpatient follow-up with oncology  Atrial flutter (HCC)  Rate controlled  Continue beta-blocker and Eliquis at home doses  Continue to monitor  Anemia due to stage 3b chronic kidney disease  (HCC)  Current hemoglobin 8.3, baseline closer to 10  Anemia studies will be obtained.    Lab Results   Component Value Date    EGFR 40 06/18/2025    EGFR 32 06/16/2025    EGFR 46 05/12/2025    CREATININE 1.28 06/18/2025    CREATININE 1.53 (H)  06/16/2025    CREATININE 1.14 05/12/2025     Class 3 severe obesity due to excess calories with serious comorbidity and body mass index (BMI) of 40.0 to 44.9 in adult  BMI 40.62  Patient would benefit greatly from very low carbohydrate diet (less than 50 g daily) and significant caloric restriction (less than 2000 kcals per day).  Hypertensive kidney disease with stage 3b chronic kidney disease (HCC)  Blood pressure and renal function currently stable  Continue home antihypertensives  Trend renal function, monitor routine vital signs    Lab Results   Component Value Date    EGFR 40 06/18/2025    EGFR 32 06/16/2025    EGFR 46 05/12/2025    CREATININE 1.28 06/18/2025    CREATININE 1.53 (H) 06/16/2025    CREATININE 1.14 05/12/2025     Mixed hyperlipidemia  Rosuvastatin is nonformulary, continue dose equivalent pravastatin 80 mg daily      Subjective:  The patient is feeling somewhat better.  She denies any chest pain.  She has a dry cough.  She is less short of breath but still has significant dyspnea on exertion.  She denies any abdominal pain, nausea, or vomiting.    Physical Exam:   Temp:  [97.9 °F (36.6 °C)-98.9 °F (37.2 °C)] 97.9 °F (36.6 °C)  HR:  [65-78] 73  BP: (127-144)/(51-63) 127/51  Resp:  [16-22] 20  SpO2:  [93 %-99 %] 93 %  O2 Device: None (Room air)    Gen: Well-developed, obese, in no distress.  Neck: Supple.  No lymphadenopathy or goiter.  Heart: Regular rhythm.  I heard no murmur, gallop, or rub.  Lungs: Patient breath sounds diffusely.  I heard no wheezing, rales, or rhonchi.  Abd: Soft with active bowel sounds.  No mass or tenderness.  Extremities: No clubbing, cyanosis, or edema.  No calf tenderness.  Neuro: Alert and oriented.  No focal sign.  Skin: Warm and dry.      LABS:   CBC:   Lab Results   Component Value Date    WBC 5.32 06/18/2025    HGB 8.3 (L) 06/18/2025    HCT 25.6 (L) 06/18/2025    MCV 88 06/18/2025    PLT 70 (L) 06/18/2025    RBC 2.92 (L) 06/18/2025    MCH 28.4 06/18/2025    [facility]  [facility] 06/18/20[facility]    RDW 17.1 (H) 06/18/2025    NRBC 0 06/18/2025   , CMP:   Lab Results   Component Value Date    SODIUM 137 06/18/2025    K 4.8 06/18/2025     06/18/2025    CO2 25 06/18/2025    BUN 33 (H) 06/18/2025    CREATININE 1.28 06/18/2025    CALCIUM 8.3 (L) 06/18/2025    AST 47 (H) 06/18/2025    ALT 33 06/18/2025    ALKPHOS 74 06/18/2025    EGFR 40 06/18/2025         VTE Pharmacologic Prophylaxis: Apixaban  VTE Mechanical Prophylaxis: reason for no mechanical VTE prophylaxis therapeutically anticoagulated.

## 2025-06-18 NOTE — ASSESSMENT & PLAN NOTE
Follows with [facility]  Was told to hold her alectinib today and tomorrow, resume on Friday

## 2025-06-18 NOTE — ASSESSMENT & PLAN NOTE
Current hemoglobin 8.3, baseline closer to 10  Anemia studies will be obtained.    Lab Results   Component Value Date    EGFR 40 06/18/2025    EGFR 32 06/16/2025    EGFR 46 05/12/2025    CREATININE 1.28 06/18/2025    CREATININE 1.53 (H) 06/16/2025    CREATININE 1.14 05/12/2025

## 2025-06-18 NOTE — ASSESSMENT & PLAN NOTE
BMI 40.62  Patient would benefit greatly from very low carbohydrate diet (less than 50 g daily) and significant caloric restriction (less than 2000 kcals per day).

## 2025-06-18 NOTE — OCCUPATIONAL THERAPY NOTE
Occupational Therapy Evaluation     Patient Name: Jayla Moody  Today's Date: 6/18/2025  Problem List  Principal Problem:    Shortness of breath  Active Problems:    MONO (obstructive sleep apnea)    Non-small cell lung cancer (HCC)    Malignant neoplasm metastatic to bone (HCC)    Severe asthma    Atrial flutter (HCC)    Anemia due to stage 3b chronic kidney disease  (HCC)    Class 3 severe obesity due to excess calories with serious comorbidity and body mass index (BMI) of 40.0 to 44.9 in adult    Hypertensive kidney disease with stage 3b chronic kidney disease (HCC)    Mixed hyperlipidemia    Past Medical History  Past Medical History[1]  Past Surgical History  Past Surgical History[2]        06/18/25 2468   OT Last Visit   OT Visit Date 06/18/25   Note Type   Note type Evaluation   Additional Comments greeted in supine and agreeable.   Pain Assessment   Pain Assessment Tool 0-10   Pain Score No Pain   Restrictions/Precautions   Weight Bearing Precautions Per Order No   Other Precautions Chair Alarm;Bed Alarm;Multiple lines;Fall Risk;Pain   Home Living   Type of Home House   Home Layout Two level;Bed/bath upstairs  (3 FIDEL, stair glide to 2nd floor)   Bathroom Shower/Tub Tub/shower unit   Bathroom Toilet Standard   Bathroom Equipment Grab bars in shower;Shower chair   Home Equipment Cane;Stair glide   Prior Function   Level of [place] Independent with ADLs;Independent with functional mobility;Independent with IADLS   Lives With Alone   Receives Help From Friend(s)   IADLs Independent with driving;Independent with meal prep;Independent with medication management   Falls in the last 6 months 1 to 4   Vocational Retired   ADL   Where Assessed Edge of bed   Eating Assistance 6  Modified independent   Grooming Assistance 6  Modified Independent   UB Bathing Assistance 5  Supervision/Setup   LB Bathing Assistance 5  Supervision/Setup   UB Dressing Assistance 5  Supervision/Setup   LB Dressing Assistance 5   Supervision/Setup   Toileting Assistance  5  Supervision/Setup   Bed Mobility   Supine to Sit 6  Modified independent   Sit to Supine 6  Modified independent   Transfers   Sit to Stand 6  Modified independent   Stand to Sit 6  Modified independent   Functional Mobility   Functional Mobility 5  Supervision   Additional Comments Ax1, Cane, short functional distances.   Additional items SPC   Balance   Static Sitting Good   Dynamic Sitting Fair +   Static Standing Fair   Dynamic Standing Fair -   Ambulatory Fair -   Activity Tolerance   Activity Tolerance Patient tolerated treatment well;Treatment limited secondary to medical complications (Comment)  (BERTRAND)   Medical Staff Made Aware PT Nacho   Nurse Made Aware RN   RUE Assessment   RUE Assessment WFL   LUE Assessment   LUE Assessment WFL   Hand Function   Gross Motor Coordination Functional   Fine Motor Coordination Functional   Psychosocial   Psychosocial (WDL) WDL   Cognition   Overall Cognitive Status WFL   Arousal/Participation Cooperative   Attention Within functional limits   Orientation Level Oriented X4   Memory Within functional limits   Following Commands Follows all commands and directions without difficulty   Assessment   Limitation Decreased ADL status;Decreased UE strength;Decreased Safe judgement during ADL;Decreased endurance;Decreased self-care trans;Decreased high-level ADLs   Prognosis Good   Assessment Jayla Moody is a 80 y.o. female seen for OT evaluation s/p admit to [facility] on 6/18/2025 w/ Shortness of breath.  Comorbidities affecting pt's functional performance at time of assessment include:   asthma, atrial fibrillation, hypertension, lung cancer with mets to the bone, and hyperlipidemia  . Pt with active OT orders and activity orders for Up and OOB as tolerated. Personal factors affecting pt at time of IE include:FIDEL home environment, steps within home environment, difficulty performing ADLs, difficulty performing IADLs, and difficulty  performing transfers/mobility. Prior to admission, pt lives in a 2 level home with 3 FIDEL and stair glide to 2nd floor. I with ADLs, IADLs and mobility with cane. 1 fall. Does not work. Drives. Upon evaluation: Pt currently requires supervision for UB ADLs, supervision for LB ADLs, supervision for toileting, Zeke for bed mobility, Zeke for functional transfers, and supervision RW mobility 2* the following deficits impacting occupational performance:weakness, decreased strength , decreased balance, and decreased activity tolerance. Pt to benefit from continued skilled OT tx while in the hospital to address deficits as defined above and maximize level of functional independence w ADL's and functional mobility. Occupational Performance areas to address include: grooming, bathing/shower, toilet hygiene, dressing, functional mobility, and clothing management. From OT standpoint, recommendation at time of d/c would be level 3 resources. OT to follow pt on caseload 1-3x/wk.   Goals   STG Time Frame 3-5   Short Term Goal #1 Pt will improve activity tolerance to G for min 30 min txment sessions for increase engagement in functional tasks   Short Term Goal #2 Pt will complete LB dressing/self care w/ mod I using adaptive device and DME as needed   Short Term Goal  Pt will complete toileting w/ mod I w/ G hygiene/thoroughness using DME as needed   LTG Time Frame 10-14   Long Term Goal #1 Pt will improve functional mobility during ADL/IADL/leisure tasks to Mod I using DME as needed w/ G balance/safety   Long Term Goal #2 Pt will demonstrate G carryover of pt/caregiver education and training as appropriate w/o cues w/ good tolerance to increase safety during functional tasks   Plan   Treatment Interventions ADL retraining;Functional transfer training;UE strengthening/ROM;Cognitive reorientation;Patient/family training;Equipment evaluation/education;Neuromuscular reeducation;Compensatory technique education;Energy  conservation;Activityengagement   Goal Expiration Date 07/02/25   OT Treatment Day 0   OT Frequency 2-3x/wk;1-2x/wk   Discharge Recommendation   Rehab Resource Intensity Level, OT III (Minimum Resource Intensity)   AM-PAC Daily Activity Inpatient   Lower Body Dressing 3   Bathing 3   Toileting 3   Upper Body Dressing 4   Grooming 4   Eating 4   Daily Activity Raw Score 21   Daily Activity Standardized Score (Calc for Raw Score >=11) 44.27   AM-PAC Applied Cognition Inpatient   Following a Speech/Presentation 4   Understanding Ordinary Conversation 4   Taking Medications 4   Remembering Where Things Are Placed or Put Away 4   Remembering List of 4-5 Errands 3   Taking Care of Complicated Tasks 3   Applied Cognition Raw Score 22   Applied Cognition Standardized Score 47.83   Taryn Morales, PARK            [1]   Past Medical History:  Diagnosis Date    Allergic rhinitis     Anemia     Angio-edema     Anxiety     Arthritis     Asthma     Atrial fibrillation (HCC)     Cancer (HCC)     Chronic bronchitis (HCC)     Chronic kidney disease     COPD (chronic obstructive pulmonary disease) (HCC)     Coronary artery disease     CPAP (continuous positive airway pressure) dependence     Degenerative joint disease     Fluid retention 2024    GERD (gastroesophageal reflux disease)     Glaucoma     History of transfusion     HL (hearing loss)     Hypertension     Lumbar disc disease     Lung cancer (HCC)     Obesity     Pelvis cancer (HCC)     Periodic heart flutter     Pneumonia     Premature atrial contractions 10/31/2017    Sleep apnea     suspected     Sleep apnea, obstructive     Ventricular arrhythmia     Vitamin D deficiency    [2]   Past Surgical History:  Procedure Laterality Date    APPENDECTOMY      BREAST BIOPSY Right     years ago    BRONCHOSCOPY      CAROTID STENT      COLON SURGERY      COLONOSCOPY N/A 03/18/2019    Procedure: COLONOSCOPY;  Surgeon: Alessia Wilson DO;  Location: [facility];  Service:  Gastroenterology    ESOPHAGOGASTRODUODENOSCOPY N/A 03/18/2019    Procedure: ESOPHAGOGASTRODUODENOSCOPY (EGD);  Surgeon: Alessia Wilson DO;  Location: [facility];  Service: Gastroenterology    KNEE SURGERY      LUNG BIOPSY      ROTATOR CUFF REPAIR      SHOULDER SURGERY

## 2025-06-18 NOTE — PLAN OF CARE
Problem: Potential for Falls  Goal: Patient will remain free of falls  Description: INTERVENTIONS:  - Educate patient/family on patient safety including physical limitations  - Instruct patient to call for assistance with activity   - Consider consulting OT/PT to assist with strengthening/mobility based on AM PAC & JH-HLM score  - Consult OT/PT to assist with strengthening/mobility   - Keep Call bell within reach  - Keep bed low and locked with side rails adjusted as appropriate  - Keep care items and personal belongings within reach  - Initiate and maintain comfort rounds  - Make Fall Risk Sign visible to staff  - Offer Toileting every 2 Hours, in advance of need  - Initiate/Maintain bed alarm  - Obtain necessary fall risk management equipment: alarm   - Apply yellow socks and bracelet for high fall risk patients  - Consider moving patient to room near nurses station  6/18/2025 0754 by Lissette Heath RN  Outcome: Progressing  6/18/2025 0754 by Lissette Heath RN  Outcome: Progressing     Problem: PAIN - ADULT  Goal: Verbalizes/displays adequate comfort level or baseline comfort level  Description: Interventions:  - Encourage patient to monitor pain and request assistance  - Assess pain using appropriate pain scale  - Administer analgesics as ordered based on type and severity of pain and evaluate response  - Implement non-pharmacological measures as appropriate and evaluate response  - Consider cultural and social influences on pain and pain management  - Notify physician/advanced practitioner if interventions unsuccessful or patient reports new pain  - Educate patient/family on pain management process including their role and importance of  reporting pain   - Provide non-pharmacologic/complimentary pain relief interventions  Outcome: Progressing     Problem: INFECTION - ADULT  Goal: Absence or prevention of progression during hospitalization  Description: INTERVENTIONS:  - Assess and monitor for signs and  symptoms of infection  - Monitor lab/diagnostic results  - Monitor all insertion sites, i.e. indwelling lines, tubes, and drains  - Monitor endotracheal if appropriate and nasal secretions for changes in amount and color  - [place] appropriate cooling/warming therapies per order  - Administer medications as ordered  - Instruct and encourage patient and family to use good hand hygiene technique  - Identify and instruct in appropriate isolation precautions for identified infection/condition  Outcome: Progressing     Problem: SAFETY ADULT  Goal: Patient will remain free of falls  Description: INTERVENTIONS:  - Educate patient/family on patient safety including physical limitations  - Instruct patient to call for assistance with activity   - Consider consulting OT/PT to assist with strengthening/mobility based on AM PAC & JH-HLM score  - Consult OT/PT to assist with strengthening/mobility   - Keep Call bell within reach  - Keep bed low and locked with side rails adjusted as appropriate  - Keep care items and personal belongings within reach  - Initiate and maintain comfort rounds  - Make Fall Risk Sign visible to staff  - Offer Toileting every 2 Hours, in advance of need  - Initiate/Maintain bed alarm  - Obtain necessary fall risk management equipment: alarm   - Apply yellow socks and bracelet for high fall risk patients  - Consider moving patient to room near nurses station  6/18/2025 0754 by Lissette Heath RN  Outcome: Progressing  6/18/2025 0754 by Lissette Heath RN  Outcome: Progressing  Goal: Maintain or return to baseline ADL function  Description: INTERVENTIONS:  -  Assess patient's ability to carry out ADLs; assess patient's baseline for ADL function and identify physical deficits which impact ability to perform ADLs (bathing, care of mouth/teeth, toileting, grooming, dressing, etc.)  - Assess/evaluate cause of self-care deficits   - Assess range of motion  - Assess patient's mobility; develop plan if  impaired  - Assess patient's need for assistive devices and provide as appropriate  - Encourage maximum independence but intervene and supervise when necessary  - Involve family in performance of ADLs  - Assess for home care needs following discharge   - Consider OT consult to assist with ADL evaluation and planning for discharge  - Provide patient education as appropriate  - Monitor functional capacity and physical performance, use of AM PAC & JH-HLM   - Monitor gait, balance and fatigue with ambulation    Outcome: Progressing  Goal: Maintains/Returns to pre admission functional level  Description: INTERVENTIONS:  - Perform AM-PAC 6 Click Basic Mobility/ Daily Activity assessment daily.  - Set and communicate daily mobility goal to care team and patient/family/caregiver.   - Collaborate with rehabilitation services on mobility goals if consulted  - Perform Range of Motion 4 times a day.  - Reposition patient every 2 hours.  - Dangle patient 3 times a day  - Stand patient 3 times a day  - Ambulate patient 3 times a day  - Out of bed to chair 3 times a day   - Out of bed for meals 3 times a day  - Out of bed for toileting  - Record patient progress and toleration of activity level   Outcome: Progressing     Problem: DISCHARGE PLANNING  Goal: Discharge to home or other facility with appropriate resources  Description: INTERVENTIONS:  - Identify barriers to discharge w/patient and caregiver  - Arrange for needed discharge resources and transportation as appropriate  - Identify discharge learning needs (meds, wound care, etc.)  - Arrange for interpretive services to assist at discharge as needed  - Refer to Case Management Department for coordinating discharge planning if the patient needs post-hospital services based on physician/advanced practitioner order or complex needs related to functional status, cognitive ability, or social support system  Outcome: Progressing     Problem: Knowledge Deficit  Goal:  Patient/family/caregiver demonstrates understanding of disease process, treatment plan, medications, and discharge instructions  Description: Complete learning assessment and assess knowledge base.  Interventions:  - Provide teaching at level of understanding  - Provide teaching via preferred learning methods  Outcome: Progressing     Problem: RESPIRATORY - ADULT  Goal: Achieves optimal ventilation and oxygenation  Description: INTERVENTIONS:  - Assess for changes in respiratory status  - Assess for changes in mentation and behavior  - Position to facilitate oxygenation and minimize respiratory effort  - Oxygen administered by appropriate delivery if ordered  - Initiate smoking cessation education as indicated  - Encourage broncho-pulmonary hygiene including cough, deep breathe, Incentive Spirometry  - Assess the need for suctioning and aspirate as needed  - Assess and instruct to report SOB or any respiratory difficulty  - Respiratory Therapy support as indicated  Outcome: Progressing

## 2025-06-18 NOTE — ASSESSMENT & PLAN NOTE
Blood pressure and renal function currently stable  Continue home antihypertensives  Trend renal function, monitor routine vital signs    Lab Results   Component Value Date    EGFR 40 06/18/2025    EGFR 32 06/16/2025    EGFR 46 05/12/2025    CREATININE 1.28 06/18/2025    CREATININE 1.53 (H) 06/16/2025    CREATININE 1.14 05/12/2025

## 2025-06-18 NOTE — ASSESSMENT & PLAN NOTE
Patient had 1 treatment of radiation when bone mets were initially diagnosed  Continue outpatient follow-up with oncology

## 2025-06-18 NOTE — ASSESSMENT & PLAN NOTE
Patient is a morbidly obese 80-year-old female past medical history of asthma, atrial fibrillation, hypertension, lung cancer with mets to the bone, and hyperlipidemia who presents to the emergency department with complaints of shortness of breath, worse on exertion, and dizziness for the last few days.  In the emergency room imaging suggests vascular congestion and pleural effusions, she was given IV Lasix and will be admitted for further treatment.  Official reading of the patient's chest x-ray suggests minimal pleural effusion  BNP is marginally elevated.  I suspect the patient's symptoms are mostly related to asthma rather than congestive heart failure.

## 2025-06-18 NOTE — ASSESSMENT & PLAN NOTE
Current hemoglobin 8.3, baseline closer to 10  Patient denies any bleeding  Suspect hemodilution in the setting of fluid overload  Continue to diurese and trend hemoglobin    Lab Results   Component Value Date    EGFR 40 06/18/2025    EGFR 32 06/16/2025    EGFR 46 05/12/2025    CREATININE 1.28 06/18/2025    CREATININE 1.53 (H) 06/16/2025    CREATININE 1.14 05/12/2025

## 2025-06-18 NOTE — ASSESSMENT & PLAN NOTE
Follows with [facility]  Was told to hold her alectinib today and tomorrow, resume on Friday

## 2025-06-18 NOTE — PLAN OF CARE
"  Problem: PHYSICAL THERAPY ADULT  Goal: Performs mobility at highest level of function for planned discharge setting.  See evaluation for individualized goals.  Description: Treatment/Interventions: Functional transfer training, LE strengthening/ROM, Therapeutic exercise, Endurance training, Elevations, Patient/family training, Bed mobility, Gait training, Spoke to nursing, OT  Equipment Recommended: Walker (pt requesting to have a RW)       See flowsheet documentation for full assessment, interventions and recommendations.  Note: Prognosis: Good  Problem List: Decreased strength, Decreased endurance, Impaired balance, Decreased mobility, Obesity  Assessment: Pt. 80 y.o.female presented w/ SOB & dizziness. Pt admitted for Shortness of breath w/ pleural effusion vs CHF exacerbation. Pt referred to PT for mobility assessment & D/C planning. Please see above for information re: home set-up & PLOF as well as objective findings during PT assessment. PTA, pt reports being I w/ SPC. On eval, pt functioning below baseline hence will continue skilled PT to improve function & safety. Pt require modified I w/ bed mobility & transfers however require S for amb w/ SPC + cues for techniques & safety. Gait deviations as above but no gross LOB noted. Dec amb tolerance 2* to SOB. SpO2 95% RA. Pt require standing rests in between amb trial 2* to SOB.  The patient's AM-PAC Basic Mobility Inpatient Short Form Raw Score is 22. A Raw score of greater than 16 suggests the patient may benefit from discharge to home. Please also refer to the recommendation of the Physical Therapist for safe discharge planning. From PT standpoint, will recommend Level III (minimum resource intensity) rehab services and RW at D/C. No dizziness reported t/o session. Nsg staff most recent vital signs as follows: /54 (BP Location: Right arm)   Pulse 78   Temp 98.8 °F (37.1 °C) (Oral)   Resp 18   Ht 5' 1\" (1.549 m)   Wt 100 kg (220 lb 7.4 oz)   SpO2 " 94%   BMI 41.66 kg/m² . At end of session, pt back in bed in stable condition, call bell & phone in reach, bed alarm activated. Fall precautions reinforced w/ good understanding. CM to follow. Nsg staff to continue to mobilized pt (OOB in chair for all meals & ambulate in room/unit) as tolerated to prevent further decline in function. Will also recommend Restorative for daily amb &/or daily OOB in chair as appropriate. Nsg notified. Co-eval was necessary to complete this PT eval for the pt's best interest given pt's medical acuity & complexity.    Barriers to Discharge: None     Rehab Resource Intensity Level, PT: III (Minimum Resource Intensity)    See flowsheet documentation for full assessment.

## 2025-06-19 LAB
ALBUMIN SERPL BCG-MCNC: 3.5 G/DL (ref 3.5–5)
ALP SERPL-CCNC: 66 U/L (ref 34–104)
ALT SERPL W P-5'-P-CCNC: 32 U/L (ref 7–52)
ANION GAP SERPL CALCULATED.3IONS-SCNC: 8 MMOL/L (ref 4–13)
AST SERPL W P-5'-P-CCNC: 34 U/L (ref 13–39)
BASOPHILS # BLD AUTO: 0 THOUSANDS/ÂΜL (ref 0–0.1)
BASOPHILS NFR BLD AUTO: 0 % (ref 0–1)
BILIRUB SERPL-MCNC: 0.71 MG/DL (ref 0.2–1)
BUN SERPL-MCNC: 51 MG/DL (ref 5–25)
CALCIUM SERPL-MCNC: 8.4 MG/DL (ref 8.4–10.2)
CHLORIDE SERPL-SCNC: 102 MMOL/L (ref 96–108)
CO2 SERPL-SCNC: 28 MMOL/L (ref 21–32)
CREAT SERPL-MCNC: 1.54 MG/DL (ref 0.6–1.3)
EOSINOPHIL # BLD AUTO: 0 THOUSAND/ÂΜL (ref 0–0.61)
EOSINOPHIL NFR BLD AUTO: 0 % (ref 0–6)
ERYTHROCYTE [DISTWIDTH] IN BLOOD BY AUTOMATED COUNT: 17.2 % (ref 11.6–15.1)
GFR SERPL CREATININE-BSD FRML MDRD: 32 ML/MIN/1.73SQ M
GLUCOSE SERPL-MCNC: 151 MG/DL (ref 65–140)
HCT VFR BLD AUTO: 22.6 % (ref 34.8–46.1)
HCT VFR BLD AUTO: 23.6 % (ref 34.8–46.1)
HGB BLD-MCNC: 7.5 G/DL (ref 11.5–15.4)
HGB BLD-MCNC: 7.9 G/DL (ref 11.5–15.4)
IMM GRANULOCYTES # BLD AUTO: 0.16 THOUSAND/UL (ref 0–0.2)
IMM GRANULOCYTES NFR BLD AUTO: 2 % (ref 0–2)
LYMPHOCYTES # BLD AUTO: 0.54 THOUSANDS/ÂΜL (ref 0.6–4.47)
LYMPHOCYTES NFR BLD AUTO: 8 % (ref 14–44)
MCH RBC QN AUTO: 28.4 PG (ref 26.8–34.3)
MCHC RBC AUTO-ENTMCNC: 33.2 G/DL (ref 31.4–37.4)
MCV RBC AUTO: 86 FL (ref 82–98)
MONOCYTES # BLD AUTO: 0.47 THOUSAND/ÂΜL (ref 0.17–1.22)
MONOCYTES NFR BLD AUTO: 7 % (ref 4–12)
NEUTROPHILS # BLD AUTO: 5.63 THOUSANDS/ÂΜL (ref 1.85–7.62)
NEUTS SEG NFR BLD AUTO: 83 % (ref 43–75)
NRBC BLD AUTO-RTO: 0 /100 WBCS
PLATELET # BLD AUTO: 75 THOUSANDS/UL (ref 149–390)
POTASSIUM SERPL-SCNC: 4.1 MMOL/L (ref 3.5–5.3)
PROT SERPL-MCNC: 5.8 G/DL (ref 6.4–8.4)
RBC # BLD AUTO: 2.64 MILLION/UL (ref 3.81–5.12)
SODIUM SERPL-SCNC: 138 MMOL/L (ref 135–147)
WBC # BLD AUTO: 6.8 THOUSAND/UL (ref 4.31–10.16)

## 2025-06-19 PROCEDURE — 85014 HEMATOCRIT: CPT | Performed by: INTERNAL MEDICINE

## 2025-06-19 PROCEDURE — 99232 SBSQ HOSP IP/OBS MODERATE 35: CPT | Performed by: INTERNAL MEDICINE

## 2025-06-19 PROCEDURE — 99222 1ST HOSP IP/OBS MODERATE 55: CPT | Performed by: INTERNAL MEDICINE

## 2025-06-19 PROCEDURE — 85025 COMPLETE CBC W/AUTO DIFF WBC: CPT

## 2025-06-19 PROCEDURE — 80053 COMPREHEN METABOLIC PANEL: CPT

## 2025-06-19 PROCEDURE — 94640 AIRWAY INHALATION TREATMENT: CPT

## 2025-06-19 PROCEDURE — 85018 HEMOGLOBIN: CPT | Performed by: INTERNAL MEDICINE

## 2025-06-19 PROCEDURE — 94760 N-INVAS EAR/PLS OXIMETRY 1: CPT

## 2025-06-19 RX ORDER — KETOTIFEN FUMARATE 0.35 MG/ML
1 SOLUTION/ DROPS OPHTHALMIC 2 TIMES DAILY PRN
Status: DISCONTINUED | OUTPATIENT
Start: 2025-06-19 | End: 2025-06-25 | Stop reason: HOSPADM

## 2025-06-19 RX ORDER — IPRATROPIUM BROMIDE AND ALBUTEROL SULFATE 2.5; .5 MG/3ML; MG/3ML
3 SOLUTION RESPIRATORY (INHALATION)
Status: DISCONTINUED | OUTPATIENT
Start: 2025-06-20 | End: 2025-06-24

## 2025-06-19 RX ORDER — SODIUM CHLORIDE, SODIUM LACTATE, POTASSIUM CHLORIDE, CALCIUM CHLORIDE 600; 310; 30; 20 MG/100ML; MG/100ML; MG/100ML; MG/100ML
125 INJECTION, SOLUTION INTRAVENOUS CONTINUOUS
Status: DISCONTINUED | OUTPATIENT
Start: 2025-06-19 | End: 2025-06-21

## 2025-06-19 RX ORDER — PANTOPRAZOLE SODIUM 40 MG/10ML
40 INJECTION, POWDER, LYOPHILIZED, FOR SOLUTION INTRAVENOUS EVERY 12 HOURS SCHEDULED
Status: DISCONTINUED | OUTPATIENT
Start: 2025-06-19 | End: 2025-06-25 | Stop reason: HOSPADM

## 2025-06-19 RX ADMIN — METOPROLOL TARTRATE 25 MG: 25 TABLET, FILM COATED ORAL at 09:13

## 2025-06-19 RX ADMIN — FLUTICASONE PROPIONATE 2 SPRAY: 50 SPRAY, METERED NASAL at 09:12

## 2025-06-19 RX ADMIN — CALCITRIOL 0.25 MCG: 0.25 CAPSULE, LIQUID FILLED ORAL at 09:13

## 2025-06-19 RX ADMIN — IPRATROPIUM BROMIDE AND ALBUTEROL SULFATE 3 ML: .5; 3 SOLUTION RESPIRATORY (INHALATION) at 19:19

## 2025-06-19 RX ADMIN — LORATADINE 5 MG: 10 TABLET ORAL at 09:13

## 2025-06-19 RX ADMIN — AMIODARONE HYDROCHLORIDE 200 MG: 200 TABLET ORAL at 09:13

## 2025-06-19 RX ADMIN — APIXABAN 5 MG: 5 TABLET, FILM COATED ORAL at 09:13

## 2025-06-19 RX ADMIN — UMECLIDINIUM BROMIDE AND VILANTEROL TRIFENATATE 1 PUFF: 62.5; 25 POWDER RESPIRATORY (INHALATION) at 09:12

## 2025-06-19 RX ADMIN — KETOTIFEN FUMARATE 1 DROP: 0.25 SOLUTION/ DROPS OPHTHALMIC at 22:24

## 2025-06-19 RX ADMIN — PRAVASTATIN SODIUM 80 MG: 80 TABLET ORAL at 17:18

## 2025-06-19 RX ADMIN — IPRATROPIUM BROMIDE AND ALBUTEROL SULFATE 3 ML: .5; 3 SOLUTION RESPIRATORY (INHALATION) at 07:45

## 2025-06-19 RX ADMIN — METOPROLOL TARTRATE 25 MG: 25 TABLET, FILM COATED ORAL at 21:41

## 2025-06-19 RX ADMIN — METHYLPREDNISOLONE SODIUM SUCCINATE 40 MG: 40 INJECTION, POWDER, FOR SOLUTION INTRAMUSCULAR; INTRAVENOUS at 04:46

## 2025-06-19 RX ADMIN — IPRATROPIUM BROMIDE AND ALBUTEROL SULFATE 3 ML: .5; 3 SOLUTION RESPIRATORY (INHALATION) at 13:39

## 2025-06-19 RX ADMIN — LISINOPRIL 2.5 MG: 2.5 TABLET ORAL at 09:13

## 2025-06-19 RX ADMIN — LIDOCAINE 1 PATCH: 50 PATCH CUTANEOUS at 19:50

## 2025-06-19 RX ADMIN — SENNOSIDES AND DOCUSATE SODIUM 1 TABLET: 50; 8.6 TABLET ORAL at 09:13

## 2025-06-19 RX ADMIN — AMLODIPINE BESYLATE 2.5 MG: 2.5 TABLET ORAL at 09:13

## 2025-06-19 RX ADMIN — METHYLPREDNISOLONE SODIUM SUCCINATE 40 MG: 40 INJECTION, POWDER, FOR SOLUTION INTRAMUSCULAR; INTRAVENOUS at 14:17

## 2025-06-19 NOTE — ASSESSMENT & PLAN NOTE
Lab Results   Component Value Date    EGFR 32 06/19/2025    EGFR 40 06/18/2025    EGFR 32 06/16/2025    CREATININE 1.54 (H) 06/19/2025    CREATININE 1.28 06/18/2025    CREATININE 1.53 (H) 06/16/2025     80 y.o. female with history of MONO, hypertension, atrial flutter on Eliquis, class III obesity (BMI 41), HFpEF (LVEF 65% from 6/30/2025), duodenal and colonic AVMs and non-small cell lung cancer with recent hospitalizations for melena thought secondary to AVMs who presented on 6/18 due to worsening exertional dyspnea with concern for asthma exacerbation started on steroids and nebulizers with downtrending hemoglobin from recent baseline of 10-12 is low 7.9 overall concerning for acute blood loss anemia likely in the setting of recurrent AVM bleeding leading to GI consult.    - Patient ate solid food today without reason unable to complete colonoscopy tomorrow  - No definitive melena, but having darker stools  - Given darker stools and history of AVMs we will plan for push enteroscopy tomorrow to evaluate for source of upper GI bleeding  - Start PPI IV twice daily  - N.p.o. at midnight  - Agree with holding Eliquis, last dose this morning

## 2025-06-19 NOTE — UTILIZATION REVIEW
Initial Clinical Review    OBSERVATION 6/18 CHANGED TO INPATIENT ON 6/18 @ 1741 -     Admission: Date/Time/Statement:   Admission Orders (From admission, onward)       Ordered        06/18/25 1741  INPATIENT ADMISSION  Once            06/18/25 0512  Place in Observation  Once                          Orders Placed This Encounter   Procedures    INPATIENT ADMISSION     Standing Status:   Standing     Number of Occurrences:   1     Level of Care:   Med Surg [16]     Estimated length of stay:   More than 2 Midnights     Certification:   I certify that inpatient services are medically necessary for this patient for a duration of greater than two midnights. See H&P and MD Progress Notes for additional information about the patient's course of treatment.     ED Arrival Information       Expected   -    Arrival   6/18/2025 02:48    Acuity   Urgent              Means of arrival   Ambulance    Escorted by   [facility] ([facility])    Service   Hospitalist    Admission type   Emergency              Arrival complaint   SOB             Chief Complaint   Patient presents with    Shortness of Breath     Pt reports hx of asthma and lung cancer, shortness of breath worse with exertion. Pt took albuterol and reported feeling jittery. Pt also states dizziness      Initial Presentation: 80 y.o. female presents to the ED via EMS from home with c/o SOB worse on exertion, dizziness x several days.  PMH: Asthma, A fib, HTN, lung CA w/ mets to bone, HLD.  In the ED VS stable, afebrile, no c/o pain.  Treated with IV fluids x 1L, Albuterol and Atrovent nebs, IV Lasix 40 mg, IV SoluMedrol 40 mg.  Labs - Hgb 8.3, elevated BUN/CR, BNP,   D dimer (stable w/ age correction), T bili.  ECG - NSR.  Imaging - vascular congestion, pleural effusions.  On exam + wheezing, no abd distention, A&O x 3. Admitted to Observation Status  then changed to INPATIENT status on same day with SOB, severe asthma, Non small cell lung CA w/ mets to bone,  anemia, CKD - PLAN: IV Lasix 40 mg daily x 2 doses, poss thoracentesis, scheduled DuoNebs, IV Steroids, hold alectinib today and tomorrow 6/18, 6/19 resume 6/20, continue Eliquis, trend renal function.  POST ADMIT NOTE - mobility score 22/16.  Pt feeling improved, less SOB, but with significant BERTRAND, likely more asthma than CHF.     Anticipated Length of Stay/Certification Statement: Patient will be admitted on an observation basis with an anticipated length of stay of less than 2 midnights secondary to shortness of breath.     Date: 6/19   Day 2:   SOB, severe asthma, Non small cell lung CA w/ mets to bone, anemia, CKD - afebrile, Hgb down to 7.5, elevated BUN/CR.  Pt c/o BERTRAND today and feels weakness.  No CP, abd pain. Had black BM today.  Seen by GI, will have EGD on 6/20.  On exam decreased breath sounds bilat.      6/19 GI Consult - decreased hgb w/ reported darker stools, hemodynamically stable.  Will do EGD w/ push on 6/20, PPI BID.     ED Treatment-Medication Administration from 06/18/2025 0248 to 06/18/2025 0624         Date/Time Order Dose Route Action     06/18/2025 0354 sodium chloride 0.9 % bolus 1,000 mL 1,000 mL Intravenous New Bag     06/18/2025 0509 albuterol inhalation solution 10 mg 10 mg Nebulization Given     06/18/2025 0509 ipratropium (ATROVENT) 0.02 % inhalation solution 1 mg 1 mg Nebulization Given     06/18/2025 0514 furosemide (LASIX) injection 40 mg 40 mg Intravenous Given     06/18/2025 0609 methylPREDNISolone sodium succinate (Solu-MEDROL) injection 40 mg 40 mg Intravenous Given            Scheduled Medications:  [Held by provider] Alectinib HCl, 450 mg, Oral, BID  amiodarone, 200 mg, Oral, Daily  amLODIPine, 2.5 mg, Oral, Daily  apixaban, 5 mg, Oral, BID  calcitriol, 0.25 mcg, Oral, Daily  fluticasone, 2 spray, Each Nare, Daily  ipratropium-albuterol, 3 mL, Nebulization, Q6H  lidocaine, 1 patch, Topical, Daily  lisinopril, 2.5 mg, Oral, Daily  loratadine, 5 mg, Oral,  Daily  methylPREDNISolone sodium succinate, 40 mg, Intravenous, Q8H JAIRON  metoprolol tartrate, 25 mg, Oral, BID  pravastatin, 80 mg, Oral, Daily With Dinner  senna-docusate sodium, 1 tablet, Oral, Daily  umeclidinium-vilanterol, 1 puff, Inhalation, Daily      Continuous IV Infusions:     PRN Meds:  acetaminophen, 650 mg, Oral, Q6H PRN  aluminum-magnesium hydroxide-simethicone, 30 mL, Oral, Q6H PRN  ondansetron, 4 mg, Intravenous, Q6H PRN  polyethylene glycol, 17 g, Oral, Daily PRN      ED Triage Vitals   Temperature Pulse Respirations Blood Pressure SpO2 Pain Score   06/18/25 0253 06/18/25 0253 06/18/25 0253 06/18/25 0253 06/18/25 0253 06/18/25 0800   98.9 °F (37.2 °C) 68 16 139/62 98 % No Pain     Weight (last 2 days)       Date/Time Weight    06/18/25 1209 100 (220.46)    06/18/25 06:28:50 100 (220.46)            Vital Signs (last 3 days)       Date/Time Temp Pulse Resp BP MAP (mmHg) SpO2 O2 Device Patient Position - Orthostatic VS Keshia Coma Scale Score Pain    06/19/25 09:12:51 -- -- -- 121/53 76 -- -- -- -- --    06/19/25 0745 -- -- -- -- -- 95 % None (Room air) -- -- --    06/19/25 07:41:19 97.6 °F (36.4 °C) 86 17 110/45 67 95 % -- Lying -- --    06/18/25 21:04:13 -- -- -- 98/47 64 -- -- -- -- No Pain    06/18/25 2100 -- 88 18 -- -- 90 % None (Room air) Lying -- --    06/18/25 20:59:20 97.4 °F (36.3 °C) -- -- 97/46 63 -- -- -- -- --    06/18/25 20:58:39 97.4 °F (36.3 °C) -- -- -- -- -- -- -- -- --    06/18/25 1953 -- -- -- -- -- -- -- -- 15 --    06/18/25 1912 -- -- -- -- -- 93 % -- -- -- --    06/18/25 15:00:04 97.9 °F (36.6 °C) 73 20 127/51 76 93 % -- -- -- --    06/18/25 1404 -- -- -- -- -- 94 % None (Room air) -- -- --    06/18/25 1209 98.8 °F (37.1 °C) 78 18 143/54 -- -- -- -- -- --    06/18/25 1004 -- -- -- -- -- -- -- -- -- No Pain    06/18/25 0950 -- -- -- -- -- -- -- -- -- No Pain    06/18/25 0800 -- -- -- -- -- -- -- -- 15 No Pain    06/18/25 0747 -- -- -- -- -- 95 % None (Room air) -- -- --     06/18/25 07:31:22 98.8 °F (37.1 °C) -- 18 143/54 84 -- -- -- -- --    06/18/25 06:28:50 98.3 °F (36.8 °C) 78 18 144/55 85 95 % None (Room air) Lying -- --    06/18/25 0613 -- -- -- -- -- -- -- -- 15 --    06/18/25 0514 -- 65 22 140/63 90 99 % None (Room air) Lying -- --    06/18/25 0253 98.9 °F (37.2 °C) 68 16 139/62 -- 98 % None (Room air) Lying -- --              Pertinent Labs/Diagnostic Test Results:   Radiology:  XR chest 2 views   ED Interpretation by Milton Virgen PA-C (06/18 0518)   Right pleural effusion with diffuse groundglass opacities consistent with fluid overload.  Cardiomegaly when compared to prior chest x-ray in January 2025.      Final Interpretation by Pam Cornejo MD (06/18 0652)      Mild pulmonary venous congestion.      Question trace effusions.            Workstation performed: [id number]           Cardiology:  ECG 12 lead   Final Result by Easton Ware MD (06/18 0842)   Normal sinus rhythm   Prolonged QT   Abnormal ECG   When compared with ECG of 18-Jun-2025 02:57, (unconfirmed)   No significant change was found   Confirmed by Easton Ware ([id number]) on 6/18/2025 8:42:16 AM      ECG 12 lead   Final Result by Easton Ware MD (06/18 0838)   Normal sinus rhythm   Normal ECG   When compared with ECG of 15-Dewey-2025 11:07,   No significant change was found   Confirmed by Easton Ware ([id number]) on 6/18/2025 8:38:04 AM        GI:  No orders to display           Results from last 7 days   Lab Units 06/19/25  0507 06/18/25 0352   WBC Thousand/uL 6.80 5.32   HEMOGLOBIN g/dL 7.5* 8.3*   HEMATOCRIT % 22.6* 25.6*   PLATELETS Thousands/uL 75* 70*   TOTAL NEUT ABS Thousands/µL 5.63 4.03         Results from last 7 days   Lab Units 06/19/25  0507 06/18/25  0352 06/16/25  0837   SODIUM mmol/L 138 137 138   POTASSIUM mmol/L 4.1 4.8 4.3   CHLORIDE mmol/L 102 104 102   CO2 mmol/L 28 25 28   ANION GAP mmol/L 8 8 8   BUN mg/dL 51* 33* 32*   CREATININE mg/dL 1.54* 1.28 1.53*   EGFR ml/min/1.73sq m 32  40 32   CALCIUM mg/dL 8.4 8.3* 9.3   PHOSPHORUS mg/dL  --   --  4.4*     Results from last 7 days   Lab Units 06/19/25  0507 06/18/25  0352 06/16/25  0837   AST U/L 34 47* 44*   ALT U/L 32 33 39   ALK PHOS U/L 66 74 86   TOTAL PROTEIN g/dL 5.8* 6.0* 6.6   ALBUMIN g/dL 3.5 3.5 4.1   TOTAL BILIRUBIN mg/dL 0.71 0.99 1.20*     Results from last 7 days   Lab Units 06/18/25  0732   POC GLUCOSE mg/dl 133     Results from last 7 days   Lab Units 06/19/25  0507 06/18/25  0352   GLUCOSE RANDOM mg/dL 151* 99       Results from last 7 days   Lab Units 06/18/25  0838 06/18/25  0609 06/18/25  0352   HS TNI 0HR ng/L  --   --  20   HS TNI 2HR ng/L  --  23  --    HSTNI D2 ng/L  --  3  --    HS TNI 4HR ng/L 23  --   --    HSTNI D4 ng/L 3  --   --      Results from last 7 days   Lab Units 06/18/25  0352   D-DIMER QUANTITATIVE ug/ml FEU 0.56*       Results from last 7 days   Lab Units 06/18/25  0352   BNP pg/mL 110*     Results from last 7 days   Lab Units 06/18/25  0352   IRON SATURATION % 25   IRON ug/dL 73   TIBC ug/dL 289.8     Results from last 7 days   Lab Units 06/18/25  0352   TRANSFERRIN mg/dL 207       Results from last 7 days   Lab Units 06/18/25  0517   CLARITY UA  Clear   COLOR UA  Light Yellow   SPEC GRAV UA  1.012   PH UA  7.5   GLUCOSE UA mg/dl Negative   KETONES UA mg/dl Negative   BLOOD UA  Negative   PROTEIN UA mg/dl Negative   NITRITE UA  Negative   BILIRUBIN UA  Negative   UROBILINOGEN UA (BE) mg/dl 3.0*   LEUKOCYTES UA  Negative     Past Medical History[1]  Present on Admission:   Mixed hyperlipidemia   Anemia due to stage 3b chronic kidney disease  (HCC)   Atrial flutter (HCC)   Non-small cell lung cancer (HCC)   Malignant neoplasm metastatic to bone (HCC)   MONO (obstructive sleep apnea)   Hypertensive kidney disease with stage 3b chronic kidney disease (HCC)   Severe asthma      Admitting Diagnosis: CHF (congestive heart failure) ([facility]) [I50.9]  Weakness [R53.1]  SOB (shortness of breath) [R06.02]  Pleural  effusion [J90]  Non-small cell lung cancer, unspecified laterality (HCC) [C34.90]  Class 3 severe obesity due to excess calories with serious comorbidity and body mass index (BMI) of 40.0 to 44.9 in adult [E66.813, Z68.41]  Age/Sex: 80 y.o. female    Network Utilization Review Department  ATTENTION: Please call with any questions or concerns to [phone] and carefully listen to the prompts so that you are directed to the right person. All voicemails are confidential.   For Discharge needs, contact Care Management DC Support Team at [phone] opt. 2  Send all requests for admission clinical reviews, approved or denied determinations and any other requests to dedicated fax number below belonging to the campus where the patient is receiving treatment. List of dedicated fax numbers for the Facilities:  FACILITY NAME UR FAX NUMBER   ADMISSION DENIALS (Administrative/Medical Necessity) [phone]   DISCHARGE SUPPORT TEAM (NETWORK) [phone]   PARENT CHILD HEALTH (Maternity/NICU/Pediatrics) [phone]   [facility] [phone]   [facility] [phone]   [facility] [phone]   [facility] [phone]   [facility] [phone]   [facility] [phone]   General acute hospital [phone]   [facility] [phone]   [facility] [phone]   [facility] [phone]   [facility] [phone]   [facility] [phone]              [1]   Past Medical History:  Diagnosis Date    Allergic rhinitis     Anemia     Angio-edema     Anxiety     Arthritis     Asthma     Atrial fibrillation (HCC)     Cancer (HCC)     Chronic bronchitis (HCC)     Chronic kidney disease     COPD (chronic  obstructive pulmonary disease) (HCC)     Coronary artery disease     CPAP (continuous positive airway pressure) dependence     Degenerative joint disease     Fluid retention 2024    GERD (gastroesophageal reflux disease)     Glaucoma     History of transfusion     HL (hearing loss)     Hypertension     Lumbar disc disease     Lung cancer (HCC)     Obesity     Pelvis cancer (HCC)     Periodic heart flutter     Pneumonia     Premature atrial contractions 10/31/2017    Sleep apnea     suspected     Sleep apnea, obstructive     Ventricular arrhythmia     Vitamin D deficiency

## 2025-06-19 NOTE — PROGRESS NOTES
Progress Note - Hospitalist   Name: Jayla Moody 80 y.o. female I MRN: [id number]  Unit/Bed#: [place] [address] -01 I Date of Admission: 6/18/2025   Date of Service: 6/19/2025 I Hospital Day: 1    Assessment & Plan  Shortness of breath  Patient is a morbidly obese 80-year-old female past medical history of asthma, atrial fibrillation, hypertension, lung cancer with mets to the bone, and hyperlipidemia who presents to the emergency department with complaints of shortness of breath, worse on exertion, and dizziness for the last few days.  In the emergency room imaging suggests vascular congestion and pleural effusions, she was given IV Lasix and will be admitted for further treatment.  Official reading of the patient's chest x-ray suggests minimal pleural effusion  BNP is marginally elevated.  I suspect the patient's symptoms are mostly related to asthma rather than congestive heart failure.  Severe asthma    Schedule DuoNebs  Start Solu-Medrol  Breztri Aerosphere is nonformulary, continue dose equivalent  The patient has improved with the above treatment but still has significant dyspnea on exertion.  MONO (obstructive sleep apnea)  Unable to tolerate CPAP outpatient, sent her machine back  Follow-up outpatient with pulmonology  Non-small cell lung cancer (HCC)  Follows with [facility] oncology  Was told to hold her alectinib today and tomorrow, resume on Friday  Malignant neoplasm metastatic to bone (HCC)  Patient had 1 treatment of radiation when bone mets were initially diagnosed  Continue outpatient follow-up with oncology  Atrial flutter (HCC)  Rate controlled  Continue beta-blocker and Eliquis at home doses  Continue to monitor  Anemia due to stage 3b chronic kidney disease  (HCC)  Current hemoglobin 8.3, baseline closer to 10  Anemia studies will be obtained.  The patient hemoglobin has dropped significantly since admission.  She describes a black bowel movement although this was not confirmed by the nursing  staff.  She has a history of GI bleeding and previous electrocautery of AVMs.  Anticoagulation will be stopped for now.  The patient will be monitored carefully.  GI evaluation has been requested.    Lab Results   Component Value Date    EGFR 32 06/19/2025    EGFR 40 06/18/2025    EGFR 32 06/16/2025    CREATININE 1.54 (H) 06/19/2025    CREATININE 1.28 06/18/2025    CREATININE 1.53 (H) 06/16/2025     Class 3 severe obesity due to excess calories with serious comorbidity and body mass index (BMI) of 40.0 to 44.9 in adult  BMI 40.62  Patient would benefit greatly from very low carbohydrate diet (less than 50 g daily) and significant caloric restriction (less than 2000 kcals per day).  Hypertensive kidney disease with stage 3b chronic kidney disease (HCC)  Blood pressure and renal function currently stable  Continue home antihypertensives  Trend renal function, monitor routine vital signs    Lab Results   Component Value Date    EGFR 32 06/19/2025    EGFR 40 06/18/2025    EGFR 32 06/16/2025    CREATININE 1.54 (H) 06/19/2025    CREATININE 1.28 06/18/2025    CREATININE 1.53 (H) 06/16/2025     Mixed hyperlipidemia  Rosuvastatin is nonformulary, continue dose equivalent pravastatin 80 mg daily  Essential hypertension  Stable on current therapy.  Coronary artery disease involving native coronary artery of native heart without angina pectoris  No angina.  On aspirin and appropriate risk factor modification.  Chronic diastolic congestive heart failure (HCC)  Wt Readings from Last 3 Encounters:   06/18/25 100 kg (220 lb 7.4 oz)   06/03/25 97.5 kg (215 lb)   05/27/25 97.5 kg (215 lb)     Currently euvolemic.  Continue current therapy.    Subjective:  The patient is comfortable at rest.  She has dyspnea on exertion and feels quite weak with minimal exertion.  She denies chest pain.  She has no abdominal pain, nausea, or vomiting.  She described the black bowel movement earlier today.    Physical Exam:   Temp:  [97.4 °F (36.3  °C)-98.2 °F (36.8 °C)] 98.2 °F (36.8 °C)  HR:  [86-88] 87  BP: ()/(39-53) 104/42  Resp:  [17-18] 18  SpO2:  [90 %-97 %] 97 %  O2 Device: None (Room air)    Gen: Well-developed, obese, in no distress.  Neck: Supple.  No lymphadenopathy or goiter.  Heart: Regular rhythm.  I heard no murmur, gallop, or rub.  Lungs: Diminished breath sounds bilaterally.  I heard no wheezing, rales, or rhonchi.  Abd: Soft with active bowel sounds.  No mass, tenderness, organomegaly.  Extremities: No clubbing, cyanosis, or edema.  No calf tenderness.  Neuro: Alert and oriented.  No focal sign.  Skin: Warm and dry.      LABS:   CBC:   Lab Results   Component Value Date    WBC 6.80 06/19/2025    HGB 7.9 (L) 06/19/2025    HCT 23.6 (L) 06/19/2025    MCV 86 06/19/2025    PLT 75 (L) 06/19/2025    RBC 2.64 (L) 06/19/2025    MCH 28.4 06/19/2025    MCHC 33.2 06/19/2025    RDW 17.2 (H) 06/19/2025    NRBC 0 06/19/2025   , CMP:   Lab Results   Component Value Date    SODIUM 138 06/19/2025    K 4.1 06/19/2025     06/19/2025    CO2 28 06/19/2025    BUN 51 (H) 06/19/2025    CREATININE 1.54 (H) 06/19/2025    CALCIUM 8.4 06/19/2025    AST 34 06/19/2025    ALT 32 06/19/2025    ALKPHOS 66 06/19/2025    EGFR 32 06/19/2025         VTE Pharmacologic Prophylaxis: Reason for no pharmacologic prophylaxis probable GI bleeding  VTE Mechanical Prophylaxis: sequential compression device

## 2025-06-19 NOTE — ASSESSMENT & PLAN NOTE
Follows with [facility] oncology  Was told to hold her alectinib today and tomorrow, resume on Friday

## 2025-06-19 NOTE — PLAN OF CARE
Problem: Potential for Falls  Goal: Patient will remain free of falls  Description: INTERVENTIONS:  - Educate patient/family on patient safety including physical limitations  - Instruct patient to call for assistance with activity   - Consider consulting OT/PT to assist with strengthening/mobility based on AM PAC & JH-HLM score  - Consult OT/PT to assist with strengthening/mobility   - Keep Call bell within reach  - Keep bed low and locked with side rails adjusted as appropriate  - Keep care items and personal belongings within reach  - Initiate and maintain comfort rounds  - Make Fall Risk Sign visible to staff  - Offer Toileting every 2 Hours, in advance of need  - Initiate/Maintain bed alarm  - Obtain necessary fall risk management equipment  - Apply yellow socks and bracelet for high fall risk patients  - Consider moving patient to room near nurses station  Outcome: Progressing     Problem: PAIN - ADULT  Goal: Verbalizes/displays adequate comfort level or baseline comfort level  Description: Interventions:  - Encourage patient to monitor pain and request assistance  - Assess pain using appropriate pain scale  - Administer analgesics as ordered based on type and severity of pain and evaluate response  - Implement non-pharmacological measures as appropriate and evaluate response  - Consider cultural and social influences on pain and pain management  - Notify physician/advanced practitioner if interventions unsuccessful or patient reports new pain  - Educate patient/family on pain management process including their role and importance of  reporting pain   - Provide non-pharmacologic/complimentary pain relief interventions  Outcome: Progressing     Problem: INFECTION - ADULT  Goal: Absence or prevention of progression during hospitalization  Description: INTERVENTIONS:  - Assess and monitor for signs and symptoms of infection  - Monitor lab/diagnostic results  - Monitor all insertion sites, i.e. indwelling lines,  tubes, and drains  - Monitor endotracheal if appropriate and nasal secretions for changes in amount and color  - [place] appropriate cooling/warming therapies per order  - Administer medications as ordered  - Instruct and encourage patient and family to use good hand hygiene technique  - Identify and instruct in appropriate isolation precautions for identified infection/condition  Outcome: Progressing     Problem: SAFETY ADULT  Goal: Patient will remain free of falls  Description: INTERVENTIONS:  - Educate patient/family on patient safety including physical limitations  - Instruct patient to call for assistance with activity   - Consider consulting OT/PT to assist with strengthening/mobility based on AM PAC & -HLM score  - Consult OT/PT to assist with strengthening/mobility   - Keep Call bell within reach  - Keep bed low and locked with side rails adjusted as appropriate  - Keep care items and personal belongings within reach  - Initiate and maintain comfort rounds  - Make Fall Risk Sign visible to staff  - Offer Toileting every 2 Hours, in advance of need  - Initiate/Maintain bed alarm  - Obtain necessary fall risk management equipment  - Apply yellow socks and bracelet for high fall risk patients  - Consider moving patient to room near nurses station  Outcome: Progressing  Goal: Maintain or return to baseline ADL function  Description: INTERVENTIONS:  -  Assess patient's ability to carry out ADLs; assess patient's baseline for ADL function and identify physical deficits which impact ability to perform ADLs (bathing, care of mouth/teeth, toileting, grooming, dressing, etc.)  - Assess/evaluate cause of self-care deficits   - Assess range of motion  - Assess patient's mobility; develop plan if impaired  - Assess patient's need for assistive devices and provide as appropriate  - Encourage maximum independence but intervene and supervise when necessary  - Involve family in performance of ADLs  - Assess for home  care needs following discharge   - Consider OT consult to assist with ADL evaluation and planning for discharge  - Provide patient education as appropriate  - Monitor functional capacity and physical performance, use of AM PAC & JH-HLM   - Monitor gait, balance and fatigue with ambulation    Outcome: Progressing  Goal: Maintains/Returns to pre admission functional level  Description: INTERVENTIONS:  - Perform AM-PAC 6 Click Basic Mobility/ Daily Activity assessment daily.  - Set and communicate daily mobility goal to care team and patient/family/caregiver.   - Collaborate with rehabilitation services on mobility goals if consulted  - Perform Range of Motion 3 times a day.  - Reposition patient every 2 hours.  - Dangle patient 3 times a day  - Stand patient 3 times a day  - Ambulate patient 3 times a day  - Out of bed to chair 3 times a day   - Out of bed for meals 3 times a day  - Out of bed for toileting  - Record patient progress and toleration of activity level   Outcome: Progressing     Problem: DISCHARGE PLANNING  Goal: Discharge to home or other facility with appropriate resources  Description: INTERVENTIONS:  - Identify barriers to discharge w/patient and caregiver  - Arrange for needed discharge resources and transportation as appropriate  - Identify discharge learning needs (meds, wound care, etc.)  - Arrange for interpretive services to assist at discharge as needed  - Refer to Case Management Department for coordinating discharge planning if the patient needs post-hospital services based on physician/advanced practitioner order or complex needs related to functional status, cognitive ability, or social support system  Outcome: Progressing     Problem: Knowledge Deficit  Goal: Patient/family/caregiver demonstrates understanding of disease process, treatment plan, medications, and discharge instructions  Description: Complete learning assessment and assess knowledge base.  Interventions:  - Provide teaching  at level of understanding  - Provide teaching via preferred learning methods  Outcome: Progressing     Problem: RESPIRATORY - ADULT  Goal: Achieves optimal ventilation and oxygenation  Description: INTERVENTIONS:  - Assess for changes in respiratory status  - Assess for changes in mentation and behavior  - Position to facilitate oxygenation and minimize respiratory effort  - Oxygen administered by appropriate delivery if ordered  - Initiate smoking cessation education as indicated  - Encourage broncho-pulmonary hygiene including cough, deep breathe, Incentive Spirometry  - Assess the need for suctioning and aspirate as needed  - Assess and instruct to report SOB or any respiratory difficulty  - Respiratory Therapy support as indicated  Outcome: Progressing

## 2025-06-19 NOTE — ASSESSMENT & PLAN NOTE
Current hemoglobin 8.3, baseline closer to 10  Anemia studies will be obtained.  The patient hemoglobin has dropped significantly since admission.  She describes a black bowel movement although this was not confirmed by the nursing staff.  She has a history of GI bleeding and previous electrocautery of AVMs.  Anticoagulation will be stopped for now.  The patient will be monitored carefully.  GI evaluation has been requested.    Lab Results   Component Value Date    EGFR 32 06/19/2025    EGFR 40 06/18/2025    EGFR 32 06/16/2025    CREATININE 1.54 (H) 06/19/2025    CREATININE 1.28 06/18/2025    CREATININE 1.53 (H) 06/16/2025

## 2025-06-19 NOTE — ASSESSMENT & PLAN NOTE
Blood pressure and renal function currently stable  Continue home antihypertensives  Trend renal function, monitor routine vital signs    Lab Results   Component Value Date    EGFR 32 06/19/2025    EGFR 40 06/18/2025    EGFR 32 06/16/2025    CREATININE 1.54 (H) 06/19/2025    CREATININE 1.28 06/18/2025    CREATININE 1.53 (H) 06/16/2025

## 2025-06-19 NOTE — CONSULTS
Consultation - Gastroenterology   Name: Jayla Moody 80 y.o. female I MRN: [id number]  Unit/Bed#: [place] [address] -01 I Date of Admission: 6/18/2025   Date of Service: 6/19/2025 I Hospital Day: 1       Inpatient consult to gastroenterology     Date/Time  6/19/2025 1:25 PM     Performed by  Lenin Alonzo MD   Authorized by  Kt Oliva MD           Physician Requesting Evaluation: Kt Oliva MD   Reason for Evaluation / Principal Problem: GI bleed    Assessment & Plan  Anemia due to stage 3b chronic kidney disease  (HCC)  Lab Results   Component Value Date    EGFR 32 06/19/2025    EGFR 40 06/18/2025    EGFR 32 06/16/2025    CREATININE 1.54 (H) 06/19/2025    CREATININE 1.28 06/18/2025    CREATININE 1.53 (H) 06/16/2025     80 y.o. female with history of MONO, hypertension, atrial flutter on Eliquis, class III obesity (BMI 41), HFpEF (LVEF 65% from 6/30/2025), duodenal and colonic AVMs and non-small cell lung cancer with recent hospitalizations for melena thought secondary to AVMs who presented on 6/18 due to worsening exertional dyspnea with concern for asthma exacerbation started on steroids and nebulizers with downtrending hemoglobin from recent baseline of 10-12 is low 7.9 overall concerning for acute blood loss anemia likely in the setting of recurrent AVM bleeding leading to GI consult.    - Patient ate solid food today without reason unable to complete colonoscopy tomorrow  - No definitive melena, but having darker stools  - Given darker stools and history of AVMs we will plan for push enteroscopy tomorrow to evaluate for source of upper GI bleeding  - Start PPI IV twice daily  - N.p.o. at midnight  - Agree with holding Eliquis, last dose this morning  Shortness of breath    Non-small cell lung cancer (HCC)    Atrial flutter (HCC)    I have discussed the above management plan in detail with the primary service.     History of Present Illness   HPI:  Jayla Moody is a 80 y.o. female with history of MONO,  hypertension, atrial flutter on Eliquis, class III obesity (BMI 41), HFpEF (LVEF 65% from 6/30/2025) and non-small cell lung cancer with recent hospitalizations for GI bleeding thought secondary to AVMs who presented on 6/18 due to worsening exertional dyspnea with concern for asthma exacerbation.  She reports she takes Dulcolax daily as she is constipated on her alectinib.  She states she has been having normal bowel movements but was a little darker today, but solid.  Denies nausea, vomiting, abdominal pain.  No NSAIDs.  Denies taking iron pills or Pepto-Bismol.  She continues on her apixaban.  Denies family history of colorectal cancer.  She has been tolerating diet and has been gaining weight.    EGD/colonoscopy 2024 showing 4 cm hiatal hernia, 2 small angioectasias and greater curve of stomach, 2 small angioectasias in third part of duodenum, lipoma in D2 with 2 large angioectasias in ascending colon and diverticulosis as well as large hemorrhoids    Review of Systems   Constitutional:  Negative for fever.   Cardiovascular:  Negative for leg swelling.   Gastrointestinal:  Positive for constipation. Negative for abdominal pain, blood in stool, diarrhea, nausea, rectal pain and vomiting.   All other systems reviewed and are negative.    Historical Information   Past Medical History[1]  Past Surgical History[2]  Social History[3]  E-Cigarette/Vaping    E-Cigarette Use Never User      E-Cigarette/Vaping Substances    Nicotine No     THC No     CBD No     Flavoring No     Other No     Unknown No      Family history non-contributory    Objective :  Temp:  [97.4 °F (36.3 °C)-97.9 °F (36.6 °C)] 97.6 °F (36.4 °C)  HR:  [73-88] 86  BP: ()/(45-53) 121/53  Resp:  [17-20] 17  SpO2:  [90 %-95 %] 95 %  O2 Device: None (Room air)    Physical Exam  Vitals and nursing note reviewed.   Constitutional:       General: She is not in acute distress.     Appearance: She is well-developed. She is obese.   Abdominal:      General:  Abdomen is flat. There is no distension.      Palpations: Abdomen is soft.      Tenderness: There is no abdominal tenderness.     Musculoskeletal:         General: No swelling.     Neurological:      Mental Status: She is alert.           Lab Results: I have reviewed the following results:none    Imaging Results Review: No pertinent imaging studies reviewed.  Other Study Results Review: No additional pertinent studies reviewed.    Administrative Statements   I have spent a total time of 32 minutes in caring for this patient on the day of the visit/encounter including Diagnostic results, Prognosis, Risks and benefits of tx options, Instructions for management, and Patient and family education.         [1]   Past Medical History:  Diagnosis Date    Allergic rhinitis     Anemia     Angio-edema     Anxiety     Arthritis     Asthma     Atrial fibrillation (HCC)     Cancer (HCC)     Chronic bronchitis (HCC)     Chronic kidney disease     COPD (chronic obstructive pulmonary disease) (HCC)     Coronary artery disease     CPAP (continuous positive airway pressure) dependence     Degenerative joint disease     Fluid retention 2024    GERD (gastroesophageal reflux disease)     Glaucoma     History of transfusion     HL (hearing loss)     Hypertension     Lumbar disc disease     Lung cancer (HCC)     Obesity     Pelvis cancer (HCC)     Periodic heart flutter     Pneumonia     Premature atrial contractions 10/31/2017    Sleep apnea     suspected     Sleep apnea, obstructive     Ventricular arrhythmia     Vitamin D deficiency    [2]   Past Surgical History:  Procedure Laterality Date    APPENDECTOMY      BREAST BIOPSY Right     years ago    BRONCHOSCOPY      CAROTID STENT      COLON SURGERY      COLONOSCOPY N/A 03/18/2019    Procedure: COLONOSCOPY;  Surgeon: Alessia Wilson DO;  Location: [facility];  Service: Gastroenterology    ESOPHAGOGASTRODUODENOSCOPY N/A 03/18/2019    Procedure: ESOPHAGOGASTRODUODENOSCOPY (EGD);   Surgeon: Alessia Wilson DO;  Location: [facility];  Service: Gastroenterology    KNEE SURGERY      LUNG BIOPSY      ROTATOR CUFF REPAIR      SHOULDER SURGERY     [3]   Social History  Tobacco Use    Smoking status: Former     Current packs/day: 0.00     Average packs/day: 0.3 packs/day for 25.0 years (6.3 ttl pk-yrs)     Types: Cigarettes     Start date: 1962     Quit date:      Years since quittin.4     Passive exposure: Never    Smokeless tobacco: Former   Vaping Use    Vaping status: Never Used   Substance and Sexual Activity    Alcohol use: Not Currently    Drug use: Not Currently     Types: Marijuana    Sexual activity: Not Currently

## 2025-06-19 NOTE — PLAN OF CARE
Problem: Potential for Falls  Goal: Patient will remain free of falls  Description: INTERVENTIONS:  - Educate patient/family on patient safety including physical limitations  - Instruct patient to call for assistance with activity   - Consider consulting OT/PT to assist with strengthening/mobility based on AM PAC & JH-HLM score  - Consult OT/PT to assist with strengthening/mobility   - Keep Call bell within reach  - Keep bed low and locked with side rails adjusted as appropriate  - Keep care items and personal belongings within reach  - Initiate and maintain comfort rounds  - Make Fall Risk Sign visible to staff  - Offer Toileting every 2 Hours, in advance of need  - Initiate/Maintain bed alarm  - Obtain necessary fall risk management equipment: bed alarm   - Apply yellow socks and bracelet for high fall risk patients  - Consider moving patient to room near nurses station  Outcome: Progressing     Problem: PAIN - ADULT  Goal: Verbalizes/displays adequate comfort level or baseline comfort level  Description: Interventions:  - Encourage patient to monitor pain and request assistance  - Assess pain using appropriate pain scale  - Administer analgesics as ordered based on type and severity of pain and evaluate response  - Implement non-pharmacological measures as appropriate and evaluate response  - Consider cultural and social influences on pain and pain management  - Notify physician/advanced practitioner if interventions unsuccessful or patient reports new pain  - Educate patient/family on pain management process including their role and importance of  reporting pain   - Provide non-pharmacologic/complimentary pain relief interventions  Outcome: Progressing     Problem: INFECTION - ADULT  Goal: Absence or prevention of progression during hospitalization  Description: INTERVENTIONS:  - Assess and monitor for signs and symptoms of infection  - Monitor lab/diagnostic results  - Monitor all insertion sites, i.e.  indwelling lines, tubes, and drains  - Monitor endotracheal if appropriate and nasal secretions for changes in amount and color  - [place] appropriate cooling/warming therapies per order  - Administer medications as ordered  - Instruct and encourage patient and family to use good hand hygiene technique  - Identify and instruct in appropriate isolation precautions for identified infection/condition  Outcome: Progressing     Problem: SAFETY ADULT  Goal: Patient will remain free of falls  Description: INTERVENTIONS:  - Educate patient/family on patient safety including physical limitations  - Instruct patient to call for assistance with activity   - Consider consulting OT/PT to assist with strengthening/mobility based on AM PAC & -HLM score  - Consult OT/PT to assist with strengthening/mobility   - Keep Call bell within reach  - Keep bed low and locked with side rails adjusted as appropriate  - Keep care items and personal belongings within reach  - Initiate and maintain comfort rounds  - Make Fall Risk Sign visible to staff  - Offer Toileting every 2 Hours, in advance of need  - Initiate/Maintain bed alarm  - Obtain necessary fall risk management equipment: bed alarm   - Apply yellow socks and bracelet for high fall risk patients  - Consider moving patient to room near nurses station  Outcome: Progressing  Goal: Maintain or return to baseline ADL function  Description: INTERVENTIONS:  -  Assess patient's ability to carry out ADLs; assess patient's baseline for ADL function and identify physical deficits which impact ability to perform ADLs (bathing, care of mouth/teeth, toileting, grooming, dressing, etc.)  - Assess/evaluate cause of self-care deficits   - Assess range of motion  - Assess patient's mobility; develop plan if impaired  - Assess patient's need for assistive devices and provide as appropriate  - Encourage maximum independence but intervene and supervise when necessary  - Involve family in performance  of ADLs  - Assess for home care needs following discharge   - Consider OT consult to assist with ADL evaluation and planning for discharge  - Provide patient education as appropriate  - Monitor functional capacity and physical performance, use of AM PAC & JH-HLM   - Monitor gait, balance and fatigue with ambulation    Outcome: Progressing  Goal: Maintains/Returns to pre admission functional level  Description: INTERVENTIONS:  - Perform AM-PAC 6 Click Basic Mobility/ Daily Activity assessment daily.  - Set and communicate daily mobility goal to care team and patient/family/caregiver.   - Collaborate with rehabilitation services on mobility goals if consulted  - Perform Range of Motion 4 times a day.  - Reposition patient every 2 hours.  - Dangle patient 3 times a day  - Stand patient 3 times a day  - Ambulate patient 3 times a day  - Out of bed to chair 3 times a day   - Out of bed for meals 3 times a day  - Out of bed for toileting  - Record patient progress and toleration of activity level   Outcome: Progressing     Problem: DISCHARGE PLANNING  Goal: Discharge to home or other facility with appropriate resources  Description: INTERVENTIONS:  - Identify barriers to discharge w/patient and caregiver  - Arrange for needed discharge resources and transportation as appropriate  - Identify discharge learning needs (meds, wound care, etc.)  - Arrange for interpretive services to assist at discharge as needed  - Refer to Case Management Department for coordinating discharge planning if the patient needs post-hospital services based on physician/advanced practitioner order or complex needs related to functional status, cognitive ability, or social support system  Outcome: Progressing     Problem: Knowledge Deficit  Goal: Patient/family/caregiver demonstrates understanding of disease process, treatment plan, medications, and discharge instructions  Description: Complete learning assessment and assess knowledge  base.  Interventions:  - Provide teaching at level of understanding  - Provide teaching via preferred learning methods  Outcome: Progressing     Problem: RESPIRATORY - ADULT  Goal: Achieves optimal ventilation and oxygenation  Description: INTERVENTIONS:  - Assess for changes in respiratory status  - Assess for changes in mentation and behavior  - Position to facilitate oxygenation and minimize respiratory effort  - Oxygen administered by appropriate delivery if ordered  - Initiate smoking cessation education as indicated  - Encourage broncho-pulmonary hygiene including cough, deep breathe, Incentive Spirometry  - Assess the need for suctioning and aspirate as needed  - Assess and instruct to report SOB or any respiratory difficulty  - Respiratory Therapy support as indicated  Outcome: Progressing

## 2025-06-19 NOTE — ASSESSMENT & PLAN NOTE
Wt Readings from Last 3 Encounters:   06/18/25 100 kg (220 lb 7.4 oz)   06/03/25 97.5 kg (215 lb)   05/27/25 97.5 kg (215 lb)     Currently euvolemic.  Continue current therapy.

## 2025-06-19 NOTE — CASE MANAGEMENT
Case Management Assessment & Discharge Planning Note    Patient name Jayla Moody  Location [place] [address] /South 2 M* MRN [id number]  : 1945 Date 2025       Current Admission Date: 2025  Current Admission Diagnosis:Shortness of breath   Patient Active Problem List    Diagnosis Date Noted    Shortness of breath 2025    Hypertensive kidney disease with stage 3b chronic kidney disease (HCC) 2025    Chronic kidney disease-mineral and bone disorder (CKD-MBD) 2025    Mixed hyperlipidemia 2025    At risk for nutrition problem 10/16/2024    Persistent proteinuria 10/16/2024    Class 3 severe obesity due to excess calories with serious comorbidity and body mass index (BMI) of 40.0 to 44.9 in adult 2024    Cancer-related pain 2023    Anemia due to stage 3b chronic kidney disease  (HCC) 2023    CARLOS ENRIQUE (acute kidney injury) (HCC) 2023    Atrial flutter (HCC) 08/15/2023    Chronic diastolic congestive heart failure (HCC) 2023    Coronary artery disease involving native coronary artery of native heart without angina pectoris 07/10/2023    Depression, recurrent (HCC) 2021    Severe asthma 2020    Malignant neoplasm metastatic to bone (HCC) 2020    Non-small cell lung cancer (HCC) 2020    MONO (obstructive sleep apnea)     Chronic rhinitis 2019    Chronic gastritis without bleeding 2019    Gastroesophageal reflux disease without esophagitis 2019    Thrombocytopenia (HCC) 10/31/2018    Essential hypertension 10/31/2018    Hiatal hernia 10/31/2018      LOS (days): 1  Geometric Mean LOS (GMLOS) (days): 2.9  Days to GMLOS:2.2     OBJECTIVE:    Risk of Unplanned Readmission Score: 30.42         Current admission status: Inpatient       Preferred Pharmacy:   [id number] [place] - [address]  [address]  [place] [address]  Phone: [phone] Fax: [phone]    [facility] -  [place], [place] - [address]  [address]  [address]  [place] [address]  Phone: [phone] Fax: [phone]    Primary Care Provider: Michelle Chatterjee MD    Primary Insurance: BLUE CROSS [employer] REP  Secondary Insurance:     ASSESSMENT:  Active Health Care Proxies       Jimmy Moody Health Care Representative - Son   Primary Phone: [phone] (Mobile)                 Advance Directives  Does patient have a Health Care POA?: Yes (Patient's son -Jimmy and brother)  Does patient have Advance Directives?: No  Primary Contact: Jimmy Moody (Son)  [phone] (Mobile)      Readmission Root Cause  30 Day Readmission: No    Patient Information  Admitted from:: Home  Mental Status: Alert  During Assessment patient was accompanied by: Not accompanied during assessment  Assessment information provided by:: Patient  Primary Caregiver: Self  Support Systems: Son, Daughter, Friends/neighbors, Self  County of Residence: [place]  What city do you live in?: [place]  Home entry access options. Select all that apply.: Stairs  Number of steps to enter home.: 6  Do the steps have railings?: No  Type of Current Residence: [address] [place] home  Living Arrangements: Lives Alone  Is patient a ?: No    Activities of Daily Living Prior to Admission  Functional Status: Independent  Completes ADLs independently?: Yes  Ambulates independently?: Yes  Does patient use assisted devices?: Yes  Assisted Devices (DME) used: Straight Cane, Stair Chair/[place]  Does patient currently own DME?: Yes  What DME does the patient currently own?: Straight Cane, Stair Chair/[place]  Does patient have a history of Outpatient Therapy (PT/OT)?: No  Does the patient have a history of Short-Term Rehab?: No  Does patient have a history of HHC?: Yes ([facility])  Does patient currently have HHC?: No    Current Home Health Care  Home Health Agency Name:: [email or web address]    Patient Information Continued  Income Source: SSI/SSD  Does patient have prescription  coverage?: Yes  Can the patient afford their medications and any related supplies (such as glucometers or test strips)?: Yes ([facility] [address]  Does patient receive dialysis treatments?: No  Does patient have a history of substance abuse?: No  Does patient have a history of Mental Health Diagnosis?: No      Means of Transportation  Means of Transport to Appts:: Drives Self      DISCHARGE DETAILS:    Discharge planning discussed with:: Kelly  Freedom of Choice: Yes  Comments - Freedom of Choice: CM provided patient with HHC (PT/OT) choice list at bedside; CM reviewed accepting HHC with [facility], patient agreeable.  CM contacted family/caregiver?: No- see comments (Patient declined need to contact family members.)  Were Treatment Team discharge recommendations reviewed with patient/caregiver?: Yes  Did patient/caregiver verbalize understanding of patient care needs?: Yes       Contacts  Patient Contacts: Jimmy Moody-son  Relationship to Patient:: Family  Contact Method: Phone  Phone Number: [phone]  Reason/Outcome: Emergency Contact    Requested Home Health Care         Is the patient interested in HHC at discharge?: Yes  Home Health Discipline requested:: Occupational Therapy, Physical Therapy  Home Health Agency Name:: [facility]  Home Health Follow-Up Provider:: PCP  Home Health Services Needed:: Strengthening/Theraputic Exercises to Improve Function, Gait/ADL Training, Evaluate Functional Status and Safety  Homebound Criteria Met:: Requires the Assistance of Another Person for Safe Ambulation or to Leave the Home, Uses an Assist Device (i.e. cane, walker, etc)  Supporting Clincal Findings:: Dyspnea with Exertion, Fatigues Easliy in Short Distances, Limited Endurance    DME Referral Provided  Referral made for DME?: Yes  DME referral completed for the following items:: Walker  DME Supplier Name:: [employer]    Other Referral/Resources/Interventions Provided:  Interventions: HHC, DME  Referral Comments:  HHC (PT/OT) [facility], DME: Walker      Treatment Team Recommendation: Home with Home Health Care  Expected Discharge Disposition: Home Health Services  Additional Discharge Dispositions: Home Health Services        Additional Comments: CM met with patient at bedside to complete assessment; patient alert. CM reviewed and offered HHC (PT/OT) choices for patient at bedside, CM reviewed accepting agency, [facility]. Patient agreeable. CM reserved in [place]. Patient requested CM to order walker for home delivery. CM ordered in [place]. CM to follow. Patient confirmed drives self and denies difficulties with SDOH. CM to follow for further discharge planning needs.

## 2025-06-20 ENCOUNTER — ANESTHESIA (INPATIENT)
Dept: GASTROENTEROLOGY | Facility: HOSPITAL | Age: 80
DRG: 202 | End: 2025-06-20
Payer: COMMERCIAL

## 2025-06-20 ENCOUNTER — APPOINTMENT (INPATIENT)
Dept: GASTROENTEROLOGY | Facility: HOSPITAL | Age: 80
DRG: 202 | End: 2025-06-20
Attending: STUDENT IN AN ORGANIZED HEALTH CARE EDUCATION/TRAINING PROGRAM
Payer: COMMERCIAL

## 2025-06-20 ENCOUNTER — ANESTHESIA EVENT (INPATIENT)
Dept: GASTROENTEROLOGY | Facility: HOSPITAL | Age: 80
DRG: 202 | End: 2025-06-20
Payer: COMMERCIAL

## 2025-06-20 LAB
ANISOCYTOSIS BLD QL SMEAR: PRESENT
BASOPHILS # BLD MANUAL: 0 THOUSAND/UL (ref 0–0.1)
BASOPHILS NFR MAR MANUAL: 0 % (ref 0–1)
BURR CELLS BLD QL SMEAR: PRESENT
EOSINOPHIL # BLD MANUAL: 0 THOUSAND/UL (ref 0–0.4)
EOSINOPHIL NFR BLD MANUAL: 0 % (ref 0–6)
ERYTHROCYTE [DISTWIDTH] IN BLOOD BY AUTOMATED COUNT: 17.4 % (ref 11.6–15.1)
GIANT PLATELETS BLD QL SMEAR: PRESENT
HCT VFR BLD AUTO: 21.3 % (ref 34.8–46.1)
HGB BLD-MCNC: 7 G/DL (ref 11.5–15.4)
LG PLATELETS BLD QL SMEAR: PRESENT
LYMPHOCYTES # BLD AUTO: 0.82 THOUSAND/UL (ref 0.6–4.47)
LYMPHOCYTES # BLD AUTO: 9 % (ref 14–44)
MCH RBC QN AUTO: 28.2 PG (ref 26.8–34.3)
MCHC RBC AUTO-ENTMCNC: 32.9 G/DL (ref 31.4–37.4)
MCV RBC AUTO: 86 FL (ref 82–98)
METAMYELOCYTE ABSOLUTE CT: 0.09 THOUSAND/UL (ref 0–0.1)
METAMYELOCYTES NFR BLD MANUAL: 1 % (ref 0–1)
MONOCYTES # BLD AUTO: 0.36 THOUSAND/UL (ref 0–1.22)
MONOCYTES NFR BLD: 4 % (ref 4–12)
NEUTROPHILS # BLD MANUAL: 7.83 THOUSAND/UL (ref 1.85–7.62)
NEUTS SEG NFR BLD AUTO: 86 % (ref 43–75)
NRBC BLD AUTO-RTO: 1 /100 WBC (ref 0–2)
OVALOCYTES BLD QL SMEAR: PRESENT
PLATELET # BLD AUTO: 82 THOUSANDS/UL (ref 149–390)
PLATELET BLD QL SMEAR: ABNORMAL
PMV BLD AUTO: 14.2 FL (ref 8.9–12.7)
RBC # BLD AUTO: 2.48 MILLION/UL (ref 3.81–5.12)
RBC MORPH BLD: PRESENT
WBC # BLD AUTO: 9.11 THOUSAND/UL (ref 4.31–10.16)

## 2025-06-20 PROCEDURE — 0DB68ZX EXCISION OF STOMACH, VIA NATURAL OR ARTIFICIAL OPENING ENDOSCOPIC, DIAGNOSTIC: ICD-10-PCS | Performed by: INTERNAL MEDICINE

## 2025-06-20 PROCEDURE — 88341 IMHCHEM/IMCYTCHM EA ADD ANTB: CPT | Performed by: PATHOLOGY

## 2025-06-20 PROCEDURE — 94640 AIRWAY INHALATION TREATMENT: CPT

## 2025-06-20 PROCEDURE — 88305 TISSUE EXAM BY PATHOLOGIST: CPT | Performed by: PATHOLOGY

## 2025-06-20 PROCEDURE — 88342 IMHCHEM/IMCYTCHM 1ST ANTB: CPT | Performed by: PATHOLOGY

## 2025-06-20 PROCEDURE — 94760 N-INVAS EAR/PLS OXIMETRY 1: CPT

## 2025-06-20 PROCEDURE — 85007 BL SMEAR W/DIFF WBC COUNT: CPT | Performed by: INTERNAL MEDICINE

## 2025-06-20 PROCEDURE — 97530 THERAPEUTIC ACTIVITIES: CPT

## 2025-06-20 PROCEDURE — 0DB78ZX EXCISION OF STOMACH, PYLORUS, VIA NATURAL OR ARTIFICIAL OPENING ENDOSCOPIC, DIAGNOSTIC: ICD-10-PCS | Performed by: INTERNAL MEDICINE

## 2025-06-20 PROCEDURE — 85027 COMPLETE CBC AUTOMATED: CPT | Performed by: INTERNAL MEDICINE

## 2025-06-20 PROCEDURE — 44361 SMALL BOWEL ENDOSCOPY/BIOPSY: CPT | Performed by: INTERNAL MEDICINE

## 2025-06-20 PROCEDURE — 99232 SBSQ HOSP IP/OBS MODERATE 35: CPT | Performed by: INTERNAL MEDICINE

## 2025-06-20 PROCEDURE — 97116 GAIT TRAINING THERAPY: CPT

## 2025-06-20 RX ORDER — LIDOCAINE HYDROCHLORIDE 20 MG/ML
INJECTION, SOLUTION EPIDURAL; INFILTRATION; INTRACAUDAL; PERINEURAL AS NEEDED
Status: DISCONTINUED | OUTPATIENT
Start: 2025-06-20 | End: 2025-06-20

## 2025-06-20 RX ORDER — METHYLPREDNISOLONE SODIUM SUCCINATE 40 MG/ML
20 INJECTION, POWDER, LYOPHILIZED, FOR SOLUTION INTRAMUSCULAR; INTRAVENOUS EVERY 8 HOURS SCHEDULED
Status: DISCONTINUED | OUTPATIENT
Start: 2025-06-20 | End: 2025-06-21

## 2025-06-20 RX ORDER — PROPOFOL 10 MG/ML
INJECTION, EMULSION INTRAVENOUS AS NEEDED
Status: DISCONTINUED | OUTPATIENT
Start: 2025-06-20 | End: 2025-06-20

## 2025-06-20 RX ORDER — ALBUTEROL SULFATE 90 UG/1
2 INHALANT RESPIRATORY (INHALATION) EVERY 4 HOURS PRN
Status: DISCONTINUED | OUTPATIENT
Start: 2025-06-20 | End: 2025-06-25 | Stop reason: HOSPADM

## 2025-06-20 RX ADMIN — IPRATROPIUM BROMIDE AND ALBUTEROL SULFATE 3 ML: 2.5; .5 SOLUTION RESPIRATORY (INHALATION) at 20:15

## 2025-06-20 RX ADMIN — ALECTINIB HYDROCHLORIDE 450 MG: 150 CAPSULE ORAL at 21:24

## 2025-06-20 RX ADMIN — IPRATROPIUM BROMIDE AND ALBUTEROL SULFATE 3 ML: 2.5; .5 SOLUTION RESPIRATORY (INHALATION) at 13:40

## 2025-06-20 RX ADMIN — SENNOSIDES AND DOCUSATE SODIUM 1 TABLET: 50; 8.6 TABLET ORAL at 11:58

## 2025-06-20 RX ADMIN — UMECLIDINIUM BROMIDE AND VILANTEROL TRIFENATATE 1 PUFF: 62.5; 25 POWDER RESPIRATORY (INHALATION) at 11:58

## 2025-06-20 RX ADMIN — METHYLPREDNISOLONE SODIUM SUCCINATE 20 MG: 40 INJECTION, POWDER, FOR SOLUTION INTRAMUSCULAR; INTRAVENOUS at 17:49

## 2025-06-20 RX ADMIN — FLUTICASONE PROPIONATE 2 SPRAY: 50 SPRAY, METERED NASAL at 11:59

## 2025-06-20 RX ADMIN — SODIUM CHLORIDE, SODIUM LACTATE, POTASSIUM CHLORIDE, AND CALCIUM CHLORIDE 125 ML/HR: .6; .31; .03; .02 INJECTION, SOLUTION INTRAVENOUS at 01:50

## 2025-06-20 RX ADMIN — IPRATROPIUM BROMIDE AND ALBUTEROL SULFATE 3 ML: 2.5; .5 SOLUTION RESPIRATORY (INHALATION) at 07:23

## 2025-06-20 RX ADMIN — PANTOPRAZOLE SODIUM 40 MG: 40 INJECTION, POWDER, FOR SOLUTION INTRAVENOUS at 01:50

## 2025-06-20 RX ADMIN — PRAVASTATIN SODIUM 80 MG: 80 TABLET ORAL at 17:21

## 2025-06-20 RX ADMIN — PANTOPRAZOLE SODIUM 40 MG: 40 INJECTION, POWDER, FOR SOLUTION INTRAVENOUS at 11:58

## 2025-06-20 RX ADMIN — AMIODARONE HYDROCHLORIDE 200 MG: 200 TABLET ORAL at 11:58

## 2025-06-20 RX ADMIN — METHYLPREDNISOLONE SODIUM SUCCINATE 40 MG: 40 INJECTION, POWDER, FOR SOLUTION INTRAMUSCULAR; INTRAVENOUS at 01:50

## 2025-06-20 RX ADMIN — LORATADINE 5 MG: 10 TABLET ORAL at 11:57

## 2025-06-20 RX ADMIN — PROPOFOL 30 MG: 10 INJECTION, EMULSION INTRAVENOUS at 09:19

## 2025-06-20 RX ADMIN — AMLODIPINE BESYLATE 2.5 MG: 2.5 TABLET ORAL at 11:58

## 2025-06-20 RX ADMIN — PROPOFOL 130 MG: 10 INJECTION, EMULSION INTRAVENOUS at 09:11

## 2025-06-20 RX ADMIN — PROPOFOL 50 MG: 10 INJECTION, EMULSION INTRAVENOUS at 09:13

## 2025-06-20 RX ADMIN — PANTOPRAZOLE SODIUM 40 MG: 40 INJECTION, POWDER, FOR SOLUTION INTRAVENOUS at 21:24

## 2025-06-20 RX ADMIN — CALCITRIOL 0.25 MCG: 0.25 CAPSULE, LIQUID FILLED ORAL at 11:58

## 2025-06-20 RX ADMIN — PROPOFOL 50 MG: 10 INJECTION, EMULSION INTRAVENOUS at 09:16

## 2025-06-20 RX ADMIN — METHYLPREDNISOLONE SODIUM SUCCINATE 20 MG: 40 INJECTION, POWDER, FOR SOLUTION INTRAMUSCULAR; INTRAVENOUS at 11:57

## 2025-06-20 RX ADMIN — METOPROLOL TARTRATE 25 MG: 25 TABLET, FILM COATED ORAL at 11:57

## 2025-06-20 RX ADMIN — POLYETHYLENE GLYCOL 3350 17 G: 17 POWDER, FOR SOLUTION ORAL at 11:57

## 2025-06-20 RX ADMIN — LIDOCAINE 1 PATCH: 50 PATCH CUTANEOUS at 12:16

## 2025-06-20 RX ADMIN — METOPROLOL TARTRATE 25 MG: 25 TABLET, FILM COATED ORAL at 21:29

## 2025-06-20 RX ADMIN — LISINOPRIL 2.5 MG: 2.5 TABLET ORAL at 11:58

## 2025-06-20 RX ADMIN — LIDOCAINE HYDROCHLORIDE 100 MG: 20 INJECTION, SOLUTION EPIDURAL; INFILTRATION; INTRACAUDAL at 09:11

## 2025-06-20 NOTE — PLAN OF CARE
Problem: PHYSICAL THERAPY ADULT  Goal: Performs mobility at highest level of function for planned discharge setting.  See evaluation for individualized goals.  Description: Treatment/Interventions: Functional transfer training, LE strengthening/ROM, Therapeutic exercise, Endurance training, Elevations, Patient/family training, Bed mobility, Gait training, Spoke to nursing, OT  Equipment Recommended: Walker (pt requesting to have a RW)       See flowsheet documentation for full assessment, interventions and recommendations.  Outcome: Progressing  Note: Prognosis: Good  Problem List: Decreased endurance, Decreased mobility, Obesity  Assessment: Pt. progressing well with overall mobility. Pt. noted to have SOB with ambulation with SpO2 stats dropping to 82% however pt. recovers quickly with rest to 94%. No LOB noted t/o session. Talked to respiratory staff about the drop in O2 level. Pt. seated on chair post session with all needs within reach. Pt. has achieved all goals set in PT IE and pt. recommended to ambulate on the unit with restorative and nursing staff. Talked to Nacho PT and agreeable to DC PT for this pt. As she has achieved all the goals set in IE.    Barriers to Discharge: None     Rehab Resource Intensity Level, PT: No post-acute rehabilitation needs    See flowsheet documentation for full assessment.

## 2025-06-20 NOTE — ASSESSMENT & PLAN NOTE
Current hemoglobin 8.3, baseline closer to 10  Anemia studies will be obtained.  The patient hemoglobin has dropped significantly since admission.  She describes a black bowel movement although this was not confirmed by the nursing staff.  She has a history of GI bleeding and previous electrocautery of AVMs.  Anticoagulation will be stopped for now.  The patient will be monitored carefully.  EGD results noted.  Monitor hemoglobin.    Lab Results   Component Value Date    EGFR 32 06/19/2025    EGFR 40 06/18/2025    EGFR 32 06/16/2025    CREATININE 1.54 (H) 06/19/2025    CREATININE 1.28 06/18/2025    CREATININE 1.53 (H) 06/16/2025

## 2025-06-20 NOTE — ASSESSMENT & PLAN NOTE
Follows with [facility] onc[facility]  Was told to hold her alectinib today and tomorrow, resume on Friday

## 2025-06-20 NOTE — ASSESSMENT & PLAN NOTE
Wt Readings from Last 3 Encounters:   06/20/25 99.8 kg (220 lb)   06/03/25 97.5 kg (215 lb)   05/27/25 97.5 kg (215 lb)     Currently euvolemic.  Continue current therapy.

## 2025-06-20 NOTE — PLAN OF CARE
Problem: Potential for Falls  Goal: Patient will remain free of falls  Description: INTERVENTIONS:  - Educate patient/family on patient safety including physical limitations  - Instruct patient to call for assistance with activity   - Consider consulting OT/PT to assist with strengthening/mobility based on AM PAC & JH-HLM score  - Consult OT/PT to assist with strengthening/mobility   - Keep Call bell within reach  - Keep bed low and locked with side rails adjusted as appropriate  - Keep care items and personal belongings within reach  - Initiate and maintain comfort rounds  - Make Fall Risk Sign visible to staff  - Offer Toileting every  Hours, in advance of need  - Initiate/Maintain bed/chair alarm  - Obtain necessary fall risk management equipment:   - Apply yellow socks and bracelet for high fall risk patients  - Consider moving patient to room near nurses station  Outcome: Progressing     Problem: PAIN - ADULT  Goal: Verbalizes/displays adequate comfort level or baseline comfort level  Description: Interventions:  - Encourage patient to monitor pain and request assistance  - Assess pain using appropriate pain scale  - Administer analgesics as ordered based on type and severity of pain and evaluate response  - Implement non-pharmacological measures as appropriate and evaluate response  - Consider cultural and social influences on pain and pain management  - Notify physician/advanced practitioner if interventions unsuccessful or patient reports new pain  - Educate patient/family on pain management process including their role and importance of  reporting pain   - Provide non-pharmacologic/complimentary pain relief interventions  Outcome: Progressing     Problem: Knowledge Deficit  Goal: Patient/family/caregiver demonstrates understanding of disease process, treatment plan, medications, and discharge instructions  Description: Complete learning assessment and assess knowledge base.  Interventions:  - Provide  teaching at level of understanding  - Provide teaching via preferred learning methods  Outcome: Progressing     Problem: DISCHARGE PLANNING  Goal: Discharge to home or other facility with appropriate resources  Description: INTERVENTIONS:  - Identify barriers to discharge w/patient and caregiver  - Arrange for needed discharge resources and transportation as appropriate  - Identify discharge learning needs (meds, wound care, etc.)  - Arrange for interpretive services to assist at discharge as needed  - Refer to Case Management Department for coordinating discharge planning if the patient needs post-hospital services based on physician/advanced practitioner order or complex needs related to functional status, cognitive ability, or social support system  Outcome: Progressing     Problem: RESPIRATORY - ADULT  Goal: Achieves optimal ventilation and oxygenation  Description: INTERVENTIONS:  - Assess for changes in respiratory status  - Assess for changes in mentation and behavior  - Position to facilitate oxygenation and minimize respiratory effort  - Oxygen administered by appropriate delivery if ordered  - Initiate smoking cessation education as indicated  - Encourage broncho-pulmonary hygiene including cough, deep breathe, Incentive Spirometry  - Assess the need for suctioning and aspirate as needed  - Assess and instruct to report SOB or any respiratory difficulty  - Respiratory Therapy support as indicated  Outcome: Progressing

## 2025-06-20 NOTE — PHYSICAL THERAPY NOTE
Physical Therapy Treatment Note     06/20/25 1345   PT Last Visit   PT Visit Date 06/20/25   Note Type   Note Type Treatment   Pain Assessment   Pain Assessment Tool 0-10   Pain Score No Pain   Restrictions/Precautions   Weight Bearing Precautions Per Order No   Other Precautions Fall Risk  (SOB)   General   Chart Reviewed Yes   Family/Caregiver Present No   Subjective   Subjective Pt. agreeable to PT   Transfers   Sit to Stand 6  Modified independent   Additional items Armrests   Stand to Sit 6  Modified independent   Additional items Armrests   Stand pivot 6  Modified independent   Additional items Increased time required   Toilet transfer 6  Modified independent   Additional items Standard toilet   Ambulation/Elevation   Gait pattern Improper Weight shift;Wide SUSIE;Decreased foot clearance;Short stride;Decreased toe off;Decreased heel strike;Excessively slow   Gait Assistance 5  Supervision   Additional items Assist x 1   Assistive Device Straight cane   Distance 100ft x 2, 10ft x 2   Stair Management Assistance 5  Supervision   Additional items Assist x 1;Increased time required   Stair Management Technique Step to pattern;Foreward;Nonreciprocal;With cane;One rail R   Number of Stairs 3   Balance   Static Sitting Good   Dynamic Sitting Fair +   Static Standing Fair   Dynamic Standing Fair -   Ambulatory Fair -   Endurance Deficit   Endurance Deficit Yes   Endurance Deficit Description SOB   Activity Tolerance   Activity Tolerance Patient tolerated treatment well;Other (Comment)  (SOB)   Nurse Made Aware Yes   Assessment   Prognosis Good   Problem List Decreased endurance;Decreased mobility;Obesity   Assessment Pt. progressing well with overall mobility. Pt. noted to have SOB with ambulation with SpO2 stats dropping to 82% however pt. recovers quickly with rest to 94%. No LOB noted t/o session. Talked to respiratory staff about the drop in O2 level. Pt. seated on chair post session with all needs within reach. Pt.  has achieved all goals set in PT IE and pt. recommended to ambulate on the unit with restorative and nursing staff.   Barriers to Discharge None   Goals   Patient Goals None reproted   STG Expiration Date 06/25/25   PT Treatment Day 1   Plan   Treatment/Interventions Functional transfer training;Elevations;Spoke to nursing;Gait training;Equipment eval/education;Patient/family training   Progress Progressing toward goals   PT Frequency 2-3x/wk   Discharge Recommendation   Rehab Resource Intensity Level, PT No post-acute rehabilitation needs   Equipment Recommended Cane   AM-PAC Basic Mobility Inpatient   Turning in Flat Bed Without Bedrails 4   Lying on Back to Sitting on Edge of Flat Bed Without Bedrails 4   Moving Bed to Chair 4   Standing Up From Chair Using Arms 4   Walk in Room 4   Climb 3-5 Stairs With Railing 4   Basic Mobility Inpatient Raw Score 24   Basic Mobility Standardized Score 57.68   [facility] Highest Level Of Mobility   -[facility] Goal 8: Walk 250 feet or more   -HLM Achieved 7: Walk 25 feet or more   End of Consult   Patient Position at End of Consult All needs within reach;Bedside chair;Bed/Chair alarm activated       Talked to Nacho PT and agreeable to DC PT for this pt. As she has achieved all the goals set in IE.    Che Mohan, PTA    An AM-PAC basic mobility standardized score less than 42.9 suggest the patient may benefit from discharge to post-acute rehab services.

## 2025-06-20 NOTE — UTILIZATION REVIEW
Continued Stay Review    Date:   6/20                          Current Patient Class: Inpatient  Current Level of Care:  med surg    HPI:80 y.o. female initially admitted on OBSERVATION 6/18 CHANGED TO INPATIENT ON 6/18 @ 1741 -      Current Diagnosis:  Shortness of breath  Severe asthma  NSCLC    Assessment/Plan:   Date:   6/20     Day 3: Has surpassed a 2nd midnight with active treatments and services.  EGD  IMPRESSION:  2 cm hiatal hernia  The esophagus, duodenum and proximal jejunum appeared normal.  Edematous, nodular mucosa in the antrum  Performed forceps biopsies in the body of the stomach and antrum to rule out H. pylori      Remains on  IV  s/medrol. Continue  IVF.  Continue Duonebs.       Medications:   Scheduled Medications:  Alectinib HCl, 450 mg, Oral, BID  amiodarone, 200 mg, Oral, Daily  amLODIPine, 2.5 mg, Oral, Daily  calcitriol, 0.25 mcg, Oral, Daily  fluticasone, 2 spray, Each Nare, Daily  ipratropium-albuterol, 3 mL, Nebulization, TID  lidocaine, 1 patch, Topical, Daily  lisinopril, 2.5 mg, Oral, Daily  loratadine, 5 mg, Oral, Daily  methylPREDNISolone sodium succinate, 20 mg, Intravenous, Q8H JAIRON  metoprolol tartrate, 25 mg, Oral, BID  pantoprazole, 40 mg, Intravenous, Q12H JAIRON  pravastatin, 80 mg, Oral, Daily With Dinner  senna-docusate sodium, 1 tablet, Oral, Daily  umeclidinium-vilanterol, 1 puff, Inhalation, Daily      Continuous IV Infusions:  lactated ringers, 125 mL/hr, Intravenous, Continuous      PRN Meds:  acetaminophen, 650 mg, Oral, Q6H PRN  aluminum-magnesium hydroxide-simethicone, 30 mL, Oral, Q6H PRN  Ketotifen Fumarate, 1 drop, Both Eyes, BID PRN  ondansetron, 4 mg, Intravenous, Q6H PRN  polyethylene glycol, 17 g, Oral, Daily PRN      Discharge Plan:   TBD    Vital Signs (last 3 days)       Date/Time Temp Pulse Resp BP MAP (mmHg) SpO2 O2 Flow Rate (L/min) O2 Device Patient Position - Orthostatic VS [place] Coma Scale Score Pain    06/20/25 1340 -- -- -- -- -- 94 % -- None (Room  air) -- -- --    06/20/25 1204 -- -- -- 130/56 -- -- -- -- -- -- --    06/20/25 0947 98.1 °F (36.7 °C) 81 16 106/50 -- 99 % -- None (Room air) -- -- --    06/20/25 0935 97.5 °F (36.4 °C) 79 18 144/75 -- 97 % 6 L/min Simple mask -- -- --    06/20/25 0853 97.2 °F (36.2 °C) 76 16 144/65 -- 99 % -- None (Room air) -- -- --    06/20/25 0800 -- -- -- -- -- -- -- -- -- -- No Pain    06/20/25 0723 -- -- -- -- -- 94 % -- None (Room air) -- -- --    06/20/25 07:08:18 97.7 °F (36.5 °C) -- -- 134/55 81 -- -- -- -- -- --    06/19/25 23:09:10 97.5 °F (36.4 °C) -- 16 143/68 93 -- -- -- -- -- --    06/19/25 21:41:01 -- -- -- 129/55 80 -- -- -- -- -- --    06/19/25 2141 -- 81 -- 129/55 -- -- -- -- -- -- --    06/19/25 2100 -- -- -- -- -- -- -- -- -- 15 No Pain    06/19/25 1922 -- -- -- -- -- 93 % -- -- -- -- --    06/19/25 14:34:29 98.2 °F (36.8 °C) 87 18 104/42 63 97 % -- None (Room air) Standing - Orthostatic VS -- --    06/19/25 14:32:22 -- -- -- 113/41 65 -- -- -- Sitting - Orthostatic VS -- --    06/19/25 14:31:12 -- -- -- 114/39 64 -- -- -- Lying - Orthostatic VS -- --    06/19/25 1339 -- -- -- -- -- 94 % -- None (Room air) -- -- --    06/19/25 09:12:51 -- -- -- 121/53 76 -- -- -- -- -- --    06/19/25 0800 -- -- -- -- -- -- -- None (Room air) -- 15 No Pain    06/19/25 0745 -- -- -- -- -- 95 % -- None (Room air) -- -- --    06/19/25 07:41:19 97.6 °F (36.4 °C) 86 17 110/45 67 95 % -- -- Lying -- --    06/18/25 21:04:13 -- -- -- 98/47 64 -- -- -- -- -- No Pain    06/18/25 2100 -- 88 18 -- -- 90 % -- None (Room air) Lying -- --    06/18/25 20:59:20 97.4 °F (36.3 °C) -- -- 97/46 63 -- -- -- -- -- --    06/18/25 20:58:39 97.4 °F (36.3 °C) -- -- -- -- -- -- -- -- -- --    06/18/25 1953 -- -- -- -- -- -- -- -- -- 15 --    06/18/25 1912 -- -- -- -- -- 93 % -- -- -- -- --    06/18/25 15:00:04 97.9 °F (36.6 °C) 73 20 127/51 76 93 % -- -- -- -- --    06/18/25 1404 -- -- -- -- -- 94 % -- None (Room air) -- -- --    06/18/25 1209 98.8 °F  (37.1 °C) 78 18 143/54 -- -- -- -- -- -- --    06/18/25 1004 -- -- -- -- -- -- -- -- -- -- No Pain    06/18/25 0950 -- -- -- -- -- -- -- -- -- -- No Pain    06/18/25 0800 -- -- -- -- -- -- -- -- -- 15 No Pain    06/18/25 0747 -- -- -- -- -- 95 % -- None (Room air) -- -- --    06/18/25 07:31:22 98.8 °F (37.1 °C) -- 18 143/54 84 -- -- -- -- -- --    06/18/25 06:28:50 98.3 °F (36.8 °C) 78 18 144/55 85 95 % -- None (Room air) Lying -- --    06/18/25 0613 -- -- -- -- -- -- -- -- -- 15 --    06/18/25 0514 -- 65 22 140/63 90 99 % -- None (Room air) Lying -- --    06/18/25 0253 98.9 °F (37.2 °C) 68 16 139/62 -- 98 % -- None (Room air) Lying -- --          Weight (last 2 days)       Date/Time Weight    06/20/25 0853 99.8 (220)    06/18/25 1209 100 (220.46)    06/18/25 06:28:50 100 (220.46)            Pertinent Labs/Diagnostic Results:   Radiology:  XR chest 2 views   ED Interpretation by Milton Virgen PA-C (06/18 0518)   Right pleural effusion with diffuse groundglass opacities consistent with fluid overload.  Cardiomegaly when compared to prior chest x-ray in January 2025.      Final Interpretation by Pam Cornejo MD (06/18 0652)      Mild pulmonary venous congestion.      Question trace effusions.            Workstation performed: [id number]           Cardiology:  ECG 12 lead   Final Result by Easton Ware MD (06/18 0842)   Normal sinus rhythm   Prolonged QT   Abnormal ECG   When compared with ECG of 18-Jun-2025 02:57, (unconfirmed)   No significant change was found   Confirmed by Easton Ware ([id number]) on 6/18/2025 8:42:16 AM      ECG 12 lead   Final Result by Easton Ware MD (06/18 0838)   Normal sinus rhythm   Normal ECG   When compared with ECG of 15-Dewey-2025 11:07,   No significant change was found   Confirmed by Easton Ware ([id number]) on 6/18/2025 8:38:04 AM        GI:  EGD with Push Enteroscopy   Final Result by Kael Patiño MD (06/20 0955)   2 cm hiatal hernia   The esophagus, duodenum and proximal  jejunum appeared normal.   Edematous, nodular mucosa in the antrum   Performed forceps biopsies in the body of the stomach and antrum to rule    out H. pylori         RECOMMENDATION:   Await pathology results    No cause of patient's acute anemia was found.   Start on PPI 40 mg daily for treatment of gastritis.   Avoid NSAIDs unless medically necessary.   Consult hematology.   Plan for colonoscopy on Monday, 6/23/2025.                     Kael Patiño MD               Results from last 7 days   Lab Units 06/20/25  0454 06/19/25  1458 06/19/25  0507 06/18/25  0352   WBC Thousand/uL 9.11  --  6.80 5.32   HEMOGLOBIN g/dL 7.0* 7.9* 7.5* 8.3*   HEMATOCRIT % 21.3* 23.6* 22.6* 25.6*   PLATELETS Thousands/uL 82*  --  75* 70*   TOTAL NEUT ABS Thousands/µL  --   --  5.63 4.03         Results from last 7 days   Lab Units 06/19/25  0507 06/18/25  0352 06/16/25  0837   SODIUM mmol/L 138 137 138   POTASSIUM mmol/L 4.1 4.8 4.3   CHLORIDE mmol/L 102 104 102   CO2 mmol/L 28 25 28   ANION GAP mmol/L 8 8 8   BUN mg/dL 51* 33* 32*   CREATININE mg/dL 1.54* 1.28 1.53*   EGFR ml/min/1.73sq m 32 40 32   CALCIUM mg/dL 8.4 8.3* 9.3   PHOSPHORUS mg/dL  --   --  4.4*     Results from last 7 days   Lab Units 06/19/25  0507 06/18/25  0352 06/16/25  0837   AST U/L 34 47* 44*   ALT U/L 32 33 39   ALK PHOS U/L 66 74 86   TOTAL PROTEIN g/dL 5.8* 6.0* 6.6   ALBUMIN g/dL 3.5 3.5 4.1   TOTAL BILIRUBIN mg/dL 0.71 0.99 1.20*     Results from last 7 days   Lab Units 06/18/25  0732   POC GLUCOSE mg/dl 133     Results from last 7 days   Lab Units 06/19/25  0507 06/18/25  0352   GLUCOSE RANDOM mg/dL 151* 99           Results from last 7 days   Lab Units 06/18/25  0838 06/18/25  0609 06/18/25  0352   HS TNI 0HR ng/L  --   --  20   HS TNI 2HR ng/L  --  23  --    HSTNI D2 ng/L  --  3  --    HS TNI 4HR ng/L 23  --   --    HSTNI D4 ng/L 3  --   --      Results from last 7 days   Lab Units 06/18/25  0352   D-DIMER QUANTITATIVE ug/ml FEU 0.56*                              Results from last 7 days   Lab Units 06/18/25  0352   BNP pg/mL 110*     Results from last 7 days   Lab Units 06/18/25  0352   IRON SATURATION % 25   IRON ug/dL 73   TIBC ug/dL 289.8     Results from last 7 days   Lab Units 06/18/25  0352   TRANSFERRIN mg/dL 207                             Results from last 7 days   Lab Units 06/18/25  0517   CLARITY UA  Clear   COLOR UA  Light Yellow   SPEC GRAV UA  1.012   PH UA  7.5   GLUCOSE UA mg/dl Negative   KETONES UA mg/dl Negative   BLOOD UA  Negative   PROTEIN UA mg/dl Negative   NITRITE UA  Negative   BILIRUBIN UA  Negative   UROBILINOGEN UA (BE) mg/dl 3.0*   LEUKOCYTES UA  Negative             Network Utilization Review Department  ATTENTION: Please call with any questions or concerns to [phone] and carefully listen to the prompts so that you are directed to the right person. All voicemails are confidential.   For Discharge needs, contact Care Management DC Support Team at [phone] opt. [phone]  Send all requests for admission clinical reviews, approved or denied determinations and any other requests to dedicated fax number below belonging to the [place] where the patient is receiving treatment. List of dedicated fax numbers for the Facilities:  FACILITY NAME UR FAX NUMBER   ADMISSION DENIALS (Administrative/Medical Necessity) [phone]   DISCHARGE SUPPORT TEAM (NETWORK) [phone]   PARENT CHILD HEALTH (Maternity/NICU/Pediatrics) [phone]   [facility] [phone]   [facility] [phone]   [facility] [phone]   [facility] [phone]   [facility] [phone]   [facility] [phone]   [facility] [phone]   [facility] [phone]   [facility] [phone]   [facility], [facility]  [facility] [phone]   [facility] [phone]   [facility] [phone]

## 2025-06-20 NOTE — ASSESSMENT & PLAN NOTE
Rate controlled  Continue beta-blocker  Eliquis has been stopped because of acute anemia.  Continue to monitor

## 2025-06-20 NOTE — ANESTHESIA PREPROCEDURE EVALUATION
Procedure:  EGD WITH PUSH ENTEROSCOPY    Relevant Problems   CARDIO   (+) Atrial flutter (HCC)   (+) Chronic diastolic congestive heart failure (HCC)   (+) Coronary artery disease involving native coronary artery of native heart without angina pectoris   (+) Essential hypertension   (+) Mixed hyperlipidemia      GI/HEPATIC   (+) Gastroesophageal reflux disease without esophagitis   (+) Hiatal hernia      /RENAL   (+) CARLOS ENRIQUE (acute kidney injury) (HCC)   (+) Anemia due to stage 3b chronic kidney disease  (HCC)   (+) Chronic kidney disease-mineral and bone disorder (CKD-MBD)   (+) Hypertensive kidney disease with stage 3b chronic kidney disease (HCC)      HEMATOLOGY   (+) Anemia due to stage 3b chronic kidney disease  (HCC)   (+) Thrombocytopenia (HCC)      NEURO/PSYCH   (+) Depression, recurrent (HCC)      PULMONARY   (+) MONO (obstructive sleep apnea)   (+) Severe asthma   (+) Shortness of breath        Physical Exam    Airway     Mallampati score: II  TM Distance: >3 FB  Neck ROM: full  Mouth opening: >= 4 cm      Cardiovascular  Cardiovascular exam normal    Dental   No notable dental hx     Pulmonary  Pulmonary exam normal     Neurological  - normal exam    Other Findings  post-pubertal.      Anesthesia Plan  ASA Score- 3     Anesthesia Type- IV sedation with anesthesia with ASA Monitors.         Additional Monitors:     Airway Plan: natural airway.           Plan Factors-Exercise tolerance (METS): >4 METS.                          Induction- intravenous.    Postoperative Plan-     Perioperative Resuscitation Plan - Level 1 - Full Code.       Informed Consent- Anesthetic plan and risks discussed with patient.  I personally reviewed this patient with the CRNA. Discussed and agreed on the Anesthesia Plan with the CRNA..      NPO Status:  No vitals data found for the desired time range.

## 2025-06-20 NOTE — ASSESSMENT & PLAN NOTE
Patient is a morbidly obese 80-year-old female past medical history of asthma, atrial fibrillation, hypertension, lung cancer with mets to the bone, and hyperlipidemia who presents to the emergency department with complaints of shortness of breath, worse on exertion, and dizziness for the last few days.  In the emergency room imaging suggests vascular congestion and pleural effusions, she was given IV Lasix and will be admitted for further treatment.  Official reading of the patient's chest x-ray suggests minimal pleural effusion  BNP is marginally elevated.  I suspect the patient's symptoms are mostly related to asthma rather than congestive heart failure.  The patient's anemia may be playing a role as well.

## 2025-06-20 NOTE — PROGRESS NOTES
Progress Note - Hospitalist   Name: Jayla Moody 80 y.o. female I MRN: [id number]  Unit/Bed#: [place] [address] -01 I Date of Admission: 6/18/2025   Date of Service: 6/20/2025 I Hospital Day: 2    Assessment & Plan  Shortness of breath  Patient is a morbidly obese 80-year-old female past medical history of asthma, atrial fibrillation, hypertension, lung cancer with mets to the bone, and hyperlipidemia who presents to the emergency department with complaints of shortness of breath, worse on exertion, and dizziness for the last few days.  In the emergency room imaging suggests vascular congestion and pleural effusions, she was given IV Lasix and will be admitted for further treatment.  Official reading of the patient's chest x-ray suggests minimal pleural effusion  BNP is marginally elevated.  I suspect the patient's symptoms are mostly related to asthma rather than congestive heart failure.  The patient's anemia may be playing a role as well.  Severe asthma    Schedule DuoNebs  Start Solu-Medrol  Breztri Aerosphere is nonformulary, continue dose equivalent  The patient has improved with the above treatment but still has significant dyspnea on exertion.  MONO (obstructive sleep apnea)  Unable to tolerate CPAP outpatient, sent her machine back  Follow-up outpatient with pulmonology  Non-small cell lung cancer (HCC)  Follows with [facility] oncology  Was told to hold her alectinib today and tomorrow, resume on Friday  Malignant neoplasm metastatic to bone (HCC)  Patient had 1 treatment of radiation when bone mets were initially diagnosed  Continue outpatient follow-up with oncology  Atrial flutter (HCC)  Rate controlled  Continue beta-blocker  Eliquis has been stopped because of acute anemia.  Continue to monitor  Anemia due to stage 3b chronic kidney disease  (HCC)  Current hemoglobin 8.3, baseline closer to 10  Anemia studies will be obtained.  The patient hemoglobin has dropped significantly since admission.  She  describes a black bowel movement although this was not confirmed by the nursing staff.  She has a history of GI bleeding and previous electrocautery of AVMs.  Anticoagulation will be stopped for now.  The patient will be monitored carefully.  EGD results noted.  Monitor hemoglobin.    Lab Results   Component Value Date    EGFR 32 06/19/2025    EGFR 40 06/18/2025    EGFR 32 06/16/2025    CREATININE 1.54 (H) 06/19/2025    CREATININE 1.28 06/18/2025    CREATININE 1.53 (H) 06/16/2025     Class 3 severe obesity due to excess calories with serious comorbidity and body mass index (BMI) of 40.0 to 44.9 in adult  BMI 40.62  Patient would benefit greatly from very low carbohydrate diet (less than 50 g daily) and significant caloric restriction (less than 2000 kcals per day).  Hypertensive kidney disease with stage 3b chronic kidney disease (HCC)  Blood pressure and renal function currently stable  Continue home antihypertensives  Trend renal function, monitor routine vital signs    Lab Results   Component Value Date    EGFR 32 06/19/2025    EGFR 40 06/18/2025    EGFR 32 06/16/2025    CREATININE 1.54 (H) 06/19/2025    CREATININE 1.28 06/18/2025    CREATININE 1.53 (H) 06/16/2025     Mixed hyperlipidemia  Rosuvastatin is nonformulary, continue dose equivalent pravastatin 80 mg daily  Essential hypertension  Stable on current therapy.  Coronary artery disease involving native coronary artery of native heart without angina pectoris  No angina.  On aspirin and appropriate risk factor modification.  Chronic diastolic congestive heart failure (HCC)  Wt Readings from Last 3 Encounters:   06/20/25 99.8 kg (220 lb)   06/03/25 97.5 kg (215 lb)   05/27/25 97.5 kg (215 lb)     Currently euvolemic.  Continue current therapy.    Subjective:  The patient feels reasonably well.  She underwent upper endoscopy which was very unremarkable.  She has had no chest pain.  She has no shortness of breath at rest.  She denies abdominal pain, nausea, or  vomiting.    Physical Exam:   Temp:  [97.2 °F (36.2 °C)-98.1 °F (36.7 °C)] 97.4 °F (36.3 °C)  HR:  [76-81] 78  BP: (106-144)/(50-75) 129/57  Resp:  [16-18] 18  SpO2:  [93 %-99 %] 94 %  O2 Device: None (Room air)    Gen: Well-developed, obese, in no distress.  Neck: Supple.  No lymphadenopathy or goiter.  Heart: Regular rhythm.  I heard no murmur, gallop, or rub.  Lungs: Clear to auscultation and percussion.  No wheezing, rales, or rhonchi.  Abd: Soft with active bowel sounds.  No mass or tenderness.  Extremities: No clubbing, cyanosis, or edema.  No calf tenderness.  Neuro: Alert and oriented.  No focal sign.  Skin: Warm and dry.      LABS:   CBC:   Lab Results   Component Value Date    WBC 9.11 06/20/2025    HGB 7.0 (L) 06/20/2025    HCT 21.3 (L) 06/20/2025    MCV 86 06/20/2025    PLT 82 (L) 06/20/2025    RBC 2.48 (L) 06/20/2025    MCH 28.2 06/20/2025    MCHC 32.9 06/20/2025    RDW 17.4 (H) 06/20/2025    MPV 14.2 (H) 06/20/2025    NRBC 1 06/20/2025           VTE Pharmacologic Prophylaxis: Reason for no pharmacologic prophylaxis GI blood loss.  VTE Mechanical Prophylaxis: sequential compression device

## 2025-06-20 NOTE — ANESTHESIA POSTPROCEDURE EVALUATION
Post-Op Assessment Note    CV Status:  Stable    Pain management: adequate       Mental Status:  Alert and awake   Hydration Status:  Euvolemic   PONV Controlled:  Controlled   Airway Patency:  Patent     Post Op Vitals Reviewed: Yes    No anethesia notable event occurred.    Staff: Anesthesiologist           Last Filed PACU Vitals:  Vitals Value Taken Time   Temp 97.5 °F (36.4 °C) 06/20/25 09:35   Pulse 79 06/20/25 09:35   /75 06/20/25 09:35   Resp 18 06/20/25 09:35   SpO2 97 % 06/20/25 09:35       Modified Javad:     Vitals Value Taken Time   Activity 1 06/20/25 09:36   Respiration 2 06/20/25 09:36   Circulation 2 06/20/25 09:36   Consciousness 1 06/20/25 09:36   Oxygen Saturation 1 06/20/25 09:36     Modified Javad Score: 7

## 2025-06-21 PROBLEM — D64.9 ANEMIA, UNSPECIFIED: Status: ACTIVE | Noted: 2023-09-01

## 2025-06-21 PROBLEM — D62 ANEMIA DUE TO ACUTE BLOOD LOSS: Status: ACTIVE | Noted: 2023-09-01

## 2025-06-21 LAB
ABO GROUP BLD: NORMAL
BASOPHILS # BLD AUTO: 0.01 THOUSANDS/ÂΜL (ref 0–0.1)
BASOPHILS NFR BLD AUTO: 0 % (ref 0–1)
BLD GP AB SCN SERPL QL: NEGATIVE
EOSINOPHIL # BLD AUTO: 0 THOUSAND/ÂΜL (ref 0–0.61)
EOSINOPHIL NFR BLD AUTO: 0 % (ref 0–6)
ERYTHROCYTE [DISTWIDTH] IN BLOOD BY AUTOMATED COUNT: 17.8 % (ref 11.6–15.1)
HCT VFR BLD AUTO: 20.7 % (ref 34.8–46.1)
HGB BLD-MCNC: 6.7 G/DL (ref 11.5–15.4)
IMM GRANULOCYTES # BLD AUTO: 0.49 THOUSAND/UL (ref 0–0.2)
IMM GRANULOCYTES NFR BLD AUTO: 6 % (ref 0–2)
LYMPHOCYTES # BLD AUTO: 0.62 THOUSANDS/ÂΜL (ref 0.6–4.47)
LYMPHOCYTES NFR BLD AUTO: 7 % (ref 14–44)
MCH RBC QN AUTO: 28.6 PG (ref 26.8–34.3)
MCHC RBC AUTO-ENTMCNC: 32.4 G/DL (ref 31.4–37.4)
MCV RBC AUTO: 89 FL (ref 82–98)
MONOCYTES # BLD AUTO: 0.58 THOUSAND/ÂΜL (ref 0.17–1.22)
MONOCYTES NFR BLD AUTO: 7 % (ref 4–12)
NEUTROPHILS # BLD AUTO: 6.91 THOUSANDS/ÂΜL (ref 1.85–7.62)
NEUTS SEG NFR BLD AUTO: 80 % (ref 43–75)
NRBC BLD AUTO-RTO: 0 /100 WBCS
PLATELET # BLD AUTO: 89 THOUSANDS/UL (ref 149–390)
PMV BLD AUTO: 14.2 FL (ref 8.9–12.7)
RBC # BLD AUTO: 2.34 MILLION/UL (ref 3.81–5.12)
RH BLD: POSITIVE
SPECIMEN EXPIRATION DATE: NORMAL
WBC # BLD AUTO: 8.61 THOUSAND/UL (ref 4.31–10.16)

## 2025-06-21 PROCEDURE — P9016 RBC LEUKOCYTES REDUCED: HCPCS

## 2025-06-21 PROCEDURE — 94640 AIRWAY INHALATION TREATMENT: CPT

## 2025-06-21 PROCEDURE — 86923 COMPATIBILITY TEST ELECTRIC: CPT

## 2025-06-21 PROCEDURE — 86901 BLOOD TYPING SEROLOGIC RH(D): CPT | Performed by: INTERNAL MEDICINE

## 2025-06-21 PROCEDURE — 86850 RBC ANTIBODY SCREEN: CPT | Performed by: INTERNAL MEDICINE

## 2025-06-21 PROCEDURE — 99232 SBSQ HOSP IP/OBS MODERATE 35: CPT | Performed by: INTERNAL MEDICINE

## 2025-06-21 PROCEDURE — 86900 BLOOD TYPING SEROLOGIC ABO: CPT | Performed by: INTERNAL MEDICINE

## 2025-06-21 PROCEDURE — 94760 N-INVAS EAR/PLS OXIMETRY 1: CPT

## 2025-06-21 PROCEDURE — 85025 COMPLETE CBC W/AUTO DIFF WBC: CPT | Performed by: INTERNAL MEDICINE

## 2025-06-21 RX ORDER — METHYLPREDNISOLONE SODIUM SUCCINATE 40 MG/ML
20 INJECTION, POWDER, LYOPHILIZED, FOR SOLUTION INTRAMUSCULAR; INTRAVENOUS EVERY 12 HOURS SCHEDULED
Status: DISCONTINUED | OUTPATIENT
Start: 2025-06-21 | End: 2025-06-22

## 2025-06-21 RX ADMIN — PANTOPRAZOLE SODIUM 40 MG: 40 INJECTION, POWDER, FOR SOLUTION INTRAVENOUS at 09:09

## 2025-06-21 RX ADMIN — SENNOSIDES AND DOCUSATE SODIUM 1 TABLET: 50; 8.6 TABLET ORAL at 09:07

## 2025-06-21 RX ADMIN — ALECTINIB HYDROCHLORIDE 450 MG: 150 CAPSULE ORAL at 20:26

## 2025-06-21 RX ADMIN — METOPROLOL TARTRATE 25 MG: 25 TABLET, FILM COATED ORAL at 09:07

## 2025-06-21 RX ADMIN — PRAVASTATIN SODIUM 80 MG: 80 TABLET ORAL at 16:23

## 2025-06-21 RX ADMIN — IPRATROPIUM BROMIDE AND ALBUTEROL SULFATE 3 ML: 2.5; .5 SOLUTION RESPIRATORY (INHALATION) at 07:30

## 2025-06-21 RX ADMIN — METOPROLOL TARTRATE 25 MG: 25 TABLET, FILM COATED ORAL at 21:37

## 2025-06-21 RX ADMIN — FLUTICASONE PROPIONATE 2 SPRAY: 50 SPRAY, METERED NASAL at 09:06

## 2025-06-21 RX ADMIN — METHYLPREDNISOLONE SODIUM SUCCINATE 20 MG: 40 INJECTION, POWDER, FOR SOLUTION INTRAMUSCULAR; INTRAVENOUS at 01:11

## 2025-06-21 RX ADMIN — AMIODARONE HYDROCHLORIDE 200 MG: 200 TABLET ORAL at 09:07

## 2025-06-21 RX ADMIN — CALCITRIOL 0.25 MCG: 0.25 CAPSULE, LIQUID FILLED ORAL at 09:07

## 2025-06-21 RX ADMIN — AMLODIPINE BESYLATE 2.5 MG: 2.5 TABLET ORAL at 09:07

## 2025-06-21 RX ADMIN — PANTOPRAZOLE SODIUM 40 MG: 40 INJECTION, POWDER, FOR SOLUTION INTRAVENOUS at 20:26

## 2025-06-21 RX ADMIN — METHYLPREDNISOLONE SODIUM SUCCINATE 20 MG: 40 INJECTION, POWDER, FOR SOLUTION INTRAMUSCULAR; INTRAVENOUS at 20:26

## 2025-06-21 RX ADMIN — METHYLPREDNISOLONE SODIUM SUCCINATE 20 MG: 40 INJECTION, POWDER, FOR SOLUTION INTRAMUSCULAR; INTRAVENOUS at 09:12

## 2025-06-21 RX ADMIN — LORATADINE 5 MG: 10 TABLET ORAL at 09:07

## 2025-06-21 RX ADMIN — IPRATROPIUM BROMIDE AND ALBUTEROL SULFATE 3 ML: 2.5; .5 SOLUTION RESPIRATORY (INHALATION) at 19:32

## 2025-06-21 RX ADMIN — ALECTINIB HYDROCHLORIDE 450 MG: 150 CAPSULE ORAL at 09:09

## 2025-06-21 RX ADMIN — LISINOPRIL 2.5 MG: 2.5 TABLET ORAL at 09:07

## 2025-06-21 RX ADMIN — IPRATROPIUM BROMIDE AND ALBUTEROL SULFATE 3 ML: 2.5; .5 SOLUTION RESPIRATORY (INHALATION) at 14:19

## 2025-06-21 RX ADMIN — UMECLIDINIUM BROMIDE AND VILANTEROL TRIFENATATE 1 PUFF: 62.5; 25 POWDER RESPIRATORY (INHALATION) at 09:06

## 2025-06-21 NOTE — PROGRESS NOTES
Progress Note - Hospitalist   Name: Jayla Moody 80 y.o. female I MRN: [id number]  Unit/Bed#: [place] [address] -01 I Date of Admission: 6/18/2025   Date of Service: 6/21/2025 I Hospital Day: 3    Assessment & Plan  Shortness of breath  Patient is a morbidly obese 80-year-old female past medical history of asthma, atrial fibrillation, hypertension, lung cancer with mets to the bone, and hyperlipidemia who presents to the emergency department with complaints of shortness of breath, worse on exertion, and dizziness for the last few days.  In the emergency room imaging suggests vascular congestion and pleural effusions, she was given IV Lasix and will be admitted for further treatment.  Official reading of the patient's chest x-ray suggests minimal pleural effusion  BNP is marginally elevated.  I suspect the patient's symptoms are mostly related to asthma rather than congestive heart failure.  The patient's anemia may be playing a role as well.  Severe asthma    Schedule DuoNebs  Start Solu-Medrol  Breztri Aerosphere is nonformulary, continue dose equivalent  The patient has improved with the above treatment but still has significant dyspnea on exertion.  MONO (obstructive sleep apnea)  Unable to tolerate CPAP outpatient, sent her machine back  Follow-up outpatient with pulmonology  Non-small cell lung cancer (HCC)  Follows with [facility] oncology  Was told to hold her alectinib today and tomorrow, resume on Friday  Malignant neoplasm metastatic to bone (HCC)  Patient had 1 treatment of radiation when bone mets were initially diagnosed  Continue outpatient follow-up with oncology  Atrial flutter (HCC)  Rate controlled  Continue beta-blocker  Eliquis has been stopped because of acute anemia.  Continue to monitor  Anemia due to acute blood loss  The patient became acutely anemic during her hospitalization.  She describes melena.  Her hemoglobin fell to 6.5 and she received blood.  She underwent EGD yesterday which revealed  no site of bleeding.  Colonoscopy is planned.  Will continue to monitor haptoglobin carefully.  Class 3 severe obesity due to excess calories with serious comorbidity and body mass index (BMI) of 40.0 to 44.9 in adult  BMI 40.62  Patient would benefit greatly from very low carbohydrate diet (less than 50 g daily) and significant caloric restriction (less than 2000 kcals per day).  Hypertensive kidney disease with stage 3b chronic kidney disease (HCC)  Blood pressure and renal function currently stable  Continue home antihypertensives  Trend renal function, monitor routine vital signs    Lab Results   Component Value Date    EGFR 32 06/19/2025    EGFR 40 06/18/2025    EGFR 32 06/16/2025    CREATININE 1.54 (H) 06/19/2025    CREATININE 1.28 06/18/2025    CREATININE 1.53 (H) 06/16/2025     Mixed hyperlipidemia  Rosuvastatin is nonformulary, continue dose equivalent pravastatin 80 mg daily  Essential hypertension  Stable on current therapy.  Coronary artery disease involving native coronary artery of native heart without angina pectoris  No angina.  On aspirin and appropriate risk factor modification.  Chronic diastolic congestive heart failure (HCC)  Wt Readings from Last 3 Encounters:   06/20/25 99.8 kg (220 lb)   06/03/25 97.5 kg (215 lb)   05/27/25 97.5 kg (215 lb)     Currently euvolemic.  Continue current therapy.    Subjective:  The patient feels reasonably well.  She still has some dyspnea on exertion but no chest pain, cough, wheezing, etc.  She says that her stool is still dark.  She notices no blood.  She has no abdominal pain, nausea, etc.    Physical Exam:   Temp:  [97.6 °F (36.4 °C)-98 °F (36.7 °C)] 98 °F (36.7 °C)  HR:  [68-70] 68  BP: (120-160)/(58-95) 120/95  Resp:  [16-18] 17  SpO2:  [92 %-97 %] 95 %  O2 Device: None (Room air)    Gen: Well-developed, obese, in no distress.  Neck: Supple.  No lymphadenopathy or goiter.  Heart: Regular rhythm.  I heard no murmur, gallop, or rub.  Lungs: Clear to  auscultation and percussion.  No wheezing, rales, or rhonchi.  Abd: Soft with active bowel sounds.  No mass or tenderness.  Extremities: No clubbing, cyanosis, or edema.  No calf tenderness.  Neuro: Alert and oriented.  No focal sign.  Skin: Warm and dry.      LABS:   CBC:   Lab Results   Component Value Date    WBC 8.61 06/21/2025    HGB 6.7 (L) 06/21/2025    HCT 20.7 (L) 06/21/2025    MCV 89 06/21/2025    PLT 89 (L) 06/21/2025    RBC 2.34 (L) 06/21/2025    MCH 28.6 06/21/2025    MCHC 32.4 06/21/2025    RDW 17.8 (H) 06/21/2025    MPV 14.2 (H) 06/21/2025    NRBC 0 06/21/2025         VTE Pharmacologic Prophylaxis: Reason for no pharmacologic prophylaxis GI bleed  VTE Mechanical Prophylaxis: sequential compression device

## 2025-06-21 NOTE — PLAN OF CARE
Problem: Potential for Falls  Goal: Patient will remain free of falls  Description: INTERVENTIONS:  - Educate patient/family on patient safety including physical limitations  - Instruct patient to call for assistance with activity   - Consider consulting OT/PT to assist with strengthening/mobility based on AM PAC & JH-HLM score  - Consult OT/PT to assist with strengthening/mobility   - Keep Call bell within reach  - Keep bed low and locked with side rails adjusted as appropriate  - Keep care items and personal belongings within reach  - Initiate and maintain comfort rounds  - Make Fall Risk Sign visible to staff  - Offer Toileting every 2 Hours, in advance of need  - Initiate/Maintain bed alarm  - Obtain necessary fall risk management equipment  - Apply yellow socks and bracelet for high fall risk patients  - Consider moving patient to room near nurses station  Outcome: Progressing     Problem: PAIN - ADULT  Goal: Verbalizes/displays adequate comfort level or baseline comfort level  Description: Interventions:  - Encourage patient to monitor pain and request assistance  - Assess pain using appropriate pain scale  - Administer analgesics as ordered based on type and severity of pain and evaluate response  - Implement non-pharmacological measures as appropriate and evaluate response  - Consider cultural and social influences on pain and pain management  - Notify physician/advanced practitioner if interventions unsuccessful or patient reports new pain  - Educate patient/family on pain management process including their role and importance of  reporting pain   - Provide non-pharmacologic/complimentary pain relief interventions  Outcome: Progressing     Problem: INFECTION - ADULT  Goal: Absence or prevention of progression during hospitalization  Description: INTERVENTIONS:  - Assess and monitor for signs and symptoms of infection  - Monitor lab/diagnostic results  - Monitor all insertion sites, i.e. indwelling lines,  tubes, and drains  - Monitor endotracheal if appropriate and nasal secretions for changes in amount and color  - [place] appropriate cooling/warming therapies per order  - Administer medications as ordered  - Instruct and encourage patient and family to use good hand hygiene technique  - Identify and instruct in appropriate isolation precautions for identified infection/condition  Outcome: Progressing     Problem: SAFETY ADULT  Goal: Patient will remain free of falls  Description: INTERVENTIONS:  - Educate patient/family on patient safety including physical limitations  - Instruct patient to call for assistance with activity   - Consider consulting OT/PT to assist with strengthening/mobility based on AM PAC & -HLM score  - Consult OT/PT to assist with strengthening/mobility   - Keep Call bell within reach  - Keep bed low and locked with side rails adjusted as appropriate  - Keep care items and personal belongings within reach  - Initiate and maintain comfort rounds  - Make Fall Risk Sign visible to staff  - Offer Toileting every 2 Hours, in advance of need  - Initiate/Maintain bed alarm  - Obtain necessary fall risk management equipment  - Apply yellow socks and bracelet for high fall risk patients  - Consider moving patient to room near nurses station  Outcome: Progressing  Goal: Maintain or return to baseline ADL function  Description: INTERVENTIONS:  -  Assess patient's ability to carry out ADLs; assess patient's baseline for ADL function and identify physical deficits which impact ability to perform ADLs (bathing, care of mouth/teeth, toileting, grooming, dressing, etc.)  - Assess/evaluate cause of self-care deficits   - Assess range of motion  - Assess patient's mobility; develop plan if impaired  - Assess patient's need for assistive devices and provide as appropriate  - Encourage maximum independence but intervene and supervise when necessary  - Involve family in performance of ADLs  - Assess for home  care needs following discharge   - Consider OT consult to assist with ADL evaluation and planning for discharge  - Provide patient education as appropriate  - Monitor functional capacity and physical performance, use of AM PAC & JH-HLM   - Monitor gait, balance and fatigue with ambulation    Outcome: Progressing  Goal: Maintains/Returns to pre admission functional level  Description: INTERVENTIONS:  - Perform AM-PAC 6 Click Basic Mobility/ Daily Activity assessment daily.  - Set and communicate daily mobility goal to care team and patient/family/caregiver.   - Collaborate with rehabilitation services on mobility goals if consulted  - Perform Range of Motion 3 times a day.  - Reposition patient every 2 hours.  - Dangle patient 3 times a day  - Stand patient 3 times a day  - Ambulate patient 3 times a day  - Out of bed to chair 3 times a day   - Out of bed for meals 3 times a day  - Out of bed for toileting  - Record patient progress and toleration of activity level   Outcome: Progressing     Problem: DISCHARGE PLANNING  Goal: Discharge to home or other facility with appropriate resources  Description: INTERVENTIONS:  - Identify barriers to discharge w/patient and caregiver  - Arrange for needed discharge resources and transportation as appropriate  - Identify discharge learning needs (meds, wound care, etc.)  - Arrange for interpretive services to assist at discharge as needed  - Refer to Case Management Department for coordinating discharge planning if the patient needs post-hospital services based on physician/advanced practitioner order or complex needs related to functional status, cognitive ability, or social support system  Outcome: Progressing     Problem: Knowledge Deficit  Goal: Patient/family/caregiver demonstrates understanding of disease process, treatment plan, medications, and discharge instructions  Description: Complete learning assessment and assess knowledge base.  Interventions:  - Provide teaching  at level of understanding  - Provide teaching via preferred learning methods  Outcome: Progressing     Problem: RESPIRATORY - ADULT  Goal: Achieves optimal ventilation and oxygenation  Description: INTERVENTIONS:  - Assess for changes in respiratory status  - Assess for changes in mentation and behavior  - Position to facilitate oxygenation and minimize respiratory effort  - Oxygen administered by appropriate delivery if ordered  - Initiate smoking cessation education as indicated  - Encourage broncho-pulmonary hygiene including cough, deep breathe, Incentive Spirometry  - Assess the need for suctioning and aspirate as needed  - Assess and instruct to report SOB or any respiratory difficulty  - Respiratory Therapy support as indicated  Outcome: Progressing

## 2025-06-21 NOTE — ASSESSMENT & PLAN NOTE
The patient became acutely anemic during her hospitalization.  She describes melena.  Her hemoglobin fell to 6.5 and she received blood.  She underwent EGD yesterday which revealed no site of bleeding.  Colonoscopy is planned.  Will continue to monitor haptoglobin carefully.

## 2025-06-21 NOTE — RESTORATIVE TECHNICIAN NOTE
Restorative Technician Note      Patient Name: Jayla Moody     Restorative Tech Visit Date: 06/21/25  Note Type: Mobility  Patient Position Upon Consult: Bedside chair  Activity Performed: Ambulated  Assistive Device: Cane  Patient Position at End of Consult: Bedside chair; All needs within reach            ns

## 2025-06-21 NOTE — ASSESSMENT & PLAN NOTE
Follows with [facility]  Was told to hold her alectinib today and tomorrow, resume on Friday

## 2025-06-21 NOTE — PLAN OF CARE
Problem: Potential for Falls  Goal: Patient will remain free of falls  Description: INTERVENTIONS:  - Educate patient/family on patient safety including physical limitations  - Instruct patient to call for assistance with activity   - Consider consulting OT/PT to assist with strengthening/mobility based on AM PAC & JH-HLM score  - Consult OT/PT to assist with strengthening/mobility   - Keep Call bell within reach  - Keep bed low and locked with side rails adjusted as appropriate  - Keep care items and personal belongings within reach  - Initiate and maintain comfort rounds  - Make Fall Risk Sign visible to staff  - Offer Toileting every 2 Hours, in advance of need  - Initiate/Maintain bed alarm  - Obtain necessary fall risk management equipment: yellow socks  - Apply yellow socks and bracelet for high fall risk patients  - Consider moving patient to room near nurses station  Outcome: Progressing     Problem: PAIN - ADULT  Goal: Verbalizes/displays adequate comfort level or baseline comfort level  Description: Interventions:  - Encourage patient to monitor pain and request assistance  - Assess pain using appropriate pain scale  - Administer analgesics as ordered based on type and severity of pain and evaluate response  - Implement non-pharmacological measures as appropriate and evaluate response  - Consider cultural and social influences on pain and pain management  - Notify physician/advanced practitioner if interventions unsuccessful or patient reports new pain  - Educate patient/family on pain management process including their role and importance of  reporting pain   - Provide non-pharmacologic/complimentary pain relief interventions  Outcome: Progressing     Problem: INFECTION - ADULT  Goal: Absence or prevention of progression during hospitalization  Description: INTERVENTIONS:  - Assess and monitor for signs and symptoms of infection  - Monitor lab/diagnostic results  - Monitor all insertion sites, i.e.  indwelling lines, tubes, and drains  - Monitor endotracheal if appropriate and nasal secretions for changes in amount and color  - [place] appropriate cooling/warming therapies per order  - Administer medications as ordered  - Instruct and encourage patient and family to use good hand hygiene technique  - Identify and instruct in appropriate isolation precautions for identified infection/condition  Outcome: Progressing     Problem: SAFETY ADULT  Goal: Patient will remain free of falls  Description: INTERVENTIONS:  - Educate patient/family on patient safety including physical limitations  - Instruct patient to call for assistance with activity   - Consider consulting OT/PT to assist with strengthening/mobility based on AM PAC & JH-HLM score  - Consult OT/PT to assist with strengthening/mobility   - Keep Call bell within reach  - Keep bed low and locked with side rails adjusted as appropriate  - Keep care items and personal belongings within reach  - Initiate and maintain comfort rounds  - Make Fall Risk Sign visible to staff  - Offer Toileting every 2 Hours, in advance of need  - Initiate/Maintain bed and alarm  - Obtain necessary fall risk management equipment: yellow socks  - Apply yellow socks and bracelet for high fall risk patients  - Consider moving patient to room near nurses station  Outcome: Progressing  Goal: Maintain or return to baseline ADL function  Description: INTERVENTIONS:  -  Assess patient's ability to carry out ADLs; assess patient's baseline for ADL function and identify physical deficits which impact ability to perform ADLs (bathing, care of mouth/teeth, toileting, grooming, dressing, etc.)  - Assess/evaluate cause of self-care deficits   - Assess range of motion  - Assess patient's mobility; develop plan if impaired  - Assess patient's need for assistive devices and provide as appropriate  - Encourage maximum independence but intervene and supervise when necessary  - Involve family in  performance of ADLs  - Assess for home care needs following discharge   - Consider OT consult to assist with ADL evaluation and planning for discharge  - Provide patient education as appropriate  - Monitor functional capacity and physical performance, use of AM PAC & JH-HLM   - Monitor gait, balance and fatigue with ambulation    Outcome: Progressing  Goal: Maintains/Returns to pre admission functional level  Description: INTERVENTIONS:  - Perform AM-PAC 6 Click Basic Mobility/ Daily Activity assessment daily.  - Set and communicate daily mobility goal to care team and patient/family/caregiver.   - Collaborate with rehabilitation services on mobility goals if consulted  - Perform Range of Motion 5 times a day.  - Reposition patient every 2 hours.  - Dangle patient 6 times a day  - Stand patient 3 times a day  - Ambulate patient 3 times a day  - Out of bed to chair 3 times a day   - Out of bed for meals 3 times a day  - Out of bed for toileting  - Record patient progress and toleration of activity level   Outcome: Progressing     Problem: DISCHARGE PLANNING  Goal: Discharge to home or other facility with appropriate resources  Description: INTERVENTIONS:  - Identify barriers to discharge w/patient and caregiver  - Arrange for needed discharge resources and transportation as appropriate  - Identify discharge learning needs (meds, wound care, etc.)  - Arrange for interpretive services to assist at discharge as needed  - Refer to Case Management Department for coordinating discharge planning if the patient needs post-hospital services based on physician/advanced practitioner order or complex needs related to functional status, cognitive ability, or social support system  Outcome: Progressing     Problem: RESPIRATORY - ADULT  Goal: Achieves optimal ventilation and oxygenation  Description: INTERVENTIONS:  - Assess for changes in respiratory status  - Assess for changes in mentation and behavior  - Position to facilitate  oxygenation and minimize respiratory effort  - Oxygen administered by appropriate delivery if ordered  - Initiate smoking cessation education as indicated  - Encourage broncho-pulmonary hygiene including cough, deep breathe, Incentive Spirometry  - Assess the need for suctioning and aspirate as needed  - Assess and instruct to report SOB or any respiratory difficulty  - Respiratory Therapy support as indicated  Outcome: Progressing

## 2025-06-22 LAB
ABO GROUP BLD BPU: NORMAL
BASOPHILS # BLD AUTO: 0.02 THOUSANDS/ÂΜL (ref 0–0.1)
BASOPHILS NFR BLD AUTO: 0 % (ref 0–1)
BPU ID: NORMAL
CROSSMATCH: NORMAL
DME PARACHUTE DELIVERY DATE ACTUAL: NORMAL
DME PARACHUTE DELIVERY DATE REQUESTED: NORMAL
DME PARACHUTE DELIVERY NOTE: NORMAL
DME PARACHUTE ITEM DESCRIPTION: NORMAL
DME PARACHUTE ORDER STATUS: NORMAL
DME PARACHUTE SUPPLIER NAME: NORMAL
DME PARACHUTE SUPPLIER PHONE: NORMAL
EOSINOPHIL # BLD AUTO: 0 THOUSAND/ÂΜL (ref 0–0.61)
EOSINOPHIL NFR BLD AUTO: 0 % (ref 0–6)
ERYTHROCYTE [DISTWIDTH] IN BLOOD BY AUTOMATED COUNT: 19.1 % (ref 11.6–15.1)
HCT VFR BLD AUTO: 25.4 % (ref 34.8–46.1)
HGB BLD-MCNC: 8.3 G/DL (ref 11.5–15.4)
IMM GRANULOCYTES # BLD AUTO: >0.5 THOUSAND/UL (ref 0–0.2)
IMM GRANULOCYTES NFR BLD AUTO: 7 % (ref 0–2)
LYMPHOCYTES # BLD AUTO: 0.72 THOUSANDS/ÂΜL (ref 0.6–4.47)
LYMPHOCYTES NFR BLD AUTO: 9 % (ref 14–44)
MCH RBC QN AUTO: 28.2 PG (ref 26.8–34.3)
MCHC RBC AUTO-ENTMCNC: 32.7 G/DL (ref 31.4–37.4)
MCV RBC AUTO: 86 FL (ref 82–98)
MONOCYTES # BLD AUTO: 0.7 THOUSAND/ÂΜL (ref 0.17–1.22)
MONOCYTES NFR BLD AUTO: 9 % (ref 4–12)
NEUTROPHILS # BLD AUTO: 6.25 THOUSANDS/ÂΜL (ref 1.85–7.62)
NEUTS SEG NFR BLD AUTO: 75 % (ref 43–75)
NRBC BLD AUTO-RTO: 1 /100 WBCS
PLATELET # BLD AUTO: 84 THOUSANDS/UL (ref 149–390)
PMV BLD AUTO: 13.1 FL (ref 8.9–12.7)
RBC # BLD AUTO: 2.94 MILLION/UL (ref 3.81–5.12)
UNIT DISPENSE STATUS: NORMAL
UNIT PRODUCT CODE: NORMAL
UNIT PRODUCT VOLUME: 350 ML
UNIT RH: NORMAL
WBC # BLD AUTO: 8.24 THOUSAND/UL (ref 4.31–10.16)

## 2025-06-22 PROCEDURE — 85027 COMPLETE CBC AUTOMATED: CPT | Performed by: INTERNAL MEDICINE

## 2025-06-22 PROCEDURE — 99232 SBSQ HOSP IP/OBS MODERATE 35: CPT | Performed by: INTERNAL MEDICINE

## 2025-06-22 PROCEDURE — 94760 N-INVAS EAR/PLS OXIMETRY 1: CPT

## 2025-06-22 PROCEDURE — 94640 AIRWAY INHALATION TREATMENT: CPT

## 2025-06-22 RX ORDER — POLYETHYLENE GLYCOL 3350 17 G/17G
238 POWDER, FOR SOLUTION ORAL SEE ADMIN INSTRUCTIONS
Status: COMPLETED | OUTPATIENT
Start: 2025-06-22 | End: 2025-06-22

## 2025-06-22 RX ORDER — PREDNISONE 20 MG/1
40 TABLET ORAL DAILY
Status: DISCONTINUED | OUTPATIENT
Start: 2025-06-23 | End: 2025-06-25 | Stop reason: HOSPADM

## 2025-06-22 RX ADMIN — LORATADINE 5 MG: 10 TABLET ORAL at 08:30

## 2025-06-22 RX ADMIN — METOPROLOL TARTRATE 25 MG: 25 TABLET, FILM COATED ORAL at 08:30

## 2025-06-22 RX ADMIN — ALECTINIB HYDROCHLORIDE 450 MG: 150 CAPSULE ORAL at 20:20

## 2025-06-22 RX ADMIN — AMIODARONE HYDROCHLORIDE 200 MG: 200 TABLET ORAL at 08:30

## 2025-06-22 RX ADMIN — IPRATROPIUM BROMIDE AND ALBUTEROL SULFATE 3 ML: 2.5; .5 SOLUTION RESPIRATORY (INHALATION) at 07:19

## 2025-06-22 RX ADMIN — METHYLPREDNISOLONE SODIUM SUCCINATE 20 MG: 40 INJECTION, POWDER, FOR SOLUTION INTRAMUSCULAR; INTRAVENOUS at 08:35

## 2025-06-22 RX ADMIN — PANTOPRAZOLE SODIUM 40 MG: 40 INJECTION, POWDER, FOR SOLUTION INTRAVENOUS at 08:32

## 2025-06-22 RX ADMIN — IPRATROPIUM BROMIDE AND ALBUTEROL SULFATE 3 ML: 2.5; .5 SOLUTION RESPIRATORY (INHALATION) at 13:57

## 2025-06-22 RX ADMIN — ALECTINIB HYDROCHLORIDE 450 MG: 150 CAPSULE ORAL at 08:30

## 2025-06-22 RX ADMIN — LISINOPRIL 2.5 MG: 2.5 TABLET ORAL at 08:30

## 2025-06-22 RX ADMIN — SENNOSIDES AND DOCUSATE SODIUM 1 TABLET: 50; 8.6 TABLET ORAL at 08:29

## 2025-06-22 RX ADMIN — IPRATROPIUM BROMIDE AND ALBUTEROL SULFATE 3 ML: 2.5; .5 SOLUTION RESPIRATORY (INHALATION) at 19:20

## 2025-06-22 RX ADMIN — FLUTICASONE PROPIONATE 2 SPRAY: 50 SPRAY, METERED NASAL at 08:31

## 2025-06-22 RX ADMIN — CALCITRIOL 0.25 MCG: 0.25 CAPSULE, LIQUID FILLED ORAL at 08:30

## 2025-06-22 RX ADMIN — METOPROLOL TARTRATE 25 MG: 25 TABLET, FILM COATED ORAL at 20:20

## 2025-06-22 RX ADMIN — PRAVASTATIN SODIUM 80 MG: 80 TABLET ORAL at 16:20

## 2025-06-22 RX ADMIN — AMLODIPINE BESYLATE 2.5 MG: 2.5 TABLET ORAL at 08:30

## 2025-06-22 RX ADMIN — PANTOPRAZOLE SODIUM 40 MG: 40 INJECTION, POWDER, FOR SOLUTION INTRAVENOUS at 20:20

## 2025-06-22 RX ADMIN — UMECLIDINIUM BROMIDE AND VILANTEROL TRIFENATATE 1 PUFF: 62.5; 25 POWDER RESPIRATORY (INHALATION) at 08:31

## 2025-06-22 RX ADMIN — POLYETHYLENE GLYCOL 3350 238 G: 17 POWDER, FOR SOLUTION ORAL at 16:28

## 2025-06-22 NOTE — PLAN OF CARE
Problem: Potential for Falls  Goal: Patient will remain free of falls  Description: INTERVENTIONS:  - Educate patient/family on patient safety including physical limitations  - Instruct patient to call for assistance with activity   - Consider consulting OT/PT to assist with strengthening/mobility based on AM PAC & JH-HLM score  - Consult OT/PT to assist with strengthening/mobility   - Keep Call bell within reach  - Keep bed low and locked with side rails adjusted as appropriate  - Keep care items and personal belongings within reach  - Initiate and maintain comfort rounds  - Make Fall Risk Sign visible to staff  - Apply yellow socks and bracelet for high fall risk patients  - Consider moving patient to room near nurses station  Outcome: Progressing     Problem: PAIN - ADULT  Goal: Verbalizes/displays adequate comfort level or baseline comfort level  Description: Interventions:  - Encourage patient to monitor pain and request assistance  - Assess pain using appropriate pain scale  - Administer analgesics as ordered based on type and severity of pain and evaluate response  - Implement non-pharmacological measures as appropriate and evaluate response  - Consider cultural and social influences on pain and pain management  - Notify physician/advanced practitioner if interventions unsuccessful or patient reports new pain  - Educate patient/family on pain management process including their role and importance of  reporting pain   - Provide non-pharmacologic/complimentary pain relief interventions  Outcome: Progressing     Problem: INFECTION - ADULT  Goal: Absence or prevention of progression during hospitalization  Description: INTERVENTIONS:  - Assess and monitor for signs and symptoms of infection  - Monitor lab/diagnostic results  - Monitor all insertion sites, i.e. indwelling lines, tubes, and drains  - Monitor endotracheal if appropriate and nasal secretions for changes in amount and color  - [place] appropriate  cooling/warming therapies per order  - Administer medications as ordered  - Instruct and encourage patient and family to use good hand hygiene technique  - Identify and instruct in appropriate isolation precautions for identified infection/condition  Outcome: Progressing     Problem: SAFETY ADULT  Goal: Patient will remain free of falls  Description: INTERVENTIONS:  - Educate patient/family on patient safety including physical limitations  - Instruct patient to call for assistance with activity   - Consider consulting OT/PT to assist with strengthening/mobility based on AM PAC & JH-HLM score  - Consult OT/PT to assist with strengthening/mobility   - Keep Call bell within reach  - Keep bed low and locked with side rails adjusted as appropriate  - Keep care items and personal belongings within reach  - Initiate and maintain comfort rounds  - Make Fall Risk Sign visible to staff  - Offer Toileting every 2 Hours, in advance of need  - Initiate/Maintain bed alarm  - Obtain necessary fall risk management equipment:  - Apply yellow socks and bracelet for high fall risk patients  - Consider moving patient to room near nurses station  Outcome: Progressing  Goal: Maintain or return to baseline ADL function  Description: INTERVENTIONS:  -  Assess patient's ability to carry out ADLs; assess patient's baseline for ADL function and identify physical deficits which impact ability to perform ADLs (bathing, care of mouth/teeth, toileting, grooming, dressing, etc.)  - Assess/evaluate cause of self-care deficits   - Assess range of motion  - Assess patient's mobility; develop plan if impaired  - Assess patient's need for assistive devices and provide as appropriate  - Encourage maximum independence but intervene and supervise when necessary  - Involve family in performance of ADLs  - Assess for home care needs following discharge   - Consider OT consult to assist with ADL evaluation and planning for discharge  - Provide patient  education as appropriate  - Monitor functional capacity and physical performance, use of AM PAC & JH-HLM   - Monitor gait, balance and fatigue with ambulation    Outcome: Progressing  Goal: Maintains/Returns to pre admission functional level  Description: INTERVENTIONS:  - Perform AM-PAC 6 Click Basic Mobility/ Daily Activity assessment daily.  - Set and communicate daily mobility goal to care team and patient/family/caregiver.   - Collaborate with rehabilitation services on mobility goals if consulted  - Perform Range of Motion 3 times a day.  - Reposition patient every 2 hours.  - Dangle patient 3 times a day  - Stand patient 3 times a day  - Ambulate patient 3 times a day  - Out of bed to chair 3 times a day   - Out of bed for meals 3 times a day  - Out of bed for toileting  - Record patient progress and toleration of activity level   Outcome: Progressing     Problem: DISCHARGE PLANNING  Goal: Discharge to home or other facility with appropriate resources  Description: INTERVENTIONS:  - Identify barriers to discharge w/patient and caregiver  - Arrange for needed discharge resources and transportation as appropriate  - Identify discharge learning needs (meds, wound care, etc.)  - Arrange for interpretive services to assist at discharge as needed  - Refer to Case Management Department for coordinating discharge planning if the patient needs post-hospital services based on physician/advanced practitioner order or complex needs related to functional status, cognitive ability, or social support system  Outcome: Progressing     Problem: Knowledge Deficit  Goal: Patient/family/caregiver demonstrates understanding of disease process, treatment plan, medications, and discharge instructions  Description: Complete learning assessment and assess knowledge base.  Interventions:  - Provide teaching at level of understanding  - Provide teaching via preferred learning methods  Outcome: Progressing     Problem: RESPIRATORY -  ADULT  Goal: Achieves optimal ventilation and oxygenation  Description: INTERVENTIONS:  - Assess for changes in respiratory status  - Assess for changes in mentation and behavior  - Position to facilitate oxygenation and minimize respiratory effort  - Oxygen administered by appropriate delivery if ordered  - Initiate smoking cessation education as indicated  - Encourage broncho-pulmonary hygiene including cough, deep breathe, Incentive Spirometry  - Assess the need for suctioning and aspirate as needed  - Assess and instruct to report SOB or any respiratory difficulty  - Respiratory Therapy support as indicated  Outcome: Progressing

## 2025-06-22 NOTE — ASSESSMENT & PLAN NOTE
Lab Results   Component Value Date    EGFR 32 06/19/2025    EGFR 40 06/18/2025    EGFR 32 06/16/2025    CREATININE 1.54 (H) 06/19/2025    CREATININE 1.28 06/18/2025    CREATININE 1.53 (H) 06/16/2025     80 y.o. female with history of MONO, hypertension, atrial flutter on Eliquis, class III obesity (BMI 41), HFpEF (LVEF 65% from 6/30/2025), duodenal and colonic AVMs and non-small cell lung cancer with recent hospitalizations for melena thought secondary to AVMs who presented on 6/18 due to worsening exertional dyspnea with concern for asthma exacerbation started on steroids and nebulizers with downtrending hemoglobin from recent baseline of 10-12 is low 7.9 overall concerning for acute blood loss anemia likely in the setting of recurrent AVM bleeding leading to GI consult. 06/20/25 push enteroscopy showed some antritis but no active bleeding.      -  pt reports still having dark stool and is xfusion dependent. Received 1u pRBC yesterday and went from 6.7 to 8.3   - plan for colonoscopy tomorrow. CLD today, prep this afternoon, NPO amn

## 2025-06-22 NOTE — ASSESSMENT & PLAN NOTE
The patient became acutely anemic during her hospitalization.  She describes melena.  Her hemoglobin fell to 6.5 and she received blood.  She underwent EGD yesterday which revealed no site of bleeding.  Colonoscopy is planned for tomorrow.  Will continue to monitor hemooglobin carefully.

## 2025-06-22 NOTE — PROGRESS NOTES
Progress Note - Hospitalist   Name: Jayla Moody 80 y.o. female I MRN: [id number]  Unit/Bed#: [place] [address] -01 I Date of Admission: 6/18/2025   Date of Service: 6/22/2025 I Hospital Day: 4    Assessment & Plan  Shortness of breath  Patient is a morbidly obese 80-year-old female past medical history of asthma, atrial fibrillation, hypertension, lung cancer with mets to the bone, and hyperlipidemia who presents to the emergency department with complaints of shortness of breath, worse on exertion, and dizziness for the last few days.  In the emergency room imaging suggests vascular congestion and pleural effusions, she was given IV Lasix and will be admitted for further treatment.  Official reading of the patient's chest x-ray suggests minimal pleural effusion  BNP is marginally elevated.  I suspect the patient's symptoms are mostly related to asthma rather than congestive heart failure.  The patient's anemia may be playing a role as well.  Severe asthma    Schedule IActionablephere is nonformulary, continue dose equivalent  The patient's respiratory status has improved substantially.  She will be transition to oral prednisone.  MONO (obstructive sleep apnea)  Unable to tolerate CPAP outpatient, sent her machine back  Follow-up outpatient with pulmonology  Non-small cell lung cancer (HCC)  Follows with [facility] oncology  Was told to hold her alectinib today and tomorrow, resume on Friday  Malignant neoplasm metastatic to bone (HCC)  Patient had 1 treatment of radiation when bone mets were initially diagnosed  Continue outpatient follow-up with oncology  Atrial flutter (HCC)  Rate controlled  Continue beta-blocker  Eliquis has been stopped because of acute anemia.  Continue to monitor  Anemia due to acute blood loss  The patient became acutely anemic during her hospitalization.  She describes melena.  Her hemoglobin fell to 6.5 and she received blood.  She underwent EGD yesterday which revealed no site of  bleeding.  Colonoscopy is planned for tomorrow.  Will continue to monitor hemooglobin carefully.  Class 3 severe obesity due to excess calories with serious comorbidity and body mass index (BMI) of 40.0 to 44.9 in adult  BMI 40.62  Patient would benefit greatly from very low carbohydrate diet (less than 50 g daily) and significant caloric restriction (less than 2000 kcals per day).  Hypertensive kidney disease with stage 3b chronic kidney disease (HCC)  Blood pressure and renal function currently stable  Continue home antihypertensives  Trend renal function, monitor routine vital signs    Lab Results   Component Value Date    EGFR 32 06/19/2025    EGFR 40 06/18/2025    EGFR 32 06/16/2025    CREATININE 1.54 (H) 06/19/2025    CREATININE 1.28 06/18/2025    CREATININE 1.53 (H) 06/16/2025     Mixed hyperlipidemia  Rosuvastatin is nonformulary, continue dose equivalent pravastatin 80 mg daily  Essential hypertension  Stable on current therapy.  Coronary artery disease involving native coronary artery of native heart without angina pectoris  No angina.  On aspirin and appropriate risk factor modification.  Chronic diastolic congestive heart failure (HCC)  Wt Readings from Last 3 Encounters:   06/20/25 99.8 kg (220 lb)   06/03/25 97.5 kg (215 lb)   05/27/25 97.5 kg (215 lb)     Currently euvolemic.  Continue current therapy.    The patient feels significantly better.  I believe that her initial symptoms were most likely related to asthma which has improved.  She is undergoing steroid taper.  And her currently, she developed melena with significant blood loss anemia.  She has had AVMs in the past which required cauterization.  EGD was not too revealing.  Colonoscopy is scheduled for tomorrow.  When the patient's hemoglobin is stable, she will be suitable for discharge.  This could conceivably be tomorrow after colonoscopy or Tuesday.    Subjective:  The patient is feeling pretty well.  She has had no difficulty overnight.   She denies chest pain or shortness of breath.  She is currently on clear liquids in preparation for colonoscopy.  She denies any abdominal pain, nausea, or further melena.    Physical Exam:   Temp:  [97.5 °F (36.4 °C)-98.1 °F (36.7 °C)] 97.9 °F (36.6 °C)  HR:  [70] 70  BP: (124-147)/(46-86) 147/57  Resp:  [18] 18  SpO2:  [92 %-95 %] 92 %  O2 Device: None (Room air)    Gen: Well-developed, obese, in no distress.  Neck: Supple.  No lymphadenopathy or goiter.  Heart: Regular rhythm.  I heard no murmur, gallop, or rub.  Lungs: Clear to auscultation and percussion.  No wheezing, rales, or rhonchi.  Abd: Soft with active bowel sounds.  No mass, tenderness, organomegaly.  Extremities: No clubbing, cyanosis, or edema.  No calf tenderness.  Neuro: Alert and oriented.  No focal sign.  Skin: Warm and dry.      LABS:   CBC:   Lab Results   Component Value Date    WBC 8.24 06/22/2025    HGB 8.3 (L) 06/22/2025    HCT 25.4 (L) 06/22/2025    MCV 86 06/22/2025    PLT 84 (L) 06/22/2025    RBC 2.94 (L) 06/22/2025    MCH 28.2 06/22/2025    MCHC 32.7 06/22/2025    RDW 19.1 (H) 06/22/2025    MPV 13.1 (H) 06/22/2025    NRBC 1 06/22/2025       VTE Pharmacologic Prophylaxis: Reason for no pharmacologic prophylaxis GI bleeding  VTE Mechanical Prophylaxis: sequential compression device

## 2025-06-22 NOTE — ASSESSMENT & PLAN NOTE
Follows with [facility]  Was told to hold her alectinib today and tomorrow, resume on Friday

## 2025-06-22 NOTE — PROGRESS NOTES
Progress Note - Gastroenterology   Name: Jayla Moody 80 y.o. female I MRN: [id number]  Unit/Bed#: [place] [address] -01 I Date of Admission: 6/18/2025   Date of Service: 6/22/2025 I Hospital Day: 4    Assessment & Plan  Anemia due to acute blood loss  Lab Results   Component Value Date    EGFR 32 06/19/2025    EGFR 40 06/18/2025    EGFR 32 06/16/2025    CREATININE 1.54 (H) 06/19/2025    CREATININE 1.28 06/18/2025    CREATININE 1.53 (H) 06/16/2025     80 y.o. female with history of MONO, hypertension, atrial flutter on Eliquis, class III obesity (BMI 41), HFpEF (LVEF 65% from 6/30/2025), duodenal and colonic AVMs and non-small cell lung cancer with recent hospitalizations for melena thought secondary to AVMs who presented on 6/18 due to worsening exertional dyspnea with concern for asthma exacerbation started on steroids and nebulizers with downtrending hemoglobin from recent baseline of 10-12 is low 7.9 overall concerning for acute blood loss anemia likely in the setting of recurrent AVM bleeding leading to GI consult. 06/20/25 push enteroscopy showed some antritis but no active bleeding.      -  pt reports still having dark stool and is xfusion dependent. Received 1u pRBC yesterday and went from 6.7 to 8.3   - plan for colonoscopy tomorrow. CLD today, prep this afternoon, NPO amn      Subjective   NAEO. Pt reports having a dark stool still    Objective :  Temp:  [97.5 °F (36.4 °C)-98.1 °F (36.7 °C)] 97.9 °F (36.6 °C)  HR:  [68-70] 70  BP: (120-160)/(46-95) 147/57  Resp:  [17-18] 18  SpO2:  [95 %-97 %] 95 %  O2 Device: None (Room air)    Physical Exam  Constitutional:       General: She is not in acute distress.     Appearance: Normal appearance.   HENT:      Head: Normocephalic and atraumatic.      Nose: Nose normal.      Mouth/Throat:      Mouth: Mucous membranes are moist.     Eyes:      General: No scleral icterus.     Extraocular Movements: Extraocular movements intact.      Conjunctiva/sclera: Conjunctivae  normal.      Pupils: Pupils are equal, round, and reactive to light.       Cardiovascular:      Rate and Rhythm: Normal rate and regular rhythm.   Pulmonary:      Effort: Pulmonary effort is normal.   Abdominal:      General: Abdomen is flat. There is no distension.      Palpations: There is no mass.      Tenderness: There is no abdominal tenderness.     Musculoskeletal:         General: No swelling. Normal range of motion.     Skin:     General: Skin is warm and dry.      Capillary Refill: Capillary refill takes less than 2 seconds.     Neurological:      General: No focal deficit present.      Mental Status: She is alert.     Psychiatric:         Mood and Affect: Mood normal.       Result Review: I have reviewed all relevant labs, imaging and procedure notes/images.

## 2025-06-22 NOTE — PLAN OF CARE
Problem: Potential for Falls  Goal: Patient will remain free of falls  Description: INTERVENTIONS:  - Educate patient/family on patient safety including physical limitations  - Instruct patient to call for assistance with activity   - Consider consulting OT/PT to assist with strengthening/mobility based on AM PAC & JH-HLM score  - Consult OT/PT to assist with strengthening/mobility   - Keep Call bell within reach  - Keep bed low and locked with side rails adjusted as appropriate  - Keep care items and personal belongings within reach  - Initiate and maintain comfort rounds  - Make Fall Risk Sign visible to staff  - Offer Toileting every 2 Hours, in advance of need  - Initiate/Maintain bed alarm  - Obtain necessary fall risk management equipment: yellow socks  - Apply yellow socks and bracelet for high fall risk patients  - Consider moving patient to room near nurses station  Outcome: Progressing     Problem: PAIN - ADULT  Goal: Verbalizes/displays adequate comfort level or baseline comfort level  Description: Interventions:  - Encourage patient to monitor pain and request assistance  - Assess pain using appropriate pain scale  - Administer analgesics as ordered based on type and severity of pain and evaluate response  - Implement non-pharmacological measures as appropriate and evaluate response  - Consider cultural and social influences on pain and pain management  - Notify physician/advanced practitioner if interventions unsuccessful or patient reports new pain  - Educate patient/family on pain management process including their role and importance of  reporting pain   - Provide non-pharmacologic/complimentary pain relief interventions  Outcome: Progressing     Problem: INFECTION - ADULT  Goal: Absence or prevention of progression during hospitalization  Description: INTERVENTIONS:  - Assess and monitor for signs and symptoms of infection  - Monitor lab/diagnostic results  - Monitor all insertion sites, i.e.  indwelling lines, tubes, and drains  - Monitor endotracheal if appropriate and nasal secretions for changes in amount and color  - [place] appropriate cooling/warming therapies per order  - Administer medications as ordered  - Instruct and encourage patient and family to use good hand hygiene technique  - Identify and instruct in appropriate isolation precautions for identified infection/condition  Outcome: Progressing     Problem: SAFETY ADULT  Goal: Patient will remain free of falls  Description: INTERVENTIONS:  - Educate patient/family on patient safety including physical limitations  - Instruct patient to call for assistance with activity   - Consider consulting OT/PT to assist with strengthening/mobility based on AM PAC & JH-HLM score  - Consult OT/PT to assist with strengthening/mobility   - Keep Call bell within reach  - Keep bed low and locked with side rails adjusted as appropriate  - Keep care items and personal belongings within reach  - Initiate and maintain comfort rounds  - Make Fall Risk Sign visible to staff  - Offer Toileting every 2 Hours, in advance of need  - Initiate/Maintain bed alarm  - Obtain necessary fall risk management equipment: yellow socks  - Apply yellow socks and bracelet for high fall risk patients  - Consider moving patient to room near nurses station  Outcome: Progressing  Goal: Maintain or return to baseline ADL function  Description: INTERVENTIONS:  -  Assess patient's ability to carry out ADLs; assess patient's baseline for ADL function and identify physical deficits which impact ability to perform ADLs (bathing, care of mouth/teeth, toileting, grooming, dressing, etc.)  - Assess/evaluate cause of self-care deficits   - Assess range of motion  - Assess patient's mobility; develop plan if impaired  - Assess patient's need for assistive devices and provide as appropriate  - Encourage maximum independence but intervene and supervise when necessary  - Involve family in  performance of ADLs  - Assess for home care needs following discharge   - Consider OT consult to assist with ADL evaluation and planning for discharge  - Provide patient education as appropriate  - Monitor functional capacity and physical performance, use of AM PAC & JH-HLM   - Monitor gait, balance and fatigue with ambulation    Outcome: Progressing  Goal: Maintains/Returns to pre admission functional level  Description: INTERVENTIONS:  - Perform AM-PAC 6 Click Basic Mobility/ Daily Activity assessment daily.  - Set and communicate daily mobility goal to care team and patient/family/caregiver.   - Collaborate with rehabilitation services on mobility goals if consulted  - Perform Range of Motion 2 times a day.  - Reposition patient every 3 hours.  - Dangle patient 3 times a day  - Stand patient 3 times a day  - Ambulate patient 3 times a day  - Out of bed to chair 3 times a day   - Out of bed for meals 3 times a day  - Out of bed for toileting  - Record patient progress and toleration of activity level   Outcome: Progressing     Problem: DISCHARGE PLANNING  Goal: Discharge to home or other facility with appropriate resources  Description: INTERVENTIONS:  - Identify barriers to discharge w/patient and caregiver  - Arrange for needed discharge resources and transportation as appropriate  - Identify discharge learning needs (meds, wound care, etc.)  - Arrange for interpretive services to assist at discharge as needed  - Refer to Case Management Department for coordinating discharge planning if the patient needs post-hospital services based on physician/advanced practitioner order or complex needs related to functional status, cognitive ability, or social support system  Outcome: Progressing     Problem: Knowledge Deficit  Goal: Patient/family/caregiver demonstrates understanding of disease process, treatment plan, medications, and discharge instructions  Description: Complete learning assessment and assess knowledge  base.  Interventions:  - Provide teaching at level of understanding  - Provide teaching via preferred learning methods  Outcome: Progressing     Problem: RESPIRATORY - ADULT  Goal: Achieves optimal ventilation and oxygenation  Description: INTERVENTIONS:  - Assess for changes in respiratory status  - Assess for changes in mentation and behavior  - Position to facilitate oxygenation and minimize respiratory effort  - Oxygen administered by appropriate delivery if ordered  - Initiate smoking cessation education as indicated  - Encourage broncho-pulmonary hygiene including cough, deep breathe, Incentive Spirometry  - Assess the need for suctioning and aspirate as needed  - Assess and instruct to report SOB or any respiratory difficulty  - Respiratory Therapy support as indicated  Outcome: Progressing

## 2025-06-23 LAB
ANISOCYTOSIS BLD QL SMEAR: PRESENT
BASOPHILS # BLD MANUAL: 0 THOUSAND/UL (ref 0–0.1)
BASOPHILS NFR MAR MANUAL: 0 % (ref 0–1)
BURR CELLS BLD QL SMEAR: PRESENT
EOSINOPHIL # BLD MANUAL: 0 THOUSAND/UL (ref 0–0.4)
EOSINOPHIL NFR BLD MANUAL: 0 % (ref 0–6)
ERYTHROCYTE [DISTWIDTH] IN BLOOD BY AUTOMATED COUNT: 18.9 % (ref 11.6–15.1)
HCT VFR BLD AUTO: 26.5 % (ref 34.8–46.1)
HGB BLD-MCNC: 8.7 G/DL (ref 11.5–15.4)
LG PLATELETS BLD QL SMEAR: PRESENT
LYMPHOCYTES # BLD AUTO: 1.04 THOUSAND/UL (ref 0.6–4.47)
LYMPHOCYTES # BLD AUTO: 15 % (ref 14–44)
MCH RBC QN AUTO: 27.8 PG (ref 26.8–34.3)
MCHC RBC AUTO-ENTMCNC: 32.8 G/DL (ref 31.4–37.4)
MCV RBC AUTO: 85 FL (ref 82–98)
METAMYELOCYTE ABSOLUTE CT: 0.07 THOUSAND/UL (ref 0–0.1)
METAMYELOCYTES NFR BLD MANUAL: 1 % (ref 0–1)
MONOCYTES # BLD AUTO: 0.55 THOUSAND/UL (ref 0–1.22)
MONOCYTES NFR BLD: 8 % (ref 4–12)
MYELOCYTE ABSOLUTE CT: 0.21 THOUSAND/UL (ref 0–0.1)
MYELOCYTES NFR BLD MANUAL: 3 % (ref 0–1)
NEUTROPHILS # BLD MANUAL: 5.04 THOUSAND/UL (ref 1.85–7.62)
NEUTS BAND NFR BLD MANUAL: 1 % (ref 0–8)
NEUTS SEG NFR BLD AUTO: 72 % (ref 43–75)
OVALOCYTES BLD QL SMEAR: PRESENT
PLATELET # BLD AUTO: 87 THOUSANDS/UL (ref 149–390)
PLATELET BLD QL SMEAR: ABNORMAL
POLYCHROMASIA BLD QL SMEAR: PRESENT
RBC # BLD AUTO: 3.13 MILLION/UL (ref 3.81–5.12)
RBC MORPH BLD: PRESENT
WBC # BLD AUTO: 6.9 THOUSAND/UL (ref 4.31–10.16)

## 2025-06-23 PROCEDURE — 94640 AIRWAY INHALATION TREATMENT: CPT

## 2025-06-23 PROCEDURE — 85007 BL SMEAR W/DIFF WBC COUNT: CPT | Performed by: INTERNAL MEDICINE

## 2025-06-23 PROCEDURE — 94760 N-INVAS EAR/PLS OXIMETRY 1: CPT

## 2025-06-23 PROCEDURE — 97535 SELF CARE MNGMENT TRAINING: CPT

## 2025-06-23 PROCEDURE — 85027 COMPLETE CBC AUTOMATED: CPT | Performed by: INTERNAL MEDICINE

## 2025-06-23 PROCEDURE — 99233 SBSQ HOSP IP/OBS HIGH 50: CPT | Performed by: STUDENT IN AN ORGANIZED HEALTH CARE EDUCATION/TRAINING PROGRAM

## 2025-06-23 PROCEDURE — 97530 THERAPEUTIC ACTIVITIES: CPT

## 2025-06-23 RX ORDER — BISACODYL 5 MG/1
10 TABLET, DELAYED RELEASE ORAL EVERY 12 HOURS
Status: COMPLETED | OUTPATIENT
Start: 2025-06-23 | End: 2025-06-24

## 2025-06-23 RX ORDER — POLYETHYLENE GLYCOL 3350 17 G/17G
238 POWDER, FOR SOLUTION ORAL ONCE
Status: COMPLETED | OUTPATIENT
Start: 2025-06-23 | End: 2025-06-23

## 2025-06-23 RX ADMIN — METOPROLOL TARTRATE 25 MG: 25 TABLET, FILM COATED ORAL at 20:39

## 2025-06-23 RX ADMIN — PANTOPRAZOLE SODIUM 40 MG: 40 INJECTION, POWDER, FOR SOLUTION INTRAVENOUS at 08:48

## 2025-06-23 RX ADMIN — BISACODYL 10 MG: 5 TABLET, COATED ORAL at 16:04

## 2025-06-23 RX ADMIN — CALCITRIOL 0.25 MCG: 0.25 CAPSULE, LIQUID FILLED ORAL at 08:45

## 2025-06-23 RX ADMIN — IPRATROPIUM BROMIDE AND ALBUTEROL SULFATE 3 ML: 2.5; .5 SOLUTION RESPIRATORY (INHALATION) at 19:57

## 2025-06-23 RX ADMIN — UMECLIDINIUM BROMIDE AND VILANTEROL TRIFENATATE 1 PUFF: 62.5; 25 POWDER RESPIRATORY (INHALATION) at 08:46

## 2025-06-23 RX ADMIN — PREDNISONE 40 MG: 20 TABLET ORAL at 08:45

## 2025-06-23 RX ADMIN — LISINOPRIL 2.5 MG: 2.5 TABLET ORAL at 08:45

## 2025-06-23 RX ADMIN — FLUTICASONE PROPIONATE 2 SPRAY: 50 SPRAY, METERED NASAL at 08:46

## 2025-06-23 RX ADMIN — POLYETHYLENE GLYCOL 3350 238 G: 17 POWDER, FOR SOLUTION ORAL at 16:05

## 2025-06-23 RX ADMIN — ALECTINIB HYDROCHLORIDE 450 MG: 150 CAPSULE ORAL at 20:39

## 2025-06-23 RX ADMIN — METOPROLOL TARTRATE 25 MG: 25 TABLET, FILM COATED ORAL at 08:45

## 2025-06-23 RX ADMIN — ALECTINIB HYDROCHLORIDE 450 MG: 150 CAPSULE ORAL at 08:46

## 2025-06-23 RX ADMIN — LORATADINE 5 MG: 10 TABLET ORAL at 08:45

## 2025-06-23 RX ADMIN — PRAVASTATIN SODIUM 80 MG: 80 TABLET ORAL at 16:04

## 2025-06-23 RX ADMIN — SENNOSIDES AND DOCUSATE SODIUM 1 TABLET: 50; 8.6 TABLET ORAL at 08:45

## 2025-06-23 RX ADMIN — PANTOPRAZOLE SODIUM 40 MG: 40 INJECTION, POWDER, FOR SOLUTION INTRAVENOUS at 20:39

## 2025-06-23 RX ADMIN — IPRATROPIUM BROMIDE AND ALBUTEROL SULFATE 3 ML: 2.5; .5 SOLUTION RESPIRATORY (INHALATION) at 07:17

## 2025-06-23 RX ADMIN — AMIODARONE HYDROCHLORIDE 200 MG: 200 TABLET ORAL at 08:45

## 2025-06-23 RX ADMIN — IPRATROPIUM BROMIDE AND ALBUTEROL SULFATE 3 ML: 2.5; .5 SOLUTION RESPIRATORY (INHALATION) at 13:12

## 2025-06-23 RX ADMIN — LIDOCAINE 1 PATCH: 50 PATCH CUTANEOUS at 08:52

## 2025-06-23 RX ADMIN — AMLODIPINE BESYLATE 2.5 MG: 2.5 TABLET ORAL at 08:46

## 2025-06-23 NOTE — ASSESSMENT & PLAN NOTE
Patient is a morbidly obese 80-year-old female past medical history of asthma, atrial fibrillation, hypertension, lung cancer with mets to the bone, and hyperlipidemia who presents to the emergency department with complaints of shortness of breath, worse on exertion, and dizziness for the last few days.  In the emergency room imaging suggests vascular congestion and pleural effusions, she was given IV Lasix and will be admitted for further treatment.    chest x-ray suggests minimal pleural effusion  BNP is marginally elevated at 110  Last echocardiogram 6/3: The left ventricular ejection fraction is 65%. Systolic function is normal. Wall motion is normal. Diastolic function is mildly abnormal, consistent with grade I (abnormal) relaxation.   I suspect the patient's symptoms are mostly related to asthma rather than congestive heart failure as well as anemia playing a role

## 2025-06-23 NOTE — QUICK NOTE
GASTROENTEROLOGY QUICK NOTE:    Rea with nursing.  Patient successfully completed MiraLAX bowel prep.  However, continues to have liquid brown stools.  Patient seen and examined at bedside.  Sitting in bed comfortably in no acute distress or visible discomfort.  Admits to holding on colonoscopy today to allow for additional bowel prep to ensure adequate visualization.  Patient understanding and agreeable.  Okay for clear liquids today.  Will plan for additional MiraLAX bowel prep tonight.  N.p.o. at midnight.  Continue to hold Eliquis.  Please reach out to GI with any questions or concerns.  Thank you.    Candice Savage DO, PGY-5  [facility] Fellow

## 2025-06-23 NOTE — ASSESSMENT & PLAN NOTE
The patient became acutely anemic during her hospitalization.  She describes melena.  Her hemoglobin fell to 6.5 and she received blood.  She underwent EGD yesterday which revealed no site of bleeding.  Colonoscopy was planned for this morning however she had incomplete bowel prep, will continue bowel prep at colonoscopy tomorrow  Will continue to monitor hemooglobin carefully.

## 2025-06-23 NOTE — PROGRESS NOTES
Progress Note - Hospitalist   Name: Jayla Moody 80 y.o. female I MRN: [id number]  Unit/Bed#: [place] [address] -01 I Date of Admission: 6/18/2025   Date of Service: 6/23/2025 I Hospital Day: 5    Assessment & Plan  Shortness of breath  Patient is a morbidly obese 80-year-old female past medical history of asthma, atrial fibrillation, hypertension, lung cancer with mets to the bone, and hyperlipidemia who presents to the emergency department with complaints of shortness of breath, worse on exertion, and dizziness for the last few days.  In the emergency room imaging suggests vascular congestion and pleural effusions, she was given IV Lasix and will be admitted for further treatment.    chest x-ray suggests minimal pleural effusion  BNP is marginally elevated at 110  Last echocardiogram 6/3: The left ventricular ejection fraction is 65%. Systolic function is normal. Wall motion is normal. Diastolic function is mildly abnormal, consistent with grade I (abnormal) relaxation.   I suspect the patient's symptoms are mostly related to asthma rather than congestive heart failure as well as anemia playing a role  Severe asthma    Schedule BringMeTheNewsphere is nonformulary, continue dose equivalent  The patient's respiratory status has improved substantially.  She will be transition to oral prednisone, continue prednisone 40 mg daily  Anemia due to acute blood loss  The patient became acutely anemic during her hospitalization.  She describes melena.  Her hemoglobin fell to 6.5 and she received blood.  She underwent EGD yesterday which revealed no site of bleeding.  Colonoscopy was planned for this morning however she had incomplete bowel prep, will continue bowel prep at colonoscopy tomorrow  Will continue to monitor hemooglobin carefully.  MONO (obstructive sleep apnea)  Unable to tolerate CPAP outpatient, sent her machine back  Follow-up outpatient with pulmonology  Non-small cell lung cancer (HCC)  Follows with   [facility]  Continue alectinib   Malignant neoplasm metastatic to bone (HCC)  Patient had 1 treatment of radiation when bone mets were initially diagnosed  Continue outpatient follow-up with oncology  Atrial flutter (HCC)  Rate controlled  Continue beta-blocker  Eliquis has been stopped because of acute anemia.  Continue to monitor  Class 3 severe obesity due to excess calories with serious comorbidity and body mass index (BMI) of 40.0 to 44.9 in adult  BMI 40.62  Patient would benefit greatly from very low carbohydrate diet (less than 50 g daily) and significant caloric restriction (less than 2000 kcals per day).  Hypertensive kidney disease with stage 3b chronic kidney disease (HCC)  Blood pressure and renal function currently stable  Continue home antihypertensives  Trend renal function, monitor routine vital signs    Lab Results   Component Value Date    EGFR 32 06/19/2025    EGFR 40 06/18/2025    EGFR 32 06/16/2025    CREATININE 1.54 (H) 06/19/2025    CREATININE 1.28 06/18/2025    CREATININE 1.53 (H) 06/16/2025     Mixed hyperlipidemia  Rosuvastatin is nonformulary, continue dose equivalent pravastatin 80 mg daily  Essential hypertension  Stable on current therapy.  Coronary artery disease involving native coronary artery of native heart without angina pectoris  No angina.  On aspirin and appropriate risk factor modification.  Chronic diastolic congestive heart failure (HCC)  Wt Readings from Last 3 Encounters:   06/20/25 99.8 kg (220 lb)   06/03/25 97.5 kg (215 lb)   05/27/25 97.5 kg (215 lb)     Currently euvolemic.  Continue current therapy.    VTE Pharmacologic Prophylaxis: VTE Score: 8 High Risk (Score >/= 5) - Pharmacological DVT Prophylaxis Contraindicated. Sequential Compression Devices Ordered.    Mobility:   Basic Mobility Inpatient Raw Score: 23  JH-HLM Goal: 7: Walk 25 feet or more  JH-HLM Achieved: 7: Walk 25 feet or more      Current Length of Stay: 5 day(s)  Current Patient Status: Inpatient    Certification Statement: The patient will continue to require additional inpatient hospital stay due to gi bleed, colonoscopy tomorrow    Code Status: Level 3 - DNAR and DNI    Subjective   Patient seen and examined at bedside this morning.  States her bowel movements are still not clear and was told by GI she will likely need to continue bowel prep and they will plan for colonoscopy tomorrow.    Objective :  Temp:  [97.6 °F (36.4 °C)-98.2 °F (36.8 °C)] 98.2 °F (36.8 °C)  HR:  [62-97] 74  BP: (116-156)/(56-68) 116/63  Resp:  [16] 16  SpO2:  [96 %-97 %] 97 %  O2 Device: None (Room air)    Body mass index is 41.57 kg/m².     Input and Output Summary (last 24 hours):     Intake/Output Summary (Last 24 hours) at 6/23/2025 1734  Last data filed at 6/23/2025 1335  Gross per 24 hour   Intake 1168 ml   Output 120 ml   Net 1048 ml       Physical Exam    Gen: Well-developed, obese, in no distress.  Neck: Supple.  No lymphadenopathy or goiter.  Heart: Regular rhythm.  I heard no murmur, gallop, or rub.  Lungs: Clear to auscultation and percussion.  No wheezing, rales, or rhonchi.  Abd: Soft with active bowel sounds.  No mass, tenderness, organomegaly.  Extremities: No clubbing, cyanosis, or edema.  No calf tenderness.  Neuro: Alert and oriented.  No focal sign.  Skin: Warm and dry.        Lab Results: I have reviewed the following results:   Results from last 7 days   Lab Units 06/23/25  0556 06/22/25  0512   WBC Thousand/uL 6.90 8.24   HEMOGLOBIN g/dL 8.7* 8.3*   HEMATOCRIT % 26.5* 25.4*   PLATELETS Thousands/uL 87* 84*   BANDS PCT % 1  --    SEGS PCT %  --  75   LYMPHO PCT % 15 9*   MONO PCT % 8 9   EOS PCT % 0 0     Results from last 7 days   Lab Units 06/19/25  0507   SODIUM mmol/L 138   POTASSIUM mmol/L 4.1   CHLORIDE mmol/L 102   CO2 mmol/L 28   BUN mg/dL 51*   CREATININE mg/dL 1.54*   ANION GAP mmol/L 8   CALCIUM mg/dL 8.4   ALBUMIN g/dL 3.5   TOTAL BILIRUBIN mg/dL 0.71   ALK PHOS U/L 66   ALT U/L 32   AST U/L 34    GLUCOSE RANDOM mg/dL 151*         Results from last 7 days   Lab Units 06/18/25  0732   POC GLUCOSE mg/dl 133               Recent Cultures (last 7 days):         Imaging Results Review: No pertinent imaging studies reviewed.  Other Study Results Review: No additional pertinent studies reviewed.    Last 24 Hours Medication List:     Current Facility-Administered Medications:     acetaminophen (TYLENOL) tablet 650 mg, Q6H PRN    albuterol (PROVENTIL HFA,VENTOLIN HFA) inhaler 2 puff, Q4H PRN    Alectinib HCl CAPS 450 mg, BID    aluminum-magnesium hydroxide-simethicone (MAALOX) oral suspension 30 mL, Q6H PRN    amiodarone tablet 200 mg, Daily    amLODIPine (NORVASC) tablet 2.5 mg, Daily    bisacodyl (DULCOLAX) EC tablet 10 mg, Q12H    calcitriol (ROCALTROL) capsule 0.25 mcg, Daily    fluticasone (FLONASE) 50 mcg/act nasal spray 2 spray, Daily    ipratropium-albuterol (DUO-NEB) 0.5-2.5 mg/3 mL inhalation solution 3 mL, TID    Ketotifen Fumarate (ZADITOR) 0.035 % ophthalmic solution 1 drop, BID PRN    lidocaine (LIDODERM) 5 % patch 1 patch, Daily    lisinopril (ZESTRIL) tablet 2.5 mg, Daily    loratadine (CLARITIN) tablet 5 mg, Daily    metoprolol tartrate (LOPRESSOR) tablet 25 mg, BID    ondansetron (ZOFRAN) injection 4 mg, Q6H PRN    pantoprazole (PROTONIX) injection 40 mg, Q12H JAIRON    polyethylene glycol (MIRALAX) packet 17 g, Daily PRN    pravastatin (PRAVACHOL) tablet 80 mg, Daily With Dinner    predniSONE tablet 40 mg, Daily    senna-docusate sodium (SENOKOT S) 8.6-50 mg per tablet 1 tablet, Daily    umeclidinium-vilanterol 62.5-25 mcg/actuation inhaler 1 puff, Daily    Administrative Statements   Today, Patient Was Seen By: Nancy Pastor MD      **Please Note: This note may have been constructed using a voice recognition system.**

## 2025-06-23 NOTE — ASSESSMENT & PLAN NOTE
Schedule DuoNebs  Breztri Aerosphere is nonformulary, continue dose equivalent  The patient's respiratory status has improved substantially.  She will be transition to oral prednisone, continue prednisone 40 mg daily

## 2025-06-23 NOTE — PLAN OF CARE
Problem: Potential for Falls  Goal: Patient will remain free of falls  Description: INTERVENTIONS:  - Educate patient/family on patient safety including physical limitations  - Instruct patient to call for assistance with activity   - Consider consulting OT/PT to assist with strengthening/mobility based on AM PAC & JH-HLM score  - Consult OT/PT to assist with strengthening/mobility   - Keep Call bell within reach  - Keep bed low and locked with side rails adjusted as appropriate  - Keep care items and personal belongings within reach  - Initiate and maintain comfort rounds  - Make Fall Risk Sign visible to staff  - Offer Toileting every 2 Hours, in advance of need  - Initiate/Maintain bed alarm  - Obtain necessary fall risk management equipment: bed alarm   - Apply yellow socks and bracelet for high fall risk patients  - Consider moving patient to room near nurses station  Outcome: Progressing     Problem: PAIN - ADULT  Goal: Verbalizes/displays adequate comfort level or baseline comfort level  Description: Interventions:  - Encourage patient to monitor pain and request assistance  - Assess pain using appropriate pain scale  - Administer analgesics as ordered based on type and severity of pain and evaluate response  - Implement non-pharmacological measures as appropriate and evaluate response  - Consider cultural and social influences on pain and pain management  - Notify physician/advanced practitioner if interventions unsuccessful or patient reports new pain  - Educate patient/family on pain management process including their role and importance of  reporting pain   - Provide non-pharmacologic/complimentary pain relief interventions  Outcome: Progressing     Problem: INFECTION - ADULT  Goal: Absence or prevention of progression during hospitalization  Description: INTERVENTIONS:  - Assess and monitor for signs and symptoms of infection  - Monitor lab/diagnostic results  - Monitor all insertion sites, i.e.  indwelling lines, tubes, and drains  - Monitor endotracheal if appropriate and nasal secretions for changes in amount and color  - [place] appropriate cooling/warming therapies per order  - Administer medications as ordered  - Instruct and encourage patient and family to use good hand hygiene technique  - Identify and instruct in appropriate isolation precautions for identified infection/condition  Outcome: Progressing     Problem: SAFETY ADULT  Goal: Patient will remain free of falls  Description: INTERVENTIONS:  - Educate patient/family on patient safety including physical limitations  - Instruct patient to call for assistance with activity   - Consider consulting OT/PT to assist with strengthening/mobility based on AM PAC & -HLM score  - Consult OT/PT to assist with strengthening/mobility   - Keep Call bell within reach  - Keep bed low and locked with side rails adjusted as appropriate  - Keep care items and personal belongings within reach  - Initiate and maintain comfort rounds  - Make Fall Risk Sign visible to staff  - Offer Toileting every 2 Hours, in advance of need  - Initiate/Maintain bed alarm  - Obtain necessary fall risk management equipment: bed alarm   - Apply yellow socks and bracelet for high fall risk patients  - Consider moving patient to room near nurses station  Outcome: Progressing  Goal: Maintain or return to baseline ADL function  Description: INTERVENTIONS:  -  Assess patient's ability to carry out ADLs; assess patient's baseline for ADL function and identify physical deficits which impact ability to perform ADLs (bathing, care of mouth/teeth, toileting, grooming, dressing, etc.)  - Assess/evaluate cause of self-care deficits   - Assess range of motion  - Assess patient's mobility; develop plan if impaired  - Assess patient's need for assistive devices and provide as appropriate  - Encourage maximum independence but intervene and supervise when necessary  - Involve family in performance  of ADLs  - Assess for home care needs following discharge   - Consider OT consult to assist with ADL evaluation and planning for discharge  - Provide patient education as appropriate  - Monitor functional capacity and physical performance, use of AM PAC & JH-HLM   - Monitor gait, balance and fatigue with ambulation    Outcome: Progressing  Goal: Maintains/Returns to pre admission functional level  Description: INTERVENTIONS:  - Perform AM-PAC 6 Click Basic Mobility/ Daily Activity assessment daily.  - Set and communicate daily mobility goal to care team and patient/family/caregiver.   - Collaborate with rehabilitation services on mobility goals if consulted  - Perform Range of Motion 4 times a day.  - Reposition patient every 2 hours.  - Dangle patient 3 times a day  - Stand patient 3 times a day  - Ambulate patient 3 times a day  - Out of bed to chair 3 times a day   - Out of bed for meals 3 times a day  - Out of bed for toileting  - Record patient progress and toleration of activity level   Outcome: Progressing     Problem: DISCHARGE PLANNING  Goal: Discharge to home or other facility with appropriate resources  Description: INTERVENTIONS:  - Identify barriers to discharge w/patient and caregiver  - Arrange for needed discharge resources and transportation as appropriate  - Identify discharge learning needs (meds, wound care, etc.)  - Arrange for interpretive services to assist at discharge as needed  - Refer to Case Management Department for coordinating discharge planning if the patient needs post-hospital services based on physician/advanced practitioner order or complex needs related to functional status, cognitive ability, or social support system  Outcome: Progressing     Problem: Knowledge Deficit  Goal: Patient/family/caregiver demonstrates understanding of disease process, treatment plan, medications, and discharge instructions  Description: Complete learning assessment and assess knowledge  base.  Interventions:  - Provide teaching at level of understanding  - Provide teaching via preferred learning methods  Outcome: Progressing     Problem: RESPIRATORY - ADULT  Goal: Achieves optimal ventilation and oxygenation  Description: INTERVENTIONS:  - Assess for changes in respiratory status  - Assess for changes in mentation and behavior  - Position to facilitate oxygenation and minimize respiratory effort  - Oxygen administered by appropriate delivery if ordered  - Initiate smoking cessation education as indicated  - Encourage broncho-pulmonary hygiene including cough, deep breathe, Incentive Spirometry  - Assess the need for suctioning and aspirate as needed  - Assess and instruct to report SOB or any respiratory difficulty  - Respiratory Therapy support as indicated  Outcome: Progressing

## 2025-06-23 NOTE — PLAN OF CARE
Problem: Potential for Falls  Goal: Patient will remain free of falls  Description: INTERVENTIONS:  - Educate patient/family on patient safety including physical limitations  - Instruct patient to call for assistance with activity   - Consider consulting OT/PT to assist with strengthening/mobility based on AM PAC & JH-HLM score  - Consult OT/PT to assist with strengthening/mobility   - Keep Call bell within reach  - Keep bed low and locked with side rails adjusted as appropriate  - Keep care items and personal belongings within reach  - Initiate and maintain comfort rounds  - Make Fall Risk Sign visible to staff  - Offer Toileting every 2 Hours, in advance of need  - Initiate/Maintain bed alarm  - Obtain necessary fall risk management equipment: bed alarm   - Apply yellow socks and bracelet for high fall risk patients  - Consider moving patient to room near nurses station  6/22/2025 2106 by Nacho Chavira RN  Outcome: Progressing  6/22/2025 2105 by Nacho Chavira RN  Outcome: Progressing     Problem: PAIN - ADULT  Goal: Verbalizes/displays adequate comfort level or baseline comfort level  Description: Interventions:  - Encourage patient to monitor pain and request assistance  - Assess pain using appropriate pain scale  - Administer analgesics as ordered based on type and severity of pain and evaluate response  - Implement non-pharmacological measures as appropriate and evaluate response  - Consider cultural and social influences on pain and pain management  - Notify physician/advanced practitioner if interventions unsuccessful or patient reports new pain  - Educate patient/family on pain management process including their role and importance of  reporting pain   - Provide non-pharmacologic/complimentary pain relief interventions  6/22/2025 2106 by Nacho Chavira RN  Outcome: Progressing  6/22/2025 2105 by Nacho Chavira RN  Outcome: Progressing     Problem: INFECTION - ADULT  Goal: Absence or prevention of progression  during hospitalization  Description: INTERVENTIONS:  - Assess and monitor for signs and symptoms of infection  - Monitor lab/diagnostic results  - Monitor all insertion sites, i.e. indwelling lines, tubes, and drains  - Monitor endotracheal if appropriate and nasal secretions for changes in amount and color  - [place] appropriate cooling/warming therapies per order  - Administer medications as ordered  - Instruct and encourage patient and family to use good hand hygiene technique  - Identify and instruct in appropriate isolation precautions for identified infection/condition  6/22/2025 2106 by Nacho Chavira RN  Outcome: Progressing  6/22/2025 2105 by Nacho Chavira RN  Outcome: Progressing     Problem: SAFETY ADULT  Goal: Patient will remain free of falls  Description: INTERVENTIONS:  - Educate patient/family on patient safety including physical limitations  - Instruct patient to call for assistance with activity   - Consider consulting OT/PT to assist with strengthening/mobility based on AM PAC & -HLM score  - Consult OT/PT to assist with strengthening/mobility   - Keep Call bell within reach  - Keep bed low and locked with side rails adjusted as appropriate  - Keep care items and personal belongings within reach  - Initiate and maintain comfort rounds  - Make Fall Risk Sign visible to staff  - Offer Toileting every 2 Hours, in advance of need  - Initiate/Maintain bed alarm  - Obtain necessary fall risk management equipment: bed alarm   - Apply yellow socks and bracelet for high fall risk patients  - Consider moving patient to room near nurses station  6/22/2025 2106 by Nacho Chavira RN  Outcome: Progressing  6/22/2025 2105 by Nacho Chavira RN  Outcome: Progressing  Goal: Maintain or return to baseline ADL function  Description: INTERVENTIONS:  -  Assess patient's ability to carry out ADLs; assess patient's baseline for ADL function and identify physical deficits which impact ability to perform ADLs (bathing,  care of mouth/teeth, toileting, grooming, dressing, etc.)  - Assess/evaluate cause of self-care deficits   - Assess range of motion  - Assess patient's mobility; develop plan if impaired  - Assess patient's need for assistive devices and provide as appropriate  - Encourage maximum independence but intervene and supervise when necessary  - Involve family in performance of ADLs  - Assess for home care needs following discharge   - Consider OT consult to assist with ADL evaluation and planning for discharge  - Provide patient education as appropriate  - Monitor functional capacity and physical performance, use of AM PAC & JH-HLM   - Monitor gait, balance and fatigue with ambulation    6/22/2025 2106 by Nacho Chavira RN  Outcome: Progressing  6/22/2025 2105 by Nacho Chavira RN  Outcome: Progressing  Goal: Maintains/Returns to pre admission functional level  Description: INTERVENTIONS:  - Perform AM-PAC 6 Click Basic Mobility/ Daily Activity assessment daily.  - Set and communicate daily mobility goal to care team and patient/family/caregiver.   - Collaborate with rehabilitation services on mobility goals if consulted  - Perform Range of Motion 4 times a day.  - Reposition patient every 2 hours.  - Dangle patient 3 times a day  - Stand patient 3 times a day  - Ambulate patient 3 times a day  - Out of bed to chair 3 times a day   - Out of bed for meals 3 times a day  - Out of bed for toileting  - Record patient progress and toleration of activity level   6/22/2025 2106 by Nacho Chavira RN  Outcome: Progressing  6/22/2025 2105 by Nacho Chavira RN  Outcome: Progressing     Problem: DISCHARGE PLANNING  Goal: Discharge to home or other facility with appropriate resources  Description: INTERVENTIONS:  - Identify barriers to discharge w/patient and caregiver  - Arrange for needed discharge resources and transportation as appropriate  - Identify discharge learning needs (meds, wound care, etc.)  - Arrange for interpretive  services to assist at discharge as needed  - Refer to Case Management Department for coordinating discharge planning if the patient needs post-hospital services based on physician/advanced practitioner order or complex needs related to functional status, cognitive ability, or social support system  6/22/2025 2106 by Nacho Chavira RN  Outcome: Progressing  6/22/2025 2105 by Nacho Chavira RN  Outcome: Progressing     Problem: Knowledge Deficit  Goal: Patient/family/caregiver demonstrates understanding of disease process, treatment plan, medications, and discharge instructions  Description: Complete learning assessment and assess knowledge base.  Interventions:  - Provide teaching at level of understanding  - Provide teaching via preferred learning methods  6/22/2025 2106 by Nacho Chavira RN  Outcome: Progressing  6/22/2025 2105 by Nacho Chavira RN  Outcome: Progressing     Problem: RESPIRATORY - ADULT  Goal: Achieves optimal ventilation and oxygenation  Description: INTERVENTIONS:  - Assess for changes in respiratory status  - Assess for changes in mentation and behavior  - Position to facilitate oxygenation and minimize respiratory effort  - Oxygen administered by appropriate delivery if ordered  - Initiate smoking cessation education as indicated  - Encourage broncho-pulmonary hygiene including cough, deep breathe, Incentive Spirometry  - Assess the need for suctioning and aspirate as needed  - Assess and instruct to report SOB or any respiratory difficulty  - Respiratory Therapy support as indicated  6/22/2025 2106 by Nacho Chavira RN  Outcome: Progressing  6/22/2025 2105 by Nacho Chavira RN  Outcome: Progressing

## 2025-06-23 NOTE — PLAN OF CARE
Problem: Potential for Falls  Goal: Patient will remain free of falls  Description: INTERVENTIONS:  - Educate patient/family on patient safety including physical limitations  - Instruct patient to call for assistance with activity   - Consider consulting OT/PT to assist with strengthening/mobility based on AM PAC & JH-HLM score  - Consult OT/PT to assist with strengthening/mobility   - Keep Call bell within reach  - Keep bed low and locked with side rails adjusted as appropriate  - Keep care items and personal belongings within reach  - Initiate and maintain comfort rounds  - Make Fall Risk Sign visible to staff  - Offer Toileting every 2 Hours, in advance of need  - Initiate/Maintain bed alarm  - Obtain necessary fall risk management equipment: bed alarm   - Apply yellow socks and bracelet for high fall risk patients  - Consider moving patient to room near nurses station  Outcome: Progressing     Problem: PAIN - ADULT  Goal: Verbalizes/displays adequate comfort level or baseline comfort level  Description: Interventions:  - Encourage patient to monitor pain and request assistance  - Assess pain using appropriate pain scale  - Administer analgesics as ordered based on type and severity of pain and evaluate response  - Implement non-pharmacological measures as appropriate and evaluate response  - Consider cultural and social influences on pain and pain management  - Notify physician/advanced practitioner if interventions unsuccessful or patient reports new pain  - Educate patient/family on pain management process including their role and importance of  reporting pain   - Provide non-pharmacologic/complimentary pain relief interventions  Outcome: Progressing     Problem: INFECTION - ADULT  Goal: Absence or prevention of progression during hospitalization  Description: INTERVENTIONS:  - Assess and monitor for signs and symptoms of infection  - Monitor lab/diagnostic results  - Monitor all insertion sites, i.e.  indwelling lines, tubes, and drains  - Monitor endotracheal if appropriate and nasal secretions for changes in amount and color  - [place] appropriate cooling/warming therapies per order  - Administer medications as ordered  - Instruct and encourage patient and family to use good hand hygiene technique  - Identify and instruct in appropriate isolation precautions for identified infection/condition  Outcome: Progressing     Problem: SAFETY ADULT  Goal: Patient will remain free of falls  Description: INTERVENTIONS:  - Educate patient/family on patient safety including physical limitations  - Instruct patient to call for assistance with activity   - Consider consulting OT/PT to assist with strengthening/mobility based on AM PAC & -HLM score  - Consult OT/PT to assist with strengthening/mobility   - Keep Call bell within reach  - Keep bed low and locked with side rails adjusted as appropriate  - Keep care items and personal belongings within reach  - Initiate and maintain comfort rounds  - Make Fall Risk Sign visible to staff  - Offer Toileting every 2 Hours, in advance of need  - Initiate/Maintain bed alarm  - Obtain necessary fall risk management equipment: bed alarm   - Apply yellow socks and bracelet for high fall risk patients  - Consider moving patient to room near nurses station  Outcome: Progressing  Goal: Maintain or return to baseline ADL function  Description: INTERVENTIONS:  -  Assess patient's ability to carry out ADLs; assess patient's baseline for ADL function and identify physical deficits which impact ability to perform ADLs (bathing, care of mouth/teeth, toileting, grooming, dressing, etc.)  - Assess/evaluate cause of self-care deficits   - Assess range of motion  - Assess patient's mobility; develop plan if impaired  - Assess patient's need for assistive devices and provide as appropriate  - Encourage maximum independence but intervene and supervise when necessary  - Involve family in performance  of ADLs  - Assess for home care needs following discharge   - Consider OT consult to assist with ADL evaluation and planning for discharge  - Provide patient education as appropriate  - Monitor functional capacity and physical performance, use of AM PAC & JH-HLM   - Monitor gait, balance and fatigue with ambulation    Outcome: Progressing  Goal: Maintains/Returns to pre admission functional level  Description: INTERVENTIONS:  - Perform AM-PAC 6 Click Basic Mobility/ Daily Activity assessment daily.  - Set and communicate daily mobility goal to care team and patient/family/caregiver.   - Collaborate with rehabilitation services on mobility goals if consulted  - Perform Range of Motion 4 times a day.  - Reposition patient every 2 hours.  - Dangle patient 3 times a day  - Stand patient 3 times a day  - Ambulate patient 3 times a day  - Out of bed to chair 3 times a day   - Out of bed for meals 3 times a day  - Out of bed for toileting  - Record patient progress and toleration of activity level   Outcome: Progressing     Problem: DISCHARGE PLANNING  Goal: Discharge to home or other facility with appropriate resources  Description: INTERVENTIONS:  - Identify barriers to discharge w/patient and caregiver  - Arrange for needed discharge resources and transportation as appropriate  - Identify discharge learning needs (meds, wound care, etc.)  - Arrange for interpretive services to assist at discharge as needed  - Refer to Case Management Department for coordinating discharge planning if the patient needs post-hospital services based on physician/advanced practitioner order or complex needs related to functional status, cognitive ability, or social support system  Outcome: Progressing     Problem: Knowledge Deficit  Goal: Patient/family/caregiver demonstrates understanding of disease process, treatment plan, medications, and discharge instructions  Description: Complete learning assessment and assess knowledge  base.  Interventions:  - Provide teaching at level of understanding  - Provide teaching via preferred learning methods  Outcome: Progressing     Problem: RESPIRATORY - ADULT  Goal: Achieves optimal ventilation and oxygenation  Description: INTERVENTIONS:  - Assess for changes in respiratory status  - Assess for changes in mentation and behavior  - Position to facilitate oxygenation and minimize respiratory effort  - Oxygen administered by appropriate delivery if ordered  - Initiate smoking cessation education as indicated  - Encourage broncho-pulmonary hygiene including cough, deep breathe, Incentive Spirometry  - Assess the need for suctioning and aspirate as needed  - Assess and instruct to report SOB or any respiratory difficulty  - Respiratory Therapy support as indicated  Outcome: Progressing

## 2025-06-24 ENCOUNTER — ANESTHESIA EVENT (OUTPATIENT)
Dept: ANESTHESIOLOGY | Facility: HOSPITAL | Age: 80
End: 2025-06-24

## 2025-06-24 ENCOUNTER — ANESTHESIA EVENT (INPATIENT)
Dept: GASTROENTEROLOGY | Facility: HOSPITAL | Age: 80
DRG: 202 | End: 2025-06-24
Payer: COMMERCIAL

## 2025-06-24 ENCOUNTER — ANESTHESIA (INPATIENT)
Dept: GASTROENTEROLOGY | Facility: HOSPITAL | Age: 80
DRG: 202 | End: 2025-06-24
Payer: COMMERCIAL

## 2025-06-24 ENCOUNTER — ANESTHESIA (OUTPATIENT)
Dept: ANESTHESIOLOGY | Facility: HOSPITAL | Age: 80
End: 2025-06-24

## 2025-06-24 ENCOUNTER — APPOINTMENT (INPATIENT)
Dept: GASTROENTEROLOGY | Facility: HOSPITAL | Age: 80
DRG: 202 | End: 2025-06-24
Attending: STUDENT IN AN ORGANIZED HEALTH CARE EDUCATION/TRAINING PROGRAM
Payer: COMMERCIAL

## 2025-06-24 PROBLEM — I42.1 MILD HOCM (HYPERTROPHIC OBSTRUCTIVE CARDIOMYOPATHY) (HCC): Status: ACTIVE | Noted: 2025-06-24

## 2025-06-24 LAB
ANION GAP SERPL CALCULATED.3IONS-SCNC: 5 MMOL/L (ref 4–13)
BNP SERPL-MCNC: 63 PG/ML (ref 0–100)
BUN SERPL-MCNC: 14 MG/DL (ref 5–25)
CALCIUM SERPL-MCNC: 7.4 MG/DL (ref 8.4–10.2)
CHLORIDE SERPL-SCNC: 107 MMOL/L (ref 96–108)
CO2 SERPL-SCNC: 29 MMOL/L (ref 21–32)
CREAT SERPL-MCNC: 1 MG/DL (ref 0.6–1.3)
GFR SERPL CREATININE-BSD FRML MDRD: 54 ML/MIN/1.73SQ M
GLUCOSE SERPL-MCNC: 87 MG/DL (ref 65–140)
POTASSIUM SERPL-SCNC: 4.3 MMOL/L (ref 3.5–5.3)
SODIUM SERPL-SCNC: 141 MMOL/L (ref 135–147)

## 2025-06-24 PROCEDURE — 94640 AIRWAY INHALATION TREATMENT: CPT

## 2025-06-24 PROCEDURE — 45388 COLONOSCOPY W/ABLATION: CPT | Performed by: STUDENT IN AN ORGANIZED HEALTH CARE EDUCATION/TRAINING PROGRAM

## 2025-06-24 PROCEDURE — 83880 ASSAY OF NATRIURETIC PEPTIDE: CPT | Performed by: STUDENT IN AN ORGANIZED HEALTH CARE EDUCATION/TRAINING PROGRAM

## 2025-06-24 PROCEDURE — C1886 CATHETER, ABLATION: HCPCS

## 2025-06-24 PROCEDURE — 0W3P8ZZ CONTROL BLEEDING IN GASTROINTESTINAL TRACT, VIA NATURAL OR ARTIFICIAL OPENING ENDOSCOPIC: ICD-10-PCS | Performed by: STUDENT IN AN ORGANIZED HEALTH CARE EDUCATION/TRAINING PROGRAM

## 2025-06-24 PROCEDURE — 94760 N-INVAS EAR/PLS OXIMETRY 1: CPT

## 2025-06-24 PROCEDURE — 80048 BASIC METABOLIC PNL TOTAL CA: CPT | Performed by: STUDENT IN AN ORGANIZED HEALTH CARE EDUCATION/TRAINING PROGRAM

## 2025-06-24 PROCEDURE — 99233 SBSQ HOSP IP/OBS HIGH 50: CPT | Performed by: STUDENT IN AN ORGANIZED HEALTH CARE EDUCATION/TRAINING PROGRAM

## 2025-06-24 RX ORDER — PHENYLEPHRINE HCL IN 0.9% NACL 1 MG/10 ML
SYRINGE (ML) INTRAVENOUS AS NEEDED
Status: DISCONTINUED | OUTPATIENT
Start: 2025-06-24 | End: 2025-06-24

## 2025-06-24 RX ORDER — KETAMINE HYDROCHLORIDE 50 MG/ML
INJECTION, SOLUTION INTRAMUSCULAR; INTRAVENOUS AS NEEDED
Status: DISCONTINUED | OUTPATIENT
Start: 2025-06-24 | End: 2025-06-24

## 2025-06-24 RX ORDER — SODIUM CHLORIDE, SODIUM LACTATE, POTASSIUM CHLORIDE, CALCIUM CHLORIDE 600; 310; 30; 20 MG/100ML; MG/100ML; MG/100ML; MG/100ML
125 INJECTION, SOLUTION INTRAVENOUS CONTINUOUS
Status: DISCONTINUED | OUTPATIENT
Start: 2025-06-24 | End: 2025-06-25 | Stop reason: HOSPADM

## 2025-06-24 RX ORDER — IPRATROPIUM BROMIDE AND ALBUTEROL SULFATE 2.5; .5 MG/3ML; MG/3ML
3 SOLUTION RESPIRATORY (INHALATION)
Status: DISCONTINUED | OUTPATIENT
Start: 2025-06-24 | End: 2025-06-25 | Stop reason: HOSPADM

## 2025-06-24 RX ORDER — ONDANSETRON 2 MG/ML
4 INJECTION INTRAMUSCULAR; INTRAVENOUS ONCE AS NEEDED
Status: CANCELLED | OUTPATIENT
Start: 2025-06-24

## 2025-06-24 RX ORDER — ALBUTEROL SULFATE 0.83 MG/ML
2.5 SOLUTION RESPIRATORY (INHALATION) ONCE AS NEEDED
Status: CANCELLED | OUTPATIENT
Start: 2025-06-24

## 2025-06-24 RX ORDER — PROPOFOL 10 MG/ML
INJECTION, EMULSION INTRAVENOUS AS NEEDED
Status: DISCONTINUED | OUTPATIENT
Start: 2025-06-24 | End: 2025-06-24

## 2025-06-24 RX ORDER — PROPOFOL 10 MG/ML
INJECTION, EMULSION INTRAVENOUS CONTINUOUS PRN
Status: DISCONTINUED | OUTPATIENT
Start: 2025-06-24 | End: 2025-06-24

## 2025-06-24 RX ORDER — ALBUTEROL SULFATE 90 UG/1
4 INHALANT RESPIRATORY (INHALATION) ONCE
Status: COMPLETED | OUTPATIENT
Start: 2025-06-24 | End: 2025-06-24

## 2025-06-24 RX ORDER — HYDROCORTISONE SODIUM SUCCINATE 100 MG/2ML
INJECTION INTRAMUSCULAR; INTRAVENOUS AS NEEDED
Status: DISCONTINUED | OUTPATIENT
Start: 2025-06-24 | End: 2025-06-24

## 2025-06-24 RX ADMIN — SENNOSIDES AND DOCUSATE SODIUM 1 TABLET: 50; 8.6 TABLET ORAL at 08:04

## 2025-06-24 RX ADMIN — Medication 100 MCG: at 12:20

## 2025-06-24 RX ADMIN — ALBUTEROL SULFATE 4 PUFF: 90 AEROSOL, METERED RESPIRATORY (INHALATION) at 11:32

## 2025-06-24 RX ADMIN — HYDROCORTISONE SODIUM SUCCINATE 25 MG: 100 INJECTION, POWDER, FOR SOLUTION INTRAMUSCULAR; INTRAVENOUS at 11:33

## 2025-06-24 RX ADMIN — UMECLIDINIUM BROMIDE AND VILANTEROL TRIFENATATE 1 PUFF: 62.5; 25 POWDER RESPIRATORY (INHALATION) at 08:13

## 2025-06-24 RX ADMIN — METOPROLOL TARTRATE 25 MG: 25 TABLET, FILM COATED ORAL at 21:07

## 2025-06-24 RX ADMIN — PROPOFOL 20 MG: 10 INJECTION, EMULSION INTRAVENOUS at 12:06

## 2025-06-24 RX ADMIN — PREDNISONE 40 MG: 20 TABLET ORAL at 08:04

## 2025-06-24 RX ADMIN — METOPROLOL TARTRATE 25 MG: 25 TABLET, FILM COATED ORAL at 08:04

## 2025-06-24 RX ADMIN — PANTOPRAZOLE SODIUM 40 MG: 40 INJECTION, POWDER, FOR SOLUTION INTRAVENOUS at 21:08

## 2025-06-24 RX ADMIN — LIDOCAINE 1 PATCH: 50 PATCH CUTANEOUS at 08:04

## 2025-06-24 RX ADMIN — PROPOFOL 40 MG: 10 INJECTION, EMULSION INTRAVENOUS at 12:01

## 2025-06-24 RX ADMIN — APIXABAN 5 MG: 5 TABLET, FILM COATED ORAL at 21:07

## 2025-06-24 RX ADMIN — KETAMINE HYDROCHLORIDE 20 MG: 50 INJECTION INTRAMUSCULAR; INTRAVENOUS at 12:11

## 2025-06-24 RX ADMIN — PANTOPRAZOLE SODIUM 40 MG: 40 INJECTION, POWDER, FOR SOLUTION INTRAVENOUS at 08:07

## 2025-06-24 RX ADMIN — AMIODARONE HYDROCHLORIDE 200 MG: 200 TABLET ORAL at 08:04

## 2025-06-24 RX ADMIN — LORATADINE 5 MG: 10 TABLET ORAL at 08:04

## 2025-06-24 RX ADMIN — KETAMINE HYDROCHLORIDE 30 MG: 50 INJECTION INTRAMUSCULAR; INTRAVENOUS at 12:01

## 2025-06-24 RX ADMIN — Medication 100 MCG: at 12:36

## 2025-06-24 RX ADMIN — ALECTINIB HYDROCHLORIDE 450 MG: 150 CAPSULE ORAL at 08:04

## 2025-06-24 RX ADMIN — Medication 100 MCG: at 12:12

## 2025-06-24 RX ADMIN — IPRATROPIUM BROMIDE AND ALBUTEROL SULFATE 3 ML: .5; 3 SOLUTION RESPIRATORY (INHALATION) at 19:00

## 2025-06-24 RX ADMIN — PROPOFOL 40 MCG/KG/MIN: 10 INJECTION, EMULSION INTRAVENOUS at 12:20

## 2025-06-24 RX ADMIN — ALECTINIB HYDROCHLORIDE 450 MG: 150 CAPSULE ORAL at 21:05

## 2025-06-24 RX ADMIN — SODIUM CHLORIDE, SODIUM LACTATE, POTASSIUM CHLORIDE, AND CALCIUM CHLORIDE 125 ML/HR: .6; .31; .03; .02 INJECTION, SOLUTION INTRAVENOUS at 11:33

## 2025-06-24 RX ADMIN — CALCITRIOL 0.25 MCG: 0.25 CAPSULE, LIQUID FILLED ORAL at 08:04

## 2025-06-24 RX ADMIN — LISINOPRIL 2.5 MG: 2.5 TABLET ORAL at 08:04

## 2025-06-24 RX ADMIN — FLUTICASONE PROPIONATE 2 SPRAY: 50 SPRAY, METERED NASAL at 08:13

## 2025-06-24 RX ADMIN — BISACODYL 10 MG: 5 TABLET, COATED ORAL at 05:37

## 2025-06-24 RX ADMIN — PROPOFOL 20 MG: 10 INJECTION, EMULSION INTRAVENOUS at 12:11

## 2025-06-24 RX ADMIN — Medication 100 MCG: at 12:01

## 2025-06-24 RX ADMIN — IPRATROPIUM BROMIDE AND ALBUTEROL SULFATE 3 ML: 2.5; .5 SOLUTION RESPIRATORY (INHALATION) at 07:33

## 2025-06-24 RX ADMIN — AMLODIPINE BESYLATE 2.5 MG: 2.5 TABLET ORAL at 08:04

## 2025-06-24 RX ADMIN — Medication 100 MCG: at 12:06

## 2025-06-24 RX ADMIN — PRAVASTATIN SODIUM 80 MG: 80 TABLET ORAL at 16:39

## 2025-06-24 RX ADMIN — Medication 100 MCG: at 12:26

## 2025-06-24 NOTE — ASSESSMENT & PLAN NOTE
Blood pressure and renal function currently stable  Continue home antihypertensives  Trend renal function, monitor routine vital signs    Lab Results   Component Value Date    EGFR 54 06/24/2025    EGFR 32 06/19/2025    EGFR 40 06/18/2025    CREATININE 1.00 06/24/2025    CREATININE 1.54 (H) 06/19/2025    CREATININE 1.28 06/18/2025

## 2025-06-24 NOTE — PROGRESS NOTES
Progress Note - Hospitalist   Name: Jayla Moody 80 y.o. female I MRN: [id number]  Unit/Bed#: [place] [address] -01 I Date of Admission: 6/18/2025   Date of Service: 6/24/2025 I Hospital Day: 6    Assessment & Plan  Anemia due to acute blood loss  The patient became acutely anemic during her hospitalization.  She describes melena.  Her hemoglobin fell to 6.7 on 6/21 and she received blood.  She underwent EGD 6/19 which revealed no site of bleeding.  Colonoscopy was delayed yesterday due to incomplete bowel prep, clear BMS today, planning for procedure later today  Will continue to monitor hemoglobin  Shortness of breath  Patient is a morbidly obese 80-year-old female past medical history of asthma, atrial fibrillation, hypertension, lung cancer with mets to the bone, and hyperlipidemia who presents to the emergency department with complaints of shortness of breath, worse on exertion, and dizziness for the last few days.  In the emergency room imaging suggests vascular congestion and pleural effusions, she was given IV Lasix and will be admitted for further treatment.    chest x-ray suggests minimal pleural effusion  BNP is marginally elevated at 110  Last echocardiogram 6/3: The left ventricular ejection fraction is 65%. Systolic function is normal. Wall motion is normal. Diastolic function is mildly abnormal, consistent with grade I (abnormal) relaxation.   I suspect the patient's symptoms are mostly related to asthma rather than congestive heart failure as well as anemia playing a role  Severe asthma    Schedule Du6fusion Aerosphere is nonformulary, continue dose equivalent  The patient's respiratory status has improved substantially.  She will be transition to oral prednisone, continue prednisone 40 mg daily  MONO (obstructive sleep apnea)  Unable to tolerate CPAP outpatient, sent her machine back  Follow-up outpatient with pulmonology  Non-small cell lung cancer (HCC)  Follows with [facility]  oncology  Continue alectinib  Malignant neoplasm metastatic to bone (HCC)  Patient had 1 treatment of radiation when bone mets were initially diagnosed  Continue outpatient follow-up with oncology  Atrial flutter (HCC)  Rate controlled  Continue beta-blocker  Plan to restart eliquis after cscope   Continue to monitor  Class 3 severe obesity due to excess calories with serious comorbidity and body mass index (BMI) of 40.0 to 44.9 in adult  BMI 40.62  Patient would benefit greatly from very low carbohydrate diet (less than 50 g daily) and significant caloric restriction (less than 2000 kcals per day).  Hypertensive kidney disease with stage 3b chronic kidney disease (HCC)  Blood pressure and renal function currently stable  Continue home antihypertensives  Trend renal function, monitor routine vital signs    Lab Results   Component Value Date    EGFR 54 06/24/2025    EGFR 32 06/19/2025    EGFR 40 06/18/2025    CREATININE 1.00 06/24/2025    CREATININE 1.54 (H) 06/19/2025    CREATININE 1.28 06/18/2025     Mixed hyperlipidemia  Rosuvastatin is nonformulary, continue dose equivalent pravastatin 80 mg daily  Essential hypertension  Stable on current therapy.  Coronary artery disease involving native coronary artery of native heart without angina pectoris  No angina.  On aspirin and appropriate risk factor modification.  Chronic diastolic congestive heart failure (HCC)  Wt Readings from Last 3 Encounters:   06/20/25 99.8 kg (220 lb)   06/03/25 97.5 kg (215 lb)   05/27/25 97.5 kg (215 lb)     Currently euvolemic.  Continue current therapy.  Melena    Mild HOCM (hypertrophic obstructive cardiomyopathy) (HCC)      VTE Pharmacologic Prophylaxis: VTE Score: 8 holding eliquis for cscope    Mobility:   Basic Mobility Inpatient Raw Score: 22  JH-HLM Goal: 7: Walk 25 feet or more  JH-HLM Achieved: 7: Walk 25 feet or more      Patient Centered Rounds: I performed bedside rounds with nursing staff today.       Current Length of Stay:  6 day(s)  Current Patient Status: Inpatient   Certification Statement: The patient will continue to require additional inpatient hospital stay due to gi bleed, colonoscopy    Code Status: Level 3 - DNAR and DNI    Subjective   Patient seen and examined at bedside. Bowel prep overnight and Bms clear, She is doing well planning to go for colonscopy today. No acute events overnight.    Objective :  Temp:  [97.9 °F (36.6 °C)-98.2 °F (36.8 °C)] 98 °F (36.7 °C)  HR:  [66-74] 71  BP: (116-160)/(55-63) 149/58  Resp:  [16] 16  SpO2:  [95 %-99 %] 97 %  O2 Device: None (Room air)    Body mass index is 41.57 kg/m².     Input and Output Summary (last 24 hours):     Intake/Output Summary (Last 24 hours) at 6/24/2025 1039  Last data filed at 6/24/2025 0600  Gross per 24 hour   Intake 1590 ml   Output 300 ml   Net 1290 ml       Physical Exam    Gen: Well-developed, obese, in no distress.  Neck: Supple.  No lymphadenopathy or goiter.  Heart: Regular rhythm.  I heard no murmur, gallop, or rub.  Lungs: Clear to auscultation and percussion.  No wheezing, rales, or rhonchi.  Abd: Soft with active bowel sounds.  No mass, tenderness, organomegaly.  Extremities: No clubbing, cyanosis, or edema.  No calf tenderness.  Neuro: Alert and oriented.  No focal sign.  Skin: Warm and dry.        Lab Results: I have reviewed the following results:   Results from last 7 days   Lab Units 06/23/25  0556 06/22/25  0512   WBC Thousand/uL 6.90 8.24   HEMOGLOBIN g/dL 8.7* 8.3*   HEMATOCRIT % 26.5* 25.4*   PLATELETS Thousands/uL 87* 84*   BANDS PCT % 1  --    SEGS PCT %  --  75   LYMPHO PCT % 15 9*   MONO PCT % 8 9   EOS PCT % 0 0     Results from last 7 days   Lab Units 06/24/25  0603 06/19/25  0507   SODIUM mmol/L 141 138   POTASSIUM mmol/L 4.3 4.1   CHLORIDE mmol/L 107 102   CO2 mmol/L 29 28   BUN mg/dL 14 51*   CREATININE mg/dL 1.00 1.54*   ANION GAP mmol/L 5 8   CALCIUM mg/dL 7.4* 8.4   ALBUMIN g/dL  --  3.5   TOTAL BILIRUBIN mg/dL  --  0.71   ALK PHOS  U/L  --  66   ALT U/L  --  32   AST U/L  --  34   GLUCOSE RANDOM mg/dL 87 151*         Results from last 7 days   Lab Units 06/18/25  0732   POC GLUCOSE mg/dl 133               Recent Cultures (last 7 days):         Imaging Results Review: No pertinent imaging studies reviewed.  Other Study Results Review: No additional pertinent studies reviewed.    Last 24 Hours Medication List:     Current Facility-Administered Medications:     acetaminophen (TYLENOL) tablet 650 mg, Q6H PRN    albuterol (PROVENTIL HFA,VENTOLIN HFA) inhaler 2 puff, Q4H PRN    Alectinib HCl CAPS 450 mg, BID    aluminum-magnesium hydroxide-simethicone (MAALOX) oral suspension 30 mL, Q6H PRN    amiodarone tablet 200 mg, Daily    amLODIPine (NORVASC) tablet 2.5 mg, Daily    calcitriol (ROCALTROL) capsule 0.25 mcg, Daily    fluticasone (FLONASE) 50 mcg/act nasal spray 2 spray, Daily    ipratropium-albuterol (DUO-NEB) 0.5-2.5 mg/3 mL inhalation solution 3 mL, BID    Ketotifen Fumarate (ZADITOR) 0.035 % ophthalmic solution 1 drop, BID PRN    lidocaine (LIDODERM) 5 % patch 1 patch, Daily    lisinopril (ZESTRIL) tablet 2.5 mg, Daily    loratadine (CLARITIN) tablet 5 mg, Daily    metoprolol tartrate (LOPRESSOR) tablet 25 mg, BID    ondansetron (ZOFRAN) injection 4 mg, Q6H PRN    pantoprazole (PROTONIX) injection 40 mg, Q12H JAIRON    polyethylene glycol (MIRALAX) packet 17 g, Daily PRN    pravastatin (PRAVACHOL) tablet 80 mg, Daily With Dinner    predniSONE tablet 40 mg, Daily    senna-docusate sodium (SENOKOT S) 8.6-50 mg per tablet 1 tablet, Daily    umeclidinium-vilanterol 62.5-25 mcg/actuation inhaler 1 puff, Daily    Administrative Statements   Today, Patient Was Seen By: Nancy Pastor MD      **Please Note: This note may have been constructed using a voice recognition system.**

## 2025-06-24 NOTE — PLAN OF CARE
Problem: Potential for Falls  Goal: Patient will remain free of falls  Description: INTERVENTIONS:  - Educate patient/family on patient safety including physical limitations  - Instruct patient to call for assistance with activity   - Consider consulting OT/PT to assist with strengthening/mobility based on AM PAC & JH-HLM score  - Consult OT/PT to assist with strengthening/mobility   - Keep Call bell within reach  - Keep bed low and locked with side rails adjusted as appropriate  - Keep care items and personal belongings within reach  - Initiate and maintain comfort rounds  - Make Fall Risk Sign visible to staff  - Offer Toileting every 2 Hours, in advance of need  - Initiate/Maintain bed alarm  - Obtain necessary fall risk management equipment: bed alarm   - Apply yellow socks and bracelet for high fall risk patients  - Consider moving patient to room near nurses station  Outcome: Progressing     Problem: PAIN - ADULT  Goal: Verbalizes/displays adequate comfort level or baseline comfort level  Description: Interventions:  - Encourage patient to monitor pain and request assistance  - Assess pain using appropriate pain scale  - Administer analgesics as ordered based on type and severity of pain and evaluate response  - Implement non-pharmacological measures as appropriate and evaluate response  - Consider cultural and social influences on pain and pain management  - Notify physician/advanced practitioner if interventions unsuccessful or patient reports new pain  - Educate patient/family on pain management process including their role and importance of  reporting pain   - Provide non-pharmacologic/complimentary pain relief interventions  Outcome: Progressing     Problem: INFECTION - ADULT  Goal: Absence or prevention of progression during hospitalization  Description: INTERVENTIONS:  - Assess and monitor for signs and symptoms of infection  - Monitor lab/diagnostic results  - Monitor all insertion sites, i.e.  indwelling lines, tubes, and drains  - Monitor endotracheal if appropriate and nasal secretions for changes in amount and color  - [place] appropriate cooling/warming therapies per order  - Administer medications as ordered  - Instruct and encourage patient and family to use good hand hygiene technique  - Identify and instruct in appropriate isolation precautions for identified infection/condition  Outcome: Progressing     Problem: SAFETY ADULT  Goal: Patient will remain free of falls  Description: INTERVENTIONS:  - Educate patient/family on patient safety including physical limitations  - Instruct patient to call for assistance with activity   - Consider consulting OT/PT to assist with strengthening/mobility based on AM PAC & -HLM score  - Consult OT/PT to assist with strengthening/mobility   - Keep Call bell within reach  - Keep bed low and locked with side rails adjusted as appropriate  - Keep care items and personal belongings within reach  - Initiate and maintain comfort rounds  - Make Fall Risk Sign visible to staff  - Offer Toileting every 2 Hours, in advance of need  - Initiate/Maintain bed alarm  - Obtain necessary fall risk management equipment: bed alarm   - Apply yellow socks and bracelet for high fall risk patients  - Consider moving patient to room near nurses station  Outcome: Progressing  Goal: Maintain or return to baseline ADL function  Description: INTERVENTIONS:  -  Assess patient's ability to carry out ADLs; assess patient's baseline for ADL function and identify physical deficits which impact ability to perform ADLs (bathing, care of mouth/teeth, toileting, grooming, dressing, etc.)  - Assess/evaluate cause of self-care deficits   - Assess range of motion  - Assess patient's mobility; develop plan if impaired  - Assess patient's need for assistive devices and provide as appropriate  - Encourage maximum independence but intervene and supervise when necessary  - Involve family in performance  of ADLs  - Assess for home care needs following discharge   - Consider OT consult to assist with ADL evaluation and planning for discharge  - Provide patient education as appropriate  - Monitor functional capacity and physical performance, use of AM PAC & JH-HLM   - Monitor gait, balance and fatigue with ambulation    Outcome: Progressing  Goal: Maintains/Returns to pre admission functional level  Description: INTERVENTIONS:  - Perform AM-PAC 6 Click Basic Mobility/ Daily Activity assessment daily.  - Set and communicate daily mobility goal to care team and patient/family/caregiver.   - Collaborate with rehabilitation services on mobility goals if consulted  - Perform Range of Motion 4 times a day.  - Reposition patient every 2 hours.  - Dangle patient 3 times a day  - Stand patient 3 times a day  - Ambulate patient 3 times a day  - Out of bed to chair 3 times a day   - Out of bed for meals 3 times a day  - Out of bed for toileting  - Record patient progress and toleration of activity level   Outcome: Progressing     Problem: DISCHARGE PLANNING  Goal: Discharge to home or other facility with appropriate resources  Description: INTERVENTIONS:  - Identify barriers to discharge w/patient and caregiver  - Arrange for needed discharge resources and transportation as appropriate  - Identify discharge learning needs (meds, wound care, etc.)  - Arrange for interpretive services to assist at discharge as needed  - Refer to Case Management Department for coordinating discharge planning if the patient needs post-hospital services based on physician/advanced practitioner order or complex needs related to functional status, cognitive ability, or social support system  Outcome: Progressing     Problem: Knowledge Deficit  Goal: Patient/family/caregiver demonstrates understanding of disease process, treatment plan, medications, and discharge instructions  Description: Complete learning assessment and assess knowledge  base.  Interventions:  - Provide teaching at level of understanding  - Provide teaching via preferred learning methods  Outcome: Progressing     Problem: RESPIRATORY - ADULT  Goal: Achieves optimal ventilation and oxygenation  Description: INTERVENTIONS:  - Assess for changes in respiratory status  - Assess for changes in mentation and behavior  - Position to facilitate oxygenation and minimize respiratory effort  - Oxygen administered by appropriate delivery if ordered  - Initiate smoking cessation education as indicated  - Encourage broncho-pulmonary hygiene including cough, deep breathe, Incentive Spirometry  - Assess the need for suctioning and aspirate as needed  - Assess and instruct to report SOB or any respiratory difficulty  - Respiratory Therapy support as indicated  Outcome: Progressing

## 2025-06-24 NOTE — QUICK NOTE
GASTROENTEROLOGY QUICK NOTE:    Discussed with nursing.  Patient successfully completed second round of MiraLAX bowel prep.  Currently having clear liquid, liquid yellow stools.  Patient seen and examined at bedside.  Currently asymptomatic and with no complaints.  Consent obtained for colonoscopy today.  Will plan to proceed as previously scheduled.  Patient should continue to remain NPO.  Continue to hold Eliquis.  Further plans of care to be determined based upon results of colonoscopy.  Please reach out to GI team with any questions or concerns.  Thank you.    Candice Savage DO, PGY-5  [facility] Gastroenterology Fellow

## 2025-06-24 NOTE — ANESTHESIA POSTPROCEDURE EVALUATION
Post-Op Assessment Note    CV Status:  Stable    Pain management: adequate       Mental Status:  Awake   Hydration Status:  Stable   PONV Controlled:  Controlled   Airway Patency:  Patent     Post Op Vitals Reviewed: Yes    No anethesia notable event occurred.    Staff: Anesthesiologist           Last Filed PACU Vitals:  Vitals Value Taken Time   Temp 96 °F (35.6 °C) 06/24/25 13:00   Pulse 66 06/24/25 13:00   /56 06/24/25 13:00   Resp 18 06/24/25 13:00   SpO2 96 % 06/24/25 13:00       Modified Javad:     Vitals Value Taken Time   Activity 2 06/24/25 13:00   Respiration 2 06/24/25 13:00   Circulation 2 06/24/25 13:00   Consciousness 2 06/24/25 13:00   Oxygen Saturation 2 06/24/25 13:00     Modified Javad Score: 10

## 2025-06-24 NOTE — ANESTHESIA PREPROCEDURE EVALUATION
Procedure:  COLONOSCOPY    Relevant Problems   CARDIO   (+) Atrial flutter (HCC)   (+) Chronic diastolic congestive heart failure (HCC)   (+) Coronary artery disease involving native coronary artery of native heart without angina pectoris   (+) Essential hypertension   (+) Mixed hyperlipidemia      GI/HEPATIC   (+) Gastroesophageal reflux disease without esophagitis   (+) Hiatal hernia      /RENAL   (+) CARLOS ENRIQUE (acute kidney injury) (HCC)   (+) Chronic kidney disease-mineral and bone disorder (CKD-MBD)   (+) Hypertensive kidney disease with stage 3b chronic kidney disease (HCC)      HEMATOLOGY   (+) Anemia due to acute blood loss   (+) Thrombocytopenia (HCC) (Plt 87)      NEURO/PSYCH   (+) Depression, recurrent (HCC)      PULMONARY   (+) MONO (obstructive sleep apnea)   (+) Severe asthma (Current exacerbation with PO sterids, will plan stress dose)   (+) Shortness of breath      Digestive   (+) Melena (While inpt, with hgb drop to 6.7, s/p 2 PRBCs with hgb >8, EGD no source, now for colonoscopy)      Oncology   (+) Cancer-related pain   (+) Malignant neoplasm metastatic to bone (HCC)   (+) Non-small cell lung cancer (HCC)      Cardiovascular/Peripheral Vascular   (+) Mild HOCM (hypertrophic obstructive cardiomyopathy) (HCC)      FEN/Gastrointestinal   (+) Chronic gastritis without bleeding      Other   (+) Class 3 severe obesity due to excess calories with serious comorbidity and body mass index (BMI) of 40.0 to 44.9 in adult      Echo:    Left Ventricle: Left ventricular cavity size is normal. Wall thickness is moderately increased. The left ventricular ejection fraction is 65%. Systolic function is normal. Wall motion is normal. Diastolic function is mildly abnormal, consistent with grade I (abnormal) relaxation.  Very mild dynamic LVOT obstruction with resting peak gradient 9.5 mmHg with Valsalva peak gradient of 17 mmHg.    Left Atrium: The atrium is moderately dilated.    Right Atrium: The atrium is mildly  dilated.    Atrial Septum: Cannot rule out PFO    Mitral Valve: There is severe annular calcification. There is mild regurgitation. There is mild stenosis. The mitral valve mean gradient is 4mmHg at HR 57 BPM.  Physical Exam    Airway     Mallampati score: II  TM Distance: <3 FB        Cardiovascular  Rhythm: regular, Rate: normal    Dental   Comment: No upper teeth     Pulmonary   Decreased breath sounds, Wheezes    Neurological    She appears awake, alert and oriented x3.      Other Findings        Anesthesia Plan  ASA Score- 4     Anesthesia Type- IV sedation with anesthesia with ASA Monitors.         Additional Monitors:     Airway Plan: natural airway.    Comment: GA pRN  Will give albuterol and stress dose steroids.       Plan Factors-Exercise tolerance (METS): <4 METS.    Chart reviewed.   Existing labs reviewed.           Obstructive sleep apnea risk education given perioperatively.        Induction- intravenous.    Postoperative Plan- .   Monitoring Plan - Monitoring plan - standard ASA monitoring  Post Operative Pain Plan - non-opiod analgesics    Perioperative Resuscitation Plan - Level 1 - Full Code.   confirmed with patient, code status changed in MAR per patient request.    Informed Consent- Anesthetic plan and risks discussed with patient.        NPO Status:  Vitals Value Taken Time   Date of last liquid 06/20/25 06/20/25 08:52   Time of last liquid 0000 06/20/25 08:52   Date of last solid 06/19/25 06/20/25 08:52   Time of last solid 1200 06/20/25 08:52

## 2025-06-24 NOTE — ASSESSMENT & PLAN NOTE
Rate controlled  Continue beta-blocker  Plan to restart eliquis after cscope   Continue to monitor

## 2025-06-24 NOTE — RESTORATIVE TECHNICIAN NOTE
Restorative Technician Note      Patient Name: Jayla Moody     Restorative Tech Visit Date: 06/24/25  Note Type: Mobility  Patient Position Upon Consult: Supine  Activity Performed: Ambulated  Assistive Device: Cane  Patient Position at End of Consult: All needs within reach; Bedside chair

## 2025-06-24 NOTE — ASSESSMENT & PLAN NOTE
The patient became acutely anemic during her hospitalization.  She describes melena.  Her hemoglobin fell to 6.7 on 6/21 and she received blood.  She underwent EGD 6/19 which revealed no site of bleeding.  Colonoscopy was delayed yesterday due to incomplete bowel prep, clear BMS today, planning for procedure later today  Will continue to monitor hemoglobin

## 2025-06-25 ENCOUNTER — TELEPHONE (OUTPATIENT)
Age: 80
End: 2025-06-25

## 2025-06-25 VITALS
SYSTOLIC BLOOD PRESSURE: 138 MMHG | DIASTOLIC BLOOD PRESSURE: 66 MMHG | WEIGHT: 220 LBS | RESPIRATION RATE: 16 BRPM | BODY MASS INDEX: 41.54 KG/M2 | HEIGHT: 61 IN | TEMPERATURE: 98.1 F | OXYGEN SATURATION: 97 % | HEART RATE: 69 BPM

## 2025-06-25 DIAGNOSIS — N18.32 STAGE 3B CHRONIC KIDNEY DISEASE (HCC): Primary | ICD-10-CM

## 2025-06-25 LAB
ANION GAP SERPL CALCULATED.3IONS-SCNC: 5 MMOL/L (ref 4–13)
BASOPHILS # BLD AUTO: 0.02 THOUSANDS/ÂΜL (ref 0–0.1)
BASOPHILS NFR BLD AUTO: 0 % (ref 0–1)
BUN SERPL-MCNC: 24 MG/DL (ref 5–25)
CALCIUM SERPL-MCNC: 7.9 MG/DL (ref 8.4–10.2)
CHLORIDE SERPL-SCNC: 109 MMOL/L (ref 96–108)
CO2 SERPL-SCNC: 28 MMOL/L (ref 21–32)
CREAT SERPL-MCNC: 1.27 MG/DL (ref 0.6–1.3)
EOSINOPHIL # BLD AUTO: 0 THOUSAND/ÂΜL (ref 0–0.61)
EOSINOPHIL NFR BLD AUTO: 0 % (ref 0–6)
ERYTHROCYTE [DISTWIDTH] IN BLOOD BY AUTOMATED COUNT: 18.6 % (ref 11.6–15.1)
GFR SERPL CREATININE-BSD FRML MDRD: 40 ML/MIN/1.73SQ M
GLUCOSE SERPL-MCNC: 114 MG/DL (ref 65–140)
HCT VFR BLD AUTO: 27.5 % (ref 34.8–46.1)
HGB BLD-MCNC: 8.9 G/DL (ref 11.5–15.4)
IMM GRANULOCYTES # BLD AUTO: 0.38 THOUSAND/UL (ref 0–0.2)
IMM GRANULOCYTES NFR BLD AUTO: 5 % (ref 0–2)
LYMPHOCYTES # BLD AUTO: 0.86 THOUSANDS/ÂΜL (ref 0.6–4.47)
LYMPHOCYTES NFR BLD AUTO: 11 % (ref 14–44)
MCH RBC QN AUTO: 29.1 PG (ref 26.8–34.3)
MCHC RBC AUTO-ENTMCNC: 32.4 G/DL (ref 31.4–37.4)
MCV RBC AUTO: 90 FL (ref 82–98)
MONOCYTES # BLD AUTO: 0.82 THOUSAND/ÂΜL (ref 0.17–1.22)
MONOCYTES NFR BLD AUTO: 11 % (ref 4–12)
NEUTROPHILS # BLD AUTO: 5.54 THOUSANDS/ÂΜL (ref 1.85–7.62)
NEUTS SEG NFR BLD AUTO: 73 % (ref 43–75)
NRBC BLD AUTO-RTO: 0 /100 WBCS
PLATELET # BLD AUTO: 98 THOUSANDS/UL (ref 149–390)
PMV BLD AUTO: 13.4 FL (ref 8.9–12.7)
POTASSIUM SERPL-SCNC: 4 MMOL/L (ref 3.5–5.3)
RBC # BLD AUTO: 3.06 MILLION/UL (ref 3.81–5.12)
SODIUM SERPL-SCNC: 142 MMOL/L (ref 135–147)
WBC # BLD AUTO: 7.62 THOUSAND/UL (ref 4.31–10.16)

## 2025-06-25 PROCEDURE — 80048 BASIC METABOLIC PNL TOTAL CA: CPT | Performed by: STUDENT IN AN ORGANIZED HEALTH CARE EDUCATION/TRAINING PROGRAM

## 2025-06-25 PROCEDURE — 85027 COMPLETE CBC AUTOMATED: CPT | Performed by: STUDENT IN AN ORGANIZED HEALTH CARE EDUCATION/TRAINING PROGRAM

## 2025-06-25 PROCEDURE — 94760 N-INVAS EAR/PLS OXIMETRY 1: CPT

## 2025-06-25 PROCEDURE — 94640 AIRWAY INHALATION TREATMENT: CPT

## 2025-06-25 PROCEDURE — 99239 HOSP IP/OBS DSCHRG MGMT >30: CPT | Performed by: STUDENT IN AN ORGANIZED HEALTH CARE EDUCATION/TRAINING PROGRAM

## 2025-06-25 RX ORDER — SIMETHICONE 80 MG
80 TABLET,CHEWABLE ORAL EVERY 6 HOURS PRN
Status: DISCONTINUED | OUTPATIENT
Start: 2025-06-25 | End: 2025-06-25 | Stop reason: HOSPADM

## 2025-06-25 RX ORDER — PREDNISONE 20 MG/1
40 TABLET ORAL DAILY
Qty: 4 TABLET | Refills: 0 | Status: SHIPPED | OUTPATIENT
Start: 2025-06-25 | End: 2025-06-27

## 2025-06-25 RX ADMIN — FLUTICASONE PROPIONATE 2 SPRAY: 50 SPRAY, METERED NASAL at 08:06

## 2025-06-25 RX ADMIN — ALECTINIB HYDROCHLORIDE 450 MG: 150 CAPSULE ORAL at 08:05

## 2025-06-25 RX ADMIN — METOPROLOL TARTRATE 25 MG: 25 TABLET, FILM COATED ORAL at 08:06

## 2025-06-25 RX ADMIN — UMECLIDINIUM BROMIDE AND VILANTEROL TRIFENATATE 1 PUFF: 62.5; 25 POWDER RESPIRATORY (INHALATION) at 08:06

## 2025-06-25 RX ADMIN — PANTOPRAZOLE SODIUM 40 MG: 40 INJECTION, POWDER, FOR SOLUTION INTRAVENOUS at 08:05

## 2025-06-25 RX ADMIN — LORATADINE 5 MG: 10 TABLET ORAL at 08:06

## 2025-06-25 RX ADMIN — APIXABAN 5 MG: 5 TABLET, FILM COATED ORAL at 08:16

## 2025-06-25 RX ADMIN — PREDNISONE 40 MG: 20 TABLET ORAL at 08:06

## 2025-06-25 RX ADMIN — SIMETHICONE 80 MG: 80 TABLET, CHEWABLE ORAL at 07:57

## 2025-06-25 RX ADMIN — AMLODIPINE BESYLATE 2.5 MG: 2.5 TABLET ORAL at 08:06

## 2025-06-25 RX ADMIN — SENNOSIDES AND DOCUSATE SODIUM 1 TABLET: 50; 8.6 TABLET ORAL at 08:06

## 2025-06-25 RX ADMIN — CALCITRIOL 0.25 MCG: 0.25 CAPSULE, LIQUID FILLED ORAL at 08:05

## 2025-06-25 RX ADMIN — LIDOCAINE 1 PATCH: 50 PATCH CUTANEOUS at 08:07

## 2025-06-25 RX ADMIN — LISINOPRIL 2.5 MG: 2.5 TABLET ORAL at 08:06

## 2025-06-25 RX ADMIN — AMIODARONE HYDROCHLORIDE 200 MG: 200 TABLET ORAL at 08:06

## 2025-06-25 RX ADMIN — IPRATROPIUM BROMIDE AND ALBUTEROL SULFATE 3 ML: .5; 3 SOLUTION RESPIRATORY (INHALATION) at 07:40

## 2025-06-25 NOTE — PLAN OF CARE
Problem: Potential for Falls  Goal: Patient will remain free of falls  Description: INTERVENTIONS:  - Educate patient/family on patient safety including physical limitations  - Instruct patient to call for assistance with activity   - Consider consulting OT/PT to assist with strengthening/mobility based on AM PAC & JH-HLM score  - Consult OT/PT to assist with strengthening/mobility   - Keep Call bell within reach  - Keep bed low and locked with side rails adjusted as appropriate  - Keep care items and personal belongings within reach  - Initiate and maintain comfort rounds  - Make Fall Risk Sign visible to staff  - Offer Toileting every 2 Hours, in advance of need  - Initiate/Maintain bed alarm  - Obtain necessary fall risk management equipment: bed alarm   - Apply yellow socks and bracelet for high fall risk patients  - Consider moving patient to room near nurses station  Outcome: Progressing     Problem: PAIN - ADULT  Goal: Verbalizes/displays adequate comfort level or baseline comfort level  Description: Interventions:  - Encourage patient to monitor pain and request assistance  - Assess pain using appropriate pain scale  - Administer analgesics as ordered based on type and severity of pain and evaluate response  - Implement non-pharmacological measures as appropriate and evaluate response  - Consider cultural and social influences on pain and pain management  - Notify physician/advanced practitioner if interventions unsuccessful or patient reports new pain  - Educate patient/family on pain management process including their role and importance of  reporting pain   - Provide non-pharmacologic/complimentary pain relief interventions  Outcome: Progressing     Problem: INFECTION - ADULT  Goal: Absence or prevention of progression during hospitalization  Description: INTERVENTIONS:  - Assess and monitor for signs and symptoms of infection  - Monitor lab/diagnostic results  - Monitor all insertion sites, i.e.  indwelling lines, tubes, and drains  - Monitor endotracheal if appropriate and nasal secretions for changes in amount and color  - [place] appropriate cooling/warming therapies per order  - Administer medications as ordered  - Instruct and encourage patient and family to use good hand hygiene technique  - Identify and instruct in appropriate isolation precautions for identified infection/condition  Outcome: Progressing     Problem: SAFETY ADULT  Goal: Patient will remain free of falls  Description: INTERVENTIONS:  - Educate patient/family on patient safety including physical limitations  - Instruct patient to call for assistance with activity   - Consider consulting OT/PT to assist with strengthening/mobility based on AM PAC & -HLM score  - Consult OT/PT to assist with strengthening/mobility   - Keep Call bell within reach  - Keep bed low and locked with side rails adjusted as appropriate  - Keep care items and personal belongings within reach  - Initiate and maintain comfort rounds  - Make Fall Risk Sign visible to staff  - Offer Toileting every 2 Hours, in advance of need  - Initiate/Maintain bed alarm  - Obtain necessary fall risk management equipment: bed alarm   - Apply yellow socks and bracelet for high fall risk patients  - Consider moving patient to room near nurses station  Outcome: Progressing  Goal: Maintain or return to baseline ADL function  Description: INTERVENTIONS:  -  Assess patient's ability to carry out ADLs; assess patient's baseline for ADL function and identify physical deficits which impact ability to perform ADLs (bathing, care of mouth/teeth, toileting, grooming, dressing, etc.)  - Assess/evaluate cause of self-care deficits   - Assess range of motion  - Assess patient's mobility; develop plan if impaired  - Assess patient's need for assistive devices and provide as appropriate  - Encourage maximum independence but intervene and supervise when necessary  - Involve family in performance  of ADLs  - Assess for home care needs following discharge   - Consider OT consult to assist with ADL evaluation and planning for discharge  - Provide patient education as appropriate  - Monitor functional capacity and physical performance, use of AM PAC & JH-HLM   - Monitor gait, balance and fatigue with ambulation    Outcome: Progressing  Goal: Maintains/Returns to pre admission functional level  Description: INTERVENTIONS:  - Perform AM-PAC 6 Click Basic Mobility/ Daily Activity assessment daily.  - Set and communicate daily mobility goal to care team and patient/family/caregiver.   - Collaborate with rehabilitation services on mobility goals if consulted  - Perform Range of Motion 4 times a day.  - Reposition patient every 2 hours.  - Dangle patient 3 times a day  - Stand patient 3 times a day  - Ambulate patient 3 times a day  - Out of bed to chair 3 times a day   - Out of bed for meals 3 times a day  - Out of bed for toileting  - Record patient progress and toleration of activity level   Outcome: Progressing     Problem: DISCHARGE PLANNING  Goal: Discharge to home or other facility with appropriate resources  Description: INTERVENTIONS:  - Identify barriers to discharge w/patient and caregiver  - Arrange for needed discharge resources and transportation as appropriate  - Identify discharge learning needs (meds, wound care, etc.)  - Arrange for interpretive services to assist at discharge as needed  - Refer to Case Management Department for coordinating discharge planning if the patient needs post-hospital services based on physician/advanced practitioner order or complex needs related to functional status, cognitive ability, or social support system  Outcome: Progressing     Problem: Knowledge Deficit  Goal: Patient/family/caregiver demonstrates understanding of disease process, treatment plan, medications, and discharge instructions  Description: Complete learning assessment and assess knowledge  base.  Interventions:  - Provide teaching at level of understanding  - Provide teaching via preferred learning methods  Outcome: Progressing     Problem: RESPIRATORY - ADULT  Goal: Achieves optimal ventilation and oxygenation  Description: INTERVENTIONS:  - Assess for changes in respiratory status  - Assess for changes in mentation and behavior  - Position to facilitate oxygenation and minimize respiratory effort  - Oxygen administered by appropriate delivery if ordered  - Initiate smoking cessation education as indicated  - Encourage broncho-pulmonary hygiene including cough, deep breathe, Incentive Spirometry  - Assess the need for suctioning and aspirate as needed  - Assess and instruct to report SOB or any respiratory difficulty  - Respiratory Therapy support as indicated  Outcome: Progressing     Problem: Nutrition/Hydration-ADULT  Goal: Nutrient/Hydration intake appropriate for improving, restoring or maintaining nutritional needs  Description: Monitor and assess patient's nutrition/hydration status for malnutrition. Collaborate with interdisciplinary team and initiate plan and interventions as ordered.  Monitor patient's weight and dietary intake as ordered or per policy. Utilize nutrition screening tool and intervene as necessary. Determine patient's food preferences and provide high-protein, high-caloric foods as appropriate.     INTERVENTIONS:  - Monitor oral intake, urinary output, labs, and treatment plans  - Assess nutrition and hydration status and recommend course of action  - Evaluate amount of meals eaten  - Assist patient with eating if necessary   - Allow adequate time for meals  - Recommend/ encourage appropriate diets, oral nutritional supplements, and vitamin/mineral supplements  - Order, calculate, and assess calorie counts as needed  - Recommend, monitor, and adjust tube feedings and TPN/PPN based on assessed needs  - Assess need for intravenous fluids  - Provide specific nutrition/hydration  education as appropriate  - Include patient/family/caregiver in decisions related to nutrition  Outcome: Progressing

## 2025-06-25 NOTE — CASE MANAGEMENT
Case Management Discharge Planning Note    Patient name Jayla Moody  Location [facility] 2 M* MRN [id number]  : 1945 Date 2025       Current Admission Date: 2025  Current Admission Diagnosis:Shortness of breath   Patient Active Problem List    Diagnosis Date Noted    Mild HOCM (hypertrophic obstructive cardiomyopathy) (HCC) 2025    Shortness of breath 2025    Hypertensive kidney disease with stage 3b chronic kidney disease (HCC) 2025    Chronic kidney disease-mineral and bone disorder (CKD-MBD) 2025    Mixed hyperlipidemia 2025    At risk for nutrition problem 10/16/2024    Persistent proteinuria 10/16/2024    Melena 2024    Class 3 severe obesity due to excess calories with serious comorbidity and body mass index (BMI) of 40.0 to 44.9 in adult 2024    Cancer-related pain 2023    Anemia due to acute blood loss 2023    CARLOS ENRIQUE (acute kidney injury) (HCC) 2023    Atrial flutter (HCC) 08/15/2023    Chronic diastolic congestive heart failure (HCC) 2023    Coronary artery disease involving native coronary artery of native heart without angina pectoris 07/10/2023    Depression, recurrent (HCC) 2021    Severe asthma 2020    Malignant neoplasm metastatic to bone (HCC) 2020    Non-small cell lung cancer (HCC) 2020    MONO (obstructive sleep apnea)     Chronic rhinitis 2019    Chronic gastritis without bleeding 2019    Gastroesophageal reflux disease without esophagitis 2019    Thrombocytopenia (HCC) 10/31/2018    Essential hypertension 10/31/2018    Hiatal hernia 10/31/2018      LOS (days): 7  Geometric Mean LOS (GMLOS) (days): 2.9  Days to GMLOS:-3.9     OBJECTIVE:  Risk of Unplanned Readmission Score: 28.95         Current admission status: Inpatient   Preferred Pharmacy:   [id number] [place] - [address]  [address]  [place] [address]  Phone: [phone] Fax:  [phone]    \[facility] - [place], [place] - [address]  [address]  [address]  [place] [address]  Phone: [phone] Fax: [phone]    Primary Care Provider: Michelle Chatterjee MD    Primary Insurance: [employer]  Secondary Insurance:     DISCHARGE DETAILS:    Discharge planning discussed with:: Patient  Freedom of Choice: Yes  Comments - Freedom of Choice: Patient agreeable to C- [facility] (PT/OT).  CM contacted family/caregiver?: No- see comments (Patient declined need to contact family members,)  Were Treatment Team discharge recommendations reviewed with patient/caregiver?: Yes  Did patient/caregiver verbalize understanding of patient care needs?: Yes       Contacts  Patient Contacts: Jimmy Moody-son  Relationship to Patient:: Family  Contact Method: Phone  Phone Number: [phone]  Reason/Outcome: Emergency Contact    Requested Home Health Care         Is the patient interested in HHC at discharge?: Yes  Home Health Discipline requested:: Occupational Therapy, Physical Therapy  Home Health Agency Name:: [facility]  Home Health Follow-Up Provider:: PCP  Home Health Services Needed:: Evaluate Functional Status and Safety, Strengthening/Theraputic Exercises to Improve Function  Homebound Criteria Met:: Requires the Assistance of Another Person for Safe Ambulation or to Leave the Home, Uses an Assist Device (i.e. cane, walker, etc)  Supporting Clincal Findings:: Dyspnea with Exertion, Fatigues Easliy in Short Distances, Limited Endurance    DME Referral Provided  Referral made for DME?: Yes  DME referral completed for the following items:: Walker  DME Supplier Name:: [facility]    Other Referral/Resources/Interventions Provided:  Interventions: DME, HHC  Referral Comments: HHC (PT/OT) [facility], DME: Walker    Treatment Team Recommendation: Home with Home Health Care  Expected Discharge Disposition: Home Health Services  Additional Discharge Dispositions: Home Health  Services    IMM Given (Date):: 06/25/25  IMM Given to:: Patient     Additional Comments: CM confirmed patient medically clear for discharge. CM reviewed discharge plan with patient at bedside. Patient agreeable to referral for C with [facility] (PT/OT). Patient confirmed delivery of  home walker with patient's neighbor delivered by [employer]. CM provide patient with IMM; patient understood and agreeable for discharge appeal. Patient confirmed patient's son to provide transport for patient at 2:30P.M. CM updated bedside RN. CM to follow for further discharge planning.

## 2025-06-25 NOTE — TELEPHONE ENCOUNTER
Patient called stating that she was just released from the hospital today. She said she had an asthma attack and that is why she went. She said she was given IV fluids and she had edema in her legs from the knee down. She is asking if she can resume the furosemide 20mg that she has at home? Updated labs are in the system. Patient denies chest pain, but says she is SOB from her asthma. Please advise. Also asking if she needs a f/u appt?

## 2025-06-25 NOTE — RESTORATIVE TECHNICIAN NOTE
Restorative Technician Note      Patient Name: Jayla Moody     Restorative Tech Visit Date: 06/25/25  Note Type: Mobility  Patient Position Upon Consult: Bedside chair  Activity Performed: Ambulated  Assistive Device: Cane  Patient Position at End of Consult: All needs within reach; Bedside chair

## 2025-06-25 NOTE — NURSING NOTE
Patient discharged AVS reviewed and patient understanding. Left via private vehicle with brother. Left unit in stable condition.

## 2025-06-25 NOTE — PLAN OF CARE
Problem: Potential for Falls  Goal: Patient will remain free of falls  Description: INTERVENTIONS:  - Educate patient/family on patient safety including physical limitations  - Instruct patient to call for assistance with activity   - Consider consulting OT/PT to assist with strengthening/mobility based on AM PAC & JH-HLM score  - Consult OT/PT to assist with strengthening/mobility   - Keep Call bell within reach  - Keep bed low and locked with side rails adjusted as appropriate  - Keep care items and personal belongings within reach  - Initiate and maintain comfort rounds  - Make Fall Risk Sign visible to staff  - Offer Toileting every 2 Hours, in advance of need  - Initiate/Maintain bed alarm  - Obtain necessary fall risk management equipment: bed alarm   - Apply yellow socks and bracelet for high fall risk patients  - Consider moving patient to room near nurses station  6/25/2025 1331 by Britney Castro RN  Outcome: Adequate for Discharge  6/25/2025 0751 by Britney Castro RN  Outcome: Progressing  6/25/2025 0750 by Britney Castro RN  Outcome: Progressing     Problem: PAIN - ADULT  Goal: Verbalizes/displays adequate comfort level or baseline comfort level  Description: Interventions:  - Encourage patient to monitor pain and request assistance  - Assess pain using appropriate pain scale  - Administer analgesics as ordered based on type and severity of pain and evaluate response  - Implement non-pharmacological measures as appropriate and evaluate response  - Consider cultural and social influences on pain and pain management  - Notify physician/advanced practitioner if interventions unsuccessful or patient reports new pain  - Educate patient/family on pain management process including their role and importance of  reporting pain   - Provide non-pharmacologic/complimentary pain relief interventions  6/25/2025 1331 by Britney Castro RN  Outcome: Adequate for Discharge  6/25/2025 0751 by Britney Castro  RN  Outcome: Progressing  6/25/2025 0750 by Britney Castro RN  Outcome: Progressing     Problem: INFECTION - ADULT  Goal: Absence or prevention of progression during hospitalization  Description: INTERVENTIONS:  - Assess and monitor for signs and symptoms of infection  - Monitor lab/diagnostic results  - Monitor all insertion sites, i.e. indwelling lines, tubes, and drains  - Monitor endotracheal if appropriate and nasal secretions for changes in amount and color  - Sunflower appropriate cooling/warming therapies per order  - Administer medications as ordered  - Instruct and encourage patient and family to use good hand hygiene technique  - Identify and instruct in appropriate isolation precautions for identified infection/condition  6/25/2025 1331 by Britney Castro RN  Outcome: Adequate for Discharge  6/25/2025 0751 by Britney Castro RN  Outcome: Progressing  6/25/2025 0750 by Britney Castro RN  Outcome: Progressing     Problem: SAFETY ADULT  Goal: Patient will remain free of falls  Description: INTERVENTIONS:  - Educate patient/family on patient safety including physical limitations  - Instruct patient to call for assistance with activity   - Consider consulting OT/PT to assist with strengthening/mobility based on AM PAC & JH-HLM score  - Consult OT/PT to assist with strengthening/mobility   - Keep Call bell within reach  - Keep bed low and locked with side rails adjusted as appropriate  - Keep care items and personal belongings within reach  - Initiate and maintain comfort rounds  - Make Fall Risk Sign visible to staff  - Offer Toileting every 2 Hours, in advance of need  - Initiate/Maintain bed alarm  - Obtain necessary fall risk management equipment: bed alarm   - Apply yellow socks and bracelet for high fall risk patients  - Consider moving patient to room near nurses station  6/25/2025 1331 by Britney Castro RN  Outcome: Adequate for Discharge  6/25/2025 0751 by Britney Castro RN  Outcome:  Progressing  6/25/2025 0750 by Britney Castro RN  Outcome: Progressing  Goal: Maintain or return to baseline ADL function  Description: INTERVENTIONS:  -  Assess patient's ability to carry out ADLs; assess patient's baseline for ADL function and identify physical deficits which impact ability to perform ADLs (bathing, care of mouth/teeth, toileting, grooming, dressing, etc.)  - Assess/evaluate cause of self-care deficits   - Assess range of motion  - Assess patient's mobility; develop plan if impaired  - Assess patient's need for assistive devices and provide as appropriate  - Encourage maximum independence but intervene and supervise when necessary  - Involve family in performance of ADLs  - Assess for home care needs following discharge   - Consider OT consult to assist with ADL evaluation and planning for discharge  - Provide patient education as appropriate  - Monitor functional capacity and physical performance, use of AM PAC & JH-HLM   - Monitor gait, balance and fatigue with ambulation    6/25/2025 1331 by Britney Castro RN  Outcome: Adequate for Discharge  6/25/2025 0751 by Britney Castro RN  Outcome: Progressing  6/25/2025 0750 by Britney Castro RN  Outcome: Progressing  Goal: Maintains/Returns to pre admission functional level  Description: INTERVENTIONS:  - Perform AM-PAC 6 Click Basic Mobility/ Daily Activity assessment daily.  - Set and communicate daily mobility goal to care team and patient/family/caregiver.   - Collaborate with rehabilitation services on mobility goals if consulted  - Perform Range of Motion 4 times a day.  - Reposition patient every 2 hours.  - Dangle patient 3 times a day  - Stand patient 3 times a day  - Ambulate patient 3 times a day  - Out of bed to chair 3 times a day   - Out of bed for meals 3 times a day  - Out of bed for toileting  - Record patient progress and toleration of activity level   6/25/2025 1331 by Britney Castro RN  Outcome: Adequate for  Discharge  6/25/2025 0751 by Britney Castro RN  Outcome: Progressing  6/25/2025 0750 by Britney Castro RN  Outcome: Progressing     Problem: DISCHARGE PLANNING  Goal: Discharge to home or other facility with appropriate resources  Description: INTERVENTIONS:  - Identify barriers to discharge w/patient and caregiver  - Arrange for needed discharge resources and transportation as appropriate  - Identify discharge learning needs (meds, wound care, etc.)  - Arrange for interpretive services to assist at discharge as needed  - Refer to Case Management Department for coordinating discharge planning if the patient needs post-hospital services based on physician/advanced practitioner order or complex needs related to functional status, cognitive ability, or social support system  6/25/2025 1331 by Britney Castro RN  Outcome: Adequate for Discharge  6/25/2025 0751 by Britney Castro RN  Outcome: Progressing  6/25/2025 0750 by Britney Castro RN  Outcome: Progressing     Problem: Knowledge Deficit  Goal: Patient/family/caregiver demonstrates understanding of disease process, treatment plan, medications, and discharge instructions  Description: Complete learning assessment and assess knowledge base.  Interventions:  - Provide teaching at level of understanding  - Provide teaching via preferred learning methods  6/25/2025 1331 by Britney Castro RN  Outcome: Adequate for Discharge  6/25/2025 0751 by Britney Castro RN  Outcome: Progressing  6/25/2025 0750 by Britney Castro RN  Outcome: Progressing     Problem: RESPIRATORY - ADULT  Goal: Achieves optimal ventilation and oxygenation  Description: INTERVENTIONS:  - Assess for changes in respiratory status  - Assess for changes in mentation and behavior  - Position to facilitate oxygenation and minimize respiratory effort  - Oxygen administered by appropriate delivery if ordered  - Initiate smoking cessation education as indicated  - Encourage broncho-pulmonary  hygiene including cough, deep breathe, Incentive Spirometry  - Assess the need for suctioning and aspirate as needed  - Assess and instruct to report SOB or any respiratory difficulty  - Respiratory Therapy support as indicated  6/25/2025 1331 by Britney Castro RN  Outcome: Adequate for Discharge  6/25/2025 0751 by Britney Castro RN  Outcome: Progressing  6/25/2025 0750 by Britney Castro RN  Outcome: Progressing     Problem: OCCUPATIONAL THERAPY ADULT  Goal: Performs self-care activities at highest level of function for planned discharge setting.  See evaluation for individualized goals.  Description: Treatment Interventions: ADL retraining, Functional transfer training, UE strengthening/ROM, Cognitive reorientation, Patient/family training, Equipment evaluation/education, Neuromuscular reeducation, Compensatory technique education, Energy conservation, Activityengagement          See flowsheet documentation for full assessment, interventions and recommendations.   Outcome: Adequate for Discharge

## 2025-06-26 ENCOUNTER — PATIENT OUTREACH (OUTPATIENT)
Dept: FAMILY MEDICINE CLINIC | Facility: CLINIC | Age: 80
End: 2025-06-26

## 2025-06-26 ENCOUNTER — TRANSITIONAL CARE MANAGEMENT (OUTPATIENT)
Dept: FAMILY MEDICINE CLINIC | Facility: CLINIC | Age: 80
End: 2025-06-26

## 2025-06-26 DIAGNOSIS — Z91.89 HIGH RISK FOR READMISSION: Primary | ICD-10-CM

## 2025-06-26 NOTE — TELEPHONE ENCOUNTER
Lm for eva mohan to start lasix again and repeat RFP prior to her upcoming appt next week. Order in epic no fasting needed.

## 2025-06-26 NOTE — DISCHARGE SUMMARY
Discharge Summary - Hospitalist   Name: Jayla Moody 80 y.o. female I MRN: [id number]  Unit/Bed#: [place] [address] -01 I Date of Admission: 6/18/2025   Date of Service: 6/25/2025 I Hospital Day: 7     Assessment & Plan  Shortness of breath  Patient is a morbidly obese 80-year-old female past medical history of asthma, atrial fibrillation, hypertension, lung cancer with mets to the bone, and hyperlipidemia who presents to the emergency department with complaints of shortness of breath, worse on exertion, and dizziness for the last few days.  In the emergency room imaging suggests vascular congestion and pleural effusions, she was given IV Lasix and will be admitted for further treatment.    chest x-ray suggests minimal pleural effusion  BNP is marginally elevated at 110  Last echocardiogram 6/3: The left ventricular ejection fraction is 65%. Systolic function is normal. Wall motion is normal. Diastolic function is mildly abnormal, consistent with grade I (abnormal) relaxation.   I suspect the patient's symptoms are mostly related to asthma rather than congestive heart failure as well as anemia playing a role  Severe asthma    Schedule [employer] is nonformulary, continue dose equivalent  The patient's respiratory status has improved substantially.  She will be transition to oral prednisone, continue prednisone 40 mg daily  Anemia due to acute blood loss  The patient became acutely anemic during her hospitalization.  She describes melena.  Her hemoglobin fell to 6.7 on 6/21 and she received blood.  She underwent EGD 6/19 which revealed no site of bleeding.  Colonoscopy was delayed yesterday due to incomplete bowel prep, clear BMS today, planning for procedure later today  Will continue to monitor hemoglobin  MONO (obstructive sleep apnea)  Unable to tolerate CPAP outpatient, sent her machine back  Follow-up outpatient with pulmonology  Non-small cell lung cancer (HCC)  Follows [facility]  oncology  Continue alectinib  Malignant neoplasm metastatic to bone (HCC)  Patient had 1 treatment of radiation when bone mets were initially diagnosed  Continue outpatient follow-up with oncology  Atrial flutter (HCC)  Rate controlled  Continue beta-blocker  Plan to restart eliquis after cscope   Continue to monitor  Class 3 severe obesity due to excess calories with serious comorbidity and body mass index (BMI) of 40.0 to 44.9 in adult  BMI 40.62  Patient would benefit greatly from very low carbohydrate diet (less than 50 g daily) and significant caloric restriction (less than 2000 kcals per day).  Hypertensive kidney disease with stage 3b chronic kidney disease (HCC)  Blood pressure and renal function currently stable  Continue home antihypertensives  Trend renal function, monitor routine vital signs    Lab Results   Component Value Date    EGFR 40 06/25/2025    EGFR 54 06/24/2025    EGFR 32 06/19/2025    CREATININE 1.27 06/25/2025    CREATININE 1.00 06/24/2025    CREATININE 1.54 (H) 06/19/2025     Mixed hyperlipidemia  Rosuvastatin is nonformulary, continue dose equivalent pravastatin 80 mg daily  Essential hypertension  Stable on current therapy.  Coronary artery disease involving native coronary artery of native heart without angina pectoris  No angina.  On aspirin and appropriate risk factor modification.  Chronic diastolic congestive heart failure (HCC)  Wt Readings from Last 3 Encounters:   06/24/25 99.8 kg (220 lb)   06/03/25 97.5 kg (215 lb)   05/27/25 97.5 kg (215 lb)     Currently euvolemic.  Continue current therapy.     Medical Problems       Resolved Problems  Date Reviewed: 5/16/2025   None       Discharging Physician / Practitioner: Nancy Pastor MD  PCP: Michelle Chatterjee MD  Admission Date:   Admission Orders (From admission, onward)       Ordered        06/18/25 1741  INPATIENT ADMISSION  Once            06/18/25 0512  Place in Observation  Once                          Discharge Date:  06/25/25    Next Steps for Physician/AP Assuming Care:  Follow up with PCP for monitoring hgb outpatient  No cause of anemia found on EGD or colonscopy, anemia work up and consider hematology referral outpatient  Follow up pathology results from EGD, GI follow up outpatient    Test Results Pending at Discharge (will require follow up):      Medication Changes for Discharge & Rationale:   Protonix 40 mg BID  See after visit summary for reconciled discharge medications provided to patient and/or family.     Consultations During Hospital Stay:  GI    Procedures Performed:   EGD, colonoscopy    Significant Findings / Test Results:   Colonscopy results: Possible non bleeding arteriovenous malformation seen in the ascending colon. Treated with circumferential APC.  Several small colonic polyps that were seen but not removed 2/2 indication of colonoscopy being for bleeding.  Severe diverticulosis with multiple extensive pancolonic diverticula containing no content and causing moderate luminal narrowing  Internal large hemorrhoids observed during perianal exam and retroflexion      Hospital Course:   Jayla Moody is a 80 y.o. female patient who originally presented to the hospital on 6/18/2025 due to complaints of shortness of breath, worse on exertion, and dizziness for the last few days.  In the emergency room imaging suggests vascular congestion and pleural effusions, she was given IV Lasix and will be admitted for further treatment. Her BNP was within normal limits however she has high BMI. She was given IV lasix initially. Ultimately her symptoms were more consistent with a asthma exacerbation she was given scheduled nebs and steroid treatment with significant improvement in her respiratory symptoms. During her hospital course she Had acute on chronic anemia recording blood transfusion. Her baseline hemoglobin is 9 to 10. Her hemoglobin fell to 6.7 on 6/21 . She does have history of iron deficiency anemia, as well as  melanotic stools. Gastroenterology was consulted who performed EGD and colonoscopy But did not find any acute cause for her anemia.But did not find any acute cause for her anemia.   EGD results: 2 cm hiatal hernia - GE junction 38 cm from the incisors, diaphragmatic impression 40 cm from the incisors  The esophagus, duodenum and proximal jejunum appeared normal.  Patchy edematous and nodular mucosa in the antrum  Performed forceps biopsies in the body of the stomach and antrum to rule out H. Pylori    Colonoscopy results: Fair prep  The terminal ileum appeared normal.  Possible non bleeding arteriovenous malformation seen in the ascending colon. Treated with circumferential APC.  Several small colonic polyps that were seen but not removed 2/2 indication of colonoscopy being for bleeding.  Severe extensive diverticulosis and causing moderate luminal narrowing  Internal large hemorrhoids    Eliquis was resumed after procedure.     Recommend PPI 40 mg daily. Can start in outpatient setting. PCP to follow up labs to monitor hgb. Continue prednisone 40mg daily for 2 more days then stop. Follow up with pcp within 1-2 weeks.                Please see above list of diagnoses and related plan for additional information.     Discharge Day Visit / Exam:         Discharge instructions/Information to patient and family:   See after visit summary for information provided to patient and family.      Provisions for Follow-Up Care:  See after visit summary for information related to follow-up care and any pertinent home health orders.      Mobility at time of Discharge:   Basic Mobility Inpatient Raw Score: 22  JH-HLM Goal: 7: Walk 25 feet or more  JH-HLM Achieved: 7: Walk 25 feet or more       Disposition:   Home    Planned Readmission: no    Administrative Statements   Discharge Statement:  I have spent a total time of 50 minutes in caring for this patient on the day of the visit/encounter. >30 minutes of time was spent on:  Diagnostic results, Instructions for management, Impressions, Counseling / Coordination of care, Documenting in the medical record, and Reviewing / ordering tests, medicine, procedures  .    **Please Note: This note may have been constructed using a voice recognition system**

## 2025-06-26 NOTE — ASSESSMENT & PLAN NOTE
Blood pressure and renal function currently stable  Continue home antihypertensives  Trend renal function, monitor routine vital signs    Lab Results   Component Value Date    EGFR 40 06/25/2025    EGFR 54 06/24/2025    EGFR 32 06/19/2025    CREATININE 1.27 06/25/2025    CREATININE 1.00 06/24/2025    CREATININE 1.54 (H) 06/19/2025

## 2025-06-26 NOTE — UTILIZATION REVIEW
NOTIFICATION OF ADMISSION DISCHARGE   This is a Notification of Discharge from [facility]. Please be advised that this patient has been discharge from our facility. Below you will find the admission and discharge date and time including the patient’s disposition.   UTILIZATION REVIEW CONTACT:  Utilization Review Assistants  Network Utilization Review Department  Phone: [phone] x carefully listen to the prompts. All voicemails are confidential.  Email: [email or web address]     ADMISSION INFORMATION  PRESENTATION DATE: 6/18/2025  2:48 AM  OBERVATION ADMISSION DATE: 06/18/2025 0512  INPATIENT ADMISSION DATE: 6/18/25  5:41 PM   DISCHARGE DATE: 6/25/2025  2:15 PM   DISPOSITION:Home/Self Care    Network Utilization Review Department  ATTENTION: Please call with any questions or concerns to [phone] and carefully listen to the prompts so that you are directed to the right person. All voicemails are confidential.   For Discharge needs, contact Care Management DC Support Team at [phone] opt. 2  Send all requests for admission clinical reviews, approved or denied determinations and any other requests to dedicated fax number below belonging to the campus where the patient is receiving treatment. List of dedicated fax numbers for the Facilities:  FACILITY NAME UR FAX NUMBER   ADMISSION DENIALS (Administrative/Medical Necessity) [phone]   DISCHARGE SUPPORT TEAM ([facility]) [phone]   PARENT CHILD HEALTH (Maternity/NICU/Pediatrics) [phone]   General acute hospital [phone]   [facility] [phone]   [facility] [phone]   [facility] [phone]   [facility] [phone]   [facility] [phone]   [facility] [phone]   [facility] [phone]    [facility] [phone]   [facility] [phone]   [facility] [phone]   [facility] [phone]

## 2025-06-26 NOTE — ASSESSMENT & PLAN NOTE
Wt Readings from Last 3 Encounters:   06/24/25 99.8 kg (220 lb)   06/03/25 97.5 kg (215 lb)   05/27/25 97.5 kg (215 lb)     Currently euvolemic.  Continue current therapy.

## 2025-06-26 NOTE — PROGRESS NOTES
Chronic Care Management Program Consent:    Patient informed of availability of Chronic Care Management services. The services will billed monthly by their Primary Care Provider only. Patient is informed there may be a monthly cost sharing associated with the Chronic Care Management services. Patient is aware that financial counseling is available to assist with any co-pay questions or concerns.    Chronic Care Management services include:    24/7 access to care.  Comprehensive plan of care created by the provider.  Individualized care planning by the care manager(s).  Transitional care support.    The patient is informed that they have the right to stop Chronic Care Management services at anytime.       Patient consents to Chronic Care Management services? no      Patient informed that consent is needed only once unless the patient switches qualifying providers.    HRR/CCM.    I called the patient, introduced my role and explained the CCM program which she declines.    The patient reports she is feeling better since her discharge yesterday for shortness of breath.  She notes she is taking the steroids and other meds as prescribed.  The patient denies dizziness.  She does note some shortness of breath with exertion.   She has her MDI and neb to use as needed.      The patient had no questions or concerns.      Chart reviewed.

## 2025-06-27 ENCOUNTER — HOSPITAL ENCOUNTER (OUTPATIENT)
Dept: INFUSION CENTER | Facility: HOSPITAL | Age: 80
Discharge: HOME | End: 2025-06-27
Attending: INTERNAL MEDICINE

## 2025-06-27 VITALS — WEIGHT: 220.9 LBS | BODY MASS INDEX: 41.71 KG/M2 | HEIGHT: 61 IN

## 2025-06-27 DIAGNOSIS — C79.51 MALIGNANT NEOPLASM METASTATIC TO BONE (HCC): ICD-10-CM

## 2025-06-27 DIAGNOSIS — C34.90 NON-SMALL CELL LUNG CANCER, UNSPECIFIED LATERALITY (HCC): ICD-10-CM

## 2025-06-27 PROCEDURE — 88305 TISSUE EXAM BY PATHOLOGIST: CPT | Performed by: PATHOLOGY

## 2025-06-27 PROCEDURE — 88341 IMHCHEM/IMCYTCHM EA ADD ANTB: CPT | Performed by: PATHOLOGY

## 2025-06-27 PROCEDURE — 88342 IMHCHEM/IMCYTCHM 1ST ANTB: CPT | Performed by: PATHOLOGY

## 2025-06-28 PROBLEM — I48.92 ATRIAL FLUTTER (HCC): Status: RESOLVED | Noted: 2023-08-15 | Resolved: 2025-06-28

## 2025-06-30 ENCOUNTER — TELEPHONE (OUTPATIENT)
Dept: FAMILY MEDICINE CLINIC | Facility: CLINIC | Age: 80
End: 2025-06-30

## 2025-06-30 NOTE — TELEPHONE ENCOUNTER
Taylor from [facility] called , They are going to be on charge of patient care, She wants us to save her contact number in case we need it Contact number is [phone], ext [phone].

## 2025-06-30 NOTE — ACP (ADVANCE CARE PLANNING)
Advanced Care Planning Progress Note    Serious Illness Conversation    No data filed         Genesis Aj PA-C     NEPHROLOGY ADVANCED GOALS OF CARE MEETING  Jayla Moody 80 y.o. female MRN: [id number]  2025        ASSESSMENT and PLAN:    I had the pleasure of seeing Jayla Moody today in the renal clinic for discussion of the patient's goals of care. Our discussion today will focus on helping Jayla achieve her desired goals, long term goals of care and how best to achieve those goals. The participants during this visit include the patient.        Kidney Smart Class: Referred to Kidney Smart Class  Would Pursue Dialysis if Needed: Yes  Dialysis Access: No Access   Advanced Directive: Not Completed  Code Status: Level 1: Full Code   POA: Not officially, but has discussed with her son (Jimmy Moody) that she wants him to be her POA  Do they need to be referred to Palliative Care: I would recommend palliative care referral- Patient was seen in consult by palliative , has not followed up since. Offered referral again given symptoms and co-morbidities but pt declined, states she already has too many appointments    Do they need to be referred to Hospice: No  Length/Time of Meetin Minutes  Outpatient Nephrologist: Dot Germain MD    Discussion and Plan:    Patient has history of right lung cancer, she had near resolution with Alectinib and this was then discontinued. Unfortunately recently noted new LLL nodule. She was started back on Alectinib. Today she notes several symptoms, suspects these are side effects from the oral chemo. She actually stopped taking this on , says she feels slightly better today without it. She questions taking this, thinking if it will give her symptoms and cause more issues with her health that she may as well just stop. She has an appointment with  her oncologist on  to discuss this further. We discussed her overall health status, her goals, and what she would  want for her future. At this point she would want most major interventions. However, she notes she would NOT want long term mechanical ventilation. She does want CPR and would be okay with ventilation if it was short term and if she would recover. However if it is deemed she would not wake up, she would not want to be kept alive on mechanical ventilation. She designates her son as her POA (not present for today's visit), but has not completed Five Wishes packet and has no legal documentation of POA. She was given the Five Wishes packet today. She will discuss with her son, fill out the packet, and bring it back into the office. All questions and concerns addressed today.       REVIEW OF SYSTEMS:    Review of Systems   Constitutional:  Positive for appetite change and fatigue.   Respiratory:  Positive for shortness of breath (with exertion). Negative for chest tightness.    Cardiovascular:  Positive for leg swelling. Negative for chest pain.   Gastrointestinal:  Positive for nausea. Negative for abdominal pain, diarrhea and vomiting.   Genitourinary:  Negative for decreased urine volume, difficulty urinating, dysuria and hematuria.   Musculoskeletal:         Thigh pain    Neurological:  Positive for light-headedness (intermittent) and headaches (intermitent). Negative for dizziness.         Medications:  Current Medications[1]      Serious Illness Conversation    1. What is your understanding now of where you are with your illness?  Prognostic Understanding: appropriate understanding of prognosis     2. How much information about what is likely to be ahead with your illness would you like to have?  Information: patient wants to be fully informed     3. What did you (clinician) communicate to the patient?  Prognostic Communication: Function - I hope that this is not the case, but I’m worried that this may be as strong as you will feel, and things are likely to get more difficult.     4. If your health situation  worsens, what are your most important goals?  Goals: be physically comfortable  And be with grandkids      5. What are the biggest fears and worries about the future and your health?  Fears/Worries: other symptoms, being a physical burden  Not being able to get out of bed      6. What abilities are so critical to your life that you cannot imagine living without them?  Unacceptable Function: being in chronic severe pain, being unable to interact with others, being unable to talk, being in pain or very uncomfortable     7. What gives you strength as you think about the future with your illness?  Praying and thinking about God.      8. If you become sicker, how much are you willing to go through for the possibility of gaining more time?  Be in the hospital: Yes Have a feeding tube: Yes   Be in the ICU: Yes Live in a nursing home: Yes   Be on a ventilator: No Be uncomfortable: Yes   Be on dialysis: Yes Undergo aggressive test and/or procedures: Yes   Patient states that she would want CPR and would be okay with temporary ventilation, but would NOT want to be on a ventilator long term. She would only want mechanical ventilation if she would be able to recover. If in a coma and would not recover/ not wake up, she would not want to continue mechanical ventilation.     9. How much does your proxy and family know about your priorities and wishes?  Discussion Discussion: extensive discussion with family about goals and wishes  Son is her verbal POA, no legal documentation yet. Patient states she thinks he is aware of her wishes. Wants him to be involved.     I’ve heard you say that being physically comfortable is really important to you. Keeping that in mind, and what we know about your illness, I recommend that you follow closely with your oncologist on discussion of whether or not to continue treatment for your lung cancer. From a renal perspective, I recommend continuing to follow with us so we can manage your kidney  disease. I also recommend considering palliative care again. This will help us make sure that your treatment plans reflect what’s important to you.     How does this plan sound to you? I will do everything I can to help you through this.  Patient verbalized understanding of the plan     I have spent 30 minutes speaking with my patient on advanced care planning today or during this visit     Advanced directives  Five Wishes: Patient does not have Five Wishes- would like information            [1]   Current Outpatient Medications:     acetaminophen (TYLENOL) 650 mg CR tablet, Take 650 mg by mouth every 8 (eight) hours as needed, Disp: , Rfl:     albuterol (2.5 mg/3 mL) 0.083 % nebulizer solution, Take 3 mL (2.5 mg total) by nebulization 2 (two) times a day, Disp: 180 mL, Rfl: 11    albuterol (PROVENTIL HFA,VENTOLIN HFA) 90 mcg/act inhaler, Inhale 2 puffs every 6 (six) hours as needed for wheezing, Disp: 18 g, Rfl: 2    Alectinib HCl 150 MG CAPS, Take 3 capsules (450 mg total) by mouth 2 (two) times a day, Disp: 180 capsule, Rfl: 5    amiodarone 200 mg tablet, Take 1 tablet (200 mg total) by mouth daily, Disp: 30 tablet, Rfl: 3    amLODIPine (NORVASC) 2.5 mg tablet, Take 1 tablet (2.5 mg total) by mouth daily, Disp: 30 tablet, Rfl: 3    apixaban (ELIQUIS) 5 mg, Take 1 tablet (5 mg total) by mouth 2 (two) times a day, Disp: 60 tablet, Rfl: 11    benralizumab (FASENRA) subcutaneous injection, Inject 1 mL (30 mg total) under the skin every 56 days, Disp: 1 mL, Rfl: 11    Budeson-Glycopyrrol-Formoterol (Breztri Aerosphere) 160-9-4.8 MCG/ACT AERO, Inhale 2 puffs 2 (two) times a day Rinse mouth after use., Disp: 10.7 g, Rfl: 11    calcitriol (ROCALTROL) 0.25 mcg capsule, Take one tablet five days a week Do not start before April 25, 2025., Disp: 60 capsule, Rfl: 3    fluticasone (FLONASE) 50 mcg/act nasal spray, SPRAY 2 SPRAYS INTO EACH NOSTRIL EVERY DAY, Disp: 48 mL, Rfl: 1    levocetirizine (XYZAL) 5 MG tablet, TAKE 1  TABLET BY MOUTH EVERY DAY IN THE EVENING, Disp: 90 tablet, Rfl: 4    lidocaine (Lidoderm) 5 %, Apply 1 patch topically over 12 hours daily Remove & Discard patch within 12 hours or as directed by MD, Disp: 5 patch, Rfl: 0    lisinopril (ZESTRIL) 2.5 mg tablet, Take 1 tablet (2.5 mg total) by mouth daily, Disp: 90 tablet, Rfl: 3    metoprolol tartrate (LOPRESSOR) 25 mg tablet, TAKE 1 TABLET (25 MG TOTAL) BY MOUTH EVERY 12 (TWELVE) HOURS, Disp: 180 tablet, Rfl: 1    rosuvastatin (CRESTOR) 10 MG tablet, Take 1 tablet (10 mg total) by mouth daily, Disp: 90 tablet, Rfl: 3    senna-docusate sodium (SENOKOT S) 8.6-50 mg per tablet, Take 1 tablet by mouth daily, Disp: 60 tablet, Rfl: 3    vitamin B-12 (VITAMIN B-12) 1,000 mcg tablet, TAKE 1 TABLET BY MOUTH EVERY DAY, Disp: 90 tablet, Rfl: 1

## 2025-07-02 ENCOUNTER — OFFICE VISIT (OUTPATIENT)
Dept: FAMILY MEDICINE CLINIC | Facility: CLINIC | Age: 80
End: 2025-07-02

## 2025-07-02 VITALS
WEIGHT: 217.7 LBS | TEMPERATURE: 97.6 F | SYSTOLIC BLOOD PRESSURE: 116 MMHG | HEART RATE: 91 BPM | DIASTOLIC BLOOD PRESSURE: 62 MMHG | RESPIRATION RATE: 18 BRPM | BODY MASS INDEX: 41.1 KG/M2 | OXYGEN SATURATION: 96 % | HEIGHT: 61 IN

## 2025-07-02 DIAGNOSIS — K21.9 GASTROESOPHAGEAL REFLUX DISEASE, UNSPECIFIED WHETHER ESOPHAGITIS PRESENT: ICD-10-CM

## 2025-07-02 DIAGNOSIS — D62 ANEMIA DUE TO ACUTE BLOOD LOSS: ICD-10-CM

## 2025-07-02 DIAGNOSIS — J45.50 SEVERE PERSISTENT ASTHMA WITHOUT COMPLICATION: Primary | ICD-10-CM

## 2025-07-02 PROCEDURE — 99496 TRANSJ CARE MGMT HIGH F2F 7D: CPT | Performed by: FAMILY MEDICINE

## 2025-07-02 RX ORDER — FUROSEMIDE 20 MG/1
20 TABLET ORAL DAILY
COMMUNITY

## 2025-07-02 RX ORDER — PANTOPRAZOLE SODIUM 40 MG/1
40 TABLET, DELAYED RELEASE ORAL DAILY
Qty: 30 TABLET | Refills: 1 | Status: SHIPPED | OUTPATIENT
Start: 2025-07-02

## 2025-07-02 NOTE — PROGRESS NOTES
Transition of Care Visit:  Name: Jayla Moody      : 1945      MRN: [id number]  Encounter Provider: Michelle Chatterjee MD  Encounter Date: 2025   Encounter department: [facility]    Assessment & Plan  Severe persistent asthma without complication  Following with allergy specialist.  Patient will schedule appointment for Fasenra injections.     Orders:    Ambulatory Referral to Allergy; Future    Gastroesophageal reflux disease, unspecified whether esophagitis present  No red flag signs.   Previously trialed Famotidine and omeprazole without improvement.   Pantoprazole seemed to help control sxs during hospitalization; order placed.     Orders:    pantoprazole (PROTONIX) 40 mg tablet; Take 1 tablet (40 mg total) by mouth daily    Anemia due to acute blood loss  Acute GI bleed suspicion is while inpatient.  EGD and colonoscopy ruled out GI etiology.  Hemoglobin on discharge 8.9; platelets 98k.  Patient currently on Eliquis for atrial flutter; [id number] score: 5.  Jimalpesh consulted cardiologist who is following with her care, agreed to keep her on Eliquis and monitor CBC for ongoing anemia.  Monitor monthly for the first 3 months to ensure stability.  Followed by every 3 months  If platelets are less than 50 will hold Eliquis.  If anemia continues or platelets began to drop then we will decrease Eliquis to 2.5 mg daily.  Cardiology will consider discussion of possible loop recorder placement at next cardiology visit in September if bruising continues.            History of Present Illness     Transitional Care Management Review:   Jayla Moody is a 80 y.o. female here for TCM follow up.     During the TCM phone call patient stated:  TCM Call (since 2025)       Date and time call was made  2025  1:12 PM    Patient was hospitialized at  [facility]    Date of Admission  25    Date of discharge  25    Diagnosis  SHORTNESS OF BREATH     "Disposition  Home    Were the patients medications reviewed and updated  No          TCM Call (since 6/18/2025)       Post hospital issues  None    Scheduled for follow up?  Yes    Did you obtain your prescribed medications  Yes    Do you need help managing your prescriptions or medications  No    Is transportation to your appointment needed  No    Living Arrangements  Alone          79 yo female w/ history of non-small cell lung cancer of her right lung, atrial flutter, CAD, chronic diastolic CHF, HTN, stage IIIb CKD presents today for TCM.  She was recently admitted into the hospital for a week for asthma exacerbation. She was discharged on prednisone 40 mg daily which she is taking. Since her last visit she has continued with her chemotherapy medications which have made her more fatigued than usual and decreased her appetite. She is finding it difficult to get up from the couch due to her fatigue. She is still motivated to walk daily but finds difficulty as her \"legs give out\". She feels like her quality of life is diminishing since restarting these medications.  She has denied any additional episodes of melena since discharge. She remains adherent with her asthma regimen.      Review of Systems   Constitutional:  Positive for appetite change and fatigue. Negative for chills and fever.   Respiratory:  Positive for shortness of breath (on exertion). Negative for cough and chest tightness.    Cardiovascular:  Negative for chest pain and palpitations.   Gastrointestinal:  Positive for nausea. Negative for anal bleeding, blood in stool, constipation, diarrhea and vomiting.   Genitourinary:  Negative for hematuria.   Neurological:  Negative for dizziness, light-headedness and headaches.   Hematological:  Bruises/bleeds easily (on Eliquis).     Objective   /62 (BP Location: Right arm, Patient Position: Sitting, Cuff Size: Large)   Pulse 91   Temp 97.6 °F (36.4 °C) (Temporal)   Resp 18   Ht 5' 1\" (1.549 m)   " Wt 98.7 kg (217 lb 11.2 oz)   SpO2 96%   BMI 41.13 kg/m²     Physical Exam  Vitals reviewed.   Constitutional:       General: She is not in acute distress.     Appearance: Normal appearance. She is not ill-appearing.      Comments: Fatigue appearing   HENT:      Nose: Nose normal.      Mouth/Throat:      Mouth: Mucous membranes are moist.      Pharynx: Oropharynx is clear.     Eyes:      Extraocular Movements: Extraocular movements intact.       Cardiovascular:      Rate and Rhythm: Normal rate and regular rhythm.      Pulses:           Radial pulses are 2+ on the right side.      Heart sounds: Normal heart sounds.   Pulmonary:      Effort: Pulmonary effort is normal. No respiratory distress.      Breath sounds: No stridor. No wheezing, rhonchi or rales.   Abdominal:      General: Abdomen is flat. There is no distension.      Palpations: Abdomen is soft.      Tenderness: There is no abdominal tenderness.     Musculoskeletal:      Right lower leg: Edema (+3) present.      Left lower leg: Edema (+3) present.     Skin:     Capillary Refill: Capillary refill takes less than 2 seconds.      Findings: Bruising (b/l forearms) present.     Neurological:      Mental Status: She is alert.       Medications have been reviewed by provider in current encounter

## 2025-07-02 NOTE — ASSESSMENT & PLAN NOTE
Following with allergy specialist.  Patient will schedule appointment for Fasenra injections.     Orders:    Ambulatory Referral to Allergy; Future

## 2025-07-02 NOTE — ASSESSMENT & PLAN NOTE
Acute GI bleed suspicion is while inpatient.  EGD and colonoscopy ruled out GI etiology.  Hemoglobin on discharge 8.9; platelets 98k.  Patient currently on Eliquis for atrial flutter; [id number] score: 5.  Jesús consulted cardiologist who is following with her care, agreed to keep her on Eliquis and monitor CBC for ongoing anemia.  Monitor monthly for the first 3 months to ensure stability.  Followed by every 3 months  If platelets are less than 50 will hold Eliquis.  If anemia continues or platelets began to drop then we will decrease Eliquis to 2.5 mg daily.  Cardiology will consider discussion of possible loop recorder placement at next cardiology visit in September if bruising continues.

## 2025-07-03 ENCOUNTER — TELEPHONE (OUTPATIENT)
Age: 80
End: 2025-07-03

## 2025-07-03 ENCOUNTER — OFFICE VISIT (OUTPATIENT)
Dept: NEPHROLOGY | Facility: CLINIC | Age: 80
End: 2025-07-03
Payer: COMMERCIAL

## 2025-07-03 DIAGNOSIS — Z71.89 ACP (ADVANCE CARE PLANNING): Primary | ICD-10-CM

## 2025-07-03 PROCEDURE — 99497 ADVNCD CARE PLAN 30 MIN: CPT

## 2025-07-03 NOTE — TELEPHONE ENCOUNTER
Reviewed with Dr. Arriaga patient dose was decreased.  Patient okay to hold medication until further discussed on 7/8 during appointment

## 2025-07-03 NOTE — TELEPHONE ENCOUNTER
"Amandeep from [facility] called to let us know that pt has stopped taking her \"Cancer pills\" due to s/e.      "

## 2025-07-05 ENCOUNTER — APPOINTMENT (OUTPATIENT)
Dept: LAB | Facility: CLINIC | Age: 80
End: 2025-07-05
Payer: COMMERCIAL

## 2025-07-05 DIAGNOSIS — E66.812 CLASS 2 SEVERE OBESITY DUE TO EXCESS CALORIES WITH SERIOUS COMORBIDITY AND BODY MASS INDEX (BMI) OF 39.0 TO 39.9 IN ADULT (HCC): ICD-10-CM

## 2025-07-05 DIAGNOSIS — N18.32 ANEMIA DUE TO STAGE 3B CHRONIC KIDNEY DISEASE  (HCC): ICD-10-CM

## 2025-07-05 DIAGNOSIS — R80.1 PERSISTENT PROTEINURIA: ICD-10-CM

## 2025-07-05 DIAGNOSIS — N17.9 AKI (ACUTE KIDNEY INJURY) (HCC): ICD-10-CM

## 2025-07-05 DIAGNOSIS — E83.9 CHRONIC KIDNEY DISEASE-MINERAL AND BONE DISORDER (CKD-MBD): ICD-10-CM

## 2025-07-05 DIAGNOSIS — N18.9 CHRONIC KIDNEY DISEASE-MINERAL AND BONE DISORDER (CKD-MBD): ICD-10-CM

## 2025-07-05 DIAGNOSIS — N18.32 STAGE 3B CHRONIC KIDNEY DISEASE (HCC): ICD-10-CM

## 2025-07-05 DIAGNOSIS — M89.9 CHRONIC KIDNEY DISEASE-MINERAL AND BONE DISORDER (CKD-MBD): ICD-10-CM

## 2025-07-05 DIAGNOSIS — E66.01 CLASS 2 SEVERE OBESITY DUE TO EXCESS CALORIES WITH SERIOUS COMORBIDITY AND BODY MASS INDEX (BMI) OF 39.0 TO 39.9 IN ADULT (HCC): ICD-10-CM

## 2025-07-05 DIAGNOSIS — I10 ESSENTIAL HYPERTENSION: ICD-10-CM

## 2025-07-05 DIAGNOSIS — D63.1 ANEMIA DUE TO STAGE 3B CHRONIC KIDNEY DISEASE  (HCC): ICD-10-CM

## 2025-07-05 DIAGNOSIS — I12.9 HYPERTENSIVE KIDNEY DISEASE WITH STAGE 3B CHRONIC KIDNEY DISEASE (HCC): ICD-10-CM

## 2025-07-05 DIAGNOSIS — N18.32 HYPERTENSIVE KIDNEY DISEASE WITH STAGE 3B CHRONIC KIDNEY DISEASE (HCC): ICD-10-CM

## 2025-07-05 DIAGNOSIS — I48.3 TYPICAL ATRIAL FLUTTER (HCC): ICD-10-CM

## 2025-07-05 DIAGNOSIS — Z79.899 ON AMIODARONE THERAPY: ICD-10-CM

## 2025-07-05 LAB
ALBUMIN SERPL BCG-MCNC: 3.6 G/DL (ref 3.5–5)
ANION GAP SERPL CALCULATED.3IONS-SCNC: 7 MMOL/L (ref 4–13)
BUN SERPL-MCNC: 18 MG/DL (ref 5–25)
CALCIUM SERPL-MCNC: 8.7 MG/DL (ref 8.4–10.2)
CHLORIDE SERPL-SCNC: 99 MMOL/L (ref 96–108)
CO2 SERPL-SCNC: 32 MMOL/L (ref 21–32)
CREAT SERPL-MCNC: 1.16 MG/DL (ref 0.6–1.3)
ERYTHROCYTE [DISTWIDTH] IN BLOOD BY AUTOMATED COUNT: 17.9 % (ref 11.6–15.1)
GFR SERPL CREATININE-BSD FRML MDRD: 45 ML/MIN/1.73SQ M
GLUCOSE P FAST SERPL-MCNC: 125 MG/DL (ref 65–99)
HCT VFR BLD AUTO: 29.4 % (ref 34.8–46.1)
HGB BLD-MCNC: 9.5 G/DL (ref 11.5–15.4)
MCH RBC QN AUTO: 28.4 PG (ref 26.8–34.3)
MCHC RBC AUTO-ENTMCNC: 32.3 G/DL (ref 31.4–37.4)
MCV RBC AUTO: 88 FL (ref 82–98)
PHOSPHATE SERPL-MCNC: 2.7 MG/DL (ref 2.3–4.1)
PLATELET # BLD AUTO: 73 THOUSANDS/UL (ref 149–390)
POTASSIUM SERPL-SCNC: 3.7 MMOL/L (ref 3.5–5.3)
RBC # BLD AUTO: 3.34 MILLION/UL (ref 3.81–5.12)
SODIUM SERPL-SCNC: 138 MMOL/L (ref 135–147)
TSH SERPL DL<=0.05 MIU/L-ACNC: 3.31 UIU/ML (ref 0.45–4.5)
WBC # BLD AUTO: 7.2 THOUSAND/UL (ref 4.31–10.16)

## 2025-07-05 PROCEDURE — 80069 RENAL FUNCTION PANEL: CPT

## 2025-07-05 PROCEDURE — 85027 COMPLETE CBC AUTOMATED: CPT

## 2025-07-05 PROCEDURE — 84443 ASSAY THYROID STIM HORMONE: CPT

## 2025-07-05 PROCEDURE — 36415 COLL VENOUS BLD VENIPUNCTURE: CPT

## 2025-07-08 ENCOUNTER — OFFICE VISIT (OUTPATIENT)
Dept: FAMILY MEDICINE CLINIC | Facility: CLINIC | Age: 80
End: 2025-07-08

## 2025-07-08 VITALS
BODY MASS INDEX: 40.52 KG/M2 | OXYGEN SATURATION: 97 % | DIASTOLIC BLOOD PRESSURE: 66 MMHG | WEIGHT: 214.6 LBS | RESPIRATION RATE: 18 BRPM | HEIGHT: 61 IN | HEART RATE: 84 BPM | TEMPERATURE: 97.9 F | SYSTOLIC BLOOD PRESSURE: 130 MMHG

## 2025-07-08 DIAGNOSIS — C79.51 MALIGNANT NEOPLASM METASTATIC TO BONE (HCC): ICD-10-CM

## 2025-07-08 DIAGNOSIS — R06.02 SHORTNESS OF BREATH: Primary | ICD-10-CM

## 2025-07-08 DIAGNOSIS — C34.91 NON-SMALL CELL CANCER OF RIGHT LUNG (HCC): ICD-10-CM

## 2025-07-08 DIAGNOSIS — I50.32 CHRONIC DIASTOLIC CONGESTIVE HEART FAILURE (HCC): ICD-10-CM

## 2025-07-08 PROCEDURE — 99214 OFFICE O/P EST MOD 30 MIN: CPT | Performed by: FAMILY MEDICINE

## 2025-07-08 PROCEDURE — G2211 COMPLEX E/M VISIT ADD ON: HCPCS | Performed by: FAMILY MEDICINE

## 2025-07-08 NOTE — PROGRESS NOTES
Name: Jayla Moody      : 1945      MRN: [id number]  Encounter Provider: Wili Savage MD  Encounter Date: 2025   Encounter department: [facility]  :  Assessment & Plan  Shortness of breath  Likely multifactorial from obesity,anemia, diastolic heart failure, severe asthma, and lung cancer  Advised patient to make a follow-up with her pulmonologist to determine need for supplemental oxygen         Non-small cell cancer of right lung (HCC)  Patient has lung cancer with metastasis to the bone  Patient does not want to continue chemotherapy due to side effects  Referral to palliative care  Follow-up with oncologist outpatient      Orders:    Ambulatory Referral to Palliative Care; Future    Malignant neoplasm metastatic to bone (HCC)    Orders:    Ambulatory Referral to Palliative Care; Future    Chronic diastolic congestive heart failure (HCC)  Wt Readings from Last 3 Encounters:   25 97.3 kg (214 lb 9.6 oz)   25 98.7 kg (217 lb 11.2 oz)   25 100 kg (220 lb 14.4 oz)   Recently admitted for CHF exacerbation  Continue metoprolol and Lasix  Follow-up with cardiology outpatient                      History of Present Illness   80-year-old female with right lung cancer presents today for fatigue.  Patient states that she would like to have home oxygen evaluation.  She reports feeling fatigued, weak and losing her appetite after she recently started chemotherapy.  She gets short of breath with exertion at baseline.  She states that her quality of life is very poor.  Her son is her POA.  She lives alone.  She states that her son checks in on her often.  Patient does not want to continue chemotherapy at this time.            Review of Systems   Constitutional:  Positive for appetite change, fatigue and unexpected weight change. Negative for chills, diaphoresis and fever.   Respiratory:  Positive for shortness of breath. Negative for cough and  "wheezing.    Gastrointestinal:  Positive for nausea. Negative for abdominal pain and vomiting.   Neurological:  Negative for seizures and syncope.   Psychiatric/Behavioral:  Negative for confusion.        Objective   Resp 18   Ht 5' 1\" (1.549 m)   BMI 41.13 kg/m²      Physical Exam  Constitutional:       General: She is not in acute distress.     Appearance: Normal appearance. She is well-developed. She is not diaphoretic.   HENT:      Head: Normocephalic and atraumatic.      Right Ear: External ear normal.      Left Ear: External ear normal.      Mouth/Throat:      Pharynx: No oropharyngeal exudate.     Eyes:      General:         Right eye: No discharge.         Left eye: No discharge.      Pupils: Pupils are equal, round, and reactive to light.       Cardiovascular:      Rate and Rhythm: Normal rate and regular rhythm.      Heart sounds: Normal heart sounds. No murmur heard.     No friction rub. No gallop.   Pulmonary:      Effort: Pulmonary effort is normal.      Breath sounds: No stridor. No wheezing.   Abdominal:      General: Bowel sounds are normal.      Palpations: Abdomen is soft. There is no mass.      Tenderness: There is no abdominal tenderness. There is no guarding.     Musculoskeletal:         General: Normal range of motion.      Cervical back: Normal range of motion.     Skin:     Capillary Refill: Capillary refill takes less than 2 seconds.      Findings: Bruising present.     Neurological:      Mental Status: She is alert and oriented to person, place, and time.         "

## 2025-07-08 NOTE — ASSESSMENT & PLAN NOTE
Wt Readings from Last 3 Encounters:   07/08/25 97.3 kg (214 lb 9.6 oz)   07/02/25 98.7 kg (217 lb 11.2 oz)   06/27/25 100 kg (220 lb 14.4 oz)   Recently admitted for CHF exacerbation  Continue metoprolol and Lasix  Follow-up with cardiology outpatient

## 2025-07-08 NOTE — ASSESSMENT & PLAN NOTE
Likely multifactorial from obesity,anemia, diastolic heart failure, severe asthma, and lung cancer  Advised patient to make a follow-up with her pulmonologist to determine need for supplemental oxygen

## 2025-07-08 NOTE — ASSESSMENT & PLAN NOTE
Patient has lung cancer with metastasis to the bone  Patient does not want to continue chemotherapy due to side effects  Referral to palliative care  Follow-up with oncologist outpatient      Orders:    Ambulatory Referral to Palliative Care; Future

## 2025-07-09 ENCOUNTER — OFFICE VISIT (OUTPATIENT)
Dept: PULMONOLOGY | Facility: CLINIC | Age: 80
End: 2025-07-09
Payer: COMMERCIAL

## 2025-07-09 VITALS
WEIGHT: 213 LBS | HEIGHT: 61 IN | HEART RATE: 97 BPM | SYSTOLIC BLOOD PRESSURE: 130 MMHG | BODY MASS INDEX: 40.22 KG/M2 | DIASTOLIC BLOOD PRESSURE: 62 MMHG | TEMPERATURE: 97.7 F | OXYGEN SATURATION: 90 %

## 2025-07-09 DIAGNOSIS — J96.11 CHRONIC HYPOXEMIC RESPIRATORY FAILURE (HCC): ICD-10-CM

## 2025-07-09 DIAGNOSIS — C34.90 NON-SMALL CELL LUNG CANCER, UNSPECIFIED LATERALITY (HCC): ICD-10-CM

## 2025-07-09 DIAGNOSIS — G47.33 OSA (OBSTRUCTIVE SLEEP APNEA): Primary | ICD-10-CM

## 2025-07-09 DIAGNOSIS — J45.50 SEVERE PERSISTENT ASTHMA WITHOUT COMPLICATION: ICD-10-CM

## 2025-07-09 DIAGNOSIS — G47.34 NOCTURNAL HYPOXEMIA: ICD-10-CM

## 2025-07-09 DIAGNOSIS — E66.01 MORBID OBESITY (HCC): ICD-10-CM

## 2025-07-09 DIAGNOSIS — C79.51 MALIGNANT NEOPLASM METASTATIC TO BONE (HCC): Primary | ICD-10-CM

## 2025-07-09 DIAGNOSIS — C34.91 MALIGNANT NEOPLASM OF RIGHT LUNG, UNSPECIFIED PART OF LUNG (HCC): ICD-10-CM

## 2025-07-09 PROCEDURE — 94618 PULMONARY STRESS TESTING: CPT

## 2025-07-09 PROCEDURE — 99214 OFFICE O/P EST MOD 30 MIN: CPT | Performed by: INTERNAL MEDICINE

## 2025-07-09 NOTE — PROGRESS NOTES
Pulmonary Follow Up Note   Jayla Moody 80 y.o. female MRN: [id number]  7/9/2025      Assessment and Plan:    Severe allergic asthma  - type 1 with high eos 480 and multiple possitive allergens in 2020 and Total IgE 109,  Multiple allergies identified in the past.  with 1-3x a year exacerbaitons, improved since starting fasenra Jan 2023 by allergy doctor. With last exacerbation in 8/2023. No smoking hx  - PFT 2022: FEV1 52% however, lung volumes not ordered    - takes allegra. She is on Bretzi 160 BID, and albuterol BID nebulizer.   - continue with Fasenra injection by rheumatologist  - completed pulmonary rehab in 2023.  - no need for adjustment. Currently her asthma is managed by her immunologist      Severe MONO   - sleep study in 2019 showed severe MONO   - home sleep study 5/2022 with AHI 85.2 with significant hypoxia to 70%  - was tolerating auto-titrating CPAP 4-20 cc of H2O pressure with 2L of oxygen at night. Compliance report in 10/2024 showing 100% compliance with AHI 1.1. She has some leakage with maximum of 104.2 but with median leakage of 1.6. She stated this is because her nasal mask sometimes will fall off but she will adjust it. However she was no longer interested in continuing Cpap support due to discomfort. We do not have compliant report (company stated it was not updated since 11/2024).  - continue current setting for now. She was advised if she will be willing to continue treating with Cpap she can contact her [employer] company to ensure it was connected and maybe another fitting test for her face mask. 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 Noted palliative referral was placed recently.       Morbid obesity  -  discussed with lifestyle modification. Noted BMI 40.55    Chronic hypoxia  Nocturnal hypoxemia  - noted patient requires 2L of oxygen at night with Cpap due to nocturnal hypoxemia since 2022. Now with more SOB and likely contributing by advanced lung cancer, anemia and obesity. Noted with recent event of low oxygen level at home and worsening respiratory symptoms.  - Will require a 6MWT to determine the needs of oxygen support during the day: she dropped her oxygen to 87%. Will start oxygen supplement at 2L for her. I will order oxygen tank as well as portable oxygen for her      Return in about 3 months (around 10/9/2025).   Patient will need to follow up with us for MONO and chronic hypoxia. She can follow up with her allergy specialist for Fasenra injection and for her asthma which is now getting better control. She will also get oxygen supplement at home.    History of Present Illness   HPI:  Jayla Moody is a 80 y.o. female 75 y.o. 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 She also has severe eosinophilic asthma with previous frequent exacerbation, on Fasenra and triple therapy, presented for follow up.    Patient follows in 8/2023 following asthma exacerbation. She was at some point on all nebulized treatment and later on switched to Breztri BID. She also stated that she started her Cpap due to MONO and could not tolerate the setting. Her CPAP was advsied to changed to CPAP 5-20 cc of H2O pressure with 2L of oxygen in 8/2023 and ever since she is compliant  with it. She also enrolled in pulm rehab program in 9/2023. Her last exacerbation of her asthma was in 2023.    She continues to follow up with her oncologist with lung cancer and recently she had a CT scan in 5/2025 with possible disease progression. She stopped ALK treatment since 2024 due to intolerance. Her oncologist suggested her to resume treatment but she has not restarted yet due to fear of not tolerating the agent. She is awaiting to talk to her oncologist about the next step.    She was recently admitted to hospital in 6/2025 complaining about SOB in the setting of anemia (hb 6.7). After discharge she still has shortness of breath, with last Hb stable at 9.5 on 7/5/2025. She just recently seen by his family doctor and found that her oxygen dropped at home to 76% after walking a short distance at home. She also found it was not helpful for her Cpap that she felt she cannot sleep with it and sometimes when she sleep in the middle of the night she felt like she was gasping for air while on Cpap. There is no compliance report on file that the DME company stated that she has no updated data since 11/2024.     Review of Systems   Constitutional:  Positive for fatigue. Negative for appetite change and fever.   HENT:  Positive for postnasal drip. Negative for ear pain, rhinorrhea, sneezing, sore throat and trouble swallowing.    Respiratory:  Positive for shortness of breath. Negative for cough and wheezing.    Cardiovascular:  Negative for chest pain.   Musculoskeletal:  Negative for myalgias.   Neurological:  Negative for headaches.   All other systems reviewed and are negative.      Historical Information   Past Medical History:   Diagnosis Date    Allergic rhinitis     Anemia     Angio-edema     Anxiety     Arthritis     Asthma     Atrial fibrillation (HCC)     Breast cyst     Cancer (HCC)     Chronic bronchitis (HCC)     Chronic kidney disease     COPD (chronic obstructive pulmonary disease) (HCC)      Coronary artery disease     CPAP (continuous positive airway pressure) dependence     Degenerative joint disease     Fluid retention 2024    GERD (gastroesophageal reflux disease)     Glaucoma     History of chemotherapy     History of transfusion     HL (hearing loss)     Hypertension     Lumbar disc disease     Lung cancer (HCC)     Obesity     Pelvis cancer (HCC)     Periodic heart flutter     Pneumonia     Premature atrial contractions 10/31/2017    Sleep apnea     suspected     Sleep apnea, obstructive     Ulcerative colitis (HCC)     Ventricular arrhythmia     Vitamin D deficiency      Past Surgical History:   Procedure Laterality Date    APPENDECTOMY      BREAST BIOPSY Right     years ago    BRONCHOSCOPY      CAROTID STENT      COLON SURGERY      COLONOSCOPY N/A 03/18/2019    Procedure: COLONOSCOPY;  Surgeon: Alessia Wilson DO;  Location: [facility];  Service: Gastroenterology    ESOPHAGOGASTRODUODENOSCOPY N/A 03/18/2019    Procedure: ESOPHAGOGASTRODUODENOSCOPY (EGD);  Surgeon: Alessia Wilson DO;  Location: [facility];  Service: Gastroenterology    KNEE SURGERY      LUNG BIOPSY      ROTATOR CUFF REPAIR      SHOULDER SURGERY      VERTEBROPLASTY       Family History   Problem Relation Name Age of Onset    Stroke Mother Susanne     Diabetes Mother Susanne     Hyperlipidemia Mother Susanne     Hypertension Mother Susanne     Allergies Mother Susanne         Environmental    Asthma Mother Susanne     Anemia Mother Susanne     Hearing loss Mother Susanne     Diabetes Father Cecil     Hyperlipidemia Father Cecil     Hypertension Father Cecil     Lung cancer Father Cecil 70    Allergies Father Cecil         Environmental    Asthma Father Cecil     Cancer Father Cecil     Lymphoma Sister  69    Heart disease Sister Yessi Seadeion         Pacemaker    Asthma Brother Puma Lopez     Aneurysm Brother          Brain - had surgery    Coronary artery disease Daughter          2 bypass done    No Known Problems Daughter      No Known  Problems Daughter      No Known Problems Son      Kidney disease Son      Liver disease Son      Obesity Son       Pets: still has a cat     Meds/Allergies     Current Outpatient Medications:     albuterol (2.5 mg/3 mL) 0.083 % nebulizer solution, Take 3 mL (2.5 mg total) by nebulization 2 (two) times a day, Disp: 180 mL, Rfl: 11    albuterol (PROVENTIL HFA,VENTOLIN HFA) 90 mcg/act inhaler, Inhale 2 puffs every 6 (six) hours as needed for wheezing, Disp: 18 g, Rfl: 2    Alectinib HCl 150 MG CAPS, Take 3 capsules (450 mg total) by mouth 2 (two) times a day, Disp: 180 capsule, Rfl: 5    amLODIPine (NORVASC) 2.5 mg tablet, Take 1 tablet (2.5 mg total) by mouth daily, Disp: 30 tablet, Rfl: 3    apixaban (ELIQUIS) 5 mg, Take 1 tablet (5 mg total) by mouth 2 (two) times a day, Disp: 60 tablet, Rfl: 11    benralizumab (FASENRA) subcutaneous injection, Inject 1 mL (30 mg total) under the skin every 56 days, Disp: 1 mL, Rfl: 11    Budeson-Glycopyrrol-Formoterol (Breztri Aerosphere) 160-9-4.8 MCG/ACT AERO, Inhale 2 puffs 2 (two) times a day Rinse mouth after use., Disp: 10.7 g, Rfl: 11    calcitriol (ROCALTROL) 0.25 mcg capsule, Take one tablet five days a week Do not start before April 25, 2025., Disp: 60 capsule, Rfl: 3    fluticasone (FLONASE) 50 mcg/act nasal spray, SPRAY 2 SPRAYS INTO EACH NOSTRIL EVERY DAY, Disp: 48 mL, Rfl: 1    furosemide (LASIX) 20 mg tablet, Take 20 mg by mouth in the morning., Disp: , Rfl:     levocetirizine (XYZAL) 5 MG tablet, TAKE 1 TABLET BY MOUTH EVERY DAY IN THE EVENING, Disp: 90 tablet, Rfl: 4    lidocaine (Lidoderm) 5 %, Apply 1 patch topically over 12 hours daily Remove & Discard patch within 12 hours or as directed by MD, Disp: 5 patch, Rfl: 0    lisinopril (ZESTRIL) 2.5 mg tablet, Take 1 tablet (2.5 mg total) by mouth daily, Disp: 90 tablet, Rfl: 3    metoprolol tartrate (LOPRESSOR) 25 mg tablet, TAKE 1 TABLET (25 MG TOTAL) BY MOUTH EVERY 12 (TWELVE) HOURS, Disp: 180 tablet, Rfl: 1     "pantoprazole (PROTONIX) 40 mg tablet, Take 1 tablet (40 mg total) by mouth daily, Disp: 30 tablet, Rfl: 1    rosuvastatin (CRESTOR) 10 MG tablet, Take 1 tablet (10 mg total) by mouth daily, Disp: 90 tablet, Rfl: 3    senna-docusate sodium (SENOKOT S) 8.6-50 mg per tablet, Take 1 tablet by mouth daily, Disp: 60 tablet, Rfl: 3    vitamin B-12 (VITAMIN B-12) 1,000 mcg tablet, TAKE 1 TABLET BY MOUTH EVERY DAY, Disp: 90 tablet, Rfl: 1  No Known Allergies    Vitals: Blood pressure 130/62, pulse 97, temperature 97.7 °F (36.5 °C), temperature source Tympanic, height 5' 1\" (1.549 m), weight 96.6 kg (213 lb), SpO2 90%, not currently breastfeeding. Body mass index is 40.25 kg/m². Oxygen Therapy  SpO2: 90 %  Oxygen Therapy: None (Room air)      Physical Exam  Physical Exam  Vitals reviewed.   Constitutional:       Appearance: Normal appearance. She is obese.   HENT:      Head: Normocephalic.      Nose: Nose normal.      Mouth/Throat:      Mouth: Mucous membranes are moist.     Eyes:      Pupils: Pupils are equal, round, and reactive to light.       Cardiovascular:      Rate and Rhythm: Normal rate and regular rhythm.      Pulses: Normal pulses.      Heart sounds: Normal heart sounds.   Pulmonary:      Effort: Pulmonary effort is normal.      Breath sounds: Normal breath sounds.   Abdominal:      General: Abdomen is flat.      Palpations: Abdomen is soft.     Musculoskeletal:         General: No swelling.      Cervical back: Normal range of motion.      Right lower leg: No edema.      Left lower leg: No edema.     Skin:     General: Skin is warm and dry.     Neurological:      General: No focal deficit present.      Mental Status: She is alert and oriented to person, place, and time.            Labs: I have personally reviewed pertinent lab results.  Lab Results   Component Value Date    WBC 7.20 07/05/2025    HGB 9.5 (L) 07/05/2025    HCT 29.4 (L) 07/05/2025    MCV 88 07/05/2025    PLT 73 (L) 07/05/2025     Lab Results "   Component Value Date    CALCIUM 8.7 2025    K 3.7 2025    CO2 32 2025    CL 99 2025    BUN 18 2025    CREATININE 1.16 2025     Lab Results   Component Value Date     2020     Lab Results   Component Value Date    ALT 32 2025    AST 34 2025    ALKPHOS 66 2025       Imaging and other studies: I have personally reviewed pertinent reports.   and I have personally reviewed pertinent films in PACS    2025 CT chest/abd/pelvis: 1.  Mildly displaced acute fractures of the right anterior second and third ribs. Otherwise, no definite findings for acute soft tissue injury in the chest, abdomen or pelvis.  2.  New and enlarging pulmonary nodules concerning for progression of pulmonary metastasis.    Pulmonary function testin2021  FEV1/FVC Ratio: 71 %  Forced Vital Capacity: 1.82 L    61 % predicted  2022: FEV1 52%     sleep study: severe MONO with SERJIO 85.2 with nocturnal hypoxia with 70% of the night with saturation below 86%      EKG, Pathology, and Other Studies: I have personally reviewed pertinent reports.   and I have personally reviewed pertinent films in PACS      Bonnie Chavez MD  Pulmonary and Critical Care Fellow  [facility]

## 2025-07-10 ENCOUNTER — TELEPHONE (OUTPATIENT)
Age: 80
End: 2025-07-10

## 2025-07-10 NOTE — TELEPHONE ENCOUNTER
Call from the patient, stating that she was in the office yesterday for an OV and oxygen supplies were ordered for her. She has not yet heard back from [facility]. Adivsed that it may be a few days as they will need to check insurance and her testing and then they will be in touch. She had no other questions at this time.

## 2025-07-11 ENCOUNTER — HOSPITAL ENCOUNTER (OUTPATIENT)
Dept: INFUSION CENTER | Facility: HOSPITAL | Age: 80
End: 2025-07-11
Attending: INTERNAL MEDICINE
Payer: COMMERCIAL

## 2025-07-11 ENCOUNTER — TELEPHONE (OUTPATIENT)
Dept: PULMONOLOGY | Facility: CLINIC | Age: 80
End: 2025-07-11

## 2025-07-11 DIAGNOSIS — C79.51 MALIGNANT NEOPLASM METASTATIC TO BONE (HCC): Primary | ICD-10-CM

## 2025-07-11 DIAGNOSIS — C34.90 NON-SMALL CELL LUNG CANCER, UNSPECIFIED LATERALITY (HCC): ICD-10-CM

## 2025-07-11 LAB

## 2025-07-11 PROCEDURE — 96372 THER/PROPH/DIAG INJ SC/IM: CPT

## 2025-07-11 RX ADMIN — DENOSUMAB 120 MG: 120 INJECTION SUBCUTANEOUS at 13:07

## 2025-07-11 NOTE — PROGRESS NOTES
Pt arrives on unit for Xgeva. CrClr 34.7 and calcium 7.9. Okay to treat on chart for calcium 7.9. Pt tolerated Xgeva sq in the left upper arm. AVS declined, next apt 7/25/25 @1:00. Left unit via w/c with staff.

## 2025-07-11 NOTE — TELEPHONE ENCOUNTER
"Patient calling stating [facility] still does not have the order for cpap supplies. Advised order was placed on 7/9, and can take up to 2-3 days to process. She stated she \"will die in 2 days\" and wants order to be faxed to [phone]  "

## 2025-07-11 NOTE — PLAN OF CARE
Problem: Potential for Falls  Goal: Patient will remain free of falls  Description: INTERVENTIONS:  - Educate patient/family on patient safety including physical limitations  - Instruct patient to call for assistance with activity   - Consider consulting OT/PT to assist with strengthening/mobility based on AM PAC & JH-HLM score  - Consult OT/PT to assist with strengthening/mobility   - Keep Call bell within reach  - Keep bed low and locked with side rails adjusted as appropriate  - Keep care items and personal belongings within reach  - Initiate and maintain comfort rounds  - Make Fall Risk Sign visible to staff  - Apply yellow socks and bracelet for high fall risk patients  - Consider moving patient to room near nurses station  Outcome: Progressing      "Hospital Medicine History & Physical Note    Date of Service  10/27/2020    Primary Care Physician  Hellen Davidson M.D.    Consultants  Neurology    Code Status  Full Code    Chief Complaint  Chief Complaint   Patient presents with   • Blurred Vision     this moring pt started having blurry vision after the pt had a 30 min spell unable to idenify her family members around her or speak. c/o of headache and weakness    • ALOC       History of Presenting Illness  73 y.o. female with past medical history of hypertension, CAD and Vitiligo who presented 10/27/2020 with complaints of acute onset blurry vision (Rt eye), confusion and difficulties with both understanding and speaking. Symptoms lasted approximately 30 minutes. She and her friend who are present denies any facial droop or unilateral weakness. On arrival to the ED, she was hypertensive with SBP >200. CT head and CTA were both done and negative for evidence of stroke or LVO. She was evaluated by neurology and was not a tPA candidate as her NIHSS was 0 and elevated blood pressures.   On evaluation, pt was asymptomatic although nervous about what has happened and complaining of headache and intermittent dizziness. She denies any focal weakness or difficulty with speech.     Of note patient mentions a diagnosis of \"rapid heart beat\" , per her most recent Cardiology note in June 2020, holter monitor showed PACs, PVCs and bradycardia. No evidence of SVT or atrial fibrillations.       Review of Systems  Review of Systems   Constitutional: Negative for chills, fever, malaise/fatigue and weight loss.   HENT: Negative for congestion and sore throat.    Eyes: Positive for blurred vision. Negative for double vision, photophobia and pain.   Respiratory: Negative for cough and shortness of breath.    Cardiovascular: Negative for chest pain, palpitations and leg swelling.   Gastrointestinal: Negative for abdominal pain, constipation, diarrhea, nausea and vomiting. "   Genitourinary: Negative for dysuria and urgency.   Musculoskeletal: Negative for myalgias.   Neurological: Positive for dizziness, speech change and headaches. Negative for focal weakness, seizures and weakness.   Psychiatric/Behavioral: Negative for depression and substance abuse. The patient is nervous/anxious.        Past Medical History   has a past medical history of Anesthesia, Back pain, BMI 35.0-35.9,adult (10/11/2013), Bowel habit changes, CAD (coronary artery disease) (10/11/2013), Chest pain, Chickenpox, Chronic right shoulder pain (10/11/2013), Daytime sleepiness, Difficulty swallowing, Dizziness, Family history of malignant melanoma (10/11/2013), Fatigue, Frequent headaches, History of nephrolithiasis (10/11/2013), Hoarseness, persistent, Hypertension, Insomnia, Kidney stone, Low back pain, Morning headache, Nausea, Painful breathing, Painful joint, Palpitations, Pneumonia (2010), Renal disorder, Ringing in ears, Shortness of breath, Snoring, Sore muscles, Sore throat, chronic, Tonsillitis, Urinary incontinence, Vitiligo (10/11/2013), Weakness, and Wears glasses.    Surgical History   has a past surgical history that includes abdominal hysterectomy total; laminotomy; lithotripsy; cholecystectomy (2004); appendectomy; tonsillectomy; cystoscopy stent placement (6/6/2014); ureteroscopy (6/6/2014); lasertripsy (6/6/2014); tumor excision with biopsy (2004); tumor excision with biopsy (2004); cystoscopy stent placement (Left, 8/16/2017); ureteroscopy (Left, 8/16/2017); lasertripsy (Left, 8/16/2017); and hysterectomy laparoscopy.     Family History  family history includes Cancer in her father and another family member; Cancer (age of onset: 73) in her brother; Lung Cancer in her mother.     Social History   reports that she has never smoked. She has never used smokeless tobacco. She reports current alcohol use. She reports that she does not use drugs.    Allergies  Allergies   Allergen Reactions   •  Hydrochlorothiazide      Bleeding and cramping   • Hydrocodone-Acetaminophen Unspecified   • Oxycodone Unspecified   • Loratadine [Claritin]      Hallucinations and vomiting   • Roxicet [Oxycodone-Acetaminophen]      Hallucinations     • Latex Rash     .       Medications  Prior to Admission Medications   Prescriptions Last Dose Informant Patient Reported? Taking?   CINNAMON PO 5 dys ago at Unknown time  Yes Yes   Sig: Take 1 Cap by mouth every day.   Cholecalciferol (VITAMIN D HIGH POTENCY PO) 5 dys ago at Unknown time  Yes No   Sig: Take 1 Cap by mouth every day.   Coenzyme Q10 300 MG Cap 5 dys ago at Unknown time  No No   Sig: Take 1 Cap by mouth every day.   Ginkgo Biloba 40 MG Tab 5 dys ago at Unknown time  Yes No   Sig: Take 1 Tab by mouth every day.   Magnesium Oxide 500 MG Tab Not Taking at Unknown time  No No   Sig: Take 1 Tab by mouth every day.   Patient not taking: Reported on 10/27/2020   NON SPECIFIED Not Taking at Unknown time  No No   Sig: Take 50 mg by mouth two times a week. Take 1 tablet at onset of migraine and can repeat up to one more in 24  No more than 10 tablets per month  Ubrelvy 50 mg tablets   Patient not taking: Reported on 10/27/2020   Riboflavin 400 MG Cap Not Taking at Unknown time  No No   Sig: Take 1 Cap by mouth every day.   Patient not taking: Reported on 10/27/2020   levalbuterol (XOPENEX HFA) 45 MCG/ACT inhaler Not Taking at Unknown time  No No   Sig: Inhale 2 Puffs by mouth every four hours as needed for Shortness of Breath (As needed for shortness of breath, cough, wheezing.).   Patient not taking: Reported on 10/27/2020   losartan (COZAAR) 50 MG Tab 10/26/2020 at hs  No No   Sig: TAKE ONE TABLET BY MOUTH ONCE DAILY   methylPREDNISolone (MEDROL) 4 MG Tab Not Taking at Unknown time  No No   Sig: Take 1 Tab by mouth every day. Take 6 tablets on day one and decrease by one tablet daily until off   Patient not taking: Reported on 10/27/2020   ondansetron (ZOFRAN ODT) 4 MG TABLET  DISPERSIBLE Not Taking at Unknown time  No No   Sig: Take 1 Tab by mouth every 8 hours as needed.   Patient not taking: Reported on 10/27/2020   pravastatin (PRAVACHOL) 40 MG tablet Not Taking at Unknown time  No No   Sig: TAKE ONE TABLET BY MOUTH ONCE DAILY   Patient not taking: Reported on 10/27/2020   vitamin D, Ergocalciferol, (DRISDOL) 16905 units Cap capsule Not Taking at Unknown time  No No   Sig: TAKE ONE CAPSULE BY MOUTH WEEKLY   Patient not taking: Reported on 10/27/2020      Facility-Administered Medications: None       Physical Exam  Temp:  [36.9 °C (98.5 °F)] 36.9 °C (98.5 °F)  Pulse:  [56-67] 60  Resp:  [12-20] 20  BP: (115-228)/() 164/75  SpO2:  [95 %-99 %] 97 %    Physical Exam  Constitutional:       General: She is not in acute distress.     Appearance: Normal appearance. She is not ill-appearing or toxic-appearing.   HENT:      Head: Normocephalic and atraumatic.      Mouth/Throat:      Mouth: Mucous membranes are moist.      Pharynx: Oropharynx is clear.   Eyes:      Extraocular Movements: Extraocular movements intact.      Conjunctiva/sclera: Conjunctivae normal.      Pupils: Pupils are equal, round, and reactive to light.   Neck:      Musculoskeletal: Normal range of motion and neck supple.   Cardiovascular:      Rate and Rhythm: Regular rhythm. Bradycardia present.      Pulses: Normal pulses.      Heart sounds: No murmur. No gallop.    Pulmonary:      Effort: Pulmonary effort is normal. No respiratory distress.      Breath sounds: Normal breath sounds.   Abdominal:      General: Abdomen is flat. Bowel sounds are normal.      Palpations: Abdomen is soft.   Musculoskeletal: Normal range of motion.         General: No swelling.   Skin:     General: Skin is warm and dry.      Capillary Refill: Capillary refill takes less than 2 seconds.   Neurological:      General: No focal deficit present.      Mental Status: She is alert and oriented to person, place, and time.      Cranial Nerves: No  cranial nerve deficit.      Sensory: No sensory deficit.      Motor: No weakness.      Coordination: Coordination normal.   Psychiatric:         Mood and Affect: Mood normal.         Behavior: Behavior normal.         Laboratory:  Recent Labs     10/27/20  1058   WBC 5.5   RBC 5.13   HEMOGLOBIN 15.6   HEMATOCRIT 47.5*   MCV 92.6   MCH 30.4   MCHC 32.8*   RDW 12.2*   PLATELETCT 236   MPV 9.0     Recent Labs     10/27/20  1058   SODIUM 140   POTASSIUM 3.9   CHLORIDE 103   CO2 25   GLUCOSE 85   BUN 16   CREATININE 0.75   CALCIUM 9.7     Recent Labs     10/27/20  1058   ALTSGPT 12   ASTSGOT 12   ALKPHOSPHAT 67   TBILIRUBIN 0.4   GLUCOSE 85     Recent Labs     10/27/20  1058   APTT 27.3   INR 0.97     No results for input(s): NTPROBNP in the last 72 hours.      Recent Labs     10/27/20  1058   TROPONINT 9       Imaging:  CT-CTA NECK WITH & W/O-POST PROCESSING   Final Result      Atherosclerotic plaque at the carotid bulbs and proximal internal carotid arteries with less than 50% stenosis.            CT-CTA HEAD WITH & W/O-POST PROCESS   Final Result      No intracranial aneurysm, focal stenosis, or abrupt large vessel cut off.      CT-CEREBRAL PERFUSION ANALYSIS   Final Result      1.  Cerebral blood flow less than 30% likely representing completed infarct = 0 mL.      2.  T Max more than 6 seconds likely representing combination of completed infarct and ischemia = 0 mL.      3.  Mismatched volume likely representing ischemic brain/penumbra = None      4.  Please note that the cerebral perfusion was performed on the limited brain tissue around the basal ganglia region. Infarct/ischemia outside the CT perfusion sections can be missed in this study.      DX-CHEST-PORTABLE (1 VIEW)   Final Result      No acute cardiopulmonary process is seen.      Atherosclerotic plaque.      CT-HEAD W/O   Final Result      No acute intracranial abnormality is identified.            Assessment/Plan:  I anticipate this patient is appropriate  for observation status at this time.    Neurological complaint- (present on admission)  Assessment & Plan  Patient presented after an episode of confusion, difficulty understanding and speaking and blurred vision in the right eye, no focal symptoms per her report. Symptoms lasted ~ 30 minutes, now resolved    - concerning for TIA vs hypertensive emergency as her BP on admission was severely elevated with SBP >220  - CT head and CTA show no signs of ischemia/hemorrahge or LVO   - neurology consulted and following, appreciate recommendations   - MRI brain pending   - Aspirin, statin   - will check lipid panel, Ha1c for risk stratification  - Q4H  Neuro checks     Hypertension- (present on admission)  Assessment & Plan  History of hypertension, on losartan for ~ 8 months, previously on amlodipine, severely elevated on admission with SBP >220, concern for hypertensive emergency given neurological deficits reported   - States good control at home  - Continue home losartan, IV vasotec prn as pt is bradycardic (which does not appears different than her baseline per cards)   - goal is to slowly lower blood pressure to avoid ischemia, recommend target of <180/<120 mmHg for the first hour and <160/<110 over next 23 hours  - can consider clonodine and PO hydralazine if BP remains elevated above target

## 2025-07-14 ENCOUNTER — HOSPITAL ENCOUNTER (EMERGENCY)
Facility: HOSPITAL | Age: 80
Discharge: HOME | End: 2025-07-14
Attending: EMERGENCY MEDICINE | Admitting: EMERGENCY MEDICINE
Payer: COMMERCIAL

## 2025-07-14 ENCOUNTER — APPOINTMENT (EMERGENCY)
Dept: RADIOLOGY | Facility: HOSPITAL | Age: 80
End: 2025-07-14
Payer: COMMERCIAL

## 2025-07-14 VITALS
SYSTOLIC BLOOD PRESSURE: 177 MMHG | DIASTOLIC BLOOD PRESSURE: 75 MMHG | HEART RATE: 75 BPM | TEMPERATURE: 98.1 F | OXYGEN SATURATION: 99 % | RESPIRATION RATE: 18 BRPM

## 2025-07-14 DIAGNOSIS — R19.7 DIARRHEA: ICD-10-CM

## 2025-07-14 DIAGNOSIS — R11.0 NAUSEA: ICD-10-CM

## 2025-07-14 DIAGNOSIS — N39.0 UTI (URINARY TRACT INFECTION): Primary | ICD-10-CM

## 2025-07-14 LAB
ACANTHOCYTES BLD QL SMEAR: PRESENT
ALBUMIN SERPL BCG-MCNC: 3.4 G/DL (ref 3.5–5)
ALP SERPL-CCNC: 62 U/L (ref 34–104)
ALT SERPL W P-5'-P-CCNC: 18 U/L (ref 7–52)
ANION GAP SERPL CALCULATED.3IONS-SCNC: 7 MMOL/L (ref 4–13)
ANISOCYTOSIS BLD QL SMEAR: PRESENT
AST SERPL W P-5'-P-CCNC: 23 U/L (ref 13–39)
BACTERIA UR QL AUTO: ABNORMAL /HPF
BASOPHILS # BLD MANUAL: 0 THOUSAND/UL (ref 0–0.1)
BASOPHILS NFR MAR MANUAL: 0 % (ref 0–1)
BILIRUB SERPL-MCNC: 0.79 MG/DL (ref 0.2–1)
BILIRUB UR QL STRIP: NEGATIVE
BUN SERPL-MCNC: 24 MG/DL (ref 5–25)
CALCIUM ALBUM COR SERPL-MCNC: 9.8 MG/DL (ref 8.3–10.1)
CALCIUM SERPL-MCNC: 9.3 MG/DL (ref 8.4–10.2)
CHLORIDE SERPL-SCNC: 102 MMOL/L (ref 96–108)
CLARITY UR: CLEAR
CO2 SERPL-SCNC: 29 MMOL/L (ref 21–32)
COLOR UR: YELLOW
CREAT SERPL-MCNC: 1.16 MG/DL (ref 0.6–1.3)
EOSINOPHIL # BLD MANUAL: 0 THOUSAND/UL (ref 0–0.4)
EOSINOPHIL NFR BLD MANUAL: 0 % (ref 0–6)
ERYTHROCYTE [DISTWIDTH] IN BLOOD BY AUTOMATED COUNT: 17.9 % (ref 11.6–15.1)
GFR SERPL CREATININE-BSD FRML MDRD: 45 ML/MIN/1.73SQ M
GLUCOSE SERPL-MCNC: 86 MG/DL (ref 65–140)
GLUCOSE UR STRIP-MCNC: NEGATIVE MG/DL
HCT VFR BLD AUTO: 27.9 % (ref 34.8–46.1)
HGB BLD-MCNC: 8.7 G/DL (ref 11.5–15.4)
HGB UR QL STRIP.AUTO: NEGATIVE
HYALINE CASTS #/AREA URNS LPF: ABNORMAL /LPF
KETONES UR STRIP-MCNC: NEGATIVE MG/DL
LEUKOCYTE ESTERASE UR QL STRIP: ABNORMAL
LIPASE SERPL-CCNC: 20 U/L (ref 11–82)
LYMPHOCYTES # BLD AUTO: 0.81 THOUSAND/UL (ref 0.6–4.47)
LYMPHOCYTES # BLD AUTO: 14 % (ref 14–44)
MCH RBC QN AUTO: 27.7 PG (ref 26.8–34.3)
MCHC RBC AUTO-ENTMCNC: 31.2 G/DL (ref 31.4–37.4)
MCV RBC AUTO: 89 FL (ref 82–98)
METAMYELOCYTE ABSOLUTE CT: 0.11 THOUSAND/UL (ref 0–0.1)
METAMYELOCYTES NFR BLD MANUAL: 2 % (ref 0–1)
MONOCYTES # BLD AUTO: 0.49 THOUSAND/UL (ref 0–1.22)
MONOCYTES NFR BLD: 9 % (ref 4–12)
MUCOUS THREADS UR QL AUTO: ABNORMAL
NEUTROPHILS # BLD MANUAL: 4 THOUSAND/UL (ref 1.85–7.62)
NEUTS BAND NFR BLD MANUAL: 1 % (ref 0–8)
NEUTS SEG NFR BLD AUTO: 73 % (ref 43–75)
NITRITE UR QL STRIP: NEGATIVE
NON-SQ EPI CELLS URNS QL MICRO: ABNORMAL /HPF
PH UR STRIP.AUTO: 6 [PH] (ref 4.5–8)
PLASMA CELLS NFR BLD: 1 % (ref 0–0)
PLATELET # BLD AUTO: 253 THOUSANDS/UL (ref 149–390)
PLATELET BLD QL SMEAR: ADEQUATE
PMV BLD AUTO: 11.5 FL (ref 8.9–12.7)
POIKILOCYTOSIS BLD QL SMEAR: PRESENT
POLYCHROMASIA BLD QL SMEAR: PRESENT
POTASSIUM SERPL-SCNC: 4.4 MMOL/L (ref 3.5–5.3)
PROT SERPL-MCNC: 6.4 G/DL (ref 6.4–8.4)
PROT UR STRIP-MCNC: NEGATIVE MG/DL
RBC # BLD AUTO: 3.14 MILLION/UL (ref 3.81–5.12)
RBC #/AREA URNS AUTO: ABNORMAL /HPF
RBC MORPH BLD: PRESENT
SODIUM SERPL-SCNC: 138 MMOL/L (ref 135–147)
SP GR UR STRIP.AUTO: 1.01 (ref 1–1.03)
UROBILINOGEN UR QL STRIP.AUTO: 1 E.U./DL
WBC # BLD AUTO: 5.41 THOUSAND/UL (ref 4.31–10.16)
WBC #/AREA URNS AUTO: ABNORMAL /HPF

## 2025-07-14 PROCEDURE — 85007 BL SMEAR W/DIFF WBC COUNT: CPT | Performed by: EMERGENCY MEDICINE

## 2025-07-14 PROCEDURE — 71045 X-RAY EXAM CHEST 1 VIEW: CPT

## 2025-07-14 PROCEDURE — 81001 URINALYSIS AUTO W/SCOPE: CPT

## 2025-07-14 PROCEDURE — 36415 COLL VENOUS BLD VENIPUNCTURE: CPT | Performed by: EMERGENCY MEDICINE

## 2025-07-14 PROCEDURE — 96374 THER/PROPH/DIAG INJ IV PUSH: CPT

## 2025-07-14 PROCEDURE — 83690 ASSAY OF LIPASE: CPT | Performed by: EMERGENCY MEDICINE

## 2025-07-14 PROCEDURE — 99285 EMERGENCY DEPT VISIT HI MDM: CPT

## 2025-07-14 PROCEDURE — 87086 URINE CULTURE/COLONY COUNT: CPT

## 2025-07-14 PROCEDURE — 80053 COMPREHEN METABOLIC PANEL: CPT | Performed by: EMERGENCY MEDICINE

## 2025-07-14 PROCEDURE — 99284 EMERGENCY DEPT VISIT MOD MDM: CPT | Performed by: EMERGENCY MEDICINE

## 2025-07-14 PROCEDURE — 96361 HYDRATE IV INFUSION ADD-ON: CPT

## 2025-07-14 PROCEDURE — 85027 COMPLETE CBC AUTOMATED: CPT | Performed by: EMERGENCY MEDICINE

## 2025-07-14 RX ORDER — ONDANSETRON 4 MG/1
4 TABLET, ORALLY DISINTEGRATING ORAL EVERY 8 HOURS PRN
Qty: 10 TABLET | Refills: 0 | Status: SHIPPED | OUTPATIENT
Start: 2025-07-14

## 2025-07-14 RX ORDER — CEPHALEXIN 500 MG/1
500 CAPSULE ORAL EVERY 12 HOURS SCHEDULED
Qty: 14 CAPSULE | Refills: 0 | Status: SHIPPED | OUTPATIENT
Start: 2025-07-14 | End: 2025-07-21

## 2025-07-14 RX ORDER — SODIUM CHLORIDE, SODIUM LACTATE, POTASSIUM CHLORIDE, CALCIUM CHLORIDE 600; 310; 30; 20 MG/100ML; MG/100ML; MG/100ML; MG/100ML
150 INJECTION, SOLUTION INTRAVENOUS CONTINUOUS
Status: DISCONTINUED | OUTPATIENT
Start: 2025-07-14 | End: 2025-07-14 | Stop reason: HOSPADM

## 2025-07-14 RX ORDER — ONDANSETRON 2 MG/ML
4 INJECTION INTRAMUSCULAR; INTRAVENOUS ONCE
Status: COMPLETED | OUTPATIENT
Start: 2025-07-14 | End: 2025-07-14

## 2025-07-14 RX ADMIN — SODIUM CHLORIDE, SODIUM LACTATE, POTASSIUM CHLORIDE, AND CALCIUM CHLORIDE 150 ML/HR: .6; .31; .03; .02 INJECTION, SOLUTION INTRAVENOUS at 16:33

## 2025-07-14 RX ADMIN — CEPHALEXIN 500 MG: 250 CAPSULE ORAL at 17:32

## 2025-07-14 RX ADMIN — ONDANSETRON 4 MG: 2 INJECTION INTRAMUSCULAR; INTRAVENOUS at 16:35

## 2025-07-14 NOTE — ED PROVIDER NOTES
Time reflects when diagnosis was documented in both MDM as applicable and the Disposition within this note       Time User Action Codes Description Comment    7/14/2025  5:20 PM Mercedes Rodney Add [R11.0] Nausea     7/14/2025  5:20 PM Mercedes Rodney Remove [R11.0] Nausea     7/14/2025  5:20 PM Mercedes Rodney Add [N39.0] UTI (urinary tract infection)     7/14/2025  5:20 PM Mercedes Rodney Add [R11.0] Nausea     7/14/2025  5:20 PM Mercedes Rodney Add [R19.7] Diarrhea           ED Disposition       ED Disposition   Discharge    Condition   Good    Date/Time   Mon Jul 14, 2025  5:20 PM    Comment   Jayla Moody discharge to home/self care.                   Assessment & Plan       Medical Decision Making  80-year-old female presents to the ED with 1 week history of nausea, decreased appetite and decreased p.o. intake and lightheadedness.  She is also had a few episodes of diarrhea.  No fevers, chills, chest pain or shortness of breath.  No abdominal pain.  She was recently admitted for asthma exacerbation and CHF.  She does have a history of metastatic lung cancer and is currently on Xgeva with last infusion 1/11/2025.  On exam she is in no acute distress.  She now has 2 L of O2 chronically for hypoxia.  She still has some bibasilar Rales on exam but she is in no respiratory distress and does not complain of shortness of breath or coughing.  No abdominal tenderness on exam.  She does not clinically appear dehydrated.  Will check basic labs to rule out dehydration or electrolyte abnormality/infection.  Will do chest x-ray secondary to rales on exam to rule out CHF.    Amount and/or Complexity of Data Reviewed  Labs: ordered. Decision-making details documented in ED Course.  Radiology: ordered.    Risk  Prescription drug management.        ED Course as of 07/14/25 1725   Mon Jul 14, 2025   1522 Blood Pressure: 167/83   1522 Temperature: 98.1 °F (36.7 °C)   1522 Pulse: 76   1522 Respirations: 20    1522 SpO2: 96 %   1615 WBC: 5.41   1615 Hemoglobin(!): 8.7  baseline   1644 Leukocytes, UA(!): Small   1644 Nitrite, UA: Negative   1644 Ketones, UA: Negative   1644 Blood, UA: Negative   1644 LIPASE: 20   1644 Sodium: 138   1644 Potassium: 4.4   1644 Creatinine: 1.16   1644 GLUCOSE: 86   1647 RBC Urine: 1-2   1649 WBC, UA(!): 20-30   1649 Bacteria, UA: Occasional   1649 Hyaline Casts, UA(!): 10-25   1711 Chest x-ray: Cardiomegaly/no infiltrate/no CHF       Medications   lactated ringers infusion (150 mL/hr Intravenous New Bag 7/14/25 1633)   cephalexin (KEFLEX) capsule 500 mg (has no administration in time range)   ondansetron (ZOFRAN) injection 4 mg (4 mg Intravenous Given 7/14/25 1635)       ED Risk Strat Scores                    No data recorded        SBIRT 22yo+      Flowsheet Row Most Recent Value   Initial Alcohol Screen: US AUDIT-C     1. How often do you have a drink containing alcohol? 0 Filed at: 07/14/2025 1511   2. How many drinks containing alcohol do you have on a typical day you are drinking?  0 Filed at: 07/14/2025 1511   3a. Male UNDER 65: How often do you have five or more drinks on one occasion? 0 Filed at: 07/14/2025 1511   3b. FEMALE Any Age, or MALE 65+: How often do you have 4 or more drinks on one occassion? 0 Filed at: 07/14/2025 1511   Audit-C Score 0 Filed at: 07/14/2025 1511   SERVANDO: How many times in the past year have you...    Used an illegal drug or used a prescription medication for non-medical reasons? Never Filed at: 07/14/2025 1511                            History of Present Illness       Chief Complaint   Patient presents with    Nausea     Pt reports was recently admitted for asthma, reports nausea and poor PO intake since d/c. +diarrhea +dizzy       Past Medical History[1]   Past Surgical History[2]   Family History[3]   Social History[4]   E-Cigarette/Vaping    E-Cigarette Use Never User       E-Cigarette/Vaping Substances    Nicotine No     THC No     CBD No      Flavoring No     Other No     Unknown No       I have reviewed and agree with the history as documented.     80-year-old female with history of asthma now on 2 L of O2 secondary to hypoxia, obstructive sleep apnea, lung cancer with mets to bone currently on Xgeva with last infusion on 7/11/2025, atrial fibrillation on Eliquis, chronic kidney disease presents to the emergency department with nausea, lightheadedness, decreased p.o. intake and a few episodes of diarrhea.  She states after leaving the hospital she felt good for a couple of weeks then in the last week or so she developed the above symptoms.  She is taking some p.o. but just feels like she is not getting enough liquids and and feels dehydrated.  No chest pain or shortness of breath, fevers or chills.  No abdominal pain.      History provided by:  Patient   used: No    Nausea  The primary symptoms include fatigue, nausea and diarrhea. Primary symptoms do not include fever, weight loss, abdominal pain, vomiting, melena, hematemesis, jaundice, hematochezia, dysuria, myalgias, arthralgias or rash. The illness began 6 to 7 days ago. The onset was gradual. The problem has not changed since onset.  The illness does not include chills, anorexia, dysphagia, odynophagia, bloating, constipation, tenesmus, back pain or itching. Associated medical issues do not include inflammatory bowel disease, liver disease, alcohol abuse, PUD, gastric bypass, irritable bowel syndrome or diverticulitis.       Review of Systems   Constitutional:  Positive for activity change, appetite change, fatigue and unexpected weight change. Negative for chills, diaphoresis, fever and weight loss.   HENT: Negative.  Negative for congestion, rhinorrhea and sore throat.    Eyes: Negative.  Negative for discharge, redness and itching.   Respiratory: Negative.  Negative for apnea, cough, chest tightness, shortness of breath and wheezing.    Cardiovascular:  Negative for chest  pain, palpitations and leg swelling.   Gastrointestinal:  Positive for diarrhea and nausea. Negative for abdominal distention, abdominal pain, anorexia, bloating, constipation, dysphagia, hematemesis, hematochezia, jaundice, melena and vomiting.   Endocrine: Negative.    Genitourinary: Negative.  Negative for dysuria, flank pain, frequency and urgency.   Musculoskeletal: Negative.  Negative for arthralgias, back pain and myalgias.   Skin: Negative.  Negative for itching and rash.   Allergic/Immunologic: Negative.    Neurological:  Positive for light-headedness. Negative for dizziness, syncope, weakness, numbness and headaches.   Hematological: Negative.    All other systems reviewed and are negative.          Objective       ED Triage Vitals [07/14/25 1508]   Temperature Pulse Blood Pressure Respirations SpO2 Patient Position - Orthostatic VS   98.1 °F (36.7 °C) 76 167/83 20 96 % Sitting      Temp Source Heart Rate Source BP Location FiO2 (%) Pain Score    Oral Monitor -- -- --      Vitals      Date and Time Temp Pulse SpO2 Resp BP Pain Score FACES Pain Rating User   07/14/25 1508 98.1 °F (36.7 °C) 76 96 % 20 167/83 -- -- MLR            Physical Exam  Vitals and nursing note reviewed.   Constitutional:       General: She is awake. She is not in acute distress.     Appearance: She is well-developed. She is obese. She is not ill-appearing, toxic-appearing or diaphoretic.   HENT:      Head: Normocephalic and atraumatic.      Right Ear: External ear normal.      Left Ear: External ear normal.      Nose: Nose normal.      Mouth/Throat:      Mouth: Mucous membranes are moist.     Eyes:      General: No scleral icterus.        Right eye: No discharge.         Left eye: No discharge.      Conjunctiva/sclera: Conjunctivae normal.     Neck:      Thyroid: No thyromegaly.      Vascular: No JVD.      Trachea: No tracheal deviation.     Cardiovascular:      Rate and Rhythm: Normal rate and regular rhythm.      Heart sounds:  Normal heart sounds. No murmur heard.     No friction rub. No gallop.   Pulmonary:      Effort: Pulmonary effort is normal. No tachypnea, accessory muscle usage or respiratory distress.      Breath sounds: No stridor. Examination of the right-lower field reveals rales. Examination of the left-lower field reveals rales. Wheezing (Few scattered wheezes) and rales present. No rhonchi.   Chest:      Chest wall: No tenderness.   Abdominal:      General: Bowel sounds are normal. There is no distension.      Palpations: Abdomen is soft. There is no mass.      Tenderness: There is no abdominal tenderness.      Hernia: No hernia is present.     Musculoskeletal:         General: No tenderness or deformity. Normal range of motion.      Right lower leg: Edema (+1 to mid shin) present.      Left lower leg: Edema (+1 to mid shin) present.     Skin:     General: Skin is warm and dry.      Coloration: Skin is not jaundiced or pale.      Findings: No bruising, erythema, lesion or rash.     Neurological:      General: No focal deficit present.      Mental Status: She is alert and oriented to person, place, and time.      Motor: No weakness or abnormal muscle tone.      Deep Tendon Reflexes: Reflexes are normal and symmetric.     Psychiatric:         Mood and Affect: Mood normal.         Behavior: Behavior is cooperative.         Results Reviewed       Procedure Component Value Units Date/Time    Manual Differential(PHLEBS Do Not Order) [[id number]]  (Abnormal) Collected: 07/14/25 2323    Lab Status: Final result Specimen: Blood from Arm, Right Updated: 07/14/25 4286     Segmented % 73 %      Bands % 1 %      Lymphocytes % 14 %      Monocytes % 9 %      Eosinophils % 0 %      Basophils % 0 %      Metamyelocytes % 2 %      Plasma Cells % 1 %      Absolute Neutrophils 4.00 Thousand/uL      Absolute Lymphocytes 0.81 Thousand/uL      Absolute Monocytes 0.49 Thousand/uL      Absolute Eosinophils 0.00 Thousand/uL      Absolute Basophils 0.00  Thousand/uL      Absolute Metamyelocytes 0.11 Thousand/uL      Total Counted --     RBC Morphology Present     Platelet Estimate Adequate     Acanthocytes Present     Anisocytosis Present     Poikilocytes Present     Polychromasia Present    Urine Microscopic [[id number]]  (Abnormal) Collected: 07/14/25 1625    Lab Status: Final result Specimen: Urine, Clean Catch Updated: 07/14/25 1645     RBC, UA 1-2 /hpf      WBC, UA 20-30 /hpf      Epithelial Cells Occasional /hpf      Bacteria, UA Occasional /hpf      MUCUS THREADS Occasional     Hyaline Casts, UA 10-25 /lpf     Urine culture [[id number]] Collected: 07/14/25 1625    Lab Status: In process Specimen: Urine, Clean Catch Updated: 07/14/25 1645    Urine Macroscopic, POC [[id number]]  (Abnormal) Collected: 07/14/25 1625    Lab Status: Final result Specimen: Urine Updated: 07/14/25 1627     Color, UA Yellow     Clarity, UA Clear     pH, UA 6.0     Leukocytes, UA Small     Nitrite, UA Negative     Protein, UA Negative mg/dl      Glucose, UA Negative mg/dl      Ketones, UA Negative mg/dl      Urobilinogen, UA 1.0 E.U./dl      Bilirubin, UA Negative     Occult Blood, UA Negative     Specific Gravity, UA 1.015    Narrative:      CLINITEK RESULT    Comprehensive metabolic panel [[id number]]  (Abnormal) Collected: 07/14/25 1554    Lab Status: Final result Specimen: Blood from Arm, Right Updated: 07/14/25 1617     Sodium 138 mmol/L      Potassium 4.4 mmol/L      Chloride 102 mmol/L      CO2 29 mmol/L      ANION GAP 7 mmol/L      BUN 24 mg/dL      Creatinine 1.16 mg/dL      Glucose 86 mg/dL      Calcium 9.3 mg/dL      Corrected Calcium 9.8 mg/dL      AST 23 U/L      ALT 18 U/L      Alkaline Phosphatase 62 U/L      Total Protein 6.4 g/dL      Albumin 3.4 g/dL      Total Bilirubin 0.79 mg/dL      eGFR 45 ml/min/1.73sq m     Narrative:      National Kid[employer] Foundation guidelines for Chronic Kidney Disease (CKD):     Stage 1 with normal or high GFR (GFR > 90 mL/min/1.73 square  meters)    Stage 2 Mild CKD (GFR = 60-89 mL/min/1.73 square meters)    Stage 3A Moderate CKD (GFR = 45-59 mL/min/1.73 square meters)    Stage 3B Moderate CKD (GFR = 30-44 mL/min/1.73 square meters)    Stage 4 Severe CKD (GFR = 15-29 mL/min/1.73 square meters)    Stage 5 End Stage CKD (GFR <15 mL/min/1.73 square meters)  Note: GFR calculation is accurate only with a steady state creatinine    Lipase [[id number]]  (Normal) Collected: 07/14/25 1556    Lab Status: Final result Specimen: Blood from Arm, Right Updated: 07/14/25 1617     Lipase 20 u/L     CBC and differential [[id number]]  (Abnormal) Collected: 07/14/25 1554    Lab Status: Final result Specimen: Blood from Arm, Right Updated: 07/14/25 1604     WBC 5.41 Thousand/uL      RBC 3.14 Million/uL      Hemoglobin 8.7 g/dL      Hematocrit 27.9 %      MCV 89 fL      MCH 27.7 pg      MCHC 31.2 g/dL      RDW 17.9 %      MPV 11.5 fL      Platelets 253 Thousands/uL     Narrative:      This is an appended report.  These results have been appended to a previously verified report.            XR chest 1 view portable    (Results Pending)       Procedures    ED Medication and Procedure Management   Prior to Admission Medications   Prescriptions Last Dose Informant Patient Reported? Taking?   Alectinib HCl 150 MG CAPS Past Month Self No Yes   Sig: Take 3 capsules (450 mg total) by mouth 2 (two) times a day   Budeson-Glycopyrrol-Formoterol (Breztri Aerosphere) 160-9-4.8 MCG/ACT AERO 7/14/2025 Morning Self No Yes   Sig: Inhale 2 puffs 2 (two) times a day Rinse mouth after use.   albuterol (2.5 mg/3 mL) 0.083 % nebulizer solution 7/14/2025 Morning Self No Yes   Sig: Take 3 mL (2.5 mg total) by nebulization 2 (two) times a day   albuterol (PROVENTIL HFA,VENTOLIN HFA) 90 mcg/act inhaler 7/14/2025 Noon Self No Yes   Sig: Inhale 2 puffs every 6 (six) hours as needed for wheezing   amLODIPine (NORVASC) 2.5 mg tablet 7/14/2025 Morning Self No Yes   Sig: Take 1 tablet (2.5 mg total) by  mouth daily   apixaban (ELIQUIS) 5 mg 7/14/2025 Morning Self No Yes   Sig: Take 1 tablet (5 mg total) by mouth 2 (two) times a day   benralizumab (FASENRA) subcutaneous injection More than a month Self No No   Sig: Inject 1 mL (30 mg total) under the skin every 56 days   calcitriol (ROCALTROL) 0.25 mcg capsule 7/14/2025 Morning Self No Yes   Sig: Take one tablet five days a week Do not start before April 25, 2025.   fluticasone (FLONASE) 50 mcg/act nasal spray 7/14/2025 Morning Self No Yes   Sig: SPRAY 2 SPRAYS INTO EACH NOSTRIL EVERY DAY   furosemide (LASIX) 20 mg tablet 7/14/2025 Morning Self Yes Yes   Sig: Take 20 mg by mouth in the morning.   levocetirizine (XYZAL) 5 MG tablet 7/13/2025 Bedtime Self No Yes   Sig: TAKE 1 TABLET BY MOUTH EVERY DAY IN THE EVENING   lidocaine (Lidoderm) 5 % Past Month Self No Yes   Sig: Apply 1 patch topically over 12 hours daily Remove & Discard patch within 12 hours or as directed by MD   lisinopril (ZESTRIL) 2.5 mg tablet 7/14/2025 Morning Self No Yes   Sig: Take 1 tablet (2.5 mg total) by mouth daily   metoprolol tartrate (LOPRESSOR) 25 mg tablet 7/14/2025 Morning Self No Yes   Sig: TAKE 1 TABLET (25 MG TOTAL) BY MOUTH EVERY 12 (TWELVE) HOURS   pantoprazole (PROTONIX) 40 mg tablet Past Week Self No Yes   Sig: Take 1 tablet (40 mg total) by mouth daily   rosuvastatin (CRESTOR) 10 MG tablet 7/14/2025 Morning Self No Yes   Sig: Take 1 tablet (10 mg total) by mouth daily   senna-docusate sodium (SENOKOT S) 8.6-50 mg per tablet Not Taking Self No No   Sig: Take 1 tablet by mouth daily   Patient not taking: Reported on 7/14/2025   vitamin B-12 (VITAMIN B-12) 1,000 mcg tablet 7/14/2025 Morning Self No Yes   Sig: TAKE 1 TABLET BY MOUTH EVERY DAY      Facility-Administered Medications: None     Patient's Medications   Discharge Prescriptions    CEPHALEXIN (KEFLEX) 500 MG CAPSULE    Take 1 capsule (500 mg total) by mouth every 12 (twelve) hours for 7 days       Start Date: 7/14/2025 End  Date: 7/21/2025       Order Dose: 500 mg       Quantity: 14 capsule    Refills: 0    ONDANSETRON (ZOFRAN-ODT) 4 MG DISINTEGRATING TABLET    Take 1 tablet (4 mg total) by mouth every 8 (eight) hours as needed for nausea or vomiting       Start Date: 7/14/2025 End Date: --       Order Dose: 4 mg       Quantity: 10 tablet    Refills: 0     Outpatient Discharge Orders   Clostridioides difficile toxin by PCR with EIA   Standing Status: Future Standing Exp. Date: 09/14/26     ED SEPSIS DOCUMENTATION   Time reflects when diagnosis was documented in both MDM as applicable and the Disposition within this note       Time User Action Codes Description Comment    7/14/2025  5:20 PM Mercedes Rodney Add [R11.0] Nausea     7/14/2025  5:20 PM Mercedes Rodney Remove [R11.0] Nausea     7/14/2025  5:20 PM Mercedes Rodney Add [N39.0] UTI (urinary tract infection)     7/14/2025  5:20 PM Mercedes Rodney Add [R11.0] Nausea     7/14/2025  5:20 PM Mercedes Rodney Add [R19.7] Diarrhea                    [1]   Past Medical History:  Diagnosis Date    Allergic rhinitis     Anemia     Angio-edema     Anxiety     Arthritis     Asthma     Atrial fibrillation (HCC)     Breast cyst     Cancer (HCC)     Chronic bronchitis (HCC)     Chronic kidney disease     COPD (chronic obstructive pulmonary disease) (HCC)     Coronary artery disease     CPAP (continuous positive airway pressure) dependence     Degenerative joint disease     Fluid retention 2024    GERD (gastroesophageal reflux disease)     Glaucoma     History of chemotherapy     History of transfusion     HL (hearing loss)     Hypertension     Lumbar disc disease     Lung cancer (HCC)     Obesity     Pelvis cancer (HCC)     Periodic heart flutter     Pneumonia     Premature atrial contractions 10/31/2017    Sleep apnea     suspected     Sleep apnea, obstructive     Ulcerative colitis (HCC)     Ventricular arrhythmia     Vitamin D deficiency    [2]   Past Surgical  History:  Procedure Laterality Date    APPENDECTOMY      BREAST BIOPSY Right     years ago    BRONCHOSCOPY      CAROTID STENT      COLON SURGERY      COLONOSCOPY N/A 2019    Procedure: COLONOSCOPY;  Surgeon: Alessia Wilson DO;  Location: [facility];  Service: Gastroenterology    ESOPHAGOGASTRODUODENOSCOPY N/A 2019    Procedure: ESOPHAGOGASTRODUODENOSCOPY (EGD);  Surgeon: Alessia Wilson DO;  Location: [facility];  Service: Gastroenterology    KNEE SURGERY      LUNG BIOPSY      ROTATOR CUFF REPAIR      SHOULDER SURGERY      VERTEBROPLASTY     [3]   Family History  Problem Relation Name Age of Onset    Stroke Mother Susanne     Diabetes Mother Susanne     Hyperlipidemia Mother Susanne     Hypertension Mother Susanne     Allergies Mother Susanne         Environmental    Asthma Mother Susanne     Anemia Mother Susanne     Hearing loss Mother Susanne     Diabetes Father Cecil     Hyperlipidemia Father Cecil     Hypertension Father Cecil     Lung cancer Father Cecil 70    Allergies Father Cecil         Environmental    Asthma Father Cecil     Cancer Father Cecil     Lymphoma Sister  69    Heart disease Sister Yessi Dolanhogabriela         Pacemaker    Asthma Brother Puma Lopez     Aneurysm Brother          Brain - had surgery    Coronary artery disease Daughter          2 bypass done    No Known Problems Daughter      No Known Problems Daughter      No Known Problems Son      Kidney disease Son      Liver disease Son      Obesity Son     [4]   Social History  Tobacco Use    Smoking status: Former     Current packs/day: 0.00     Average packs/day: 0.3 packs/day for 25.0 years (6.3 ttl pk-yrs)     Types: Cigarettes     Start date: 1962     Quit date:      Years since quittin.5     Passive exposure: Never    Smokeless tobacco: Former   Vaping Use    Vaping status: Never Used   Substance Use Topics    Alcohol use: Not Currently    Drug use: Not Currently     Types: Marijuana        Mercedes Rodney DO  25  [phone]

## 2025-07-15 LAB — BACTERIA UR CULT: NORMAL

## 2025-07-17 DIAGNOSIS — I25.10 CORONARY ARTERY DISEASE INVOLVING NATIVE CORONARY ARTERY OF NATIVE HEART WITHOUT ANGINA PECTORIS: ICD-10-CM

## 2025-07-17 RX ORDER — AMLODIPINE BESYLATE 2.5 MG/1
2.5 TABLET ORAL DAILY
Qty: 90 TABLET | Refills: 1 | Status: SHIPPED | OUTPATIENT
Start: 2025-07-17

## 2025-07-18 ENCOUNTER — APPOINTMENT (OUTPATIENT)
Dept: LAB | Facility: HOSPITAL | Age: 80
End: 2025-07-18
Payer: COMMERCIAL

## 2025-07-18 DIAGNOSIS — R19.7 DIARRHEA: ICD-10-CM

## 2025-07-18 PROCEDURE — 87493 C DIFF AMPLIFIED PROBE: CPT

## 2025-07-19 LAB — C DIFF TOX GENS STL QL NAA+PROBE: NEGATIVE

## 2025-07-23 ENCOUNTER — TELEPHONE (OUTPATIENT)
Dept: FAMILY MEDICINE CLINIC | Facility: CLINIC | Age: 80
End: 2025-07-23

## 2025-07-23 NOTE — TELEPHONE ENCOUNTER
PCP SIGNATURE NEEDED FOR [facility]  [facility] FORM RECEIVED VIA FAX AND PLACED IN PCP FOLDER TO BE DELIVERED AT ASSIGNED TIMES.      Order # [id number] &[id number]

## 2025-07-24 ENCOUNTER — TELEPHONE (OUTPATIENT)
Dept: INFUSION CENTER | Facility: HOSPITAL | Age: 80
End: 2025-07-24

## 2025-07-24 NOTE — TELEPHONE ENCOUNTER
07/24 called pt and left her a voicemail letting her know we cancel her appt for tomorrow and rescheduled to 08/08 @1pm

## 2025-07-25 ENCOUNTER — HOSPITAL ENCOUNTER (OUTPATIENT)
Dept: INFUSION CENTER | Facility: HOSPITAL | Age: 80
Discharge: HOME | End: 2025-07-25
Attending: INTERNAL MEDICINE

## 2025-07-25 DIAGNOSIS — K21.9 GASTROESOPHAGEAL REFLUX DISEASE, UNSPECIFIED WHETHER ESOPHAGITIS PRESENT: ICD-10-CM

## 2025-07-25 RX ORDER — PANTOPRAZOLE SODIUM 40 MG/1
40 TABLET, DELAYED RELEASE ORAL DAILY
Qty: 90 TABLET | Refills: 1 | Status: SHIPPED | OUTPATIENT
Start: 2025-07-25

## 2025-07-28 ENCOUNTER — TELEPHONE (OUTPATIENT)
Dept: FAMILY MEDICINE CLINIC | Facility: CLINIC | Age: 80
End: 2025-07-28

## 2025-07-29 ENCOUNTER — TELEPHONE (OUTPATIENT)
Dept: FAMILY MEDICINE CLINIC | Facility: CLINIC | Age: 80
End: 2025-07-29

## 2025-07-29 NOTE — TELEPHONE ENCOUNTER
Jocelyn from [facility] Called stating that this form needs to be re-signed by DR Giovana Gandara. I placed in her bin, thanks.

## 2025-07-31 ENCOUNTER — TELEPHONE (OUTPATIENT)
Dept: PULMONOLOGY | Facility: CLINIC | Age: 80
End: 2025-07-31

## 2025-08-04 ENCOUNTER — APPOINTMENT (OUTPATIENT)
Dept: LAB | Facility: HOSPITAL | Age: 80
End: 2025-08-04
Payer: COMMERCIAL

## 2025-08-04 ENCOUNTER — TELEPHONE (OUTPATIENT)
Dept: FAMILY MEDICINE CLINIC | Facility: CLINIC | Age: 80
End: 2025-08-04

## 2025-08-04 DIAGNOSIS — C79.51 MALIGNANT NEOPLASM METASTATIC TO BONE (HCC): ICD-10-CM

## 2025-08-04 DIAGNOSIS — C34.90 NON-SMALL CELL LUNG CANCER, UNSPECIFIED LATERALITY (HCC): ICD-10-CM

## 2025-08-04 LAB
ALBUMIN SERPL BCG-MCNC: 4.1 G/DL (ref 3.5–5)
ALP SERPL-CCNC: 54 U/L (ref 34–104)
ALT SERPL W P-5'-P-CCNC: 22 U/L (ref 7–52)
ANION GAP SERPL CALCULATED.3IONS-SCNC: 10 MMOL/L (ref 4–13)
AST SERPL W P-5'-P-CCNC: 23 U/L (ref 13–39)
BILIRUB SERPL-MCNC: 0.54 MG/DL (ref 0.2–1)
BUN SERPL-MCNC: 24 MG/DL (ref 5–25)
CALCIUM SERPL-MCNC: 9.1 MG/DL (ref 8.4–10.2)
CHLORIDE SERPL-SCNC: 106 MMOL/L (ref 96–108)
CO2 SERPL-SCNC: 27 MMOL/L (ref 21–32)
CREAT SERPL-MCNC: 1.15 MG/DL (ref 0.6–1.3)
GFR SERPL CREATININE-BSD FRML MDRD: 45 ML/MIN/1.73SQ M
GLUCOSE P FAST SERPL-MCNC: 94 MG/DL (ref 65–99)
POTASSIUM SERPL-SCNC: 4.3 MMOL/L (ref 3.5–5.3)
PROT SERPL-MCNC: 6.7 G/DL (ref 6.4–8.4)
SODIUM SERPL-SCNC: 143 MMOL/L (ref 135–147)

## 2025-08-04 PROCEDURE — 36415 COLL VENOUS BLD VENIPUNCTURE: CPT

## 2025-08-04 PROCEDURE — 80053 COMPREHEN METABOLIC PANEL: CPT

## 2025-08-06 ENCOUNTER — HOSPITAL ENCOUNTER (OUTPATIENT)
Dept: CT IMAGING | Facility: HOSPITAL | Age: 80
Discharge: HOME | End: 2025-08-06
Attending: INTERNAL MEDICINE
Payer: COMMERCIAL

## 2025-08-08 ENCOUNTER — HOSPITAL ENCOUNTER (OUTPATIENT)
Dept: INFUSION CENTER | Facility: HOSPITAL | Age: 80
End: 2025-08-08
Attending: INTERNAL MEDICINE
Payer: COMMERCIAL

## 2025-08-12 ENCOUNTER — OFFICE VISIT (OUTPATIENT)
Dept: HEMATOLOGY ONCOLOGY | Facility: CLINIC | Age: 80
End: 2025-08-12
Payer: COMMERCIAL

## 2025-08-13 ENCOUNTER — DOCUMENTATION (OUTPATIENT)
Dept: HEMATOLOGY ONCOLOGY | Facility: CLINIC | Age: 80
End: 2025-08-13

## 2025-08-15 ENCOUNTER — OFFICE VISIT (OUTPATIENT)
Dept: FAMILY MEDICINE CLINIC | Facility: CLINIC | Age: 80
End: 2025-08-15

## 2025-08-18 ENCOUNTER — TELEPHONE (OUTPATIENT)
Dept: FAMILY MEDICINE CLINIC | Facility: CLINIC | Age: 80
End: 2025-08-18